# Patient Record
Sex: FEMALE | Race: WHITE | Employment: OTHER | URBAN - METROPOLITAN AREA
[De-identification: names, ages, dates, MRNs, and addresses within clinical notes are randomized per-mention and may not be internally consistent; named-entity substitution may affect disease eponyms.]

---

## 2017-07-03 ENCOUNTER — HOSPITAL ENCOUNTER (OUTPATIENT)
Dept: ULTRASOUND IMAGING | Age: 67
Discharge: HOME | End: 2017-07-03
Payer: MEDICARE

## 2017-07-03 DIAGNOSIS — M79.605 PAIN OF LEFT LOWER EXTREMITY: ICD-10-CM

## 2017-07-03 DIAGNOSIS — L53.9 REDNESS: ICD-10-CM

## 2017-07-03 PROCEDURE — 93971 EXTREMITY STUDY: CPT

## 2017-07-05 ENCOUNTER — HOSPITAL ENCOUNTER (INPATIENT)
Age: 67
LOS: 3 days | Discharge: HOME HEALTH | DRG: 580 | End: 2017-07-09
Attending: FAMILY MEDICINE | Admitting: FAMILY MEDICINE
Payer: MEDICARE

## 2017-07-05 ENCOUNTER — ANESTHESIA EVENT (OUTPATIENT)
Dept: OPERATING ROOM | Age: 67
DRG: 580 | End: 2017-07-05
Payer: MEDICARE

## 2017-07-05 LAB
ABSOLUTE BANDS #: 0.34 K/UL (ref 0–1)
ABSOLUTE EOS #: 0.09 K/UL (ref 0–0.4)
ABSOLUTE LYMPH #: 0.85 K/UL (ref 1–4.8)
ABSOLUTE MONO #: 0.51 K/UL (ref 0.1–1.3)
ANION GAP SERPL CALCULATED.3IONS-SCNC: 13 MMOL/L (ref 9–17)
BANDS: 4 %
BASOPHILS # BLD: 0 %
BASOPHILS ABSOLUTE: 0 K/UL (ref 0–0.2)
BUN BLDV-MCNC: 18 MG/DL (ref 8–23)
BUN/CREAT BLD: NORMAL (ref 9–20)
CALCIUM SERPL-MCNC: 9.8 MG/DL (ref 8.6–10.4)
CHLORIDE BLD-SCNC: 98 MMOL/L (ref 98–107)
CO2: 26 MMOL/L (ref 20–31)
CREAT SERPL-MCNC: 0.81 MG/DL (ref 0.5–0.9)
DIFFERENTIAL TYPE: ABNORMAL
EOSINOPHILS RELATIVE PERCENT: 1 %
GFR AFRICAN AMERICAN: >60 ML/MIN
GFR NON-AFRICAN AMERICAN: >60 ML/MIN
GFR SERPL CREATININE-BSD FRML MDRD: NORMAL ML/MIN/{1.73_M2}
GFR SERPL CREATININE-BSD FRML MDRD: NORMAL ML/MIN/{1.73_M2}
GLUCOSE BLD-MCNC: 94 MG/DL (ref 70–99)
HCT VFR BLD CALC: 36.5 % (ref 36–46)
HEMOGLOBIN: 12.3 G/DL (ref 12–16)
LYMPHOCYTES # BLD: 10 %
MCH RBC QN AUTO: 30.8 PG (ref 26–34)
MCHC RBC AUTO-ENTMCNC: 33.8 G/DL (ref 31–37)
MCV RBC AUTO: 91 FL (ref 80–100)
MONOCYTES # BLD: 6 %
MORPHOLOGY: ABNORMAL
PDW BLD-RTO: 15.5 % (ref 11.5–14.9)
PLATELET # BLD: 280 K/UL (ref 150–450)
PLATELET ESTIMATE: ABNORMAL
PMV BLD AUTO: 8.5 FL (ref 6–12)
POTASSIUM SERPL-SCNC: 4.8 MMOL/L (ref 3.7–5.3)
RBC # BLD: 4.01 M/UL (ref 4–5.2)
RBC # BLD: ABNORMAL 10*6/UL
SEG NEUTROPHILS: 79 %
SEGMENTED NEUTROPHILS ABSOLUTE COUNT: 6.71 K/UL (ref 1.3–9.1)
SODIUM BLD-SCNC: 137 MMOL/L (ref 135–144)
WBC # BLD: 8.5 K/UL (ref 3.5–11)
WBC # BLD: ABNORMAL 10*3/UL

## 2017-07-05 PROCEDURE — 87205 SMEAR GRAM STAIN: CPT

## 2017-07-05 PROCEDURE — G0378 HOSPITAL OBSERVATION PER HR: HCPCS

## 2017-07-05 PROCEDURE — 6370000000 HC RX 637 (ALT 250 FOR IP): Performed by: FAMILY MEDICINE

## 2017-07-05 PROCEDURE — 80048 BASIC METABOLIC PNL TOTAL CA: CPT

## 2017-07-05 PROCEDURE — 93005 ELECTROCARDIOGRAM TRACING: CPT

## 2017-07-05 PROCEDURE — 36415 COLL VENOUS BLD VENIPUNCTURE: CPT

## 2017-07-05 PROCEDURE — 87075 CULTR BACTERIA EXCEPT BLOOD: CPT

## 2017-07-05 PROCEDURE — 6360000002 HC RX W HCPCS: Performed by: FAMILY MEDICINE

## 2017-07-05 PROCEDURE — 96365 THER/PROPH/DIAG IV INF INIT: CPT

## 2017-07-05 PROCEDURE — 87070 CULTURE OTHR SPECIMN AEROBIC: CPT

## 2017-07-05 PROCEDURE — 87040 BLOOD CULTURE FOR BACTERIA: CPT

## 2017-07-05 PROCEDURE — 2580000003 HC RX 258: Performed by: FAMILY MEDICINE

## 2017-07-05 PROCEDURE — 85025 COMPLETE CBC W/AUTO DIFF WBC: CPT

## 2017-07-05 PROCEDURE — G0379 DIRECT REFER HOSPITAL OBSERV: HCPCS

## 2017-07-05 PROCEDURE — 96366 THER/PROPH/DIAG IV INF ADDON: CPT

## 2017-07-05 RX ORDER — LORAZEPAM 0.5 MG/1
0.5 TABLET ORAL 2 TIMES DAILY PRN
Status: DISCONTINUED | OUTPATIENT
Start: 2017-07-05 | End: 2017-07-09 | Stop reason: HOSPADM

## 2017-07-05 RX ORDER — ONDANSETRON 2 MG/ML
4 INJECTION INTRAMUSCULAR; INTRAVENOUS EVERY 6 HOURS PRN
Status: DISCONTINUED | OUTPATIENT
Start: 2017-07-05 | End: 2017-07-09 | Stop reason: HOSPADM

## 2017-07-05 RX ORDER — FUROSEMIDE 40 MG/1
40 TABLET ORAL DAILY PRN
Status: DISCONTINUED | OUTPATIENT
Start: 2017-07-05 | End: 2017-07-09 | Stop reason: HOSPADM

## 2017-07-05 RX ORDER — CLONIDINE HYDROCHLORIDE 0.2 MG/1
0.2 TABLET ORAL 2 TIMES DAILY
Status: DISCONTINUED | OUTPATIENT
Start: 2017-07-05 | End: 2017-07-09 | Stop reason: HOSPADM

## 2017-07-05 RX ORDER — OXYCODONE HYDROCHLORIDE AND ACETAMINOPHEN 5; 325 MG/1; MG/1
1 TABLET ORAL EVERY 6 HOURS PRN
Status: DISCONTINUED | OUTPATIENT
Start: 2017-07-05 | End: 2017-07-09 | Stop reason: HOSPADM

## 2017-07-05 RX ORDER — PANTOPRAZOLE SODIUM 40 MG/1
40 TABLET, DELAYED RELEASE ORAL
Status: DISCONTINUED | OUTPATIENT
Start: 2017-07-06 | End: 2017-07-09 | Stop reason: HOSPADM

## 2017-07-05 RX ORDER — OXYCODONE HYDROCHLORIDE 5 MG/1
5 TABLET ORAL EVERY 4 HOURS PRN
Status: DISCONTINUED | OUTPATIENT
Start: 2017-07-05 | End: 2017-07-09 | Stop reason: HOSPADM

## 2017-07-05 RX ORDER — ESCITALOPRAM OXALATE 10 MG/1
5 TABLET ORAL DAILY
Status: DISCONTINUED | OUTPATIENT
Start: 2017-07-05 | End: 2017-07-09 | Stop reason: HOSPADM

## 2017-07-05 RX ORDER — LISINOPRIL 20 MG/1
40 TABLET ORAL DAILY
Status: DISCONTINUED | OUTPATIENT
Start: 2017-07-05 | End: 2017-07-09 | Stop reason: HOSPADM

## 2017-07-05 RX ORDER — NAPROXEN 250 MG/1
500 TABLET ORAL DAILY
Status: DISCONTINUED | OUTPATIENT
Start: 2017-07-05 | End: 2017-07-09 | Stop reason: HOSPADM

## 2017-07-05 RX ORDER — ZOLPIDEM TARTRATE 5 MG/1
5 TABLET ORAL NIGHTLY PRN
Status: DISCONTINUED | OUTPATIENT
Start: 2017-07-05 | End: 2017-07-09 | Stop reason: HOSPADM

## 2017-07-05 RX ADMIN — CLONIDINE HYDROCHLORIDE 0.2 MG: 0.2 TABLET ORAL at 20:02

## 2017-07-05 RX ADMIN — VANCOMYCIN HYDROCHLORIDE 1750 MG: 1 INJECTION, POWDER, LYOPHILIZED, FOR SOLUTION INTRAVENOUS at 20:01

## 2017-07-05 RX ADMIN — METRONIDAZOLE: 7.5 CREAM TOPICAL at 20:03

## 2017-07-05 RX ADMIN — LORAZEPAM 0.5 MG: 0.5 TABLET ORAL at 21:31

## 2017-07-05 ASSESSMENT — PAIN SCALES - GENERAL
PAINLEVEL_OUTOF10: 5
PAINLEVEL_OUTOF10: 5

## 2017-07-05 ASSESSMENT — PAIN DESCRIPTION - LOCATION: LOCATION: LEG

## 2017-07-05 ASSESSMENT — PAIN DESCRIPTION - PAIN TYPE: TYPE: ACUTE PAIN

## 2017-07-06 ENCOUNTER — ANESTHESIA (OUTPATIENT)
Dept: OPERATING ROOM | Age: 67
DRG: 580 | End: 2017-07-06
Payer: MEDICARE

## 2017-07-06 VITALS — SYSTOLIC BLOOD PRESSURE: 173 MMHG | TEMPERATURE: 96.6 F | OXYGEN SATURATION: 96 % | DIASTOLIC BLOOD PRESSURE: 78 MMHG

## 2017-07-06 PROBLEM — L03.116 LEFT LEG CELLULITIS: Status: ACTIVE | Noted: 2017-07-06

## 2017-07-06 PROBLEM — L02.416 ABSCESS OF LEFT LEG EXCLUDING FOOT: Status: ACTIVE | Noted: 2017-07-06

## 2017-07-06 LAB
CREAT SERPL-MCNC: 0.83 MG/DL (ref 0.5–0.9)
EKG ATRIAL RATE: 88 BPM
EKG P AXIS: 37 DEGREES
EKG P-R INTERVAL: 162 MS
EKG Q-T INTERVAL: 350 MS
EKG QRS DURATION: 76 MS
EKG QTC CALCULATION (BAZETT): 423 MS
EKG R AXIS: -14 DEGREES
EKG T AXIS: 34 DEGREES
EKG VENTRICULAR RATE: 88 BPM
GFR AFRICAN AMERICAN: >60 ML/MIN
GFR NON-AFRICAN AMERICAN: >60 ML/MIN
GFR SERPL CREATININE-BSD FRML MDRD: NORMAL ML/MIN/{1.73_M2}
GFR SERPL CREATININE-BSD FRML MDRD: NORMAL ML/MIN/{1.73_M2}

## 2017-07-06 PROCEDURE — 87075 CULTR BACTERIA EXCEPT BLOOD: CPT

## 2017-07-06 PROCEDURE — 6370000000 HC RX 637 (ALT 250 FOR IP): Performed by: FAMILY MEDICINE

## 2017-07-06 PROCEDURE — 2580000003 HC RX 258: Performed by: FAMILY MEDICINE

## 2017-07-06 PROCEDURE — 6360000002 HC RX W HCPCS: Performed by: FAMILY MEDICINE

## 2017-07-06 PROCEDURE — 3700000000 HC ANESTHESIA ATTENDED CARE: Performed by: SURGERY

## 2017-07-06 PROCEDURE — 3600000002 HC SURGERY LEVEL 2 BASE: Performed by: SURGERY

## 2017-07-06 PROCEDURE — 7100000001 HC PACU RECOVERY - ADDTL 15 MIN: Performed by: SURGERY

## 2017-07-06 PROCEDURE — 0J9M0ZZ DRAINAGE OF LEFT UPPER LEG SUBCUTANEOUS TISSUE AND FASCIA, OPEN APPROACH: ICD-10-PCS | Performed by: SURGERY

## 2017-07-06 PROCEDURE — 36415 COLL VENOUS BLD VENIPUNCTURE: CPT

## 2017-07-06 PROCEDURE — 7100000000 HC PACU RECOVERY - FIRST 15 MIN: Performed by: SURGERY

## 2017-07-06 PROCEDURE — 82565 ASSAY OF CREATININE: CPT

## 2017-07-06 PROCEDURE — 3700000001 HC ADD 15 MINUTES (ANESTHESIA): Performed by: SURGERY

## 2017-07-06 PROCEDURE — 2580000003 HC RX 258: Performed by: ANESTHESIOLOGY

## 2017-07-06 PROCEDURE — 99222 1ST HOSP IP/OBS MODERATE 55: CPT | Performed by: FAMILY MEDICINE

## 2017-07-06 PROCEDURE — 1200000000 HC SEMI PRIVATE

## 2017-07-06 PROCEDURE — 86403 PARTICLE AGGLUT ANTBDY SCRN: CPT

## 2017-07-06 PROCEDURE — 2500000003 HC RX 250 WO HCPCS: Performed by: ANESTHESIOLOGY

## 2017-07-06 PROCEDURE — 87205 SMEAR GRAM STAIN: CPT

## 2017-07-06 PROCEDURE — 87186 SC STD MICRODIL/AGAR DIL: CPT

## 2017-07-06 PROCEDURE — 6370000000 HC RX 637 (ALT 250 FOR IP): Performed by: SURGERY

## 2017-07-06 PROCEDURE — 87070 CULTURE OTHR SPECIMN AEROBIC: CPT

## 2017-07-06 PROCEDURE — 3600000012 HC SURGERY LEVEL 2 ADDTL 15MIN: Performed by: SURGERY

## 2017-07-06 PROCEDURE — 6360000002 HC RX W HCPCS: Performed by: ANESTHESIOLOGY

## 2017-07-06 RX ORDER — MIDAZOLAM HYDROCHLORIDE 1 MG/ML
INJECTION INTRAMUSCULAR; INTRAVENOUS PRN
Status: DISCONTINUED | OUTPATIENT
Start: 2017-07-06 | End: 2017-07-06 | Stop reason: SDUPTHER

## 2017-07-06 RX ORDER — DIPHENHYDRAMINE HYDROCHLORIDE 50 MG/ML
12.5 INJECTION INTRAMUSCULAR; INTRAVENOUS
Status: DISCONTINUED | OUTPATIENT
Start: 2017-07-06 | End: 2017-07-06 | Stop reason: HOSPADM

## 2017-07-06 RX ORDER — ONDANSETRON 2 MG/ML
4 INJECTION INTRAMUSCULAR; INTRAVENOUS
Status: DISCONTINUED | OUTPATIENT
Start: 2017-07-06 | End: 2017-07-06 | Stop reason: HOSPADM

## 2017-07-06 RX ORDER — PROPOFOL 10 MG/ML
INJECTION, EMULSION INTRAVENOUS PRN
Status: DISCONTINUED | OUTPATIENT
Start: 2017-07-06 | End: 2017-07-06 | Stop reason: SDUPTHER

## 2017-07-06 RX ORDER — SODIUM CHLORIDE, SODIUM LACTATE, POTASSIUM CHLORIDE, CALCIUM CHLORIDE 600; 310; 30; 20 MG/100ML; MG/100ML; MG/100ML; MG/100ML
INJECTION, SOLUTION INTRAVENOUS CONTINUOUS PRN
Status: DISCONTINUED | OUTPATIENT
Start: 2017-07-06 | End: 2017-07-06 | Stop reason: SDUPTHER

## 2017-07-06 RX ORDER — LIDOCAINE HYDROCHLORIDE 10 MG/ML
INJECTION, SOLUTION EPIDURAL; INFILTRATION; INTRACAUDAL; PERINEURAL PRN
Status: DISCONTINUED | OUTPATIENT
Start: 2017-07-06 | End: 2017-07-06 | Stop reason: SDUPTHER

## 2017-07-06 RX ORDER — MORPHINE SULFATE 2 MG/ML
2 INJECTION, SOLUTION INTRAMUSCULAR; INTRAVENOUS EVERY 5 MIN PRN
Status: DISCONTINUED | OUTPATIENT
Start: 2017-07-06 | End: 2017-07-06 | Stop reason: HOSPADM

## 2017-07-06 RX ORDER — LABETALOL HYDROCHLORIDE 5 MG/ML
5 INJECTION, SOLUTION INTRAVENOUS EVERY 10 MIN PRN
Status: DISCONTINUED | OUTPATIENT
Start: 2017-07-06 | End: 2017-07-06 | Stop reason: HOSPADM

## 2017-07-06 RX ORDER — OXYCODONE HYDROCHLORIDE AND ACETAMINOPHEN 5; 325 MG/1; MG/1
1 TABLET ORAL EVERY 4 HOURS PRN
Status: DISCONTINUED | OUTPATIENT
Start: 2017-07-06 | End: 2017-07-09 | Stop reason: HOSPADM

## 2017-07-06 RX ORDER — ONDANSETRON 2 MG/ML
INJECTION INTRAMUSCULAR; INTRAVENOUS PRN
Status: DISCONTINUED | OUTPATIENT
Start: 2017-07-06 | End: 2017-07-06 | Stop reason: SDUPTHER

## 2017-07-06 RX ORDER — SUCCINYLCHOLINE CHLORIDE 20 MG/ML
INJECTION INTRAMUSCULAR; INTRAVENOUS PRN
Status: DISCONTINUED | OUTPATIENT
Start: 2017-07-06 | End: 2017-07-06 | Stop reason: SDUPTHER

## 2017-07-06 RX ORDER — ROCURONIUM BROMIDE 10 MG/ML
INJECTION, SOLUTION INTRAVENOUS PRN
Status: DISCONTINUED | OUTPATIENT
Start: 2017-07-06 | End: 2017-07-06 | Stop reason: SDUPTHER

## 2017-07-06 RX ORDER — FENTANYL CITRATE 50 UG/ML
INJECTION, SOLUTION INTRAMUSCULAR; INTRAVENOUS PRN
Status: DISCONTINUED | OUTPATIENT
Start: 2017-07-06 | End: 2017-07-06 | Stop reason: SDUPTHER

## 2017-07-06 RX ADMIN — ONDANSETRON 4 MG: 2 INJECTION INTRAMUSCULAR; INTRAVENOUS at 06:19

## 2017-07-06 RX ADMIN — LIDOCAINE HYDROCHLORIDE 50 MG: 10 INJECTION, SOLUTION EPIDURAL; INFILTRATION; INTRACAUDAL; PERINEURAL at 06:08

## 2017-07-06 RX ADMIN — OXYCODONE HYDROCHLORIDE AND ACETAMINOPHEN 1 TABLET: 5; 325 TABLET ORAL at 21:35

## 2017-07-06 RX ADMIN — OXYBUTYNIN CHLORIDE 15 MG: 10 TABLET, FILM COATED, EXTENDED RELEASE ORAL at 08:28

## 2017-07-06 RX ADMIN — OXYCODONE HYDROCHLORIDE 5 MG: 5 TABLET ORAL at 08:24

## 2017-07-06 RX ADMIN — FENTANYL CITRATE 100 MCG: 50 INJECTION, SOLUTION INTRAMUSCULAR; INTRAVENOUS at 06:08

## 2017-07-06 RX ADMIN — MIDAZOLAM 2 MG: 1 INJECTION INTRAMUSCULAR; INTRAVENOUS at 06:08

## 2017-07-06 RX ADMIN — LISINOPRIL 40 MG: 20 TABLET ORAL at 08:23

## 2017-07-06 RX ADMIN — ROCURONIUM BROMIDE 5 MG: 10 INJECTION INTRAVENOUS at 06:08

## 2017-07-06 RX ADMIN — SODIUM CHLORIDE, POTASSIUM CHLORIDE, SODIUM LACTATE AND CALCIUM CHLORIDE: 600; 310; 30; 20 INJECTION, SOLUTION INTRAVENOUS at 06:03

## 2017-07-06 RX ADMIN — ENOXAPARIN SODIUM 30 MG: 30 INJECTION SUBCUTANEOUS at 21:34

## 2017-07-06 RX ADMIN — SUCCINYLCHOLINE CHLORIDE 140 MG: 20 INJECTION, SOLUTION INTRAMUSCULAR; INTRAVENOUS at 06:08

## 2017-07-06 RX ADMIN — ESCITALOPRAM OXALATE 5 MG: 10 TABLET ORAL at 08:23

## 2017-07-06 RX ADMIN — LORAZEPAM 0.5 MG: 0.5 TABLET ORAL at 21:35

## 2017-07-06 RX ADMIN — OXYCODONE HYDROCHLORIDE 5 MG: 5 TABLET ORAL at 21:35

## 2017-07-06 RX ADMIN — PROPOFOL 150 MG: 10 INJECTION, EMULSION INTRAVENOUS at 06:08

## 2017-07-06 RX ADMIN — VANCOMYCIN HYDROCHLORIDE 1250 MG: 1 INJECTION, POWDER, LYOPHILIZED, FOR SOLUTION INTRAVENOUS at 10:36

## 2017-07-06 RX ADMIN — CLONIDINE HYDROCHLORIDE 0.2 MG: 0.2 TABLET ORAL at 21:34

## 2017-07-06 RX ADMIN — VANCOMYCIN HYDROCHLORIDE 1250 MG: 1 INJECTION, POWDER, LYOPHILIZED, FOR SOLUTION INTRAVENOUS at 21:35

## 2017-07-06 RX ADMIN — ROCURONIUM BROMIDE 5 MG: 10 INJECTION INTRAVENOUS at 06:15

## 2017-07-06 RX ADMIN — OXYCODONE HYDROCHLORIDE AND ACETAMINOPHEN 1 TABLET: 5; 325 TABLET ORAL at 08:24

## 2017-07-06 ASSESSMENT — PAIN DESCRIPTION - PROGRESSION
CLINICAL_PROGRESSION: GRADUALLY IMPROVING
CLINICAL_PROGRESSION: GRADUALLY IMPROVING

## 2017-07-06 ASSESSMENT — PAIN SCALES - GENERAL
PAINLEVEL_OUTOF10: 2
PAINLEVEL_OUTOF10: 6
PAINLEVEL_OUTOF10: 0
PAINLEVEL_OUTOF10: 5

## 2017-07-06 ASSESSMENT — PAIN DESCRIPTION - LOCATION: LOCATION: LEG

## 2017-07-06 ASSESSMENT — PAIN DESCRIPTION - ONSET: ONSET: GRADUAL

## 2017-07-06 ASSESSMENT — PAIN DESCRIPTION - ORIENTATION: ORIENTATION: LEFT

## 2017-07-06 ASSESSMENT — PAIN DESCRIPTION - FREQUENCY: FREQUENCY: INTERMITTENT

## 2017-07-06 ASSESSMENT — PAIN DESCRIPTION - PAIN TYPE: TYPE: SURGICAL PAIN

## 2017-07-07 LAB
ABSOLUTE EOS #: 0.4 K/UL (ref 0–0.4)
ABSOLUTE LYMPH #: 0.8 K/UL (ref 1–4.8)
ABSOLUTE MONO #: 0.5 K/UL (ref 0.1–1.3)
ANION GAP SERPL CALCULATED.3IONS-SCNC: 10 MMOL/L (ref 9–17)
BASOPHILS # BLD: 1 %
BASOPHILS ABSOLUTE: 0 K/UL (ref 0–0.2)
BUN BLDV-MCNC: 19 MG/DL (ref 8–23)
BUN/CREAT BLD: ABNORMAL (ref 9–20)
CALCIUM SERPL-MCNC: 9 MG/DL (ref 8.6–10.4)
CHLORIDE BLD-SCNC: 98 MMOL/L (ref 98–107)
CO2: 25 MMOL/L (ref 20–31)
CREAT SERPL-MCNC: 1.02 MG/DL (ref 0.5–0.9)
DIFFERENTIAL TYPE: ABNORMAL
EOSINOPHILS RELATIVE PERCENT: 6 %
GFR AFRICAN AMERICAN: >60 ML/MIN
GFR NON-AFRICAN AMERICAN: 54 ML/MIN
GFR SERPL CREATININE-BSD FRML MDRD: ABNORMAL ML/MIN/{1.73_M2}
GFR SERPL CREATININE-BSD FRML MDRD: ABNORMAL ML/MIN/{1.73_M2}
GLUCOSE BLD-MCNC: 91 MG/DL (ref 70–99)
HCT VFR BLD CALC: 35.5 % (ref 36–46)
HEMOGLOBIN: 11.7 G/DL (ref 12–16)
LYMPHOCYTES # BLD: 12 %
MCH RBC QN AUTO: 30.5 PG (ref 26–34)
MCHC RBC AUTO-ENTMCNC: 32.9 G/DL (ref 31–37)
MCV RBC AUTO: 92.7 FL (ref 80–100)
MONOCYTES # BLD: 7 %
PDW BLD-RTO: 15.2 % (ref 11.5–14.9)
PLATELET # BLD: 272 K/UL (ref 150–450)
PLATELET ESTIMATE: ABNORMAL
PMV BLD AUTO: 8 FL (ref 6–12)
POTASSIUM SERPL-SCNC: 4.9 MMOL/L (ref 3.7–5.3)
RBC # BLD: 3.83 M/UL (ref 4–5.2)
RBC # BLD: ABNORMAL 10*6/UL
SEG NEUTROPHILS: 74 %
SEGMENTED NEUTROPHILS ABSOLUTE COUNT: 5.1 K/UL (ref 1.3–9.1)
SODIUM BLD-SCNC: 133 MMOL/L (ref 135–144)
VANCOMYCIN RANDOM DATE LAST DOSE: NORMAL
VANCOMYCIN RANDOM DOSE AMOUNT: NORMAL
VANCOMYCIN RANDOM TIME LAST DOSE: 2135
VANCOMYCIN RANDOM: 19.3 UG/ML
VANCOMYCIN TROUGH DATE LAST DOSE: ABNORMAL
VANCOMYCIN TROUGH DOSE AMOUNT: ABNORMAL
VANCOMYCIN TROUGH TIME LAST DOSE: 2135
VANCOMYCIN TROUGH: 22.7 UG/ML (ref 10–20)
WBC # BLD: 6.9 K/UL (ref 3.5–11)
WBC # BLD: ABNORMAL 10*3/UL

## 2017-07-07 PROCEDURE — 99232 SBSQ HOSP IP/OBS MODERATE 35: CPT | Performed by: FAMILY MEDICINE

## 2017-07-07 PROCEDURE — 6370000000 HC RX 637 (ALT 250 FOR IP): Performed by: SURGERY

## 2017-07-07 PROCEDURE — 85025 COMPLETE CBC W/AUTO DIFF WBC: CPT

## 2017-07-07 PROCEDURE — 80048 BASIC METABOLIC PNL TOTAL CA: CPT

## 2017-07-07 PROCEDURE — 6360000002 HC RX W HCPCS: Performed by: FAMILY MEDICINE

## 2017-07-07 PROCEDURE — 36415 COLL VENOUS BLD VENIPUNCTURE: CPT

## 2017-07-07 PROCEDURE — 6370000000 HC RX 637 (ALT 250 FOR IP): Performed by: FAMILY MEDICINE

## 2017-07-07 PROCEDURE — 80202 ASSAY OF VANCOMYCIN: CPT

## 2017-07-07 PROCEDURE — 99231 SBSQ HOSP IP/OBS SF/LOW 25: CPT | Performed by: NURSE PRACTITIONER

## 2017-07-07 PROCEDURE — 1200000000 HC SEMI PRIVATE

## 2017-07-07 RX ADMIN — OXYBUTYNIN CHLORIDE 15 MG: 10 TABLET, FILM COATED, EXTENDED RELEASE ORAL at 08:47

## 2017-07-07 RX ADMIN — OXYCODONE HYDROCHLORIDE 5 MG: 5 TABLET ORAL at 20:49

## 2017-07-07 RX ADMIN — PANTOPRAZOLE SODIUM 40 MG: 40 TABLET, DELAYED RELEASE ORAL at 06:19

## 2017-07-07 RX ADMIN — CLONIDINE HYDROCHLORIDE 0.2 MG: 0.2 TABLET ORAL at 08:47

## 2017-07-07 RX ADMIN — OXYCODONE HYDROCHLORIDE AND ACETAMINOPHEN 1 TABLET: 5; 325 TABLET ORAL at 20:49

## 2017-07-07 RX ADMIN — OXYCODONE HYDROCHLORIDE 5 MG: 5 TABLET ORAL at 06:19

## 2017-07-07 RX ADMIN — ENOXAPARIN SODIUM 30 MG: 30 INJECTION SUBCUTANEOUS at 20:50

## 2017-07-07 RX ADMIN — NAPROXEN 250 MG: 250 TABLET ORAL at 08:49

## 2017-07-07 RX ADMIN — LISINOPRIL 40 MG: 20 TABLET ORAL at 08:46

## 2017-07-07 RX ADMIN — ENOXAPARIN SODIUM 30 MG: 30 INJECTION SUBCUTANEOUS at 08:46

## 2017-07-07 RX ADMIN — CLONIDINE HYDROCHLORIDE 0.2 MG: 0.2 TABLET ORAL at 20:50

## 2017-07-07 RX ADMIN — ESCITALOPRAM OXALATE 5 MG: 10 TABLET ORAL at 08:46

## 2017-07-07 RX ADMIN — OXYCODONE HYDROCHLORIDE AND ACETAMINOPHEN 1 TABLET: 5; 325 TABLET ORAL at 06:19

## 2017-07-07 ASSESSMENT — PAIN DESCRIPTION - ONSET: ONSET: GRADUAL

## 2017-07-07 ASSESSMENT — PAIN DESCRIPTION - PROGRESSION

## 2017-07-07 ASSESSMENT — PAIN SCALES - GENERAL
PAINLEVEL_OUTOF10: 4
PAINLEVEL_OUTOF10: 5
PAINLEVEL_OUTOF10: 1
PAINLEVEL_OUTOF10: 1
PAINLEVEL_OUTOF10: 5
PAINLEVEL_OUTOF10: 6
PAINLEVEL_OUTOF10: 3

## 2017-07-07 ASSESSMENT — PAIN DESCRIPTION - PAIN TYPE
TYPE: SURGICAL PAIN

## 2017-07-07 ASSESSMENT — PAIN DESCRIPTION - FREQUENCY
FREQUENCY: INTERMITTENT

## 2017-07-07 ASSESSMENT — PAIN DESCRIPTION - ORIENTATION
ORIENTATION: LEFT

## 2017-07-07 ASSESSMENT — PAIN DESCRIPTION - LOCATION
LOCATION: LEG

## 2017-07-07 ASSESSMENT — PAIN DESCRIPTION - DESCRIPTORS
DESCRIPTORS: ACHING
DESCRIPTORS: SORE

## 2017-07-08 LAB
CREAT SERPL-MCNC: 1.25 MG/DL (ref 0.5–0.9)
GFR AFRICAN AMERICAN: 52 ML/MIN
GFR NON-AFRICAN AMERICAN: 43 ML/MIN
GFR SERPL CREATININE-BSD FRML MDRD: ABNORMAL ML/MIN/{1.73_M2}
GFR SERPL CREATININE-BSD FRML MDRD: ABNORMAL ML/MIN/{1.73_M2}
VANCOMYCIN TROUGH DATE LAST DOSE: NORMAL
VANCOMYCIN TROUGH DOSE AMOUNT: NORMAL
VANCOMYCIN TROUGH TIME LAST DOSE: 2135
VANCOMYCIN TROUGH: 12 UG/ML (ref 10–20)

## 2017-07-08 PROCEDURE — 99232 SBSQ HOSP IP/OBS MODERATE 35: CPT | Performed by: FAMILY MEDICINE

## 2017-07-08 PROCEDURE — 80202 ASSAY OF VANCOMYCIN: CPT

## 2017-07-08 PROCEDURE — 6370000000 HC RX 637 (ALT 250 FOR IP): Performed by: FAMILY MEDICINE

## 2017-07-08 PROCEDURE — 36415 COLL VENOUS BLD VENIPUNCTURE: CPT

## 2017-07-08 PROCEDURE — 2580000003 HC RX 258: Performed by: SURGERY

## 2017-07-08 PROCEDURE — 6360000002 HC RX W HCPCS: Performed by: FAMILY MEDICINE

## 2017-07-08 PROCEDURE — 6360000002 HC RX W HCPCS: Performed by: SURGERY

## 2017-07-08 PROCEDURE — 1200000000 HC SEMI PRIVATE

## 2017-07-08 PROCEDURE — 82565 ASSAY OF CREATININE: CPT

## 2017-07-08 RX ORDER — SODIUM CHLORIDE 9 MG/ML
INJECTION, SOLUTION INTRAVENOUS CONTINUOUS
Status: DISCONTINUED | OUTPATIENT
Start: 2017-07-08 | End: 2017-07-09

## 2017-07-08 RX ORDER — DOCUSATE SODIUM 100 MG/1
100 CAPSULE, LIQUID FILLED ORAL 2 TIMES DAILY
Status: DISCONTINUED | OUTPATIENT
Start: 2017-07-08 | End: 2017-07-09 | Stop reason: HOSPADM

## 2017-07-08 RX ADMIN — METRONIDAZOLE: 7.5 CREAM TOPICAL at 23:13

## 2017-07-08 RX ADMIN — SODIUM CHLORIDE: 9 INJECTION, SOLUTION INTRAVENOUS at 13:20

## 2017-07-08 RX ADMIN — CLONIDINE HYDROCHLORIDE 0.2 MG: 0.2 TABLET ORAL at 23:14

## 2017-07-08 RX ADMIN — PANTOPRAZOLE SODIUM 40 MG: 40 TABLET, DELAYED RELEASE ORAL at 06:06

## 2017-07-08 RX ADMIN — DOCUSATE SODIUM 100 MG: 100 CAPSULE, LIQUID FILLED ORAL at 23:14

## 2017-07-08 RX ADMIN — ENOXAPARIN SODIUM 30 MG: 30 INJECTION SUBCUTANEOUS at 09:50

## 2017-07-08 RX ADMIN — CEFAZOLIN 1 G: 1 INJECTION, POWDER, FOR SOLUTION INTRAMUSCULAR; INTRAVENOUS at 13:20

## 2017-07-08 RX ADMIN — DOCUSATE SODIUM 100 MG: 100 CAPSULE, LIQUID FILLED ORAL at 15:56

## 2017-07-08 RX ADMIN — OXYBUTYNIN CHLORIDE 15 MG: 10 TABLET, FILM COATED, EXTENDED RELEASE ORAL at 09:48

## 2017-07-08 RX ADMIN — ENOXAPARIN SODIUM 30 MG: 30 INJECTION SUBCUTANEOUS at 23:14

## 2017-07-08 RX ADMIN — OXYCODONE HYDROCHLORIDE 5 MG: 5 TABLET ORAL at 11:43

## 2017-07-08 RX ADMIN — OXYCODONE HYDROCHLORIDE AND ACETAMINOPHEN 1 TABLET: 5; 325 TABLET ORAL at 11:43

## 2017-07-08 RX ADMIN — LISINOPRIL 40 MG: 20 TABLET ORAL at 09:48

## 2017-07-08 RX ADMIN — NAPROXEN 500 MG: 250 TABLET ORAL at 09:48

## 2017-07-08 RX ADMIN — CEFAZOLIN 1 G: 1 INJECTION, POWDER, FOR SOLUTION INTRAMUSCULAR; INTRAVENOUS at 23:13

## 2017-07-08 RX ADMIN — ESCITALOPRAM OXALATE 5 MG: 10 TABLET ORAL at 11:45

## 2017-07-08 RX ADMIN — CLONIDINE HYDROCHLORIDE 0.2 MG: 0.2 TABLET ORAL at 09:48

## 2017-07-08 RX ADMIN — FUROSEMIDE 40 MG: 40 TABLET ORAL at 09:48

## 2017-07-08 ASSESSMENT — PAIN DESCRIPTION - PROGRESSION

## 2017-07-08 ASSESSMENT — PAIN DESCRIPTION - ORIENTATION
ORIENTATION: LEFT;INNER;UPPER
ORIENTATION: LEFT;INNER;UPPER

## 2017-07-08 ASSESSMENT — PAIN SCALES - GENERAL
PAINLEVEL_OUTOF10: 6
PAINLEVEL_OUTOF10: 5
PAINLEVEL_OUTOF10: 6

## 2017-07-08 ASSESSMENT — PAIN DESCRIPTION - LOCATION
LOCATION: OTHER (COMMENT)
LOCATION: OTHER (COMMENT)

## 2017-07-08 ASSESSMENT — PAIN DESCRIPTION - PAIN TYPE: TYPE: SURGICAL PAIN

## 2017-07-09 VITALS
HEART RATE: 57 BPM | SYSTOLIC BLOOD PRESSURE: 140 MMHG | DIASTOLIC BLOOD PRESSURE: 54 MMHG | RESPIRATION RATE: 12 BRPM | OXYGEN SATURATION: 99 % | TEMPERATURE: 97.9 F | WEIGHT: 289.24 LBS | BODY MASS INDEX: 53.23 KG/M2 | HEIGHT: 62 IN

## 2017-07-09 LAB
CREAT SERPL-MCNC: 1.01 MG/DL (ref 0.5–0.9)
GFR AFRICAN AMERICAN: >60 ML/MIN
GFR NON-AFRICAN AMERICAN: 55 ML/MIN
GFR SERPL CREATININE-BSD FRML MDRD: ABNORMAL ML/MIN/{1.73_M2}
GFR SERPL CREATININE-BSD FRML MDRD: ABNORMAL ML/MIN/{1.73_M2}

## 2017-07-09 PROCEDURE — 6370000000 HC RX 637 (ALT 250 FOR IP): Performed by: FAMILY MEDICINE

## 2017-07-09 PROCEDURE — 36415 COLL VENOUS BLD VENIPUNCTURE: CPT

## 2017-07-09 PROCEDURE — 2580000003 HC RX 258: Performed by: SURGERY

## 2017-07-09 PROCEDURE — 99239 HOSP IP/OBS DSCHRG MGMT >30: CPT | Performed by: FAMILY MEDICINE

## 2017-07-09 PROCEDURE — 82565 ASSAY OF CREATININE: CPT

## 2017-07-09 PROCEDURE — 6360000002 HC RX W HCPCS: Performed by: FAMILY MEDICINE

## 2017-07-09 PROCEDURE — 6370000000 HC RX 637 (ALT 250 FOR IP): Performed by: SURGERY

## 2017-07-09 PROCEDURE — 6360000002 HC RX W HCPCS: Performed by: SURGERY

## 2017-07-09 RX ORDER — PSEUDOEPHEDRINE HCL 30 MG
100 TABLET ORAL 2 TIMES DAILY
Qty: 60 CAPSULE | Refills: 0 | Status: SHIPPED | OUTPATIENT
Start: 2017-07-09 | End: 2019-03-14 | Stop reason: ALTCHOICE

## 2017-07-09 RX ORDER — CEPHALEXIN 500 MG/1
500 CAPSULE ORAL EVERY 8 HOURS SCHEDULED
Status: DISCONTINUED | OUTPATIENT
Start: 2017-07-09 | End: 2017-07-09 | Stop reason: HOSPADM

## 2017-07-09 RX ORDER — HYDROCODONE BITARTRATE AND ACETAMINOPHEN 5; 325 MG/1; MG/1
1 TABLET ORAL EVERY 6 HOURS PRN
Qty: 30 TABLET | Refills: 0
Start: 2017-07-09 | End: 2017-07-16

## 2017-07-09 RX ORDER — CEPHALEXIN 500 MG/1
500 CAPSULE ORAL 3 TIMES DAILY
Qty: 30 CAPSULE | Refills: 0 | Status: SHIPPED | OUTPATIENT
Start: 2017-07-09 | End: 2017-07-19

## 2017-07-09 RX ADMIN — ESCITALOPRAM OXALATE 5 MG: 10 TABLET ORAL at 10:02

## 2017-07-09 RX ADMIN — PANTOPRAZOLE SODIUM 40 MG: 40 TABLET, DELAYED RELEASE ORAL at 05:28

## 2017-07-09 RX ADMIN — FUROSEMIDE 40 MG: 40 TABLET ORAL at 10:02

## 2017-07-09 RX ADMIN — SODIUM CHLORIDE: 9 INJECTION, SOLUTION INTRAVENOUS at 10:10

## 2017-07-09 RX ADMIN — OXYCODONE HYDROCHLORIDE 5 MG: 5 TABLET ORAL at 11:41

## 2017-07-09 RX ADMIN — OXYBUTYNIN CHLORIDE 15 MG: 10 TABLET, FILM COATED, EXTENDED RELEASE ORAL at 11:40

## 2017-07-09 RX ADMIN — DOCUSATE SODIUM 100 MG: 100 CAPSULE, LIQUID FILLED ORAL at 10:02

## 2017-07-09 RX ADMIN — ENOXAPARIN SODIUM 30 MG: 30 INJECTION SUBCUTANEOUS at 10:03

## 2017-07-09 RX ADMIN — LISINOPRIL 40 MG: 20 TABLET ORAL at 10:03

## 2017-07-09 RX ADMIN — CLONIDINE HYDROCHLORIDE 0.2 MG: 0.2 TABLET ORAL at 10:02

## 2017-07-09 RX ADMIN — CEPHALEXIN 500 MG: 500 CAPSULE ORAL at 13:50

## 2017-07-09 RX ADMIN — NAPROXEN 500 MG: 250 TABLET ORAL at 10:03

## 2017-07-09 RX ADMIN — OXYCODONE HYDROCHLORIDE AND ACETAMINOPHEN 1 TABLET: 5; 325 TABLET ORAL at 11:41

## 2017-07-09 RX ADMIN — CEFAZOLIN 1 G: 1 INJECTION, POWDER, FOR SOLUTION INTRAMUSCULAR; INTRAVENOUS at 05:28

## 2017-07-09 ASSESSMENT — PAIN SCALES - GENERAL
PAINLEVEL_OUTOF10: 6
PAINLEVEL_OUTOF10: 5

## 2017-07-09 ASSESSMENT — PAIN DESCRIPTION - LOCATION: LOCATION: OTHER (COMMENT)

## 2017-07-09 ASSESSMENT — PAIN DESCRIPTION - ORIENTATION: ORIENTATION: LEFT

## 2017-07-09 ASSESSMENT — PAIN DESCRIPTION - PAIN TYPE: TYPE: SURGICAL PAIN

## 2017-07-10 ENCOUNTER — HOSPITAL ENCOUNTER (OUTPATIENT)
Dept: ULTRASOUND IMAGING | Age: 67
Discharge: HOME | End: 2017-07-10
Payer: MEDICARE

## 2017-07-10 LAB
CULTURE: NORMAL
CULTURE: NORMAL
DIRECT EXAM: NORMAL
DIRECT EXAM: NORMAL
Lab: NORMAL
SPECIMEN DESCRIPTION: NORMAL
SPECIMEN DESCRIPTION: NORMAL
STATUS: NORMAL

## 2017-07-11 LAB
CULTURE: ABNORMAL
DIRECT EXAM: ABNORMAL
DIRECT EXAM: ABNORMAL
Lab: ABNORMAL
ORGANISM: ABNORMAL
SPECIMEN DESCRIPTION: ABNORMAL
SPECIMEN DESCRIPTION: ABNORMAL
STATUS: ABNORMAL

## 2017-07-12 LAB
CULTURE: NORMAL
CULTURE: NORMAL
Lab: NORMAL
Lab: NORMAL
SPECIMEN DESCRIPTION: NORMAL
SPECIMEN DESCRIPTION: NORMAL
STATUS: NORMAL

## 2017-07-17 ENCOUNTER — HOSPITAL ENCOUNTER (OUTPATIENT)
Age: 67
Setting detail: SPECIMEN
Discharge: HOME | End: 2017-07-17
Payer: MEDICARE

## 2017-07-17 LAB
ABSOLUTE EOS #: 0.2 K/UL (ref 0–0.4)
ABSOLUTE LYMPH #: 1 K/UL (ref 1–4.8)
ABSOLUTE MONO #: 0.5 K/UL (ref 0.1–1.3)
ANION GAP SERPL CALCULATED.3IONS-SCNC: 14 MMOL/L (ref 9–17)
BASOPHILS # BLD: 1 %
BASOPHILS ABSOLUTE: 0.1 K/UL (ref 0–0.2)
BUN BLDV-MCNC: 20 MG/DL (ref 8–23)
BUN/CREAT BLD: ABNORMAL (ref 9–20)
CALCIUM SERPL-MCNC: 9.4 MG/DL (ref 8.6–10.4)
CHLORIDE BLD-SCNC: 99 MMOL/L (ref 98–107)
CO2: 27 MMOL/L (ref 20–31)
CREAT SERPL-MCNC: 0.88 MG/DL (ref 0.5–0.9)
DIFFERENTIAL TYPE: ABNORMAL
EOSINOPHILS RELATIVE PERCENT: 4 %
GFR AFRICAN AMERICAN: >60 ML/MIN
GFR NON-AFRICAN AMERICAN: >60 ML/MIN
GFR SERPL CREATININE-BSD FRML MDRD: ABNORMAL ML/MIN/{1.73_M2}
GFR SERPL CREATININE-BSD FRML MDRD: ABNORMAL ML/MIN/{1.73_M2}
GLUCOSE BLD-MCNC: 67 MG/DL (ref 70–99)
HCT VFR BLD CALC: 35.2 % (ref 36–46)
HEMOGLOBIN: 11.6 G/DL (ref 12–16)
LYMPHOCYTES # BLD: 22 %
MCH RBC QN AUTO: 30 PG (ref 26–34)
MCHC RBC AUTO-ENTMCNC: 32.9 G/DL (ref 31–37)
MCV RBC AUTO: 91 FL (ref 80–100)
MONOCYTES # BLD: 11 %
PDW BLD-RTO: 14.8 % (ref 11.5–14.9)
PLATELET # BLD: 329 K/UL (ref 150–450)
PLATELET ESTIMATE: ABNORMAL
PMV BLD AUTO: 9 FL (ref 6–12)
POTASSIUM SERPL-SCNC: 4.5 MMOL/L (ref 3.7–5.3)
RBC # BLD: 3.87 M/UL (ref 4–5.2)
RBC # BLD: ABNORMAL 10*6/UL
SEG NEUTROPHILS: 62 %
SEGMENTED NEUTROPHILS ABSOLUTE COUNT: 2.9 K/UL (ref 1.3–9.1)
SODIUM BLD-SCNC: 140 MMOL/L (ref 135–144)
WBC # BLD: 4.7 K/UL (ref 3.5–11)
WBC # BLD: ABNORMAL 10*3/UL

## 2017-07-17 PROCEDURE — 36415 COLL VENOUS BLD VENIPUNCTURE: CPT

## 2017-07-17 PROCEDURE — 85025 COMPLETE CBC W/AUTO DIFF WBC: CPT

## 2017-07-17 PROCEDURE — 80048 BASIC METABOLIC PNL TOTAL CA: CPT

## 2017-09-05 ENCOUNTER — HOSPITAL ENCOUNTER (OUTPATIENT)
Dept: ULTRASOUND IMAGING | Age: 67
Discharge: HOME | End: 2017-09-05
Payer: MEDICARE

## 2017-09-05 ENCOUNTER — HOSPITAL ENCOUNTER (OUTPATIENT)
Dept: ULTRASOUND IMAGING | Age: 67
End: 2017-09-05
Payer: MEDICARE

## 2017-09-05 DIAGNOSIS — M79.89 RIGHT LEG SWELLING: ICD-10-CM

## 2017-09-05 PROCEDURE — 93971 EXTREMITY STUDY: CPT

## 2017-09-05 PROCEDURE — 93970 EXTREMITY STUDY: CPT

## 2018-01-25 PROBLEM — I48.0 PAROXYSMAL ATRIAL FIBRILLATION (HCC): Status: ACTIVE | Noted: 2018-01-25

## 2018-01-25 PROBLEM — L03.116 LEFT LEG CELLULITIS: Status: RESOLVED | Noted: 2017-07-06 | Resolved: 2018-01-25

## 2018-01-25 PROBLEM — L02.416 ABSCESS OF LEFT LEG EXCLUDING FOOT: Status: RESOLVED | Noted: 2017-07-06 | Resolved: 2018-01-25

## 2018-08-29 PROBLEM — I36.1 NON-RHEUMATIC TRICUSPID VALVE INSUFFICIENCY: Status: ACTIVE | Noted: 2018-08-29

## 2018-08-29 PROBLEM — I48.0 PAROXYSMAL ATRIAL FIBRILLATION (HCC): Status: RESOLVED | Noted: 2018-01-25 | Resolved: 2018-08-29

## 2019-01-04 ENCOUNTER — OFFICE VISIT (OUTPATIENT)
Dept: PRIMARY CARE CLINIC | Age: 69
End: 2019-01-04
Payer: MEDICARE

## 2019-01-04 VITALS
BODY MASS INDEX: 45.71 KG/M2 | RESPIRATION RATE: 16 BRPM | OXYGEN SATURATION: 98 % | WEIGHT: 248.4 LBS | HEART RATE: 92 BPM | HEIGHT: 62 IN | DIASTOLIC BLOOD PRESSURE: 88 MMHG | SYSTOLIC BLOOD PRESSURE: 134 MMHG

## 2019-01-04 DIAGNOSIS — K43.9 VENTRAL HERNIA WITHOUT OBSTRUCTION OR GANGRENE: ICD-10-CM

## 2019-01-04 DIAGNOSIS — E66.01 MORBID OBESITY (HCC): ICD-10-CM

## 2019-01-04 DIAGNOSIS — R30.0 DYSURIA: Primary | ICD-10-CM

## 2019-01-04 PROCEDURE — 99213 OFFICE O/P EST LOW 20 MIN: CPT | Performed by: FAMILY MEDICINE

## 2019-01-04 PROCEDURE — 1123F ACP DISCUSS/DSCN MKR DOCD: CPT | Performed by: FAMILY MEDICINE

## 2019-01-04 PROCEDURE — 1090F PRES/ABSN URINE INCON ASSESS: CPT | Performed by: FAMILY MEDICINE

## 2019-01-04 PROCEDURE — 3017F COLORECTAL CA SCREEN DOC REV: CPT | Performed by: FAMILY MEDICINE

## 2019-01-04 PROCEDURE — G8417 CALC BMI ABV UP PARAM F/U: HCPCS | Performed by: FAMILY MEDICINE

## 2019-01-04 PROCEDURE — 4040F PNEUMOC VAC/ADMIN/RCVD: CPT | Performed by: FAMILY MEDICINE

## 2019-01-04 PROCEDURE — 1101F PT FALLS ASSESS-DOCD LE1/YR: CPT | Performed by: FAMILY MEDICINE

## 2019-01-04 PROCEDURE — G8427 DOCREV CUR MEDS BY ELIG CLIN: HCPCS | Performed by: FAMILY MEDICINE

## 2019-01-04 PROCEDURE — G8400 PT W/DXA NO RESULTS DOC: HCPCS | Performed by: FAMILY MEDICINE

## 2019-01-04 PROCEDURE — 1036F TOBACCO NON-USER: CPT | Performed by: FAMILY MEDICINE

## 2019-01-04 PROCEDURE — G8482 FLU IMMUNIZE ORDER/ADMIN: HCPCS | Performed by: FAMILY MEDICINE

## 2019-01-04 RX ORDER — SULFAMETHOXAZOLE AND TRIMETHOPRIM 800; 160 MG/1; MG/1
1 TABLET ORAL 2 TIMES DAILY
Qty: 14 TABLET | Refills: 0 | Status: SHIPPED | OUTPATIENT
Start: 2019-01-04 | End: 2019-01-11

## 2019-01-04 ASSESSMENT — ENCOUNTER SYMPTOMS
WHEEZING: 0
SORE THROAT: 0
EYE REDNESS: 0
EYE DISCHARGE: 0
VOMITING: 0
COUGH: 0
NAUSEA: 0
ABDOMINAL PAIN: 1
ABDOMINAL DISTENTION: 1
DIARRHEA: 0
SHORTNESS OF BREATH: 0
RHINORRHEA: 0

## 2019-01-07 ENCOUNTER — TELEPHONE (OUTPATIENT)
Dept: PRIMARY CARE CLINIC | Age: 69
End: 2019-01-07

## 2019-01-09 ENCOUNTER — TELEPHONE (OUTPATIENT)
Dept: PRIMARY CARE CLINIC | Age: 69
End: 2019-01-09

## 2019-01-09 DIAGNOSIS — R30.0 DYSURIA: ICD-10-CM

## 2019-03-01 DIAGNOSIS — F41.8 DEPRESSION WITH ANXIETY: ICD-10-CM

## 2019-03-01 RX ORDER — ESCITALOPRAM OXALATE 10 MG/1
10 TABLET ORAL DAILY
Qty: 30 TABLET | Refills: 3 | Status: SHIPPED | OUTPATIENT
Start: 2019-03-01 | End: 2019-07-23 | Stop reason: SDUPTHER

## 2019-03-14 ENCOUNTER — OFFICE VISIT (OUTPATIENT)
Dept: PRIMARY CARE CLINIC | Age: 69
End: 2019-03-14
Payer: MEDICARE

## 2019-03-14 VITALS
DIASTOLIC BLOOD PRESSURE: 90 MMHG | BODY MASS INDEX: 45.8 KG/M2 | SYSTOLIC BLOOD PRESSURE: 142 MMHG | OXYGEN SATURATION: 98 % | TEMPERATURE: 99.1 F | HEART RATE: 61 BPM | WEIGHT: 250.4 LBS

## 2019-03-14 DIAGNOSIS — Z23 NEED FOR VACCINATION: ICD-10-CM

## 2019-03-14 DIAGNOSIS — M79.604 RIGHT LEG PAIN: ICD-10-CM

## 2019-03-14 DIAGNOSIS — S80.11XA HEMATOMA OF RIGHT LOWER EXTREMITY, INITIAL ENCOUNTER: Primary | ICD-10-CM

## 2019-03-14 PROCEDURE — G0009 ADMIN PNEUMOCOCCAL VACCINE: HCPCS | Performed by: PHYSICIAN ASSISTANT

## 2019-03-14 PROCEDURE — G8417 CALC BMI ABV UP PARAM F/U: HCPCS | Performed by: PHYSICIAN ASSISTANT

## 2019-03-14 PROCEDURE — 1036F TOBACCO NON-USER: CPT | Performed by: PHYSICIAN ASSISTANT

## 2019-03-14 PROCEDURE — 1101F PT FALLS ASSESS-DOCD LE1/YR: CPT | Performed by: PHYSICIAN ASSISTANT

## 2019-03-14 PROCEDURE — 4040F PNEUMOC VAC/ADMIN/RCVD: CPT | Performed by: PHYSICIAN ASSISTANT

## 2019-03-14 PROCEDURE — 90732 PPSV23 VACC 2 YRS+ SUBQ/IM: CPT | Performed by: PHYSICIAN ASSISTANT

## 2019-03-14 PROCEDURE — G8400 PT W/DXA NO RESULTS DOC: HCPCS | Performed by: PHYSICIAN ASSISTANT

## 2019-03-14 PROCEDURE — G8482 FLU IMMUNIZE ORDER/ADMIN: HCPCS | Performed by: PHYSICIAN ASSISTANT

## 2019-03-14 PROCEDURE — 1123F ACP DISCUSS/DSCN MKR DOCD: CPT | Performed by: PHYSICIAN ASSISTANT

## 2019-03-14 PROCEDURE — 1090F PRES/ABSN URINE INCON ASSESS: CPT | Performed by: PHYSICIAN ASSISTANT

## 2019-03-14 PROCEDURE — G8427 DOCREV CUR MEDS BY ELIG CLIN: HCPCS | Performed by: PHYSICIAN ASSISTANT

## 2019-03-14 PROCEDURE — 3017F COLORECTAL CA SCREEN DOC REV: CPT | Performed by: PHYSICIAN ASSISTANT

## 2019-03-14 PROCEDURE — 99213 OFFICE O/P EST LOW 20 MIN: CPT | Performed by: PHYSICIAN ASSISTANT

## 2019-03-14 ASSESSMENT — PATIENT HEALTH QUESTIONNAIRE - PHQ9
SUM OF ALL RESPONSES TO PHQ9 QUESTIONS 1 & 2: 0
2. FEELING DOWN, DEPRESSED OR HOPELESS: 0
1. LITTLE INTEREST OR PLEASURE IN DOING THINGS: 0
SUM OF ALL RESPONSES TO PHQ QUESTIONS 1-9: 0
SUM OF ALL RESPONSES TO PHQ QUESTIONS 1-9: 0

## 2019-03-20 ASSESSMENT — ENCOUNTER SYMPTOMS: SHORTNESS OF BREATH: 0

## 2019-03-22 DIAGNOSIS — M79.604 RIGHT LEG PAIN: ICD-10-CM

## 2019-03-22 DIAGNOSIS — S80.11XA HEMATOMA OF RIGHT LOWER EXTREMITY, INITIAL ENCOUNTER: ICD-10-CM

## 2019-05-20 RX ORDER — NAPROXEN 500 MG/1
TABLET ORAL
Qty: 180 TABLET | Refills: 0 | Status: SHIPPED | OUTPATIENT
Start: 2019-05-20 | End: 2019-10-21 | Stop reason: SDUPTHER

## 2019-05-28 DIAGNOSIS — I48.0 PAROXYSMAL ATRIAL FIBRILLATION (HCC): ICD-10-CM

## 2019-05-29 DIAGNOSIS — K25.9 GASTRIC ULCER, UNSPECIFIED CHRONICITY, UNSPECIFIED WHETHER GASTRIC ULCER HEMORRHAGE OR PERFORATION PRESENT: ICD-10-CM

## 2019-05-30 DIAGNOSIS — I48.0 PAROXYSMAL ATRIAL FIBRILLATION (HCC): ICD-10-CM

## 2019-05-30 RX ORDER — OMEPRAZOLE 20 MG/1
CAPSULE, DELAYED RELEASE ORAL
Qty: 90 CAPSULE | Refills: 0 | Status: SHIPPED | OUTPATIENT
Start: 2019-05-30 | End: 2019-12-02 | Stop reason: SDUPTHER

## 2019-06-17 DIAGNOSIS — S29.012A MUSCLE STRAIN OF LEFT UPPER BACK, INITIAL ENCOUNTER: ICD-10-CM

## 2019-06-17 RX ORDER — TIZANIDINE 4 MG/1
4 TABLET ORAL 3 TIMES DAILY
Qty: 60 TABLET | Refills: 1 | Status: SHIPPED | OUTPATIENT
Start: 2019-06-17 | End: 2020-05-18 | Stop reason: SDUPTHER

## 2019-07-23 DIAGNOSIS — F41.8 DEPRESSION WITH ANXIETY: ICD-10-CM

## 2019-07-23 RX ORDER — ESCITALOPRAM OXALATE 10 MG/1
10 TABLET ORAL DAILY
Qty: 30 TABLET | Refills: 0 | Status: SHIPPED | OUTPATIENT
Start: 2019-07-23 | End: 2019-10-07 | Stop reason: SDUPTHER

## 2019-08-06 ENCOUNTER — NURSE TRIAGE (OUTPATIENT)
Dept: OTHER | Facility: CLINIC | Age: 69
End: 2019-08-06

## 2019-08-06 NOTE — TELEPHONE ENCOUNTER
Reason for Disposition   Patient wants to be seen    Protocols used: LEG PAIN-ADULT-[place]    Received call from [facility] in [facility]. Patient with the following complaints:  1). Patient c/o of 4/10 pain on the pain scale to the right knee which started 3-4 weeks ago when she participated in a move from house to house and needed to bend, climb, and be more active. The patient does report a history of a right knee replacement in September of 2018. The patient does report the right knee is \"tight\" with no swelling noted or numbness/tingling or discoloration to the right knee. The patient reports when she walks it hurts. Patient using Tylenol for the pain. 2). Patient c/o of facial rash which has been present for 5-6 weeks- the patient has treated this with acne cream and cold cream but can still feel the rash \"below the skin\" in her words. There is not active red rash on the surface of the skin, but the patient can feel bumps. 3). At the end of the conversation patient also stated her left great toenail was removed and now she had numbness in that toe- she was told she had arthritis. Recommendation and care advice given and patient voices understanding. Call soft transferred to [facility] in [facility] to schedule appointment to be seen today.

## 2019-08-21 ENCOUNTER — HOSPITAL ENCOUNTER (OUTPATIENT)
Dept: GENERAL RADIOLOGY | Age: 69
Discharge: HOME | End: 2019-08-23
Payer: MEDICARE

## 2019-08-21 ENCOUNTER — OFFICE VISIT (OUTPATIENT)
Dept: PRIMARY CARE CLINIC | Age: 69
End: 2019-08-21
Payer: MEDICARE

## 2019-08-21 ENCOUNTER — HOSPITAL ENCOUNTER (OUTPATIENT)
Age: 69
Discharge: HOME | End: 2019-08-23
Payer: MEDICARE

## 2019-08-21 VITALS
HEIGHT: 62 IN | SYSTOLIC BLOOD PRESSURE: 118 MMHG | BODY MASS INDEX: 47.88 KG/M2 | DIASTOLIC BLOOD PRESSURE: 68 MMHG | WEIGHT: 260.2 LBS | OXYGEN SATURATION: 97 % | HEART RATE: 57 BPM

## 2019-08-21 DIAGNOSIS — L71.9 ACNE ROSACEA: ICD-10-CM

## 2019-08-21 DIAGNOSIS — F51.01 PRIMARY INSOMNIA: ICD-10-CM

## 2019-08-21 DIAGNOSIS — S80.01XA CONTUSION OF RIGHT KNEE, INITIAL ENCOUNTER: ICD-10-CM

## 2019-08-21 DIAGNOSIS — Z12.31 ENCOUNTER FOR SCREENING MAMMOGRAM FOR MALIGNANT NEOPLASM OF BREAST: ICD-10-CM

## 2019-08-21 DIAGNOSIS — R19.5 POSITIVE FIT (FECAL IMMUNOCHEMICAL TEST): ICD-10-CM

## 2019-08-21 DIAGNOSIS — Z23 NEED FOR VIRAL IMMUNIZATION: ICD-10-CM

## 2019-08-21 DIAGNOSIS — S80.01XA CONTUSION OF RIGHT KNEE, INITIAL ENCOUNTER: Primary | ICD-10-CM

## 2019-08-21 PROCEDURE — 73562 X-RAY EXAM OF KNEE 3: CPT

## 2019-08-21 PROCEDURE — 1123F ACP DISCUSS/DSCN MKR DOCD: CPT | Performed by: FAMILY MEDICINE

## 2019-08-21 PROCEDURE — G0008 ADMIN INFLUENZA VIRUS VAC: HCPCS | Performed by: FAMILY MEDICINE

## 2019-08-21 PROCEDURE — G8417 CALC BMI ABV UP PARAM F/U: HCPCS | Performed by: FAMILY MEDICINE

## 2019-08-21 PROCEDURE — 1090F PRES/ABSN URINE INCON ASSESS: CPT | Performed by: FAMILY MEDICINE

## 2019-08-21 PROCEDURE — 1036F TOBACCO NON-USER: CPT | Performed by: FAMILY MEDICINE

## 2019-08-21 PROCEDURE — 3017F COLORECTAL CA SCREEN DOC REV: CPT | Performed by: FAMILY MEDICINE

## 2019-08-21 PROCEDURE — 99213 OFFICE O/P EST LOW 20 MIN: CPT | Performed by: FAMILY MEDICINE

## 2019-08-21 PROCEDURE — 90662 IIV NO PRSV INCREASED AG IM: CPT | Performed by: FAMILY MEDICINE

## 2019-08-21 PROCEDURE — G8400 PT W/DXA NO RESULTS DOC: HCPCS | Performed by: FAMILY MEDICINE

## 2019-08-21 PROCEDURE — G8427 DOCREV CUR MEDS BY ELIG CLIN: HCPCS | Performed by: FAMILY MEDICINE

## 2019-08-21 PROCEDURE — 4040F PNEUMOC VAC/ADMIN/RCVD: CPT | Performed by: FAMILY MEDICINE

## 2019-08-21 RX ORDER — METRONIDAZOLE 7.5 MG/G
GEL TOPICAL
Qty: 45 G | Refills: 2 | Status: SHIPPED | OUTPATIENT
Start: 2019-08-21 | End: 2022-05-24

## 2019-08-21 RX ORDER — TRAZODONE HYDROCHLORIDE 50 MG/1
50 TABLET ORAL NIGHTLY PRN
Qty: 30 TABLET | Refills: 5 | Status: SHIPPED | OUTPATIENT
Start: 2019-08-21 | End: 2022-05-24 | Stop reason: SDUPTHER

## 2019-08-21 RX ORDER — TRAZODONE HYDROCHLORIDE 50 MG/1
TABLET ORAL
Qty: 90 TABLET | Refills: 5 | OUTPATIENT
Start: 2019-08-21

## 2019-08-21 ASSESSMENT — ENCOUNTER SYMPTOMS
DIARRHEA: 0
RHINORRHEA: 0
EYE REDNESS: 0
EYE DISCHARGE: 0
SHORTNESS OF BREATH: 0
SORE THROAT: 0
WHEEZING: 0
VOMITING: 0
ABDOMINAL PAIN: 0
NAUSEA: 0
COUGH: 0

## 2019-08-21 NOTE — PROGRESS NOTES
(fecal immunochemical test)          Plan:    pt to get colonoscopy done, understands risk of cancer and death with positve FIT test .  MetroGel for acne rosacea. X-ray right knee. Return in about 3 months (around 11/21/2019), or medicare wellness. Orders Placed This Encounter   Procedures    XR KNEE RIGHT (3 VIEWS)     Standing Status:   Future     Standing Expiration Date:   8/21/2020     Order Specific Question:   Reason for exam:     Answer:   swelling right knee    KARRI DIGITAL SCREEN W OR WO CAD BILATERAL     Standing Status:   Future     Standing Expiration Date:   10/21/2020     Order Specific Question:   Reason for exam:     Answer:   screen    INFLUENZA, HIGH DOSE, 65 YRS +, IM, PF, PREFILL SYR, 0.5ML (FLUZONE HD)     Orders Placed This Encounter   Medications    metroNIDAZOLE (METROGEL) 0.75 % gel     Sig: Apply topically 2 times daily. Dispense:  45 g     Refill:  2    traZODone (DESYREL) 50 MG tablet     Sig: Take 1 tablet by mouth nightly as needed for Sleep     Dispense:  30 tablet     Refill:  5       Patient given educationalmaterials - see patient instructions. Discussed use, benefit, and side effectsof prescribed medications. All patient questions answered. Pt voiced understanding. Reviewed health maintenance. Instructed to continue current medications, diet andexercise. Patient agreed with treatment plan. Follow up as directed.      Electronicallysigned by Phylicia oFrman MD on 8/21/2019 at 11:07 AM

## 2019-08-22 ENCOUNTER — TELEPHONE (OUTPATIENT)
Dept: PRIMARY CARE CLINIC | Age: 69
End: 2019-08-22

## 2019-08-29 DIAGNOSIS — S80.01XA CONTUSION OF RIGHT KNEE, INITIAL ENCOUNTER: Primary | ICD-10-CM

## 2019-09-11 ENCOUNTER — TELEPHONE (OUTPATIENT)
Dept: PRIMARY CARE CLINIC | Age: 69
End: 2019-09-11

## 2019-10-03 ENCOUNTER — TELEPHONE (OUTPATIENT)
Dept: PRIMARY CARE CLINIC | Age: 69
End: 2019-10-03

## 2019-10-07 ENCOUNTER — TELEPHONE (OUTPATIENT)
Dept: PRIMARY CARE CLINIC | Age: 69
End: 2019-10-07

## 2019-10-07 DIAGNOSIS — F41.8 DEPRESSION WITH ANXIETY: ICD-10-CM

## 2019-10-07 RX ORDER — SOLIFENACIN SUCCINATE 5 MG/1
10 TABLET, FILM COATED ORAL DAILY
Qty: 30 TABLET | Refills: 3 | Status: SHIPPED | OUTPATIENT
Start: 2019-10-07 | End: 2019-10-28 | Stop reason: SDUPTHER

## 2019-10-09 RX ORDER — ESCITALOPRAM OXALATE 10 MG/1
TABLET ORAL
Qty: 30 TABLET | Refills: 0 | Status: SHIPPED | OUTPATIENT
Start: 2019-10-09 | End: 2019-11-05 | Stop reason: SDUPTHER

## 2019-10-21 RX ORDER — NAPROXEN 500 MG/1
TABLET ORAL
Qty: 180 TABLET | Refills: 0 | Status: SHIPPED | OUTPATIENT
Start: 2019-10-21 | End: 2020-01-21 | Stop reason: SDUPTHER

## 2019-10-22 ENCOUNTER — TELEPHONE (OUTPATIENT)
Dept: ORTHOPEDIC SURGERY | Age: 69
End: 2019-10-22

## 2019-10-28 RX ORDER — SOLIFENACIN SUCCINATE 5 MG/1
TABLET, FILM COATED ORAL
Qty: 90 TABLET | Refills: 3 | Status: SHIPPED | OUTPATIENT
Start: 2019-10-28 | End: 2019-11-21

## 2019-10-28 RX ORDER — SOLIFENACIN SUCCINATE 5 MG/1
TABLET, FILM COATED ORAL
Qty: 30 TABLET | Refills: 3 | Status: SHIPPED | OUTPATIENT
Start: 2019-10-28 | End: 2019-10-28 | Stop reason: SDUPTHER

## 2019-11-05 DIAGNOSIS — F41.8 DEPRESSION WITH ANXIETY: ICD-10-CM

## 2019-11-05 RX ORDER — ESCITALOPRAM OXALATE 10 MG/1
TABLET ORAL
Qty: 30 TABLET | Refills: 0 | Status: SHIPPED | OUTPATIENT
Start: 2019-11-05 | End: 2019-11-21

## 2019-11-21 ENCOUNTER — HOSPITAL ENCOUNTER (OUTPATIENT)
Age: 69
Setting detail: SPECIMEN
Discharge: HOME | End: 2019-11-21
Payer: MEDICARE

## 2019-11-21 ENCOUNTER — OFFICE VISIT (OUTPATIENT)
Dept: PRIMARY CARE CLINIC | Age: 69
End: 2019-11-21
Payer: MEDICARE

## 2019-11-21 VITALS
DIASTOLIC BLOOD PRESSURE: 90 MMHG | HEART RATE: 58 BPM | OXYGEN SATURATION: 98 % | WEIGHT: 274.6 LBS | BODY MASS INDEX: 50.53 KG/M2 | SYSTOLIC BLOOD PRESSURE: 140 MMHG | HEIGHT: 62 IN

## 2019-11-21 DIAGNOSIS — F41.8 DEPRESSION WITH ANXIETY: ICD-10-CM

## 2019-11-21 DIAGNOSIS — R19.5 POSITIVE FIT (FECAL IMMUNOCHEMICAL TEST): ICD-10-CM

## 2019-11-21 DIAGNOSIS — Z00.00 ROUTINE GENERAL MEDICAL EXAMINATION AT A HEALTH CARE FACILITY: Primary | ICD-10-CM

## 2019-11-21 DIAGNOSIS — Z13.820 SCREENING FOR OSTEOPOROSIS: ICD-10-CM

## 2019-11-21 DIAGNOSIS — Z13.220 ENCOUNTER FOR LIPID SCREENING FOR CARDIOVASCULAR DISEASE: ICD-10-CM

## 2019-11-21 DIAGNOSIS — Z12.31 ENCOUNTER FOR SCREENING MAMMOGRAM FOR MALIGNANT NEOPLASM OF BREAST: ICD-10-CM

## 2019-11-21 DIAGNOSIS — Z13.6 ENCOUNTER FOR LIPID SCREENING FOR CARDIOVASCULAR DISEASE: ICD-10-CM

## 2019-11-21 PROCEDURE — 3017F COLORECTAL CA SCREEN DOC REV: CPT | Performed by: FAMILY MEDICINE

## 2019-11-21 PROCEDURE — G0439 PPPS, SUBSEQ VISIT: HCPCS | Performed by: FAMILY MEDICINE

## 2019-11-21 PROCEDURE — G8482 FLU IMMUNIZE ORDER/ADMIN: HCPCS | Performed by: FAMILY MEDICINE

## 2019-11-21 PROCEDURE — 4040F PNEUMOC VAC/ADMIN/RCVD: CPT | Performed by: FAMILY MEDICINE

## 2019-11-21 PROCEDURE — 1123F ACP DISCUSS/DSCN MKR DOCD: CPT | Performed by: FAMILY MEDICINE

## 2019-11-21 RX ORDER — SOLIFENACIN SUCCINATE 10 MG/1
10 TABLET, FILM COATED ORAL DAILY
Qty: 90 TABLET | Refills: 3 | Status: SHIPPED | OUTPATIENT
Start: 2019-11-21 | End: 2020-12-21

## 2019-11-21 RX ORDER — ESCITALOPRAM OXALATE 10 MG/1
TABLET ORAL
Qty: 90 TABLET | Refills: 3 | Status: SHIPPED | OUTPATIENT
Start: 2019-11-21 | End: 2020-11-03 | Stop reason: SDUPTHER

## 2019-11-21 ASSESSMENT — PATIENT HEALTH QUESTIONNAIRE - PHQ9
SUM OF ALL RESPONSES TO PHQ QUESTIONS 1-9: 0
SUM OF ALL RESPONSES TO PHQ QUESTIONS 1-9: 0

## 2019-12-02 DIAGNOSIS — K25.9 GASTRIC ULCER, UNSPECIFIED CHRONICITY, UNSPECIFIED WHETHER GASTRIC ULCER HEMORRHAGE OR PERFORATION PRESENT: ICD-10-CM

## 2019-12-02 RX ORDER — OMEPRAZOLE 20 MG/1
CAPSULE, DELAYED RELEASE ORAL
Qty: 90 CAPSULE | Refills: 0 | Status: SHIPPED | OUTPATIENT
Start: 2019-12-02 | End: 2020-06-02 | Stop reason: SDUPTHER

## 2019-12-10 ENCOUNTER — PROCEDURE VISIT (OUTPATIENT)
Dept: PRIMARY CARE CLINIC | Age: 69
End: 2019-12-10

## 2019-12-10 DIAGNOSIS — Z13.820 SPECIAL SCREENING FOR OSTEOPOROSIS: ICD-10-CM

## 2019-12-10 DIAGNOSIS — Z78.0 MENOPAUSE: Primary | ICD-10-CM

## 2019-12-11 DIAGNOSIS — Z13.820 SCREENING FOR OSTEOPOROSIS: ICD-10-CM

## 2020-01-21 RX ORDER — NAPROXEN 500 MG/1
TABLET ORAL
Qty: 180 TABLET | Refills: 0 | Status: SHIPPED | OUTPATIENT
Start: 2020-01-21 | End: 2020-05-18

## 2020-01-22 ENCOUNTER — OFFICE VISIT (OUTPATIENT)
Dept: PRIMARY CARE CLINIC | Age: 70
End: 2020-01-22
Payer: MEDICARE

## 2020-01-22 VITALS
HEART RATE: 68 BPM | SYSTOLIC BLOOD PRESSURE: 176 MMHG | WEIGHT: 283.2 LBS | OXYGEN SATURATION: 98 % | BODY MASS INDEX: 51.73 KG/M2 | DIASTOLIC BLOOD PRESSURE: 102 MMHG

## 2020-01-22 PROCEDURE — G8482 FLU IMMUNIZE ORDER/ADMIN: HCPCS | Performed by: NURSE PRACTITIONER

## 2020-01-22 PROCEDURE — 1123F ACP DISCUSS/DSCN MKR DOCD: CPT | Performed by: NURSE PRACTITIONER

## 2020-01-22 PROCEDURE — 1090F PRES/ABSN URINE INCON ASSESS: CPT | Performed by: NURSE PRACTITIONER

## 2020-01-22 PROCEDURE — 1036F TOBACCO NON-USER: CPT | Performed by: NURSE PRACTITIONER

## 2020-01-22 PROCEDURE — G8399 PT W/DXA RESULTS DOCUMENT: HCPCS | Performed by: NURSE PRACTITIONER

## 2020-01-22 PROCEDURE — 3017F COLORECTAL CA SCREEN DOC REV: CPT | Performed by: NURSE PRACTITIONER

## 2020-01-22 PROCEDURE — 4040F PNEUMOC VAC/ADMIN/RCVD: CPT | Performed by: NURSE PRACTITIONER

## 2020-01-22 PROCEDURE — G8427 DOCREV CUR MEDS BY ELIG CLIN: HCPCS | Performed by: NURSE PRACTITIONER

## 2020-01-22 PROCEDURE — 99214 OFFICE O/P EST MOD 30 MIN: CPT | Performed by: NURSE PRACTITIONER

## 2020-01-22 PROCEDURE — G8417 CALC BMI ABV UP PARAM F/U: HCPCS | Performed by: NURSE PRACTITIONER

## 2020-01-22 RX ORDER — BACITRACIN 500 [USP'U]/G
OINTMENT OPHTHALMIC 3 TIMES DAILY
Qty: 3.5 G | Refills: 0 | Status: SHIPPED | OUTPATIENT
Start: 2020-01-22 | End: 2020-02-01

## 2020-01-22 ASSESSMENT — ENCOUNTER SYMPTOMS
EYE PAIN: 0
VOMITING: 0
PHOTOPHOBIA: 0
COUGH: 0
SINUS PRESSURE: 1
EYE ITCHING: 1
RHINORRHEA: 0
SHORTNESS OF BREATH: 0
DIARRHEA: 0
EYE REDNESS: 0
EYE DISCHARGE: 1
SINUS PAIN: 0
WHEEZING: 0
NAUSEA: 0
SORE THROAT: 0

## 2020-01-22 NOTE — PATIENT INSTRUCTIONS
ointment:  ? Tilt your head back, and pull your lower eyelid down with one finger. ? Drop or squirt the medicine inside the lower lid. ? Close your eye for 30 to 60 seconds to let the drops or ointment move around. ? Do not touch the ointment or dropper tip to your eyelashes or any other surface. · Do not wear eye makeup or contact lenses until the stye or chalazion heals. · Do not share towels, pillows, or washcloths while you have a stye. When should you call for help? Call your doctor now or seek immediate medical care if:    · You have pain in your eye.     · You have a change in vision or loss of vision.     · Redness and swelling get much worse.    Watch closely for changes in your health, and be sure to contact your doctor if:    · Your stye does not get better in 1 week.     · Your chalazion does not start to get better after several weeks. Where can you learn more? Go to [email or web address] and sign in to your [employer] account. Enter D639 in the Knok box to learn more about \"Styes and Chalazia: Care Instructions. \"     If you do not have an account, please click on the \"Sign Up Now\" link. Current as of: May 5, 2019  Content Version: 12.3  © 6300-3748 Healthwise, Incorporated. Care instructions adapted under license by [facility] ([facility]). If you have questions about a medical condition or this instruction, always ask your healthcare professional. [place] [address] any warranty or liability for your use of this information. Patient Education        Styes and Chalazia: Care Instructions  Your Care Instructions    Styes and chalazia (say \"eyh-XUC-fdp-uh\") are both conditions that can cause swelling of the eyelid. A stye is an infection in the root of an eyelash. The infection causes a tender red lump on the edge of the eyelid. The infection can spread until the whole eyelid becomes red and inflamed.  Styes usually break open, and a tiny amount of pus drains. They usually clear up on their own in about a week, but they sometimes need treatment with antibiotics. A chalazion is a lump or cyst in the eyelid (chalazion is singular; chalazia is plural). It is caused by swelling and inflammation of deep oil glands inside the eyelid. Chalazia are usually not infected. They can take a few months to heal.  If a chalazion becomes more swollen and painful or does not go away, you may need to have it drained by your doctor. Follow-up care is a key part of your treatment and safety. Be sure to make and go to all appointments, and call your doctor if you are having problems. It's also a good idea to know your test results and keep a list of the medicines you take. How can you care for yourself at home? · Do not rub your eyes. Do not squeeze or try to open a stye or chalazion. · To help a stye or chalazion heal faster:  ? Put a warm, moist compress on your eye for 5 to 10 minutes, 3 to 6 times a day. Heat often brings a stye to a point where it drains on its own. Keep in mind that warm compresses will often increase swelling a little at first.  ? Do not use hot water or heat a wet cloth in a microwave oven. The compress may get too hot and can burn the eyelid. · Always wash your hands before and after you use a compress or touch your eyes. · If the doctor gave you antibiotic drops or ointment, use the medicine exactly as directed. Use the medicine for as long as instructed, even if your eye starts to feel better. · To put in eyedrops or ointment:  ? Tilt your head back, and pull your lower eyelid down with one finger. ? Drop or squirt the medicine inside the lower lid. ? Close your eye for 30 to 60 seconds to let the drops or ointment move around. ? Do not touch the ointment or dropper tip to your eyelashes or any other surface. · Do not wear eye makeup or contact lenses until the stye or chalazion heals.   · Do not share towels, pillows, or washcloths while

## 2020-01-23 ENCOUNTER — TELEPHONE (OUTPATIENT)
Dept: PRIMARY CARE CLINIC | Age: 70
End: 2020-01-23

## 2020-01-23 RX ORDER — ERYTHROMYCIN 5 MG/G
OINTMENT OPHTHALMIC
Qty: 1 G | Refills: 0 | Status: SHIPPED | OUTPATIENT
Start: 2020-01-23 | End: 2020-02-02

## 2020-01-23 NOTE — TELEPHONE ENCOUNTER
Pt was seen by you yesterday. The eye oint that was sent in was 85.00. Pt asking if you would try sending in something else? Ricardo santana listed.

## 2020-02-25 ENCOUNTER — TELEPHONE (OUTPATIENT)
Dept: PRIMARY CARE CLINIC | Age: 70
End: 2020-02-25

## 2020-02-25 RX ORDER — GUAIFENESIN AND CODEINE PHOSPHATE 100; 10 MG/5ML; MG/5ML
10 SOLUTION ORAL EVERY 4 HOURS PRN
Qty: 236 ML | Refills: 0 | Status: SHIPPED | OUTPATIENT
Start: 2020-02-25 | End: 2020-03-10

## 2020-02-25 NOTE — TELEPHONE ENCOUNTER
Pt called and states she has been coughing up Phlegm (green in color) since 02/21/20. She would like to know if you could send a cough syrup over to the [facility] in [place].    Please Approve/deny

## 2020-05-18 ENCOUNTER — OFFICE VISIT (OUTPATIENT)
Dept: PRIMARY CARE CLINIC | Age: 70
End: 2020-05-18
Payer: MEDICARE

## 2020-05-18 VITALS
WEIGHT: 293 LBS | HEIGHT: 62 IN | BODY MASS INDEX: 53.92 KG/M2 | SYSTOLIC BLOOD PRESSURE: 126 MMHG | OXYGEN SATURATION: 96 % | HEART RATE: 70 BPM | DIASTOLIC BLOOD PRESSURE: 76 MMHG

## 2020-05-18 PROBLEM — L97.121 NON-PRESSURE CHRONIC ULCER OF LEFT THIGH LIMITED TO BREAKDOWN OF SKIN (HCC): Status: ACTIVE | Noted: 2020-05-18

## 2020-05-18 PROCEDURE — G8399 PT W/DXA RESULTS DOCUMENT: HCPCS | Performed by: FAMILY MEDICINE

## 2020-05-18 PROCEDURE — G8427 DOCREV CUR MEDS BY ELIG CLIN: HCPCS | Performed by: FAMILY MEDICINE

## 2020-05-18 PROCEDURE — G8417 CALC BMI ABV UP PARAM F/U: HCPCS | Performed by: FAMILY MEDICINE

## 2020-05-18 PROCEDURE — 3017F COLORECTAL CA SCREEN DOC REV: CPT | Performed by: FAMILY MEDICINE

## 2020-05-18 PROCEDURE — 4040F PNEUMOC VAC/ADMIN/RCVD: CPT | Performed by: FAMILY MEDICINE

## 2020-05-18 PROCEDURE — 1090F PRES/ABSN URINE INCON ASSESS: CPT | Performed by: FAMILY MEDICINE

## 2020-05-18 PROCEDURE — 1036F TOBACCO NON-USER: CPT | Performed by: FAMILY MEDICINE

## 2020-05-18 PROCEDURE — 1123F ACP DISCUSS/DSCN MKR DOCD: CPT | Performed by: FAMILY MEDICINE

## 2020-05-18 PROCEDURE — 99214 OFFICE O/P EST MOD 30 MIN: CPT | Performed by: FAMILY MEDICINE

## 2020-05-18 RX ORDER — TOBRAMYCIN AND DEXAMETHASONE 3; 1 MG/ML; MG/ML
1 SUSPENSION/ DROPS OPHTHALMIC 4 TIMES DAILY
Qty: 10 ML | Refills: 0 | Status: SHIPPED | OUTPATIENT
Start: 2020-05-18 | End: 2020-05-28

## 2020-05-18 RX ORDER — TIZANIDINE 4 MG/1
4 TABLET ORAL 3 TIMES DAILY PRN
Qty: 60 TABLET | Refills: 3 | Status: SHIPPED | OUTPATIENT
Start: 2020-05-18 | End: 2022-05-24

## 2020-05-18 ASSESSMENT — ENCOUNTER SYMPTOMS
SHORTNESS OF BREATH: 0
DIARRHEA: 0
EYE REDNESS: 0
EYE DISCHARGE: 0
NAUSEA: 0
ABDOMINAL PAIN: 0
VOMITING: 0
RHINORRHEA: 0
COUGH: 0
WHEEZING: 0
SORE THROAT: 0

## 2020-05-18 NOTE — PROGRESS NOTES
[address] [facility] PRIMARY [facility]ARE  [address] [facility] [address]  Dept: [address] [place] [address]Ramón Garzon is a 77 y.o. female who presents today for her medical conditions/complaintsas noted below. Chief Complaint   Patient presents with    Other     4 month follow up       HPI:     HPI  Patient here for four-month follow-up. Patient states has been gaining weight with the coronavirus. Has not had blood work done recently. Denies any chest pain or shortness of breath. Patient denies any fevers or chills. States her eyes are still bothering her with itching. Patient not checking her blood pressure outside the office. Patient states the Zanaflex was helping with her muscle spasms in her neck. Patient states the ulcer on her leg has totally resolved. Patient states has been trying to follow a low-fat diet.       LDL Cholesterol (mg/dL)   Date Value   11/21/2019 105     LDL Calculated (mg/dL)   Date Value   06/07/2017 131       (goal LDL is <100)   BUN (mg/dL)   Date Value   11/21/2019 19     BP Readings from Last 3 Encounters:   05/18/20 126/76   01/22/20 (!) 176/102   11/21/19 (!) 140/90          (goal 120/80)    Past Medical History:   Diagnosis Date    Bell's palsy     Cellulitis     Hypertension       Past Surgical History:   Procedure Laterality Date    CATARACT REMOVAL Bilateral 2015    HYSTERECTOMY, TOTAL ABDOMINAL      INCISION AND DRAINAGE Left 7/6/2017    LEG INCISION AND DRAINAGE ABSCESS performed by Bri Meyer MD at [address] [place] [address] Right 09/24/2018    TOTAL KNEE ARTHROPLASTY Left 11/09/2017    VENTRAL HERNIA REPAIR  02/11/2019    5 hernias       Family History   Problem Relation Age of Onset    Cancer Mother     High Blood Pressure Father     Stroke Father     Diabetes Maternal Aunt     Cancer Other         daughter/ovarian cancer    Cancer Daughter        Social History     Tobacco Use    Smoking status: Never Smoker

## 2020-06-02 RX ORDER — OMEPRAZOLE 20 MG/1
CAPSULE, DELAYED RELEASE ORAL
Qty: 90 CAPSULE | Refills: 0 | Status: SHIPPED | OUTPATIENT
Start: 2020-06-02 | End: 2021-12-27

## 2020-06-24 ENCOUNTER — TELEPHONE (OUTPATIENT)
Dept: PRIMARY CARE CLINIC | Age: 70
End: 2020-06-24

## 2020-06-24 LAB
ANION GAP SERPL CALCULATED.3IONS-SCNC: NORMAL MMOL/L
ANTIBODY: NORMAL
BUN BLDV-MCNC: NORMAL MG/DL
BUN/CREAT BLD: NORMAL
CALCIUM SERPL-MCNC: NORMAL MG/DL
CHLORIDE BLD-SCNC: NORMAL MMOL/L
CO2: NORMAL
CREAT SERPL-MCNC: NORMAL MG/DL
GFR AFRICAN AMERICAN: NORMAL
GFR NON-AFRICAN AMERICAN: NORMAL
GLUCOSE FASTING: NORMAL
POTASSIUM SERPL-SCNC: NORMAL MMOL/L
SODIUM BLD-SCNC: NORMAL MMOL/L
T4 FREE: NORMAL

## 2020-08-31 ENCOUNTER — TELEPHONE (OUTPATIENT)
Dept: PRIMARY CARE CLINIC | Age: 70
End: 2020-08-31

## 2020-08-31 NOTE — TELEPHONE ENCOUNTER
Patient requests an appt with Dr morales for this week or early next week for leg swelling. She states she can do any day except for Tues 9/01 due to her  having another appt. Patient states she only wants to see Dr Yaneth Molina. Please call patient at [phone] to advise.

## 2020-09-09 ENCOUNTER — OFFICE VISIT (OUTPATIENT)
Dept: PRIMARY CARE CLINIC | Age: 70
End: 2020-09-09
Payer: MEDICARE

## 2020-09-09 VITALS
HEIGHT: 62 IN | OXYGEN SATURATION: 96 % | DIASTOLIC BLOOD PRESSURE: 84 MMHG | SYSTOLIC BLOOD PRESSURE: 136 MMHG | BODY MASS INDEX: 53.92 KG/M2 | WEIGHT: 293 LBS | TEMPERATURE: 97.2 F | HEART RATE: 67 BPM

## 2020-09-09 PROBLEM — L97.121 NON-PRESSURE CHRONIC ULCER OF LEFT THIGH LIMITED TO BREAKDOWN OF SKIN (HCC): Status: RESOLVED | Noted: 2020-05-18 | Resolved: 2020-09-09

## 2020-09-09 PROCEDURE — 99213 OFFICE O/P EST LOW 20 MIN: CPT | Performed by: FAMILY MEDICINE

## 2020-09-09 PROCEDURE — G8417 CALC BMI ABV UP PARAM F/U: HCPCS | Performed by: FAMILY MEDICINE

## 2020-09-09 PROCEDURE — 1036F TOBACCO NON-USER: CPT | Performed by: FAMILY MEDICINE

## 2020-09-09 PROCEDURE — 90694 VACC AIIV4 NO PRSRV 0.5ML IM: CPT | Performed by: FAMILY MEDICINE

## 2020-09-09 PROCEDURE — 3017F COLORECTAL CA SCREEN DOC REV: CPT | Performed by: FAMILY MEDICINE

## 2020-09-09 PROCEDURE — G8399 PT W/DXA RESULTS DOCUMENT: HCPCS | Performed by: FAMILY MEDICINE

## 2020-09-09 PROCEDURE — 1090F PRES/ABSN URINE INCON ASSESS: CPT | Performed by: FAMILY MEDICINE

## 2020-09-09 PROCEDURE — G8427 DOCREV CUR MEDS BY ELIG CLIN: HCPCS | Performed by: FAMILY MEDICINE

## 2020-09-09 PROCEDURE — 4040F PNEUMOC VAC/ADMIN/RCVD: CPT | Performed by: FAMILY MEDICINE

## 2020-09-09 PROCEDURE — G0008 ADMIN INFLUENZA VIRUS VAC: HCPCS | Performed by: FAMILY MEDICINE

## 2020-09-09 PROCEDURE — 1123F ACP DISCUSS/DSCN MKR DOCD: CPT | Performed by: FAMILY MEDICINE

## 2020-09-09 RX ORDER — FUROSEMIDE 20 MG/1
20 TABLET ORAL DAILY PRN
Qty: 30 TABLET | Refills: 5 | Status: SHIPPED | OUTPATIENT
Start: 2020-09-09 | End: 2020-10-19

## 2020-09-09 RX ORDER — POTASSIUM CHLORIDE 750 MG/1
10 TABLET, EXTENDED RELEASE ORAL DAILY PRN
Qty: 30 TABLET | Refills: 5 | Status: SHIPPED | OUTPATIENT
Start: 2020-09-09 | End: 2020-10-19

## 2020-09-09 ASSESSMENT — ENCOUNTER SYMPTOMS
NAUSEA: 0
ABDOMINAL PAIN: 0
EYE REDNESS: 0
EYE DISCHARGE: 0
COUGH: 0
RHINORRHEA: 0
DIARRHEA: 0
VOMITING: 0
WHEEZING: 0
SHORTNESS OF BREATH: 0
SORE THROAT: 0

## 2020-09-09 ASSESSMENT — PATIENT HEALTH QUESTIONNAIRE - PHQ9
1. LITTLE INTEREST OR PLEASURE IN DOING THINGS: 0
SUM OF ALL RESPONSES TO PHQ9 QUESTIONS 1 & 2: 0
SUM OF ALL RESPONSES TO PHQ QUESTIONS 1-9: 0
SUM OF ALL RESPONSES TO PHQ QUESTIONS 1-9: 0
2. FEELING DOWN, DEPRESSED OR HOPELESS: 0

## 2020-09-09 NOTE — PROGRESS NOTES
[address] [facility] PRIMARY CARE  [address] [address] [facility] [address]  Dept: [address] is a 77 y.o. female who presents today for her medical conditions/complaintsas noted below. Chief Complaint   Patient presents with    Edema     Right leg, tender    Flu Vaccine     High dose       HPI:     HPI  Patient states having swelling over the right leg for the past 3 weeks. Denies any previous history. No trauma. Denies any chest pain or shortness of breath. States having some pain in the leg as well. Patient is status post right knee replacement. Patient has not gotten her mammogram done. States she lost her slip.     LDL Cholesterol (mg/dL)   Date Value   11/21/2019 105     LDL Calculated (mg/dL)   Date Value   06/07/2017 131       (goal LDL is <100)   BUN (mg/dL)   Date Value   11/21/2019 19     BP Readings from Last 3 Encounters:   09/09/20 136/84   05/18/20 126/76   01/22/20 (!) 176/102          (goal 120/80)    Past Medical History:   Diagnosis Date    Bell's palsy     Cellulitis     Hypertension       Past Surgical History:   Procedure Laterality Date    CATARACT REMOVAL Bilateral 2015    HYSTERECTOMY, TOTAL ABDOMINAL      INCISION AND DRAINAGE Left 7/6/2017    LEG INCISION AND DRAINAGE ABSCESS performed by Lakisha Norris MD at [facility] Right 09/24/2018    TOTAL KNEE ARTHROPLASTY Left 11/09/2017    VENTRAL HERNIA REPAIR  02/11/2019    5 hernias       Family History   Problem Relation Age of Onset    Cancer Mother     High Blood Pressure Father     Stroke Father     Diabetes Maternal Aunt     Cancer Other         daughter/ovarian cancer    Cancer Daughter        Social History     Tobacco Use    Smoking status: Never Smoker    Smokeless tobacco: Never Used   Substance Use Topics    Alcohol use: No      Current Outpatient Medications   Medication Sig Dispense Refill    furosemide (LASIX) 20 MG tablet Take 1 tablet by mouth daily as needed (swelling) 30 tablet 5    potassium chloride (KLOR-CON M) 10 MEQ extended release tablet Take 1 tablet by mouth daily as needed (swelling) 30 tablet 5    omeprazole (PRILOSEC) 20 MG delayed release capsule TAKE 1 CAPSULE BY MOUTH EVERY NIGHT 90 capsule 0    tiZANidine (ZANAFLEX) 4 MG tablet Take 1 tablet by mouth 3 times daily as needed (spasms) 60 tablet 3    metoprolol tartrate (LOPRESSOR) 25 MG tablet TAKE 1/2 TABLET BY MOUTH TWICE DAILY 90 tablet 3    Calcium Citrate-Vitamin D 500-500 MG-UNIT PACK Take 1 by mouth daily. 30 each 2    escitalopram (LEXAPRO) 10 MG tablet TAKE 1 TABLET BY MOUTH EVERY DAY 90 tablet 3    solifenacin (VESICARE) 10 MG tablet Take 1 tablet by mouth daily 90 tablet 3    metroNIDAZOLE (METROGEL) 0.75 % gel Apply topically 2 times daily. 45 g 2    traZODone (DESYREL) 50 MG tablet Take 1 tablet by mouth nightly as needed for Sleep 30 tablet 5     No current facility-administered medications for this visit. Allergies   Allergen Reactions    Penicillins Hives       Health Maintenance   Topic Date Due    Shingles Vaccine (2 of 3) 08/13/2012    Breast cancer screen  02/15/2020    Flu vaccine (1) 09/01/2020    Annual Wellness Visit (AWV)  11/21/2020    Potassium monitoring  06/24/2021    Creatinine monitoring  06/24/2021    Colon cancer screen fecal DNA test (Cologuard)  06/17/2023    Lipid screen  11/21/2024    DTaP/Tdap/Td vaccine (3 - Td) 11/20/2025    DEXA (modify frequency per FRAX score)  Completed    Pneumococcal 65+ years Vaccine  Completed    Hepatitis C screen  Completed    Hepatitis A vaccine  Aged Out    Hepatitis B vaccine  Aged Out    Hib vaccine  Aged Out    Meningococcal (ACWY) vaccine  Aged Out       Subjective:      Review of Systems   Constitutional: Negative for chills and fever. HENT: Negative for rhinorrhea and sore throat. Eyes: Negative for discharge and redness.    Respiratory: Negative for cough, shortness of breath and wheezing. Cardiovascular: Positive for leg swelling. Negative for chest pain and palpitations. Gastrointestinal: Negative for abdominal pain, diarrhea, nausea and vomiting. Genitourinary: Negative for dysuria and frequency. Musculoskeletal: Negative for arthralgias and myalgias. Neurological: Negative for dizziness, light-headedness and headaches. Psychiatric/Behavioral: Negative for sleep disturbance. Objective:     /84   Pulse 67   Temp 97.2 °F (36.2 °C)   Ht 5' 2.04\" (1.576 m)   Wt 299 lb 3.2 oz (135.7 kg)   SpO2 96%   BMI 54.65 kg/m²   Physical Exam  Vitals signs and nursing note reviewed. Constitutional:       General: She is not in acute distress. Appearance: She is well-developed. She is not ill-appearing. HENT:      Head: Normocephalic and atraumatic. Right Ear: External ear normal.      Left Ear: External ear normal.   Eyes:      General: No scleral icterus. Right eye: No discharge. Left eye: No discharge. Conjunctiva/sclera: Conjunctivae normal.      Pupils: Pupils are equal, round, and reactive to light. Neck:      Thyroid: No thyromegaly. Trachea: No tracheal deviation. Cardiovascular:      Rate and Rhythm: Normal rate and regular rhythm. Heart sounds: Normal heart sounds. Pulmonary:      Effort: Pulmonary effort is normal. No respiratory distress. Breath sounds: Normal breath sounds. No wheezing. Musculoskeletal:         General: Swelling present. Comments: 1+ pitting edema both lower extremities but somewhat more pronounced on the right. Lymphadenopathy:      Cervical: No cervical adenopathy. Skin:     General: Skin is warm. Findings: No rash. Neurological:      Mental Status: She is alert and oriented to person, place, and time. Psychiatric:         Mood and Affect: Mood normal.         Behavior: Behavior normal.         Thought Content:  Thought content normal. Assessment:       Diagnosis Orders   1. Localized swelling of both lower legs  US DUP LOWER EXTREMITY RIGHT DANA    US DUP LOWER EXTREMITY LEFT DANA   2. Need for vaccination  INFLUENZA, QUADV, ADJUVANTED, 65 YRS =, IM, PF, PREFILL SYR, 0.5ML (FLUAD)   3. Encounter for screening mammogram for malignant neoplasm of breast  KARRI DIGITAL SCREEN W OR WO CAD BILATERAL        Plan:    Start Lasix 20 mg and potassium 10 mEq daily as needed for swelling. Venous scan of both legs ordered. Flu shot today. Mammogram ordered. Return in about 3 months (around 12/9/2020), or medicare wellness. Orders Placed This Encounter   Procedures    US DUP LOWER EXTREMITY RIGHT DANA     Standing Status:   Future     Standing Expiration Date:   9/9/2021     Order Specific Question:   Reason for exam:     Answer:   swelling    US DUP LOWER EXTREMITY LEFT DANA     Standing Status:   Future     Standing Expiration Date:   9/9/2021     Order Specific Question:   Reason for exam:     Answer:   leg swelling    KARRI DIGITAL SCREEN W OR WO CAD BILATERAL     Standing Status:   Future     Standing Expiration Date:   11/9/2021     Order Specific Question:   Reason for exam:     Answer:   screen    INFLUENZA, QUADV, ADJUVANTED, 72 YRS =, IM, PF, PREFILL SYR, 0.5ML (FLUAD)     Orders Placed This Encounter   Medications    furosemide (LASIX) 20 MG tablet     Sig: Take 1 tablet by mouth daily as needed (swelling)     Dispense:  30 tablet     Refill:  5    potassium chloride (KLOR-CON M) 10 MEQ extended release tablet     Sig: Take 1 tablet by mouth daily as needed (swelling)     Dispense:  30 tablet     Refill:  5       Patient given educationalmaterials - see patient instructions. Discussed use, benefit, and side effectsof prescribed medications. All patient questions answered. Pt voiced understanding. Reviewed health maintenance. Instructed to continue current medications, diet andexercise. Patient agreed with treatment plan.  Follow up as directed.      Electronicallysigned by Baylee Newman MD on 9/9/2020 at 11:24 AM

## 2020-09-18 ENCOUNTER — HOSPITAL ENCOUNTER (OUTPATIENT)
Dept: MAMMOGRAPHY | Age: 70
Discharge: HOME | End: 2020-09-20
Payer: MEDICARE

## 2020-09-18 ENCOUNTER — HOSPITAL ENCOUNTER (OUTPATIENT)
Dept: ULTRASOUND IMAGING | Age: 70
Discharge: HOME | End: 2020-09-20
Payer: MEDICARE

## 2020-09-18 ENCOUNTER — APPOINTMENT (OUTPATIENT)
Dept: ULTRASOUND IMAGING | Age: 70
End: 2020-09-18
Payer: MEDICARE

## 2020-09-18 PROCEDURE — 77063 BREAST TOMOSYNTHESIS BI: CPT

## 2020-09-18 PROCEDURE — 93970 EXTREMITY STUDY: CPT

## 2020-09-18 PROCEDURE — 93971 EXTREMITY STUDY: CPT

## 2020-10-19 ENCOUNTER — OFFICE VISIT (OUTPATIENT)
Dept: PRIMARY CARE CLINIC | Age: 70
End: 2020-10-19
Payer: MEDICARE

## 2020-10-19 VITALS
DIASTOLIC BLOOD PRESSURE: 80 MMHG | OXYGEN SATURATION: 94 % | TEMPERATURE: 97 F | HEIGHT: 62 IN | SYSTOLIC BLOOD PRESSURE: 128 MMHG | HEART RATE: 63 BPM | WEIGHT: 293 LBS | BODY MASS INDEX: 53.92 KG/M2

## 2020-10-19 PROCEDURE — G8399 PT W/DXA RESULTS DOCUMENT: HCPCS | Performed by: FAMILY MEDICINE

## 2020-10-19 PROCEDURE — 99213 OFFICE O/P EST LOW 20 MIN: CPT | Performed by: FAMILY MEDICINE

## 2020-10-19 PROCEDURE — 1123F ACP DISCUSS/DSCN MKR DOCD: CPT | Performed by: FAMILY MEDICINE

## 2020-10-19 PROCEDURE — 4040F PNEUMOC VAC/ADMIN/RCVD: CPT | Performed by: FAMILY MEDICINE

## 2020-10-19 PROCEDURE — 3017F COLORECTAL CA SCREEN DOC REV: CPT | Performed by: FAMILY MEDICINE

## 2020-10-19 PROCEDURE — G8427 DOCREV CUR MEDS BY ELIG CLIN: HCPCS | Performed by: FAMILY MEDICINE

## 2020-10-19 PROCEDURE — 1036F TOBACCO NON-USER: CPT | Performed by: FAMILY MEDICINE

## 2020-10-19 PROCEDURE — G8484 FLU IMMUNIZE NO ADMIN: HCPCS | Performed by: FAMILY MEDICINE

## 2020-10-19 PROCEDURE — G8417 CALC BMI ABV UP PARAM F/U: HCPCS | Performed by: FAMILY MEDICINE

## 2020-10-19 PROCEDURE — 1090F PRES/ABSN URINE INCON ASSESS: CPT | Performed by: FAMILY MEDICINE

## 2020-10-19 RX ORDER — FUROSEMIDE 40 MG/1
40 TABLET ORAL DAILY
Qty: 30 TABLET | Refills: 5 | Status: SHIPPED | OUTPATIENT
Start: 2020-10-19 | End: 2021-12-27 | Stop reason: SDUPTHER

## 2020-10-19 RX ORDER — POTASSIUM CHLORIDE 750 MG/1
10 TABLET, EXTENDED RELEASE ORAL 2 TIMES DAILY
Qty: 60 TABLET | Refills: 5 | Status: SHIPPED | OUTPATIENT
Start: 2020-10-19 | End: 2021-12-27

## 2020-10-19 ASSESSMENT — PATIENT HEALTH QUESTIONNAIRE - PHQ9
SUM OF ALL RESPONSES TO PHQ9 QUESTIONS 1 & 2: 0
2. FEELING DOWN, DEPRESSED OR HOPELESS: 0
SUM OF ALL RESPONSES TO PHQ QUESTIONS 1-9: 0
1. LITTLE INTEREST OR PLEASURE IN DOING THINGS: 0
SUM OF ALL RESPONSES TO PHQ QUESTIONS 1-9: 0
SUM OF ALL RESPONSES TO PHQ QUESTIONS 1-9: 0

## 2020-10-19 ASSESSMENT — ENCOUNTER SYMPTOMS
RHINORRHEA: 0
WHEEZING: 0
SORE THROAT: 0
SHORTNESS OF BREATH: 0
COUGH: 0
NAUSEA: 0
EYE DISCHARGE: 0
ABDOMINAL PAIN: 0
DIARRHEA: 0
VOMITING: 0
EYE REDNESS: 0

## 2020-10-19 NOTE — PROGRESS NOTES
[address] [facility] PRIMARY CARE  [address]  [facility] [address]  Dept: [address] is a 77 y.o. female who presents today for her medical conditions/complaintsas noted below. Chief Complaint   Patient presents with    Other     Follow up possible blood clot       HPI:     HPI  Patient here for follow-up. Started Xarelto for possible DVT in the right leg. Denies any chest pain or shortness of breath. States legs are still swollen. Not going down very much. Still mostly in the right, somewhat in the left. Denies any fevers or chills. Denies any other change in medication.     LDL Cholesterol (mg/dL)   Date Value   11/21/2019 105     LDL Calculated (mg/dL)   Date Value   06/07/2017 131       (goal LDL is <100)   BUN (mg/dL)   Date Value   11/21/2019 19     BP Readings from Last 3 Encounters:   10/19/20 128/80   09/09/20 136/84   05/18/20 126/76          (goal 120/80)    Past Medical History:   Diagnosis Date    Bell's palsy     Cellulitis     Hypertension       Past Surgical History:   Procedure Laterality Date    CATARACT REMOVAL Bilateral 2015    HYSTERECTOMY, TOTAL ABDOMINAL      INCISION AND DRAINAGE Left 7/6/2017    LEG INCISION AND DRAINAGE ABSCESS performed by Bartolo Verma MD at [facility] Right 09/24/2018    TOTAL KNEE ARTHROPLASTY Left 11/09/2017    VENTRAL HERNIA REPAIR  02/11/2019    5 hernias       Family History   Problem Relation Age of Onset    Cancer Mother     High Blood Pressure Father     Stroke Father     Diabetes Maternal Aunt     Cancer Other         daughter/ovarian cancer    Cancer Daughter        Social History     Tobacco Use    Smoking status: Never Smoker    Smokeless tobacco: Never Used   Substance Use Topics    Alcohol use: No      Current Outpatient Medications   Medication Sig Dispense Refill    apixaban (ELIQUIS) 5 MG TABS tablet Take 1 tablet by mouth 2 times daily 60 tablet 5    potassium chloride (KLOR-CON M) 10 MEQ extended release tablet Take 1 tablet by mouth 2 times daily 60 tablet 5    furosemide (LASIX) 40 MG tablet Take 1 tablet by mouth daily 30 tablet 5    rivaroxaban 15 & 20 MG Starter Pack Take as directed on package. 1 Package 0    omeprazole (PRILOSEC) 20 MG delayed release capsule TAKE 1 CAPSULE BY MOUTH EVERY NIGHT 90 capsule 0    tiZANidine (ZANAFLEX) 4 MG tablet Take 1 tablet by mouth 3 times daily as needed (spasms) 60 tablet 3    metoprolol tartrate (LOPRESSOR) 25 MG tablet TAKE 1/2 TABLET BY MOUTH TWICE DAILY 90 tablet 3    Calcium Citrate-Vitamin D 500-500 MG-UNIT PACK Take 1 by mouth daily. 30 each 2    escitalopram (LEXAPRO) 10 MG tablet TAKE 1 TABLET BY MOUTH EVERY DAY 90 tablet 3    solifenacin (VESICARE) 10 MG tablet Take 1 tablet by mouth daily 90 tablet 3    metroNIDAZOLE (METROGEL) 0.75 % gel Apply topically 2 times daily. 45 g 2    traZODone (DESYREL) 50 MG tablet Take 1 tablet by mouth nightly as needed for Sleep 30 tablet 5     No current facility-administered medications for this visit. Allergies   Allergen Reactions    Penicillins Hives       Health Maintenance   Topic Date Due    Shingles Vaccine (2 of 3) 08/13/2012    Annual Wellness Visit (AWV)  11/21/2020    Potassium monitoring  06/24/2021    Creatinine monitoring  06/24/2021    Breast cancer screen  09/18/2022    Colon cancer screen fecal DNA test (Cologuard)  06/17/2023    Lipid screen  11/21/2024    DTaP/Tdap/Td vaccine (3 - Td) 11/20/2025    DEXA (modify frequency per FRAX score)  Completed    Flu vaccine  Completed    Pneumococcal 65+ years Vaccine  Completed    Hepatitis C screen  Completed    Hepatitis A vaccine  Aged Out    Hepatitis B vaccine  Aged Out    Hib vaccine  Aged Out    Meningococcal (ACWY) vaccine  Aged Out       Subjective:      Review of Systems   Constitutional: Negative for chills and fever.    HENT: Negative for rhinorrhea and sore normal.         Thought Content: Thought content normal.         Assessment:       Diagnosis Orders   1. Acute deep vein thrombosis (DVT) of proximal vein of right lower extremity (HCC)  apixaban (ELIQUIS) 5 MG TABS tablet   2. Localized edema  potassium chloride (KLOR-CON M) 10 MEQ extended release tablet    furosemide (LASIX) 40 MG tablet    Basic Metabolic Panel        Plan: Will see if Eliquis is any cheaper. Option of warfarin discussed. Increase Lasix to 40 mg daily. Increase potassium to 10 mEq twice daily. BMP in 2 weeks. Return in about 3 months (around 1/19/2021). Orders Placed This Encounter   Procedures    Basic Metabolic Panel     Standing Status:   Future     Standing Expiration Date:   10/19/2021     Orders Placed This Encounter   Medications    apixaban (ELIQUIS) 5 MG TABS tablet     Sig: Take 1 tablet by mouth 2 times daily     Dispense:  60 tablet     Refill:  5    potassium chloride (KLOR-CON M) 10 MEQ extended release tablet     Sig: Take 1 tablet by mouth 2 times daily     Dispense:  60 tablet     Refill:  5    furosemide (LASIX) 40 MG tablet     Sig: Take 1 tablet by mouth daily     Dispense:  30 tablet     Refill:  5       Patient given educationalmaterials - see patient instructions. Discussed use, benefit, and side effectsof prescribed medications. All patient questions answered. Pt voiced understanding. Reviewed health maintenance. Instructed to continue current medications, diet andexercise. Patient agreed with treatment plan. Follow up as directed.      Electronicallysigned by Ashli Chicas MD on 10/19/2020 at 9:16 AM

## 2020-11-02 ENCOUNTER — NURSE TRIAGE (OUTPATIENT)
Dept: OTHER | Facility: CLINIC | Age: 70
End: 2020-11-02

## 2020-11-02 NOTE — TELEPHONE ENCOUNTER
Received call from [facility] in [facility]. The patient is having intermittent dizziness for the last week. Not a diabetic- did check her blood sugar and was 83 this morning. History of HTN and does NOT monitor blood pressures at home    Protocol recommendation shared to be seen today and care advice shared. [facility] to schedule appointment by calling the patient back. Attention Provider: Thank you for allowing me to participate in the care of your patient. The  patient was connected to triage in response to information provided to the [facility]. Please do not respond through this encounter as the response is not directed to a shared pool. Reason for Disposition   Patient wants to be seen    Answer Assessment - Initial Assessment Questions  1. DESCRIPTION: \"Describe your dizziness. \"      Comes and goes    2. LIGHTHEADED: \"Do you feel lightheaded? \" (e.g., somewhat faint, woozy, weak upon standing)      The patient has issues with standing up and is on HTN medication. Blood pressure was 120/80 at the MD office. 3. VERTIGO: \"Do you feel like either you or the room is spinning or tilting? \" (i.e. vertigo)     Is not currently but has last week    4. SEVERITY: \"How bad is it? \"  \"Do you feel like you are going to faint? \" \"Can you stand and walk? \"    - MILD - walking normally    - MODERATE - interferes with normal activities (e.g., work, school)     - SEVERE - unable to stand, requires support to walk, feels like passing out now.       mild    5. ONSET:  \"When did the dizziness begin? \"      One week ago    6. AGGRAVATING FACTORS: \"Does anything make it worse? \" (e.g., standing, change in head position)        Standing makes it worse    7. HEART RATE: \"Can you tell me your heart rate? \" \"How many beats in 15 seconds? \"  (Note: not all patients can do this)        Denies    8. CAUSE: \"What do you think is causing the dizziness? \"      Denies a cause    9.  RECURRENT SYMPTOM: \"Have you had dizziness before? \" If so, ask: \"When was the last time? \" \"What happened that time? \"      This happened on Saturday with vertigo    10. OTHER SYMPTOMS: \"Do you have any other symptoms? \" (e.g., fever, chest pain, vomiting, diarrhea, bleeding)        Denies fever, CP, nausea, vomiting, diarrhea, bleeding  11. PREGNANCY: \"Is there any chance you are pregnant? \" \"When was your last menstrual period? \"        N/a due to age    Protocols used: DIZZINESS-ADULT-OH    See above documentation

## 2020-11-03 ENCOUNTER — HOSPITAL ENCOUNTER (OUTPATIENT)
Age: 70
Setting detail: SPECIMEN
Discharge: HOME | End: 2020-11-03
Payer: MEDICARE

## 2020-11-03 ENCOUNTER — OFFICE VISIT (OUTPATIENT)
Dept: PRIMARY CARE CLINIC | Age: 70
End: 2020-11-03
Payer: MEDICARE

## 2020-11-03 VITALS
DIASTOLIC BLOOD PRESSURE: 80 MMHG | WEIGHT: 293 LBS | BODY MASS INDEX: 56.19 KG/M2 | OXYGEN SATURATION: 98 % | HEART RATE: 71 BPM | TEMPERATURE: 96.8 F | SYSTOLIC BLOOD PRESSURE: 126 MMHG

## 2020-11-03 LAB
ALBUMIN SERPL-MCNC: 4 G/DL (ref 3.5–5.2)
ALBUMIN/GLOBULIN RATIO: 0.9 (ref 1–2.5)
ALP BLD-CCNC: 82 U/L (ref 35–104)
ALT SERPL-CCNC: 12 U/L (ref 5–33)
ANION GAP SERPL CALCULATED.3IONS-SCNC: 8 MMOL/L (ref 9–17)
AST SERPL-CCNC: 21 U/L
BILIRUB SERPL-MCNC: 0.43 MG/DL (ref 0.3–1.2)
BUN BLDV-MCNC: 19 MG/DL (ref 8–23)
BUN/CREAT BLD: ABNORMAL (ref 9–20)
CALCIUM SERPL-MCNC: 9.3 MG/DL (ref 8.6–10.4)
CHLORIDE BLD-SCNC: 101 MMOL/L (ref 98–107)
CO2: 28 MMOL/L (ref 20–31)
CREAT SERPL-MCNC: 0.73 MG/DL (ref 0.5–0.9)
GFR AFRICAN AMERICAN: >60 ML/MIN
GFR NON-AFRICAN AMERICAN: >60 ML/MIN
GFR SERPL CREATININE-BSD FRML MDRD: ABNORMAL ML/MIN/{1.73_M2}
GFR SERPL CREATININE-BSD FRML MDRD: ABNORMAL ML/MIN/{1.73_M2}
GLUCOSE BLD-MCNC: 80 MG/DL (ref 70–99)
POTASSIUM SERPL-SCNC: 4.7 MMOL/L (ref 3.7–5.3)
SODIUM BLD-SCNC: 137 MMOL/L (ref 135–144)
TOTAL PROTEIN: 8.3 G/DL (ref 6.4–8.3)

## 2020-11-03 PROCEDURE — G8417 CALC BMI ABV UP PARAM F/U: HCPCS | Performed by: PHYSICIAN ASSISTANT

## 2020-11-03 PROCEDURE — 1123F ACP DISCUSS/DSCN MKR DOCD: CPT | Performed by: PHYSICIAN ASSISTANT

## 2020-11-03 PROCEDURE — 4040F PNEUMOC VAC/ADMIN/RCVD: CPT | Performed by: PHYSICIAN ASSISTANT

## 2020-11-03 PROCEDURE — 3017F COLORECTAL CA SCREEN DOC REV: CPT | Performed by: PHYSICIAN ASSISTANT

## 2020-11-03 PROCEDURE — G8484 FLU IMMUNIZE NO ADMIN: HCPCS | Performed by: PHYSICIAN ASSISTANT

## 2020-11-03 PROCEDURE — 1090F PRES/ABSN URINE INCON ASSESS: CPT | Performed by: PHYSICIAN ASSISTANT

## 2020-11-03 PROCEDURE — G8399 PT W/DXA RESULTS DOCUMENT: HCPCS | Performed by: PHYSICIAN ASSISTANT

## 2020-11-03 PROCEDURE — 1036F TOBACCO NON-USER: CPT | Performed by: PHYSICIAN ASSISTANT

## 2020-11-03 PROCEDURE — G8427 DOCREV CUR MEDS BY ELIG CLIN: HCPCS | Performed by: PHYSICIAN ASSISTANT

## 2020-11-03 PROCEDURE — 99214 OFFICE O/P EST MOD 30 MIN: CPT | Performed by: PHYSICIAN ASSISTANT

## 2020-11-03 RX ORDER — ESCITALOPRAM OXALATE 20 MG/1
TABLET ORAL
Qty: 30 TABLET | Refills: 0 | Status: SHIPPED | OUTPATIENT
Start: 2020-11-03 | End: 2021-01-04 | Stop reason: SDUPTHER

## 2020-11-03 RX ORDER — MECLIZINE HYDROCHLORIDE 25 MG/1
25 TABLET ORAL 3 TIMES DAILY PRN
Qty: 15 TABLET | Refills: 0 | Status: SHIPPED | OUTPATIENT
Start: 2020-11-03 | End: 2020-11-13

## 2020-11-03 ASSESSMENT — ENCOUNTER SYMPTOMS
ABDOMINAL DISTENTION: 0
RHINORRHEA: 0
ABDOMINAL PAIN: 0
SHORTNESS OF BREATH: 0
SINUS PRESSURE: 0
PHOTOPHOBIA: 0
COUGH: 0
VOMITING: 0
SORE THROAT: 0
EYE DISCHARGE: 0
DIARRHEA: 0
CHEST TIGHTNESS: 0
CONSTIPATION: 0

## 2020-11-03 NOTE — PROGRESS NOTES
Family History   Problem Relation Age of Onset    Cancer Mother     High Blood Pressure Father     Stroke Father     Diabetes Maternal Aunt     Cancer Other         daughter/ovarian cancer    Cancer Daughter        Social History     Tobacco Use    Smoking status: Never Smoker    Smokeless tobacco: Never Used   Substance Use Topics    Alcohol use: No      Current Outpatient Medications   Medication Sig Dispense Refill    meclizine (ANTIVERT) 25 MG tablet Take 1 tablet by mouth 3 times daily as needed for Dizziness 15 tablet 0    escitalopram (LEXAPRO) 20 MG tablet TAKE 1 TABLET BY MOUTH EVERY DAY 30 tablet 0    carbamide peroxide (DEBROX) 6.5 % otic solution Place 5 drops in ear(s) 2 times daily 15 mL 0    apixaban (ELIQUIS) 5 MG TABS tablet Take 1 tablet by mouth 2 times daily 60 tablet 5    potassium chloride (KLOR-CON M) 10 MEQ extended release tablet Take 1 tablet by mouth 2 times daily 60 tablet 5    furosemide (LASIX) 40 MG tablet Take 1 tablet by mouth daily 30 tablet 5    rivaroxaban 15 & 20 MG Starter Pack Take as directed on package. 1 Package 0    omeprazole (PRILOSEC) 20 MG delayed release capsule TAKE 1 CAPSULE BY MOUTH EVERY NIGHT 90 capsule 0    tiZANidine (ZANAFLEX) 4 MG tablet Take 1 tablet by mouth 3 times daily as needed (spasms) 60 tablet 3    metoprolol tartrate (LOPRESSOR) 25 MG tablet TAKE 1/2 TABLET BY MOUTH TWICE DAILY 90 tablet 3    Calcium Citrate-Vitamin D 500-500 MG-UNIT PACK Take 1 by mouth daily. 30 each 2    solifenacin (VESICARE) 10 MG tablet Take 1 tablet by mouth daily 90 tablet 3    metroNIDAZOLE (METROGEL) 0.75 % gel Apply topically 2 times daily. 45 g 2    traZODone (DESYREL) 50 MG tablet Take 1 tablet by mouth nightly as needed for Sleep 30 tablet 5     No current facility-administered medications for this visit.       Allergies   Allergen Reactions    Penicillins Hives       Health Maintenance   Topic Date Due    Shingles Vaccine (2 of 3) 08/13/2012    Annual Wellness Visit (AWV)  11/21/2020    Potassium monitoring  06/24/2021    Creatinine monitoring  06/24/2021    Breast cancer screen  09/18/2022    Colon cancer screen fecal DNA test (Cologuard)  06/17/2023    Lipid screen  11/21/2024    DTaP/Tdap/Td vaccine (3 - Td) 11/20/2025    DEXA (modify frequency per FRAX score)  Completed    Flu vaccine  Completed    Pneumococcal 65+ years Vaccine  Completed    Hepatitis C screen  Completed    Hepatitis A vaccine  Aged Out    Hepatitis B vaccine  Aged Out    Hib vaccine  Aged Out    Meningococcal (ACWY) vaccine  Aged Out       Subjective:      Review of Systems   Constitutional: Negative for chills, fever and unexpected weight change. HENT: Negative for congestion, hearing loss, rhinorrhea, sinus pressure and sore throat. Eyes: Negative for photophobia, discharge and visual disturbance. Respiratory: Negative for cough, chest tightness and shortness of breath. Cardiovascular: Negative for chest pain, palpitations and leg swelling. Gastrointestinal: Negative for abdominal distention, abdominal pain, constipation, diarrhea and vomiting. Endocrine: Negative for polydipsia and polyuria. Genitourinary: Negative for decreased urine volume, difficulty urinating, frequency and urgency. Musculoskeletal: Negative for arthralgias, gait problem and myalgias. Skin: Negative for rash. Allergic/Immunologic: Negative for food allergies. Neurological: Positive for dizziness, light-headedness and headaches (at times but it is her normal. ). Negative for weakness and numbness. Hematological: Negative for adenopathy. Psychiatric/Behavioral: Negative for dysphoric mood and sleep disturbance. The patient is not nervous/anxious.         Objective:     /80 (Site: Right Upper Arm, Position: Standing)   Pulse 71   Temp 96.8 °F (36 °C)   Wt (!) 307 lb 9.6 oz (139.5 kg)   SpO2 98%   BMI 56.19 kg/m²   Physical Exam  Constitutional: educated to go to the ER with any signs symptoms of chest pain palpitations or stroke symptoms. Return for Follow up if symptoms persist or worsen. Orders Placed This Encounter   Procedures    Comprehensive Metabolic Panel     Standing Status:   Future     Standing Expiration Date:   11/3/2021     Orders Placed This Encounter   Medications    meclizine (ANTIVERT) 25 MG tablet     Sig: Take 1 tablet by mouth 3 times daily as needed for Dizziness     Dispense:  15 tablet     Refill:  0    escitalopram (LEXAPRO) 20 MG tablet     Sig: TAKE 1 TABLET BY MOUTH EVERY DAY     Dispense:  30 tablet     Refill:  0    carbamide peroxide (DEBROX) 6.5 % otic solution     Sig: Place 5 drops in ear(s) 2 times daily     Dispense:  15 mL     Refill:  0       Patient given educationalmaterials - see patient instructions. Discussed use, benefit, and side effectsof prescribed medications. All patient questions answered. Pt voiced understanding. Reviewed health maintenance. Instructed to continue current medications, diet andexercise. Patient agreed with treatment plan. Follow up as directed.      Electronicallysigned by IZA Renteria on 11/3/2020 at 10:54 AM

## 2020-12-21 ENCOUNTER — OFFICE VISIT (OUTPATIENT)
Dept: PRIMARY CARE CLINIC | Age: 70
End: 2020-12-21
Payer: MEDICARE

## 2020-12-21 VITALS
HEIGHT: 62 IN | WEIGHT: 293 LBS | DIASTOLIC BLOOD PRESSURE: 80 MMHG | BODY MASS INDEX: 53.92 KG/M2 | HEART RATE: 65 BPM | TEMPERATURE: 96.9 F | OXYGEN SATURATION: 92 % | SYSTOLIC BLOOD PRESSURE: 130 MMHG

## 2020-12-21 PROBLEM — K43.9 VENTRAL HERNIA: Status: RESOLVED | Noted: 2019-01-04 | Resolved: 2020-12-21

## 2020-12-21 PROCEDURE — G8484 FLU IMMUNIZE NO ADMIN: HCPCS | Performed by: FAMILY MEDICINE

## 2020-12-21 PROCEDURE — 3017F COLORECTAL CA SCREEN DOC REV: CPT | Performed by: FAMILY MEDICINE

## 2020-12-21 PROCEDURE — 1123F ACP DISCUSS/DSCN MKR DOCD: CPT | Performed by: FAMILY MEDICINE

## 2020-12-21 PROCEDURE — G0439 PPPS, SUBSEQ VISIT: HCPCS | Performed by: FAMILY MEDICINE

## 2020-12-21 PROCEDURE — 4040F PNEUMOC VAC/ADMIN/RCVD: CPT | Performed by: FAMILY MEDICINE

## 2020-12-21 RX ORDER — SOLIFENACIN SUCCINATE 10 MG/1
10 TABLET, FILM COATED ORAL DAILY
Qty: 90 TABLET | Refills: 3 | Status: SHIPPED | OUTPATIENT
Start: 2020-12-21 | End: 2021-01-04 | Stop reason: SDUPTHER

## 2020-12-21 SDOH — ECONOMIC STABILITY: FOOD INSECURITY: WITHIN THE PAST 12 MONTHS, YOU WORRIED THAT YOUR FOOD WOULD RUN OUT BEFORE YOU GOT MONEY TO BUY MORE.: NEVER TRUE

## 2020-12-21 SDOH — ECONOMIC STABILITY: TRANSPORTATION INSECURITY
IN THE PAST 12 MONTHS, HAS LACK OF TRANSPORTATION KEPT YOU FROM MEETINGS, WORK, OR FROM GETTING THINGS NEEDED FOR DAILY LIVING?: NO

## 2020-12-21 SDOH — ECONOMIC STABILITY: INCOME INSECURITY: HOW HARD IS IT FOR YOU TO PAY FOR THE VERY BASICS LIKE FOOD, HOUSING, MEDICAL CARE, AND HEATING?: SOMEWHAT HARD

## 2020-12-21 SDOH — ECONOMIC STABILITY: TRANSPORTATION INSECURITY
IN THE PAST 12 MONTHS, HAS THE LACK OF TRANSPORTATION KEPT YOU FROM MEDICAL APPOINTMENTS OR FROM GETTING MEDICATIONS?: NO

## 2020-12-21 SDOH — ECONOMIC STABILITY: FOOD INSECURITY: WITHIN THE PAST 12 MONTHS, THE FOOD YOU BOUGHT JUST DIDN'T LAST AND YOU DIDN'T HAVE MONEY TO GET MORE.: NEVER TRUE

## 2020-12-21 ASSESSMENT — PATIENT HEALTH QUESTIONNAIRE - PHQ9
1. LITTLE INTEREST OR PLEASURE IN DOING THINGS: 0
SUM OF ALL RESPONSES TO PHQ QUESTIONS 1-9: 0
SUM OF ALL RESPONSES TO PHQ9 QUESTIONS 1 & 2: 0
SUM OF ALL RESPONSES TO PHQ QUESTIONS 1-9: 0
2. FEELING DOWN, DEPRESSED OR HOPELESS: 0
SUM OF ALL RESPONSES TO PHQ QUESTIONS 1-9: 0

## 2020-12-21 NOTE — PROGRESS NOTES
Past Surgical History:   Procedure Laterality Date    CATARACT REMOVAL Bilateral 2015    HYSTERECTOMY, TOTAL ABDOMINAL  1990    INCISION AND DRAINAGE Left 07/06/2017    LEG INCISION AND DRAINAGE ABSCESS performed by Kurt Quinn MD at [address] Right 09/24/2018    TOTAL KNEE ARTHROPLASTY Left 11/09/2017    VENTRAL HERNIA REPAIR  02/11/2019    5 hernias         Family History   Problem Relation Age of Onset    Cancer Mother     High Blood Pressure Father     Stroke Father     Diabetes Maternal Aunt     Cancer Other         daughter/ovarian cancer    Cancer Daughter        CareTeam (Including outside providers/suppliers regularly involved in providing care):   Patient Care Team:  Eitan Cedeño MD as PCP - General (Family Medicine)  [facility]    Wt Readings from Last 3 Encounters:   12/21/20 (!) 310 lb 9.6 oz (140.9 kg)   11/03/20 (!) 307 lb 9.6 oz (139.5 kg)   10/19/20 (!) 304 lb (137.9 kg)     Vitals:    12/21/20 1306   BP: 130/80   Pulse: 65   Temp: 96.9 °F (36.1 °C)   SpO2: 92%   Weight: (!) 310 lb 9.6 oz (140.9 kg)   Height: 5' 2.04\" (1.576 m)     Body mass index is 56.74 kg/m². Based upon direct observation of the patient, evaluation of cognition reveals recent and remote memory intact.     General Appearance: alert and oriented to person, place and time, well developed and well- nourished, in no acute distress  Skin: warm and dry, no rash or erythema  Head: normocephalic and atraumatic  Eyes: pupils equal, round, and reactive to light, extraocular eye movements intact, conjunctivae normal  ENT: tympanic membrane, external ear and ear canal normal bilaterally, nose without deformity, nasal mucosa and turbinates normal without polyps  Neck: supple and non-tender without mass, no thyromegaly or thyroid nodules, no cervical lymphadenopathy Do you or your family notice any trouble with your hearing?: No  Do you have difficulty driving, watching TV, or doing any of your daily activities because of your eyesight?: No  Have you had an eye exam within the past year?: (!) No  Hearing/Vision Interventions:  · No concerns     Safety:  Safety  Do you have working smoke detectors?: Yes  Have all throw rugs been removed or fastened?: (!) No  Do you have non-slip mats or surfaces in all bathtubs/showers?: Yes  Do all of your stairways have a railing or banister?: Yes  Are your doorways, halls and stairs free of clutter?: Yes  Do you always fasten your seatbelt when you are in a car?: Yes  Safety Interventions:  · Patient declines any further evaluation/treatment for this issue     Personalized Preventive Plan   Current Health Maintenance Status  Immunization History   Administered Date(s) Administered    DTaP 07/30/2004    Influenza Virus Vaccine 09/29/2008, 10/20/2011, 09/14/2012, 07/26/2013, 08/23/2013, 08/14/2014, 10/01/2014, 08/25/2015, 11/22/2016    Influenza, High Dose (Fluzone 65 yrs and older) 09/05/2017, 08/29/2018, 08/21/2019    Influenza, Quadv, adjuvanted, 65 yrs +, IM, PF (Fluad) 09/09/2020    Pneumococcal Conjugate 13-valent (Richie Balm) 03/01/2016    Pneumococcal Polysaccharide ([id number]) 04/07/2008, 03/14/2019    Tdap (Boostrix, Adacel) 11/20/2015    Zoster Live (Zostavax) 06/18/2012        Health Maintenance   Topic Date Due    Shingles Vaccine (2 of 3) 08/13/2012    Annual Wellness Visit (AWV)  05/29/2019    Potassium monitoring  11/03/2021    Creatinine monitoring  11/03/2021    Breast cancer screen  09/18/2022    Colon cancer screen fecal DNA test (Cologuard)  06/17/2023    Lipid screen  11/21/2024    DTaP/Tdap/Td vaccine (3 - Td) 11/20/2025    DEXA (modify frequency per FRAX score)  Completed    Flu vaccine  Completed    Pneumococcal 65+ years Vaccine  Completed    Hepatitis C screen  Completed  Hepatitis A vaccine  Aged Out    Hepatitis B vaccine  Aged Out    Hib vaccine  Aged Out    Meningococcal (ACWY) vaccine  Aged Out     Recommendations for [employer] Due: see orders and patient instructions/AVS.  . Recommended screening schedule for the next 5-10 years is provided to the patient in written form: see Patient Gabriela Dutta was seen today for medicare aw. Diagnoses and all orders for this visit:    Routine general medical examination at a health care facility    Deep vein thrombosis (DVT) of proximal vein of right lower extremity, unspecified chronicity ([facility] [address]  -     US DUP LOWER EXTREMITY RIGHT DANA; Future    Other orders  -     solifenacin (VESICARE) 10 MG tablet; Take 1 tablet by mouth daily           Patient no longer taking blood thinners, states cannot afford them. Renew Vesicare. Repeat venous scan right leg.

## 2020-12-21 NOTE — PATIENT INSTRUCTIONS
Personalized Preventive Plan for Mar Champion - 12/21/2020  Medicare offers a range of preventive health benefits. Some of the tests and screenings are paid in full while other may be subject to a deductible, co-insurance, and/or copay. Some of these benefits include a comprehensive review of your medical history including lifestyle, illnesses that may run in your family, and various assessments and screenings as appropriate. After reviewing your medical record and screening and assessments performed today your provider may have ordered immunizations, labs, imaging, and/or referrals for you. A list of these orders (if applicable) as well as your Preventive Care list are included within your After Visit Summary for your review. Other Preventive Recommendations:    · A preventive eye exam performed by an eye specialist is recommended every 1-2 years to screen for glaucoma; cataracts, macular degeneration, and other eye disorders. · A preventive dental visit is recommended every 6 months. · Try to get at least 150 minutes of exercise per week or 10,000 steps per day on a pedometer . · Order or download the FREE \"Exercise & Physical Activity: Your Everyday Guide\" from [employer]. Call 5-257.564.4260 or search [employer] online. · You need 5787-6638 mg of calcium and 7555-7952 IU of vitamin D per day. It is possible to meet your calcium requirement with diet alone, but a vitamin D supplement is usually necessary to meet this goal.  · When exposed to the sun, use a sunscreen that protects against both UVA and UVB radiation with an SPF of 30 or greater. Reapply every 2 to 3 hours or after sweating, drying off with a towel, or swimming. · Always wear a seat belt when traveling in a car. Always wear a helmet when riding a bicycle or motorcycle.

## 2020-12-23 ENCOUNTER — HOSPITAL ENCOUNTER (OUTPATIENT)
Dept: ULTRASOUND IMAGING | Age: 70
Discharge: HOME | End: 2020-12-25
Payer: MEDICARE

## 2020-12-23 PROCEDURE — 93971 EXTREMITY STUDY: CPT

## 2021-01-04 DIAGNOSIS — F41.8 DEPRESSION WITH ANXIETY: ICD-10-CM

## 2021-01-04 RX ORDER — SOLIFENACIN SUCCINATE 10 MG/1
10 TABLET, FILM COATED ORAL DAILY
Qty: 90 TABLET | Refills: 3 | Status: SHIPPED | OUTPATIENT
Start: 2021-01-04 | End: 2021-12-27 | Stop reason: SDUPTHER

## 2021-01-04 RX ORDER — ESCITALOPRAM OXALATE 20 MG/1
TABLET ORAL
Qty: 90 TABLET | Refills: 3 | Status: SHIPPED | OUTPATIENT
Start: 2021-01-04 | End: 2021-12-27 | Stop reason: SDUPTHER

## 2021-04-13 ENCOUNTER — TELEPHONE (OUTPATIENT)
Dept: PRIMARY CARE CLINIC | Age: 71
End: 2021-04-13

## 2021-04-14 RX ORDER — SULFAMETHOXAZOLE AND TRIMETHOPRIM 800; 160 MG/1; MG/1
1 TABLET ORAL 2 TIMES DAILY
Qty: 20 TABLET | Refills: 0 | Status: SHIPPED | OUTPATIENT
Start: 2021-04-14 | End: 2021-04-24

## 2021-05-10 ENCOUNTER — NURSE TRIAGE (OUTPATIENT)
Dept: OTHER | Facility: CLINIC | Age: 71
End: 2021-05-10

## 2021-05-10 NOTE — TELEPHONE ENCOUNTER
Reason for Disposition   Patient wants to be seen    Answer Assessment - Initial Assessment Questions  1. ONSET: \"When did the pain start? \"      4 days ago    2. LOCATION: \"Where is the pain located? \"     Right shoulder    3. PAIN: \"How bad is the pain? \" (Scale 1-10; or mild, moderate, severe)    - MILD (1-3): doesn't interfere with normal activities    - MODERATE (4-7): interferes with normal activities (e.g., work or school) or awakens from sleep    - SEVERE (8-10): excruciating pain, unable to do any normal activities, unable to move arm at all due to pain     7/10    4. WORK OR EXERCISE: \"Has there been any recent work or exercise that involved this part of the body? \"      Walking dog, he pulls    5. CAUSE: \"What do you think is causing the shoulder pain? \"      Maybe the dog walking     6. OTHER SYMPTOMS: \"Do you have any other symptoms? \" (e.g., neck pain, swelling, rash, fever, numbness, weakness)      Pain in arm and shoulder. Also says tired all the time and some redness in legs and painful at times for a few weeks. 7. PREGNANCY: \"Is there any chance you are pregnant? \" \"When was your last menstrual period? \"      N/a    Protocols used: SHOULDER PAIN-ADULT-OH    Received call from Capri Washington at [facility] with [employer]. Brief description of triage: pain in right shoulder from walking dog who pulls hard, redness in both legs for \"weeks\" and \"tired all the time\"    Triage indicates for patient to to be seen today or tomorrow in office    Care advice provided, patient verbalizes understanding; denies any other questions or concerns; instructed to call back for any new or worsening symptoms. Writer provided warm transfer to Christy Timmons at [facility] for appointment scheduling. Attention Provider: Thank you for allowing me to participate in the care of your patient. The patient was connected to triage in response to information provided to the [facility].   Please do not respond

## 2021-05-11 ENCOUNTER — OFFICE VISIT (OUTPATIENT)
Dept: PRIMARY CARE CLINIC | Age: 71
End: 2021-05-11
Payer: MEDICARE

## 2021-05-11 VITALS
OXYGEN SATURATION: 97 % | DIASTOLIC BLOOD PRESSURE: 82 MMHG | WEIGHT: 293 LBS | HEIGHT: 62 IN | BODY MASS INDEX: 53.92 KG/M2 | SYSTOLIC BLOOD PRESSURE: 138 MMHG | HEART RATE: 59 BPM

## 2021-05-11 DIAGNOSIS — E66.01 MORBID OBESITY (HCC): ICD-10-CM

## 2021-05-11 DIAGNOSIS — M75.41 IMPINGEMENT SYNDROME OF RIGHT SHOULDER: Primary | ICD-10-CM

## 2021-05-11 DIAGNOSIS — L03.115 CELLULITIS OF RIGHT LEG: ICD-10-CM

## 2021-05-11 DIAGNOSIS — I48.0 PAROXYSMAL ATRIAL FIBRILLATION (HCC): ICD-10-CM

## 2021-05-11 PROCEDURE — G8399 PT W/DXA RESULTS DOCUMENT: HCPCS | Performed by: FAMILY MEDICINE

## 2021-05-11 PROCEDURE — 1123F ACP DISCUSS/DSCN MKR DOCD: CPT | Performed by: FAMILY MEDICINE

## 2021-05-11 PROCEDURE — G8417 CALC BMI ABV UP PARAM F/U: HCPCS | Performed by: FAMILY MEDICINE

## 2021-05-11 PROCEDURE — 3017F COLORECTAL CA SCREEN DOC REV: CPT | Performed by: FAMILY MEDICINE

## 2021-05-11 PROCEDURE — G8427 DOCREV CUR MEDS BY ELIG CLIN: HCPCS | Performed by: FAMILY MEDICINE

## 2021-05-11 PROCEDURE — 99213 OFFICE O/P EST LOW 20 MIN: CPT | Performed by: FAMILY MEDICINE

## 2021-05-11 PROCEDURE — 4040F PNEUMOC VAC/ADMIN/RCVD: CPT | Performed by: FAMILY MEDICINE

## 2021-05-11 PROCEDURE — 1036F TOBACCO NON-USER: CPT | Performed by: FAMILY MEDICINE

## 2021-05-11 PROCEDURE — 1090F PRES/ABSN URINE INCON ASSESS: CPT | Performed by: FAMILY MEDICINE

## 2021-05-11 RX ORDER — DOXYCYCLINE HYCLATE 100 MG
100 TABLET ORAL 2 TIMES DAILY
Qty: 20 TABLET | Refills: 0 | Status: SHIPPED | OUTPATIENT
Start: 2021-05-11 | End: 2021-05-21

## 2021-05-11 RX ORDER — NAPROXEN 500 MG/1
TABLET ORAL
Qty: 180 TABLET | Refills: 1 | Status: SHIPPED | OUTPATIENT
Start: 2021-05-11 | End: 2021-11-05 | Stop reason: SDUPTHER

## 2021-05-11 ASSESSMENT — ENCOUNTER SYMPTOMS
DIARRHEA: 0
SORE THROAT: 0
WHEEZING: 0
NAUSEA: 0
COUGH: 0
EYE REDNESS: 0
EYE DISCHARGE: 0
RHINORRHEA: 0
VOMITING: 0
SHORTNESS OF BREATH: 0
ABDOMINAL PAIN: 0

## 2021-05-11 ASSESSMENT — PATIENT HEALTH QUESTIONNAIRE - PHQ9
2. FEELING DOWN, DEPRESSED OR HOPELESS: 0
SUM OF ALL RESPONSES TO PHQ QUESTIONS 1-9: 0
SUM OF ALL RESPONSES TO PHQ QUESTIONS 1-9: 0

## 2021-05-11 NOTE — PROGRESS NOTES
[address] [facility] PRIMARY CARE  [address]  [facility] [address]  Dept: [address] is a 72 y.o. female Established patient, who presents today for her medical conditions/complaints as noted below. Chief Complaint   Patient presents with    Shoulder Pain    Fatigue    Other     Redness bilateral legs       HPI:     HPI  Pt with some redness to the right lower leg, gets it on the left as well. Pt states dog was pulling on leash, one week ago, now with pain in the right shoulder . Patient states has not been using her Lasix on a regular basis. Denies any fevers or chills. Otherwise unremarkable.     Reviewed prior notes None  Reviewed previous Labs and Imaging    LDL Cholesterol (mg/dL)   Date Value   11/21/2019 105     LDL Calculated (mg/dL)   Date Value   06/07/2017 131       (goal LDL is <100)   AST (U/L)   Date Value   11/03/2020 21     ALT (U/L)   Date Value   11/03/2020 12     BUN (mg/dL)   Date Value   11/03/2020 19     Hemoglobin A1C (%)   Date Value   08/21/2018 5.2     BP Readings from Last 3 Encounters:   05/11/21 138/82   12/21/20 130/80   11/03/20 126/80          (goal 120/80)    Past Medical History:   Diagnosis Date    Bell's palsy     Cellulitis     Hypertension       Past Surgical History:   Procedure Laterality Date    CATARACT REMOVAL Bilateral 2015    HYSTERECTOMY, TOTAL ABDOMINAL  1990    INCISION AND DRAINAGE Left 07/06/2017    LEG INCISION AND DRAINAGE ABSCESS performed by Chiqui Norris MD at [facility] Right 09/24/2018    TOTAL KNEE ARTHROPLASTY Left 11/09/2017    VENTRAL HERNIA REPAIR  02/11/2019    5 hernias       Family History   Problem Relation Age of Onset    Cancer Mother     High Blood Pressure Father     Stroke Father     Diabetes Maternal Aunt     Cancer Other         daughter/ovarian cancer    Cancer Daughter        Social History     Tobacco Use    Smoking status: Never Smoker    Smokeless tobacco: Never Used   Substance Use Topics    Alcohol use: No      Current Outpatient Medications   Medication Sig Dispense Refill    naproxen (NAPROSYN) 500 MG tablet TAKE 1 TABLET BY MOUTH TWICE DAILY WITH MEALS 180 tablet 1    doxycycline hyclate (VIBRA-TABS) 100 MG tablet Take 1 tablet by mouth 2 times daily for 10 days 20 tablet 0    solifenacin (VESICARE) 10 MG tablet Take 1 tablet by mouth daily 90 tablet 3    escitalopram (LEXAPRO) 20 MG tablet TAKE 1 TABLET BY MOUTH EVERY DAY 90 tablet 3    potassium chloride (KLOR-CON M) 10 MEQ extended release tablet Take 1 tablet by mouth 2 times daily 60 tablet 5    furosemide (LASIX) 40 MG tablet Take 1 tablet by mouth daily 30 tablet 5    omeprazole (PRILOSEC) 20 MG delayed release capsule TAKE 1 CAPSULE BY MOUTH EVERY NIGHT 90 capsule 0    tiZANidine (ZANAFLEX) 4 MG tablet Take 1 tablet by mouth 3 times daily as needed (spasms) 60 tablet 3    metoprolol tartrate (LOPRESSOR) 25 MG tablet TAKE 1/2 TABLET BY MOUTH TWICE DAILY 90 tablet 3    Calcium Citrate-Vitamin D 500-500 MG-UNIT PACK Take 1 by mouth daily. 30 each 2    metroNIDAZOLE (METROGEL) 0.75 % gel Apply topically 2 times daily. 45 g 2    traZODone (DESYREL) 50 MG tablet Take 1 tablet by mouth nightly as needed for Sleep 30 tablet 5     No current facility-administered medications for this visit.       Allergies   Allergen Reactions    Penicillins Hives       Health Maintenance   Topic Date Due    Shingles Vaccine (2 of 3) 08/13/2012    Potassium monitoring  11/03/2021    Creatinine monitoring  11/03/2021    Annual Wellness Visit (AWV)  12/22/2021    Breast cancer screen  09/18/2022    Colon cancer screen fecal DNA test (Cologuard)  06/17/2023    Lipid screen  11/21/2024    DTaP/Tdap/Td vaccine (3 - Td) 11/20/2025    DEXA (modify frequency per FRAX score)  Completed    Flu vaccine  Completed    Pneumococcal 65+ years Vaccine  Completed    COVID-19 Vaccine  Completed lower extremity shows a 1+ pitting edema with some erythema. Lymphadenopathy:      Cervical: No cervical adenopathy. Skin:     General: Skin is warm. Findings: No rash. Neurological:      Mental Status: She is alert and oriented to person, place, and time. Psychiatric:         Mood and Affect: Mood normal.         Behavior: Behavior normal.         Thought Content: Thought content normal.         Assessment:       Diagnosis Orders   1. Impingement syndrome of right shoulder  XR SHOULDER RIGHT (MIN 2 VIEWS)    naproxen (NAPROSYN) 500 MG tablet   2. Paroxysmal atrial fibrillation (HCC)     3. Morbid obesity (HCC)     4. Cellulitis of right leg  doxycycline hyclate (VIBRA-TABS) 100 MG tablet        Plan:    Doxycycline for 10 days. Patient encouraged to take her Lasix daily. X-ray right shoulder. Renew naproxen. Physical therapy eval and treat. If no improvement in shoulder, will refer to orthopedic surgery. Patient no longer needs to be on Eliquis. Has been more than 3 months for her DVT and repeat scan showed no evidence. Return in about 6 months (around 11/11/2021). Orders Placed This Encounter   Procedures    XR SHOULDER RIGHT (MIN 2 VIEWS)     Standing Status:   Future     Standing Expiration Date:   5/11/2022     Order Specific Question:   Reason for exam:     Answer:   shoulder pain     Orders Placed This Encounter   Medications    naproxen (NAPROSYN) 500 MG tablet     Sig: TAKE 1 TABLET BY MOUTH TWICE DAILY WITH MEALS     Dispense:  180 tablet     Refill:  1    doxycycline hyclate (VIBRA-TABS) 100 MG tablet     Sig: Take 1 tablet by mouth 2 times daily for 10 days     Dispense:  20 tablet     Refill:  0       Patient given educational materials - see patient instructions. Discussed use, benefit, and side effects of prescribed medications. All patient questions answered. Pt voiced understanding. Reviewed health maintenance.   Instructed to continue current medications, diet andexercise. Patient agreed with treatment plan. Follow up as directed.      Electronicallysigned by Xiomy Patel MD on 5/11/2021 at 10:38 AM

## 2021-07-06 ENCOUNTER — NURSE TRIAGE (OUTPATIENT)
Dept: OTHER | Facility: CLINIC | Age: 71
End: 2021-07-06

## 2021-07-06 ENCOUNTER — TELEPHONE (OUTPATIENT)
Dept: PRIMARY CARE CLINIC | Age: 71
End: 2021-07-06

## 2021-07-06 ENCOUNTER — OFFICE VISIT (OUTPATIENT)
Dept: PRIMARY CARE CLINIC | Age: 71
End: 2021-07-06
Payer: MEDICARE

## 2021-07-06 VITALS
HEART RATE: 75 BPM | OXYGEN SATURATION: 96 % | BODY MASS INDEX: 53.92 KG/M2 | HEIGHT: 62 IN | SYSTOLIC BLOOD PRESSURE: 142 MMHG | DIASTOLIC BLOOD PRESSURE: 88 MMHG | WEIGHT: 293 LBS

## 2021-07-06 DIAGNOSIS — R53.83 OTHER FATIGUE: ICD-10-CM

## 2021-07-06 DIAGNOSIS — L03.115 CELLULITIS OF RIGHT LOWER EXTREMITY: Primary | ICD-10-CM

## 2021-07-06 PROCEDURE — 4040F PNEUMOC VAC/ADMIN/RCVD: CPT | Performed by: PHYSICIAN ASSISTANT

## 2021-07-06 PROCEDURE — G8417 CALC BMI ABV UP PARAM F/U: HCPCS | Performed by: PHYSICIAN ASSISTANT

## 2021-07-06 PROCEDURE — 1123F ACP DISCUSS/DSCN MKR DOCD: CPT | Performed by: PHYSICIAN ASSISTANT

## 2021-07-06 PROCEDURE — G8427 DOCREV CUR MEDS BY ELIG CLIN: HCPCS | Performed by: PHYSICIAN ASSISTANT

## 2021-07-06 PROCEDURE — 1036F TOBACCO NON-USER: CPT | Performed by: PHYSICIAN ASSISTANT

## 2021-07-06 PROCEDURE — 99213 OFFICE O/P EST LOW 20 MIN: CPT | Performed by: PHYSICIAN ASSISTANT

## 2021-07-06 PROCEDURE — G8399 PT W/DXA RESULTS DOCUMENT: HCPCS | Performed by: PHYSICIAN ASSISTANT

## 2021-07-06 PROCEDURE — 1090F PRES/ABSN URINE INCON ASSESS: CPT | Performed by: PHYSICIAN ASSISTANT

## 2021-07-06 PROCEDURE — 3017F COLORECTAL CA SCREEN DOC REV: CPT | Performed by: PHYSICIAN ASSISTANT

## 2021-07-06 RX ORDER — DOXYCYCLINE HYCLATE 100 MG
100 TABLET ORAL 2 TIMES DAILY
Qty: 28 TABLET | Refills: 0 | Status: SHIPPED | OUTPATIENT
Start: 2021-07-06 | End: 2021-07-20

## 2021-07-06 ASSESSMENT — ENCOUNTER SYMPTOMS
DIARRHEA: 0
EYE DISCHARGE: 0
SINUS PRESSURE: 0
SHORTNESS OF BREATH: 0
CHEST TIGHTNESS: 0
VOMITING: 0
SORE THROAT: 0
CONSTIPATION: 0
COUGH: 0
ABDOMINAL DISTENTION: 0
RHINORRHEA: 0
PHOTOPHOBIA: 0
ABDOMINAL PAIN: 0

## 2021-07-06 NOTE — TELEPHONE ENCOUNTER
Received call from [facility] at . [facility] with [employer]. Brief description of triage: Right lower leg bumped, small wound, pt concerned about infection. Triage indicates for patient to be seen today. If no appts go to walkin or . Care advice provided, patient verbalizes understanding; denies any other questions or concerns; instructed to call back for any new or worsening symptoms. Writer provided warm transfer to OMAR Woodard at [facility] for appointment scheduling. Attention Provider: Thank you for allowing me to participate in the care of your patient. The patient was connected to triage in response to information provided to the [facility]. Please do not respond through this encounter as the response is not directed to a shared pool. Reason for Disposition   Wound looks infected   Pus or cloudy fluid draining from wound    Answer Assessment - Initial Assessment Questions  1. MECHANISM: \"How did the injury happen? \" (e.g., twisting injury, direct blow)       \"bumped right ankle a few days ago, noticed an open area\"  2. ONSET: \"When did the injury happen? \" (Minutes or hours ago)      4-5 days ago. 3. LOCATION: \"Where is the injury located? \"     \"Right, front of leg, closer to ankle\"  4. APPEARANCE of INJURY: \"What does the injury look like? \"      \"Redness all around it, tender to touch, draining clear fluid\"  5. WEIGHT-BEARING: \"Can you put weight on that foot? \" \"Can you walk (four steps or more)? \"       yes  6. SIZE: For cuts, bruises, or swelling, ask: \"How large is it? \" (e.g., inches or centimeters;  entire joint)      \"aprox dime size\"  7. PAIN: \"Is there pain? \" If so, ask: \"How bad is the pain? \"    (e.g., Scale 1-10; or mild, moderate, severe)     4/10  8. TETANUS: For any breaks in the skin, ask: \"When was the last tetanus booster? \"     unsure  9. OTHER SYMPTOMS: \"Do you have any other symptoms? \"      \"mild swelling, feels warm\"  10.  PREGNANCY: \"Is there any chance you are pregnant? \" \"When was your last menstrual period? \"  NA    Protocols used: ANKLE AND FOOT INJURY-ADULT-[place], WOUND INFECTION-ADULT-[place]

## 2021-07-06 NOTE — TELEPHONE ENCOUNTER
----- Message from [facility] sent at 7/6/2021  9:40 AM EDT -----  Subject: Appointment Request    Reason for Call: Immediate Return from RN Triage    QUESTIONS  Type of Appointment? Established Patient  Reason for appointment request? No appointments available during search  Additional Information for Provider? Patient is returned from [place] with a   small wound on her right leg. Her disposition was to be seen today but   nothing available. Patient refused walk-in and UC due to cost. Please   advise.  ---------------------------------------------------------------------------  --------------  CALL BACK INFO  What is the best way for the office to contact you? OK to leave message on   voicemail  Preferred Call Back Phone Number? [phone]  ---------------------------------------------------------------------------  --------------  SCRIPT ANSWERS  Patient needs to be seen today? Yes  Nurse Name? Andria  Have you been diagnosed with, awaiting test results for, or told that you   are suspected of having COVID-19 (Coronavirus)? (If patient has tested   negative or was tested as a requirement for work, school, or travel and   not based on symptoms, answer no)? No  Do you currently have flu-like symptoms including fever or chills, cough,   shortness of breath, difficulty breathing, or new loss of taste or smell? No  Have you had close contact with someone with COVID-19 in the last 14 days? No  (Service Expert  click yes below to proceed with [employer] Usual   Scheduling)?  Yes

## 2021-07-06 NOTE — PROGRESS NOTES
[facility] PRIMARY CARE  [address] [address] [address]  Dept: [phone] is a 72 y.o. female Established patient, who presents today for her medical conditions/complaints as noted below. Chief Complaint   Patient presents with    Wound Check     Right ankle       HPI:     HPI: The patient was having a problem with a a wound on her leg. She was moving furniture. That occurred about 1 week ago.      Reviewed prior notes None  Reviewed previous Labs    LDL Cholesterol (mg/dL)   Date Value   11/21/2019 105     LDL Calculated (mg/dL)   Date Value   06/07/2017 131       (goal LDL is <100)   AST (U/L)   Date Value   11/03/2020 21     ALT (U/L)   Date Value   11/03/2020 12     BUN (mg/dL)   Date Value   11/03/2020 19     Hemoglobin A1C (%)   Date Value   08/21/2018 5.2     BP Readings from Last 3 Encounters:   07/06/21 (!) 142/88   05/11/21 138/82   12/21/20 130/80          (goal 120/80)    Past Medical History:   Diagnosis Date    Bell's palsy     Cellulitis     Hypertension       Past Surgical History:   Procedure Laterality Date    CATARACT REMOVAL Bilateral 2015    HYSTERECTOMY, TOTAL ABDOMINAL  1990    INCISION AND DRAINAGE Left 07/06/2017    LEG INCISION AND DRAINAGE ABSCESS performed by Jessica Mcconnell MD at [address] [place] [address] Right 09/24/2018    TOTAL KNEE ARTHROPLASTY Left 11/09/2017    VENTRAL HERNIA REPAIR  02/11/2019    5 hernias       Family History   Problem Relation Age of Onset    Cancer Mother     High Blood Pressure Father     Stroke Father     Diabetes Maternal Aunt     Cancer Other         daughter/ovarian cancer    Cancer Daughter        Social History     Tobacco Use    Smoking status: Never Smoker    Smokeless tobacco: Never Used   Substance Use Topics    Alcohol use: No      Current Outpatient Medications   Medication Sig Dispense Refill    doxycycline hyclate (VIBRA-TABS) 100 MG tablet Take 1 tablet by mouth 2 times daily for 14 days 28 tablet 0    naproxen (NAPROSYN) 500 MG tablet TAKE 1 TABLET BY MOUTH TWICE DAILY WITH MEALS 180 tablet 1    solifenacin (VESICARE) 10 MG tablet Take 1 tablet by mouth daily 90 tablet 3    escitalopram (LEXAPRO) 20 MG tablet TAKE 1 TABLET BY MOUTH EVERY DAY 90 tablet 3    potassium chloride (KLOR-CON M) 10 MEQ extended release tablet Take 1 tablet by mouth 2 times daily 60 tablet 5    furosemide (LASIX) 40 MG tablet Take 1 tablet by mouth daily 30 tablet 5    omeprazole (PRILOSEC) 20 MG delayed release capsule TAKE 1 CAPSULE BY MOUTH EVERY NIGHT 90 capsule 0    tiZANidine (ZANAFLEX) 4 MG tablet Take 1 tablet by mouth 3 times daily as needed (spasms) 60 tablet 3    metoprolol tartrate (LOPRESSOR) 25 MG tablet TAKE 1/2 TABLET BY MOUTH TWICE DAILY 90 tablet 3    Calcium Citrate-Vitamin D 500-500 MG-UNIT PACK Take 1 by mouth daily. 30 each 2    metroNIDAZOLE (METROGEL) 0.75 % gel Apply topically 2 times daily. 45 g 2    traZODone (DESYREL) 50 MG tablet Take 1 tablet by mouth nightly as needed for Sleep 30 tablet 5     No current facility-administered medications for this visit.      Allergies   Allergen Reactions    Penicillins Hives       Health Maintenance   Topic Date Due    Shingles Vaccine (2 of 3) 08/13/2012    Flu vaccine (1) 09/01/2021    Potassium monitoring  11/03/2021    Creatinine monitoring  11/03/2021    Annual Wellness Visit (AWV)  12/22/2021    Breast cancer screen  09/18/2022    Colon cancer screen fecal DNA test (Cologuard)  06/17/2023    Lipid screen  11/21/2024    DTaP/Tdap/Td vaccine (3 - Td or Tdap) 11/20/2025    DEXA (modify frequency per FRAX score)  Completed    Pneumococcal 65+ years Vaccine  Completed    COVID-19 Vaccine  Completed    Hepatitis C screen  Completed    Hepatitis A vaccine  Aged Out    Hepatitis B vaccine  Aged Out    Hib vaccine  Aged Out    Meningococcal (ACWY) vaccine  Aged Out       Subjective: Review of Systems   Constitutional: Negative for chills, fever and unexpected weight change. HENT: Negative for congestion, hearing loss, rhinorrhea, sinus pressure and sore throat. Eyes: Negative for photophobia, discharge and visual disturbance. Respiratory: Negative for cough, chest tightness and shortness of breath. Cardiovascular: Negative for chest pain, palpitations and leg swelling. Gastrointestinal: Negative for abdominal distention, abdominal pain, constipation, diarrhea and vomiting. Endocrine: Negative for polydipsia and polyuria. Genitourinary: Negative for decreased urine volume, difficulty urinating, frequency and urgency. Musculoskeletal: Negative for arthralgias, gait problem and myalgias. Skin: Positive for rash. Allergic/Immunologic: Negative for food allergies. Neurological: Negative for dizziness, weakness, numbness and headaches. Hematological: Negative for adenopathy. Psychiatric/Behavioral: Negative for dysphoric mood and sleep disturbance. The patient is not nervous/anxious. Objective:     BP (!) 142/88   Pulse 75   Ht 5' 2.04\" (1.576 m)   Wt (!) 311 lb 6.4 oz (141.3 kg)   SpO2 96%   BMI 56.88 kg/m²   Physical Exam  Constitutional:       General: She is not in acute distress. Appearance: Normal appearance. She is not ill-appearing. HENT:      Head: Normocephalic and atraumatic. Right Ear: External ear normal.      Left Ear: External ear normal.      Nose: Nose normal.      Mouth/Throat:      Mouth: Mucous membranes are moist.   Eyes:      Extraocular Movements: Extraocular movements intact. Conjunctiva/sclera: Conjunctivae normal.      Pupils: Pupils are equal, round, and reactive to light. Neck:      Vascular: No carotid bruit. Cardiovascular:      Rate and Rhythm: Normal rate and regular rhythm. Pulses: Normal pulses. Heart sounds: Normal heart sounds.    Pulmonary:      Effort: Pulmonary effort is normal. No

## 2021-07-07 ENCOUNTER — HOSPITAL ENCOUNTER (OUTPATIENT)
Age: 71
Setting detail: SPECIMEN
Discharge: HOME | End: 2021-07-07
Payer: MEDICARE

## 2021-07-07 DIAGNOSIS — R53.83 OTHER FATIGUE: ICD-10-CM

## 2021-07-07 LAB
ABSOLUTE EOS #: 0.13 K/UL (ref 0–0.44)
ABSOLUTE IMMATURE GRANULOCYTE: <0.03 K/UL (ref 0–0.3)
ABSOLUTE LYMPH #: 1.02 K/UL (ref 1.1–3.7)
ABSOLUTE MONO #: 0.37 K/UL (ref 0.1–1.2)
BASOPHILS # BLD: 1 % (ref 0–2)
BASOPHILS ABSOLUTE: <0.03 K/UL (ref 0–0.2)
DIFFERENTIAL TYPE: ABNORMAL
EOSINOPHILS RELATIVE PERCENT: 3 % (ref 1–4)
HCT VFR BLD CALC: 41.9 % (ref 36.3–47.1)
HEMOGLOBIN: 13.2 G/DL (ref 11.9–15.1)
IMMATURE GRANULOCYTES: 0 %
LYMPHOCYTES # BLD: 25 % (ref 24–43)
MCH RBC QN AUTO: 28.9 PG (ref 25.2–33.5)
MCHC RBC AUTO-ENTMCNC: 31.5 G/DL (ref 28.4–34.8)
MCV RBC AUTO: 91.9 FL (ref 82.6–102.9)
MONOCYTES # BLD: 9 % (ref 3–12)
NRBC AUTOMATED: 0 PER 100 WBC
PDW BLD-RTO: 15.2 % (ref 11.8–14.4)
PLATELET # BLD: 234 K/UL (ref 138–453)
PLATELET ESTIMATE: ABNORMAL
PMV BLD AUTO: 11.8 FL (ref 8.1–13.5)
RBC # BLD: 4.56 M/UL (ref 3.95–5.11)
RBC # BLD: ABNORMAL 10*6/UL
SEG NEUTROPHILS: 62 % (ref 36–65)
SEGMENTED NEUTROPHILS ABSOLUTE COUNT: 2.57 K/UL (ref 1.5–8.1)
TSH SERPL DL<=0.05 MIU/L-ACNC: 1.7 MIU/L (ref 0.3–5)
VITAMIN B-12: 598 PG/ML (ref 232–1245)
WBC # BLD: 4.1 K/UL (ref 3.5–11.3)
WBC # BLD: ABNORMAL 10*3/UL

## 2021-08-06 ENCOUNTER — OFFICE VISIT (OUTPATIENT)
Dept: PRIMARY CARE CLINIC | Age: 71
End: 2021-08-06
Payer: MEDICARE

## 2021-08-06 VITALS
HEIGHT: 62 IN | HEART RATE: 72 BPM | WEIGHT: 293 LBS | DIASTOLIC BLOOD PRESSURE: 98 MMHG | SYSTOLIC BLOOD PRESSURE: 152 MMHG | OXYGEN SATURATION: 98 % | BODY MASS INDEX: 53.92 KG/M2

## 2021-08-06 DIAGNOSIS — H00.011 HORDEOLUM EXTERNUM OF RIGHT UPPER EYELID: Primary | ICD-10-CM

## 2021-08-06 PROCEDURE — 1090F PRES/ABSN URINE INCON ASSESS: CPT | Performed by: PHYSICIAN ASSISTANT

## 2021-08-06 PROCEDURE — 99213 OFFICE O/P EST LOW 20 MIN: CPT | Performed by: PHYSICIAN ASSISTANT

## 2021-08-06 PROCEDURE — G8399 PT W/DXA RESULTS DOCUMENT: HCPCS | Performed by: PHYSICIAN ASSISTANT

## 2021-08-06 PROCEDURE — 3017F COLORECTAL CA SCREEN DOC REV: CPT | Performed by: PHYSICIAN ASSISTANT

## 2021-08-06 PROCEDURE — 1123F ACP DISCUSS/DSCN MKR DOCD: CPT | Performed by: PHYSICIAN ASSISTANT

## 2021-08-06 PROCEDURE — 4040F PNEUMOC VAC/ADMIN/RCVD: CPT | Performed by: PHYSICIAN ASSISTANT

## 2021-08-06 PROCEDURE — 1036F TOBACCO NON-USER: CPT | Performed by: PHYSICIAN ASSISTANT

## 2021-08-06 PROCEDURE — G8427 DOCREV CUR MEDS BY ELIG CLIN: HCPCS | Performed by: PHYSICIAN ASSISTANT

## 2021-08-06 PROCEDURE — G8417 CALC BMI ABV UP PARAM F/U: HCPCS | Performed by: PHYSICIAN ASSISTANT

## 2021-08-06 RX ORDER — SULFAMETHOXAZOLE AND TRIMETHOPRIM 800; 160 MG/1; MG/1
1 TABLET ORAL 2 TIMES DAILY
Qty: 20 TABLET | Refills: 0 | Status: SHIPPED | OUTPATIENT
Start: 2021-08-06 | End: 2021-08-16

## 2021-08-06 ASSESSMENT — ENCOUNTER SYMPTOMS
PHOTOPHOBIA: 0
EYE PAIN: 1
EYE DISCHARGE: 0
EYE REDNESS: 0
SINUS PAIN: 0
COLOR CHANGE: 1

## 2021-08-06 NOTE — PROGRESS NOTES
[facility] PRIMARY CARE  [address] [facility] [address]  Dept: [phone] is a 72 y.o. female Established patient, who presents today for her medical conditions/complaints as noted below. Chief Complaint   Patient presents with    Facial Swelling     Right eye swelling and itchy       HPI:     HPI: Patient noticed increasing swelling on right eyelid over the last 2 days. Patient states it is very tender to touch. Has not tried anything for it at this time. No fevers no vision disturbance.     Reviewed prior notes None  Reviewed previous Labs    LDL Cholesterol (mg/dL)   Date Value   11/21/2019 105     LDL Calculated (mg/dL)   Date Value   06/07/2017 131       (goal LDL is <100)   AST (U/L)   Date Value   11/03/2020 21     ALT (U/L)   Date Value   11/03/2020 12     BUN (mg/dL)   Date Value   11/03/2020 19     Hemoglobin A1C (%)   Date Value   08/21/2018 5.2     TSH (mIU/L)   Date Value   07/07/2021 1.70     BP Readings from Last 3 Encounters:   08/06/21 (!) 152/98   07/06/21 (!) 142/88   05/11/21 138/82          (goal 120/80)    Past Medical History:   Diagnosis Date    Bell's palsy     Cellulitis     Hypertension       Past Surgical History:   Procedure Laterality Date    CATARACT REMOVAL Bilateral 2015    HYSTERECTOMY, TOTAL ABDOMINAL  1990    INCISION AND DRAINAGE Left 07/06/2017    LEG INCISION AND DRAINAGE ABSCESS performed by Aylin Neff MD at [address] [place] [address] Right 09/24/2018    TOTAL KNEE ARTHROPLASTY Left 11/09/2017    VENTRAL HERNIA REPAIR  02/11/2019    5 hernias       Family History   Problem Relation Age of Onset    Cancer Mother     High Blood Pressure Father     Stroke Father     Diabetes Maternal Aunt     Cancer Other         daughter/ovarian cancer    Cancer Daughter        Social History     Tobacco Use    Smoking status: Never Smoker    Smokeless tobacco: Never Used   Substance Use screen  Completed    Hepatitis A vaccine  Aged Out    Hepatitis B vaccine  Aged Out    Hib vaccine  Aged Out    Meningococcal (ACWY) vaccine  Aged Out       Subjective:      Review of Systems   Constitutional: Negative for chills and fever. HENT: Negative for congestion, ear discharge, ear pain and sinus pain. Eyes: Positive for pain (on eye lid ). Negative for photophobia, discharge, redness and visual disturbance. Skin: Positive for color change. Objective:     BP (!) 152/98   Pulse 72   Ht 5' 2.04\" (1.576 m)   Wt (!) 314 lb 3.2 oz (142.5 kg)   SpO2 98%   BMI 57.39 kg/m²   Physical Exam  Constitutional:       General: She is not in acute distress. Appearance: She is obese. She is not ill-appearing. HENT:      Head: Normocephalic and atraumatic. Right Ear: External ear normal.      Left Ear: External ear normal.   Eyes:      General:         Right eye: Hordeolum present. Comments: Large fluctuant hordeolum very tender on right upper eyelid. No active discharge. Neurological:      Mental Status: She is alert. Assessment and Plan:          1. Hordeolum externum of right upper eyelid  -     sulfamethoxazole-trimethoprim (BACTRIM DS;SEPTRA DS) 800-160 MG per tablet; Take 1 tablet by mouth 2 times daily for 10 days, Disp-20 tablet, R-0Normal     Patient started on Bactrim twice daily for 10 days. Patient educated to use warm compress with massage 3 times a day in order to facilitate drainage. Follow-up on Monday if no improvement. Patient's blood pressure was elevated today however patient states she forgot to take her medications today. Patient given educational materials - see patient instructions. Discussed use, benefit, and side effects of prescribed medications. All patient questions answered. Pt voiced understanding. Reviewed health maintenance. Instructed to continue current medications, diet and exercise. Patient agreed with treatment plan.  Follow up as directed.      Electronically signed by IZA Olivia on 8/6/2021 at 4:16 PM

## 2021-08-09 ENCOUNTER — TELEPHONE (OUTPATIENT)
Dept: PRIMARY CARE CLINIC | Age: 71
End: 2021-08-09

## 2021-08-09 DIAGNOSIS — H00.011 HORDEOLUM EXTERNUM OF RIGHT UPPER EYELID: Primary | ICD-10-CM

## 2021-08-09 NOTE — TELEPHONE ENCOUNTER
----- Message from Tharon Aase sent at 8/9/2021  9:26 AM EDT -----  Subject: Message to Provider    QUESTIONS  Information for Provider? Patient was advised by Barbara Owen to call back if   the swelling on her right eye had not gone down. It has not gotten worse   that she has noticed, but is starting to feel hard where as before she was   able to push on it. States he would give a referral for an eye doctor. Would like a call back regarding this please. Thank you.   ---------------------------------------------------------------------------  --------------  CALL BACK INFO  What is the best way for the office to contact you? OK to leave message on   voicemail  Preferred Call Back Phone Number? [phone]  ---------------------------------------------------------------------------  --------------  SCRIPT ANSWERS  Relationship to Patient?  Self

## 2021-09-25 DIAGNOSIS — I48.0 PAROXYSMAL ATRIAL FIBRILLATION (HCC): ICD-10-CM

## 2021-11-05 ENCOUNTER — OFFICE VISIT (OUTPATIENT)
Dept: PRIMARY CARE CLINIC | Age: 71
End: 2021-11-05
Payer: MEDICARE

## 2021-11-05 VITALS
HEART RATE: 57 BPM | OXYGEN SATURATION: 97 % | BODY MASS INDEX: 53.92 KG/M2 | HEIGHT: 62 IN | WEIGHT: 293 LBS | SYSTOLIC BLOOD PRESSURE: 136 MMHG | DIASTOLIC BLOOD PRESSURE: 82 MMHG

## 2021-11-05 DIAGNOSIS — M75.41 IMPINGEMENT SYNDROME OF RIGHT SHOULDER: ICD-10-CM

## 2021-11-05 DIAGNOSIS — L03.116 CELLULITIS OF LEFT LOWER EXTREMITY: ICD-10-CM

## 2021-11-05 DIAGNOSIS — Z23 NEED FOR VACCINATION: Primary | ICD-10-CM

## 2021-11-05 PROCEDURE — G8399 PT W/DXA RESULTS DOCUMENT: HCPCS | Performed by: PHYSICIAN ASSISTANT

## 2021-11-05 PROCEDURE — 1123F ACP DISCUSS/DSCN MKR DOCD: CPT | Performed by: PHYSICIAN ASSISTANT

## 2021-11-05 PROCEDURE — G8484 FLU IMMUNIZE NO ADMIN: HCPCS | Performed by: PHYSICIAN ASSISTANT

## 2021-11-05 PROCEDURE — G0008 ADMIN INFLUENZA VIRUS VAC: HCPCS | Performed by: PHYSICIAN ASSISTANT

## 2021-11-05 PROCEDURE — 1036F TOBACCO NON-USER: CPT | Performed by: PHYSICIAN ASSISTANT

## 2021-11-05 PROCEDURE — G8417 CALC BMI ABV UP PARAM F/U: HCPCS | Performed by: PHYSICIAN ASSISTANT

## 2021-11-05 PROCEDURE — G8427 DOCREV CUR MEDS BY ELIG CLIN: HCPCS | Performed by: PHYSICIAN ASSISTANT

## 2021-11-05 PROCEDURE — 3017F COLORECTAL CA SCREEN DOC REV: CPT | Performed by: PHYSICIAN ASSISTANT

## 2021-11-05 PROCEDURE — 99214 OFFICE O/P EST MOD 30 MIN: CPT | Performed by: PHYSICIAN ASSISTANT

## 2021-11-05 PROCEDURE — 4040F PNEUMOC VAC/ADMIN/RCVD: CPT | Performed by: PHYSICIAN ASSISTANT

## 2021-11-05 PROCEDURE — 90694 VACC AIIV4 NO PRSRV 0.5ML IM: CPT | Performed by: PHYSICIAN ASSISTANT

## 2021-11-05 PROCEDURE — 1090F PRES/ABSN URINE INCON ASSESS: CPT | Performed by: PHYSICIAN ASSISTANT

## 2021-11-05 RX ORDER — SULFAMETHOXAZOLE AND TRIMETHOPRIM 800; 160 MG/1; MG/1
1 TABLET ORAL 2 TIMES DAILY
Qty: 20 TABLET | Refills: 0 | Status: SHIPPED | OUTPATIENT
Start: 2021-11-05 | End: 2021-11-15

## 2021-11-05 RX ORDER — NAPROXEN 500 MG/1
TABLET ORAL
Qty: 180 TABLET | Refills: 1 | Status: SHIPPED | OUTPATIENT
Start: 2021-11-05 | End: 2022-06-16

## 2021-11-05 ASSESSMENT — ENCOUNTER SYMPTOMS
PHOTOPHOBIA: 0
RHINORRHEA: 0
DIARRHEA: 0
ABDOMINAL PAIN: 0
COLOR CHANGE: 1
COUGH: 0
ABDOMINAL DISTENTION: 0
SORE THROAT: 0
EYE DISCHARGE: 0
CONSTIPATION: 0
VOMITING: 0
CHEST TIGHTNESS: 0
SINUS PRESSURE: 0
SHORTNESS OF BREATH: 0

## 2021-11-05 NOTE — PROGRESS NOTES
[address] [facility] PRIMARY CARE  [address] [facility] [address]  Dept: [phone] is a 80 y.o. female Established patient, who presents today for her medical conditions/complaints as noted below. Chief Complaint   Patient presents with    Puncture Wound     Left leg (Dog Scratch)       HPI:     HPI: The patient was here for a puncture wound. Dog scratched her ankle. It has not healed and is seeping fluid. Leg is swelling patient is elevating leg and using wrap and antibiotic ointment.      Reviewed prior notes None  Reviewed previous Labs    LDL Cholesterol (mg/dL)   Date Value   11/21/2019 105     LDL Calculated (mg/dL)   Date Value   06/07/2017 131       (goal LDL is <100)   AST (U/L)   Date Value   11/03/2020 21     ALT (U/L)   Date Value   11/03/2020 12     BUN (mg/dL)   Date Value   11/03/2020 19     Hemoglobin A1C (%)   Date Value   08/21/2018 5.2     TSH (mIU/L)   Date Value   07/07/2021 1.70     BP Readings from Last 3 Encounters:   11/05/21 136/82   08/06/21 (!) 152/98   07/06/21 (!) 142/88          (goal 120/80)    Past Medical History:   Diagnosis Date    Bell's palsy     Cellulitis     Hypertension       Past Surgical History:   Procedure Laterality Date    CATARACT REMOVAL Bilateral 2015    HYSTERECTOMY, TOTAL ABDOMINAL  1990    INCISION AND DRAINAGE Left 07/06/2017    LEG INCISION AND DRAINAGE ABSCESS performed by Miky Grimm MD at [address] [place] [address] Right 09/24/2018    TOTAL KNEE ARTHROPLASTY Left 11/09/2017    VENTRAL HERNIA REPAIR  02/11/2019    5 hernias       Family History   Problem Relation Age of Onset    Cancer Mother     High Blood Pressure Father     Stroke Father     Diabetes Maternal Aunt     Cancer Other         daughter/ovarian cancer    Cancer Daughter        Social History     Tobacco Use    Smoking status: Never Smoker    Smokeless tobacco: Never Used   Substance Use Topics    Alcohol use: No      Current Outpatient Medications   Medication Sig Dispense Refill    naproxen (NAPROSYN) 500 MG tablet TAKE 1 TABLET BY MOUTH TWICE DAILY WITH MEALS 180 tablet 1    sulfamethoxazole-trimethoprim (BACTRIM DS;SEPTRA DS) 800-160 MG per tablet Take 1 tablet by mouth 2 times daily for 10 days 20 tablet 0    metoprolol tartrate (LOPRESSOR) 25 MG tablet Take 1/2 (one-half) tablet by mouth twice daily 90 tablet 0    solifenacin (VESICARE) 10 MG tablet Take 1 tablet by mouth daily 90 tablet 3    escitalopram (LEXAPRO) 20 MG tablet TAKE 1 TABLET BY MOUTH EVERY DAY 90 tablet 3    potassium chloride (KLOR-CON M) 10 MEQ extended release tablet Take 1 tablet by mouth 2 times daily 60 tablet 5    furosemide (LASIX) 40 MG tablet Take 1 tablet by mouth daily 30 tablet 5    omeprazole (PRILOSEC) 20 MG delayed release capsule TAKE 1 CAPSULE BY MOUTH EVERY NIGHT 90 capsule 0    tiZANidine (ZANAFLEX) 4 MG tablet Take 1 tablet by mouth 3 times daily as needed (spasms) 60 tablet 3    Calcium Citrate-Vitamin D 500-500 MG-UNIT PACK Take 1 by mouth daily. 30 each 2    metroNIDAZOLE (METROGEL) 0.75 % gel Apply topically 2 times daily. 45 g 2    traZODone (DESYREL) 50 MG tablet Take 1 tablet by mouth nightly as needed for Sleep 30 tablet 5     No current facility-administered medications for this visit.      Allergies   Allergen Reactions    Penicillins Hives       Health Maintenance   Topic Date Due    Shingles Vaccine (2 of 3) 08/13/2012    Flu vaccine (1) 09/01/2021    Potassium monitoring  11/03/2021    Creatinine monitoring  11/03/2021    Annual Wellness Visit (AWV)  12/22/2021    Breast cancer screen  09/18/2022    Colon cancer screen fecal DNA test (Cologuard)  06/17/2023    Lipid screen  11/21/2024    DTaP/Tdap/Td vaccine (3 - Td or Tdap) 11/20/2025    DEXA (modify frequency per FRAX score)  Completed    Pneumococcal 65+ years Vaccine  Completed    COVID-19 Vaccine  Completed    Hepatitis C screen Completed    Hepatitis A vaccine  Aged Out    Hepatitis B vaccine  Aged Out    Hib vaccine  Aged Out    Meningococcal (ACWY) vaccine  Aged Out       Subjective:      Review of Systems   Constitutional: Negative for chills, fever and unexpected weight change. HENT: Negative for congestion, hearing loss, rhinorrhea, sinus pressure and sore throat. Eyes: Negative for photophobia, discharge and visual disturbance. Respiratory: Negative for cough, chest tightness and shortness of breath. Cardiovascular: Positive for leg swelling. Negative for chest pain and palpitations. Gastrointestinal: Negative for abdominal distention, abdominal pain, constipation, diarrhea and vomiting. Endocrine: Negative for polydipsia and polyuria. Genitourinary: Negative for decreased urine volume, difficulty urinating, frequency and urgency. Musculoskeletal: Negative for arthralgias, gait problem and myalgias. Skin: Positive for color change and wound. Negative for rash. Allergic/Immunologic: Negative for food allergies. Neurological: Negative for dizziness, weakness, numbness and headaches. Hematological: Negative for adenopathy. Psychiatric/Behavioral: Negative for dysphoric mood and sleep disturbance. The patient is not nervous/anxious. Objective:     /82   Pulse 62   Ht 5' 2.04\" (1.576 m)   Wt (!) 318 lb 3.2 oz (144.3 kg)   SpO2 97%   BMI 58.12 kg/m²   Physical Exam  Constitutional:       General: She is not in acute distress. Appearance: Normal appearance. She is obese. She is not ill-appearing. HENT:      Head: Normocephalic and atraumatic. Right Ear: External ear normal.      Left Ear: External ear normal.      Nose: Nose normal.      Mouth/Throat:      Mouth: Mucous membranes are moist.   Eyes:      Extraocular Movements: Extraocular movements intact. Conjunctiva/sclera: Conjunctivae normal.      Pupils: Pupils are equal, round, and reactive to light.    Neck: Vascular: No carotid bruit. Cardiovascular:      Rate and Rhythm: Normal rate and regular rhythm. Pulses: Normal pulses. Heart sounds: Normal heart sounds. Pulmonary:      Effort: Pulmonary effort is normal. No respiratory distress. Breath sounds: Normal breath sounds. Abdominal:      General: Bowel sounds are normal. There is no distension. Tenderness: There is no abdominal tenderness. Musculoskeletal:         General: Normal range of motion. Cervical back: Normal range of motion and neck supple. Lymphadenopathy:      Cervical: No cervical adenopathy. Skin:     General: Skin is warm and dry. Findings: Erythema and lesion present. Comments: Posterior left calf. Neurological:      General: No focal deficit present. Mental Status: She is alert and oriented to person, place, and time. Psychiatric:         Mood and Affect: Mood normal.         Behavior: Behavior normal.         Thought Content: Thought content normal.         Assessment and Plan:          1. Need for vaccination  -     INFLUENZA, QUADV, ADJUVANTED, 65 YRS =, IM, PF, PREFILL SYR, 0.5ML (FLUAD)  2. Impingement syndrome of right shoulder  -     naproxen (NAPROSYN) 500 MG tablet; TAKE 1 TABLET BY MOUTH TWICE DAILY WITH MEALS, Disp-180 tablet, R-1Normal  3. Cellulitis of left lower extremity  -     sulfamethoxazole-trimethoprim (BACTRIM DS;SEPTRA DS) 800-160 MG per tablet; Take 1 tablet by mouth 2 times daily for 10 days, Disp-20 tablet, R-0Normal             Patient given educational materials - see patient instructions. Discussed use, benefit, and side effects of prescribed medications. All patient questions answered. Pt voiced understanding. Reviewed health maintenance. Instructed to continue current medications, diet and exercise. Patient agreed with treatment plan. Follow up as directed.      Electronically signed by IZA Hughes on 11/5/2021 at 9:36 AM

## 2021-12-27 ENCOUNTER — OFFICE VISIT (OUTPATIENT)
Dept: PRIMARY CARE CLINIC | Age: 71
End: 2021-12-27
Payer: MEDICARE

## 2021-12-27 VITALS
OXYGEN SATURATION: 97 % | HEART RATE: 63 BPM | WEIGHT: 293 LBS | SYSTOLIC BLOOD PRESSURE: 138 MMHG | DIASTOLIC BLOOD PRESSURE: 84 MMHG | BODY MASS INDEX: 53.92 KG/M2 | HEIGHT: 62 IN

## 2021-12-27 DIAGNOSIS — I48.0 PAROXYSMAL ATRIAL FIBRILLATION (HCC): ICD-10-CM

## 2021-12-27 DIAGNOSIS — K25.9 GASTRIC ULCER, UNSPECIFIED CHRONICITY, UNSPECIFIED WHETHER GASTRIC ULCER HEMORRHAGE OR PERFORATION PRESENT: ICD-10-CM

## 2021-12-27 DIAGNOSIS — F41.8 DEPRESSION WITH ANXIETY: ICD-10-CM

## 2021-12-27 DIAGNOSIS — R60.0 LOCALIZED EDEMA: ICD-10-CM

## 2021-12-27 DIAGNOSIS — Z13.6 ENCOUNTER FOR LIPID SCREENING FOR CARDIOVASCULAR DISEASE: ICD-10-CM

## 2021-12-27 DIAGNOSIS — Z00.00 ROUTINE GENERAL MEDICAL EXAMINATION AT A HEALTH CARE FACILITY: Primary | ICD-10-CM

## 2021-12-27 DIAGNOSIS — Z13.220 ENCOUNTER FOR LIPID SCREENING FOR CARDIOVASCULAR DISEASE: ICD-10-CM

## 2021-12-27 DIAGNOSIS — Z12.31 ENCOUNTER FOR SCREENING MAMMOGRAM FOR MALIGNANT NEOPLASM OF BREAST: ICD-10-CM

## 2021-12-27 PROCEDURE — G0439 PPPS, SUBSEQ VISIT: HCPCS | Performed by: FAMILY MEDICINE

## 2021-12-27 PROCEDURE — G8484 FLU IMMUNIZE NO ADMIN: HCPCS | Performed by: FAMILY MEDICINE

## 2021-12-27 PROCEDURE — 1123F ACP DISCUSS/DSCN MKR DOCD: CPT | Performed by: FAMILY MEDICINE

## 2021-12-27 PROCEDURE — 3017F COLORECTAL CA SCREEN DOC REV: CPT | Performed by: FAMILY MEDICINE

## 2021-12-27 PROCEDURE — 4040F PNEUMOC VAC/ADMIN/RCVD: CPT | Performed by: FAMILY MEDICINE

## 2021-12-27 RX ORDER — FUROSEMIDE 40 MG/1
40 TABLET ORAL DAILY
Qty: 90 TABLET | Refills: 1 | Status: SHIPPED | OUTPATIENT
Start: 2021-12-27 | End: 2022-05-24

## 2021-12-27 RX ORDER — SOLIFENACIN SUCCINATE 10 MG/1
10 TABLET, FILM COATED ORAL DAILY
Qty: 90 TABLET | Refills: 1 | Status: SHIPPED | OUTPATIENT
Start: 2021-12-27 | End: 2022-05-24

## 2021-12-27 RX ORDER — POTASSIUM CHLORIDE 750 MG/1
10 TABLET, EXTENDED RELEASE ORAL 2 TIMES DAILY
Qty: 180 TABLET | Refills: 1 | Status: SHIPPED | OUTPATIENT
Start: 2021-12-27

## 2021-12-27 RX ORDER — OMEPRAZOLE 20 MG/1
CAPSULE, DELAYED RELEASE ORAL
Qty: 90 CAPSULE | Refills: 1 | Status: SHIPPED | OUTPATIENT
Start: 2021-12-27 | End: 2022-06-16

## 2021-12-27 RX ORDER — ESCITALOPRAM OXALATE 20 MG/1
TABLET ORAL
Qty: 90 TABLET | Refills: 1 | Status: SHIPPED | OUTPATIENT
Start: 2021-12-27 | End: 2022-09-07

## 2021-12-27 SDOH — ECONOMIC STABILITY: FOOD INSECURITY: WITHIN THE PAST 12 MONTHS, THE FOOD YOU BOUGHT JUST DIDN'T LAST AND YOU DIDN'T HAVE MONEY TO GET MORE.: NEVER TRUE

## 2021-12-27 SDOH — ECONOMIC STABILITY: FOOD INSECURITY: WITHIN THE PAST 12 MONTHS, YOU WORRIED THAT YOUR FOOD WOULD RUN OUT BEFORE YOU GOT MONEY TO BUY MORE.: NEVER TRUE

## 2021-12-27 ASSESSMENT — PATIENT HEALTH QUESTIONNAIRE - PHQ9
SUM OF ALL RESPONSES TO PHQ QUESTIONS 1-9: 0
2. FEELING DOWN, DEPRESSED OR HOPELESS: 0
SUM OF ALL RESPONSES TO PHQ9 QUESTIONS 1 & 2: 0
SUM OF ALL RESPONSES TO PHQ QUESTIONS 1-9: 0
1. LITTLE INTEREST OR PLEASURE IN DOING THINGS: 0
SUM OF ALL RESPONSES TO PHQ QUESTIONS 1-9: 0

## 2021-12-27 ASSESSMENT — SOCIAL DETERMINANTS OF HEALTH (SDOH): HOW HARD IS IT FOR YOU TO PAY FOR THE VERY BASICS LIKE FOOD, HOUSING, MEDICAL CARE, AND HEATING?: NOT HARD AT ALL

## 2021-12-27 NOTE — PATIENT INSTRUCTIONS
Personalized Preventive Plan for Susan Velazco - 12/27/2021  Medicare offers a range of preventive health benefits. Some of the tests and screenings are paid in full while other may be subject to a deductible, co-insurance, and/or copay. Some of these benefits include a comprehensive review of your medical history including lifestyle, illnesses that may run in your family, and various assessments and screenings as appropriate. After reviewing your medical record and screening and assessments performed today your provider may have ordered immunizations, labs, imaging, and/or referrals for you. A list of these orders (if applicable) as well as your Preventive Care list are included within your After Visit Summary for your review. Other Preventive Recommendations:    · A preventive eye exam performed by an eye specialist is recommended every 1-2 years to screen for glaucoma; cataracts, macular degeneration, and other eye disorders. · A preventive dental visit is recommended every 6 months. · Try to get at least 150 minutes of exercise per week or 10,000 steps per day on a pedometer . · Order or download the FREE \"Exercise & Physical Activity: Your Everyday Guide\" from [employer]. Call 3-943.618.2598 or search [employer] online. · You need 3864-0703 mg of calcium and 1334-0356 IU of vitamin D per day. It is possible to meet your calcium requirement with diet alone, but a vitamin D supplement is usually necessary to meet this goal.  · When exposed to the sun, use a sunscreen that protects against both UVA and UVB radiation with an SPF of 30 or greater. Reapply every 2 to 3 hours or after sweating, drying off with a towel, or swimming. · Always wear a seat belt when traveling in a car. Always wear a helmet when riding a bicycle or motorcycle.

## 2021-12-27 NOTE — PROGRESS NOTES
Medicare Annual Wellness Visit  Name: Nuvia Ordaz Date: 2021   MRN: [id number] Sex: Female   Age: 80 y.o. Ethnicity: Non- / Non    : 1950 Race: White (non-)      Beto Jung is here for Medicare AWV    Screenings for behavioral, psychosocial and functional/safety risks, and cognitive dysfunction are all negative except as indicated below. These results, as well as other patient data from the [address] form, are documented in Flowsheets linked to this Encounter. Allergies   Allergen Reactions    Penicillins Hives         Prior to Visit Medications    Medication Sig Taking? Authorizing Provider   furosemide (LASIX) 40 MG tablet Take 1 tablet by mouth daily Yes Sandra Oliver MD   potassium chloride (KLOR-CON M) 10 MEQ extended release tablet Take 1 tablet by mouth 2 times daily Yes Sandra Oliver MD   omeprazole (PRILOSEC) 20 MG delayed release capsule TAKE 1 CAPSULE BY MOUTH EVERY NIGHT Yes Sandra Oliver MD   metoprolol tartrate (LOPRESSOR) 25 MG tablet Take 1/2 (one-half) tablet by mouth twice daily Yes Sandra Oliver MD   solifenacin (VESICARE) 10 MG tablet Take 1 tablet by mouth daily Yes Sandra Oliver MD   escitalopram (LEXAPRO) 20 MG tablet TAKE 1 TABLET BY MOUTH EVERY DAY Yes Sandra Oliver MD   naproxen (NAPROSYN) 500 MG tablet TAKE 1 TABLET BY MOUTH TWICE DAILY WITH MEALS Yes IZA Sapp   tiZANidine (ZANAFLEX) 4 MG tablet Take 1 tablet by mouth 3 times daily as needed (spasms) Yes Sandra Oliver MD   Calcium Citrate-Vitamin D 500-500 MG-UNIT PACK Take 1 by mouth daily. Yes TOPHER Chavez CNP   metroNIDAZOLE (METROGEL) 0.75 % gel Apply topically 2 times daily.  Yes Sandra Oliver MD   traZODone (DESYREL) 50 MG tablet Take 1 tablet by mouth nightly as needed for Sleep Yes Sandra Oliver MD         Past Medical History:   Diagnosis Date    Bell's palsy     Cellulitis     Hypertension        Past Surgical History:   Procedure Laterality Date    CATARACT REMOVAL Bilateral 2015    HYSTERECTOMY, TOTAL ABDOMINAL  1990    INCISION AND DRAINAGE Left 07/06/2017    LEG INCISION AND DRAINAGE ABSCESS performed by Tori Duong MD at [address] Right 09/24/2018    TOTAL KNEE ARTHROPLASTY Left 11/09/2017    VENTRAL HERNIA REPAIR  02/11/2019    5 hernias         Family History   Problem Relation Age of Onset    Cancer Mother     High Blood Pressure Father     Stroke Father     Diabetes Maternal Aunt     Cancer Other         daughter/ovarian cancer    Cancer Daughter        CareTeam (Including outside providers/suppliers regularly involved in providing care):   Patient Care Team:  Sai Major MD as PCP - General (Family Medicine)  Sai Major MD as PCP - [facility] Empaneled Provider    Wt Readings from Last 3 Encounters:   12/27/21 (!) 324 lb 9.6 oz (147.2 kg)   11/05/21 (!) 318 lb 3.2 oz (144.3 kg)   08/06/21 (!) 314 lb 3.2 oz (142.5 kg)     Vitals:    12/27/21 1058   BP: 138/84   Pulse: 63   SpO2: 97%   Weight: (!) 324 lb 9.6 oz (147.2 kg)   Height: 5' 2.04\" (1.576 m)     Body mass index is 59.29 kg/m². Based upon direct observation of the patient, evaluation of cognition reveals recent and remote memory intact.     General Appearance: alert and oriented to person, place and time, well developed and well- nourished, in no acute distress  Skin: warm and dry, no rash or erythema  Head: normocephalic and atraumatic  Eyes: pupils equal, round, and reactive to light, extraocular eye movements intact, conjunctivae normal  ENT: tympanic membrane, external ear and ear canal normal bilaterally, nose without deformity, nasal mucosa and turbinates normal without polyps  Neck: supple and non-tender without mass, no thyromegaly or thyroid nodules, no cervical lymphadenopathy  Pulmonary/Chest: clear to auscultation bilaterally- no wheezes, rales or rhonchi, normal air movement, no respiratory distress  Cardiovascular: normal rate, regular rhythm, normal S1 and S2, no murmurs, rubs, clicks, or gallops, distal pulses intact, no carotid bruits  Abdomen: soft, non-tender, non-distended, normal bowel sounds, no masses or organomegaly  Extremities: no cyanosis, clubbing or edema  Musculoskeletal: normal range of motion, no joint swelling, deformity or tenderness  Neurologic: reflexes normal and symmetric, no cranial nerve deficit, gait, coordination and speech normal    Patient's complete Health Risk Assessment and screening values have been reviewed and are found in Flowsheets. The following problems were reviewed today and where indicated follow up appointments were made and/or referrals ordered. Positive Risk Factor Screenings with Interventions:              General Health and ACP:  General  In general, how would you say your health is?: Fair  In the past 7 days, have you experienced any of the following?  New or Increased Pain, New or Increased Fatigue, Loneliness, Social Isolation, Stress or Anger?: None of These  Do you get the social and emotional support that you need?: Yes  Do you have a Living Will?: Yes  Advance Directives     Power of [employer] Will ACP-Advance Directive ACP-Power of     Not on File Not on File Not on File Not on File      General Health Risk Interventions:  · Has living will     Health Habits/Nutrition:  Health Habits/Nutrition  Do you exercise for at least 20 minutes 2-3 times per week?: (!) No  Have you lost any weight without trying in the past 3 months?: No  Do you eat only one meal per day?: No  Have you seen the dentist within the past year?: (!) No  Body mass index: (!) 59.29  Health Habits/Nutrition Interventions:  · Has dentures          Personalized Preventive Plan   Current Health Maintenance Status  Immunization History   Administered Date(s) Administered    COVID-19, Mey Bran, Primary or Immunocompromised, PF, potassium chloride (KLOR-CON M) 10 MEQ extended release tablet; Take 1 tablet by mouth 2 times daily    Gastric ulcer, unspecified chronicity, unspecified whether gastric ulcer hemorrhage or perforation present  -     omeprazole (PRILOSEC) 20 MG delayed release capsule; TAKE 1 CAPSULE BY MOUTH EVERY NIGHT    Paroxysmal atrial fibrillation (HCC)  -     metoprolol tartrate (LOPRESSOR) 25 MG tablet; Take 1/2 (one-half) tablet by mouth twice daily    Depression with anxiety  -     escitalopram (LEXAPRO) 20 MG tablet; TAKE 1 TABLET BY MOUTH EVERY DAY    Encounter for screening mammogram for malignant neoplasm of breast  -     KARRI DIGITAL SCREEN W OR WO CAD BILATERAL; Future    Encounter for lipid screening for cardiovascular disease  -     Lipid, Fasting; Future    Other orders  -     solifenacin (VESICARE) 10 MG tablet; Take 1 tablet by mouth daily           Mammogram ordered  Blood work ordered  Renew meds  Restart lasix and potassium  If edema does not improve, would consider adding in lymphedema wraps for swelling.

## 2022-02-16 ENCOUNTER — NURSE TRIAGE (OUTPATIENT)
Dept: OTHER | Facility: CLINIC | Age: 72
End: 2022-02-16

## 2022-02-16 ENCOUNTER — TELEPHONE (OUTPATIENT)
Dept: PRIMARY CARE CLINIC | Age: 72
End: 2022-02-16

## 2022-02-16 RX ORDER — DOXYCYCLINE HYCLATE 100 MG
100 TABLET ORAL 2 TIMES DAILY
Qty: 28 TABLET | Refills: 0 | Status: SHIPPED | OUTPATIENT
Start: 2022-02-16 | End: 2022-03-02

## 2022-02-16 NOTE — TELEPHONE ENCOUNTER
Received call from Renny Cuenca at [facility] with [employer] Complaint. Subjective: Caller states \"right leg pain, redness and swelling\"     Current Symptoms: right leg swelling, pain in the calf area and redness from ankle to calf. Pain when touched. Left leg with redness but no pain or swelling    Onset: 1 day ago; worsening    Associated Symptoms: NA    Pain Severity: 4/10; sharp; intermittent    Temperature: No     What has been tried: ice and elevation-no help    LMP: NA Pregnant: No    Recommended disposition: Go to ED/UCC (or to office with PCP approval). 2nd level triage transferred to Kwesi Moss for further [address] advice provided, patient verbalizes understanding; denies any other questions or concerns; instructed to call back for any new or worsening symptoms. Writer provided warm transfer to Kwesi Moss at [facility] for 2nd level triage     Attention Provider: Thank you for allowing me to participate in the care of your patient. The patient was connected to triage in response to information provided to the ECC/PSC. Please do not respond through this encounter as the response is not directed to a shared pool.           Reason for Disposition   Thigh or calf pain and only 1 side and present > 1 hour    Protocols used: LEG SWELLING AND EDEMA-ADULT-OH

## 2022-02-16 NOTE — TELEPHONE ENCOUNTER
Right leg pain, swelling, hot to the touch x2 days. States has had clot in past and doesn't feel the same, states does feel similar to when she had cellulitis. Also skin is dry and \"breaking\". See nurse triage encounter from today.

## 2022-02-23 ENCOUNTER — HOSPITAL ENCOUNTER (OUTPATIENT)
Age: 72
Setting detail: SPECIMEN
Discharge: HOME | End: 2022-02-23

## 2022-02-23 ENCOUNTER — OFFICE VISIT (OUTPATIENT)
Dept: PRIMARY CARE CLINIC | Age: 72
End: 2022-02-23
Payer: MEDICARE

## 2022-02-23 VITALS
OXYGEN SATURATION: 96 % | BODY MASS INDEX: 53.92 KG/M2 | SYSTOLIC BLOOD PRESSURE: 134 MMHG | DIASTOLIC BLOOD PRESSURE: 88 MMHG | WEIGHT: 293 LBS | HEART RATE: 66 BPM | HEIGHT: 62 IN

## 2022-02-23 DIAGNOSIS — J40 BRONCHITIS: ICD-10-CM

## 2022-02-23 DIAGNOSIS — B37.2 YEAST DERMATITIS: ICD-10-CM

## 2022-02-23 DIAGNOSIS — R60.0 LOCALIZED EDEMA: Primary | ICD-10-CM

## 2022-02-23 DIAGNOSIS — I48.0 PAROXYSMAL ATRIAL FIBRILLATION (HCC): ICD-10-CM

## 2022-02-23 PROCEDURE — 99214 OFFICE O/P EST MOD 30 MIN: CPT | Performed by: FAMILY MEDICINE

## 2022-02-23 PROCEDURE — 1036F TOBACCO NON-USER: CPT | Performed by: FAMILY MEDICINE

## 2022-02-23 PROCEDURE — G8399 PT W/DXA RESULTS DOCUMENT: HCPCS | Performed by: FAMILY MEDICINE

## 2022-02-23 PROCEDURE — 1123F ACP DISCUSS/DSCN MKR DOCD: CPT | Performed by: FAMILY MEDICINE

## 2022-02-23 PROCEDURE — 1090F PRES/ABSN URINE INCON ASSESS: CPT | Performed by: FAMILY MEDICINE

## 2022-02-23 PROCEDURE — G8427 DOCREV CUR MEDS BY ELIG CLIN: HCPCS | Performed by: FAMILY MEDICINE

## 2022-02-23 PROCEDURE — G8417 CALC BMI ABV UP PARAM F/U: HCPCS | Performed by: FAMILY MEDICINE

## 2022-02-23 PROCEDURE — 4040F PNEUMOC VAC/ADMIN/RCVD: CPT | Performed by: FAMILY MEDICINE

## 2022-02-23 PROCEDURE — G8484 FLU IMMUNIZE NO ADMIN: HCPCS | Performed by: FAMILY MEDICINE

## 2022-02-23 PROCEDURE — 3017F COLORECTAL CA SCREEN DOC REV: CPT | Performed by: FAMILY MEDICINE

## 2022-02-23 RX ORDER — GUAIFENESIN AND CODEINE PHOSPHATE 100; 10 MG/5ML; MG/5ML
5 SOLUTION ORAL EVERY 4 HOURS PRN
Qty: 236 ML | Refills: 0 | Status: SHIPPED | OUTPATIENT
Start: 2022-02-23 | End: 2022-05-23

## 2022-02-23 RX ORDER — LEVOFLOXACIN 500 MG/1
500 TABLET, FILM COATED ORAL DAILY
Qty: 10 TABLET | Refills: 0 | Status: SHIPPED | OUTPATIENT
Start: 2022-02-23 | End: 2022-03-05

## 2022-02-23 RX ORDER — NYSTATIN 100000 [USP'U]/G
POWDER TOPICAL
Qty: 60 G | Refills: 3 | Status: SHIPPED | OUTPATIENT
Start: 2022-02-23 | End: 2022-05-24

## 2022-02-23 ASSESSMENT — ENCOUNTER SYMPTOMS
DIARRHEA: 0
RHINORRHEA: 0
NAUSEA: 0
EYE DISCHARGE: 0
SORE THROAT: 0
COUGH: 1
ABDOMINAL PAIN: 0
EYE REDNESS: 0
VOMITING: 0
SHORTNESS OF BREATH: 1
WHEEZING: 0

## 2022-02-23 NOTE — PROGRESS NOTES
[facility] PRIMARY CARE  [address] [address] [address]  [facility] [address]  Dept: [address] is a 80 y.o. female Established patient, who presents today for her medical conditions/complaints as noted below. Chief Complaint   Patient presents with    Edema     Right leg       HPI:     HPI  Pt with right leg swelling for two weeks. Pt states sob for bad cold. Cold symptoms for past 3-4 days. Has cough with phlegm. No fatigue. Has some chills. No light headed or dizzy. Patient states the right leg swelling is not going down. Has been taking her doxycycline as prescribed. Patient denies any rapid beating of the heart. No skipping of the heartbeat. Has been taking her Lopressor as prescribed. Patient also complaining of redness and rash underneath her abdominal folds. Requesting some medication for this.     Reviewed prior notes None  Reviewed previous Labs    LDL Cholesterol (mg/dL)   Date Value   11/21/2019 105     LDL Calculated (mg/dL)   Date Value   06/07/2017 131       (goal LDL is <100)   AST (U/L)   Date Value   11/03/2020 21     ALT (U/L)   Date Value   11/03/2020 12     BUN (mg/dL)   Date Value   11/03/2020 19     Hemoglobin A1C (%)   Date Value   08/21/2018 5.2     TSH (mIU/L)   Date Value   07/07/2021 1.70     BP Readings from Last 3 Encounters:   02/23/22 134/88   12/27/21 138/84   11/05/21 136/82          (goal 120/80)    Past Medical History:   Diagnosis Date    Bell's palsy     Cellulitis     Hypertension       Past Surgical History:   Procedure Laterality Date    CATARACT REMOVAL Bilateral 2015    HYSTERECTOMY, TOTAL ABDOMINAL  1990    INCISION AND DRAINAGE Left 07/06/2017    LEG INCISION AND DRAINAGE ABSCESS performed by Wilton Navarro MD at [address] [facility] Right 09/24/2018    TOTAL KNEE ARTHROPLASTY Left 11/09/2017    VENTRAL HERNIA REPAIR  02/11/2019    5 hernias       Family History   Problem Relation Age of Onset    Cancer Mother     High Blood Pressure Father     Stroke Father     Diabetes Maternal Aunt     Cancer Other         daughter/ovarian cancer    Cancer Daughter        Social History     Tobacco Use    Smoking status: Never Smoker    Smokeless tobacco: Never Used   Substance Use Topics    Alcohol use: No      Current Outpatient Medications   Medication Sig Dispense Refill    levoFLOXacin (LEVAQUIN) 500 MG tablet Take 1 tablet by mouth daily for 10 days 10 tablet 0    guaiFENesin-codeine (CHERATUSSIN AC) 100-10 MG/5ML syrup Take 5 mLs by mouth every 4 hours as needed for Cough for up to 14 days. 236 mL 0    nystatin (MYCOSTATIN) 753637 UNIT/GM powder Apply 2 times daily. 60 g 3    doxycycline hyclate (VIBRA-TABS) 100 MG tablet Take 1 tablet by mouth 2 times daily for 14 days 28 tablet 0    furosemide (LASIX) 40 MG tablet Take 1 tablet by mouth daily 90 tablet 1    potassium chloride (KLOR-CON M) 10 MEQ extended release tablet Take 1 tablet by mouth 2 times daily 180 tablet 1    omeprazole (PRILOSEC) 20 MG delayed release capsule TAKE 1 CAPSULE BY MOUTH EVERY NIGHT 90 capsule 1    metoprolol tartrate (LOPRESSOR) 25 MG tablet Take 1/2 (one-half) tablet by mouth twice daily 90 tablet 1    solifenacin (VESICARE) 10 MG tablet Take 1 tablet by mouth daily 90 tablet 1    escitalopram (LEXAPRO) 20 MG tablet TAKE 1 TABLET BY MOUTH EVERY DAY 90 tablet 1    naproxen (NAPROSYN) 500 MG tablet TAKE 1 TABLET BY MOUTH TWICE DAILY WITH MEALS 180 tablet 1    tiZANidine (ZANAFLEX) 4 MG tablet Take 1 tablet by mouth 3 times daily as needed (spasms) 60 tablet 3    Calcium Citrate-Vitamin D 500-500 MG-UNIT PACK Take 1 by mouth daily. 30 each 2    metroNIDAZOLE (METROGEL) 0.75 % gel Apply topically 2 times daily. 45 g 2    traZODone (DESYREL) 50 MG tablet Take 1 tablet by mouth nightly as needed for Sleep 30 tablet 5     No current facility-administered medications for this visit.      Allergies   Allergen Left eye: No discharge. Conjunctiva/sclera: Conjunctivae normal.      Pupils: Pupils are equal, round, and reactive to light. Neck:      Thyroid: No thyromegaly. Trachea: No tracheal deviation. Cardiovascular:      Rate and Rhythm: Normal rate and regular rhythm. Heart sounds: Normal heart sounds. Pulmonary:      Effort: Pulmonary effort is normal. No respiratory distress. Breath sounds: Normal breath sounds. No wheezing. Musculoskeletal:      Right lower leg: Edema present. Left lower leg: Edema present. Comments: 2+ pitting edema to both lower extremities. Very minimal erythema to both lower extremities. Some tenderness over the right posterior calf   Lymphadenopathy:      Cervical: No cervical adenopathy. Skin:     General: Skin is warm. Findings: No rash. Neurological:      Mental Status: She is alert and oriented to person, place, and time. Psychiatric:         Mood and Affect: Mood normal.         Behavior: Behavior normal.         Thought Content: Thought content normal.         Assessment:       Diagnosis Orders   1. Localized edema  US DUP LOWER EXTREMITY RIGHT DANA   2. Paroxysmal atrial fibrillation (HCC)     3. Bronchitis  levoFLOXacin (LEVAQUIN) 500 MG tablet    guaiFENesin-codeine (CHERATUSSIN AC) 100-10 MG/5ML syrup    COVID-19   4. Body mass index (BMI) 50.0-59.9, adult ([facility] [address]     5. Yeast dermatitis          Plan:    Venous Doppler right leg rule out DVT  Levaquin for 10 days  Cheratussin for cough  Weight loss discussed  Continue medication as is for atrial fibrillation  Nasal swab for Covid  Monistat powder for yeast dermatitis under abdominal folds    Return if symptoms worsen or fail to improve.     Orders Placed This Encounter   Procedures    US DUP LOWER EXTREMITY RIGHT DANA     Standing Status:   Future     Standing Expiration Date:   2/23/2023     Order Specific Question:   Reason for exam:     Answer:   r/o dvt    COVID-19     Standing Status:   Future     Standing Expiration Date:   2/23/2023     Scheduling Instructions:      1) Due to current limited availability of the COVID-19 test, tests will be prioritized based on responses to questions above. Testing may be delayed due to volume. 2) Print and instruct patient to adhere to CDC home isolation program. (Link Above)              3) Set up or refer patient for a monitoring program.              4) Have patient sign up for and leverage MyChart (if not previously done). Order Specific Question:   Is this test for diagnosis or screening? Answer:   Diagnosis of ill patient     Order Specific Question:   Symptomatic for COVID-19 as defined by CDC? Answer:   Yes     Order Specific Question:   Date of Symptom Onset     Answer:   2/19/2022     Order Specific Question:   Hospitalized for COVID-19? Answer:   No     Order Specific Question:   Admitted to ICU for COVID-19? Answer:   No     Order Specific Question:   Employed in healthcare setting? Answer:   No     Order Specific Question:   Resident in a congregate (group) care setting? Answer:   No     Order Specific Question:   Pregnant? Answer:   No     Order Specific Question:   Previously tested for COVID-19? Answer:   Yes     Orders Placed This Encounter   Medications    levoFLOXacin (LEVAQUIN) 500 MG tablet     Sig: Take 1 tablet by mouth daily for 10 days     Dispense:  10 tablet     Refill:  0    guaiFENesin-codeine (CHERATUSSIN AC) 100-10 MG/5ML syrup     Sig: Take 5 mLs by mouth every 4 hours as needed for Cough for up to 14 days. Dispense:  236 mL     Refill:  0     Reduce doses taken as pain becomes manageable    nystatin (MYCOSTATIN) 024254 UNIT/GM powder     Sig: Apply 2 times daily. Dispense:  60 g     Refill:  3       Patient given educational materials - see patient instructions. Discussed use, benefit, and side effects of prescribed medications. All patient questions answered. Pt voiced understanding. Reviewed health maintenance. Instructed to continue current medications, diet andexercise. Patient agreed with treatment plan. Follow up as directed.      Electronicallysigned by Gino Damian MD on 2/23/2022 at 10:28 AM

## 2022-02-24 DIAGNOSIS — J40 BRONCHITIS: ICD-10-CM

## 2022-02-25 LAB
SARS-COV-2: NORMAL
SARS-COV-2: NOT DETECTED
SOURCE: NORMAL

## 2022-03-03 ENCOUNTER — HOSPITAL ENCOUNTER (OUTPATIENT)
Age: 72
Discharge: HOME | End: 2022-03-03
Payer: MEDICARE

## 2022-03-03 ENCOUNTER — HOSPITAL ENCOUNTER (OUTPATIENT)
Dept: VASCULAR LAB | Age: 72
Discharge: HOME | End: 2022-03-03
Payer: MEDICARE

## 2022-03-03 ENCOUNTER — OFFICE VISIT (OUTPATIENT)
Dept: PRIMARY CARE CLINIC | Age: 72
End: 2022-03-03
Payer: MEDICARE

## 2022-03-03 ENCOUNTER — TELEPHONE (OUTPATIENT)
Dept: PRIMARY CARE CLINIC | Age: 72
End: 2022-03-03

## 2022-03-03 VITALS
SYSTOLIC BLOOD PRESSURE: 132 MMHG | BODY MASS INDEX: 53.92 KG/M2 | TEMPERATURE: 97.3 F | OXYGEN SATURATION: 98 % | HEIGHT: 62 IN | WEIGHT: 293 LBS | DIASTOLIC BLOOD PRESSURE: 80 MMHG | HEART RATE: 86 BPM

## 2022-03-03 DIAGNOSIS — R23.8 REDNESS AND SWELLING OF LOWER LEG: ICD-10-CM

## 2022-03-03 DIAGNOSIS — M79.89 REDNESS AND SWELLING OF LOWER LEG: ICD-10-CM

## 2022-03-03 DIAGNOSIS — R60.0 LOCALIZED EDEMA: ICD-10-CM

## 2022-03-03 DIAGNOSIS — S81.801D OPEN WOUND OF RIGHT LOWER EXTREMITY, SUBSEQUENT ENCOUNTER: ICD-10-CM

## 2022-03-03 DIAGNOSIS — R60.0 LOCALIZED EDEMA: Primary | ICD-10-CM

## 2022-03-03 LAB
ANION GAP SERPL CALCULATED.3IONS-SCNC: 10 MMOL/L (ref 9–17)
BUN BLDV-MCNC: 15 MG/DL (ref 8–23)
CALCIUM SERPL-MCNC: 9.2 MG/DL (ref 8.6–10.4)
CHLORIDE BLD-SCNC: 100 MMOL/L (ref 98–107)
CO2: 29 MMOL/L (ref 20–31)
CREAT SERPL-MCNC: 0.76 MG/DL (ref 0.5–0.9)
GFR AFRICAN AMERICAN: >60 ML/MIN
GFR NON-AFRICAN AMERICAN: >60 ML/MIN
GFR SERPL CREATININE-BSD FRML MDRD: NORMAL ML/MIN/{1.73_M2}
GLUCOSE BLD-MCNC: 88 MG/DL (ref 70–99)
POTASSIUM SERPL-SCNC: 4.4 MMOL/L (ref 3.7–5.3)
SODIUM BLD-SCNC: 139 MMOL/L (ref 135–144)

## 2022-03-03 PROCEDURE — 93971 EXTREMITY STUDY: CPT

## 2022-03-03 PROCEDURE — G8484 FLU IMMUNIZE NO ADMIN: HCPCS | Performed by: PHYSICIAN ASSISTANT

## 2022-03-03 PROCEDURE — 3017F COLORECTAL CA SCREEN DOC REV: CPT | Performed by: PHYSICIAN ASSISTANT

## 2022-03-03 PROCEDURE — 1036F TOBACCO NON-USER: CPT | Performed by: PHYSICIAN ASSISTANT

## 2022-03-03 PROCEDURE — 1090F PRES/ABSN URINE INCON ASSESS: CPT | Performed by: PHYSICIAN ASSISTANT

## 2022-03-03 PROCEDURE — G8427 DOCREV CUR MEDS BY ELIG CLIN: HCPCS | Performed by: PHYSICIAN ASSISTANT

## 2022-03-03 PROCEDURE — 99214 OFFICE O/P EST MOD 30 MIN: CPT | Performed by: PHYSICIAN ASSISTANT

## 2022-03-03 PROCEDURE — 80048 BASIC METABOLIC PNL TOTAL CA: CPT

## 2022-03-03 PROCEDURE — 36415 COLL VENOUS BLD VENIPUNCTURE: CPT

## 2022-03-03 PROCEDURE — G8417 CALC BMI ABV UP PARAM F/U: HCPCS | Performed by: PHYSICIAN ASSISTANT

## 2022-03-03 PROCEDURE — 4040F PNEUMOC VAC/ADMIN/RCVD: CPT | Performed by: PHYSICIAN ASSISTANT

## 2022-03-03 PROCEDURE — G8399 PT W/DXA RESULTS DOCUMENT: HCPCS | Performed by: PHYSICIAN ASSISTANT

## 2022-03-03 PROCEDURE — 1123F ACP DISCUSS/DSCN MKR DOCD: CPT | Performed by: PHYSICIAN ASSISTANT

## 2022-03-03 ASSESSMENT — ENCOUNTER SYMPTOMS
COUGH: 1
EYES NEGATIVE: 1
ABDOMINAL PAIN: 0
COLOR CHANGE: 0

## 2022-03-03 NOTE — TELEPHONE ENCOUNTER
----- Message from Chiquita Collado sent at 3/3/2022  9:42 AM EST -----  Subject: Appointment Request    Reason for Call: Semi-Routine Skin Problem    QUESTIONS  Type of Appointment? Established Patient  Reason for appointment request? No appointments available during search  Additional Information for Provider? pt wants a appt she has sore on the   back of her right leg she needs looked at ,please call pt   ---------------------------------------------------------------------------  --------------  Foodem  What is the best way for the office to contact you? OK to leave message on   voicemail  Preferred Call Back Phone Number? [phone]  ---------------------------------------------------------------------------  --------------  SCRIPT ANSWERS  Relationship to Patient? Self  Are you having swelling in your throat or face? No  Are you having difficulty breathing? No  Have the symptoms worsened or spread in the last day? No  Are you having fevers (100.4), chills or sweats? No  Have you recently (14 days) seen a provider for this issue? No  Have you been diagnosed with, awaiting test results for, or told that you   are suspected of having COVID-19 (Coronavirus)? (If patient has tested   negative or was tested as a requirement for work, school, or travel and   not based on symptoms, answer no)? No  Within the past 10 days have you developed any of the following symptoms   (answer no if symptoms have been present longer than 10 days or began   more than 10 days ago)? Fever or Chills, Cough, Shortness of breath or   difficulty breathing, Loss of taste or smell, Sore throat, Nasal   congestion, Sneezing or runny nose, Fatigue or generalized body aches   (answer no if pain is specific to a body part e.g. back pain), Diarrhea,   Headache? No  Have you had close contact with someone with COVID-19 in the last 7 days? No  (Service Expert  click yes below to proceed with [employer] As Usual   Scheduling)?  Yes

## 2022-03-03 NOTE — PROGRESS NOTES
[facility] Primary [facility]  [address]  Phone: [phone]  Fax: [phone]    Rupinder Ortega is a 80 y.o. female who presents today for her medical conditions/complaintsas noted below. Chief Complaint   Patient presents with    Leg Pain     patient states she was seen by dr. Trey Larkin doppler pt states she did not have done- patient states sore on the back of right leg- patient states seeping, painful and redness          HPI:     HPI   Saw Dr. Karley Liu 2/23/22 with red, swollen leg and venous doppler was ordered. Has not had done. Open sore on posterior right leg now, weeping a clear fluid  Was taking Doxycycline, now on Levaquin and has 2 days left. Only takes her Lasix some of the time. Current Outpatient Medications   Medication Sig Dispense Refill    mupirocin (BACTROBAN) 2 % ointment Apply topically 3 times daily. 22 g 2    levoFLOXacin (LEVAQUIN) 500 MG tablet Take 1 tablet by mouth daily for 10 days 10 tablet 0    guaiFENesin-codeine (CHERATUSSIN AC) 100-10 MG/5ML syrup Take 5 mLs by mouth every 4 hours as needed for Cough for up to 14 days. 236 mL 0    nystatin (MYCOSTATIN) 997210 UNIT/GM powder Apply 2 times daily.  60 g 3    furosemide (LASIX) 40 MG tablet Take 1 tablet by mouth daily 90 tablet 1    potassium chloride (KLOR-CON M) 10 MEQ extended release tablet Take 1 tablet by mouth 2 times daily 180 tablet 1    omeprazole (PRILOSEC) 20 MG delayed release capsule TAKE 1 CAPSULE BY MOUTH EVERY NIGHT 90 capsule 1    metoprolol tartrate (LOPRESSOR) 25 MG tablet Take 1/2 (one-half) tablet by mouth twice daily 90 tablet 1    solifenacin (VESICARE) 10 MG tablet Take 1 tablet by mouth daily 90 tablet 1    escitalopram (LEXAPRO) 20 MG tablet TAKE 1 TABLET BY MOUTH EVERY DAY 90 tablet 1    naproxen (NAPROSYN) 500 MG tablet TAKE 1 TABLET BY MOUTH TWICE DAILY WITH MEALS 180 tablet 1    tiZANidine (ZANAFLEX) 4 MG tablet Take 1 tablet by mouth 3 times daily as needed (spasms) 60 tablet 3    Calcium Citrate-Vitamin D 500-500 MG-UNIT PACK Take 1 by mouth daily. 30 each 2    metroNIDAZOLE (METROGEL) 0.75 % gel Apply topically 2 times daily. 45 g 2    traZODone (DESYREL) 50 MG tablet Take 1 tablet by mouth nightly as needed for Sleep 30 tablet 5     No current facility-administered medications for this visit. Allergies   Allergen Reactions    Penicillins Hives       Subjective:      Review of Systems   Constitutional: Negative for chills, diaphoresis, fever and unexpected weight change. HENT: Negative. Negative for mouth sores. Eyes: Negative. Respiratory: Positive for cough. Cardiovascular: Positive for leg swelling. Gastrointestinal: Negative for abdominal pain. Musculoskeletal: Negative for arthralgias and myalgias. Skin: Negative for color change, pallor, rash and wound. Allergic/Immunologic: Negative for environmental allergies, food allergies and immunocompromised state. Hematological: Negative for adenopathy. Objective:     /80   Pulse 86   Temp 97.3 °F (36.3 °C) (Temporal)   Ht 5' 2.04\" (1.576 m)   Wt (!) 319 lb (144.7 kg)   SpO2 98%   BMI 58.27 kg/m²   Physical Exam  Vitals and nursing note reviewed. Constitutional:       Appearance: Normal appearance. She is obese. She is not ill-appearing. Cardiovascular:      Rate and Rhythm: Normal rate and regular rhythm. Heart sounds: Normal heart sounds. Pulmonary:      Effort: Pulmonary effort is normal.      Breath sounds: Normal breath sounds. No wheezing, rhonchi or rales. Musculoskeletal:      Right lower leg: Edema (3+ bilat , red and warm) present. Left lower leg: Edema present. Skin:     Comments: 3-5 open areas on posterior leg weeping serous fluid   Neurological:      Mental Status: She is alert and oriented to person, place, and time.    Psychiatric:         Mood and Affect: Mood normal.         Assessment:       Diagnosis Orders   1. Localized edema  Basic Metabolic Panel    VL Extremity Venous Right   2. Redness and swelling of lower leg  VL Extremity Venous Right   3. Open wound of right lower extremity, subsequent encounter          Plan:    Schedule for STAT venous doppler  ---Sheela Cesar or Olivier Kerns   Murpirocin topically   Had Mary Li take a look for possible Unna boot placement if no DVT. Take the Lasix 40mg daily and elevate the leg       Return if symptoms worsen or fail to improve. Orders Placed This Encounter   Procedures    VL Extremity Venous Right     Standing Status:   Future     Standing Expiration Date:   3/3/2023     Order Specific Question:   Upper or Lower Extremity? Answer:   Lower Extremity    Basic Metabolic Panel     Standing Status:   Future     Standing Expiration Date:   3/3/2023     Orders Placed This Encounter   Medications    mupirocin (BACTROBAN) 2 % ointment     Sig: Apply topically 3 times daily.      Dispense:  22 g     Refill:  2           Electronically signed by Jose López 3/3/2022 at 2:36 PM

## 2022-03-09 ENCOUNTER — TELEPHONE (OUTPATIENT)
Dept: PRIMARY CARE CLINIC | Age: 72
End: 2022-03-09

## 2022-03-09 NOTE — TELEPHONE ENCOUNTER
Nystatin powder prior authorization denied. Insurance states they do cover Clotrimazole topical cream, or nystatin topical ointment. Pharmacy [facility].

## 2022-03-10 RX ORDER — NYSTATIN 100000 U/G
OINTMENT TOPICAL
Qty: 30 G | Refills: 2 | Status: SHIPPED | OUTPATIENT
Start: 2022-03-10 | End: 2022-10-18

## 2022-03-17 ENCOUNTER — HOSPITAL ENCOUNTER (OUTPATIENT)
Age: 72
Setting detail: SPECIMEN
Discharge: HOME | End: 2022-03-17

## 2022-03-17 ENCOUNTER — OFFICE VISIT (OUTPATIENT)
Dept: PRIMARY CARE CLINIC | Age: 72
End: 2022-03-17
Payer: MEDICARE

## 2022-03-17 VITALS — BODY MASS INDEX: 57.76 KG/M2 | HEART RATE: 75 BPM | WEIGHT: 293 LBS | OXYGEN SATURATION: 95 %

## 2022-03-17 DIAGNOSIS — L85.3 DRY SKIN DERMATITIS: ICD-10-CM

## 2022-03-17 DIAGNOSIS — S81.801D OPEN WOUND OF RIGHT LOWER EXTREMITY, SUBSEQUENT ENCOUNTER: ICD-10-CM

## 2022-03-17 DIAGNOSIS — R60.0 LOCALIZED EDEMA: Primary | ICD-10-CM

## 2022-03-17 PROCEDURE — 4040F PNEUMOC VAC/ADMIN/RCVD: CPT | Performed by: NURSE PRACTITIONER

## 2022-03-17 PROCEDURE — 99213 OFFICE O/P EST LOW 20 MIN: CPT | Performed by: NURSE PRACTITIONER

## 2022-03-17 PROCEDURE — 1036F TOBACCO NON-USER: CPT | Performed by: NURSE PRACTITIONER

## 2022-03-17 PROCEDURE — 1123F ACP DISCUSS/DSCN MKR DOCD: CPT | Performed by: NURSE PRACTITIONER

## 2022-03-17 PROCEDURE — 3017F COLORECTAL CA SCREEN DOC REV: CPT | Performed by: NURSE PRACTITIONER

## 2022-03-17 PROCEDURE — 1090F PRES/ABSN URINE INCON ASSESS: CPT | Performed by: NURSE PRACTITIONER

## 2022-03-17 PROCEDURE — G8399 PT W/DXA RESULTS DOCUMENT: HCPCS | Performed by: NURSE PRACTITIONER

## 2022-03-17 PROCEDURE — G8427 DOCREV CUR MEDS BY ELIG CLIN: HCPCS | Performed by: NURSE PRACTITIONER

## 2022-03-17 PROCEDURE — G8484 FLU IMMUNIZE NO ADMIN: HCPCS | Performed by: NURSE PRACTITIONER

## 2022-03-17 PROCEDURE — 29580 STRAPPING UNNA BOOT: CPT | Performed by: NURSE PRACTITIONER

## 2022-03-17 PROCEDURE — G8417 CALC BMI ABV UP PARAM F/U: HCPCS | Performed by: NURSE PRACTITIONER

## 2022-03-17 RX ORDER — AMMONIUM LACTATE 12 G/100G
LOTION TOPICAL
Qty: 222 ML | Refills: 3 | Status: SHIPPED | OUTPATIENT
Start: 2022-03-17 | End: 2022-05-24

## 2022-03-17 ASSESSMENT — ENCOUNTER SYMPTOMS: COLOR CHANGE: 1

## 2022-03-17 NOTE — PROGRESS NOTES
717 [facility] PRIMARY CARE  [address] [facility] [address]  Dept: [address] is a 80 y.o. female Established patient, who presents today for her medical conditions/complaints as noted below. Chief Complaint   Patient presents with    Wound Check     right leg - open wound - pt is unsure of how long it has been there. HPI:     HPI  Right lower leg edema and swelling.   Open wound on posterior legg  She has been on doxycycline and Levaquin in the past.  Dry skin on arm and legs    Reviewed prior notes Previous PCP  Reviewed previous Imaging    LDL Cholesterol (mg/dL)   Date Value   11/21/2019 105     LDL Calculated (mg/dL)   Date Value   06/07/2017 131       (goal LDL is <100)   AST (U/L)   Date Value   11/03/2020 21     ALT (U/L)   Date Value   11/03/2020 12     BUN (mg/dL)   Date Value   03/03/2022 15     Hemoglobin A1C (%)   Date Value   08/21/2018 5.2     TSH (mIU/L)   Date Value   07/07/2021 1.70     BP Readings from Last 3 Encounters:   03/03/22 132/80   02/23/22 134/88   12/27/21 138/84          (goal 120/80)    Past Medical History:   Diagnosis Date    Bell's palsy     Cellulitis     Hypertension       Past Surgical History:   Procedure Laterality Date    CATARACT REMOVAL Bilateral 2015    HYSTERECTOMY, TOTAL ABDOMINAL  1990    INCISION AND DRAINAGE Left 07/06/2017    LEG INCISION AND DRAINAGE ABSCESS performed by Mortimer Guerin, MD at [address] [place] [address] Right 09/24/2018    TOTAL KNEE ARTHROPLASTY Left 11/09/2017    VENTRAL HERNIA REPAIR  02/11/2019    5 hernias       Family History   Problem Relation Age of Onset    Cancer Mother     High Blood Pressure Father     Stroke Father     Diabetes Maternal Aunt     Cancer Other         daughter/ovarian cancer    Cancer Daughter        Social History     Tobacco Use    Smoking status: Never Smoker    Smokeless tobacco: Never Used   Substance Use Topics    Alcohol use: No      Current Outpatient Medications   Medication Sig Dispense Refill    ammonium lactate (LAC-HYDRIN) 12 % lotion Apply topically daily. 222 mL 3    nystatin (MYCOSTATIN) 906721 UNIT/GM ointment Apply topically 2 times daily. 30 g 2    mupirocin (BACTROBAN) 2 % ointment Apply topically 3 times daily. 22 g 2    furosemide (LASIX) 40 MG tablet Take 1 tablet by mouth daily 90 tablet 1    potassium chloride (KLOR-CON M) 10 MEQ extended release tablet Take 1 tablet by mouth 2 times daily 180 tablet 1    omeprazole (PRILOSEC) 20 MG delayed release capsule TAKE 1 CAPSULE BY MOUTH EVERY NIGHT 90 capsule 1    metoprolol tartrate (LOPRESSOR) 25 MG tablet Take 1/2 (one-half) tablet by mouth twice daily 90 tablet 1    solifenacin (VESICARE) 10 MG tablet Take 1 tablet by mouth daily 90 tablet 1    escitalopram (LEXAPRO) 20 MG tablet TAKE 1 TABLET BY MOUTH EVERY DAY 90 tablet 1    Calcium Citrate-Vitamin D 500-500 MG-UNIT PACK Take 1 by mouth daily. 30 each 2    metroNIDAZOLE (METROGEL) 0.75 % gel Apply topically 2 times daily. 45 g 2    nystatin (MYCOSTATIN) 039686 UNIT/GM powder Apply 2 times daily. (Patient not taking: Reported on 3/17/2022) 60 g 3    naproxen (NAPROSYN) 500 MG tablet TAKE 1 TABLET BY MOUTH TWICE DAILY WITH MEALS (Patient not taking: Reported on 3/17/2022) 180 tablet 1    tiZANidine (ZANAFLEX) 4 MG tablet Take 1 tablet by mouth 3 times daily as needed (spasms) (Patient not taking: Reported on 3/17/2022) 60 tablet 3    traZODone (DESYREL) 50 MG tablet Take 1 tablet by mouth nightly as needed for Sleep (Patient not taking: Reported on 3/17/2022) 30 tablet 5     No current facility-administered medications for this visit.      Allergies   Allergen Reactions    Penicillins Hives       Health Maintenance   Topic Date Due    Shingles Vaccine (2 of 3) 08/13/2012    Breast cancer screen  09/18/2022    Depression Monitoring  12/27/2022    Annual Wellness Visit (AWV)  12/28/2022    Potassium monitoring  2023    Creatinine monitoring  2023    Colorectal Cancer Screen  2023    Lipid screen  2024    DTaP/Tdap/Td vaccine (3 - Td or Tdap) 2025    DEXA (modify frequency per FRAX score)  Completed    Flu vaccine  Completed    Pneumococcal 65+ years Vaccine  Completed    COVID-19 Vaccine  Completed    Hepatitis C screen  Completed    Hepatitis A vaccine  Aged Out    Hepatitis B vaccine  Aged Out    Hib vaccine  Aged Out    Meningococcal (ACWY) vaccine  Aged Out       Subjective:      Review of Systems   Cardiovascular: Positive for leg swelling. Musculoskeletal: Positive for gait problem. Skin: Positive for color change and wound. Objective:     Pulse 75   Wt (!) 316 lb 3.2 oz (143.4 kg)   SpO2 95%   BMI 57.76 kg/m²   Physical Exam  HENT:      Head: Normocephalic and atraumatic. Right Ear: External ear normal.      Left Ear: External ear normal.   Cardiovascular:      Rate and Rhythm: Normal rate and regular rhythm. Heart sounds: Normal heart sounds. Pulmonary:      Effort: Pulmonary effort is normal.      Breath sounds: Normal breath sounds. Musculoskeletal:      Right lower le+ Pitting Edema present. Left lower le+ Pitting Edema present. Skin:     General: Skin is warm. Comments: Proximal posterior left lower leg wound 1.1 X 0.9 X 0.1 with red base small amount of serous drainage. Distal posterior left lower leg wound 3.9 X 1.0 x 0.1 cm with 50% slough and 50% red wound base, erythema surrounding area and moderate serous drainage. Neurological:      Mental Status: She is oriented to person, place, and time. Psychiatric:         Mood and Affect: Mood normal.         Behavior: Behavior normal.         Assessment/Plan:   1. Localized edema  -     OK APPLY OF PASTE BOOT  2.  Open wound of right lower extremity, subsequent encounter  -     OK APPLY OF PASTE BOOT  -     Culture, Aerobic and Anaerobic; Future  3. Dry skin dermatitis  -     ammonium lactate (LAC-HYDRIN) 12 % lotion; Apply topically daily. , Disp-222 mL, R-3, Normal       right cleansed  with soap and water and pat dry. calcium alginate placed over open areas  to absorb moisture. Four inch Taylor's boot dressing applied from base of toes to knee. 10 yards  applied in over-lapping turns. Roll gauze applied. Molded to fit. Four inch Coban applied in over-lapping turns. Tolerated well. Instructed not to get wet. She may do all normal activities except anything that may get taylor boot dressing wet. Voiced understanding. Return for Monday in [place] and Thursday in [place]. Orders Placed This Encounter   Procedures    Culture, Aerobic and Anaerobic     Standing Status:   Future     Standing Expiration Date:   3/17/2023    CA APPLY OF PASTE BOOT     Orders Placed This Encounter   Medications    ammonium lactate (LAC-HYDRIN) 12 % lotion     Sig: Apply topically daily. Dispense:  222 mL     Refill:  3       Patient given educational materials - see patient instructions. Discussed use, benefit, and side effects of prescribed medications. All patient questions answered. Pt voiced understanding. Reviewed health maintenance. Instructed to continue current medications, diet and exercise. Patient agreed with treatment plan. Follow up as directed.      Electronically signed by TOPHER Agee CNP on 3/17/2022 at 2:16 PM

## 2022-03-21 ENCOUNTER — OFFICE VISIT (OUTPATIENT)
Dept: PRIMARY CARE CLINIC | Age: 72
End: 2022-03-21
Payer: MEDICARE

## 2022-03-21 VITALS — DIASTOLIC BLOOD PRESSURE: 80 MMHG | SYSTOLIC BLOOD PRESSURE: 162 MMHG | HEART RATE: 62 BPM | OXYGEN SATURATION: 98 %

## 2022-03-21 DIAGNOSIS — B37.9 ANTIBIOTIC-INDUCED YEAST INFECTION: ICD-10-CM

## 2022-03-21 DIAGNOSIS — S81.801D OPEN WOUND OF RIGHT LOWER EXTREMITY, SUBSEQUENT ENCOUNTER: Primary | ICD-10-CM

## 2022-03-21 DIAGNOSIS — T36.95XA ANTIBIOTIC-INDUCED YEAST INFECTION: ICD-10-CM

## 2022-03-21 DIAGNOSIS — L03.115 CELLULITIS OF RIGHT LOWER EXTREMITY: ICD-10-CM

## 2022-03-21 PROCEDURE — 4040F PNEUMOC VAC/ADMIN/RCVD: CPT | Performed by: NURSE PRACTITIONER

## 2022-03-21 PROCEDURE — 1123F ACP DISCUSS/DSCN MKR DOCD: CPT | Performed by: NURSE PRACTITIONER

## 2022-03-21 PROCEDURE — G8417 CALC BMI ABV UP PARAM F/U: HCPCS | Performed by: NURSE PRACTITIONER

## 2022-03-21 PROCEDURE — 1090F PRES/ABSN URINE INCON ASSESS: CPT | Performed by: NURSE PRACTITIONER

## 2022-03-21 PROCEDURE — G8484 FLU IMMUNIZE NO ADMIN: HCPCS | Performed by: NURSE PRACTITIONER

## 2022-03-21 PROCEDURE — 3017F COLORECTAL CA SCREEN DOC REV: CPT | Performed by: NURSE PRACTITIONER

## 2022-03-21 PROCEDURE — G8399 PT W/DXA RESULTS DOCUMENT: HCPCS | Performed by: NURSE PRACTITIONER

## 2022-03-21 PROCEDURE — G8427 DOCREV CUR MEDS BY ELIG CLIN: HCPCS | Performed by: NURSE PRACTITIONER

## 2022-03-21 PROCEDURE — 99213 OFFICE O/P EST LOW 20 MIN: CPT | Performed by: NURSE PRACTITIONER

## 2022-03-21 PROCEDURE — 1036F TOBACCO NON-USER: CPT | Performed by: NURSE PRACTITIONER

## 2022-03-21 RX ORDER — FLUCONAZOLE 100 MG/1
100 TABLET ORAL DAILY
Qty: 10 TABLET | Refills: 0 | Status: SHIPPED | OUTPATIENT
Start: 2022-03-21 | End: 2022-03-31

## 2022-03-21 RX ORDER — SULFAMETHOXAZOLE AND TRIMETHOPRIM 800; 160 MG/1; MG/1
1 TABLET ORAL 2 TIMES DAILY
Qty: 28 TABLET | Refills: 0 | Status: SHIPPED | OUTPATIENT
Start: 2022-03-21 | End: 2022-04-04

## 2022-03-21 ASSESSMENT — ENCOUNTER SYMPTOMS
EYE REDNESS: 0
CONSTIPATION: 0
COUGH: 0
BLOOD IN STOOL: 0
SHORTNESS OF BREATH: 0
COLOR CHANGE: 1
EYE PAIN: 0
DIARRHEA: 0

## 2022-03-21 NOTE — PATIENT INSTRUCTIONS
Cleanse right lower leg wound with soap and water, pat dry and cover with gauze pad and wrap daily. Sulfamethoxazole-trimethoprim 1 tablet 2x/day x 14 days.     Diflucan 100 mg daily X 10 days

## 2022-03-22 LAB
CULTURE: ABNORMAL
CULTURE: ABNORMAL
DIRECT EXAM: ABNORMAL
DIRECT EXAM: ABNORMAL
SPECIMEN DESCRIPTION: ABNORMAL

## 2022-03-31 ENCOUNTER — OFFICE VISIT (OUTPATIENT)
Dept: PRIMARY CARE CLINIC | Age: 72
End: 2022-03-31
Payer: MEDICARE

## 2022-03-31 VITALS
BODY MASS INDEX: 53.92 KG/M2 | HEART RATE: 61 BPM | DIASTOLIC BLOOD PRESSURE: 80 MMHG | WEIGHT: 293 LBS | OXYGEN SATURATION: 96 % | HEIGHT: 62 IN | SYSTOLIC BLOOD PRESSURE: 126 MMHG

## 2022-03-31 DIAGNOSIS — R60.0 LOCALIZED EDEMA: ICD-10-CM

## 2022-03-31 DIAGNOSIS — S81.801D OPEN WOUND OF RIGHT LOWER EXTREMITY, SUBSEQUENT ENCOUNTER: Primary | ICD-10-CM

## 2022-03-31 PROCEDURE — 3017F COLORECTAL CA SCREEN DOC REV: CPT | Performed by: NURSE PRACTITIONER

## 2022-03-31 PROCEDURE — 1090F PRES/ABSN URINE INCON ASSESS: CPT | Performed by: NURSE PRACTITIONER

## 2022-03-31 PROCEDURE — G8427 DOCREV CUR MEDS BY ELIG CLIN: HCPCS | Performed by: NURSE PRACTITIONER

## 2022-03-31 PROCEDURE — 1123F ACP DISCUSS/DSCN MKR DOCD: CPT | Performed by: NURSE PRACTITIONER

## 2022-03-31 PROCEDURE — G8399 PT W/DXA RESULTS DOCUMENT: HCPCS | Performed by: NURSE PRACTITIONER

## 2022-03-31 PROCEDURE — G8417 CALC BMI ABV UP PARAM F/U: HCPCS | Performed by: NURSE PRACTITIONER

## 2022-03-31 PROCEDURE — G8484 FLU IMMUNIZE NO ADMIN: HCPCS | Performed by: NURSE PRACTITIONER

## 2022-03-31 PROCEDURE — 4040F PNEUMOC VAC/ADMIN/RCVD: CPT | Performed by: NURSE PRACTITIONER

## 2022-03-31 PROCEDURE — 99213 OFFICE O/P EST LOW 20 MIN: CPT | Performed by: NURSE PRACTITIONER

## 2022-03-31 PROCEDURE — 1036F TOBACCO NON-USER: CPT | Performed by: NURSE PRACTITIONER

## 2022-03-31 RX ORDER — HYDROPHILIC CREAM
3 PASTE (GRAM) TOPICAL 2 TIMES DAILY
Qty: 170 G | Refills: 2 | Status: SHIPPED | OUTPATIENT
Start: 2022-03-31 | End: 2022-05-24

## 2022-03-31 ASSESSMENT — ENCOUNTER SYMPTOMS
DIARRHEA: 0
SHORTNESS OF BREATH: 0
COLOR CHANGE: 1
COUGH: 0

## 2022-03-31 NOTE — PATIENT INSTRUCTIONS
Cleanse right lower leg with soap and water and pat dry. Apply thin layer of Triad ointment (zind oxide) to wound 2x/day until healed.

## 2022-03-31 NOTE — PROGRESS NOTES
717 [facility] PRIMARY CARE  [address] [address] [facility] [place] [address]  Dept: [address] is a 80 y.o. female Established patient, who presents today for her medical conditions/complaints as noted below. Chief Complaint   Patient presents with    Wound Check       HPI:     HPI  She is leaving it open to air. She states the band aid stuck to her skin and ripped good skin off. She has 4 days of antibiotic left. No drainage right now. Yesterday a little clear drainage.     Reviewed prior notes    Reviewed previous Labs - wound culture negative    LDL Cholesterol (mg/dL)   Date Value   11/21/2019 105     LDL Calculated (mg/dL)   Date Value   06/07/2017 131       (goal LDL is <100)   AST (U/L)   Date Value   11/03/2020 21     ALT (U/L)   Date Value   11/03/2020 12     BUN (mg/dL)   Date Value   03/03/2022 15     Hemoglobin A1C (%)   Date Value   08/21/2018 5.2     TSH (mIU/L)   Date Value   07/07/2021 1.70     BP Readings from Last 3 Encounters:   03/31/22 126/80   03/21/22 (!) 162/80   03/03/22 132/80          (goal 120/80)    Past Medical History:   Diagnosis Date    Bell's palsy     Cellulitis     Hypertension       Past Surgical History:   Procedure Laterality Date    CATARACT REMOVAL Bilateral 2015    HYSTERECTOMY, TOTAL ABDOMINAL  1990    INCISION AND DRAINAGE Left 07/06/2017    LEG INCISION AND DRAINAGE ABSCESS performed by Awilda More MD at [facility] Right 09/24/2018    TOTAL KNEE ARTHROPLASTY Left 11/09/2017    VENTRAL HERNIA REPAIR  02/11/2019    5 hernias       Family History   Problem Relation Age of Onset    Cancer Mother     High Blood Pressure Father     Stroke Father     Diabetes Maternal Aunt     Cancer Other         daughter/ovarian cancer    Cancer Daughter        Social History     Tobacco Use    Smoking status: Never Smoker    Smokeless tobacco: Never Used   Substance Use Topics    Alcohol use: No      Current Outpatient Medications   Medication Sig Dispense Refill    zinc oxide (TRIAD HYDROPHILIC) PSTE paste Apply 3 g topically 2 times daily 170 g 2    sulfamethoxazole-trimethoprim (BACTRIM DS) 800-160 MG per tablet Take 1 tablet by mouth 2 times daily for 14 days 28 tablet 0    fluconazole (DIFLUCAN) 100 MG tablet Take 1 tablet by mouth daily for 10 doses 10 tablet 0    ammonium lactate (LAC-HYDRIN) 12 % lotion Apply topically daily. 222 mL 3    nystatin (MYCOSTATIN) 806380 UNIT/GM ointment Apply topically 2 times daily. 30 g 2    nystatin (MYCOSTATIN) 902025 UNIT/GM powder Apply 2 times daily. 60 g 3    furosemide (LASIX) 40 MG tablet Take 1 tablet by mouth daily 90 tablet 1    potassium chloride (KLOR-CON M) 10 MEQ extended release tablet Take 1 tablet by mouth 2 times daily 180 tablet 1    omeprazole (PRILOSEC) 20 MG delayed release capsule TAKE 1 CAPSULE BY MOUTH EVERY NIGHT 90 capsule 1    metoprolol tartrate (LOPRESSOR) 25 MG tablet Take 1/2 (one-half) tablet by mouth twice daily 90 tablet 1    solifenacin (VESICARE) 10 MG tablet Take 1 tablet by mouth daily 90 tablet 1    escitalopram (LEXAPRO) 20 MG tablet TAKE 1 TABLET BY MOUTH EVERY DAY 90 tablet 1    naproxen (NAPROSYN) 500 MG tablet TAKE 1 TABLET BY MOUTH TWICE DAILY WITH MEALS 180 tablet 1    tiZANidine (ZANAFLEX) 4 MG tablet Take 1 tablet by mouth 3 times daily as needed (spasms) 60 tablet 3    Calcium Citrate-Vitamin D 500-500 MG-UNIT PACK Take 1 by mouth daily. 30 each 2    metroNIDAZOLE (METROGEL) 0.75 % gel Apply topically 2 times daily. 45 g 2    traZODone (DESYREL) 50 MG tablet Take 1 tablet by mouth nightly as needed for Sleep 30 tablet 5     No current facility-administered medications for this visit.      Allergies   Allergen Reactions    Penicillins Hives       Health Maintenance   Topic Date Due    Shingles Vaccine (2 of 3) 08/13/2012    Breast cancer screen  09/18/2022    Depression Monitoring  12/27/2022    Annual Wellness Visit (AWV)  2022    Potassium monitoring  2023    Creatinine monitoring  2023    Colorectal Cancer Screen  2023    Lipid screen  2024    DTaP/Tdap/Td vaccine (3 - Td or Tdap) 2025    DEXA (modify frequency per FRAX score)  Completed    Flu vaccine  Completed    Pneumococcal 65+ years Vaccine  Completed    COVID-19 Vaccine  Completed    Hepatitis C screen  Completed    Hepatitis A vaccine  Aged Out    Hepatitis B vaccine  Aged Out    Hib vaccine  Aged Out    Meningococcal (ACWY) vaccine  Aged Out       Subjective:      Review of Systems   Constitutional: Negative for chills, fatigue and fever. Respiratory: Negative for cough and shortness of breath. Cardiovascular: Positive for leg swelling. Gastrointestinal: Negative for diarrhea. Skin: Positive for color change and wound. Neurological: Negative for dizziness and light-headedness. Psychiatric/Behavioral: Negative for dysphoric mood. The patient is not nervous/anxious. Objective:     /80   Pulse 61   Ht 5' 2.04\" (1.576 m)   Wt (!) 307 lb 6.4 oz (139.4 kg)   SpO2 96%   BMI 56.15 kg/m²   Physical Exam  Vitals and nursing note reviewed. Constitutional:       Appearance: She is obese. HENT:      Head: Normocephalic and atraumatic. Cardiovascular:      Rate and Rhythm: Normal rate and regular rhythm. Heart sounds: Normal heart sounds. Pulmonary:      Effort: Pulmonary effort is normal.      Breath sounds: Normal breath sounds. Musculoskeletal:      Right lower le+ Pitting Edema present. Left lower le+ Pitting Edema present. Skin:     Comments: Proximal Right posterior calf wound 0.4 x 0.4 X 0.1 with 50% slough and small amount of serous drainage. Distal right posterior calf wound 2.1 x 1.1 x 0.1 with dry base and no drainage. Neurological:      Mental Status: She is alert and oriented to person, place, and time.    Psychiatric:         Mood and Affect: Mood normal.         Behavior: Behavior normal.         Assessment/Plan:   1. Open wound of right lower extremity, subsequent encounter  -     zinc oxide (TRIAD HYDROPHILIC) PSTE paste; Apply 3 g topically 2 times daily, Disp-170 g, R-2Normal  2. Localized edema     Cleanse right lower leg with soap and water and pat dry. Apply thin layer of Triad ointment (zinc oxide) to wound 2x/day until healed. Return if symptoms worsen or fail to improve. No orders of the defined types were placed in this encounter. Orders Placed This Encounter   Medications    zinc oxide (TRIAD HYDROPHILIC) PSTE paste     Sig: Apply 3 g topically 2 times daily     Dispense:  170 g     Refill:  2       Patient given educational materials - see patient instructions. Discussed use, benefit, and side effects of prescribed medications. All patient questions answered. Pt voiced understanding. Reviewed health maintenance. Instructed to continue current medications, diet and exercise. Patient agreed with treatment plan. Follow up as directed.      Electronically signed by TOPHER Hussein CNP on 3/31/2022 at 2:06 PM

## 2022-05-21 DIAGNOSIS — J40 BRONCHITIS: ICD-10-CM

## 2022-05-23 ENCOUNTER — TELEPHONE (OUTPATIENT)
Dept: PRIMARY CARE CLINIC | Age: 72
End: 2022-05-23

## 2022-05-23 RX ORDER — CODEINE PHOSPHATE AND GUAIFENESIN 10; 100 MG/5ML; MG/5ML
SOLUTION ORAL
Qty: 236 ML | Refills: 0 | Status: SHIPPED | OUTPATIENT
Start: 2022-05-23 | End: 2022-05-24

## 2022-05-23 NOTE — TELEPHONE ENCOUNTER
----- Message from [employer] sent at 5/23/2022 10:21 AM EDT -----  Subject: Message to Provider    QUESTIONS  Information for Provider? Patient has ulcer in leg that is not getting   better. Saw Dr. Javid Ravi last Thursday. Would like Chely Molina to look at it.   ---------------------------------------------------------------------------  --------------  [address]  What is the best way for the office to contact you? OK to leave message on   voicemail  Preferred Call Back Phone Number? [phone]  ---------------------------------------------------------------------------  --------------  SCRIPT ANSWERS  Relationship to Patient? Self  Are you having swelling in your throat or face? No  Are you having difficulty breathing? No  Have the symptoms worsened or spread in the last day? No  Are you having fevers (100.4), chills or sweats? No  Have you recently (14 days) seen a provider for this issue?  Yes

## 2022-05-24 ENCOUNTER — OFFICE VISIT (OUTPATIENT)
Dept: PRIMARY CARE CLINIC | Age: 72
End: 2022-05-24
Payer: MEDICARE

## 2022-05-24 VITALS
OXYGEN SATURATION: 98 % | DIASTOLIC BLOOD PRESSURE: 80 MMHG | HEART RATE: 68 BPM | WEIGHT: 293 LBS | SYSTOLIC BLOOD PRESSURE: 122 MMHG | HEIGHT: 62 IN | BODY MASS INDEX: 53.92 KG/M2

## 2022-05-24 DIAGNOSIS — I87.2 LYMPHEDEMA DUE TO VENOUS INSUFFICIENCY: ICD-10-CM

## 2022-05-24 DIAGNOSIS — R60.0 LOCALIZED EDEMA: ICD-10-CM

## 2022-05-24 DIAGNOSIS — I10 ESSENTIAL (PRIMARY) HYPERTENSION: Primary | ICD-10-CM

## 2022-05-24 DIAGNOSIS — I89.0 LYMPHEDEMA DUE TO VENOUS INSUFFICIENCY: ICD-10-CM

## 2022-05-24 DIAGNOSIS — S81.801D WOUND OF RIGHT LOWER EXTREMITY, SUBSEQUENT ENCOUNTER: ICD-10-CM

## 2022-05-24 DIAGNOSIS — F51.01 PRIMARY INSOMNIA: ICD-10-CM

## 2022-05-24 PROCEDURE — 1090F PRES/ABSN URINE INCON ASSESS: CPT | Performed by: FAMILY MEDICINE

## 2022-05-24 PROCEDURE — G8427 DOCREV CUR MEDS BY ELIG CLIN: HCPCS | Performed by: FAMILY MEDICINE

## 2022-05-24 PROCEDURE — G8399 PT W/DXA RESULTS DOCUMENT: HCPCS | Performed by: FAMILY MEDICINE

## 2022-05-24 PROCEDURE — 99213 OFFICE O/P EST LOW 20 MIN: CPT | Performed by: FAMILY MEDICINE

## 2022-05-24 PROCEDURE — 3017F COLORECTAL CA SCREEN DOC REV: CPT | Performed by: FAMILY MEDICINE

## 2022-05-24 PROCEDURE — 1036F TOBACCO NON-USER: CPT | Performed by: FAMILY MEDICINE

## 2022-05-24 PROCEDURE — G8417 CALC BMI ABV UP PARAM F/U: HCPCS | Performed by: FAMILY MEDICINE

## 2022-05-24 PROCEDURE — 1123F ACP DISCUSS/DSCN MKR DOCD: CPT | Performed by: FAMILY MEDICINE

## 2022-05-24 RX ORDER — FUROSEMIDE 40 MG/1
60 TABLET ORAL DAILY
Qty: 135 TABLET | Refills: 3 | Status: SHIPPED | OUTPATIENT
Start: 2022-05-24 | End: 2022-06-16

## 2022-05-24 RX ORDER — TRAZODONE HYDROCHLORIDE 50 MG/1
50 TABLET ORAL NIGHTLY PRN
Qty: 30 TABLET | Refills: 5 | Status: SHIPPED | OUTPATIENT
Start: 2022-05-24

## 2022-05-24 ASSESSMENT — ENCOUNTER SYMPTOMS
EYE REDNESS: 0
VOMITING: 0
SORE THROAT: 0
NAUSEA: 0
ABDOMINAL PAIN: 0
RHINORRHEA: 0
SHORTNESS OF BREATH: 0
EYE DISCHARGE: 0
DIARRHEA: 0
WHEEZING: 0
COUGH: 0

## 2022-05-24 NOTE — PROGRESS NOTES
[facility] PRIMARY CARE  [address] [facility] [address]  Dept: [address] is a 80 y.o. female Established patient, who presents today for her medical conditions/complaints as noted below. Chief Complaint   Patient presents with    Leg Pain     right leg per pt-- patient states she has been seeing wound clininc for this     Leg Swelling       HPI:     HPI  Pt with wound on the right leg. Has been treated at the wound center at [facility]. No fever or chills. Has one day of antibiotic left. Patient has been seen in wound care but without resolution. Has been using multiple wraps without improvement. Patient wondering what other options there are. Denies any chest heaviness or shortness of breath. Patient also complaining of difficulty sleeping. States has not been using her trazodone. Had run out sometime ago.     Reviewed prior notes None  Reviewed previous Labs    LDL Cholesterol (mg/dL)   Date Value   11/21/2019 105     LDL Calculated (mg/dL)   Date Value   06/07/2017 131       (goal LDL is <100)   AST (U/L)   Date Value   11/03/2020 21     ALT (U/L)   Date Value   11/03/2020 12     BUN (mg/dL)   Date Value   03/03/2022 15     Hemoglobin A1C (%)   Date Value   08/21/2018 5.2     TSH (mIU/L)   Date Value   07/07/2021 1.70     BP Readings from Last 3 Encounters:   05/24/22 122/80   03/31/22 126/80   03/21/22 (!) 162/80          (goal 120/80)    Past Medical History:   Diagnosis Date    Bell's palsy     Cellulitis     Hypertension       Past Surgical History:   Procedure Laterality Date    CATARACT REMOVAL Bilateral 2015    HYSTERECTOMY, TOTAL ABDOMINAL  1990    INCISION AND DRAINAGE Left 07/06/2017    LEG INCISION AND DRAINAGE ABSCESS performed by Ailyn Sams MD at [facility] Right 09/24/2018    TOTAL KNEE ARTHROPLASTY Left 11/09/2017    VENTRAL HERNIA REPAIR  02/11/2019    5 hernias       Family History   Problem Relation Age of Onset    Cancer Mother     High Blood Pressure Father     Stroke Father     Diabetes Maternal Aunt     Cancer Other         daughter/ovarian cancer    Cancer Daughter        Social History     Tobacco Use    Smoking status: Never Smoker    Smokeless tobacco: Never Used   Substance Use Topics    Alcohol use: No      Current Outpatient Medications   Medication Sig Dispense Refill    furosemide (LASIX) 40 MG tablet Take 1.5 tablets by mouth daily 135 tablet 3    traZODone (DESYREL) 50 MG tablet Take 1 tablet by mouth nightly as needed for Sleep 30 tablet 5    nystatin (MYCOSTATIN) 597134 UNIT/GM ointment Apply topically 2 times daily. 30 g 2    potassium chloride (KLOR-CON M) 10 MEQ extended release tablet Take 1 tablet by mouth 2 times daily 180 tablet 1    omeprazole (PRILOSEC) 20 MG delayed release capsule TAKE 1 CAPSULE BY MOUTH EVERY NIGHT 90 capsule 1    metoprolol tartrate (LOPRESSOR) 25 MG tablet Take 1/2 (one-half) tablet by mouth twice daily 90 tablet 1    escitalopram (LEXAPRO) 20 MG tablet TAKE 1 TABLET BY MOUTH EVERY DAY 90 tablet 1    naproxen (NAPROSYN) 500 MG tablet TAKE 1 TABLET BY MOUTH TWICE DAILY WITH MEALS 180 tablet 1     No current facility-administered medications for this visit.      Allergies   Allergen Reactions    Penicillins Hives       Health Maintenance   Topic Date Due    Shingles vaccine (2 of 3) 08/13/2012    Breast cancer screen  09/18/2022    Depression Monitoring  12/27/2022    Annual Wellness Visit (AWV)  12/28/2022    Colorectal Cancer Screen  06/17/2023    Lipids  11/21/2024    DTaP/Tdap/Td vaccine (3 - Td or Tdap) 11/20/2025    DEXA (modify frequency per FRAX score)  Completed    Flu vaccine  Completed    Pneumococcal 65+ years Vaccine  Completed    COVID-19 Vaccine  Completed    Hepatitis C screen  Completed    Hepatitis A vaccine  Aged Out    Hepatitis B vaccine  Aged Out    Hib vaccine  Aged C/ Haroldo Ofelia 19 Meningococcal (ACWY) vaccine  Aged Out       Subjective:      Review of Systems   Constitutional: Negative for chills and fever. HENT: Negative for rhinorrhea and sore throat. Eyes: Negative for discharge and redness. Respiratory: Negative for cough, shortness of breath and wheezing. Cardiovascular: Positive for leg swelling. Negative for chest pain and palpitations. Gastrointestinal: Negative for abdominal pain, diarrhea, nausea and vomiting. Genitourinary: Negative for dysuria and frequency. Musculoskeletal: Negative for arthralgias and myalgias. Neurological: Negative for dizziness, light-headedness and headaches. Psychiatric/Behavioral: Negative for sleep disturbance. Objective:     /80   Pulse 68   Ht 5' 2.04\" (1.576 m)   Wt (!) 306 lb (138.8 kg)   SpO2 98%   BMI 55.90 kg/m²   Physical Exam  Vitals and nursing note reviewed. Constitutional:       General: She is not in acute distress. Appearance: She is well-developed. She is not ill-appearing. HENT:      Head: Normocephalic and atraumatic. Right Ear: External ear normal.      Left Ear: External ear normal.   Eyes:      General: No scleral icterus. Right eye: No discharge. Left eye: No discharge. Conjunctiva/sclera: Conjunctivae normal.   Neck:      Thyroid: No thyromegaly. Trachea: No tracheal deviation. Cardiovascular:      Rate and Rhythm: Normal rate and regular rhythm. Heart sounds: Normal heart sounds. Pulmonary:      Effort: Pulmonary effort is normal. No respiratory distress. Breath sounds: Normal breath sounds. No wheezing. Musculoskeletal:      Right lower leg: Edema present. Left lower leg: Edema present. Comments: 3+ nonpitting edema to right lower extremity, 2+ nonpitting edema to left lower. Ace wraps are applied. Lymphadenopathy:      Cervical: No cervical adenopathy. Skin:     General: Skin is warm. Findings: No rash.    Neurological: Mental Status: She is alert and oriented to person, place, and time. Psychiatric:         Mood and Affect: Mood normal.         Behavior: Behavior normal.         Thought Content: Thought content normal.         Assessment:       Diagnosis Orders   1. Essential (primary) hypertension     2. Localized edema  furosemide (LASIX) 40 MG tablet    CBC with Auto Differential    Basic Metabolic Panel   3. Wound of right lower extremity, subsequent encounter  [address]   4. Lymphedema due to venous insufficiency  [place] [address]   5. Primary insomnia  traZODone (DESYREL) 50 MG tablet        Plan:    Referral to wound care at [facility] for second opinion  Referral to lymphedema clinic  Increase Lasix to 60 mg daily  CBC BMP in 2 weeks  Renew trazodone for insomnia    Return in about 3 months (around 8/24/2022). Orders Placed This Encounter   Procedures    CBC with Auto Differential     Standing Status:   Future     Standing Expiration Date:   5/24/2023    Basic Metabolic Panel     Standing Status:   Future     Standing Expiration Date:   5/24/2023   [place] [address]     Referral Priority:   Routine     Referral Type:   Eval and Treat     Referral Reason:   Specialty Services Required     Requested Specialty:   Wound Care     Number of Visits Requested:   [facility]     Referral Priority:   Routine     Referral Type:   Eval and Treat     Referral Reason:   Specialty Services Required     Number of Visits Requested:   1     Orders Placed This Encounter   Medications    furosemide (LASIX) 40 MG tablet     Sig: Take 1.5 tablets by mouth daily     Dispense:  135 tablet     Refill:  3    traZODone (DESYREL) 50 MG tablet     Sig: Take 1 tablet by mouth nightly as needed for Sleep     Dispense:  30 tablet     Refill:  5       Patient given educational materials - see patient instructions. Discussed use, benefit, and side effects of prescribed medications. All patient questions answered. Pt voiced understanding. Reviewed health maintenance. Instructed to continue current medications, diet andexercise. Patient agreed with treatment plan. Follow up as directed.      Electronicallysigned by Agustina Randall MD on 5/24/2022 at 10:24 AM

## 2022-05-27 ENCOUNTER — HOSPITAL ENCOUNTER (OUTPATIENT)
Dept: WOUND CARE | Age: 72
Discharge: HOME | End: 2022-05-27
Payer: MEDICARE

## 2022-05-27 VITALS
BODY MASS INDEX: 53.92 KG/M2 | DIASTOLIC BLOOD PRESSURE: 79 MMHG | HEIGHT: 62 IN | WEIGHT: 293 LBS | SYSTOLIC BLOOD PRESSURE: 142 MMHG | HEART RATE: 79 BPM | RESPIRATION RATE: 17 BRPM | TEMPERATURE: 97.6 F

## 2022-05-27 DIAGNOSIS — E66.01 MORBID OBESITY (HCC): ICD-10-CM

## 2022-05-27 DIAGNOSIS — I87.2 VENOUS INSUFFICIENCY OF BOTH LOWER EXTREMITIES: ICD-10-CM

## 2022-05-27 DIAGNOSIS — L97.912 LEG ULCER, RIGHT, WITH FAT LAYER EXPOSED (HCC): Primary | ICD-10-CM

## 2022-05-27 PROCEDURE — 99213 OFFICE O/P EST LOW 20 MIN: CPT

## 2022-05-27 PROCEDURE — 99203 OFFICE O/P NEW LOW 30 MIN: CPT | Performed by: NURSE PRACTITIONER

## 2022-05-27 PROCEDURE — 11042 DBRDMT SUBQ TIS 1ST 20SQCM/<: CPT

## 2022-05-27 PROCEDURE — 11042 DBRDMT SUBQ TIS 1ST 20SQCM/<: CPT | Performed by: NURSE PRACTITIONER

## 2022-05-27 RX ORDER — LIDOCAINE HYDROCHLORIDE 20 MG/ML
JELLY TOPICAL ONCE
Status: CANCELLED | OUTPATIENT
Start: 2022-05-27 | End: 2022-05-27

## 2022-05-27 RX ORDER — LIDOCAINE HYDROCHLORIDE 40 MG/ML
SOLUTION TOPICAL ONCE
Status: CANCELLED | OUTPATIENT
Start: 2022-05-27 | End: 2022-05-27

## 2022-05-27 RX ORDER — LIDOCAINE HYDROCHLORIDE 40 MG/ML
SOLUTION TOPICAL ONCE
Status: DISCONTINUED | OUTPATIENT
Start: 2022-05-27 | End: 2022-05-28 | Stop reason: HOSPADM

## 2022-05-27 ASSESSMENT — PAIN DESCRIPTION - FREQUENCY: FREQUENCY: INTERMITTENT

## 2022-05-27 ASSESSMENT — PAIN - FUNCTIONAL ASSESSMENT: PAIN_FUNCTIONAL_ASSESSMENT: ACTIVITIES ARE NOT PREVENTED

## 2022-05-27 ASSESSMENT — PAIN DESCRIPTION - LOCATION: LOCATION: LEG

## 2022-05-27 ASSESSMENT — PAIN DESCRIPTION - PAIN TYPE: TYPE: ACUTE PAIN

## 2022-05-27 ASSESSMENT — PAIN SCALES - GENERAL: PAINLEVEL_OUTOF10: 5

## 2022-05-27 ASSESSMENT — PAIN DESCRIPTION - ONSET: ONSET: ON-GOING

## 2022-05-27 ASSESSMENT — PAIN DESCRIPTION - ORIENTATION: ORIENTATION: RIGHT

## 2022-05-27 NOTE — PLAN OF CARE
Problem: Discharge Planning  Goal: Discharge to home or other facility with appropriate resources  Outcome: Progressing     Problem: Pain  Goal: Verbalizes/displays adequate comfort level or baseline comfort level  Outcome: Progressing     Problem: Wound:  Goal: Will show signs of wound healing; wound closure and no evidence of infection  Description: Will show signs of wound healing; wound closure and no evidence of infection  Outcome: Progressing     Problem: Falls - Risk of:  Goal: Will remain free from falls  Description: Will remain free from falls  Outcome: Progressing

## 2022-05-27 NOTE — PROGRESS NOTES
Father     Diabetes Maternal Aunt     Cancer Other         daughter/ovarian cancer    Cancer Daughter        SOCIAL HISTORY    Social History     Tobacco Use    Smoking status: Never Smoker    Smokeless tobacco: Never Used   Vaping Use    Vaping Use: Never used   Substance Use Topics    Alcohol use: No    Drug use: No       ALLERGIES    Allergies   Allergen Reactions    Penicillins Hives       MEDICATIONS    Current Outpatient Medications on File Prior to Encounter   Medication Sig Dispense Refill    furosemide (LASIX) 40 MG tablet Take 1.5 tablets by mouth daily 135 tablet 3    traZODone (DESYREL) 50 MG tablet Take 1 tablet by mouth nightly as needed for Sleep 30 tablet 5    nystatin (MYCOSTATIN) 431051 UNIT/GM ointment Apply topically 2 times daily. 30 g 2    potassium chloride (KLOR-CON M) 10 MEQ extended release tablet Take 1 tablet by mouth 2 times daily 180 tablet 1    omeprazole (PRILOSEC) 20 MG delayed release capsule TAKE 1 CAPSULE BY MOUTH EVERY NIGHT 90 capsule 1    metoprolol tartrate (LOPRESSOR) 25 MG tablet Take 1/2 (one-half) tablet by mouth twice daily 90 tablet 1    escitalopram (LEXAPRO) 20 MG tablet TAKE 1 TABLET BY MOUTH EVERY DAY 90 tablet 1    naproxen (NAPROSYN) 500 MG tablet TAKE 1 TABLET BY MOUTH TWICE DAILY WITH MEALS 180 tablet 1     No current facility-administered medications on file prior to encounter.        REVIEW OF SYSTEMS    Constitutional: negative  Eyes: negative  Ears, nose, mouth, throat, and face: negative  Respiratory: negative  Cardiovascular: negative except for lower extremity edema  Gastrointestinal: negative  Genitourinary:negative  Integument/breast: negative except for right leg ulceration  Hematologic/lymphatic: negative  Musculoskeletal:negative  Neurological: negative  Behavioral/Psych: negative  Endocrine: negative  Allergic/Immunologic: negative    Objective:      BP (!) 142/79   Pulse 79   Temp 97.6 °F (36.4 °C) (Tympanic)   Resp 17   Ht 5' 2\" (1.575 m)   Wt (!) 306 lb (138.8 kg)   BMI 55.97 kg/m²     Wt Readings from Last 3 Encounters:   05/27/22 (!) 306 lb (138.8 kg)   05/24/22 (!) 306 lb (138.8 kg)   03/31/22 (!) 307 lb 6.4 oz (139.4 kg)       PHYSICAL EXAM    General Appearance: alert and oriented to person, place and time, well developed and obese, in no acute distress  Skin: warm and dry, no rash or erythema, right leg ulceration   Head: normocephalic and atraumatic  Eyes: pupils equal, round, mextraocular eye movements intact, conjunctivae normal  Pulmonary/Chest: clear to auscultation bilaterally- no wheezes, rales or rhonchi, normal air movement, no respiratory distress  Cardiovascular: normal rate, regular rhythm, normal S1 and S2, no murmurs  Abdomen: soft, non-tender, non-distended, normal bowel sounds  Extremities: no cyanosis, clubbing   Musculoskeletal: no joint swelling, deformity or tenderness  Neurologic: gait, coordination and speech normal      Assessment:     Problem List Items Addressed This Visit     Leg ulcer, right, with fat layer exposed ([address] - Primary    Relevant Medications    lidocaine (XYLOCAINE) 4 % external solution (Start on 5/27/2022  9:15 AM)    Other Relevant Orders    Initiate Outpatient Wound Care Protocol    Morbid obesity ([address]    Relevant Medications    lidocaine (XYLOCAINE) 4 % external solution (Start on 5/27/2022  9:15 AM)    Other Relevant Orders    Initiate Outpatient Wound Care Protocol    Venous insufficiency of both lower extremities    Relevant Medications    lidocaine (XYLOCAINE) 4 % external solution (Start on 5/27/2022  9:15 AM)    Other Relevant Orders    Yara Mark MD, Vascular Surgery, [place]    Initiate Outpatient Wound Care Protocol           Procedure Note  Indications:  Based on my examination of this patient's wound(s)/ulcer(s) today, debridement is required to promote healing and evaluate the wound base.     Performed by: TOPHER Wagner - CNP    Consent obtained:  Yes    Time out taken:  Yes    Pain Control: Anesthetic  Anesthetic: 4% Lidocaine Liquid Topical     Debridement:Excisional Debridement    Using curette the wound(s)/ulcer(s) was/were sharply debrided down through and including the removal of subcutaneous tissue. Devitalized Tissue Debrided:  fibrin, biofilm and slough    Pre Debridement Measurements:  Are located in the [place]  Documentation Flow Sheet    Wound/Ulcer #: 1    Post Debridement Measurements:  Wound/Ulcer Descriptions are Pre Debridement except measurements:    Wound 05/27/22 Leg Right; Lower; Posterior;Distal #1 (Active)   Wound Image   05/27/22 0817   Wound Etiology Venous 05/27/22 0817   Dressing Status Old drainage noted;New drainage noted 05/27/22 0817   Wound Cleansed Cleansed with saline 05/27/22 0817   Wound Length (cm) 2 cm 05/27/22 0817   Wound Width (cm) 1.5 cm 05/27/22 0817   Wound Depth (cm) 0.4 cm 05/27/22 0817   Wound Surface Area (cm^2) 3 cm^2 05/27/22 0817   Wound Volume (cm^3) 1.2 cm^3 05/27/22 0817   Post-Procedure Length (cm) 2 cm 05/27/22 0817   Post-Procedure Width (cm) 1.5 cm 05/27/22 0817   Post-Procedure Depth (cm) 0.4 cm 05/27/22 0817   Post-Procedure Surface Area (cm^2) 3 cm^2 05/27/22 0817   Post-Procedure Volume (cm^3) 1.2 cm^3 05/27/22 0817   Wound Assessment [place]/red;Slough 05/27/22 0817   Drainage Amount Moderate 05/27/22 0817   Drainage Description Yellow;Serous 05/27/22 0817   Odor None 05/27/22 0817   Sis-wound Assessment Blanchable erythema 05/27/22 0817   Margins Defined edges 05/27/22 0817   Wound Thickness Description not for Pressure Injury Full thickness 05/27/22 0817   Number of days: 0       Wound 05/27/22 Leg Lower;Medial;Posterior;Right #2 cluster (Active)   Wound Image   05/27/22 0817   Wound Etiology Venous 05/27/22 0817   Dressing Status New drainage noted; Old drainage noted 05/27/22 0817   Wound Cleansed Cleansed with saline 05/27/22 0817   Wound Length (cm) 2.2 cm 05/27/22 0817   Wound Width (cm) 1.4 cm 05/27/22 0817   Wound Depth (cm) 0.1 cm 05/27/22 0817   Wound Surface Area (cm^2) 3.08 cm^2 05/27/22 0817   Wound Volume (cm^3) 0.308 cm^3 05/27/22 0817   Post-Procedure Length (cm) 2.2 cm 05/27/22 0817   Post-Procedure Width (cm) 1.4 cm 05/27/22 0817   Post-Procedure Depth (cm) 0.1 cm 05/27/22 0817   Post-Procedure Surface Area (cm^2) 3.08 cm^2 05/27/22 0817   Post-Procedure Volume (cm^3) 0.308 cm^3 05/27/22 0817   Wound Assessment [facility] 05/27/22 0817   Drainage Amount Moderate 05/27/22 0817   Drainage Description Yellow;Serous 05/27/22 0817   Odor None 05/27/22 0817   Sis-wound Assessment Blanchable erythema 05/27/22 0817   Margins Defined edges 05/27/22 0817   Wound Thickness Description not for Pressure Injury Full thickness 05/27/22 0817   Number of days: 0          Percent of Wound(s)/Ulcer(s) Debrided: 100%    Total Surface Area Debrided:  3.0 sq cm     Diabetic/Pressure/Non Pressure Ulcers only:  Ulcer: Non-Pressure ulcer, fat layer exposed    Estimated Blood Loss:  Minimal    Hemostasis Achieved:  by pressure    Procedural Pain:  4  / 10     Post Procedural Pain:  0 / 10     Response to treatment:  Well tolerated by patient. Plan:     Treatment Note please see Discharge Instructions    Written patient dismissal instructions given to patient and signed by patient or POA.          Electronically signed by TOPHER Guaman CNP on 5/27/2022 at 9:05 AM

## 2022-05-27 NOTE — PROGRESS NOTES
[facility] Application   Below Knee    NAME:  Quinton Collins  YOB: 1950  MEDICAL RECORD NUMBER:  [id number]  DATE:  5/27/2022     [x] Applied moisturizing agent to dry skin as needed.  [x] Appied primary and secondary dressing as ordered     [x] Applied Unna roll from toes to knee overlapping each time.  [x] Applied ace wrap or coban from toes to below the knee.  [x] Secured with tape and/or metal clips covered with tape.  [x] Instructed patient/caregiver to keep dressing dry and intact. DO NOT REMOVE DRESSING.    [] Instructed pt/family/caregiver to report excessive draining, loose bandage, wet dressing, severe pain or tingling in toes.  [x] Applied [facility] dressing below the knee to RIGHT lower leg(s)        Unna Boot(s) were applied per  Guidelines.      Electronically signed by Claudene Lacrosse, RN on 5/27/2022 at 9:03 AM

## 2022-05-31 ENCOUNTER — HOSPITAL ENCOUNTER (OUTPATIENT)
Dept: OCCUPATIONAL THERAPY | Age: 72
Setting detail: THERAPIES SERIES
Discharge: HOME | End: 2022-05-31

## 2022-05-31 NOTE — SIGNIFICANT EVENT
[x] [address] Occupational Therapy       [address] [facility], 1st Floor       Phone: [phone]       Fax: [phone] [] [facility] Occupational  Therapy at [address] [place] [address].  [place], [place]       Phone: [phone]       Fax: [phone]          Occupational Therapy Cancel/No Show note    Date: 2022  Patient: Truman Dave  : 1950  MRN: [id number]    Cancels/No Shows to date: 1    For today's appointment patient:    [x]  Cancelled    [] Rescheduled appointment    [] No-show     Reason given by patient:    []  Patient ill    []  Conflicting appointment    [] No transportation      [] Conflict with work    [x] No reason given    [] Weather related    [] COVID-19    [] Other:      Comments:       [] Next appointment was confirmed       Electronically signed by: Cooper Byers, OT

## 2022-06-03 ENCOUNTER — HOSPITAL ENCOUNTER (OUTPATIENT)
Dept: WOUND CARE | Age: 72
Discharge: HOME | End: 2022-06-03
Payer: MEDICARE

## 2022-06-03 VITALS
DIASTOLIC BLOOD PRESSURE: 93 MMHG | SYSTOLIC BLOOD PRESSURE: 192 MMHG | TEMPERATURE: 96.4 F | RESPIRATION RATE: 18 BRPM | HEART RATE: 83 BPM | WEIGHT: 293 LBS | BODY MASS INDEX: 55.97 KG/M2

## 2022-06-03 DIAGNOSIS — E66.01 MORBID OBESITY (HCC): ICD-10-CM

## 2022-06-03 DIAGNOSIS — L97.912 LEG ULCER, RIGHT, WITH FAT LAYER EXPOSED (HCC): Primary | ICD-10-CM

## 2022-06-03 DIAGNOSIS — I87.2 VENOUS INSUFFICIENCY OF BOTH LOWER EXTREMITIES: ICD-10-CM

## 2022-06-03 PROCEDURE — 11042 DBRDMT SUBQ TIS 1ST 20SQCM/<: CPT

## 2022-06-03 PROCEDURE — 11042 DBRDMT SUBQ TIS 1ST 20SQCM/<: CPT | Performed by: NURSE PRACTITIONER

## 2022-06-03 RX ORDER — LIDOCAINE HYDROCHLORIDE 40 MG/ML
SOLUTION TOPICAL ONCE
Status: COMPLETED | OUTPATIENT
Start: 2022-06-03 | End: 2022-06-03

## 2022-06-03 RX ORDER — LIDOCAINE HYDROCHLORIDE 20 MG/ML
JELLY TOPICAL ONCE
Status: CANCELLED | OUTPATIENT
Start: 2022-06-03 | End: 2022-06-03

## 2022-06-03 RX ORDER — LIDOCAINE HYDROCHLORIDE 40 MG/ML
SOLUTION TOPICAL ONCE
Status: CANCELLED | OUTPATIENT
Start: 2022-06-03 | End: 2022-06-03

## 2022-06-03 RX ADMIN — LIDOCAINE HYDROCHLORIDE 10 ML: 40 SOLUTION TOPICAL at 08:29

## 2022-06-03 ASSESSMENT — PAIN SCALES - GENERAL: PAINLEVEL_OUTOF10: 3

## 2022-06-03 NOTE — PROGRESS NOTES
[facility] Application   Below Knee    NAME:  Mar Champion  YOB: 1950  MEDICAL RECORD NUMBER:  [id number]  DATE:  6/3/2022     [x] Applied moisturizing agent to dry skin as needed.  [x] Appied primary and secondary dressing as ordered     [x] Applied Unna roll from toes to knee overlapping each time.  [x] Applied ace wrap or coban from toes to below the knee.  [x] Secured with tape and/or metal clips covered with tape.  [x] Instructed patient/caregiver to keep dressing dry and intact. DO NOT REMOVE DRESSING.  [x] Instructed pt/family/caregiver to report excessive draining, loose bandage, wet dressing, severe pain or tingling in toes.  [x] Applied [facility] dressing below the knee to Right lower leg(s)        Unna Boot(s) were applied per  Guidelines.   Applied per RUBÉN   Electronically signed by Shon Moran RN on 6/3/2022 at 8:51 AM

## 2022-06-03 NOTE — PLAN OF CARE
Problem: Discharge Planning  Goal: Discharge to home or other facility with appropriate resources  Outcome: Progressing     Problem: Discharge Planning  Goal: Discharge to home or other facility with appropriate resources  Outcome: Progressing     Problem: Pain  Goal: Verbalizes/displays adequate comfort level or baseline comfort level  Outcome: Progressing     Problem: Pain  Goal: Verbalizes/displays adequate comfort level or baseline comfort level  Outcome: Progressing     Problem: Wound:  Goal: Will show signs of wound healing; wound closure and no evidence of infection  Description: Will show signs of wound healing; wound closure and no evidence of infection  Outcome: Progressing     Problem: Falls - Risk of:  Goal: Will remain free from falls  Description: Will remain free from falls  Outcome: Progressing

## 2022-06-03 NOTE — PROGRESS NOTES
[address]   Progress Note and Procedure Note      [address] [facility] [facility] RECORD NUMBER:  [id number]  AGE: 80 y.o. GENDER: female  : 1950  EPISODE DATE:  6/3/2022    Subjective:     Chief Complaint   Patient presents with    Wound Check     Right lower leg         HISTORY of PRESENT ILLNESS HPI     Aamir Carmona is a 80 y.o. female who presents today for wound/ulcer evaluation. History of Wound Context: here to follow up on right lower leg ulcer. Did well with unna boot.    Wound/Ulcer Pain Timing/Severity: intermittent  Quality of pain: sharp, burning  Severity:  3 / 10   Modifying Factors: Pain worsens with debridement  Associated Signs/Symptoms: edema    Ulcer Identification:  Ulcer Type: venous  Contributing Factors: edema, venous stasis, lymphedema, decreased mobility and obesity    Wound: N/A        PAST MEDICAL HISTORY        Diagnosis Date    Bell's palsy     Cellulitis     Hypertension        PAST SURGICAL HISTORY    Past Surgical History:   Procedure Laterality Date    CATARACT REMOVAL Bilateral     HYSTERECTOMY, TOTAL ABDOMINAL  1990    INCISION AND DRAINAGE Left 2017    LEG INCISION AND DRAINAGE ABSCESS performed by Jocelyne Abraham MD at [address] [place] [address] Right 2018    TOTAL KNEE ARTHROPLASTY Left 2017    VENTRAL HERNIA REPAIR  2019    5 hernias       FAMILY HISTORY    Family History   Problem Relation Age of Onset    Cancer Mother     High Blood Pressure Father     Stroke Father     Diabetes Maternal Aunt     Cancer Other         daughter/ovarian cancer    Cancer Daughter        SOCIAL HISTORY    Social History     Tobacco Use    Smoking status: Never Smoker    Smokeless tobacco: Never Used   Vaping Use    Vaping Use: Never used   Substance Use Topics    Alcohol use: No    Drug use: No       ALLERGIES    Allergies   Allergen Reactions    Penicillins Hives       MEDICATIONS    Current Outpatient Medications on File Prior to Encounter   Medication Sig Dispense Refill    furosemide (LASIX) 40 MG tablet Take 1.5 tablets by mouth daily 135 tablet 3    traZODone (DESYREL) 50 MG tablet Take 1 tablet by mouth nightly as needed for Sleep 30 tablet 5    nystatin (MYCOSTATIN) 541429 UNIT/GM ointment Apply topically 2 times daily. 30 g 2    potassium chloride (KLOR-CON M) 10 MEQ extended release tablet Take 1 tablet by mouth 2 times daily 180 tablet 1    omeprazole (PRILOSEC) 20 MG delayed release capsule TAKE 1 CAPSULE BY MOUTH EVERY NIGHT 90 capsule 1    metoprolol tartrate (LOPRESSOR) 25 MG tablet Take 1/2 (one-half) tablet by mouth twice daily 90 tablet 1    escitalopram (LEXAPRO) 20 MG tablet TAKE 1 TABLET BY MOUTH EVERY DAY 90 tablet 1    naproxen (NAPROSYN) 500 MG tablet TAKE 1 TABLET BY MOUTH TWICE DAILY WITH MEALS 180 tablet 1     No current facility-administered medications on file prior to encounter.        REVIEW OF SYSTEMS    Constitutional: negative  Eyes: negative  Ears, nose, mouth, throat, and face: negative  Respiratory: negative  Cardiovascular: negative except for lower extremity edema  Gastrointestinal: negative  Genitourinary:negative  Integument/breast: negative except for right leg ulceration  Hematologic/lymphatic: negative  Musculoskeletal:negative  Neurological: negative  Behavioral/Psych: negative  Endocrine: negative  Allergic/Immunologic: negative    Objective:      BP (!) 192/93 Comment: hasn't taken BP meds yet this morning  Pulse 83   Temp (!) 96.4 °F (35.8 °C) (Tympanic)   Resp 18   Wt (!) 306 lb (138.8 kg)   BMI 55.97 kg/m²     Wt Readings from Last 3 Encounters:   06/03/22 (!) 306 lb (138.8 kg)   05/27/22 (!) 306 lb (138.8 kg)   05/24/22 (!) 306 lb (138.8 kg)       PHYSICAL EXAM    General Appearance: alert and oriented to person, place and time, well-developed and obese, in no acute distress  Skin: warm and dry, no rash or erythema, right leg ulceration  Head: Area (cm^2) 2.75 cm^2 06/03/22 0826   Change in Wound Size % (l*w) 8.33 06/03/22 0826   Wound Volume (cm^3) 0.825 cm^3 06/03/22 0826   Wound Healing % 31 06/03/22 0826   Post-Procedure Length (cm) 2.5 cm 06/03/22 0826   Post-Procedure Width (cm) 1.1 cm 06/03/22 0826   Post-Procedure Depth (cm) 0.3 cm 06/03/22 0826   Post-Procedure Surface Area (cm^2) 2.75 cm^2 06/03/22 0826   Post-Procedure Volume (cm^3) 0.825 cm^3 06/03/22 0826   Wound Assessment [place]/red;Slough 06/03/22 0826   Drainage Amount Moderate 06/03/22 0826   Drainage Description Serosanguinous; Yellow 06/03/22 0826   Odor None 06/03/22 0826   Sis-wound Assessment Blanchable erythema 06/03/22 0826   Margins Defined edges 06/03/22 0826   Wound Thickness Description not for Pressure Injury Full thickness 06/03/22 0826   Number of days: 7       Wound 05/27/22 Leg Lower;Medial;Posterior;Right #2  (Active)   Wound Image   05/27/22 0817   Wound Etiology Venous 06/03/22 0826   Dressing Status New drainage noted; Old drainage noted 06/03/22 0826   Wound Cleansed Soap and water 06/03/22 0826   Dressing/Treatment Other (comment) 06/03/22 0830   Wound Length (cm) 0.6 cm 06/03/22 0826   Wound Width (cm) 0.5 cm 06/03/22 0826   Wound Depth (cm) 0.3 cm 06/03/22 0826   Wound Surface Area (cm^2) 0.3 cm^2 06/03/22 0826   Change in Wound Size % (l*w) 90.26 06/03/22 0826   Wound Volume (cm^3) 0.09 cm^3 06/03/22 0826   Wound Healing % 71 06/03/22 0826   Post-Procedure Length (cm) 0.6 cm 06/03/22 0826   Post-Procedure Width (cm) 0.5 cm 06/03/22 0826   Post-Procedure Depth (cm) 0.3 cm 06/03/22 0826   Post-Procedure Surface Area (cm^2) 0.3 cm^2 06/03/22 0826   Post-Procedure Volume (cm^3) 0.09 cm^3 06/03/22 0826   Wound Assessment [facility] 06/03/22 0826   Drainage Amount Moderate 06/03/22 0826   Drainage Description Serosanguinous; Yellow 06/03/22 0826   Odor None 06/03/22 0826   Sis-wound Assessment Blanchable erythema 06/03/22 0826   Margins Defined edges 06/03/22 0826   Wound Thickness Description not for Pressure Injury Full thickness 06/03/22 0826   Number of days: 7          Percent of Wound(s)/Ulcer(s) Debrided: 100%    Total Surface Area Debrided:  3.05 sq cm     Diabetic/Pressure/Non Pressure Ulcers only:  Ulcer: Non-Pressure ulcer, fat layer exposed    Estimated Blood Loss:  Minimal    Hemostasis Achieved:  by pressure    Procedural Pain:  3  / 10     Post Procedural Pain:  0 / 10     Response to treatment:  Well tolerated by patient. Plan:     Treatment Note please see Discharge Instructions    Written patient dismissal instructions given to patient and signed by patient or POA.            Electronically signed by TOPHER Del Toro CNP on 6/3/2022 at 8:52 AM

## 2022-06-10 ENCOUNTER — HOSPITAL ENCOUNTER (OUTPATIENT)
Dept: WOUND CARE | Age: 72
Discharge: HOME | End: 2022-06-10
Payer: MEDICARE

## 2022-06-10 VITALS
WEIGHT: 293 LBS | HEIGHT: 62 IN | TEMPERATURE: 96.4 F | DIASTOLIC BLOOD PRESSURE: 80 MMHG | SYSTOLIC BLOOD PRESSURE: 196 MMHG | BODY MASS INDEX: 53.92 KG/M2 | HEART RATE: 63 BPM | RESPIRATION RATE: 20 BRPM

## 2022-06-10 DIAGNOSIS — E66.01 MORBID OBESITY (HCC): ICD-10-CM

## 2022-06-10 DIAGNOSIS — L97.912 LEG ULCER, RIGHT, WITH FAT LAYER EXPOSED (HCC): Primary | ICD-10-CM

## 2022-06-10 DIAGNOSIS — I87.2 VENOUS INSUFFICIENCY OF BOTH LOWER EXTREMITIES: ICD-10-CM

## 2022-06-10 PROCEDURE — 11042 DBRDMT SUBQ TIS 1ST 20SQCM/<: CPT | Performed by: NURSE PRACTITIONER

## 2022-06-10 PROCEDURE — 11042 DBRDMT SUBQ TIS 1ST 20SQCM/<: CPT

## 2022-06-10 RX ORDER — LIDOCAINE HYDROCHLORIDE 20 MG/ML
JELLY TOPICAL ONCE
Status: CANCELLED | OUTPATIENT
Start: 2022-06-10 | End: 2022-06-10

## 2022-06-10 RX ORDER — LIDOCAINE HYDROCHLORIDE 40 MG/ML
SOLUTION TOPICAL ONCE
Status: CANCELLED | OUTPATIENT
Start: 2022-06-10 | End: 2022-06-10

## 2022-06-10 RX ORDER — LIDOCAINE HYDROCHLORIDE 40 MG/ML
SOLUTION TOPICAL ONCE
Status: COMPLETED | OUTPATIENT
Start: 2022-06-10 | End: 2022-06-10

## 2022-06-10 RX ADMIN — LIDOCAINE HYDROCHLORIDE 5 ML: 40 SOLUTION TOPICAL at 08:22

## 2022-06-10 NOTE — PLAN OF CARE
Problem: Discharge Planning  Goal: Discharge to home or other facility with appropriate resources  Outcome: Progressing     Problem: Wound:  Goal: Will show signs of wound healing; wound closure and no evidence of infection  Description: Will show signs of wound healing; wound closure and no evidence of infection  Outcome: Progressing     Problem: Falls - Risk of:  Goal: Will remain free from falls  Description: Will remain free from falls  Outcome: Progressing

## 2022-06-10 NOTE — PROGRESS NOTES
[facility] Application   Below Knee    NAME:  Belinda Conklin  YOB: 1950  MEDICAL RECORD NUMBER:  [id number]  DATE:  6/10/2022     [x] Removed old [employer] boot if indicated and wash leg with mild soap and water.  [x] Applied moisturizing agent to dry skin as needed.  [x] Appied primary and secondary dressing as ordered     [x] Applied Unna roll from toes to knee overlapping each time.  [x] Applied ace wrap or coban from toes to below the knee.  [x] Secured with tape and/or metal clips covered with tape.  [x] Instructed patient/caregiver to keep dressing dry and intact. DO NOT REMOVE DRESSING.  [x] Instructed pt/family/caregiver to report excessive draining, loose bandage, wet dressing, severe pain or tingling in toes.  [x] Applied [employer] dressing below the knee to Right lower leg(s)        Unna Boot(s) were applied per  Guidelines.      Electronically signed by Marcial Shaver RN on 6/10/2022 at 8:40 AM

## 2022-06-10 NOTE — PROGRESS NOTES
[address]   Progress Note and Procedure Note      [address] [facility],[address] [facility] RECORD NUMBER:  [id number]  AGE: 80 y.o. GENDER: female  : 1950  EPISODE DATE:  6/10/2022    Subjective:     Chief Complaint   Patient presents with    Wound Check     right lower leg         HISTORY of PRESENT ILLNESS HPI     Panfilo Mayberry is a 80 y.o. female who presents today for wound/ulcer evaluation. History of Wound Context: here to follow up on right posterior lower leg ulcer which is improving with unna boot.    Wound/Ulcer Pain Timing/Severity: intermittent  Quality of pain: burning  Severity:  2 / 10   Modifying Factors: Pain worsens with debridement  Associated Signs/Symptoms: edema    Ulcer Identification:  Ulcer Type: venous  Contributing Factors: edema, venous stasis, lymphedema, decreased mobility and obesity    Wound: N/A        PAST MEDICAL HISTORY        Diagnosis Date    Bell's palsy     Cellulitis     Hypertension        PAST SURGICAL HISTORY    Past Surgical History:   Procedure Laterality Date    CATARACT REMOVAL Bilateral     HYSTERECTOMY, TOTAL ABDOMINAL (CERVIX REMOVED)      INCISION AND DRAINAGE Left 2017    LEG INCISION AND DRAINAGE ABSCESS performed by Ok Wilcox MD at [address] [place] [address] Right 2018    TOTAL KNEE ARTHROPLASTY Left 2017    VENTRAL HERNIA REPAIR  2019    5 hernias       FAMILY HISTORY    Family History   Problem Relation Age of Onset    Cancer Mother     High Blood Pressure Father     Stroke Father     Diabetes Maternal Aunt     Cancer Other         daughter/ovarian cancer    Cancer Daughter        SOCIAL HISTORY    Social History     Tobacco Use    Smoking status: Never Smoker    Smokeless tobacco: Never Used   Vaping Use    Vaping Use: Never used   Substance Use Topics    Alcohol use: No    Drug use: No       ALLERGIES    Allergies   Allergen Reactions    Penicillins Hives MEDICATIONS    Current Outpatient Medications on File Prior to Encounter   Medication Sig Dispense Refill    furosemide (LASIX) 40 MG tablet Take 1.5 tablets by mouth daily 135 tablet 3    traZODone (DESYREL) 50 MG tablet Take 1 tablet by mouth nightly as needed for Sleep 30 tablet 5    nystatin (MYCOSTATIN) 993307 UNIT/GM ointment Apply topically 2 times daily. 30 g 2    potassium chloride (KLOR-CON M) 10 MEQ extended release tablet Take 1 tablet by mouth 2 times daily 180 tablet 1    omeprazole (PRILOSEC) 20 MG delayed release capsule TAKE 1 CAPSULE BY MOUTH EVERY NIGHT 90 capsule 1    metoprolol tartrate (LOPRESSOR) 25 MG tablet Take 1/2 (one-half) tablet by mouth twice daily 90 tablet 1    escitalopram (LEXAPRO) 20 MG tablet TAKE 1 TABLET BY MOUTH EVERY DAY 90 tablet 1    naproxen (NAPROSYN) 500 MG tablet TAKE 1 TABLET BY MOUTH TWICE DAILY WITH MEALS 180 tablet 1     No current facility-administered medications on file prior to encounter.        REVIEW OF SYSTEMS    Constitutional: negative  Eyes: negative  Ears, nose, mouth, throat, and face: negative  Respiratory: negative  Cardiovascular: negative except for lower extremity edema  Gastrointestinal: negative  Genitourinary:negative  Integument/breast: negative except for right posterior lower leg ulcer  Hematologic/lymphatic: negative  Musculoskeletal:negative  Neurological: negative  Behavioral/Psych: negative  Endocrine: negative  Allergic/Immunologic: negative    Objective:      BP (!) 196/80   Pulse 63   Temp (!) 96.4 °F (35.8 °C) (Tympanic)   Resp 20   Ht 5' 2\" (1.575 m)   Wt (!) 306 lb (138.8 kg)   BMI 55.97 kg/m²     Wt Readings from Last 3 Encounters:   06/10/22 (!) 306 lb (138.8 kg)   06/03/22 (!) 306 lb (138.8 kg)   05/27/22 (!) 306 lb (138.8 kg)       PHYSICAL EXAM    General Appearance: alert and oriented to person, place and time, well-developed and obese, in no acute distress  Skin: warm and dry, no rash or erythema, right posterior leg ulceration   Head: normocephalic and atraumatic  Eyes: pupils equal, round, extraocular eye movements intact, and conjunctivae normal  Pulmonary/Chest: normal air movement, no respiratory distress  Extremities: no cyanosis and no clubbing   Musculoskeletal: no joint swelling, deformity or tenderness  Neurologic: gait, coordination normal and speech normal      Assessment:     Problem List Items Addressed This Visit     Leg ulcer, right, with fat layer exposed ([address] - Primary    Relevant Orders    Initiate Outpatient Wound Care Protocol    Morbid obesity [facility])    Relevant Orders    Initiate Outpatient Wound Care Protocol    Venous insufficiency of both lower extremities    Relevant Orders    Initiate Outpatient Wound Care Protocol           Procedure Note  Indications:  Based on my examination of this patient's wound(s)/ulcer(s) today, debridement is required to promote healing and evaluate the wound base. Performed by: TOPHER Grewal CNP    Consent obtained:  Yes    Time out taken:  Yes    Pain Control: Anesthetic  Anesthetic: 4% Lidocaine Liquid Topical     Debridement:Excisional Debridement    Using curette the wound(s)/ulcer(s) was/were sharply debrided down through and including the removal of subcutaneous tissue. Devitalized Tissue Debrided:  biofilm and slough    Pre Debridement Measurements:  Are located in the Wound/Ulcer Documentation Flow Sheet    Wound/Ulcer #: 1 and 2    Post Debridement Measurements:  Wound/Ulcer Descriptions are Pre Debridement except measurements:    Wound 05/27/22 Leg Right; Lower; Posterior;Distal #1 (Active)   Wound Image   05/27/22 0817   Wound Etiology Venous 06/10/22 0814   Dressing Status Old drainage noted;New drainage noted 06/10/22 0814   Wound Cleansed Soap and water 06/10/22 0814   Dressing/Treatment Other (comment) 06/03/22 0830   Wound Length (cm) 1.7 cm 06/10/22 0814   Wound Width (cm) 0.9 cm 06/10/22 0814   Wound Depth (cm) 0.2 cm 06/10/22 0814   Wound Surface Area (cm^2) 1.53 cm^2 06/10/22 0814   Change in Wound Size % (l*w) 49 06/10/22 0814   Wound Volume (cm^3) 0.306 cm^3 06/10/22 0814   Wound Healing % 74 06/10/22 0814   Post-Procedure Length (cm) 1.7 cm 06/10/22 0814   Post-Procedure Width (cm) 0.9 cm 06/10/22 0814   Post-Procedure Depth (cm) 0.2 cm 06/10/22 0814   Post-Procedure Surface Area (cm^2) 1.53 cm^2 06/10/22 0814   Post-Procedure Volume (cm^3) 0.306 cm^3 06/10/22 0814   Wound Assessment Pink/red;Slough;Granulation tissue 06/10/22 0814   Drainage Amount Moderate 06/10/22 0814   Drainage Description Serosanguinous; Yellow 06/10/22 0814   Odor None 06/10/22 0814   Sis-wound Assessment Blanchable erythema 06/10/22 0814   Margins Defined edges 06/10/22 0814   Wound Thickness Description not for Pressure Injury Full thickness 06/10/22 0814   Number of days: 14       Wound 05/27/22 Leg Lower;Medial;Posterior;Right #2 cluster (Active)   Wound Image   05/27/22 0817   Wound Etiology Venous 06/10/22 0814   Dressing Status New drainage noted; Old drainage noted 06/10/22 0814   Wound Cleansed Soap and water 06/10/22 0814   Dressing/Treatment Other (comment) 06/03/22 0830   Wound Length (cm) 1.5 cm 06/10/22 0814   Wound Width (cm) 0.9 cm 06/10/22 0814   Wound Depth (cm) 0.2 cm 06/10/22 0814   Wound Surface Area (cm^2) 1.35 cm^2 06/10/22 0814   Change in Wound Size % (l*w) 56.17 06/10/22 0814   Wound Volume (cm^3) 0.27 cm^3 06/10/22 0814   Wound Healing % 12 06/10/22 0814   Post-Procedure Length (cm) 1.5 cm 06/10/22 0814   Post-Procedure Width (cm) 0.9 cm 06/10/22 0814   Post-Procedure Depth (cm) 0.2 cm 06/10/22 0814   Post-Procedure Surface Area (cm^2) 1.35 cm^2 06/10/22 0814   Post-Procedure Volume (cm^3) 0.27 cm^3 06/10/22 0814   Wound Assessment [facility] 06/10/22 0814   Drainage Amount Moderate 06/10/22 0814   Drainage Description Serosanguinous; Yellow 06/10/22 0814   Odor None 06/10/22 0814   Sis-wound Assessment Blanchable erythema 06/10/22 0814   Margins Defined edges 06/10/22 0814   Wound Thickness Description not for Pressure Injury Full thickness 06/10/22 0814   Number of days: 14          Percent of Wound(s)/Ulcer(s) Debrided: 100%    Total Surface Area Debrided:  2.88 sq cm     Diabetic/Pressure/Non Pressure Ulcers only:  Ulcer: Non-Pressure ulcer, fat layer exposed    Estimated Blood Loss:  Minimal    Hemostasis Achieved:  by pressure    Procedural Pain:  2  / 10     Post Procedural Pain:  0 / 10     Response to treatment:  Well tolerated by patient. Plan:     Treatment Note please see Discharge Instructions    Written patient dismissal instructions given to patient and signed by patient or POA.            Electronically signed by TOPHER Del Toro CNP on 6/10/2022 at 8:35 AM

## 2022-06-14 ENCOUNTER — TELEPHONE (OUTPATIENT)
Dept: PRIMARY CARE CLINIC | Age: 72
End: 2022-06-14

## 2022-06-14 NOTE — TELEPHONE ENCOUNTER
----- Message from [employer] sent at 6/13/2022  1:33 PM EDT -----  Subject: Appointment Request    Reason for Call: Urgent Abdominal Pain    QUESTIONS  Type of Appointment? Established Patient  Reason for appointment request? No appointments available during search  Additional Information for Provider? Patient has had stomach pain with the   past 2 weeks; would like to schedule an appt.  ---------------------------------------------------------------------------  --------------  CALL BACK INFO  What is the best way for the office to contact you? OK to leave message on   voicemail  Preferred Call Back Phone Number? [phone]  ---------------------------------------------------------------------------  --------------  SCRIPT ANSWERS  Relationship to Patient? Self  Do you have pain that has started or worsened within the past 24 hours? No  Are you vomiting blood or have bloody or black stool? No  Have you recently (14 days) seen a provider for this pain? No  Have you been diagnosed with COVID-19 in the past 10 days? No  (Service Expert  click yes below to proceed with [facility] As [facility]ual   Scheduling)?  Yes

## 2022-06-16 ENCOUNTER — OFFICE VISIT (OUTPATIENT)
Dept: PRIMARY CARE CLINIC | Age: 72
End: 2022-06-16
Payer: MEDICARE

## 2022-06-16 ENCOUNTER — IMMUNIZATION (OUTPATIENT)
Dept: PRIMARY CARE CLINIC | Age: 72
End: 2022-06-16
Payer: MEDICARE

## 2022-06-16 VITALS
BODY MASS INDEX: 56.41 KG/M2 | SYSTOLIC BLOOD PRESSURE: 130 MMHG | WEIGHT: 293 LBS | HEART RATE: 59 BPM | OXYGEN SATURATION: 93 % | DIASTOLIC BLOOD PRESSURE: 74 MMHG

## 2022-06-16 DIAGNOSIS — Z23 NEED FOR VACCINATION: Primary | ICD-10-CM

## 2022-06-16 DIAGNOSIS — R60.0 LOCALIZED EDEMA: ICD-10-CM

## 2022-06-16 DIAGNOSIS — R10.10 PAIN OF UPPER ABDOMEN: Primary | ICD-10-CM

## 2022-06-16 DIAGNOSIS — R10.10 PAIN OF UPPER ABDOMEN: ICD-10-CM

## 2022-06-16 DIAGNOSIS — R19.7 DIARRHEA, UNSPECIFIED TYPE: ICD-10-CM

## 2022-06-16 LAB
ABSOLUTE EOS #: 0.08 K/UL (ref 0–0.44)
ABSOLUTE IMMATURE GRANULOCYTE: <0.03 K/UL (ref 0–0.3)
ABSOLUTE LYMPH #: 0.93 K/UL (ref 1.1–3.7)
ABSOLUTE MONO #: 0.42 K/UL (ref 0.1–1.2)
ALBUMIN SERPL-MCNC: 4.3 G/DL (ref 3.5–5.2)
ALBUMIN/GLOBULIN RATIO: 1.1 (ref 1–2.5)
ALP BLD-CCNC: 80 U/L (ref 35–104)
ALT SERPL-CCNC: 12 U/L (ref 5–33)
AST SERPL-CCNC: 26 U/L
BASOPHILS # BLD: 1 % (ref 0–2)
BASOPHILS ABSOLUTE: <0.03 K/UL (ref 0–0.2)
BILIRUB SERPL-MCNC: 0.5 MG/DL (ref 0.3–1.2)
BILIRUBIN DIRECT: <0.08 MG/DL
BILIRUBIN, INDIRECT: NORMAL MG/DL (ref 0–1)
DIFFERENTIAL TYPE: ABNORMAL
EOSINOPHILS RELATIVE PERCENT: 2 % (ref 1–4)
GLOBULIN: NORMAL G/DL (ref 1.5–3.8)
HCT VFR BLD CALC: 40.6 % (ref 36.3–47.1)
HEMOGLOBIN: 13 G/DL (ref 11.9–15.1)
IMMATURE GRANULOCYTES: 0 %
LIPASE: 27 U/L (ref 13–60)
LYMPHOCYTES # BLD: 27 % (ref 24–43)
MCH RBC QN AUTO: 29.5 PG (ref 25.2–33.5)
MCHC RBC AUTO-ENTMCNC: 32 G/DL (ref 28.4–34.8)
MCV RBC AUTO: 92.3 FL (ref 82.6–102.9)
MONOCYTES # BLD: 12 % (ref 3–12)
NRBC AUTOMATED: 0 PER 100 WBC
PDW BLD-RTO: 15.6 % (ref 11.8–14.4)
PLATELET # BLD: 218 K/UL (ref 138–453)
PLATELET ESTIMATE: ABNORMAL
PMV BLD AUTO: 11.4 FL (ref 8.1–13.5)
RBC # BLD: 4.4 M/UL (ref 3.95–5.11)
RBC # BLD: ABNORMAL 10*6/UL
SEG NEUTROPHILS: 58 % (ref 36–65)
SEGMENTED NEUTROPHILS ABSOLUTE COUNT: 2.04 K/UL (ref 1.5–8.1)
TOTAL PROTEIN: 8.3 G/DL (ref 6.4–8.3)
WBC # BLD: 3.5 K/UL (ref 3.5–11.3)
WBC # BLD: ABNORMAL 10*3/UL

## 2022-06-16 PROCEDURE — 1036F TOBACCO NON-USER: CPT | Performed by: FAMILY MEDICINE

## 2022-06-16 PROCEDURE — 1124F ACP DISCUSS-NO DSCNMKR DOCD: CPT | Performed by: FAMILY MEDICINE

## 2022-06-16 PROCEDURE — 0054A COVID-19, PFIZER GRAY TOP, DO NOT DILUTE, TRIS-SUCROSE, (AGE 12 YRS+), IM, PF, 30MCG/0.3 ML: CPT | Performed by: FAMILY MEDICINE

## 2022-06-16 PROCEDURE — 91305 COVID-19, PFIZER GRAY TOP, DO NOT DILUTE, TRIS-SUCROSE, (AGE 12 YRS+), IM, PF, 30MCG/0.3 ML: CPT | Performed by: FAMILY MEDICINE

## 2022-06-16 PROCEDURE — 1090F PRES/ABSN URINE INCON ASSESS: CPT | Performed by: FAMILY MEDICINE

## 2022-06-16 PROCEDURE — 3017F COLORECTAL CA SCREEN DOC REV: CPT | Performed by: FAMILY MEDICINE

## 2022-06-16 PROCEDURE — G8427 DOCREV CUR MEDS BY ELIG CLIN: HCPCS | Performed by: FAMILY MEDICINE

## 2022-06-16 PROCEDURE — G8417 CALC BMI ABV UP PARAM F/U: HCPCS | Performed by: FAMILY MEDICINE

## 2022-06-16 PROCEDURE — G8399 PT W/DXA RESULTS DOCUMENT: HCPCS | Performed by: FAMILY MEDICINE

## 2022-06-16 PROCEDURE — 99213 OFFICE O/P EST LOW 20 MIN: CPT | Performed by: FAMILY MEDICINE

## 2022-06-16 RX ORDER — DIPHENOXYLATE HYDROCHLORIDE AND ATROPINE SULFATE 2.5; .025 MG/1; MG/1
1 TABLET ORAL 4 TIMES DAILY PRN
Qty: 40 TABLET | Refills: 1 | Status: SHIPPED | OUTPATIENT
Start: 2022-06-16 | End: 2022-06-26

## 2022-06-16 RX ORDER — PANTOPRAZOLE SODIUM 40 MG/1
40 TABLET, DELAYED RELEASE ORAL
Qty: 30 TABLET | Refills: 5 | Status: SHIPPED | OUTPATIENT
Start: 2022-06-16

## 2022-06-16 RX ORDER — BUMETANIDE 1 MG/1
1 TABLET ORAL DAILY
Qty: 90 TABLET | Refills: 3 | Status: SHIPPED | OUTPATIENT
Start: 2022-06-16

## 2022-06-16 ASSESSMENT — ENCOUNTER SYMPTOMS
EYE DISCHARGE: 0
SHORTNESS OF BREATH: 0
NAUSEA: 1
EYE REDNESS: 0
COUGH: 0
SORE THROAT: 0
VOMITING: 0
RHINORRHEA: 0
DIARRHEA: 1
WHEEZING: 0
ABDOMINAL PAIN: 1

## 2022-06-16 ASSESSMENT — PATIENT HEALTH QUESTIONNAIRE - PHQ9
8. MOVING OR SPEAKING SO SLOWLY THAT OTHER PEOPLE COULD HAVE NOTICED. OR THE OPPOSITE, BEING SO FIGETY OR RESTLESS THAT YOU HAVE BEEN MOVING AROUND A LOT MORE THAN USUAL: 0
SUM OF ALL RESPONSES TO PHQ QUESTIONS 1-9: 2
SUM OF ALL RESPONSES TO PHQ9 QUESTIONS 1 & 2: 0
4. FEELING TIRED OR HAVING LITTLE ENERGY: 1
SUM OF ALL RESPONSES TO PHQ QUESTIONS 1-9: 2
6. FEELING BAD ABOUT YOURSELF - OR THAT YOU ARE A FAILURE OR HAVE LET YOURSELF OR YOUR FAMILY DOWN: 0
10. IF YOU CHECKED OFF ANY PROBLEMS, HOW DIFFICULT HAVE THESE PROBLEMS MADE IT FOR YOU TO DO YOUR WORK, TAKE CARE OF THINGS AT HOME, OR GET ALONG WITH OTHER PEOPLE: 0
7. TROUBLE CONCENTRATING ON THINGS, SUCH AS READING THE NEWSPAPER OR WATCHING TELEVISION: 0
2. FEELING DOWN, DEPRESSED OR HOPELESS: 0
1. LITTLE INTEREST OR PLEASURE IN DOING THINGS: 0
SUM OF ALL RESPONSES TO PHQ QUESTIONS 1-9: 2
5. POOR APPETITE OR OVEREATING: 1
3. TROUBLE FALLING OR STAYING ASLEEP: 0
SUM OF ALL RESPONSES TO PHQ QUESTIONS 1-9: 2
9. THOUGHTS THAT YOU WOULD BE BETTER OFF DEAD, OR OF HURTING YOURSELF: 0

## 2022-06-16 NOTE — PROGRESS NOTES
[address] [facility] PRIMARY CARE  [address] [facility] [address]  Dept: [address] is a 80 y.o. female Established patient, who presents today for her medical conditions/complaints as noted below. Chief Complaint   Patient presents with    Abdominal Pain     X 2 weeks, feels better after eating    Diarrhea       HPI:     HPI  Pt with complaint of epigastric pain since May. No blood in the stools, or melena. Has been using Naproxen. Patient states having nausea with the Lasix. Having some cramps as well. Patient states for the past several days has been having diarrhea. Otherwise unremarkable. Denies any emesis.   Taking naproxen on a regular basis    Reviewed prior notes None  Reviewed previous Labs    LDL Cholesterol (mg/dL)   Date Value   11/21/2019 105     LDL Calculated (mg/dL)   Date Value   06/07/2017 131       (goal LDL is <100)   AST (U/L)   Date Value   11/03/2020 21     ALT (U/L)   Date Value   11/03/2020 12     BUN (mg/dL)   Date Value   03/03/2022 15     Hemoglobin A1C (%)   Date Value   08/21/2018 5.2     TSH (mIU/L)   Date Value   07/07/2021 1.70     BP Readings from Last 3 Encounters:   06/16/22 130/74   06/10/22 (!) 196/80   06/03/22 (!) 192/93          (goal 120/80)    Past Medical History:   Diagnosis Date    Bell's palsy     Cellulitis     Hypertension       Past Surgical History:   Procedure Laterality Date    CATARACT REMOVAL Bilateral 2015    HYSTERECTOMY, TOTAL ABDOMINAL (CERVIX REMOVED)  1990    INCISION AND DRAINAGE Left 07/06/2017    LEG INCISION AND DRAINAGE ABSCESS performed by Diana Case MD at [address] [place] [address] Right 09/24/2018    TOTAL KNEE ARTHROPLASTY Left 11/09/2017    VENTRAL HERNIA REPAIR  02/11/2019    5 hernias       Family History   Problem Relation Age of Onset    Cancer Mother     High Blood Pressure Father     Stroke Father     Diabetes Maternal Aunt     Cancer Other daughter/ovarian cancer    Cancer Daughter        Social History     Tobacco Use    Smoking status: Never Smoker    Smokeless tobacco: Never Used   Substance Use Topics    Alcohol use: No      Current Outpatient Medications   Medication Sig Dispense Refill    pantoprazole (PROTONIX) 40 MG tablet Take 1 tablet by mouth every morning (before breakfast) 30 tablet 5    bumetanide (BUMEX) 1 MG tablet Take 1 tablet by mouth daily 90 tablet 3    diphenoxylate-atropine (DIPHENATOL) 2.5-0.025 MG per tablet Take 1 tablet by mouth 4 times daily as needed for Diarrhea for up to 10 days. 40 tablet 1    traZODone (DESYREL) 50 MG tablet Take 1 tablet by mouth nightly as needed for Sleep 30 tablet 5    nystatin (MYCOSTATIN) 319476 UNIT/GM ointment Apply topically 2 times daily. 30 g 2    potassium chloride (KLOR-CON M) 10 MEQ extended release tablet Take 1 tablet by mouth 2 times daily 180 tablet 1    metoprolol tartrate (LOPRESSOR) 25 MG tablet Take 1/2 (one-half) tablet by mouth twice daily 90 tablet 1    escitalopram (LEXAPRO) 20 MG tablet TAKE 1 TABLET BY MOUTH EVERY DAY 90 tablet 1     No current facility-administered medications for this visit.      Allergies   Allergen Reactions    Penicillins Hives       Health Maintenance   Topic Date Due    Shingles vaccine (2 of 3) 08/13/2012    Breast cancer screen  09/18/2022    Depression Monitoring  12/27/2022    Annual Wellness Visit (AWV)  12/28/2022    Colorectal Cancer Screen  06/17/2023    Lipids  11/21/2024    DTaP/Tdap/Td vaccine (3 - Td or Tdap) 11/20/2025    DEXA (modify frequency per FRAX score)  Completed    Flu vaccine  Completed    Pneumococcal 65+ years Vaccine  Completed    COVID-19 Vaccine  Completed    Hepatitis C screen  Completed    Hepatitis A vaccine  Aged Out    Hepatitis B vaccine  Aged Out    Hib vaccine  Aged Out    Meningococcal (ACWY) vaccine  Aged Out       Subjective:      Review of Systems   Constitutional: Negative for chills and fever. HENT: Negative for rhinorrhea and sore throat. Eyes: Negative for discharge and redness. Respiratory: Negative for cough, shortness of breath and wheezing. Cardiovascular: Negative for chest pain and palpitations. Gastrointestinal: Positive for abdominal pain, diarrhea and nausea. Negative for vomiting. Genitourinary: Negative for dysuria and frequency. Musculoskeletal: Negative for arthralgias and myalgias. Neurological: Negative for dizziness, light-headedness and headaches. Psychiatric/Behavioral: Negative for sleep disturbance. Objective:     /74   Pulse 59   Wt (!) 308 lb 6.4 oz (139.9 kg)   SpO2 93%   BMI 56.41 kg/m²   Physical Exam  Vitals and nursing note reviewed. Constitutional:       General: She is not in acute distress. Appearance: She is well-developed. She is not ill-appearing. HENT:      Head: Normocephalic and atraumatic. Right Ear: External ear normal.      Left Ear: External ear normal.   Eyes:      General: No scleral icterus. Right eye: No discharge. Left eye: No discharge. Conjunctiva/sclera: Conjunctivae normal.   Neck:      Thyroid: No thyromegaly. Trachea: No tracheal deviation. Cardiovascular:      Rate and Rhythm: Normal rate and regular rhythm. Heart sounds: Normal heart sounds. Pulmonary:      Effort: Pulmonary effort is normal. No respiratory distress. Breath sounds: Normal breath sounds. No wheezing. Abdominal:      Tenderness: There is abdominal tenderness. Comments: Epigastric as well as left upper quadrant   Lymphadenopathy:      Cervical: No cervical adenopathy. Skin:     General: Skin is warm. Findings: No rash. Neurological:      Mental Status: She is alert and oriented to person, place, and time. Psychiatric:         Mood and Affect: Mood normal.         Behavior: Behavior normal.         Thought Content:  Thought content normal.         Assessment: Diagnosis Orders   1. Pain of upper abdomen  CBC with Auto Differential    Hepatic Function Panel    Lipase   2. Diarrhea, unspecified type  diphenoxylate-atropine (DIPHENATOL) 2.5-0.025 MG per tablet   3. Localized edema  bumetanide (BUMEX) 1 MG tablet        Plan: For gastritis versus ulcer. DC naproxen  Start Protonix 40 mg daily  Blood work ordered today  Change Lasix to Bumex due to patient having nausea Lasix  Covid booster today    Return in about 3 months (around 9/16/2022). Orders Placed This Encounter   Procedures    CBC with Auto Differential     Standing Status:   Future     Standing Expiration Date:   6/16/2023    Hepatic Function Panel     Standing Status:   Future     Standing Expiration Date:   6/16/2023    Lipase     Standing Status:   Future     Standing Expiration Date:   6/16/2023     Orders Placed This Encounter   Medications    pantoprazole (PROTONIX) 40 MG tablet     Sig: Take 1 tablet by mouth every morning (before breakfast)     Dispense:  30 tablet     Refill:  5    bumetanide (BUMEX) 1 MG tablet     Sig: Take 1 tablet by mouth daily     Dispense:  90 tablet     Refill:  3    diphenoxylate-atropine (DIPHENATOL) 2.5-0.025 MG per tablet     Sig: Take 1 tablet by mouth 4 times daily as needed for Diarrhea for up to 10 days. Dispense:  40 tablet     Refill:  1       Patient given educational materials - see patient instructions. Discussed use, benefit, and side effects of prescribed medications. All patient questions answered. Pt voiced understanding. Reviewed health maintenance. Instructed to continue current medications, diet andexercise. Patient agreed with treatment plan. Follow up as directed.      Electronicallysigned by Romana Lederer, MD on 6/16/2022 at 9:23 AM

## 2022-06-16 NOTE — PROGRESS NOTES
After obtaining consent, and per orders of  injection of COVID given in Right deltoid by Villa Allred MA. Patient instructed to remain in clinic for 20 minutes afterwards, and to report any adverse reaction to me immediately. After remaining in office patient tolerated injection well with no questions or concerns.

## 2022-06-17 ENCOUNTER — HOSPITAL ENCOUNTER (OUTPATIENT)
Dept: WOUND CARE | Age: 72
Discharge: HOME | End: 2022-06-17
Payer: MEDICARE

## 2022-06-17 VITALS
HEIGHT: 62 IN | WEIGHT: 293 LBS | BODY MASS INDEX: 53.92 KG/M2 | RESPIRATION RATE: 19 BRPM | SYSTOLIC BLOOD PRESSURE: 185 MMHG | DIASTOLIC BLOOD PRESSURE: 81 MMHG | HEART RATE: 61 BPM | TEMPERATURE: 98 F

## 2022-06-17 DIAGNOSIS — I87.2 VENOUS INSUFFICIENCY OF BOTH LOWER EXTREMITIES: ICD-10-CM

## 2022-06-17 DIAGNOSIS — E66.01 MORBID OBESITY (HCC): ICD-10-CM

## 2022-06-17 DIAGNOSIS — L97.912 LEG ULCER, RIGHT, WITH FAT LAYER EXPOSED (HCC): Primary | ICD-10-CM

## 2022-06-17 PROCEDURE — 11042 DBRDMT SUBQ TIS 1ST 20SQCM/<: CPT | Performed by: NURSE PRACTITIONER

## 2022-06-17 PROCEDURE — 11042 DBRDMT SUBQ TIS 1ST 20SQCM/<: CPT

## 2022-06-17 RX ORDER — LIDOCAINE HYDROCHLORIDE 20 MG/ML
JELLY TOPICAL ONCE
Status: CANCELLED | OUTPATIENT
Start: 2022-06-17 | End: 2022-06-17

## 2022-06-17 RX ORDER — LIDOCAINE HYDROCHLORIDE 40 MG/ML
SOLUTION TOPICAL ONCE
Status: COMPLETED | OUTPATIENT
Start: 2022-06-17 | End: 2022-06-17

## 2022-06-17 RX ORDER — LIDOCAINE HYDROCHLORIDE 40 MG/ML
SOLUTION TOPICAL ONCE
Status: CANCELLED | OUTPATIENT
Start: 2022-06-17 | End: 2022-06-17

## 2022-06-17 RX ADMIN — LIDOCAINE HYDROCHLORIDE 5 ML: 40 SOLUTION TOPICAL at 08:23

## 2022-06-17 NOTE — PROGRESS NOTES
[address]   Progress Note and Procedure Note      [address] [facility],6[address] Floor [facility] RECORD NUMBER:  [id number]  AGE: 80 y.o. GENDER: female  : 1950  EPISODE DATE:  2022    Subjective:     Chief Complaint   Patient presents with    Wound Check     right lower leg         HISTORY of PRESENT ILLNESS HPI     Rekha Madsen is a 80 y.o. female who presents today for wound/ulcer evaluation. History of Wound Context: here to follow up on right posterior lower leg ulcer. Doing well with unna boot.    Wound/Ulcer Pain Timing/Severity: intermittent  Quality of pain: burning  Severity:  2  10   Modifying Factors: Pain worsens with debridement  Associated Signs/Symptoms: edema    Ulcer Identification:  Ulcer Type: venous  Contributing Factors: edema, venous stasis, lymphedema, decreased mobility and obesity    Wound: N/A        PAST MEDICAL HISTORY        Diagnosis Date    Bell's palsy     Cellulitis     Hypertension        PAST SURGICAL HISTORY    Past Surgical History:   Procedure Laterality Date    CATARACT REMOVAL Bilateral     HYSTERECTOMY, TOTAL ABDOMINAL (CERVIX REMOVED)      INCISION AND DRAINAGE Left 2017    LEG INCISION AND DRAINAGE ABSCESS performed by Josse Crooks MD at [address] [place] [address] Right 2018    TOTAL KNEE ARTHROPLASTY Left 2017    VENTRAL HERNIA REPAIR  2019    5 hernias       FAMILY HISTORY    Family History   Problem Relation Age of Onset    Cancer Mother     High Blood Pressure Father     Stroke Father     Diabetes Maternal Aunt     Cancer Other         daughter/ovarian cancer    Cancer Daughter        SOCIAL HISTORY    Social History     Tobacco Use    Smoking status: Never Smoker    Smokeless tobacco: Never Used   Vaping Use    Vaping Use: Never used   Substance Use Topics    Alcohol use: No    Drug use: No       ALLERGIES    Allergies   Allergen Reactions    Penicillins Hives MEDICATIONS    Current Outpatient Medications on File Prior to Encounter   Medication Sig Dispense Refill    pantoprazole (PROTONIX) 40 MG tablet Take 1 tablet by mouth every morning (before breakfast) 30 tablet 5    bumetanide (BUMEX) 1 MG tablet Take 1 tablet by mouth daily 90 tablet 3    diphenoxylate-atropine (DIPHENATOL) 2.5-0.025 MG per tablet Take 1 tablet by mouth 4 times daily as needed for Diarrhea for up to 10 days. 40 tablet 1    traZODone (DESYREL) 50 MG tablet Take 1 tablet by mouth nightly as needed for Sleep 30 tablet 5    nystatin (MYCOSTATIN) 789498 UNIT/GM ointment Apply topically 2 times daily. 30 g 2    potassium chloride (KLOR-CON M) 10 MEQ extended release tablet Take 1 tablet by mouth 2 times daily 180 tablet 1    metoprolol tartrate (LOPRESSOR) 25 MG tablet Take 1/2 (one-half) tablet by mouth twice daily 90 tablet 1    escitalopram (LEXAPRO) 20 MG tablet TAKE 1 TABLET BY MOUTH EVERY DAY 90 tablet 1     No current facility-administered medications on file prior to encounter.        REVIEW OF SYSTEMS    Constitutional: negative  Eyes: negative  Ears, nose, mouth, throat, and face: negative  Respiratory: negative  Cardiovascular: negative except for lower extremity edema  Gastrointestinal: negative  Genitourinary:negative  Integument/breast: negative except for right posterior leg ulcer  Hematologic/lymphatic: negative  Musculoskeletal:negative  Neurological: negative  Behavioral/Psych: negative  Endocrine: negative  Allergic/Immunologic: negative    Objective:      BP (!) 185/81   Pulse 61   Temp 98 °F (36.7 °C) (Tympanic)   Resp 19   Ht 5' 2\" (1.575 m)   Wt (!) 308 lb (139.7 kg)   BMI 56.33 kg/m²     Wt Readings from Last 3 Encounters:   06/17/22 (!) 308 lb (139.7 kg)   06/16/22 (!) 308 lb 6.4 oz (139.9 kg)   06/10/22 (!) 306 lb (138.8 kg)       PHYSICAL EXAM    General Appearance: alert and oriented to person, place and time, well-developed and obese, in no acute distress  Skin: warm and dry, no rash or erythema, right posterior leg ulcer   Head: normocephalic and atraumatic  Eyes: pupils equal, round, extraocular eye movements intact, and conjunctivae normal  Pulmonary/Chest: normal air movement, no respiratory distress  Extremities: no cyanosis and no clubbing   Musculoskeletal: no joint swelling, deformity or tenderness  Neurologic: gait, coordination normal and speech normal      Assessment:     Problem List Items Addressed This Visit     Leg ulcer, right, with fat layer exposed ([address] - Primary    Relevant Orders    Initiate Outpatient Wound Care Protocol    Morbid obesity [facility])    Relevant Orders    Initiate Outpatient Wound Care Protocol    Venous insufficiency of both lower extremities    Relevant Orders    Initiate Outpatient Wound Care Protocol           Procedure Note  Indications:  Based on my examination of this patient's wound(s)/ulcer(s) today, debridement is required to promote healing and evaluate the wound base. Performed by: TOPHER Ledesma CNP    Consent obtained:  Yes    Time out taken:  Yes    Pain Control: Anesthetic  Anesthetic: 4% Lidocaine Liquid Topical     Debridement:Excisional Debridement    Using curette the wound(s)/ulcer(s) was/were sharply debrided down through and including the removal of subcutaneous tissue. Devitalized Tissue Debrided:  biofilm and slough    Pre Debridement Measurements:  Are located in the [facility]  Documentation Flow Sheet    Wound/Ulcer #: 1    Post Debridement Measurements:  Wound/Ulcer Descriptions are Pre Debridement except measurements:    Wound 05/27/22 Leg Right; Lower; Posterior;Distal #1 (Active)   Wound Image   05/27/22 0817   Wound Etiology Venous 06/17/22 0817   Dressing Status Old drainage noted;New drainage noted 06/17/22 0817   Wound Cleansed Soap and water 06/17/22 0817   Dressing/Treatment Other (comment) 06/10/22 0836   Wound Length (cm) 1.3 cm 06/17/22 0817   Wound Width (cm) 0.5 cm 06/17/22 0817   Wound Depth (cm) 0.2 cm 06/17/22 0817   Wound Surface Area (cm^2) 0.65 cm^2 06/17/22 0817   Change in Wound Size % (l*w) 78.33 06/17/22 0817   Wound Volume (cm^3) 0.13 cm^3 06/17/22 0817   Wound Healing % 89 06/17/22 0817   Post-Procedure Length (cm) 1.3 cm 06/17/22 0817   Post-Procedure Width (cm) 0.5 cm 06/17/22 0817   Post-Procedure Depth (cm) 0.2 cm 06/17/22 0817   Post-Procedure Surface Area (cm^2) 0.65 cm^2 06/17/22 0817   Post-Procedure Volume (cm^3) 0.13 cm^3 06/17/22 0817   Wound Assessment Pink/red;Slough;Granulation tissue 06/17/22 0817   Drainage Amount Moderate 06/17/22 0817   Drainage Description Serosanguinous; Yellow 06/17/22 0817   Odor None 06/17/22 0817   Sis-wound Assessment Blanchable erythema 06/17/22 0817   Margins Defined edges 06/17/22 0817   Wound Thickness Description not for Pressure Injury Full thickness 06/17/22 0817   Number of days: 21       Wound 05/27/22 Leg Lower;Medial;Posterior;Right #2 cluster (Active)   Wound Image   05/27/22 0817   Wound Etiology Venous 06/10/22 0814   Dressing Status New drainage noted; Old drainage noted 06/10/22 0814   Wound Cleansed Soap and water 06/10/22 0814   Dressing/Treatment Other (comment) 06/10/22 0836   Wound Length (cm) 0 cm 06/17/22 0817   Wound Width (cm) 0 cm 06/17/22 0817   Wound Depth (cm) 0 cm 06/17/22 0817   Wound Surface Area (cm^2) 0 cm^2 06/17/22 0817   Change in Wound Size % (l*w) 100 06/17/22 0817   Wound Volume (cm^3) 0 cm^3 06/17/22 0817   Wound Healing % 100 06/17/22 0817   Post-Procedure Length (cm) 0 cm 06/17/22 0817   Post-Procedure Width (cm) 0 cm 06/17/22 0817   Post-Procedure Depth (cm) 0 cm 06/17/22 0817   Post-Procedure Surface Area (cm^2) 0 cm^2 06/17/22 0817   Post-Procedure Volume (cm^3) 0 cm^3 06/17/22 0817   Wound Assessment [facility] 06/10/22 0814   Drainage Amount Moderate 06/10/22 0814   Drainage Description Serosanguinous; Yellow 06/10/22 0814   Odor None 06/10/22 0814   Sis-wound Assessment Blanchable erythema 06/10/22 0814   Margins Defined edges 06/10/22 0814   Wound Thickness Description not for Pressure Injury Full thickness 06/10/22 0814   Number of days: 21          Percent of Wound(s)/Ulcer(s) Debrided: 100%    Total Surface Area Debrided:  0.65 sq cm     Diabetic/Pressure/Non Pressure Ulcers only:  Ulcer: Non-Pressure ulcer, fat layer exposed    Estimated Blood Loss:  Minimal    Hemostasis Achieved:  by pressure    Procedural Pain:  2  / 10     Post Procedural Pain:  0 / 10     Response to treatment:  Well tolerated by patient. Plan:     Treatment Note please see Discharge Instructions    Written patient dismissal instructions given to patient and signed by patient or POA.            Electronically signed by TOPHER Hurt CNP on 6/17/2022 at 8:36 AM

## 2022-06-17 NOTE — PLAN OF CARE
Problem: Discharge Planning  Goal: Discharge to home or other facility with appropriate resources  Outcome: Progressing     Problem: Wound:  Goal: Will show signs of wound healing; wound closure and no evidence of infection  Description: Will show signs of wound healing; wound closure and no evidence of infection  Outcome: Progressing     Problem: Falls - Risk of:  Goal: Will remain free from falls  Description: Will remain free from falls  Outcome: Progressing     Problem: Safety - Adult  Goal: Free from fall injury  Outcome: Progressing

## 2022-06-17 NOTE — PROGRESS NOTES
[facility] Application   Below Knee    NAME:  Luis Lopez  YOB: 1950  MEDICAL RECORD NUMBER:  [id number]  DATE:  6/17/2022     [x] Applied moisturizing agent to dry skin as needed.  [x] Appied primary and secondary dressing as ordered     [x] Applied Unna roll from toes to knee overlapping each time.  [x] Applied ace wrap or coban from toes to below the knee.  [x] Secured with tape and/or metal clips covered with tape.  [x] Instructed patient/caregiver to keep dressing dry and intact. DO NOT REMOVE DRESSING.  [x] Instructed pt/family/caregiver to report excessive draining, loose bandage, wet dressing, severe pain or tingling in toes.  [x] Applied [facility] dressing below the knee to RIGHT lower leg(s)        Unna Boot(s) were applied per  Guidelines.      Electronically signed by Manoj Jackson RN on 6/17/2022 at 8:39 AM

## 2022-06-23 ENCOUNTER — HOSPITAL ENCOUNTER (OUTPATIENT)
Dept: WOUND CARE | Age: 72
Discharge: HOME | End: 2022-06-23
Payer: MEDICARE

## 2022-06-23 VITALS
TEMPERATURE: 98.5 F | RESPIRATION RATE: 18 BRPM | WEIGHT: 293 LBS | BODY MASS INDEX: 53.92 KG/M2 | HEIGHT: 62 IN | DIASTOLIC BLOOD PRESSURE: 77 MMHG | HEART RATE: 71 BPM | SYSTOLIC BLOOD PRESSURE: 176 MMHG

## 2022-06-23 DIAGNOSIS — L97.912 LEG ULCER, RIGHT, WITH FAT LAYER EXPOSED (HCC): Primary | ICD-10-CM

## 2022-06-23 DIAGNOSIS — I87.2 VENOUS INSUFFICIENCY OF BOTH LOWER EXTREMITIES: ICD-10-CM

## 2022-06-23 DIAGNOSIS — E66.01 MORBID OBESITY (HCC): ICD-10-CM

## 2022-06-23 DIAGNOSIS — I89.0 LYMPHEDEMA: ICD-10-CM

## 2022-06-23 PROBLEM — L97.812 NON-PRESSURE CHRONIC ULCER OF OTHER PART OF RIGHT LOWER LEG WITH FAT LAYER EXPOSED (HCC): Status: ACTIVE | Noted: 2022-05-27

## 2022-06-23 PROCEDURE — 11042 DBRDMT SUBQ TIS 1ST 20SQCM/<: CPT

## 2022-06-23 RX ORDER — LIDOCAINE HYDROCHLORIDE 40 MG/ML
SOLUTION TOPICAL ONCE
Status: COMPLETED | OUTPATIENT
Start: 2022-06-23 | End: 2022-06-23

## 2022-06-23 RX ORDER — LIDOCAINE HYDROCHLORIDE 20 MG/ML
JELLY TOPICAL ONCE
Status: CANCELLED | OUTPATIENT
Start: 2022-06-23 | End: 2022-06-23

## 2022-06-23 RX ORDER — LIDOCAINE HYDROCHLORIDE 40 MG/ML
SOLUTION TOPICAL ONCE
Status: CANCELLED | OUTPATIENT
Start: 2022-06-23 | End: 2022-06-23

## 2022-06-23 RX ADMIN — LIDOCAINE HYDROCHLORIDE 5 ML: 40 SOLUTION TOPICAL at 08:51

## 2022-06-23 ASSESSMENT — PAIN SCALES - GENERAL: PAINLEVEL_OUTOF10: 0

## 2022-06-23 NOTE — PROGRESS NOTES
[address]   Progress Note and Procedure Note      [address] [facility],6[address] Floor [facility] RECORD NUMBER:  [id number]  AGE: 80 y.o. GENDER: female  : 1950  EPISODE DATE:  2022    Subjective:     Chief Complaint   Patient presents with    Wound Check     right lower leg         HISTORY of PRESENT ILLNESS HPI     Ольга Mccauley is a 80 y.o. female who presents today for wound/ulcer evaluation. History of Wound Context: Jim  presents for follow-up evaluation of right posterior calf ulceration. She has been improving with current treatment and doing well with the [facility]. She does have juxta light wraps for when she has healed. No sign or symptom of infection.     Ulcer Identification:  Ulcer Type: venous and lymphedema  Contributing Factors: edema, venous stasis, lymphedema and obesity          PAST MEDICAL HISTORY        Diagnosis Date    Bell's palsy     Cellulitis     Hypertension        PAST SURGICAL HISTORY    Past Surgical History:   Procedure Laterality Date    CATARACT REMOVAL Bilateral     HYSTERECTOMY, TOTAL ABDOMINAL (CERVIX REMOVED)      INCISION AND DRAINAGE Left 2017    LEG INCISION AND DRAINAGE ABSCESS performed by Joaquin Clifton MD at [address] [place] [address] Right 2018    TOTAL KNEE ARTHROPLASTY Left 2017    VENTRAL HERNIA REPAIR  2019    5 hernias       FAMILY HISTORY    Family History   Problem Relation Age of Onset    Cancer Mother     High Blood Pressure Father     Stroke Father     Diabetes Maternal Aunt     Cancer Other         daughter/ovarian cancer    Cancer Daughter        SOCIAL HISTORY    Social History     Tobacco Use    Smoking status: Never Smoker    Smokeless tobacco: Never Used   Vaping Use    Vaping Use: Never used   Substance Use Topics    Alcohol use: No    Drug use: No       ALLERGIES    Allergies   Allergen Reactions    Penicillins Hives       MEDICATIONS    Current Outpatient Medications on File Prior to Encounter   Medication Sig Dispense Refill    pantoprazole (PROTONIX) 40 MG tablet Take 1 tablet by mouth every morning (before breakfast) 30 tablet 5    bumetanide (BUMEX) 1 MG tablet Take 1 tablet by mouth daily 90 tablet 3    diphenoxylate-atropine (DIPHENATOL) 2.5-0.025 MG per tablet Take 1 tablet by mouth 4 times daily as needed for Diarrhea for up to 10 days. 40 tablet 1    traZODone (DESYREL) 50 MG tablet Take 1 tablet by mouth nightly as needed for Sleep 30 tablet 5    nystatin (MYCOSTATIN) 660051 UNIT/GM ointment Apply topically 2 times daily. 30 g 2    potassium chloride (KLOR-CON M) 10 MEQ extended release tablet Take 1 tablet by mouth 2 times daily 180 tablet 1    metoprolol tartrate (LOPRESSOR) 25 MG tablet Take 1/2 (one-half) tablet by mouth twice daily 90 tablet 1    escitalopram (LEXAPRO) 20 MG tablet TAKE 1 TABLET BY MOUTH EVERY DAY 90 tablet 1     No current facility-administered medications on file prior to encounter. REVIEW OF SYSTEMS    Review of Systems   Constitutional: Negative for chills and fever. Skin: Positive for wound. Objective:      BP (!) 176/77   Pulse 71   Temp 98.5 °F (36.9 °C) (Tympanic)   Resp 18   Ht 5' 2\" (1.575 m)   Wt (!) 308 lb (139.7 kg)   BMI 56.33 kg/m²     Wt Readings from Last 3 Encounters:   06/23/22 (!) 308 lb (139.7 kg)   06/17/22 (!) 308 lb (139.7 kg)   06/16/22 (!) 308 lb 6.4 oz (139.9 kg)       Physical Exam:  General:  Alert and oriented x3. In no acute distress. Lower Extremity Physical Exam:    Vascular: DP pulses are palpable, Bilateral. PT pulses are palpable, Bilateral. CFT <3 seconds to all digits, Bilateral.  Pitting edema, Bilateral.  Hair growth is absent to the level of the digits, Bilateral.     Neuro: Saph/sural/SP/DP/plantar sensation intact to light touch. Musculoskeletal: EHL/FHL/GS/TA gross motor intact. Gross deformity is absent.      Dermatologic: Open wound present to posterior right calf as documented in detail below. Wound base is fibrotic with slough and bioburden into to the subcutaneous layer. Negative probe to bone. There is no erythema. There is no purulent drainage. There is no fluctuance or crepitus. Interdigital maceration absent, Bilateral.       Assessment:      Active Hospital Problems    Diagnosis Date Noted    Lymphedema [I89.0] 06/23/2022     Priority: Medium    Non-pressure chronic ulcer of other part of right lower leg with fat layer exposed ([address] [L97.812] 05/27/2022     Priority: Medium    Venous insufficiency of both lower extremities [I87.2] 05/27/2022     Priority: Medium    Morbid obesity ([address] [E66.01] 11/17/2015       Plan:     Treatment Note please see attached Discharge Instructions    Discussed the importance of compression therapy for healing of leg ulcerations in the setting of venous insufficiency and lymphedema. Continue Unna boot until healed. Transition to juxta lites after that. Educated on signs and symptoms of infection. Instructed to call clinic immediately or go to ER if signs and symptoms of infection are present. RTC 1 week      Procedure Note  Indications:  Based on my examination of this patient's wound(s)/ulcer(s) today, debridement is required to promote healing and evaluate the wound base. Performed by: David Hunter DPM    Consent obtained:  Yes    Time out taken:  Yes    Pain Control: Anesthetic  Anesthetic: 4% Lidocaine Liquid Topical       Debridement: Excisional Debridement    Using curette the wound(s)/ulcer(s) was/were sharply debrided down through and including the removal of subcutaneous tissue.         Devitalized Tissue Debrided:  fibrin, biofilm and slough    Pre Debridement Measurements:  Are located in the [facility]  Documentation Flow Sheet    Wound/Ulcer #: 1    Post Debridement Measurements:  Wound/Ulcer Descriptions are Pre Debridement except measurements:    Wound 05/27/22 Leg Right; Lower; Posterior;Distal #1 (Active)   Wound Image   05/27/22 0817   Wound Etiology Venous 06/23/22 0841   Dressing Status Old drainage noted;New drainage noted 06/23/22 0841   Wound Cleansed Soap and water 06/23/22 0841   Dressing/Treatment Other (comment) 06/10/22 0836   Wound Length (cm) 0.6 cm 06/23/22 0841   Wound Width (cm) 0.2 cm 06/23/22 0841   Wound Depth (cm) 0.2 cm 06/23/22 0841   Wound Surface Area (cm^2) 0.12 cm^2 06/23/22 0841   Change in Wound Size % (l*w) 96 06/23/22 0841   Wound Volume (cm^3) 0.024 cm^3 06/23/22 0841   Wound Healing % 98 06/23/22 0841   Post-Procedure Length (cm) 0.6 cm 06/23/22 0841   Post-Procedure Width (cm) 0.2 cm 06/23/22 0841   Post-Procedure Depth (cm) 0.2 cm 06/23/22 0841   Post-Procedure Surface Area (cm^2) 0.12 cm^2 06/23/22 0841   Post-Procedure Volume (cm^3) 0.024 cm^3 06/23/22 0841   Wound Assessment Pink/red 06/23/22 0841   Drainage Amount Moderate 06/23/22 0841   Drainage Description Serosanguinous 06/23/22 0841   Odor None 06/23/22 0841   Sis-wound Assessment Blanchable erythema 06/23/22 0841   Margins Defined edges 06/23/22 0841   Wound Thickness Description not for Pressure Injury Full thickness 06/23/22 0841   Number of days: 27          Percent of Wound(s)/Ulcer(s) Debrided: 100%    Total Surface Area Debrided:  0.12 sq cm     Diabetic/Pressure/Non Pressure Ulcers only:  Ulcer: Non-Pressure ulcer, fat layer exposed     Estimated Blood Loss:  Minimal    Hemostasis Achieved:  by pressure    Response to treatment:  Well tolerated by patient. Written patient discharge instructions given to patient and signed by patient or POA.       Orders Placed This Encounter   Medications    lidocaine (XYLOCAINE) 4 % external solution     Orders Placed This Encounter   Procedures    Initiate Outpatient Wound Care Protocol     Cleanse wound with saline    If wound contains bioburden or contamination cleanse with wound cleanser or antimicrobial solution     For normal periwound tissue without irritation nor maceration, apply topical skin protectant    For periwound tissue with irritation and/or maceration, apply zinc based product, topical steroid cream/ointment, or equivalent     For wounds with dry firm black eschar and/or without exudate, apply betadine and leave open to air      For wounds with scant/small to no exudate or drainage, apply wound gel, hydrocolloid, polymer, or equivalent and cover with secondary dressing/foam      For wounds with moderate/large exudate or drainage, apply alginate, hydrofiber, polymer, or equivalent and cover with secondary dressing/foam    For wounds with nonviable tissue requiring removal, apply chemical or mechanical debrider and cover with secondary dressing/foam    For wounds with tunneling, dead space, or cavity, fill or pack with strip/gauze/kerlex to fit and cover with secondary dressing/foam    For wounds with adequate granulation or epithelization, apply wound gel, hydrocolloid, polymer, collagen, or transparent film, and cover secondary dry dressing/foam    For wounds that need additional secondary dressing to help pad or control additional drainage/exudates, add foam, absorbent pad or hydrocolloid    For wounds with suspected or known infection, apply antimicrobial mesh and/or antimicrobial alginate/hydrofiber, or antimicrobial solution moistened gauze/kerlex, or equivalent and cover with secondary dressing/foam    Compression Management needed for edema control, apply multilayer compression or tubular garment or equivalent    Offloading Management needed for pressure relief, apply offloading shoe/boot or equivalent     Standing Status:   Standing     Number of Occurrences:   1          Discharge Instructions         [address] [place] [facility]                                 Visit  Discharge Instructions / Physician Orders  DATE:6/22/22     Home Care:NONE     SUPPLIES ORDERED THRU:NONE      DATE LAST SUPPLIED NONE     Wound Location:  Right Lower leg     Cleanse with: Keep dry and intact      Dressing Orders:  Primary dressing Fibracol then Silvercel Secondary dressing  White foam to post ankle then build up ankle with abd pads Apply Zinc Unna boot                                Frequency:  Keep dry and intact     Additional Orders: Increase protein to diet (meat, cheese, eggs, fish, peanut butter, nuts and beans)  Multivitamin daily  ELEVATE LEGS WHEN SITTING  If area where wound is at is sore try laying an ice pack wrapped in a towel over it. ( you can rest your leg on it)  Bring Compression wraps to wound care next week  OFFLOADING []???? YES  TYPE:                  [x]???? NA     Weekly wound care visits until determined otherwise.     Antibiotic therapy-wound care related YES []???? NO []???? NA[x]? ???     MY CHART []????     Smart Device  []????      HYPERBARIC TREATMENT-                TREATMENT #                          Your next appointment with the [address] [place] [address] is in Monday 6/27/22 at 2:45pm                                                                                                   (Please note your next appointment above and if you are unable to keep, kindly give a 24 hour notice.  Thank you.)  If more than 15 min late we cannot guarantee you will be seen due to clinician schedule  Per Policy, Excessive cancellation will call for dismissal from program.  If you experience any of the following, please call the [address] [place] [address] during business hours:  [phone]     * Increase in Pain  * Temperature over 101  * Increase in drainage from your wound  * Drainage with a foul odor  * Bleeding  * Increase in swelling  * Need for compression bandage changes due to slippage, breakthrough drainage.     If you need medical attention outside of the business hours of the [address] [place] [address] please contact your PCP or go to the nearest emergency room.     The information contained in the After Visit Summary has been reviewed with me, the patient and/or responsible adult, by my health care provider(s). I had the opportunity to ask questions regarding this information. I have elected to receive;      []?? ?? After Visit Summary  [x]????Comprehensive Discharge Instruction        Patient signature______________________________________  Electronically signed by Juventino Dorsey DPM on 6/23/2022 at 8:26 AM  Electronically signed by Codie Leon RN on 6/23/2022 at 9:10 AM          Electronically signed by Juventino Dorsey DPM on 6/23/2022 at 9:10 AM

## 2022-06-23 NOTE — PLAN OF CARE
Problem: Discharge Planning  Goal: Discharge to home or other facility with appropriate resources  Outcome: Progressing     Problem: Wound:  Goal: Will show signs of wound healing; wound closure and no evidence of infection  Description: Will show signs of wound healing; wound closure and no evidence of infection  Outcome: Progressing     Problem: Falls - Risk of:  Goal: Will remain free from falls  Description: Will remain free from falls  Outcome: Progressing     Problem: Pain  Goal: Verbalizes/displays adequate comfort level or baseline comfort level  Outcome: Progressing

## 2022-06-23 NOTE — PROGRESS NOTES
[facility] Application   Below Knee    NAME:  Jaqueline Monroy  YOB: 1950  MEDICAL RECORD NUMBER:  [id number]  DATE:  6/23/2022     [x] Applied moisturizing agent to dry skin as needed.  [x] Appied primary and secondary dressing as ordered     [x] Applied Unna roll from toes to knee overlapping each time.  [x] Applied ace wrap or coban from toes to below the knee.  [x] Secured with tape and/or metal clips covered with tape.  [x] Instructed patient/caregiver to keep dressing dry and intact. DO NOT REMOVE DRESSING.  [x] Instructed pt/family/caregiver to report excessive draining, loose bandage, wet dressing, severe pain or tingling in toes.  [x] Applied [facility] dressing below the knee to Right lower leg(s)        Unna Boot(s) were applied per  Guidelines.      Electronically signed by Natalia Luis RN on 6/23/2022 at 9:26 AM

## 2022-06-27 ENCOUNTER — HOSPITAL ENCOUNTER (OUTPATIENT)
Dept: WOUND CARE | Age: 72
Discharge: HOME | End: 2022-06-27
Payer: MEDICARE

## 2022-06-27 VITALS
TEMPERATURE: 98.7 F | HEIGHT: 62 IN | DIASTOLIC BLOOD PRESSURE: 81 MMHG | SYSTOLIC BLOOD PRESSURE: 196 MMHG | WEIGHT: 293 LBS | RESPIRATION RATE: 18 BRPM | BODY MASS INDEX: 53.92 KG/M2 | HEART RATE: 69 BPM

## 2022-06-27 DIAGNOSIS — I87.2 VENOUS INSUFFICIENCY OF BOTH LOWER EXTREMITIES: ICD-10-CM

## 2022-06-27 DIAGNOSIS — E66.01 MORBID OBESITY (HCC): ICD-10-CM

## 2022-06-27 DIAGNOSIS — L97.812 NON-PRESSURE CHRONIC ULCER OF OTHER PART OF RIGHT LOWER LEG WITH FAT LAYER EXPOSED (HCC): Primary | ICD-10-CM

## 2022-06-27 PROCEDURE — 29580 STRAPPING UNNA BOOT: CPT

## 2022-06-27 PROCEDURE — 99213 OFFICE O/P EST LOW 20 MIN: CPT | Performed by: PLASTIC SURGERY

## 2022-06-27 RX ORDER — LIDOCAINE HYDROCHLORIDE 40 MG/ML
SOLUTION TOPICAL ONCE
Status: CANCELLED | OUTPATIENT
Start: 2022-06-27 | End: 2022-06-27

## 2022-06-27 RX ORDER — LIDOCAINE HYDROCHLORIDE 20 MG/ML
JELLY TOPICAL ONCE
Status: CANCELLED | OUTPATIENT
Start: 2022-06-27 | End: 2022-06-27

## 2022-06-27 ASSESSMENT — PAIN SCALES - GENERAL: PAINLEVEL_OUTOF10: 0

## 2022-06-27 NOTE — PROGRESS NOTES
[employer] Application   Below Knee    NAME:  Jaqueline Monroy  YOB: 1950  MEDICAL RECORD NUMBER:  [id number]  DATE:  6/27/2022     [x] Applied moisturizing agent to dry skin as needed.  [x] Appied primary and secondary dressing as ordered     [x] Applied Unna roll from toes to knee overlapping each time.  [x] Applied ace wrap or coban from toes to below the knee.  [x] Secured with tape and/or metal clips covered with tape.  [x] Instructed patient/caregiver to keep dressing dry and intact. DO NOT REMOVE DRESSING.  [x] Instructed pt/family/caregiver to report excessive draining, loose bandage, wet dressing, severe pain or tingling in toes.  [x] Applied [employer] dressing below the knee to Right lower leg(s)        Unna Boot(s) were applied per  Guidelines.      Electronically signed by Natalia Luis RN on 6/27/2022 at 2:52 PM

## 2022-06-27 NOTE — PROGRESS NOTES
[address]   Progress Note and Procedure Note      Progress note     Chief Complaint   Patient presents with    Wound Check     right lower leg        HPI:   Donna Perales is a 80 y.o. female who presents for wound ulcer evaluation. Carlton Ladd History of the wound is as follows: Patient is here for follow-up of the posterior calf ulceration. This has been treated with an Unna boot. The also type is venous and lymphedema  Contributing factors are BMI greater than 30, venous stasis, lymphedema  Pain: Patient does not have much pain associated with the area  Medications:     Current Outpatient Medications   Medication Sig Dispense Refill    pantoprazole (PROTONIX) 40 MG tablet Take 1 tablet by mouth every morning (before breakfast) 30 tablet 5    bumetanide (BUMEX) 1 MG tablet Take 1 tablet by mouth daily 90 tablet 3    traZODone (DESYREL) 50 MG tablet Take 1 tablet by mouth nightly as needed for Sleep 30 tablet 5    nystatin (MYCOSTATIN) 282037 UNIT/GM ointment Apply topically 2 times daily. 30 g 2    potassium chloride (KLOR-CON M) 10 MEQ extended release tablet Take 1 tablet by mouth 2 times daily 180 tablet 1    metoprolol tartrate (LOPRESSOR) 25 MG tablet Take 1/2 (one-half) tablet by mouth twice daily 90 tablet 1    escitalopram (LEXAPRO) 20 MG tablet TAKE 1 TABLET BY MOUTH EVERY DAY 90 tablet 1     No current facility-administered medications for this encounter. Allergies: Allergies   Allergen Reactions    Penicillins Hives     Review of Systems:   Constitutional: Negative for fever, chills, fatigue and unexpected weight change. HENT: Negative for hearing loss, sore throat and facial swelling. Eyes: Negative for pain and discharge. Respiratory: Negative for cough and shortness of breath. Cardiovascular: Patient with a history of hypertension. Gastrointestinal: Negative for nausea, vomiting, diarrhea and constipation.    Skin: Patient with a history of cellulitis. Neurological: Patient with a history of Bell's palsy  Hematological: Does not bruise/bleed easily. Psychiatric/Behavioral: Negative for behavioral problems. The patient is not nervous/anxious. Past Medical History:   Diagnosis Date    Bell's palsy     Cellulitis     Hypertension      Past Surgical History:   Procedure Laterality Date    CATARACT REMOVAL Bilateral 2015    HYSTERECTOMY, TOTAL ABDOMINAL (CERVIX REMOVED)  1990    INCISION AND DRAINAGE Left 07/06/2017    LEG INCISION AND DRAINAGE ABSCESS performed by Alcides Bolanos MD at [address] [place] [address] Right 09/24/2018    TOTAL KNEE ARTHROPLASTY Left 11/09/2017    VENTRAL HERNIA REPAIR  02/11/2019    5 hernias     Social History     Socioeconomic History    Marital status:      Spouse name: Not on file    Number of children: Not on file    Years of education: Not on file    Highest education level: Not on file   Occupational History    Not on file   Tobacco Use    Smoking status: Never Smoker    Smokeless tobacco: Never Used   Vaping Use    Vaping Use: Never used   Substance and Sexual Activity    Alcohol use: No    Drug use: No    Sexual activity: Yes   Other Topics Concern    Not on file   Social History Narrative    Not on file     Social Determinants of Health     Financial Resource Strain: Low Risk     Difficulty of Paying Living Expenses: Not hard at all   Food Insecurity: No Food Insecurity    Worried About [address] in the Last Year: Never true    [facility] in the Last Year: Never true   Transportation Needs:     Lack of Transportation (Medical): Not on file    Lack of Transportation (Non-Medical):  Not on file   Physical Activity:     Days of Exercise per Week: Not on file    Minutes of Exercise per Session: Not on file   Stress:     Feeling of Stress : Not on file   Social Connections:     Frequency of Communication with Friends and Family: Not on file    Frequency of Social Gatherings with Friends and Family: Not on file    Attends Anglican Services: Not on file    Active Member of Clubs or Organizations: Not on file    Attends Club or Organization Meetings: Not on file    Marital Status: Not on file   Intimate Partner Violence:     Fear of Current or Ex-Partner: Not on file    Emotionally Abused: Not on file    Physically Abused: Not on file    Sexually Abused: Not on file   Housing Stability:     Unable to Pay for Housing in the Last Year: Not on file    Number of [place] in the Last Year: Not on file    Unstable Housing in the Last Year: Not on file     Family History   Problem Relation Age of Onset    Cancer Mother     High Blood Pressure Father     Stroke Father     Diabetes Maternal Aunt     Cancer Other         daughter/ovarian cancer    Cancer Daughter      Physical Exam:   BP (!) 196/81   Pulse 69   Temp 98.7 °F (37.1 °C) (Tympanic)   Resp 18   Ht 5' 2\" (1.575 m)   Wt (!) 308 lb (139.7 kg)   BMI 56.33 kg/m²    Body mass index is 56.33 kg/m². Physical Exam   Nursing note and vitals reviewed. Constitutional: Oriented to person, place, and time. Appears well-developed and well-nourished. No distress. Head: Normocephalic and atraumatic. Eyes: Conjunctivae and EOM are normal.   Pulmonary/Chest: Effort normal. No respiratory distress. Neurological: Alert and oriented to person, place, and time. Psychiatric: Normal mood and affect. Behavior is normal    Post Debridement Measurements:  Wound/Ulcer Descriptions are Pre Debridement except measurements:    Wound 05/27/22 Leg Right; Lower; Posterior;Distal #1 (Active)   Wound Image   05/27/22 0817   Wound Etiology Venous 06/27/22 1435   Dressing Status Old drainage noted;New drainage noted 06/27/22 1435   Wound Cleansed Soap and water 06/27/22 1435   Dressing/Treatment Other (comment) 06/23/22 0925   Wound Length (cm) 0 cm 06/27/22 1435   Wound Width (cm) 0 cm 06/27/22 1435   Wound Depth (cm) 0 cm 06/27/22 1435   Wound Surface Area (cm^2) 0 cm^2 06/27/22 1435   Change in Wound Size % (l*w) 100 06/27/22 1435   Wound Volume (cm^3) 0 cm^3 06/27/22 1435   Wound Healing % 100 06/27/22 1435   Post-Procedure Length (cm) 0 cm 06/27/22 1435   Post-Procedure Width (cm) 0 cm 06/27/22 1435   Post-Procedure Depth (cm) 0 cm 06/27/22 1435   Post-Procedure Surface Area (cm^2) 0 cm^2 06/27/22 1435   Post-Procedure Volume (cm^3) 0 cm^3 06/27/22 1435   Wound Assessment Pink/red 06/23/22 0841   Drainage Amount Moderate 06/23/22 0841   Drainage Description Serosanguinous 06/23/22 0841   Odor None 06/23/22 0841   Sis-wound Assessment Blanchable erythema 06/23/22 0841   Margins Defined edges 06/23/22 0841   Wound Thickness Description not for Pressure Injury Full thickness 06/23/22 0841   Number of days: 31            Imaging:   [unfilled]        Impression/Plan:     Problem List Items Addressed This Visit        Circulatory    Venous insufficiency of both lower extremities    Relevant Orders    Initiate Outpatient Wound Care Protocol       Other    Non-pressure chronic ulcer of other part of right lower leg with fat layer exposed ([address] - Primary    Relevant Orders    Initiate Outpatient Wound Care Protocol    Morbid obesity ([address]    Relevant Orders    Initiate Outpatient Wound Care Protocol          Patient Active Problem List   Diagnosis    Depression with anxiety    Eczema    Edema    Essential (primary) hypertension    Hyperlipidemia    Morbid obesity ([address]    Osteoarthritis    Rosacea    Urge incontinence of urine    Varicose veins    Paroxysmal atrial fibrillation ([address]    Non-rheumatic tricuspid valve insufficiency    Non-pressure chronic ulcer of other part of right lower leg with fat layer exposed ([address]    Venous insufficiency of both lower extremities    Lymphedema       Plan:  Wound care as per wound care instructions.          Electronically signed by:  Haroldo Li MD 6/27/2022

## 2022-07-06 ENCOUNTER — HOSPITAL ENCOUNTER (OUTPATIENT)
Dept: WOUND CARE | Age: 72
Discharge: HOME | End: 2022-07-06
Payer: MEDICARE

## 2022-07-06 VITALS
HEART RATE: 61 BPM | HEIGHT: 62 IN | RESPIRATION RATE: 18 BRPM | DIASTOLIC BLOOD PRESSURE: 75 MMHG | WEIGHT: 293 LBS | TEMPERATURE: 97.6 F | SYSTOLIC BLOOD PRESSURE: 208 MMHG | BODY MASS INDEX: 53.92 KG/M2

## 2022-07-06 DIAGNOSIS — E66.01 MORBID OBESITY (HCC): ICD-10-CM

## 2022-07-06 DIAGNOSIS — L97.812 NON-PRESSURE CHRONIC ULCER OF OTHER PART OF RIGHT LOWER LEG WITH FAT LAYER EXPOSED (HCC): Primary | ICD-10-CM

## 2022-07-06 DIAGNOSIS — I87.2 VENOUS INSUFFICIENCY OF BOTH LOWER EXTREMITIES: ICD-10-CM

## 2022-07-06 PROCEDURE — 99212 OFFICE O/P EST SF 10 MIN: CPT

## 2022-07-06 RX ORDER — LIDOCAINE HYDROCHLORIDE 20 MG/ML
JELLY TOPICAL ONCE
OUTPATIENT
Start: 2022-07-06 | End: 2022-07-06

## 2022-07-06 RX ORDER — LIDOCAINE HYDROCHLORIDE 40 MG/ML
SOLUTION TOPICAL ONCE
Status: DISCONTINUED | OUTPATIENT
Start: 2022-07-06 | End: 2022-07-07 | Stop reason: HOSPADM

## 2022-07-06 RX ORDER — LIDOCAINE HYDROCHLORIDE 40 MG/ML
SOLUTION TOPICAL ONCE
OUTPATIENT
Start: 2022-07-06 | End: 2022-07-06

## 2022-07-06 ASSESSMENT — PAIN SCALES - GENERAL: PAINLEVEL_OUTOF10: 0

## 2022-07-06 NOTE — PROGRESS NOTES
[address]   Progress Note and Procedure Note      [address] [facility],[address] [facility] RECORD NUMBER:  [id number]  AGE: 80 y.o. GENDER: female  : 1950  EPISODE DATE:  2022    Subjective:     Chief Complaint   Patient presents with    Wound Check     Right leg         HISTORY of PRESENT ILLNESS HPI     Rebecca Adam is a 80 y.o. female who presents today for wound/ulcer evaluation.    History of Wound Context: Posterior calf wound, had been using unna boot     Interval history: wound is closed today    Wound/Ulcer Pain Timing/Severity: none    Wound/Ulcer Identification:  Ulcer Type: venous  Contributing Factors: edema, venous stasis and lymphedema          PAST MEDICAL HISTORY        Diagnosis Date    Bell's palsy     Cellulitis     Hypertension        PAST SURGICAL HISTORY    Past Surgical History:   Procedure Laterality Date    CATARACT REMOVAL Bilateral     HYSTERECTOMY, TOTAL ABDOMINAL (CERVIX REMOVED)      INCISION AND DRAINAGE Left 2017    LEG INCISION AND DRAINAGE ABSCESS performed by Lester Lee MD at [place] [address] Right 2018    TOTAL KNEE ARTHROPLASTY Left 2017    VENTRAL HERNIA REPAIR  2019    5 hernias       FAMILY HISTORY    Family History   Problem Relation Age of Onset    Cancer Mother     High Blood Pressure Father     Stroke Father     Diabetes Maternal Aunt     Cancer Other         daughter/ovarian cancer    Cancer Daughter        SOCIAL HISTORY    Social History     Tobacco Use    Smoking status: Never Smoker    Smokeless tobacco: Never Used   Vaping Use    Vaping Use: Never used   Substance Use Topics    Alcohol use: No    Drug use: No       ALLERGIES    Allergies   Allergen Reactions    Penicillins Hives       MEDICATIONS    Current Outpatient Medications on File Prior to Encounter   Medication Sig Dispense Refill    pantoprazole (PROTONIX) 40 MG tablet Take 1 tablet by mouth every insufficiency of both lower extremities    Relevant Medications    lidocaine (XYLOCAINE) 4 % external solution    Other Relevant Orders    Initiate Outpatient Wound Care Protocol       Other    Non-pressure chronic ulcer of other part of right lower leg with fat layer exposed ([facility] - Primary    Relevant Medications    lidocaine (XYLOCAINE) 4 % external solution    Other Relevant Orders    Initiate Outpatient Wound Care Protocol    Morbid obesity ([facility]    Relevant Medications    lidocaine (XYLOCAINE) 4 % external solution    Other Relevant Orders    Initiate Outpatient Wound Care Protocol                      Plan:     -Wound remains closed today    -Educated on importance of compression during waking hours to prevent further VLU. -See Discharge Instructions        Written patient dismissal instructions given to patient and signed by patient or POA.            Electronically signed by TOPHER Bowie CNP on 7/6/2022 at 8:40 AM

## 2022-07-19 ENCOUNTER — HOSPITAL ENCOUNTER (OUTPATIENT)
Dept: OCCUPATIONAL THERAPY | Age: 72
Setting detail: THERAPIES SERIES
Discharge: HOME | End: 2022-07-19

## 2022-07-19 NOTE — SIGNIFICANT EVENT
[x] [address] Occupational Therapy       [address] [facility], 1st Floor       Phone: [phone]       Fax: [phone] [] [facility] Occupational  Therapy at [address] [place] [address]. [place], [place]       Phone: [phone]       Fax: [phone]          Occupational Therapy Cancel/No Show note    Date: 2022  Patient: Fabienne Koo  : 1950  MRN: [id number]    Cancels/No Shows to date: 2    For today's appointment patient:    [x]  Cancelled    [] Rescheduled appointment    [] No-show     Reason given by patient:    []  Patient ill    []  Conflicting appointment    [] No transportation      [] Conflict with work    [x] No reason given    [] Weather related    [] COVID-19    [] Other:      Comments:       [] Next appointment was confirmed  - pt states she does not want to reschedule at this time.        Electronically signed by: Jong Rawls OT

## 2022-08-10 DIAGNOSIS — I48.0 PAROXYSMAL ATRIAL FIBRILLATION (HCC): ICD-10-CM

## 2022-09-07 DIAGNOSIS — F41.8 DEPRESSION WITH ANXIETY: ICD-10-CM

## 2022-09-07 DIAGNOSIS — K25.9 GASTRIC ULCER, UNSPECIFIED CHRONICITY, UNSPECIFIED WHETHER GASTRIC ULCER HEMORRHAGE OR PERFORATION PRESENT: ICD-10-CM

## 2022-09-07 RX ORDER — OMEPRAZOLE 20 MG/1
CAPSULE, DELAYED RELEASE ORAL
Qty: 90 CAPSULE | Refills: 0 | Status: SHIPPED | OUTPATIENT
Start: 2022-09-07

## 2022-09-07 RX ORDER — ESCITALOPRAM OXALATE 20 MG/1
TABLET ORAL
Qty: 90 TABLET | Refills: 0 | Status: SHIPPED | OUTPATIENT
Start: 2022-09-07

## 2022-10-18 ENCOUNTER — OFFICE VISIT (OUTPATIENT)
Dept: PRIMARY CARE CLINIC | Age: 72
End: 2022-10-18
Payer: MEDICARE

## 2022-10-18 VITALS
DIASTOLIC BLOOD PRESSURE: 84 MMHG | OXYGEN SATURATION: 98 % | HEART RATE: 64 BPM | BODY MASS INDEX: 55.6 KG/M2 | SYSTOLIC BLOOD PRESSURE: 122 MMHG | WEIGHT: 293 LBS

## 2022-10-18 DIAGNOSIS — L70.9 ACNE, UNSPECIFIED ACNE TYPE: ICD-10-CM

## 2022-10-18 DIAGNOSIS — S81.802A OPEN WOUND OF LEFT LOWER LEG, INITIAL ENCOUNTER: Primary | ICD-10-CM

## 2022-10-18 DIAGNOSIS — R19.7 DIARRHEA, UNSPECIFIED TYPE: ICD-10-CM

## 2022-10-18 PROCEDURE — 99214 OFFICE O/P EST MOD 30 MIN: CPT | Performed by: NURSE PRACTITIONER

## 2022-10-18 PROCEDURE — 1036F TOBACCO NON-USER: CPT | Performed by: NURSE PRACTITIONER

## 2022-10-18 PROCEDURE — G8417 CALC BMI ABV UP PARAM F/U: HCPCS | Performed by: NURSE PRACTITIONER

## 2022-10-18 PROCEDURE — G8427 DOCREV CUR MEDS BY ELIG CLIN: HCPCS | Performed by: NURSE PRACTITIONER

## 2022-10-18 PROCEDURE — G8484 FLU IMMUNIZE NO ADMIN: HCPCS | Performed by: NURSE PRACTITIONER

## 2022-10-18 PROCEDURE — 3017F COLORECTAL CA SCREEN DOC REV: CPT | Performed by: NURSE PRACTITIONER

## 2022-10-18 PROCEDURE — G8399 PT W/DXA RESULTS DOCUMENT: HCPCS | Performed by: NURSE PRACTITIONER

## 2022-10-18 PROCEDURE — 1124F ACP DISCUSS-NO DSCNMKR DOCD: CPT | Performed by: NURSE PRACTITIONER

## 2022-10-18 PROCEDURE — 1090F PRES/ABSN URINE INCON ASSESS: CPT | Performed by: NURSE PRACTITIONER

## 2022-10-18 RX ORDER — HYDROPHILIC CREAM
3 PASTE (GRAM) TOPICAL 3 TIMES DAILY
Qty: 170 G | Refills: 2 | Status: SHIPPED | OUTPATIENT
Start: 2022-10-18

## 2022-10-18 RX ORDER — CLINDAMYCIN AND BENZOYL PEROXIDE 10; 50 MG/G; MG/G
GEL TOPICAL
Qty: 50 G | Refills: 1 | Status: SHIPPED | OUTPATIENT
Start: 2022-10-18

## 2022-10-18 RX ORDER — DIPHENOXYLATE HYDROCHLORIDE AND ATROPINE SULFATE 2.5; .025 MG/1; MG/1
1 TABLET ORAL 3 TIMES DAILY PRN
Qty: 20 TABLET | Refills: 0 | Status: SHIPPED | OUTPATIENT
Start: 2022-10-18 | End: 2022-11-17

## 2022-10-18 RX ORDER — DIPHENOXYLATE HYDROCHLORIDE AND ATROPINE SULFATE 2.5; .025 MG/1; MG/1
1 TABLET ORAL 3 TIMES DAILY PRN
Qty: 20 TABLET | Refills: 0 | Status: SHIPPED | OUTPATIENT
Start: 2022-10-18 | End: 2022-10-18 | Stop reason: SDUPTHER

## 2022-10-18 RX ORDER — GREEN TEA/HOODIA GORDONII 315-12.5MG
1 CAPSULE ORAL 2 TIMES DAILY
Qty: 60 TABLET | Refills: 3 | Status: SHIPPED | OUTPATIENT
Start: 2022-10-18 | End: 2022-11-17

## 2022-10-18 ASSESSMENT — ENCOUNTER SYMPTOMS
BLOATING: 1
FLATUS: 1
ABDOMINAL PAIN: 1
VOMITING: 0
SHORTNESS OF BREATH: 0
COUGH: 0
DIARRHEA: 1

## 2022-10-18 ASSESSMENT — PATIENT HEALTH QUESTIONNAIRE - PHQ9
SUM OF ALL RESPONSES TO PHQ QUESTIONS 1-9: 0
2. FEELING DOWN, DEPRESSED OR HOPELESS: 0
SUM OF ALL RESPONSES TO PHQ QUESTIONS 1-9: 0
SUM OF ALL RESPONSES TO PHQ9 QUESTIONS 1 & 2: 0
1. LITTLE INTEREST OR PLEASURE IN DOING THINGS: 0

## 2022-10-18 NOTE — PATIENT INSTRUCTIONS
Wash left thigh/buttocks wound with soap and water and pat dry, then apply Triad paste 3x/day and as needed.   Take probiotic (floranex) 1 tablet 2x/day  Take diphenoxylate-atropine (lomotil) 1 tablet as needed for diarrhea - no more than 3 capsules per day (It can cause constipation)  Clindamycin gel for acne - apply 2x/day

## 2022-10-18 NOTE — PROGRESS NOTES
[address] [address] PRIMARY CARE  [facility] [address]  [facility] [place] [address]  Dept: [phone]    Efren Felder is a 80 y.o. female Established patient, who presents today for her medical conditions/complaints as noted below. Chief Complaint   Patient presents with    Abdominal Pain    Diarrhea     Pt states this started about a week ago - has a rash on her leg thinks it's related to the diarrhea - denies vomiting. HPI:     Diarrhea   This is a recurrent problem. The current episode started 1 to 4 weeks ago (a little longer than a week). The problem occurs 2 to 4 times per day. The problem has been waxing and waning. The stool consistency is described as Mucous and watery. The patient states that diarrhea does not awaken her from sleep. Associated symptoms include abdominal pain, bloating, increased flatus and myalgias. Pertinent negatives include no chills, coughing, fever or vomiting. Exacerbated by: cola or coffee. She has tried anti-motility drug and change of diet for the symptoms. The treatment provided no relief. Abdominal Pain  This is a recurrent problem. The current episode started 1 to 4 weeks ago (this episode about a week she has had previous flare ups). The onset quality is sudden. The problem occurs 2 to 4 times per day. Associated symptoms include diarrhea, flatus and myalgias. Pertinent negatives include no dysuria, fever or vomiting. Left arm pain all the time. It an achy pain in the muscle . Rates at 5/10,.     Reviewed prior notes: Previous PCP and wound care    Reviewed previous:  Labs    LDL Cholesterol (mg/dL)   Date Value   11/21/2019 105     LDL Calculated (mg/dL)   Date Value   06/07/2017 131       (goal LDL is <100)   AST (U/L)   Date Value   06/16/2022 26     ALT (U/L)   Date Value   06/16/2022 12     BUN (mg/dL)   Date Value   03/03/2022 15     Hemoglobin A1C (%)   Date Value   08/21/2018 5.2     TSH (mIU/L)   Date Value   07/07/2021 1.70     BP Readings from Last 3 Encounters:   10/18/22 122/84   07/06/22 (!) 208/75   06/27/22 (!) 196/81          (goal 120/80)    Past Medical History:   Diagnosis Date    Bell's palsy     Cellulitis     Hypertension       Past Surgical History:   Procedure Laterality Date    CATARACT REMOVAL Bilateral 2015    HYSTERECTOMY, TOTAL ABDOMINAL (CERVIX REMOVED)  1990    INCISION AND DRAINAGE Left 07/06/2017    LEG INCISION AND DRAINAGE ABSCESS performed by Sam Cortes MD at [place] [address] Right 09/24/2018    TOTAL KNEE ARTHROPLASTY Left 11/09/2017    VENTRAL HERNIA REPAIR  02/11/2019    5 hernias       Family History   Problem Relation Age of Onset    Cancer Mother     High Blood Pressure Father     Stroke Father     Diabetes Maternal Aunt     Cancer Other         daughter/ovarian cancer    Cancer Daughter        Social History     Tobacco Use    Smoking status: Never    Smokeless tobacco: Never   Substance Use Topics    Alcohol use: No      Current Outpatient Medications   Medication Sig Dispense Refill    zinc oxide (TRIAD HYDROPHILIC) PSTE paste Apply 3 g topically 3 times daily 170 g 2    diphenoxylate-atropine (LOMOTIL) 2.5-0.025 MG per tablet Take 1 tablet by mouth 3 times daily as needed for Diarrhea for up to 30 days. 20 tablet 0    Probiotic Acidophilus (FLORANEX) TABS Take 1 tablet by mouth 2 times daily 60 tablet 3    clindamycin-benzoyl peroxide (BENZACLIN) 1-5 % gel Apply topically 2 times daily.  50 g 1    escitalopram (LEXAPRO) 20 MG tablet Take 1 tablet by mouth once daily 90 tablet 0    omeprazole (PRILOSEC) 20 MG delayed release capsule TAKE 1 CAPSULE BY MOUTH ONCE DAILY AT NIGHT 90 capsule 0    metoprolol tartrate (LOPRESSOR) 25 MG tablet Take 1/2 (one-half) tablet by mouth twice daily 90 tablet 0    pantoprazole (PROTONIX) 40 MG tablet Take 1 tablet by mouth every morning (before breakfast) 30 tablet 5    bumetanide (BUMEX) 1 MG tablet Take 1 tablet by mouth daily 90 tablet 3 traZODone (DESYREL) 50 MG tablet Take 1 tablet by mouth nightly as needed for Sleep 30 tablet 5    potassium chloride (KLOR-CON M) 10 MEQ extended release tablet Take 1 tablet by mouth 2 times daily 180 tablet 1     No current facility-administered medications for this visit. Allergies   Allergen Reactions    Penicillins Hives       Health Maintenance   Topic Date Due    Shingles vaccine (2 of 3) 08/13/2012    Breast cancer screen  09/18/2022    Annual Wellness Visit (AWV)  12/28/2022    Depression Monitoring  06/16/2023    Colorectal Cancer Screen  06/17/2023    Lipids  11/21/2024    DTaP/Tdap/Td vaccine (3 - Td or Tdap) 11/20/2025    DEXA (modify frequency per FRAX score)  Completed    Flu vaccine  Completed    Pneumococcal 65+ years Vaccine  Completed    COVID-19 Vaccine  Completed    Hepatitis C screen  Completed    Hepatitis A vaccine  Aged Out    Hib vaccine  Aged Out    Meningococcal (ACWY) vaccine  Aged Out       Subjective:      Review of Systems   Constitutional:  Negative for chills and fever. HENT:  Negative for congestion and ear pain. Respiratory:  Negative for cough and shortness of breath. Gastrointestinal:  Positive for abdominal pain, bloating, diarrhea and flatus. Negative for vomiting. Genitourinary:  Negative for difficulty urinating and dysuria. Musculoskeletal:  Positive for myalgias. Skin:  Positive for rash. Objective:     /84   Pulse 64   Wt (!) 304 lb (137.9 kg)   SpO2 98%   BMI 55.60 kg/m²   Physical Exam  Vitals and nursing note reviewed. Constitutional:       Appearance: She is obese. HENT:      Head: Normocephalic and atraumatic. Right Ear: External ear normal.      Left Ear: External ear normal.   Cardiovascular:      Rate and Rhythm: Normal rate and regular rhythm. Pulmonary:      Effort: Pulmonary effort is normal.      Breath sounds: Normal breath sounds. Abdominal:      Tenderness: There is abdominal tenderness in the epigastric area. Musculoskeletal:      Cervical back: Normal range of motion and neck supple. Right lower leg: No edema. Left lower leg: No edema. Skin:            Comments: Open wound on left posterior thigh/buttocks crease. Wound base is red. Minimal erythema with no drainage at this time. Multiple small patches or acne on face   Neurological:      Mental Status: She is alert and oriented to person, place, and time. Psychiatric:         Mood and Affect: Mood normal.         Behavior: Behavior normal.         Thought Content: Thought content normal.       Assessment/Plan:   1. Open wound of left lower leg, initial encounter  -     zinc oxide (TRIAD HYDROPHILIC) PSTE paste; Apply 3 g topically 3 times daily, Disp-170 g, R-2Normal  2. Diarrhea, unspecified type  -     diphenoxylate-atropine (LOMOTIL) 2.5-0.025 MG per tablet; Take 1 tablet by mouth 3 times daily as needed for Diarrhea for up to 30 days. , Disp-20 tablet, R-0Print  -     Probiotic Acidophilus (FLORANEX) TABS; Take 1 tablet by mouth 2 times daily, Disp-60 tablet, R-3Normal  3. Acne, unspecified acne type  -     clindamycin-benzoyl peroxide (BENZACLIN) 1-5 % gel; Apply topically 2 times daily. , Disp-50 g, R-1, Normal     Wash left thigh/buttocks wound with soap and water and pat dry, then apply Triad paste 3x/day and as needed. Take probiotic (floranex) 1 tablet 2x/day  Take diphenoxylate-atropine (lomotil) 1 tablet as needed for diarrhea - no more than 3 capsules per day (It can cause constipation)  Clindamycin gel for acne - apply 2x/day     Return in about 3 weeks (around 11/8/2022) for wound check with Dr. Marce Roque or Farzana Sharp. Data Unavailable     No orders of the defined types were placed in this encounter.     Orders Placed This Encounter   Medications    zinc oxide (TRIAD HYDROPHILIC) PSTE paste     Sig: Apply 3 g topically 3 times daily     Dispense:  170 g     Refill:  2    diphenoxylate-atropine (LOMOTIL) 2.5-0.025 MG per tablet     Sig: Take 1 tablet by mouth 3 times daily as needed for Diarrhea for up to 30 days. Dispense:  20 tablet     Refill:  0    Probiotic Acidophilus (FLORANEX) TABS     Sig: Take 1 tablet by mouth 2 times daily     Dispense:  60 tablet     Refill:  3    clindamycin-benzoyl peroxide (BENZACLIN) 1-5 % gel     Sig: Apply topically 2 times daily. Dispense:  50 g     Refill:  1         Patient given educational materials - see patient instructions. Discussed use, benefit, and side effects of prescribed medications. All patient questions answered. Pt voiced understanding. Reviewed health maintenance. Instructed to continue current medications, diet and exercise. Patient agreed with treatment plan. Follow up as directed.      Electronically signed by TOPHER Marlow CNP on 10/18/2022 at 12:17 PM

## 2022-11-10 ENCOUNTER — TELEPHONE (OUTPATIENT)
Dept: PRIMARY CARE CLINIC | Age: 72
End: 2022-11-10

## 2022-11-10 NOTE — TELEPHONE ENCOUNTER
Left message for patient to call office back about paperwork. We got paperwork from [facility] screening for immunodeficiency testing, need to know if patient would like to have this done?

## 2022-12-04 DIAGNOSIS — I48.0 PAROXYSMAL ATRIAL FIBRILLATION (HCC): ICD-10-CM

## 2022-12-04 DIAGNOSIS — K25.9 GASTRIC ULCER, UNSPECIFIED CHRONICITY, UNSPECIFIED WHETHER GASTRIC ULCER HEMORRHAGE OR PERFORATION PRESENT: ICD-10-CM

## 2022-12-04 DIAGNOSIS — M75.41 IMPINGEMENT SYNDROME OF RIGHT SHOULDER: ICD-10-CM

## 2022-12-05 RX ORDER — OMEPRAZOLE 20 MG/1
CAPSULE, DELAYED RELEASE ORAL
Qty: 90 CAPSULE | Refills: 0 | Status: SHIPPED | OUTPATIENT
Start: 2022-12-05

## 2022-12-05 RX ORDER — NAPROXEN 500 MG/1
TABLET ORAL
Qty: 180 TABLET | Refills: 0 | OUTPATIENT
Start: 2022-12-05

## 2022-12-06 DIAGNOSIS — M75.41 IMPINGEMENT SYNDROME OF RIGHT SHOULDER: ICD-10-CM

## 2022-12-06 RX ORDER — NAPROXEN 500 MG/1
TABLET ORAL
Qty: 90 TABLET | Refills: 0 | Status: SHIPPED | OUTPATIENT
Start: 2022-12-06

## 2022-12-06 RX ORDER — NAPROXEN 500 MG/1
TABLET ORAL
Qty: 180 TABLET | Refills: 0 | OUTPATIENT
Start: 2022-12-06

## 2023-02-20 DIAGNOSIS — F41.8 DEPRESSION WITH ANXIETY: ICD-10-CM

## 2023-02-20 RX ORDER — ESCITALOPRAM OXALATE 20 MG/1
TABLET ORAL
Qty: 90 TABLET | Refills: 0 | Status: SHIPPED | OUTPATIENT
Start: 2023-02-20

## 2023-03-05 DIAGNOSIS — K25.9 GASTRIC ULCER, UNSPECIFIED CHRONICITY, UNSPECIFIED WHETHER GASTRIC ULCER HEMORRHAGE OR PERFORATION PRESENT: ICD-10-CM

## 2023-03-05 DIAGNOSIS — I48.0 PAROXYSMAL ATRIAL FIBRILLATION (HCC): ICD-10-CM

## 2023-03-06 RX ORDER — OMEPRAZOLE 20 MG/1
CAPSULE, DELAYED RELEASE ORAL
Qty: 90 CAPSULE | Refills: 0 | Status: SHIPPED | OUTPATIENT
Start: 2023-03-06

## 2023-03-07 ENCOUNTER — TELEPHONE (OUTPATIENT)
Dept: PRIMARY CARE CLINIC | Age: 73
End: 2023-03-07

## 2023-03-07 ENCOUNTER — OFFICE VISIT (OUTPATIENT)
Dept: PRIMARY CARE CLINIC | Age: 73
End: 2023-03-07
Payer: MEDICARE

## 2023-03-07 VITALS
HEART RATE: 60 BPM | BODY MASS INDEX: 53.92 KG/M2 | WEIGHT: 293 LBS | HEIGHT: 62 IN | DIASTOLIC BLOOD PRESSURE: 82 MMHG | OXYGEN SATURATION: 96 % | SYSTOLIC BLOOD PRESSURE: 124 MMHG

## 2023-03-07 DIAGNOSIS — Z12.31 ENCOUNTER FOR SCREENING MAMMOGRAM FOR MALIGNANT NEOPLASM OF BREAST: ICD-10-CM

## 2023-03-07 DIAGNOSIS — Z00.00 MEDICARE ANNUAL WELLNESS VISIT, SUBSEQUENT: Primary | ICD-10-CM

## 2023-03-07 DIAGNOSIS — Z00.00 ANNUAL PHYSICAL EXAM: ICD-10-CM

## 2023-03-07 DIAGNOSIS — I48.0 PAROXYSMAL ATRIAL FIBRILLATION (HCC): ICD-10-CM

## 2023-03-07 DIAGNOSIS — H61.23 BILATERAL IMPACTED CERUMEN: ICD-10-CM

## 2023-03-07 DIAGNOSIS — Z13.220 ENCOUNTER FOR LIPID SCREENING FOR CARDIOVASCULAR DISEASE: ICD-10-CM

## 2023-03-07 DIAGNOSIS — Z13.6 ENCOUNTER FOR LIPID SCREENING FOR CARDIOVASCULAR DISEASE: ICD-10-CM

## 2023-03-07 DIAGNOSIS — L70.9 ACNE, UNSPECIFIED ACNE TYPE: ICD-10-CM

## 2023-03-07 DIAGNOSIS — L97.812 NON-PRESSURE CHRONIC ULCER OF OTHER PART OF RIGHT LOWER LEG WITH FAT LAYER EXPOSED (HCC): ICD-10-CM

## 2023-03-07 PROCEDURE — 69209 REMOVE IMPACTED EAR WAX UNI: CPT | Performed by: FAMILY MEDICINE

## 2023-03-07 PROCEDURE — 3074F SYST BP LT 130 MM HG: CPT | Performed by: FAMILY MEDICINE

## 2023-03-07 PROCEDURE — 1124F ACP DISCUSS-NO DSCNMKR DOCD: CPT | Performed by: FAMILY MEDICINE

## 2023-03-07 PROCEDURE — G8484 FLU IMMUNIZE NO ADMIN: HCPCS | Performed by: FAMILY MEDICINE

## 2023-03-07 PROCEDURE — 3017F COLORECTAL CA SCREEN DOC REV: CPT | Performed by: FAMILY MEDICINE

## 2023-03-07 PROCEDURE — G0439 PPPS, SUBSEQ VISIT: HCPCS | Performed by: FAMILY MEDICINE

## 2023-03-07 PROCEDURE — 3079F DIAST BP 80-89 MM HG: CPT | Performed by: FAMILY MEDICINE

## 2023-03-07 RX ORDER — NYSTATIN 100000 U/G
OINTMENT TOPICAL
Qty: 30 G | Refills: 2 | Status: SHIPPED | OUTPATIENT
Start: 2023-03-07

## 2023-03-07 RX ORDER — METRONIDAZOLE 10 MG/G
GEL TOPICAL
Qty: 60 G | Refills: 2 | Status: SHIPPED | OUTPATIENT
Start: 2023-03-07

## 2023-03-07 RX ORDER — CLINDAMYCIN AND BENZOYL PEROXIDE 10; 50 MG/G; MG/G
GEL TOPICAL
Qty: 50 G | Refills: 1 | Status: SHIPPED | OUTPATIENT
Start: 2023-03-07

## 2023-03-07 SDOH — ECONOMIC STABILITY: FOOD INSECURITY: WITHIN THE PAST 12 MONTHS, YOU WORRIED THAT YOUR FOOD WOULD RUN OUT BEFORE YOU GOT MONEY TO BUY MORE.: NEVER TRUE

## 2023-03-07 SDOH — ECONOMIC STABILITY: HOUSING INSECURITY
IN THE LAST 12 MONTHS, WAS THERE A TIME WHEN YOU DID NOT HAVE A STEADY PLACE TO SLEEP OR SLEPT IN A SHELTER (INCLUDING NOW)?: NO

## 2023-03-07 SDOH — ECONOMIC STABILITY: INCOME INSECURITY: HOW HARD IS IT FOR YOU TO PAY FOR THE VERY BASICS LIKE FOOD, HOUSING, MEDICAL CARE, AND HEATING?: NOT HARD AT ALL

## 2023-03-07 SDOH — ECONOMIC STABILITY: FOOD INSECURITY: WITHIN THE PAST 12 MONTHS, THE FOOD YOU BOUGHT JUST DIDN'T LAST AND YOU DIDN'T HAVE MONEY TO GET MORE.: NEVER TRUE

## 2023-03-07 ASSESSMENT — PATIENT HEALTH QUESTIONNAIRE - PHQ9
9. THOUGHTS THAT YOU WOULD BE BETTER OFF DEAD, OR OF HURTING YOURSELF: 0
7. TROUBLE CONCENTRATING ON THINGS, SUCH AS READING THE NEWSPAPER OR WATCHING TELEVISION: 0
6. FEELING BAD ABOUT YOURSELF - OR THAT YOU ARE A FAILURE OR HAVE LET YOURSELF OR YOUR FAMILY DOWN: 0
1. LITTLE INTEREST OR PLEASURE IN DOING THINGS: 0
SUM OF ALL RESPONSES TO PHQ QUESTIONS 1-9: 0
3. TROUBLE FALLING OR STAYING ASLEEP: 0
SUM OF ALL RESPONSES TO PHQ QUESTIONS 1-9: 0
SUM OF ALL RESPONSES TO PHQ QUESTIONS 1-9: 0
2. FEELING DOWN, DEPRESSED OR HOPELESS: 0
SUM OF ALL RESPONSES TO PHQ QUESTIONS 1-9: 0
4. FEELING TIRED OR HAVING LITTLE ENERGY: 0
10. IF YOU CHECKED OFF ANY PROBLEMS, HOW DIFFICULT HAVE THESE PROBLEMS MADE IT FOR YOU TO DO YOUR WORK, TAKE CARE OF THINGS AT HOME, OR GET ALONG WITH OTHER PEOPLE: 0
SUM OF ALL RESPONSES TO PHQ9 QUESTIONS 1 & 2: 0
8. MOVING OR SPEAKING SO SLOWLY THAT OTHER PEOPLE COULD HAVE NOTICED. OR THE OPPOSITE, BEING SO FIGETY OR RESTLESS THAT YOU HAVE BEEN MOVING AROUND A LOT MORE THAN USUAL: 0
5. POOR APPETITE OR OVEREATING: 0

## 2023-03-07 ASSESSMENT — LIFESTYLE VARIABLES
HOW OFTEN DO YOU HAVE A DRINK CONTAINING ALCOHOL: NEVER
HOW MANY STANDARD DRINKS CONTAINING ALCOHOL DO YOU HAVE ON A TYPICAL DAY: PATIENT DOES NOT DRINK

## 2023-03-07 NOTE — PATIENT INSTRUCTIONS
Learning About Vision Tests  What are vision tests? The four most common vision tests are visual acuity tests, refraction, visual field tests, and color vision tests. Visual acuity (sharpness) tests  These tests are used: To see if you need glasses or contact lenses. To monitor an eye problem. To check an eye injury. Visual acuity tests are done as part of routine exams. You may also have this test when you get your 's license or apply for some types of jobs. Visual field tests  These tests are used: To check for vision loss in any area of your range of vision. To screen for certain eye diseases. To look for nerve damage after a stroke, head injury, or other problem that could reduce blood flow to the brain. Refraction and color tests  A refraction test is done to find the right prescription for glasses and contact lenses. A color vision test is done to check for color blindness. Color vision is often tested as part of a routine exam. You may also have this test when you apply for a job where recognizing different colors is important, such as , electronics, or the [employer]. How are vision tests done? Visual acuity test   You cover one eye at a time. You read aloud from a wall chart across the room. You read aloud from a small card that you hold in your hand. Refraction   You look into a special device. The device puts lenses of different strengths in front of each eye to see how strong your glasses or contact lenses need to be. Visual field tests   Your doctor may have you look through special machines. Or your doctor may simply have you stare straight ahead while they move a finger into and out of your field of vision. Color vision test   You look at pieces of printed test patterns in various colors. You say what number or symbol you see. Your doctor may have you trace the number or symbol using a pointer. How do these tests feel?   There is very little chance of having a problem from this test. If dilating drops are used for a vision test, they may make the eyes sting and cause a medicine taste in the mouth. Follow-up care is a key part of your treatment and safety. Be sure to make and go to all appointments, and call your doctor if you are having problems. It's also a good idea to know your test results and keep a list of the medicines you take. Where can you learn more? Go to http://www.darden.com/ and enter G551 to learn more about \"Learning About Vision Tests. \"  Current as of: October 12, 2022               Content Version: 13.5  © 3198-5065 [employer]. Care instructions adapted under license by [facility] ([facility]). If you have questions about a medical condition or this instruction, always ask your healthcare professional. [address] any warranty or liability for your use of this information. Advance Directives: Care Instructions  Overview  An advance directive is a legal way to state your wishes at the end of your life. It tells your family and your doctor what to do if you can't say what you want. There are two main types of advance directives. You can change them any time your wishes change. Living will. This form tells your family and your doctor your wishes about life support and other treatment. The form is also called a declaration. Medical power of . This form lets you name a person to make treatment decisions for you when you can't speak for yourself. This person is called a health care agent (health care proxy, health care surrogate). The form is also called a durable power of  for health care. If you do not have an advance directive, decisions about your medical care may be made by a family member, or by a doctor or a  who doesn't know you. It may help to think of an advance directive as a gift to the people who care for you.  If you have one, they won't have to make tough decisions by themselves. For more information, including forms for your state, see the [address] website (www.caringinfo.org/planning/advance-directives/). Follow-up care is a key part of your treatment and safety. Be sure to make and go to all appointments, and call your doctor if you are having problems. It's also a good idea to know your test results and keep a list of the medicines you take. What should you include in an advance directive? Many states have a unique advance directive form. (It may ask you to address specific issues.) Or you might use a universal form that's approved by many states. If your form doesn't tell you what to address, it may be hard to know what to include in your advance directive. Use the questions below to help you get started. Who do you want to make decisions about your medical care if you are not able to? What life-support measures do you want if you have a serious illness that gets worse over time or can't be cured? What are you most afraid of that might happen? (Maybe you're afraid of having pain, losing your independence, or being kept alive by machines.)  Where would you prefer to die? (Your home? A hospital? A nursing home?)  Do you want to donate your organs when you die? Do you want certain [facility] practices performed before you die? When should you call for help? Be sure to contact your doctor if you have any questions. Where can you learn more? Go to http://www.darden.com/ and enter R264 to learn more about \"Advance Directives: Care Instructions. \"  Current as of: June 16, 2022               Content Version: 13.5  © 4538-3944 [employer]. Care instructions adapted under license by [facility] ([facility]). If you have questions about a medical condition or this instruction, always ask your healthcare professional. [address] any warranty or liability for your use of this information.            Starting a Weight Loss Plan: Care Instructions  Overview     If you're thinking about losing weight, it can be hard to know where to start. Your doctor can help you set up a weight loss plan that best meets your needs. You may want to take a class on nutrition or exercise, or you could join a weight loss support group. If you have questions about how to make changes to your eating or exercise habits, ask your doctor about seeing a registered dietitian or an exercise specialist.  It can be a big challenge to lose weight. But you don't have to make huge changes at once. Make small changes, and stick with them. When those changes become habit, add a few more changes. If you don't think you're ready to make changes right now, try to pick a date in the future. Make an appointment to see your doctor to discuss whether the time is right for you to start a plan. Follow-up care is a key part of your treatment and safety. Be sure to make and go to all appointments, and call your doctor if you are having problems. It's also a good idea to know your test results and keep a list of the medicines you take. How can you care for yourself at home? Set realistic goals. Many people expect to lose much more weight than is likely. A weight loss of 5% to 10% of your body weight may be enough to improve your health. Get family and friends involved to provide support. Talk to them about why you are trying to lose weight, and ask them to help. They can help by participating in exercise and having meals with you, even if they may be eating something different. Find what works best for you. If you do not have time or do not like to cook, a program that offers meal replacement bars or shakes may be better for you. Or if you like to prepare meals, finding a plan that includes daily menus and recipes may be best.  Ask your doctor about other health professionals who can help you achieve your weight loss goals.   A dietitian can help you make healthy changes in your diet. An exercise specialist or  can help you develop a safe and effective exercise program.  A counselor or psychiatrist can help you cope with issues such as depression, anxiety, or family problems that can make it hard to focus on weight loss. Consider joining a support group for people who are trying to lose weight. Your doctor can suggest groups in your area. Where can you learn more? Go to http://www.woods.com/ and enter U357 to learn more about \"Starting a Weight Loss Plan: Care Instructions. \"  Current as of: August 25, 2022               Content Version: 13.5  © 3938-3072 [employer]. Care instructions adapted under license by [facility] ([facility]). If you have questions about a medical condition or this instruction, always ask your healthcare professional. [address] any warranty or liability for your use of this information. A Healthy Heart: Care Instructions  Your Care Instructions     Coronary artery disease, also called heart disease, occurs when a substance called plaque builds up in the vessels that supply oxygen-rich blood to your heart muscle. This can narrow the blood vessels and reduce blood flow. A heart attack happens when blood flow is completely blocked. A high-fat diet, smoking, and other factors increase the risk of heart disease. Your doctor has found that you have a chance of having heart disease. You can do lots of things to keep your heart healthy. It may not be easy, but you can change your diet, exercise more, and quit smoking. These steps really work to lower your chance of heart disease. Follow-up care is a key part of your treatment and safety. Be sure to make and go to all appointments, and call your doctor if you are having problems. It's also a good idea to know your test results and keep a list of the medicines you take. How can you care for yourself at home?   Diet    Use less salt when you cook and eat. This helps lower your blood pressure. Taste food before salting. Add only a little salt when you think you need it. With time, your taste buds will adjust to less salt.     Eat fewer snack items, fast foods, canned soups, and other high-salt, high-fat, processed foods.     Read food labels and try to avoid saturated and trans fats. They increase your risk of heart disease by raising cholesterol levels.     Limit the amount of solid fat-butter, margarine, and shortening-you eat. Use olive, peanut, or canola oil when you cook. Bake, broil, and steam foods instead of frying them.     Eat a variety of fruit and vegetables every day. Dark green, deep orange, red, or yellow fruits and vegetables are especially good for you. Examples include spinach, carrots, peaches, and berries.     Foods high in fiber can reduce your cholesterol and provide important vitamins and minerals. High-fiber foods include whole-grain cereals and breads, oatmeal, beans, brown rice, citrus fruits, and apples.     Eat lean proteins. Heart-healthy proteins include seafood, lean meats and poultry, eggs, beans, peas, nuts, seeds, and soy products.     Limit drinks and foods with added sugar. These include candy, desserts, and soda pop. Lifestyle changes    If your doctor recommends it, get more exercise. Walking is a good choice. Bit by bit, increase the amount you walk every day. Try for at least 30 minutes on most days of the week. You also may want to swim, bike, or do other activities.     Do not smoke. If you need help quitting, talk to your doctor about stop-smoking programs and medicines. These can increase your chances of quitting for good. Quitting smoking may be the most important step you can take to protect your heart. It is never too late to quit.     Limit alcohol to 2 drinks a day for men and 1 drink a day for women.  Too much alcohol can cause health problems.     Manage other health problems such as diabetes, high blood pressure, and high cholesterol. If you think you may have a problem with alcohol or drug use, talk to your doctor. Medicines    Take your medicines exactly as prescribed. Call your doctor if you think you are having a problem with your medicine.     If your doctor recommends aspirin, take the amount directed each day. Make sure you take aspirin and not another kind of pain reliever, such as acetaminophen (Tylenol). When should you call for help? Call 911 if you have symptoms of a heart attack. These may include:    Chest pain or pressure, or a strange feeling in the chest.     Sweating.     Shortness of breath.     Pain, pressure, or a strange feeling in the back, neck, jaw, or upper belly or in one or both shoulders or arms.     Lightheadedness or sudden weakness.     A fast or irregular heartbeat. After you call 911, the  may tell you to chew 1 adult-strength or 2 to 4 low-dose aspirin. Wait for an ambulance. Do not try to drive yourself. Watch closely for changes in your health, and be sure to contact your doctor if you have any problems. Where can you learn more? Go to http://Zappli.darden.com/ and enter F075 to learn more about \"A Healthy Heart: Care Instructions. \"  Current as of: September 7, 2022               Content Version: 13.5  © 2006-2022 Healthwise, Incorporated. Care instructions adapted under license by [facility] ([facility]). If you have questions about a medical condition or this instruction, always ask your healthcare professional. [place] [address] any warranty or liability for your use of this information. Personalized Preventive Plan for Cici Welsh - 3/7/2023  Medicare offers a range of preventive health benefits. Some of the tests and screenings are paid in full while other may be subject to a deductible, co-insurance, and/or copay.     Some of these benefits include a comprehensive review of your medical history including lifestyle, illnesses that may run in your family, and various assessments and screenings as appropriate. After reviewing your medical record and screening and assessments performed today your provider may have ordered immunizations, labs, imaging, and/or referrals for you. A list of these orders (if applicable) as well as your Preventive Care list are included within your After Visit Summary for your review. Other Preventive Recommendations:    A preventive eye exam performed by an eye specialist is recommended every 1-2 years to screen for glaucoma; cataracts, macular degeneration, and other eye disorders. A preventive dental visit is recommended every 6 months. Try to get at least 150 minutes of exercise per week or 10,000 steps per day on a pedometer . Order or download the FREE \"Exercise & Physical Activity: Your Everyday Guide\" from [employer]. Call 7-375.842.4828 or search [employer] online. You need 5663-8200 mg of calcium and 5565-7412 IU of vitamin D per day. It is possible to meet your calcium requirement with diet alone, but a vitamin D supplement is usually necessary to meet this goal.  When exposed to the sun, use a sunscreen that protects against both UVA and UVB radiation with an SPF of 30 or greater. Reapply every 2 to 3 hours or after sweating, drying off with a towel, or swimming. Always wear a seat belt when traveling in a car. Always wear a helmet when riding a bicycle or motorcycle.

## 2023-03-07 NOTE — TELEPHONE ENCOUNTER
She went to  the Metronidazole it was $140 she cannot afford that. Is there something else you can send?

## 2023-03-07 NOTE — PROGRESS NOTES
Medicare Annual Wellness Visit    Margaret Cardenas is here for Rash (Patient c/o rash on face. Itches. ), Leg Pain (Patient c/o bilateral leg pain. Edema at times), and Medicare AWV    Assessment & Plan   Medicare annual wellness visit, subsequent  Paroxysmal atrial fibrillation ([facility] [address]    Recommendations for Preventive Services Due: see orders and patient instructions/AVS.  Recommended screening schedule for the next 5-10 years is provided to the patient in written form: see Patient Instructions/AVS.     Return for Medicare Annual Wellness Visit in 1 year. Subjective   The following acute and/or chronic problems were also addressed today:  Acne rosacea  Lymphedema    Lymphedema - has bilateral edema daily with activity, puts feet up in evening to reduce swelling. Leg throbbing keeps her up at night. Takes Tylenol for pain which controls the pain. Has had ulcers in past on posterior calves. Bilateral calves edematous with some erythema in office. OA - naproxen BID, if doesn't take has aching of distal joints. HTN - taking metoprolol. Eczema - no recent flare ups. Has had outbreak of itching blisters on nose. Present for 1month. No recent illness. Depression/anxiety - states lexapro keeps mood stable. No thoughts of suicide or hurting self/others. GERD - controlled by prilosec. HTN- needs new machine for BP at home. Has upper/lower dentures, does not see dentist, has had no complications. Needs refill Nystatin Ointment 100,000U/g needs refill for eczema below bilateral breasts. Would like ears irrigated. No interest in mammo. Would like Shingrix today. Interested in weight loss injections. Patient's complete Health Risk Assessment and screening values have been reviewed and are found in Flowsheets. The following problems were reviewed today and where indicated follow up appointments were made and/or referrals ordered.     Positive Risk Factor Screenings with Interventions: Weight and Activity:  Physical Activity: Insufficiently Active    Days of Exercise per Week: 2 days    Minutes of Exercise per Session: 20 min     On average, how many days per week do you engage in moderate to strenuous exercise (like a brisk walk)?: 2 days  Have you lost any weight without trying in the past 3 months?: No  Body mass index: (!) 54.75  Obesity Interventions:  Patient comments: Starting physical therapy for legs at [facility] for free next week. Vision Screen:  Do you have difficulty driving, watching TV, or doing any of your daily activities because of your eyesight?: No  Have you had an eye exam within the past year?: (!) No  No results found. Optometrist about 1.5yrs ago, needs new glasses due to increased difficulty reading. Interventions:   Patient encouraged to make appointment with their eye specialist                      Objective   Vitals:    03/07/23 0827   BP: 124/82   Pulse: 60   SpO2: 96%   Weight: 299 lb 6.4 oz (135.8 kg)   Height: 5' 2\" (1.575 m)      Body mass index is 54.76 kg/m². Skin: warm and dry. New pruritic lesion of bridge of nose. Head: normocephalic and atraumatic  Eyes: pupils equal, round, and reactive to light, extraocular eye movements intact, conjunctivae normal, pupils equal, round, and reactive to light, and extraocular eye movements intact  ENT: bilateral cerumen impaction noted  Neck: neck supple and non tender without mass, no thyromegaly or thyroid nodules, no cervical lymphadenopathy, neck supple and non tender without mass, no thyromegaly, no thyroid nodules, and no cervical adenopathy   Pulmonary/Chest: clear to auscultation bilaterally- no wheezes, rales or rhonchi, normal air movement, no respiratory distress  Cardiovascular: normal rate, regular rhythm, normal S1 and S2, no gallops, intact distal pulses, and no carotid bruits. 2+ Edema of lower extremities with slight erythema, varicose veins.       Allergies   Allergen Reactions Penicillins Hives     Prior to Visit Medications    Medication Sig Taking? Authorizing Provider   omeprazole (PRILOSEC) 20 MG delayed release capsule TAKE 1 CAPSULE BY MOUTH ONCE DAILY AT NIGHT Yes Josse Winston MD   metoprolol tartrate (LOPRESSOR) 25 MG tablet Take 1/2 (one-half) tablet by mouth twice daily Yes Josse Winston MD   escitalopram (LEXAPRO) 20 MG tablet Take 1 tablet by mouth once daily Yes Josse Winston MD   naproxen (NAPROSYN) 500 MG tablet TAKE 1 TABLET BY MOUTH TWICE DAILY WITH MEALS PRN Yes Radha Caballero MD   clindamycin-benzoyl peroxide (BENZACLIN) 1-5 % gel Apply topically 2 times daily. Yes TOPHER Reyes CNP   pantoprazole (PROTONIX) 40 MG tablet Take 1 tablet by mouth every morning (before breakfast) Yes Josse Winston MD   bumetanide (BUMEX) 1 MG tablet Take 1 tablet by mouth daily Yes Josse Winston MD   traZODone (DESYREL) 50 MG tablet Take 1 tablet by mouth nightly as needed for Sleep Yes Josse Winston MD   potassium chloride (KLOR-CON M) 10 MEQ extended release tablet Take 1 tablet by mouth 2 times daily Yes Josse Winston MD   zinc oxide (TRIAD HYDROPHILIC) PSTE paste Apply 3 g topically 3 times daily  Patient not taking: Reported on 3/7/2023  TOPHER Reyes CNP       CareTeam (Including outside providers/suppliers regularly involved in providing care):   Patient Care Team:  Josse Winston MD as PCP - General (Family Medicine)  Josse Winston [facility]     Reviewed and updated this visit:  Tobacco  Allergies  Meds  Med Hx  Surg Hx  Soc Hx  Fam Hx      For acne rosacea, start MetroGel. If not effective, may need to change to BenzaClin    Patient's ulcer has healed up on her lower leg. No longer seeing wound care. Lymphedema is unchanged    Patient denies any atrial fibrillation at present. Denies any rapid beating of the heart or skipping of the heartbeat.   Currently in sinus rhythm    Blood work ordered    Shingrix discussed    Ear irrigation performed with removal of wax from both ears.         Robin Ly MD

## 2023-05-17 ENCOUNTER — TELEPHONE (OUTPATIENT)
Dept: PRIMARY CARE CLINIC | Age: 73
End: 2023-05-17

## 2023-05-24 ENCOUNTER — HOSPITAL ENCOUNTER (OUTPATIENT)
Dept: WOUND CARE | Age: 73
Discharge: HOME | End: 2023-05-24
Payer: MEDICARE

## 2023-05-24 VITALS
SYSTOLIC BLOOD PRESSURE: 187 MMHG | TEMPERATURE: 99.3 F | BODY MASS INDEX: 55.32 KG/M2 | RESPIRATION RATE: 18 BRPM | HEIGHT: 61 IN | HEART RATE: 72 BPM | WEIGHT: 293 LBS | DIASTOLIC BLOOD PRESSURE: 67 MMHG

## 2023-05-24 DIAGNOSIS — L97.822 NON-PRESSURE CHRONIC ULCER OF OTHER PART OF LEFT LOWER LEG WITH FAT LAYER EXPOSED (HCC): Primary | ICD-10-CM

## 2023-05-24 PROCEDURE — 99213 OFFICE O/P EST LOW 20 MIN: CPT

## 2023-05-24 PROCEDURE — 11042 DBRDMT SUBQ TIS 1ST 20SQCM/<: CPT

## 2023-05-24 RX ORDER — BETAMETHASONE DIPROPIONATE 0.05 %
OINTMENT (GRAM) TOPICAL ONCE
OUTPATIENT
Start: 2023-05-24 | End: 2023-05-24

## 2023-05-24 RX ORDER — BACITRACIN ZINC AND POLYMYXIN B SULFATE 500; 1000 [USP'U]/G; [USP'U]/G
OINTMENT TOPICAL ONCE
OUTPATIENT
Start: 2023-05-24 | End: 2023-05-24

## 2023-05-24 RX ORDER — LIDOCAINE HYDROCHLORIDE 20 MG/ML
JELLY TOPICAL ONCE
OUTPATIENT
Start: 2023-05-24 | End: 2023-05-24

## 2023-05-24 RX ORDER — CLOBETASOL PROPIONATE 0.5 MG/G
OINTMENT TOPICAL ONCE
OUTPATIENT
Start: 2023-05-24 | End: 2023-05-24

## 2023-05-24 RX ORDER — LIDOCAINE HYDROCHLORIDE 20 MG/ML
JELLY TOPICAL ONCE
Status: CANCELLED | OUTPATIENT
Start: 2023-05-24 | End: 2023-05-24

## 2023-05-24 RX ORDER — BETAMETHASONE DIPROPIONATE 0.05 %
OINTMENT (GRAM) TOPICAL ONCE
Status: CANCELLED | OUTPATIENT
Start: 2023-05-24 | End: 2023-05-24

## 2023-05-24 RX ORDER — LIDOCAINE HYDROCHLORIDE 40 MG/ML
SOLUTION TOPICAL ONCE
OUTPATIENT
Start: 2023-05-24 | End: 2023-05-24

## 2023-05-24 RX ORDER — BACITRACIN, NEOMYCIN, POLYMYXIN B 400; 3.5; 5 [USP'U]/G; MG/G; [USP'U]/G
OINTMENT TOPICAL ONCE
OUTPATIENT
Start: 2023-05-24 | End: 2023-05-24

## 2023-05-24 RX ORDER — LIDOCAINE 50 MG/G
OINTMENT TOPICAL ONCE
OUTPATIENT
Start: 2023-05-24 | End: 2023-05-24

## 2023-05-24 RX ORDER — LIDOCAINE 40 MG/G
CREAM TOPICAL ONCE
Status: CANCELLED | OUTPATIENT
Start: 2023-05-24 | End: 2023-05-24

## 2023-05-24 RX ORDER — GENTAMICIN SULFATE 1 MG/G
OINTMENT TOPICAL ONCE
Status: CANCELLED | OUTPATIENT
Start: 2023-05-24 | End: 2023-05-24

## 2023-05-24 RX ORDER — SODIUM CHLOR/HYPOCHLOROUS ACID 0.033 %
SOLUTION, IRRIGATION IRRIGATION ONCE
OUTPATIENT
Start: 2023-05-24 | End: 2023-05-24

## 2023-05-24 RX ORDER — GINSENG 100 MG
CAPSULE ORAL ONCE
OUTPATIENT
Start: 2023-05-24 | End: 2023-05-24

## 2023-05-24 RX ORDER — LIDOCAINE HYDROCHLORIDE 40 MG/ML
SOLUTION TOPICAL ONCE
Status: COMPLETED | OUTPATIENT
Start: 2023-05-24 | End: 2023-05-24

## 2023-05-24 RX ORDER — SODIUM CHLOR/HYPOCHLOROUS ACID 0.033 %
SOLUTION, IRRIGATION IRRIGATION ONCE
Status: CANCELLED | OUTPATIENT
Start: 2023-05-24 | End: 2023-05-24

## 2023-05-24 RX ORDER — LIDOCAINE 50 MG/G
OINTMENT TOPICAL ONCE
Status: CANCELLED | OUTPATIENT
Start: 2023-05-24 | End: 2023-05-24

## 2023-05-24 RX ORDER — LIDOCAINE 40 MG/G
CREAM TOPICAL ONCE
OUTPATIENT
Start: 2023-05-24 | End: 2023-05-24

## 2023-05-24 RX ORDER — CLOBETASOL PROPIONATE 0.5 MG/G
OINTMENT TOPICAL ONCE
Status: CANCELLED | OUTPATIENT
Start: 2023-05-24 | End: 2023-05-24

## 2023-05-24 RX ORDER — BACITRACIN ZINC AND POLYMYXIN B SULFATE 500; 1000 [USP'U]/G; [USP'U]/G
OINTMENT TOPICAL ONCE
Status: CANCELLED | OUTPATIENT
Start: 2023-05-24 | End: 2023-05-24

## 2023-05-24 RX ORDER — ASPIRIN 81 MG/1
81 TABLET ORAL DAILY
COMMUNITY

## 2023-05-24 RX ORDER — GINSENG 100 MG
CAPSULE ORAL ONCE
Status: CANCELLED | OUTPATIENT
Start: 2023-05-24 | End: 2023-05-24

## 2023-05-24 RX ORDER — GENTAMICIN SULFATE 1 MG/G
OINTMENT TOPICAL ONCE
OUTPATIENT
Start: 2023-05-24 | End: 2023-05-24

## 2023-05-24 RX ORDER — BACITRACIN, NEOMYCIN, POLYMYXIN B 400; 3.5; 5 [USP'U]/G; MG/G; [USP'U]/G
OINTMENT TOPICAL ONCE
Status: CANCELLED | OUTPATIENT
Start: 2023-05-24 | End: 2023-05-24

## 2023-05-24 RX ADMIN — LIDOCAINE HYDROCHLORIDE 5 ML: 40 SOLUTION TOPICAL at 14:30

## 2023-05-24 ASSESSMENT — PAIN - FUNCTIONAL ASSESSMENT: PAIN_FUNCTIONAL_ASSESSMENT: ACTIVITIES ARE NOT PREVENTED

## 2023-05-24 ASSESSMENT — PAIN DESCRIPTION - DESCRIPTORS: DESCRIPTORS: BURNING

## 2023-05-24 ASSESSMENT — PAIN DESCRIPTION - LOCATION: LOCATION: LEG

## 2023-05-24 ASSESSMENT — PAIN DESCRIPTION - PAIN TYPE: TYPE: CHRONIC PAIN

## 2023-05-24 ASSESSMENT — PAIN DESCRIPTION - ORIENTATION: ORIENTATION: LEFT;LOWER

## 2023-05-24 ASSESSMENT — PAIN DESCRIPTION - ONSET: ONSET: ON-GOING

## 2023-05-24 ASSESSMENT — PAIN DESCRIPTION - FREQUENCY: FREQUENCY: INTERMITTENT

## 2023-05-24 ASSESSMENT — PAIN SCALES - GENERAL: PAINLEVEL_OUTOF10: 6

## 2023-05-24 NOTE — PROGRESS NOTES
[place] Application   Below Knee    NAME:  Natalia Li  YOB: 1950  MEDICAL RECORD NUMBER:  [id number]  DATE:  5/24/2023    [x] Applied moisturizing agent to dry skin as needed. [x] Appied primary and secondary dressing as ordered    [x] Applied Unna roll from toes to knee overlapping each time. [x] Applied ace wrap or coban from toes to below the knee. [x] Secured with tape and/or metal clips covered with tape. [x] Instructed patient/caregiver to keep dressing dry and intact. DO NOT REMOVE DRESSING. [x] Instructed pt/family/caregiver to report excessive draining, loose bandage, wet dressing, severe pain or tingling in toes. [x] Applied [place] dressing below the knee to Left lower leg(s)       Unna Boot(s) were applied per  Guidelines.      Electronically signed by Orlando Purvis RN on 5/24/2023 at 3:11 PM
05/24/23 1430   Wound Width (cm) 0.3 cm 05/24/23 1430   Wound Depth (cm) 0.1 cm 05/24/23 1430   Wound Surface Area (cm^2) 0.24 cm^2 05/24/23 1430   Wound Volume (cm^3) 0.024 cm^3 05/24/23 1430   Post-Procedure Length (cm) 0.8 cm 05/24/23 1430   Post-Procedure Width (cm) 0.3 cm 05/24/23 1430   Post-Procedure Depth (cm) 0.1 cm 05/24/23 1430   Post-Procedure Surface Area (cm^2) 0.24 cm^2 05/24/23 1430   Post-Procedure Volume (cm^3) 0.024 cm^3 05/24/23 1430   Wound Assessment Pink/red 05/24/23 1430   Drainage Amount Moderate 05/24/23 1430   Drainage Description Serous 05/24/23 1430   Odor None 05/24/23 1430   Sis-wound Assessment Blanchable erythema 05/24/23 1430   Margins Defined edges 05/24/23 1430   Wound Thickness Description not for Pressure Injury Full thickness 05/24/23 1430   Number of days: 0          Percent of Wound(s)/Ulcer(s) Debrided: 100%    Total Surface Area Debrided:  1.44 sq cm     Diabetic/Pressure/Non Pressure Ulcers only:  Ulcer: Non-Pressure ulcer, fat layer exposed     Estimated Blood Loss:  1 mL    Hemostasis Achieved:  by pressure    Response to treatment:  Well tolerated by patient. Written patient discharge instructions given to patient and signed by patient or POA. No orders of the defined types were placed in this encounter. No orders of the defined types were placed in this encounter.          Discharge Instructions           [id number]      Visit  Discharge Instructions / Physician Orders  DATE: 5/24/23     Home Care: none     SUPPLIES ORDERED THRU:    none                 DATE LAST SUPPLIED  na     Wound Location:  Left lower leg     Cleanse with: Leave wraps dry and intact     Dressing Orders:  Primary dressing    fibracol, silvercel, zinc unna boot to left lower leg  Frequency:  Leave wraps dry and intact     Additional Orders: Increase protein to diet (meat, cheese, eggs, fish, peanut butter, nuts and beans)  Multivitamin

## 2023-05-24 NOTE — DISCHARGE INSTRUCTIONS
receive;      []After Visit Summary  [x]Comprehensive Discharge Instruction      Patient signature______________________________________Date:________   Electronically signed by Emelina Del Angel DPM on 5/24/2023 at 2:33 PM  Electronically signed by Yee Ruiz RN on 5/24/2023 at 2:52 PM

## 2023-05-24 NOTE — PLAN OF CARE
Problem: Discharge Planning  Goal: Discharge to home or other facility with appropriate resources  Outcome: Progressing     Problem: Pain  Goal: Verbalizes/displays adequate comfort level or baseline comfort level  Outcome: Progressing     Problem: Cognitive:  Goal: Knowledge of wound care  Description: Knowledge of wound care  Outcome: Progressing  Goal: Understands risk factors for wounds  Description: Understands risk factors for wounds  Outcome: Progressing     Problem: Wound:  Goal: Will show signs of wound healing; wound closure and no evidence of infection  Description: Will show signs of wound healing; wound closure and no evidence of infection  Outcome: Progressing     Problem: Venous:  Goal: Signs of wound healing will improve  Description: Signs of wound healing will improve  Outcome: Progressing     Problem: Compression therapy:  Goal: Will be free from complications associated with compression therapy  Description: Will be free from complications associated with compression therapy  Outcome: Progressing     Problem: Falls - Risk of:  Goal: Will remain free from falls  Description: Will remain free from falls  Outcome: Progressing

## 2023-05-31 ENCOUNTER — HOSPITAL ENCOUNTER (OUTPATIENT)
Dept: WOUND CARE | Age: 73
Discharge: HOME | End: 2023-05-31
Payer: MEDICARE

## 2023-05-31 VITALS
HEART RATE: 96 BPM | RESPIRATION RATE: 18 BRPM | TEMPERATURE: 99.4 F | SYSTOLIC BLOOD PRESSURE: 160 MMHG | DIASTOLIC BLOOD PRESSURE: 80 MMHG | HEIGHT: 61 IN | WEIGHT: 293 LBS | BODY MASS INDEX: 55.32 KG/M2

## 2023-05-31 DIAGNOSIS — F41.8 DEPRESSION WITH ANXIETY: ICD-10-CM

## 2023-05-31 DIAGNOSIS — K25.9 GASTRIC ULCER, UNSPECIFIED CHRONICITY, UNSPECIFIED WHETHER GASTRIC ULCER HEMORRHAGE OR PERFORATION PRESENT: ICD-10-CM

## 2023-05-31 DIAGNOSIS — L97.822 NON-PRESSURE CHRONIC ULCER OF OTHER PART OF LEFT LOWER LEG WITH FAT LAYER EXPOSED (HCC): Primary | ICD-10-CM

## 2023-05-31 PROCEDURE — 11042 DBRDMT SUBQ TIS 1ST 20SQCM/<: CPT

## 2023-05-31 RX ORDER — BACITRACIN ZINC AND POLYMYXIN B SULFATE 500; 1000 [USP'U]/G; [USP'U]/G
OINTMENT TOPICAL ONCE
OUTPATIENT
Start: 2023-05-31 | End: 2023-05-31

## 2023-05-31 RX ORDER — LIDOCAINE 50 MG/G
OINTMENT TOPICAL ONCE
OUTPATIENT
Start: 2023-05-31 | End: 2023-05-31

## 2023-05-31 RX ORDER — LIDOCAINE 40 MG/G
CREAM TOPICAL ONCE
OUTPATIENT
Start: 2023-05-31 | End: 2023-05-31

## 2023-05-31 RX ORDER — GINSENG 100 MG
CAPSULE ORAL ONCE
OUTPATIENT
Start: 2023-05-31 | End: 2023-05-31

## 2023-05-31 RX ORDER — BETAMETHASONE DIPROPIONATE 0.05 %
OINTMENT (GRAM) TOPICAL ONCE
OUTPATIENT
Start: 2023-05-31 | End: 2023-05-31

## 2023-05-31 RX ORDER — LIDOCAINE HYDROCHLORIDE 20 MG/ML
JELLY TOPICAL ONCE
OUTPATIENT
Start: 2023-05-31 | End: 2023-05-31

## 2023-05-31 RX ORDER — BACITRACIN, NEOMYCIN, POLYMYXIN B 400; 3.5; 5 [USP'U]/G; MG/G; [USP'U]/G
OINTMENT TOPICAL ONCE
OUTPATIENT
Start: 2023-05-31 | End: 2023-05-31

## 2023-05-31 RX ORDER — GENTAMICIN SULFATE 1 MG/G
OINTMENT TOPICAL ONCE
OUTPATIENT
Start: 2023-05-31 | End: 2023-05-31

## 2023-05-31 RX ORDER — LIDOCAINE HYDROCHLORIDE 40 MG/ML
SOLUTION TOPICAL ONCE
OUTPATIENT
Start: 2023-05-31 | End: 2023-05-31

## 2023-05-31 RX ORDER — ESCITALOPRAM OXALATE 20 MG/1
TABLET ORAL
Qty: 90 TABLET | Refills: 0 | Status: SHIPPED | OUTPATIENT
Start: 2023-05-31

## 2023-05-31 RX ORDER — LIDOCAINE HYDROCHLORIDE 40 MG/ML
SOLUTION TOPICAL ONCE
Status: COMPLETED | OUTPATIENT
Start: 2023-05-31 | End: 2023-05-31

## 2023-05-31 RX ORDER — SODIUM CHLOR/HYPOCHLOROUS ACID 0.033 %
SOLUTION, IRRIGATION IRRIGATION ONCE
OUTPATIENT
Start: 2023-05-31 | End: 2023-05-31

## 2023-05-31 RX ORDER — NAPROXEN 500 MG/1
TABLET ORAL
Qty: 90 TABLET | Refills: 0 | Status: SHIPPED | OUTPATIENT
Start: 2023-05-31

## 2023-05-31 RX ORDER — CLOBETASOL PROPIONATE 0.5 MG/G
OINTMENT TOPICAL ONCE
OUTPATIENT
Start: 2023-05-31 | End: 2023-05-31

## 2023-05-31 RX ORDER — OMEPRAZOLE 20 MG/1
CAPSULE, DELAYED RELEASE ORAL
Qty: 90 CAPSULE | Refills: 0 | Status: SHIPPED | OUTPATIENT
Start: 2023-05-31

## 2023-05-31 RX ADMIN — LIDOCAINE HYDROCHLORIDE 5 ML: 40 SOLUTION TOPICAL at 14:22

## 2023-05-31 ASSESSMENT — PAIN DESCRIPTION - ONSET: ONSET: ON-GOING

## 2023-05-31 ASSESSMENT — PAIN DESCRIPTION - ORIENTATION: ORIENTATION: RIGHT;LOWER

## 2023-05-31 ASSESSMENT — PAIN - FUNCTIONAL ASSESSMENT: PAIN_FUNCTIONAL_ASSESSMENT: PREVENTS OR INTERFERES SOME ACTIVE ACTIVITIES AND ADLS

## 2023-05-31 ASSESSMENT — PAIN SCALES - GENERAL: PAINLEVEL_OUTOF10: 6

## 2023-05-31 ASSESSMENT — PAIN DESCRIPTION - LOCATION: LOCATION: LEG

## 2023-05-31 ASSESSMENT — PAIN DESCRIPTION - PAIN TYPE: TYPE: CHRONIC PAIN

## 2023-05-31 ASSESSMENT — PAIN DESCRIPTION - FREQUENCY: FREQUENCY: INTERMITTENT

## 2023-05-31 ASSESSMENT — PAIN DESCRIPTION - DESCRIPTORS: DESCRIPTORS: ACHING;BURNING

## 2023-05-31 NOTE — TELEPHONE ENCOUNTER
LAST VISIT:   3/7/2023     Future Appointments   Date Time Provider Stacy Camilo   5/31/2023  2:00 PM Gar Lulas, [place] [facility] [place]   6/12/2023  9:30 AM [facility]

## 2023-05-31 NOTE — PLAN OF CARE
Problem: Discharge Planning  Goal: Discharge to home or other facility with appropriate resources  Outcome: Progressing     Problem: Pain  Goal: Verbalizes/displays adequate comfort level or baseline comfort level  Outcome: Progressing     Problem: Wound:  Goal: Will show signs of wound healing; wound closure and no evidence of infection  Description: Will show signs of wound healing; wound closure and no evidence of infection  Outcome: Progressing     Problem: Venous:  Goal: Signs of wound healing will improve  Description: Signs of wound healing will improve  Outcome: Progressing     Problem: Compression therapy:  Goal: Will be free from complications associated with compression therapy  Description: Will be free from complications associated with compression therapy  Outcome: Progressing

## 2023-05-31 NOTE — TELEPHONE ENCOUNTER
LAST VISIT:   3/7/2023     Future Appointments   Date Time Provider Stacy Camilo   5/31/2023  2:00 PM Zander Spann   6/12/2023  9:30 AM [facility]

## 2023-05-31 NOTE — PROGRESS NOTES
[address]   Progress Note and Procedure Note      [address] [facility] [facility] RECORD NUMBER:  [id number]  AGE: 71 y.o. GENDER: female  : 1950  EPISODE DATE:  2023    Subjective:     Chief Complaint   Patient presents with    Wound Check     Left lower leg         HISTORY of PRESENT ILLNESS HPI     Lamar Adan is a 71 y.o. female who presents today for wound/ulcer  evaluation. Interval history: Tolerated the [employer] well. History of Wound Context: Darlin Byers presents for follow-up evaluation of left pre-tib and lateral calf ulceration. She was previously seen for ulceration of the right leg. She did well with Unna boot. No sign or symptom of infection.     Ulcer Identification:  Ulcer Type: venous and lymphedema  Contributing Factors: edema, venous stasis, lymphedema and obesity          PAST MEDICAL HISTORY        Diagnosis Date    Bell's palsy     Cellulitis     Hypertension        PAST SURGICAL HISTORY    Past Surgical History:   Procedure Laterality Date    CATARACT REMOVAL Bilateral     HYSTERECTOMY, TOTAL ABDOMINAL (CERVIX REMOVED)  1990    INCISION AND DRAINAGE Left 2017    LEG INCISION AND DRAINAGE ABSCESS performed by Meghan Castro MD at [place] [address] Right 2018    TOTAL KNEE ARTHROPLASTY Left 2017    VENTRAL HERNIA REPAIR  2019    5 hernias       FAMILY HISTORY    Family History   Problem Relation Age of Onset    Cancer Mother     High Blood Pressure Father     Stroke Father     Diabetes Maternal Aunt     Cancer Other         daughter/ovarian cancer    Cancer Daughter        SOCIAL HISTORY    Social History     Tobacco Use    Smoking status: Never    Smokeless tobacco: Never   Vaping Use    Vaping Use: Never used   Substance Use Topics    Alcohol use: No    Drug use: No       ALLERGIES    Allergies   Allergen Reactions    Penicillins Hives       MEDICATIONS    Current Outpatient Medications on File

## 2023-05-31 NOTE — DISCHARGE INSTRUCTIONS
to ask questions regarding this information.  I have elected to receive;      []After Visit Summary  [x]Comprehensive Discharge Instruction        Patient signature______________________________________Date:________  Electronically signed by Javan Harada, DPM on 5/31/2023 at 2:38 PM  Electronically signed by Jessica Downs RN on 5/31/2023 at 2:44 PM

## 2023-06-05 NOTE — DISCHARGE INSTRUCTIONS
[address]                            Visit  Discharge Instructions / Physician Orders  DATE: 6/7/23     Home Care: none     SUPPLIES ORDERED THRU:    none                 DATE LAST SUPPLIED  na     Wound Location:  Left lower leg     Cleanse with: Keep dry and intact    Dressing Orders:  Primary dressing   AM endoform, silvercel, zinc unna boot    Frequency:  Keep dry and intact     Additional Orders: Increase protein to diet (meat, cheese, eggs, fish, peanut butter, nuts and beans)  Multivitamin daily     OFFLOADING [] YES  TYPE:                  [x] NA     Weekly wound care visits until determined otherwise. Antibiotic therapy-wound care related YES [] NO [] NA[x]     MY CHART []     Smart Device  []          Your next appointment with the [facility] is in 1 week                                                                                                   (Please note your next appointment above and if you are unable to keep, kindly give a 24 hour notice. Thank you.)  If more than 15 min late we cannot guarantee you will be seen due to clinician schedule  Per Policy, Excessive cancellation will call for dismissal from program.  If you experience any of the following, please call the [facility] during business hours:  [phone]     * Increase in Pain  * Temperature over 101  * Increase in drainage from your wound  * Drainage with a foul odor  * Bleeding  * Increase in swelling  * Need for compression bandage changes due to slippage, breakthrough drainage. If you need medical attention outside of the business hours of the [facility] please contact your PCP or go to the nearest emergency room. The information contained in the After Visit Summary has been reviewed with me, the patient and/or responsible adult, by my health care provider(s). I had the opportunity to ask questions regarding this information.  I have elected to receive;

## 2023-06-07 ENCOUNTER — HOSPITAL ENCOUNTER (OUTPATIENT)
Dept: WOUND CARE | Age: 73
Discharge: HOME | End: 2023-06-07
Payer: MEDICARE

## 2023-06-07 VITALS
HEIGHT: 61 IN | SYSTOLIC BLOOD PRESSURE: 188 MMHG | DIASTOLIC BLOOD PRESSURE: 94 MMHG | HEART RATE: 64 BPM | WEIGHT: 293 LBS | TEMPERATURE: 97.6 F | BODY MASS INDEX: 55.32 KG/M2 | RESPIRATION RATE: 18 BRPM

## 2023-06-07 DIAGNOSIS — L97.822 NON-PRESSURE CHRONIC ULCER OF OTHER PART OF LEFT LOWER LEG WITH FAT LAYER EXPOSED (HCC): Primary | ICD-10-CM

## 2023-06-07 PROCEDURE — 11042 DBRDMT SUBQ TIS 1ST 20SQCM/<: CPT

## 2023-06-07 RX ORDER — GINSENG 100 MG
CAPSULE ORAL ONCE
OUTPATIENT
Start: 2023-06-07 | End: 2023-06-07

## 2023-06-07 RX ORDER — BETAMETHASONE DIPROPIONATE 0.05 %
OINTMENT (GRAM) TOPICAL ONCE
OUTPATIENT
Start: 2023-06-07 | End: 2023-06-07

## 2023-06-07 RX ORDER — LIDOCAINE HYDROCHLORIDE 40 MG/ML
SOLUTION TOPICAL ONCE
OUTPATIENT
Start: 2023-06-07 | End: 2023-06-07

## 2023-06-07 RX ORDER — LIDOCAINE 40 MG/G
CREAM TOPICAL ONCE
OUTPATIENT
Start: 2023-06-07 | End: 2023-06-07

## 2023-06-07 RX ORDER — LIDOCAINE 50 MG/G
OINTMENT TOPICAL ONCE
OUTPATIENT
Start: 2023-06-07 | End: 2023-06-07

## 2023-06-07 RX ORDER — CLOBETASOL PROPIONATE 0.5 MG/G
OINTMENT TOPICAL ONCE
OUTPATIENT
Start: 2023-06-07 | End: 2023-06-07

## 2023-06-07 RX ORDER — LIDOCAINE HYDROCHLORIDE 20 MG/ML
JELLY TOPICAL ONCE
OUTPATIENT
Start: 2023-06-07 | End: 2023-06-07

## 2023-06-07 RX ORDER — SODIUM CHLOR/HYPOCHLOROUS ACID 0.033 %
SOLUTION, IRRIGATION IRRIGATION ONCE
OUTPATIENT
Start: 2023-06-07 | End: 2023-06-07

## 2023-06-07 RX ORDER — BACITRACIN ZINC AND POLYMYXIN B SULFATE 500; 1000 [USP'U]/G; [USP'U]/G
OINTMENT TOPICAL ONCE
OUTPATIENT
Start: 2023-06-07 | End: 2023-06-07

## 2023-06-07 RX ORDER — GENTAMICIN SULFATE 1 MG/G
OINTMENT TOPICAL ONCE
OUTPATIENT
Start: 2023-06-07 | End: 2023-06-07

## 2023-06-07 RX ORDER — IBUPROFEN 200 MG
TABLET ORAL ONCE
OUTPATIENT
Start: 2023-06-07 | End: 2023-06-07

## 2023-06-07 RX ORDER — LIDOCAINE HYDROCHLORIDE 40 MG/ML
SOLUTION TOPICAL ONCE
Status: COMPLETED | OUTPATIENT
Start: 2023-06-07 | End: 2023-06-07

## 2023-06-07 RX ADMIN — LIDOCAINE HYDROCHLORIDE 5 ML: 40 SOLUTION TOPICAL at 13:33

## 2023-06-07 ASSESSMENT — PAIN DESCRIPTION - ONSET: ONSET: ON-GOING

## 2023-06-07 ASSESSMENT — PAIN SCALES - GENERAL: PAINLEVEL_OUTOF10: 7

## 2023-06-07 ASSESSMENT — PAIN DESCRIPTION - FREQUENCY: FREQUENCY: INTERMITTENT

## 2023-06-07 ASSESSMENT — PAIN DESCRIPTION - PAIN TYPE: TYPE: CHRONIC PAIN

## 2023-06-07 ASSESSMENT — PAIN DESCRIPTION - DESCRIPTORS: DESCRIPTORS: BURNING;ACHING

## 2023-06-07 NOTE — PLAN OF CARE
Problem: Discharge Planning  Goal: Discharge to home or other facility with appropriate resources  Outcome: Progressing     Problem: Pain  Goal: Verbalizes/displays adequate comfort level or baseline comfort level  Outcome: Progressing     Problem: Wound:  Goal: Will show signs of wound healing; wound closure and no evidence of infection  Description: Will show signs of wound healing; wound closure and no evidence of infection  Outcome: Progressing     Problem: Venous:  Goal: Signs of wound healing will improve  Description: Signs of wound healing will improve  Outcome: Progressing     Problem: Compression therapy:  Goal: Will be free from complications associated with compression therapy  Description: Will be free from complications associated with compression therapy  Outcome: Progressing     Problem: Falls - Risk of:  Goal: Will remain free from falls  Description: Will remain free from falls  Outcome: Progressing     Problem: Cognitive:  Goal: Knowledge of wound care  Description: Knowledge of wound care  Outcome: Progressing  Goal: Understands risk factors for wounds  Description: Understands risk factors for wounds  Outcome: Progressing

## 2023-06-07 NOTE — PROGRESS NOTES
[employer] Application   Below Knee    NAME:  Jayde Seigel  YOB: 1950  MEDICAL RECORD NUMBER:  [id number]  DATE:  6/7/2023    [x] Removed old Lavetta Linear boot if indicated and wash leg with mild soap and water. [x] Applied moisturizing agent to dry skin as needed. [x] Appied primary and secondary dressing as ordered    [x] Applied Unna roll from toes to knee overlapping each time. [x] Applied ace wrap or coban from toes to below the knee. [x] Secured with tape and/or metal clips covered with tape. [x] Instructed patient/caregiver to keep dressing dry and intact. DO NOT REMOVE DRESSING. [x] Instructed pt/family/caregiver to report excessive draining, loose bandage, wet dressing, severe pain or tingling in toes. [x] Applied [employer] dressing below the knee to Left lower leg(s)       Unna Boot(s) were applied per  Guidelines.      Electronically signed by Zachary Reilly RN on 6/7/2023 at 2:17 PM

## 2023-06-07 NOTE — PROGRESS NOTES
[address]   Progress Note and Procedure Note      [address] [facility],[address] Floor [facility] RECORD NUMBER:  [id number]  AGE: 71 y.o. GENDER: female  : 1950  EPISODE DATE:  2023    Subjective:     Chief Complaint   Patient presents with    Wound Check     Left lower leg         HISTORY of PRESENT ILLNESS HPI     Kimmy Patterson is a 71 y.o. female who presents today for wound/ulcer  evaluation. Interval history:  Ulceration is more granular this week. History of Wound Context: Carson Beltran presents for follow-up evaluation of left pre-tib and lateral calf ulceration. She was previously seen for ulceration of the right leg. She did well with Unna boot. No sign or symptom of infection.     Ulcer Identification:  Ulcer Type: venous and lymphedema  Contributing Factors: edema, venous stasis, lymphedema and obesity          PAST MEDICAL HISTORY        Diagnosis Date    Bell's palsy     Cellulitis     Hypertension        PAST SURGICAL HISTORY    Past Surgical History:   Procedure Laterality Date    CATARACT REMOVAL Bilateral     HYSTERECTOMY, TOTAL ABDOMINAL (CERVIX REMOVED)      INCISION AND DRAINAGE Left 2017    LEG INCISION AND DRAINAGE ABSCESS performed by Judy Hernandez MD at [place] [address] Right 2018    TOTAL KNEE ARTHROPLASTY Left 2017    VENTRAL HERNIA REPAIR  2019    5 hernias       FAMILY HISTORY    Family History   Problem Relation Age of Onset    Cancer Mother     High Blood Pressure Father     Stroke Father     Diabetes Maternal Aunt     Cancer Other         daughter/ovarian cancer    Cancer Daughter        SOCIAL HISTORY    Social History     Tobacco Use    Smoking status: Never    Smokeless tobacco: Never   Vaping Use    Vaping Use: Never used   Substance Use Topics    Alcohol use: No    Drug use: No       ALLERGIES    Allergies   Allergen Reactions    Penicillins Hives       MEDICATIONS    Current Outpatient Medications on

## 2023-06-19 NOTE — DISCHARGE INSTRUCTIONS
[address]                            Visit  Discharge Instructions / Physician Orders  DATE: 6/23/23     Home Care: none     SUPPLIES ORDERED THRU:    none                 DATE LAST SUPPLIED  na     Wound Location:  Left lower leg     Cleanse with: Keep dry and intact     Dressing Orders:  Primary dressing   AM endoform, silvercel, zinc unna boot     Frequency:  Keep dry and intact     Additional Orders: Increase protein to diet (meat, cheese, eggs, fish, peanut butter, nuts and beans)  Multivitamin daily     OFFLOADING [] YES  TYPE:                  [x] NA     Weekly wound care visits until determined otherwise. Antibiotic therapy-wound care related YES [] NO [] NA[x]     MY CHART []     Smart Device  []          Your next appointment with the [facility] is in 1 week                                                                                                   (Please note your next appointment above and if you are unable to keep, kindly give a 24 hour notice. Thank you.)  If more than 15 min late we cannot guarantee you will be seen due to clinician schedule  Per Policy, Excessive cancellation will call for dismissal from program.  If you experience any of the following, please call the [facility] during business hours:  [phone]     * Increase in Pain  * Temperature over 101  * Increase in drainage from your wound  * Drainage with a foul odor  * Bleeding  * Increase in swelling  * Need for compression bandage changes due to slippage, breakthrough drainage. If you need medical attention outside of the business hours of the [facility] please contact your PCP or go to the nearest emergency room. The information contained in the After Visit Summary has been reviewed with me, the patient and/or responsible adult, by my health care provider(s). I had the opportunity to ask questions regarding this information.  I have elected to

## 2023-06-23 ENCOUNTER — HOSPITAL ENCOUNTER (OUTPATIENT)
Dept: WOUND CARE | Age: 73
Discharge: HOME | End: 2023-06-23
Payer: MEDICARE

## 2023-06-23 VITALS
BODY MASS INDEX: 55.32 KG/M2 | RESPIRATION RATE: 19 BRPM | WEIGHT: 293 LBS | TEMPERATURE: 98.4 F | SYSTOLIC BLOOD PRESSURE: 195 MMHG | HEIGHT: 61 IN | HEART RATE: 56 BPM | DIASTOLIC BLOOD PRESSURE: 74 MMHG

## 2023-06-23 DIAGNOSIS — L97.822 NON-PRESSURE CHRONIC ULCER OF OTHER PART OF LEFT LOWER LEG WITH FAT LAYER EXPOSED (HCC): Primary | ICD-10-CM

## 2023-06-23 DIAGNOSIS — I87.2 VENOUS INSUFFICIENCY OF BOTH LOWER EXTREMITIES: ICD-10-CM

## 2023-06-23 PROCEDURE — 11042 DBRDMT SUBQ TIS 1ST 20SQCM/<: CPT

## 2023-06-23 RX ORDER — SODIUM CHLOR/HYPOCHLOROUS ACID 0.033 %
SOLUTION, IRRIGATION IRRIGATION ONCE
OUTPATIENT
Start: 2023-06-23 | End: 2023-06-23

## 2023-06-23 RX ORDER — LIDOCAINE HYDROCHLORIDE 40 MG/ML
SOLUTION TOPICAL ONCE
OUTPATIENT
Start: 2023-06-23 | End: 2023-06-23

## 2023-06-23 RX ORDER — LIDOCAINE HYDROCHLORIDE 40 MG/ML
SOLUTION TOPICAL ONCE
Status: COMPLETED | OUTPATIENT
Start: 2023-06-23 | End: 2023-06-23

## 2023-06-23 RX ORDER — CLOBETASOL PROPIONATE 0.5 MG/G
OINTMENT TOPICAL ONCE
OUTPATIENT
Start: 2023-06-23 | End: 2023-06-23

## 2023-06-23 RX ORDER — GINSENG 100 MG
CAPSULE ORAL ONCE
OUTPATIENT
Start: 2023-06-23 | End: 2023-06-23

## 2023-06-23 RX ORDER — IBUPROFEN 200 MG
TABLET ORAL ONCE
OUTPATIENT
Start: 2023-06-23 | End: 2023-06-23

## 2023-06-23 RX ORDER — LIDOCAINE 40 MG/G
CREAM TOPICAL ONCE
OUTPATIENT
Start: 2023-06-23 | End: 2023-06-23

## 2023-06-23 RX ORDER — BACITRACIN ZINC AND POLYMYXIN B SULFATE 500; 1000 [USP'U]/G; [USP'U]/G
OINTMENT TOPICAL ONCE
OUTPATIENT
Start: 2023-06-23 | End: 2023-06-23

## 2023-06-23 RX ORDER — BETAMETHASONE DIPROPIONATE 0.05 %
OINTMENT (GRAM) TOPICAL ONCE
OUTPATIENT
Start: 2023-06-23 | End: 2023-06-23

## 2023-06-23 RX ORDER — LIDOCAINE HYDROCHLORIDE 20 MG/ML
JELLY TOPICAL ONCE
OUTPATIENT
Start: 2023-06-23 | End: 2023-06-23

## 2023-06-23 RX ORDER — LIDOCAINE 50 MG/G
OINTMENT TOPICAL ONCE
OUTPATIENT
Start: 2023-06-23 | End: 2023-06-23

## 2023-06-23 RX ORDER — GENTAMICIN SULFATE 1 MG/G
OINTMENT TOPICAL ONCE
OUTPATIENT
Start: 2023-06-23 | End: 2023-06-23

## 2023-06-23 RX ADMIN — LIDOCAINE HYDROCHLORIDE 6 ML: 40 SOLUTION TOPICAL at 08:17

## 2023-06-23 NOTE — PROGRESS NOTES
[address]   Progress Note and Procedure Note      [address] [facility],[address] Floor [facility] RECORD NUMBER:  [id number]  AGE: 71 y.o. GENDER: female  : 1950  EPISODE DATE:  2023    Subjective:     Chief Complaint   Patient presents with    Wound Check     Left lower leg         HISTORY of PRESENT ILLNESS HPI     Alfonso Briggs is a 71 y.o. female who presents today for wound/ulcer  evaluation. History of Wound Context: Merlinda Scott presents for follow-up evaluation of left pre-tib and lateral calf ulceration. She denied to significant pain today, no purulent drainage or surrounding redness, denied fever, no other complaints.     Ulcer Identification:  Ulcer Type: venous and lymphedema  Contributing Factors: edema, venous stasis, lymphedema and obesity          PAST MEDICAL HISTORY        Diagnosis Date    Bell's palsy     Cellulitis     Hypertension        PAST SURGICAL HISTORY    Past Surgical History:   Procedure Laterality Date    CATARACT REMOVAL Bilateral     HYSTERECTOMY, TOTAL ABDOMINAL (CERVIX REMOVED)      INCISION AND DRAINAGE Left 2017    LEG INCISION AND DRAINAGE ABSCESS performed by Chandan Reeves MD at [place] [address] Right 2018    TOTAL KNEE ARTHROPLASTY Left 2017    VENTRAL HERNIA REPAIR  2019    5 hernias       FAMILY HISTORY    Family History   Problem Relation Age of Onset    Cancer Mother     High Blood Pressure Father     Stroke Father     Diabetes Maternal Aunt     Cancer Other         daughter/ovarian cancer    Cancer Daughter        SOCIAL HISTORY    Social History     Tobacco Use    Smoking status: Never    Smokeless tobacco: Never   Vaping Use    Vaping Use: Never used   Substance Use Topics    Alcohol use: No    Drug use: No       ALLERGIES    Allergies   Allergen Reactions    Penicillins Hives       MEDICATIONS    Current Outpatient Medications on File Prior to Encounter   Medication Sig Dispense Refill

## 2023-06-23 NOTE — PROGRESS NOTES
[employer] Application   Below Knee    NAME:  Alisa Thomas  YOB: 1950  MEDICAL RECORD NUMBER:  [id number]  DATE:  6/23/2023    [x] Applied moisturizing agent to dry skin as needed. [x] Appied primary and secondary dressing as ordered    [x] Applied Unna roll from toes to knee overlapping each time. [x] Applied ace wrap or coban from toes to below the knee. [x] Secured with tape and/or metal clips covered with tape. [x] Instructed patient/caregiver to keep dressing dry and intact. DO NOT REMOVE DRESSING. [x] Instructed pt/family/caregiver to report excessive draining, loose bandage, wet dressing, severe pain or tingling in toes. [x] Applied [employer] dressing below the knee to Left lower leg(s)       Unna Boot(s) were applied per  Guidelines.      Electronically signed by Laura Kincaid RN on 6/23/2023 at 8:42 AM

## 2023-06-29 ENCOUNTER — HOSPITAL ENCOUNTER (OUTPATIENT)
Dept: WOUND CARE | Age: 73
Discharge: HOME | End: 2023-06-29
Payer: MEDICARE

## 2023-06-29 DIAGNOSIS — L97.822 NON-PRESSURE CHRONIC ULCER OF OTHER PART OF LEFT LOWER LEG WITH FAT LAYER EXPOSED (HCC): Primary | ICD-10-CM

## 2023-06-29 DIAGNOSIS — I87.2 VENOUS INSUFFICIENCY OF BOTH LOWER EXTREMITIES: ICD-10-CM

## 2023-06-29 DIAGNOSIS — I89.0 LYMPHEDEMA: ICD-10-CM

## 2023-06-29 PROCEDURE — 11042 DBRDMT SUBQ TIS 1ST 20SQCM/<: CPT

## 2023-06-29 RX ORDER — GENTAMICIN SULFATE 1 MG/G
OINTMENT TOPICAL ONCE
Status: CANCELLED | OUTPATIENT
Start: 2023-06-29 | End: 2023-06-29

## 2023-06-29 RX ORDER — LIDOCAINE HYDROCHLORIDE 40 MG/ML
SOLUTION TOPICAL ONCE
OUTPATIENT
Start: 2023-06-29 | End: 2023-06-29

## 2023-06-29 RX ORDER — BETAMETHASONE DIPROPIONATE 0.05 %
OINTMENT (GRAM) TOPICAL ONCE
Status: CANCELLED | OUTPATIENT
Start: 2023-06-29 | End: 2023-06-29

## 2023-06-29 RX ORDER — LIDOCAINE HYDROCHLORIDE 20 MG/ML
JELLY TOPICAL ONCE
OUTPATIENT
Start: 2023-06-29 | End: 2023-06-29

## 2023-06-29 RX ORDER — LIDOCAINE HYDROCHLORIDE 20 MG/ML
JELLY TOPICAL ONCE
Status: CANCELLED | OUTPATIENT
Start: 2023-06-29 | End: 2023-06-29

## 2023-06-29 RX ORDER — IBUPROFEN 200 MG
TABLET ORAL ONCE
Status: CANCELLED | OUTPATIENT
Start: 2023-06-29 | End: 2023-06-29

## 2023-06-29 RX ORDER — GINSENG 100 MG
CAPSULE ORAL ONCE
Status: CANCELLED | OUTPATIENT
Start: 2023-06-29 | End: 2023-06-29

## 2023-06-29 RX ORDER — LIDOCAINE 50 MG/G
OINTMENT TOPICAL ONCE
Status: CANCELLED | OUTPATIENT
Start: 2023-06-29 | End: 2023-06-29

## 2023-06-29 RX ORDER — LIDOCAINE 40 MG/G
CREAM TOPICAL ONCE
Status: CANCELLED | OUTPATIENT
Start: 2023-06-29 | End: 2023-06-29

## 2023-06-29 RX ORDER — BACITRACIN ZINC AND POLYMYXIN B SULFATE 500; 1000 [USP'U]/G; [USP'U]/G
OINTMENT TOPICAL ONCE
Status: CANCELLED | OUTPATIENT
Start: 2023-06-29 | End: 2023-06-29

## 2023-06-29 RX ORDER — CLOBETASOL PROPIONATE 0.5 MG/G
OINTMENT TOPICAL ONCE
Status: CANCELLED | OUTPATIENT
Start: 2023-06-29 | End: 2023-06-29

## 2023-06-29 RX ORDER — SODIUM CHLOR/HYPOCHLOROUS ACID 0.033 %
SOLUTION, IRRIGATION IRRIGATION ONCE
Status: CANCELLED | OUTPATIENT
Start: 2023-06-29 | End: 2023-06-29

## 2023-06-29 RX ORDER — LIDOCAINE HYDROCHLORIDE 40 MG/ML
SOLUTION TOPICAL ONCE
Status: COMPLETED | OUTPATIENT
Start: 2023-06-29 | End: 2023-06-29

## 2023-06-29 RX ADMIN — LIDOCAINE HYDROCHLORIDE 10 ML: 40 SOLUTION TOPICAL at 08:33

## 2023-06-29 ASSESSMENT — ENCOUNTER SYMPTOMS
COUGH: 0
RHINORRHEA: 0
NAUSEA: 0
SHORTNESS OF BREATH: 0
VOMITING: 0
DIARRHEA: 0

## 2023-07-04 NOTE — DISCHARGE INSTRUCTIONS
[facility]                            Visit  Discharge Instructions / Physician Orders  DATE: 7/5/23   NURSE VISIT  Home Care: none     SUPPLIES ORDERED THRU:    none                 DATE LAST SUPPLIED  na     Wound Location:  Left lower leg     Cleanse with: Keep dry and intact     Dressing Orders:  Primary dressing Triad cream foam then Calamine unna boot     Frequency:  Keep dry and intact     Additional Orders: Increase protein to diet (meat, cheese, eggs, fish, peanut butter, nuts and beans)  Multivitamin daily    a pair of compression socks  OFFLOADING [] YES  TYPE:                  [x] NA     Weekly wound care visits until determined otherwise. Antibiotic therapy-wound care related YES [] NO [] NA[x]     MY CHART []     Smart Device  []          [facility] 1 week  with Gilbert Long                                                                                                                                                                                                      (Please note your next appointment above and if you are unable to keep, kindly give a 24 hour notice. Thank you.)  If more than 15 min late we cannot guarantee you will be seen due to clinician schedule  Per Policy, Excessive cancellation will call for dismissal from program.  If you experience any of the following, please call the [facility] during business hours:  [phone]     * Increase in Pain  * Temperature over 101  * Increase in drainage from your wound  * Drainage with a foul odor  * Bleeding  * Increase in swelling  * Need for compression bandage changes due to slippage, breakthrough drainage. If you need medical attention outside of the business hours of the [facility] please contact your PCP or go to the nearest emergency room.      The information contained in the After Visit Summary has been reviewed with me, the

## 2023-07-05 ENCOUNTER — HOSPITAL ENCOUNTER (OUTPATIENT)
Dept: WOUND CARE | Age: 73
Discharge: HOME | End: 2023-07-05
Payer: MEDICARE

## 2023-07-05 VITALS
RESPIRATION RATE: 20 BRPM | SYSTOLIC BLOOD PRESSURE: 193 MMHG | DIASTOLIC BLOOD PRESSURE: 91 MMHG | TEMPERATURE: 98.9 F | HEART RATE: 63 BPM

## 2023-07-05 DIAGNOSIS — L97.822 NON-PRESSURE CHRONIC ULCER OF OTHER PART OF LEFT LOWER LEG WITH FAT LAYER EXPOSED (HCC): Primary | ICD-10-CM

## 2023-07-05 PROCEDURE — 29580 STRAPPING UNNA BOOT: CPT

## 2023-07-05 RX ORDER — LIDOCAINE HYDROCHLORIDE 40 MG/ML
SOLUTION TOPICAL ONCE
OUTPATIENT
Start: 2023-07-05 | End: 2023-07-05

## 2023-07-05 RX ORDER — LIDOCAINE HYDROCHLORIDE 20 MG/ML
JELLY TOPICAL ONCE
OUTPATIENT
Start: 2023-07-05 | End: 2023-07-05

## 2023-07-05 ASSESSMENT — PAIN SCALES - GENERAL: PAINLEVEL_OUTOF10: 0

## 2023-07-05 NOTE — PLAN OF CARE
Problem: Discharge Planning  Goal: Discharge to home or other facility with appropriate resources  Outcome: Progressing     Problem: Wound:  Goal: Will show signs of wound healing; wound closure and no evidence of infection  Description: Will show signs of wound healing; wound closure and no evidence of infection  Outcome: Progressing     Problem: Venous:  Goal: Signs of wound healing will improve  Description: Signs of wound healing will improve  Outcome: Progressing     Problem: Falls - Risk of:  Goal: Will remain free from falls  Description: Will remain free from falls  Outcome: Progressing

## 2023-07-11 NOTE — DISCHARGE INSTRUCTIONS
me, the patient and/or responsible adult, by my health care provider(s). I had the opportunity to ask questions regarding this information.  I have elected to receive;      []After Visit Summary  [x]Comprehensive Discharge Instruction        Patient signature______________________________________Date:________  Electronically signed by Shane Devine RN on 7/13/2023 at 8:28 AM  Electronically signed by TOPHER Singh - CNP on 7/13/2023 at 8:53 AM

## 2023-07-13 ENCOUNTER — HOSPITAL ENCOUNTER (OUTPATIENT)
Dept: WOUND CARE | Age: 73
Discharge: HOME | End: 2023-07-13
Payer: MEDICARE

## 2023-07-13 VITALS
TEMPERATURE: 98.4 F | BODY MASS INDEX: 55.32 KG/M2 | WEIGHT: 293 LBS | DIASTOLIC BLOOD PRESSURE: 84 MMHG | RESPIRATION RATE: 20 BRPM | SYSTOLIC BLOOD PRESSURE: 181 MMHG | HEART RATE: 54 BPM | HEIGHT: 61 IN

## 2023-07-13 DIAGNOSIS — L97.822 NON-PRESSURE CHRONIC ULCER OF OTHER PART OF LEFT LOWER LEG WITH FAT LAYER EXPOSED (HCC): Primary | ICD-10-CM

## 2023-07-13 PROCEDURE — 11042 DBRDMT SUBQ TIS 1ST 20SQCM/<: CPT

## 2023-07-13 RX ORDER — LIDOCAINE HYDROCHLORIDE 40 MG/ML
SOLUTION TOPICAL ONCE
OUTPATIENT
Start: 2023-07-13 | End: 2023-07-13

## 2023-07-13 RX ORDER — LIDOCAINE HYDROCHLORIDE 20 MG/ML
JELLY TOPICAL ONCE
OUTPATIENT
Start: 2023-07-13 | End: 2023-07-13

## 2023-07-13 RX ORDER — LIDOCAINE HYDROCHLORIDE 40 MG/ML
SOLUTION TOPICAL ONCE
Status: COMPLETED | OUTPATIENT
Start: 2023-07-13 | End: 2023-07-13

## 2023-07-13 RX ADMIN — LIDOCAINE HYDROCHLORIDE 7.5 ML: 40 SOLUTION TOPICAL at 08:35

## 2023-07-13 ASSESSMENT — ENCOUNTER SYMPTOMS
RHINORRHEA: 0
COUGH: 0
DIARRHEA: 0
NAUSEA: 0
VOMITING: 0
SHORTNESS OF BREATH: 0

## 2023-07-13 ASSESSMENT — PAIN SCALES - GENERAL: PAINLEVEL_OUTOF10: 0

## 2023-07-13 NOTE — PROGRESS NOTES
[employer] Application   Below Knee    NAME:  Keaton Madrigal  YOB: 1950  MEDICAL RECORD NUMBER:  [id number]  DATE:  7/13/2023    [x] Applied moisturizing agent to dry skin as needed. [x] Appied primary and secondary dressing as ordered    [x] Applied Unna roll from toes to knee overlapping each time. [x] Applied ace wrap or coban from toes to below the knee. [x] Secured with tape and/or metal clips covered with tape. [x] Instructed patient/caregiver to keep dressing dry and intact. DO NOT REMOVE DRESSING. [x] Instructed pt/family/caregiver to report excessive draining, loose bandage, wet dressing, severe pain or tingling in toes. [x] Applied [employer] dressing below the knee to Left lower leg(s)       Unna Boot(s) were applied per  Guidelines.      Electronically signed by Noemi Capps RN on 7/13/2023 at 9:07 AM

## 2023-07-13 NOTE — PROGRESS NOTES
[address] [facility]   Progress Note and Procedure Note      [address] RECORD NUMBER:  [id number]  AGE: 71 y.o. GENDER: female  : 1950  EPISODE DATE:  2023    Subjective:     Chief Complaint   Patient presents with    Wound Check     Left lower leg         HISTORY of PRESENT ILLNESS HPI     Enedina Lau is a 71 y.o. female who presents today for wound/ulcer evaluation. History of Wound Context: here to follow up on left lower leg ulceration. Doing well with unna boot. Itching is better with calamine. Wound bed has good granulation, will stop triad cream and start endoform to help with epithelialization.    Wound/Ulcer Pain Timing/Severity: intermittent  Quality of pain: sharp  Severity:  2 / 10   Modifying Factors: None  Associated Signs/Symptoms: none    Ulcer Identification:  Ulcer Type: venous  Contributing Factors: edema, venous stasis, lymphedema, decreased mobility, and obesity         PAST MEDICAL HISTORY        Diagnosis Date    Bell's palsy     Cellulitis     Hypertension        PAST SURGICAL HISTORY    Past Surgical History:   Procedure Laterality Date    CATARACT REMOVAL Bilateral     HYSTERECTOMY, TOTAL ABDOMINAL (CERVIX REMOVED)  1990    INCISION AND DRAINAGE Left 2017    LEG INCISION AND DRAINAGE ABSCESS performed by Luisana Ayon MD at [address] [facility] Right 2018    TOTAL KNEE ARTHROPLASTY Left 2017    VENTRAL HERNIA REPAIR  2019    5 hernias       FAMILY HISTORY    Family History   Problem Relation Age of Onset    Cancer Mother     High Blood Pressure Father     Stroke Father     Diabetes Maternal Aunt     Cancer Other         daughter/ovarian cancer    Cancer Daughter        SOCIAL HISTORY    Social History     Tobacco Use    Smoking status: Never    Smokeless tobacco: Never   Vaping Use    Vaping Use: Never used   Substance Use Topics    Alcohol use: No    Drug use: No       ALLERGIES    Allergies

## 2023-07-17 NOTE — DISCHARGE INSTRUCTIONS
[facility]                            Visit  Discharge Instructions / Physician Orders  DATE: 7/20/23     Home Care: none     SUPPLIES ORDERED THRU:    none                 DATE LAST SUPPLIED  na     Wound Location:  Left lower leg     Cleanse with: Keep dry and intact     Dressing Orders:  Primary dressing Endoform then hydroferra blue foam then Calamine unna boot     Frequency:  Keep dry and intact     Additional Orders: Increase protein to diet (meat, cheese, eggs, fish, peanut butter, nuts and beans)  Multivitamin daily    a pair of compression socks  OFFLOADING [] YES  TYPE:                  [x] NA     Weekly wound care visits until determined otherwise. Antibiotic therapy-wound care related YES [] NO [] NA[x]     MY CHART []     Smart Device  []          Your next appointment with the [address] [place] [address] is in                                                                                                    1 week with Tom Short                                                                                                   (Please note your next appointment above and if you are unable to keep, kindly give a 24 hour notice. Thank you.)  If more than 15 min late we cannot guarantee you will be seen due to clinician schedule  Per Policy, Excessive cancellation will call for dismissal from program.  If you experience any of the following, please call the [address] [place] [address] during business hours:  [phone]     * Increase in Pain  * Temperature over 101  * Increase in drainage from your wound  * Drainage with a foul odor  * Bleeding  * Increase in swelling  * Need for compression bandage changes due to slippage, breakthrough drainage. If you need medical attention outside of the business hours of the [address] [place] [address] please contact your PCP or go to the nearest emergency room.      The information contained in the After Visit Summary has been reviewed with

## 2023-07-20 ENCOUNTER — HOSPITAL ENCOUNTER (OUTPATIENT)
Dept: WOUND CARE | Age: 73
Discharge: HOME | End: 2023-07-20
Payer: MEDICARE

## 2023-07-20 VITALS
SYSTOLIC BLOOD PRESSURE: 163 MMHG | HEIGHT: 61 IN | HEART RATE: 63 BPM | TEMPERATURE: 97.6 F | WEIGHT: 293 LBS | DIASTOLIC BLOOD PRESSURE: 67 MMHG | RESPIRATION RATE: 20 BRPM | BODY MASS INDEX: 55.32 KG/M2

## 2023-07-20 DIAGNOSIS — L97.822 NON-PRESSURE CHRONIC ULCER OF OTHER PART OF LEFT LOWER LEG WITH FAT LAYER EXPOSED (HCC): Primary | ICD-10-CM

## 2023-07-20 DIAGNOSIS — I89.0 LYMPHEDEMA: ICD-10-CM

## 2023-07-20 DIAGNOSIS — E66.01 MORBID OBESITY (HCC): ICD-10-CM

## 2023-07-20 DIAGNOSIS — I87.2 VENOUS INSUFFICIENCY OF BOTH LOWER EXTREMITIES: ICD-10-CM

## 2023-07-20 PROCEDURE — 11042 DBRDMT SUBQ TIS 1ST 20SQCM/<: CPT

## 2023-07-20 RX ORDER — LIDOCAINE HYDROCHLORIDE 20 MG/ML
JELLY TOPICAL ONCE
OUTPATIENT
Start: 2023-07-20 | End: 2023-07-20

## 2023-07-20 RX ORDER — LIDOCAINE HYDROCHLORIDE 40 MG/ML
SOLUTION TOPICAL ONCE
OUTPATIENT
Start: 2023-07-20 | End: 2023-07-20

## 2023-07-20 RX ORDER — LIDOCAINE HYDROCHLORIDE 20 MG/ML
JELLY TOPICAL ONCE
Status: COMPLETED | OUTPATIENT
Start: 2023-07-20 | End: 2023-07-20

## 2023-07-20 RX ADMIN — LIDOCAINE HYDROCHLORIDE 5 ML: 20 JELLY TOPICAL at 13:07

## 2023-07-20 ASSESSMENT — ENCOUNTER SYMPTOMS
VOMITING: 0
SHORTNESS OF BREATH: 0
RHINORRHEA: 0
DIARRHEA: 0
COUGH: 0
NAUSEA: 0

## 2023-07-20 ASSESSMENT — PAIN SCALES - GENERAL: PAINLEVEL_OUTOF10: 0

## 2023-07-20 NOTE — PLAN OF CARE
Problem: Discharge Planning  Goal: Discharge to home or other facility with appropriate resources  Outcome: Progressing     Problem: Pain  Goal: Verbalizes/displays adequate comfort level or baseline comfort level  Outcome: Progressing     Problem: Wound:  Goal: Will show signs of wound healing; wound closure and no evidence of infection  Description: Will show signs of wound healing; wound closure and no evidence of infection  Outcome: Progressing     Problem: Venous:  Goal: Signs of wound healing will improve  Description: Signs of wound healing will improve  Outcome: Progressing     Problem: Falls - Risk of:  Goal: Will remain free from falls  Description: Will remain free from falls  Outcome: Progressing
PROVIDER:[TOKEN:[[id number]]]

## 2023-07-24 NOTE — DISCHARGE INSTRUCTIONS
[facility] HYPERBARIC TREATMENT  CENTER                            Visit  Discharge Instructions / Physician Orders  DATE: 7/27/23     Home Care: none     SUPPLIES ORDERED THRU:    none                 DATE LAST SUPPLIED  na     Wound Location:  Left lower leg     Cleanse with: Keep dry and intact     Dressing Orders:  Primary dressing Endoform then hydroferra blue foam then Calamine unna boot     Frequency:  Keep dry and intact     Additional Orders: Increase protein to diet (meat, cheese, eggs, fish, peanut butter, nuts and beans)  Multivitamin daily    a pair of compression socks  OFFLOADING [] YES  TYPE:                  [x] NA     Weekly wound care visits until determined otherwise. Antibiotic therapy-wound care related YES [] NO [] NA[x]     MY CHART []     Smart Device  []          Your next appointment with the [address] [place] [address] is in                                                                                                    1 week with Nury Moore                                                                                                   (Please note your next appointment above and if you are unable to keep, kindly give a 24 hour notice. Thank you.)  If more than 15 min late we cannot guarantee you will be seen due to clinician schedule  Per Policy, Excessive cancellation will call for dismissal from program.  If you experience any of the following, please call the [address] [place] [address] during business hours:  [phone]     * Increase in Pain  * Temperature over 101  * Increase in drainage from your wound  * Drainage with a foul odor  * Bleeding  * Increase in swelling  * Need for compression bandage changes due to slippage, breakthrough drainage. If you need medical attention outside of the business hours of the [address] [place] [address] please contact your PCP or go to the nearest emergency room.      The information contained in the After Visit Summary has been reviewed with

## 2023-07-27 ENCOUNTER — HOSPITAL ENCOUNTER (OUTPATIENT)
Dept: WOUND CARE | Age: 73
Discharge: HOME | End: 2023-07-27
Payer: MEDICARE

## 2023-07-27 VITALS
TEMPERATURE: 98 F | SYSTOLIC BLOOD PRESSURE: 186 MMHG | BODY MASS INDEX: 55.32 KG/M2 | WEIGHT: 293 LBS | DIASTOLIC BLOOD PRESSURE: 78 MMHG | HEIGHT: 61 IN

## 2023-07-27 DIAGNOSIS — I89.0 LYMPHEDEMA: ICD-10-CM

## 2023-07-27 DIAGNOSIS — I87.2 VENOUS INSUFFICIENCY OF BOTH LOWER EXTREMITIES: ICD-10-CM

## 2023-07-27 DIAGNOSIS — L97.822 NON-PRESSURE CHRONIC ULCER OF OTHER PART OF LEFT LOWER LEG WITH FAT LAYER EXPOSED (HCC): Primary | ICD-10-CM

## 2023-07-27 PROCEDURE — 11042 DBRDMT SUBQ TIS 1ST 20SQCM/<: CPT

## 2023-07-27 RX ORDER — LIDOCAINE HYDROCHLORIDE 20 MG/ML
JELLY TOPICAL ONCE
OUTPATIENT
Start: 2023-07-27 | End: 2023-07-27

## 2023-07-27 RX ORDER — LIDOCAINE HYDROCHLORIDE 40 MG/ML
SOLUTION TOPICAL ONCE
OUTPATIENT
Start: 2023-07-27 | End: 2023-07-27

## 2023-07-27 RX ORDER — LIDOCAINE HYDROCHLORIDE 40 MG/ML
SOLUTION TOPICAL ONCE
Status: COMPLETED | OUTPATIENT
Start: 2023-07-27 | End: 2023-07-27

## 2023-07-27 RX ADMIN — LIDOCAINE HYDROCHLORIDE 10 ML: 40 SOLUTION TOPICAL at 08:55

## 2023-07-27 ASSESSMENT — PAIN DESCRIPTION - DESCRIPTORS: DESCRIPTORS: SORE

## 2023-07-27 ASSESSMENT — ENCOUNTER SYMPTOMS
SHORTNESS OF BREATH: 0
DIARRHEA: 0
COUGH: 0
VOMITING: 0
NAUSEA: 0
RHINORRHEA: 0

## 2023-07-27 ASSESSMENT — PAIN DESCRIPTION - ORIENTATION: ORIENTATION: LEFT;LOWER

## 2023-07-27 ASSESSMENT — PAIN SCALES - GENERAL: PAINLEVEL_OUTOF10: 3

## 2023-07-27 ASSESSMENT — PAIN DESCRIPTION - FREQUENCY: FREQUENCY: CONTINUOUS

## 2023-07-27 ASSESSMENT — PAIN DESCRIPTION - ONSET: ONSET: ON-GOING

## 2023-07-27 ASSESSMENT — PAIN DESCRIPTION - LOCATION: LOCATION: LEG

## 2023-07-27 NOTE — PLAN OF CARE
Problem: Discharge Planning  Goal: Discharge to home or other facility with appropriate resources  Outcome: Progressing     Problem: Pain  Goal: Verbalizes/displays adequate comfort level or baseline comfort level  Outcome: Progressing     Problem: Wound:  Goal: Will show signs of wound healing; wound closure and no evidence of infection  Description: Will show signs of wound healing; wound closure and no evidence of infection  Outcome: Progressing     Problem: Venous:  Goal: Signs of wound healing will improve  Description: Signs of wound healing will improve  Outcome: Progressing     Problem: Falls - Risk of:  Goal: Will remain free from falls  Description: Will remain free from falls  Outcome: Progressing

## 2023-07-27 NOTE — PROGRESS NOTES
[employer] Application   Below Knee    NAME:  Jorje Scott  YOB: 1950  MEDICAL RECORD NUMBER:  [id number]  DATE:  7/27/2023    [x] Removed old Sujey Hortensia boot if indicated and wash leg with mild soap and water. [x] Applied moisturizing agent to dry skin as needed. [x] Appied primary and secondary dressing as ordered    [x] Applied Unna roll from toes to knee overlapping each time. [x] Applied ace wrap or coban from toes to below the knee. [x] Secured with tape and/or metal clips covered with tape. [x] Instructed patient/caregiver to keep dressing dry and intact. DO NOT REMOVE DRESSING. Instructed pt/family/caregiver to r[x]eport excessive draining, loose bandage, wet dressing, severe pain or tingling in toes. [x] Applied [place] dressing below the knee to Left lower leg(s)       Unna Boot(s) were applied per  Guidelines.      Electronically signed by Jenny Parisi RN on 7/27/2023 at 10:22 AM
or POA.            Electronically signed by TOPHER Rodríguez CNP on 7/27/2023 at 10:13 AM

## 2023-07-30 NOTE — DISCHARGE INSTRUCTIONS
[facility] HYPERBARIC TREATMENT  CENTER                            Visit  Discharge Instructions / Physician Orders  DATE: 8/2/23     Home Care: none     SUPPLIES ORDERED THRU:    none                 DATE LAST SUPPLIED  na     Wound Location:  Left lower leg     Cleanse with: Keep dry and intact     Dressing Orders:  Primary dressing Fibracol then Silver maty Calamine unna boot     Frequency:  Keep dry and intact     Additional Orders: Increase protein to diet (meat, cheese, eggs, fish, peanut butter, nuts and beans)  Multivitamin daily    a pair of compression socks 10 - 20mmhg  or 8-15 mm hg  OFFLOADING [] YES  TYPE:                  [x] NA     Weekly wound care visits until determined otherwise. Antibiotic therapy-wound care related YES [] NO [] NA[x]     MY CHART []     Smart Device  []          Your next appointment with the [address] [place] [address] is in                                                                                                    1 week with Marcia Soto                                                                                                   (Please note your next appointment above and if you are unable to keep, kindly give a 24 hour notice. Thank you.)  If more than 15 min late we cannot guarantee you will be seen due to clinician schedule  Per Policy, Excessive cancellation will call for dismissal from program.  If you experience any of the following, please call the [address] [place] [address] during business hours:  [phone]     * Increase in Pain  * Temperature over 101  * Increase in drainage from your wound  * Drainage with a foul odor  * Bleeding  * Increase in swelling  * Need for compression bandage changes due to slippage, breakthrough drainage. If you need medical attention outside of the business hours of the [address] [place] [address] please contact your PCP or go to the nearest emergency room.      The information contained in the After Visit Summary has been

## 2023-07-31 ENCOUNTER — OFFICE VISIT (OUTPATIENT)
Dept: PRIMARY CARE CLINIC | Age: 73
End: 2023-07-31
Payer: MEDICARE

## 2023-07-31 VITALS
DIASTOLIC BLOOD PRESSURE: 112 MMHG | HEART RATE: 65 BPM | OXYGEN SATURATION: 95 % | SYSTOLIC BLOOD PRESSURE: 160 MMHG | HEIGHT: 61 IN | WEIGHT: 293 LBS | BODY MASS INDEX: 55.32 KG/M2

## 2023-07-31 DIAGNOSIS — Z13.220 ENCOUNTER FOR LIPID SCREENING FOR CARDIOVASCULAR DISEASE: ICD-10-CM

## 2023-07-31 DIAGNOSIS — Z79.899 MEDICATION MANAGEMENT: ICD-10-CM

## 2023-07-31 DIAGNOSIS — R60.0 LOCALIZED EDEMA: ICD-10-CM

## 2023-07-31 DIAGNOSIS — I48.0 PAROXYSMAL ATRIAL FIBRILLATION (HCC): ICD-10-CM

## 2023-07-31 DIAGNOSIS — M19.91 PRIMARY OSTEOARTHRITIS, UNSPECIFIED SITE: ICD-10-CM

## 2023-07-31 DIAGNOSIS — I89.0 LYMPHEDEMA: ICD-10-CM

## 2023-07-31 DIAGNOSIS — I10 ESSENTIAL (PRIMARY) HYPERTENSION: Primary | ICD-10-CM

## 2023-07-31 DIAGNOSIS — Z12.11 ENCOUNTER FOR SCREENING FOR MALIGNANT NEOPLASM OF COLON: ICD-10-CM

## 2023-07-31 DIAGNOSIS — Z13.6 ENCOUNTER FOR LIPID SCREENING FOR CARDIOVASCULAR DISEASE: ICD-10-CM

## 2023-07-31 DIAGNOSIS — M19.91 PRIMARY OSTEOARTHRITIS, UNSPECIFIED SITE: Primary | ICD-10-CM

## 2023-07-31 DIAGNOSIS — F41.8 DEPRESSION WITH ANXIETY: ICD-10-CM

## 2023-07-31 DIAGNOSIS — Z00.00 ANNUAL PHYSICAL EXAM: ICD-10-CM

## 2023-07-31 PROCEDURE — G8427 DOCREV CUR MEDS BY ELIG CLIN: HCPCS | Performed by: FAMILY MEDICINE

## 2023-07-31 PROCEDURE — 1036F TOBACCO NON-USER: CPT | Performed by: FAMILY MEDICINE

## 2023-07-31 PROCEDURE — 3017F COLORECTAL CA SCREEN DOC REV: CPT | Performed by: FAMILY MEDICINE

## 2023-07-31 PROCEDURE — 3080F DIAST BP >= 90 MM HG: CPT | Performed by: FAMILY MEDICINE

## 2023-07-31 PROCEDURE — 1123F ACP DISCUSS/DSCN MKR DOCD: CPT | Performed by: FAMILY MEDICINE

## 2023-07-31 PROCEDURE — 99214 OFFICE O/P EST MOD 30 MIN: CPT | Performed by: FAMILY MEDICINE

## 2023-07-31 PROCEDURE — 3077F SYST BP >= 140 MM HG: CPT | Performed by: FAMILY MEDICINE

## 2023-07-31 PROCEDURE — G8399 PT W/DXA RESULTS DOCUMENT: HCPCS | Performed by: FAMILY MEDICINE

## 2023-07-31 PROCEDURE — 1090F PRES/ABSN URINE INCON ASSESS: CPT | Performed by: FAMILY MEDICINE

## 2023-07-31 PROCEDURE — G8417 CALC BMI ABV UP PARAM F/U: HCPCS | Performed by: FAMILY MEDICINE

## 2023-07-31 RX ORDER — POTASSIUM CHLORIDE 750 MG/1
10 TABLET, EXTENDED RELEASE ORAL 2 TIMES DAILY
Qty: 180 TABLET | Refills: 1 | Status: SHIPPED | OUTPATIENT
Start: 2023-07-31

## 2023-07-31 RX ORDER — ESCITALOPRAM OXALATE 20 MG/1
20 TABLET ORAL DAILY
Qty: 90 TABLET | Refills: 3 | Status: SHIPPED | OUTPATIENT
Start: 2023-07-31

## 2023-07-31 RX ORDER — NAPROXEN 500 MG/1
TABLET ORAL
Qty: 180 TABLET | Refills: 3 | Status: SHIPPED | OUTPATIENT
Start: 2023-07-31

## 2023-07-31 RX ORDER — BUMETANIDE 1 MG/1
1 TABLET ORAL DAILY
Qty: 90 TABLET | Refills: 3 | Status: SHIPPED | OUTPATIENT
Start: 2023-07-31

## 2023-07-31 RX ORDER — TELMISARTAN 20 MG/1
20 TABLET ORAL DAILY
Qty: 90 TABLET | Refills: 3 | Status: SHIPPED | OUTPATIENT
Start: 2023-07-31

## 2023-07-31 ASSESSMENT — ENCOUNTER SYMPTOMS
EYE REDNESS: 0
VOMITING: 0
DIARRHEA: 0
NAUSEA: 0
COUGH: 0
EYE DISCHARGE: 0
SHORTNESS OF BREATH: 0
RHINORRHEA: 0
SORE THROAT: 0
ABDOMINAL PAIN: 0
WHEEZING: 0

## 2023-07-31 NOTE — PROGRESS NOTES
[address] PRIMARY CARE  [address]  [facility] [address]  Dept: [facility] is a 71 y.o. female Established patient, who presents today for her medical conditions/complaints as noted below. Chief Complaint   Patient presents with    Hypertension     Pt is complaining of headaches       HPI:     HPI  Patient here for follow-up of high blood pressure. Denies any chest pain or shortness of breath. Has been having some headaches. States upon care blood pressure has continued to. Patient had the Lopressor 1/2 tablet twice daily for mild left-ventricular diastolic dysfunction. Denies any chest fatigue. Continues to use naproxen for diffuse arthritic pain in the lower legs. Moods been doing well. Not feeling down or sad. Patient states not able lose weight. Patient is been out of her Bumex and her Lasix. Requesting refills. Does have history of lymphedema.     Reviewed prior notes None  Reviewed previous Labs and Imaging    LDL Cholesterol (mg/dL)   Date Value   11/21/2019 105     LDL Calculated (mg/dL)   Date Value   06/07/2017 131       (goal LDL is <100)   AST (U/L)   Date Value   06/16/2022 26     ALT (U/L)   Date Value   06/16/2022 12     BUN (mg/dL)   Date Value   03/03/2022 15     Hemoglobin A1C (%)   Date Value   08/21/2018 5.2     TSH (mIU/L)   Date Value   07/07/2021 1.70     BP Readings from Last 3 Encounters:   07/31/23 (!) 160/112   07/27/23 (!) 186/78   07/20/23 (!) 163/67          (goal 120/80)    Past Medical History:   Diagnosis Date    Bell's palsy     Cellulitis     Hypertension       Past Surgical History:   Procedure Laterality Date    CATARACT REMOVAL Bilateral 2015    HYSTERECTOMY, TOTAL ABDOMINAL (CERVIX REMOVED)  1990    INCISION AND DRAINAGE Left 07/06/2017    LEG INCISION AND DRAINAGE ABSCESS performed by Vanessa Marcano MD at [address] [place] [address] Right 09/24/2018    TOTAL KNEE ARTHROPLASTY

## 2023-08-02 ENCOUNTER — HOSPITAL ENCOUNTER (OUTPATIENT)
Dept: WOUND CARE | Age: 73
Discharge: HOME | End: 2023-08-02
Payer: MEDICARE

## 2023-08-02 VITALS
TEMPERATURE: 98.4 F | WEIGHT: 293 LBS | HEIGHT: 61 IN | HEART RATE: 56 BPM | BODY MASS INDEX: 55.32 KG/M2 | RESPIRATION RATE: 20 BRPM | DIASTOLIC BLOOD PRESSURE: 80 MMHG | SYSTOLIC BLOOD PRESSURE: 186 MMHG

## 2023-08-02 DIAGNOSIS — L97.822 NON-PRESSURE CHRONIC ULCER OF OTHER PART OF LEFT LOWER LEG WITH FAT LAYER EXPOSED (HCC): Primary | ICD-10-CM

## 2023-08-02 DIAGNOSIS — I89.0 LYMPHEDEMA: ICD-10-CM

## 2023-08-02 DIAGNOSIS — I87.2 VENOUS INSUFFICIENCY OF BOTH LOWER EXTREMITIES: ICD-10-CM

## 2023-08-02 PROCEDURE — 11042 DBRDMT SUBQ TIS 1ST 20SQCM/<: CPT | Performed by: NURSE PRACTITIONER

## 2023-08-02 PROCEDURE — 11042 DBRDMT SUBQ TIS 1ST 20SQCM/<: CPT

## 2023-08-02 RX ORDER — LIDOCAINE HYDROCHLORIDE 20 MG/ML
JELLY TOPICAL ONCE
OUTPATIENT
Start: 2023-08-02 | End: 2023-08-02

## 2023-08-02 RX ORDER — LIDOCAINE HYDROCHLORIDE 40 MG/ML
SOLUTION TOPICAL ONCE
Status: COMPLETED | OUTPATIENT
Start: 2023-08-02 | End: 2023-08-02

## 2023-08-02 RX ORDER — LIDOCAINE HYDROCHLORIDE 40 MG/ML
SOLUTION TOPICAL ONCE
OUTPATIENT
Start: 2023-08-02 | End: 2023-08-02

## 2023-08-02 RX ADMIN — LIDOCAINE HYDROCHLORIDE 5 ML: 40 SOLUTION TOPICAL at 08:40

## 2023-08-02 ASSESSMENT — ENCOUNTER SYMPTOMS
DIARRHEA: 0
SHORTNESS OF BREATH: 0
NAUSEA: 0
COUGH: 0
VOMITING: 0
RHINORRHEA: 0

## 2023-08-02 ASSESSMENT — PAIN SCALES - GENERAL: PAINLEVEL_OUTOF10: 0

## 2023-08-02 NOTE — PROGRESS NOTES
[employer] Application   Below Knee    NAME:  Keaton Madrigal  YOB: 1950  MEDICAL RECORD NUMBER:  [id number]  DATE:  8/2/2023    [x] Removed old Faustino Jacob boot if indicated and wash leg with mild soap and water. [x] Applied moisturizing agent to dry skin as needed. [x] Appied primary and secondary dressing as ordered    [x] Applied Unna roll from toes to knee overlapping each time. [x] Applied ace wrap or coban from toes to below the knee. [x] Secured with tape and/or metal clips covered with tape. [x] Instructed patient/caregiver to keep dressing dry and intact. DO NOT REMOVE DRESSING. [x] Instructed pt/family/caregiver to report excessive draining, loose bandage, wet dressing, severe pain or tingling in toes. [x] Applied [employer] dressing below the knee to Left lower leg(s)       Unna Boot(s) were applied per  Guidelines.      Electronically signed by Ivor Apley, RN on 8/2/2023 at 9:17 AM
Documentation Flow Sheet    Wound/Ulcer #: 1    Post Debridement Measurements:  Wound/Ulcer Descriptions are Pre Debridement except measurements:    Wound 05/24/23 Leg Left; Lower; Lateral wound #1 (Active)   Wound Image   07/27/23 0842   Wound Etiology Venous 08/02/23 0830   Dressing Status New drainage noted; Old drainage noted 08/02/23 0830   Wound Cleansed Soap and water 08/02/23 0830   Dressing/Treatment Other (comment) 07/13/23 0855   Wound Length (cm) 1.2 cm 08/02/23 0830   Wound Width (cm) 1 cm 08/02/23 0830   Wound Depth (cm) 0.2 cm 08/02/23 0830   Wound Surface Area (cm^2) 1.2 cm^2 08/02/23 0830   Change in Wound Size % (l*w) 0.83 08/02/23 0830   Wound Volume (cm^3) 0.24 cm^3 08/02/23 0830   Wound Healing % 1 08/02/23 0830   Post-Procedure Length (cm) 1 cm 08/02/23 0830   Post-Procedure Width (cm) 1 cm 08/02/23 0830   Post-Procedure Depth (cm) 0.2 cm 08/02/23 0830   Post-Procedure Surface Area (cm^2) 1 cm^2 08/02/23 0830   Post-Procedure Volume (cm^3) 0.2 cm^3 08/02/23 0830   Wound Assessment [place]/red;Slough 08/02/23 0830   Drainage Amount Moderate 08/02/23 0830   Drainage Description Serosanguinous 08/02/23 0830   Odor None 08/02/23 0830   Sis-wound Assessment Blanchable erythema 08/02/23 0830   Margins Defined edges 08/02/23 0830   Wound Thickness Description not for Pressure Injury Full thickness 08/02/23 0830   Number of days: 69          Percent of Wound(s)/Ulcer(s) Debrided: 100%    Total Surface Area Debrided:  1.0 sq cm     Diabetic/Pressure/Non Pressure Ulcers only:  Ulcer: Non-Pressure ulcer, fat layer exposed    Estimated Blood Loss:  None    Hemostasis Achieved:  not needed    Procedural Pain:  2  / 10     Post Procedural Pain:  2 / 10     Response to treatment:  Well tolerated by patient. Plan:     Treatment Note please see Discharge Instructions    Written patient dismissal instructions given to patient and signed by patient or POA.            Electronically signed by Mary Walter,

## 2023-08-04 NOTE — DISCHARGE INSTRUCTIONS
reviewed with me, the patient and/or responsible adult, by my health care provider(s). I had the opportunity to ask questions regarding this information.  I have elected to receive;      []After Visit Summary  [x]Comprehensive Discharge Instruction        Patient signature______________________________________Date:________    Electronically signed by TOPHER Darden CNP on 8/9/2023 at 8:25 AM  Electronically signed by Kennedi Rojas RN on 8/9/2023 at 8:52 AM

## 2023-08-08 DIAGNOSIS — Z00.00 ANNUAL PHYSICAL EXAM: ICD-10-CM

## 2023-08-08 DIAGNOSIS — Z13.6 ENCOUNTER FOR LIPID SCREENING FOR CARDIOVASCULAR DISEASE: ICD-10-CM

## 2023-08-08 DIAGNOSIS — Z13.220 ENCOUNTER FOR LIPID SCREENING FOR CARDIOVASCULAR DISEASE: ICD-10-CM

## 2023-08-08 DIAGNOSIS — Z79.899 MEDICATION MANAGEMENT: ICD-10-CM

## 2023-08-08 LAB
ABSOLUTE IMMATURE GRANULOCYTE: <0.03 K/UL (ref 0–0.3)
ALBUMIN SERPL-MCNC: 4.3 G/DL (ref 3.5–5.2)
ALBUMIN/GLOBULIN RATIO: 0.9 (ref 1–2.5)
ALP BLD-CCNC: 84 U/L (ref 35–104)
ALT SERPL-CCNC: 13 U/L (ref 5–33)
ANION GAP SERPL CALCULATED.3IONS-SCNC: 11 MMOL/L (ref 9–17)
AST SERPL-CCNC: 26 U/L
BASOPHILS ABSOLUTE: <0.03 K/UL (ref 0–0.2)
BASOPHILS RELATIVE PERCENT: 1 % (ref 0–2)
BILIRUB SERPL-MCNC: 0.6 MG/DL (ref 0.3–1.2)
BILIRUBIN DIRECT: 0.1 MG/DL
BILIRUBIN, INDIRECT: 0.5 MG/DL (ref 0–1)
BUN BLDV-MCNC: 21 MG/DL (ref 8–23)
CALCIUM SERPL-MCNC: 9.7 MG/DL (ref 8.6–10.4)
CHLORIDE BLD-SCNC: 98 MMOL/L (ref 98–107)
CHOLESTEROL, FASTING: 178 MG/DL
CHOLESTEROL/HDL RATIO: 4
CO2: 29 MMOL/L (ref 20–31)
CREAT SERPL-MCNC: 0.8 MG/DL (ref 0.5–0.9)
EOSINOPHILS ABSOLUTE: 0.19 K/UL (ref 0–0.44)
EOSINOPHILS RELATIVE PERCENT: 5 % (ref 1–4)
GFR SERPL CREATININE-BSD FRML MDRD: >60 ML/MIN/1.73M2
GLUCOSE FASTING: 98 MG/DL (ref 70–99)
HCT VFR BLD CALC: 43.8 % (ref 36.3–47.1)
HDLC SERPL-MCNC: 44 MG/DL
HEMOGLOBIN: 14.5 G/DL (ref 11.9–15.1)
IMMATURE GRANULOCYTES: 0 %
LDL CHOLESTEROL: 112 MG/DL (ref 0–130)
LYMPHOCYTES ABSOLUTE: 0.79 K/UL (ref 1.1–3.7)
LYMPHOCYTES RELATIVE PERCENT: 21 % (ref 24–43)
MCH RBC QN AUTO: 30.3 PG (ref 25.2–33.5)
MCHC RBC AUTO-ENTMCNC: 33.1 G/DL (ref 28.4–34.8)
MCV RBC AUTO: 91.6 FL (ref 82.6–102.9)
MONOCYTES ABSOLUTE: 0.37 K/UL (ref 0.1–1.2)
MONOCYTES RELATIVE PERCENT: 10 % (ref 3–12)
NEUTROPHILS ABSOLUTE: 2.42 K/UL (ref 1.5–8.1)
NEUTROPHILS RELATIVE PERCENT: 63 % (ref 36–65)
NRBC AUTOMATED: 0 PER 100 WBC
PDW BLD-RTO: 14.7 % (ref 11.8–14.4)
PLATELET # BLD: 231 K/UL (ref 138–453)
PMV BLD AUTO: 11.2 FL (ref 8.1–13.5)
POTASSIUM SERPL-SCNC: 4.3 MMOL/L (ref 3.7–5.3)
RBC # BLD: 4.78 M/UL (ref 3.95–5.11)
RBC # BLD: ABNORMAL 10*6/UL
SODIUM BLD-SCNC: 138 MMOL/L (ref 135–144)
TOTAL PROTEIN: 9.2 G/DL (ref 6.4–8.3)
TRIGLYCERIDE, FASTING: 108 MG/DL
WBC # BLD: 3.8 K/UL (ref 3.5–11.3)

## 2023-08-09 ENCOUNTER — HOSPITAL ENCOUNTER (OUTPATIENT)
Dept: WOUND CARE | Age: 73
Discharge: HOME | End: 2023-08-09
Payer: MEDICARE

## 2023-08-09 VITALS
HEART RATE: 63 BPM | SYSTOLIC BLOOD PRESSURE: 170 MMHG | HEIGHT: 61 IN | BODY MASS INDEX: 55.32 KG/M2 | TEMPERATURE: 96.8 F | RESPIRATION RATE: 20 BRPM | WEIGHT: 293 LBS | DIASTOLIC BLOOD PRESSURE: 78 MMHG

## 2023-08-09 DIAGNOSIS — I87.2 VENOUS INSUFFICIENCY OF BOTH LOWER EXTREMITIES: ICD-10-CM

## 2023-08-09 DIAGNOSIS — I89.0 LYMPHEDEMA: ICD-10-CM

## 2023-08-09 DIAGNOSIS — L97.822 NON-PRESSURE CHRONIC ULCER OF OTHER PART OF LEFT LOWER LEG WITH FAT LAYER EXPOSED (HCC): Primary | ICD-10-CM

## 2023-08-09 PROCEDURE — 11042 DBRDMT SUBQ TIS 1ST 20SQCM/<: CPT

## 2023-08-09 RX ORDER — LIDOCAINE HYDROCHLORIDE 20 MG/ML
JELLY TOPICAL ONCE
OUTPATIENT
Start: 2023-08-09 | End: 2023-08-09

## 2023-08-09 RX ORDER — LIDOCAINE HYDROCHLORIDE 40 MG/ML
SOLUTION TOPICAL ONCE
OUTPATIENT
Start: 2023-08-09 | End: 2023-08-09

## 2023-08-09 RX ORDER — LIDOCAINE HYDROCHLORIDE 40 MG/ML
SOLUTION TOPICAL ONCE
Status: COMPLETED | OUTPATIENT
Start: 2023-08-09 | End: 2023-08-09

## 2023-08-09 RX ADMIN — LIDOCAINE HYDROCHLORIDE 5 ML: 40 SOLUTION TOPICAL at 08:31

## 2023-08-09 ASSESSMENT — ENCOUNTER SYMPTOMS
VOMITING: 0
DIARRHEA: 0
SHORTNESS OF BREATH: 0
COUGH: 0
RHINORRHEA: 0
NAUSEA: 0

## 2023-08-09 ASSESSMENT — PAIN DESCRIPTION - PAIN TYPE: TYPE: CHRONIC PAIN

## 2023-08-09 ASSESSMENT — PAIN DESCRIPTION - FREQUENCY: FREQUENCY: CONTINUOUS

## 2023-08-09 ASSESSMENT — PAIN DESCRIPTION - LOCATION: LOCATION: LEG

## 2023-08-09 ASSESSMENT — PAIN DESCRIPTION - DESCRIPTORS: DESCRIPTORS: SORE

## 2023-08-09 ASSESSMENT — PAIN SCALES - GENERAL: PAINLEVEL_OUTOF10: 0

## 2023-08-09 ASSESSMENT — PAIN DESCRIPTION - ORIENTATION: ORIENTATION: LEFT;LOWER

## 2023-08-09 ASSESSMENT — PAIN - FUNCTIONAL ASSESSMENT: PAIN_FUNCTIONAL_ASSESSMENT: PREVENTS OR INTERFERES SOME ACTIVE ACTIVITIES AND ADLS

## 2023-08-09 ASSESSMENT — PAIN DESCRIPTION - ONSET: ONSET: ON-GOING

## 2023-08-09 NOTE — WOUND CARE
[employer] Application   Below Knee    NAME:  Chemo Sanchez  YOB: 1950  MEDICAL RECORD NUMBER:  [id number]  DATE:  8/9/2023    [x] Removed old Manley Cone boot if indicated and wash leg with mild soap and water. [x] Applied moisturizing agent to dry skin as needed. [x] Appied primary and secondary dressing as ordered    [x] Applied Unna roll from toes to knee overlapping each time. [x] Applied ace wrap or coban from toes to below the knee. [x] Secured with tape and/or metal clips covered with tape. [x] Instructed patient/caregiver to keep dressing dry and intact. DO NOT REMOVE DRESSING. [x] Instructed pt/family/caregiver to report excessive draining, loose bandage, wet dressing, severe pain or tingling in toes. [] Applied [employer] dressing below the knee to Left lower leg(s)       Unna Boot(s) were applied per  Guidelines.      Electronically signed by Viviana Landers RN on 8/9/2023 at 9:19 AM

## 2023-08-14 NOTE — DISCHARGE INSTRUCTIONS
[facility] HYPERBARIC TREATMENT  CENTER                            Visit  Discharge Instructions / Physician Orders  DATE: 8/16/23     Home Care: none     SUPPLIES ORDERED THRU:    none                 DATE LAST SUPPLIED  na     Wound Location:  Left lower leg     Cleanse with: Keep dry and intact     Dressing Orders:  Primary dressing Fibracol then Silver maty Calamine unna boot     Frequency:  Keep dry and intact     Additional Orders: Increase protein to diet (meat, cheese, eggs, fish, peanut butter, nuts and beans)  Multivitamin daily    a pair of compression socks 10 - 20mmhg  or 8-15 mm hg  OFFLOADING [] YES  TYPE:                  [x] NA     Weekly wound care visits until determined otherwise. Antibiotic therapy-wound care related YES [] NO [] NA[x]     MY CHART []     Smart Device  []          Your next appointment with the [address] [place] [address] is in                                                                                                    1 week with Jeronimo Sharp                                                                                                   (Please note your next appointment above and if you are unable to keep, kindly give a 24 hour notice. Thank you.)  If more than 15 min late we cannot guarantee you will be seen due to clinician schedule  Per Policy, Excessive cancellation will call for dismissal from program.  If you experience any of the following, please call the [address] [place] [address] during business hours:  [phone]     * Increase in Pain  * Temperature over 101  * Increase in drainage from your wound  * Drainage with a foul odor  * Bleeding  * Increase in swelling  * Need for compression bandage changes due to slippage, breakthrough drainage. If you need medical attention outside of the business hours of the [address] [place] [address] please contact your PCP or go to the nearest emergency room.      The information contained in the After Visit Summary has been

## 2023-08-16 ENCOUNTER — HOSPITAL ENCOUNTER (OUTPATIENT)
Dept: WOUND CARE | Age: 73
Discharge: HOME | End: 2023-08-16
Payer: MEDICARE

## 2023-08-16 VITALS
RESPIRATION RATE: 20 BRPM | HEIGHT: 61 IN | SYSTOLIC BLOOD PRESSURE: 198 MMHG | BODY MASS INDEX: 55.32 KG/M2 | TEMPERATURE: 96.3 F | HEART RATE: 70 BPM | DIASTOLIC BLOOD PRESSURE: 75 MMHG | WEIGHT: 293 LBS

## 2023-08-16 DIAGNOSIS — L97.822 NON-PRESSURE CHRONIC ULCER OF OTHER PART OF LEFT LOWER LEG WITH FAT LAYER EXPOSED (HCC): Primary | ICD-10-CM

## 2023-08-16 DIAGNOSIS — I89.0 LYMPHEDEMA: ICD-10-CM

## 2023-08-16 DIAGNOSIS — I87.2 VENOUS INSUFFICIENCY OF BOTH LOWER EXTREMITIES: ICD-10-CM

## 2023-08-16 PROCEDURE — 11042 DBRDMT SUBQ TIS 1ST 20SQCM/<: CPT

## 2023-08-16 RX ORDER — LIDOCAINE HYDROCHLORIDE 40 MG/ML
SOLUTION TOPICAL ONCE
OUTPATIENT
Start: 2023-08-16 | End: 2023-08-16

## 2023-08-16 RX ORDER — LIDOCAINE HYDROCHLORIDE 40 MG/ML
SOLUTION TOPICAL ONCE
Status: COMPLETED | OUTPATIENT
Start: 2023-08-16 | End: 2023-08-16

## 2023-08-16 RX ORDER — LIDOCAINE HYDROCHLORIDE 20 MG/ML
JELLY TOPICAL ONCE
OUTPATIENT
Start: 2023-08-16 | End: 2023-08-16

## 2023-08-16 RX ADMIN — LIDOCAINE HYDROCHLORIDE 5 ML: 40 SOLUTION TOPICAL at 09:04

## 2023-08-16 ASSESSMENT — ENCOUNTER SYMPTOMS
COUGH: 0
NAUSEA: 0
DIARRHEA: 0
VOMITING: 0
SHORTNESS OF BREATH: 0
RHINORRHEA: 0

## 2023-08-16 ASSESSMENT — PAIN SCALES - GENERAL: PAINLEVEL_OUTOF10: 0

## 2023-08-16 NOTE — PROGRESS NOTES
[employer] Application   Below Knee    NAME:  Mary Dyer  YOB: 1950  MEDICAL RECORD NUMBER:  [id number]  DATE:  8/16/2023    [x] Removed old [place] boot if indicated and wash leg with mild soap and water. [x] Applied moisturizing agent to dry skin as needed. [x] Appied primary and secondary dressing as ordered    [x] Applied Unna roll from toes to knee overlapping each time. [x] Applied ace wrap or coban from toes to below the knee. [x] Secured with tape and/or metal clips covered with tape. [x] Instructed patient/caregiver to keep dressing dry and intact. DO NOT REMOVE DRESSING. [x] Instructed pt/family/caregiver to report excessive draining, loose bandage, wet dressing, severe pain or tingling in toes. [x] Applied [employer] dressing below the knee to Left lower leg(s)       Unna Boot(s) were applied per  Guidelines.      Electronically signed by Jona Kramer RN on 8/16/2023 at 9:30 AM
Left;Lower; Lateral wound #1 (Active)   Wound Image   07/27/23 0842   Wound Etiology Venous 08/16/23 0902   Dressing Status New drainage noted; Old drainage noted 08/16/23 0902   Wound Cleansed Soap and water 08/16/23 0902   Dressing/Treatment Other (comment) 08/16/23 0902   Wound Length (cm) 1 cm 08/16/23 0902   Wound Width (cm) 0.7 cm 08/16/23 0902   Wound Depth (cm) 0.1 cm 08/16/23 0902   Wound Surface Area (cm^2) 0.7 cm^2 08/16/23 0902   Change in Wound Size % (l*w) 42.15 08/16/23 0902   Wound Volume (cm^3) 0.07 cm^3 08/16/23 0902   Wound Healing % 71 08/16/23 0902   Post-Procedure Length (cm) 1 cm 08/16/23 0902   Post-Procedure Width (cm) 0.7 cm 08/16/23 0902   Post-Procedure Depth (cm) 0.1 cm 08/16/23 0902   Post-Procedure Surface Area (cm^2) 0.7 cm^2 08/16/23 0902   Post-Procedure Volume (cm^3) 0.07 cm^3 08/16/23 0902   Wound Assessment [place]/red;Slough 08/16/23 0902   Drainage Amount Moderate (25-50%) 08/16/23 0902   Drainage Description Serosanguinous 08/16/23 0902   Odor None 08/16/23 0902   Sis-wound Assessment Blanchable erythema 08/16/23 0902   Margins Defined edges 08/16/23 0902   Wound Thickness Description not for Pressure Injury Full thickness 08/16/23 0902   Number of days: 83          Percent of Wound(s)/Ulcer(s) Debrided: 100%    Total Surface Area Debrided:  0.70 sq cm     Diabetic/Pressure/Non Pressure Ulcers only:  Ulcer: Non-Pressure ulcer, fat layer exposed    Estimated Blood Loss:  None    Hemostasis Achieved:  not needed    Procedural Pain:  2  / 10     Post Procedural Pain:  2 / 10     Response to treatment:  Well tolerated by patient. Plan:     Treatment Note please see Discharge Instructions    Written patient dismissal instructions given to patient and signed by patient or POA.            Electronically signed by TOPHER Lin - CNP on 8/16/2023 at 9:18 AM

## 2023-08-20 NOTE — DISCHARGE INSTRUCTIONS
[facility] HYPERBARIC TREATMENT  CENTER                            Visit  Discharge Instructions / Physician Orders  DATE: 8/23/23     Home Care: none     SUPPLIES ORDERED THRU:    none                 DATE LAST SUPPLIED  na     Wound Location:  Left lower leg     Cleanse with: Keep dry and intact     Dressing Orders:  Primary dressing Fibracol then Silver maty Calamine unna boot     Frequency:  Keep dry and intact     Additional Orders: Increase protein to diet (meat, cheese, eggs, fish, peanut butter, nuts and beans)  Multivitamin daily    a pair of compression socks 10 - 20mmhg  or 8-15 mm hg  OFFLOADING [] YES  TYPE:                  [x] NA     Weekly wound care visits until determined otherwise. Antibiotic therapy-wound care related YES [] NO [] NA[x]     MY CHART []     Smart Device  []          Your next appointment with the [address] [place] [address] is in                                                                                                    1 week with Belinda Sanders                                                                                                   (Please note your next appointment above and if you are unable to keep, kindly give a 24 hour notice. Thank you.)  If more than 15 min late we cannot guarantee you will be seen due to clinician schedule  Per Policy, Excessive cancellation will call for dismissal from program.  If you experience any of the following, please call the [address] [place] [address] during business hours:  [phone]     * Increase in Pain  * Temperature over 101  * Increase in drainage from your wound  * Drainage with a foul odor  * Bleeding  * Increase in swelling  * Need for compression bandage changes due to slippage, breakthrough drainage. If you need medical attention outside of the business hours of the [address] [place] [address] please contact your PCP or go to the nearest emergency room.      The information contained in the After Visit Summary has been

## 2023-08-23 ENCOUNTER — HOSPITAL ENCOUNTER (OUTPATIENT)
Dept: WOUND CARE | Age: 73
Discharge: HOME | End: 2023-08-23
Payer: MEDICARE

## 2023-08-23 VITALS
SYSTOLIC BLOOD PRESSURE: 183 MMHG | TEMPERATURE: 97.1 F | BODY MASS INDEX: 55.32 KG/M2 | RESPIRATION RATE: 18 BRPM | WEIGHT: 293 LBS | DIASTOLIC BLOOD PRESSURE: 64 MMHG | HEART RATE: 52 BPM | HEIGHT: 61 IN

## 2023-08-23 DIAGNOSIS — I87.2 VENOUS INSUFFICIENCY OF BOTH LOWER EXTREMITIES: ICD-10-CM

## 2023-08-23 DIAGNOSIS — I89.0 LYMPHEDEMA: ICD-10-CM

## 2023-08-23 DIAGNOSIS — L97.822 NON-PRESSURE CHRONIC ULCER OF OTHER PART OF LEFT LOWER LEG WITH FAT LAYER EXPOSED (HCC): Primary | ICD-10-CM

## 2023-08-23 PROCEDURE — 11042 DBRDMT SUBQ TIS 1ST 20SQCM/<: CPT

## 2023-08-23 RX ORDER — LIDOCAINE HYDROCHLORIDE 40 MG/ML
SOLUTION TOPICAL ONCE
Status: COMPLETED | OUTPATIENT
Start: 2023-08-23 | End: 2023-08-23

## 2023-08-23 RX ORDER — LIDOCAINE HYDROCHLORIDE 40 MG/ML
SOLUTION TOPICAL ONCE
OUTPATIENT
Start: 2023-08-23 | End: 2023-08-23

## 2023-08-23 RX ORDER — LIDOCAINE HYDROCHLORIDE 20 MG/ML
JELLY TOPICAL ONCE
OUTPATIENT
Start: 2023-08-23 | End: 2023-08-23

## 2023-08-23 RX ADMIN — LIDOCAINE HYDROCHLORIDE 5 ML: 40 SOLUTION TOPICAL at 09:29

## 2023-08-23 ASSESSMENT — ENCOUNTER SYMPTOMS
VOMITING: 0
COUGH: 0
NAUSEA: 0
RHINORRHEA: 0
DIARRHEA: 0
SHORTNESS OF BREATH: 0

## 2023-08-23 ASSESSMENT — PAIN SCALES - GENERAL: PAINLEVEL_OUTOF10: 0

## 2023-08-23 NOTE — PROGRESS NOTES
[employer] Application   Below Knee    NAME:  Bridgette Law  YOB: 1950  MEDICAL RECORD NUMBER:  [id number]  DATE:  8/23/2023    [x] Removed old Renetta Maillard boot if indicated and wash leg with mild soap and water. [x] Applied moisturizing agent to dry skin as needed. [x] Appied primary and secondary dressing as ordered    [x] Applied Unna roll from toes to knee overlapping each time. [x] Applied ace wrap or coban from toes to below the knee. [x] Secured with tape and/or metal clips covered with tape. [x] Instructed patient/caregiver to keep dressing dry and intact. DO NOT REMOVE DRESSING. [x] Instructed pt/family/caregiver to report excessive draining, loose bandage, wet dressing, severe pain or tingling in toes. [x] Applied [employer] dressing below the knee to Left lower leg(s)       Unna Boot(s) were applied per  Guidelines.      Electronically signed by Shirlene Corbin RN on 8/23/2023 at 9:48 AM

## 2023-08-23 NOTE — PROGRESS NOTES
[facility]   Progress Note and Procedure Note      [address] RECORD NUMBER:  [id number]  AGE: 71 y.o. GENDER: female  : 1950  EPISODE DATE:  2023    Subjective:     Chief Complaint   Patient presents with    Wound Check     Left lateral lower leg         HISTORY of PRESENT ILLNESS CLAUDIA Montelongo is a 71 y.o. female who presents today for wound/ulcer evaluation. History of Wound Context: presents in follow up on left lower leg ulcer. No signs or symptoms of infection.     Wound/Ulcer Pain Timing/Severity: intermittent  Quality of pain: sharp  Severity:  2 / 10   Modifying Factors: None  Associated Signs/Symptoms: none    Ulcer Identification:  Ulcer Type: venous  Contributing Factors: edema, venous stasis, lymphedema, decreased mobility, and obesity         PAST MEDICAL HISTORY        Diagnosis Date    Bell's palsy     Cellulitis     Hypertension        PAST SURGICAL HISTORY    Past Surgical History:   Procedure Laterality Date    CATARACT REMOVAL Bilateral     HYSTERECTOMY, TOTAL ABDOMINAL (CERVIX REMOVED)      INCISION AND DRAINAGE Left 2017    LEG INCISION AND DRAINAGE ABSCESS performed by Debi Simon MD at [address] [place] [address] Right 2018    TOTAL KNEE ARTHROPLASTY Left 2017    VENTRAL HERNIA REPAIR  2019    5 hernias       FAMILY HISTORY    Family History   Problem Relation Age of Onset    Cancer Mother     High Blood Pressure Father     Stroke Father     Diabetes Maternal Aunt     Cancer Other         daughter/ovarian cancer    Cancer Daughter        SOCIAL HISTORY    Social History     Tobacco Use    Smoking status: Never    Smokeless tobacco: Never   Vaping Use    Vaping Use: Never used   Substance Use Topics    Alcohol use: No    Drug use: No       ALLERGIES    Allergies   Allergen Reactions    Penicillins Hives       MEDICATIONS    Current Outpatient Medications on File Prior to Encounter

## 2023-08-28 NOTE — DISCHARGE INSTRUCTIONS
[facility] and HYPERBARIC TREATMENT  CENTER                            Visit  Discharge Instructions / Physician Orders  DATE: 8/30/23     Home Care: none     SUPPLIES ORDERED THRU:    none                 DATE LAST SUPPLIED  na     Wound Location:  Left lower leg     Cleanse with: Liquid antibacterial soap and water,rinse well       Dressing Orders:  Primary dressing oanh to wound cover with gentac apply tubigrip sock     Frequency:  daily     Additional Orders: Increase protein to diet (meat, cheese, eggs, fish, peanut butter, nuts and beans)  Multivitamin daily    a pair of compression socks 10 - 20mmhg  or 8-15 mm hg  GO TO ER IF SWELLING OR REDNESS GETS WORSE  START DOXYCYCLINE 100 MG 2 X PER DAY FOR 7 DAYS- SENT TO [facility] ON [address] [place]-8/30/23  OFFLOADING [] YES  TYPE:                  [x] NA     Weekly wound care visits until determined otherwise. Antibiotic therapy-wound care related YES [] NO [] NA[x]     MY CHART []     Smart Device  []          Your next appointment with the [address] [place] [address] is in                                                                                                    1 week with Fabby Chew                                                                                                   (Please note your next appointment above and if you are unable to keep, kindly give a 24 hour notice. Thank you.)  If more than 15 min late we cannot guarantee you will be seen due to clinician schedule  Per Policy, Excessive cancellation will call for dismissal from program.  If you experience any of the following, please call the [address] [place] [address] during business hours:  [phone]     * Increase in Pain  * Temperature over 101  * Increase in drainage from your wound  * Drainage with a foul odor  * Bleeding  * Increase in swelling  * Need for compression bandage changes due to slippage, breakthrough drainage.      If you need medical attention outside of the business

## 2023-08-30 ENCOUNTER — HOSPITAL ENCOUNTER (OUTPATIENT)
Dept: MAMMOGRAPHY | Age: 73
Discharge: HOME | End: 2023-09-01
Payer: MEDICARE

## 2023-08-30 ENCOUNTER — HOSPITAL ENCOUNTER (OUTPATIENT)
Dept: WOUND CARE | Age: 73
Discharge: HOME | End: 2023-08-30
Payer: MEDICARE

## 2023-08-30 VITALS
HEIGHT: 61 IN | DIASTOLIC BLOOD PRESSURE: 86 MMHG | SYSTOLIC BLOOD PRESSURE: 181 MMHG | RESPIRATION RATE: 18 BRPM | HEART RATE: 69 BPM | TEMPERATURE: 97.6 F | BODY MASS INDEX: 55.32 KG/M2 | WEIGHT: 293 LBS

## 2023-08-30 DIAGNOSIS — I87.2 VENOUS INSUFFICIENCY OF BOTH LOWER EXTREMITIES: ICD-10-CM

## 2023-08-30 DIAGNOSIS — Z12.31 ENCOUNTER FOR SCREENING MAMMOGRAM FOR MALIGNANT NEOPLASM OF BREAST: ICD-10-CM

## 2023-08-30 DIAGNOSIS — I89.0 LYMPHEDEMA: ICD-10-CM

## 2023-08-30 DIAGNOSIS — L97.822 NON-PRESSURE CHRONIC ULCER OF OTHER PART OF LEFT LOWER LEG WITH FAT LAYER EXPOSED (HCC): Primary | ICD-10-CM

## 2023-08-30 PROCEDURE — 87070 CULTURE OTHR SPECIMN AEROBIC: CPT

## 2023-08-30 PROCEDURE — 87176 TISSUE HOMOGENIZATION CULTR: CPT

## 2023-08-30 PROCEDURE — 87075 CULTR BACTERIA EXCEPT BLOOD: CPT

## 2023-08-30 PROCEDURE — 87205 SMEAR GRAM STAIN: CPT

## 2023-08-30 PROCEDURE — 87186 SC STD MICRODIL/AGAR DIL: CPT

## 2023-08-30 PROCEDURE — 11042 DBRDMT SUBQ TIS 1ST 20SQCM/<: CPT | Performed by: NURSE PRACTITIONER

## 2023-08-30 PROCEDURE — 11042 DBRDMT SUBQ TIS 1ST 20SQCM/<: CPT

## 2023-08-30 PROCEDURE — 77063 BREAST TOMOSYNTHESIS BI: CPT

## 2023-08-30 RX ORDER — DOXYCYCLINE HYCLATE 100 MG
100 TABLET ORAL 2 TIMES DAILY
Qty: 14 TABLET | Refills: 0 | Status: SHIPPED | OUTPATIENT
Start: 2023-08-30 | End: 2023-09-06

## 2023-08-30 RX ORDER — LIDOCAINE HYDROCHLORIDE 40 MG/ML
SOLUTION TOPICAL ONCE
OUTPATIENT
Start: 2023-08-30 | End: 2023-08-30

## 2023-08-30 RX ORDER — LIDOCAINE HYDROCHLORIDE 40 MG/ML
SOLUTION TOPICAL ONCE
Status: COMPLETED | OUTPATIENT
Start: 2023-08-30 | End: 2023-08-30

## 2023-08-30 RX ORDER — LIDOCAINE HYDROCHLORIDE 20 MG/ML
JELLY TOPICAL ONCE
OUTPATIENT
Start: 2023-08-30 | End: 2023-08-30

## 2023-08-30 RX ADMIN — LIDOCAINE HYDROCHLORIDE 5 ML: 40 SOLUTION TOPICAL at 13:22

## 2023-08-30 ASSESSMENT — ENCOUNTER SYMPTOMS
DIARRHEA: 0
RHINORRHEA: 0
VOMITING: 0
SHORTNESS OF BREATH: 0
COUGH: 0
NAUSEA: 0

## 2023-08-30 ASSESSMENT — PAIN SCALES - GENERAL: PAINLEVEL_OUTOF10: 6

## 2023-08-30 NOTE — PROGRESS NOTES
[facility]   Progress Note and Procedure Note      [address] RECORD NUMBER:  [id number]  AGE: 71 y.o. GENDER: female  : 1950  EPISODE DATE:  2023    Subjective:     Chief Complaint   Patient presents with    Wound Check     Left lower leg         HISTORY of PRESENT ILLNESS HPI     Larissa Juarez is a 71 y.o. female who presents today for wound/ulcer evaluation. History of Wound Context: here to follow up on left lower leg wound. She took unna boot off yesterday as leg was painful and she wanted to shower. Left leg is edematous with erythema and warmth circumstantially around lower leg by her wound.       Wound/Ulcer Pain Timing/Severity: intermittent  Quality of pain: sharp  Severity:  2 / 10   Modifying Factors: None  Associated Signs/Symptoms: none    Ulcer Identification:  Ulcer Type: venous  Contributing Factors: edema, venous stasis, lymphedema, decreased mobility, and obesity         PAST MEDICAL HISTORY        Diagnosis Date    Bell's palsy     Cellulitis     Hypertension        PAST SURGICAL HISTORY    Past Surgical History:   Procedure Laterality Date    CATARACT REMOVAL Bilateral     HYSTERECTOMY, TOTAL ABDOMINAL (CERVIX REMOVED)  1990    INCISION AND DRAINAGE Left 2017    LEG INCISION AND DRAINAGE ABSCESS performed by Lowell Dupont MD at [address] [facility] Right 2018    TOTAL KNEE ARTHROPLASTY Left 2017    VENTRAL HERNIA REPAIR  2019    5 hernias       FAMILY HISTORY    Family History   Problem Relation Age of Onset    Cancer Mother     High Blood Pressure Father     Stroke Father     Ovarian Cancer Daughter     Cancer Daughter     Diabetes Maternal Aunt     Cancer Other         daughter/ovarian cancer       SOCIAL HISTORY    Social History     Tobacco Use    Smoking status: Never    Smokeless tobacco: Never   Vaping Use    Vaping Use: Never used   Substance Use Topics    Alcohol use: No    Drug use:

## 2023-09-02 LAB
MICROORGANISM SPEC CULT: ABNORMAL
MICROORGANISM SPEC CULT: ABNORMAL
MICROORGANISM/AGENT SPEC: ABNORMAL
MICROORGANISM/AGENT SPEC: ABNORMAL
SERVICE CMNT-IMP: ABNORMAL
SPECIMEN DESCRIPTION: ABNORMAL

## 2023-09-03 DIAGNOSIS — K25.9 GASTRIC ULCER, UNSPECIFIED CHRONICITY, UNSPECIFIED WHETHER GASTRIC ULCER HEMORRHAGE OR PERFORATION PRESENT: ICD-10-CM

## 2023-09-03 NOTE — DISCHARGE INSTRUCTIONS
please contact your PCP or go to the nearest emergency room. The information contained in the After Visit Summary has been reviewed with me, the patient and/or responsible adult, by my health care provider(s). I had the opportunity to ask questions regarding this information.  I have elected to receive;      []After Visit Summary  [x]Comprehensive Discharge Instruction        Patient signature______________________________________Date:________  Electronically signed by Rick Kennedy RN on 9/6/2023 at 8:47 AM    Electronically signed by TOPHER Cervantes CNP on 9/6/2023 at 8:32 AM

## 2023-09-05 RX ORDER — OMEPRAZOLE 20 MG/1
CAPSULE, DELAYED RELEASE ORAL
Qty: 90 CAPSULE | Refills: 0 | Status: SHIPPED | OUTPATIENT
Start: 2023-09-05

## 2023-09-05 NOTE — TELEPHONE ENCOUNTER
LAST VISIT:   7/31/2023     Future Appointments   Date Time Provider [address]   9/6/2023  8:30 AM TOPHER Tillman - CNP [facility] MELISSA [place]ledge [place]   9/11/2023  8:30 AM MD JEFFRY Waggoner

## 2023-09-06 ENCOUNTER — HOSPITAL ENCOUNTER (OUTPATIENT)
Dept: WOUND CARE | Age: 73
Discharge: HOME | End: 2023-09-06
Payer: MEDICARE

## 2023-09-06 VITALS
HEIGHT: 61 IN | RESPIRATION RATE: 20 BRPM | DIASTOLIC BLOOD PRESSURE: 71 MMHG | BODY MASS INDEX: 55.32 KG/M2 | SYSTOLIC BLOOD PRESSURE: 189 MMHG | HEART RATE: 57 BPM | TEMPERATURE: 96.7 F | WEIGHT: 293 LBS

## 2023-09-06 DIAGNOSIS — L97.822 NON-PRESSURE CHRONIC ULCER OF OTHER PART OF LEFT LOWER LEG WITH FAT LAYER EXPOSED (HCC): Primary | ICD-10-CM

## 2023-09-06 DIAGNOSIS — I87.2 VENOUS INSUFFICIENCY OF BOTH LOWER EXTREMITIES: ICD-10-CM

## 2023-09-06 DIAGNOSIS — I89.0 LYMPHEDEMA: ICD-10-CM

## 2023-09-06 PROCEDURE — 11042 DBRDMT SUBQ TIS 1ST 20SQCM/<: CPT | Performed by: NURSE PRACTITIONER

## 2023-09-06 PROCEDURE — 11042 DBRDMT SUBQ TIS 1ST 20SQCM/<: CPT

## 2023-09-06 RX ORDER — LIDOCAINE HYDROCHLORIDE 40 MG/ML
SOLUTION TOPICAL ONCE
OUTPATIENT
Start: 2023-09-06 | End: 2023-09-06

## 2023-09-06 RX ORDER — LIDOCAINE HYDROCHLORIDE 20 MG/ML
JELLY TOPICAL ONCE
OUTPATIENT
Start: 2023-09-06 | End: 2023-09-06

## 2023-09-06 RX ORDER — LIDOCAINE HYDROCHLORIDE 40 MG/ML
SOLUTION TOPICAL ONCE
Status: COMPLETED | OUTPATIENT
Start: 2023-09-06 | End: 2023-09-06

## 2023-09-06 RX ADMIN — LIDOCAINE HYDROCHLORIDE 5 ML: 40 SOLUTION TOPICAL at 08:40

## 2023-09-06 ASSESSMENT — PAIN DESCRIPTION - FREQUENCY: FREQUENCY: INTERMITTENT

## 2023-09-06 ASSESSMENT — ENCOUNTER SYMPTOMS
SHORTNESS OF BREATH: 0
NAUSEA: 0
VOMITING: 0
COUGH: 0
RHINORRHEA: 0
DIARRHEA: 0

## 2023-09-06 ASSESSMENT — PAIN DESCRIPTION - LOCATION: LOCATION: LEG

## 2023-09-06 ASSESSMENT — PAIN DESCRIPTION - ONSET: ONSET: ON-GOING

## 2023-09-06 ASSESSMENT — PAIN SCALES - GENERAL: PAINLEVEL_OUTOF10: 2

## 2023-09-06 ASSESSMENT — PAIN - FUNCTIONAL ASSESSMENT: PAIN_FUNCTIONAL_ASSESSMENT: PREVENTS OR INTERFERES SOME ACTIVE ACTIVITIES AND ADLS

## 2023-09-06 ASSESSMENT — PAIN DESCRIPTION - ORIENTATION: ORIENTATION: LEFT;LOWER

## 2023-09-06 ASSESSMENT — PAIN DESCRIPTION - DESCRIPTORS: DESCRIPTORS: SORE

## 2023-09-06 ASSESSMENT — PAIN DESCRIPTION - PAIN TYPE: TYPE: CHRONIC PAIN

## 2023-09-06 NOTE — PROGRESS NOTES
Debridement except measurements:    Wound 05/24/23 Leg Left; Lower; Lateral wound #1 (Active)   Wound Image   08/23/23 0927   Wound Etiology Venous 09/06/23 0836   Dressing Status New drainage noted; Old drainage noted 09/06/23 0836   Wound Cleansed Cleansed with saline 09/06/23 0836   Dressing/Treatment Other (comment) 08/30/23 1345   Wound Length (cm) 0.4 cm 09/06/23 0836   Wound Width (cm) 0.4 cm 09/06/23 0836   Wound Depth (cm) 0.2 cm 09/06/23 0836   Wound Surface Area (cm^2) 0.16 cm^2 09/06/23 0836   Change in Wound Size % (l*w) 86.78 09/06/23 0836   Wound Volume (cm^3) 0.032 cm^3 09/06/23 0836   Wound Healing % 87 09/06/23 0836   Post-Procedure Length (cm) 0.4 cm 09/06/23 0836   Post-Procedure Width (cm) 0.4 cm 09/06/23 0836   Post-Procedure Depth (cm) 0.2 cm 09/06/23 0836   Post-Procedure Surface Area (cm^2) 0.16 cm^2 09/06/23 0836   Post-Procedure Volume (cm^3) 0.032 cm^3 09/06/23 0836   Wound Assessment [facility] 09/06/23 0836   Drainage Amount Moderate (25-50%) 09/06/23 0836   Drainage Description Serosanguinous 08/30/23 1319   Odor None 09/06/23 0836   Sis-wound Assessment Blanchable erythema 09/06/23 0836   Margins Defined edges 09/06/23 0836   Wound Thickness Description not for Pressure Injury Full thickness 09/06/23 0836   Number of days: 104          Percent of Wound(s)/Ulcer(s) Debrided: 100%    Total Surface Area Debrided:  0.16 sq cm     Diabetic/Pressure/Non Pressure Ulcers only:  Ulcer: Non-Pressure ulcer, fat layer exposed    Estimated Blood Loss:  Minimal    Hemostasis Achieved:  by pressure    Procedural Pain:  2  / 10     Post Procedural Pain:  2 / 10     Response to treatment:  Well tolerated by patient. Plan:     Treatment Note please see Discharge Instructions    Written patient dismissal instructions given to patient and signed by patient or POA.            Electronically signed by TOPHER Retana CNP on 9/6/2023 at 8:56 AM
none

## 2023-09-11 ENCOUNTER — OFFICE VISIT (OUTPATIENT)
Dept: PRIMARY CARE CLINIC | Age: 73
End: 2023-09-11
Payer: MEDICARE

## 2023-09-11 VITALS
BODY MASS INDEX: 55.32 KG/M2 | WEIGHT: 293 LBS | HEIGHT: 61 IN | HEART RATE: 79 BPM | DIASTOLIC BLOOD PRESSURE: 84 MMHG | OXYGEN SATURATION: 95 % | SYSTOLIC BLOOD PRESSURE: 160 MMHG

## 2023-09-11 DIAGNOSIS — Z23 NEED FOR VACCINATION: ICD-10-CM

## 2023-09-11 DIAGNOSIS — I10 ESSENTIAL (PRIMARY) HYPERTENSION: Primary | ICD-10-CM

## 2023-09-11 PROCEDURE — G8399 PT W/DXA RESULTS DOCUMENT: HCPCS | Performed by: FAMILY MEDICINE

## 2023-09-11 PROCEDURE — G0008 ADMIN INFLUENZA VIRUS VAC: HCPCS | Performed by: FAMILY MEDICINE

## 2023-09-11 PROCEDURE — 99213 OFFICE O/P EST LOW 20 MIN: CPT | Performed by: FAMILY MEDICINE

## 2023-09-11 PROCEDURE — G8417 CALC BMI ABV UP PARAM F/U: HCPCS | Performed by: FAMILY MEDICINE

## 2023-09-11 PROCEDURE — 3017F COLORECTAL CA SCREEN DOC REV: CPT | Performed by: FAMILY MEDICINE

## 2023-09-11 PROCEDURE — 90694 VACC AIIV4 NO PRSRV 0.5ML IM: CPT | Performed by: FAMILY MEDICINE

## 2023-09-11 PROCEDURE — 3077F SYST BP >= 140 MM HG: CPT | Performed by: FAMILY MEDICINE

## 2023-09-11 PROCEDURE — G8427 DOCREV CUR MEDS BY ELIG CLIN: HCPCS | Performed by: FAMILY MEDICINE

## 2023-09-11 PROCEDURE — 3079F DIAST BP 80-89 MM HG: CPT | Performed by: FAMILY MEDICINE

## 2023-09-11 PROCEDURE — 1090F PRES/ABSN URINE INCON ASSESS: CPT | Performed by: FAMILY MEDICINE

## 2023-09-11 PROCEDURE — 1036F TOBACCO NON-USER: CPT | Performed by: FAMILY MEDICINE

## 2023-09-11 PROCEDURE — 1123F ACP DISCUSS/DSCN MKR DOCD: CPT | Performed by: FAMILY MEDICINE

## 2023-09-11 RX ORDER — TELMISARTAN 40 MG/1
40 TABLET ORAL DAILY
Qty: 90 TABLET | Refills: 3 | Status: SHIPPED | OUTPATIENT
Start: 2023-09-11

## 2023-09-11 RX ORDER — SOLIFENACIN SUCCINATE 10 MG/1
10 TABLET, FILM COATED ORAL DAILY
Qty: 90 TABLET | Refills: 3 | Status: SHIPPED | OUTPATIENT
Start: 2023-09-11

## 2023-09-11 ASSESSMENT — ENCOUNTER SYMPTOMS
VOMITING: 0
COUGH: 0
RHINORRHEA: 0
ABDOMINAL PAIN: 0
SORE THROAT: 0
WHEEZING: 0
EYE REDNESS: 0
NAUSEA: 0
EYE DISCHARGE: 0
SHORTNESS OF BREATH: 0
DIARRHEA: 0

## 2023-09-13 ENCOUNTER — HOSPITAL ENCOUNTER (OUTPATIENT)
Dept: WOUND CARE | Age: 73
Discharge: HOME | End: 2023-09-13
Payer: MEDICARE

## 2023-09-13 VITALS
HEART RATE: 59 BPM | DIASTOLIC BLOOD PRESSURE: 86 MMHG | HEIGHT: 61 IN | BODY MASS INDEX: 55.32 KG/M2 | TEMPERATURE: 97.3 F | WEIGHT: 293 LBS | SYSTOLIC BLOOD PRESSURE: 179 MMHG | RESPIRATION RATE: 18 BRPM

## 2023-09-13 DIAGNOSIS — L97.822 NON-PRESSURE CHRONIC ULCER OF OTHER PART OF LEFT LOWER LEG WITH FAT LAYER EXPOSED (HCC): Primary | ICD-10-CM

## 2023-09-13 DIAGNOSIS — I89.0 LYMPHEDEMA: ICD-10-CM

## 2023-09-13 DIAGNOSIS — I87.2 VENOUS INSUFFICIENCY OF BOTH LOWER EXTREMITIES: ICD-10-CM

## 2023-09-13 PROCEDURE — 11042 DBRDMT SUBQ TIS 1ST 20SQCM/<: CPT | Performed by: NURSE PRACTITIONER

## 2023-09-13 PROCEDURE — 11042 DBRDMT SUBQ TIS 1ST 20SQCM/<: CPT

## 2023-09-13 RX ORDER — LIDOCAINE HYDROCHLORIDE 40 MG/ML
SOLUTION TOPICAL ONCE
OUTPATIENT
Start: 2023-09-13 | End: 2023-09-13

## 2023-09-13 RX ORDER — LIDOCAINE HYDROCHLORIDE 20 MG/ML
JELLY TOPICAL ONCE
OUTPATIENT
Start: 2023-09-13 | End: 2023-09-13

## 2023-09-13 RX ORDER — LIDOCAINE HYDROCHLORIDE 40 MG/ML
SOLUTION TOPICAL ONCE
Status: COMPLETED | OUTPATIENT
Start: 2023-09-13 | End: 2023-09-13

## 2023-09-13 RX ADMIN — LIDOCAINE HYDROCHLORIDE 5 ML: 40 SOLUTION TOPICAL at 08:29

## 2023-09-13 ASSESSMENT — ENCOUNTER SYMPTOMS
SHORTNESS OF BREATH: 0
DIARRHEA: 0
NAUSEA: 0
RHINORRHEA: 0
COUGH: 0
VOMITING: 0

## 2023-09-13 ASSESSMENT — PAIN SCALES - GENERAL: PAINLEVEL_OUTOF10: 0

## 2023-09-13 NOTE — PROGRESS NOTES
[facility]   Progress Note and Procedure Note      [address] RECORD NUMBER:  [id number]  AGE: 71 y.o. GENDER: female  : 1950  EPISODE DATE:  2023    Subjective:     Chief Complaint   Patient presents with    Wound Check     Left lower leg         HISTORY of PRESENT ILLNESS HPI     Chemo Sanchez is a 71 y.o. female who presents today for wound/ulcer evaluation.    History of Wound Context: here to follow up on left lower leg wound     Wound/Ulcer Pain Timing/Severity: intermittent  Quality of pain: sharp  Severity:  2 / 10   Modifying Factors: None  Associated Signs/Symptoms: none    Ulcer Identification:  Ulcer Type: venous  Contributing Factors: edema, venous stasis, lymphedema, decreased mobility, and obesity         PAST MEDICAL HISTORY        Diagnosis Date    Bell's palsy     Cellulitis     Hypertension        PAST SURGICAL HISTORY    Past Surgical History:   Procedure Laterality Date    CATARACT REMOVAL Bilateral     HYSTERECTOMY, TOTAL ABDOMINAL (CERVIX REMOVED)      INCISION AND DRAINAGE Left 2017    LEG INCISION AND DRAINAGE ABSCESS performed by Tiffanie Thayer MD at [address] [place] [address] Right 2018    TOTAL KNEE ARTHROPLASTY Left 2017    VENTRAL HERNIA REPAIR  2019    5 hernias       FAMILY HISTORY    Family History   Problem Relation Age of Onset    Cancer Mother     High Blood Pressure Father     Stroke Father     Ovarian Cancer Daughter     Cancer Daughter     Diabetes Maternal Aunt     Cancer Other         daughter/ovarian cancer       SOCIAL HISTORY    Social History     Tobacco Use    Smoking status: Never    Smokeless tobacco: Never   Vaping Use    Vaping Use: Never used   Substance Use Topics    Alcohol use: No    Drug use: No       ALLERGIES    Allergies   Allergen Reactions    Penicillins Hives       MEDICATIONS    Current Outpatient Medications on File Prior to Encounter   Medication Sig

## 2023-09-13 NOTE — PLAN OF CARE
Problem: Wound:  Goal: Will show signs of wound healing; wound closure and no evidence of infection  Description: Will show signs of wound healing; wound closure and no evidence of infection  Outcome: Progressing     Problem: Venous:  Goal: Signs of wound healing will improve  Description: Signs of wound healing will improve  Outcome: Progressing     Problem: Falls - Risk of:  Goal: Will remain free from falls  Description: Will remain free from falls  Outcome: Progressing

## 2023-09-13 NOTE — DISCHARGE INSTRUCTIONS
[facility] HYPERBARIC TREATMENT  CENTER                            Visit  Discharge Instructions / Physician Orders  DATE: 9/13/23     Home Care: none     SUPPLIES ORDERED THRU:    none                 DATE LAST SUPPLIED  na     Wound Location:  Left lower leg     Cleanse with: Liquid antibacterial soap and water,rinse well        Dressing Orders:  Primary dressing oanh to wound cover with gentac apply tubigrip sock     Frequency:  daily     Additional Orders: Increase protein to diet (meat, cheese, eggs, fish, peanut butter, nuts and beans)  Multivitamin daily    a pair of compression socks 10 - 20mmhg  or 8-15 mm hg    OFFLOADING [] YES  TYPE:                  [x] NA     Weekly wound care visits until determined otherwise. Antibiotic therapy-wound care related YES [] NO [] NA[x]     MY CHART []     Smart Device  []          Your next appointment with the [address] [place] [address] is in                                                                                                    1 week with Dmitriy Polanco                                                                                                   (Please note your next appointment above and if you are unable to keep, kindly give a 24 hour notice. Thank you.)  If more than 15 min late we cannot guarantee you will be seen due to clinician schedule  Per Policy, Excessive cancellation will call for dismissal from program.  If you experience any of the following, please call the [address] [place] [address] during business hours:  [phone]     * Increase in Pain  * Temperature over 101  * Increase in drainage from your wound  * Drainage with a foul odor  * Bleeding  * Increase in swelling  * Need for compression bandage changes due to slippage, breakthrough drainage. If you need medical attention outside of the business hours of the [address] [place] [address] please contact your PCP or go to the nearest emergency room.      The information contained in the

## 2023-09-17 NOTE — DISCHARGE INSTRUCTIONS
[facility]                            Visit  Discharge Instructions / Physician Orders  DATE: 9/20/23     Home Care: none     SUPPLIES ORDERED THRU:    none                 DATE LAST SUPPLIED  na     Wound Location:  Left lower leg     Cleanse with: keep dry and intact        Dressing Orders:  Primary dressing Fibracol silver maty apply foam to achilles area to wound wrap with zinc unna boot     Frequency:  keep dry and intact     Additional Orders: Increase protein to diet (meat, cheese, eggs, fish, peanut butter, nuts and beans)  Multivitamin daily  Will order Juxta lite wrap  thru [address] [facility] [address]     OFFLOADING [] YES  TYPE:                  [x] NA     Weekly wound care visits until determined otherwise. Antibiotic therapy-wound care related YES [] NO [] NA[x]     MY CHART []     Smart Device  []          Your next appointment with the [address] [place] [address] is in                                                                                                    1 week with Maci Becker                                                                                                   (Please note your next appointment above and if you are unable to keep, kindly give a 24 hour notice. Thank you.)  If more than 15 min late we cannot guarantee you will be seen due to clinician schedule  Per Policy, Excessive cancellation will call for dismissal from program.  If you experience any of the following, please call the [address] [place] [address] during business hours:  [phone]     * Increase in Pain  * Temperature over 101  * Increase in drainage from your wound  * Drainage with a foul odor  * Bleeding  * Increase in swelling  * Need for compression bandage changes due to slippage, breakthrough drainage. If you need medical attention outside of the business hours of the [address] [place] [address] please contact your PCP or go to the nearest emergency room.      The information contained in the

## 2023-09-20 ENCOUNTER — HOSPITAL ENCOUNTER (OUTPATIENT)
Dept: WOUND CARE | Age: 73
Discharge: HOME | End: 2023-09-20
Payer: MEDICARE

## 2023-09-20 VITALS
DIASTOLIC BLOOD PRESSURE: 89 MMHG | RESPIRATION RATE: 18 BRPM | HEART RATE: 64 BPM | TEMPERATURE: 97 F | WEIGHT: 293 LBS | BODY MASS INDEX: 55.32 KG/M2 | HEIGHT: 61 IN | SYSTOLIC BLOOD PRESSURE: 181 MMHG

## 2023-09-20 DIAGNOSIS — L97.822 NON-PRESSURE CHRONIC ULCER OF OTHER PART OF LEFT LOWER LEG WITH FAT LAYER EXPOSED (HCC): Primary | ICD-10-CM

## 2023-09-20 DIAGNOSIS — I87.2 VENOUS INSUFFICIENCY OF BOTH LOWER EXTREMITIES: ICD-10-CM

## 2023-09-20 DIAGNOSIS — I89.0 LYMPHEDEMA: ICD-10-CM

## 2023-09-20 PROCEDURE — 11042 DBRDMT SUBQ TIS 1ST 20SQCM/<: CPT

## 2023-09-20 PROCEDURE — 11042 DBRDMT SUBQ TIS 1ST 20SQCM/<: CPT | Performed by: NURSE PRACTITIONER

## 2023-09-20 RX ORDER — LIDOCAINE HYDROCHLORIDE 40 MG/ML
SOLUTION TOPICAL ONCE
OUTPATIENT
Start: 2023-09-20 | End: 2023-09-20

## 2023-09-20 RX ORDER — LIDOCAINE HYDROCHLORIDE 20 MG/ML
JELLY TOPICAL ONCE
OUTPATIENT
Start: 2023-09-20 | End: 2023-09-20

## 2023-09-20 RX ORDER — LIDOCAINE HYDROCHLORIDE 40 MG/ML
SOLUTION TOPICAL ONCE
Status: COMPLETED | OUTPATIENT
Start: 2023-09-20 | End: 2023-09-20

## 2023-09-20 RX ADMIN — LIDOCAINE HYDROCHLORIDE 10 ML: 40 SOLUTION TOPICAL at 09:15

## 2023-09-20 ASSESSMENT — ENCOUNTER SYMPTOMS
DIARRHEA: 0
VOMITING: 0
SHORTNESS OF BREATH: 0
RHINORRHEA: 0
COUGH: 0
NAUSEA: 0

## 2023-09-20 ASSESSMENT — PAIN SCALES - GENERAL: PAINLEVEL_OUTOF10: 0

## 2023-09-20 NOTE — PROGRESS NOTES
[facility]   Progress Note and Procedure Note      [address] RECORD NUMBER:  [id number]  AGE: 71 y.o. GENDER: female  : 1950  EPISODE DATE:  2023    Subjective:     Chief Complaint   Patient presents with    Wound Check     Left lower leg         HISTORY of PRESENT ILLNESS HPI     Robin Fragoso is a 71 y.o. female who presents today for wound/ulcer evaluation. History of Wound Context: here to follow up on left lower leg wound. Has increased in size. She is using tubigrip for compression which is not keeping her swelling under control. Will wrap in unna boot to help with edema and wound healing. She will follow up here next week.      Wound/Ulcer Pain Timing/Severity: intermittent  Quality of pain: sharp  Severity:  2 / 10   Modifying Factors: None  Associated Signs/Symptoms: none    Ulcer Identification:  Ulcer Type: venous  Contributing Factors: edema, venous stasis, lymphedema, decreased mobility, and obesity         PAST MEDICAL HISTORY        Diagnosis Date    Bell's palsy     Cellulitis     Hypertension        PAST SURGICAL HISTORY    Past Surgical History:   Procedure Laterality Date    CATARACT REMOVAL Bilateral     HYSTERECTOMY, TOTAL ABDOMINAL (CERVIX REMOVED)  1990    INCISION AND DRAINAGE Left 2017    LEG INCISION AND DRAINAGE ABSCESS performed by Dave Reyes MD at [address] [facility] Right 2018    TOTAL KNEE ARTHROPLASTY Left 2017    VENTRAL HERNIA REPAIR  2019    5 hernias       FAMILY HISTORY    Family History   Problem Relation Age of Onset    Cancer Mother     High Blood Pressure Father     Stroke Father     Ovarian Cancer Daughter     Cancer Daughter     Diabetes Maternal Aunt     Cancer Other         daughter/ovarian cancer       SOCIAL HISTORY    Social History     Tobacco Use    Smoking status: Never    Smokeless tobacco: Never   Vaping Use    Vaping Use: Never used   Substance Use Topics
[place] Application   Below Knee    NAME:  Jai Gonzalez  YOB: 1950  MEDICAL RECORD NUMBER:  [id number]  DATE:  9/20/2023    [x] Removed old Anabella Parisian boot if indicated and wash leg with mild soap and water. [x] Applied moisturizing agent to dry skin as needed. [x] Appied primary and secondary dressing as ordered    [x] Applied Unna roll from toes to knee overlapping each time. [x] Applied ace wrap or coban from toes to below the knee. [x] Secured with tape and/or metal clips covered with tape. [x] Instructed patient/caregiver to keep dressing dry and intact. DO NOT REMOVE DRESSING. [x] Instructed pt/family/caregiver to report excessive draining, loose bandage, wet dressing, severe pain or tingling in toes. [x] Applied [place] dressing below the knee to Left lower leg(s)       Unna Boot(s) were applied per  Guidelines.      Electronically signed by Isaac Barker RN on 9/20/2023 at 9:43 AM
1.1 cm 09/20/23 0905   Post-Procedure Depth (cm) 0.1 cm 09/20/23 0905   Post-Procedure Surface Area (cm^2) 0.66 cm^2 09/20/23 0905   Post-Procedure Volume (cm^3) 0.066 cm^3 09/20/23 0905   Wound Assessment Pink/red;Slough 09/20/23 0905   Drainage Amount Moderate (25-50%) 09/20/23 0905   Drainage Description Yellow 09/20/23 0905   Odor None 09/20/23 0905   Sis-wound Assessment Blanchable erythema 09/20/23 0905   Margins Defined edges 09/20/23 0905   Wound Thickness Description not for Pressure Injury Full thickness 09/20/23 0905   Number of days: 118       Wound 09/20/23 Leg Left;Distal #2 (Active)   Wound Image   09/20/23 0905   Wound Etiology Venous 09/20/23 0905   Dressing Status New drainage noted; Old drainage noted 09/20/23 0905   Wound Cleansed Cleansed with saline 09/20/23 0905   Wound Length (cm) 0.8 cm 09/20/23 0905   Wound Width (cm) 1.5 cm 09/20/23 0905   Wound Depth (cm) 0.1 cm 09/20/23 0905   Wound Surface Area (cm^2) 1.2 cm^2 09/20/23 0905   Wound Volume (cm^3) 0.12 cm^3 09/20/23 0905   Post-Procedure Length (cm) 0.8 cm 09/20/23 0905   Post-Procedure Width (cm) 1.5 cm 09/20/23 0905   Post-Procedure Depth (cm) 0.1 cm 09/20/23 0905   Post-Procedure Surface Area (cm^2) 1.2 cm^2 09/20/23 0905   Post-Procedure Volume (cm^3) 0.12 cm^3 09/20/23 0905   Wound Assessment Pink/red 09/20/23 0905   Drainage Amount Moderate (25-50%) 09/20/23 0905   Drainage Description Serous 09/20/23 0905   Odor None 09/20/23 0905   Sis-wound Assessment Blanchable erythema 09/20/23 0905   Margins Defined edges 09/20/23 0905   Wound Thickness Description not for Pressure Injury Full thickness 09/20/23 0905   Number of days: 0       Right Leg Measurements: (ALL measurements are in cm)  Right Leg Edema Point of Measurement  Great toe to forefoot: 10 cm  Heel to ankle: 10 cm  Heel to calf: 30  Leg circumference: 52 cm  Ankle circumference: 27 cm  Foot circumference: 23 cm  Ankle to Knee 31 cm  Please note NO OPEN WOUND ON RIGHT PLEASE

## 2023-09-24 NOTE — DISCHARGE INSTRUCTIONS
[address] [facility]                            Visit  Discharge Instructions / Physician Orders  DATE: 9/27/23     Home Care: none     SUPPLIES ORDERED THRU:    none                 DATE LAST SUPPLIED  na     Wound Location:  Left lower leg     Cleanse with: keep dry and intact        Dressing Orders:  Primary dressing  apply foam to achilles area to wound wrap with zinc unna boot     Frequency:  keep dry and intact     Additional Orders: Increase protein to diet (meat, cheese, eggs, fish, peanut butter, nuts and beans)  Multivitamin daily  Will order Juxta lite wrap  thru [address] [facility] [address]     OFFLOADING [] YES  TYPE:                  [x] NA     Weekly wound care visits until determined otherwise. Antibiotic therapy-wound care related YES [] NO [] NA[x]     MY CHART []     Smart Device  []          Your next appointment with the [address] [place] [address] is in                                                                                                    1 week with Nury Moore                                                                                                   (Please note your next appointment above and if you are unable to keep, kindly give a 24 hour notice. Thank you.)  If more than 15 min late we cannot guarantee you will be seen due to clinician schedule  Per Policy, Excessive cancellation will call for dismissal from program.  If you experience any of the following, please call the [address] [place] [address] during business hours:  [phone]     * Increase in Pain  * Temperature over 101  * Increase in drainage from your wound  * Drainage with a foul odor  * Bleeding  * Increase in swelling  * Need for compression bandage changes due to slippage, breakthrough drainage. If you need medical attention outside of the business hours of the [address] [place] [address] please contact your PCP or go to the nearest emergency room.      The information contained in the After Visit Summary

## 2023-09-27 ENCOUNTER — HOSPITAL ENCOUNTER (OUTPATIENT)
Dept: WOUND CARE | Age: 73
Discharge: HOME | End: 2023-09-27
Payer: MEDICARE

## 2023-09-27 VITALS
RESPIRATION RATE: 20 BRPM | WEIGHT: 293 LBS | DIASTOLIC BLOOD PRESSURE: 73 MMHG | BODY MASS INDEX: 55.32 KG/M2 | SYSTOLIC BLOOD PRESSURE: 187 MMHG | HEART RATE: 64 BPM | HEIGHT: 61 IN | TEMPERATURE: 97 F

## 2023-09-27 DIAGNOSIS — I87.2 VENOUS INSUFFICIENCY OF BOTH LOWER EXTREMITIES: ICD-10-CM

## 2023-09-27 DIAGNOSIS — I89.0 LYMPHEDEMA: ICD-10-CM

## 2023-09-27 DIAGNOSIS — L97.822 NON-PRESSURE CHRONIC ULCER OF OTHER PART OF LEFT LOWER LEG WITH FAT LAYER EXPOSED (HCC): Primary | ICD-10-CM

## 2023-09-27 PROCEDURE — 29580 STRAPPING UNNA BOOT: CPT

## 2023-09-27 PROCEDURE — 99213 OFFICE O/P EST LOW 20 MIN: CPT | Performed by: NURSE PRACTITIONER

## 2023-09-27 RX ORDER — LIDOCAINE HYDROCHLORIDE 40 MG/ML
SOLUTION TOPICAL ONCE
OUTPATIENT
Start: 2023-09-27 | End: 2023-09-27

## 2023-09-27 RX ORDER — LIDOCAINE HYDROCHLORIDE 20 MG/ML
JELLY TOPICAL ONCE
OUTPATIENT
Start: 2023-09-27 | End: 2023-09-27

## 2023-09-27 RX ORDER — LIDOCAINE HYDROCHLORIDE 40 MG/ML
SOLUTION TOPICAL ONCE
Status: DISCONTINUED | OUTPATIENT
Start: 2023-09-27 | End: 2023-09-28 | Stop reason: HOSPADM

## 2023-09-27 ASSESSMENT — ENCOUNTER SYMPTOMS
SHORTNESS OF BREATH: 0
COUGH: 0
VOMITING: 0
NAUSEA: 0
RHINORRHEA: 0
DIARRHEA: 0

## 2023-09-27 NOTE — PROGRESS NOTES
[facility]   Progress Note and Procedure Note      [address] RECORD NUMBER:  [id number]  AGE: 71 y.o. GENDER: female  : 1950  EPISODE DATE:  2023    Subjective:     Chief Complaint   Patient presents with    Wound Check     Left lower posterior leg         HISTORY of PRESENT ILLNESS HPI     Sera Nuñez is a 71 y.o. female who presents today for wound/ulcer evaluation. History of Wound Context: here to follow upon left lower leg wound that is healed today. Will wrap in unna boot one more week. Juxta lite wraps ordered, have not arrived yet. She was advised to bring wraps with her to next appointment.    Wound/Ulcer Pain Timing/Severity: intermittent  Quality of pain: sharp  Severity:  2 / 10   Modifying Factors: None  Associated Signs/Symptoms: none    Ulcer Identification:  Ulcer Type: venous  Contributing Factors: edema, venous stasis, lymphedema, decreased mobility, and obesity         PAST MEDICAL HISTORY        Diagnosis Date    Bell's palsy     Cellulitis     Hypertension        PAST SURGICAL HISTORY    Past Surgical History:   Procedure Laterality Date    CATARACT REMOVAL Bilateral     HYSTERECTOMY, TOTAL ABDOMINAL (CERVIX REMOVED)  1990    INCISION AND DRAINAGE Left 2017    LEG INCISION AND DRAINAGE ABSCESS performed by Joy Merrill MD at [address] [facility] Right 2018    TOTAL KNEE ARTHROPLASTY Left 2017    VENTRAL HERNIA REPAIR  2019    5 hernias       FAMILY HISTORY    Family History   Problem Relation Age of Onset    Cancer Mother     High Blood Pressure Father     Stroke Father     Ovarian Cancer Daughter     Cancer Daughter     Diabetes Maternal Aunt     Cancer Other         daughter/ovarian cancer       SOCIAL HISTORY    Social History     Tobacco Use    Smoking status: Never    Smokeless tobacco: Never   Vaping Use    Vaping Use: Never used   Substance Use Topics    Alcohol use: No    Drug

## 2023-10-01 NOTE — DISCHARGE INSTRUCTIONS
[facility]  [place]                            Visit  Discharge Instructions / Physician Orders  DATE: 10/4/23     Home Care: none     SUPPLIES ORDERED THRU:    none                 DATE LAST SUPPLIED  na     Wound Location:  Left lower leg-HEALED     Cleanse with:         Dressing Orders:  APPLY JUXTA LITE WRAP FIRST THING IN THE AM   SILICONE DRESSING FOR PROTECTION- MAY REMOVE IN 2-3 DAYS  Frequency:       Additional Orders: Increase protein to diet (meat, cheese, eggs, fish, peanut butter, nuts and beans)  Multivitamin daily  Will order Juxta lite wrap  thru [address]     OFFLOADING [] YES  TYPE:                  [x] NA     Weekly wound care visits until determined otherwise. Antibiotic therapy-wound care related YES [] NO [] NA[x]     MY CHART []     Smart Device  []          Your next appointment with the [address] [place] [address] is in -CALL IF NEEDED                                                                                                                                                                                                      (Please note your next appointment above and if you are unable to keep, kindly give a 24 hour notice. Thank you.)  If more than 15 min late we cannot guarantee you will be seen due to clinician schedule  Per Policy, Excessive cancellation will call for dismissal from program.  If you experience any of the following, please call the [address] [place] [address] during business hours:  [phone]     * Increase in Pain  * Temperature over 101  * Increase in drainage from your wound  * Drainage with a foul odor  * Bleeding  * Increase in swelling  * Need for compression bandage changes due to slippage, breakthrough drainage. If you need medical attention outside of the business hours of the [address] [place] [address] please contact your PCP or go to the nearest emergency room.      The information contained in the After Visit Summary has been

## 2023-10-04 ENCOUNTER — HOSPITAL ENCOUNTER (OUTPATIENT)
Dept: WOUND CARE | Age: 73
Discharge: HOME | End: 2023-10-04
Payer: MEDICARE

## 2023-10-04 VITALS
BODY MASS INDEX: 55.32 KG/M2 | DIASTOLIC BLOOD PRESSURE: 75 MMHG | TEMPERATURE: 97.9 F | HEIGHT: 61 IN | SYSTOLIC BLOOD PRESSURE: 195 MMHG | WEIGHT: 293 LBS | HEART RATE: 56 BPM | RESPIRATION RATE: 20 BRPM

## 2023-10-04 DIAGNOSIS — I87.2 VENOUS INSUFFICIENCY OF BOTH LOWER EXTREMITIES: ICD-10-CM

## 2023-10-04 DIAGNOSIS — I89.0 LYMPHEDEMA: ICD-10-CM

## 2023-10-04 DIAGNOSIS — L97.822 NON-PRESSURE CHRONIC ULCER OF OTHER PART OF LEFT LOWER LEG WITH FAT LAYER EXPOSED (HCC): Primary | ICD-10-CM

## 2023-10-04 PROCEDURE — 99212 OFFICE O/P EST SF 10 MIN: CPT | Performed by: NURSE PRACTITIONER

## 2023-10-04 PROCEDURE — 99212 OFFICE O/P EST SF 10 MIN: CPT

## 2023-10-04 RX ORDER — LIDOCAINE HYDROCHLORIDE 40 MG/ML
SOLUTION TOPICAL ONCE
Status: CANCELLED | OUTPATIENT
Start: 2023-10-04 | End: 2023-10-04

## 2023-10-04 RX ORDER — LIDOCAINE HYDROCHLORIDE 40 MG/ML
SOLUTION TOPICAL ONCE
Status: COMPLETED | OUTPATIENT
Start: 2023-10-04 | End: 2023-10-04

## 2023-10-04 RX ORDER — LIDOCAINE HYDROCHLORIDE 20 MG/ML
JELLY TOPICAL ONCE
Status: CANCELLED | OUTPATIENT
Start: 2023-10-04 | End: 2023-10-04

## 2023-10-04 RX ADMIN — LIDOCAINE HYDROCHLORIDE 10 ML: 40 SOLUTION TOPICAL at 09:18

## 2023-10-04 ASSESSMENT — ENCOUNTER SYMPTOMS
NAUSEA: 0
RHINORRHEA: 0
SHORTNESS OF BREATH: 0
DIARRHEA: 0
COUGH: 0
VOMITING: 0

## 2023-10-04 NOTE — PROGRESS NOTES
[facility]   Progress Note and Procedure Note      [address] RECORD NUMBER:  [id number]  AGE: 71 y.o. GENDER: female  : 1950  EPISODE DATE:  10/4/2023    Subjective:     Chief Complaint   Patient presents with    Wound Check     LLE         HISTORY of PRESENT ILLNESS HPI Silvia Severe is a 71 y.o. female who presents today for wound/ulcer evaluation. History of Wound Context: here to follow up on left lower leg wound that is healed. She brought juxta lite wrap, will transition to that. She will follow up in wound clinic only as needed.    Wound/Ulcer Pain Timing/Severity: none  Quality of pain: N/A  Severity:  0 / 10   Modifying Factors: None  Associated Signs/Symptoms: none    Ulcer Identification:  Ulcer Type: venous  Contributing Factors: edema, venous stasis, lymphedema, decreased mobility, and obesity    Wound: N/A        PAST MEDICAL HISTORY        Diagnosis Date    Bell's palsy     Cellulitis     Hypertension        PAST SURGICAL HISTORY    Past Surgical History:   Procedure Laterality Date    CATARACT REMOVAL Bilateral     HYSTERECTOMY, TOTAL ABDOMINAL (CERVIX REMOVED)  1990    INCISION AND DRAINAGE Left 2017    LEG INCISION AND DRAINAGE ABSCESS performed by Roseann Moss MD at [address] [place] [address] Right 2018    TOTAL KNEE ARTHROPLASTY Left 2017    VENTRAL HERNIA REPAIR  2019    5 hernias       FAMILY HISTORY    Family History   Problem Relation Age of Onset    Cancer Mother     High Blood Pressure Father     Stroke Father     Ovarian Cancer Daughter     Cancer Daughter     Diabetes Maternal Aunt     Cancer Other         daughter/ovarian cancer       SOCIAL HISTORY    Social History     Tobacco Use    Smoking status: Never     Passive exposure: Never    Smokeless tobacco: Never   Vaping Use    Vaping Use: Never used   Substance Use Topics    Alcohol use: No    Drug use: No       ALLERGIES    Allergies

## 2023-10-10 ENCOUNTER — OFFICE VISIT (OUTPATIENT)
Dept: PRIMARY CARE CLINIC | Age: 73
End: 2023-10-10

## 2023-10-10 VITALS
HEART RATE: 56 BPM | HEIGHT: 61 IN | SYSTOLIC BLOOD PRESSURE: 138 MMHG | WEIGHT: 293 LBS | OXYGEN SATURATION: 92 % | BODY MASS INDEX: 55.32 KG/M2 | DIASTOLIC BLOOD PRESSURE: 80 MMHG

## 2023-10-10 DIAGNOSIS — I87.2 LYMPHEDEMA DUE TO VENOUS INSUFFICIENCY: Primary | ICD-10-CM

## 2023-10-10 DIAGNOSIS — I10 ESSENTIAL (PRIMARY) HYPERTENSION: ICD-10-CM

## 2023-10-10 DIAGNOSIS — I89.0 LYMPHEDEMA DUE TO VENOUS INSUFFICIENCY: Primary | ICD-10-CM

## 2023-10-10 DIAGNOSIS — F41.8 DEPRESSION WITH ANXIETY: ICD-10-CM

## 2023-10-10 RX ORDER — ESCITALOPRAM OXALATE 20 MG/1
20 TABLET ORAL DAILY
Qty: 90 TABLET | Refills: 3 | Status: SHIPPED | OUTPATIENT
Start: 2023-10-10

## 2023-10-10 RX ORDER — SOLIFENACIN SUCCINATE 10 MG/1
10 TABLET, FILM COATED ORAL DAILY
Qty: 90 TABLET | Refills: 3 | Status: SHIPPED | OUTPATIENT
Start: 2023-10-10

## 2023-10-10 ASSESSMENT — ENCOUNTER SYMPTOMS
BLOOD IN STOOL: 0
NAUSEA: 0
ABDOMINAL PAIN: 0
DIARRHEA: 0
VOMITING: 0
WHEEZING: 0
COUGH: 0
SHORTNESS OF BREATH: 0

## 2023-10-10 NOTE — PROGRESS NOTES
[address] PRIMARY CARE  [id number] [address]  [address] [facility] [address]  Dept: [address] [facility] [address] is a 71 y.o. female Established patient, who presents today for her medical conditions/complaints as noted below. Chief Complaint   Patient presents with    Hypertension       HPI:     HPI  Increased Micardis to 40 mg at last visit. Checks BP at [facility] a couple times per week, 140/80-84. Pt states insurance would not cover Vesicare for urge incontinence. Pt would discuss potential weight loss medications. Has tried diet changes without results. Was cleared by wound care regarding wound on L foot. Is wearing wrap on LLE. Since wearing wrap, pt has noted increased swelling around her left knee. No associated pain, numbness/tingling, redness, or warmth.      Reviewed prior notes None  Reviewed previous Labs    LDL Cholesterol (mg/dL)   Date Value   08/08/2023 112   11/21/2019 105     LDL Calculated (mg/dL)   Date Value   06/07/2017 131       (goal LDL is <100)   AST (U/L)   Date Value   08/08/2023 26     ALT (U/L)   Date Value   08/08/2023 13     BUN (mg/dL)   Date Value   08/08/2023 21     Hemoglobin A1C (%)   Date Value   08/21/2018 5.2     TSH (mIU/L)   Date Value   07/07/2021 1.70     BP Readings from Last 3 Encounters:   10/10/23 138/80   10/04/23 (!) 195/75   09/27/23 (!) 187/73          (goal 120/80)    Past Medical History:   Diagnosis Date    Bell's palsy     Cellulitis     Hypertension       Past Surgical History:   Procedure Laterality Date    CATARACT REMOVAL Bilateral 2015    HYSTERECTOMY, TOTAL ABDOMINAL (CERVIX REMOVED)  1990    INCISION AND DRAINAGE Left 07/06/2017    LEG INCISION AND DRAINAGE ABSCESS performed by Karl De Leon MD at [facility] Right 09/24/2018    TOTAL KNEE ARTHROPLASTY Left 11/09/2017    VENTRAL HERNIA REPAIR  02/11/2019    5 hernias       Family History   Problem Relation Age of Onset    Cancer

## 2023-10-30 ENCOUNTER — HOSPITAL ENCOUNTER (OUTPATIENT)
Dept: WOUND CARE | Age: 73
Discharge: HOME | End: 2023-10-30
Payer: MEDICARE

## 2023-10-30 VITALS
SYSTOLIC BLOOD PRESSURE: 170 MMHG | HEIGHT: 61 IN | RESPIRATION RATE: 20 BRPM | BODY MASS INDEX: 55.32 KG/M2 | DIASTOLIC BLOOD PRESSURE: 76 MMHG | HEART RATE: 62 BPM | TEMPERATURE: 98.7 F | WEIGHT: 293 LBS

## 2023-10-30 DIAGNOSIS — I87.2 VENOUS INSUFFICIENCY OF BOTH LOWER EXTREMITIES: ICD-10-CM

## 2023-10-30 DIAGNOSIS — I89.0 LYMPHEDEMA: Primary | ICD-10-CM

## 2023-10-30 PROBLEM — L97.202 VENOUS STASIS ULCER OF CALF WITH FAT LAYER EXPOSED WITH VARICOSE VEINS (HCC): Status: ACTIVE | Noted: 2023-10-30

## 2023-10-30 PROBLEM — I83.002 VENOUS STASIS ULCER OF CALF WITH FAT LAYER EXPOSED WITH VARICOSE VEINS (HCC): Status: ACTIVE | Noted: 2023-10-30

## 2023-10-30 PROCEDURE — 87176 TISSUE HOMOGENIZATION CULTR: CPT

## 2023-10-30 PROCEDURE — 11042 DBRDMT SUBQ TIS 1ST 20SQCM/<: CPT | Performed by: PLASTIC SURGERY

## 2023-10-30 PROCEDURE — 87205 SMEAR GRAM STAIN: CPT

## 2023-10-30 PROCEDURE — 87077 CULTURE AEROBIC IDENTIFY: CPT

## 2023-10-30 PROCEDURE — 87186 SC STD MICRODIL/AGAR DIL: CPT

## 2023-10-30 PROCEDURE — 87075 CULTR BACTERIA EXCEPT BLOOD: CPT

## 2023-10-30 PROCEDURE — 87070 CULTURE OTHR SPECIMN AEROBIC: CPT

## 2023-10-30 PROCEDURE — 11042 DBRDMT SUBQ TIS 1ST 20SQCM/<: CPT

## 2023-10-30 RX ORDER — SODIUM CHLOR/HYPOCHLOROUS ACID 0.033 %
SOLUTION, IRRIGATION IRRIGATION ONCE
OUTPATIENT
Start: 2023-10-30 | End: 2023-10-30

## 2023-10-30 RX ORDER — LIDOCAINE 40 MG/G
CREAM TOPICAL ONCE
OUTPATIENT
Start: 2023-10-30 | End: 2023-10-30

## 2023-10-30 RX ORDER — TRIAMCINOLONE ACETONIDE 1 MG/G
OINTMENT TOPICAL ONCE
OUTPATIENT
Start: 2023-10-30 | End: 2023-10-30

## 2023-10-30 RX ORDER — GINSENG 100 MG
CAPSULE ORAL ONCE
OUTPATIENT
Start: 2023-10-30 | End: 2023-10-30

## 2023-10-30 RX ORDER — DOXYCYCLINE HYCLATE 100 MG
100 TABLET ORAL 2 TIMES DAILY
Qty: 14 TABLET | Refills: 0 | Status: SHIPPED | OUTPATIENT
Start: 2023-10-30 | End: 2023-11-06

## 2023-10-30 RX ORDER — LIDOCAINE HYDROCHLORIDE 40 MG/ML
SOLUTION TOPICAL ONCE
Status: DISCONTINUED | OUTPATIENT
Start: 2023-10-30 | End: 2023-10-31 | Stop reason: HOSPADM

## 2023-10-30 RX ORDER — CLOBETASOL PROPIONATE 0.5 MG/G
OINTMENT TOPICAL ONCE
OUTPATIENT
Start: 2023-10-30 | End: 2023-10-30

## 2023-10-30 RX ORDER — BACITRACIN ZINC AND POLYMYXIN B SULFATE 500; 1000 [USP'U]/G; [USP'U]/G
OINTMENT TOPICAL ONCE
OUTPATIENT
Start: 2023-10-30 | End: 2023-10-30

## 2023-10-30 RX ORDER — BETAMETHASONE DIPROPIONATE 0.05 %
OINTMENT (GRAM) TOPICAL ONCE
OUTPATIENT
Start: 2023-10-30 | End: 2023-10-30

## 2023-10-30 RX ORDER — GENTAMICIN SULFATE 1 MG/G
OINTMENT TOPICAL ONCE
OUTPATIENT
Start: 2023-10-30 | End: 2023-10-30

## 2023-10-30 RX ORDER — LIDOCAINE HYDROCHLORIDE 20 MG/ML
JELLY TOPICAL ONCE
OUTPATIENT
Start: 2023-10-30 | End: 2023-10-30

## 2023-10-30 RX ORDER — LIDOCAINE 50 MG/G
OINTMENT TOPICAL ONCE
OUTPATIENT
Start: 2023-10-30 | End: 2023-10-30

## 2023-10-30 RX ORDER — IBUPROFEN 200 MG
TABLET ORAL ONCE
OUTPATIENT
Start: 2023-10-30 | End: 2023-10-30

## 2023-10-30 RX ORDER — LIDOCAINE HYDROCHLORIDE 40 MG/ML
SOLUTION TOPICAL ONCE
OUTPATIENT
Start: 2023-10-30 | End: 2023-10-30

## 2023-10-30 ASSESSMENT — PAIN SCALES - GENERAL: PAINLEVEL_OUTOF10: 0

## 2023-10-30 NOTE — DISCHARGE INSTRUCTIONS
[address] [facility]                            Visit  Discharge Instructions / Physician Orders  DATE: 10/30/23     Home Care: none     SUPPLIES ORDERED THRU:  [employer]           DATE LAST SUPPLIED  10/30/23     Wound Location:  Left lower leg     Cleanse with: Liquid antibacterial soap and water,rinse well       Dressing Orders:  Silvercel to wounds cover with abd pad, wrap with 4\" Kerlix wrap    Frequency:  Change daily     Additional Orders: Increase protein to diet (meat, cheese, eggs, fish, peanut butter, nuts and beans)  Multivitamin daily  Will order Juxta lite wrap  thru [address] [facility] [address]     OFFLOADING [] YES  TYPE:                  [x] NA     Weekly wound care visits until determined otherwise. Antibiotic therapy-wound care related YES [x] NO [] NA[]   Doxycycline 100 mg 2 x per day for 1 week  MY CHART []     Smart Device  []          Your next appointment with the [address] [place] [address] is in 1 week                                                                                                   (Please note your next appointment above and if you are unable to keep, kindly give a 24 hour notice. Thank you.)  If more than 15 min late we cannot guarantee you will be seen due to clinician schedule  Per Policy, Excessive cancellation will call for dismissal from program.  If you experience any of the following, please call the [address] [place] [address] during business hours:  [phone]     * Increase in Pain  * Temperature over 101  * Increase in drainage from your wound  * Drainage with a foul odor  * Bleeding  * Increase in swelling  * Need for compression bandage changes due to slippage, breakthrough drainage. If you need medical attention outside of the business hours of the [address] [place] [address] please contact your PCP or go to the nearest emergency room.      The information contained in the After Visit Summary has been reviewed with me, the patient and/or

## 2023-10-30 NOTE — PROGRESS NOTES
Serous 10/30/23 1254   Odor None 10/30/23 1254   Sis-wound Assessment Blanchable erythema 10/30/23 1254   Margins Defined edges 10/30/23 1254   Wound Thickness Description not for Pressure Injury Full thickness 10/30/23 1254   Number of days: 0       Wound 10/30/23 Leg Left;Posterior; Lower; Lateral wound #2 (Active)   Wound Image   10/30/23 1254   Wound Etiology Venous 10/30/23 1254   Dressing Status New drainage noted; Old drainage noted 10/30/23 1254   Wound Cleansed Cleansed with saline 10/30/23 1254   Wound Length (cm) 1 cm 10/30/23 1254   Wound Width (cm) 1.5 cm 10/30/23 1254   Wound Depth (cm) 0.2 cm 10/30/23 1254   Wound Surface Area (cm^2) 1.5 cm^2 10/30/23 1254   Wound Volume (cm^3) 0.3 cm^3 10/30/23 1254   Post-Procedure Length (cm) 1 cm 10/30/23 1254   Post-Procedure Width (cm) 1.5 cm 10/30/23 1254   Post-Procedure Depth (cm) 0.2 cm 10/30/23 1254   Post-Procedure Surface Area (cm^2) 1.5 cm^2 10/30/23 1254   Post-Procedure Volume (cm^3) 0.3 cm^3 10/30/23 1254   Wound Assessment Pink/red;Slough 10/30/23 1254   Drainage Amount Moderate (25-50%) 10/30/23 1254   Drainage Description Serous 10/30/23 1254   Odor None 10/30/23 1254   Sis-wound Assessment Blanchable erythema 10/30/23 1254   Margins Defined edges 10/30/23 1254   Wound Thickness Description not for Pressure Injury Full thickness 10/30/23 1254   Number of days: 0          Supplies Requested :      WOUND #: 1 and 2   PRIMARY DRESSING:  Silvercel nonadherent   Cover and Secure with: ABD pad     FREQUENCY OF DRESSING CHANGES:  Daily       ADDITIONAL ITEMS:  [] Gloves Small  [x] Gloves Medium [] Gloves Large [] Gloves XLarge  [] Tape 1\" [x] Tape 2\" [] Tape 3\"  [] Medipore Tape  [] Saline  [] Vashe  [] Skin Prep   [] Adhesive Remover   [] Cotton Tip Applicators   [] Other:    Patient Wound(s) Debrided: [x] Yes if yes please add date 10/30/23[] No    Debribement Type: Excisional/Sharp    Is the patient currently on an antibiotic for their Wound(s): [x] Yes
not for Pressure Injury Full thickness 10/30/23 1254   Number of days: 0          Procedure Note  Indications:  Based on my examination of this patient's wound(s)/ulcer(s) today, debridement is required to promote healing and evaluate the wound base. Performed by: Ministerio Espinosa MD    Consent obtained:  Yes    Time out taken:  Yes    Pain Control: Anesthetic  Anesthetic: 4% Lidocaine Liquid Topical       Percent of Wound(s)/Ulcer(s) Debrided: 100%    Total Surface Area Debrided:    Excisional debridement of the left lower extremity tibial using a curette measuring 1.76 sq cm   Excisional debridement of the left lower extremity posterior using a curette measuring 15 sq cm     Diabetic/Pressure/Non Pressure Ulcers only:  Ulcer: Non-Pressure ulcer, fat layer exposed      Estimated Blood Loss:  Minimal    Hemostasis Achieved:  by pressure    Procedural Pain:  4  / 10     Post Procedural Pain:  0 / 10     Response to treatment:  With complaints of pain.          Imaging:   [unfilled]        Impression/Plan:     Problem List Items Addressed This Visit    None      Patient Active Problem List   Diagnosis    Depression with anxiety    Eczema    Edema    Essential (primary) hypertension    Hyperlipidemia    Morbid obesity ([address])    Osteoarthritis    Rosacea    Urge incontinence of urine    Varicose veins    Paroxysmal atrial fibrillation (HCC)    Non-rheumatic tricuspid valve insufficiency    Venous insufficiency of both lower extremities    Lymphedema       Plan:  Wound care orders as per discharge instructions  Tissue cultures were obtained     Electronically signed by:  Ministerio Espinosa MD 10/30/2023

## 2023-11-03 LAB
MICROORGANISM SPEC CULT: ABNORMAL
MICROORGANISM/AGENT SPEC: ABNORMAL
MICROORGANISM/AGENT SPEC: ABNORMAL
SPECIMEN DESCRIPTION: ABNORMAL

## 2023-11-04 NOTE — DISCHARGE INSTRUCTIONS
[facility] and HYPERBARIC TREATMENT  CENTER                            Visit  Discharge Instructions / Physician Orders  DATE: 11/6/23     Home Care: none     SUPPLIES ORDERED THRU:  [email or web address]           DATE LAST SUPPLIED  10/30/23     Wound Location:  Left lower leg     Cleanse with: Liquid antibacterial soap and water,rinse well       Dressing Orders:  Silvercel to wounds cover with abd pad, wrap with 4\" Kerlix wrap     Frequency:  Change daily     Additional Orders: Increase protein to diet (meat, cheese, eggs, fish, peanut butter, nuts and beans)  Multivitamin daily  Will order Juxta lite wrap  thru [address] [facility] [address]     OFFLOADING [] YES  TYPE:                  [x] NA     Weekly wound care visits until determined otherwise. Antibiotic therapy-wound care related YES [x] NO [] NA[]   sulfamethoxazole-trimethoprim (BACTRIM) 400-80 MG per tablet [[id number]]     Order Details  Dose: 1 tablet Route: Oral Frequency: 2 TIMES DAILY   Dispense Quantity: 20 tablet Refills: 0          Sig: Take 1 tablet by mouth 2 times daily for 10 days         Start Date: 11/06/23 End Date: 11/16/23 after 20 doses   Written Date: 11/06/23 Expiration Date: 11/05/24     MY CHART []     Smart Device  []          Your next appointment with the [address] [place] [address] is in 1 week                                                                                                   (Please note your next appointment above and if you are unable to keep, kindly give a 24 hour notice.  Thank you.)  If more than 15 min late we cannot guarantee you will be seen due to clinician schedule  Per Policy, Excessive cancellation will call for dismissal from program.  If you experience any of the following, please call the [address] [place] [address] during business hours:  [phone]     * Increase in Pain  * Temperature over 101  * Increase in drainage from your wound  * Drainage with a foul odor  * Bleeding  * Increase in swelling  * Need for

## 2023-11-06 ENCOUNTER — HOSPITAL ENCOUNTER (OUTPATIENT)
Dept: WOUND CARE | Age: 73
Discharge: HOME | End: 2023-11-06
Payer: MEDICARE

## 2023-11-06 VITALS
BODY MASS INDEX: 55.32 KG/M2 | TEMPERATURE: 98.7 F | SYSTOLIC BLOOD PRESSURE: 168 MMHG | HEIGHT: 61 IN | DIASTOLIC BLOOD PRESSURE: 71 MMHG | HEART RATE: 71 BPM | RESPIRATION RATE: 20 BRPM | WEIGHT: 293 LBS

## 2023-11-06 DIAGNOSIS — L08.9 WOUND INFECTION: Primary | ICD-10-CM

## 2023-11-06 DIAGNOSIS — I89.0 LYMPHEDEMA: Primary | ICD-10-CM

## 2023-11-06 DIAGNOSIS — T14.8XXA WOUND INFECTION: Primary | ICD-10-CM

## 2023-11-06 DIAGNOSIS — I87.2 VENOUS INSUFFICIENCY OF BOTH LOWER EXTREMITIES: ICD-10-CM

## 2023-11-06 PROCEDURE — 11042 DBRDMT SUBQ TIS 1ST 20SQCM/<: CPT

## 2023-11-06 PROCEDURE — 11042 DBRDMT SUBQ TIS 1ST 20SQCM/<: CPT | Performed by: PLASTIC SURGERY

## 2023-11-06 RX ORDER — TRIAMCINOLONE ACETONIDE 1 MG/G
OINTMENT TOPICAL ONCE
OUTPATIENT
Start: 2023-11-06 | End: 2023-11-06

## 2023-11-06 RX ORDER — LIDOCAINE HYDROCHLORIDE 20 MG/ML
JELLY TOPICAL ONCE
OUTPATIENT
Start: 2023-11-06 | End: 2023-11-06

## 2023-11-06 RX ORDER — IBUPROFEN 200 MG
TABLET ORAL ONCE
OUTPATIENT
Start: 2023-11-06 | End: 2023-11-06

## 2023-11-06 RX ORDER — GENTAMICIN SULFATE 1 MG/G
OINTMENT TOPICAL ONCE
OUTPATIENT
Start: 2023-11-06 | End: 2023-11-06

## 2023-11-06 RX ORDER — SULFAMETHOXAZOLE AND TRIMETHOPRIM 400; 80 MG/1; MG/1
1 TABLET ORAL 2 TIMES DAILY
Qty: 20 TABLET | Refills: 0 | Status: SHIPPED | OUTPATIENT
Start: 2023-11-06 | End: 2023-11-16

## 2023-11-06 RX ORDER — LIDOCAINE 50 MG/G
OINTMENT TOPICAL ONCE
OUTPATIENT
Start: 2023-11-06 | End: 2023-11-06

## 2023-11-06 RX ORDER — GINSENG 100 MG
CAPSULE ORAL ONCE
OUTPATIENT
Start: 2023-11-06 | End: 2023-11-06

## 2023-11-06 RX ORDER — LIDOCAINE HYDROCHLORIDE 40 MG/ML
SOLUTION TOPICAL ONCE
Status: DISCONTINUED | OUTPATIENT
Start: 2023-11-06 | End: 2023-11-07 | Stop reason: HOSPADM

## 2023-11-06 RX ORDER — SODIUM CHLOR/HYPOCHLOROUS ACID 0.033 %
SOLUTION, IRRIGATION IRRIGATION ONCE
OUTPATIENT
Start: 2023-11-06 | End: 2023-11-06

## 2023-11-06 RX ORDER — BETAMETHASONE DIPROPIONATE 0.05 %
OINTMENT (GRAM) TOPICAL ONCE
OUTPATIENT
Start: 2023-11-06 | End: 2023-11-06

## 2023-11-06 RX ORDER — LIDOCAINE 40 MG/G
CREAM TOPICAL ONCE
OUTPATIENT
Start: 2023-11-06 | End: 2023-11-06

## 2023-11-06 RX ORDER — LIDOCAINE HYDROCHLORIDE 40 MG/ML
SOLUTION TOPICAL ONCE
OUTPATIENT
Start: 2023-11-06 | End: 2023-11-06

## 2023-11-06 RX ORDER — BACITRACIN ZINC AND POLYMYXIN B SULFATE 500; 1000 [USP'U]/G; [USP'U]/G
OINTMENT TOPICAL ONCE
OUTPATIENT
Start: 2023-11-06 | End: 2023-11-06

## 2023-11-06 RX ORDER — CLOBETASOL PROPIONATE 0.5 MG/G
OINTMENT TOPICAL ONCE
OUTPATIENT
Start: 2023-11-06 | End: 2023-11-06

## 2023-11-06 ASSESSMENT — PAIN DESCRIPTION - FREQUENCY: FREQUENCY: INTERMITTENT

## 2023-11-06 ASSESSMENT — PAIN - FUNCTIONAL ASSESSMENT: PAIN_FUNCTIONAL_ASSESSMENT: PREVENTS OR INTERFERES SOME ACTIVE ACTIVITIES AND ADLS

## 2023-11-06 ASSESSMENT — PAIN DESCRIPTION - ONSET: ONSET: ON-GOING

## 2023-11-06 ASSESSMENT — PAIN SCALES - GENERAL: PAINLEVEL_OUTOF10: 5

## 2023-11-06 ASSESSMENT — PAIN DESCRIPTION - PAIN TYPE: TYPE: CHRONIC PAIN

## 2023-11-06 ASSESSMENT — PAIN DESCRIPTION - LOCATION: LOCATION: LEG

## 2023-11-06 ASSESSMENT — PAIN DESCRIPTION - ORIENTATION: ORIENTATION: LEFT;LOWER

## 2023-11-06 ASSESSMENT — PAIN DESCRIPTION - DESCRIPTORS: DESCRIPTORS: BURNING;SORE

## 2023-11-06 NOTE — PLAN OF CARE
Problem: Pain  Goal: Verbalizes/displays adequate comfort level or baseline comfort level  Outcome: Progressing     Problem: Wound:  Goal: Will show signs of wound healing; wound closure and no evidence of infection  Description: Will show signs of wound healing; wound closure and no evidence of infection  Outcome: Progressing     Problem: Venous:  Goal: Signs of wound healing will improve  Description: Signs of wound healing will improve  Outcome: Progressing     Problem: Falls - Risk of:  Goal: Will remain free from falls  Description: Will remain free from falls  Outcome: Progressing

## 2023-11-13 ENCOUNTER — HOSPITAL ENCOUNTER (OUTPATIENT)
Dept: WOUND CARE | Age: 73
Discharge: HOME | End: 2023-11-13
Payer: MEDICARE

## 2023-11-13 VITALS
BODY MASS INDEX: 55.32 KG/M2 | HEIGHT: 61 IN | SYSTOLIC BLOOD PRESSURE: 191 MMHG | WEIGHT: 293 LBS | TEMPERATURE: 96.9 F | HEART RATE: 66 BPM | RESPIRATION RATE: 20 BRPM | DIASTOLIC BLOOD PRESSURE: 78 MMHG

## 2023-11-13 DIAGNOSIS — I89.0 LYMPHEDEMA: Primary | ICD-10-CM

## 2023-11-13 DIAGNOSIS — I87.2 VENOUS INSUFFICIENCY OF BOTH LOWER EXTREMITIES: ICD-10-CM

## 2023-11-13 PROCEDURE — 87205 SMEAR GRAM STAIN: CPT

## 2023-11-13 PROCEDURE — 11042 DBRDMT SUBQ TIS 1ST 20SQCM/<: CPT | Performed by: PLASTIC SURGERY

## 2023-11-13 PROCEDURE — 87075 CULTR BACTERIA EXCEPT BLOOD: CPT

## 2023-11-13 PROCEDURE — 11042 DBRDMT SUBQ TIS 1ST 20SQCM/<: CPT

## 2023-11-13 PROCEDURE — 87070 CULTURE OTHR SPECIMN AEROBIC: CPT

## 2023-11-13 PROCEDURE — 86403 PARTICLE AGGLUT ANTBDY SCRN: CPT

## 2023-11-13 RX ORDER — LIDOCAINE 50 MG/G
OINTMENT TOPICAL ONCE
OUTPATIENT
Start: 2023-11-13 | End: 2023-11-13

## 2023-11-13 RX ORDER — BACITRACIN ZINC AND POLYMYXIN B SULFATE 500; 1000 [USP'U]/G; [USP'U]/G
OINTMENT TOPICAL ONCE
OUTPATIENT
Start: 2023-11-13 | End: 2023-11-13

## 2023-11-13 RX ORDER — LIDOCAINE 40 MG/G
CREAM TOPICAL ONCE
OUTPATIENT
Start: 2023-11-13 | End: 2023-11-13

## 2023-11-13 RX ORDER — GENTAMICIN SULFATE 1 MG/G
OINTMENT TOPICAL ONCE
OUTPATIENT
Start: 2023-11-13 | End: 2023-11-13

## 2023-11-13 RX ORDER — CLOBETASOL PROPIONATE 0.5 MG/G
OINTMENT TOPICAL ONCE
OUTPATIENT
Start: 2023-11-13 | End: 2023-11-13

## 2023-11-13 RX ORDER — LIDOCAINE HYDROCHLORIDE 20 MG/ML
JELLY TOPICAL ONCE
OUTPATIENT
Start: 2023-11-13 | End: 2023-11-13

## 2023-11-13 RX ORDER — LIDOCAINE HYDROCHLORIDE 40 MG/ML
SOLUTION TOPICAL ONCE
Status: COMPLETED | OUTPATIENT
Start: 2023-11-13 | End: 2023-11-13

## 2023-11-13 RX ORDER — SULFAMETHOXAZOLE AND TRIMETHOPRIM 400; 80 MG/1; MG/1
1 TABLET ORAL 2 TIMES DAILY
Qty: 14 TABLET | Refills: 0 | Status: SHIPPED | OUTPATIENT
Start: 2023-11-13 | End: 2023-11-20

## 2023-11-13 RX ORDER — BETAMETHASONE DIPROPIONATE 0.05 %
OINTMENT (GRAM) TOPICAL ONCE
OUTPATIENT
Start: 2023-11-13 | End: 2023-11-13

## 2023-11-13 RX ORDER — SODIUM CHLOR/HYPOCHLOROUS ACID 0.033 %
SOLUTION, IRRIGATION IRRIGATION ONCE
OUTPATIENT
Start: 2023-11-13 | End: 2023-11-13

## 2023-11-13 RX ORDER — IBUPROFEN 200 MG
TABLET ORAL ONCE
OUTPATIENT
Start: 2023-11-13 | End: 2023-11-13

## 2023-11-13 RX ORDER — GINSENG 100 MG
CAPSULE ORAL ONCE
OUTPATIENT
Start: 2023-11-13 | End: 2023-11-13

## 2023-11-13 RX ORDER — TRIAMCINOLONE ACETONIDE 1 MG/G
OINTMENT TOPICAL ONCE
OUTPATIENT
Start: 2023-11-13 | End: 2023-11-13

## 2023-11-13 RX ORDER — LIDOCAINE HYDROCHLORIDE 40 MG/ML
SOLUTION TOPICAL ONCE
OUTPATIENT
Start: 2023-11-13 | End: 2023-11-13

## 2023-11-13 RX ADMIN — LIDOCAINE HYDROCHLORIDE 5 ML: 40 SOLUTION TOPICAL at 13:44

## 2023-11-13 ASSESSMENT — PAIN DESCRIPTION - ORIENTATION: ORIENTATION: LEFT;RIGHT;LOWER

## 2023-11-13 ASSESSMENT — PAIN DESCRIPTION - DESCRIPTORS: DESCRIPTORS: SORE

## 2023-11-13 ASSESSMENT — PAIN DESCRIPTION - FREQUENCY: FREQUENCY: INTERMITTENT

## 2023-11-13 ASSESSMENT — PAIN SCALES - GENERAL: PAINLEVEL_OUTOF10: 5

## 2023-11-13 ASSESSMENT — PAIN DESCRIPTION - PAIN TYPE: TYPE: CHRONIC PAIN

## 2023-11-13 ASSESSMENT — PAIN DESCRIPTION - LOCATION: LOCATION: LEG

## 2023-11-13 ASSESSMENT — PAIN DESCRIPTION - ONSET: ONSET: ON-GOING

## 2023-11-13 NOTE — PROGRESS NOTES
[address] [place] [address]       Progress Note and Procedure Note      Progress note     Chief Complaint   Patient presents with    Wound Check     Lower legs        HPI:   Elizabet Mueller is a 68 y.o. female who presents for wound ulcer evaluation  . History of Wound Context:   Patient is a history of a left leg wound. Wound/Ulcer Pain Timing/Severity: none  Quality of pain: N/A  Severity:  0 / 10   Modifying Factors: None  Associated Signs/Symptoms: none    Ulcer Identification:  Ulcer Type: venous  Contributing Factors: edema, venous stasis, lymphedema, decreased mobility, and obesity        Medications:     Current Outpatient Medications   Medication Sig Dispense Refill    sulfamethoxazole-trimethoprim (BACTRIM) 400-80 MG per tablet Take 1 tablet by mouth 2 times daily for 10 days 20 tablet 0    solifenacin (VESICARE) 10 MG tablet Take 1 tablet by mouth daily 90 tablet 3    escitalopram (LEXAPRO) 20 MG tablet Take 1 tablet by mouth daily 90 tablet 3    telmisartan (MICARDIS) 40 MG tablet Take 1 tablet by mouth daily 90 tablet 3    omeprazole (PRILOSEC) 20 MG delayed release capsule TAKE 1 CAPSULE BY MOUTH ONCE DAILY AT NIGHT 90 capsule 0    bumetanide (BUMEX) 1 MG tablet Take 1 tablet by mouth daily 90 tablet 3    potassium chloride (KLOR-CON M) 10 MEQ extended release tablet Take 1 tablet by mouth 2 times daily 180 tablet 1    metoprolol tartrate (LOPRESSOR) 25 MG tablet Take 1 tablet by mouth 2 times daily 180 tablet 3    naproxen (NAPROSYN) 500 MG tablet TAKE 1 TABLET BY MOUTH TWICE DAILY WITH MEALS AS NEEDED 180 tablet 3    aspirin 81 MG EC tablet Take 1 tablet by mouth daily      traZODone (DESYREL) 50 MG tablet Take 1 tablet by mouth nightly as needed for Sleep 30 tablet 5     No current facility-administered medications for this encounter. Allergies:      Allergies   Allergen Reactions    Penicillins Hives     Review of Systems:   Constitutional: Negative for fever, chills,

## 2023-11-18 LAB
MICROORGANISM SPEC CULT: NORMAL
MICROORGANISM SPEC CULT: NORMAL
MICROORGANISM/AGENT SPEC: NORMAL
MICROORGANISM/AGENT SPEC: NORMAL
SPECIMEN DESCRIPTION: NORMAL

## 2023-11-18 NOTE — DISCHARGE INSTRUCTIONS
[facility] HYPERBARIC TREATMENT  CENTER                            Visit  Discharge Instructions / Physician Orders  DATE: 11/20/23     Home Care: none     SUPPLIES ORDERED THRU:  [employer]           DATE LAST SUPPLIED  10/30/23     Wound Location:  Left lower leg, Right lower leg     Cleanse with: Liquid antibacterial soap and water,rinse well       Dressing Orders:  Triad to wounds Nickel Thickness cover with abd pad, wrap with 4\" Kerlix wrap/ wrap with ace wraps     Frequency:  Change daily     Additional Orders: Increase protein to diet (meat, cheese, eggs, fish, peanut butter, nuts and beans)  Multivitamin daily        OFFLOADING [] YES  TYPE:                  [x] NA     Weekly wound care visits until determined otherwise. Antibiotic therapy-wound care related YES [x] NO [] NA[]   sulfamethoxazole-trimethoprim (BACTRIM) 400-80 MG per tablet  2x per day for 1 more week-finish antibiotic    Order Details please finish up  [address] [place] [address] []     Smart Device  []          Your next appointment with the [address] [place] [address] is in 1 week                                                                                                   (Please note your next appointment above and if you are unable to keep, kindly give a 24 hour notice. Thank you.)  If more than 15 min late we cannot guarantee you will be seen due to clinician schedule  Per Policy, Excessive cancellation will call for dismissal from program.  If you experience any of the following, please call the [address] [place] [address] during business hours:  [phone]     * Increase in Pain  * Temperature over 101  * Increase in drainage from your wound  * Drainage with a foul odor  * Bleeding  * Increase in swelling  * Need for compression bandage changes due to slippage, breakthrough drainage. If you need medical attention outside of the business hours of the [address] [place] [address] please contact your PCP or go to the nearest emergency room.      The

## 2023-11-20 ENCOUNTER — HOSPITAL ENCOUNTER (OUTPATIENT)
Dept: WOUND CARE | Age: 73
Discharge: HOME | End: 2023-11-20
Payer: MEDICARE

## 2023-11-20 DIAGNOSIS — I87.2 VENOUS INSUFFICIENCY OF BOTH LOWER EXTREMITIES: ICD-10-CM

## 2023-11-20 DIAGNOSIS — I89.0 LYMPHEDEMA: Primary | ICD-10-CM

## 2023-11-20 PROCEDURE — 99213 OFFICE O/P EST LOW 20 MIN: CPT | Performed by: PLASTIC SURGERY

## 2023-11-20 PROCEDURE — 99213 OFFICE O/P EST LOW 20 MIN: CPT

## 2023-11-20 RX ORDER — CLOBETASOL PROPIONATE 0.5 MG/G
OINTMENT TOPICAL ONCE
OUTPATIENT
Start: 2023-11-20 | End: 2023-11-20

## 2023-11-20 RX ORDER — BETAMETHASONE DIPROPIONATE 0.05 %
OINTMENT (GRAM) TOPICAL ONCE
OUTPATIENT
Start: 2023-11-20 | End: 2023-11-20

## 2023-11-20 RX ORDER — LIDOCAINE HYDROCHLORIDE 40 MG/ML
SOLUTION TOPICAL ONCE
OUTPATIENT
Start: 2023-11-20 | End: 2023-11-20

## 2023-11-20 RX ORDER — SODIUM CHLOR/HYPOCHLOROUS ACID 0.033 %
SOLUTION, IRRIGATION IRRIGATION ONCE
OUTPATIENT
Start: 2023-11-20 | End: 2023-11-20

## 2023-11-20 RX ORDER — LIDOCAINE 50 MG/G
OINTMENT TOPICAL ONCE
OUTPATIENT
Start: 2023-11-20 | End: 2023-11-20

## 2023-11-20 RX ORDER — BACITRACIN ZINC AND POLYMYXIN B SULFATE 500; 1000 [USP'U]/G; [USP'U]/G
OINTMENT TOPICAL ONCE
OUTPATIENT
Start: 2023-11-20 | End: 2023-11-20

## 2023-11-20 RX ORDER — GENTAMICIN SULFATE 1 MG/G
OINTMENT TOPICAL ONCE
OUTPATIENT
Start: 2023-11-20 | End: 2023-11-20

## 2023-11-20 RX ORDER — LIDOCAINE HYDROCHLORIDE 40 MG/ML
SOLUTION TOPICAL ONCE
Status: COMPLETED | OUTPATIENT
Start: 2023-11-20 | End: 2023-11-20

## 2023-11-20 RX ORDER — LIDOCAINE HYDROCHLORIDE 20 MG/ML
JELLY TOPICAL ONCE
OUTPATIENT
Start: 2023-11-20 | End: 2023-11-20

## 2023-11-20 RX ORDER — IBUPROFEN 200 MG
TABLET ORAL ONCE
OUTPATIENT
Start: 2023-11-20 | End: 2023-11-20

## 2023-11-20 RX ORDER — GINSENG 100 MG
CAPSULE ORAL ONCE
OUTPATIENT
Start: 2023-11-20 | End: 2023-11-20

## 2023-11-20 RX ORDER — LIDOCAINE 40 MG/G
CREAM TOPICAL ONCE
OUTPATIENT
Start: 2023-11-20 | End: 2023-11-20

## 2023-11-20 RX ORDER — TRIAMCINOLONE ACETONIDE 1 MG/G
OINTMENT TOPICAL ONCE
OUTPATIENT
Start: 2023-11-20 | End: 2023-11-20

## 2023-11-20 RX ADMIN — LIDOCAINE HYDROCHLORIDE 10 ML: 40 SOLUTION TOPICAL at 14:10

## 2023-11-20 NOTE — PROGRESS NOTES
[address] [place] [address]       Progress Note and Procedure Note      Progress note     Chief Complaint   Patient presents with    Wound Check     Bilateral lower legs        HPI:   Elizabet Mueller is a 68 y.o. female who presents for wound ulcer evaluation  . History of Wound Context:   Patient is a history of a left leg wound. Wound/Ulcer Pain Timing/Severity: none  Quality of pain: N/A  Severity:  0 / 10   Modifying Factors: None  Associated Signs/Symptoms: none    Ulcer Identification:  Ulcer Type: venous  Contributing Factors: edema, venous stasis, lymphedema, decreased mobility, and obesity        Medications:     Current Outpatient Medications   Medication Sig Dispense Refill    sulfamethoxazole-trimethoprim (BACTRIM) 400-80 MG per tablet Take 1 tablet by mouth 2 times daily for 7 days 14 tablet 0    solifenacin (VESICARE) 10 MG tablet Take 1 tablet by mouth daily 90 tablet 3    escitalopram (LEXAPRO) 20 MG tablet Take 1 tablet by mouth daily 90 tablet 3    telmisartan (MICARDIS) 40 MG tablet Take 1 tablet by mouth daily 90 tablet 3    omeprazole (PRILOSEC) 20 MG delayed release capsule TAKE 1 CAPSULE BY MOUTH ONCE DAILY AT NIGHT 90 capsule 0    bumetanide (BUMEX) 1 MG tablet Take 1 tablet by mouth daily 90 tablet 3    potassium chloride (KLOR-CON M) 10 MEQ extended release tablet Take 1 tablet by mouth 2 times daily 180 tablet 1    metoprolol tartrate (LOPRESSOR) 25 MG tablet Take 1 tablet by mouth 2 times daily 180 tablet 3    naproxen (NAPROSYN) 500 MG tablet TAKE 1 TABLET BY MOUTH TWICE DAILY WITH MEALS AS NEEDED 180 tablet 3    aspirin 81 MG EC tablet Take 1 tablet by mouth daily      traZODone (DESYREL) 50 MG tablet Take 1 tablet by mouth nightly as needed for Sleep (Patient not taking: Reported on 11/20/2023) 30 tablet 5     No current facility-administered medications for this encounter. Allergies:      Allergies   Allergen Reactions    Penicillins Hives     Review of

## 2023-11-26 NOTE — DISCHARGE INSTRUCTIONS
[facility] and HYPERBARIC TREATMENT  CENTER                            Visit  Discharge Instructions / Physician Orders  DATE: 11/27/23     Home Care: none     SUPPLIES ORDERED THRU:  [employer]           DATE LAST SUPPLIED  10/30/23     Wound Location:  Left lower leg, Right lower leg     Cleanse with: Liquid antibacterial soap and water,rinse well       Dressing Orders:  Triad to wounds Nickel Thickness cover with abd pad, wrap with 4\" Kerlix wrap/ wrap with ace wraps     Frequency:  Change daily     Additional Orders: Increase protein to diet (meat, cheese, eggs, fish, peanut butter, nuts and beans)  Multivitamin daily   Schedule PVR with [facility]   Venous studies with Reflux     OFFLOADING [] YES  TYPE:                  [x] NA     Weekly wound care visits until determined otherwise. Antibiotic therapy-wound care related YES [x] NO [] NA[]         MY CHART []     Smart Device  []        Your next appointment with the [employer] [place] is in 1 week                                                                                                   (Please note your next appointment above and if you are unable to keep, kindly give a 24 hour notice. Thank you.)  If more than 15 min late we cannot guarantee you will be seen due to clinician schedule  Per Policy, Excessive cancellation will call for dismissal from program.  If you experience any of the following, please call the [facility] [place] during business hours:  [phone]     * Increase in Pain  * Temperature over 101  * Increase in drainage from your wound  * Drainage with a foul odor  * Bleeding  * Increase in swelling  * Need for compression bandage changes due to slippage, breakthrough drainage. If you need medical attention outside of the business hours of the [address] [place], [place] [address] please contact your PCP or go to the nearest emergency room.      The information contained in the After Visit Summary has been reviewed with me, the patient

## 2023-11-27 ENCOUNTER — HOSPITAL ENCOUNTER (OUTPATIENT)
Dept: WOUND CARE | Age: 73
Discharge: HOME | End: 2023-11-27
Payer: MEDICARE

## 2023-11-27 VITALS
BODY MASS INDEX: 55.32 KG/M2 | DIASTOLIC BLOOD PRESSURE: 78 MMHG | SYSTOLIC BLOOD PRESSURE: 186 MMHG | RESPIRATION RATE: 20 BRPM | WEIGHT: 293 LBS | HEIGHT: 61 IN | TEMPERATURE: 96.3 F | HEART RATE: 67 BPM

## 2023-11-27 DIAGNOSIS — I87.2 VENOUS INSUFFICIENCY OF BOTH LOWER EXTREMITIES: ICD-10-CM

## 2023-11-27 DIAGNOSIS — R60.0 BILATERAL LOWER EXTREMITY EDEMA: ICD-10-CM

## 2023-11-27 DIAGNOSIS — R09.89 DECREASED PEDAL PULSES: ICD-10-CM

## 2023-11-27 DIAGNOSIS — I89.0 LYMPHEDEMA: Primary | ICD-10-CM

## 2023-11-27 PROCEDURE — 11042 DBRDMT SUBQ TIS 1ST 20SQCM/<: CPT | Performed by: PLASTIC SURGERY

## 2023-11-27 PROCEDURE — 11042 DBRDMT SUBQ TIS 1ST 20SQCM/<: CPT

## 2023-11-27 RX ORDER — LIDOCAINE HYDROCHLORIDE 20 MG/ML
JELLY TOPICAL ONCE
OUTPATIENT
Start: 2023-11-27 | End: 2023-11-27

## 2023-11-27 RX ORDER — BACITRACIN ZINC AND POLYMYXIN B SULFATE 500; 1000 [USP'U]/G; [USP'U]/G
OINTMENT TOPICAL ONCE
OUTPATIENT
Start: 2023-11-27 | End: 2023-11-27

## 2023-11-27 RX ORDER — LIDOCAINE HYDROCHLORIDE 40 MG/ML
SOLUTION TOPICAL ONCE
Status: COMPLETED | OUTPATIENT
Start: 2023-11-27 | End: 2023-11-27

## 2023-11-27 RX ORDER — GINSENG 100 MG
CAPSULE ORAL ONCE
OUTPATIENT
Start: 2023-11-27 | End: 2023-11-27

## 2023-11-27 RX ORDER — SODIUM CHLOR/HYPOCHLOROUS ACID 0.033 %
SOLUTION, IRRIGATION IRRIGATION ONCE
OUTPATIENT
Start: 2023-11-27 | End: 2023-11-27

## 2023-11-27 RX ORDER — GENTAMICIN SULFATE 1 MG/G
OINTMENT TOPICAL ONCE
OUTPATIENT
Start: 2023-11-27 | End: 2023-11-27

## 2023-11-27 RX ORDER — CLOBETASOL PROPIONATE 0.5 MG/G
OINTMENT TOPICAL ONCE
OUTPATIENT
Start: 2023-11-27 | End: 2023-11-27

## 2023-11-27 RX ORDER — LIDOCAINE HYDROCHLORIDE 40 MG/ML
SOLUTION TOPICAL ONCE
OUTPATIENT
Start: 2023-11-27 | End: 2023-11-27

## 2023-11-27 RX ORDER — IBUPROFEN 200 MG
TABLET ORAL ONCE
OUTPATIENT
Start: 2023-11-27 | End: 2023-11-27

## 2023-11-27 RX ORDER — TRIAMCINOLONE ACETONIDE 1 MG/G
OINTMENT TOPICAL ONCE
OUTPATIENT
Start: 2023-11-27 | End: 2023-11-27

## 2023-11-27 RX ORDER — LIDOCAINE 50 MG/G
OINTMENT TOPICAL ONCE
OUTPATIENT
Start: 2023-11-27 | End: 2023-11-27

## 2023-11-27 RX ORDER — LIDOCAINE 40 MG/G
CREAM TOPICAL ONCE
OUTPATIENT
Start: 2023-11-27 | End: 2023-11-27

## 2023-11-27 RX ORDER — BETAMETHASONE DIPROPIONATE 0.05 %
OINTMENT (GRAM) TOPICAL ONCE
OUTPATIENT
Start: 2023-11-27 | End: 2023-11-27

## 2023-11-27 RX ADMIN — LIDOCAINE HYDROCHLORIDE 5 ML: 40 SOLUTION TOPICAL at 13:11

## 2023-11-27 ASSESSMENT — PAIN DESCRIPTION - FREQUENCY: FREQUENCY: INTERMITTENT

## 2023-11-27 ASSESSMENT — PAIN DESCRIPTION - LOCATION: LOCATION: LEG

## 2023-11-27 ASSESSMENT — PAIN DESCRIPTION - ONSET: ONSET: ON-GOING

## 2023-11-27 ASSESSMENT — PAIN DESCRIPTION - ORIENTATION: ORIENTATION: LEFT;LOWER

## 2023-11-27 ASSESSMENT — PAIN DESCRIPTION - PAIN TYPE: TYPE: CHRONIC PAIN

## 2023-11-27 ASSESSMENT — PAIN DESCRIPTION - DESCRIPTORS: DESCRIPTORS: SORE

## 2023-11-27 ASSESSMENT — PAIN - FUNCTIONAL ASSESSMENT: PAIN_FUNCTIONAL_ASSESSMENT: PREVENTS OR INTERFERES SOME ACTIVE ACTIVITIES AND ADLS

## 2023-11-27 ASSESSMENT — PAIN SCALES - GENERAL: PAINLEVEL_OUTOF10: 3

## 2023-11-27 NOTE — PROGRESS NOTES
[address] [place] [address]       Progress Note and Procedure Note      Progress note     Chief Complaint   Patient presents with    Wound Check     Left lower lateral leg        HPI:   Dedra Rodriguez is a 68 y.o. female who presents for wound ulcer evaluation  . History of Wound Context:   Patient is a history of a left leg wound. Wound/Ulcer Pain Timing/Severity: none  Quality of pain: N/A  Severity:  0 / 10   Modifying Factors: None  Associated Signs/Symptoms: none    Ulcer Identification:  Ulcer Type: venous  Contributing Factors: edema, venous stasis, lymphedema, decreased mobility, and obesity        Medications:     Current Outpatient Medications   Medication Sig Dispense Refill    solifenacin (VESICARE) 10 MG tablet Take 1 tablet by mouth daily 90 tablet 3    escitalopram (LEXAPRO) 20 MG tablet Take 1 tablet by mouth daily 90 tablet 3    telmisartan (MICARDIS) 40 MG tablet Take 1 tablet by mouth daily 90 tablet 3    omeprazole (PRILOSEC) 20 MG delayed release capsule TAKE 1 CAPSULE BY MOUTH ONCE DAILY AT NIGHT 90 capsule 0    bumetanide (BUMEX) 1 MG tablet Take 1 tablet by mouth daily 90 tablet 3    potassium chloride (KLOR-CON M) 10 MEQ extended release tablet Take 1 tablet by mouth 2 times daily 180 tablet 1    metoprolol tartrate (LOPRESSOR) 25 MG tablet Take 1 tablet by mouth 2 times daily 180 tablet 3    naproxen (NAPROSYN) 500 MG tablet TAKE 1 TABLET BY MOUTH TWICE DAILY WITH MEALS AS NEEDED 180 tablet 3    aspirin 81 MG EC tablet Take 1 tablet by mouth daily      traZODone (DESYREL) 50 MG tablet Take 1 tablet by mouth nightly as needed for Sleep (Patient not taking: Reported on 11/20/2023) 30 tablet 5     Current Facility-Administered Medications   Medication Dose Route Frequency Provider Last Rate Last Admin    lidocaine (XYLOCAINE) 4 % external solution   Topical Once Kyle Matthew MD          Allergies:      Allergies   Allergen Reactions    Penicillins Hives     Review of

## 2023-12-01 ENCOUNTER — HOSPITAL ENCOUNTER (OUTPATIENT)
Dept: VASCULAR LAB | Age: 73
End: 2023-12-01
Attending: PLASTIC SURGERY
Payer: MEDICARE

## 2023-12-01 DIAGNOSIS — R09.89 DECREASED PEDAL PULSES: ICD-10-CM

## 2023-12-01 DIAGNOSIS — R60.0 BILATERAL LOWER EXTREMITY EDEMA: ICD-10-CM

## 2023-12-01 LAB
VAS LEFT ABI: 0.68
VAS LEFT ARM BP: 194 MMHG
VAS LEFT ATA PROX PSV: 42.7 CM/S
VAS LEFT CFA PROX PSV: 160.5 CM/S
VAS LEFT DORSALIS PEDIS BP: 110 MMHG
VAS LEFT PFA PROX PSV: 106.6 CM/S
VAS LEFT POP A DIST PSV: 90.5 CM/S
VAS LEFT POP A PROX PSV: 178.1 CM/S
VAS LEFT PTA BP: 137 MMHG
VAS LEFT PTA DIST PSV: 68.6 CM/S
VAS LEFT PTA MID PSV: 98.5 CM/S
VAS LEFT PTA PROX PSV: 84 CM/S
VAS LEFT SFA MID PSV: 133.9 CM/S
VAS LEFT SFA MID VEL RATIO: 1.17
VAS LEFT SFA PROX PSV: 114.7 CM/S
VAS LEFT SFA PROX VEL RATIO: 0.71
VAS RIGHT ABI: 1.07
VAS RIGHT ARM BP: 202 MMHG
VAS RIGHT ATA PROX PSV: 13.6 CM/S
VAS RIGHT CFA PROX PSV: 172.8 CM/S
VAS RIGHT DORSALIS PEDIS BP: 216 MMHG
VAS RIGHT PERONEAL MID PSV: 108.4 CM/S
VAS RIGHT PFA PROX PSV: 168.8 CM/S
VAS RIGHT POP A DIST PSV: 120.7 CM/S
VAS RIGHT POP A PROX PSV: 109.4 CM/S
VAS RIGHT POP A PROX VEL RATIO: 0.86
VAS RIGHT PTA BP: 215 MMHG
VAS RIGHT PTA DIST PSV: 44.2 CM/S
VAS RIGHT PTA MID PSV: 45.1 CM/S
VAS RIGHT PTA PROX PSV: 32 CM/S
VAS RIGHT SFA DIST PSV: 127.2 CM/S
VAS RIGHT SFA DIST VEL RATIO: 0.98
VAS RIGHT SFA MID PSV: 129.4 CM/S
VAS RIGHT SFA MID VEL RATIO: 0.9
VAS RIGHT SFA PROX PSV: 145.2 CM/S
VAS RIGHT SFA PROX VEL RATIO: 0.8

## 2023-12-01 PROCEDURE — 93922 UPR/L XTREMITY ART 2 LEVELS: CPT

## 2023-12-02 DIAGNOSIS — K25.9 GASTRIC ULCER, UNSPECIFIED CHRONICITY, UNSPECIFIED WHETHER GASTRIC ULCER HEMORRHAGE OR PERFORATION PRESENT: ICD-10-CM

## 2023-12-03 NOTE — DISCHARGE INSTRUCTIONS
[facility]                            Visit  Discharge Instructions / Physician Orders  DATE: 12/4/23     Home Care: none     SUPPLIES ORDERED THRU:  [address] [facility] [address]           DATE LAST SUPPLIED  10/30/23     Wound Location:  Left lower leg, Right lower leg     Cleanse with: Liquid antibacterial soap and water,rinse well       Dressing Orders:  Triad to wounds Nickel Thickness cover with abd pad, wrap with 4\" Kerlix wrap/ wrap with ace wraps     Frequency:  Change daily     Additional Orders: Increase protein to diet (meat, cheese, eggs, fish, peanut butter, nuts and beans)  Multivitamin daily   Schedule PVR with PHILL-Patient needs to see  or Peyton Harrison at [address] [place] [address]' Wound care PHILL on Left leg is . 76 Study completed 12/1/23   Venous studies with Reflux     OFFLOADING [] YES  TYPE:                  [x] NA     Weekly wound care visits until determined otherwise. Antibiotic therapy-wound care related YES [x] NO [] NA[]         MY CHART []     Smart Device  []        Your next appointment with the [facility] is in 1 week                                                                                                   (Please note your next appointment above and if you are unable to keep, kindly give a 24 hour notice. Thank you.)  If more than 15 min late we cannot guarantee you will be seen due to clinician schedule  Per Policy, Excessive cancellation will call for dismissal from program.  If you experience any of the following, please call the [facility] during business hours:  [phone]     * Increase in Pain  * Temperature over 101  * Increase in drainage from your wound  * Drainage with a foul odor  * Bleeding  * Increase in swelling  * Need for compression bandage changes due to slippage, breakthrough drainage.      If you need medical attention outside of the business hours of the [facility] please contact your PCP or go to the

## 2023-12-04 ENCOUNTER — HOSPITAL ENCOUNTER (OUTPATIENT)
Dept: WOUND CARE | Age: 73
Discharge: HOME | End: 2023-12-04
Payer: MEDICARE

## 2023-12-04 VITALS
RESPIRATION RATE: 20 BRPM | DIASTOLIC BLOOD PRESSURE: 72 MMHG | HEIGHT: 62 IN | BODY MASS INDEX: 53.92 KG/M2 | WEIGHT: 293 LBS | SYSTOLIC BLOOD PRESSURE: 178 MMHG | HEART RATE: 64 BPM | TEMPERATURE: 97.3 F

## 2023-12-04 DIAGNOSIS — I89.0 LYMPHEDEMA: Primary | ICD-10-CM

## 2023-12-04 DIAGNOSIS — I87.2 VENOUS INSUFFICIENCY OF BOTH LOWER EXTREMITIES: ICD-10-CM

## 2023-12-04 PROCEDURE — 93925 LOWER EXTREMITY STUDY: CPT | Performed by: STUDENT IN AN ORGANIZED HEALTH CARE EDUCATION/TRAINING PROGRAM

## 2023-12-04 PROCEDURE — 99213 OFFICE O/P EST LOW 20 MIN: CPT

## 2023-12-04 PROCEDURE — 99213 OFFICE O/P EST LOW 20 MIN: CPT | Performed by: PLASTIC SURGERY

## 2023-12-04 RX ORDER — LIDOCAINE HYDROCHLORIDE 20 MG/ML
JELLY TOPICAL ONCE
OUTPATIENT
Start: 2023-12-04 | End: 2023-12-04

## 2023-12-04 RX ORDER — IBUPROFEN 200 MG
TABLET ORAL ONCE
OUTPATIENT
Start: 2023-12-04 | End: 2023-12-04

## 2023-12-04 RX ORDER — LIDOCAINE HYDROCHLORIDE 40 MG/ML
SOLUTION TOPICAL ONCE
Status: COMPLETED | OUTPATIENT
Start: 2023-12-04 | End: 2023-12-04

## 2023-12-04 RX ORDER — CLOBETASOL PROPIONATE 0.5 MG/G
OINTMENT TOPICAL ONCE
OUTPATIENT
Start: 2023-12-04 | End: 2023-12-04

## 2023-12-04 RX ORDER — LIDOCAINE 50 MG/G
OINTMENT TOPICAL ONCE
OUTPATIENT
Start: 2023-12-04 | End: 2023-12-04

## 2023-12-04 RX ORDER — GINSENG 100 MG
CAPSULE ORAL ONCE
OUTPATIENT
Start: 2023-12-04 | End: 2023-12-04

## 2023-12-04 RX ORDER — OMEPRAZOLE 20 MG/1
CAPSULE, DELAYED RELEASE ORAL
Qty: 90 CAPSULE | Refills: 0 | Status: SHIPPED | OUTPATIENT
Start: 2023-12-04

## 2023-12-04 RX ORDER — SODIUM CHLOR/HYPOCHLOROUS ACID 0.033 %
SOLUTION, IRRIGATION IRRIGATION ONCE
OUTPATIENT
Start: 2023-12-04 | End: 2023-12-04

## 2023-12-04 RX ORDER — LIDOCAINE 40 MG/G
CREAM TOPICAL ONCE
OUTPATIENT
Start: 2023-12-04 | End: 2023-12-04

## 2023-12-04 RX ORDER — BETAMETHASONE DIPROPIONATE 0.05 %
OINTMENT (GRAM) TOPICAL ONCE
OUTPATIENT
Start: 2023-12-04 | End: 2023-12-04

## 2023-12-04 RX ORDER — LIDOCAINE HYDROCHLORIDE 40 MG/ML
SOLUTION TOPICAL ONCE
OUTPATIENT
Start: 2023-12-04 | End: 2023-12-04

## 2023-12-04 RX ORDER — GENTAMICIN SULFATE 1 MG/G
OINTMENT TOPICAL ONCE
OUTPATIENT
Start: 2023-12-04 | End: 2023-12-04

## 2023-12-04 RX ORDER — TRIAMCINOLONE ACETONIDE 1 MG/G
OINTMENT TOPICAL ONCE
OUTPATIENT
Start: 2023-12-04 | End: 2023-12-04

## 2023-12-04 RX ORDER — BACITRACIN ZINC AND POLYMYXIN B SULFATE 500; 1000 [USP'U]/G; [USP'U]/G
OINTMENT TOPICAL ONCE
OUTPATIENT
Start: 2023-12-04 | End: 2023-12-04

## 2023-12-04 RX ADMIN — LIDOCAINE HYDROCHLORIDE 5 ML: 40 SOLUTION TOPICAL at 13:45

## 2023-12-04 ASSESSMENT — PAIN DESCRIPTION - PAIN TYPE: TYPE: CHRONIC PAIN

## 2023-12-04 ASSESSMENT — PAIN DESCRIPTION - LOCATION: LOCATION: LEG

## 2023-12-04 ASSESSMENT — PAIN - FUNCTIONAL ASSESSMENT: PAIN_FUNCTIONAL_ASSESSMENT: PREVENTS OR INTERFERES SOME ACTIVE ACTIVITIES AND ADLS

## 2023-12-04 ASSESSMENT — PAIN DESCRIPTION - FREQUENCY: FREQUENCY: INTERMITTENT

## 2023-12-04 ASSESSMENT — PAIN DESCRIPTION - DESCRIPTORS: DESCRIPTORS: SORE;ITCHING

## 2023-12-04 ASSESSMENT — PAIN DESCRIPTION - ONSET: ONSET: ON-GOING

## 2023-12-04 ASSESSMENT — PAIN DESCRIPTION - ORIENTATION: ORIENTATION: LOWER;LEFT

## 2023-12-04 ASSESSMENT — PAIN SCALES - GENERAL: PAINLEVEL_OUTOF10: 2

## 2023-12-04 NOTE — PROGRESS NOTES
[address] [place] [address]       Progress Note and Procedure Note      Progress note     Chief Complaint   Patient presents with    Wound Check     Left lower leg        HPI:   Larissa Juarez is a 68 y.o. female who presents for wound ulcer evaluation  . History of Wound Context:   Patient is a history of a left leg wound. Wound/Ulcer Pain Timing/Severity: none  Quality of pain: N/A  Severity:  0 / 10   Modifying Factors: None  Associated Signs/Symptoms: none    Ulcer Identification:  Ulcer Type: venous  Contributing Factors: edema, venous stasis, lymphedema, decreased mobility, and obesity        Medications:     Current Outpatient Medications   Medication Sig Dispense Refill    solifenacin (VESICARE) 10 MG tablet Take 1 tablet by mouth daily 90 tablet 3    escitalopram (LEXAPRO) 20 MG tablet Take 1 tablet by mouth daily 90 tablet 3    telmisartan (MICARDIS) 40 MG tablet Take 1 tablet by mouth daily 90 tablet 3    omeprazole (PRILOSEC) 20 MG delayed release capsule TAKE 1 CAPSULE BY MOUTH ONCE DAILY AT NIGHT 90 capsule 0    bumetanide (BUMEX) 1 MG tablet Take 1 tablet by mouth daily 90 tablet 3    potassium chloride (KLOR-CON M) 10 MEQ extended release tablet Take 1 tablet by mouth 2 times daily 180 tablet 1    metoprolol tartrate (LOPRESSOR) 25 MG tablet Take 1 tablet by mouth 2 times daily 180 tablet 3    naproxen (NAPROSYN) 500 MG tablet TAKE 1 TABLET BY MOUTH TWICE DAILY WITH MEALS AS NEEDED 180 tablet 3    aspirin 81 MG EC tablet Take 1 tablet by mouth daily      traZODone (DESYREL) 50 MG tablet Take 1 tablet by mouth nightly as needed for Sleep (Patient not taking: Reported on 12/4/2023) 30 tablet 5     No current facility-administered medications for this encounter. Allergies: Allergies   Allergen Reactions    Penicillins Hives     Review of Systems:   Constitutional: Negative for fever, chills, fatigue and unexpected weight change.    HENT: Negative for hearing loss, sore

## 2023-12-05 DIAGNOSIS — R60.0 BILATERAL LOWER EXTREMITY EDEMA: Primary | ICD-10-CM

## 2023-12-08 ENCOUNTER — HOSPITAL ENCOUNTER (OUTPATIENT)
Dept: VASCULAR LAB | Age: 73
End: 2023-12-08
Payer: MEDICARE

## 2023-12-08 DIAGNOSIS — R60.0 BILATERAL LOWER EXTREMITY EDEMA: ICD-10-CM

## 2023-12-08 LAB
VAS LEFT GSV AT KNEE DIAM: 7.2 MM
VAS LEFT GSV BK MID DIAM: 4.1 MM
VAS LEFT GSV JUNC DIAM: 5.8 MM
VAS LEFT GSV THIGH MID DIAM: 4.8 MM
VAS LEFT GSV THIGH PROX DIAM: 5.9 MM
VAS LEFT GSV THIGHT MID RFX: 1.4 S
VAS LEFT POP RFX: 1.2 S
VAS LEFT SSV MID DIAM: 2.9 MM
VAS LEFT SSV PROX DIAM: 3 MM
VAS RIGHT GSV ANKLE DIAM: 3.8 MM
VAS RIGHT GSV AT KNEE DIAM: 3.8 MM
VAS RIGHT GSV BK MID DIAM: 4.6 MM
VAS RIGHT GSV JUNC DIAM: 7.8 MM
VAS RIGHT GSV JUNC RFX: 1.3 S
VAS RIGHT GSV THIGH MID DIAM: 6 MM
VAS RIGHT GSV THIGH PROX DIAM: 4.4 MM
VAS RIGHT SSV MID DIAM: 3.3 MM
VAS RIGHT SSV PROX DIAM: 3.8 MM

## 2023-12-08 PROCEDURE — 93970 EXTREMITY STUDY: CPT | Performed by: SURGERY

## 2023-12-08 PROCEDURE — 93970 EXTREMITY STUDY: CPT

## 2023-12-12 NOTE — DISCHARGE INSTRUCTIONS
[address]- Phone: [phone] Fax: [phone]                          Visit  Discharge Instructions / Physician Orders    DATE: 12/13/2023     Home Care: none     SUPPLIES ORDERED THRU:  [address]           DATE LAST SUPPLIED  10/30/23     Wound Location:  Left lower leg, Right lower leg     Cleanse with: Keep Dry and Intact      Dressing Orders:  Aquacel Ag, KerraMax, Zinc Layer, 3 Layer Compression Wrap     Frequency: Keep Dry and Intact     Additional Orders: Increase protein to diet (meat, cheese, eggs, fish, peanut butter, nuts and beans)  12/1/23- Arterial/ Venous Studies Completed  12/13/23- Lymphedema Referral       Your next appointment with the [address] [place] [address] is in 1 week with Dr. Smita Ponce                                                                                                   (Please note your next appointment above and if you are unable to keep, kindly give a 24 hour notice. Thank you.)  If more than 15 min late we cannot guarantee you will be seen due to clinician schedule  Per Policy, Excessive cancellation will call for dismissal from program.  If you experience any of the following, please call the [address] [place] [address] during business hours:  [phone]     * Increase in Pain  * Temperature over 101  * Increase in drainage from your wound  * Drainage with a foul odor  * Bleeding  * Increase in swelling  * Need for compression bandage changes due to slippage, breakthrough drainage. If you need medical attention outside of the business hours of the [address] [place] [address] please contact your PCP or go to the nearest emergency room. The information contained in the After Visit Summary has been reviewed with me, the patient and/or responsible adult, by my health care provider(s). I had the opportunity to ask questions regarding this information.  I have elected to receive;      []After Visit Summary  [x]Comprehensive Discharge Instruction        Patient

## 2023-12-13 ENCOUNTER — HOSPITAL ENCOUNTER (OUTPATIENT)
Dept: WOUND CARE | Age: 73
Discharge: HOME | End: 2023-12-13
Payer: MEDICARE

## 2023-12-13 VITALS
BODY MASS INDEX: 53.92 KG/M2 | TEMPERATURE: 97.9 F | HEIGHT: 62 IN | HEART RATE: 67 BPM | RESPIRATION RATE: 20 BRPM | DIASTOLIC BLOOD PRESSURE: 59 MMHG | SYSTOLIC BLOOD PRESSURE: 104 MMHG | WEIGHT: 293 LBS

## 2023-12-13 DIAGNOSIS — I87.2 VENOUS INSUFFICIENCY OF BOTH LOWER EXTREMITIES: Primary | ICD-10-CM

## 2023-12-13 DIAGNOSIS — I89.0 LYMPHEDEMA: ICD-10-CM

## 2023-12-13 PROBLEM — L97.929 VENOUS ULCER OF LEFT LEG (HCC): Status: ACTIVE | Noted: 2023-12-13

## 2023-12-13 PROBLEM — I83.029 VENOUS ULCER OF LEFT LEG (HCC): Status: ACTIVE | Noted: 2023-12-13

## 2023-12-13 PROCEDURE — 11042 DBRDMT SUBQ TIS 1ST 20SQCM/<: CPT

## 2023-12-13 RX ORDER — LIDOCAINE HYDROCHLORIDE 20 MG/ML
JELLY TOPICAL ONCE
Status: COMPLETED | OUTPATIENT
Start: 2023-12-13 | End: 2023-12-13

## 2023-12-13 RX ORDER — BACITRACIN ZINC AND POLYMYXIN B SULFATE 500; 1000 [USP'U]/G; [USP'U]/G
OINTMENT TOPICAL ONCE
OUTPATIENT
Start: 2023-12-13 | End: 2023-12-13

## 2023-12-13 RX ORDER — LIDOCAINE 50 MG/G
OINTMENT TOPICAL ONCE
OUTPATIENT
Start: 2023-12-13 | End: 2023-12-13

## 2023-12-13 RX ORDER — LIDOCAINE 40 MG/G
CREAM TOPICAL ONCE
OUTPATIENT
Start: 2023-12-13 | End: 2023-12-13

## 2023-12-13 RX ORDER — IBUPROFEN 200 MG
TABLET ORAL ONCE
OUTPATIENT
Start: 2023-12-13 | End: 2023-12-13

## 2023-12-13 RX ORDER — SODIUM CHLOR/HYPOCHLOROUS ACID 0.033 %
SOLUTION, IRRIGATION IRRIGATION ONCE
OUTPATIENT
Start: 2023-12-13 | End: 2023-12-13

## 2023-12-13 RX ORDER — LIDOCAINE HYDROCHLORIDE 40 MG/ML
SOLUTION TOPICAL ONCE
OUTPATIENT
Start: 2023-12-13 | End: 2023-12-13

## 2023-12-13 RX ORDER — LIDOCAINE HYDROCHLORIDE 20 MG/ML
JELLY TOPICAL ONCE
OUTPATIENT
Start: 2023-12-13 | End: 2023-12-13

## 2023-12-13 RX ORDER — GINSENG 100 MG
CAPSULE ORAL ONCE
OUTPATIENT
Start: 2023-12-13 | End: 2023-12-13

## 2023-12-13 RX ORDER — TRIAMCINOLONE ACETONIDE 1 MG/G
OINTMENT TOPICAL ONCE
OUTPATIENT
Start: 2023-12-13 | End: 2023-12-13

## 2023-12-13 RX ORDER — CLOBETASOL PROPIONATE 0.5 MG/G
OINTMENT TOPICAL ONCE
OUTPATIENT
Start: 2023-12-13 | End: 2023-12-13

## 2023-12-13 RX ORDER — GENTAMICIN SULFATE 1 MG/G
OINTMENT TOPICAL ONCE
OUTPATIENT
Start: 2023-12-13 | End: 2023-12-13

## 2023-12-13 RX ORDER — BETAMETHASONE DIPROPIONATE 0.05 %
OINTMENT (GRAM) TOPICAL ONCE
OUTPATIENT
Start: 2023-12-13 | End: 2023-12-13

## 2023-12-13 RX ADMIN — LIDOCAINE HYDROCHLORIDE 6 ML: 20 JELLY TOPICAL at 10:13

## 2023-12-13 ASSESSMENT — PAIN DESCRIPTION - LOCATION: LOCATION: LEG

## 2023-12-13 ASSESSMENT — PAIN DESCRIPTION - ORIENTATION: ORIENTATION: LEFT;LOWER

## 2023-12-13 ASSESSMENT — PAIN SCALES - GENERAL: PAINLEVEL_OUTOF10: 10

## 2023-12-13 NOTE — PROGRESS NOTES
[facility]   Progress Note and Procedure Note      [address] RECORD NUMBER:  [id number]  AGE: 68 y.o. GENDER: female  : 1950  EPISODE DATE:  2023    Subjective:     Chief Complaint   Patient presents with    Wound Check     LLE         HISTORY of PRESENT ILLNESS HPI     Odette Elaine is a 68 y.o. female who presents today for wound/ulcer evaluation. Left leg venous ulcer that has been there for several weeks. Has history of leg swelling for many years. Said she hasn't dealt with venous ulcers before. Arterial studies show normal PHILL on the right and 0.68 on the left. The venous reflux showed isolated reflux at right SFJ and left mid thigh GSV and popliteal vein reflux. She has discomfort in left leg due to the swelling, no issues with the foot.         PAST MEDICAL HISTORY        Diagnosis Date    Bell's palsy     Cellulitis     Hypertension        PAST SURGICAL HISTORY    Past Surgical History:   Procedure Laterality Date    CATARACT REMOVAL Bilateral     HYSTERECTOMY, TOTAL ABDOMINAL (CERVIX REMOVED)      INCISION AND DRAINAGE Left 2017    LEG INCISION AND DRAINAGE ABSCESS performed by Brady Hearn MD at [facility] Right 2018    TOTAL KNEE ARTHROPLASTY Left 2017    VENTRAL HERNIA REPAIR  2019    5 hernias       FAMILY HISTORY    Family History   Problem Relation Age of Onset    Cancer Mother     High Blood Pressure Father     Stroke Father     Ovarian Cancer Daughter     Cancer Daughter     Diabetes Maternal Aunt     Cancer Other         daughter/ovarian cancer       SOCIAL HISTORY    Social History     Tobacco Use    Smoking status: Never     Passive exposure: Never    Smokeless tobacco: Never   Vaping Use    Vaping Use: Never used   Substance Use Topics    Alcohol use: No    Drug use: No       ALLERGIES    Allergies   Allergen Reactions    Penicillins Hives       MEDICATIONS    Current

## 2023-12-14 ENCOUNTER — APPOINTMENT (OUTPATIENT)
Dept: VASCULAR LAB | Age: 73
DRG: 853 | End: 2023-12-14
Attending: INTERNAL MEDICINE
Payer: MEDICARE

## 2023-12-14 ENCOUNTER — HOSPITAL ENCOUNTER (INPATIENT)
Age: 73
LOS: 19 days | Discharge: ANOTHER HOSPITAL | DRG: 853 | End: 2024-01-02
Attending: EMERGENCY MEDICINE | Admitting: INTERNAL MEDICINE
Payer: MEDICARE

## 2023-12-14 ENCOUNTER — APPOINTMENT (OUTPATIENT)
Dept: ULTRASOUND IMAGING | Age: 73
DRG: 853 | End: 2023-12-14
Payer: MEDICARE

## 2023-12-14 ENCOUNTER — APPOINTMENT (OUTPATIENT)
Dept: GENERAL RADIOLOGY | Age: 73
DRG: 853 | End: 2023-12-14
Payer: MEDICARE

## 2023-12-14 ENCOUNTER — APPOINTMENT (OUTPATIENT)
Dept: CT IMAGING | Age: 73
DRG: 853 | End: 2023-12-14
Payer: MEDICARE

## 2023-12-14 DIAGNOSIS — K92.1 MELENA: ICD-10-CM

## 2023-12-14 DIAGNOSIS — N17.9 AKI (ACUTE KIDNEY INJURY) (HCC): ICD-10-CM

## 2023-12-14 DIAGNOSIS — R60.0 LOCALIZED EDEMA: ICD-10-CM

## 2023-12-14 DIAGNOSIS — E87.5 HYPERKALEMIA: ICD-10-CM

## 2023-12-14 DIAGNOSIS — L03.115 CELLULITIS OF RIGHT LOWER EXTREMITY: Primary | ICD-10-CM

## 2023-12-14 DIAGNOSIS — L03.116 CELLULITIS OF LEFT LOWER EXTREMITY: ICD-10-CM

## 2023-12-14 DIAGNOSIS — R41.82 ALTERED MENTAL STATUS, UNSPECIFIED ALTERED MENTAL STATUS TYPE: ICD-10-CM

## 2023-12-14 PROBLEM — L03.90 CELLULITIS: Status: ACTIVE | Noted: 2023-12-14

## 2023-12-14 LAB
ABSOLUTE BANDS #: 0.76 K/UL (ref 0–1)
ALBUMIN SERPL-MCNC: 3.2 G/DL (ref 3.5–5.2)
ALP SERPL-CCNC: 104 U/L (ref 35–104)
ALT SERPL-CCNC: 15 U/L (ref 5–33)
ANION GAP SERPL CALCULATED.3IONS-SCNC: 14 MMOL/L (ref 9–17)
AST SERPL-CCNC: 28 U/L
BANDS: 4 % (ref 0–10)
BASOPHILS # BLD: 0 K/UL (ref 0–0.2)
BASOPHILS NFR BLD: 0 % (ref 0–2)
BILIRUB SERPL-MCNC: 0.8 MG/DL (ref 0.3–1.2)
BUN SERPL-MCNC: 54 MG/DL (ref 8–23)
CALCIUM SERPL-MCNC: 9.2 MG/DL (ref 8.6–10.4)
CHLORIDE SERPL-SCNC: 98 MMOL/L (ref 98–107)
CK SERPL-CCNC: 236 U/L (ref 26–192)
CO2 SERPL-SCNC: 24 MMOL/L (ref 20–31)
CREAT SERPL-MCNC: 3.9 MG/DL (ref 0.5–0.9)
CRP SERPL HS-MCNC: 464.3 MG/L (ref 0–5)
EOSINOPHIL # BLD: 0 K/UL (ref 0–0.4)
EOSINOPHILS RELATIVE PERCENT: 0 % (ref 0–4)
ERYTHROCYTE [DISTWIDTH] IN BLOOD BY AUTOMATED COUNT: 15.1 % (ref 11.5–14.9)
ERYTHROCYTE [SEDIMENTATION RATE] IN BLOOD BY PHOTOMETRIC METHOD: 47 MM/HR (ref 0–30)
GFR SERPL CREATININE-BSD FRML MDRD: 12 ML/MIN/1.73M2
GLUCOSE BLD-MCNC: 73 MG/DL (ref 65–105)
GLUCOSE BLD-MCNC: 82 MG/DL (ref 65–105)
GLUCOSE SERPL-MCNC: 99 MG/DL (ref 70–99)
HCT VFR BLD AUTO: 35.3 % (ref 36–46)
HGB BLD-MCNC: 11.3 G/DL (ref 12–16)
INR PPP: 1.2
LACTATE BLDV-SCNC: 3.3 MMOL/L (ref 0.5–1.9)
LYMPHOCYTES NFR BLD: 0.38 K/UL (ref 1–4.8)
LYMPHOCYTES RELATIVE PERCENT: 2 % (ref 24–44)
MAGNESIUM SERPL-MCNC: 1.9 MG/DL (ref 1.6–2.6)
MCH RBC QN AUTO: 29.7 PG (ref 26–34)
MCHC RBC AUTO-ENTMCNC: 32.2 G/DL (ref 31–37)
MCV RBC AUTO: 92.3 FL (ref 80–100)
MONOCYTES NFR BLD: 0.57 K/UL (ref 0.1–1.3)
MONOCYTES NFR BLD: 3 % (ref 1–7)
MORPHOLOGY: ABNORMAL
NEUTROPHILS NFR BLD: 91 % (ref 36–66)
NEUTS SEG NFR BLD: 17.19 K/UL (ref 1.3–9.1)
PLATELET # BLD AUTO: 224 K/UL (ref 150–450)
PMV BLD AUTO: 9.2 FL (ref 6–12)
POTASSIUM SERPL-SCNC: 4.8 MMOL/L (ref 3.7–5.3)
PROT SERPL-MCNC: 7.8 G/DL (ref 6.4–8.3)
PROTHROMBIN TIME: 15.3 SEC (ref 11.8–14.6)
RBC # BLD AUTO: 3.82 M/UL (ref 4–5.2)
SODIUM SERPL-SCNC: 136 MMOL/L (ref 135–144)
TROPONIN I SERPL HS-MCNC: 31 NG/L (ref 0–14)
WBC OTHER # BLD: 18.9 K/UL (ref 3.5–11)

## 2023-12-14 PROCEDURE — 84484 ASSAY OF TROPONIN QUANT: CPT

## 2023-12-14 PROCEDURE — 96374 THER/PROPH/DIAG INJ IV PUSH: CPT

## 2023-12-14 PROCEDURE — 99285 EMERGENCY DEPT VISIT HI MDM: CPT

## 2023-12-14 PROCEDURE — 87205 SMEAR GRAM STAIN: CPT

## 2023-12-14 PROCEDURE — 93005 ELECTROCARDIOGRAM TRACING: CPT | Performed by: EMERGENCY MEDICINE

## 2023-12-14 PROCEDURE — 6360000002 HC RX W HCPCS: Performed by: EMERGENCY MEDICINE

## 2023-12-14 PROCEDURE — 6370000000 HC RX 637 (ALT 250 FOR IP): Performed by: INTERNAL MEDICINE

## 2023-12-14 PROCEDURE — 80053 COMPREHEN METABOLIC PANEL: CPT

## 2023-12-14 PROCEDURE — 6360000002 HC RX W HCPCS: Performed by: INTERNAL MEDICINE

## 2023-12-14 PROCEDURE — 87040 BLOOD CULTURE FOR BACTERIA: CPT

## 2023-12-14 PROCEDURE — 86140 C-REACTIVE PROTEIN: CPT

## 2023-12-14 PROCEDURE — 83605 ASSAY OF LACTIC ACID: CPT

## 2023-12-14 PROCEDURE — 70450 CT HEAD/BRAIN W/O DYE: CPT

## 2023-12-14 PROCEDURE — 99223 1ST HOSP IP/OBS HIGH 75: CPT | Performed by: INTERNAL MEDICINE

## 2023-12-14 PROCEDURE — 85652 RBC SED RATE AUTOMATED: CPT

## 2023-12-14 PROCEDURE — 82550 ASSAY OF CK (CPK): CPT

## 2023-12-14 PROCEDURE — 85610 PROTHROMBIN TIME: CPT

## 2023-12-14 PROCEDURE — 87186 SC STD MICRODIL/AGAR DIL: CPT

## 2023-12-14 PROCEDURE — 83735 ASSAY OF MAGNESIUM: CPT

## 2023-12-14 PROCEDURE — 87154 CUL TYP ID BLD PTHGN 6+ TRGT: CPT

## 2023-12-14 PROCEDURE — 85025 COMPLETE CBC W/AUTO DIFF WBC: CPT

## 2023-12-14 PROCEDURE — 73590 X-RAY EXAM OF LOWER LEG: CPT

## 2023-12-14 PROCEDURE — 2580000003 HC RX 258: Performed by: EMERGENCY MEDICINE

## 2023-12-14 PROCEDURE — 36415 COLL VENOUS BLD VENIPUNCTURE: CPT

## 2023-12-14 PROCEDURE — 2580000003 HC RX 258: Performed by: INTERNAL MEDICINE

## 2023-12-14 PROCEDURE — 87077 CULTURE AEROBIC IDENTIFY: CPT

## 2023-12-14 PROCEDURE — 82947 ASSAY GLUCOSE BLOOD QUANT: CPT

## 2023-12-14 PROCEDURE — 76775 US EXAM ABDO BACK WALL LIM: CPT

## 2023-12-14 PROCEDURE — 6370000000 HC RX 637 (ALT 250 FOR IP): Performed by: EMERGENCY MEDICINE

## 2023-12-14 PROCEDURE — 2060000000 HC ICU INTERMEDIATE R&B

## 2023-12-14 RX ORDER — ONDANSETRON 4 MG/1
4 TABLET, ORALLY DISINTEGRATING ORAL EVERY 8 HOURS PRN
Status: DISCONTINUED | OUTPATIENT
Start: 2023-12-14 | End: 2024-01-02 | Stop reason: HOSPADM

## 2023-12-14 RX ORDER — MORPHINE SULFATE 4 MG/ML
4 INJECTION, SOLUTION INTRAMUSCULAR; INTRAVENOUS ONCE
Status: COMPLETED | OUTPATIENT
Start: 2023-12-14 | End: 2023-12-14

## 2023-12-14 RX ORDER — LINEZOLID 2 MG/ML
600 INJECTION, SOLUTION INTRAVENOUS EVERY 12 HOURS
Status: COMPLETED | OUTPATIENT
Start: 2023-12-15 | End: 2023-12-19

## 2023-12-14 RX ORDER — SODIUM CHLORIDE 0.9 % (FLUSH) 0.9 %
5-40 SYRINGE (ML) INJECTION PRN
Status: DISCONTINUED | OUTPATIENT
Start: 2023-12-14 | End: 2024-01-02 | Stop reason: HOSPADM

## 2023-12-14 RX ORDER — ONDANSETRON 2 MG/ML
4 INJECTION INTRAMUSCULAR; INTRAVENOUS EVERY 6 HOURS PRN
Status: DISCONTINUED | OUTPATIENT
Start: 2023-12-14 | End: 2024-01-02 | Stop reason: HOSPADM

## 2023-12-14 RX ORDER — SODIUM CHLORIDE 0.9 % (FLUSH) 0.9 %
5-40 SYRINGE (ML) INJECTION EVERY 12 HOURS SCHEDULED
Status: DISCONTINUED | OUTPATIENT
Start: 2023-12-14 | End: 2024-01-02 | Stop reason: HOSPADM

## 2023-12-14 RX ORDER — SODIUM CHLORIDE 9 MG/ML
INJECTION, SOLUTION INTRAVENOUS CONTINUOUS
Status: ACTIVE | OUTPATIENT
Start: 2023-12-14 | End: 2023-12-16

## 2023-12-14 RX ORDER — HEPARIN SODIUM 5000 [USP'U]/ML
5000 INJECTION, SOLUTION INTRAVENOUS; SUBCUTANEOUS EVERY 8 HOURS SCHEDULED
Status: DISCONTINUED | OUTPATIENT
Start: 2023-12-14 | End: 2023-12-18

## 2023-12-14 RX ORDER — ACETAMINOPHEN 325 MG/1
650 TABLET ORAL EVERY 6 HOURS PRN
Status: DISCONTINUED | OUTPATIENT
Start: 2023-12-14 | End: 2024-01-02 | Stop reason: HOSPADM

## 2023-12-14 RX ORDER — ACETAMINOPHEN 325 MG/1
650 TABLET ORAL EVERY 6 HOURS PRN
COMMUNITY

## 2023-12-14 RX ORDER — ESCITALOPRAM OXALATE 20 MG/1
20 TABLET ORAL DAILY
Status: DISCONTINUED | OUTPATIENT
Start: 2023-12-14 | End: 2024-01-02 | Stop reason: HOSPADM

## 2023-12-14 RX ORDER — SODIUM CHLORIDE 9 MG/ML
INJECTION, SOLUTION INTRAVENOUS PRN
Status: DISCONTINUED | OUTPATIENT
Start: 2023-12-14 | End: 2024-01-02 | Stop reason: HOSPADM

## 2023-12-14 RX ORDER — OXYCODONE HYDROCHLORIDE AND ACETAMINOPHEN 5; 325 MG/1; MG/1
1 TABLET ORAL ONCE
Status: COMPLETED | OUTPATIENT
Start: 2023-12-14 | End: 2023-12-14

## 2023-12-14 RX ORDER — POLYETHYLENE GLYCOL 3350 17 G/17G
17 POWDER, FOR SOLUTION ORAL DAILY PRN
Status: DISCONTINUED | OUTPATIENT
Start: 2023-12-14 | End: 2024-01-02 | Stop reason: HOSPADM

## 2023-12-14 RX ORDER — ASPIRIN 81 MG/1
81 TABLET ORAL DAILY
Status: DISCONTINUED | OUTPATIENT
Start: 2023-12-14 | End: 2024-01-02 | Stop reason: HOSPADM

## 2023-12-14 RX ORDER — PANTOPRAZOLE SODIUM 40 MG/1
40 TABLET, DELAYED RELEASE ORAL
Status: DISCONTINUED | OUTPATIENT
Start: 2023-12-15 | End: 2023-12-26

## 2023-12-14 RX ORDER — 0.9 % SODIUM CHLORIDE 0.9 %
1000 INTRAVENOUS SOLUTION INTRAVENOUS ONCE
Status: COMPLETED | OUTPATIENT
Start: 2023-12-14 | End: 2023-12-14

## 2023-12-14 RX ORDER — ACETAMINOPHEN 650 MG/1
650 SUPPOSITORY RECTAL EVERY 6 HOURS PRN
Status: DISCONTINUED | OUTPATIENT
Start: 2023-12-14 | End: 2024-01-02 | Stop reason: HOSPADM

## 2023-12-14 RX ORDER — 0.9 % SODIUM CHLORIDE 0.9 %
500 INTRAVENOUS SOLUTION INTRAVENOUS ONCE
Status: COMPLETED | OUTPATIENT
Start: 2023-12-14 | End: 2023-12-14

## 2023-12-14 RX ORDER — LINEZOLID 2 MG/ML
600 INJECTION, SOLUTION INTRAVENOUS ONCE
Status: COMPLETED | OUTPATIENT
Start: 2023-12-14 | End: 2023-12-14

## 2023-12-14 RX ADMIN — ASPIRIN 81 MG: 81 TABLET, COATED ORAL at 17:19

## 2023-12-14 RX ADMIN — HEPARIN SODIUM 5000 UNITS: 5000 INJECTION INTRAVENOUS; SUBCUTANEOUS at 17:19

## 2023-12-14 RX ADMIN — SODIUM CHLORIDE 1000 ML: 9 INJECTION, SOLUTION INTRAVENOUS at 12:59

## 2023-12-14 RX ADMIN — LINEZOLID 600 MG: 600 INJECTION, SOLUTION INTRAVENOUS at 17:19

## 2023-12-14 RX ADMIN — MORPHINE SULFATE 4 MG: 4 INJECTION, SOLUTION INTRAMUSCULAR; INTRAVENOUS at 12:15

## 2023-12-14 RX ADMIN — SODIUM CHLORIDE 500 ML: 9 INJECTION, SOLUTION INTRAVENOUS at 22:05

## 2023-12-14 RX ADMIN — SODIUM CHLORIDE: 9 INJECTION, SOLUTION INTRAVENOUS at 17:16

## 2023-12-14 RX ADMIN — ACETAMINOPHEN 650 MG: 325 TABLET ORAL at 17:19

## 2023-12-14 RX ADMIN — CEFEPIME 2000 MG: 2 INJECTION, POWDER, FOR SOLUTION INTRAVENOUS at 13:50

## 2023-12-14 RX ADMIN — CEFEPIME 2000 MG: 2 INJECTION, POWDER, FOR SOLUTION INTRAVENOUS at 17:31

## 2023-12-14 RX ADMIN — SODIUM CHLORIDE, PRESERVATIVE FREE 10 ML: 5 INJECTION INTRAVENOUS at 22:06

## 2023-12-14 RX ADMIN — HEPARIN SODIUM 5000 UNITS: 5000 INJECTION INTRAVENOUS; SUBCUTANEOUS at 22:16

## 2023-12-14 RX ADMIN — OXYCODONE AND ACETAMINOPHEN 1 TABLET: 5; 325 TABLET ORAL at 13:53

## 2023-12-14 ASSESSMENT — PAIN DESCRIPTION - LOCATION
LOCATION: LEG

## 2023-12-14 ASSESSMENT — PAIN DESCRIPTION - DESCRIPTORS: DESCRIPTORS: BURNING;STABBING

## 2023-12-14 ASSESSMENT — PAIN - FUNCTIONAL ASSESSMENT
PAIN_FUNCTIONAL_ASSESSMENT: 0-10
PAIN_FUNCTIONAL_ASSESSMENT: PREVENTS OR INTERFERES SOME ACTIVE ACTIVITIES AND ADLS

## 2023-12-14 ASSESSMENT — PAIN SCALES - GENERAL
PAINLEVEL_OUTOF10: 8
PAINLEVEL_OUTOF10: 8
PAINLEVEL_OUTOF10: 9

## 2023-12-14 ASSESSMENT — PAIN DESCRIPTION - ORIENTATION
ORIENTATION: LEFT
ORIENTATION: LEFT

## 2023-12-14 NOTE — PROGRESS NOTES
Pharmacy Medication History Note      List of current medications patient is taking is complete.     Source of information: patient, dispense report, OARRS    Changes made to medication list:  Medications flagged for removal (include reason, ex. noncompliance):  Trazodone - patient reports not taking   Omeprazole - patient reports she has not taken this medications in over a month    Medications removed (include reason, ex. therapy complete or physician discontinued):  None     Medications added/doses adjusted:  None     Other notes (ex. Recent course of antibiotics, Coumadin dosing):  OARRS negative     Denies use of other OTC or herbal medications.      Allergies clarified    Medication list provided to the patient: no   Medication education provided to the patient: none      Electronically signed by Anahy Noe RPH on 12/14/2023 at 2:57 PM

## 2023-12-14 NOTE — ED PROVIDER NOTES
EMERGENCY DEPARTMENT ENCOUNTER    Pt Name: Elaina Champagne  MRN: [id number]  Birthdate 1950  Date of evaluation: 12/14/23  CHIEF COMPLAINT       Chief Complaint   Patient presents with    Leg Pain     Left      Fatigue     HISTORY OF PRESENT ILLNESS   HPI  Had an episode of confusion this morning.  She could not dial her phone and she was having some difficulty seeing.  It resolved completely.  No unilateral weakness.  She was having some excruciating pain in her left leg at the same time.  She just had left leg wound care debridement yesterday with a tight wrapping around it which has been since taken off.  Her pain is significantly improved.  Her chief complaint at this time is pain and redness in her left leg.  She is not currently on antibiotics she was on antibiotics 2 weeks ago.  Denies any headache denies any speech difficulty denies any vision loss at this time.  She came from home.      REVIEW OF SYSTEMS     Review of Systems   All other systems reviewed and are negative.    PASTMEDICAL HISTORY     Past Medical History:   Diagnosis Date    Bell's palsy     Cellulitis     Hypertension      Past Problem List  Patient Active Problem List   Diagnosis Code    Depression with anxiety F41.8    Eczema L30.9    Edema R60.9    Essential (primary) hypertension I10    Hyperlipidemia E78.5    Morbid obesity ([facility]) E66.01    Osteoarthritis M19.90    Rosacea L71.9    Urge incontinence of urine N39.41    Varicose veins I83.90    Paroxysmal atrial fibrillation ([facility]) I48.0    Non-rheumatic tricuspid valve insufficiency I36.1    Venous insufficiency of both lower extremities I87.2    Lymphedema I89.0    Venous stasis ulcer of calf with fat layer exposed with varicose veins ([facility]) I83.002, L97.202    Venous ulcer of left leg ([facility]) I83.029, L97.929    Hyperkalemia E87.5    MICHELLE (acute kidney injury) ([facility]) N17.9     SURGICAL HISTORY       Past Surgical History:   Procedure Laterality Date    CATARACT REMOVAL Bilateral 2015     ENCOUNTER:  Orders Placed This Encounter   Medications    morphine injection 4 mg    ceFEPIme (MAXIPIME) 2,000 mg in sodium chloride 0.9 % 100 mL IVPB (mini-bag)     Order Specific Question:   Antimicrobial Indications     Answer:   Skin and Soft Tissue Infection    sodium chloride 0.9 % bolus 1,000 mL    linezolid (ZYVOX) IVPB 600 mg     Order Specific Question:   Antimicrobial Indications     Answer:   Skin and Soft Tissue Infection     Order Specific Question:   Skin duration of therapy     Answer:   7 days     DISCHARGE PRESCRIPTIONS:  New Prescriptions    No medications on file     PHYSICIAN CONSULTS ORDERED THIS ENCOUNTER:  IP CONSULT TO HOSPITALIST  FINAL IMPRESSION      1. Cellulitis of left lower extremity    2. Cellulitis of right lower extremity    3. Altered mental status, unspecified altered mental status type    4. MICHELLE (acute kidney injury) ([facility])          DISPOSITION/PLAN   DISPOSITION Admitted 12/14/2023 01:22:52 PM      OUTPATIENT FOLLOW UP THE PATIENT:  No follow-up provider specified.    MD Khurram Macario Wesley D, MD  12/14/23 1624

## 2023-12-14 NOTE — ED NOTES
notified that second set of cultures was not yet able to be obtained - MD verbal order and instruction to start antibiotics prior to obtaining the second set of blood cultures.        Linda Anton RN  12/14/23 5386

## 2023-12-14 NOTE — ED NOTES
2nd attempt to page lab for cultures as well as trop and lactic that are due.     Arnulfo Perkins, RN  12/14/23 2358

## 2023-12-14 NOTE — ED NOTES
Mode of arrival (squad #, walk in, police, etc) : EMS        Chief complaint(s): leg pain, weakness        Arrival Note (brief scenario, treatment PTA, etc).: squad called to patient's house due to increase in LLE pain and patient having some confusion. Patient alert and oriented to everything but the current year.         C= \"Have you ever felt that you should Cut down on your drinking?\"  No  A= \"Have people Annoyed you by criticizing your drinking?\"  No  G= \"Have you ever felt bad or Guilty about your drinking?\"  No  E= \"Have you ever had a drink as an Eye-opener first thing in the morning to steady your nerves or to help a hangover?\"  No      Deferred []      Reason for deferring:     *If yes to two or more: probable alcohol abuse.*      Leslee Wyatt, MARIA M  12/14/23 6326

## 2023-12-14 NOTE — ED NOTES
Report given to MARIA M Enriquez from [facility].   Report method by phone   The following was reviewed with receiving RN:   Current vital signs:  /68   Pulse 89   Temp 97.7 °F (36.5 °C) (Oral)   Resp 24   Ht 1.575 m (5' 2\")   Wt (!) 137 kg (302 lb)   SpO2 99%   BMI 55.24 kg/m²                MEWS Score: 2     Any medication or safety alerts were reviewed. Any pending diagnostics and notifications were also reviewed, as well as any safety concerns or issues, abnormal labs, abnormal imaging, and abnormal assessment findings. Questions were answered.          Arnulfo Perkins RN  12/14/23 3975

## 2023-12-14 NOTE — H&P
[facility]   IN-PATIENT SERVICE   [facility]    HISTORY AND PHYSICAL EXAMINATION            Date:   12/14/2023  Patient name:  Elaina Champagne  Date of admission:  12/14/2023 11:42 AM  MRN:   [id number]  Account:  [id number]  YOB: 1950  PCP:    Robin Ly MD  Room:   JAZ/JAZ  Code Status:    Full Code    Chief Complaint:     Chief Complaint   Patient presents with    Leg Pain     Left      Fatigue       History Obtained From:     patient, electronic medical record    History of Present Illness:     The patient is a 73 y.o.  Non- / non  female who presents with Leg Pain (Left/) and Fatigue   and she is admitted to the hospital for the management of wound check  Patient has past medical is a multiple medical problem including morbid obesity, hypertension, lymphedema, patient has wound in both legs and back of her legs, symptoms are going on for last 1 month  Patient follows with wound care  She was evaluated by Dr. Meredith vascular surgery yesterday, dressing was applied on left leg, she was having severe pain that made her come to the hospital  Patient, had arterial scan done earlier suggestive of PHILL of 0.68 on left side, on right side PHILL was okay  Previous wound culture was growing Klebsiella and staph, MSSA  In the emergency room, patient had lab work done suggestive elevated creatinine of 3.8  CK was normal      Past Medical History:     Past Medical History:   Diagnosis Date    Bell's palsy     Cellulitis     Hypertension         Past Surgical History:     Past Surgical History:   Procedure Laterality Date    CATARACT REMOVAL Bilateral 2015    HYSTERECTOMY, TOTAL ABDOMINAL (CERVIX REMOVED)  1990    INCISION AND DRAINAGE Left 07/06/2017    LEG INCISION AND DRAINAGE ABSCESS performed by Isaiah Casey MD at [facility] OR    KNEE ARTHROPLASTY Right 09/24/2018    TOTAL KNEE ARTHROPLASTY Left 11/09/2017    VENTRAL HERNIA REPAIR  02/11/2019    5 hernias     starting patient on gentle hydration, will monitor creatinine closely, ultrasound kidneys done in emergency room was negative for hydronephrosis, nephrology consult  Heparin for DVT prophylaxis  Ordering Doppler for both legs to rule out DVT  Morbid obesity with BMI of 55    Consultations:   IP CONSULT TO HOSPITALIST  IP CONSULT TO INFECTIOUS DISEASES  IP CONSULT TO NEUROLOGY    Patient is admitted as inpatient status because of co-morbidities listed above, severity of signs and symptoms as outlined, requirement for current medical therapies and most importantly because of direct risk to patient if care not provided in a hospital setting.    David Yang MD  12/14/2023  2:11 PM    Copy sent to Dr. Ly, Robin Olivo MD    Please note that this chart was generated using voice recognition Dragon dictation software.  Although every effort was made to ensure the accuracy of this automated transcription, some errors in transcription may have occurred.

## 2023-12-15 ENCOUNTER — APPOINTMENT (OUTPATIENT)
Dept: INTERVENTIONAL RADIOLOGY/VASCULAR | Age: 73
DRG: 853 | End: 2023-12-15
Attending: INTERNAL MEDICINE
Payer: MEDICARE

## 2023-12-15 ENCOUNTER — APPOINTMENT (OUTPATIENT)
Age: 73
DRG: 853 | End: 2023-12-15
Attending: INTERNAL MEDICINE
Payer: MEDICARE

## 2023-12-15 LAB
ABSOLUTE BANDS #: 5.22 K/UL (ref 0–1)
ANION GAP SERPL CALCULATED.3IONS-SCNC: 14 MMOL/L (ref 9–17)
BACTERIA URNS QL MICRO: ABNORMAL
BANDS: 23 % (ref 0–10)
BASOPHILS # BLD: 0 K/UL (ref 0–0.2)
BASOPHILS NFR BLD: 0 % (ref 0–2)
BILIRUB UR QL STRIP: ABNORMAL
BUN SERPL-MCNC: 61 MG/DL (ref 8–23)
CALCIUM SERPL-MCNC: 8.5 MG/DL (ref 8.6–10.4)
CASTS #/AREA URNS LPF: ABNORMAL /LPF
CHLORIDE SERPL-SCNC: 100 MMOL/L (ref 98–107)
CLARITY UR: ABNORMAL
CO2 SERPL-SCNC: 20 MMOL/L (ref 20–31)
COLOR UR: ABNORMAL
CREAT SERPL-MCNC: 3.8 MG/DL (ref 0.5–0.9)
CREAT UR-MCNC: 325.9 MG/DL (ref 28–217)
ECHO AO ROOT DIAM: 3.4 CM
ECHO AO ROOT INDEX: 1.47 CM/M2
ECHO AV MEAN GRADIENT: 2 MMHG
ECHO AV MEAN VELOCITY: 0.7 M/S
ECHO AV PEAK GRADIENT: 6 MMHG
ECHO AV PEAK VELOCITY: 1.2 M/S
ECHO AV VTI: 21.8 CM
ECHO BSA: 2.48 M2
ECHO EST RA PRESSURE: 15 MMHG
ECHO LA DIAMETER INDEX: 1.9 CM/M2
ECHO LA DIAMETER: 4.4 CM
ECHO LA TO AORTIC ROOT RATIO: 1.29
ECHO LV FRACTIONAL SHORTENING: 40 % (ref 28–44)
ECHO LV INTERNAL DIMENSION DIASTOLE INDEX: 1.3 CM/M2
ECHO LV INTERNAL DIMENSION DIASTOLIC: 3 CM (ref 3.9–5.3)
ECHO LV INTERNAL DIMENSION SYSTOLIC INDEX: 0.78 CM/M2
ECHO LV INTERNAL DIMENSION SYSTOLIC: 1.8 CM
ECHO LV IVSD: 1.9 CM (ref 0.6–0.9)
ECHO LV MASS 2D: 228.4 G (ref 67–162)
ECHO LV MASS INDEX 2D: 98.9 G/M2 (ref 43–95)
ECHO LV POSTERIOR WALL DIASTOLIC: 1.8 CM (ref 0.6–0.9)
ECHO LV RELATIVE WALL THICKNESS RATIO: 1.2
ECHO LVOT AREA: 4.2 CM2
ECHO LVOT DIAM: 2.3 CM
ECHO RIGHT VENTRICULAR SYSTOLIC PRESSURE (RVSP): 37 MMHG
ECHO TV REGURGITANT MAX VELOCITY: 2.35 M/S
ECHO TV REGURGITANT PEAK GRADIENT: 21 MMHG
EOSINOPHIL # BLD: 0 K/UL (ref 0–0.4)
EOSINOPHILS RELATIVE PERCENT: 0 % (ref 0–4)
EPI CELLS #/AREA URNS HPF: ABNORMAL /HPF
ERYTHROCYTE [DISTWIDTH] IN BLOOD BY AUTOMATED COUNT: 15.8 % (ref 11.5–14.9)
GFR SERPL CREATININE-BSD FRML MDRD: 12 ML/MIN/1.73M2
GLUCOSE BLD-MCNC: 82 MG/DL (ref 65–105)
GLUCOSE BLD-MCNC: 93 MG/DL (ref 65–105)
GLUCOSE SERPL-MCNC: 109 MG/DL (ref 70–99)
GLUCOSE UR STRIP-MCNC: NEGATIVE MG/DL
HCT VFR BLD AUTO: 34 % (ref 36–46)
HGB BLD-MCNC: 11.2 G/DL (ref 12–16)
HGB UR QL STRIP.AUTO: NEGATIVE
KETONES UR STRIP-MCNC: ABNORMAL MG/DL
LACTATE BLDV-SCNC: 2.2 MMOL/L (ref 0.5–2.2)
LACTATE BLDV-SCNC: 3.1 MMOL/L (ref 0.5–2.2)
LEUKOCYTE ESTERASE UR QL STRIP: ABNORMAL
LYMPHOCYTES NFR BLD: 1.36 K/UL (ref 1–4.8)
LYMPHOCYTES RELATIVE PERCENT: 6 % (ref 24–44)
MCH RBC QN AUTO: 30.6 PG (ref 26–34)
MCHC RBC AUTO-ENTMCNC: 33 G/DL (ref 31–37)
MCV RBC AUTO: 92.7 FL (ref 80–100)
MONOCYTES NFR BLD: 0.45 K/UL (ref 0.1–1.3)
MONOCYTES NFR BLD: 2 % (ref 1–7)
MORPHOLOGY: ABNORMAL
MORPHOLOGY: ABNORMAL
NEUTROPHILS NFR BLD: 69 % (ref 36–66)
NEUTS SEG NFR BLD: 15.67 K/UL (ref 1.3–9.1)
NITRITE UR QL STRIP: NEGATIVE
PH UR STRIP: 5 [PH] (ref 5–8)
PLATELET # BLD AUTO: 233 K/UL (ref 150–450)
PMV BLD AUTO: 9.4 FL (ref 6–12)
POTASSIUM SERPL-SCNC: 5 MMOL/L (ref 3.7–5.3)
PROT UR STRIP-MCNC: ABNORMAL MG/DL
RBC # BLD AUTO: 3.66 M/UL (ref 4–5.2)
RBC #/AREA URNS HPF: ABNORMAL /HPF
SODIUM SERPL-SCNC: 134 MMOL/L (ref 135–144)
SODIUM UR-SCNC: 27 MMOL/L
SP GR UR STRIP: 1.03 (ref 1–1.03)
UROBILINOGEN UR STRIP-ACNC: NORMAL EU/DL (ref 0–1)
WBC #/AREA URNS HPF: ABNORMAL /HPF
WBC OTHER # BLD: 22.7 K/UL (ref 3.5–11)

## 2023-12-15 PROCEDURE — 6360000002 HC RX W HCPCS: Performed by: INTERNAL MEDICINE

## 2023-12-15 PROCEDURE — 82947 ASSAY GLUCOSE BLOOD QUANT: CPT

## 2023-12-15 PROCEDURE — P9047 ALBUMIN (HUMAN), 25%, 50ML: HCPCS | Performed by: INTERNAL MEDICINE

## 2023-12-15 PROCEDURE — 51701 INSERT BLADDER CATHETER: CPT

## 2023-12-15 PROCEDURE — 87205 SMEAR GRAM STAIN: CPT

## 2023-12-15 PROCEDURE — 2500000003 HC RX 250 WO HCPCS: Performed by: INTERNAL MEDICINE

## 2023-12-15 PROCEDURE — 81001 URINALYSIS AUTO W/SCOPE: CPT

## 2023-12-15 PROCEDURE — 93005 ELECTROCARDIOGRAM TRACING: CPT

## 2023-12-15 PROCEDURE — 36556 INSERT NON-TUNNEL CV CATH: CPT

## 2023-12-15 PROCEDURE — 6360000002 HC RX W HCPCS: Performed by: RADIOLOGY

## 2023-12-15 PROCEDURE — 83605 ASSAY OF LACTIC ACID: CPT

## 2023-12-15 PROCEDURE — 86705 HEP B CORE ANTIBODY IGM: CPT

## 2023-12-15 PROCEDURE — 51798 US URINE CAPACITY MEASURE: CPT

## 2023-12-15 PROCEDURE — 99213 OFFICE O/P EST LOW 20 MIN: CPT

## 2023-12-15 PROCEDURE — 76937 US GUIDE VASCULAR ACCESS: CPT

## 2023-12-15 PROCEDURE — 6370000000 HC RX 637 (ALT 250 FOR IP): Performed by: INTERNAL MEDICINE

## 2023-12-15 PROCEDURE — 87186 SC STD MICRODIL/AGAR DIL: CPT

## 2023-12-15 PROCEDURE — 87070 CULTURE OTHR SPECIMN AEROBIC: CPT

## 2023-12-15 PROCEDURE — 77001 FLUOROGUIDE FOR VEIN DEVICE: CPT

## 2023-12-15 PROCEDURE — 87077 CULTURE AEROBIC IDENTIFY: CPT

## 2023-12-15 PROCEDURE — 85025 COMPLETE CBC W/AUTO DIFF WBC: CPT

## 2023-12-15 PROCEDURE — 86317 IMMUNOASSAY INFECTIOUS AGENT: CPT

## 2023-12-15 PROCEDURE — 82570 ASSAY OF URINE CREATININE: CPT

## 2023-12-15 PROCEDURE — 80048 BASIC METABOLIC PNL TOTAL CA: CPT

## 2023-12-15 PROCEDURE — 2580000003 HC RX 258: Performed by: INTERNAL MEDICINE

## 2023-12-15 PROCEDURE — 84300 ASSAY OF URINE SODIUM: CPT

## 2023-12-15 PROCEDURE — 87086 URINE CULTURE/COLONY COUNT: CPT

## 2023-12-15 PROCEDURE — 6370000000 HC RX 637 (ALT 250 FOR IP)

## 2023-12-15 PROCEDURE — 87340 HEPATITIS B SURFACE AG IA: CPT

## 2023-12-15 PROCEDURE — 2060000000 HC ICU INTERMEDIATE R&B

## 2023-12-15 PROCEDURE — C1769 GUIDE WIRE: HCPCS

## 2023-12-15 PROCEDURE — 99233 SBSQ HOSP IP/OBS HIGH 50: CPT | Performed by: INTERNAL MEDICINE

## 2023-12-15 PROCEDURE — 36415 COLL VENOUS BLD VENIPUNCTURE: CPT

## 2023-12-15 PROCEDURE — C1713 ANCHOR/SCREW BN/BN,TIS/BN: HCPCS

## 2023-12-15 PROCEDURE — 93306 TTE W/DOPPLER COMPLETE: CPT

## 2023-12-15 PROCEDURE — 99223 1ST HOSP IP/OBS HIGH 75: CPT | Performed by: INTERNAL MEDICINE

## 2023-12-15 PROCEDURE — 87040 BLOOD CULTURE FOR BACTERIA: CPT

## 2023-12-15 PROCEDURE — 02HV33Z INSERTION OF INFUSION DEVICE INTO SUPERIOR VENA CAVA, PERCUTANEOUS APPROACH: ICD-10-PCS | Performed by: INTERNAL MEDICINE

## 2023-12-15 PROCEDURE — 90935 HEMODIALYSIS ONE EVALUATION: CPT

## 2023-12-15 RX ORDER — HEPARIN SODIUM 1000 [USP'U]/ML
INJECTION, SOLUTION INTRAVENOUS; SUBCUTANEOUS PRN
Status: COMPLETED | OUTPATIENT
Start: 2023-12-15 | End: 2023-12-15

## 2023-12-15 RX ORDER — ALBUMIN (HUMAN) 12.5 G/50ML
25 SOLUTION INTRAVENOUS ONCE
Status: COMPLETED | OUTPATIENT
Start: 2023-12-15 | End: 2023-12-15

## 2023-12-15 RX ORDER — 0.9 % SODIUM CHLORIDE 0.9 %
500 INTRAVENOUS SOLUTION INTRAVENOUS ONCE
Status: COMPLETED | OUTPATIENT
Start: 2023-12-15 | End: 2023-12-15

## 2023-12-15 RX ORDER — DEXTROSE MONOHYDRATE 100 MG/ML
INJECTION, SOLUTION INTRAVENOUS CONTINUOUS PRN
Status: DISCONTINUED | OUTPATIENT
Start: 2023-12-15 | End: 2024-01-02 | Stop reason: HOSPADM

## 2023-12-15 RX ADMIN — ANTI-FUNGAL POWDER MICONAZOLE NITRATE TALC FREE: 1.42 POWDER TOPICAL at 13:06

## 2023-12-15 RX ADMIN — ANTI-FUNGAL POWDER MICONAZOLE NITRATE TALC FREE: 1.42 POWDER TOPICAL at 22:58

## 2023-12-15 RX ADMIN — HEPARIN SODIUM 1400 UNITS: 1000 INJECTION INTRAVENOUS; SUBCUTANEOUS at 14:09

## 2023-12-15 RX ADMIN — SODIUM CHLORIDE, PRESERVATIVE FREE 10 ML: 5 INJECTION INTRAVENOUS at 22:58

## 2023-12-15 RX ADMIN — HEPARIN SODIUM 1500 UNITS: 1000 INJECTION INTRAVENOUS; SUBCUTANEOUS at 14:11

## 2023-12-15 RX ADMIN — ASPIRIN 81 MG: 81 TABLET, COATED ORAL at 08:22

## 2023-12-15 RX ADMIN — COLLAGENASE SANTYL: 250 OINTMENT TOPICAL at 18:25

## 2023-12-15 RX ADMIN — SODIUM CHLORIDE: 9 INJECTION, SOLUTION INTRAVENOUS at 00:36

## 2023-12-15 RX ADMIN — SODIUM CHLORIDE, PRESERVATIVE FREE 10 ML: 5 INJECTION INTRAVENOUS at 08:23

## 2023-12-15 RX ADMIN — AMIODARONE HYDROCHLORIDE 300 MG: 50 INJECTION, SOLUTION INTRAVENOUS at 23:06

## 2023-12-15 RX ADMIN — ONDANSETRON 4 MG: 2 INJECTION INTRAMUSCULAR; INTRAVENOUS at 11:05

## 2023-12-15 RX ADMIN — HEPARIN SODIUM 5000 UNITS: 5000 INJECTION INTRAVENOUS; SUBCUTANEOUS at 05:44

## 2023-12-15 RX ADMIN — HEPARIN SODIUM 5000 UNITS: 5000 INJECTION INTRAVENOUS; SUBCUTANEOUS at 22:57

## 2023-12-15 RX ADMIN — LINEZOLID 600 MG: 600 INJECTION, SOLUTION INTRAVENOUS at 12:37

## 2023-12-15 RX ADMIN — Medication 1.5 ML: at 17:49

## 2023-12-15 RX ADMIN — SODIUM CHLORIDE 500 ML: 9 INJECTION, SOLUTION INTRAVENOUS at 05:13

## 2023-12-15 RX ADMIN — Medication 1.4 ML: at 17:49

## 2023-12-15 RX ADMIN — AMIODARONE HYDROCHLORIDE 1 MG/MIN: 1.8 INJECTION, SOLUTION INTRAVENOUS at 23:23

## 2023-12-15 RX ADMIN — PANTOPRAZOLE SODIUM 40 MG: 40 TABLET, DELAYED RELEASE ORAL at 06:35

## 2023-12-15 RX ADMIN — HEPARIN SODIUM 5000 UNITS: 5000 INJECTION INTRAVENOUS; SUBCUTANEOUS at 15:39

## 2023-12-15 RX ADMIN — LINEZOLID 600 MG: 600 INJECTION, SOLUTION INTRAVENOUS at 00:37

## 2023-12-15 RX ADMIN — SODIUM CHLORIDE: 9 INJECTION, SOLUTION INTRAVENOUS at 12:36

## 2023-12-15 RX ADMIN — SODIUM CHLORIDE 500 ML: 9 INJECTION, SOLUTION INTRAVENOUS at 01:19

## 2023-12-15 RX ADMIN — SODIUM CHLORIDE: 9 INJECTION, SOLUTION INTRAVENOUS at 23:07

## 2023-12-15 RX ADMIN — ALBUMIN (HUMAN) 25 G: 0.25 INJECTION, SOLUTION INTRAVENOUS at 12:34

## 2023-12-15 RX ADMIN — ACETAMINOPHEN 650 MG: 325 TABLET ORAL at 22:58

## 2023-12-15 RX ADMIN — CEFEPIME 1000 MG: 1 INJECTION, POWDER, FOR SOLUTION INTRAMUSCULAR; INTRAVENOUS at 05:10

## 2023-12-15 ASSESSMENT — PAIN SCALES - GENERAL
PAINLEVEL_OUTOF10: 0

## 2023-12-15 NOTE — DIALYSIS
HEMODIALYSIS POST TREATMENT NOTE    Treatment time ordered: 2 hours    Actual treatment time: 2 hours    UltraFiltration Goal: 0  UltraFiltration Removed: 0      Pre Treatment weight:   Post Treatment weight: 141.1 kg  Estimated Dry Weight:     Access used:     Central Venous Catheter:          Tunneled or Non-tunneled: non tunneled placed today 12.15.23           Site: right neck          Access Flow: lines reversed; positional; able to run at ordered bfr 250      Internal Access:       AV Fistula or AV Graft:          Site:        Access Flow:        Sign and symptoms of infection: wnl       If YES:     Medications or blood products given: none    Chronic outpatient schedule:     Chronic outpatient unit:     Summary of response to treatment: Treatment #1 complete and tolerated well. Lines reversed. No fluid removal over 2 hours.     Explain if orders NOT met, was physician notified:n/a      ACES flowsheet faxed to patient unit/ placed in patient chart: yes    Post assessment completed: yes    Report given to: Maryellen FRANZ      * Intra-treatment documented Safety Checks include the followin) Access and face visible at all times.     2) All connections and blood lines are secure with no kinks.     3) NVL alarm engaged.     4) Hemosafe device applied (if applicable).     5) No collapse of Arterial or Venous blood chambers.     6) All blood lines / pump segments in the air detectors.

## 2023-12-15 NOTE — PROGRESS NOTES
RN attempted to call Mahi Loo, Pt's primary decision maker but it directed RN to voice mail. RN left a voice message of ICU RN's work number for them to call back and be updated on pt's progress.

## 2023-12-15 NOTE — PROGRESS NOTES
Updated Dr. Yang that pt's BP is starting to drop again, pt has had no urine output, and bladder scan is only showing 27ml Orders received for IV NS 500ml bolus and repeat NS 500ml bolus if BP does not improve with 1 hour, increase NS IV fluids to 125ml/hr, lactic acid, urine culture, blood cultures x2, critical care consult, and to transfer pt to Intermediate ICU. Orders initiated and pts nurse Jenna updated.

## 2023-12-15 NOTE — CARE COORDINATION
Case Management Assessment  Initial Evaluation    Date/Time of Evaluation: 12/15/2023 2:23 PM  Assessment Completed by: Sujatha Bills RN    If patient is discharged prior to next notation, then this note serves as note for discharge by case management.    Patient Name: Elaina Champagne                   YOB: 1950  Diagnosis: Hyperkalemia [E87.5]  Cellulitis [L03.90]  MICHELLE (acute kidney injury) ([facility]) [N17.9]  Cellulitis of right lower extremity [L03.115]  Cellulitis of left lower extremity [L03.116]  Altered mental status, unspecified altered mental status type [R41.82]                   Date / Time: 12/14/2023 11:42 AM    Patient Admission Status: Inpatient   Readmission Risk (Low < 19, Mod (19-27), High > 27): Readmission Risk Score: 17.6    Current PCP: Robin Ly MD  PCP verified by CM? Yes    Chart Reviewed: Yes      History Provided by: Significant Other  Patient Orientation: Other (see comment) (Sleeping)    Patient Cognition:      Hospitalization in the last 30 days (Readmission):  No    If yes, Readmission Assessment in CM Navigator will be completed.    Advance Directives:      Code Status: Full Code   Patient's Primary Decision Maker is:      Primary Decision Maker: MahiEze - Other - [phone]    Discharge Planning:    Patient lives with: Spouse/Significant Other Type of Home: Trailer/Mobile Home  Primary Care Giver: Self  Patient Support Systems include: Spouse/Significant Other   Current Financial resources: Medicare  Current community resources: None  Current services prior to admission: Durable Medical Equipment, Other (Comment) (Follows at [facility])            Current DME: Cane, Walker            Type of Home Care services:  None    ADLS  Prior functional level: Independent in ADLs/IADLs  Current functional level: Independent in ADLs/IADLs    PT AM-PAC:   /24  OT AM-PAC:   /24    Family can provide assistance at DC: No (Per  needs

## 2023-12-15 NOTE — PROGRESS NOTES
Attempted to call patient for TCM, LMTCB.   Report given MARIA M Wagner from ICU.  Reviewed vitals, medication, any pending diagnosis, abnormal labs, abnormal assessments findings and any safety concern or issues.

## 2023-12-15 NOTE — DIALYSIS
HEMODIALYSIS PRE-TREATMENT NOTE    Patient Identifiers prior to treatment: name  mrn    Isolation Required:                       Isolation Type:        (please document if patient is being managed as a PUI/COVID-19 patient)        Hepatitis status: unknown                          Date Drawn                             Result  Hepatitis B Surface Antigen 12.15.23                          Hepatitis B Surface Antibody 12.15.23         Hepatitis B Core Antibody 12.15.23           How was Hepatitis Status verified: lab draw complete     Was a copy of the labs you documented provided to facility for the patient's chart: n/a    Hemodialysis orders verified: yes    Access Within normal limits ( I.e. s/s of infection,...): non tunneled to right neck wnl     Pre-Assessment completed: yes    Pre-dialysis report received from: Maryellen FRANZ                      Time: 5422

## 2023-12-15 NOTE — DISCHARGE INSTR - COC
Continuity of Care Form    Patient Name: Elaina Champagne   :  1950  MRN:  [id number]    Admit date:  2023  Discharge date:  ***    Code Status Order: Full Code   Advance Directives:     Admitting Physician:  David Yang MD  PCP: Robin Ly MD    Discharging Nurse: ***  Discharging Hospital Unit/Room#:   Discharging Unit Phone Number: ***    Emergency Contact:   Extended Emergency Contact Information  Primary Emergency Contact: Eze Champagne   [facility]  Home Phone: [phone]  Relation: Other    Past Surgical History:  Past Surgical History:   Procedure Laterality Date    CATARACT REMOVAL Bilateral     HYSTERECTOMY, TOTAL ABDOMINAL (CERVIX REMOVED)  1990    INCISION AND DRAINAGE Left 2017    LEG INCISION AND DRAINAGE ABSCESS performed by Isaiah Casey MD at [facility] OR    KNEE ARTHROPLASTY Right 2018    TOTAL KNEE ARTHROPLASTY Left 2017    VENTRAL HERNIA REPAIR  2019    5 hernias       Immunization History:   Immunization History   Administered Date(s) Administered    COVID-19, MODERNA BLUE border, Primary or Immunocompromised, (age 12y+), IM, 100 mcg/0.5mL 2021, 2021    COVID-19, PFIZER Bivalent, DO NOT Dilute, (age 12y+), IM, 30 mcg/0.3 mL 2022    COVID-19, PFIZER GRAY top, DO NOT Dilute, (age 12 y+), IM, 30 mcg/0.3 mL 2022    COVID-19, PFIZER PURPLE top, DILUTE for use, (age 12 y+), 30mcg/0.3mL 2021    DTaP 2004    Influenza Virus Vaccine 2008, 10/20/2011, 2012, 2013, 2013, 2014, 10/01/2014, 2015, 2016    Influenza, FLUAD, (age 65 y+), Adjuvanted, 0.5mL 2020, 2021, 2023    Influenza, FLUZONE (age 65 y+), High Dose, 0.7mL 2022    Influenza, High Dose (Fluzone 65 yrs and older) 10/20/2011, 2012, 2013, 2013, 10/01/2014, 2017, 2018, 2019    Pneumococcal, PCV-13, PREVNAR 13, (age 6w+), IM, 0.5mL  Tibial Left;Posterior;Lower wound #1 (Active)   Wound Image   12/15/23 1535   Wound Etiology Other 12/15/23 1535   Dressing Status Old drainage noted;New dressing applied 12/15/23 1535   Wound Cleansed Cleansed with saline 12/15/23 1535   Dressing/Treatment Petroleum gauze;ABD;Roll gauze 12/15/23 1535   Dressing Change Due 12/15/23 12/15/23 1200   Wound Length (cm) 8 cm 12/15/23 1535   Wound Width (cm) 6 cm 12/15/23 1535   Wound Depth (cm) 0.2 cm 12/15/23 1535   Wound Surface Area (cm^2) 48 cm^2 12/15/23 1535   Change in Wound Size % (l*w) -2627.27 12/15/23 1535   Wound Volume (cm^3) 9.6 cm^3 12/15/23 1535   Wound Healing % -2627 12/15/23 1535   Post-Procedure Length (cm) 4 cm 12/13/23 1011   Post-Procedure Width (cm) 3.3 cm 12/13/23 1011   Post-Procedure Depth (cm) 0.2 cm 12/13/23 1011   Post-Procedure Surface Area (cm^2) 13.2 cm^2 12/13/23 1011   Post-Procedure Volume (cm^3) 2.64 cm^3 12/13/23 1011   Wound Assessment Eschar moist;Pink/red;Slough 12/15/23 1535   Drainage Amount Moderate (25-50%) 12/15/23 1535   Drainage Description Serosanguinous 12/15/23 1535   Odor None 12/15/23 1535   Sis-wound Assessment Blanchable erythema;Fragile 12/15/23 1535   Margins Defined edges 12/15/23 1535   Wound Thickness Description not for Pressure Injury Partial thickness 12/15/23 1200   Number of days: 46     Left posterior leg wound: Cleanse with saline, pat dry. Apply santyl (nickel thickness) to wound, cover with saline moistened gauze and ABD, wrap with roll gauze. Change daily and as needed if loose or soiled.     Gluteal cleft: Cleanse with soap and water, pat dry. Apply triad cream, cover with foam dressing. Change daily and as needed if loose or soiled.      Elimination:  Continence:   Bowel: {YES / NO:19727}  Bladder: {YES / NO:19727}  Urinary Catheter: {Urinary Catheter:[id number]}   Colostomy/Ileostomy/Ileal Conduit: {YES / NO:19727}       Date of Last BM: ***    Intake/Output Summary (Last 24 hours) at 12/15/2023

## 2023-12-15 NOTE — PROGRESS NOTES
Pt is now more alert and responsive after NS bolus and BP is now 98/34. No s/s or c/o distress at this time; will continue to monitor.

## 2023-12-15 NOTE — CONSULTS
[facility]   CONSULT NOTE:      DATE OF CONSULT 12/15/2023    REASON FOR CONSULTATION:  Fatigue acute kidney injury low blood pressure      PCP Robin Ly MD     CHIEF COMPLAINT: I am not feeling good    HISTORY OF PRESENT ILLNESS:     73-year-old female.  History of left leg discomfort history of fatigue.  Patient was seen in the ER.  Reportedly she has a history of wounds in her left leg follows with wound care.  She has been off antibiotics for couple weeks.  She presented were lab work included a BUN 54 creatinine 3.9 serum bicarb 24 total CK2 36 white count 18.9 with 4 bands.  Repeat white count this morning 22.7 thousand with 23% bands.      Patient's blood cultures times 2 December 2014 pending so far blood cultures times 2 December 2015 pending so far urine culture in process wound culture in process  Patient currently is on Maxipime.  On Zyvox.    Patient is on DVT prophylaxis with subcu heparin.    ALLERGIES:  Allergies   Allergen Reactions    Penicillins Hives       HOME MEDICATIONS:  Medications Prior to Admission: acetaminophen (TYLENOL) 325 MG tablet, Take 2 tablets by mouth every 6 hours as needed for Pain  omeprazole (PRILOSEC) 20 MG delayed release capsule, TAKE 1 CAPSULE BY MOUTH ONCE DAILY AT NIGHT  solifenacin (VESICARE) 10 MG tablet, Take 1 tablet by mouth daily  escitalopram (LEXAPRO) 20 MG tablet, Take 1 tablet by mouth daily  telmisartan (MICARDIS) 40 MG tablet, Take 1 tablet by mouth daily  bumetanide (BUMEX) 1 MG tablet, Take 1 tablet by mouth daily  potassium chloride (KLOR-CON M) 10 MEQ extended release tablet, Take 1 tablet by mouth 2 times daily  metoprolol tartrate (LOPRESSOR) 25 MG tablet, Take 1 tablet by mouth 2 times daily  naproxen (NAPROSYN) 500 MG tablet, TAKE 1 TABLET BY MOUTH TWICE DAILY WITH MEALS AS NEEDED  aspirin 81 MG EC tablet, Take 1 tablet by mouth daily  traZODone (DESYREL) 50 MG tablet, Take 1 tablet by mouth    Intimate Partner Violence: Not on file   Housing Stability: Low Risk  (12/14/2023)    Housing Stability Vital Sign     Unable to Pay for Housing in the Last Year: No     Number of Places Lived in the Last Year: 1     Unstable Housing in the Last Year: No       FAMILY HISTORY:  Family History   Problem Relation Age of Onset    Cancer Mother     High Blood Pressure Father     Stroke Father     Ovarian Cancer Daughter     Cancer Daughter     Diabetes Maternal Aunt     Cancer Other         daughter/ovarian cancer       REVIEW OF SYSTEMS:  All other systems reviewed and are negative.      PHYSICAL EXAM:  Vital Signs Blood pressure (!) 91/25, pulse 87, temperature 97.6 °F (36.4 °C), temperature source Oral, resp. rate 27, height 1.575 m (5' 2\"), weight (!) 141.2 kg (311 lb 4.6 oz), SpO2 91 %.  Oxygen Amount and Delivery:      Admission Weight Weight - Scale: (!) 137 kg (302 lb)    General Appearance     73-year-old female obese, looking tired.    Head  Normocephalic, without obvious abnormality, atraumatic    Eyes  conjunctivae clear.     ENT Mallampati class IV  Neck  no adenopathy,   Lungs clear posteriorly no crackles rales or wheezes  Heart: regular rate and rhythm, S1, S2 normal, no murmur, click, rub or gallop  Abdomen  soft, non-tender; bowel sounds normal  Extremities    Skin  Skin color, texture, turgor normal. No rashes or lesions        Imaging      Lab Review  CBC     Lab Results   Component Value Date/Time    WBC 22.7 12/15/2023 03:58 AM    RBC 3.66 12/15/2023 03:58 AM    RBC 3.34 09/25/2018 05:38 AM    HGB 11.2 12/15/2023 03:58 AM    HCT 34.0 12/15/2023 03:58 AM     12/15/2023 03:58 AM    MCV 92.7 12/15/2023 03:58 AM    MCH 30.6 12/15/2023 03:58 AM    MCHC 33.0 12/15/2023 03:58 AM    RDW 15.8 12/15/2023 03:58 AM    NRBC 0 09/25/2018 05:38 AM    LYMPHOPCT 6 12/15/2023 03:58 AM    MONOPCT 2 12/15/2023 03:58 AM    BASOPCT 0 12/15/2023 03:58 AM    MONOSABS 0.45 12/15/2023 03:58 AM    LYMPHSABS 1.36

## 2023-12-15 NOTE — CONSULTS
[facility]  Consult Note       NAME:  Elaina Champagne  MEDICAL RECORD NUMBER:  [id number]  AGE: 73 y.o.   GENDER: female  : 1950  TODAY'S DATE:  12/15/2023    Subjective:      Elaina Champagne is a 73 y.o. female with inpatient referral to [facility] for:  Left posterior leg      Wound Identification:  Wound Type: venous and arterial  Contributing Factors: edema, venous stasis, and lymphedema    Wound History: pt states she has had the wound for \"a while,\" she has been following with outpatient wound care since May 2023  Current Wound Care Treatment:  3 layer compression wrap    Patient Goal of Care:  [x] Wound Healing  [] Odor Control  [] Palliative Care  [] Pain Control   [] Other:         PAST MEDICAL HISTORY        Diagnosis Date    Bell's palsy     Cellulitis     Hypertension        PAST SURGICAL HISTORY    Past Surgical History:   Procedure Laterality Date    CATARACT REMOVAL Bilateral     HYSTERECTOMY, TOTAL ABDOMINAL (CERVIX REMOVED)      INCISION AND DRAINAGE Left 2017    LEG INCISION AND DRAINAGE ABSCESS performed by Isaiah Casey MD at [facility] OR    KNEE ARTHROPLASTY Right 2018    TOTAL KNEE ARTHROPLASTY Left 2017    VENTRAL HERNIA REPAIR  2019    5 hernias       FAMILY HISTORY    Family History   Problem Relation Age of Onset    Cancer Mother     High Blood Pressure Father     Stroke Father     Ovarian Cancer Daughter     Cancer Daughter     Diabetes Maternal Aunt     Cancer Other         daughter/ovarian cancer       SOCIAL HISTORY    Social History     Tobacco Use    Smoking status: Never     Passive exposure: Never    Smokeless tobacco: Never   Vaping Use    Vaping Use: Never used   Substance Use Topics    Alcohol use: No    Drug use: No         ALLERGIES    Allergies   Allergen Reactions    Penicillins Hives       HOME MEDICATIONS  Prior to Admission medications    Medication Sig Start Date End Date Taking?  back dressing, inspect skin beneath, and re-secure every shift. Change every 3 days and as needed if loose or soiled. Discontinue sacral foam if repeatedly soiled by incontinence.    [] Apply zinc oxide cream twice daily and as needed after incontinent episodes.    [] Perform routine incontinence care with use of foam cleanser.    [x] Use single layer moisture wicking underpad.    [] Use comfort glide system and wedges to reposition patient.    [x] Keep the head of the bed below 30 degrees unless contraindicated.    [] Pressure reducing chair cushion while up to chair. Reposition every hour while in chair and limit chair time to 2 hour intervals.    [x] Encourage good nutritional intake and fluids. Consult dietician if needed.    Specialty Bed Required : Yes   [] Low Air Loss   [x] Pressure Redistribution  [] Fluid Immersion  [] Bariatric  [] Total Pressure Relief  [] Other:     Current Diet: ADULT DIET; Regular; No Added Salt (3-4 gm)  Dietician consult:  Yes    Discharge Plan:  Placement for patient upon discharge: home with support   Patient appropriate for Outpatient Wound Care Center: Yes    Patient/Caregiver Teaching:  Level of patientunderstanding able to:     [x] Indicates understanding       [] Needs reinforcement  [] Unsuccessful      [x] Verbal Understanding  [] Demonstrated understanding       [] No evidence of learning  [] Refused teaching         [] N/A       Electronically signed by Mabel Arellano RN on  12/15/2023 at 3:38 PM

## 2023-12-15 NOTE — PROGRESS NOTES
Writer called Dr. Yang to inform him that pts BP is 72/34, pt is clammy and slower to respond. Order received for IV NS 500ml bolus. Order initiated and bolus started.

## 2023-12-15 NOTE — OR NURSING
Patient brought to the IR suite via cart, after consent obtained, patient placed on the procedure table, monitoring equipment/O2 hooked up and rhythm strip printed.  Patient then positioned/prepped/draped

## 2023-12-15 NOTE — PLAN OF CARE
Problem: Discharge Planning  Goal: Discharge to home or other facility with appropriate resources  Outcome: Progressing     Problem: Safety - Adult  Goal: Free from fall injury  Outcome: Progressing     Problem: Pain  Goal: Verbalizes/displays adequate comfort level or baseline comfort level  Outcome: Progressing     Problem: Skin/Tissue Integrity  Goal: Absence of new skin breakdown  Description: 1.  Monitor for areas of redness and/or skin breakdown  2.  Assess vascular access sites hourly  3.  Every 4-6 hours minimum:  Change oxygen saturation probe site  4.  Every 4-6 hours:  If on nasal continuous positive airway pressure, respiratory therapy assess nares and determine need for appliance change or resting period.  Outcome: Progressing     Problem: ABCDS Injury Assessment  Goal: Absence of physical injury  Outcome: Progressing

## 2023-12-15 NOTE — PROGRESS NOTES
Patient refused venous scan/ dopplers. states that she had one done recently and they \"roughed me up\".

## 2023-12-15 NOTE — PLAN OF CARE
Problem: Discharge Planning  Goal: Discharge to home or other facility with appropriate resources  12/15/2023 1632 by Maryellen Rodriguez, RN  Outcome: Progressing  Flowsheets  Taken 12/15/2023 1600  Discharge to home or other facility with appropriate resources: Refer to discharge planning if patient needs post-hospital services based on physician order or complex needs related to functional status, cognitive ability or social support system  Taken 12/15/2023 1200  Discharge to home or other facility with appropriate resources: Refer to discharge planning if patient needs post-hospital services based on physician order or complex needs related to functional status, cognitive ability or social support system  Taken 12/15/2023 0800  Discharge to home or other facility with appropriate resources: Refer to discharge planning if patient needs post-hospital services based on physician order or complex needs related to functional status, cognitive ability or social support system     Problem: Pain  Goal: Verbalizes/displays adequate comfort level or baseline comfort level  12/15/2023 1632 by Maryellen Rodriguez, RN  Outcome: Progressing  Flowsheets  Taken 12/15/2023 1200  Verbalizes/displays adequate comfort level or baseline comfort level:   Encourage patient to monitor pain and request assistance   Assess pain using appropriate pain scale  Taken 12/15/2023 0800  Verbalizes/displays adequate comfort level or baseline comfort level:   Encourage patient to monitor pain and request assistance   Assess pain using appropriate pain scale   Administer analgesics based on type and severity of pain and evaluate response

## 2023-12-15 NOTE — PROGRESS NOTES
the  Progressive Unit/Step down    Patient admitted with cellulitis, source and back of both legs, with lymphedema, holding wound care, previous wound culture was growing Klebsiella and MSSA, starting patient on cefepime, Zyvox,  Ordering venous scan, patient had arterial scan done recently suggestive of peripheral artery disease affecting left leg, ID consult, wound care consult  MICHELLE, holding telmisartan, diuretics starting patient on gentle hydration, will monitor creatinine closely, ultrasound kidneys done in emergency room was negative for hydronephrosis, nephrology consult  Heparin for DVT prophylaxis  Ordering Doppler for both legs to rule out DVT  Morbid obesity with BMI of 55  Dec 15  Patient was hypotensive on the floor hence transferred to ICU did not require any pressors fluid resuscitation  Extensive cellulitis with wounds in the back of the leg patient refused to get the DVT scan done today vascular input pending broad-spectrum IV antibiotics infectious disease input requested  Ok to step down      Consultations:   IP CONSULT TO HOSPITALIST  IP CONSULT TO INFECTIOUS DISEASES  IP CONSULT TO NEUROLOGY  IP CONSULT TO CRITICAL CARE    Patient is admitted as inpatient status because of co-morbidities listed above, severity of signs and symptoms as outlined, requirement for current medical therapies and most importantly because of direct risk to patient if care not provided in a hospital setting.    Meño Lowe MD  12/15/2023  10:48 AM    Copy sent to Dr. Ly, Robin Olivo MD    Please note that this chart was generated using voice recognition Dragon dictation software.  Although every effort was made to ensure the accuracy of this automated transcription, some errors in transcription may have occurred.

## 2023-12-15 NOTE — CONSULTS
NEPHROLOGY CONSULT     Patient :  Elaina Champagne; 73 y.o. MRN# [id number]  Location:  2006/2006-01  Attending:  David Yang MD  Admit Date:  12/14/2023   Hospital Day: 1      Reason for Consult: Acute kidney injury      Chief Complaint: Left leg swelling wound erythema and pain  History Obtained From:  patient ,, EMR nursing staff and they can do it tomorrow I told yesterday that it is if she if they can do it and everything is set at discharge    History of Present Illness:    This is a 73 y.o. female with past medical history of essential hypertension, peripheral vascular disease, patient presented to the hospital with complaints of left leg swelling erythema and wound on the left leg patient was seen by vascular and wound care on 12/13/2023.  Patient presented to the hospital yesterday on 12/14/2023 with complaints of left leg swelling erythema and wound getting worse patient has that swelling for several weeks but has been getting worse and more red.  Patient also noted to have fatigue weakness, and she has confusion this morning.  According to the  no nausea vomiting diarrhea no fever chills no headache dizziness.  Labs on admission showed BUN of 54 serum creatinine of 3.9 mg/dL, patient has been oliguric.    Patient denies dysuria, gross hematuria, flank pain, nocturia, urgency, passing frothy urine or urinary incontinence.  There has been no recent exposure to IV contrast.   There is no history  of paraprotein disease.   Pt denies any history of recurrent UTI or kidney stones.  Medication review shows use of ARB's and Bumex, and NSAIDs.    Patient noted to be hypotensive yesterday systolic blood pressure was in 70s blood pressure has improved    UA showed 1+ protein trace leukocyte esterase WBC 0-2 RBC 3-5 hemoglobin negative many bacteria.    Urine culture is positive for moderate gram-negative rods, few gram-positive cocci in pairs, rare gram-positive cocci in clusters.Likely contaminated

## 2023-12-15 NOTE — CONSULTS
[facility] -   Infectious diseases evaluation  admission date 12/14/2023    reason for consultation:   Cellulitis    Impression :   Current:  Cellulitis bilateral lower extremity  Sepsis secondary to above  Left posterior tibial wound  Peripheral arterial disease  Acute on chronic renal failure  Obesity  History of lymphedema venous stasis insufficiency  Hyperlipidemia  Penicillin allergy    Recommendations   IV cefepime and Zyvox  Follow cultures and adjust antibiotics as needed  Plan for temporary hemodialysis catheter per nephrology  Wound care  Follow CBC and renal function  Supportive care    Infection Control Recommendations   [place] Precautions  Antimicrobial Stewardship Recommendations   Simplification of therapy  Targeted therapy      History of Present Illness:   Initial history:  Elaina Champagne is a 73 y.o.-year-old female was sent to the hospital from wound care clinic for severe left leg pain associated with generalized fatigue.  The patient had swelling and redness of the lower extremities bilaterally with open wound to the posterior aspect of the right leg.  She is poor historian, afebrile, denied nausea or vomiting, no diarrhea, no cough or shortness of breath, no other complaints.  Initial labs showed WBC of 22, elevated lactic acid and creatinine of 3.8.  Arterial Doppler showed left PHILL of 0.68    Interval changes  12/15/2023   Patient Vitals for the past 8 hrs:   BP Temp Temp src Pulse Resp SpO2   12/15/23 0930 -- -- -- 87 27 91 %   12/15/23 0915 -- -- -- 93 22 92 %   12/15/23 0900 (!) 91/25 -- -- 92 26 95 %   12/15/23 0800 118/71 97.6 °F (36.4 °C) Oral 95 20 95 %   12/15/23 0600 (!) 120/29 -- -- 91 24 --   12/15/23 0400 (!) 123/41 -- -- (!) 109 25 (!) 87 %           I have personally reviewed the past medical history, past surgical history, medications, social history, and family history, and I haveupdated the database

## 2023-12-16 PROBLEM — R78.81 BACTEREMIA DUE TO PSEUDOMONAS: Status: ACTIVE | Noted: 2023-12-16

## 2023-12-16 PROBLEM — R41.82 ALTERED MENTAL STATUS: Status: ACTIVE | Noted: 2023-12-16

## 2023-12-16 PROBLEM — B96.5 BACTEREMIA DUE TO PSEUDOMONAS: Status: ACTIVE | Noted: 2023-12-16

## 2023-12-16 PROBLEM — R79.82 ELEVATED C-REACTIVE PROTEIN (CRP): Status: ACTIVE | Noted: 2023-12-16

## 2023-12-16 PROBLEM — R82.71 BACTERIURIA: Status: ACTIVE | Noted: 2023-12-16

## 2023-12-16 PROBLEM — Z88.0 ALLERGY TO PENICILLIN: Status: ACTIVE | Noted: 2023-12-16

## 2023-12-16 PROBLEM — D72.829 LEUKOCYTOSIS: Status: ACTIVE | Noted: 2023-12-16

## 2023-12-16 PROBLEM — I48.91 ATRIAL FIBRILLATION WITH RAPID VENTRICULAR RESPONSE (HCC): Status: ACTIVE | Noted: 2023-12-16

## 2023-12-16 LAB
ABSOLUTE BANDS #: 4.18 K/UL (ref 0–1)
ALBUMIN SERPL-MCNC: 2.4 G/DL (ref 3.5–5.2)
ALP SERPL-CCNC: 147 U/L (ref 35–104)
ALT SERPL-CCNC: 15 U/L (ref 5–33)
ANION GAP SERPL CALCULATED.3IONS-SCNC: 11 MMOL/L (ref 9–17)
AST SERPL-CCNC: 29 U/L
BANDS: 18 % (ref 0–10)
BASOPHILS # BLD: 0 K/UL (ref 0–0.2)
BASOPHILS NFR BLD: 0 % (ref 0–2)
BILIRUB SERPL-MCNC: 0.7 MG/DL (ref 0.3–1.2)
BUN SERPL-MCNC: 55 MG/DL (ref 8–23)
CALCIUM SERPL-MCNC: 8.4 MG/DL (ref 8.6–10.4)
CHLORIDE SERPL-SCNC: 99 MMOL/L (ref 98–107)
CO2 SERPL-SCNC: 23 MMOL/L (ref 20–31)
CREAT SERPL-MCNC: 3 MG/DL (ref 0.5–0.9)
EOSINOPHIL # BLD: 0 K/UL (ref 0–0.4)
EOSINOPHILS RELATIVE PERCENT: 0 % (ref 0–4)
ERYTHROCYTE [DISTWIDTH] IN BLOOD BY AUTOMATED COUNT: 15.9 % (ref 11.5–14.9)
GFR SERPL CREATININE-BSD FRML MDRD: 16 ML/MIN/1.73M2
GLUCOSE SERPL-MCNC: 99 MG/DL (ref 70–99)
HBV CORE IGM SERPL QL IA: NONREACTIVE
HBV SURFACE AB SERPL IA-ACNC: <3.5 MIU/ML
HBV SURFACE AG SERPL QL IA: NONREACTIVE
HCT VFR BLD AUTO: 33.3 % (ref 36–46)
HGB BLD-MCNC: 10.8 G/DL (ref 12–16)
LYMPHOCYTES NFR BLD: 0.46 K/UL (ref 1–4.8)
LYMPHOCYTES RELATIVE PERCENT: 2 % (ref 24–44)
MCH RBC QN AUTO: 30.2 PG (ref 26–34)
MCHC RBC AUTO-ENTMCNC: 32.4 G/DL (ref 31–37)
MCV RBC AUTO: 93.4 FL (ref 80–100)
MICROORGANISM SPEC CULT: ABNORMAL
MICROORGANISM SPEC CULT: NO GROWTH
MONOCYTES NFR BLD: 0.23 K/UL (ref 0.1–1.3)
MONOCYTES NFR BLD: 1 % (ref 1–7)
MORPHOLOGY: ABNORMAL
MORPHOLOGY: ABNORMAL
NEUTROPHILS NFR BLD: 79 % (ref 36–66)
NEUTS SEG NFR BLD: 18.33 K/UL (ref 1.3–9.1)
PLATELET # BLD AUTO: 235 K/UL (ref 150–450)
PMV BLD AUTO: 9.5 FL (ref 6–12)
POTASSIUM SERPL-SCNC: 4.6 MMOL/L (ref 3.7–5.3)
PROT SERPL-MCNC: 6.9 G/DL (ref 6.4–8.3)
RBC # BLD AUTO: 3.57 M/UL (ref 4–5.2)
SERVICE CMNT-IMP: ABNORMAL
SODIUM SERPL-SCNC: 133 MMOL/L (ref 135–144)
SPECIMEN DESCRIPTION: ABNORMAL
SPECIMEN DESCRIPTION: NORMAL
WBC OTHER # BLD: 23.2 K/UL (ref 3.5–11)

## 2023-12-16 PROCEDURE — 2500000003 HC RX 250 WO HCPCS: Performed by: INTERNAL MEDICINE

## 2023-12-16 PROCEDURE — 2580000003 HC RX 258: Performed by: INTERNAL MEDICINE

## 2023-12-16 PROCEDURE — 6360000002 HC RX W HCPCS: Performed by: INTERNAL MEDICINE

## 2023-12-16 PROCEDURE — 99291 CRITICAL CARE FIRST HOUR: CPT | Performed by: INTERNAL MEDICINE

## 2023-12-16 PROCEDURE — 85025 COMPLETE CBC W/AUTO DIFF WBC: CPT

## 2023-12-16 PROCEDURE — 36415 COLL VENOUS BLD VENIPUNCTURE: CPT

## 2023-12-16 PROCEDURE — 2060000000 HC ICU INTERMEDIATE R&B

## 2023-12-16 PROCEDURE — 6370000000 HC RX 637 (ALT 250 FOR IP): Performed by: INTERNAL MEDICINE

## 2023-12-16 PROCEDURE — 99223 1ST HOSP IP/OBS HIGH 75: CPT | Performed by: INTERNAL MEDICINE

## 2023-12-16 PROCEDURE — 80053 COMPREHEN METABOLIC PANEL: CPT

## 2023-12-16 PROCEDURE — 90935 HEMODIALYSIS ONE EVALUATION: CPT

## 2023-12-16 PROCEDURE — 99232 SBSQ HOSP IP/OBS MODERATE 35: CPT | Performed by: NURSE PRACTITIONER

## 2023-12-16 RX ORDER — NOREPINEPHRINE BITARTRATE 0.06 MG/ML
1-100 INJECTION, SOLUTION INTRAVENOUS CONTINUOUS
Status: DISCONTINUED | OUTPATIENT
Start: 2023-12-16 | End: 2023-12-17

## 2023-12-16 RX ADMIN — LINEZOLID 600 MG: 600 INJECTION, SOLUTION INTRAVENOUS at 12:56

## 2023-12-16 RX ADMIN — PANTOPRAZOLE SODIUM 40 MG: 40 TABLET, DELAYED RELEASE ORAL at 05:16

## 2023-12-16 RX ADMIN — CEFEPIME 1000 MG: 1 INJECTION, POWDER, FOR SOLUTION INTRAMUSCULAR; INTRAVENOUS at 05:14

## 2023-12-16 RX ADMIN — SODIUM CHLORIDE: 9 INJECTION, SOLUTION INTRAVENOUS at 06:47

## 2023-12-16 RX ADMIN — AMIODARONE HYDROCHLORIDE 1 MG/MIN: 1.8 INJECTION, SOLUTION INTRAVENOUS at 11:48

## 2023-12-16 RX ADMIN — Medication 5 MCG/MIN: at 06:18

## 2023-12-16 RX ADMIN — HEPARIN SODIUM 5000 UNITS: 5000 INJECTION INTRAVENOUS; SUBCUTANEOUS at 14:45

## 2023-12-16 RX ADMIN — LINEZOLID 600 MG: 600 INJECTION, SOLUTION INTRAVENOUS at 00:35

## 2023-12-16 RX ADMIN — Medication 1.5 ML: at 12:19

## 2023-12-16 RX ADMIN — Medication 1.4 ML: at 12:18

## 2023-12-16 RX ADMIN — AMIODARONE HYDROCHLORIDE 1 MG/MIN: 1.8 INJECTION, SOLUTION INTRAVENOUS at 23:39

## 2023-12-16 RX ADMIN — COLLAGENASE SANTYL: 250 OINTMENT TOPICAL at 08:53

## 2023-12-16 RX ADMIN — ANTI-FUNGAL POWDER MICONAZOLE NITRATE TALC FREE: 1.42 POWDER TOPICAL at 21:30

## 2023-12-16 RX ADMIN — SODIUM CHLORIDE, PRESERVATIVE FREE 10 ML: 5 INJECTION INTRAVENOUS at 21:30

## 2023-12-16 RX ADMIN — HEPARIN SODIUM 5000 UNITS: 5000 INJECTION INTRAVENOUS; SUBCUTANEOUS at 05:44

## 2023-12-16 RX ADMIN — HEPARIN SODIUM 5000 UNITS: 5000 INJECTION INTRAVENOUS; SUBCUTANEOUS at 21:30

## 2023-12-16 RX ADMIN — AMIODARONE HYDROCHLORIDE 1 MG/MIN: 1.8 INJECTION, SOLUTION INTRAVENOUS at 05:15

## 2023-12-16 RX ADMIN — ANTI-FUNGAL POWDER MICONAZOLE NITRATE TALC FREE: 1.42 POWDER TOPICAL at 08:53

## 2023-12-16 RX ADMIN — AMIODARONE HYDROCHLORIDE 1 MG/MIN: 1.8 INJECTION, SOLUTION INTRAVENOUS at 17:54

## 2023-12-16 ASSESSMENT — PAIN SCALES - GENERAL
PAINLEVEL_OUTOF10: 0

## 2023-12-16 NOTE — PROGRESS NOTES
[facility] -   Infectious diseases evaluation  admission date 12/14/2023    reason for consultation:   Cellulitis    Impression :   Current:  Cellulitis bilateral lower extremity. Wound cx growing GNR, GPC in pairs and clusters.  Pseudomonas Aeruginosa Bacteremia.  Sepsis secondary to above  Bacteriuria  Leukocytosis  Elevated CRP  Left posterior tibial wound  Peripheral arterial disease  Acute on chronic renal failure.  New onset HD.  Obesity  History of lymphedema venous stasis insufficiency  Hyperlipidemia  Penicillin allergy-Hives.    Recommendations   Zyvox 600 mg IV every 12 hours, Cefepime 1 gram IV daily for now.  Follow WBC and platelets closely while on Zyvox.  Follow renal function closely.  Follow cultures and adjust antibiotics as needed  Wound care  Supportive care  Discussed with patient,  at the bedside.    Infection Control Recommendations   [place] Precautions    Antimicrobial Stewardship Recommendations   Simplification of therapy  Targeted therapy    History of Present Illness:   Initial history:  Elaina Champagne is a 73 y.o.-year-old female was sent to the hospital from wound care clinic for severe left leg pain associated with generalized fatigue.  The patient had swelling and redness of the lower extremities bilaterally with open wound to the posterior aspect of the right leg.  She is poor historian, afebrile, denied nausea or vomiting, no diarrhea, no cough or shortness of breath, no other complaints.  Initial labs showed WBC of 22, elevated lactic acid and creatinine of 3.8.  Arterial Doppler showed left PHILL of 0.68    12/14            Interval changes  12/16/2023   Patient Vitals for the past 8 hrs:   BP Temp Temp src Pulse Resp SpO2 Weight   12/16/23 1115 (!) 113/37 -- -- 74 -- -- --   12/16/23 1100 (!) 96/21 -- -- 71 19 97 % --   12/16/23 1045 128/68 -- -- 73 15 95 % --   12/16/23 1030 (!) 119/33 -- -- 75 24 98 % --   12/16/23 1015  reviewed/ordered the following labs:    CBC with Differential:   Recent Labs     12/15/23  0358 12/16/23  0419   WBC 22.7* 23.2*   HGB 11.2* 10.8*   HCT 34.0* 33.3*    235   LYMPHOPCT 6* 2*   MONOPCT 2 1     BMP:  Recent Labs     12/14/23  1207 12/15/23  0358 12/16/23  0419    134* 133*   K 4.8 5.0 4.6   CL 98 100 99   CO2 24 20 23   BUN 54* 61* 55*   CREATININE 3.9* 3.8* 3.0*   MG 1.9  --   --      Hepatic Function Panel:   Recent Labs     12/14/23  1207 12/16/23 0419   PROT 7.8 6.9   LABALBU 3.2* 2.4*   BILITOT 0.8 0.7   ALKPHOS 104 147*   ALT 15 15   AST 28 29     No results for input(s): \"RPR\" in the last 72 hours.  No results for input(s): \"HIV\" in the last 72 hours.  No results for input(s): \"BC\" in the last 72 hours.  Lab Results   Component Value Date/Time    CREATININE 3.0 12/16/2023 04:19 AM    GLUCOSE 99 12/16/2023 04:19 AM    GLUCOSE 91 09/19/2018 11:53 AM   CRP: 464.3    Detailed results:        Thank you for allowing us to participate in the care of this patient.Please call with questions.    This note is created with the assistance of a speech recognition program.  While intending to generate adocument that actually reflects the content of the visit, the document can still have some errors including those of syntax and sound a like substitutions which may escape proof reading.  It such instances, actual meaningcan be extrapolated by contextual diversion.    Emerald Kwan, TOPHER - CNP  Office: [phone]  Perfect serve / office [phone]

## 2023-12-16 NOTE — PROGRESS NOTES
Nutrition Note    Pt reportedly not eating well with nurse requesting supplements with meals. Will add Ensure x 1 and Nepro x 2 daily. Monitor labs.    Hortencia Moses R.D., L.D.  Phone: [phone]

## 2023-12-16 NOTE — PROGRESS NOTES
Pulmonary Progress Note  [facility]      Patient - Elaina Champagne,  Age - 73 y.o.    - 1950      Room Number -    MRN -  [id number]   [facility] # - [id number]  Date of Admission -  2023 11:42 AM    Consulting Service/Physician   Consulting - David Yang MD  Primary Care Physician - Robin Ly MD     SUBJECTIVE   Patient appears to be tired.   at bedside.  Dialysis running.  Plan is to remove 800 mL of fluid.    OBJECTIVE   VITALS    height is 1.575 m (5' 2\") and weight is 146.3 kg (322 lb 8.5 oz) (abnormal). Her oral temperature is 98.2 °F (36.8 °C). Her blood pressure is 134/48 (abnormal) and her pulse is 75. Her respiration is 19 and oxygen saturation is 97%.     Body mass index is 58.99 kg/m².  Temperature Range: Temp: 98.2 °F (36.8 °C) Temp  Av.1 °F (36.7 °C)  Min: 97.9 °F (36.6 °C)  Max: 98.2 °F (36.8 °C)  BP Range:  Systolic (24hrs), Av , Min:62 , Max:163     Diastolic (24hrs), Av, Min:20, Max:115    Pulse Range: Pulse  Av.1  Min: 52  Max: 116  Respiration Range: Resp  Av.1  Min: 14  Max: 32  Current Pulse Ox::  SpO2: 97 %  24HR Pulse Ox Range:  SpO2  Av.1 %  Min: 87 %  Max: 100 %  Oxygen Amount and Delivery: O2 Flow Rate (L/min): 2 L/min    Wt Readings from Last 3 Encounters:   23 (!) 146.3 kg (322 lb 8.5 oz)   23 (!) 137 kg (302 lb)   23 (!) 138.3 kg (305 lb)       I/O (24 Hours)    Intake/Output Summary (Last 24 hours) at 2023 1222  Last data filed at 2023 0649  Gross per 24 hour   Intake 5010.92 ml   Output 500 ml   Net 4510.92 ml       EXAM     General Appearance  Awake, alert, oriented, in no acute distress  HEENT - normocephalic, atraumatic.  Neck - Supple,  trachea midline   Lungs -coarse breath sounds no crackles rales or wheezes  Heart Exam:PMI normal. No lifts, heaves, or thrills. RRR. No murmurs, clicks, gallops, or rubs  Abdomen Exam: Abdomen soft, non-tender. BS  normal.  Extremity Exam: No signs of cyanosis    MEDS      [START ON 12/17/2023] cefepime  1,000 mg IntraVENous Q24H    miconazole   Topical BID    collagenase   Topical Daily    linezolid  600 mg IntraVENous Q12H    aspirin  81 mg Oral Daily    [Held by provider] escitalopram  20 mg Oral Daily    [Held by provider] metoprolol tartrate  25 mg Oral BID    pantoprazole  40 mg Oral QAM AC    sodium chloride flush  5-40 mL IntraVENous 2 times per day    heparin (porcine)  5,000 Units SubCUTAneous 3 times per day      norepinephrine Stopped (12/16/23 0854)    dextrose      amiodarone 1 mg/min (12/16/23 1148)    sodium chloride 10 mL/hr at 12/15/23 0036    sodium chloride 125 mL/hr at 12/16/23 0649     glucose, dextrose bolus **OR** dextrose bolus, glucagon (rDNA), dextrose, anticoagulant sodium citrate, anticoagulant sodium citrate, sodium chloride flush, sodium chloride, ondansetron **OR** ondansetron, polyethylene glycol, acetaminophen **OR** acetaminophen    LABS   CBC   Recent Labs     12/16/23  0419   WBC 23.2*   HGB 10.8*   HCT 33.3*   MCV 93.4        BMP:   Lab Results   Component Value Date/Time     12/16/2023 04:19 AM    K 4.6 12/16/2023 04:19 AM    CL 99 12/16/2023 04:19 AM    CO2 23 12/16/2023 04:19 AM    BUN 55 12/16/2023 04:19 AM    LABALBU 2.4 12/16/2023 04:19 AM    CREATININE 3.0 12/16/2023 04:19 AM    CALCIUM 8.4 12/16/2023 04:19 AM    GFRAA >60 03/03/2022 03:57 PM    LABGLOM 16 12/16/2023 04:19 AM     ABGs:No results found for: \"PHART\", \"PO2ART\", \"JUA5HXO\" No results found for: \"IFIO2\", \"MODE\", \"SETTIDVOL\", \"SETPEEP\"  Ionized Calcium:  No results found for: \"IONCA\"  Magnesium:    Lab Results   Component Value Date/Time    MG 1.9 12/14/2023 12:07 PM     Phosphorus:  No results found for: \"PHOS\"     LIVER PROFILE   Recent Labs     12/16/23  0419   AST 29   ALT 15   BILITOT 0.7   ALKPHOS 147*     INR   Recent Labs     12/14/23  1207   INR 1.2     PTT   Lab Results   Component Value Date    APTT

## 2023-12-16 NOTE — PLAN OF CARE
Problem: Discharge Planning  Goal: Discharge to home or other facility with appropriate resources  Outcome: Progressing  Flowsheets  Taken 12/16/2023 1600  Discharge to home or other facility with appropriate resources: Refer to discharge planning if patient needs post-hospital services based on physician order or complex needs related to functional status, cognitive ability or social support system  Taken 12/16/2023 1200  Discharge to home or other facility with appropriate resources: Refer to discharge planning if patient needs post-hospital services based on physician order or complex needs related to functional status, cognitive ability or social support system  Taken 12/16/2023 0900  Discharge to home or other facility with appropriate resources: Refer to discharge planning if patient needs post-hospital services based on physician order or complex needs related to functional status, cognitive ability or social support system  Taken 12/16/2023 0800  Discharge to home or other facility with appropriate resources: Refer to discharge planning if patient needs post-hospital services based on physician order or complex needs related to functional status, cognitive ability or social support system     Problem: Pain  Goal: Verbalizes/displays adequate comfort level or baseline comfort level  Outcome: Progressing  Flowsheets  Taken 12/16/2023 1600  Verbalizes/displays adequate comfort level or baseline comfort level:   Assess pain using appropriate pain scale   Encourage patient to monitor pain and request assistance  Taken 12/16/2023 1200  Verbalizes/displays adequate comfort level or baseline comfort level:   Assess pain using appropriate pain scale   Administer analgesics based on type and severity of pain and evaluate response   Encourage patient to monitor pain and request assistance  Taken 12/16/2023 0800  Verbalizes/displays adequate comfort level or baseline comfort level:   Encourage patient to monitor pain

## 2023-12-16 NOTE — PROGRESS NOTES
NEPHROLOGY PROGRESS NOTE     Patient :  Elaina Champagne; 73 y.o. MRN# [id number]  Location:  2006/2006-01  Attending:  David Yang MD  Admit Date:  12/14/2023   Hospital Day: 2      Reason for Consult: Acute kidney injury    Subjective/interval history.  Patient seen and examined in ICU during dialysis patient is lethargic but responsive.  Patient was hypotensive and required Levophed which is off blood pressure stable patient was also found to be in A-fib and on amiodarone drip  Currently in sinus rhythm  Tolerating dialysis well, 1 L ultrafiltration goal      Chief Complaint: Left leg swelling wound erythema and pain  History Obtained From:  patient ,, EMR nursing staff and they can do it tomorrow I told yesterday that it is if she if they can do it and everything is set at discharge    History of Present Illness:    This is a 73 y.o. female with past medical history of essential hypertension, peripheral vascular disease, patient presented to the hospital with complaints of left leg swelling erythema and wound on the left leg patient was seen by vascular and wound care on 12/13/2023.  Patient presented to the hospital yesterday on 12/14/2023 with complaints of left leg swelling erythema and wound getting worse patient has that swelling for several weeks but has been getting worse and more red.  Patient also noted to have fatigue weakness, and she has confusion this morning.  According to the  no nausea vomiting diarrhea no fever chills no headache dizziness.  Labs on admission showed BUN of 54 serum creatinine of 3.9 mg/dL, patient has been oliguric.    Patient denies dysuria, gross hematuria, flank pain, nocturia, urgency, passing frothy urine or urinary incontinence.  There has been no recent exposure to IV contrast.   There is no history  of paraprotein disease.   Pt denies any history of recurrent UTI or kidney stones.  Medication review shows use of ARB's and Bumex, and NSAIDs.    Patient  55*   CREATININE 3.9* 3.8* 3.0*   GLUCOSE 99 109* 99   CALCIUM 9.2 8.5* 8.4*      Phosphorus:  No results for input(s): \"PHOS\" in the last 72 hours.  Magnesium:   Recent Labs     12/14/23  1207   MG 1.9     Albumin:   Recent Labs     12/14/23  1207 12/16/23  0419   LABALBU 3.2* 2.4*       IRON:  No results for input(s): \"IRON\" in the last 72 hours.  Iron Saturation:  Invalid input(s): \"PERCENTFE\"  TIBC:  No results for input(s): \"TIBC\" in the last 72 hours.  FERRITIN:  No results for input(s): \"FERRITIN\" in the last 72 hours.  SPEP: No results for input(s): \"SPEP\" in the last 72 hours.   Recent Labs     12/16/23  0419   PROT 6.9     UPEP: No results for input(s): \"TPU\" in the last 72 hours.   Urine Sodium:    Recent Labs     12/15/23  0421   RYNE 27      Urine Potassium: No results for input(s): \"KUR\" in the last 72 hours.  Urine Chloride:  No results for input(s): \"CLU\" in the last 72 hours.  Urine Ph:  Invalid input(s): \"PO4U\"  Urine Osmolarity: No results for input(s): \"OSMOU\" in the last 72 hours.  Urine Creatinine:    Recent Labs     12/15/23  0421   LABCREA 325.9*     Urine Eosinophils: Invalid input(s): \"EOSU\"  Urine Protein:  No results for input(s): \"TPU\" in the last 72 hours.  Urinalysis:    Recent Labs     12/15/23  0420   NITRU NEGATIVE   COLORU Dark Yellow*   PHUR 5.0   WBCUA 0 TO 2*   RBCUA 3 to 5*   BACTERIA MANY*   SPECGRAV 1.030   LEUKOCYTESUR TRACE*   UROBILINOGEN Normal   BILIRUBINUR NEGATIVE  Verified by ictotest.*   GLUCOSEU NEGATIVE   KETUA SMALL*         Radiology:  Reviewed as available.    Assessment:   MICHELLE, most likely secondary to ATN due to hypotension and hypovolemia, patient is oliguric serum creatinine peaked to 3.9 mg/dL, UA no blood 1+ protein negative leukocyte Estrace, , renal ultrasound unremarkable, patient has confused lethargic oriented to time and place, most likely weak uremic symptoms.  Status post temporary dialysis catheter placement 12/15/2023 patient had first

## 2023-12-16 NOTE — PROGRESS NOTES
atraumatic.  Eye: no icterus, redness, pupils equal and reactive, extraocular eye movements intact, conjunctiva clear  Ear: normal external ear, no discharge, hearing intact  Nose:  no drainage noted  Mouth: mucous membranes moist  Neck: supple, no carotid bruits, thyroid not palpable  Lungs: Bilateral equal air entry, clear to ausculation, no wheezing, rales or rhonchi, normal effort  Cardiovascular: normal rate, regular rhythm, no murmur, gallop, rub.  Abdomen: Soft, nontender, nondistended, normal bowel sounds, no hepatomegaly or splenomegaly  Neurologic: There are no new focal motor or sensory deficits, normal muscle tone and bulk, no abnormal sensation, normal speech, cranial nerves II through XII grossly intact  Skin: No gross lesions, rashes, bruising or bleeding on exposed skin area  Extremities:  p has good peripheral pulses, lymphedema present, sores in the back of both calves    Psych: normal affect     Investigations:      Laboratory Testing:  Recent Results (from the past 24 hour(s))   Echo (TTE) complete (PRN contrast/bubble/strain/3D)    Collection Time: 12/15/23 12:50 PM   Result Value Ref Range    AV Mean Gradient 2 mmHg    AV VTI 21.8 cm    AV Mean Velocity 0.7 m/s    AV Peak Velocity 1.2 m/s    AV Peak Gradient 6 mmHg    Aortic Root 3.4 cm    IVSd 1.9 (A) 0.6 - 0.9 cm    LVIDd 3.0 (A) 3.9 - 5.3 cm    LVIDs 1.8 cm    LVOT Diameter 2.3 cm    LVPWd 1.8 (A) 0.6 - 0.9 cm    LVOT Area 4.2 cm2    LA Diameter 4.4 cm    TR Max Velocity 2.35 m/s    TR Peak Gradient 21 mmHg    Body Surface Area 2.48 m2    Fractional Shortening 2D 40 28 - 44 %    LVIDd Index 1.30 cm/m2    LVIDs Index 0.78 cm/m2    LV RWT Ratio 1.20     LV Mass 2D 228.4 (A) 67 - 162 g    LV Mass 2D Index 98.9 (A) 43 - 95 g/m2    LA Size Index 1.90 cm/m2    LA/AO Root Ratio 1.29     Ao Root Index 1.47 cm/m2    Est. RA Pressure 15 mmHg    RVSP 37 mmHg   Hepatitis B Core Antibody, IgM    Collection Time: 12/15/23  3:39 PM   Result Value Ref Range

## 2023-12-16 NOTE — PROGRESS NOTES
HEMODIALYSIS PRE-TREATMENT NOTE    Patient Identifiers prior to treatment: name  mrn    Isolation Required: no                      Isolation Type: no       (please document if patient is being managed as a PUI/COVID-19 patient)        Hepatitis status:                           Date Drawn                             Result  Hepatitis B Surface Antigen 12/15/23     neg                     Hepatitis B Surface Antibody 12/15/23 neg        Hepatitis B Core Antibody 12/15/23 neg          How was Hepatitis Status verified: epic     Was a copy of the labs you documented provided to facility for the patient's chart: yes    Hemodialysis orders verified: yes    Access Within normal limits ( I.e. s/s of infection,...): yes     Pre-Assessment completed: yes    Pre-dialysis report received from: geni de oliveira rn                     Time: 915

## 2023-12-16 NOTE — PROGRESS NOTES
Writer spoke to pt's spouse as he left unit and walked to ICU waiting room; he's tired and worried. He appreciated support and prayer.    12/16/23 1433   Encounter Summary   Encounter Overview/Reason  Spiritual/Emotional Needs   Service Provided For: Family   Referral/Consult From: [facility]   Support System Spouse;Family members   Last Encounter  12/16/23   Complexity of Encounter Moderate   Spiritual/Emotional needs   Type Spiritual Distress;Emotional Distress   Assessment/Intervention/Outcome   Assessment Anxious   Intervention Active listening;Discussed illness injury and it’s impact;Explored/Affirmed feelings, thoughts, concerns;Prayer (assurance of)/[place];Sustaining Presence/Ministry of presence   Outcome Comfort;Engaged in conversation;Expressed feelings, needs, and concerns;Expressed Gratitude;Receptive

## 2023-12-16 NOTE — CONSULTS
[facility]   Consult Note         Today's Date: 12/16/2023  Patient Name: Elaina Champagne  Date of admission: 12/14/2023 11:42 AM  Patient's age: 73 y.o., 1950  Admission Dx: Hyperkalemia [E87.5]  Cellulitis [L03.90]  MICHELLE (acute kidney injury) (HCC) [N17.9]  Cellulitis of right lower extremity [L03.115]  Cellulitis of left lower extremity [L03.116]  Altered mental status, unspecified altered mental status type [R41.82]    Reason for Consult:  Cardiac evaluation    Requesting Physician: David Yang MD    REASON FOR CONSULT:  Afib    History Obtained From:  Patient, chart, staff, records    HISTORY OF PRESENT ILLNESS:      The patient is a 73 y.o. female who is admitted to the hospital for fatigue and leg pain. Found to be in afib RVR.     Past Medical History:   has a past medical history of Bell's palsy, Cellulitis, and Hypertension.    Past Surgical History:   has a past surgical history that includes Hysterectomy, total abdominal (1990); Cataract removal (Bilateral, 2015); incision and drainage (Left, 07/06/2017); Total knee arthroplasty (Left, 11/09/2017); Knee Arthroplasty (Right, 09/24/2018); ventral hernia repair (02/11/2019); and IR NONTUNNELED VASCULAR CATHETER > 5 YEARS (12/15/2023).     Home Medications:    Prior to Admission medications    Medication Sig Start Date End Date Taking? Authorizing Provider   acetaminophen (TYLENOL) 325 MG tablet Take 2 tablets by mouth every 6 hours as needed for Pain   Yes Provider, MD Jah   omeprazole (PRILOSEC) 20 MG delayed release capsule TAKE 1 CAPSULE BY MOUTH ONCE DAILY AT NIGHT 12/4/23   Robin Ly MD   solifenacin (VESICARE) 10 MG tablet Take 1 tablet by mouth daily 10/10/23   Robin Ly MD   escitalopram (LEXAPRO) 20 MG tablet Take 1 tablet by mouth daily 10/10/23   Robin Ly MD   telmisartan (MICARDIS) 40 MG tablet Take 1 tablet by mouth daily 9/11/23   Robin Ly MD   bumetanide (BUMEX)

## 2023-12-16 NOTE — PROGRESS NOTES
HEMODIALYSIS POST TREATMENT NOTE    Treatment time ordered: 3h    Actual treatment time: 3h    UltraFiltration Goal: 700mls  UltraFiltration Removed: 700mls      Pre Treatment weight: 146.3kg  Post Treatment weight: 145.6kg  Estimated Dry Weight: tbd    Access used:     Central Venous Catheter:          Tunneled or Non-tunneled: non           Site: right neck          Access Flow: good      Internal Access:       AV Fistula or AV Graft: na         Site: na       Access Flow: na       Sign and symptoms of infection: no       If YES: na    Medications or blood products given: no    Chronic outpatient schedule: baljinder tx 2    Chronic outpatient unit: na    Summary of response to treatment: [facility]    Explain if orders NOT met, was physician notified:keely      ACES flowsheet faxed to patient unit/ placed in patient chart: yes    Post assessment completed: yes    Report given to: geni de oliveira rn      * Intra-treatment documented Safety Checks include the followin) Access and face visible at all times.     2) All connections and blood lines are secure with no kinks.     3) NVL alarm engaged.     4) Hemosafe device applied (if applicable).     5) No collapse of Arterial or Venous blood chambers.     6) All blood lines / pump segments in the air detectors.

## 2023-12-16 NOTE — PROGRESS NOTES
2200 [facility] informed of new onset rhythm change that appears to be Afib/RVR. New consult for cardiology.      2240 Spoke with Dr. Gracia Giles     300mg IV bolus amiodarone    1mg/minute no rate reduction amio drip      2309 bolus half finished, pressures dropped to 60s/40s; called dr. Giles. Instructed to stop amio bolus and just run drip at 1mg/min with no rate reducation....pressures rebounding       2343 Bladder scan with 54cc; patient has not made urine; Dr. José Manuel Ellis MD  informed; patient will likely get HD in the AM; no further need to intervene at this time      0600 70s/40s, genuine. Dr. Giles informed. New orders for levophed.     0623 Critical care updated on afib rvr, amiodarone, hypotension, and levophed orders. Request for status change to ICU. Awaiting response.

## 2023-12-17 ENCOUNTER — APPOINTMENT (OUTPATIENT)
Dept: CT IMAGING | Age: 73
DRG: 853 | End: 2023-12-17
Payer: MEDICARE

## 2023-12-17 PROBLEM — R70.0 ELEVATED ERYTHROCYTE SEDIMENTATION RATE: Status: ACTIVE | Noted: 2023-12-17

## 2023-12-17 LAB
ABSOLUTE BANDS #: 2.16 K/UL (ref 0–1)
ANION GAP SERPL CALCULATED.3IONS-SCNC: 10 MMOL/L (ref 9–17)
BANDS: 7 % (ref 0–10)
BASOPHILS # BLD: 0 K/UL (ref 0–0.2)
BASOPHILS NFR BLD: 0 % (ref 0–2)
BUN SERPL-MCNC: 49 MG/DL (ref 8–23)
CALCIUM SERPL-MCNC: 8.7 MG/DL (ref 8.6–10.4)
CHLORIDE SERPL-SCNC: 100 MMOL/L (ref 98–107)
CO2 SERPL-SCNC: 24 MMOL/L (ref 20–31)
CREAT SERPL-MCNC: 2.5 MG/DL (ref 0.5–0.9)
CRP SERPL HS-MCNC: 327.2 MG/L (ref 0–5)
EKG ATRIAL RATE: 84 BPM
EKG P AXIS: 26 DEGREES
EKG P-R INTERVAL: 178 MS
EKG Q-T INTERVAL: 374 MS
EKG QRS DURATION: 78 MS
EKG QTC CALCULATION (BAZETT): 441 MS
EKG R AXIS: -12 DEGREES
EKG T AXIS: 48 DEGREES
EKG VENTRICULAR RATE: 84 BPM
EOSINOPHIL # BLD: 0 K/UL (ref 0–0.4)
EOSINOPHILS RELATIVE PERCENT: 0 % (ref 0–4)
ERYTHROCYTE [DISTWIDTH] IN BLOOD BY AUTOMATED COUNT: 15.8 % (ref 11.5–14.9)
GFR SERPL CREATININE-BSD FRML MDRD: 20 ML/MIN/1.73M2
GLUCOSE SERPL-MCNC: 112 MG/DL (ref 70–99)
HCT VFR BLD AUTO: 32.8 % (ref 36–46)
HGB BLD-MCNC: 10.5 G/DL (ref 12–16)
LYMPHOCYTES NFR BLD: 1.85 K/UL (ref 1–4.8)
LYMPHOCYTES RELATIVE PERCENT: 6 % (ref 24–44)
MCH RBC QN AUTO: 29.6 PG (ref 26–34)
MCHC RBC AUTO-ENTMCNC: 32 G/DL (ref 31–37)
MCV RBC AUTO: 92.4 FL (ref 80–100)
MICROORGANISM SPEC CULT: ABNORMAL
MICROORGANISM/AGENT SPEC: ABNORMAL
MONOCYTES NFR BLD: 0.31 K/UL (ref 0.1–1.3)
MONOCYTES NFR BLD: 1 % (ref 1–7)
MORPHOLOGY: ABNORMAL
NEUTROPHILS NFR BLD: 86 % (ref 36–66)
NEUTS SEG NFR BLD: 26.58 K/UL (ref 1.3–9.1)
PLATELET # BLD AUTO: 230 K/UL (ref 150–450)
PMV BLD AUTO: 9 FL (ref 6–12)
POTASSIUM SERPL-SCNC: 4.3 MMOL/L (ref 3.7–5.3)
PROCALCITONIN SERPL-MCNC: 13.11 NG/ML
RBC # BLD AUTO: 3.55 M/UL (ref 4–5.2)
SERVICE CMNT-IMP: ABNORMAL
SODIUM SERPL-SCNC: 134 MMOL/L (ref 135–144)
SPECIMEN DESCRIPTION: ABNORMAL
SPECIMEN DESCRIPTION: ABNORMAL
WBC OTHER # BLD: 30.9 K/UL (ref 3.5–11)

## 2023-12-17 PROCEDURE — 2580000003 HC RX 258: Performed by: NURSE PRACTITIONER

## 2023-12-17 PROCEDURE — 6360000002 HC RX W HCPCS: Performed by: NURSE PRACTITIONER

## 2023-12-17 PROCEDURE — 6360000002 HC RX W HCPCS: Performed by: INTERNAL MEDICINE

## 2023-12-17 PROCEDURE — 36415 COLL VENOUS BLD VENIPUNCTURE: CPT

## 2023-12-17 PROCEDURE — 93010 ELECTROCARDIOGRAM REPORT: CPT | Performed by: INTERNAL MEDICINE

## 2023-12-17 PROCEDURE — 94761 N-INVAS EAR/PLS OXIMETRY MLT: CPT

## 2023-12-17 PROCEDURE — 2060000000 HC ICU INTERMEDIATE R&B

## 2023-12-17 PROCEDURE — 73700 CT LOWER EXTREMITY W/O DYE: CPT

## 2023-12-17 PROCEDURE — 6370000000 HC RX 637 (ALT 250 FOR IP): Performed by: INTERNAL MEDICINE

## 2023-12-17 PROCEDURE — 86140 C-REACTIVE PROTEIN: CPT

## 2023-12-17 PROCEDURE — 99233 SBSQ HOSP IP/OBS HIGH 50: CPT | Performed by: INTERNAL MEDICINE

## 2023-12-17 PROCEDURE — 2500000003 HC RX 250 WO HCPCS: Performed by: INTERNAL MEDICINE

## 2023-12-17 PROCEDURE — 99232 SBSQ HOSP IP/OBS MODERATE 35: CPT | Performed by: NURSE PRACTITIONER

## 2023-12-17 PROCEDURE — 85025 COMPLETE CBC W/AUTO DIFF WBC: CPT

## 2023-12-17 PROCEDURE — 80048 BASIC METABOLIC PNL TOTAL CA: CPT

## 2023-12-17 PROCEDURE — 84145 PROCALCITONIN (PCT): CPT

## 2023-12-17 RX ADMIN — PANTOPRAZOLE SODIUM 40 MG: 40 TABLET, DELAYED RELEASE ORAL at 05:32

## 2023-12-17 RX ADMIN — HEPARIN SODIUM 5000 UNITS: 5000 INJECTION INTRAVENOUS; SUBCUTANEOUS at 22:45

## 2023-12-17 RX ADMIN — AMIODARONE HYDROCHLORIDE 1 MG/MIN: 1.8 INJECTION, SOLUTION INTRAVENOUS at 11:49

## 2023-12-17 RX ADMIN — HEPARIN SODIUM 5000 UNITS: 5000 INJECTION INTRAVENOUS; SUBCUTANEOUS at 05:32

## 2023-12-17 RX ADMIN — LINEZOLID 600 MG: 600 INJECTION, SOLUTION INTRAVENOUS at 00:36

## 2023-12-17 RX ADMIN — HEPARIN SODIUM 5000 UNITS: 5000 INJECTION INTRAVENOUS; SUBCUTANEOUS at 15:20

## 2023-12-17 RX ADMIN — CEFEPIME 1000 MG: 1 INJECTION, POWDER, FOR SOLUTION INTRAMUSCULAR; INTRAVENOUS at 05:33

## 2023-12-17 RX ADMIN — CEFEPIME 1000 MG: 1 INJECTION, POWDER, FOR SOLUTION INTRAMUSCULAR; INTRAVENOUS at 18:20

## 2023-12-17 RX ADMIN — ANTI-FUNGAL POWDER MICONAZOLE NITRATE TALC FREE: 1.42 POWDER TOPICAL at 22:46

## 2023-12-17 RX ADMIN — COLLAGENASE SANTYL: 250 OINTMENT TOPICAL at 08:45

## 2023-12-17 RX ADMIN — AMIODARONE HYDROCHLORIDE 1 MG/MIN: 1.8 INJECTION, SOLUTION INTRAVENOUS at 05:35

## 2023-12-17 RX ADMIN — ANTI-FUNGAL POWDER MICONAZOLE NITRATE TALC FREE: 1.42 POWDER TOPICAL at 08:44

## 2023-12-17 RX ADMIN — AMIODARONE HYDROCHLORIDE 1 MG/MIN: 1.8 INJECTION, SOLUTION INTRAVENOUS at 18:52

## 2023-12-17 RX ADMIN — ASPIRIN 81 MG: 81 TABLET, COATED ORAL at 08:44

## 2023-12-17 RX ADMIN — LINEZOLID 600 MG: 600 INJECTION, SOLUTION INTRAVENOUS at 12:47

## 2023-12-17 RX ADMIN — ACETAMINOPHEN 650 MG: 325 TABLET ORAL at 08:44

## 2023-12-17 ASSESSMENT — PAIN DESCRIPTION - LOCATION: LOCATION: GENERALIZED

## 2023-12-17 ASSESSMENT — PAIN DESCRIPTION - DESCRIPTORS: DESCRIPTORS: ACHING

## 2023-12-17 ASSESSMENT — PAIN SCALES - GENERAL
PAINLEVEL_OUTOF10: 3
PAINLEVEL_OUTOF10: 3

## 2023-12-17 NOTE — PROGRESS NOTES
[facility]        Date:   12/17/2023  Patient name: Elaina Champagne  Date of admission:  12/14/2023 11:42 AM  MRN:   [id number]  YOB: 1950  PCP: Robin Ly MD    Reason for Consult:       Subjective: No new cardiac issues. No overnight events. Chart reviewed.        Physical Exam:   Vitals: BP (!) 127/36   Pulse 75   Temp 98 °F (36.7 °C) (Oral)   Resp 23   Ht 1.575 m (5' 2\")   Wt (!) 145.6 kg (320 lb 15.8 oz)   SpO2 97%   BMI 58.71 kg/m²   General appearance: alert and cooperative with exam  HEENT: Head: Normocephalic, no lesions, without obvious abnormality.  Neck: no adenopathy, no carotid bruit, no JVD, supple, symmetrical, trachea midline and thyroid not enlarged, symmetric, no tenderness/mass/nodules  Lungs: clear to auscultation bilaterally  Heart: regular rate and rhythm, S1, S2 normal, no murmur, click, rub or gallop  Abdomen: soft, non-tender; bowel sounds normal; no masses,  no organomegaly  Extremities: extremities normal, atraumatic, no cyanosis or edema  Neurologic: Mental status: Alert, oriented, thought content appropriate      Lab work, imaging, nursing, and chart reviewed extensively.    Medications:   Scheduled Meds:   cefepime  1,000 mg IntraVENous Q12H    miconazole   Topical BID    collagenase   Topical Daily    linezolid  600 mg IntraVENous Q12H    aspirin  81 mg Oral Daily    [Held by provider] escitalopram  20 mg Oral Daily    [Held by provider] metoprolol tartrate  25 mg Oral BID    pantoprazole  40 mg Oral QAM AC    sodium chloride flush  5-40 mL IntraVENous 2 times per day    heparin (porcine)  5,000 Units SubCUTAneous 3 times per day     Continuous Infusions:   dextrose      amiodarone 1 mg/min (12/17/23 1149)    sodium chloride 10 mL/hr at 12/15/23 0036         Labs:     Lab Results   Component Value Date    CHOL 189 06/07/2017    TRIG 56 06/07/2017    HDL 44 08/08/2023    LDLCHOLESTEROL 112 08/08/2023    LDLCALC 131 06/07/2017    VLDL  Echo:  12/14/23    ECHO (TTE) COMPLETE (PRN CONTRAST/BUBBLE/STRAIN/3D) 12/15/2023  6:00 PM (Final)    Interpretation Summary  Technically difficult study  The left ventricle appears normal in size, severely increased LV wall thickness, small LV cavity  Hyperdynamic wall motions, ejection fraction more than 65%  The right ventricle appears normal in size and function  The left atrium appears mildly dilated  The aortic valve appears sclerotic no stenosis no regurgitation  Mitral valve structure appears normal no mitral stenosis, no obvious regurgitation  Tricuspid valve structure appears normal trace regurgitation no stenosis  Pulmonary valve not well-visualized  Normal aortic root dimensions  The IVC is dilated, impaired respiratory variation indicating elevated right atrial filling pressure  No significant pericardial effusion seen    Signed by: Cristian Lorenzo MD on 12/15/2023  6:00 PM      Last Cath:  No results found for this or any previous visit.      Last Stress:  No results found for this or any previous visit.      IMPRESSION & Recommendations:      New Afib RVR - optimize meds. On amio drip currently. Rate better controlled now  ESRD- was on HD at the time of my evaluation  Sepsis- stable  Leukocytosis      Discussed with patient, family, and Nurse.    Electronically signed by Mariya Giles DO on 12/17/2023 at 5:35 PM    Mariya Giles DO  [facility]  [facility]  [phone]

## 2023-12-17 NOTE — PROGRESS NOTES
[facility]   IN-PATIENT SERVICE   [facility]    HISTORY AND PHYSICAL EXAMINATION            Date:   12/17/2023  Patient name:  Elaina Champagne  Date of admission:  12/14/2023 11:42 AM  MRN:   [id number]  Account:  [id number]  YOB: 1950  PCP:    Robin Ly MD  Room:   2006/2006-01  Code Status:    Full Code    Chief Complaint:     Chief Complaint   Patient presents with    Leg Pain     Left      Fatigue       History Obtained From:     patient, electronic medical record    History of Present Illness:     The patient is a 73 y.o.  Non- / non  female who presents with Leg Pain (Left/) and Fatigue   and she is admitted to the hospital for the management of wound check  Patient has past medical is a multiple medical problem including morbid obesity, hypertension, lymphedema, patient has wound in both legs and back of her legs, symptoms are going on for last 1 month  Patient follows with wound care  She was evaluated by Dr. Meredith vascular surgery yesterday, dressing was applied on left leg, she was having severe pain that made her come to the hospital  Patient, had arterial scan done earlier suggestive of PHILL of 0.68 on left side, on right side PHILL was okay  Previous wound culture was growing Klebsiella and staph, MSSA  In the emergency room, patient had lab work done suggestive elevated creatinine of 3.8  CK was normal      Past Medical History:     Past Medical History:   Diagnosis Date    Bell's palsy     Cellulitis     Hypertension         Past Surgical History:     Past Surgical History:   Procedure Laterality Date    CATARACT REMOVAL Bilateral 2015    HYSTERECTOMY, TOTAL ABDOMINAL (CERVIX REMOVED)  1990    INCISION AND DRAINAGE Left 07/06/2017    LEG INCISION AND DRAINAGE ABSCESS performed by Isaiah Casey MD at [facility] OR    IR NONTUNNELED VASCULAR CATHETER  12/15/2023    IR NONTUNNELED VASCULAR CATHETER 12/15/2023 [facility] SPECIAL PROCEDURES    KNEE  Right arm;

## 2023-12-17 NOTE — PROGRESS NOTES
0522 Critical value WBC 30.9. OMAR Gomez NP notified. RN instructed per NP to also notify infectious disease...perfect serve message to Dr. Luong sent as well

## 2023-12-17 NOTE — PROGRESS NOTES
[facility] -   Infectious diseases evaluation  admission date 12/14/2023    reason for consultation:   Cellulitis    Impression :   Current:  Cellulitis bilateral lower extremity. Wound cx growing GNR, GPC in pairs and clusters.Pseudomonas Aeruginosa Bacteremia.  Sepsis secondary to above  Bacteriuria  Leukocytosis  Elevated CRP  Left posterior tibial wound  Peripheral arterial disease  Acute on chronic renal failure.  New onset HD.  Obesity  History of lymphedema venous stasis insufficiency  Hyperlipidemia  Penicillin allergy-Hives.    Recommendations   Zyvox 600 mg IV every 12 hours,   Increase Cefepime to 1.5 gram IV daily for now per Dr Mckeon  Worsending leukocytosis could be a sign of abscess, will obtain CT scan without contrast of Rt and Left lower extremities  Follow WBC and platelets closely while on Zyvox.  Follow renal function closely.  Follow cultures and adjust antibiotics as needed  Wound care  Supportive care  Discussed with patient, RN, Dr Aparicio, Dr Valdez    Infection Control Recommendations   [place] Precautions    Antimicrobial Stewardship Recommendations   Simplification of therapy  Targeted therapy    History of Present Illness:   Initial history:  Elaina Champagne is a 73 y.o.-year-old female was sent to the hospital from wound care clinic for severe left leg pain associated with generalized fatigue.  The patient had swelling and redness of the lower extremities bilaterally with open wound to the posterior aspect of the right leg.  She is poor historian, afebrile, denied nausea or vomiting, no diarrhea, no cough or shortness of breath, no other complaints.  Initial labs showed WBC of 22, elevated lactic acid and creatinine of 3.8.  Arterial Doppler showed left PHILL of 0.68    12/14            Interval changes  12/17/2023   Afebrile  VS stable, SpO2 97% 2L/nc  Amiodarone gtt  Temp HD catheter placed 12/15/23.  Anuric since admission  Alert and

## 2023-12-17 NOTE — PROGRESS NOTES
Pulmonary Progress Note  [facility]      Patient - Elaina Champagne,  Age - 73 y.o.    - 1950      Room Number -    MRN -  [id number]   [facility] # - [id number]  Date of Admission -  2023 11:42 AM        Consulting Service/Physician   Consulting - David Yang MD  Primary Care Physician - Robin Ly MD     SUBJECTIVE   Patient's mentation is improved.  She definitely looks less tired compared to yesterday.  O2 saturations 97% on 2 L.  Temperature max is 90  OBJECTIVE   VITALS    height is 1.575 m (5' 2\") and weight is 145.6 kg (320 lb 15.8 oz) (abnormal). Her oral temperature is 98 °F (36.7 °C). Her blood pressure is 127/36 (abnormal) and her pulse is 75. Her respiration is 23 and oxygen saturation is 97%.     Body mass index is 58.71 kg/m².  Temperature Range: Temp: 98 °F (36.7 °C) Temp  Av.3 °F (36.8 °C)  Min: 97.4 °F (36.3 °C)  Max: 98.9 °F (37.2 °C)  BP Range:  Systolic (24hrs), Av , Min:109 , Max:154     Diastolic (24hrs), Av, Min:26, Max:95    Pulse Range: Pulse  Av.3  Min: 70  Max: 79  Respiration Range: Resp  Av.5  Min: 16  Max: 33  Current Pulse Ox::  SpO2: 97 %  24HR Pulse Ox Range:  SpO2  Av.3 %  Min: 93 %  Max: 100 %  Oxygen Amount and Delivery: O2 Flow Rate (L/min): 2 L/min    Wt Readings from Last 3 Encounters:   23 (!) 145.6 kg (320 lb 15.8 oz)   23 (!) 137 kg (302 lb)   23 (!) 138.3 kg (305 lb)       I/O (24 Hours)    Intake/Output Summary (Last 24 hours) at 2023 1113  Last data filed at 2023 0500  Gross per 24 hour   Intake 2618.17 ml   Output 1200 ml   Net 1418.17 ml       EXAM     General Appearance  Awake, alert, oriented, in no acute distress  HEENT - normocephalic, atraumatic.  Neck - Supple,  trachea midline   Lungs -coarse breath sounds no crackles no rales or wheezes  Heart Exam:PMI normal. No lifts, heaves, or thrills. RRR. No murmurs, clicks, gallops, or rubs  Abdomen

## 2023-12-17 NOTE — PLAN OF CARE
Problem: Discharge Planning  Goal: Discharge to home or other facility with appropriate resources  Outcome: Progressing     Problem: Safety - Adult  Goal: Free from fall injury  Outcome: Progressing     Problem: Pain  Goal: Verbalizes/displays adequate comfort level or baseline comfort level  Outcome: Progressing     Problem: Skin/Tissue Integrity  Goal: Absence of new skin breakdown  Description: 1.  Monitor for areas of redness and/or skin breakdown  2.  Assess vascular access sites hourly  3.  Every 4-6 hours minimum:  Change oxygen saturation probe site  4.  Every 4-6 hours:  If on nasal continuous positive airway pressure, respiratory therapy assess nares and determine need for appliance change or resting period.  Outcome: Progressing

## 2023-12-17 NOTE — PROGRESS NOTES
NEPHROLOGY PROGRESS NOTE     Patient :  Elaina Champagne; 73 y.o. MRN# [id number]  Location:  2006/2006-01  Attending:  David Yang MD  Admit Date:  12/14/2023   Hospital Day: 3      Reason for Consult: Acute kidney injury    Subjective/interval history.  Patient seen and examined in ICU, no acute events overnight patient is more awake and alert patient had second session of dialysis yesterday, tolerated well, UF goal of 700 mL achieved  Blood pressure stable   heart rate improved on amiodarone drip  Currently in sinus rhythm        Chief Complaint: Left leg swelling wound erythema and pain  History Obtained From:  patient ,, EMR nursing staff and they can do it tomorrow I told yesterday that it is if she if they can do it and everything is set at discharge    History of Present Illness:    This is a 73 y.o. female with past medical history of essential hypertension, peripheral vascular disease, patient presented to the hospital with complaints of left leg swelling erythema and wound on the left leg patient was seen by vascular and wound care on 12/13/2023.  Patient presented to the hospital yesterday on 12/14/2023 with complaints of left leg swelling erythema and wound getting worse patient has that swelling for several weeks but has been getting worse and more red.  Patient also noted to have fatigue weakness, and she has confusion this morning.  According to the  no nausea vomiting diarrhea no fever chills no headache dizziness.  Labs on admission showed BUN of 54 serum creatinine of 3.9 mg/dL, patient has been oliguric.    Patient denies dysuria, gross hematuria, flank pain, nocturia, urgency, passing frothy urine or urinary incontinence.  There has been no recent exposure to IV contrast.   There is no history  of paraprotein disease.   Pt denies any history of recurrent UTI or kidney stones.  Medication review shows use of ARB's and Bumex, and NSAIDs.    Patient noted to be hypotensive yesterday  hours.  Magnesium:   Recent Labs     12/14/23  1207   MG 1.9     Albumin:   Recent Labs     12/14/23  1207 12/16/23  0419   LABALBU 3.2* 2.4*       IRON:  No results for input(s): \"IRON\" in the last 72 hours.  Iron Saturation:  Invalid input(s): \"PERCENTFE\"  TIBC:  No results for input(s): \"TIBC\" in the last 72 hours.  FERRITIN:  No results for input(s): \"FERRITIN\" in the last 72 hours.  SPEP: No results for input(s): \"SPEP\" in the last 72 hours.   Recent Labs     12/16/23  0419   PROT 6.9     UPEP: No results for input(s): \"TPU\" in the last 72 hours.   Urine Sodium:    Recent Labs     12/15/23  0421   RYNE 27      Urine Potassium: No results for input(s): \"KUR\" in the last 72 hours.  Urine Chloride:  No results for input(s): \"CLU\" in the last 72 hours.  Urine Ph:  Invalid input(s): \"PO4U\"  Urine Osmolarity: No results for input(s): \"OSMOU\" in the last 72 hours.  Urine Creatinine:    Recent Labs     12/15/23  0421   LABCREA 325.9*     Urine Eosinophils: Invalid input(s): \"EOSU\"  Urine Protein:  No results for input(s): \"TPU\" in the last 72 hours.  Urinalysis:    Recent Labs     12/15/23  0420   NITRU NEGATIVE   COLORU Dark Yellow*   PHUR 5.0   WBCUA 0 TO 2*   RBCUA 3 to 5*   BACTERIA MANY*   SPECGRAV 1.030   LEUKOCYTESUR TRACE*   UROBILINOGEN Normal   BILIRUBINUR NEGATIVE  Verified by ictotest.*   GLUCOSEU NEGATIVE   KETUA SMALL*         Radiology:  Reviewed as available.    Assessment:   MICHELLE, most likely secondary to ATN due to hypotension and hypovolemia, patient is oliguric serum creatinine peaked to 3.9 mg/dL, UA no blood 1+ protein negative leukocyte Estrace, , renal ultrasound unremarkable,   Status post temporary dialysis catheter placement 12/15/2023 patient had first dialysis treatment yesterday tolerated well   patient remains oliguric    Hypotension.  Improved  Bilateral lower extremity swelling erythema wound/cellulitis left leg, leukocytosis WBC count 22.7  Sepsis/bacteremia Pseudomonas

## 2023-12-18 PROBLEM — I48.21 PERMANENT ATRIAL FIBRILLATION (HCC): Status: ACTIVE | Noted: 2023-12-18

## 2023-12-18 PROBLEM — I15.0 RENOVASCULAR HYPERTENSION: Status: ACTIVE | Noted: 2023-12-18

## 2023-12-18 LAB
ABSOLUTE BANDS #: 0.83 K/UL (ref 0–1)
ANION GAP SERPL CALCULATED.3IONS-SCNC: 10 MMOL/L (ref 9–17)
ANTI-XA UNFRAC HEPARIN: <0.1 IU/L (ref 0.3–0.7)
ANTI-XA UNFRAC HEPARIN: <0.1 IU/L (ref 0.3–0.7)
BANDS: 3 % (ref 0–10)
BASOPHILS # BLD: 0 K/UL (ref 0–0.2)
BASOPHILS NFR BLD: 0 % (ref 0–2)
BUN SERPL-MCNC: 58 MG/DL (ref 8–23)
CALCIUM SERPL-MCNC: 8.3 MG/DL (ref 8.6–10.4)
CHLORIDE SERPL-SCNC: 100 MMOL/L (ref 98–107)
CO2 SERPL-SCNC: 24 MMOL/L (ref 20–31)
CREAT SERPL-MCNC: 2.2 MG/DL (ref 0.5–0.9)
CRP SERPL HS-MCNC: 242.4 MG/L (ref 0–5)
EOSINOPHIL # BLD: 0 K/UL (ref 0–0.4)
EOSINOPHILS RELATIVE PERCENT: 0 % (ref 0–4)
ERYTHROCYTE [DISTWIDTH] IN BLOOD BY AUTOMATED COUNT: 15.4 % (ref 11.5–14.9)
ERYTHROCYTE [SEDIMENTATION RATE] IN BLOOD BY PHOTOMETRIC METHOD: 123 MM/HR (ref 0–30)
GFR SERPL CREATININE-BSD FRML MDRD: 23 ML/MIN/1.73M2
GLUCOSE BLD-MCNC: 116 MG/DL (ref 65–105)
GLUCOSE SERPL-MCNC: 106 MG/DL (ref 70–99)
HCT VFR BLD AUTO: 30.6 % (ref 36–46)
HGB BLD-MCNC: 10.1 G/DL (ref 12–16)
INR PPP: 1.1
LYMPHOCYTES NFR BLD: 0.55 K/UL (ref 1–4.8)
LYMPHOCYTES RELATIVE PERCENT: 2 % (ref 24–44)
MCH RBC QN AUTO: 29.5 PG (ref 26–34)
MCHC RBC AUTO-ENTMCNC: 32.9 G/DL (ref 31–37)
MCV RBC AUTO: 89.6 FL (ref 80–100)
MONOCYTES NFR BLD: 1.39 K/UL (ref 0.1–1.3)
MONOCYTES NFR BLD: 5 % (ref 1–7)
MORPHOLOGY: ABNORMAL
NEUTROPHILS NFR BLD: 90 % (ref 36–66)
NEUTS SEG NFR BLD: 24.93 K/UL (ref 1.3–9.1)
PARTIAL THROMBOPLASTIN TIME: 37.8 SEC (ref 24–36)
PLATELET # BLD AUTO: 222 K/UL (ref 150–450)
PMV BLD AUTO: 8.4 FL (ref 6–12)
POTASSIUM SERPL-SCNC: 4.2 MMOL/L (ref 3.7–5.3)
PROCALCITONIN SERPL-MCNC: 7.7 NG/ML
PROTHROMBIN TIME: 14.8 SEC (ref 11.8–14.6)
RBC # BLD AUTO: 3.42 M/UL (ref 4–5.2)
SODIUM SERPL-SCNC: 134 MMOL/L (ref 135–144)
WBC OTHER # BLD: 27.7 K/UL (ref 3.5–11)

## 2023-12-18 PROCEDURE — 6360000002 HC RX W HCPCS: Performed by: NURSE PRACTITIONER

## 2023-12-18 PROCEDURE — 99233 SBSQ HOSP IP/OBS HIGH 50: CPT | Performed by: INTERNAL MEDICINE

## 2023-12-18 PROCEDURE — 6360000002 HC RX W HCPCS: Performed by: INTERNAL MEDICINE

## 2023-12-18 PROCEDURE — 82947 ASSAY GLUCOSE BLOOD QUANT: CPT

## 2023-12-18 PROCEDURE — 2580000003 HC RX 258: Performed by: INTERNAL MEDICINE

## 2023-12-18 PROCEDURE — 84145 PROCALCITONIN (PCT): CPT

## 2023-12-18 PROCEDURE — 85520 HEPARIN ASSAY: CPT

## 2023-12-18 PROCEDURE — 86140 C-REACTIVE PROTEIN: CPT

## 2023-12-18 PROCEDURE — 85652 RBC SED RATE AUTOMATED: CPT

## 2023-12-18 PROCEDURE — 2060000000 HC ICU INTERMEDIATE R&B

## 2023-12-18 PROCEDURE — 85025 COMPLETE CBC W/AUTO DIFF WBC: CPT

## 2023-12-18 PROCEDURE — 2580000003 HC RX 258: Performed by: NURSE PRACTITIONER

## 2023-12-18 PROCEDURE — 2500000003 HC RX 250 WO HCPCS: Performed by: INTERNAL MEDICINE

## 2023-12-18 PROCEDURE — 36415 COLL VENOUS BLD VENIPUNCTURE: CPT

## 2023-12-18 PROCEDURE — 2500000003 HC RX 250 WO HCPCS: Performed by: NURSE PRACTITIONER

## 2023-12-18 PROCEDURE — 80048 BASIC METABOLIC PNL TOTAL CA: CPT

## 2023-12-18 PROCEDURE — 99232 SBSQ HOSP IP/OBS MODERATE 35: CPT | Performed by: INTERNAL MEDICINE

## 2023-12-18 PROCEDURE — 90935 HEMODIALYSIS ONE EVALUATION: CPT

## 2023-12-18 PROCEDURE — 85610 PROTHROMBIN TIME: CPT

## 2023-12-18 PROCEDURE — 6370000000 HC RX 637 (ALT 250 FOR IP): Performed by: INTERNAL MEDICINE

## 2023-12-18 PROCEDURE — 6370000000 HC RX 637 (ALT 250 FOR IP)

## 2023-12-18 PROCEDURE — 85730 THROMBOPLASTIN TIME PARTIAL: CPT

## 2023-12-18 RX ORDER — HEPARIN SODIUM 10000 [USP'U]/100ML
5-30 INJECTION, SOLUTION INTRAVENOUS CONTINUOUS
Status: DISCONTINUED | OUTPATIENT
Start: 2023-12-18 | End: 2023-12-25

## 2023-12-18 RX ORDER — HEPARIN SODIUM 1000 [USP'U]/ML
2000 INJECTION, SOLUTION INTRAVENOUS; SUBCUTANEOUS PRN
Status: DISCONTINUED | OUTPATIENT
Start: 2023-12-18 | End: 2023-12-25

## 2023-12-18 RX ORDER — HEPARIN SODIUM 1000 [USP'U]/ML
60 INJECTION, SOLUTION INTRAVENOUS; SUBCUTANEOUS ONCE
Status: DISCONTINUED | OUTPATIENT
Start: 2023-12-18 | End: 2023-12-18

## 2023-12-18 RX ORDER — GUAIFENESIN 200 MG/10ML
200 LIQUID ORAL EVERY 4 HOURS PRN
Status: DISCONTINUED | OUTPATIENT
Start: 2023-12-18 | End: 2024-01-02 | Stop reason: HOSPADM

## 2023-12-18 RX ORDER — HEPARIN SODIUM 1000 [USP'U]/ML
4000 INJECTION, SOLUTION INTRAVENOUS; SUBCUTANEOUS PRN
Status: DISCONTINUED | OUTPATIENT
Start: 2023-12-18 | End: 2023-12-25

## 2023-12-18 RX ADMIN — HEPARIN SODIUM 6 UNITS/KG/HR: 10000 INJECTION, SOLUTION INTRAVENOUS at 17:37

## 2023-12-18 RX ADMIN — HEPARIN SODIUM 5000 UNITS: 5000 INJECTION INTRAVENOUS; SUBCUTANEOUS at 05:52

## 2023-12-18 RX ADMIN — PANTOPRAZOLE SODIUM 40 MG: 40 TABLET, DELAYED RELEASE ORAL at 05:52

## 2023-12-18 RX ADMIN — LINEZOLID 600 MG: 600 INJECTION, SOLUTION INTRAVENOUS at 01:18

## 2023-12-18 RX ADMIN — LINEZOLID 600 MG: 600 INJECTION, SOLUTION INTRAVENOUS at 14:19

## 2023-12-18 RX ADMIN — ASPIRIN 81 MG: 81 TABLET, COATED ORAL at 14:20

## 2023-12-18 RX ADMIN — AMIODARONE HYDROCHLORIDE 1 MG/MIN: 1.8 INJECTION, SOLUTION INTRAVENOUS at 05:53

## 2023-12-18 RX ADMIN — ANTI-FUNGAL POWDER MICONAZOLE NITRATE TALC FREE: 1.42 POWDER TOPICAL at 14:27

## 2023-12-18 RX ADMIN — HEPARIN SODIUM 5000 UNITS: 5000 INJECTION INTRAVENOUS; SUBCUTANEOUS at 14:24

## 2023-12-18 RX ADMIN — CEFEPIME 1000 MG: 1 INJECTION, POWDER, FOR SOLUTION INTRAMUSCULAR; INTRAVENOUS at 05:51

## 2023-12-18 RX ADMIN — CEFEPIME 1000 MG: 1 INJECTION, POWDER, FOR SOLUTION INTRAMUSCULAR; INTRAVENOUS at 22:13

## 2023-12-18 RX ADMIN — METOPROLOL TARTRATE 25 MG: 25 TABLET, FILM COATED ORAL at 22:05

## 2023-12-18 RX ADMIN — AMIODARONE HYDROCHLORIDE 1 MG/MIN: 1.8 INJECTION, SOLUTION INTRAVENOUS at 01:17

## 2023-12-18 RX ADMIN — GUAIFENESIN 200 MG: 200 SOLUTION ORAL at 03:18

## 2023-12-18 RX ADMIN — COLLAGENASE SANTYL: 250 OINTMENT TOPICAL at 17:57

## 2023-12-18 RX ADMIN — ANTI-FUNGAL POWDER MICONAZOLE NITRATE TALC FREE: 1.42 POWDER TOPICAL at 22:05

## 2023-12-18 RX ADMIN — SODIUM CHLORIDE, PRESERVATIVE FREE 10 ML: 5 INJECTION INTRAVENOUS at 22:06

## 2023-12-18 NOTE — PLAN OF CARE
Problem: Skin/Tissue Integrity  Goal: Absence of new skin breakdown  Description: 1.  Monitor for areas of redness and/or skin breakdown  2.  Assess vascular access sites hourly  3.  Every 4-6 hours minimum:  Change oxygen saturation probe site  4.  Every 4-6 hours:  If on nasal continuous positive airway pressure, respiratory therapy assess nares and determine need for appliance change or resting period.  12/18/2023 0538 by Lois Fermin RN  Outcome: Not Progressing  Note: Patient repositioned, mepilex placed on coccyx area skin breakdown noted.   12/17/2023 1833 by Heather Hankins RN  Outcome: Progressing     Problem: Discharge Planning  Goal: Discharge to home or other facility with appropriate resources  12/18/2023 0538 by Lois Fermin RN  Outcome: Progressing     Problem: Safety - Adult  Goal: Free from fall injury  12/18/2023 0538 by Lois Fermin RN  Outcome: Progressing  Note: Bed alarm on, call light in reach     Problem: Pain  Goal: Verbalizes/displays adequate comfort level or baseline comfort level  12/18/2023 0538 by Lois Fermin RN  Outcome: Progressing     Problem: ABCDS Injury Assessment  Goal: Absence of physical injury  Outcome: Progressing  Flowsheets (Taken 12/18/2023 0538)  Absence of Physical Injury: Implement safety measures based on patient assessment     Problem: Skin/Tissue Integrity  Goal: Absence of new skin breakdown  Description: 1.  Monitor for areas of redness and/or skin breakdown  2.  Assess vascular access sites hourly  3.  Every 4-6 hours minimum:  Change oxygen saturation probe site  4.  Every 4-6 hours:  If on nasal continuous positive airway pressure, respiratory therapy assess nares and determine need for appliance change or resting period.  12/18/2023 0538 by Lois Fermin RN  Outcome: Not Progressing  Note: Patient repositioned, mepilex placed on coccyx area skin breakdown noted.   12/17/2023 1833 by Nhi

## 2023-12-18 NOTE — CARE COORDINATION
ONGOING DISCHARGE PLAN:    Patient is alert and oriented x4.    Spoke with patient regarding discharge plan and patient confirms that plan is still to discharge to home vs snf    Labs    Ultrafiltration tomorrow    DC IV fluids    Hemodialysis today      DV amio drip    Will continue to follow for additional discharge needs.    If patient is discharged prior to next notation, then this note serves as note for discharge by case management.    Electronically signed by Cami Schmidt RN on 12/18/2023 at 12:51 PM

## 2023-12-18 NOTE — PROGRESS NOTES
[facility] -   Infectious diseases evaluation  admission date 12/14/2023    reason for consultation:   Cellulitis    Impression :   Current:  Cellulitis bilateral lower extremity. Wound cx grew Pseudomonas aeruginosa sensitive to Levaquin and cefepime and Enterococcus faecalis sensitive to ampicillin  Pseudomonas Aeruginosa bacteremia sensitive to Levaquin and cefepime.  Sepsis secondary to above  Bacteriuria  Leukocytosis  Elevated CRP  Left posterior tibial wound  Peripheral arterial disease  Acute on chronic renal failure.  New onset HD.  Obesity  History of lymphedema venous stasis insufficiency  Hyperlipidemia  Penicillin allergy-Hives.    Recommendations   Zyvox 600 mg IV every 12 hours, avoid ampicillin due to penicillin allergy  IV cefepime  Left lower extremity CT showed no fluid collection  Follow WBC and platelets closely while on Zyvox.  Follow renal function closely.  Follow cultures and adjust antibiotics as needed  Wound care  Supportive care      Infection Control Recommendations   [place] Precautions    Antimicrobial Stewardship Recommendations   Simplification of therapy  Targeted therapy    History of Present Illness:   Initial history:  Elaina Champagne is a 73 y.o.-year-old female was sent to the hospital from wound care clinic for severe left leg pain associated with generalized fatigue.  The patient had swelling and redness of the lower extremities bilaterally with open wound to the posterior aspect of the right leg.  She is poor historian, afebrile, denied nausea or vomiting, no diarrhea, no cough or shortness of breath, no other complaints.  Initial labs showed WBC of 22, elevated lactic acid and creatinine of 3.8.  Arterial Doppler showed left PHILL of 0.68    12/14            Interval changes  12/18/2023   She was seen and evaluated earlier today,  afebrile, denied significant pain, no new complaints  WBC 27  Echocardiogram showed dilated IVC

## 2023-12-18 NOTE — PROGRESS NOTES
HEMODIALYSIS PRE-TREATMENT NOTE    Patient Identifiers prior to treatment: Name  MRN    Isolation Required: No                      Isolation Type: N/A       (please document if patient is being managed as a PUI/COVID-19 patient)        Hepatitis status:                           Date Drawn                             Result  Hepatitis B Surface Antigen 12/15/2023     NEG                     Hepatitis B Surface Antibody 12/15/2023 NEG        Hepatitis B Core Antibody 12/15/2023 NEG          How was Hepatitis Status verified: Epic Chart     Was a copy of the labs you documented provided to facility for the patient's chart: yes    Hemodialysis orders verified: Yes by Andria Gonzalez    Access Within normal limits ( I.e. s/s of infection,...): WNL     Pre-Assessment completed: Yes by Andria Gonzalez    Pre-dialysis report received from: Wandy                      Time: 930

## 2023-12-18 NOTE — PROGRESS NOTES
[facility]   Progress Note                   Date:   12/18/2023  Patient name: Elaina Champagne  Date of admission:  12/14/2023 11:42 AM  MRN:   [id number]  YOB: 1950  PCP: Robin Ly MD    Reason for Admission:      Subjective:       Clinical Changes / Abnormalities: Patient is lying comfortably still in A-fib with well rate controlled  Patient just had third dialysis done.        Medications:   Scheduled Meds:   cefepime  1,000 mg IntraVENous Q24H    miconazole   Topical BID    collagenase   Topical Daily    linezolid  600 mg IntraVENous Q12H    aspirin  81 mg Oral Daily    [Held by provider] escitalopram  20 mg Oral Daily    [Held by provider] metoprolol tartrate  25 mg Oral BID    pantoprazole  40 mg Oral QAM AC    sodium chloride flush  5-40 mL IntraVENous 2 times per day    heparin (porcine)  5,000 Units SubCUTAneous 3 times per day     Continuous Infusions:   dextrose      sodium chloride 10 mL/hr at 12/15/23 0036     CBC:   Recent Labs     12/16/23 0419 12/17/23 0426 12/18/23  0531   WBC 23.2* 30.9* 27.7*   HGB 10.8* 10.5* 10.1*    230 222     BMP:    Recent Labs     12/16/23 0419 12/17/23  0426 12/18/23  0531   * 134* 134*   K 4.6 4.3 4.2   CL 99 100 100   CO2 23 24 24   BUN 55* 49* 58*   CREATININE 3.0* 2.5* 2.2*   GLUCOSE 99 112* 106*     Hepatic:   Recent Labs     12/16/23 0419   AST 29   ALT 15   BILITOT 0.7   ALKPHOS 147*     Troponin: No results for input(s): \"TROPONINI\" in the last 72 hours.  BNP: No results for input(s): \"BNP\" in the last 72 hours.  Lipids: No results for input(s): \"CHOL\", \"HDL\" in the last 72 hours.    Invalid input(s): \"LDLCALCU\"  INR: No results for input(s): \"INR\" in the last 72 hours.    Objective:   Vitals: BP (!) 166/55   Pulse 75   Temp 98.6 °F (37 °C)   Resp 16   Ht 1.575 m (5' 2\")   Wt (!) 146 kg (321 lb 14 oz)   SpO2 97%   BMI 58.87 kg/m²   I/O last 3 completed shifts:  In: 836.3 [P.O.:237; I.V.:299.3; IV  Piggyback:300]  Out: 400 [Urine:400]  I/O this shift:  In: 840 [P.O.:340]  Out: 3650 [Urine:650]  Scheduled Meds:   cefepime  1,000 mg IntraVENous Q24H    miconazole   Topical BID    collagenase   Topical Daily    linezolid  600 mg IntraVENous Q12H    aspirin  81 mg Oral Daily    [Held by provider] escitalopram  20 mg Oral Daily    [Held by provider] metoprolol tartrate  25 mg Oral BID    pantoprazole  40 mg Oral QAM AC    sodium chloride flush  5-40 mL IntraVENous 2 times per day    heparin (porcine)  5,000 Units SubCUTAneous 3 times per day     Continuous Infusions:   dextrose      sodium chloride 10 mL/hr at 12/15/23 0036     PRN Meds:.guaiFENesin, glucose, dextrose bolus **OR** dextrose bolus, glucagon (rDNA), dextrose, anticoagulant sodium citrate, anticoagulant sodium citrate, sodium chloride flush, sodium chloride, ondansetron **OR** ondansetron, polyethylene glycol, acetaminophen **OR** acetaminophen    CONSTITUTIONAL: AOx4, no apparent distress, appears stated age   HEAD: normocephalic, atraumatic   EYES: PERRLA, EOMI   ENT: moist mucous membranes, uvula midline   NECK: symmetric, no midline tenderness to palpation   LUNGS: clear to auscultation bilaterally   CARDIOVASCULAR: irregular rate and rhythm, no murmurs, rubs or gallops   ABDOMEN: Soft, non-tender, non-distended with normal active bowel sounds   SKIN: no rash       EKG: .AFIB  ECHO: 12/15/23  Technically difficult study  The left ventricle appears normal in size, severely increased LV wall thickness, small LV cavity  Hyperdynamic wall motions, ejection fraction more than 65%  The right ventricle appears normal in size and function  The left atrium appears mildly dilated  The aortic valve appears sclerotic no stenosis no regurgitation  Mitral valve structure appears normal no mitral stenosis, no obvious regurgitation  Tricuspid valve structure appears normal trace regurgitation no stenosis  Pulmonary valve not well-visualized  Normal aortic root

## 2023-12-18 NOTE — PROGRESS NOTES
[facility] PULMONARY,CRITICAL CARE & SLEEP   Fabrizio Aparicio MD/Karl Crowder MD/Homero OBANDO AGACNP-BC, NP-C      Angela OBANDO NP-C     Joie OBANDO NP-C                                           Pulmonary Progress Note    Patient - Elaina Champagne   Age - 73 y.o.   - 1950  MRN - [id number]  [id number]  Date of Admission - 2023 11:42 AM    Consulting Service/Physician:       Primary Care Physician: Robin Ly MD    SUBJECTIVE:     Chief Complaint:   Chief Complaint   Patient presents with    Leg Pain     Left      Fatigue     Subjective:    She states she is feeling better, she has not had any fevers, white count is trending down but still elevated.  I did see her while she was in hemodialysis, after she had completed 2.5 L of fluid removal.  She is alert and appropriate.  She is on 3 L of oxygen with pulse ox in the high 90s.  She states she has an occasional cough but not bringing up any phlegm.  She denies any chest pain or pleuritic pain.    VITALS  BP (!) 166/55   Pulse 75   Temp 98.6 °F (37 °C)   Resp 16   Ht 1.575 m (5' 2\")   Wt (!) 146 kg (321 lb 14 oz)   SpO2 97%   BMI 58.87 kg/m²   Wt Readings from Last 3 Encounters:   23 (!) 146 kg (321 lb 14 oz)   23 (!) 137 kg (302 lb)   23 (!) 138.3 kg (305 lb)     I/O (24 Hours)    Intake/Output Summary (Last 24 hours) at 2023 1351  Last data filed at 2023 1324  Gross per 24 hour   Intake 1077 ml   Output 3650 ml   Net -2573 ml     Ventilator:      Exam:   Physical Exam   Constitutional:  Oriented to person, place, and time.  Lying in hemodialysis bed, not in any distress  HENT: Unremarkable, nasal cannula in place  Head: Normocephalic and atraumatic.   Eyes: EOM are normal. Pupils are equal, round, and reactive to light.   Neck: Neck supple.   Cardiovascular:  Regular rate and rhythm.  Normal heart tones.  No JVD.    Pulmonary/Chest: Lung  amiodarone has been discontinued  Lactic acidosis, resolved  Acute hypoxic respiratory insufficiency on 3 L  Chronic diastolic heart failure  History of venous insufficiency/bilateral lower extremity swelling  Morbid obesity with BMI 56.9  Full code  PLAN:   Fluid removal per nephrology, she did tolerate 2.5 L removed today, plans noted for ultrafiltration again tomorrow  Wean O2 to keep sats greater than 89%  Antibiotics per infectious disease, currently on cefepime and Zyvox  Monitor leukocytosis, slowly trending down  Monitor renal function  Increase activity  Discussed with RN and hemodialysis RN      Electronically signed by TOPHER Miguel - NINA on 12/18/23     This progress note was completed using a voice transcription system. Every effort was made to ensure accuracy. However, inadvertent computerized transcription errors may be present.    VERO OBANDO AGACNP-BC, NP-C  [facility] Pulmonary, Critical Care & Sleep

## 2023-12-18 NOTE — PROGRESS NOTES
NEPHROLOGY PROGRESS NOTE     Patient :  Elaina Champagne; 73 y.o. MRN# [id number]  Location:  2095/2095-01  Attending:  David Yang MD  Admit Date:  12/14/2023   Hospital Day: 4      Reason for Consult: Acute kidney injury    Subjective/interval history.  Patient seen and examined at dialysis.  Transferred out of the ICU over the weekend.  Remains oliguric with positive balance of 5.7 kg-urine output 350 cc yesterday  Started on amiodarone drip for new onset A-fib  Positive leg swelling edema  Echocardiogram showed dilated IVC with elevated right atrial filling pressure EF greater than 65% severely increased LV wall thickness, small LV cavity      Chief Complaint: Left leg swelling wound erythema and pain  History Obtained From:  patient ,, EMR nursing staff     History of Present Illness:    This is a 73 y.o. female with past medical history of essential hypertension, peripheral vascular disease, patient presented to the hospital with complaints of left leg swelling erythema and wound on the left leg patient was seen by vascular and wound care on 12/13/2023.  Patient presented to the hospital yesterday on 12/14/2023 with complaints of left leg swelling erythema and wound getting worse patient has that swelling for several weeks but has been getting worse and more red.  Patient also noted to have fatigue weakness, and she has confusion this morning.  According to the  no nausea vomiting diarrhea no fever chills no headache dizziness.  Labs on admission showed BUN of 54 serum creatinine of 3.9 mg/dL, patient has been oliguric.    Patient denies dysuria, gross hematuria, flank pain, nocturia, urgency, passing frothy urine or urinary incontinence.  There has been no recent exposure to IV contrast.   There is no history  of paraprotein disease.   Pt denies any history of recurrent UTI or kidney stones.  Medication review shows use of ARB's and Bumex, and NSAIDs.    Patient noted to be hypotensive  PRN  0.9 % sodium chloride infusion, PRN  ondansetron (ZOFRAN-ODT) disintegrating tablet 4 mg, Q8H PRN   Or  ondansetron (ZOFRAN) injection 4 mg, Q6H PRN  polyethylene glycol (GLYCOLAX) packet 17 g, Daily PRN  acetaminophen (TYLENOL) tablet 650 mg, Q6H PRN   Or  acetaminophen (TYLENOL) suppository 650 mg, Q6H PRN  heparin (porcine) injection 5,000 Units, 3 times per day        Allergies:  Penicillins    Social History:   Social History     Socioeconomic History    Marital status:      Spouse name: Eze    Number of children: 3    Years of education: Not on file    Highest education level: Not on file   Occupational History    Not on file   Tobacco Use    Smoking status: Never     Passive exposure: Never    Smokeless tobacco: Never   Vaping Use    Vaping Use: Never used   Substance and Sexual Activity    Alcohol use: No    Drug use: No    Sexual activity: Yes   Other Topics Concern    Not on file   Social History Narrative    Not on file     Social Determinants of Health     Financial Resource Strain: Low Risk  (3/7/2023)    Overall Financial Resource Strain (CARDIA)     Difficulty of Paying Living Expenses: Not hard at all   Food Insecurity: No Food Insecurity (12/14/2023)    Hunger Vital Sign     Worried About Running Out of Food in the Last Year: Never true     Ran Out of Food in the Last Year: Never true   Transportation Needs: No Transportation Needs (12/14/2023)    PRAPARE - Transportation     Lack of Transportation (Medical): No     Lack of Transportation (Non-Medical): No   Physical Activity: Insufficiently Active (3/7/2023)    Exercise Vital Sign     Days of Exercise per Week: 2 days     Minutes of Exercise per Session: 20 min   Stress: Not on file   Social Connections: Not on file   Intimate Partner Violence: Not on file   Housing Stability: Low Risk  (12/14/2023)    Housing Stability Vital Sign     Unable to Pay for Housing in the Last Year: No     Number of Places Lived in the Last Year: 1

## 2023-12-18 NOTE — PROGRESS NOTES
[facility]   IN-PATIENT SERVICE   [facility]    HISTORY AND PHYSICAL EXAMINATION            Date:   12/18/2023  Patient name:  Elaina Champagne  Date of admission:  12/14/2023 11:42 AM  MRN:   [id number]  Account:  [id number]  YOB: 1950  PCP:    Robin Ly MD  Room:   [address] [place] [address]  Code Status:    Full Code    Chief Complaint:     Chief Complaint   Patient presents with    Leg Pain     Left      Fatigue       History Obtained From:     patient, electronic medical record    History of Present Illness:     The patient is a 73 y.o.  Non- / non  female who presents with Leg Pain (Left/) and Fatigue   and she is admitted to the hospital for the management of wound check  Patient has past medical is a multiple medical problem including morbid obesity, hypertension, lymphedema, patient has wound in both legs and back of her legs, symptoms are going on for last 1 month  Patient follows with wound care  She was evaluated by Dr. Meredith vascular surgery yesterday, dressing was applied on left leg, she was having severe pain that made her come to the hospital  Patient, had arterial scan done earlier suggestive of PHILL of 0.68 on left side, on right side PHILL was okay  Previous wound culture was growing Klebsiella and staph, MSSA  In the emergency room, patient had lab work done suggestive elevated creatinine of 3.8  CK was normal      Past Medical History:     Past Medical History:   Diagnosis Date    Bell's palsy     Cellulitis     Hypertension         Past Surgical History:     Past Surgical History:   Procedure Laterality Date    CATARACT REMOVAL Bilateral 2015    HYSTERECTOMY, TOTAL ABDOMINAL (CERVIX REMOVED)  1990    INCISION AND DRAINAGE Left 07/06/2017    LEG INCISION AND DRAINAGE ABSCESS performed by Isaiah Casey MD at [facility] OR    IR NONTUNNELED VASCULAR CATHETER  12/15/2023    IR NONTUNNELED VASCULAR CATHETER 12/15/2023 [facility] SPECIAL PROCEDURES    KNEE  therapies and most importantly because of direct risk to patient if care not provided in a hospital setting.    Joesph Tate MD  12/18/2023  6:07 PM    Copy sent to Robin Franco MD    Please note that this chart was generated using voice recognition Dragon dictation software.  Although every effort was made to ensure the accuracy of this automated transcription, some errors in transcription may have occurred.

## 2023-12-18 NOTE — PROGRESS NOTES
Comprehensive Nutrition Assessment    Type and Reason for Visit:  Initial    Nutrition Recommendations/Plan:   Will continue No Added Salt diet with supplements all trays     Malnutrition Assessment:  Malnutrition Status:  At risk for malnutrition (Comment) (12/18/23 1501)    Context:  Chronic Illness     Findings of the 6 clinical characteristics of malnutrition:  Energy Intake:  Mild decrease in energy intake (Comment)  Weight Loss:  No significant weight loss     Body Fat Loss:  No significant body fat loss     Muscle Mass Loss:  No significant muscle mass loss    Fluid Accumulation:  Mild Extremities   Strength:  Not Performed    Nutrition Assessment:    Pt was admitted for Cellulitis. HD was initiated on 12/15. Intake has been poor. Supplements have been ordered.    Nutrition Related Findings:    mild generalized/BLE edema, Labs: K 4.2, Meds: Reviewed, PMH: Lymphedema, Chronic Wounds, BM 12/12 Wound Type: Venous Stasis       Current Nutrition Intake & Therapies:    Average Meal Intake: 0%, %     ADULT DIET; Regular; No Added Salt (3-4 gm)  ADULT ORAL NUTRITION SUPPLEMENT; Dinner; Standard High Calorie/High Protein Oral Supplement  ADULT ORAL NUTRITION SUPPLEMENT; Breakfast, Lunch; Renal Oral Supplement    Anthropometric Measures:  Height: 157.5 cm (5' 2\")  Ideal Body Weight (IBW): 110 lbs (50 kg)    Admission Body Weight: 141.1 kg (311 lb)  Current Body Weight: 145.6 kg (321 lb),   IBW. Weight Source: Bed Scale  Current BMI (kg/m2): 58.7  Usual Body Weight: 135.6 kg (299 lb) (3/23)  % Weight Change (Calculated): 7.4                    BMI Categories: Obese Class 3 (BMI 40.0 or greater)    Estimated Daily Nutrient Needs:  Energy Requirements Based On: Formula  Weight Used for Energy Requirements: Ideal  Energy (kcal/day): [place] x 1.3= 1300 kcal  Weight Used for Protein Requirements: Ideal  Protein (g/day): 2g/kg= 100 g     Fluid (ml/day): per MD    Nutrition Diagnosis:   Inadequate protein intake

## 2023-12-18 NOTE — PROGRESS NOTES
Message out to Dr. Luong regarding family concern over patient's RLE, increasing redness since yesterday, awaiting response.

## 2023-12-18 NOTE — PROGRESS NOTES
Patient is NSR, HR in 70's, with 2 episodes of bradycardia. Dr. Mariano notified, discontinue amiodarone gtt.

## 2023-12-18 NOTE — PROGRESS NOTES
HEMODIALYSIS POST TREATMENT NOTE    Treatment time ordered: 3.0hrs    Actual treatment time: 3.0hrs    UltraFiltration Goal: 2.5kgs  UltraFiltration Removed: 2.5kgs      Pre Treatment weight: 148.5kgs   Post Treatment weight: 146.0kgs  Estimated Dry Weight: MICHELLE    Access used:     Central Venous Catheter:          Tunneled or Non-tunneled: Non-Tunneled           Site: Right Neck          Access Flow: Good      Internal Access:       AV Fistula or AV Graft: N/A         Site: N/A       Access Flow: N/A       Sign and symptoms of infection: No       If YES: N/A    Medications or blood products given: See MAR    Chronic outpatient schedule: MICHELLE    Chronic outpatient unit: MICHELLE    Summary of response to treatment: Patient tolerated treatment good.  2.5Kgs of fluid removed without difficulty.  CVC clamped and capped.    Explain if orders NOT met, was physician notified:N/A      ACES flowsheet faxed to patient unit/ placed in patient chart: Yes    Post assessment completed: Andria Gonzalez    Report given to: Wandy Morales Intra-treatment documented Safety Checks include the followin) Access and face visible at all times.     2) All connections and blood lines are secure with no kinks.     3) NVL alarm engaged.     4) Hemosafe device applied (if applicable).     5) No collapse of Arterial or Venous blood chambers.     6) All blood lines / pump segments in the air detectors.

## 2023-12-19 ENCOUNTER — APPOINTMENT (OUTPATIENT)
Dept: VASCULAR LAB | Age: 73
DRG: 853 | End: 2023-12-19
Attending: INTERNAL MEDICINE
Payer: MEDICARE

## 2023-12-19 ENCOUNTER — APPOINTMENT (OUTPATIENT)
Dept: GENERAL RADIOLOGY | Age: 73
DRG: 853 | End: 2023-12-19
Payer: MEDICARE

## 2023-12-19 LAB
ANION GAP SERPL CALCULATED.3IONS-SCNC: 9 MMOL/L (ref 9–17)
ANTI-XA UNFRAC HEPARIN: 0.22 IU/L (ref 0.3–0.7)
ANTI-XA UNFRAC HEPARIN: 0.25 IU/L (ref 0.3–0.7)
ANTI-XA UNFRAC HEPARIN: 0.36 IU/L (ref 0.3–0.7)
BASOPHILS # BLD: 0 K/UL (ref 0–0.2)
BASOPHILS NFR BLD: 0 % (ref 0–2)
BUN SERPL-MCNC: 38 MG/DL (ref 8–23)
CALCIUM SERPL-MCNC: 8.3 MG/DL (ref 8.6–10.4)
CHLORIDE SERPL-SCNC: 99 MMOL/L (ref 98–107)
CO2 SERPL-SCNC: 26 MMOL/L (ref 20–31)
CREAT SERPL-MCNC: 1.4 MG/DL (ref 0.5–0.9)
ECHO BSA: 2.48 M2
EOSINOPHIL # BLD: 0.21 K/UL (ref 0–0.4)
EOSINOPHILS RELATIVE PERCENT: 1 % (ref 0–4)
ERYTHROCYTE [DISTWIDTH] IN BLOOD BY AUTOMATED COUNT: 15 % (ref 11.5–14.9)
GFR SERPL CREATININE-BSD FRML MDRD: 40 ML/MIN/1.73M2
GLUCOSE BLD-MCNC: 101 MG/DL (ref 65–105)
GLUCOSE BLD-MCNC: 88 MG/DL (ref 65–105)
GLUCOSE BLD-MCNC: 97 MG/DL (ref 65–105)
GLUCOSE SERPL-MCNC: 96 MG/DL (ref 70–99)
HCT VFR BLD AUTO: 30.8 % (ref 36–46)
HGB BLD-MCNC: 10.3 G/DL (ref 12–16)
LYMPHOCYTES NFR BLD: 0.83 K/UL (ref 1–4.8)
LYMPHOCYTES RELATIVE PERCENT: 4 % (ref 24–44)
MCH RBC QN AUTO: 29.8 PG (ref 26–34)
MCHC RBC AUTO-ENTMCNC: 33.5 G/DL (ref 31–37)
MCV RBC AUTO: 89 FL (ref 80–100)
MONOCYTES NFR BLD: 0.62 K/UL (ref 0.1–1.3)
MONOCYTES NFR BLD: 3 % (ref 1–7)
MORPHOLOGY: ABNORMAL
NEUTROPHILS NFR BLD: 92 % (ref 36–66)
NEUTS SEG NFR BLD: 19.04 K/UL (ref 1.3–9.1)
PLATELET # BLD AUTO: 226 K/UL (ref 150–450)
PMV BLD AUTO: 7.9 FL (ref 6–12)
POTASSIUM SERPL-SCNC: 4.3 MMOL/L (ref 3.7–5.3)
RBC # BLD AUTO: 3.46 M/UL (ref 4–5.2)
SODIUM SERPL-SCNC: 134 MMOL/L (ref 135–144)
WBC OTHER # BLD: 20.7 K/UL (ref 3.5–11)

## 2023-12-19 PROCEDURE — 6360000002 HC RX W HCPCS: Performed by: INTERNAL MEDICINE

## 2023-12-19 PROCEDURE — 85025 COMPLETE CBC W/AUTO DIFF WBC: CPT

## 2023-12-19 PROCEDURE — 2580000003 HC RX 258: Performed by: INTERNAL MEDICINE

## 2023-12-19 PROCEDURE — 99213 OFFICE O/P EST LOW 20 MIN: CPT

## 2023-12-19 PROCEDURE — 99232 SBSQ HOSP IP/OBS MODERATE 35: CPT | Performed by: INTERNAL MEDICINE

## 2023-12-19 PROCEDURE — 85520 HEPARIN ASSAY: CPT

## 2023-12-19 PROCEDURE — 36415 COLL VENOUS BLD VENIPUNCTURE: CPT

## 2023-12-19 PROCEDURE — 99233 SBSQ HOSP IP/OBS HIGH 50: CPT | Performed by: INTERNAL MEDICINE

## 2023-12-19 PROCEDURE — 6370000000 HC RX 637 (ALT 250 FOR IP): Performed by: INTERNAL MEDICINE

## 2023-12-19 PROCEDURE — 90935 HEMODIALYSIS ONE EVALUATION: CPT

## 2023-12-19 PROCEDURE — 80048 BASIC METABOLIC PNL TOTAL CA: CPT

## 2023-12-19 PROCEDURE — 6360000002 HC RX W HCPCS: Performed by: NURSE PRACTITIONER

## 2023-12-19 PROCEDURE — 2580000003 HC RX 258: Performed by: NURSE PRACTITIONER

## 2023-12-19 PROCEDURE — 99233 SBSQ HOSP IP/OBS HIGH 50: CPT | Performed by: NURSE PRACTITIONER

## 2023-12-19 PROCEDURE — 2500000003 HC RX 250 WO HCPCS: Performed by: NURSE PRACTITIONER

## 2023-12-19 PROCEDURE — 2500000003 HC RX 250 WO HCPCS: Performed by: INTERNAL MEDICINE

## 2023-12-19 PROCEDURE — 93970 EXTREMITY STUDY: CPT | Performed by: SURGERY

## 2023-12-19 PROCEDURE — 93970 EXTREMITY STUDY: CPT

## 2023-12-19 PROCEDURE — 71045 X-RAY EXAM CHEST 1 VIEW: CPT

## 2023-12-19 PROCEDURE — 2060000000 HC ICU INTERMEDIATE R&B

## 2023-12-19 PROCEDURE — 82947 ASSAY GLUCOSE BLOOD QUANT: CPT

## 2023-12-19 RX ORDER — MIDODRINE HYDROCHLORIDE 5 MG/1
5 TABLET ORAL PRN
Status: DISCONTINUED | OUTPATIENT
Start: 2023-12-19 | End: 2024-01-02 | Stop reason: HOSPADM

## 2023-12-19 RX ORDER — ALBUMIN (HUMAN) 12.5 G/50ML
25 SOLUTION INTRAVENOUS PRN
Status: DISCONTINUED | OUTPATIENT
Start: 2023-12-19 | End: 2024-01-02 | Stop reason: HOSPADM

## 2023-12-19 RX ADMIN — ANTI-FUNGAL POWDER MICONAZOLE NITRATE TALC FREE: 1.42 POWDER TOPICAL at 20:55

## 2023-12-19 RX ADMIN — METOPROLOL TARTRATE 25 MG: 25 TABLET, FILM COATED ORAL at 20:50

## 2023-12-19 RX ADMIN — ACETAMINOPHEN 650 MG: 325 TABLET ORAL at 20:50

## 2023-12-19 RX ADMIN — LINEZOLID 600 MG: 600 INJECTION, SOLUTION INTRAVENOUS at 00:59

## 2023-12-19 RX ADMIN — ASPIRIN 81 MG: 81 TABLET, COATED ORAL at 07:57

## 2023-12-19 RX ADMIN — SODIUM CHLORIDE: 9 INJECTION, SOLUTION INTRAVENOUS at 00:54

## 2023-12-19 RX ADMIN — HEPARIN SODIUM 2000 UNITS: 1000 INJECTION INTRAVENOUS; SUBCUTANEOUS at 15:32

## 2023-12-19 RX ADMIN — Medication 1.5 ML: at 13:17

## 2023-12-19 RX ADMIN — SODIUM CHLORIDE, PRESERVATIVE FREE 10 ML: 5 INJECTION INTRAVENOUS at 20:53

## 2023-12-19 RX ADMIN — PANTOPRAZOLE SODIUM 40 MG: 40 TABLET, DELAYED RELEASE ORAL at 05:35

## 2023-12-19 RX ADMIN — LINEZOLID 600 MG: 600 INJECTION, SOLUTION INTRAVENOUS at 12:45

## 2023-12-19 RX ADMIN — COLLAGENASE SANTYL: 250 OINTMENT TOPICAL at 17:27

## 2023-12-19 RX ADMIN — CEFEPIME 1000 MG: 1 INJECTION, POWDER, FOR SOLUTION INTRAMUSCULAR; INTRAVENOUS at 22:11

## 2023-12-19 RX ADMIN — Medication 1.4 ML: at 13:17

## 2023-12-19 RX ADMIN — HEPARIN SODIUM 12 UNITS/KG/HR: 10000 INJECTION, SOLUTION INTRAVENOUS at 12:45

## 2023-12-19 RX ADMIN — GUAIFENESIN 200 MG: 200 SOLUTION ORAL at 00:45

## 2023-12-19 RX ADMIN — SODIUM CHLORIDE: 9 INJECTION, SOLUTION INTRAVENOUS at 22:09

## 2023-12-19 RX ADMIN — ANTI-FUNGAL POWDER MICONAZOLE NITRATE TALC FREE: 1.42 POWDER TOPICAL at 07:57

## 2023-12-19 RX ADMIN — HEPARIN SODIUM 2000 UNITS: 1000 INJECTION INTRAVENOUS; SUBCUTANEOUS at 07:46

## 2023-12-19 RX ADMIN — ACETAMINOPHEN 650 MG: 325 TABLET ORAL at 15:18

## 2023-12-19 RX ADMIN — METOPROLOL TARTRATE 25 MG: 25 TABLET, FILM COATED ORAL at 07:59

## 2023-12-19 RX ADMIN — HEPARIN SODIUM 4000 UNITS: 1000 INJECTION INTRAVENOUS; SUBCUTANEOUS at 00:30

## 2023-12-19 ASSESSMENT — PAIN DESCRIPTION - FREQUENCY
FREQUENCY: INTERMITTENT
FREQUENCY: INTERMITTENT

## 2023-12-19 ASSESSMENT — PAIN DESCRIPTION - PAIN TYPE
TYPE: CHRONIC PAIN
TYPE: CHRONIC PAIN

## 2023-12-19 ASSESSMENT — PAIN - FUNCTIONAL ASSESSMENT
PAIN_FUNCTIONAL_ASSESSMENT: PREVENTS OR INTERFERES SOME ACTIVE ACTIVITIES AND ADLS
PAIN_FUNCTIONAL_ASSESSMENT: PREVENTS OR INTERFERES SOME ACTIVE ACTIVITIES AND ADLS

## 2023-12-19 ASSESSMENT — PAIN DESCRIPTION - DESCRIPTORS
DESCRIPTORS: ACHING
DESCRIPTORS: ACHING

## 2023-12-19 ASSESSMENT — PAIN DESCRIPTION - ONSET
ONSET: ON-GOING
ONSET: ON-GOING

## 2023-12-19 ASSESSMENT — PAIN DESCRIPTION - LOCATION
LOCATION: LEG
LOCATION: LEG

## 2023-12-19 ASSESSMENT — PAIN DESCRIPTION - ORIENTATION
ORIENTATION: LEFT
ORIENTATION: LEFT

## 2023-12-19 ASSESSMENT — PAIN SCALES - GENERAL
PAINLEVEL_OUTOF10: 3
PAINLEVEL_OUTOF10: 6

## 2023-12-19 NOTE — PROGRESS NOTES
NEPHROLOGY PROGRESS NOTE     Patient :  Elaina Champagne; 73 y.o. MRN# [id number]  Location:  2095/2095-01  Attending:  David Yang MD  Admit Date:  12/14/2023   Hospital Day: 5      Reason for Consult: Acute kidney injury    Subjective/interval history.  Patient seen and examined at dialysis.   Patient denies nausea-appetite slowly improving -no flank pain fever or skin rash.  Patient is receiving extra ultrafiltration today-urine output increased at 1075 - on amiodarone drip for new onset A-fib.Positive leg swelling edema  Echocardiogram showed dilated IVC with elevated right atrial filling pressure EF greater than 65% severely increased LV wall thickness, small LV cavity      Chief Complaint: Left leg swelling wound erythema and pain  History Obtained From:  patient ,, EMR nursing staff     History of Present Illness:    This is a 73 y.o. female with past medical history of essential hypertension, peripheral vascular disease, patient presented to the hospital with complaints of left leg swelling erythema and wound on the left leg patient was seen by vascular and wound care on 12/13/2023.  Patient presented to the hospital yesterday on 12/14/2023 with complaints of left leg swelling erythema and wound getting worse patient has that swelling for several weeks but has been getting worse and more red.  Patient also noted to have fatigue weakness, and she has confusion this morning.  According to the  no nausea vomiting diarrhea no fever chills no headache dizziness.  Labs on admission showed BUN of 54 serum creatinine of 3.9 mg/dL, patient has been oliguric.    Patient denies dysuria, gross hematuria, flank pain, nocturia, urgency, passing frothy urine or urinary incontinence.  There has been no recent exposure to IV contrast.   There is no history  of paraprotein disease.   Pt denies any history of recurrent UTI or kidney stones.  Medication review shows use of ARB's and Bumex, and NSAIDs.    Patient  Number of Places Lived in the Last Year: 1     Unstable Housing in the Last Year: No   Aware she is for potassium    Objective:  CURRENT TEMPERATURE:  Temp: 97.8 °F (36.6 °C)  MAXIMUM TEMPERATURE OVER 24HRS:  Temp (24hrs), Av.6 °F (37 °C), Min:97.8 °F (36.6 °C), Max:99.5 °F (37.5 °C)    CURRENT RESPIRATORY RATE:  Respirations: 18  CURRENT PULSE:  Pulse: 73  CURRENT BLOOD PRESSURE:  BP: (!) 132/58  24HR BLOOD PRESSURE RANGE:  Systolic (24hrs), Av , Min:129 , Max:185   ; Diastolic (24hrs), Av, Min:51, Max:71    24HR INTAKE/OUTPUT:    Intake/Output Summary (Last 24 hours) at 2023 1102  Last data filed at 2023 0546  Gross per 24 hour   Intake 500 ml   Output 3725 ml   Net -3225 ml       Patient Vitals for the past 96 hrs (Last 3 readings):   Weight   23 0926 (!) 152.2 kg (335 lb 8.6 oz)   23 0630 (!) 145.4 kg (320 lb 8.8 oz)   23 1324 (!) 146 kg (321 lb 14 oz)         Physical Exam:  GENERAL APPEARANCE: Lethargic, responsive to verbal stimuli  HEAD: normocephalic  EYES:  EOMI. Not pale, anicteric   NOSE:  No nasal discharge.    CARDIAC: Normal S1 and S2. No S3, S4 or murmurs. Rhythm is regular.  LUNGS:  diminished breath sounds.  No accessory muscle use  NECK: Neck supple, non-tender   GI-soft nontender  MUSKULOSKELETAL: Adequately aligned spine. No joint erythema or tenderness.   EXTREMITIES: ++ edema.  Bilateral lower extremity edema swelling  NEURO: Nonfocal      Labs:   CBC:  Recent Labs     23  0426 23  0531 23  0625   WBC 30.9* 27.7* 20.7*   RBC 3.55* 3.42* 3.46*   HGB 10.5* 10.1* 10.3*   HCT 32.8* 30.6* 30.8*   MCV 92.4 89.6 89.0   MCH 29.6 29.5 29.8   MCHC 32.0 32.9 33.5   RDW 15.8* 15.4* 15.0*    222 226   MPV 9.0 8.4 7.9        BMP:   Recent Labs     23  0426 23  0531 23  0625   * 134* 134*   K 4.3 4.2 4.3    100 99   CO2 24 24 26   BUN 49* 58* 38*   CREATININE 2.5* 2.2* 1.4*   GLUCOSE 112* 106* 96   CALCIUM 8.7

## 2023-12-19 NOTE — PROGRESS NOTES
Leonardo Russell, JABIER   Patient:  MARGARET PACHECO   YOB: 1950  MRN:  [id number]  Location: [facility]    2095-01 12/19/23 3:39 PM   [phone] From: Kristin Watson [facility] RE: MARGARET TARA Foot blisters  Unread

## 2023-12-19 NOTE — PROGRESS NOTES
miconazole (MICOTIN) 2 % powder, , Topical, BID, Jorge Luong MD, Given at 12/19/23 0757    collagenase ointment, , Topical, Daily, Keyanna Juarez APRN - CNP, Given at 12/18/23 1757    anticoagulant sodium citrate 4 % injection 1.4 mL, 1.4 mL, IntraCATHeter, PRN, José Manuel Ellis MD, 1.4 mL at 12/16/23 1218    anticoagulant sodium citrate 4 % injection 1.5 mL, 1.5 mL, IntraCATHeter, PRN, José Manuel Ellis MD, 1.5 mL at 12/16/23 1219    linezolid (ZYVOX) IVPB 600 mg, 600 mg, IntraVENous, Q12H, David Yang MD, Stopped at 12/19/23 0159    aspirin EC tablet 81 mg, 81 mg, Oral, Daily, David Yang MD, 81 mg at 12/19/23 0757    [Held by provider] escitalopram (LEXAPRO) tablet 20 mg, 20 mg, Oral, Daily, David Yang MD    metoprolol tartrate (LOPRESSOR) tablet 25 mg, 25 mg, Oral, BID, Sunil Mariano MD, 25 mg at 12/19/23 0759    pantoprazole (PROTONIX) tablet 40 mg, 40 mg, Oral, QAM AC, David Yang MD, 40 mg at 12/19/23 0535    sodium chloride flush 0.9 % injection 5-40 mL, 5-40 mL, IntraVENous, 2 times per day, David Yang MD, 10 mL at 12/18/23 2206    sodium chloride flush 0.9 % injection 5-40 mL, 5-40 mL, IntraVENous, PRN, David Yang MD    0.9 % sodium chloride infusion, , IntraVENous, PRN, Davdi Yang MD, Last Rate: 10 mL/hr at 12/19/23 0054, New Bag at 12/19/23 0054    ondansetron (ZOFRAN-ODT) disintegrating tablet 4 mg, 4 mg, Oral, Q8H PRN **OR** ondansetron (ZOFRAN) injection 4 mg, 4 mg, IntraVENous, Q6H PRN, David Yang MD, 4 mg at 12/15/23 1105    polyethylene glycol (GLYCOLAX) packet 17 g, 17 g, Oral, Daily PRN, David Yang MD    acetaminophen (TYLENOL) tablet 650 mg, 650 mg, Oral, Q6H PRN, 650 mg at 12/17/23 0844 **OR** acetaminophen (TYLENOL) suppository 650 mg, 650 mg, Rectal, Q6H PRN, David Yang MD    Lab Results:     Lab Results   Component Value Date    WBC 20.7 (H) 12/19/2023    HGB 10.3 (L) 12/19/2023    HCT 30.8 (L) 12/19/2023    MCV 89.0 12/19/2023      12/19/2023     Lab Results   Component Value Date    CALCIUM 8.3 (L) 12/19/2023     (L) 12/19/2023    K 4.3 12/19/2023    CO2 26 12/19/2023    CL 99 12/19/2023    BUN 38 (H) 12/19/2023    CREATININE 1.4 (H) 12/19/2023       Lab Results   Component Value Date    INR 1.1 12/18/2023    PROTIME 14.8 (H) 12/18/2023       Radiology:   No new chest imaging    ASSESSMENT:       Sepsis secondary to cellulitis, positive wound culture with Pseudomonas  Acute kidney injury requiring hemodialysis, secondary to ischemic acute tubular necrosis due to hypotension and sepsis -completed hemodialysis today with 2 and half liters fluid removal  New onset A-fib with RVR, now controlled, amiodarone has been discontinued  Lactic acidosis, resolved  Acute hypoxic respiratory insufficiency on 1 L  Chronic diastolic heart failure  History of venous insufficiency/bilateral lower extremity swelling  Morbid obesity with BMI 56.9  Full code  PLAN:   Extra ultrafiltration completed today for fluid removal  Wean O2 to keep sats greater than 89%, she is down to 1 L with adequate saturations  Antibiotics per infectious disease, currently on cefepime and Zyvox  Monitor renal function  Leukocytosis continues to trend down  Increase activity  Discussed with hemodialysis RN      Electronically signed by TOPHER Miguel - CNP on 12/19/23     This progress note was completed using a voice transcription system. Every effort was made to ensure accuracy. However, inadvertent computerized transcription errors may be present.    VERO OBANDO AGACNP-BC, NP-C  [facility]

## 2023-12-19 NOTE — PROGRESS NOTES
[facility]  Progress Note                   Date:   12/19/2023  Patient name: Elaina Champagne  Date of admission:  12/14/2023 11:42 AM  MRN:   [id number]  YOB: 1950  PCP: Robin Ly MD    Reason for Admission: Hyperkalemia [E87.5]  Cellulitis [L03.90]  MICHELLE (acute kidney injury) (HCC) [N17.9]  Cellulitis of right lower extremity [L03.115]  Cellulitis of left lower extremity [L03.116]  Altered mental status, unspecified altered mental status type [R41.82]    Subjective:       Clinical Changes /Abnormalities: Seen & examined in room. No acute CV issues/concerns overnight. Labs, vitals, & tele reviewed.     Review of Systems    Medications:   Scheduled Meds:   cefepime  1,000 mg IntraVENous Q24H    miconazole   Topical BID    collagenase   Topical Daily    linezolid  600 mg IntraVENous Q12H    aspirin  81 mg Oral Daily    [Held by provider] escitalopram  20 mg Oral Daily    metoprolol tartrate  25 mg Oral BID    pantoprazole  40 mg Oral QAM AC    sodium chloride flush  5-40 mL IntraVENous 2 times per day     Continuous Infusions:   heparin (PORCINE) Infusion 12 Units/kg/hr (12/19/23 0747)    dextrose      sodium chloride 10 mL/hr at 12/19/23 0054     CBC:   Recent Labs     12/17/23 0426 12/18/23  0531 12/19/23  0625   WBC 30.9* 27.7* 20.7*   HGB 10.5* 10.1* 10.3*    222 226     BMP:    Recent Labs     12/17/23 0426 12/18/23  0531 12/19/23  0625   * 134* 134*   K 4.3 4.2 4.3    100 99   CO2 24 24 26   BUN 49* 58* 38*   CREATININE 2.5* 2.2* 1.4*   GLUCOSE 112* 106* 96     Hepatic:No results for input(s): \"AST\", \"ALT\", \"ALB\", \"BILITOT\", \"ALKPHOS\" in the last 72 hours.  Troponin: No results for input(s): \"TROPHS\" in the last 72 hours.  BNP: No results for input(s): \"BNP\" in the last 72 hours.  Lipids: No results for input(s): \"CHOL\", \"HDL\" in the last 72 hours.    Invalid input(s): \"LDLCALCU\"  INR:   Recent Labs     12/18/23  1512   INR 1.1       Objective:

## 2023-12-19 NOTE — PROGRESS NOTES
[facility]   IN-PATIENT SERVICE   [facility]    Progress note             Date:   12/19/2023  Patient name:  Elaina Champagne  Date of admission:  12/14/2023 11:42 AM  MRN:   [id number]  Account:  [id number]  YOB: 1950  PCP:    Robin Ly MD  Room:   [facility]  Code Status:    Full Code    Chief Complaint:     Chief Complaint   Patient presents with    Leg Pain     Left      Fatigue       History Obtained From:     patient, electronic medical record    History of Present Illness:     The patient is a 73 y.o.  Non- / non  female who presents with Leg Pain (Left/) and Fatigue   and she is admitted to the hospital for the management of wound check  Patient has past medical is a multiple medical problem including morbid obesity, hypertension, lymphedema, patient has wound in both legs and back of her legs, symptoms are going on for last 1 month  Patient follows with wound care  She was evaluated by Dr. Meredith vascular surgery yesterday, dressing was applied on left leg, she was having severe pain that made her come to the hospital  Patient, had arterial scan done earlier suggestive of PHILL of 0.68 on left side, on right side PHILL was okay  Previous wound culture was growing Klebsiella and staph, MSSA  In the emergency room, patient had lab work done suggestive elevated creatinine of 3.8  CK was normal      Past Medical History:     Past Medical History:   Diagnosis Date    Bell's palsy     Cellulitis     Hypertension         Past Surgical History:     Past Surgical History:   Procedure Laterality Date    CATARACT REMOVAL Bilateral 2015    HYSTERECTOMY, TOTAL ABDOMINAL (CERVIX REMOVED)  1990    INCISION AND DRAINAGE Left 07/06/2017    LEG INCISION AND DRAINAGE ABSCESS performed by Isaiah Casey MD at [facility] OR    IR NONTUNNELED VASCULAR CATHETER  12/15/2023    IR NONTUNNELED VASCULAR CATHETER 12/15/2023 [facility] SPECIAL PROCEDURES    KNEE ARTHROPLASTY Right

## 2023-12-19 NOTE — PROGRESS NOTES
[facility] -   Infectious diseases evaluation  admission date 12/14/2023    reason for consultation:   Cellulitis    Impression :   Current:  Cellulitis bilateral lower extremity. Wound cx grew Pseudomonas aeruginosa sensitive to Levaquin and cefepime and Enterococcus faecalis sensitive to ampicillin  Pseudomonas Aeruginosa bacteremia sensitive to Levaquin and cefepime.  Sepsis secondary to above  Bacteriuria  Leukocytosis  Elevated CRP  Left posterior tibial wound  Peripheral arterial disease  Acute on chronic renal failure.  New onset HD.  Obesity  History of lymphedema venous stasis insufficiency  Hyperlipidemia  Penicillin allergy-Hives.    Recommendations   Zyvox 600 mg IV every 12 hours, avoid ampicillin due to penicillin allergy  IV cefepime  Left lower extremity CT showed no fluid collection  Follow WBC and platelets closely while on Zyvox.  Follow renal function closely.  Follow cultures and adjust antibiotics as needed  Wound care  Supportive care  Podiatry evaluation  Right foot x-ray  Discussed with Dr. Tate      Infection Control Recommendations   [place]ollis Precautions    Antimicrobial Stewardship Recommendations   Simplification of therapy  Targeted therapy    History of Present Illness:   Initial history:  Elaina Champagne is a 73 y.o.-year-old female was sent to the hospital from wound care clinic for severe left leg pain associated with generalized fatigue.  The patient had swelling and redness of the lower extremities bilaterally with open wound to the posterior aspect of the right leg.  She is poor historian, afebrile, denied nausea or vomiting, no diarrhea, no cough or shortness of breath, no other complaints.  Initial labs showed WBC of 22, elevated lactic acid and creatinine of 3.8.  Arterial Doppler showed left PHILL of 0.68    12/14            Interval changes  12/19/2023   She had a fever with a temperature max of 100.9,denied significant pain,  Eze    Number of children: 3    Years of education: Not on file    Highest education level: Not on file   Occupational History    Not on file   Tobacco Use    Smoking status: Never     Passive exposure: Never    Smokeless tobacco: Never   Vaping Use    Vaping Use: Never used   Substance and Sexual Activity    Alcohol use: No    Drug use: No    Sexual activity: Yes   Other Topics Concern    Not on file   Social History Narrative    Not on file     Social Determinants of Health     Financial Resource Strain: Low Risk  (3/7/2023)    Overall Financial Resource Strain (CARDIA)     Difficulty of Paying Living Expenses: Not hard at all   Food Insecurity: No Food Insecurity (12/14/2023)    Hunger Vital Sign     Worried About Running Out of Food in the Last Year: Never true     Ran Out of Food in the Last Year: Never true   Transportation Needs: No Transportation Needs (12/14/2023)    PRAPARE - Transportation     Lack of Transportation (Medical): No     Lack of Transportation (Non-Medical): No   Physical Activity: Insufficiently Active (3/7/2023)    Exercise Vital Sign     Days of Exercise per Week: 2 days     Minutes of Exercise per Session: 20 min   Stress: Not on file   Social Connections: Not on file   Intimate Partner Violence: Not on file   Housing Stability: Low Risk  (12/14/2023)    Housing Stability Vital Sign     Unable to Pay for Housing in the Last Year: No     Number of Places Lived in the Last Year: 1     Unstable Housing in the Last Year: No       Family History:     Family History   Problem Relation Age of Onset    Cancer Mother     High Blood Pressure Father     Stroke Father     Ovarian Cancer Daughter     Cancer Daughter     Diabetes Maternal Aunt     Cancer Other         daughter/ovarian cancer      Medical Decision Making:   I have independently reviewed/ordered the following labs:    CBC with Differential:   Recent Labs     12/18/23  0531 12/19/23  0625   WBC 27.7* 20.7*   HGB 10.1* 10.3*   HCT 30.6*

## 2023-12-19 NOTE — PLAN OF CARE
Problem: Discharge Planning  Goal: Discharge to home or other facility with appropriate resources  12/18/2023 1929 by Wandy Livingston, RN  Outcome: Progressing     Problem: Safety - Adult  Goal: Free from fall injury  12/18/2023 1929 by Wandy Livingston, RN  Outcome: Progressing     Problem: Pain  Goal: Verbalizes/displays adequate comfort level or baseline comfort level  12/18/2023 1929 by Wandy Livingston, RN  Outcome: Progressing     Problem: Skin/Tissue Integrity  Goal: Absence of new skin breakdown  Description: 1.  Monitor for areas of redness and/or skin breakdown  2.  Assess vascular access sites hourly  3.  Every 4-6 hours minimum:  Change oxygen saturation probe site  4.  Every 4-6 hours:  If on nasal continuous positive airway pressure, respiratory therapy assess nares and determine need for appliance change or resting period.  12/18/2023 1929 by Wandy Livingston, RN  Outcome: Progressing     Problem: ABCDS Injury Assessment  Goal: Absence of physical injury  12/18/2023 1929 by Wandy Livingston, RN  Outcome: Progressing     Problem: Nutrition Deficit:  Goal: Optimize nutritional status  Outcome: Progressing     Problem: Skin/Tissue Integrity  Goal: Absence of new skin breakdown  Description: 1.  Monitor for areas of redness and/or skin breakdown  2.  Assess vascular access sites hourly  3.  Every 4-6 hours minimum:  Change oxygen saturation probe site  4.  Every 4-6 hours:  If on nasal continuous positive airway pressure, respiratory therapy assess nares and determine need for appliance change or resting period.  12/18/2023 1929 by Wandy Livingston, RN  Outcome: Progressing  12/18/2023 0538 by Lois Fermin RN  Outcome: Not Progressing  Note: Patient repositioned, mepilex placed on coccyx area skin breakdown noted.

## 2023-12-19 NOTE — PROGRESS NOTES
HEMODIALYSIS POST TREATMENT NOTE    Treatment time ordered: 3h    Actual treatment time: 3h    UltraFiltration Goal: 2.5l  UltraFiltration Removed: 2.5l      Pre Treatment weight: 152.2kg  Post Treatment weight: 149.8kg  Estimated Dry Weight: tbd    Access used:     Central Venous Catheter:          Tunneled or Non-tunneled: non           Site: right neck          Access Flow: good      Internal Access:       AV Fistula or AV Graft: na         Site: na       Access Flow: na       Sign and symptoms of infection: no       If YES: na    Medications or blood products given: no    Chronic outpatient schedule: tbd    Chronic outpatient unit: [facility]    Summary of response to treatment: HANANE WELL    Explain if orders NOT met, was physician notified:MUKUL      ACES flowsheet faxed to patient unit/ placed in patient chart: YES    Post assessment completed: YES    Report given to: FARHEEN BRENNAN RN      * Intra-treatment documented Safety Checks include the followin) Access and face visible at all times.     2) All connections and blood lines are secure with no kinks.     3) NVL alarm engaged.     4) Hemosafe device applied (if applicable).     5) No collapse of Arterial or Venous blood chambers.     6) All blood lines / pump segments in the air detectors.

## 2023-12-19 NOTE — CARE COORDINATION
ONGOING DISCHARGE PLAN:    Patient is alert and oriented x4.    Spoke with patient regarding discharge plan and patient confirms that plan is agreeable to [facility]. Referral Sent.      12/15 IR non tunneled cath  1st UF on 12/16, extra tx today   Will follow for HD for OP    IV cefepime/zyvox per ID, temp 100.9    Venous scan pending    New Podiatry consult-blisters to left foot    Cardiology consult-heparin drip,echo completed   eliquis/xarelto will not be affordable     PT/OT       Will continue to follow for additional discharge needs.    If patient is discharged prior to next notation, then this note serves as note for discharge by case management.    Electronically signed by Meagan Barrios RN on 12/19/2023 at 3:31 PM

## 2023-12-19 NOTE — PROGRESS NOTES
HEMODIALYSIS PRE-TREATMENT NOTE    Patient Identifiers prior to treatment: name, , mrn    Isolation Required: n/a                      Isolation Type: n/a       (please document if patient is being managed as a PUI/COVID-19 patient)        Hepatitis status:                           Date Drawn                             Result  Hepatitis B Surface Antigen 23     neg                     Hepatitis B Surface Antibody 12/15/23 neg        Hepatitis B Core Antibody 12/15/23 neg          How was Hepatitis Status verified: epic     Was a copy of the labs you documented provided to facility for the patient's chart: yes    Hemodialysis orders verified: yes    Access Within normal limits ( I.e. s/s of infection,...): yes     Pre-Assessment completed: yes    Pre-dialysis report received from: Radha Tariq RN                      Time: 830

## 2023-12-19 NOTE — PROGRESS NOTES
[facility]  Progress Note      NAME:  Elaina Champagne  MEDICAL RECORD NUMBER:  [id number]  AGE: 73 y.o.   GENDER: female  : 1950  TODAY'S DATE:  2023    [facility] nurse follow up visit for left posterior leg wound. Wound has improved some with the use of santyl. About 50% of wound is now red healthy tissue. Pt does still have some slough/moist eschar to the proximal portion of the wound that would benefit from continued use of santyl. Primary RN also reports buttock wound. Pt had some intertriginous dermatitis to her gluteal cleft which has improved, but there is a small area that is open and bleeding. Recommend triad cream and foam dressing to protect. Pt also states that her right leg has become more red in the last few days. When assessed, noted that she had multiple fluid filled blisters to her ankle, dorsal foot, and toes. She does not know when the blisters developed, but they were not present when writer last assessed her. Dr Tate aware and has consulted podiatry. Will leave open to air for now, as the blisters are intact and not draining. Vascular staff arrived to room to do vascular duplex. Will continue to follow.     Gluteal cleft: Cleanse with soap and water, pat dry. Apply triad cream, cover with foam dressing. Change daily and as needed if loose or soiled.       Measurements:  Wound 10/30/23 Tibial Left;Posterior;Lower wound #1 (Active)   Wound Image   23 1358   Wound Etiology Other 23   Dressing Status Old drainage noted;New dressing applied 23   Wound Cleansed Cleansed with saline 23 135   Dressing/Treatment Pharmaceutical agent (see MAR);Moist to moist;ABD;Roll gauze 23 135   Dressing Change Due 23 2100   Wound Length (cm) 9 cm 23   Wound Width (cm) 5.5 cm 23 135   Wound Depth (cm) 0.2 cm 23   Wound Surface Area (cm^2) 49.5 cm^2 23 135   Change in Wound Size % (l*w) -2712.5  [facility]  Wound, Ostomy, and Continence Nursing  [phone]

## 2023-12-20 ENCOUNTER — APPOINTMENT (OUTPATIENT)
Dept: GENERAL RADIOLOGY | Age: 73
DRG: 853 | End: 2023-12-20
Attending: INTERNAL MEDICINE
Payer: MEDICARE

## 2023-12-20 LAB
ANION GAP SERPL CALCULATED.3IONS-SCNC: 9 MMOL/L (ref 9–17)
ANTI-XA UNFRAC HEPARIN: 0.37 IU/L (ref 0.3–0.7)
ATYPICAL LYMPHOCYTE ABSOLUTE COUNT: 0.59 K/UL
ATYPICAL LYMPHOCYTES: 3 %
BASOPHILS # BLD: 0 K/UL (ref 0–0.2)
BASOPHILS NFR BLD: 0 % (ref 0–2)
BUN SERPL-MCNC: 45 MG/DL (ref 8–23)
CALCIUM SERPL-MCNC: 8.3 MG/DL (ref 8.6–10.4)
CHLORIDE SERPL-SCNC: 94 MMOL/L (ref 98–107)
CO2 SERPL-SCNC: 26 MMOL/L (ref 20–31)
CREAT SERPL-MCNC: 1.6 MG/DL (ref 0.5–0.9)
EKG Q-T INTERVAL: 286 MS
EKG QRS DURATION: 80 MS
EKG QTC CALCULATION (BAZETT): 436 MS
EKG R AXIS: -9 DEGREES
EKG T AXIS: 27 DEGREES
EKG VENTRICULAR RATE: 140 BPM
EOSINOPHIL # BLD: 0.59 K/UL (ref 0–0.4)
EOSINOPHILS RELATIVE PERCENT: 3 % (ref 0–4)
ERYTHROCYTE [DISTWIDTH] IN BLOOD BY AUTOMATED COUNT: 15.7 % (ref 11.5–14.9)
GFR SERPL CREATININE-BSD FRML MDRD: 34 ML/MIN/1.73M2
GLUCOSE BLD-MCNC: 106 MG/DL (ref 65–105)
GLUCOSE BLD-MCNC: 80 MG/DL (ref 65–105)
GLUCOSE BLD-MCNC: 88 MG/DL (ref 65–105)
GLUCOSE BLD-MCNC: 88 MG/DL (ref 65–105)
GLUCOSE SERPL-MCNC: 94 MG/DL (ref 70–99)
HCT VFR BLD AUTO: 32.4 % (ref 36–46)
HGB BLD-MCNC: 10.7 G/DL (ref 12–16)
LYMPHOCYTES NFR BLD: 1.77 K/UL (ref 1–4.8)
LYMPHOCYTES RELATIVE PERCENT: 9 % (ref 24–44)
MCH RBC QN AUTO: 29.9 PG (ref 26–34)
MCHC RBC AUTO-ENTMCNC: 33.1 G/DL (ref 31–37)
MCV RBC AUTO: 90.4 FL (ref 80–100)
MICROORGANISM SPEC CULT: NORMAL
MICROORGANISM SPEC CULT: NORMAL
MONOCYTES NFR BLD: 0.59 K/UL (ref 0.1–1.3)
MONOCYTES NFR BLD: 3 % (ref 1–7)
MORPHOLOGY: ABNORMAL
NEUTROPHILS NFR BLD: 82 % (ref 36–66)
NEUTS SEG NFR BLD: 16.16 K/UL (ref 1.3–9.1)
NUCLEATED RED BLOOD CELLS: 1 PER 100 WBC
PLATELET # BLD AUTO: 227 K/UL (ref 150–450)
PMV BLD AUTO: 8 FL (ref 6–12)
POTASSIUM SERPL-SCNC: 4.1 MMOL/L (ref 3.7–5.3)
RBC # BLD AUTO: 3.58 M/UL (ref 4–5.2)
SERVICE CMNT-IMP: NORMAL
SERVICE CMNT-IMP: NORMAL
SODIUM SERPL-SCNC: 129 MMOL/L (ref 135–144)
SPECIMEN DESCRIPTION: NORMAL
SPECIMEN DESCRIPTION: NORMAL
WBC OTHER # BLD: 19.7 K/UL (ref 3.5–11)

## 2023-12-20 PROCEDURE — 2580000003 HC RX 258: Performed by: NURSE PRACTITIONER

## 2023-12-20 PROCEDURE — 2060000000 HC ICU INTERMEDIATE R&B

## 2023-12-20 PROCEDURE — 99233 SBSQ HOSP IP/OBS HIGH 50: CPT | Performed by: INTERNAL MEDICINE

## 2023-12-20 PROCEDURE — 6370000000 HC RX 637 (ALT 250 FOR IP): Performed by: INTERNAL MEDICINE

## 2023-12-20 PROCEDURE — 85025 COMPLETE CBC W/AUTO DIFF WBC: CPT

## 2023-12-20 PROCEDURE — 97530 THERAPEUTIC ACTIVITIES: CPT

## 2023-12-20 PROCEDURE — 97166 OT EVAL MOD COMPLEX 45 MIN: CPT

## 2023-12-20 PROCEDURE — 2580000003 HC RX 258: Performed by: INTERNAL MEDICINE

## 2023-12-20 PROCEDURE — 6360000002 HC RX W HCPCS: Performed by: NURSE PRACTITIONER

## 2023-12-20 PROCEDURE — 97162 PT EVAL MOD COMPLEX 30 MIN: CPT

## 2023-12-20 PROCEDURE — 6360000002 HC RX W HCPCS: Performed by: INTERNAL MEDICINE

## 2023-12-20 PROCEDURE — 6370000000 HC RX 637 (ALT 250 FOR IP): Performed by: NURSE PRACTITIONER

## 2023-12-20 PROCEDURE — 73620 X-RAY EXAM OF FOOT: CPT

## 2023-12-20 PROCEDURE — 36415 COLL VENOUS BLD VENIPUNCTURE: CPT

## 2023-12-20 PROCEDURE — 85520 HEPARIN ASSAY: CPT

## 2023-12-20 PROCEDURE — 82947 ASSAY GLUCOSE BLOOD QUANT: CPT

## 2023-12-20 PROCEDURE — 80048 BASIC METABOLIC PNL TOTAL CA: CPT

## 2023-12-20 RX ORDER — LANOLIN ALCOHOL/MO/W.PET/CERES
3 CREAM (GRAM) TOPICAL NIGHTLY PRN
Status: DISCONTINUED | OUTPATIENT
Start: 2023-12-20 | End: 2024-01-02 | Stop reason: HOSPADM

## 2023-12-20 RX ORDER — FUROSEMIDE 10 MG/ML
40 INJECTION INTRAMUSCULAR; INTRAVENOUS ONCE
Status: COMPLETED | OUTPATIENT
Start: 2023-12-20 | End: 2023-12-20

## 2023-12-20 RX ADMIN — ACETAMINOPHEN 650 MG: 325 TABLET ORAL at 10:53

## 2023-12-20 RX ADMIN — SODIUM CHLORIDE, PRESERVATIVE FREE 10 ML: 5 INJECTION INTRAVENOUS at 10:00

## 2023-12-20 RX ADMIN — FUROSEMIDE 40 MG: 10 INJECTION, SOLUTION INTRAMUSCULAR; INTRAVENOUS at 16:36

## 2023-12-20 RX ADMIN — ACETAMINOPHEN 650 MG: 325 TABLET ORAL at 16:57

## 2023-12-20 RX ADMIN — ANTI-FUNGAL POWDER MICONAZOLE NITRATE TALC FREE: 1.42 POWDER TOPICAL at 10:00

## 2023-12-20 RX ADMIN — HEPARIN SODIUM 14 UNITS/KG/HR: 10000 INJECTION, SOLUTION INTRAVENOUS at 13:20

## 2023-12-20 RX ADMIN — ANTI-FUNGAL POWDER MICONAZOLE NITRATE TALC FREE: 1.42 POWDER TOPICAL at 21:04

## 2023-12-20 RX ADMIN — COLLAGENASE SANTYL: 250 OINTMENT TOPICAL at 17:16

## 2023-12-20 RX ADMIN — ASPIRIN 81 MG: 81 TABLET, COATED ORAL at 10:53

## 2023-12-20 RX ADMIN — HEPARIN SODIUM 14 UNITS/KG/HR: 10000 INJECTION, SOLUTION INTRAVENOUS at 00:16

## 2023-12-20 RX ADMIN — CEFEPIME 1000 MG: 1 INJECTION, POWDER, FOR SOLUTION INTRAMUSCULAR; INTRAVENOUS at 21:03

## 2023-12-20 RX ADMIN — PANTOPRAZOLE SODIUM 40 MG: 40 TABLET, DELAYED RELEASE ORAL at 05:42

## 2023-12-20 RX ADMIN — METOPROLOL TARTRATE 25 MG: 25 TABLET, FILM COATED ORAL at 21:02

## 2023-12-20 RX ADMIN — METOPROLOL TARTRATE 25 MG: 25 TABLET, FILM COATED ORAL at 10:54

## 2023-12-20 RX ADMIN — SODIUM CHLORIDE, PRESERVATIVE FREE 10 ML: 5 INJECTION INTRAVENOUS at 21:02

## 2023-12-20 RX ADMIN — Medication 3 MG: at 21:02

## 2023-12-20 ASSESSMENT — PAIN DESCRIPTION - ORIENTATION
ORIENTATION: RIGHT;LEFT;LOWER
ORIENTATION: RIGHT;LEFT

## 2023-12-20 ASSESSMENT — PAIN DESCRIPTION - DESCRIPTORS
DESCRIPTORS: DISCOMFORT
DESCRIPTORS: ACHING;THROBBING

## 2023-12-20 ASSESSMENT — PAIN DESCRIPTION - LOCATION
LOCATION: LEG
LOCATION: LEG

## 2023-12-20 ASSESSMENT — PAIN SCALES - GENERAL
PAINLEVEL_OUTOF10: 8
PAINLEVEL_OUTOF10: 2

## 2023-12-20 NOTE — PROGRESS NOTES
Physical Therapy  Facility/Department: [facility] PROGRESSIVE CARE  Physical Therapy Initial Assessment    Name: Elaina Champagne  : 1950  MRN: [id number]  Date of Service: 2023    Discharge Recommendations:  Patient would benefit from continued therapy after discharge, Therapy recommended at discharge          Patient Diagnosis(es): The primary encounter diagnosis was Cellulitis of right lower extremity. Diagnoses of Cellulitis of left lower extremity, Altered mental status, unspecified altered mental status type, MICHELLE (acute kidney injury) (HCC), Hyperkalemia, and Localized edema were also pertinent to this visit.  Past Medical History:  has a past medical history of Bell's palsy, Cellulitis, and Hypertension.  Past Surgical History:  has a past surgical history that includes Hysterectomy, total abdominal (); Cataract removal (Bilateral, ); incision and drainage (Left, 2017); Total knee arthroplasty (Left, 2017); Knee Arthroplasty (Right, 2018); ventral hernia repair (2019); and IR NONTUNNELED VASCULAR CATHETER > 5 YEARS (12/15/2023).    Assessment   Treatment Diagnosis: Impaired fucntion  Specific Instructions for Next Treatment: Caution wound B LE-very tender to touch whiel assisting in/out of bed  Therapy Prognosis: Fair  Decision Making: High Complexity  Requires PT Follow-Up: Yes  Activity Tolerance  Activity Tolerance: Patient limited by pain;Patient limited by fatigue;Patient limited by endurance     Plan   Physical Therapy Plan  General Plan: 5-7 times per week  Specific Instructions for Next Treatment: Caution wound B LE-very tender to touch whiel assisting in/out of bed  Current Treatment Recommendations: Strengthening, Balance training, Functional mobility training, Transfer training, Endurance training, Gait training, Patient/Caregiver education & training, Home exercise program, Positioning, Therapeutic activities, ROM  Safety Devices  Type of Devices: All lien

## 2023-12-20 NOTE — PROGRESS NOTES
NEPHROLOGY PROGRESS NOTE     Patient :  Elaina Champagne; 73 y.o. MRN# [id number]  Location:  2095/2095-01  Attending:  David Yang MD  Admit Date:  12/14/2023   Hospital Day: 6      Reason for Consult: Acute kidney injury    Subjective/interval history.  Patient seen and examined a  Patient denies nausea-appetite slowly improving -no flank pain fever or skin rash.  Patient is receiving extra ultrafiltration yesterday with 2.5 kg removed. -urine output 450-++ leg swelling edema  Echocardiogram showed dilated IVC with elevated right atrial filling pressure EF greater than 65% severely increased LV wall thickness, small LV cavity      Chief Complaint: Left leg swelling wound erythema and pain  History Obtained From:  patient ,, EMR nursing staff     History of Present Illness:    This is a 73 y.o. female with past medical history of essential hypertension, peripheral vascular disease, patient presented to the hospital with complaints of left leg swelling erythema and wound on the left leg patient was seen by vascular and wound care on 12/13/2023.  Patient presented to the hospital yesterday on 12/14/2023 with complaints of left leg swelling erythema and wound getting worse patient has that swelling for several weeks but has been getting worse and more red.  Patient also noted to have fatigue weakness, and she has confusion this morning.  According to the  no nausea vomiting diarrhea no fever chills no headache dizziness.  Labs on admission showed BUN of 54 serum creatinine of 3.9 mg/dL, patient has been oliguric.    Patient denies dysuria, gross hematuria, flank pain, nocturia, urgency, passing frothy urine or urinary incontinence.  There has been no recent exposure to IV contrast.   There is no history  of paraprotein disease.   Pt denies any history of recurrent UTI or kidney stones.  Medication review shows use of ARB's and Bumex, and NSAIDs.    Patient noted to be hypotensive yesterday systolic  No     Number of Places Lived in the Last Year: 1     Unstable Housing in the Last Year: No   Aware she is for potassium    Objective:  CURRENT TEMPERATURE:  Temp: 98 °F (36.7 °C)  MAXIMUM TEMPERATURE OVER 24HRS:  Temp (24hrs), Av.4 °F (36.9 °C), Min:97.8 °F (36.6 °C), Max:100 °F (37.8 °C)    CURRENT RESPIRATORY RATE:  Respirations: 19  CURRENT PULSE:  Pulse: 83  CURRENT BLOOD PRESSURE:  BP: 137/69  24HR BLOOD PRESSURE RANGE:  Systolic (24hrs), Av , Min:118 , Max:155   ; Diastolic (24hrs), Av, Min:42, Max:86    24HR INTAKE/OUTPUT:    Intake/Output Summary (Last 24 hours) at 2023 1611  Last data filed at 2023 0957  Gross per 24 hour   Intake 1954.08 ml   Output 875 ml   Net 1079.08 ml       Patient Vitals for the past 96 hrs (Last 3 readings):   Weight   23 1220 (!) 149.8 kg (330 lb 4 oz)   23 0926 (!) 152.2 kg (335 lb 8.6 oz)   23 0630 (!) 145.4 kg (320 lb 8.8 oz)         Physical Exam:  GENERAL APPEARANCE: Lethargic, responsive to verbal stimuli  HEAD: normocephalic  EYES:  EOMI. Not pale, anicteric   NOSE:  No nasal discharge.    CARDIAC: Normal S1 and S2. No S3, S4 or murmurs. Rhythm is regular.  LUNGS:  diminished breath sounds.  No accessory muscle use  NECK: Neck supple, non-tender   GI-soft nontender  MUSKULOSKELETAL: Adequately aligned spine. No joint erythema or tenderness.   EXTREMITIES: ++ edema.  Bilateral lower extremity edema swelling  NEURO: Nonfocal      Labs:   CBC:  Recent Labs     23  0531 23  0625 23  0354   WBC 27.7* 20.7* 19.7*   RBC 3.42* 3.46* 3.58*   HGB 10.1* 10.3* 10.7*   HCT 30.6* 30.8* 32.4*   MCV 89.6 89.0 90.4   MCH 29.5 29.8 29.9   MCHC 32.9 33.5 33.1   RDW 15.4* 15.0* 15.7*    226 227   MPV 8.4 7.9 8.0        BMP:   Recent Labs     23  0531 23  0625 23  0354   * 134* 129*   K 4.2 4.3 4.1    99 94*   CO2 24 26 26   BUN 58* 38* 45*   CREATININE 2.2* 1.4* 1.6*   GLUCOSE 106* 96 94   CALCIUM

## 2023-12-20 NOTE — PROGRESS NOTES
Consultations:   IP CONSULT TO HOSPITALIST  IP CONSULT TO INFECTIOUS DISEASES  IP CONSULT TO NEUROLOGY  IP CONSULT TO CRITICAL CARE  IP CONSULT TO CARDIOLOGY  IP CONSULT TO PODIATRY    Patient is admitted as inpatient status because of co-morbidities listed above, severity of signs and symptoms as outlined, requirement for current medical therapies and most importantly because of direct risk to patient if care not provided in a hospital setting.    Joesph Tate MD  12/20/2023  5:03 PM    Copy sent to Dr. Ly, Robin Olivo MD    Please note that this chart was generated using voice recognition Dragon dictation software.  Although every effort was made to ensure the accuracy of this automated transcription, some errors in transcription may have occurred.

## 2023-12-20 NOTE — PROGRESS NOTES
[facility]  Progress Note                   Date:   12/20/2023  Patient name: Elaina Champagne  Date of admission:  12/14/2023 11:42 AM  MRN:   [id number]  YOB: 1950  PCP: Robin Ly MD    Reason for Admission: Hyperkalemia [E87.5]  Cellulitis [L03.90]  MICHELLE (acute kidney injury) (HCC) [N17.9]  Cellulitis of right lower extremity [L03.115]  Cellulitis of left lower extremity [L03.116]  Altered mental status, unspecified altered mental status type [R41.82]    Subjective:       Clinical Changes /Abnormalities:   No cp  No sob  Sinus on monitor    Medications:   Scheduled Meds:   cefepime  1,000 mg IntraVENous Q24H    miconazole   Topical BID    collagenase   Topical Daily    aspirin  81 mg Oral Daily    [Held by provider] escitalopram  20 mg Oral Daily    metoprolol tartrate  25 mg Oral BID    pantoprazole  40 mg Oral QAM AC    sodium chloride flush  5-40 mL IntraVENous 2 times per day     Continuous Infusions:   heparin (PORCINE) Infusion 14 Units/kg/hr (12/20/23 0016)    dextrose      sodium chloride 5 mL/hr at 12/19/23 2209     CBC:   Recent Labs     12/18/23  0531 12/19/23  0625 12/20/23  0354   WBC 27.7* 20.7* 19.7*   HGB 10.1* 10.3* 10.7*    226 227       BMP:    Recent Labs     12/18/23  0531 12/19/23  0625 12/20/23  0354   * 134* 129*   K 4.2 4.3 4.1    99 94*   CO2 24 26 26   BUN 58* 38* 45*   CREATININE 2.2* 1.4* 1.6*   GLUCOSE 106* 96 94       Hepatic:No results for input(s): \"AST\", \"ALT\", \"ALB\", \"BILITOT\", \"ALKPHOS\" in the last 72 hours.  Troponin: No results for input(s): \"TROPHS\" in the last 72 hours.  BNP: No results for input(s): \"BNP\" in the last 72 hours.  Lipids: No results for input(s): \"CHOL\", \"HDL\" in the last 72 hours.    Invalid input(s): \"LDLCALCU\"  INR:   Recent Labs     12/18/23  1512   INR 1.1         Objective:   Vitals: BP (!) 152/75   Pulse 85   Temp 97.8 °F (36.6 °C) (Oral)   Resp 19   Ht 1.575 m (5' 2\")   Wt (!) 149.8

## 2023-12-20 NOTE — CONSULTS
cellulitis involving the medial aspect and posteromedial   aspect of the proximal thigh, involving the leg, ankle, and lateral foot. No   localized collection identified.      Remote full-thickness Achilles tendon tear.      Atrophy of the leg musculature.      Degenerative change.         IR NONTUNNELED VASCULAR CATHETER > 5 YEARS   Final Result   Successful ultrasound and fluoroscopy guided non-tunneled triple-lumen   dialysis catheter placement.         XR TIBIA FIBULA LEFT (2 VIEWS)   Final Result   Edema of the subcutaneous fat, in keeping with the clinical diagnosis of   cellulitis. No radiographic evidence of osteomyelitis.         XR TIBIA FIBULA RIGHT (2 VIEWS)   Final Result   Right total knee prosthesis. No acute bony process.      No radiographic evidence for infectious process.         US RENAL LIMITED   Final Result   Unremarkable ultrasound of the kidneys.      Cholelithiasis in a distended gallbladder.         CT HEAD WO CONTRAST   Final Result   No acute intracranial abnormality.         Vascular duplex lower extremity venous bilateral    (Results Pending)       Cultures:   Left leg swab: Pseudomonas, Enterococcus faecalis  Blood cultures negative x2    Assessment     Elaina Champagne is a 73 y.o. female with   Intact blisters to dorsal foot, right  Full thickness ulceration extending to subcutaneous tissue, left leg  Pitting edema, bilateral lower extremity    Principal Problem:    Hyperkalemia  Active Problems:    Atrial fibrillation with rapid ventricular response (HCC)    Altered mental status    Permanent atrial fibrillation (HCC)    Renovascular hypertension    MICHELLE (acute kidney injury) (HCC)    Cellulitis    Bacteremia due to Pseudomonas    Bacteriuria    Leukocytosis    Elevated C-reactive protein (CRP)    Allergy to penicillin    Elevated erythrocyte sedimentation rate  Resolved Problems:    * No resolved hospital problems. *        Plan     Patient examined and evaluated at bedside  as noted.     Roc Edwards DPM on 12/27/2023 at 8:45 AM  [facility]  [facility], [employer]    This note was generated with the assistance of a speech recognition program. While intending to generate a timely document that accurately reflects the content of the visit, no guarantee can be provided that every grammatical or spelling mistake has been or will be identified or corrected.  In such instances, actual meaning can be extrapolated by context.Thank you for your understanding.

## 2023-12-20 NOTE — CARE COORDINATION
Rosenda with [facility] called and they can accept. They would be able to accept for dialysis if needed. Patient would need tunneled cath prior to discharge if HD is decided. Electronically signed by Meagan Barrios RN on 12/20/2023 at 12:12 PM

## 2023-12-20 NOTE — PLAN OF CARE
Problem: Pain  Goal: Verbalizes/displays adequate comfort level or baseline comfort level  12/20/2023 0318 by Jenna Ellison RN  Outcome: Progressing     Problem: Safety - Adult  Goal: Free from fall injury  12/20/2023 0318 by Jenna Ellison RN  Outcome: Progressing     Problem: Skin/Tissue Integrity  Goal: Absence of new skin breakdown  Description: 1.  Monitor for areas of redness and/or skin breakdown  2.  Assess vascular access sites hourly  3.  Every 4-6 hours minimum:  Change oxygen saturation probe site  4.  Every 4-6 hours:  If on nasal continuous positive airway pressure, respiratory therapy assess nares and determine need for appliance change or resting period.  12/20/2023 0318 by Jenna Ellison RN  Outcome: Progressing     Problem: ABCDS Injury Assessment  Goal: Absence of physical injury  Outcome: Progressing     Problem: Respiratory - Adult  Goal: Achieves optimal ventilation and oxygenation  Outcome: Progressing     Problem: Cardiovascular - Adult  Goal: Maintains optimal cardiac output and hemodynamic stability  Outcome: Progressing     Problem: Cardiovascular - Adult  Goal: Absence of cardiac dysrhythmias or at baseline  Outcome: Progressing     Problem: Musculoskeletal - Adult  Goal: Return mobility to safest level of function  Outcome: Progressing     Problem: Infection - Adult  Goal: Absence of infection during hospitalization  Outcome: Progressing     Problem: Metabolic/Fluid and Electrolytes - Adult  Goal: Electrolytes maintained within normal limits  Outcome: Progressing

## 2023-12-20 NOTE — PROGRESS NOTES
[facility] PULMONARY,CRITICAL CARE & SLEEP   Fabrizio Aparicio MD/Karl Crowder MD/Homero OBANDO AGACNP-BC, NP-C      Angela OBANDO NP-C     Joie OBANDO NP-C                                           Pulmonary Progress Note    Patient - Elaina Champagne   Age - 73 y.o.   - 1950  MRN - [id number]  [id number]  Date of Admission - 2023 11:42 AM    Consulting Service/Physician:       Primary Care Physician: Robin Ly MD    SUBJECTIVE:     Chief Complaint:   Chief Complaint   Patient presents with    Leg Pain     Left      Fatigue     Subjective:    She continues to have pain to the right lower extremity with redness.  She has not had any recent fevers.  She has been weaned off her oxygen she does look mildly dyspneic at rest however her pulse ox is 94 to 95%.  She did have an extra ultrafiltration yesterday.  Chest x-ray yesterday still showed significant pulmonary edema/vascular congestion.    VITALS  /63   Pulse 88   Temp 98.4 °F (36.9 °C) (Oral)   Resp 18   Ht 1.575 m (5' 2\")   Wt (!) 149.8 kg (330 lb 4 oz)   SpO2 96%   BMI 60.40 kg/m²   Wt Readings from Last 3 Encounters:   23 (!) 149.8 kg (330 lb 4 oz)   23 (!) 137 kg (302 lb)   23 (!) 138.3 kg (305 lb)     I/O (24 Hours)    Intake/Output Summary (Last 24 hours) at 2023 1255  Last data filed at 2023 0957  Gross per 24 hour   Intake 1954.08 ml   Output 875 ml   Net 1079.08 ml     Ventilator:      Exam:   Physical Exam   Constitutional:  Oriented to person, place, and time.  Lying in bed, mildly dyspneic at rest but not in any distress on room air  HENT: Unremarkable  Head: Normocephalic and atraumatic.   Eyes: EOM are normal. Pupils are equal, round, and reactive to light.   Neck: Neck supple.  Short thick stature neck  Cardiovascular:  Regular rate and rhythm.  Normal heart tones.  No JVD.    Pulmonary/Chest: Lung sounds clear but  12/20/2023    K 4.1 12/20/2023    CO2 26 12/20/2023    CL 94 (L) 12/20/2023    BUN 45 (H) 12/20/2023    CREATININE 1.6 (H) 12/20/2023       Lab Results   Component Value Date    INR 1.1 12/18/2023    PROTIME 14.8 (H) 12/18/2023       Radiology:       12/20/23 chest x-ray with vascular congestion noted  ASSESSMENT:       Sepsis secondary to cellulitis, positive wound culture with Pseudomonas  BLE cellulitis - + pseudomonas aeruginosa and enterococcus faecalis  Acute kidney injury requiring hemodialysis, secondary to ischemic acute tubular necrosis due to hypotension and sepsis  New onset A-fib with RVR, now controlled, amiodarone has been discontinued, still on heparin infusion  Lactic acidosis, resolved  Acute hypoxic respiratory insufficiency -resolved now on room air  Acute on Chronic diastolic heart failure  PAD  Venous stasis/lymphedema  Morbid obesity with BMI 56.9  Full code  PLAN:   Fluid removal per nephrology  Monitor off oxygen, she is saturating well at rest on room air  Antibiotics per infectious disease, currently on cefepime and Zyvox  Monitor renal function  Await podiatry eval for any further procedures/interventions   Increase activity  Discussed with spouse at bedside  Discussed with RN      Electronically signed by TOPHER Miguel - CNP on 12/20/23     This progress note was completed using a voice transcription system. Every effort was made to ensure accuracy. However, inadvertent computerized transcription errors may be present.    VERO OBANDO AGACNP-BC, NP-C  [facility] Pulmonary, Critical Care & Sleep

## 2023-12-20 NOTE — PLAN OF CARE
Problem: Discharge Planning  Goal: Discharge to home or other facility with appropriate resources  Outcome: Progressing     Problem: Safety - Adult  Goal: Free from fall injury  12/20/2023 1649 by Mary Anne Morton RN  Outcome: Progressing     Problem: Pain  Goal: Verbalizes/displays adequate comfort level or baseline comfort level  12/20/2023 1649 by Mary Anne Morton RN  Outcome: Progressing     Problem: Skin/Tissue Integrity  Goal: Absence of new skin breakdown  Description: 1.  Monitor for areas of redness and/or skin breakdown  2.  Assess vascular access sites hourly  3.  Every 4-6 hours minimum:  Change oxygen saturation probe site  4.  Every 4-6 hours:  If on nasal continuous positive airway pressure, respiratory therapy assess nares and determine need for appliance change or resting period.  12/20/2023 1649 by Mary Anne Morton RN  Outcome: Progressing     Problem: ABCDS Injury Assessment  Goal: Absence of physical injury  12/20/2023 1649 by Mary Anne Morton RN  Outcome: Progressing     Problem: Nutrition Deficit:  Goal: Optimize nutritional status  12/20/2023 1649 by Mary Anne Morton RN  Outcome: Progressing     Problem: Respiratory - Adult  Goal: Achieves optimal ventilation and oxygenation  12/20/2023 1649 by Mary Anne Morton RN  Outcome: Progressing     Problem: Cardiovascular - Adult  Goal: Maintains optimal cardiac output and hemodynamic stability  12/20/2023 1649 by Mary Anne Morton RN  Outcome: Progressing     Problem: Cardiovascular - Adult  Goal: Absence of cardiac dysrhythmias or at baseline  12/20/2023 1649 by Mary Anne Morton RN  Outcome: Progressing     Problem: Musculoskeletal - Adult  Goal: Return mobility to safest level of function  12/20/2023 1649 by Mary Anne Morton RN  Outcome: Progressing     Problem: Infection - Adult  Goal: Absence of infection during hospitalization  12/20/2023 1649 by Mary Anne Morton RN  Outcome: Progressing     Problem: Metabolic/Fluid and Electrolytes -  Adult  Goal: Electrolytes maintained within normal limits  12/20/2023 1649 by Mary Anne Morton, RN  Outcome: Progressing

## 2023-12-20 NOTE — CARE COORDINATION
ONGOING DISCHARGE PLAN:    Patient is alert and oriented x4.    Spoke with patient regarding discharge plan and patient confirms that plan is still [facility]. They can accept for both HD and skilled therapy.    Nephrology-will follow for possible HD OP  12/15 IR non tunneled cath  1st UF on 12/16, extra tx today   IV lasix x1 12/20    IV cefepime per ID -cellulitis LLE w/ blisters     Will need coumadin at discharge for Afib d/t affordability    PT/OT     Will continue to follow for additional discharge needs.    If patient is discharged prior to next notation, then this note serves as note for discharge by case management.    Electronically signed by Meagan Barrios RN on 12/20/2023 at 1:39 PM

## 2023-12-20 NOTE — PROGRESS NOTES
Physical Therapy  Facility/Department: [facility] PROGRESSIVE CARE  Physical Therapy Initial Assessment    Name: Elaina Champagne  : 1950  MRN: [id number]  Date of Service: 2023    Discharge Recommendations:  Patient would benefit from continued therapy after discharge, Therapy recommended at discharge          Patient Diagnosis(es): The primary encounter diagnosis was Cellulitis of right lower extremity. Diagnoses of Cellulitis of left lower extremity, Altered mental status, unspecified altered mental status type, MICHELLE (acute kidney injury) (HCC), Hyperkalemia, and Localized edema were also pertinent to this visit.  Past Medical History:  has a past medical history of Bell's palsy, Cellulitis, and Hypertension.  Past Surgical History:  has a past surgical history that includes Hysterectomy, total abdominal (); Cataract removal (Bilateral, ); incision and drainage (Left, 2017); Total knee arthroplasty (Left, 2017); Knee Arthroplasty (Right, 2018); ventral hernia repair (2019); and IR NONTUNNELED VASCULAR CATHETER > 5 YEARS (12/15/2023).    Assessment   Treatment Diagnosis: Impaired fucntion  Specific Instructions for Next Treatment: Caution wound B LE-very tender to touch whiel assisting in/out of bed  Therapy Prognosis: Fair  Decision Making: High Complexity  Requires PT Follow-Up: Yes  Activity Tolerance  Activity Tolerance: Patient limited by pain;Patient limited by fatigue;Patient limited by endurance     Plan   Physical Therapy Plan  General Plan: 5-7 times per week  Specific Instructions for Next Treatment: Caution wound B LE-very tender to touch whiel assisting in/out of bed  Current Treatment Recommendations: Strengthening, Balance training, Functional mobility training, Transfer training, Endurance training, Gait training, Patient/Caregiver education & training, Home exercise program, Positioning, Therapeutic activities, ROM  Safety Devices  Type of Devices: All lien

## 2023-12-20 NOTE — PROGRESS NOTES
[facility]   Occupational Therapy Evaluation  Date: 23  Patient Name: Elaina Champagne       Room: 5/2095-01  MRN: [id number]  Account: [id number]   : 1950  (73 y.o.) Gender: female     Discharge Recommendations:  Further Occupational Therapy is recommended upon facility discharge.    OT Equipment Recommendations  Other: TBD    Referring Practitioner: David Yang MD  Diagnosis: Hyperkalemia Additional Pertinent Hx: The patient is a 73 y.o.  Non- / non  female who presents with Leg Pain (Left/) and Fatigue   and she is admitted to the hospital for the management of wound check  Patient has past medical is a multiple medical problem including morbid obesity, hypertension, lymphedema, patient has wound in both legs and back of her legs, symptoms are going on for last 1 month  Patient follows with wound care  She was evaluated by Dr. Meredith vascular surgery yesterday, dressing was applied on left leg, she was having severe pain that made her come to the hospital  Patient, had arterial scan done earlier suggestive of PHILL of 0.68 on left side, on right side PHILL was okay  Previous wound culture was growing Klebsiella and staph, MSSA    Treatment Diagnosis: Impaired self-care status    Past Medical History:  has a past medical history of Bell's palsy, Cellulitis, and Hypertension.    Past Surgical History:   has a past surgical history that includes Hysterectomy, total abdominal (); Cataract removal (Bilateral, ); incision and drainage (Left, 2017); Total knee arthroplasty (Left, 2017); Knee Arthroplasty (Right, 2018); ventral hernia repair (2019); and IR NONTUNNELED VASCULAR CATHETER > 5 YEARS (12/15/2023).    Restrictions  Restrictions/Precautions  Restrictions/Precautions: Fall Risk;General Precautions;Up as Tolerated  Required Braces or Orthoses?: No  Implants present? : Metal implants (Bilat TKA)  Position Activity  status, Decreased ROM, Decreased strength, Decreased safe awareness, Decreased endurance, Decreased sensation, Decreased balance, Decreased high-level IADLs  Treatment Diagnosis: Impaired self-care status  Prognosis: Good  Decision Making: Medium Complexity  Discharge Recommendations: Patient would benefit from continued therapy after discharge    Activity Tolerance  Activity Tolerance: Patient limited by fatigue, Patient limited by pain    Safety Devices  Type of Devices: All lien prominences offloaded, All fall risk precautions in place, Bed alarm in place, Call light within reach, Gait belt, Heels elevated for pressure relief, Patient at risk for falls, Left in bed, Nurse notified    Patient Education  Patient Education  Education Given To: Patient, Family  Education Provided: Role of Therapy, Plan of Care, Home Exercise Program, Transfer Training  Education Method: Verbal  Barriers to Learning: None  Education Outcome: Verbalized understanding, Continued education needed    Functional Outcome Measures  AM-PAC Daily Activity - Inpatient   How much help is needed for putting on and taking off regular lower body clothing?: Total  How much help is needed for bathing (which includes washing, rinsing, drying)?: Total  How much help is needed for toileting (which includes using toilet, bedpan, or urinal)?: Total  How much help is needed for putting on and taking off regular upper body clothing?: A Little  How much help is needed for taking care of personal grooming?: A Little  How much help for eating meals?: A Little  AM-[facility] Inpatient Daily Activity Raw Score: 12  AM-PAC Inpatient ADL T-Scale Score : 30.6  ADL Inpatient CMS 0-100% Score: 66.57  ADL Inpatient CMS G-Code Modifier : CL    Goals   Short Term Goals  Time Frame for Short Term Goals: By discharge  Short Term Goal 1: Pt will complete bed mobility with Mod A x2 to assist with skin breakdown/pressure ulcers and assist with hygiene  Short Term Goal 2: Pt will

## 2023-12-20 NOTE — PROGRESS NOTES
[facility] -   Infectious diseases evaluation  admission date 12/14/2023    reason for consultation:   Cellulitis    Impression :   Current:  Cellulitis bilateral lower extremity. Wound cx grew Pseudomonas aeruginosa sensitive to Levaquin and cefepime and Enterococcus faecalis sensitive to ampicillin  Pseudomonas Aeruginosa bacteremia sensitive to Levaquin and cefepime.  Sepsis secondary to above  Bacteriuria  Leukocytosis  Elevated CRP  Left posterior tibial wound  Peripheral arterial disease  Acute on chronic renal failure.  New onset HD.  Obesity  History of lymphedema venous stasis insufficiency  Hyperlipidemia  Penicillin allergy-Hives.    Recommendations   Zyvox 600 mg IV every 12 hours, avoid ampicillin due to penicillin allergy  IV cefepime  Left lower extremity CT showed no fluid collection  Follow WBC and platelets closely while on Zyvox.  Follow renal function closely.  Repeat blood cultures from 12/15/2023 still negative  No objection for tunneled hemodialysis catheter if needed.  Wound care  Supportive care  Podiatry evaluation  Right foot x-ray  Discussed with Dr. Tate      Infection Control Recommendations   [place] Precautions    Antimicrobial Stewardship Recommendations   Simplification of therapy  Targeted therapy    History of Present Illness:   Initial history:  Elaina Champagne is a 73 y.o.-year-old female was sent to the hospital from wound care clinic for severe left leg pain associated with generalized fatigue.  The patient had swelling and redness of the lower extremities bilaterally with open wound to the posterior aspect of the right leg.  She is poor historian, afebrile, denied nausea or vomiting, no diarrhea, no cough or shortness of breath, no other complaints.  Initial labs showed WBC of 22, elevated lactic acid and creatinine of 3.8.  Arterial Doppler showed left PHILL of 0.68    12/14            Interval changes  12/20/2023   She had a     Highest education level: Not on file   Occupational History    Not on file   Tobacco Use    Smoking status: Never     Passive exposure: Never    Smokeless tobacco: Never   Vaping Use    Vaping Use: Never used   Substance and Sexual Activity    Alcohol use: No    Drug use: No    Sexual activity: Yes   Other Topics Concern    Not on file   Social History Narrative    Not on file     Social Determinants of Health     Financial Resource Strain: Low Risk  (3/7/2023)    Overall Financial Resource Strain (CARDIA)     Difficulty of Paying Living Expenses: Not hard at all   Food Insecurity: No Food Insecurity (12/14/2023)    Hunger Vital Sign     Worried About Running Out of Food in the Last Year: Never true     Ran Out of Food in the Last Year: Never true   Transportation Needs: No Transportation Needs (12/14/2023)    PRAPARE - Transportation     Lack of Transportation (Medical): No     Lack of Transportation (Non-Medical): No   Physical Activity: Insufficiently Active (3/7/2023)    Exercise Vital Sign     Days of Exercise per Week: 2 days     Minutes of Exercise per Session: 20 min   Stress: Not on file   Social Connections: Not on file   Intimate Partner Violence: Not on file   Housing Stability: Low Risk  (12/14/2023)    Housing Stability Vital Sign     Unable to Pay for Housing in the Last Year: No     Number of Places Lived in the Last Year: 1     Unstable Housing in the Last Year: No       Family History:     Family History   Problem Relation Age of Onset    Cancer Mother     High Blood Pressure Father     Stroke Father     Ovarian Cancer Daughter     Cancer Daughter     Diabetes Maternal Aunt     Cancer Other         daughter/ovarian cancer      Medical Decision Making:   I have independently reviewed/ordered the following labs:    CBC with Differential:   Recent Labs     12/19/23  0625 12/20/23  0354   WBC 20.7* 19.7*   HGB 10.3* 10.7*   HCT 30.8* 32.4*    227   LYMPHOPCT 4* 9*   MONOPCT 3 3

## 2023-12-21 LAB
ABSOLUTE BANDS #: 1.74 K/UL (ref 0–1)
ANION GAP SERPL CALCULATED.3IONS-SCNC: 6 MMOL/L (ref 9–17)
ANTI-XA UNFRAC HEPARIN: 0.39 IU/L (ref 0.3–0.7)
BANDS: 9 % (ref 0–10)
BASOPHILS # BLD: 0 K/UL (ref 0–0.2)
BASOPHILS NFR BLD: 0 % (ref 0–2)
BUN SERPL-MCNC: 33 MG/DL (ref 8–23)
CALCIUM SERPL-MCNC: 8.2 MG/DL (ref 8.6–10.4)
CHLORIDE SERPL-SCNC: 98 MMOL/L (ref 98–107)
CO2 SERPL-SCNC: 29 MMOL/L (ref 20–31)
CREAT SERPL-MCNC: 0.8 MG/DL (ref 0.5–0.9)
EOSINOPHIL # BLD: 0 K/UL (ref 0–0.4)
EOSINOPHILS RELATIVE PERCENT: 0 % (ref 0–4)
ERYTHROCYTE [DISTWIDTH] IN BLOOD BY AUTOMATED COUNT: 15.2 % (ref 11.5–14.9)
GFR SERPL CREATININE-BSD FRML MDRD: >60 ML/MIN/1.73M2
GLUCOSE BLD-MCNC: 106 MG/DL (ref 65–105)
GLUCOSE BLD-MCNC: 86 MG/DL (ref 65–105)
GLUCOSE BLD-MCNC: 89 MG/DL (ref 65–105)
GLUCOSE BLD-MCNC: 97 MG/DL (ref 65–105)
GLUCOSE SERPL-MCNC: 115 MG/DL (ref 70–99)
HCT VFR BLD AUTO: 30.4 % (ref 36–46)
HGB BLD-MCNC: 10.1 G/DL (ref 12–16)
LYMPHOCYTES NFR BLD: 0.19 K/UL (ref 1–4.8)
LYMPHOCYTES RELATIVE PERCENT: 1 % (ref 24–44)
MAGNESIUM SERPL-MCNC: 1.8 MG/DL (ref 1.6–2.6)
MCH RBC QN AUTO: 29.9 PG (ref 26–34)
MCHC RBC AUTO-ENTMCNC: 33.3 G/DL (ref 31–37)
MCV RBC AUTO: 90 FL (ref 80–100)
MONOCYTES NFR BLD: 1.54 K/UL (ref 0.1–1.3)
MONOCYTES NFR BLD: 8 % (ref 1–7)
MORPHOLOGY: ABNORMAL
NEUTROPHILS NFR BLD: 82 % (ref 36–66)
NEUTS SEG NFR BLD: 15.83 K/UL (ref 1.3–9.1)
PLATELET # BLD AUTO: 218 K/UL (ref 150–450)
PMV BLD AUTO: 8.8 FL (ref 6–12)
POTASSIUM SERPL-SCNC: 3.9 MMOL/L (ref 3.7–5.3)
RBC # BLD AUTO: 3.38 M/UL (ref 4–5.2)
SODIUM SERPL-SCNC: 133 MMOL/L (ref 135–144)
WBC OTHER # BLD: 19.3 K/UL (ref 3.5–11)

## 2023-12-21 PROCEDURE — 99213 OFFICE O/P EST LOW 20 MIN: CPT

## 2023-12-21 PROCEDURE — 6360000002 HC RX W HCPCS: Performed by: INTERNAL MEDICINE

## 2023-12-21 PROCEDURE — 97530 THERAPEUTIC ACTIVITIES: CPT

## 2023-12-21 PROCEDURE — 2500000003 HC RX 250 WO HCPCS: Performed by: INTERNAL MEDICINE

## 2023-12-21 PROCEDURE — 6370000000 HC RX 637 (ALT 250 FOR IP): Performed by: NURSE PRACTITIONER

## 2023-12-21 PROCEDURE — 85025 COMPLETE CBC W/AUTO DIFF WBC: CPT

## 2023-12-21 PROCEDURE — 82947 ASSAY GLUCOSE BLOOD QUANT: CPT

## 2023-12-21 PROCEDURE — 6370000000 HC RX 637 (ALT 250 FOR IP): Performed by: INTERNAL MEDICINE

## 2023-12-21 PROCEDURE — 99232 SBSQ HOSP IP/OBS MODERATE 35: CPT | Performed by: INTERNAL MEDICINE

## 2023-12-21 PROCEDURE — 85520 HEPARIN ASSAY: CPT

## 2023-12-21 PROCEDURE — 97110 THERAPEUTIC EXERCISES: CPT

## 2023-12-21 PROCEDURE — 90935 HEMODIALYSIS ONE EVALUATION: CPT

## 2023-12-21 PROCEDURE — 83735 ASSAY OF MAGNESIUM: CPT

## 2023-12-21 PROCEDURE — 2580000003 HC RX 258: Performed by: INTERNAL MEDICINE

## 2023-12-21 PROCEDURE — 2060000000 HC ICU INTERMEDIATE R&B

## 2023-12-21 PROCEDURE — 6360000002 HC RX W HCPCS: Performed by: NURSE PRACTITIONER

## 2023-12-21 PROCEDURE — 2580000003 HC RX 258: Performed by: NURSE PRACTITIONER

## 2023-12-21 PROCEDURE — 36415 COLL VENOUS BLD VENIPUNCTURE: CPT

## 2023-12-21 PROCEDURE — 80048 BASIC METABOLIC PNL TOTAL CA: CPT

## 2023-12-21 RX ADMIN — CEFEPIME 2000 MG: 2 INJECTION, POWDER, FOR SOLUTION INTRAVENOUS at 18:37

## 2023-12-21 RX ADMIN — Medication 1.4 ML: at 12:58

## 2023-12-21 RX ADMIN — ACETAMINOPHEN 650 MG: 325 TABLET ORAL at 09:27

## 2023-12-21 RX ADMIN — ASPIRIN 81 MG: 81 TABLET, COATED ORAL at 14:49

## 2023-12-21 RX ADMIN — HEPARIN SODIUM 14 UNITS/KG/HR: 10000 INJECTION, SOLUTION INTRAVENOUS at 01:36

## 2023-12-21 RX ADMIN — METOPROLOL TARTRATE 25 MG: 25 TABLET, FILM COATED ORAL at 14:49

## 2023-12-21 RX ADMIN — SODIUM CHLORIDE, PRESERVATIVE FREE 10 ML: 5 INJECTION INTRAVENOUS at 21:35

## 2023-12-21 RX ADMIN — SODIUM CHLORIDE, PRESERVATIVE FREE 10 ML: 5 INJECTION INTRAVENOUS at 14:49

## 2023-12-21 RX ADMIN — Medication 1.5 ML: at 12:58

## 2023-12-21 RX ADMIN — HEPARIN SODIUM 14 UNITS/KG/HR: 10000 INJECTION, SOLUTION INTRAVENOUS at 13:54

## 2023-12-21 RX ADMIN — ACETAMINOPHEN 650 MG: 325 TABLET ORAL at 21:34

## 2023-12-21 RX ADMIN — COLLAGENASE SANTYL: 250 OINTMENT TOPICAL at 14:50

## 2023-12-21 RX ADMIN — ANTI-FUNGAL POWDER MICONAZOLE NITRATE TALC FREE: 1.42 POWDER TOPICAL at 14:49

## 2023-12-21 RX ADMIN — Medication 3 MG: at 21:34

## 2023-12-21 RX ADMIN — ANTI-FUNGAL POWDER MICONAZOLE NITRATE TALC FREE: 1.42 POWDER TOPICAL at 21:35

## 2023-12-21 RX ADMIN — METOPROLOL TARTRATE 25 MG: 25 TABLET, FILM COATED ORAL at 21:35

## 2023-12-21 RX ADMIN — MIDODRINE HYDROCHLORIDE 5 MG: 5 TABLET ORAL at 09:27

## 2023-12-21 RX ADMIN — PANTOPRAZOLE SODIUM 40 MG: 40 TABLET, DELAYED RELEASE ORAL at 06:16

## 2023-12-21 ASSESSMENT — PAIN DESCRIPTION - LOCATION
LOCATION: LEG
LOCATION: LEG
LOCATION: BACK

## 2023-12-21 ASSESSMENT — PAIN SCALES - GENERAL
PAINLEVEL_OUTOF10: 7
PAINLEVEL_OUTOF10: 3
PAINLEVEL_OUTOF10: 8

## 2023-12-21 ASSESSMENT — PAIN DESCRIPTION - DESCRIPTORS
DESCRIPTORS: ACHING
DESCRIPTORS: ACHING;DISCOMFORT

## 2023-12-21 ASSESSMENT — PAIN DESCRIPTION - ORIENTATION
ORIENTATION: RIGHT;LEFT
ORIENTATION: LEFT

## 2023-12-21 NOTE — PROGRESS NOTES
redness, pupils equal and reactive, extraocular eye movements intact, conjunctiva clear  Ear: normal external ear, no discharge, hearing intact  Nose:  no drainage noted  Mouth: mucous membranes moist  Neck: supple, no carotid bruits, thyroid not palpable  Lungs: Bilateral equal air entry, clear to ausculation, no wheezing, rales or rhonchi, normal effort  Cardiovascular: normal rate, regular rhythm, no murmur, gallop, rub.  Abdomen: Soft, nontender, nondistended, normal bowel sounds, no hepatomegaly or splenomegaly  Neurologic: There are no new focal motor or sensory deficits, normal muscle tone and bulk, no abnormal sensation, normal speech, cranial nerves II through XII grossly intact  Skin: No gross lesions, rashes, bruising or bleeding on exposed skin area  Extremities:  p has good peripheral pulses, lymphedema present, sores in the back of both calves    Psych: normal affect     Investigations:      Laboratory Testing:  Recent Results (from the past 24 hour(s))   POC Glucose Fingerstick    Collection Time: 12/20/23  8:11 PM   Result Value Ref Range    POC Glucose 106 (H) 65 - 105 mg/dL   POC Glucose Fingerstick    Collection Time: 12/21/23  6:20 AM   Result Value Ref Range    POC Glucose 97 65 - 105 mg/dL   Anti-Xa, Unfractionated Heparin    Collection Time: 12/21/23  6:57 AM   Result Value Ref Range    Anti-XA Unfrac Heparin 0.39 0.30 - 0.70 IU/L   CBC with Auto Differential    Collection Time: 12/21/23  6:57 AM   Result Value Ref Range    WBC 19.3 (H) 3.5 - 11.0 k/uL    RBC 3.38 (L) 4.0 - 5.2 m/uL    Hemoglobin 10.1 (L) 12.0 - 16.0 g/dL    Hematocrit 30.4 (L) 36 - 46 %    MCV 90.0 80 - 100 fL    MCH 29.9 26 - 34 pg    MCHC 33.3 31 - 37 g/dL    RDW 15.2 (H) 11.5 - 14.9 %    Platelets 218 150 - 450 k/uL    MPV 8.8 6.0 - 12.0 fL    Neutrophils % 82 (H) 36 - 66 %    Lymphocytes % 1 (L) 24 - 44 %    Monocytes % 8 (H) 1 - 7 %    Eosinophils % 0 0 - 4 %    Basophils % 0 0 - 2 %    Bands 9 0 - 10 %    Neutrophils  Absolute 15.83 (H) 1.3 - 9.1 k/uL    Lymphocytes Absolute 0.19 (L) 1.0 - 4.8 k/uL    Monocytes Absolute 1.54 (H) 0.1 - 1.3 k/uL    Eosinophils Absolute 0.00 0.0 - 0.4 k/uL    Basophils Absolute 0.00 0.0 - 0.2 k/uL    Absolute Bands # 1.74 (H) 0.0 - 1.0 k/uL    Morphology ANISOCYTOSIS PRESENT     Morphology HYPOCHROMIA PRESENT     Morphology 1+ TARGET CELLS     Morphology GIANT PLATELETS    Basic Metabolic Panel    Collection Time: 12/21/23 10:15 AM   Result Value Ref Range    Sodium 133 (L) 135 - 144 mmol/L    Potassium 3.9 3.7 - 5.3 mmol/L    Chloride 98 98 - 107 mmol/L    CO2 29 20 - 31 mmol/L    Anion Gap 6 (L) 9 - 17 mmol/L    Glucose 115 (H) 70 - 99 mg/dL    BUN 33 (H) 8 - 23 mg/dL    Creatinine 0.8 0.5 - 0.9 mg/dL    Est, Glom Filt Rate >60 >60 mL/min/1.73m2    Calcium 8.2 (L) 8.6 - 10.4 mg/dL   Magnesium    Collection Time: 12/21/23 10:15 AM   Result Value Ref Range    Magnesium 1.8 1.6 - 2.6 mg/dL   POC Glucose Fingerstick    Collection Time: 12/21/23  2:02 PM   Result Value Ref Range    POC Glucose 106 (H) 65 - 105 mg/dL       Imaging/Diagnostics:        Assessment :      Primary Problem  Hyperkalemia    Active Hospital Problems    Diagnosis Date Noted    Permanent atrial fibrillation (HCC) [I48.21] 12/18/2023     Priority: High    Renovascular hypertension [I15.0] 12/18/2023     Priority: High    Atrial fibrillation with rapid ventricular response (HCC) [I48.91] 12/16/2023     Priority: High    Altered mental status [R41.82] 12/16/2023     Priority: High    Elevated erythrocyte sedimentation rate [R70.0] 12/17/2023    Bacteremia due to Pseudomonas [R78.81, B96.5] 12/16/2023    Bacteriuria [R82.71] 12/16/2023    Leukocytosis [D72.829] 12/16/2023    Elevated C-reactive protein (CRP) [R79.82] 12/16/2023    Allergy to penicillin [Z88.0] 12/16/2023    Hyperkalemia [E87.5] 12/14/2023    MICHELLE (acute kidney injury) (HCC) [N17.9] 12/14/2023    Cellulitis [L03.90] 12/14/2023     Cc 35 mins  Plan:     Patient status

## 2023-12-21 NOTE — PROGRESS NOTES
HEMODIALYSIS POST TREATMENT NOTE    Treatment time ordered: 3h    Actual treatment time: 3h    UltraFiltration Goal: 2l  UltraFiltration Removed: 2l      Pre Treatment weight: 149.1kg  Post Treatment weight: 14.8kg  Estimated Dry Weight: tbd    Access used:     Central Venous Catheter:          Tunneled or Non-tunneled: non           Site: right neck          Access Flow: fair, arteral alarms freq      Internal Access:       AV Fistula or AV Graft: na         Site: na       Access Flow: na       Sign and symptoms of infection: no       If YES: na    Medications or blood products given: no    Chronic outpatient schedule: na    Chronic outpatient unit: [facility]    Summary of response to treatment: paul[facility] well    Explain if orders NOT met, was physician notified:keely      ACES flowsheet faxed to patient unit/ placed in patient chart: yes    Post assessment completed: yes    Report given to: Linda Domingo rn      * Intra-treatment documented Safety Checks include the followin) Access and face visible at all times.     2) All connections and blood lines are secure with no kinks.     3) NVL alarm engaged.     4) Hemosafe device applied (if applicable).     5) No collapse of Arterial or Venous blood chambers.     6) All blood lines / pump segments in the air detectors.

## 2023-12-21 NOTE — PROGRESS NOTES
Physical Therapy  [facility]    Date: 23  Patient Name: Elaina Champagne       Room: 5/2095-01  MRN: [id number]   Account: [id number]   : 1950  (73 y.o.) Gender: female     Referring Practitioner: David Yang MD  Diagnosis: Hyperkalemia  Past Medical History:  has a past medical history of Bell's palsy, Cellulitis, and Hypertension.   Past Surgical History:   has a past surgical history that includes Hysterectomy, total abdominal (); Cataract removal (Bilateral, ); incision and drainage (Left, 2017); Total knee arthroplasty (Left, 2017); Knee Arthroplasty (Right, 2018); ventral hernia repair (2019); and IR NONTUNNELED VASCULAR CATHETER > 5 YEARS (12/15/2023).  Additional Pertinent Hx: The patient is a 73 y.o.  Non- / non  female who presents with Leg Pain (Left/) and Fatigue   and she is admitted to the hospital for the management of wound check  Patient has past medical is a multiple medical problem including morbid obesity, hypertension, lymphedema, patient has wound in both legs and back of her legs, symptoms are going on for last 1 month  Patient follows with wound care  She was evaluated by Dr. Meredith vascular surgery yesterday, dressing was applied on left leg, she was having severe pain that made her come to the hospital  Patient, had arterial scan done earlier suggestive of PHILL of 0.68 on left side, on right side PHILL was okay  Previous wound culture was growing Klebsiella and staph, MSSA  In the emergency room, patient had lab work done suggestive elevated creatinine of 3.8  CK was normal, Nephrology consulted for MICHELLE- started on Hemodialysis.    Overall Orientation Status: Within Functional Limits  Restrictions/Precautions  Restrictions/Precautions: Fall Risk;General Precautions;Up as Tolerated  Required Braces or Orthoses?: No  Implants present? : Metal implants (Bilat TKA)  Position Activity Restriction  Other  hospital room?: Total  How much help is needed climbing 3-5 steps with a railing?: Total  AM-PAC Inpatient Mobility Raw Score : 6  AM-PAC Inpatient T-Scale Score : 23.55  Mobility Inpatient CMS 0-100% Score: 100  Mobility Inpatient CMS G-Code Modifier : CN     Goals  Short Term Goals  Time Frame for Short Term Goals: 10 visits  Short Term Goal 1: Supine <>sit mod A x 2  Short Term Goal 2: Sit<>stand from chair/bed , mod a x 2,  Short Term Goal 3: Tolerate standing with Rolling walekr for 2 to 3 minutes, performing pre-gait activity  Short Term Goal 4: Pt able to take 4 to 5 steps with RW to transfers to recliner/BSC with 2 person assist and rolling walker.  Short Term Goal 5: Pt able to ambulate with rolling walekr distance fo 10 ft x 2 bismark x 2 in straight path  Short Term Goal 6: Tolerate ROM  B LE to improve flexion at all joint and improve mobility       12/21/23 0930   PT Individual Minutes   Time In 0823   Time Out 0846   Minutes 23         Electronically signed by Maribel Dean PTA on 12/21/23 at 9:30 AM EST

## 2023-12-21 NOTE — CARE COORDINATION
ONGOING DISCHARGE PLAN:    Patient is alert and oriented x4.    Spoke with patient regarding discharge plan and patient confirms that plan is still [facility].    Per family more confused today.    Heparin drip-will need to start coumadin     Nephrology consult-waiting for final recommendations for HD    IV cefepime per ID   RLE cellulitis-blisters    Wound care     PT/OT    Will continue to follow for additional discharge needs.    If patient is discharged prior to next notation, then this note serves as note for discharge by case management.    Electronically signed by Meagan Barrios RN on 12/21/2023 at 2:32 PM

## 2023-12-21 NOTE — PROGRESS NOTES
[facility] -   Infectious diseases evaluation  admission date 12/14/2023    reason for consultation:   Cellulitis    Impression :   Current:  Cellulitis bilateral lower extremity. Wound cx grew Pseudomonas aeruginosa sensitive to Levaquin and cefepime and Enterococcus faecalis sensitive to ampicillin  Pseudomonas Aeruginosa bacteremia sensitive to Levaquin and cefepime.  Sepsis secondary to above  Bacteriuria  Leukocytosis  Elevated CRP  Left posterior tibial wound  Peripheral arterial disease  Acute on chronic renal failure.  New onset HD.  Obesity  History of lymphedema venous stasis insufficiency  Hyperlipidemia  Penicillin allergy-Hives.    Recommendations   Zyvox course completed  IV cefepime  Left lower extremity CT showed no fluid collection  Follow WBC and platelets closely while on Zyvox.  Follow renal function closely.  Repeat blood cultures from 12/15/2023 still negative  The patient off dialysis, renal function improving  Wound care  Supportive care        Infection Control Recommendations   [place] Precautions    Antimicrobial Stewardship Recommendations   Simplification of therapy  Targeted therapy    History of Present Illness:   Initial history:  Elaina Champagne is a 73 y.o.-year-old female was sent to the hospital from wound care clinic for severe left leg pain associated with generalized fatigue.  The patient had swelling and redness of the lower extremities bilaterally with open wound to the posterior aspect of the right leg.  She is poor historian, afebrile, denied nausea or vomiting, no diarrhea, no cough or shortness of breath, no other complaints.  Initial labs showed WBC of 22, elevated lactic acid and creatinine of 3.8.  Arterial Doppler showed left PHILL of 0.68    12/14            Interval changes  12/21/2023   She is afebrile, no new complaints  Echocardiogram showed dilated IVC with elevated right atrial filling pressure EF greater than  file     Social Determinants of Health     Financial Resource Strain: Low Risk  (3/7/2023)    Overall Financial Resource Strain (CARDIA)     Difficulty of Paying Living Expenses: Not hard at all   Food Insecurity: No Food Insecurity (12/14/2023)    Hunger Vital Sign     Worried About Running Out of Food in the Last Year: Never true     Ran Out of Food in the Last Year: Never true   Transportation Needs: No Transportation Needs (12/14/2023)    PRAPARE - Transportation     Lack of Transportation (Medical): No     Lack of Transportation (Non-Medical): No   Physical Activity: Insufficiently Active (3/7/2023)    Exercise Vital Sign     Days of Exercise per Week: 2 days     Minutes of Exercise per Session: 20 min   Stress: Not on file   Social Connections: Not on file   Intimate Partner Violence: Not on file   Housing Stability: Low Risk  (12/14/2023)    Housing Stability Vital Sign     Unable to Pay for Housing in the Last Year: No     Number of Places Lived in the Last Year: 1     Unstable Housing in the Last Year: No       Family History:     Family History   Problem Relation Age of Onset    Cancer Mother     High Blood Pressure Father     Stroke Father     Ovarian Cancer Daughter     Cancer Daughter     Diabetes Maternal Aunt     Cancer Other         daughter/ovarian cancer      Medical Decision Making:   I have independently reviewed/ordered the following labs:    CBC with Differential:   Recent Labs     12/20/23  0354 12/21/23  0657   WBC 19.7* 19.3*   HGB 10.7* 10.1*   HCT 32.4* 30.4*    218   LYMPHOPCT 9* 1*   MONOPCT 3 8*       BMP:  Recent Labs     12/20/23  0354 12/21/23  1015   * 133*   K 4.1 3.9   CL 94* 98   CO2 26 29   BUN 45* 33*   CREATININE 1.6* 0.8   MG  --  1.8       Hepatic Function Panel:   No results for input(s): \"PROT\", \"LABALBU\", \"BILIDIR\", \"IBILI\", \"BILITOT\", \"ALKPHOS\", \"ALT\", \"AST\" in the last 72 hours.    No results for input(s): \"RPR\" in the last 72 hours.  No results for

## 2023-12-21 NOTE — PROGRESS NOTES
NEPHROLOGY PROGRESS NOTE     Patient :  Elaina Champagne; 73 y.o. MRN# [id number]  Location:  2095/2095-01  Attending:  David Yang MD  Admit Date:  12/14/2023   Hospital Day: 7      Reason for Consult: Acute kidney injury    Subjective/interval history.  Patient seen and examined on hemodialysis receiving ultrafiltration  Patient denies nausea-appetite slowly improving -no flank pain fever or skin rash.  Remarkably creatinine is 0.8 mg/dL with urine output of 1.4 l after IV Lasix x 1  Patient  still has LE edema      Echocardiogram showed dilated IVC with elevated right atrial filling pressure EF greater than 65% severely increased LV wall thickness, small LV cavity      Chief Complaint: Left leg swelling wound erythema and pain  History Obtained From:  patient ,, EMR nursing staff     History of Present Illness:    This is a 73 y.o. female with past medical history of essential hypertension, peripheral vascular disease, patient presented to the hospital with complaints of left leg swelling erythema and wound on the left leg patient was seen by vascular and wound care on 12/13/2023.  Patient presented to the hospital yesterday on 12/14/2023 with complaints of left leg swelling erythema and wound getting worse patient has that swelling for several weeks but has been getting worse and more red.  Patient also noted to have fatigue weakness, and she has confusion this morning.  According to the  no nausea vomiting diarrhea no fever chills no headache dizziness.  Labs on admission showed BUN of 54 serum creatinine of 3.9 mg/dL, patient has been oliguric.    Patient denies dysuria, gross hematuria, flank pain, nocturia, urgency, passing frothy urine or urinary incontinence.  There has been no recent exposure to IV contrast.   There is no history  of paraprotein disease.   Pt denies any history of recurrent UTI or kidney stones.  Medication review shows use of ARB's and Bumex, and NSAIDs.    Patient noted  unremarkable,[Normal renal function creatinine 0.8 mg/dl on 8/8/203]  Status post temporary dialysis catheter placement 12/15/2023 patient had first dialysis treatment on Saturday, 12/16/2023-renal function improving creatinine 0.8 off dialysis since Wednesday    Hypotension.  Improved now blood pressure on the higher side-    Bilateral lower extremity swelling erythema wound/cellulitis left leg, leukocytosis    Sepsis/bacteremia Pseudomonas aeruginosa-on IV antibiotics     5.  Chronic heart failure with diastolic dysfunction, severe LVH, elevated right atrial pressure.    Plan:  Ultrafiltration only today for fluid removal-no indication for solute clearance renal function stable  IV Lasix 40 mg daily  Strict I's and O's  IV albumin 25 g and midodrine as needed for intradialytic hypotension  5.  Basic metabolic panel daily  6.  Continue IV zyvox and cefepime renal dosed.      Prognosis for renal recovery is good renal function improving.  Patient does not require a tunneled catheter.  If Renal function is stable and edema improving over next 2 days , will consider discontinuing dialysis          Electronically signed by Zachariah Ward MD on 12/21/2023 at 12:08 PM

## 2023-12-21 NOTE — PLAN OF CARE
Problem: Discharge Planning  Goal: Discharge to home or other facility with appropriate resources  12/20/2023 2342 by Laura Licea RN  Outcome: Progressing  Flowsheets (Taken 12/20/2023 2100)  Discharge to home or other facility with appropriate resources: Identify barriers to discharge with patient and caregiver     Problem: Safety - Adult  Goal: Free from fall injury  12/20/2023 2342 by Laura Licea RN  Outcome: Progressing     Problem: Pain  Goal: Verbalizes/displays adequate comfort level or baseline comfort level  12/20/2023 2342 by Laura Licea RN  Outcome: Progressing  Flowsheets (Taken 12/20/2023 2100)  Verbalizes/displays adequate comfort level or baseline comfort level:   Encourage patient to monitor pain and request assistance   Assess pain using appropriate pain scale   Administer analgesics based on type and severity of pain and evaluate response   Implement non-pharmacological measures as appropriate and evaluate response     Problem: Skin/Tissue Integrity  Goal: Absence of new skin breakdown  Description: 1.  Monitor for areas of redness and/or skin breakdown  2.  Assess vascular access sites hourly  3.  Every 4-6 hours minimum:  Change oxygen saturation probe site  4.  Every 4-6 hours:  If on nasal continuous positive airway pressure, respiratory therapy assess nares and determine need for appliance change or resting period.  12/20/2023 2342 by Laura Licea RN  Outcome: Progressing     Problem: ABCDS Injury Assessment  Goal: Absence of physical injury  12/20/2023 2342 by Laura Licea RN  Outcome: Progressing     Problem: Infection - Adult  Goal: Absence of infection during hospitalization  12/20/2023 2342 by Laura Licea RN  Outcome: Progressing  Flowsheets (Taken 12/20/2023 2100)  Absence of infection during hospitalization:   Assess and monitor for signs and symptoms of infection   Monitor lab/diagnostic results     Problem: Metabolic/Fluid and Electrolytes - Adult  Goal: Electrolytes

## 2023-12-21 NOTE — PROGRESS NOTES
HEMODIALYSIS PRE-TREATMENT NOTE    Patient Identifiers prior to treatment: name  mrn    Isolation Required: no                      Isolation Type: na       (please document if patient is being managed as a PUI/COVID-19 patient)        Hepatitis status:                           Date Drawn                             Result  Hepatitis B Surface Antigen 12/15/23     neg                     Hepatitis B Surface Antibody 12/15/23 neg        Hepatitis B Core Antibody 12/15/23 neg          How was Hepatitis Status verified: epic     Was a copy of the labs you documented provided to facility for the patient's chart: yes    Hemodialysis orders verified: yes    Access Within normal limits ( I.e. s/s of infection,...): yes     Pre-Assessment completed: yes    Pre-dialysis report received from: viviana whitaker                      Time: 0900

## 2023-12-21 NOTE — CARE COORDINATION
Writer is following for potential discharge to [facility]. Facility accepts this pt pending permanent catheter placement for HD. Pt is not medically ready at this time.    Writer placed call to Rosenda with [facility] for update.   Electronically signed by MANDI Hopson on 12/21/2023 at 3:53 PM

## 2023-12-21 NOTE — PROGRESS NOTES
Occupational Therapy  [facility]   INPATIENT OCCUPATIONAL THERAPY  PROGRESS NOTE  Date: 2023  Patient Name: Elaina Champagne       Room:   MRN: [id number]    : 1950  (73 y.o.)  Gender: female   Referring Practitioner: David Yang MD  Diagnosis: Hyperkalemia      Discharge Recommendations:  Further Occupational Therapy is recommended upon facility discharge.    OT Equipment Recommendations  Other: TBD    Restrictions/Precautions  Restrictions/Precautions  Restrictions/Precautions: Fall Risk;General Precautions;Up as Tolerated  Required Braces or Orthoses?: No  Implants present? : Metal implants (Bilat TKA)  Position Activity Restriction  Other position/activity restrictions: Up with assistance. Hemodialysis started 23    SpO2: 97 %  O2 Device: None (Room air)  Comment: 1L O2. 94-97% throughout    Subjective  Subjective  Pain: Pt reports 8/10 pain in BLE  Comments: Okay for treatment, co-tx with Maribel PTA.    Objective  Orientation  Overall Orientation Status: Within Functional Limits  Cognition  Overall Cognitive Status: WFL    Activities of Daily Living  ADL  Feeding: Setup  Grooming: Setup  UE Bathing: Minimal assistance  LE Bathing: Dependent/Total  UE Dressing: Minimal assistance  LE Dressing: Dependent/Total  Toileting: Dependent/Total  Additional Comments: ADL scores are based on skilled observation and clinical reasoning unless otherwise noted. Pt is currently limited by increased pain, impaired strength, endurance, balance, and body habitus which impacts the pt's ability to safely and independently complete self-care/mobility    Balance  Balance  Sitting Balance: Stand by assistance (~10 min seated EOB)  Standing Balance: Unable to assess(comment) (pt deferred standing this date due to pain.)    Transfers/Mobility  Bed mobility  Supine to Sit: 2 Person assistance;Maximum assistance  Sit to Supine: 2 Person assistance;Dependent/Total  Scootin  Decreased safe awareness, Decreased endurance, Decreased sensation, Decreased balance, Decreased high-level IADLs  Treatment Diagnosis: Impaired self-care status  Prognosis: Good  Decision Making: Medium Complexity  Discharge Recommendations: Patient would benefit from continued therapy after discharge  OT Equipment Recommendations  Other: TBD  Safety Devices  Type of Devices: All lien prominences offloaded, All fall risk precautions in place, Bed alarm in place, Call light within reach, Heels elevated for pressure relief, Patient at risk for falls, Left in bed, Nurse notified    AM-PAC Daily Activities Inpatient  AM-PAC Daily Activity - Inpatient   How much help is needed for putting on and taking off regular lower body clothing?: Total  How much help is needed for bathing (which includes washing, rinsing, drying)?: Total  How much help is needed for toileting (which includes using toilet, bedpan, or urinal)?: Total  How much help is needed for putting on and taking off regular upper body clothing?: A Little  How much help is needed for taking care of personal grooming?: A Little  How much help for eating meals?: A Little  AM-PAC Inpatient Daily Activity Raw Score: 12  AM-PAC Inpatient ADL T-Scale Score : 30.6  ADL Inpatient CMS 0-100% Score: 66.57  ADL Inpatient CMS G-Code Modifier : CL    OT Minutes  OT Individual Minutes  Time In: 0823  Time Out: 0846  Minutes: 23      Electronically signed by DEAN Simmons on 12/21/23 at 1:35 PM EST

## 2023-12-21 NOTE — PROGRESS NOTES
[facility]  Progress Note      NAME:  Elaina Champagne  MEDICAL RECORD NUMBER:  [id number]  AGE: 73 y.o.   GENDER: female  : 1950  TODAY'S DATE:  2023    [facility] nurse quick follow up visit to check on blisters to right foot. Some of the blisters have started to reabsorb (see images in media tab). Right leg continues to be red and warm to touch. Will add skin barrier wipe daily to give more strength the skin over the blisters with the hope that they will reabsorb and skin will crust over naturally rather than breaking open and having open wounds. Primary RN changing dressings at time of assessment. Left posterior leg looking better with more red tissue present. Buttock wound has improved with two small areas open in the gluteal cleft. Will continue current treatments.     Mabel Arellano, MACN, RN, CWOCN, WCC, DAPWCA  [facility]  Wound, Ostomy, and Continence Nursing  [phone]

## 2023-12-21 NOTE — PROGRESS NOTES
HEPARIN DRIP  Date  12/21/23 2 0657  Anti-Xa    0.39    Plt  218K    Anti-Xa 0.3-0.7     units/mL No bolus.    No change in rate.   HEIDI Bruno.Ph.  12/21/2023  8:47 AM  Continue:   Handoff : 14 Units/kg/hr : 20.4 mL/hr : IntraVENous 12/21/23 0233 12/21/23 0233   VIMAL BrunoPh.  12/21/2023  8:48 AM

## 2023-12-21 NOTE — PROGRESS NOTES
[facility] PULMONARY,CRITICAL CARE & SLEEP   Farbiziobharathi Aparicio MD/Karl Crowder MD/Homero OBANDO AGACNP-BC, NP-C      Angela OBANDO NP-C     Joie OBANDO NP-C                                           Pulmonary Progress Note    Patient - Elaina Champagne   Age - 73 y.o.   - 1950  MRN - [id number]  [id number]  Date of Admission - 2023 11:42 AM    Consulting Service/Physician:       Primary Care Physician: Robin Ly MD    SUBJECTIVE:     Chief Complaint:   Chief Complaint   Patient presents with    Leg Pain     Left      Fatigue     Subjective:    She states she does feel like she is breathing easier today, podiatry did evaluate her right lower extremity, no need for any surgical intervention at this time, discussed continuing with local wound care.  She has not had any fevers.  Right lower extremity is still very erythematous and warm to touch, slightly tender.  Blisters are currently intact.    VITALS  BP (!) 114/46   Pulse 78   Temp 97.8 °F (36.6 °C) (Oral)   Resp 18   Ht 1.575 m (5' 2\")   Wt (!) 149.1 kg (328 lb 11.3 oz)   SpO2 94%   BMI 60.12 kg/m²   Wt Readings from Last 3 Encounters:   23 (!) 149.1 kg (328 lb 11.3 oz)   23 (!) 137 kg (302 lb)   23 (!) 138.3 kg (305 lb)     I/O (24 Hours)    Intake/Output Summary (Last 24 hours) at 2023 1132  Last data filed at 2023 0255  Gross per 24 hour   Intake 236 ml   Output 950 ml   Net -714 ml     Ventilator:      Exam:   Physical Exam   Constitutional:  Oriented to person, place, and time.  Lying in bed, looks more comfortable today, not in any distress on room air seen in hemodialysis  HENT: Unremarkable  Head: Normocephalic and atraumatic.   Eyes: EOM are normal. Pupils are equal, round, and reactive to light.   Neck: Neck supple.  Short thick stature neck  Cardiovascular:  Regular rate and rhythm.  Normal heart tones.  No JVD.   12/21/2023     12/21/2023     Lab Results   Component Value Date    CALCIUM 8.2 (L) 12/21/2023     (L) 12/21/2023    K 3.9 12/21/2023    CO2 29 12/21/2023    CL 98 12/21/2023    BUN 33 (H) 12/21/2023    CREATININE 0.8 12/21/2023       Lab Results   Component Value Date    INR 1.1 12/18/2023    PROTIME 14.8 (H) 12/18/2023       Radiology:       12/20/23 chest x-ray with vascular congestion noted  ASSESSMENT:       Sepsis secondary to cellulitis, positive wound culture with Pseudomonas  BLE cellulitis - + pseudomonas aeruginosa and enterococcus faecalis  Acute kidney injury requiring hemodialysis, secondary to ischemic acute tubular necrosis due to hypotension and sepsis  New onset A-fib with RVR, now controlled, amiodarone has been discontinued, still on heparin infusion  Lactic acidosis, resolved  Acute hypoxic respiratory insufficiency -resolved now on room air  Acute on Chronic diastolic heart failure  PAD  Venous stasis/lymphedema  Morbid obesity with BMI 56.9  Full code  PLAN:   Fluid removal per nephrology, plan noted for removal of 2 and half liters  Monitor off oxygen, she is saturating well at rest on room air  Antibiotics per infectious disease, currently on cefepime   Monitor renal function  Podiatry eval yesterday stated no need for surgical intervention and to leave blisters intact and to continue local wound care  Increase activity  Follow-up chest x-ray in a.m.  Discussed with hemodialysis RN      Electronically signed by TOPHER Miguel - CNP on 12/21/23     This progress note was completed using a voice transcription system. Every effort was made to ensure accuracy. However, inadvertent computerized transcription errors may be present.    VERO OBANDO AGACNP-BC, NP-C  [facility]

## 2023-12-21 NOTE — PLAN OF CARE
Problem: Safety - Adult  Goal: Free from fall injury  12/21/2023 0312 by Rina Sierra RN  Outcome: Progressing  Note: Pt free from falls     Problem: Skin/Tissue Integrity  Goal: Absence of new skin breakdown  Description: 1.  Monitor for areas of redness and/or skin breakdown  2.  Assess vascular access sites hourly  3.  Every 4-6 hours minimum:  Change oxygen saturation probe site  4.  Every 4-6 hours:  If on nasal continuous positive airway pressure, respiratory therapy assess nares and determine need for appliance change or resting period.  12/21/2023 0312 by Rina Sierra, RN  Outcome: Progressing  Note: Pt absent of any new skin breakdown     Problem: ABCDS Injury Assessment  Goal: Absence of physical injury  12/21/2023 0312 by Rina Sierra RN  Outcome: Progressing  Note: Pt absent of any physical injury

## 2023-12-22 LAB
ANION GAP SERPL CALCULATED.3IONS-SCNC: 11 MMOL/L (ref 9–17)
ANTI-XA UNFRAC HEPARIN: 0.39 IU/L (ref 0.3–0.7)
BUN SERPL-MCNC: 36 MG/DL (ref 8–23)
CALCIUM SERPL-MCNC: 8.2 MG/DL (ref 8.6–10.4)
CHLORIDE SERPL-SCNC: 97 MMOL/L (ref 98–107)
CO2 SERPL-SCNC: 26 MMOL/L (ref 20–31)
CREAT SERPL-MCNC: 0.9 MG/DL (ref 0.5–0.9)
GFR SERPL CREATININE-BSD FRML MDRD: >60 ML/MIN/1.73M2
GLUCOSE BLD-MCNC: 100 MG/DL (ref 65–105)
GLUCOSE BLD-MCNC: 107 MG/DL (ref 65–105)
GLUCOSE BLD-MCNC: 88 MG/DL (ref 65–105)
GLUCOSE BLD-MCNC: 89 MG/DL (ref 65–105)
GLUCOSE BLD-MCNC: 95 MG/DL (ref 65–105)
GLUCOSE SERPL-MCNC: 96 MG/DL (ref 70–99)
POTASSIUM SERPL-SCNC: 4.3 MMOL/L (ref 3.7–5.3)
SODIUM SERPL-SCNC: 134 MMOL/L (ref 135–144)

## 2023-12-22 PROCEDURE — 6370000000 HC RX 637 (ALT 250 FOR IP): Performed by: INTERNAL MEDICINE

## 2023-12-22 PROCEDURE — 99232 SBSQ HOSP IP/OBS MODERATE 35: CPT | Performed by: INTERNAL MEDICINE

## 2023-12-22 PROCEDURE — 82947 ASSAY GLUCOSE BLOOD QUANT: CPT

## 2023-12-22 PROCEDURE — 6360000002 HC RX W HCPCS: Performed by: INTERNAL MEDICINE

## 2023-12-22 PROCEDURE — 6360000002 HC RX W HCPCS: Performed by: NURSE PRACTITIONER

## 2023-12-22 PROCEDURE — 85520 HEPARIN ASSAY: CPT

## 2023-12-22 PROCEDURE — 2580000003 HC RX 258: Performed by: NURSE PRACTITIONER

## 2023-12-22 PROCEDURE — 80048 BASIC METABOLIC PNL TOTAL CA: CPT

## 2023-12-22 PROCEDURE — 36415 COLL VENOUS BLD VENIPUNCTURE: CPT

## 2023-12-22 PROCEDURE — 2580000003 HC RX 258: Performed by: INTERNAL MEDICINE

## 2023-12-22 PROCEDURE — 2060000000 HC ICU INTERMEDIATE R&B

## 2023-12-22 RX ORDER — ACYCLOVIR 200 MG/1
400 CAPSULE ORAL 3 TIMES DAILY
Status: COMPLETED | OUTPATIENT
Start: 2023-12-22 | End: 2023-12-29

## 2023-12-22 RX ORDER — FUROSEMIDE 10 MG/ML
40 INJECTION INTRAMUSCULAR; INTRAVENOUS DAILY
Status: DISCONTINUED | OUTPATIENT
Start: 2023-12-22 | End: 2023-12-29

## 2023-12-22 RX ADMIN — FUROSEMIDE 40 MG: 10 INJECTION, SOLUTION INTRAMUSCULAR; INTRAVENOUS at 18:21

## 2023-12-22 RX ADMIN — HEPARIN SODIUM 14 UNITS/KG/HR: 10000 INJECTION, SOLUTION INTRAVENOUS at 17:59

## 2023-12-22 RX ADMIN — ACETAMINOPHEN 650 MG: 325 TABLET ORAL at 20:30

## 2023-12-22 RX ADMIN — ACYCLOVIR 400 MG: 200 CAPSULE ORAL at 18:21

## 2023-12-22 RX ADMIN — HEPARIN SODIUM 14 UNITS/KG/HR: 10000 INJECTION, SOLUTION INTRAVENOUS at 02:29

## 2023-12-22 RX ADMIN — CEFEPIME 2000 MG: 2 INJECTION, POWDER, FOR SOLUTION INTRAVENOUS at 18:34

## 2023-12-22 RX ADMIN — SODIUM CHLORIDE, PRESERVATIVE FREE 10 ML: 5 INJECTION INTRAVENOUS at 11:35

## 2023-12-22 RX ADMIN — ASPIRIN 81 MG: 81 TABLET, COATED ORAL at 11:33

## 2023-12-22 RX ADMIN — CEFEPIME 2000 MG: 2 INJECTION, POWDER, FOR SOLUTION INTRAVENOUS at 11:30

## 2023-12-22 RX ADMIN — ACYCLOVIR 400 MG: 200 CAPSULE ORAL at 20:32

## 2023-12-22 RX ADMIN — CEFEPIME 2000 MG: 2 INJECTION, POWDER, FOR SOLUTION INTRAVENOUS at 02:11

## 2023-12-22 RX ADMIN — PANTOPRAZOLE SODIUM 40 MG: 40 TABLET, DELAYED RELEASE ORAL at 06:36

## 2023-12-22 RX ADMIN — METOPROLOL TARTRATE 25 MG: 25 TABLET, FILM COATED ORAL at 11:33

## 2023-12-22 RX ADMIN — COLLAGENASE SANTYL: 250 OINTMENT TOPICAL at 11:30

## 2023-12-22 NOTE — PROGRESS NOTES
Physical Therapy  DATE: 2023    NAME: Elaina Champagne  MRN: [id number]   : 1950    Patient not seen this date for Physical Therapy due to:      [] Cancel by RN or physician due to:    [] Hemodialysis    [] Critical Lab Value Level     [] Blood transfusion in progress    [] Acute or unstable cardiovascular status   _MAP < 55 or more than >115  _HR < 40 or > 130    [] Acute or unstable pulmonary status   -FiO2 > 60%   _RR < 5 or >40    _O2 sats < 85%    [] Strict Bedrest    [] Off Unit for surgery or procedure    [] Off Unit for testing       [] Pending imaging to R/O fracture    [x] Refusal by Patient; checked on pt @ 2457-3712. Pt lying in bed stating \"I feel out of it\". Encouragement provided from writer and [place]. Pt continued to refuse. Will continue to follow.      [] Other      [] PT being discontinued at this time. Patient independent. No further needs.     [] PT being discontinued at this time as the patient has been transferred to hospice care. No further needs.      Electronically signed by Maribel Dean PTA on 23 at 9:53 AM EST

## 2023-12-22 NOTE — PROGRESS NOTES
NEPHROLOGY PROGRESS NOTE     Patient :  Elaina Champagne; 73 y.o. MRN# [id number]  Location:  2095/2095-01  Attending:  David Yang MD  Admit Date:  12/14/2023   Hospital Day: 8      Reason for Consult: Acute kidney injury    Subjective/interval history.  Patient seen and examined .  She appears confused this afternoon.  No fever or shortness of breath  She had an extra ultrafiltration yesterday for lower extremity edema.  Remarkably creatinine is 0.8 mg/dL       Echocardiogram showed dilated IVC with elevated right atrial filling pressure EF greater than 65% severely increased LV wall thickness, small LV cavity      Chief Complaint: Left leg swelling wound erythema and pain  History Obtained From:  patient ,, EMR nursing staff     History of Present Illness:    This is a 73 y.o. female with past medical history of essential hypertension, peripheral vascular disease, patient presented to the hospital with complaints of left leg swelling erythema and wound on the left leg patient was seen by vascular and wound care on 12/13/2023.  Patient presented to the hospital yesterday on 12/14/2023 with complaints of left leg swelling erythema and wound getting worse patient has that swelling for several weeks but has been getting worse and more red.  Patient also noted to have fatigue weakness, and she has confusion this morning.  According to the  no nausea vomiting diarrhea no fever chills no headache dizziness.  Labs on admission showed BUN of 54 serum creatinine of 3.9 mg/dL, patient has been oliguric.  Patient denies dysuria, gross hematuria, flank pain, nocturia, urgency, passing frothy urine or urinary incontinence.  There has been no recent exposure to IV contrast.   There is no history  of paraprotein disease.   Pt denies any history of recurrent UTI or kidney stones.  Medication review shows use of ARB's and Bumex, and NSAIDs.    Patient noted to be hypotensive yesterday systolic blood pressure was in

## 2023-12-22 NOTE — PROGRESS NOTES
Comprehensive Nutrition Assessment    Type and Reason for Visit:  Reassess    Nutrition Recommendations/Plan:   Recommend continue No Added Salt diet and supplements all trays  Would an appetite stimulant like Marinol be appropriate for this pt?     Malnutrition Assessment:  Malnutrition Status:  At risk for malnutrition (Comment) (12/18/23 1501)    Context:  Chronic Illness     Findings of the 6 clinical characteristics of malnutrition:  Energy Intake:  Mild decrease in energy intake (Comment)  Weight Loss:  No significant weight loss     Body Fat Loss:  No significant body fat loss     Muscle Mass Loss:  No significant muscle mass loss    Fluid Accumulation:  Mild Extremities   Strength:  Not Performed    Nutrition Assessment:    Pt is not meeting nutrition needs with po intake. Nursing documentation shows 0% intake. Observed pt with lunch tray untouched. Encouraged pt to eat. She states she is not hungry. Pt continues to receive regular UF.    Nutrition Related Findings:    mild generalized edema, Moderate to severe BLE edema, Labs/Meds: Reviewed Wound Type: Venous Stasis       Current Nutrition Intake & Therapies:    Average Meal Intake: 0%  Average Supplements Intake: 0%  ADULT DIET; Regular; No Added Salt (3-4 gm)  ADULT ORAL NUTRITION SUPPLEMENT; Dinner; Standard High Calorie/High Protein Oral Supplement  ADULT ORAL NUTRITION SUPPLEMENT; Breakfast, Lunch; Renal Oral Supplement    Anthropometric Measures:  Height: 157.5 cm (5' 2\")  Ideal Body Weight (IBW): 110 lbs (50 kg)    Admission Body Weight: 141.1 kg (311 lb)  Current Body Weight: 147.4 kg (325 lb),   IBW. Weight Source: Bed Scale  Current BMI (kg/m2): 59.4  Usual Body Weight: 135.6 kg (299 lb) (3/23)  % Weight Change (Calculated): 7.4                    BMI Categories: Obese Class 3 (BMI 40.0 or greater)    Estimated Daily Nutrient Needs:  Energy Requirements Based On: Formula  Weight Used for Energy Requirements: Ideal  Energy (kcal/day): [place] x

## 2023-12-22 NOTE — PLAN OF CARE
Problem: Discharge Planning  Goal: Discharge to home or other facility with appropriate resources  Outcome: Progressing  Flowsheets (Taken 12/22/2023 0537)  Discharge to home or other facility with appropriate resources: Identify barriers to discharge with patient and caregiver     Problem: Safety - Adult  Goal: Free from fall injury  Outcome: Progressing  Note: Bed alarm on, bed locked, side rails up, gripper socks on, call-light within reach and able to use appropriately. Will continue to monitor this shift.       Problem: Pain  Goal: Verbalizes/displays adequate comfort level or baseline comfort level  Outcome: Progressing  Flowsheets (Taken 12/22/2023 0537)  Verbalizes/displays adequate comfort level or baseline comfort level:   Encourage patient to monitor pain and request assistance   Assess pain using appropriate pain scale     Problem: Skin/Tissue Integrity  Goal: Absence of new skin breakdown  Description: 1.  Monitor for areas of redness and/or skin breakdown  2.  Assess vascular access sites hourly  3.  Every 4-6 hours minimum:  Change oxygen saturation probe site  4.  Every 4-6 hours:  If on nasal continuous positive airway pressure, respiratory therapy assess nares and determine need for appliance change or resting period.  Outcome: Progressing  Note: Patient is on a  2 hour turn and reposition schedule. Able  to use call light appropriately for any assistance. Will continue to monitor this shift.       Problem: ABCDS Injury Assessment  Goal: Absence of physical injury  Outcome: Progressing     Problem: Nutrition Deficit:  Goal: Optimize nutritional status  Outcome: Progressing     Problem: Respiratory - Adult  Goal: Achieves optimal ventilation and oxygenation  Outcome: Progressing  Flowsheets (Taken 12/22/2023 0537)  Achieves optimal ventilation and oxygenation:   Assess for changes in respiratory status   Assess for changes in mentation and behavior     Problem: Cardiovascular - Adult  Goal:  Maintains optimal cardiac output and hemodynamic stability  Outcome: Progressing  Flowsheets (Taken 12/22/2023 0537)  Maintains optimal cardiac output and hemodynamic stability:   Monitor blood pressure and heart rate   Monitor urine output and notify Licensed Independent Practitioner for values outside of normal range     Problem: Cardiovascular - Adult  Goal: Absence of cardiac dysrhythmias or at baseline  Outcome: Progressing  Flowsheets (Taken 12/22/2023 0537)  Absence of cardiac dysrhythmias or at baseline: Monitor cardiac rate and rhythm     Problem: Metabolic/Fluid and Electrolytes - Adult  Goal: Electrolytes maintained within normal limits  Flowsheets (Taken 12/22/2023 0537)  Electrolytes maintained within normal limits:   Monitor labs and assess patient for signs and symptoms of electrolyte imbalances   Administer electrolyte replacement as ordered

## 2023-12-22 NOTE — PLAN OF CARE
Problem: Discharge Planning  Goal: Discharge to home or other facility with appropriate resources  12/22/2023 1848 by Zoe Aiken RN  Outcome: Progressing  Note: Pt not ready for discharge     Problem: Safety - Adult  Goal: Free from fall injury  12/22/2023 1848 by Zoe Aiken RN  Outcome: Progressing     Problem: Pain  Goal: Verbalizes/displays adequate comfort level or baseline comfort level  12/22/2023 1848 by Zoe Aiken RN  Outcome: Progressing     Problem: Skin/Tissue Integrity  Goal: Absence of new skin breakdown  Description: 1.  Monitor for areas of redness and/or skin breakdown  2.  Assess vascular access sites hourly  3.  Every 4-6 hours minimum:  Change oxygen saturation probe site  4.  Every 4-6 hours:  If on nasal continuous positive airway pressure, respiratory therapy assess nares and determine need for appliance change or resting period.  12/22/2023 1848 by Zoe Aiken RN  Outcome: Progressing     Problem: ABCDS Injury Assessment  Goal: Absence of physical injury  12/22/2023 1848 by Zoe Aiken RN  Outcome: Progressing     Problem: Nutrition Deficit:  Goal: Optimize nutritional status  12/22/2023 1848 by Zoe Aiken RN  Outcome: Not Progressing  Flowsheets  Taken 12/22/2023 1848 by Zoe Aiken RN  Nutrient intake appropriate for improving, restoring, or maintaining nutritional needs:   Assess nutritional status and recommend course of action   Monitor oral intake, labs, and treatment plans  Taken 12/22/2023 1451 by Kim Dasilva RD, LD  Nutrient intake appropriate for improving, restoring, or maintaining nutritional needs: Monitor oral intake, labs, and treatment plans  Note: Poor PO intake today     Problem: Respiratory - Adult  Goal: Achieves optimal ventilation and oxygenation  12/22/2023 1848 by Zoe Aiken RN  Outcome: Progressing     Problem: Cardiovascular - Adult  Goal: Maintains optimal cardiac output and hemodynamic  No

## 2023-12-22 NOTE — PROGRESS NOTES
[facility]   OCCUPATIONAL THERAPY MISSED TREATMENT NOTE   INPATIENT   Date: 23  Patient Name: Elaina Champagne       Room:   MRN: [id number]   Account #: [id number]    : 1950  (73 y.o.)  Gender: female   Referring Practitioner: David Yang MD  Diagnosis: Hyperkalemia             REASON FOR MISSED TREATMENT:  Patient declined   -   Attempt @ 857, Pt states, \"I'm not in my right mind today.\" Writer and PTA provided edu and encouragement for participation. Pt continues to decline. Will continue to follow for OT needs.      Electronically signed by DEAN Nugent on 23 at 9:47 AM EST

## 2023-12-22 NOTE — PROGRESS NOTES
[facility] PULMONARY,CRITICAL CARE & SLEEP   Fabrizio Aparicio MD/Karl Crowder MD/Homero OBANDO AGACNP-BC, NP-C      Angela OBANDO NP-C     Joie OBANDO NP-C                                           Pulmonary Progress Note    Patient - Elaina Champagne   Age - 73 y.o.   - 1950  MRN - [id number]  [id number]  Date of Admission - 2023 11:42 AM    Consulting Service/Physician:       Primary Care Physician: Robin Ly MD    SUBJECTIVE:     Chief Complaint:   Chief Complaint   Patient presents with    Leg Pain     Left      Fatigue     Subjective:    She has had more confusion that has been noticeable, usually occurs in the morning.   is concerned.  She does have a low-grade temperature 99.1.  Right lower extremity is still very warm to touch with blisters, very erythematous.  She did have blood cultures are positive for Pseudomonas but she has been on antibiotics with infectious disease.  She did have a blister on the left leg that did open up.  Wound care has been working with her also.    I suspect she has some component of obstructive sleep apnea as well.  She states when she does wake up she does feel disoriented.  She did not have any ultrafiltration today.    Her blood pressures remained stable, she has not required any oxygen today.  She does not feel short of breath.    VITALS  BP (!) 140/48   Pulse 75   Temp 99.1 °F (37.3 °C) (Oral)   Resp 18   Ht 1.575 m (5' 2\")   Wt (!) 147.8 kg (325 lb 13.4 oz)   SpO2 100%   BMI 59.60 kg/m²   Wt Readings from Last 3 Encounters:   23 (!) 147.8 kg (325 lb 13.4 oz)   23 (!) 137 kg (302 lb)   23 (!) 138.3 kg (305 lb)     I/O (24 Hours)  No intake or output data in the 24 hours ending 23 1523    Ventilator:      Exam:   Physical Exam   Constitutional:  Oriented to person, place, and time.  Lying in bed, somewhat confused to details but orients very easily,  units in dextrose 5% 250 mL (premix) infusion, 5-30 Units/kg/hr, IntraVENous, Continuous, Sunil Mariano MD, Last Rate: 20.4 mL/hr at 12/22/23 0229, 14 Units/kg/hr at 12/22/23 0229    glucose chewable tablet 16 g, 4 tablet, Oral, PRN, David Yang MD    dextrose bolus 10% 125 mL, 125 mL, IntraVENous, PRN **OR** dextrose bolus 10% 250 mL, 250 mL, IntraVENous, PRN, David Yang MD    glucagon injection 1 mg, 1 mg, SubCUTAneous, PRN, David Yang MD    dextrose 10 % infusion, , IntraVENous, Continuous PRN, David Yang MD    miconazole (MICOTIN) 2 % powder, , Topical, BID, Jorge Luong MD, Given at 12/21/23 2135    collagenase ointment, , Topical, Daily, Keyanna Juarez, APRN - CNP, Given at 12/22/23 1130    anticoagulant sodium citrate 4 % injection 1.4 mL, 1.4 mL, IntraCATHeter, PRN, José Manuel Ellis MD, 1.4 mL at 12/21/23 1258    anticoagulant sodium citrate 4 % injection 1.5 mL, 1.5 mL, IntraCATHeter, PRN, José Manuel Ellis MD, 1.5 mL at 12/21/23 1258    aspirin EC tablet 81 mg, 81 mg, Oral, Daily, David Yang MD, 81 mg at 12/22/23 1133    [Held by provider] escitalopram (LEXAPRO) tablet 20 mg, 20 mg, Oral, Daily, David Yang MD    metoprolol tartrate (LOPRESSOR) tablet 25 mg, 25 mg, Oral, BID, Sunil Mariano MD, 25 mg at 12/22/23 1133    pantoprazole (PROTONIX) tablet 40 mg, 40 mg, Oral, QAM AC, David Yang MD, 40 mg at 12/22/23 0636    sodium chloride flush 0.9 % injection 5-40 mL, 5-40 mL, IntraVENous, 2 times per day, David Yang MD, 10 mL at 12/22/23 1135    sodium chloride flush 0.9 % injection 5-40 mL, 5-40 mL, IntraVENous, PRN, David Yang MD    0.9 % sodium chloride infusion, , IntraVENous, PRN, David Yang MD, Last Rate: 5 mL/hr at 12/19/23 2209, New Bag at 12/19/23 2209    ondansetron (ZOFRAN-ODT) disintegrating tablet 4 mg, 4 mg, Oral, Q8H PRN **OR** ondansetron (ZOFRAN) injection 4 mg, 4 mg, IntraVENous, Q6H PRN, David Yang MD, 4 mg at 12/15/23 1105

## 2023-12-22 NOTE — PROGRESS NOTES
[facility]   IN-PATIENT SERVICE   [facility]    Progress note             Date:   12/22/2023  Patient name:  Elaina Champagne  Date of admission:  12/14/2023 11:42 AM  MRN:   [id number]  Account:  [id number]  YOB: 1950  PCP:    Robin Ly MD  Room:   2092095[facility]  Code Status:    DNR-CCA    Chief Complaint:     Chief Complaint   Patient presents with    Leg Pain     Left      Fatigue       History Obtained From:     patient, electronic medical record    History of Present Illness:     The patient is a 73 y.o.  Non- / non  female who presents with Leg Pain (Left/) and Fatigue   and she is admitted to the hospital for the management of wound check  Patient has past medical is a multiple medical problem including morbid obesity, hypertension, lymphedema, patient has wound in both legs and back of her legs, symptoms are going on for last 1 month  Patient follows with wound care  She was evaluated by Dr. Meredith vascular surgery yesterday, dressing was applied on left leg, she was having severe pain that made her come to the hospital  Patient, had arterial scan done earlier suggestive of PHILL of 0.68 on left side, on right side PHILL was okay  Previous wound culture was growing Klebsiella and staph, MSSA  In the emergency room, patient had lab work done suggestive elevated creatinine of 3.8  CK was normal      Past Medical History:     Past Medical History:   Diagnosis Date    Bell's palsy     Cellulitis     Hypertension         Past Surgical History:     Past Surgical History:   Procedure Laterality Date    CATARACT REMOVAL Bilateral 2015    HYSTERECTOMY, TOTAL ABDOMINAL (CERVIX REMOVED)  1990    INCISION AND DRAINAGE Left 07/06/2017    LEG INCISION AND DRAINAGE ABSCESS performed by Isaiah Casey MD at [facility] OR    IR NONTUNNELED VASCULAR CATHETER  12/15/2023    IR NONTUNNELED VASCULAR CATHETER 12/15/2023 [facility] SPECIAL PROCEDURES    KNEE ARTHROPLASTY Right

## 2023-12-22 NOTE — PROGRESS NOTES
[facility] -   Infectious diseases evaluation  admission date 12/14/2023    reason for consultation:   Cellulitis    Impression :   Current:  Cellulitis bilateral lower extremity. Wound cx grew Pseudomonas aeruginosa sensitive to Levaquin and cefepime and Enterococcus faecalis sensitive to ampicillin  Pseudomonas Aeruginosa bacteremia sensitive to Levaquin and cefepime.  Sepsis secondary to above  Mild ulcers, possible HSV  Left posterior tibial wound  Peripheral arterial disease  Acute on chronic renal failure.  New onset HD.  Obesity  History of lymphedema venous stasis insufficiency  Hyperlipidemia  Penicillin allergy-Hives.    Recommendations   Zyvox course completed 12/19/2023  IV cefepime dose adjusted to renal function  P.o. Valtrex  Left lower extremity CT showed no fluid collection  Follow WBC and platelets closely while on Zyvox.  Follow renal function closely.  Repeat blood cultures from 12/15/2023 still negative  The patient off dialysis, renal function improving  Wound care  Supportive care        Infection Control Recommendations   [place] Precautions    Antimicrobial Stewardship Recommendations   Simplification of therapy  Targeted therapy    History of Present Illness:   Initial history:  Elaina Champagne is a 73 y.o.-year-old female was sent to the hospital from wound care clinic for severe left leg pain associated with generalized fatigue.  The patient had swelling and redness of the lower extremities bilaterally with open wound to the posterior aspect of the right leg.  She is poor historian, afebrile, denied nausea or vomiting, no diarrhea, no cough or shortness of breath, no other complaints.  Initial labs showed WBC of 22, elevated lactic acid and creatinine of 3.8.  Arterial Doppler showed left PHILL of 0.68    12/14            Interval changes  12/22/2023   She is afebrile, had mouth ulcers, denied significant pain, comfortable on oxygen per  Overall Financial Resource Strain (CARDIA)     Difficulty of Paying Living Expenses: Not hard at all   Food Insecurity: No Food Insecurity (12/14/2023)    Hunger Vital Sign     Worried About Running Out of Food in the Last Year: Never true     Ran Out of Food in the Last Year: Never true   Transportation Needs: No Transportation Needs (12/14/2023)    PRAPARE - Transportation     Lack of Transportation (Medical): No     Lack of Transportation (Non-Medical): No   Physical Activity: Insufficiently Active (3/7/2023)    Exercise Vital Sign     Days of Exercise per Week: 2 days     Minutes of Exercise per Session: 20 min   Stress: Not on file   Social Connections: Not on file   Intimate Partner Violence: Not on file   Housing Stability: Low Risk  (12/14/2023)    Housing Stability Vital Sign     Unable to Pay for Housing in the Last Year: No     Number of Places Lived in the Last Year: 1     Unstable Housing in the Last Year: No       Family History:     Family History   Problem Relation Age of Onset    Cancer Mother     High Blood Pressure Father     Stroke Father     Ovarian Cancer Daughter     Cancer Daughter     Diabetes Maternal Aunt     Cancer Other         daughter/ovarian cancer      Medical Decision Making:   I have independently reviewed/ordered the following labs:    CBC with Differential:   Recent Labs     12/20/23  0354 12/21/23  0657   WBC 19.7* 19.3*   HGB 10.7* 10.1*   HCT 32.4* 30.4*    218   LYMPHOPCT 9* 1*   MONOPCT 3 8*       BMP:  Recent Labs     12/21/23  1015 12/22/23  1145   * 134*   K 3.9 4.3   CL 98 97*   CO2 29 26   BUN 33* 36*   CREATININE 0.8 0.9   MG 1.8  --        Hepatic Function Panel:   No results for input(s): \"PROT\", \"LABALBU\", \"BILIDIR\", \"IBILI\", \"BILITOT\", \"ALKPHOS\", \"ALT\", \"AST\" in the last 72 hours.    No results for input(s): \"RPR\" in the last 72 hours.  No results for input(s): \"HIV\" in the last 72 hours.  No results for input(s): \"BC\" in the last 72 hours.  Lab

## 2023-12-23 ENCOUNTER — APPOINTMENT (OUTPATIENT)
Dept: GENERAL RADIOLOGY | Age: 73
DRG: 853 | End: 2023-12-23
Payer: MEDICARE

## 2023-12-23 LAB
ANION GAP SERPL CALCULATED.3IONS-SCNC: 10 MMOL/L (ref 9–17)
ANTI-XA UNFRAC HEPARIN: 0.32 IU/L (ref 0.3–0.7)
BUN SERPL-MCNC: 37 MG/DL (ref 8–23)
CALCIUM SERPL-MCNC: 8.3 MG/DL (ref 8.6–10.4)
CHLORIDE SERPL-SCNC: 98 MMOL/L (ref 98–107)
CO2 SERPL-SCNC: 27 MMOL/L (ref 20–31)
CREAT SERPL-MCNC: 0.9 MG/DL (ref 0.5–0.9)
ERYTHROCYTE [DISTWIDTH] IN BLOOD BY AUTOMATED COUNT: 15.1 % (ref 11.5–14.9)
GFR SERPL CREATININE-BSD FRML MDRD: >60 ML/MIN/1.73M2
GLUCOSE BLD-MCNC: 104 MG/DL (ref 65–105)
GLUCOSE BLD-MCNC: 90 MG/DL (ref 65–105)
GLUCOSE BLD-MCNC: 91 MG/DL (ref 65–105)
GLUCOSE BLD-MCNC: 97 MG/DL (ref 65–105)
GLUCOSE SERPL-MCNC: 95 MG/DL (ref 70–99)
HCT VFR BLD AUTO: 29.9 % (ref 36–46)
HGB BLD-MCNC: 9.7 G/DL (ref 12–16)
MCH RBC QN AUTO: 29.6 PG (ref 26–34)
MCHC RBC AUTO-ENTMCNC: 32.5 G/DL (ref 31–37)
MCV RBC AUTO: 90.9 FL (ref 80–100)
PLATELET # BLD AUTO: 318 K/UL (ref 150–450)
PMV BLD AUTO: 8.3 FL (ref 6–12)
POTASSIUM SERPL-SCNC: 4.1 MMOL/L (ref 3.7–5.3)
RBC # BLD AUTO: 3.28 M/UL (ref 4–5.2)
SODIUM SERPL-SCNC: 135 MMOL/L (ref 135–144)
WBC OTHER # BLD: 17.8 K/UL (ref 3.5–11)

## 2023-12-23 PROCEDURE — 2580000003 HC RX 258: Performed by: INTERNAL MEDICINE

## 2023-12-23 PROCEDURE — 99232 SBSQ HOSP IP/OBS MODERATE 35: CPT | Performed by: INTERNAL MEDICINE

## 2023-12-23 PROCEDURE — 36415 COLL VENOUS BLD VENIPUNCTURE: CPT

## 2023-12-23 PROCEDURE — 6360000002 HC RX W HCPCS: Performed by: NURSE PRACTITIONER

## 2023-12-23 PROCEDURE — 85027 COMPLETE CBC AUTOMATED: CPT

## 2023-12-23 PROCEDURE — 6370000000 HC RX 637 (ALT 250 FOR IP): Performed by: INTERNAL MEDICINE

## 2023-12-23 PROCEDURE — 2580000003 HC RX 258: Performed by: NURSE PRACTITIONER

## 2023-12-23 PROCEDURE — 80048 BASIC METABOLIC PNL TOTAL CA: CPT

## 2023-12-23 PROCEDURE — 82947 ASSAY GLUCOSE BLOOD QUANT: CPT

## 2023-12-23 PROCEDURE — 6360000002 HC RX W HCPCS: Performed by: INTERNAL MEDICINE

## 2023-12-23 PROCEDURE — 99231 SBSQ HOSP IP/OBS SF/LOW 25: CPT | Performed by: INTERNAL MEDICINE

## 2023-12-23 PROCEDURE — 2060000000 HC ICU INTERMEDIATE R&B

## 2023-12-23 PROCEDURE — 85520 HEPARIN ASSAY: CPT

## 2023-12-23 PROCEDURE — 71045 X-RAY EXAM CHEST 1 VIEW: CPT

## 2023-12-23 RX ADMIN — ACETAMINOPHEN 650 MG: 325 TABLET ORAL at 08:23

## 2023-12-23 RX ADMIN — ACETAMINOPHEN 650 MG: 325 TABLET ORAL at 16:32

## 2023-12-23 RX ADMIN — ACYCLOVIR 400 MG: 200 CAPSULE ORAL at 15:30

## 2023-12-23 RX ADMIN — ANTI-FUNGAL POWDER MICONAZOLE NITRATE TALC FREE: 1.42 POWDER TOPICAL at 08:24

## 2023-12-23 RX ADMIN — ASPIRIN 81 MG: 81 TABLET, COATED ORAL at 08:24

## 2023-12-23 RX ADMIN — HEPARIN SODIUM 14 UNITS/KG/HR: 10000 INJECTION, SOLUTION INTRAVENOUS at 05:17

## 2023-12-23 RX ADMIN — METOPROLOL TARTRATE 25 MG: 25 TABLET, FILM COATED ORAL at 20:22

## 2023-12-23 RX ADMIN — ANTI-FUNGAL POWDER MICONAZOLE NITRATE TALC FREE: 1.42 POWDER TOPICAL at 20:22

## 2023-12-23 RX ADMIN — ANTI-FUNGAL POWDER MICONAZOLE NITRATE TALC FREE: 1.42 POWDER TOPICAL at 00:15

## 2023-12-23 RX ADMIN — CEFEPIME 2000 MG: 2 INJECTION, POWDER, FOR SOLUTION INTRAVENOUS at 03:12

## 2023-12-23 RX ADMIN — ACYCLOVIR 400 MG: 200 CAPSULE ORAL at 20:22

## 2023-12-23 RX ADMIN — FUROSEMIDE 40 MG: 10 INJECTION, SOLUTION INTRAMUSCULAR; INTRAVENOUS at 08:24

## 2023-12-23 RX ADMIN — HEPARIN SODIUM 14 UNITS/KG/HR: 10000 INJECTION, SOLUTION INTRAVENOUS at 16:06

## 2023-12-23 RX ADMIN — SODIUM CHLORIDE, PRESERVATIVE FREE 10 ML: 5 INJECTION INTRAVENOUS at 08:36

## 2023-12-23 RX ADMIN — CEFEPIME 2000 MG: 2 INJECTION, POWDER, FOR SOLUTION INTRAVENOUS at 08:35

## 2023-12-23 RX ADMIN — COLLAGENASE SANTYL: 250 OINTMENT TOPICAL at 08:27

## 2023-12-23 RX ADMIN — ACYCLOVIR 400 MG: 200 CAPSULE ORAL at 08:23

## 2023-12-23 ASSESSMENT — PAIN SCALES - GENERAL: PAINLEVEL_OUTOF10: 3

## 2023-12-23 ASSESSMENT — PAIN DESCRIPTION - LOCATION: LOCATION: LEG

## 2023-12-23 NOTE — PROGRESS NOTES
rubs  Abdomen Exam: Abdomen soft, non-tender.   Extremity Exam: No signs of cyanosis    MEDS      [START ON 12/24/2023] cefepime  1,000 mg IntraVENous Q24H    acyclovir  400 mg Oral TID    furosemide  40 mg IntraVENous Daily    miconazole   Topical BID    collagenase   Topical Daily    aspirin  81 mg Oral Daily    [Held by provider] escitalopram  20 mg Oral Daily    metoprolol tartrate  25 mg Oral BID    pantoprazole  40 mg Oral QAM AC    sodium chloride flush  5-40 mL IntraVENous 2 times per day      heparin (PORCINE) Infusion 14 Units/kg/hr (12/23/23 1606)    dextrose      sodium chloride 5 mL/hr at 12/19/23 2209     magic (miracle) mouthwash, melatonin, albumin human 25%, midodrine, guaiFENesin, heparin (porcine), heparin (porcine), glucose, dextrose bolus **OR** dextrose bolus, glucagon (rDNA), dextrose, anticoagulant sodium citrate, anticoagulant sodium citrate, sodium chloride flush, sodium chloride, ondansetron **OR** ondansetron, polyethylene glycol, acetaminophen **OR** acetaminophen    LABS   CBC   Recent Labs     12/23/23  0555   WBC 17.8*   HGB 9.7*   HCT 29.9*   MCV 90.9        BMP:   Lab Results   Component Value Date/Time     12/23/2023 05:55 AM    K 4.1 12/23/2023 05:55 AM    CL 98 12/23/2023 05:55 AM    CO2 27 12/23/2023 05:55 AM    BUN 37 12/23/2023 05:55 AM    LABALBU 2.4 12/16/2023 04:19 AM    CREATININE 0.9 12/23/2023 05:55 AM    CALCIUM 8.3 12/23/2023 05:55 AM    GFRAA >60 03/03/2022 03:57 PM    LABGLOM >60 12/23/2023 05:55 AM     ABGs:No results found for: \"PHART\", \"PO2ART\", \"AVZ7XCU\" No results found for: \"IFIO2\", \"MODE\", \"SETTIDVOL\", \"SETPEEP\"  Ionized Calcium:  No results found for: \"IONCA\"  Magnesium:    Lab Results   Component Value Date/Time    MG 1.8 12/21/2023 10:15 AM     Phosphorus:  No results found for: \"PHOS\"     LIVER PROFILE No results for input(s): \"AST\", \"ALT\", \"LIPASE\", \"AMYLASE\", \"ALB\", \"BILIDIR\", \"BILITOT\", \"ALKPHOS\" in the last 72 hours.  INR No results for  input(s): \"INR\" in the last 72 hours.  PTT   Lab Results   Component Value Date    APTT 37.8 (H) 12/18/2023         RADIOLOGY     (See actual reports for details)    ASSESSMENT/PLAN     Patient Active Problem List   Diagnosis    Depression with anxiety    Eczema    Edema    Essential (primary) hypertension    Hyperlipidemia    Morbid obesity (HCC)    Osteoarthritis    Rosacea    Urge incontinence of urine    Varicose veins    Paroxysmal atrial fibrillation (HCC)    Non-rheumatic tricuspid valve insufficiency    Venous insufficiency of both lower extremities    Lymphedema    Venous stasis ulcer of calf with fat layer exposed with varicose veins (HCC)    Venous ulcer of left leg (HCC)    Hyperkalemia    MICHELLE (acute kidney injury) ([facility])    Cellulitis    Bacteremia due to Pseudomonas    Bacteriuria    Leukocytosis    Elevated C-reactive protein (CRP)    Allergy to penicillin    Atrial fibrillation with rapid ventricular response ([facility])    Altered mental status    Elevated erythrocyte sedimentation rate    Permanent atrial fibrillation ([facility])    Renovascular hypertension         Sepsis secondary to cellulitis, positive wound culture with Pseudomonas  Pseudomonas bacteremia  BLE cellulitis - + pseudomonas aeruginosa and enterococcus faecalis  Acute kidney injury requiring hemodialysis, secondary to ischemic acute tubular necrosis due to hypotension and sepsis  New onset A-fib with RVR, now controlled, amiodarone has been discontinued, on heparin  Lactic acidosis, resolved  Acute hypoxic respiratory insufficiency -resolved now on room air  Acute on Chronic diastolic heart failure  PAD  Venous stasis/lymphedema  Suspect obstructive sleep apnea  Morbid obesity with BMI 56.9  Full code    Chest x-ray reveals evidence of mild congestion.  Currently on Maxipime  Currently on heparin drip----ordered by cardiology  Dialysis per nephrology      Following with other services.  Electronically signed by Fabrizio Aparicio MD on 12/23/2023

## 2023-12-23 NOTE — PROGRESS NOTES
Bed in lowest and locked position.  Bed alarm is on  HOB at 60 degrees.    Patient is currently talking to granddaughter who is at bedside.   Pt is more talkative and appears more lucid.  Patient is talking about recipes and about needing to be in rehab for 2 weeks or more.   Patient is tolerating room air at this time.  Respirations are even and unlabored.  Patient states that she feels better after receiving tylenol.    Telemetry remains on, sinus rhythm on the monitor.        Last set of vitals  Vitals:    12/22/23 2204   BP: (!) 124/51   Pulse: 82   Resp: 19   Temp: 98.8 °F (37.1 °C)   SpO2: 96%         Patient denies any needs at this time.    Will continue with hourly and PRN rounds.

## 2023-12-23 NOTE — CARE COORDINATION
ONGOING DISCHARGE PLAN:    Patient was asleep at time of visit.    Writer reviewed Chart notes.     Plan is To DC to [facility].     Will need 4-5 hour notice, prior to return for Bariatric Bed.     Per Previous Nephro notes, Renal function improving. Does not require a tunneled cath.    WBC 17.8. today.    Will continue to follow for additional discharge needs.    If patient is discharged prior to next notation, then this note serves as note for discharge by case management.    Electronically signed by Sujatha Bills RN on 12/23/2023 at 2:27 PM

## 2023-12-23 NOTE — PLAN OF CARE
Problem: Safety - Adult  Goal: Free from fall injury  12/23/2023 0431 by Erika Vicente RN  Outcome: Progressing  Note: Bed in lowest and locked position.  2 bedrails used for pt safety.  Bed alarm in use in pt's room.  Hourly rounds done by staff and RN.  Phone and call light within pt's reach.           Problem: Skin/Tissue Integrity  Goal: Absence of new skin breakdown  Description: 1.  Monitor for areas of redness and/or skin breakdown  2.  Assess vascular access sites hourly  3.  Every 4-6 hours minimum:  Change oxygen saturation probe site  4.  Every 4-6 hours:  If on nasal continuous positive airway pressure, respiratory therapy assess nares and determine need for appliance change or resting period.  12/23/2023 0431 by Erika Vicente RN  Outcome: Progressing  Note: Patient is repositioned by staff every 2 hours.  Patient does have a vera on the bed to alternate the pressure.  Patient has both legs elevated on pillows.  Skin issues have been assessed during the shift.  No new skin breakdown noted at this time.       Problem: Nutrition Deficit:  Goal: Optimize nutritional status  12/23/2023 0431 by Erika Vicente, RN  Outcome: Progressing  Note: Patient has poor intake and states that she has no appetite.  Patient will drink water when staff offer it to her.   Patient refused to drink a protein drink during this shift.       Problem: Musculoskeletal - Adult  Goal: Return mobility to safest level of function  12/23/2023 0431 by Erika Vicente, RN  Outcome: Progressing  Note: Staff reposition patient every 2 hours.  Patient has skin care issues on both legs.  Patient's family states that patient did not ambulate much at home without assistance.  Patient's family states that discharge plan is to a rehab facility.       Problem: Infection - Adult  Goal: Absence of infection during hospitalization  12/23/2023 0431 by Erika Vicente, RN  Outcome: Progressing  Note: Patient is receiving IV  antibiotics and oral antivirals.  Infectious disease has been consulted and active part of her care team. Patient did have an elevated temp during the shift.  Tylenol was effective for elevated temp.  Patient did remove a peripheral IV during the shift and attempted to pull at her dialysis catheter.  Dressing for dialysis catheter has been changed during the shift.       Problem: Confusion  Goal: Confusion, delirium, dementia, or psychosis is improved or at baseline  Description: INTERVENTIONS:  1. Assess for possible contributors to thought disturbance, including medications, impaired vision or hearing, underlying metabolic abnormalities, dehydration, psychiatric diagnoses, and notify attending LIP  2. [place] high risk fall precautions, as indicated  3. Provide frequent short contacts to provide reality reorientation, refocusing and direction  4. Decrease environmental stimuli, including noise as appropriate  5. Monitor and intervene to maintain adequate nutrition, hydration, elimination, sleep and activity  6. If unable to ensure safety without constant attention obtain sitter and review sitter guidelines with assigned personnel  7. Initiate Psychosocial CNS and Spiritual Care consult, as indicated  Outcome: Progressing  Note: Patient has been alert to self and place.  Family state that patient is A&Ox4 at baseline.  Patient does need to be reoriented to place and situation often.  Telesitter in the room d/t patient pulling at lines.       Problem: Nutrition Deficit:  Goal: Optimize nutritional status  12/23/2023 0431 by Erika Vicente RN  Outcome: Progressing  Note: Patient has poor intake and states that she has no appetite.  Patient will drink water when staff offer it to her.   Patient refused to drink a protein drink during this shift.    12/23/2023 0404 by Erika Vicente, RN  Outcome: Progressing  12/22/2023 1848 by Zoe Aiken RN  Outcome: Not Progressing  Flowsheets  Taken 12/22/2023 1848

## 2023-12-23 NOTE — PLAN OF CARE
Problem: Discharge Planning  Goal: Discharge to home or other facility with appropriate resources  Outcome: Progressing     Problem: Safety - Adult  Goal: Free from fall injury  12/23/2023 1505 by Zoe Aiken RN  Outcome: Progressing     Problem: Pain  Goal: Verbalizes/displays adequate comfort level or baseline comfort level  Outcome: Progressing     Problem: Skin/Tissue Integrity  Goal: Absence of new skin breakdown  Description: 1.  Monitor for areas of redness and/or skin breakdown  2.  Assess vascular access sites hourly  3.  Every 4-6 hours minimum:  Change oxygen saturation probe site  4.  Every 4-6 hours:  If on nasal continuous positive airway pressure, respiratory therapy assess nares and determine need for appliance change or resting period.  12/23/2023 1505 by Zeo Aiken RN  Outcome: Not Progressing  Note: Pt has multiple areas of skin concerns. Her PO intake is poor which will inhibit healing.     Problem: ABCDS Injury Assessment  Goal: Absence of physical injury  Outcome: Progressing     Problem: Nutrition Deficit:  Goal: Optimize nutritional status  12/23/2023 1505 by Zoe Aiken RN  Outcome: Not Progressing  Flowsheets (Taken 12/23/2023 1505)  Nutrient intake appropriate for improving, restoring, or maintaining nutritional needs:   Assess nutritional status and recommend course of action   Monitor oral intake, labs, and treatment plans  Note: Poor intake     Problem: Respiratory - Adult  Goal: Achieves optimal ventilation and oxygenation  Outcome: Progressing     Problem: Cardiovascular - Adult  Goal: Maintains optimal cardiac output and hemodynamic stability  Outcome: Progressing     Problem: Cardiovascular - Adult  Goal: Absence of cardiac dysrhythmias or at baseline  Outcome: Progressing     Problem: Musculoskeletal - Adult  Goal: Return mobility to safest level of function  12/23/2023 1505 by Zoe Aiken RN  Outcome: Progressing     Problem: Infection - Adult  Goal:

## 2023-12-23 NOTE — PROGRESS NOTES
Messaged Dr. Manley about heparin gtt. Pt has had 5 therapeutic readings, lab orders have  to draw anti XA. Asked Dr. Manley to advise as to continuing this gtt.

## 2023-12-23 NOTE — PROGRESS NOTES
Physician Progress Note      PATIENT:               MARGARET PACHECO  CSN #:                  [id number]  :                       1950  ADMIT DATE:       2023 11:42 AM  DISCH DATE:  RESPONDING  PROVIDER #:        Joesph Tate MD          QUERY TEXT:    Pt admitted with BLE cellulitis.  On admission, pt noted to be afebrile, R 22,   P 97, BP 72/32 (on ), WBC 18.9, lactic 3.1, .3, MICHELLE.  Code sepsis   was called in ED.  If possible, please document in the progress notes and   discharge summary if you are evaluating and /or treating any of the following:    The medical record reflects the following:  Risk Factors: BLE cellulitis, advanced age  Clinical Indicators: On admission, pt noted to be afebrile, R 22, P 97, BP   72/32 (on ), WBC 18.9, lactic 3.1, .3, MICHELLE.  Code sepsis was   called in ED  Treatment: IV ATBs, IVF, Tylenol, labs, imaging    Thank you!    Erin Dominique, RN, BSN, RHIT, CCDS  Clinical   Options provided:  -- Sepsis, present on admission  -- Cellulitis without Sepsis  -- Other - I will add my own diagnosis  -- Disagree - Not applicable / Not valid  -- Disagree - Clinically unable to determine / Unknown  -- Refer to Clinical Documentation Reviewer    PROVIDER RESPONSE TEXT:    This patient has sepsis which was present on admission.    Query created by: Erin Dominique on 2023 11:25 AM      QUERY TEXT:    Pt admitted with sepsis due to BLE cellulitis.   IM note states,   \"Hypotension overnight with A-fib with RVR requiring amiodarone drip required   Levophed overnight.\"  Pt noted to have BP 62/20 and MAP 34.  If possible,   please document in the progress notes and discharge summary if you are   evaluating and/or treating any of the following:    The medical record reflects the following:  Risk Factors: sepsis, afib with RVR, advanced age  Clinical Indicators:  IM note states, \"Hypotension overnight with A-fib   with RVR  Documentation Reviewer    PROVIDER RESPONSE TEXT:    After study, ESRD confirmed and ATN ruled out.    Query created by: Erin Dominique on 12/21/2023 11:29 AM      Electronically signed by:  Joesph Tate MD 12/23/2023 12:39 PM

## 2023-12-23 NOTE — PROGRESS NOTES
lower extremity swelling or redness improving, still have blisters to the right foot, no other complaints  Echocardiogram showed dilated IVC with elevated right atrial filling pressure EF greater than 65% severely increased LV wall thickness, small LV cavity   Temp HD catheter placed 12/15/23.    Micro:  12/14 Blood Cx x2 - 2/2 pseudomonas aeruginosa  12/15 Blood Cx x2 - no growth to date;   12/15 Wound Cx - pseudomonas aeruginosa & enterococcus faecalis  12/15 Urine Cx - no growth    Patient Vitals for the past 8 hrs:   BP Temp Temp src Pulse Resp SpO2   12/23/23 1230 (!) 121/58 98.8 °F (37.1 °C) Oral 80 17 96 %   12/23/23 0814 -- -- -- 82 -- --   12/23/23 0800 (!) 119/51 99.6 °F (37.6 °C) Oral 86 18 100 %             I have personally reviewed the past medical history, past surgical history, medications, social history, and family history, and I haveupdated the database accordingly.      Allergies:   Penicillins     Review of Systems:     Review of Systems   All other systems reviewed and are negative.      Physical Examination :       Physical Exam  Vitals and nursing note reviewed.   Constitutional:       General: She is not in acute distress.     Appearance: Normal appearance. She is obese.   HENT:      Head: Normocephalic and atraumatic.      Right Ear: External ear normal.      Left Ear: External ear normal.      Nose: Nose normal.      Mouth/Throat:      Comments: Several mouth ulcers  Eyes:      General:         Right eye: No discharge.         Left eye: No discharge.   Neck:      Comments: R IJ temporary HD catheter site clean.  Cardiovascular:      Rate and Rhythm: Normal rate and regular rhythm.      Heart sounds: Normal heart sounds.   Pulmonary:      Effort: Pulmonary effort is normal. No respiratory distress.      Comments: 2 L NC  Abdominal:      General: Abdomen is flat. There is no distension.   Genitourinary:     Comments: No tierney.  Musculoskeletal:      Cervical back: Neck supple. No rigidity.  \"ALKPHOS\", \"ALT\", \"AST\" in the last 72 hours.    No results for input(s): \"RPR\" in the last 72 hours.  No results for input(s): \"HIV\" in the last 72 hours.  No results for input(s): \"BC\" in the last 72 hours.  Lab Results   Component Value Date/Time    CREATININE 0.9 12/23/2023 05:55 AM    GLUCOSE 95 12/23/2023 05:55 AM    GLUCOSE 91 09/19/2018 11:53 AM   CRP: 464.3    Detailed results:        Thank you for allowing us to participate in the care of this patient.Please call with questions.    This note is created with the assistance of a speech recognition program.  While intending to generate adocument that actually reflects the content of the visit, the document can still have some errors including those of syntax and sound a like substitutions which may escape proof reading.  It such instances, actual meaningcan be extrapolated by contextual diversion.    Jorge Luong MD  Office: [phone]  Perfect serve / office [phone]

## 2023-12-23 NOTE — PROGRESS NOTES
During hourly rounds, RN found patient's newest IV on the ground.  Patient did confirm that she pulled it out.  Patient had pulled another IV during the day shift.  RN went to get [employer] camera.  Albina WADDELL was assisting another patient to the bathroom and our house supervisor was busy to grab a camera.  Albina CTA informed RN that patient was attempting to pull at her dialysis catheter.  Dialysis catheter assessed.  It is still in place and is patent.

## 2023-12-23 NOTE — PROGRESS NOTES
NEPHROLOGY PROGRESS NOTE     Patient :  Elaina Champagne; 73 y.o. MRN# [id number]  Location:  2095/2095-01  Attending:  David Yang MD  Admit Date:  12/14/2023   Hospital Day: 9      Reason for Consult: Acute kidney injury    Subjective/interval history.  Patient seen and examined .  She appears confused this afternoon.  No fever or shortness of breath  She had an extra ultrafiltration on Thursday for lower extremity edema.   creatinine is 0.9 mg/dL       Echocardiogram showed dilated IVC with elevated right atrial filling pressure EF greater than 65% severely increased LV wall thickness, small LV cavity      Chief Complaint: Left leg swelling wound erythema and pain  History Obtained From:  patient ,, EMR nursing staff     History of Present Illness:    This is a 73 y.o. female with past medical history of essential hypertension, peripheral vascular disease, patient presented to the hospital with complaints of left leg swelling erythema and wound on the left leg patient was seen by vascular and wound care on 12/13/2023.  Patient presented to the hospital yesterday on 12/14/2023 with complaints of left leg swelling erythema and wound getting worse patient has that swelling for several weeks but has been getting worse and more red.  Patient also noted to have fatigue weakness, and she has confusion this morning.  According to the  no nausea vomiting diarrhea no fever chills no headache dizziness.  Labs on admission showed BUN of 54 serum creatinine of 3.9 mg/dL, patient has been oliguric.  Patient denies dysuria, gross hematuria, flank pain, nocturia, urgency, passing frothy urine or urinary incontinence.  There has been no recent exposure to IV contrast.   There is no history  of paraprotein disease.   Pt denies any history of recurrent UTI or kidney stones.  Medication review shows use of ARB's and Bumex, and NSAIDs.    Patient noted to be hypotensive yesterday systolic blood pressure was in 70s  mg/dl on 8/8/203]  Status post temporary dialysis catheter placement 12/15/2023 patient had first dialysis treatment on Saturday, 12/16/2023-renal function improving creatinine 0.9 mg/dl.     Hypotension.  Improved    Bilateral lower extremity swelling erythema wound/cellulitis left leg    Sepsis/bacteremia Pseudomonas aeruginosa-on IV antibiotics     5.  Chronic heart failure with diastolic dysfunction, severe LVH, elevated right atrial pressure.    Plan:  Hold the dialysis today  IV Lasix 40 mg daily  Strict I's and O's  4.  Basic metabolic panel daily  5.  Continue IV zyvox and cefepime renal dosed.      Prognosis for renal recovery is good renal function improving.  Patient does not require a tunneled catheter.  If adequate diuresis with IV Lasix and renal function stable will discontinue dialysis and remove Melquiades catheter today          Electronically signed by Zachariah Ward MD on 12/23/2023 at 4:55 PM

## 2023-12-24 LAB
ANION GAP SERPL CALCULATED.3IONS-SCNC: 8 MMOL/L (ref 9–17)
ANTI-XA UNFRAC HEPARIN: 0.42 IU/L (ref 0.3–0.7)
BASOPHILS # BLD: 0 K/UL (ref 0–0.2)
BASOPHILS NFR BLD: 0 % (ref 0–2)
BUN SERPL-MCNC: 37 MG/DL (ref 8–23)
CALCIUM SERPL-MCNC: 8.3 MG/DL (ref 8.6–10.4)
CHLORIDE SERPL-SCNC: 99 MMOL/L (ref 98–107)
CO2 SERPL-SCNC: 28 MMOL/L (ref 20–31)
CREAT SERPL-MCNC: 0.8 MG/DL (ref 0.5–0.9)
EOSINOPHIL # BLD: 0.16 K/UL (ref 0–0.4)
EOSINOPHILS RELATIVE PERCENT: 1 % (ref 0–4)
ERYTHROCYTE [DISTWIDTH] IN BLOOD BY AUTOMATED COUNT: 15.8 % (ref 11.5–14.9)
GFR SERPL CREATININE-BSD FRML MDRD: >60 ML/MIN/1.73M2
GLUCOSE BLD-MCNC: 95 MG/DL (ref 65–105)
GLUCOSE BLD-MCNC: 97 MG/DL (ref 65–105)
GLUCOSE BLD-MCNC: 98 MG/DL (ref 65–105)
GLUCOSE BLD-MCNC: 99 MG/DL (ref 65–105)
GLUCOSE SERPL-MCNC: 90 MG/DL (ref 70–99)
HCT VFR BLD AUTO: 29.1 % (ref 36–46)
HGB BLD-MCNC: 9.1 G/DL (ref 12–16)
LYMPHOCYTES NFR BLD: 0.95 K/UL (ref 1–4.8)
LYMPHOCYTES RELATIVE PERCENT: 6 % (ref 24–44)
MCH RBC QN AUTO: 29.3 PG (ref 26–34)
MCHC RBC AUTO-ENTMCNC: 31.4 G/DL (ref 31–37)
MCV RBC AUTO: 93.5 FL (ref 80–100)
MONOCYTES NFR BLD: 1.26 K/UL (ref 0.1–1.3)
MONOCYTES NFR BLD: 8 % (ref 1–7)
MORPHOLOGY: ABNORMAL
MORPHOLOGY: ABNORMAL
NEUTROPHILS NFR BLD: 85 % (ref 36–66)
NEUTS SEG NFR BLD: 13.43 K/UL (ref 1.3–9.1)
PLATELET # BLD AUTO: 357 K/UL (ref 150–450)
PMV BLD AUTO: 8.8 FL (ref 6–12)
POTASSIUM SERPL-SCNC: 4 MMOL/L (ref 3.7–5.3)
RBC # BLD AUTO: 3.12 M/UL (ref 4–5.2)
SODIUM SERPL-SCNC: 135 MMOL/L (ref 135–144)
WBC OTHER # BLD: 15.8 K/UL (ref 3.5–11)

## 2023-12-24 PROCEDURE — 99232 SBSQ HOSP IP/OBS MODERATE 35: CPT | Performed by: INTERNAL MEDICINE

## 2023-12-24 PROCEDURE — 6360000002 HC RX W HCPCS: Performed by: INTERNAL MEDICINE

## 2023-12-24 PROCEDURE — 2580000003 HC RX 258: Performed by: INTERNAL MEDICINE

## 2023-12-24 PROCEDURE — 80048 BASIC METABOLIC PNL TOTAL CA: CPT

## 2023-12-24 PROCEDURE — 6370000000 HC RX 637 (ALT 250 FOR IP): Performed by: INTERNAL MEDICINE

## 2023-12-24 PROCEDURE — 36415 COLL VENOUS BLD VENIPUNCTURE: CPT

## 2023-12-24 PROCEDURE — 6370000000 HC RX 637 (ALT 250 FOR IP): Performed by: NURSE PRACTITIONER

## 2023-12-24 PROCEDURE — 85025 COMPLETE CBC W/AUTO DIFF WBC: CPT

## 2023-12-24 PROCEDURE — 2060000000 HC ICU INTERMEDIATE R&B

## 2023-12-24 PROCEDURE — 85520 HEPARIN ASSAY: CPT

## 2023-12-24 PROCEDURE — 82947 ASSAY GLUCOSE BLOOD QUANT: CPT

## 2023-12-24 RX ORDER — SENNA AND DOCUSATE SODIUM 50; 8.6 MG/1; MG/1
2 TABLET, FILM COATED ORAL DAILY
Status: DISCONTINUED | OUTPATIENT
Start: 2023-12-24 | End: 2024-01-02 | Stop reason: HOSPADM

## 2023-12-24 RX ADMIN — ACETAMINOPHEN 650 MG: 325 TABLET ORAL at 03:32

## 2023-12-24 RX ADMIN — ACYCLOVIR 400 MG: 200 CAPSULE ORAL at 08:06

## 2023-12-24 RX ADMIN — Medication 3 MG: at 21:10

## 2023-12-24 RX ADMIN — FUROSEMIDE 40 MG: 10 INJECTION, SOLUTION INTRAMUSCULAR; INTRAVENOUS at 08:06

## 2023-12-24 RX ADMIN — HEPARIN SODIUM 14 UNITS/KG/HR: 10000 INJECTION, SOLUTION INTRAVENOUS at 05:07

## 2023-12-24 RX ADMIN — SODIUM CHLORIDE, PRESERVATIVE FREE 10 ML: 5 INJECTION INTRAVENOUS at 21:11

## 2023-12-24 RX ADMIN — ASPIRIN 81 MG: 81 TABLET, COATED ORAL at 08:06

## 2023-12-24 RX ADMIN — ANTI-FUNGAL POWDER MICONAZOLE NITRATE TALC FREE: 1.42 POWDER TOPICAL at 08:23

## 2023-12-24 RX ADMIN — SODIUM CHLORIDE, PRESERVATIVE FREE 10 ML: 5 INJECTION INTRAVENOUS at 08:46

## 2023-12-24 RX ADMIN — METOPROLOL TARTRATE 25 MG: 25 TABLET, FILM COATED ORAL at 21:10

## 2023-12-24 RX ADMIN — ACYCLOVIR 400 MG: 200 CAPSULE ORAL at 21:10

## 2023-12-24 RX ADMIN — CEFEPIME 1000 MG: 1 INJECTION, POWDER, FOR SOLUTION INTRAMUSCULAR; INTRAVENOUS at 08:18

## 2023-12-24 RX ADMIN — ANTI-FUNGAL POWDER MICONAZOLE NITRATE TALC FREE: 1.42 POWDER TOPICAL at 21:11

## 2023-12-24 RX ADMIN — SENNOSIDES AND DOCUSATE SODIUM 2 TABLET: 50; 8.6 TABLET ORAL at 21:10

## 2023-12-24 RX ADMIN — HEPARIN SODIUM 14 UNITS/KG/HR: 10000 INJECTION, SOLUTION INTRAVENOUS at 17:20

## 2023-12-24 RX ADMIN — ACYCLOVIR 400 MG: 200 CAPSULE ORAL at 15:04

## 2023-12-24 RX ADMIN — COLLAGENASE SANTYL: 250 OINTMENT TOPICAL at 08:22

## 2023-12-24 RX ADMIN — PANTOPRAZOLE SODIUM 40 MG: 40 TABLET, DELAYED RELEASE ORAL at 06:22

## 2023-12-24 ASSESSMENT — PAIN DESCRIPTION - ORIENTATION: ORIENTATION: RIGHT;LEFT

## 2023-12-24 ASSESSMENT — PAIN SCALES - GENERAL
PAINLEVEL_OUTOF10: 5
PAINLEVEL_OUTOF10: 2

## 2023-12-24 ASSESSMENT — PAIN DESCRIPTION - LOCATION: LOCATION: KNEE

## 2023-12-24 NOTE — PROGRESS NOTES
NEPHROLOGY PROGRESS NOTE     Patient :  Elaina Champagne; 73 y.o. MRN# [id number]  Location:  2095/2095-01  Attending:  David Yang MD  Admit Date:  12/14/2023   Hospital Day: 10      Reason for Consult: Acute kidney injury    Subjective/interval history.  Patient seen and examined .  She appears confused this afternoon.  No fever or shortness of breath  She had an extra ultrafiltration on Thursday for lower extremity edema.  Responding well to IV Lasix edema is improving.  Creatinine stable 0.9 mg/dL      Echocardiogram showed dilated IVC with elevated right atrial filling pressure EF greater than 65% severely increased LV wall thickness, small LV cavity      Chief Complaint: Left leg swelling wound erythema and pain  History Obtained From:  patient ,, EMR nursing staff     History of Present Illness:    This is a 73 y.o. female with past medical history of essential hypertension, peripheral vascular disease, patient presented to the hospital with complaints of left leg swelling erythema and wound on the left leg patient was seen by vascular and wound care on 12/13/2023.  Patient presented to the hospital yesterday on 12/14/2023 with complaints of left leg swelling erythema and wound getting worse patient has that swelling for several weeks but has been getting worse and more red.  Patient also noted to have fatigue weakness, and she has confusion this morning.  According to the  no nausea vomiting diarrhea no fever chills no headache dizziness.  Labs on admission showed BUN of 54 serum creatinine of 3.9 mg/dL, patient has been oliguric.  Patient denies dysuria, gross hematuria, flank pain, nocturia, urgency, passing frothy urine or urinary incontinence.  There has been no recent exposure to IV contrast.   There is no history  of paraprotein disease.   Pt denies any history of recurrent UTI or kidney stones.  Medication review shows use of ARB's and Bumex, and NSAIDs.    Patient noted to be    Social Connections: Not on file   Intimate Partner Violence: Not on file   Housing Stability: Low Risk  (2023)    Housing Stability Vital Sign     Unable to Pay for Housing in the Last Year: No     Number of Places Lived in the Last Year: 1     Unstable Housing in the Last Year: No   Aware she is for potassium    Objective:  CURRENT TEMPERATURE:  Temp: 98.7 °F (37.1 °C)  MAXIMUM TEMPERATURE OVER 24HRS:  Temp (24hrs), Av.5 °F (36.9 °C), Min:98.2 °F (36.8 °C), Max:98.8 °F (37.1 °C)    CURRENT RESPIRATORY RATE:  Respirations: 19  CURRENT PULSE:  Pulse: 90  CURRENT BLOOD PRESSURE:  BP: (!) 131/50  24HR BLOOD PRESSURE RANGE:  Systolic (24hrs), Av , Min:113 , Max:148   ; Diastolic (24hrs), Av, Min:47, Max:70    24HR INTAKE/OUTPUT:    Intake/Output Summary (Last 24 hours) at 2023 1739  Last data filed at 2023 1724  Gross per 24 hour   Intake 1435.19 ml   Output 2750 ml   Net -1314.81 ml       Patient Vitals for the past 96 hrs (Last 3 readings):   Weight   23 1300 (!) 147.8 kg (325 lb 13.4 oz)   23 0949 (!) 149.1 kg (328 lb 11.3 oz)         Physical Exam:  GENERAL APPEARANCE: Lethargic, responsive to verbal stimuli  HEAD: normocephalic  EYES:  EOMI. Not pale, anicteric   NOSE:  No nasal discharge.    CARDIAC: Normal S1 and S2. No S3, S4 or murmurs. Rhythm is regular.  LUNGS:  diminished breath sounds.  No accessory muscle use  NECK: Neck supple, non-tender   GI-soft nontender  MUSKULOSKELETAL: Adequately aligned spine. No joint erythema or tenderness.   EXTREMITIES: ++ edema.  Bilateral lower extremity edema swelling  NEURO: Nonfocal      Labs:   CBC:  Recent Labs     23  0555 23  0605   WBC 17.8* 15.8*   RBC 3.28* 3.12*   HGB 9.7* 9.1*   HCT 29.9* 29.1*   MCV 90.9 93.5   MCH 29.6 29.3   MCHC 32.5 31.4   RDW 15.1* 15.8*    357   MPV 8.3 8.8        BMP:   Recent Labs     23  1145 23  0555 23  0605   * 135 135   K 4.3 4.1 4.0   CL 97* 98

## 2023-12-24 NOTE — PROGRESS NOTES
[facility] PULMONARY,CRITICAL CARE & SLEEP   Fabrizio Aparicio MD/Karl Crowder MD/Homero OBANDO AGACNP-BC, NP-C      Angela OBANDO NP-C     Joie OBANDO NP-C                                           Pulmonary Progress Note    Patient - Elaina Champagne   Age - 73 y.o.   - 1950  MRN - [id number]  [id number]  Date of Admission - 2023 11:42 AM    Consulting Service/Physician:       Primary Care Physician: Robin Ly MD    SUBJECTIVE:     Chief Complaint:   Chief Complaint   Patient presents with    Leg Pain     Left      Fatigue     Subjective:    She has been doing fairly well, has not required any oxygen.  Her lower extremity does not look as erythematic as it had been, still with some weeping.  She denies any cough or shortness of breath.  Chest x-ray yesterday still showed some vascular congestion, she is still getting diuresed per nephrology.  No further hemodialysis required, dialysis catheter to be removed today.    VITALS  BP (!) 113/47   Pulse 84   Temp 98.3 °F (36.8 °C) (Oral)   Resp 18   Ht 1.575 m (5' 2\")   Wt (!) 147.8 kg (325 lb 13.4 oz)   SpO2 (P) 95%   BMI 59.60 kg/m²   Wt Readings from Last 3 Encounters:   23 (!) 147.8 kg (325 lb 13.4 oz)   23 (!) 137 kg (302 lb)   23 (!) 138.3 kg (305 lb)     I/O (24 Hours)    Intake/Output Summary (Last 24 hours) at 2023 1333  Last data filed at 2023 1132  Gross per 24 hour   Intake 1435.19 ml   Output 2000 ml   Net -564.81 ml     Ventilator:      Exam:   Physical Exam   Constitutional:  Oriented to person, place, and time.  Lying in bed, very appropriate, not in any distress  HENT: Unremarkable  Head: Normocephalic and atraumatic.   Eyes: EOM are normal. Pupils are equal, round, and reactive to light.   Neck: Neck supple.   Cardiovascular:  Regular rate and rhythm.  Normal heart tones.  No JVD.    Pulmonary/Chest: Lung sounds clear anteriorly,  acetaminophen (TYLENOL) suppository 650 mg, 650 mg, Rectal, Q6H PRN, David Yang MD    Lab Results:     Lab Results   Component Value Date    WBC 15.8 (H) 12/24/2023    HGB 9.1 (L) 12/24/2023    HCT 29.1 (L) 12/24/2023    MCV 93.5 12/24/2023     12/24/2023     Lab Results   Component Value Date    CALCIUM 8.3 (L) 12/24/2023     12/24/2023    K 4.0 12/24/2023    CO2 28 12/24/2023    CL 99 12/24/2023    BUN 37 (H) 12/24/2023    CREATININE 0.8 12/24/2023       Lab Results   Component Value Date    INR 1.1 12/18/2023    PROTIME 14.8 (H) 12/18/2023       Radiology:      12/23/23 12/19/23  Chest x-ray still shows mild vascular congestion  ASSESSMENT:       Sepsis secondary to cellulitis, positive wound culture with Pseudomonas  Pseudomonas bacteremia  BLE cellulitis - + pseudomonas aeruginosa and enterococcus faecalis  Acute kidney injury requiring hemodialysis, secondary to ischemic acute tubular necrosis due to hypotension and sepsis  New onset A-fib with RVR, now controlled, amiodarone has been discontinued, on heparin  Lactic acidosis, resolved  Acute hypoxic respiratory insufficiency -resolved now on room air  Acute on Chronic diastolic heart failure  PAD  Venous stasis/lymphedema  Suspect obstructive sleep apnea  Morbid obesity with BMI 56.9  Full code  PLAN:   Pulmonary wise has remained stable  Hemodialysis catheter to be removed after they replace an IV  Diuresis per nephrology, would keep her on the dry side  Antibiotics per ID  Wound care  Has not required oxygen in several days  Nothing further for us to add at this time, we will sign off  Please call us back if we can be of any further assistance  Discussed with family at bedside  Discussed with RN      Electronically signed by TOPHER Miguel CNP on 12/24/23     This progress note was completed using a voice transcription system. Every effort was made to ensure accuracy. However,

## 2023-12-24 NOTE — PROGRESS NOTES
lymphedema, holding wound care, previous wound culture was growing Klebsiella and MSSA, starting patient on cefepime, Zyvox,  Ordering venous scan, patient had arterial scan done recently suggestive of peripheral artery disease affecting left leg, ID consult, wound care consult  MICHELLE, holding telmisartan, diuretics starting patient on gentle hydration, will monitor creatinine closely, ultrasound kidneys done in emergency room was negative for hydronephrosis, nephrology consult  Heparin for DVT prophylaxis  Ordering Doppler for both legs to rule out DVT  Morbid obesity with BMI of 55  Dec 15  Patient was hypotensive on the floor hence transferred to ICU did not require any pressors fluid resuscitation  Extensive cellulitis with wounds in the back of the leg patient refused to get the DVT scan done today vascular input pending broad-spectrum IV antibiotics infectious disease input requested      Dec 21  Seen examined face-to-face,  Blood culture positive for Pseudomonas gram-negative sepsis IV cefepime and Zyvox  Infectious disease on board  Patient currently receiving hemodialysis with a right Melquiades catheter  is present in the room  Patient is critically ill   A-fib with RVR, now rate controlled,  Was on amiodarone, cardiology discontinued amiodarone, starting on metoprolol,  On heparin now, will stop for procedure   End-stage renal disease on hemodialysis,  Overall prognosis very poor  Foot blisters   Advised to get podiatry by ID   Overall prognosis poor   Code status discussed with pt and  at length   They both want to change code status to DNRCCA   Changed and signed   Dialysis cath soon       12/22/23    Patient admitted with cellulitis, source and back of both legs, with lymphedema, holding wound care, previous wound culture was growing Klebsiella and MSSA, starting patient on cefepime, Zyvox,   Atrial fibrillation the rate is controlled now  The planned to have new tunneled Catheter has been  held  Her creatinine was 0.8 yesterday  She tolerated hemofiltration  Bilateral leg cellulitis persists  Had bacteremia MSSA and Klebsiella  Chills are stable  She was a little confused when she woke up but at present is coherent and alert  Afebrile  Sugars okay  Continue current therapy        12/23/23  Patient continues to be doing fairly  Being treated for sepsis  Vitals and labs reviewed  Will continue current therapy    Consultations:   IP CONSULT TO HOSPITALIST  IP CONSULT TO INFECTIOUS DISEASES  IP CONSULT TO NEUROLOGY  IP CONSULT TO CRITICAL CARE  IP CONSULT TO CARDIOLOGY  IP CONSULT TO PODIATRY    Patient is admitted as inpatient status because of co-morbidities listed above, severity of signs and symptoms as outlined, requirement for current medical therapies and most importantly because of direct risk to patient if care not provided in a hospital setting.    Srini Manley MD  12/24/2023  12:45 PM    Copy sent to Dr. Ly, Robin Olivo MD    Please note that this chart was generated using voice recognition Dragon dictation software.  Although every effort was made to ensure the accuracy of this automated transcription, some errors in transcription may have occurred.

## 2023-12-24 NOTE — PROGRESS NOTES
Pt's overall skin health is very poor. Despite inner dry and fungal powder, skin has worsened from 12/23 to 12/24. Pt's PO intake has been poor. Eating <25% of meals. She is very limited with movement and quite painful with attempting at turning. She is being turned frequently. The wounds to her legs have not improved despite interventions as well. Her R foot remains red with fluid-filled blisters. Dressings changed to her legs with noticeable drainage seeping out on the R.

## 2023-12-24 NOTE — PROGRESS NOTES
[facility] -   Infectious diseases evaluation  admission date 12/14/2023    reason for consultation:   Cellulitis    Impression :   Current:  Cellulitis bilateral lower extremity. Wound cx grew Pseudomonas aeruginosa sensitive to Levaquin and cefepime and Enterococcus faecalis sensitive to ampicillin  Pseudomonas Aeruginosa bacteremia sensitive to Levaquin and cefepime.  Sepsis secondary to above  Mild ulcers, possible HSV  Left posterior tibial wound  Peripheral arterial disease  Acute on chronic renal failure.  New onset HD, discontinued.  Obesity  History of lymphedema venous stasis insufficiency  Hyperlipidemia  Penicillin allergy-Hives.    Recommendations   Zyvox course completed 12/19/2023  IV cefepime dose adjusted to renal function through 12/30/2023  Midline on discharge if okay with nephrology  P.o. Valtrex to finish 1 week course  Left lower extremity CT showed no fluid collection  Follow renal function closely.  Repeat blood cultures from 12/15/2023 still negative  On IV Lasix  Wound care  Supportive care  Hemodialysis catheter to be removed.        Infection Control Recommendations   [place] Precautions    Antimicrobial Stewardship Recommendations   Simplification of therapy  Targeted therapy    History of Present Illness:   Initial history:  Elaina Champagne is a 73 y.o.-year-old female was sent to the hospital from wound care clinic for severe left leg pain associated with generalized fatigue.  The patient had swelling and redness of the lower extremities bilaterally with open wound to the posterior aspect of the right leg.  She is poor historian, afebrile, denied nausea or vomiting, no diarrhea, no cough or shortness of breath, no other complaints.  Initial labs showed WBC of 22, elevated lactic acid and creatinine of 3.8.  Arterial Doppler showed left PHILL of 0.68    12/14            Interval changes  12/24/2023   She is afebrile, mouth ulcers  improving, less swelling and redness to the legs, right foot blisters improving, no new complaints.  Echocardiogram showed dilated IVC with elevated right atrial filling pressure EF greater than 65% severely increased LV wall thickness, small LV cavity   Temp HD catheter placed 12/15/23.    Micro:  12/14 Blood Cx x2 - 2/2 pseudomonas aeruginosa  12/15 Blood Cx x2 - no growth to date;   12/15 Wound Cx - pseudomonas aeruginosa & enterococcus faecalis  12/15 Urine Cx - no growth    Patient Vitals for the past 8 hrs:   BP Temp Temp src Pulse Resp SpO2   12/24/23 1730 (!) 131/50 98.7 °F (37.1 °C) Oral 90 19 95 %   12/24/23 1349 (!) 148/63 98.6 °F (37 °C) Oral 89 18 99 %             I have personally reviewed the past medical history, past surgical history, medications, social history, and family history, and I haveupdated the database accordingly.      Allergies:   Penicillins     Review of Systems:     Review of Systems   All other systems reviewed and are negative.      Physical Examination :       Physical Exam  Vitals and nursing note reviewed.   Constitutional:       General: She is not in acute distress.     Appearance: Normal appearance. She is obese.   HENT:      Head: Normocephalic and atraumatic.      Right Ear: External ear normal.      Left Ear: External ear normal.      Nose: Nose normal.      Mouth/Throat:      Comments: Several mouth ulcers  Eyes:      General:         Right eye: No discharge.         Left eye: No discharge.   Neck:      Comments: R IJ temporary HD catheter site clean.  Cardiovascular:      Rate and Rhythm: Normal rate and regular rhythm.      Heart sounds: Normal heart sounds.   Pulmonary:      Effort: Pulmonary effort is normal. No respiratory distress.      Comments: 2 L NC  Abdominal:      General: Abdomen is flat. There is no distension.   Genitourinary:     Comments: No tierney.  Musculoskeletal:      Cervical back: Neck supple. No rigidity.      Right lower leg: Edema present.

## 2023-12-24 NOTE — PLAN OF CARE
Problem: Discharge Planning  Goal: Discharge to home or other facility with appropriate resources  Outcome: Progressing     Problem: Safety - Adult  Goal: Free from fall injury  Outcome: Progressing     Problem: Pain  Goal: Verbalizes/displays adequate comfort level or baseline comfort level  Outcome: Progressing     Problem: Skin/Tissue Integrity  Goal: Absence of new skin breakdown  Description: 1.  Monitor for areas of redness and/or skin breakdown  2.  Assess vascular access sites hourly  3.  Every 4-6 hours minimum:  Change oxygen saturation probe site  4.  Every 4-6 hours:  If on nasal continuous positive airway pressure, respiratory therapy assess nares and determine need for appliance change or resting period.  Outcome: Not Progressing  Note: General skin health is poor. Pt has multiple skin areas that are open. Nutrition is poor.     Problem: ABCDS Injury Assessment  Goal: Absence of physical injury  Outcome: Progressing     Problem: Nutrition Deficit:  Goal: Optimize nutritional status  Outcome: Not Progressing  Flowsheets (Taken 12/24/2023 1711)  Nutrient intake appropriate for improving, restoring, or maintaining nutritional needs:   Assess nutritional status and recommend course of action   Monitor oral intake, labs, and treatment plans  Note: PO intake remains poor     Problem: Respiratory - Adult  Goal: Achieves optimal ventilation and oxygenation  Outcome: Progressing     Problem: Cardiovascular - Adult  Goal: Maintains optimal cardiac output and hemodynamic stability  Outcome: Progressing     Problem: Cardiovascular - Adult  Goal: Absence of cardiac dysrhythmias or at baseline  Outcome: Progressing     Problem: Musculoskeletal - Adult  Goal: Return mobility to safest level of function  Outcome: Not Progressing  Note: Pt has poor strength and cannot even turn without assistance     Problem: Infection - Adult  Goal: Absence of infection during hospitalization  Outcome: Progressing     Problem:

## 2023-12-25 LAB
ANION GAP SERPL CALCULATED.3IONS-SCNC: 11 MMOL/L (ref 9–17)
ANTI-XA UNFRAC HEPARIN: 0.32 IU/L (ref 0.3–0.7)
BASOPHILS # BLD: 0.18 K/UL (ref 0–0.2)
BASOPHILS NFR BLD: 1 % (ref 0–2)
BUN SERPL-MCNC: 33 MG/DL (ref 8–23)
CALCIUM SERPL-MCNC: 8.3 MG/DL (ref 8.6–10.4)
CHLORIDE SERPL-SCNC: 97 MMOL/L (ref 98–107)
CO2 SERPL-SCNC: 26 MMOL/L (ref 20–31)
CREAT SERPL-MCNC: 0.9 MG/DL (ref 0.5–0.9)
DATE, STOOL #1: ABNORMAL
EOSINOPHIL # BLD: 0 K/UL (ref 0–0.4)
EOSINOPHILS RELATIVE PERCENT: 0 % (ref 0–4)
ERYTHROCYTE [DISTWIDTH] IN BLOOD BY AUTOMATED COUNT: 15 % (ref 11.5–14.9)
GFR SERPL CREATININE-BSD FRML MDRD: >60 ML/MIN/1.73M2
GLUCOSE BLD-MCNC: 102 MG/DL (ref 65–105)
GLUCOSE BLD-MCNC: 102 MG/DL (ref 65–105)
GLUCOSE BLD-MCNC: 75 MG/DL (ref 65–105)
GLUCOSE BLD-MCNC: 90 MG/DL (ref 65–105)
GLUCOSE SERPL-MCNC: 102 MG/DL (ref 70–99)
HCT VFR BLD AUTO: 29 % (ref 36–46)
HEMOCCULT SP1 STL QL: POSITIVE
HGB BLD-MCNC: 9.5 G/DL (ref 12–16)
LACTATE BLDV-SCNC: 2.4 MMOL/L (ref 0.5–2.2)
LYMPHOCYTES NFR BLD: 1.07 K/UL (ref 1–4.8)
LYMPHOCYTES RELATIVE PERCENT: 6 % (ref 24–44)
MCH RBC QN AUTO: 29.5 PG (ref 26–34)
MCHC RBC AUTO-ENTMCNC: 32.6 G/DL (ref 31–37)
MCV RBC AUTO: 90.5 FL (ref 80–100)
MONOCYTES NFR BLD: 1.07 K/UL (ref 0.1–1.3)
MONOCYTES NFR BLD: 6 % (ref 1–7)
MORPHOLOGY: ABNORMAL
NEUTROPHILS NFR BLD: 87 % (ref 36–66)
NEUTS SEG NFR BLD: 15.58 K/UL (ref 1.3–9.1)
PLATELET # BLD AUTO: 537 K/UL (ref 150–450)
PMV BLD AUTO: 8.3 FL (ref 6–12)
POTASSIUM SERPL-SCNC: 4.3 MMOL/L (ref 3.7–5.3)
RBC # BLD AUTO: 3.2 M/UL (ref 4–5.2)
SODIUM SERPL-SCNC: 134 MMOL/L (ref 135–144)
TIME, STOOL #1: ABNORMAL
WBC OTHER # BLD: 17.9 K/UL (ref 3.5–11)

## 2023-12-25 PROCEDURE — 2580000003 HC RX 258: Performed by: INTERNAL MEDICINE

## 2023-12-25 PROCEDURE — 6370000000 HC RX 637 (ALT 250 FOR IP): Performed by: INTERNAL MEDICINE

## 2023-12-25 PROCEDURE — 83605 ASSAY OF LACTIC ACID: CPT

## 2023-12-25 PROCEDURE — 80048 BASIC METABOLIC PNL TOTAL CA: CPT

## 2023-12-25 PROCEDURE — 6360000002 HC RX W HCPCS: Performed by: INTERNAL MEDICINE

## 2023-12-25 PROCEDURE — 85520 HEPARIN ASSAY: CPT

## 2023-12-25 PROCEDURE — 2060000000 HC ICU INTERMEDIATE R&B

## 2023-12-25 PROCEDURE — 85025 COMPLETE CBC W/AUTO DIFF WBC: CPT

## 2023-12-25 PROCEDURE — 87040 BLOOD CULTURE FOR BACTERIA: CPT

## 2023-12-25 PROCEDURE — 36415 COLL VENOUS BLD VENIPUNCTURE: CPT

## 2023-12-25 PROCEDURE — 97530 THERAPEUTIC ACTIVITIES: CPT

## 2023-12-25 PROCEDURE — 99232 SBSQ HOSP IP/OBS MODERATE 35: CPT | Performed by: INTERNAL MEDICINE

## 2023-12-25 PROCEDURE — 82270 OCCULT BLOOD FECES: CPT

## 2023-12-25 PROCEDURE — 82947 ASSAY GLUCOSE BLOOD QUANT: CPT

## 2023-12-25 PROCEDURE — 99233 SBSQ HOSP IP/OBS HIGH 50: CPT | Performed by: INTERNAL MEDICINE

## 2023-12-25 RX ADMIN — CEFEPIME 2000 MG: 2 INJECTION, POWDER, FOR SOLUTION INTRAVENOUS at 09:19

## 2023-12-25 RX ADMIN — COLLAGENASE SANTYL: 250 OINTMENT TOPICAL at 09:14

## 2023-12-25 RX ADMIN — METOPROLOL TARTRATE 25 MG: 25 TABLET, FILM COATED ORAL at 09:14

## 2023-12-25 RX ADMIN — HEPARIN SODIUM 14 UNITS/KG/HR: 10000 INJECTION, SOLUTION INTRAVENOUS at 05:17

## 2023-12-25 RX ADMIN — FUROSEMIDE 40 MG: 10 INJECTION, SOLUTION INTRAMUSCULAR; INTRAVENOUS at 09:12

## 2023-12-25 RX ADMIN — ACYCLOVIR 400 MG: 200 CAPSULE ORAL at 13:32

## 2023-12-25 RX ADMIN — ACYCLOVIR 400 MG: 200 CAPSULE ORAL at 09:14

## 2023-12-25 RX ADMIN — ACYCLOVIR 400 MG: 200 CAPSULE ORAL at 21:40

## 2023-12-25 RX ADMIN — ANTI-FUNGAL POWDER MICONAZOLE NITRATE TALC FREE: 1.42 POWDER TOPICAL at 09:14

## 2023-12-25 RX ADMIN — METOPROLOL TARTRATE 25 MG: 25 TABLET, FILM COATED ORAL at 21:39

## 2023-12-25 RX ADMIN — ESCITALOPRAM OXALATE 20 MG: 20 TABLET ORAL at 09:14

## 2023-12-25 RX ADMIN — RIVAROXABAN 20 MG: 20 TABLET, FILM COATED ORAL at 18:25

## 2023-12-25 RX ADMIN — ACETAMINOPHEN 650 MG: 325 TABLET ORAL at 12:18

## 2023-12-25 RX ADMIN — ANTI-FUNGAL POWDER MICONAZOLE NITRATE TALC FREE: 1.42 POWDER TOPICAL at 21:41

## 2023-12-25 RX ADMIN — PANTOPRAZOLE SODIUM 40 MG: 40 TABLET, DELAYED RELEASE ORAL at 05:28

## 2023-12-25 RX ADMIN — SENNOSIDES AND DOCUSATE SODIUM 2 TABLET: 50; 8.6 TABLET ORAL at 09:14

## 2023-12-25 RX ADMIN — ASPIRIN 81 MG: 81 TABLET, COATED ORAL at 09:14

## 2023-12-25 ASSESSMENT — PAIN DESCRIPTION - ORIENTATION: ORIENTATION: LEFT;RIGHT

## 2023-12-25 ASSESSMENT — PAIN SCALES - GENERAL
PAINLEVEL_OUTOF10: 1
PAINLEVEL_OUTOF10: 5

## 2023-12-25 ASSESSMENT — PAIN DESCRIPTION - LOCATION: LOCATION: LEG

## 2023-12-25 ASSESSMENT — PAIN DESCRIPTION - DESCRIPTORS: DESCRIPTORS: SHARP;STABBING

## 2023-12-25 ASSESSMENT — PAIN - FUNCTIONAL ASSESSMENT: PAIN_FUNCTIONAL_ASSESSMENT: PREVENTS OR INTERFERES WITH MANY ACTIVE NOT PASSIVE ACTIVITIES

## 2023-12-25 NOTE — PROGRESS NOTES
many bacteria.    Urine culture is positive for moderate gram-negative rods, few gram-positive cocci in pairs, rare gram-positive cocci in clusters.Likely contaminated urine sample    Patient received IV normal saline    Past Medical History:        Diagnosis Date    Bell's palsy     Cellulitis     Hypertension        Past Surgical History:        Procedure Laterality Date    CATARACT REMOVAL Bilateral 2015    HYSTERECTOMY, TOTAL ABDOMINAL (CERVIX REMOVED)  1990    INCISION AND DRAINAGE Left 07/06/2017    LEG INCISION AND DRAINAGE ABSCESS performed by Isaiah Casey MD at [facility] OR    IR NONTUNNELED VASCULAR CATHETER  12/15/2023    IR NONTUNNELED VASCULAR CATHETER 12/15/2023 [facility] SPECIAL PROCEDURES    KNEE ARTHROPLASTY Right 09/24/2018    TOTAL KNEE ARTHROPLASTY Left 11/09/2017    VENTRAL HERNIA REPAIR  02/11/2019    5 hernias       Current Medications:    ceFEPIme (MAXIPIME) 2,000 mg in sodium chloride 0.9 % 100 mL IVPB (mini-bag), Q8H  sennosides-docusate sodium (SENOKOT-S) 8.6-50 MG tablet 2 tablet, Daily  magic (miracle) mouthwash, 4x Daily PRN  acyclovir (ZOVIRAX) capsule 400 mg, TID  furosemide (LASIX) injection 40 mg, Daily  melatonin tablet 3 mg, Nightly PRN  albumin human 25% IV solution 25 g, PRN  midodrine (PROAMATINE) tablet 5 mg, PRN  guaiFENesin (ROBITUSSIN) 100 MG/5ML liquid 200 mg, Q4H PRN  glucose chewable tablet 16 g, PRN  dextrose bolus 10% 125 mL, PRN   Or  dextrose bolus 10% 250 mL, PRN  glucagon injection 1 mg, PRN  dextrose 10 % infusion, Continuous PRN  miconazole (MICOTIN) 2 % powder, BID  collagenase ointment, Daily  anticoagulant sodium citrate 4 % injection 1.4 mL, PRN  anticoagulant sodium citrate 4 % injection 1.5 mL, PRN  aspirin EC tablet 81 mg, Daily  escitalopram (LEXAPRO) tablet 20 mg, Daily  metoprolol tartrate (LOPRESSOR) tablet 25 mg, BID  pantoprazole (PROTONIX) tablet 40 mg, QAM AC  sodium chloride flush 0.9 % injection 5-40 mL, 2 times per day  sodium chloride flush 0.9 %    Aware she is for potassium    Objective:  CURRENT TEMPERATURE:  Temp: 98.8 °F (37.1 °C)  MAXIMUM TEMPERATURE OVER 24HRS:  Temp (24hrs), Av.3 °F (37.4 °C), Min:98.5 °F (36.9 °C), Max:101.9 °F (38.8 °C)    CURRENT RESPIRATORY RATE:  Respirations: 18  CURRENT PULSE:  Pulse: 98  CURRENT BLOOD PRESSURE:  BP: 127/75  24HR BLOOD PRESSURE RANGE:  Systolic (24hrs), Av , Min:127 , Max:141   ; Diastolic (24hrs), Av, Min:50, Max:91    24HR INTAKE/OUTPUT:    Intake/Output Summary (Last 24 hours) at 2023 1512  Last data filed at 2023 1250  Gross per 24 hour   Intake 830 ml   Output 2250 ml   Net -1420 ml       No data found.      Physical Exam:  GENERAL APPEARANCE: Lethargic, responsive to verbal stimuli  HEAD: normocephalic  EYES:  EOMI. Not pale, anicteric   NOSE:  No nasal discharge.    CARDIAC: Normal S1 and S2. No S3, S4 or murmurs. Rhythm is regular.  LUNGS:  diminished breath sounds.  No accessory muscle use  NECK: Neck supple, non-tender   GI-soft nontender  MUSKULOSKELETAL: Adequately aligned spine. No joint erythema or tenderness.   EXTREMITIES: ++ edema.  Bilateral lower extremity edema swelling  NEURO: Nonfocal      Labs:   CBC:  Recent Labs     23  0555 23  0605 23  1226   WBC 17.8* 15.8* 17.9*   RBC 3.28* 3.12* 3.20*   HGB 9.7* 9.1* 9.5*   HCT 29.9* 29.1* 29.0*   MCV 90.9 93.5 90.5   MCH 29.6 29.3 29.5   MCHC 32.5 31.4 32.6   RDW 15.1* 15.8* 15.0*    357 537*   MPV 8.3 8.8 8.3        BMP:   Recent Labs     23  0555 23  0605 23  1032    135 134*   K 4.1 4.0 4.3   CL 98 99 97*   CO2 27 28 26   BUN 37* 37* 33*   CREATININE 0.9 0.8 0.9   GLUCOSE 95 90 102*   CALCIUM 8.3* 8.3* 8.3*        Phosphorus:  No results for input(s): \"PHOS\" in the last 72 hours.  Magnesium:   No results for input(s): \"MG\" in the last 72 hours.    Albumin:   No results for input(s): \"LABALBU\" in the last 72 hours.      IRON:  No results for input(s): \"IRON\" in the last 72

## 2023-12-25 NOTE — CARE COORDINATION
ONGOING DISCHARGE PLAN:    Writer reviewed Chart Notes.     Plan is to DC to [facility].     Will need 4-5 hour notice, prior to return for Bariatric Bed.     Per Nephro notes, Renal function improving. Does not require a tunneled cath. Melquiades to be removed today.    Per ID, Midline, if OK w/ Nephro. Will need IV Cefepime through 12/30.      Will continue to follow for additional discharge needs.    If patient is discharged prior to next notation, then this note serves as note for discharge by case management.    Electronically signed by Sujatha Bills RN on 12/25/2023 at 4:21 PM

## 2023-12-25 NOTE — PROGRESS NOTES
Occupational Therapy  [facility]   INPATIENT OCCUPATIONAL THERAPY  PROGRESS NOTE  Date: 2023  Patient Name: Elaina Champagne       Room:   MRN: [id number]    : 1950  (73 y.o.)  Gender: female   Referring Practitioner: David Yang MD  Diagnosis: Hyperkalemia      Discharge Recommendations:  Further Occupational Therapy is recommended upon facility discharge.    OT Equipment Recommendations  Other: TBD    Restrictions/Precautions  Restrictions/Precautions  Restrictions/Precautions: Fall Risk;General Precautions;Up as Tolerated  Required Braces or Orthoses?: No  Implants present? : Metal implants (Bilat TKA)  Position Activity Restriction  Other position/activity restrictions: Up with assistance. Hemodialysis started 23    SpO2: 97 %  O2 Device: None (Room air)  Comment: 1L O2. 94-97% throughout    Subjective  Subjective  Subjective: pt agreeable to participate in therapy as long as there is no pressure on her feet.  Subjective  Pain: Pt reports 10/10 pain in feet.       Objective  Orientation  Overall Orientation Status: Within Functional Limits  Cognition  Overall Cognitive Status: WFL    Activities of Daily Living  ADL  Feeding: Setup  Grooming: Setup  UE Bathing: Minimal assistance  LE Bathing: Maximum assistance  UE Dressing: Minimal assistance  LE Dressing: Dependent/Total  Toileting: Dependent/Total  Additional Comments: ADL scores are based on skilled observation and clinical reasoning unless otherwise noted. Pt is currently limited by increased pain, impaired strength, endurance, balance, and body habitus which impacts the pt's ability to safely and independently complete self-care/mobility  Skin Care: Soap and water    Balance  Balance  Sitting Balance: Stand by assistance (pt declines sitting EOB this date 2* pain.)  Standing Balance: Unable to assess(comment) (pt deferred standing this date due to pain.)    Transfers/Mobility  Bed mobility  Rolling

## 2023-12-25 NOTE — PROGRESS NOTES
Patient was unable to sit for wound dressing change. She refused and wouldn't let me continue, offered support and offered to do it after a dose of tylenol

## 2023-12-25 NOTE — PROGRESS NOTES
[facility] -   Infectious diseases evaluation  admission date 12/14/2023    reason for consultation:   Cellulitis    Impression :   Current:  Cellulitis bilateral lower extremity. Wound cx grew Pseudomonas aeruginosa sensitive to Levaquin and cefepime and Enterococcus faecalis sensitive to ampicillin  Pseudomonas Aeruginosa bacteremia sensitive to Levaquin and cefepime.  Sepsis secondary to above  Mild ulcers, possible HSV  Left posterior tibial wound  Peripheral arterial disease  Acute on chronic renal failure.  New onset HD, discontinued.  Obesity  History of lymphedema venous stasis insufficiency  Hyperlipidemia  Penicillin allergy-Hives.    Recommendations   Zyvox course completed 12/19/2023  IV cefepime  through 12/30/2023  Midline on discharge if okay with nephrology  P.o. Valtrex to finish 1 week course  Left lower extremity CT showed no fluid collection  Follow renal function closely.  Repeat blood cultures from 12/15/2023 negative  On IV Lasix  Wound care  Supportive care  Hemodialysis catheter to be removed.  Discussed with Dr. Manley and the case management        Infection Control Recommendations   [place] Precautions    Antimicrobial Stewardship Recommendations   Simplification of therapy  Targeted therapy    History of Present Illness:   Initial history:  Elaina Champagne is a 73 y.o.-year-old female was sent to the hospital from wound care clinic for severe left leg pain associated with generalized fatigue.  The patient had swelling and redness of the lower extremities bilaterally with open wound to the posterior aspect of the right leg.  She is poor historian, afebrile, denied nausea or vomiting, no diarrhea, no cough or shortness of breath, no other complaints.  Initial labs showed WBC of 22, elevated lactic acid and creatinine of 3.8.  Arterial Doppler showed left PHILL of 0.68    12/14            Interval changes  12/25/2023   She is afebrile, mouth  hours.  No results for input(s): \"HIV\" in the last 72 hours.  No results for input(s): \"BC\" in the last 72 hours.  Lab Results   Component Value Date/Time    CREATININE 0.8 12/24/2023 06:05 AM    GLUCOSE 90 12/24/2023 06:05 AM    GLUCOSE 91 09/19/2018 11:53 AM   CRP: 464.3    Detailed results:        Thank you for allowing us to participate in the care of this patient.Please call with questions.    This note is created with the assistance of a speech recognition program.  While intending to generate adocument that actually reflects the content of the visit, the document can still have some errors including those of syntax and sound a like substitutions which may escape proof reading.  It such instances, actual meaningcan be extrapolated by contextual diversion.    Jorge Luong MD  Office: [phone]  Perfect serve / office [phone]

## 2023-12-25 NOTE — PROGRESS NOTES
Pharmacy Consult      Elaina Champagne is a 73 y.o. female for whom pharmacy has been consulted to dose rivaroxaban for A fib.    Patient Active Problem List   Diagnosis    Depression with anxiety    Eczema    Edema    Essential (primary) hypertension    Hyperlipidemia    Morbid obesity (HCC)    Osteoarthritis    Rosacea    Urge incontinence of urine    Varicose veins    Paroxysmal atrial fibrillation (HCC)    Non-rheumatic tricuspid valve insufficiency    Venous insufficiency of both lower extremities    Lymphedema    Venous stasis ulcer of calf with fat layer exposed with varicose veins (HCC)    Venous ulcer of left leg (HCC)    Hyperkalemia    MICHELLE (acute kidney injury) (HCC)    Cellulitis    Bacteremia due to Pseudomonas    Bacteriuria    Leukocytosis    Elevated C-reactive protein (CRP)    Allergy to penicillin    Atrial fibrillation with rapid ventricular response (HCC)    Altered mental status    Elevated erythrocyte sedimentation rate    Permanent atrial fibrillation (HCC)    Renovascular hypertension       Allergies:  Penicillins     Recent Labs     12/24/23  0605 12/25/23  1032   CREATININE 0.8 0.9       Ht/Wt:   Ht Readings from Last 1 Encounters:   12/15/23 1.575 m (5' 2\")        Wt Readings from Last 1 Encounters:   12/21/23 (!) 147.8 kg (325 lb 13.4 oz)         Estimated Creatinine Clearance: 80 mL/min (based on SCr of 0.9 mg/dL).    Assessment/Plan:  Patient no longer requiring hemodialysis, so will dose standard dose for A fib at 20 mg daily.      Thank you for the consult.  Will continue to follow.    HEIDI Bruno.Ph.  12/25/2023  3:28 PM

## 2023-12-25 NOTE — PLAN OF CARE
Problem: Skin/Tissue Integrity  Goal: Absence of new skin breakdown  Description: 1.  Monitor for areas of redness and/or skin breakdown  2.  Assess vascular access sites hourly  3.  Every 4-6 hours minimum:  Change oxygen saturation probe site  4.  Every 4-6 hours:  If on nasal continuous positive airway pressure, respiratory therapy assess nares and determine need for appliance change or resting period.  Outcome: Not Progressing  Note: Patients skin has not improved. Pt refusing turns despite educating patient on need for turning.      Problem: Nutrition Deficit:  Goal: Optimize nutritional status  Outcome: Not Progressing  Note: Patient eats less than <25% of meals.      Problem: Discharge Planning  Goal: Discharge to home or other facility with appropriate resources  Outcome: Progressing     Problem: Safety - Adult  Goal: Free from fall injury  Outcome: Progressing     Problem: Pain  Goal: Verbalizes/displays adequate comfort level or baseline comfort level  Outcome: Progressing     Problem: ABCDS Injury Assessment  Goal: Absence of physical injury  Outcome: Progressing     Problem: Respiratory - Adult  Goal: Achieves optimal ventilation and oxygenation  Outcome: Progressing     Problem: Cardiovascular - Adult  Goal: Maintains optimal cardiac output and hemodynamic stability  Outcome: Progressing  Goal: Absence of cardiac dysrhythmias or at baseline  Outcome: Progressing     Problem: Musculoskeletal - Adult  Goal: Return mobility to safest level of function  Outcome: Progressing     Problem: Infection - Adult  Goal: Absence of infection during hospitalization  Outcome: Progressing     Problem: Metabolic/Fluid and Electrolytes - Adult  Goal: Electrolytes maintained within normal limits  Outcome: Progressing     Problem: Confusion  Goal: Confusion, delirium, dementia, or psychosis is improved or at baseline  Description: INTERVENTIONS:  1. Assess for possible contributors to thought disturbance, including  medications, impaired vision or hearing, underlying metabolic abnormalities, dehydration, psychiatric diagnoses, and notify attending LIP  2. [place] high risk fall precautions, as indicated  3. Provide frequent short contacts to provide reality reorientation, refocusing and direction  4. Decrease environmental stimuli, including noise as appropriate  5. Monitor and intervene to maintain adequate nutrition, hydration, elimination, sleep and activity  6. If unable to ensure safety without constant attention obtain sitter and review sitter guidelines with assigned personnel  7. Initiate Psychosocial CNS and Spiritual Care consult, as indicated  Outcome: Progressing     Problem: Skin/Tissue Integrity  Goal: Absence of new skin breakdown  Description: 1.  Monitor for areas of redness and/or skin breakdown  2.  Assess vascular access sites hourly  3.  Every 4-6 hours minimum:  Change oxygen saturation probe site  4.  Every 4-6 hours:  If on nasal continuous positive airway pressure, respiratory therapy assess nares and determine need for appliance change or resting period.  Outcome: Not Progressing  Note: Patients skin has not improved. Pt refusing turns despite educating patient on need for turning.      Problem: Nutrition Deficit:  Goal: Optimize nutritional status  Outcome: Not Progressing  Note: Patient eats less than <25% of meals.

## 2023-12-25 NOTE — PROGRESS NOTES
discontinued amiodarone, starting on metoprolol,  On heparin now, will stop for procedure   End-stage renal disease on hemodialysis,  Overall prognosis very poor  Foot blisters   Advised to get podiatry by ID   Overall prognosis poor   Code status discussed with pt and  at length   They both want to change code status to DNRCCA   Changed and signed   Dialysis cath soon       12/22/23    Patient admitted with cellulitis, source and back of both legs, with lymphedema, holding wound care, previous wound culture was growing Klebsiella and MSSA, starting patient on cefepime, Zyvox,   Atrial fibrillation the rate is controlled now  The planned to have new tunneled Catheter has been held  Her creatinine was 0.8 yesterday  She tolerated hemofiltration  Bilateral leg cellulitis persists  Had bacteremia MSSA and Klebsiella  Chills are stable  She was a little confused when she woke up but at present is coherent and alert  Afebrile  Sugars okay  Continue current therapy        12/23/23  Patient continues to be doing fairly  Being treated for sepsis  Vitals and labs reviewed  Will continue current therapy    12/24/23    Patient is afebrile vitals are stable   Labs reviewed blood sugar and renal function normal  Last hemoglobin 9.1 WBC count 15,800  Patient is on Zyvox which has been completed cefepime continue patient is also on p.o. Valtrex          12/25/23    Cellulitis bilateral lower extremity. Wound cx grew Pseudomonas aeruginosa sensitive to Levaquin and cefepime and Enterococcus faecalis sensitive to ampicillin  Pseudomonas Aeruginosa bacteremia sensitive to Levaquin and cefepime.  Sepsis secondary to above  Mild ulcers, possible HSV  Left posterior tibial wound  Peripheral arterial disease   On cefepime till 12/30/23  Zyvox completed.  Patient had A-fib with rapid ventricular response on admission is on heparin drip  Will discontinue heparin  Patient having trouble with IV access  And will request pharmacy to  dose Xarelto          nsultations:   IP CONSULT TO HOSPITALIST  IP CONSULT TO INFECTIOUS DISEASES  IP CONSULT TO NEUROLOGY  IP CONSULT TO CRITICAL CARE  IP CONSULT TO CARDIOLOGY  IP CONSULT TO PODIATRY    Patient is admitted as inpatient status because of co-morbidities listed above, severity of signs and symptoms as outlined, requirement for current medical therapies and most importantly because of direct risk to patient if care not provided in a hospital setting.    Srini Manley MD  12/25/2023  10:06 AM    Copy sent to Dr. Ly, Robin Olivo MD    Please note that this chart was generated using voice recognition Dragon dictation software.  Although every effort was made to ensure the accuracy of this automated transcription, some errors in transcription may have occurred.

## 2023-12-26 LAB
ANTI-XA UNFRAC HEPARIN: >1.1 IU/L (ref 0.3–0.7)
BASOPHILS # BLD: 0.1 K/UL (ref 0–0.2)
BASOPHILS NFR BLD: 0 % (ref 0–2)
EOSINOPHIL # BLD: 0.1 K/UL (ref 0–0.4)
EOSINOPHILS RELATIVE PERCENT: 1 % (ref 0–4)
ERYTHROCYTE [DISTWIDTH] IN BLOOD BY AUTOMATED COUNT: 14.9 % (ref 11.5–14.9)
ERYTHROCYTE [DISTWIDTH] IN BLOOD BY AUTOMATED COUNT: 15.6 % (ref 11.5–14.9)
GLUCOSE BLD-MCNC: 102 MG/DL (ref 65–105)
GLUCOSE BLD-MCNC: 113 MG/DL (ref 65–105)
GLUCOSE BLD-MCNC: 93 MG/DL (ref 65–105)
GLUCOSE BLD-MCNC: 97 MG/DL (ref 65–105)
HCT VFR BLD AUTO: 24.6 % (ref 36–46)
HCT VFR BLD AUTO: 25.6 % (ref 36–46)
HCT VFR BLD AUTO: 25.6 % (ref 36–46)
HCT VFR BLD AUTO: 27 % (ref 36–46)
HGB BLD-MCNC: 8.1 G/DL (ref 12–16)
HGB BLD-MCNC: 8.2 G/DL (ref 12–16)
HGB BLD-MCNC: 8.2 G/DL (ref 12–16)
HGB BLD-MCNC: 8.7 G/DL (ref 12–16)
LYMPHOCYTES NFR BLD: 1 K/UL (ref 1–4.8)
LYMPHOCYTES RELATIVE PERCENT: 8 % (ref 24–44)
MCH RBC QN AUTO: 29.3 PG (ref 26–34)
MCH RBC QN AUTO: 29.3 PG (ref 26–34)
MCHC RBC AUTO-ENTMCNC: 32.1 G/DL (ref 31–37)
MCHC RBC AUTO-ENTMCNC: 32.4 G/DL (ref 31–37)
MCV RBC AUTO: 90.6 FL (ref 80–100)
MCV RBC AUTO: 91.3 FL (ref 80–100)
MONOCYTES NFR BLD: 0.9 K/UL (ref 0.1–1.3)
MONOCYTES NFR BLD: 7 % (ref 1–7)
NEUTROPHILS NFR BLD: 84 % (ref 36–66)
NEUTS SEG NFR BLD: 11.6 K/UL (ref 1.3–9.1)
PLATELET # BLD AUTO: 556 K/UL (ref 150–450)
PLATELET # BLD AUTO: 567 K/UL (ref 150–450)
PMV BLD AUTO: 8 FL (ref 6–12)
PMV BLD AUTO: 8.1 FL (ref 6–12)
RBC # BLD AUTO: 2.81 M/UL (ref 4–5.2)
RBC # BLD AUTO: 2.98 M/UL (ref 4–5.2)
WBC OTHER # BLD: 13.5 K/UL (ref 3.5–11)
WBC OTHER # BLD: 13.7 K/UL (ref 3.5–11)

## 2023-12-26 PROCEDURE — 2580000003 HC RX 258: Performed by: INTERNAL MEDICINE

## 2023-12-26 PROCEDURE — 97530 THERAPEUTIC ACTIVITIES: CPT

## 2023-12-26 PROCEDURE — C9113 INJ PANTOPRAZOLE SODIUM, VIA: HCPCS | Performed by: NURSE PRACTITIONER

## 2023-12-26 PROCEDURE — 36415 COLL VENOUS BLD VENIPUNCTURE: CPT

## 2023-12-26 PROCEDURE — 6370000000 HC RX 637 (ALT 250 FOR IP): Performed by: INTERNAL MEDICINE

## 2023-12-26 PROCEDURE — 85014 HEMATOCRIT: CPT

## 2023-12-26 PROCEDURE — A4216 STERILE WATER/SALINE, 10 ML: HCPCS | Performed by: NURSE PRACTITIONER

## 2023-12-26 PROCEDURE — 85025 COMPLETE CBC W/AUTO DIFF WBC: CPT

## 2023-12-26 PROCEDURE — 85018 HEMOGLOBIN: CPT

## 2023-12-26 PROCEDURE — 97110 THERAPEUTIC EXERCISES: CPT

## 2023-12-26 PROCEDURE — 82947 ASSAY GLUCOSE BLOOD QUANT: CPT

## 2023-12-26 PROCEDURE — 85027 COMPLETE CBC AUTOMATED: CPT

## 2023-12-26 PROCEDURE — 6360000002 HC RX W HCPCS: Performed by: NURSE PRACTITIONER

## 2023-12-26 PROCEDURE — 2580000003 HC RX 258: Performed by: NURSE PRACTITIONER

## 2023-12-26 PROCEDURE — 85520 HEPARIN ASSAY: CPT

## 2023-12-26 PROCEDURE — 6360000002 HC RX W HCPCS: Performed by: INTERNAL MEDICINE

## 2023-12-26 PROCEDURE — 2060000000 HC ICU INTERMEDIATE R&B

## 2023-12-26 RX ADMIN — ANTI-FUNGAL POWDER MICONAZOLE NITRATE TALC FREE: 1.42 POWDER TOPICAL at 11:37

## 2023-12-26 RX ADMIN — CEFEPIME 2000 MG: 2 INJECTION, POWDER, FOR SOLUTION INTRAVENOUS at 00:54

## 2023-12-26 RX ADMIN — PANTOPRAZOLE SODIUM 80 MG: 40 INJECTION, POWDER, FOR SOLUTION INTRAVENOUS at 16:46

## 2023-12-26 RX ADMIN — ACETAMINOPHEN 650 MG: 325 TABLET ORAL at 14:32

## 2023-12-26 RX ADMIN — SENNOSIDES AND DOCUSATE SODIUM 2 TABLET: 50; 8.6 TABLET ORAL at 11:34

## 2023-12-26 RX ADMIN — ACYCLOVIR 400 MG: 200 CAPSULE ORAL at 15:25

## 2023-12-26 RX ADMIN — ESCITALOPRAM OXALATE 20 MG: 20 TABLET ORAL at 11:34

## 2023-12-26 RX ADMIN — COLLAGENASE SANTYL: 250 OINTMENT TOPICAL at 11:38

## 2023-12-26 RX ADMIN — CEFEPIME 2000 MG: 2 INJECTION, POWDER, FOR SOLUTION INTRAVENOUS at 11:50

## 2023-12-26 RX ADMIN — FUROSEMIDE 40 MG: 10 INJECTION, SOLUTION INTRAMUSCULAR; INTRAVENOUS at 11:34

## 2023-12-26 RX ADMIN — CEFEPIME 2000 MG: 2 INJECTION, POWDER, FOR SOLUTION INTRAVENOUS at 18:08

## 2023-12-26 RX ADMIN — ANTI-FUNGAL POWDER MICONAZOLE NITRATE TALC FREE: 1.42 POWDER TOPICAL at 21:39

## 2023-12-26 RX ADMIN — SODIUM CHLORIDE, PRESERVATIVE FREE 10 ML: 5 INJECTION INTRAVENOUS at 11:38

## 2023-12-26 RX ADMIN — METOPROLOL TARTRATE 25 MG: 25 TABLET, FILM COATED ORAL at 11:34

## 2023-12-26 RX ADMIN — PANTOPRAZOLE SODIUM 40 MG: 40 TABLET, DELAYED RELEASE ORAL at 06:29

## 2023-12-26 RX ADMIN — METOPROLOL TARTRATE 25 MG: 25 TABLET, FILM COATED ORAL at 21:39

## 2023-12-26 RX ADMIN — ACYCLOVIR 400 MG: 200 CAPSULE ORAL at 11:39

## 2023-12-26 RX ADMIN — SODIUM CHLORIDE, PRESERVATIVE FREE 10 ML: 5 INJECTION INTRAVENOUS at 21:39

## 2023-12-26 RX ADMIN — ASPIRIN 81 MG: 81 TABLET, COATED ORAL at 11:34

## 2023-12-26 RX ADMIN — ACYCLOVIR 400 MG: 200 CAPSULE ORAL at 21:39

## 2023-12-26 RX ADMIN — PANTOPRAZOLE SODIUM 8 MG/HR: 40 INJECTION, POWDER, FOR SOLUTION INTRAVENOUS at 18:10

## 2023-12-26 NOTE — CARE COORDINATION
Writer is following for potential discharge to [facility]. Pt has been accepted by facility for skilled therapy and HD.    Writer met with pt to discuss discharge plans.  No further questions at this time.  Electronically signed by MANDI Hopson on 12/26/2023 at 4:43 PM

## 2023-12-26 NOTE — PROGRESS NOTES
[facility]   IN-PATIENT SERVICE   [facility]    Progress note             Date:   12/26/2023  Patient name:  Elaina Champagne  Date of admission:  12/14/2023 11:42 AM  MRN:   [id number]  Account:  [id number]  YOB: 1950  PCP:    Robin Ly MD  Room:   2092095[facility]  Code Status:    DNR-CCA    Chief Complaint:     Chief Complaint   Patient presents with    Leg Pain     Left      Fatigue       History Obtained From:     patient, electronic medical record    History of Present Illness:     The patient is a 73 y.o.  Non- / non  female who presents with Leg Pain (Left/) and Fatigue   and she is admitted to the hospital for the management of wound check  Patient has past medical is a multiple medical problem including morbid obesity, hypertension, lymphedema, patient has wound in both legs and back of her legs, symptoms are going on for last 1 month  Patient follows with wound care  She was evaluated by Dr. Meredith vascular surgery yesterday, dressing was applied on left leg, she was having severe pain that made her come to the hospital  Patient, had arterial scan done earlier suggestive of PHILL of 0.68 on left side, on right side PHILL was okay  Previous wound culture was growing Klebsiella and staph, MSSA  In the emergency room, patient had lab work done suggestive elevated creatinine of 3.8  CK was normal      Past Medical History:     Past Medical History:   Diagnosis Date    Bell's palsy     Cellulitis     Hypertension         Past Surgical History:     Past Surgical History:   Procedure Laterality Date    CATARACT REMOVAL Bilateral 2015    HYSTERECTOMY, TOTAL ABDOMINAL (CERVIX REMOVED)  1990    INCISION AND DRAINAGE Left 07/06/2017    LEG INCISION AND DRAINAGE ABSCESS performed by Isaiah Casey MD at [facility] OR    IR NONTUNNELED VASCULAR CATHETER  12/15/2023    IR NONTUNNELED VASCULAR CATHETER 12/15/2023 [facility] SPECIAL PROCEDURES    KNEE ARTHROPLASTY Right  transcription may have occurred.

## 2023-12-26 NOTE — PROGRESS NOTES
Physical Therapy  [facility]    Date: 23  Patient Name: Elaina Champagne       Room: 5/2095-01  MRN: [id number]   Account: [id number]   : 1950  (73 y.o.) Gender: female     Referring Practitioner: David Yang MD  Diagnosis: Hyperkalemia  Past Medical History:  has a past medical history of Bell's palsy, Cellulitis, and Hypertension.   Past Surgical History:   has a past surgical history that includes Hysterectomy, total abdominal (); Cataract removal (Bilateral, ); incision and drainage (Left, 2017); Total knee arthroplasty (Left, 2017); Knee Arthroplasty (Right, 2018); ventral hernia repair (2019); and IR NONTUNNELED VASCULAR CATHETER > 5 YEARS (12/15/2023).  Additional Pertinent Hx: The patient is a 73 y.o.  Non- / non  female who presents with Leg Pain (Left/) and Fatigue   and she is admitted to the hospital for the management of wound check  Patient has past medical is a multiple medical problem including morbid obesity, hypertension, lymphedema, patient has wound in both legs and back of her legs, symptoms are going on for last 1 month  Patient follows with wound care  She was evaluated by Dr. Meredith vascular surgery yesterday, dressing was applied on left leg, she was having severe pain that made her come to the hospital  Patient, had arterial scan done earlier suggestive of PHILL of 0.68 on left side, on right side PHILL was okay  Previous wound culture was growing Klebsiella and staph, MSSA  In the emergency room, patient had lab work done suggestive elevated creatinine of 3.8  CK was normal, Nephrology consulted for MICHELLE- started on Hemodialysis.    Overall Orientation Status: Within Functional Limits  Restrictions/Precautions  Restrictions/Precautions: Fall Risk;General Precautions;Up as Tolerated  Required Braces or Orthoses?: No  Implants present? : Metal implants (Bilat TKA)  Position Activity Restriction  Other  23.55  Mobility Inpatient CMS 0-100% Score: 100  Mobility Inpatient CMS G-Code Modifier : CN     Goals  Short Term Goals  Time Frame for Short Term Goals: 10 visits  Short Term Goal 1: Supine <>sit mod A x 2  Short Term Goal 2: Sit<>stand from chair/bed , mod a x 2,  Short Term Goal 3: Tolerate standing with Rolling walekr for 2 to 3 minutes, performing pre-gait activity  Short Term Goal 4: Pt able to take 4 to 5 steps with RW to transfers to recliner/BSC with 2 person assist and rolling walker.  Short Term Goal 5: Pt able to ambulate with rolling walekr distance fo 10 ft x 2 b[place]ark x 2 in straight path  Short Term Goal 6: Tolerate ROM  B LE to improve flexion at all joint and improve mobility       12/26/23 1354   PT Individual Minutes   Time In 1107   Time Out 1133   Minutes 26         Electronically signed by Maribel Dean PTA on 12/26/23 at 1:55 PM EST

## 2023-12-26 NOTE — PROGRESS NOTES
Perfect serve sent to wound care letting them know that one of her blisters had opened and it was bleeding. No further injury noticed. Blister drained blood.

## 2023-12-26 NOTE — PROGRESS NOTES
[facility]   INPATIENT OCCUPATIONAL THERAPY  PROGRESS NOTE  Date: 2023  Patient Name: Elaina Champagne       Room:   MRN: [id number]    : 1950  (73 y.o.)  Gender: female   Referring Practitioner: David Yang MD  Diagnosis: Hyperkalemia      Discharge Recommendations:  Further Occupational Therapy is recommended upon facility discharge.    OT Equipment Recommendations  Other: TBD    Restrictions/Precautions  Restrictions/Precautions  Restrictions/Precautions: Fall Risk;General Precautions;Up as Tolerated  Required Braces or Orthoses?: No  Implants present? : Metal implants (Bilat TKA)  Position Activity Restriction  Other position/activity restrictions: Up with assistance. Hemodialysis started 23    SpO2: 97 %  O2 Device: None (Room air)  Comment: 1L O2. 94-97% throughout    Subjective  Subjective  Subjective: pt agreeable to participate in therapy as long as there is no pressure on her feet.  Comments: MARIA M boyce's tx. Co-tx with Maribel NAIK PTA    Objective  Orientation  Overall Orientation Status: Within Functional Limits  Cognition  Overall Cognitive Status: WFL    Activities of Daily Living  ADL  Feeding: Setup  Grooming: Setup  UE Bathing: Minimal assistance  LE Bathing: Maximum assistance  UE Dressing: Minimal assistance  LE Dressing: Dependent/Total  Toileting: Dependent/Total  Additional Comments: ADL scores are based on skilled observation and clinical reasoning unless otherwise noted. Pt is currently limited by increased pain, impaired strength, endurance, balance, and body habitus which impacts the pt's ability to safely and independently complete self-care/mobility    Balance  Balance  Sitting Balance: Stand by assistance (SBA for dynamic reaching, SUP for static.)  Standing Balance: Unable to assess(comment) (Pt declines standing this date)    Transfers/Mobility  Bed mobility  Supine to Sit: 2 Person assistance;Moderate assistance (Mod A  X2)  Sit to Supine: 2 Person assistance;Maximum assistance (Max A X2)  Scooting: Maximal assistance (hips to EOB)  Bed Mobility Comments: HOB elevated, heavy us of bed rail    OT Exercises  Dynamic Sitting Balance Exercises: Pt facilitated in functional reaching in all directions (high/how and laterally/cross midline outside CORRIE) while sitting unsupported EOB for increased activity tolerance for improved ease and I with ADLs. Pt tolerates for 16 minutes with SBA for dynamic  sitting balance.    Patient Education  Patient Education  Education Given To: Patient  Education Provided: Role of Therapy, Plan of Care, Transfer Training, Home Exercise Program  Education Method: Verbal  Barriers to Learning: None  Education Outcome: Verbalized understanding, Continued education needed    Goals  Short Term Goals  Time Frame for Short Term Goals: By discharge  Short Term Goal 1: Pt will complete bed mobility with Mod A x2 to assist with skin breakdown/pressure ulcers and assist with hygiene  Short Term Goal 2: Pt will tolerate sitting at EOB unsupported, SUP, for 10+ minutes during dynamic reaching tasks for improved ease with LB self-care  Short Term Goal 3: Pt will complete UB ADLs with Mod I and LB ADLs with Mod A, good safety, and use of AE/DME/Modified techniques as needed  Short Term Goal 4: Pt will complete functional transfers/mobility with Min A x2, good safety, and use of least restrictive device  Short Term Goal 5: Pt will actively participate in 15+ minutes of therapeutic exercise/functional activity to promote safety and independence with self-care/mobility  Occupational Therapy Plan  Times Per Week: 5-7  Times Per Day: Once a day  Current Treatment Recommendations: Self-Care / ADL, Strengthening, ROM, Balance training, Functional mobility training, Endurance training, Pain management, Safety education & training, Patient/Caregiver education & training, Equipment evaluation, education, & procurement, Positioning,

## 2023-12-26 NOTE — PROGRESS NOTES
Zi Duran, ALBER   Patient:  MARGARET PACHECO   YOB: 1950  MRN:  [id number]  Location: [facility]    2095-01 12/26/23 9:43 AM   [phone] Hospital or Facility: [facility] From: [facility] [place] RE: MARGARET PACHECO 1950 RM: 2095-01 +occult stool 12/25, hgb 8.7 from 9.5 Need Callback: NO CALLBACK REQ PROG CARE ROUTINE  Unrea

## 2023-12-26 NOTE — PLAN OF CARE
Problem: Discharge Planning  Goal: Discharge to home or other facility with appropriate resources  12/26/2023 1609 by Zoe Aiken RN  Outcome: Progressing  Note: Pt has been accepted at [facility]     Problem: Safety - Adult  Goal: Free from fall injury  12/26/2023 1609 by Zoe Aiken RN  Outcome: Progressing     Problem: Pain  Goal: Verbalizes/displays adequate comfort level or baseline comfort level  12/26/2023 1609 by Zoe Aiken RN  Outcome: Progressing     Problem: Skin/Tissue Integrity  Goal: Absence of new skin breakdown  Description: 1.  Monitor for areas of redness and/or skin breakdown  2.  Assess vascular access sites hourly  3.  Every 4-6 hours minimum:  Change oxygen saturation probe site  4.  Every 4-6 hours:  If on nasal continuous positive airway pressure, respiratory therapy assess nares and determine need for appliance change or resting period.  12/26/2023 1609 by Zoe Aiken RN  Outcome: Not Progressing  Note: Pt remains with several problematic areas     Problem: ABCDS Injury Assessment  Goal: Absence of physical injury  12/26/2023 1609 by Zoe Aiken RN  Outcome: Progressing     Problem: Nutrition Deficit:  Goal: Optimize nutritional status  12/26/2023 1609 by Zoe Aiken RN  Outcome: Not Progressing  Flowsheets (Taken 12/26/2023 1609)  Nutrient intake appropriate for improving, restoring, or maintaining nutritional needs:   Assess nutritional status and recommend course of action   Monitor oral intake, labs, and treatment plans   Recommend appropriate diets, oral nutritional supplements, and vitamin/mineral supplements  Note: PO intake remains poor     Problem: Respiratory - Adult  Goal: Achieves optimal ventilation and oxygenation  12/26/2023 1609 by Zoe Aiken RN  Outcome: Progressing     Problem: Cardiovascular - Adult  Goal: Maintains optimal cardiac output and hemodynamic stability  12/26/2023 1609 by Zoe Aiken RN  Outcome:

## 2023-12-26 NOTE — PROGRESS NOTES
NEPHROLOGY PROGRESS NOTE     Patient :  Elaina Champagne; 73 y.o. MRN# [id number]  Location:  2095/2095-01  Attending:  David Yang MD  Admit Date:  12/14/2023   Hospital Day: 12    Reason for consultation: Management of acute kidney injury.    Consulting physician: David Yang MD.    Interval history: Patient was seen and examined today and is currently afebrile and does not have shortness of breath.  He is not on oliguric and has not required hemodialysis in 1 week.  Laboratory studies were reviewed.  Echocardiogram showed dilated IVC with elevated right atrial filling pressure EF greater than 65% severely increased LV wall thickness, small LV cavity    History of Present Illness: This is a 73 y.o. female with past medical history of essential hypertension, peripheral vascular disease, patient presented to the hospital with complaints of left leg swelling erythema and wound on the left leg patient was seen by vascular and wound care on 12/13/2023.  Patient presented to the hospital yesterday on 12/14/2023 with complaints of left leg swelling erythema and wound getting worse patient has that swelling for several weeks but has been getting worse and more red.  Patient also noted to have fatigue weakness, and she has confusion this morning.  According to the  no nausea vomiting diarrhea no fever chills no headache dizziness.  Labs on admission showed BUN of 54 serum creatinine of 3.9 mg/dL, patient has been oliguric.  Patient denies dysuria, gross hematuria, flank pain, nocturia, urgency, passing frothy urine or urinary incontinence.  There has been no recent exposure to IV contrast.   There is no history  of paraprotein disease.   Pt denies any history of recurrent UTI or kidney stones.  Medication review shows use of ARB's and Bumex, and NSAIDs.  Patient noted to be hypotensive yesterday systolic blood pressure was in 70s blood pressure has improved  UA showed 1+ protein trace leukocyte esterase WBC 0-2  RBC 3-5 hemoglobin negative many bacteria.  Urine culture is positive for moderate gram-negative rods, few gram-positive cocci in pairs, rare gram-positive cocci in clusters.Likely contaminated urine sample    Current Medications:    pantoprazole (PROTONIX) 80 mg in sodium chloride (PF) 0.9 % 20 mL injection, Once  pantoprazole (PROTONIX) 80 mg in sodium chloride 0.9 % 100 mL infusion, Continuous  ceFEPIme (MAXIPIME) 2,000 mg in sodium chloride 0.9 % 100 mL IVPB (mini-bag), Q8H  rivaroxaban (XARELTO) tablet 20 mg, Daily  sennosides-docusate sodium (SENOKOT-S) 8.6-50 MG tablet 2 tablet, Daily  magic (miracle) mouthwash, 4x Daily PRN  acyclovir (ZOVIRAX) capsule 400 mg, TID  furosemide (LASIX) injection 40 mg, Daily  melatonin tablet 3 mg, Nightly PRN  albumin human 25% IV solution 25 g, PRN  midodrine (PROAMATINE) tablet 5 mg, PRN  guaiFENesin (ROBITUSSIN) 100 MG/5ML liquid 200 mg, Q4H PRN  glucose chewable tablet 16 g, PRN  dextrose bolus 10% 125 mL, PRN   Or  dextrose bolus 10% 250 mL, PRN  glucagon injection 1 mg, PRN  dextrose 10 % infusion, Continuous PRN  miconazole (MICOTIN) 2 % powder, BID  collagenase ointment, Daily  anticoagulant sodium citrate 4 % injection 1.4 mL, PRN  anticoagulant sodium citrate 4 % injection 1.5 mL, PRN  aspirin EC tablet 81 mg, Daily  escitalopram (LEXAPRO) tablet 20 mg, Daily  metoprolol tartrate (LOPRESSOR) tablet 25 mg, BID  sodium chloride flush 0.9 % injection 5-40 mL, 2 times per day  sodium chloride flush 0.9 % injection 5-40 mL, PRN  0.9 % sodium chloride infusion, PRN  ondansetron (ZOFRAN-ODT) disintegrating tablet 4 mg, Q8H PRN   Or  ondansetron (ZOFRAN) injection 4 mg, Q6H PRN  polyethylene glycol (GLYCOLAX) packet 17 g, Daily PRN  acetaminophen (TYLENOL) tablet 650 mg, Q6H PRN   Or  acetaminophen (TYLENOL) suppository 650 mg, Q6H PRN      Objective:  CURRENT TEMPERATURE:  Temp: 98 °F (36.7 °C)  MAXIMUM TEMPERATURE OVER 24HRS:  Temp (24hrs), Av.3 °F (36.8 °C), Min:97.9

## 2023-12-26 NOTE — PROGRESS NOTES
Comprehensive Nutrition Assessment    Type and Reason for Visit:  Reassess    Nutrition Recommendations/Plan:   Will continue No Added Salt diet and provide Nepro twice daily as a milkshake.     Malnutrition Assessment:  Malnutrition Status:  At risk for malnutrition (Comment) (12/18/23 1501)    Context:  Chronic Illness     Findings of the 6 clinical characteristics of malnutrition:  Energy Intake:  Mild decrease in energy intake (Comment)  Weight Loss:  No significant weight loss     Body Fat Loss:  No significant body fat loss     Muscle Mass Loss:  No significant muscle mass loss    Fluid Accumulation:  Mild Extremities   Strength:  Not Performed    Nutrition Assessment:    Observed pt with lunch tray consuming tomato soup. She states she would drink supplement if it was made into a milkshake. Mouth Sores noted.    Nutrition Related Findings:    Edema: Moderate BLE, mild generalized, Labs:POC Glu 113, Meds: Reviewed, BM 12/25 Wound Type: Venous Stasis       Current Nutrition Intake & Therapies:    Average Meal Intake: 1-25%  Average Supplements Intake: 0%  ADULT DIET; Regular; No Added Salt (3-4 gm)  ADULT ORAL NUTRITION SUPPLEMENT; Breakfast, Lunch; Renal Oral Supplement    Anthropometric Measures:  Height: 157.5 cm (5' 2\")  Ideal Body Weight (IBW): 110 lbs (50 kg)    Admission Body Weight: 141.1 kg (311 lb)  Current Body Weight: 147.4 kg (325 lb),   IBW. Weight Source: Bed Scale  Current BMI (kg/m2): 59.4  Usual Body Weight: 135.6 kg (299 lb) (3/23)  % Weight Change (Calculated): 7.4                    BMI Categories: Obese Class 3 (BMI 40.0 or greater)    Estimated Daily Nutrient Needs:  Energy Requirements Based On: Formula  Weight Used for Energy Requirements: Ideal  Energy (kcal/day): [place] x 1.3= 1300 kcal  Weight Used for Protein Requirements: Ideal  Protein (g/day): 2g/kg= 100 g     Fluid (ml/day): per MD    Nutrition Diagnosis:   Inadequate protein intake related to  (poor appetite) as evidenced

## 2023-12-27 ENCOUNTER — ANESTHESIA EVENT (OUTPATIENT)
Dept: ENDOSCOPY | Age: 73
End: 2023-12-27
Payer: MEDICARE

## 2023-12-27 PROBLEM — D64.9 ACUTE ON CHRONIC ANEMIA: Status: ACTIVE | Noted: 2023-12-27

## 2023-12-27 PROBLEM — Z79.01 ANTICOAGULATED: Status: ACTIVE | Noted: 2023-12-27

## 2023-12-27 PROBLEM — K92.1 MELENA: Status: ACTIVE | Noted: 2023-12-27

## 2023-12-27 LAB
ALBUMIN SERPL-MCNC: 2.4 G/DL (ref 3.5–5.2)
ANION GAP SERPL CALCULATED.3IONS-SCNC: 9 MMOL/L (ref 9–17)
BUN SERPL-MCNC: 37 MG/DL (ref 8–23)
CALCIUM SERPL-MCNC: 8.3 MG/DL (ref 8.6–10.4)
CHLORIDE SERPL-SCNC: 101 MMOL/L (ref 98–107)
CO2 SERPL-SCNC: 29 MMOL/L (ref 20–31)
CREAT SERPL-MCNC: 0.7 MG/DL (ref 0.5–0.9)
ERYTHROCYTE [DISTWIDTH] IN BLOOD BY AUTOMATED COUNT: 15.2 % (ref 11.5–14.9)
GFR SERPL CREATININE-BSD FRML MDRD: >60 ML/MIN/1.73M2
GLUCOSE BLD-MCNC: 103 MG/DL (ref 65–105)
GLUCOSE BLD-MCNC: 90 MG/DL (ref 65–105)
GLUCOSE BLD-MCNC: 91 MG/DL (ref 65–105)
GLUCOSE BLD-MCNC: 96 MG/DL (ref 65–105)
GLUCOSE SERPL-MCNC: 105 MG/DL (ref 70–99)
HCT VFR BLD AUTO: 24.4 % (ref 36–46)
HCT VFR BLD AUTO: 26.6 % (ref 36–46)
HGB BLD-MCNC: 8 G/DL (ref 12–16)
HGB BLD-MCNC: 8.7 G/DL (ref 12–16)
MCH RBC QN AUTO: 29.9 PG (ref 26–34)
MCHC RBC AUTO-ENTMCNC: 32.6 G/DL (ref 31–37)
MCV RBC AUTO: 91.5 FL (ref 80–100)
PHOSPHATE SERPL-MCNC: 3.3 MG/DL (ref 2.6–4.5)
PLATELET # BLD AUTO: 599 K/UL (ref 150–450)
PMV BLD AUTO: 8 FL (ref 6–12)
POTASSIUM SERPL-SCNC: 3.8 MMOL/L (ref 3.7–5.3)
RBC # BLD AUTO: 2.66 M/UL (ref 4–5.2)
SODIUM SERPL-SCNC: 139 MMOL/L (ref 135–144)
WBC OTHER # BLD: 11.1 K/UL (ref 3.5–11)

## 2023-12-27 PROCEDURE — 2580000003 HC RX 258: Performed by: NURSE PRACTITIONER

## 2023-12-27 PROCEDURE — 85018 HEMOGLOBIN: CPT

## 2023-12-27 PROCEDURE — 97110 THERAPEUTIC EXERCISES: CPT

## 2023-12-27 PROCEDURE — 99222 1ST HOSP IP/OBS MODERATE 55: CPT | Performed by: INTERNAL MEDICINE

## 2023-12-27 PROCEDURE — 36415 COLL VENOUS BLD VENIPUNCTURE: CPT

## 2023-12-27 PROCEDURE — 82947 ASSAY GLUCOSE BLOOD QUANT: CPT

## 2023-12-27 PROCEDURE — 6370000000 HC RX 637 (ALT 250 FOR IP): Performed by: INTERNAL MEDICINE

## 2023-12-27 PROCEDURE — 85027 COMPLETE CBC AUTOMATED: CPT

## 2023-12-27 PROCEDURE — 99213 OFFICE O/P EST LOW 20 MIN: CPT

## 2023-12-27 PROCEDURE — 6360000002 HC RX W HCPCS: Performed by: INTERNAL MEDICINE

## 2023-12-27 PROCEDURE — C9113 INJ PANTOPRAZOLE SODIUM, VIA: HCPCS | Performed by: NURSE PRACTITIONER

## 2023-12-27 PROCEDURE — 97530 THERAPEUTIC ACTIVITIES: CPT

## 2023-12-27 PROCEDURE — 2580000003 HC RX 258: Performed by: INTERNAL MEDICINE

## 2023-12-27 PROCEDURE — 6370000000 HC RX 637 (ALT 250 FOR IP): Performed by: NURSE PRACTITIONER

## 2023-12-27 PROCEDURE — 2060000000 HC ICU INTERMEDIATE R&B

## 2023-12-27 PROCEDURE — 99232 SBSQ HOSP IP/OBS MODERATE 35: CPT | Performed by: INTERNAL MEDICINE

## 2023-12-27 PROCEDURE — 6360000002 HC RX W HCPCS: Performed by: NURSE PRACTITIONER

## 2023-12-27 PROCEDURE — 85014 HEMATOCRIT: CPT

## 2023-12-27 PROCEDURE — 80069 RENAL FUNCTION PANEL: CPT

## 2023-12-27 RX ORDER — LEVOFLOXACIN 500 MG/1
500 TABLET, FILM COATED ORAL DAILY
Qty: 3 TABLET | Refills: 0 | Status: SHIPPED | OUTPATIENT
Start: 2023-12-27 | End: 2023-12-30

## 2023-12-27 RX ORDER — LEVOFLOXACIN 500 MG/1
500 TABLET, FILM COATED ORAL DAILY
Status: COMPLETED | OUTPATIENT
Start: 2023-12-27 | End: 2023-12-29

## 2023-12-27 RX ADMIN — CEFEPIME 2000 MG: 2 INJECTION, POWDER, FOR SOLUTION INTRAVENOUS at 09:26

## 2023-12-27 RX ADMIN — METOPROLOL TARTRATE 25 MG: 25 TABLET, FILM COATED ORAL at 20:09

## 2023-12-27 RX ADMIN — ASPIRIN 81 MG: 81 TABLET, COATED ORAL at 09:19

## 2023-12-27 RX ADMIN — ACYCLOVIR 400 MG: 200 CAPSULE ORAL at 20:09

## 2023-12-27 RX ADMIN — LEVOFLOXACIN 500 MG: 500 TABLET, FILM COATED ORAL at 20:06

## 2023-12-27 RX ADMIN — ANTI-FUNGAL POWDER MICONAZOLE NITRATE TALC FREE: 1.42 POWDER TOPICAL at 20:13

## 2023-12-27 RX ADMIN — ANTI-FUNGAL POWDER MICONAZOLE NITRATE TALC FREE: 1.42 POWDER TOPICAL at 09:38

## 2023-12-27 RX ADMIN — SENNOSIDES AND DOCUSATE SODIUM 2 TABLET: 50; 8.6 TABLET ORAL at 09:20

## 2023-12-27 RX ADMIN — SODIUM CHLORIDE, PRESERVATIVE FREE 10 ML: 5 INJECTION INTRAVENOUS at 09:38

## 2023-12-27 RX ADMIN — ESCITALOPRAM OXALATE 20 MG: 20 TABLET ORAL at 09:20

## 2023-12-27 RX ADMIN — PANTOPRAZOLE SODIUM 8 MG/HR: 40 INJECTION, POWDER, FOR SOLUTION INTRAVENOUS at 17:47

## 2023-12-27 RX ADMIN — CEFEPIME 2000 MG: 2 INJECTION, POWDER, FOR SOLUTION INTRAVENOUS at 17:23

## 2023-12-27 RX ADMIN — ACYCLOVIR 400 MG: 200 CAPSULE ORAL at 14:56

## 2023-12-27 RX ADMIN — CEFEPIME 2000 MG: 2 INJECTION, POWDER, FOR SOLUTION INTRAVENOUS at 00:17

## 2023-12-27 RX ADMIN — ACYCLOVIR 400 MG: 200 CAPSULE ORAL at 09:37

## 2023-12-27 RX ADMIN — Medication 3 MG: at 00:21

## 2023-12-27 RX ADMIN — COLLAGENASE SANTYL: 250 OINTMENT TOPICAL at 09:38

## 2023-12-27 RX ADMIN — METOPROLOL TARTRATE 25 MG: 25 TABLET, FILM COATED ORAL at 09:19

## 2023-12-27 RX ADMIN — FUROSEMIDE 40 MG: 10 INJECTION, SOLUTION INTRAMUSCULAR; INTRAVENOUS at 09:20

## 2023-12-27 RX ADMIN — PANTOPRAZOLE SODIUM 8 MG/HR: 40 INJECTION, POWDER, FOR SOLUTION INTRAVENOUS at 04:27

## 2023-12-27 NOTE — PLAN OF CARE
Problem: Discharge Planning  Goal: Discharge to home or other facility with appropriate resources  Outcome: Progressing     Problem: Safety - Adult  Goal: Free from fall injury  Outcome: Progressing     Problem: Pain  Goal: Verbalizes/displays adequate comfort level or baseline comfort level  Outcome: Progressing     Problem: Skin/Tissue Integrity  Goal: Absence of new skin breakdown  Description: 1.  Monitor for areas of redness and/or skin breakdown  2.  Assess vascular access sites hourly  3.  Every 4-6 hours minimum:  Change oxygen saturation probe site  4.  Every 4-6 hours:  If on nasal continuous positive airway pressure, respiratory therapy assess nares and determine need for appliance change or resting period.  Outcome: Not Progressing  Note: Pt continues with multiple areas of problematic skin conditions     Problem: ABCDS Injury Assessment  Goal: Absence of physical injury  Outcome: Progressing     Problem: Nutrition Deficit:  Goal: Optimize nutritional status  Outcome: Not Progressing  Note: PO intake remains fair to poor     Problem: Respiratory - Adult  Goal: Achieves optimal ventilation and oxygenation  Outcome: Progressing     Problem: Cardiovascular - Adult  Goal: Maintains optimal cardiac output and hemodynamic stability  Outcome: Progressing  Goal: Absence of cardiac dysrhythmias or at baseline  Outcome: Progressing     Problem: Musculoskeletal - Adult  Goal: Return mobility to safest level of function  Outcome: Not Progressing  Note: Pt able to sit on the side of the bed but unable to ambulate and provide self care     Problem: Infection - Adult  Goal: Absence of infection during hospitalization  Outcome: Progressing     Problem: Metabolic/Fluid and Electrolytes - Adult  Goal: Electrolytes maintained within normal limits  Outcome: Progressing     Problem: Gastrointestinal - Adult  Goal: Maintains or returns to baseline bowel function  Outcome: Progressing  Goal: Maintains adequate nutritional

## 2023-12-27 NOTE — PLAN OF CARE
GI Pre rounding note:    73 F admitted with BLE extremity weakness, cellulitis, open wounds, lymphedema  Upon arrival patient was found to be in A-fib with RVR and started on heparin drip.   Patient has had a prolonged hospitalization due to wound care and IV antibiotics  Patient developed melena on 12/25/2023 after heparin drip was DC'd and patient was started on Xarelto.  Patient has had a total of 3 episodes of melena, denies any abdominal pain, epigastric pain, nausea vomiting, emesis or bright red blood per rectum.  No recent weight loss no change in appetite.   Patient denies previous GI bleeding  Denies previous colonoscopy  States that she does take 3 ibuprofen tablets daily for knee pain    No previous endoscopy or GI reports available in care everywhere epic  No abdominal imaging completed at this time  BMP unremarkable except elevated BUN of 37 with a normal creatinine of 0.7  Patient's hemoglobin upon arrival was 11.3 g/dL which is slowly down trended during the hospitalization to 8.0 g/dL this a.m. normocytic anemia.  Normal platelets.  LFTs unremarkable except slight elevation alk phos of 147    Impression:    Suspected NSAID induced put causing melena, acute blood loss anemia in the setting of AC use.  Patient will need to continue on Xarelto due to newly diagnosed A-fib with RVR.     Plan:  Will plan for endoscopy tomorrow in the OR due to lack of availability today  Clear liquid diet-n.p.o. at midnight  Continue Protonix drip  Trend H&H.  Transfuse as clinically indicated  Avoid all NSAIDs  Please hold Xarelto  Complete consult note to follow per Dr. Nolasco later this afternoon    Zi Duran, CNP  [phone]

## 2023-12-27 NOTE — PROGRESS NOTES
Wound care managed pt's coccyx wound and BLE wounds. Inner dry applied to bilateral breasts and abdominal folds. Pt's skin conditions remains widely unchanged.

## 2023-12-27 NOTE — ANESTHESIA PRE PROCEDURE
reviewed   no history of anesthetic complications:   Airway: Mallampati: III  TM distance: >3 FB   Neck ROM: full  Mouth opening: > = 3 FB   Dental:    (+) upper dentures and lower dentures      Pulmonary:normal exam  breath sounds clear to auscultation                             Cardiovascular:    (+) hypertension:, dysrhythmias: atrial fibrillation, hyperlipidemia      ECG reviewed      Echocardiogram reviewed         Beta Blocker:  Dose within 24 Hrs      ROS comment: The left ventricle appears normal in size, severely increased LV wall thickness, small LV cavity  Hyperdynamic wall motions, ejection fraction more than 65%  The right ventricle appears normal in size and function       Neuro/Psych:   (+) neuromuscular disease:, psychiatric history:depression/anxiety             GI/Hepatic/Renal:   (+) morbid obesity         ROS comment:  melena. Endo/Other:    (+) : arthritis: OA. .                  ROS comment: Uses cane and walker for ambulation at home Abdominal:             Vascular: negative vascular ROS. Other Findings:       Anesthesia Plan      general     ASA 3     (TIVA  Has a DNR status which she agrees to have suspended for tomorrow)  Induction: intravenous. MIPS: Prophylactic antiemetics administered. Anesthetic plan and risks discussed with patient. Plan discussed with CRNA.                 Erik Dubon MD   12/27/2023

## 2023-12-27 NOTE — PLAN OF CARE
WBC down  Pseudpmonas S levaquin  Switch to levaquin po till 12/30 - reconsiled    Anna Valdez MD. Infectious Diseases

## 2023-12-27 NOTE — PROGRESS NOTES
[facility]   IN-PATIENT SERVICE   [facility]    Progress note             Date:   12/27/2023  Patient name:  Elaina Champagne  Date of admission:  12/14/2023 11:42 AM  MRN:   [id number]  Account:  [id number]  YOB: 1950  PCP:    Robin Ly MD  Room:   [id number]  Code Status:    DNR-CCA    Chief Complaint:     Chief Complaint   Patient presents with    Leg Pain     Left      Fatigue       History Obtained From:     patient, electronic medical record    History of Present Illness:     The patient is a 73 y.o.  Non- / non  female who presents with Leg Pain (Left/) and Fatigue   and she is admitted to the hospital for the management of wound check  Patient has past medical is a multiple medical problem including morbid obesity, hypertension, lymphedema, patient has wound in both legs and back of her legs, symptoms are going on for last 1 month  Patient follows with wound care  She was evaluated by Dr. Meredith vascular surgery yesterday, dressing was applied on left leg, she was having severe pain that made her come to the hospital  Patient, had arterial scan done earlier suggestive of PHILL of 0.68 on left side, on right side PHILL was okay  Previous wound culture was growing Klebsiella and staph, MSSA  In the emergency room, patient had lab work done suggestive elevated creatinine of 3.8  CK was normal      Past Medical History:     Past Medical History:   Diagnosis Date    Bell's palsy     Cellulitis     Hypertension         Past Surgical History:     Past Surgical History:   Procedure Laterality Date    CATARACT REMOVAL Bilateral 2015    HYSTERECTOMY, TOTAL ABDOMINAL (CERVIX REMOVED)  1990    INCISION AND DRAINAGE Left 07/06/2017    LEG INCISION AND DRAINAGE ABSCESS performed by Isaiah Casey MD at [facility] OR    IR NONTUNNELED VASCULAR CATHETER  12/15/2023    IR NONTUNNELED VASCULAR CATHETER 12/15/2023 [facility] SPECIAL PROCEDURES    KNEE ARTHROPLASTY Right  09/24/2018    TOTAL KNEE ARTHROPLASTY Left 11/09/2017    VENTRAL HERNIA REPAIR  02/11/2019    5 hernias        Medications Prior to Admission:     Prior to Admission medications    Medication Sig Start Date End Date Taking? Authorizing Provider   acetaminophen (TYLENOL) 325 MG tablet Take 2 tablets by mouth every 6 hours as needed for Pain   Yes Jah Ko MD   omeprazole (PRILOSEC) 20 MG delayed release capsule TAKE 1 CAPSULE BY MOUTH ONCE DAILY AT NIGHT 12/4/23   Robin Ly MD   solifenacin (VESICARE) 10 MG tablet Take 1 tablet by mouth daily 10/10/23   Robin Ly MD   escitalopram (LEXAPRO) 20 MG tablet Take 1 tablet by mouth daily 10/10/23   Robin Ly MD   telmisartan (MICARDIS) 40 MG tablet Take 1 tablet by mouth daily 9/11/23   Robin Ly MD   bumetanide (BUMEX) 1 MG tablet Take 1 tablet by mouth daily 7/31/23   Robin Ly MD   potassium chloride (KLOR-CON M) 10 MEQ extended release tablet Take 1 tablet by mouth 2 times daily 7/31/23   Robin Ly MD   metoprolol tartrate (LOPRESSOR) 25 MG tablet Take 1 tablet by mouth 2 times daily 7/31/23   Robin Ly MD   naproxen (NAPROSYN) 500 MG tablet TAKE 1 TABLET BY MOUTH TWICE DAILY WITH MEALS AS NEEDED 7/31/23   Robin Ly MD   aspirin 81 MG EC tablet Take 1 tablet by mouth daily    ProviderJah MD   traZODone (DESYREL) 50 MG tablet Take 1 tablet by mouth nightly as needed for Sleep  Patient not taking: Reported on 12/4/2023 5/24/22   Robin Ly MD        Allergies:     Penicillins    Social History:     Tobacco:    reports that she has never smoked. She has never been exposed to tobacco smoke. She has never used smokeless tobacco.  Alcohol:      reports no history of alcohol use.  Drug Use:  reports no history of drug use.    Family History:     Family History   Problem Relation Age of Onset    Cancer Mother     High Blood Pressure Father     Stroke Father

## 2023-12-27 NOTE — PLAN OF CARE
Problem: Safety - Adult  Goal: Free from fall injury  12/27/2023 0437 by Pb Reardon, RN  Outcome: Progressing     Problem: Pain  Goal: Verbalizes/displays adequate comfort level or baseline comfort level  12/27/2023 0437 by Pb Reardon RN  Outcome: Progressing     Problem: Skin/Tissue Integrity  Goal: Absence of new skin breakdown  Description: 1.  Monitor for areas of redness and/or skin breakdown  2.  Assess vascular access sites hourly  3.  Every 4-6 hours minimum:  Change oxygen saturation probe site  4.  Every 4-6 hours:  If on nasal continuous positive airway pressure, respiratory therapy assess nares and determine need for appliance change or resting period.  12/27/2023 0437 by Pb Reardon, RN  Outcome: Progressing     Problem: ABCDS Injury Assessment  Goal: Absence of physical injury  12/27/2023 0437 by Pb Reardon RN  Outcome: Progressing     Problem: Skin/Tissue Integrity  Goal: Absence of new skin breakdown  Description: 1.  Monitor for areas of redness and/or skin breakdown  2.  Assess vascular access sites hourly  3.  Every 4-6 hours minimum:  Change oxygen saturation probe site  4.  Every 4-6 hours:  If on nasal continuous positive airway pressure, respiratory therapy assess nares and determine need for appliance change or resting period.  12/27/2023 0437 by Pb Reardon RN  Outcome: Progressing  12/26/2023 1609 by Zoe Aiken RN  Outcome: Not Progressing  Note: Pt remains with several problematic areas  12/26/2023 1608 by Zoe Aiken RN  Outcome: Progressing     Problem: Nutrition Deficit:  Goal: Optimize nutritional status  12/26/2023 1609 by Zoe Aiken RN  Outcome: Not Progressing  Flowsheets (Taken 12/26/2023 1609)  Nutrient intake appropriate for improving, restoring, or maintaining nutritional needs:   Assess nutritional status and recommend course of action   Monitor oral intake, labs, and treatment plans   Recommend

## 2023-12-27 NOTE — CONSULTS
Gastroenterology  Consultation Note     .      REASON FOR CONSULTATION:    Occult positive stool, anemia      HISTORY OF PRESENT ILLNESS:       This is a 73-year-old female with past medical history of hypertension who is currently admitted to the hospital with bilateral lower extremity weakness, cellulitis with open wound and lymphedema.  The patient was noted to be in A-fib with RVR and was initially started on heparin drip.  The patient has had a prolonged hospitalization due to wound care and IV antibiotics.  She initially developed melena or 12/25/2023 after heparin was DC'd and Xarelto was started.  She denies any previous similar symptoms.  She had a total of 3 episodes of melena.  She denies any abdominal pain.  No vomiting or hematochezia noted.  No recent weight loss.  She did endorse having a previous EGD a long time ago, no formal report is available.  No prior colonoscopies were reported.  She was taking ibuprofen prior to hospitalization.  On admission her hemoglobin was 11.3, prior to that it was 14.5 in August 2023.  Her hemoglobin has been ranging in 8-9 recently.    Previous GI history:  No previous endoscopy or GI reports available in care everywhere epic  No abdominal imaging completed at this time    PAST MEDICAL HISTORY:  Past Medical History:   Diagnosis Date    Bell's palsy     Cellulitis     Hypertension      Past Surgical History:   Procedure Laterality Date    CATARACT REMOVAL Bilateral 2015    HYSTERECTOMY, TOTAL ABDOMINAL (CERVIX REMOVED)  1990    INCISION AND DRAINAGE Left 07/06/2017    LEG INCISION AND DRAINAGE ABSCESS performed by Isaiah Casey MD at [facility] OR    IR NONTUNNELED VASCULAR CATHETER  12/15/2023    IR NONTUNNELED VASCULAR CATHETER 12/15/2023 [facility] SPECIAL PROCEDURES    KNEE ARTHROPLASTY Right 09/24/2018    TOTAL KNEE ARTHROPLASTY Left 11/09/2017    VENTRAL HERNIA REPAIR  02/11/2019    5 hernias       ALLERGIES:  Allergies   Allergen Reactions    Penicillins Hives  Unstable Housing in the Last Year: No       FAMILY HISTORY:   Family History   Problem Relation Age of Onset    Cancer Mother     High Blood Pressure Father     Stroke Father     Ovarian Cancer Daughter     Cancer Daughter     Diabetes Maternal Aunt     Cancer Other         daughter/ovarian cancer       REVIEW OF SYSTEMS:     Constitutional: No fever, no chills, no lethargy, no weakness.  HEENT:  No headache, otalgia, itchy eyes, nasal discharge or sore throat.  Cardiac:  No chest pain, dyspnea, orthopnea or PND.  Chest:   No cough, phlegm or wheezing.  Abdomen:  No abdominal pain, nausea or vomiting.  Neuro:  No focal weakness, abnormal movements or seizure like activity.  Skin:   No rashes, no itching.  :   No hematuria, no pyuria, no dysuria, no flank pain.  Extremities:  No swelling or joint pains.  ROS was otherwise negative except as mentioned in the [place].     PHYSICAL EXAM:    BP (!) 142/63   Pulse 76   Temp 98.4 °F (36.9 °C) (Oral)   Resp 18   Ht 1.575 m (5' 2\")   Wt (!) 147.8 kg (325 lb 13.4 oz)   SpO2 94%   BMI 59.60 kg/m²   .TMAX[24]    General: Well developed, Well nourished, No apparent distress  Head:  Normocephalic, Atraumatic  EENT: EOMI, Sclera not icteric, Oropharynx moist  Neck:  Supple, Trachea midline  Lungs:CTA Bilaterally  Heart: RRR, No murmur, No rub, No gallop, PMI nondisplaced.   Abdomen:Soft, Non tender, Not distended, BS WNL,  No masses.  Ext:No clubbing.  No cyanosis. No edema.  Skin: No rashes.  No jaundice.  No stigmata of liver disease.    Neuro:  A&O x Three, No focal neurological deficits    LABS and IMAGING:     Hemotological labs:   ANEMIA STUDIES  No results for input(s): \"LABIRON\", \"TIBC\", \"FERRITIN\", \"YYPLKMYE60\", \"FOLATE\", \"OCCULTBLD\" in the last 72 hours.    CBC  Recent Labs     12/25/23  1226 12/26/23  0603 12/26/23  1624 12/26/23  2239 12/27/23  0537 12/27/23  1534   WBC 17.9* 13.7* 13.5*  --  11.1*  --    HGB 9.5* 8.7* 8.2*  8.2* 8.1* 8.0* 8.7*   MCV 90.5 90.6

## 2023-12-27 NOTE — PROGRESS NOTES
NEPHROLOGY PROGRESS NOTE     Patient :  Elaina Champagne; 73 y.o. MRN# [id number]  Location:  2095/2095-01  Attending:  David Yang MD  Admit Date:  12/14/2023   Hospital Day: 13    Reason for consultation: Management of acute kidney injury.    Consulting physician: David Yang MD.    Interval history:  Patient was seen and examined today and she apparently developed rectal bleeding last night.  She is comfortable at rest and is nonoliguric on diuretics.  Laboratory study results were reviewed and discussed with patient and her .    History of Present Illness: This is a 73 y.o. female with past medical history of essential hypertension, peripheral vascular disease, patient presented to the hospital with complaints of left leg swelling erythema and wound on the left leg patient was seen by vascular and wound care on 12/13/2023.  Patient presented to the hospital yesterday on 12/14/2023 with complaints of left leg swelling erythema and wound getting worse patient has that swelling for several weeks but has been getting worse and more red.  Patient also noted to have fatigue weakness, and she has confusion this morning.  According to the  no nausea vomiting diarrhea no fever chills no headache dizziness.  Labs on admission showed BUN of 54 serum creatinine of 3.9 mg/dL, patient has been oliguric.  Patient denies dysuria, gross hematuria, flank pain, nocturia, urgency, passing frothy urine or urinary incontinence.  There has been no recent exposure to IV contrast.   There is no history  of paraprotein disease.   Pt denies any history of recurrent UTI or kidney stones.  Medication review shows use of ARB's and Bumex, and NSAIDs.  Patient noted to be hypotensive yesterday systolic blood pressure was in 70s blood pressure has improved  UA showed 1+ protein trace leukocyte esterase WBC 0-2 RBC 3-5 hemoglobin negative many bacteria.  Urine culture is positive for moderate gram-negative rods, few  gram-positive cocci in pairs, rare gram-positive cocci in clusters.Likely contaminated urine sample    Current Medications:    pantoprazole (PROTONIX) 80 mg in sodium chloride 0.9 % 100 mL infusion, Continuous  ceFEPIme (MAXIPIME) 2,000 mg in sodium chloride 0.9 % 100 mL IVPB (mini-bag), Q8H  rivaroxaban (XARELTO) tablet 20 mg, Daily  sennosides-docusate sodium (SENOKOT-S) 8.6-50 MG tablet 2 tablet, Daily  magic (miracle) mouthwash, 4x Daily PRN  acyclovir (ZOVIRAX) capsule 400 mg, TID  furosemide (LASIX) injection 40 mg, Daily  melatonin tablet 3 mg, Nightly PRN  albumin human 25% IV solution 25 g, PRN  midodrine (PROAMATINE) tablet 5 mg, PRN  guaiFENesin (ROBITUSSIN) 100 MG/5ML liquid 200 mg, Q4H PRN  glucose chewable tablet 16 g, PRN  dextrose bolus 10% 125 mL, PRN   Or  dextrose bolus 10% 250 mL, PRN  glucagon injection 1 mg, PRN  dextrose 10 % infusion, Continuous PRN  miconazole (MICOTIN) 2 % powder, BID  collagenase ointment, Daily  anticoagulant sodium citrate 4 % injection 1.4 mL, PRN  anticoagulant sodium citrate 4 % injection 1.5 mL, PRN  aspirin EC tablet 81 mg, Daily  escitalopram (LEXAPRO) tablet 20 mg, Daily  metoprolol tartrate (LOPRESSOR) tablet 25 mg, BID  sodium chloride flush 0.9 % injection 5-40 mL, 2 times per day  sodium chloride flush 0.9 % injection 5-40 mL, PRN  0.9 % sodium chloride infusion, PRN  ondansetron (ZOFRAN-ODT) disintegrating tablet 4 mg, Q8H PRN   Or  ondansetron (ZOFRAN) injection 4 mg, Q6H PRN  polyethylene glycol (GLYCOLAX) packet 17 g, Daily PRN  acetaminophen (TYLENOL) tablet 650 mg, Q6H PRN   Or  acetaminophen (TYLENOL) suppository 650 mg, Q6H PRN      Objective:  CURRENT TEMPERATURE:  Temp: 97.8 °F (36.6 °C)  MAXIMUM TEMPERATURE OVER 24HRS:  Temp (24hrs), Av °F (36.7 °C), Min:97.3 °F (36.3 °C), Max:98.6 °F (37 °C)    CURRENT RESPIRATORY RATE:  Respirations: 17  CURRENT PULSE:  Pulse: 76  CURRENT BLOOD PRESSURE:  BP: (!) 109/48  24HR BLOOD PRESSURE RANGE:  Systolic

## 2023-12-28 ENCOUNTER — ANESTHESIA (OUTPATIENT)
Dept: ENDOSCOPY | Age: 73
End: 2023-12-28
Payer: MEDICARE

## 2023-12-28 LAB
ALBUMIN SERPL-MCNC: 2.4 G/DL (ref 3.5–5.2)
ANION GAP SERPL CALCULATED.3IONS-SCNC: 6 MMOL/L (ref 9–17)
BUN SERPL-MCNC: 31 MG/DL (ref 8–23)
CALCIUM SERPL-MCNC: 8.3 MG/DL (ref 8.6–10.4)
CHLORIDE SERPL-SCNC: 101 MMOL/L (ref 98–107)
CO2 SERPL-SCNC: 32 MMOL/L (ref 20–31)
CREAT SERPL-MCNC: 0.7 MG/DL (ref 0.5–0.9)
ERYTHROCYTE [DISTWIDTH] IN BLOOD BY AUTOMATED COUNT: 15.2 % (ref 11.5–14.9)
GFR SERPL CREATININE-BSD FRML MDRD: >60 ML/MIN/1.73M2
GLUCOSE BLD-MCNC: 103 MG/DL (ref 65–105)
GLUCOSE BLD-MCNC: 105 MG/DL (ref 65–105)
GLUCOSE BLD-MCNC: 94 MG/DL (ref 65–105)
GLUCOSE SERPL-MCNC: 104 MG/DL (ref 70–99)
HCT VFR BLD AUTO: 24.1 % (ref 36–46)
HGB BLD-MCNC: 8 G/DL (ref 12–16)
MCH RBC QN AUTO: 30.3 PG (ref 26–34)
MCHC RBC AUTO-ENTMCNC: 33.4 G/DL (ref 31–37)
MCV RBC AUTO: 90.8 FL (ref 80–100)
PHOSPHATE SERPL-MCNC: 2.7 MG/DL (ref 2.6–4.5)
PLATELET # BLD AUTO: 607 K/UL (ref 150–450)
PMV BLD AUTO: 8 FL (ref 6–12)
POTASSIUM SERPL-SCNC: 3.9 MMOL/L (ref 3.7–5.3)
RBC # BLD AUTO: 2.65 M/UL (ref 4–5.2)
SODIUM SERPL-SCNC: 139 MMOL/L (ref 135–144)
WBC OTHER # BLD: 8.7 K/UL (ref 3.5–11)

## 2023-12-28 PROCEDURE — 7100000001 HC PACU RECOVERY - ADDTL 15 MIN: Performed by: INTERNAL MEDICINE

## 2023-12-28 PROCEDURE — 82947 ASSAY GLUCOSE BLOOD QUANT: CPT

## 2023-12-28 PROCEDURE — 6370000000 HC RX 637 (ALT 250 FOR IP): Performed by: INTERNAL MEDICINE

## 2023-12-28 PROCEDURE — 2580000003 HC RX 258: Performed by: NURSE PRACTITIONER

## 2023-12-28 PROCEDURE — C9113 INJ PANTOPRAZOLE SODIUM, VIA: HCPCS | Performed by: NURSE PRACTITIONER

## 2023-12-28 PROCEDURE — 2580000003 HC RX 258: Performed by: INTERNAL MEDICINE

## 2023-12-28 PROCEDURE — 85027 COMPLETE CBC AUTOMATED: CPT

## 2023-12-28 PROCEDURE — 6370000000 HC RX 637 (ALT 250 FOR IP): Performed by: NURSE PRACTITIONER

## 2023-12-28 PROCEDURE — 80069 RENAL FUNCTION PANEL: CPT

## 2023-12-28 PROCEDURE — 6360000002 HC RX W HCPCS: Performed by: INTERNAL MEDICINE

## 2023-12-28 PROCEDURE — 97110 THERAPEUTIC EXERCISES: CPT

## 2023-12-28 PROCEDURE — 2580000003 HC RX 258: Performed by: ANESTHESIOLOGY

## 2023-12-28 PROCEDURE — 2709999900 HC NON-CHARGEABLE SUPPLY: Performed by: INTERNAL MEDICINE

## 2023-12-28 PROCEDURE — 6360000002 HC RX W HCPCS: Performed by: NURSE PRACTITIONER

## 2023-12-28 PROCEDURE — C9113 INJ PANTOPRAZOLE SODIUM, VIA: HCPCS | Performed by: INTERNAL MEDICINE

## 2023-12-28 PROCEDURE — 36415 COLL VENOUS BLD VENIPUNCTURE: CPT

## 2023-12-28 PROCEDURE — 7100000000 HC PACU RECOVERY - FIRST 15 MIN: Performed by: INTERNAL MEDICINE

## 2023-12-28 PROCEDURE — 97530 THERAPEUTIC ACTIVITIES: CPT

## 2023-12-28 PROCEDURE — 99232 SBSQ HOSP IP/OBS MODERATE 35: CPT | Performed by: NURSE PRACTITIONER

## 2023-12-28 PROCEDURE — 3609012400 HC EGD TRANSORAL BIOPSY SINGLE/MULTIPLE: Performed by: INTERNAL MEDICINE

## 2023-12-28 PROCEDURE — 88305 TISSUE EXAM BY PATHOLOGIST: CPT

## 2023-12-28 PROCEDURE — 6360000002 HC RX W HCPCS: Performed by: NURSE ANESTHETIST, CERTIFIED REGISTERED

## 2023-12-28 PROCEDURE — 1200000000 HC SEMI PRIVATE

## 2023-12-28 PROCEDURE — 2580000003 HC RX 258: Performed by: NURSE ANESTHETIST, CERTIFIED REGISTERED

## 2023-12-28 PROCEDURE — 2500000003 HC RX 250 WO HCPCS: Performed by: NURSE ANESTHETIST, CERTIFIED REGISTERED

## 2023-12-28 PROCEDURE — 88342 IMHCHEM/IMCYTCHM 1ST ANTB: CPT

## 2023-12-28 PROCEDURE — 0DB68ZX EXCISION OF STOMACH, VIA NATURAL OR ARTIFICIAL OPENING ENDOSCOPIC, DIAGNOSTIC: ICD-10-PCS | Performed by: INTERNAL MEDICINE

## 2023-12-28 PROCEDURE — 43239 EGD BIOPSY SINGLE/MULTIPLE: CPT | Performed by: INTERNAL MEDICINE

## 2023-12-28 PROCEDURE — 3700000000 HC ANESTHESIA ATTENDED CARE: Performed by: INTERNAL MEDICINE

## 2023-12-28 RX ORDER — SODIUM CHLORIDE, SODIUM LACTATE, POTASSIUM CHLORIDE, CALCIUM CHLORIDE 600; 310; 30; 20 MG/100ML; MG/100ML; MG/100ML; MG/100ML
INJECTION, SOLUTION INTRAVENOUS CONTINUOUS
Status: DISCONTINUED | OUTPATIENT
Start: 2023-12-28 | End: 2023-12-29

## 2023-12-28 RX ORDER — PROPOFOL 10 MG/ML
INJECTION, EMULSION INTRAVENOUS PRN
Status: DISCONTINUED | OUTPATIENT
Start: 2023-12-28 | End: 2023-12-28 | Stop reason: SDUPTHER

## 2023-12-28 RX ORDER — SODIUM CHLORIDE 9 MG/ML
INJECTION, SOLUTION INTRAVENOUS PRN
Status: DISCONTINUED | OUTPATIENT
Start: 2023-12-28 | End: 2023-12-28 | Stop reason: HOSPADM

## 2023-12-28 RX ORDER — LIDOCAINE HYDROCHLORIDE 10 MG/ML
1 INJECTION, SOLUTION EPIDURAL; INFILTRATION; INTRACAUDAL; PERINEURAL
Status: DISCONTINUED | OUTPATIENT
Start: 2023-12-28 | End: 2023-12-28 | Stop reason: HOSPADM

## 2023-12-28 RX ORDER — SODIUM CHLORIDE 0.9 % (FLUSH) 0.9 %
5-40 SYRINGE (ML) INJECTION EVERY 12 HOURS SCHEDULED
Status: DISCONTINUED | OUTPATIENT
Start: 2023-12-28 | End: 2023-12-28 | Stop reason: HOSPADM

## 2023-12-28 RX ORDER — SODIUM CHLORIDE 0.9 % (FLUSH) 0.9 %
5-40 SYRINGE (ML) INJECTION PRN
Status: DISCONTINUED | OUTPATIENT
Start: 2023-12-28 | End: 2023-12-28 | Stop reason: HOSPADM

## 2023-12-28 RX ORDER — SODIUM CHLORIDE 9 MG/ML
INJECTION, SOLUTION INTRAVENOUS CONTINUOUS PRN
Status: DISCONTINUED | OUTPATIENT
Start: 2023-12-28 | End: 2023-12-28 | Stop reason: SDUPTHER

## 2023-12-28 RX ORDER — LIDOCAINE HYDROCHLORIDE 10 MG/ML
INJECTION, SOLUTION EPIDURAL; INFILTRATION; INTRACAUDAL; PERINEURAL PRN
Status: DISCONTINUED | OUTPATIENT
Start: 2023-12-28 | End: 2023-12-28 | Stop reason: SDUPTHER

## 2023-12-28 RX ADMIN — Medication 3 MG: at 00:03

## 2023-12-28 RX ADMIN — ASPIRIN 81 MG: 81 TABLET, COATED ORAL at 11:11

## 2023-12-28 RX ADMIN — PANTOPRAZOLE SODIUM 8 MG/HR: 40 INJECTION, POWDER, FOR SOLUTION INTRAVENOUS at 21:01

## 2023-12-28 RX ADMIN — LEVOFLOXACIN 500 MG: 500 TABLET, FILM COATED ORAL at 11:11

## 2023-12-28 RX ADMIN — METOPROLOL TARTRATE 25 MG: 25 TABLET, FILM COATED ORAL at 11:11

## 2023-12-28 RX ADMIN — SENNOSIDES AND DOCUSATE SODIUM 2 TABLET: 50; 8.6 TABLET ORAL at 11:11

## 2023-12-28 RX ADMIN — ESCITALOPRAM OXALATE 20 MG: 20 TABLET ORAL at 11:12

## 2023-12-28 RX ADMIN — ANTI-FUNGAL POWDER MICONAZOLE NITRATE TALC FREE: 1.42 POWDER TOPICAL at 11:14

## 2023-12-28 RX ADMIN — PROPOFOL 80 MG: 10 INJECTION, EMULSION INTRAVENOUS at 07:51

## 2023-12-28 RX ADMIN — SODIUM CHLORIDE, POTASSIUM CHLORIDE, SODIUM LACTATE AND CALCIUM CHLORIDE: 600; 310; 30; 20 INJECTION, SOLUTION INTRAVENOUS at 21:02

## 2023-12-28 RX ADMIN — SODIUM CHLORIDE, POTASSIUM CHLORIDE, SODIUM LACTATE AND CALCIUM CHLORIDE: 600; 310; 30; 20 INJECTION, SOLUTION INTRAVENOUS at 11:29

## 2023-12-28 RX ADMIN — RIVAROXABAN 20 MG: 20 TABLET, FILM COATED ORAL at 17:24

## 2023-12-28 RX ADMIN — COLLAGENASE SANTYL: 250 OINTMENT TOPICAL at 11:13

## 2023-12-28 RX ADMIN — ACYCLOVIR 400 MG: 200 CAPSULE ORAL at 21:03

## 2023-12-28 RX ADMIN — METOPROLOL TARTRATE 25 MG: 25 TABLET, FILM COATED ORAL at 21:03

## 2023-12-28 RX ADMIN — ACYCLOVIR 400 MG: 200 CAPSULE ORAL at 11:27

## 2023-12-28 RX ADMIN — ACYCLOVIR 400 MG: 200 CAPSULE ORAL at 14:57

## 2023-12-28 RX ADMIN — PANTOPRAZOLE SODIUM 8 MG/HR: 40 INJECTION, POWDER, FOR SOLUTION INTRAVENOUS at 03:18

## 2023-12-28 RX ADMIN — ACETAMINOPHEN 650 MG: 325 TABLET ORAL at 11:49

## 2023-12-28 RX ADMIN — SODIUM CHLORIDE, PRESERVATIVE FREE 10 ML: 5 INJECTION INTRAVENOUS at 11:20

## 2023-12-28 RX ADMIN — SODIUM CHLORIDE: 9 INJECTION, SOLUTION INTRAVENOUS at 07:46

## 2023-12-28 RX ADMIN — FUROSEMIDE 40 MG: 10 INJECTION, SOLUTION INTRAMUSCULAR; INTRAVENOUS at 11:12

## 2023-12-28 RX ADMIN — LIDOCAINE HYDROCHLORIDE 40 MG: 10 INJECTION, SOLUTION EPIDURAL; INFILTRATION; INTRACAUDAL; PERINEURAL at 07:51

## 2023-12-28 ASSESSMENT — PAIN - FUNCTIONAL ASSESSMENT
PAIN_FUNCTIONAL_ASSESSMENT: NONE - DENIES PAIN
PAIN_FUNCTIONAL_ASSESSMENT: 0-10

## 2023-12-28 ASSESSMENT — PAIN DESCRIPTION - ORIENTATION
ORIENTATION: LEFT;RIGHT
ORIENTATION: LEFT;RIGHT

## 2023-12-28 ASSESSMENT — PAIN DESCRIPTION - ONSET: ONSET: ON-GOING

## 2023-12-28 ASSESSMENT — PAIN DESCRIPTION - LOCATION
LOCATION: LEG
LOCATION: LEG

## 2023-12-28 ASSESSMENT — PAIN DESCRIPTION - DESCRIPTORS: DESCRIPTORS: ACHING

## 2023-12-28 ASSESSMENT — PAIN SCALES - WONG BAKER: WONGBAKER_NUMERICALRESPONSE: 6

## 2023-12-28 ASSESSMENT — PAIN SCALES - GENERAL
PAINLEVEL_OUTOF10: 0
PAINLEVEL_OUTOF10: 4

## 2023-12-28 NOTE — PROGRESS NOTES
Rehabilitation Physical Therapy    Date: 2023  Patient Name: Elaina Champagne        MRN: [id number]    : 1950  (73 y.o.)  Gender: female      Diagnosis: Hyperkalemia  Additional Pertinent Hx: The patient is a 73 y.o.  Non- / non  female who presents with Leg Pain (Left/) and Fatigue   and she is admitted to the hospital for the management of wound check  Patient has past medical is a multiple medical problem including morbid obesity, hypertension, lymphedema, patient has wound in both legs and back of her legs, symptoms are going on for last 1 month  Patient follows with wound care  She was evaluated by Dr. Meredith vascular surgery yesterday, dressing was applied on left leg, she was having severe pain that made her come to the hospital  Patient, had arterial scan done earlier suggestive of PHILL of 0.68 on left side, on right side PHILL was okay  Previous wound culture was growing Klebsiella and staph, MSSA  In the emergency room, patient had lab work done suggestive elevated creatinine of 3.8  CK was normal, Nephrology consulted for MICHELLE- started on Hemodialysis.    Discharge Recommendations:  Patient would benefit from continued therapy after discharge, Therapy recommended at discharge       Assessment  Assessment  Activity Tolerance: Patient limited by pain;Treatment limited secondary to medical complications (pt had just returned from EGD and was lethargic which affected pt's ability from previous date.)  Discharge Recommendations: Patient would benefit from continued therapy after discharge;Therapy recommended at discharge    Plan  Physical Therapy Plan  General Plan: 5-7 times per week  Specific Instructions for Next Treatment: Caution wound B LE-very tender to touch while assisting in/out of bed  Current Treatment Recommendations: Strengthening, Balance training, Functional mobility training, Transfer training, Endurance training, Gait training, Patient/Caregiver education & training, Home

## 2023-12-28 NOTE — PROGRESS NOTES
Comprehensive Nutrition Assessment    Type and Reason for Visit:  Reassess    Nutrition Recommendations/Plan:   Will add Clear Liquid supplements to Clear Liquid trays  Recommend Regular diet as tolerated as pt is consuming very minimal amounts     Malnutrition Assessment:  Malnutrition Status:  At risk for malnutrition (Comment) (12/18/23 1501)    Context:  Chronic Illness     Findings of the 6 clinical characteristics of malnutrition:  Energy Intake:   (minimal intake x 14 days)  Weight Loss:  No significant weight loss     Body Fat Loss:  No significant body fat loss     Muscle Mass Loss:  No significant muscle mass loss    Fluid Accumulation:  Mild Extremities   Strength:  Not Performed    Nutrition Assessment:    Pt was npo for EGD this morning. EGD showed large ulcer. Pt is now on Clear liquids consuming minimal amounts. Intake has been less than 50% for almost all of pt's 14 day stay.    Nutrition Related Findings:    Mild edema BLE/Generalized, Labs: K 3.9, Meds: Reviewed, BM 12/28 Wound Type: Multiple       Current Nutrition Intake & Therapies:    Average Meal Intake: 26-50%, 1-25%  Average Supplements Intake: 0%  ADULT DIET; Clear Liquid  ADULT ORAL NUTRITION SUPPLEMENT; Breakfast, Lunch, Dinner; Clear Liquid Oral Supplement    Anthropometric Measures:  Height: 157.5 cm (5' 2\")  Ideal Body Weight (IBW): 110 lbs (50 kg)    Admission Body Weight: 141.1 kg (311 lb)  Current Body Weight: 147.4 kg (325 lb),   IBW. Weight Source: Bed Scale  Current BMI (kg/m2): 59.4  Usual Body Weight: 135.6 kg (299 lb) (3/23)  % Weight Change (Calculated): 7.4                    BMI Categories: Obese Class 3 (BMI 40.0 or greater)    Estimated Daily Nutrient Needs:  Energy Requirements Based On: Formula  Weight Used for Energy Requirements: Ideal  Energy (kcal/day): [place] x 1.3= 1300 kcal  Weight Used for Protein Requirements: Ideal  Protein (g/day): 2g/kg= 100 g     Fluid (ml/day): per MD    Nutrition Diagnosis:

## 2023-12-28 NOTE — CARE COORDINATION
ONGOING DISCHARGE PLAN:    Patient is alert and oriented x4.    Spoke with patient regarding discharge plan and patient confirms that plan is still to discharge to [facility]    Patient had a EGD done today     Continue IV Protonix drip for three days      [facility]     Will continue to follow for additional discharge needs.    If patient is discharged prior to next notation, then this note serves as note for discharge by case management.    Electronically signed by Cami Schmidt RN on 12/28/2023 at 10:52 AM

## 2023-12-28 NOTE — PROGRESS NOTES
[facility] -   Infectious diseases evaluation  admission date 12/14/2023    reason for consultation:   Cellulitis    Impression :   Current:  Cellulitis bilateral lower extremity. Wound cx grew Pseudomonas aeruginosa sensitive to Levaquin and cefepime and Enterococcus faecalis sensitive to ampicillin  Pseudomonas Aeruginosa bacteremia sensitive to Levaquin and cefepime.  Sepsis secondary to above  Mild ulcers, possible HSV  Left posterior tibial wound  Peripheral arterial disease  Acute on chronic renal failure.  New onset HD, discontinued.  Obesity  History of lymphedema venous stasis insufficiency  Hyperlipidemia  Penicillin allergy-Hives.  Tolerated Cefepime 12/23.    Recommendations   Zyvox course completed 12/19/2023  Levaquin 500 mg po through 12/29/23.   Valtrex 400 mg po every 8 hours through 12/29.to finish 1 week course  Wound care  Supportive care  Discussed with patient, her  at the bedside.    Infection Control Recommendations   [place] Precautions    Antimicrobial Stewardship Recommendations   Simplification of therapy  Targeted therapy    History of Present Illness:   Initial history:  Elaina Champagne is a 73 y.o.-year-old female was sent to the hospital from wound care clinic for severe left leg pain associated with generalized fatigue.  The patient had swelling and redness of the lower extremities bilaterally with open wound to the posterior aspect of the right leg.  She is poor historian, afebrile, denied nausea or vomiting, no diarrhea, no cough or shortness of breath, no other complaints.  Initial labs showed WBC of 22, elevated lactic acid and creatinine of 3.8.  Arterial Doppler showed left PHILL of 0.68    12/14            Interval changes  12/28/2023   Afebrile.  EGD today for anemia, + OB stool.  Large cratered ulcer noted at the antrum.  Feeling better.  No fever, chills, n/v/d.  Redness to right lower leg improved.    Micro:  12/14

## 2023-12-28 NOTE — PLAN OF CARE
Problem: Nutrition Deficit:  Goal: Optimize nutritional status  Outcome: Progressing  Flowsheets (Taken 12/28/2023 1508 by Kim Dasilva, CECILLE, LD)  Nutrient intake appropriate for improving, restoring, or maintaining nutritional needs: Monitor oral intake, labs, and treatment plans  Note: Patient can now begin to advance diet as tolerated starting with clears then moving to full liquid diet per GI doctor.

## 2023-12-28 NOTE — PROGRESS NOTES
Occupational Therapy  [facility]   INPATIENT OCCUPATIONAL THERAPY  PROGRESS NOTE  Date: 2023  Patient Name: Elaina Champagne       Room:   MRN: [id number]    : 1950  (73 y.o.)  Gender: female   Referring Practitioner: David Yang MD  Diagnosis: Hyperkalemia      Discharge Recommendations:  Further Occupational Therapy is recommended upon facility discharge.    OT Equipment Recommendations  Other: TBD    Restrictions/Precautions  Restrictions/Precautions  Restrictions/Precautions: Fall Risk;General Precautions;Up as Tolerated  Required Braces or Orthoses?: No  Implants present? : Metal implants (Bilat TKA)  Position Activity Restriction  Other position/activity restrictions: Up with assistance. Hemodialysis started 23    O2 Device: Nasal Canula  Comment: 2L O2.  Pt reports pain in BLEs however does not rate.     Subjective  Subjective  Subjective: EGD procedure completed earlier this date, pt reports still feeling effects of procedure, \"please don't make me stand today\"  Comments: MARIA M Celaya'd treatment, co-tx with Meagan CANAS for optimal pt safety and tolerance. Pt pleasant and cooperative to participate.    Objective  Orientation  Overall Orientation Status: Within Functional Limits  Cognition  Overall Cognitive Status: WFL    Activities of Daily Living  ADL  Feeding: Setup  Grooming: Setup  UE Bathing: Minimal assistance  LE Bathing: Maximum assistance  UE Dressing: Minimal assistance  LE Dressing: Dependent/Total  Toileting: Dependent/Total  Additional Comments: ADL scores are based on skilled observation and clinical reasoning unless otherwise noted. Pt is currently limited by increased pain, impaired strength, endurance, balance, and body habitus which impacts the pt's ability to safely and independently complete self-care/mobility    Balance  Balance  Sitting Balance: Stand by assistance (SBA for dynamic reaching as able, sup for static. Pt tolerated  sitting EOB x15 min)  Standing Balance: Unable to assess(comment) (pt declined this date.)    Transfers/Mobility  Bed mobility  Supine to Sit: Maximum assistance  Sit to Supine: Moderate assistance (LE assist only.)  Bed Mobility Comments: cues for technique, bed rails and HOB elevated.     OT Exercises  A/AROM Exercises: Pt completed BUE AROM x15 reps in all planes to address overall strength and endurance for functional tasks, good seated balance throughout while completed EOB.  Resistive Exercises: pink resistive sponge provided to assist with swelling reduction, hand strength and overall UE circulation    Patient Education  Patient Education  Education Given To: Patient  Education Provided: Role of Therapy, Plan of Care, Transfer Training, Home Exercise Program  Education Method: Verbal  Barriers to Learning: None  Education Outcome: Verbalized understanding, Continued education needed    Goals  Short Term Goals  Time Frame for Short Term Goals: By discharge  Short Term Goal 1: Pt will complete bed mobility with Mod A x2 to assist with skin breakdown/pressure ulcers and assist with hygiene  Short Term Goal 2: Pt will tolerate sitting at EOB unsupported, SUP, for 10+ minutes during dynamic reaching tasks for improved ease with LB self-care  Short Term Goal 3: Pt will complete UB ADLs with Mod I and LB ADLs with Mod A, good safety, and use of AE/DME/Modified techniques as needed  Short Term Goal 4: Pt will complete functional transfers/mobility with Min A x2, good safety, and use of least restrictive device  Short Term Goal 5: Pt will actively participate in 15+ minutes of therapeutic exercise/functional activity to promote safety and independence with self-care/mobility  Occupational Therapy Plan  Times Per Week: 5-7  Times Per Day: Once a day  Current Treatment Recommendations: Self-Care / ADL, Strengthening, ROM, Balance training, Functional mobility training, Endurance training, Pain management, Safety

## 2023-12-28 NOTE — OP NOTE
EGD report    Esophagogastroduodenoscopy (EGD) Procedure Note    Procedure:  EGD with Biopsies    Procedure Date: 12/28/23    Indications:  Anemia, Positive occult blood stool    Sedation:  MAC    Attending Physician:  Dr. Mary Nolasco MD    Assistant:  None    Procedure Details:    Informed consent was obtained for the procedure, including sedation. Risks of infection, perforation, hemorrhage, adverse drug reaction, and aspiration were discussed. The patient was placed in the left lateral decubitus position. The patient was monitored continuously with ECG tracing, pulse oximetry, blood pressure monitoring, and direct observation.      The gastroscope was inserted into the mouth and advanced under direct vision to second portion of the duodenum.  A careful inspection was made as the gastroscope was withdrawn, including a retroflexed view of the proximal stomach; findings and interventions are described below. Appropriate photodocumentation was obtained.    Findings:  Retropharyngeal area was grossly normal appearing     Esophagus: normal                          Esophagogastric markings: Diaphragmatic hiatus- 40 cm; GE junction- 40 cm     Stomach:    Fundus: normal    Body: normal    Antrum: A large cratered ulcer measuring 3-4 cm noted at the antrum causing deformity of the pylorus. Pigmented spots noted but no other high risk features. No active bleeding. Random stomach biopsies obtained.     Duodenum:     Bulb: normal    First part: Normal    Second Part: Normal      Complications:  None           Estimated blood loss:  Minimal    Disposition:  [facility]           Condition: stable    Specimen Removed: Stomach mucosal biopsies    Impression:    A large cratered ulcer measuring 3-4 cm noted at the antrum causing deformity of the pylorus. Pigmented spots noted but no other high risk features. No active bleeding. Random stomach biopsies obtained.    Recommendations:  Continue protonix drip for 3 days., then  40mg BID for 2 months  Ideally should hold the anticoagulation for at least 2-3 days before resuming and if absolutely needed, consider initiating heparin first.  Avoid NSAIDs  Will need repeat EGD in 6-8 weeks to assess healing  Discussed with family    Attending Attestation: I performed the procedure.    Mary Nolasco MD

## 2023-12-28 NOTE — PROGRESS NOTES
2.64 cm^3 12/13/23 1011   Wound Assessment Pink/red;Slough 12/27/23 1433   Drainage Amount Moderate (25-50%) 12/27/23 1433   Drainage Description Serosanguinous 12/27/23 1433   Odor None 12/27/23 1433   Sis-wound Assessment Fragile 12/27/23 1433   Margins Defined edges 12/27/23 1433   Wound Thickness Description not for Pressure Injury Partial thickness 12/27/23 1231   Number of days: 58       Wound 12/19/23 Buttocks gluteal cleft- MASD (Active)   Wound Image   12/27/23 1433   Wound Etiology Other 12/27/23 1433   Dressing Status Old drainage noted;New dressing applied 12/27/23 1433   Wound Cleansed Soap and water 12/27/23 1433   Dressing/Treatment Triad hydro/zinc oxide-based hydrophilic paste 12/27/23 1433   Dressing Change Due 12/24/23 12/27/23 1231   Wound Length (cm) 3 cm 12/27/23 1433   Wound Width (cm) 0.5 cm 12/27/23 1433   Wound Depth (cm) 0.1 cm 12/27/23 1433   Wound Surface Area (cm^2) 1.5 cm^2 12/27/23 1433   Wound Volume (cm^3) 0.15 cm^3 12/27/23 1433   Wound Assessment Pink/red 12/27/23 1433   Drainage Amount Scant (moist but unmeasurable) 12/27/23 1433   Drainage Description Serosanguinous 12/27/23 1433   Odor None 12/27/23 1433   Sis-wound Assessment Blanchable erythema 12/27/23 1433   Margins Attached edges 12/27/23 1433   Number of days: 8       Wound 12/19/23 Foot Right (Active)   Wound Image    12/27/23 1433   Wound Etiology Other 12/27/23 1433   Dressing Status Old drainage noted;New dressing applied 12/27/23 1433   Wound Cleansed Cleansed with saline 12/27/23 1433   Dressing/Treatment Open to air;Barrier film 12/27/23 1433   Wound Assessment Ruptured blister 12/27/23 1433   Drainage Amount None (dry) 12/27/23 1433   Odor None 12/27/23 1433   Sis-wound Assessment Dry/flaky;Fragile 12/27/23 1433   Margins Attached edges 12/27/23 1433   Number of days: 8       Wound 12/27/23 Leg Right;Lateral (Active)   Wound Image   12/27/23 1433   Wound Etiology Other 12/27/23 1433   Dressing Status Old  drainage noted;New dressing applied 12/27/23 1433   Wound Cleansed Cleansed with saline 12/27/23 1433   Dressing/Treatment Petroleum gauze;ABD;Roll gauze 12/27/23 1433   Wound Length (cm) 1 cm 12/27/23 1433   Wound Width (cm) 1 cm 12/27/23 1433   Wound Depth (cm) 0.1 cm 12/27/23 1433   Wound Surface Area (cm^2) 1 cm^2 12/27/23 1433   Wound Volume (cm^3) 0.1 cm^3 12/27/23 1433   Wound Assessment Pink/red;Bleeding 12/27/23 1433   Drainage Amount Moderate (25-50%) 12/27/23 1433   Drainage Description Serosanguinous 12/27/23 1433   Odor None 12/27/23 1433   Sis-wound Assessment Blisters;Blanchable erythema 12/27/23 1433   Margins Defined edges 12/27/23 1433   Number of days: 0     Mabel Arellano, MACN, RN, CWOCN, WCC, DAPWCA  [facility]  Wound, Ostomy, and Continence Nursing  [phone]

## 2023-12-29 ENCOUNTER — APPOINTMENT (OUTPATIENT)
Dept: MRI IMAGING | Age: 73
DRG: 853 | End: 2023-12-29
Payer: MEDICARE

## 2023-12-29 PROBLEM — K25.0 ACUTE GASTRIC ULCER WITH HEMORRHAGE: Status: ACTIVE | Noted: 2023-12-29

## 2023-12-29 PROBLEM — L02.611 ABSCESS OF RIGHT FOOT: Status: ACTIVE | Noted: 2023-12-29

## 2023-12-29 PROBLEM — A41.52 PSEUDOMONAS SEPTICEMIA (HCC): Status: ACTIVE | Noted: 2023-12-29

## 2023-12-29 LAB
ALBUMIN SERPL-MCNC: 2.2 G/DL (ref 3.5–5.2)
ANION GAP SERPL CALCULATED.3IONS-SCNC: 7 MMOL/L (ref 9–17)
BASOPHILS # BLD: 0.1 K/UL (ref 0–0.2)
BASOPHILS NFR BLD: 1 % (ref 0–2)
BUN SERPL-MCNC: 21 MG/DL (ref 8–23)
CALCIUM SERPL-MCNC: 8.2 MG/DL (ref 8.6–10.4)
CHLORIDE SERPL-SCNC: 102 MMOL/L (ref 98–107)
CO2 SERPL-SCNC: 30 MMOL/L (ref 20–31)
CREAT SERPL-MCNC: 0.6 MG/DL (ref 0.5–0.9)
EOSINOPHIL # BLD: 0.1 K/UL (ref 0–0.4)
EOSINOPHILS RELATIVE PERCENT: 1 % (ref 0–4)
ERYTHROCYTE [DISTWIDTH] IN BLOOD BY AUTOMATED COUNT: 16.3 % (ref 11.5–14.9)
GFR SERPL CREATININE-BSD FRML MDRD: >60 ML/MIN/1.73M2
GLUCOSE BLD-MCNC: 128 MG/DL (ref 65–105)
GLUCOSE BLD-MCNC: 91 MG/DL (ref 65–105)
GLUCOSE BLD-MCNC: 99 MG/DL (ref 65–105)
GLUCOSE BLD-MCNC: 99 MG/DL (ref 65–105)
GLUCOSE SERPL-MCNC: 96 MG/DL (ref 70–99)
HCT VFR BLD AUTO: 27.4 % (ref 36–46)
HGB BLD-MCNC: 8.7 G/DL (ref 12–16)
LYMPHOCYTES NFR BLD: 1 K/UL (ref 1–4.8)
LYMPHOCYTES RELATIVE PERCENT: 13 % (ref 24–44)
MCH RBC QN AUTO: 30.2 PG (ref 26–34)
MCHC RBC AUTO-ENTMCNC: 31.7 G/DL (ref 31–37)
MCV RBC AUTO: 95.4 FL (ref 80–100)
MONOCYTES NFR BLD: 0.5 K/UL (ref 0.1–1.3)
MONOCYTES NFR BLD: 7 % (ref 1–7)
NEUTROPHILS NFR BLD: 78 % (ref 36–66)
NEUTS SEG NFR BLD: 6 K/UL (ref 1.3–9.1)
PHOSPHATE SERPL-MCNC: 2.5 MG/DL (ref 2.6–4.5)
PLATELET # BLD AUTO: 601 K/UL (ref 150–450)
PMV BLD AUTO: 7.7 FL (ref 6–12)
POTASSIUM SERPL-SCNC: 3.4 MMOL/L (ref 3.7–5.3)
RBC # BLD AUTO: 2.88 M/UL (ref 4–5.2)
SODIUM SERPL-SCNC: 139 MMOL/L (ref 135–144)
WBC OTHER # BLD: 7.6 K/UL (ref 3.5–11)

## 2023-12-29 PROCEDURE — 87186 SC STD MICRODIL/AGAR DIL: CPT

## 2023-12-29 PROCEDURE — 6370000000 HC RX 637 (ALT 250 FOR IP): Performed by: INTERNAL MEDICINE

## 2023-12-29 PROCEDURE — 82947 ASSAY GLUCOSE BLOOD QUANT: CPT

## 2023-12-29 PROCEDURE — 73721 MRI JNT OF LWR EXTRE W/O DYE: CPT

## 2023-12-29 PROCEDURE — 99232 SBSQ HOSP IP/OBS MODERATE 35: CPT | Performed by: INTERNAL MEDICINE

## 2023-12-29 PROCEDURE — 6360000002 HC RX W HCPCS: Performed by: NURSE PRACTITIONER

## 2023-12-29 PROCEDURE — 97530 THERAPEUTIC ACTIVITIES: CPT

## 2023-12-29 PROCEDURE — 87205 SMEAR GRAM STAIN: CPT

## 2023-12-29 PROCEDURE — 99233 SBSQ HOSP IP/OBS HIGH 50: CPT | Performed by: INTERNAL MEDICINE

## 2023-12-29 PROCEDURE — 1200000000 HC SEMI PRIVATE

## 2023-12-29 PROCEDURE — 6370000000 HC RX 637 (ALT 250 FOR IP): Performed by: NURSE PRACTITIONER

## 2023-12-29 PROCEDURE — 80069 RENAL FUNCTION PANEL: CPT

## 2023-12-29 PROCEDURE — 6360000002 HC RX W HCPCS: Performed by: INTERNAL MEDICINE

## 2023-12-29 PROCEDURE — 2580000003 HC RX 258: Performed by: INTERNAL MEDICINE

## 2023-12-29 PROCEDURE — 2500000003 HC RX 250 WO HCPCS: Performed by: INTERNAL MEDICINE

## 2023-12-29 PROCEDURE — 73718 MRI LOWER EXTREMITY W/O DYE: CPT

## 2023-12-29 PROCEDURE — 36415 COLL VENOUS BLD VENIPUNCTURE: CPT

## 2023-12-29 PROCEDURE — C9113 INJ PANTOPRAZOLE SODIUM, VIA: HCPCS | Performed by: INTERNAL MEDICINE

## 2023-12-29 PROCEDURE — 85025 COMPLETE CBC W/AUTO DIFF WBC: CPT

## 2023-12-29 PROCEDURE — 87070 CULTURE OTHR SPECIMN AEROBIC: CPT

## 2023-12-29 PROCEDURE — 2580000003 HC RX 258: Performed by: ANESTHESIOLOGY

## 2023-12-29 PROCEDURE — 87077 CULTURE AEROBIC IDENTIFY: CPT

## 2023-12-29 PROCEDURE — APPSS30 APP SPLIT SHARED TIME 16-30 MINUTES: Performed by: NURSE PRACTITIONER

## 2023-12-29 RX ORDER — LEVOFLOXACIN 500 MG/1
500 TABLET, FILM COATED ORAL DAILY
Status: DISCONTINUED | OUTPATIENT
Start: 2023-12-29 | End: 2023-12-29

## 2023-12-29 RX ORDER — MORPHINE SULFATE 2 MG/ML
2 INJECTION, SOLUTION INTRAMUSCULAR; INTRAVENOUS ONCE
Status: COMPLETED | OUTPATIENT
Start: 2023-12-29 | End: 2023-12-29

## 2023-12-29 RX ORDER — FUROSEMIDE 20 MG/1
20 TABLET ORAL DAILY
Status: DISCONTINUED | OUTPATIENT
Start: 2023-12-30 | End: 2024-01-02 | Stop reason: HOSPADM

## 2023-12-29 RX ORDER — LEVOFLOXACIN 500 MG/1
500 TABLET, FILM COATED ORAL DAILY
Status: DISCONTINUED | OUTPATIENT
Start: 2023-12-30 | End: 2024-01-02 | Stop reason: HOSPADM

## 2023-12-29 RX ADMIN — SODIUM CHLORIDE, PRESERVATIVE FREE 10 ML: 5 INJECTION INTRAVENOUS at 21:37

## 2023-12-29 RX ADMIN — ANTI-FUNGAL POWDER MICONAZOLE NITRATE TALC FREE: 1.42 POWDER TOPICAL at 21:37

## 2023-12-29 RX ADMIN — ANTI-FUNGAL POWDER MICONAZOLE NITRATE TALC FREE: 1.42 POWDER TOPICAL at 11:00

## 2023-12-29 RX ADMIN — METOPROLOL TARTRATE 25 MG: 25 TABLET, FILM COATED ORAL at 21:37

## 2023-12-29 RX ADMIN — MORPHINE SULFATE 2 MG: 2 INJECTION, SOLUTION INTRAMUSCULAR; INTRAVENOUS at 11:24

## 2023-12-29 RX ADMIN — ESCITALOPRAM OXALATE 20 MG: 20 TABLET ORAL at 10:50

## 2023-12-29 RX ADMIN — SODIUM CHLORIDE, POTASSIUM CHLORIDE, SODIUM LACTATE AND CALCIUM CHLORIDE: 600; 310; 30; 20 INJECTION, SOLUTION INTRAVENOUS at 04:45

## 2023-12-29 RX ADMIN — SODIUM CHLORIDE, POTASSIUM CHLORIDE, SODIUM LACTATE AND CALCIUM CHLORIDE: 600; 310; 30; 20 INJECTION, SOLUTION INTRAVENOUS at 11:15

## 2023-12-29 RX ADMIN — RIVAROXABAN 20 MG: 20 TABLET, FILM COATED ORAL at 18:13

## 2023-12-29 RX ADMIN — METOPROLOL TARTRATE 25 MG: 25 TABLET, FILM COATED ORAL at 10:50

## 2023-12-29 RX ADMIN — PANTOPRAZOLE SODIUM 8 MG/HR: 40 INJECTION, POWDER, FOR SOLUTION INTRAVENOUS at 06:06

## 2023-12-29 RX ADMIN — FUROSEMIDE 40 MG: 10 INJECTION, SOLUTION INTRAMUSCULAR; INTRAVENOUS at 10:55

## 2023-12-29 RX ADMIN — Medication 3 MG: at 01:45

## 2023-12-29 RX ADMIN — POTASSIUM PHOSPHATE, MONOBASIC POTASSIUM PHOSPHATE, DIBASIC 10 MMOL: 224; 236 INJECTION, SOLUTION, CONCENTRATE INTRAVENOUS at 14:34

## 2023-12-29 RX ADMIN — ACYCLOVIR 400 MG: 200 CAPSULE ORAL at 10:54

## 2023-12-29 RX ADMIN — LEVOFLOXACIN 500 MG: 500 TABLET, FILM COATED ORAL at 10:54

## 2023-12-29 RX ADMIN — SENNOSIDES AND DOCUSATE SODIUM 2 TABLET: 50; 8.6 TABLET ORAL at 10:50

## 2023-12-29 RX ADMIN — ACETAMINOPHEN 650 MG: 325 TABLET ORAL at 01:07

## 2023-12-29 RX ADMIN — ACETAMINOPHEN 650 MG: 325 TABLET ORAL at 18:16

## 2023-12-29 RX ADMIN — ASPIRIN 81 MG: 81 TABLET, COATED ORAL at 10:50

## 2023-12-29 ASSESSMENT — PAIN DESCRIPTION - LOCATION
LOCATION: FOOT
LOCATION: HEAD
LOCATION: LEG
LOCATION: HEAD
LOCATION: ABDOMEN

## 2023-12-29 ASSESSMENT — PAIN SCALES - GENERAL
PAINLEVEL_OUTOF10: 4
PAINLEVEL_OUTOF10: 7
PAINLEVEL_OUTOF10: 4
PAINLEVEL_OUTOF10: 4
PAINLEVEL_OUTOF10: 3

## 2023-12-29 ASSESSMENT — PAIN DESCRIPTION - DESCRIPTORS
DESCRIPTORS: HEAVINESS
DESCRIPTORS: DISCOMFORT
DESCRIPTORS: ACHING

## 2023-12-29 ASSESSMENT — PAIN DESCRIPTION - ORIENTATION
ORIENTATION: MID
ORIENTATION: RIGHT;LEFT
ORIENTATION: LEFT;RIGHT

## 2023-12-29 NOTE — PLAN OF CARE
Please have pt or POA who knows pt medical hx fill out MRI screening form in [facility]. Pt will need to be dressed in hospital clothes for this exam. Any questions please call [phone].    Thanks!

## 2023-12-29 NOTE — CARE COORDINATION
Writer placed call to Heydi at [facility] to schedule transport for pt tentatively for 1500 on 12/31. Heydi agreeable requesting forms faxed for scheduling. Will call back to confirm time.  Electronically signed by MANDI Hopson on 12/29/2023 at 4:30 PM

## 2023-12-29 NOTE — ANESTHESIA POSTPROCEDURE EVALUATION
Department of Anesthesiology  Postprocedure Note    Patient: Odette Mcclendon  MRN: [id number]  YOB: 1950  Date of evaluation: 12/29/2023    Procedure Summary     Date: 12/28/23 Room / Location: [address] [place] [address]: [place] ROVERTO REINA    Anesthesia Start: 1225 Anesthesia Stop: 6993    Procedure: EGD BIOPSY (Esophagus) Diagnosis:       Melena      (Melena [K92.1])    Surgeons: Mindy Johnston MD Responsible Provider: Isha Argueta MD    Anesthesia Type: general ASA Status: 3          Anesthesia Type: No value filed. Chase Phase I: Chase Score: 8    Chase Phase II:      Anesthesia Post Evaluation    Comments: POD #1 Patient seen lying in bed. Denied any anesthesia related issues. No notable events documented.

## 2023-12-29 NOTE — PROGRESS NOTES
[facility]   IN-PATIENT SERVICE   [facility]    Progress note             Date:   12/29/2023  Patient name:  Elaina Champagne  Date of admission:  12/14/2023 11:42 AM  MRN:   [id number]  Account:  [id number]  YOB: 1950  PCP:    Robin Ly MD  Room:   2058/2058-01  Code Status:    DNR-CCA    Chief Complaint:     Chief Complaint   Patient presents with    Leg Pain     Left      Fatigue       History Obtained From:     patient, electronic medical record    History of Present Illness:     The patient is a 73 y.o.  Non- / non  female who presents with Leg Pain (Left/) and Fatigue   and she is admitted to the hospital for the management of wound check  Patient has past medical is a multiple medical problem including morbid obesity, hypertension, lymphedema, patient has wound in both legs and back of her legs, symptoms are going on for last 1 month  Patient follows with wound care  She was evaluated by Dr. Meredith vascular surgery yesterday, dressing was applied on left leg, she was having severe pain that made her come to the hospital  Patient, had arterial scan done earlier suggestive of PHILL of 0.68 on left side, on right side PHILL was okay  Previous wound culture was growing Klebsiella and staph, MSSA  In the emergency room, patient had lab work done suggestive elevated creatinine of 3.8  CK was normal      Past Medical History:     Past Medical History:   Diagnosis Date    Bell's palsy     Cellulitis     Hypertension         Past Surgical History:     Past Surgical History:   Procedure Laterality Date    CATARACT REMOVAL Bilateral 2015    HYSTERECTOMY, TOTAL ABDOMINAL (CERVIX REMOVED)  1990    INCISION AND DRAINAGE Left 07/06/2017    LEG INCISION AND DRAINAGE ABSCESS performed by Isaiah Casey MD at [facility] OR    IR NONTUNNELED VASCULAR CATHETER  12/15/2023    IR NONTUNNELED VASCULAR CATHETER 12/15/2023 [facility] SPECIAL PROCEDURES    KNEE ARTHROPLASTY Right     Albumin 2.2 (L) 3.5 - 5.2 g/dL    Phosphorus 2.5 (L) 2.6 - 4.5 mg/dL    Sodium 139 135 - 144 mmol/L    Potassium 3.4 (L) 3.7 - 5.3 mmol/L    Chloride 102 98 - 107 mmol/L    CO2 30 20 - 31 mmol/L    Anion Gap 7 (L) 9 - 17 mmol/L   CBC with Auto Differential    Collection Time: 12/29/23  6:39 AM   Result Value Ref Range    WBC 7.6 3.5 - 11.0 k/uL    RBC 2.88 (L) 4.0 - 5.2 m/uL    Hemoglobin 8.7 (L) 12.0 - 16.0 g/dL    Hematocrit 27.4 (L) 36 - 46 %    MCV 95.4 80 - 100 fL    MCH 30.2 26 - 34 pg    MCHC 31.7 31 - 37 g/dL    RDW 16.3 (H) 11.5 - 14.9 %    Platelets 601 (H) 150 - 450 k/uL    MPV 7.7 6.0 - 12.0 fL    Neutrophils % 78 (H) 36 - 66 %    Lymphocytes % 13 (L) 24 - 44 %    Monocytes % 7 1 - 7 %    Eosinophils % 1 0 - 4 %    Basophils % 1 0 - 2 %    Neutrophils Absolute 6.00 1.3 - 9.1 k/uL    Lymphocytes Absolute 1.00 1.0 - 4.8 k/uL    Monocytes Absolute 0.50 0.1 - 1.3 k/uL    Eosinophils Absolute 0.10 0.0 - 0.4 k/uL    Basophils Absolute 0.10 0.0 - 0.2 k/uL       Imaging/Diagnostics:        Assessment :      Primary Problem  Hyperkalemia    Active Hospital Problems    Diagnosis Date Noted    Permanent atrial fibrillation (HCC) [I48.21] 12/18/2023     Priority: High    Renovascular hypertension [I15.0] 12/18/2023     Priority: High    Atrial fibrillation with rapid ventricular response (HCC) [I48.91] 12/16/2023     Priority: High    Altered mental status [R41.82] 12/16/2023     Priority: High    Melena [K92.1] 12/27/2023    Acute on chronic anemia [D64.9] 12/27/2023    Anticoagulated [Z79.01] 12/27/2023    Elevated erythrocyte sedimentation rate [R70.0] 12/17/2023    Bacteremia due to Pseudomonas [R78.81, B96.5] 12/16/2023    Bacteriuria [R82.71] 12/16/2023    Leukocytosis [D72.829] 12/16/2023    Elevated C-reactive protein (CRP) [R79.82] 12/16/2023    Allergy to penicillin [Z88.0] 12/16/2023    Hyperkalemia [E87.5] 12/14/2023    MICHELLE (acute kidney injury) (HCC) [N17.9] 12/14/2023    Cellulitis [L03.90]

## 2023-12-29 NOTE — PROGRESS NOTES
Physical Therapy  [facility]    Date: 23  Patient Name: Elaina Champagne       Room: 8-01  MRN: [id number]   Account: [id number]   : 1950  (73 y.o.) Gender: female     Referring Practitioner: David Yang MD  Diagnosis: Hyperkalemia  Past Medical History:  has a past medical history of Bell's palsy, Cellulitis, and Hypertension.   Past Surgical History:   has a past surgical history that includes Hysterectomy, total abdominal (); Cataract removal (Bilateral, ); incision and drainage (Left, 2017); Total knee arthroplasty (Left, 2017); Knee Arthroplasty (Right, 2018); ventral hernia repair (2019); IR NONTUNNELED VASCULAR CATHETER > 5 YEARS (12/15/2023); and Upper gastrointestinal endoscopy (N/A, 2023).  Additional Pertinent Hx: The patient is a 73 y.o.  Non- / non  female who presents with Leg Pain (Left/) and Fatigue   and she is admitted to the hospital for the management of wound check  Patient has past medical is a multiple medical problem including morbid obesity, hypertension, lymphedema, patient has wound in both legs and back of her legs, symptoms are going on for last 1 month  Patient follows with wound care  She was evaluated by Dr. Meredith vascular surgery yesterday, dressing was applied on left leg, she was having severe pain that made her come to the hospital  Patient, had arterial scan done earlier suggestive of PHILL of 0.68 on left side, on right side PHILL was okay  Previous wound culture was growing Klebsiella and staph, MSSA  In the emergency room, patient had lab work done suggestive elevated creatinine of 3.8  CK was normal, Nephrology consulted for MICHELLE- started on Hemodialysis.    Overall Orientation Status: Within Functional Limits  Restrictions/Precautions  Restrictions/Precautions: Fall Risk;General Precautions;Up as Tolerated  Required Braces or Orthoses?: No  Implants present? : Metal implants  (Bilat TKA)  Position Activity Restriction  Other position/activity restrictions: Up with assistance. Hemodialysis started 12/18/23    Subjective: Pt lying in bed upon arrival, agreeable to therapy  Comments: MARIA M Sánchez approved therapy. co-treat with CLIFF Navarro       Pain Assessment: 0-10  Pain Level: 4 (increased to 6 with standing)  Pain Location: Foot  Pain Orientation: Right;Left     Oxygen Therapy  O2 Device: None (Room air)    Bed Mobility  Supine to Sit: Stand by assistance  Sit to Supine: Maximal assistance (x2)  Scooting: Stand by assistance (to OEB)  Bed mobility  Scooting: Stand by assistance (to OEB)    Transfers:  Sit to Stand: Maximum Assistance;2 Person Assistance  Stand to Sit: Maximum Assistance;2 Person Assistance                          Stairs/Curb  Stairs?: No      EXERCISES    Other exercises?: Yes  Other exercises 1: bed mobility x2  Other exercises 2: seated EOB ~ 17 minutes SBA  Other exercises 3: STS x1 with RW and Max A x2. Ateempted second stand however pt could not clear butt from bed  Other exercises 4: standing tolerance ~ 55 seconds CGA x2           Activity Tolerance: Patient limited by pain, Treatment limited secondary to medical complications    Current Treatment Recommendations: Strengthening, Balance training, Functional mobility training, Transfer training, Endurance training, Gait training, Patient/Caregiver education & training, Home exercise program, Positioning, Therapeutic activities, ROM    Conditions Requiring Skilled Therapeutic Intervention  Treatment Diagnosis: Impaired fucntion  Discharge Recommendations: Patient would benefit from continued therapy after discharge;Therapy recommended at discharge    AM-PAC Basic Mobility - Inpatient   How much help is needed turning from your back to your side while in a flat bed without using bedrails?: A Lot  How much help is needed moving from lying on your back to sitting on the side of a flat bed without using bedrails?: A

## 2023-12-29 NOTE — PROGRESS NOTES
NEPHROLOGY PROGRESS NOTE     Patient :  Elaina Champagne; 73 y.o. MRN# [id number]  Location:  2058/2058-01  Attending:  David Yang MD  Admit Date:  12/14/2023   Hospital Day: 15    Reason for consultation: Management of acute kidney injury.    Consulting physician: David Yang MD.    Interval history:  Patient was seen and examined today she complained mainly of insomnia. She underwent EGD [12/28/2023] with finding of a large cratered ulcer measuring 3 to 4 cm at the antrum causing deformity of the pylorus.  She is currently on Protonix drip.  She is also on lactated Ringer's infusion at 125 mill per hour and IV Lasix 40 mg daily.    History of Present Illness: This is a 73 y.o. female with past medical history of essential hypertension, peripheral vascular disease, patient presented to the hospital with complaints of left leg swelling erythema and wound on the left leg patient was seen by vascular and wound care on 12/13/2023.  Patient presented to the hospital yesterday on 12/14/2023 with complaints of left leg swelling erythema and wound getting worse patient has that swelling for several weeks but has been getting worse and more red.  Patient also noted to have fatigue weakness, and she has confusion this morning.  According to the  no nausea vomiting diarrhea no fever chills no headache dizziness.  Labs on admission showed BUN of 54 serum creatinine of 3.9 mg/dL, patient has been oliguric.  Patient denies dysuria, gross hematuria, flank pain, nocturia, urgency, passing frothy urine or urinary incontinence.  There has been no recent exposure to IV contrast.   There is no history  of paraprotein disease.   Pt denies any history of recurrent UTI or kidney stones.  Medication review shows use of ARB's and Bumex, and NSAIDs.  Patient noted to be hypotensive yesterday systolic blood pressure was in 70s blood pressure has improved  UA showed 1+ protein trace leukocyte esterase WBC 0-2 RBC 3-5 hemoglobin  Systolic (24hrs), Av , Min:120 , Max:135   ; Diastolic (24hrs), Av, Min:53, Max:64    24HR INTAKE/OUTPUT:    Intake/Output Summary (Last 24 hours) at 2023 1109  Last data filed at 2023 0449  Gross per 24 hour   Intake --   Output 1000 ml   Net -1000 ml       Physical Exam:  GENERAL APPEARANCE: Lethargic, responsive to verbal stimuli  HEAD: normocephalic  EYES:  EOMI. Not pale, anicteric   NOSE:  No nasal discharge.    CARDIAC: Normal S1 and S2. No S3, S4 or murmurs. Rhythm is regular.  LUNGS:  diminished breath sounds.  No accessory muscle use  NECK: Neck supple, non-tender   GI-soft nontender  MUSKULOSKELETAL: Adequately aligned spine. No joint erythema or tenderness.   EXTREMITIES: ++ edema.  Bilateral lower extremity edema swelling  NEURO:  Awake and alert; no acute focal neurologic deficits.    Labs:   CBC:  Recent Labs     23  0537 23  1534 23  0538 23  0639   WBC 11.1*  --  8.7 7.6   RBC 2.66*  --  2.65* 2.88*   HGB 8.0* 8.7* 8.0* 8.7*   HCT 24.4* 26.6* 24.1* 27.4*   MCV 91.5  --  90.8 95.4   MCH 29.9  --  30.3 30.2   MCHC 32.6  --  33.4 31.7   RDW 15.2*  --  15.2* 16.3*   *  --  607* 601*   MPV 8.0  --  8.0 7.7        BMP:   Recent Labs     23  0537 23  0538 23  0639    139 139   K 3.8 3.9 3.4*    101 102   CO2 29 32* 30   BUN 37* 31* 21   CREATININE 0.7 0.7 0.6   GLUCOSE 105* 104* 96   CALCIUM 8.3* 8.3* 8.2*     Assessment/plan:    1.  Acute kidney injury - consistent with ischemic acute tubular necrosis.  Renal function has recovered and there are no indications for further hemodialysis in this patient.    Continue to monitor urine output closely.    2.  Generalized edema - change to furosemide 20 mg p.o. daily.  Check weight daily.  Two g/day dietary sodium restriction.    3.  Systemic hypertension - blood pressure control is adequate.    4.  GI bleed -  Secondary to peptic ulcer disease.  Patient would need repeat EGD in 6  Statement Selected

## 2023-12-29 NOTE — PROGRESS NOTES
Transfer report given to [facility] MARIA M Ferguson. Patient transported to [facility] via bed with all belongings.

## 2023-12-29 NOTE — CARE COORDINATION
Writer is following for potential discharge to [facility].  Pt can potentially admit Sunday per charge MARIA M Connell.  Writer placed message to Rosenda at [facility] regarding this. Facility needs at 4 hour notice for pt admission to facility to ensure bariatric bed is ordered.  Electronically signed by MANDI Hopson on 12/29/2023 at 10:01 AM    Writer spoke to Rosenda regarding pt HD approval. HD approval is pending her permanent catheter placement.   Pt would not be able to admit on Sunday if permanent cath is not placed prior for dialysis approval.  Electronically signed by MANDI Hopson on 12/29/2023 at 10:09 AM    Writer confirmed with charge MARIA M Connell that pt will not need dialysis at facility. Writer placed call to Rosenda to update.  Electronically signed by MANDI Hopson on 12/29/2023 at 11:26 AM

## 2023-12-29 NOTE — CONSULTS
Consult Note  Podiatric Medicine and Surgery     Subjective     CC: right leg cellulitis and venous wounds    HPI :  Elaina Champagne is a 73 y.o. female seen at [facility] complaining of blisters to her right dorsal foot. Pt has severe lower extremity edema and a wound to her left posterior leg. She has been seen at [facility] wound care in the past. Wound ostomy is on board giving attention to her left leg wound and sacral wound. Patient has a history of cellulitis, Afib, AMS, venous insufficiency, and MICHELLE. Blisters have not been draining and have come on insidiously in the last couple days.She was diagnosed with bacteremia secondary to UTI on this admission. We are re-consulted because of worsening of venous wound new blood blisters to E.  Patient denies any systemic symptoms at this time.      ROS: Review of Systems   Constitutional:  Positive for activity change.   HENT: Negative.     Eyes: Negative.    Respiratory: Negative.     Cardiovascular:  Positive for leg swelling.   Gastrointestinal: Negative.    Endocrine: Negative.    Genitourinary: Negative.    Musculoskeletal:  Positive for gait problem and joint swelling.   Skin:  Positive for color change and wound.   Allergic/Immunologic: Negative.    Hematological: Negative.    Psychiatric/Behavioral: Negative.      Medications:  Scheduled Meds:   [START ON 12/30/2023] furosemide  20 mg Oral Daily    potassium phosphate IVPB (CENTRAL LINE)  10 mmol IntraVENous Once    [START ON 12/30/2023] levoFLOXacin  500 mg Oral Daily    rivaroxaban  20 mg Oral Daily    sennosides-docusate sodium  2 tablet Oral Daily    miconazole   Topical BID    collagenase   Topical Daily    aspirin  81 mg Oral Daily    escitalopram  20 mg Oral Daily    metoprolol tartrate  25 mg Oral BID    sodium chloride flush  5-40 mL IntraVENous 2 times per day       Continuous Infusions:   pantoprazole 8 mg/hr (12/29/23 0606)    dextrose      sodium chloride 5 mL/hr at 12/19/23 6182       PRN

## 2023-12-29 NOTE — PLAN OF CARE
Problem: Discharge Planning  Goal: Discharge to home or other facility with appropriate resources  Outcome: Progressing     Problem: Safety - Adult  Goal: Free from fall injury  Outcome: Progressing     Problem: Pain  Goal: Verbalizes/displays adequate comfort level or baseline comfort level  Outcome: Progressing     Problem: Skin/Tissue Integrity  Goal: Absence of new skin breakdown  Description: 1.  Monitor for areas of redness and/or skin breakdown  2.  Assess vascular access sites hourly  3.  Every 4-6 hours minimum:  Change oxygen saturation probe site  4.  Every 4-6 hours:  If on nasal continuous positive airway pressure, respiratory therapy assess nares and determine need for appliance change or resting period.  Outcome: Progressing     Problem: ABCDS Injury Assessment  Goal: Absence of physical injury  Outcome: Progressing     Problem: Nutrition Deficit:  Goal: Optimize nutritional status  Outcome: Progressing     Problem: Respiratory - Adult  Goal: Achieves optimal ventilation and oxygenation  Outcome: Progressing     Problem: Cardiovascular - Adult  Goal: Maintains optimal cardiac output and hemodynamic stability  Outcome: Progressing     Problem: Cardiovascular - Adult  Goal: Absence of cardiac dysrhythmias or at baseline  Outcome: Progressing     Problem: Musculoskeletal - Adult  Goal: Return mobility to safest level of function  Outcome: Progressing     Problem: Infection - Adult  Goal: Absence of infection during hospitalization  Outcome: Progressing     Problem: Metabolic/Fluid and Electrolytes - Adult  Goal: Electrolytes maintained within normal limits  Outcome: Progressing     Problem: Confusion  Goal: Confusion, delirium, dementia, or psychosis is improved or at baseline  Description: INTERVENTIONS:  1. Assess for possible contributors to thought disturbance, including medications, impaired vision or hearing, underlying metabolic abnormalities, dehydration, psychiatric diagnoses, and notify  attending LIP  2. [place] high risk fall precautions, as indicated  3. Provide frequent short contacts to provide reality reorientation, refocusing and direction  4. Decrease environmental stimuli, including noise as appropriate  5. Monitor and intervene to maintain adequate nutrition, hydration, elimination, sleep and activity  6. If unable to ensure safety without constant attention obtain sitter and review sitter guidelines with assigned personnel  7. Initiate Psychosocial CNS and Spiritual Care consult, as indicated  Outcome: Progressing     Problem: Gastrointestinal - Adult  Goal: Maintains or returns to baseline bowel function  Outcome: Progressing     Problem: Gastrointestinal - Adult  Goal: Maintains adequate nutritional intake  Outcome: Progressing

## 2023-12-29 NOTE — PROGRESS NOTES
GI Progress notes    12/29/2023   10:16 AM    Name:  Elaina Champagne  MRN:    [id number]     Acct:     [id number]   Room:  2058/2058-01   Day: 15     Admit Date: 12/14/2023 11:42 AM  PCP: Robin Ly MD    Subjective:     C/C:   Chief Complaint   Patient presents with    Leg Pain     Left      Fatigue       Interval History: Status: improved.     Patient seen and examined.  No acute events overnight.  No signs of bleeding  Hgb stable  S/p EGD with large cratered ulcer, 3-4 cm, antrum causing deformity of pylorus.  On PPI drip  Tolerating CLD.  Having some mild epigastric discomfort.    ROS:  Constitutional: negative for chills, fevers and sweats  Gastrointestinal: negative for constipation, diarrhea, nausea and vomiting  Neurological: negative for dizziness and headaches    Medications:     Allergies:   Allergies   Allergen Reactions    Penicillins Hives       Current Meds: lactated ringers IV soln infusion, Continuous  levoFLOXacin (LEVAQUIN) tablet 500 mg, Daily  pantoprazole (PROTONIX) 80 mg in sodium chloride 0.9 % 100 mL infusion, Continuous  rivaroxaban (XARELTO) tablet 20 mg, Daily  sennosides-docusate sodium (SENOKOT-S) 8.6-50 MG tablet 2 tablet, Daily  magic (miracle) mouthwash, 4x Daily PRN  acyclovir (ZOVIRAX) capsule 400 mg, TID  furosemide (LASIX) injection 40 mg, Daily  melatonin tablet 3 mg, Nightly PRN  albumin human 25% IV solution 25 g, PRN  midodrine (PROAMATINE) tablet 5 mg, PRN  guaiFENesin (ROBITUSSIN) 100 MG/5ML liquid 200 mg, Q4H PRN  glucose chewable tablet 16 g, PRN  dextrose bolus 10% 125 mL, PRN   Or  dextrose bolus 10% 250 mL, PRN  glucagon injection 1 mg, PRN  dextrose 10 % infusion, Continuous PRN  miconazole (MICOTIN) 2 % powder, BID  collagenase ointment, Daily  anticoagulant sodium citrate 4 % injection 1.4 mL, PRN  anticoagulant sodium citrate 4 % injection 1.5 mL, PRN  aspirin EC tablet 81 mg, Daily  escitalopram (LEXAPRO) tablet 20 mg, Daily  metoprolol tartrate  extrapolated by contextual diversion.    Electronically signed by TOPHER Donovan NP on 12/29/2023 at 10:16 AM

## 2023-12-29 NOTE — PROGRESS NOTES
[facility] -   Infectious diseases evaluation  admission date 12/14/2023    reason for consultation:   Cellulitis    Impression :   Current:  Cellulitis bilateral lower extremity.   Bilat LE edema w large blisters - infected bliters  Wound cx grew Pseudomonas aeruginosa sensitive to Levaquin and cefepime and Enterococcus faecalis sensitive to ampicillin  Pseudomonas Aeruginosa bacteremia 12/14/23 source is the leg abscesses  sensitive to Levaquin and cefepime.  TRAUMATIC rR post calf hematoma  Sepsis secondary to above  Mild ulcers, possible HSV  Peripheral arterial disease  Acute on chronic renal failure.  New onset HD, discontinued.  Obesity  History of lymphedema venous stasis insufficiency  Hyperlipidemia  Penicillin allergy-Hives.  Tolerated Cefepime 12/23.    Recommendations   Zyvox course completed 12/19/2023 and cellulitis RLE much better  Levaquin 500 mg po- at least till 12/29 for the bacteremia   Keep longer for the R foot cutan abscesses  Cx taken from 12/29 right foot sub cut blisters  12/29 Consult podiatry for skin I/D   Valtrex po- 1 week course - completed      Infection Control Recommendations   [place] Precautions    Antimicrobial Stewardship Recommendations   Simplification of therapy  Targeted therapy    History of Present Illness:   Initial history:  Elaina Champagne is a 73 y.o.-year-old female was sent to the hospital from wound care clinic for severe left leg pain associated with generalized fatigue.  The patient had swelling and redness of the lower extremities bilaterally with open wound to the posterior aspect of the right leg.  She is poor historian, afebrile, denied nausea or vomiting, no diarrhea, no cough or shortness of breath, no other complaints.  Initial labs showed WBC of 22, elevated lactic acid and creatinine of 3.8.  Arterial Doppler showed left PHILL of 0.68    12/14 12/29                        Interval changes       Socioeconomic History    Marital status:      Spouse name: Eze    Number of children: 3    Years of education: Not on file    Highest education level: Not on file   Occupational History    Not on file   Tobacco Use    Smoking status: Never     Passive exposure: Never    Smokeless tobacco: Never   Vaping Use    Vaping Use: Never used   Substance and Sexual Activity    Alcohol use: No    Drug use: No    Sexual activity: Yes   Other Topics Concern    Not on file   Social History Narrative    Not on file     Social Determinants of Health     Financial Resource Strain: Low Risk  (3/7/2023)    Overall Financial Resource Strain (CARDIA)     Difficulty of Paying Living Expenses: Not hard at all   Food Insecurity: No Food Insecurity (12/14/2023)    Hunger Vital Sign     Worried About Running Out of Food in the Last Year: Never true     Ran Out of Food in the Last Year: Never true   Transportation Needs: No Transportation Needs (12/14/2023)    PRAPARE - Transportation     Lack of Transportation (Medical): No     Lack of Transportation (Non-Medical): No   Physical Activity: Insufficiently Active (3/7/2023)    Exercise Vital Sign     Days of Exercise per Week: 2 days     Minutes of Exercise per Session: 20 min   Stress: Not on file   Social Connections: Not on file   Intimate Partner Violence: Not on file   Housing Stability: Low Risk  (12/14/2023)    Housing Stability Vital Sign     Unable to Pay for Housing in the Last Year: No     Number of Places Lived in the Last Year: 1     Unstable Housing in the Last Year: No       Family History:     Family History   Problem Relation Age of Onset    Cancer Mother     High Blood Pressure Father     Stroke Father     Ovarian Cancer Daughter     Cancer Daughter     Diabetes Maternal Aunt     Cancer Other         daughter/ovarian cancer      Medical Decision Making:   I have independently reviewed/ordered the following labs:    CBC with Differential:   Recent Labs

## 2023-12-29 NOTE — PROGRESS NOTES
Occupational Therapy  [facility]   INPATIENT OCCUPATIONAL THERAPY  PROGRESS NOTE  Date: 2023  Patient Name: Elaina Champagne       Room:   MRN: [id number]    : 1950  (73 y.o.)  Gender: female   Referring Practitioner: David Yang MD  Diagnosis: Hyperkalemia      Discharge Recommendations:  Further Occupational Therapy is recommended upon facility discharge.    OT Equipment Recommendations  Other: TBD    Restrictions/Precautions  Restrictions/Precautions  Restrictions/Precautions: Fall Risk;General Precautions;Up as Tolerated  Required Braces or Orthoses?: No  Implants present? : Metal implants (Bilat TKA)  Position Activity Restriction  Other position/activity restrictions: Up with assistance. Hemodialysis started 23      Subjective  Subjective  Subjective: Pt reports 4/10 pain in bottom of B feet at rest, increased to 6/10 once in standing.  Comments: MARIA M Ramirez treatment, co-tx with Maribel CANAS for optimal pt safety and performance.    Objective  Orientation  Overall Orientation Status: Within Functional Limits  Cognition  Overall Cognitive Status: WFL    Activities of Daily Living  ADL  Feeding: Setup  Grooming: Setup  UE Bathing: Minimal assistance  LE Bathing: Maximum assistance  UE Dressing: Minimal assistance  LE Dressing: Dependent/Total  Toileting: Dependent/Total  Additional Comments: ADL scores are based on skilled observation and clinical reasoning unless otherwise noted. Pt is currently limited by increased pain, impaired strength, endurance, balance, and body habitus which impacts the pt's ability to safely and independently complete self-care/mobility    Balance  Balance  Sitting Balance: Supervision (seated EOB in good tolerance; x9 and x8 min trials.)  Standing Balance: Contact guard assistance (CGA x 2)  Standing Balance  Time: 55 sec  Activity: static stand EOB with BUE support on RW; cues for upright posture.    Transfers/Mobility  Bed

## 2023-12-29 NOTE — PROGRESS NOTES
Is patient able to advance diet as tolerated? Read 5:28 PM   12/28/23 5:29 PM  Yes start with clear liquids and advance to fill liquids today    Will advance diet as tolerated per GI

## 2023-12-30 ENCOUNTER — APPOINTMENT (OUTPATIENT)
Dept: GENERAL RADIOLOGY | Age: 73
DRG: 853 | End: 2023-12-30
Payer: MEDICARE

## 2023-12-30 LAB
ALBUMIN SERPL-MCNC: 2.6 G/DL (ref 3.5–5.2)
ANION GAP SERPL CALCULATED.3IONS-SCNC: 7 MMOL/L (ref 9–17)
BASOPHILS # BLD: 0.1 K/UL (ref 0–0.2)
BASOPHILS NFR BLD: 1 % (ref 0–2)
BUN SERPL-MCNC: 16 MG/DL (ref 8–23)
CALCIUM SERPL-MCNC: 8.4 MG/DL (ref 8.6–10.4)
CHLORIDE SERPL-SCNC: 97 MMOL/L (ref 98–107)
CO2 SERPL-SCNC: 31 MMOL/L (ref 20–31)
CREAT SERPL-MCNC: 0.6 MG/DL (ref 0.5–0.9)
CRP SERPL HS-MCNC: 93.2 MG/L (ref 0–5)
D DIMER PPP FEU-MCNC: 5.18 UG/ML FEU (ref 0–0.59)
EOSINOPHIL # BLD: 0.1 K/UL (ref 0–0.4)
EOSINOPHILS RELATIVE PERCENT: 1 % (ref 0–4)
ERYTHROCYTE [DISTWIDTH] IN BLOOD BY AUTOMATED COUNT: 15.6 % (ref 11.5–14.9)
ERYTHROCYTE [SEDIMENTATION RATE] IN BLOOD BY PHOTOMETRIC METHOD: 40 MM/HR (ref 0–30)
GFR SERPL CREATININE-BSD FRML MDRD: >60 ML/MIN/1.73M2
GLUCOSE BLD-MCNC: 114 MG/DL (ref 65–105)
GLUCOSE BLD-MCNC: 90 MG/DL (ref 65–105)
GLUCOSE BLD-MCNC: 92 MG/DL (ref 65–105)
GLUCOSE BLD-MCNC: 93 MG/DL (ref 65–105)
GLUCOSE SERPL-MCNC: 113 MG/DL (ref 70–99)
HCT VFR BLD AUTO: 26.4 % (ref 36–46)
HGB BLD-MCNC: 8.7 G/DL (ref 12–16)
LACTATE BLDV-SCNC: 1.1 MMOL/L (ref 0.5–1.9)
LYMPHOCYTES NFR BLD: 1.2 K/UL (ref 1–4.8)
LYMPHOCYTES RELATIVE PERCENT: 16 % (ref 24–44)
MAGNESIUM SERPL-MCNC: 1.5 MG/DL (ref 1.6–2.6)
MCH RBC QN AUTO: 30.1 PG (ref 26–34)
MCHC RBC AUTO-ENTMCNC: 33.1 G/DL (ref 31–37)
MCV RBC AUTO: 90.9 FL (ref 80–100)
MICROORGANISM SPEC CULT: NORMAL
MONOCYTES NFR BLD: 0.3 K/UL (ref 0.1–1.3)
MONOCYTES NFR BLD: 4 % (ref 1–7)
NEUTROPHILS NFR BLD: 78 % (ref 36–66)
NEUTS SEG NFR BLD: 6 K/UL (ref 1.3–9.1)
PHOSPHATE SERPL-MCNC: 2.2 MG/DL (ref 2.6–4.5)
PLATELET # BLD AUTO: 628 K/UL (ref 150–450)
PMV BLD AUTO: 8.1 FL (ref 6–12)
POTASSIUM SERPL-SCNC: 3 MMOL/L (ref 3.7–5.3)
POTASSIUM SERPL-SCNC: 3.5 MMOL/L (ref 3.7–5.3)
RBC # BLD AUTO: 2.9 M/UL (ref 4–5.2)
SERVICE CMNT-IMP: NORMAL
SODIUM SERPL-SCNC: 135 MMOL/L (ref 135–144)
SPECIMEN DESCRIPTION: NORMAL
TROPONIN I SERPL HS-MCNC: 383 NG/L (ref 0–14)
TROPONIN I SERPL HS-MCNC: 383 NG/L (ref 0–14)
WBC OTHER # BLD: 7.7 K/UL (ref 3.5–11)

## 2023-12-30 PROCEDURE — 80069 RENAL FUNCTION PANEL: CPT

## 2023-12-30 PROCEDURE — 36415 COLL VENOUS BLD VENIPUNCTURE: CPT

## 2023-12-30 PROCEDURE — 86140 C-REACTIVE PROTEIN: CPT

## 2023-12-30 PROCEDURE — 6370000000 HC RX 637 (ALT 250 FOR IP): Performed by: INTERNAL MEDICINE

## 2023-12-30 PROCEDURE — 2580000003 HC RX 258: Performed by: INTERNAL MEDICINE

## 2023-12-30 PROCEDURE — 85652 RBC SED RATE AUTOMATED: CPT

## 2023-12-30 PROCEDURE — 84484 ASSAY OF TROPONIN QUANT: CPT

## 2023-12-30 PROCEDURE — 6360000002 HC RX W HCPCS: Performed by: NURSE PRACTITIONER

## 2023-12-30 PROCEDURE — 82947 ASSAY GLUCOSE BLOOD QUANT: CPT

## 2023-12-30 PROCEDURE — 84132 ASSAY OF SERUM POTASSIUM: CPT

## 2023-12-30 PROCEDURE — 71045 X-RAY EXAM CHEST 1 VIEW: CPT

## 2023-12-30 PROCEDURE — 99231 SBSQ HOSP IP/OBS SF/LOW 25: CPT | Performed by: INTERNAL MEDICINE

## 2023-12-30 PROCEDURE — 6360000002 HC RX W HCPCS: Performed by: INTERNAL MEDICINE

## 2023-12-30 PROCEDURE — C9113 INJ PANTOPRAZOLE SODIUM, VIA: HCPCS | Performed by: NURSE PRACTITIONER

## 2023-12-30 PROCEDURE — 1200000000 HC SEMI PRIVATE

## 2023-12-30 PROCEDURE — 85379 FIBRIN DEGRADATION QUANT: CPT

## 2023-12-30 PROCEDURE — 2500000003 HC RX 250 WO HCPCS: Performed by: INTERNAL MEDICINE

## 2023-12-30 PROCEDURE — APPSS30 APP SPLIT SHARED TIME 16-30 MINUTES: Performed by: NURSE PRACTITIONER

## 2023-12-30 PROCEDURE — 83605 ASSAY OF LACTIC ACID: CPT

## 2023-12-30 PROCEDURE — 85025 COMPLETE CBC W/AUTO DIFF WBC: CPT

## 2023-12-30 PROCEDURE — 83735 ASSAY OF MAGNESIUM: CPT

## 2023-12-30 PROCEDURE — 2580000003 HC RX 258: Performed by: NURSE PRACTITIONER

## 2023-12-30 PROCEDURE — 6370000000 HC RX 637 (ALT 250 FOR IP): Performed by: NURSE PRACTITIONER

## 2023-12-30 PROCEDURE — 99232 SBSQ HOSP IP/OBS MODERATE 35: CPT | Performed by: INTERNAL MEDICINE

## 2023-12-30 RX ORDER — POTASSIUM CHLORIDE 20 MEQ/1
20 TABLET, EXTENDED RELEASE ORAL ONCE
Status: COMPLETED | OUTPATIENT
Start: 2023-12-30 | End: 2023-12-30

## 2023-12-30 RX ORDER — ENOXAPARIN SODIUM 150 MG/ML
1 INJECTION SUBCUTANEOUS 2 TIMES DAILY
Status: DISCONTINUED | OUTPATIENT
Start: 2023-12-30 | End: 2024-01-02 | Stop reason: HOSPADM

## 2023-12-30 RX ORDER — METOPROLOL TARTRATE 50 MG/1
50 TABLET, FILM COATED ORAL 2 TIMES DAILY
Status: DISCONTINUED | OUTPATIENT
Start: 2023-12-30 | End: 2024-01-02 | Stop reason: HOSPADM

## 2023-12-30 RX ORDER — PANTOPRAZOLE SODIUM 40 MG/1
40 TABLET, DELAYED RELEASE ORAL
Status: DISCONTINUED | OUTPATIENT
Start: 2023-12-30 | End: 2023-12-30

## 2023-12-30 RX ORDER — HYDROXYZINE HYDROCHLORIDE 10 MG/1
10 TABLET, FILM COATED ORAL ONCE
Status: COMPLETED | OUTPATIENT
Start: 2023-12-30 | End: 2023-12-30

## 2023-12-30 RX ADMIN — POTASSIUM CHLORIDE 20 MEQ: 1500 TABLET, EXTENDED RELEASE ORAL at 12:03

## 2023-12-30 RX ADMIN — LEVOFLOXACIN 500 MG: 500 TABLET, FILM COATED ORAL at 09:24

## 2023-12-30 RX ADMIN — FUROSEMIDE 20 MG: 20 TABLET ORAL at 09:19

## 2023-12-30 RX ADMIN — SENNOSIDES AND DOCUSATE SODIUM 2 TABLET: 50; 8.6 TABLET ORAL at 09:19

## 2023-12-30 RX ADMIN — PANTOPRAZOLE SODIUM 8 MG/HR: 40 INJECTION, POWDER, FOR SOLUTION INTRAVENOUS at 19:55

## 2023-12-30 RX ADMIN — ACETAMINOPHEN 650 MG: 325 TABLET ORAL at 09:24

## 2023-12-30 RX ADMIN — SODIUM CHLORIDE, PRESERVATIVE FREE 10 ML: 5 INJECTION INTRAVENOUS at 21:21

## 2023-12-30 RX ADMIN — ESCITALOPRAM OXALATE 20 MG: 20 TABLET ORAL at 09:24

## 2023-12-30 RX ADMIN — ACETAMINOPHEN 650 MG: 325 TABLET ORAL at 00:25

## 2023-12-30 RX ADMIN — METOPROLOL TARTRATE 50 MG: 50 TABLET ORAL at 21:21

## 2023-12-30 RX ADMIN — ACETAMINOPHEN 650 MG: 325 TABLET ORAL at 21:21

## 2023-12-30 RX ADMIN — ANTI-FUNGAL POWDER MICONAZOLE NITRATE TALC FREE: 1.42 POWDER TOPICAL at 09:20

## 2023-12-30 RX ADMIN — ANTI-FUNGAL POWDER MICONAZOLE NITRATE TALC FREE: 1.42 POWDER TOPICAL at 21:21

## 2023-12-30 RX ADMIN — ASPIRIN 81 MG: 81 TABLET, COATED ORAL at 09:19

## 2023-12-30 RX ADMIN — PANTOPRAZOLE SODIUM 8 MG/HR: 40 INJECTION, POWDER, FOR SOLUTION INTRAVENOUS at 09:18

## 2023-12-30 RX ADMIN — METOPROLOL TARTRATE 25 MG: 25 TABLET, FILM COATED ORAL at 09:19

## 2023-12-30 RX ADMIN — ENOXAPARIN SODIUM 150 MG: 150 INJECTION SUBCUTANEOUS at 23:24

## 2023-12-30 RX ADMIN — POTASSIUM PHOSPHATE, MONOBASIC POTASSIUM PHOSPHATE, DIBASIC 30 MMOL: 224; 236 INJECTION, SOLUTION, CONCENTRATE INTRAVENOUS at 12:03

## 2023-12-30 RX ADMIN — SODIUM CHLORIDE, PRESERVATIVE FREE 80 MG: 5 INJECTION INTRAVENOUS at 09:17

## 2023-12-30 RX ADMIN — HYDROXYZINE HYDROCHLORIDE 10 MG: 10 TABLET ORAL at 23:15

## 2023-12-30 ASSESSMENT — PAIN SCALES - GENERAL
PAINLEVEL_OUTOF10: 3

## 2023-12-30 ASSESSMENT — PAIN DESCRIPTION - LOCATION
LOCATION: FOOT
LOCATION: TOE (COMMENT WHICH ONE)
LOCATION: LEG
LOCATION: FOOT;LEG

## 2023-12-30 ASSESSMENT — PAIN DESCRIPTION - ORIENTATION
ORIENTATION: RIGHT;LEFT
ORIENTATION: RIGHT;LEFT
ORIENTATION: LEFT
ORIENTATION: LEFT;RIGHT

## 2023-12-30 ASSESSMENT — PAIN DESCRIPTION - DESCRIPTORS
DESCRIPTORS: ACHING
DESCRIPTORS: CRAMPING

## 2023-12-30 NOTE — PLAN OF CARE
Problem: Discharge Planning  Goal: Discharge to home or other facility with appropriate resources  12/30/2023 1714 by Elba Mcnulty, RN  Outcome: Progressing     Problem: Pain  Goal: Verbalizes/displays adequate comfort level or baseline comfort level  12/30/2023 1714 by Elba Mcunlty, RN  Outcome: Progressing     Problem: Skin/Tissue Integrity  Goal: Absence of new skin breakdown  Description: 1.  Monitor for areas of redness and/or skin breakdown  2.  Assess vascular access sites hourly  3.  Every 4-6 hours minimum:  Change oxygen saturation probe site  4.  Every 4-6 hours:  If on nasal continuous positive airway pressure, respiratory therapy assess nares and determine need for appliance change or resting period.  12/30/2023 1714 by Elba Mcnulty, RN  Outcome: Progressing

## 2023-12-30 NOTE — PROGRESS NOTES
[facility]    Gastroenterology Daily Progress Note      Patient:   Elaina Champagne   :    1950   Facility:   [facility]  Date:     2023  Consultant:   TOPHER Kincaid - CNP, CNP      SUBJECTIVE  73 y.o. female admitted 2023 with Hyperkalemia [E87.5]  Cellulitis [L03.90]  MICHELLE (acute kidney injury) (HCC) [N17.9]  Cellulitis of right lower extremity [L03.115]  Cellulitis of left lower extremity [L03.116]  Altered mental status, unspecified altered mental status type [R41.82] and seen for anemia with antral ulcer. The pt was seen and examined. Hgb 8.7 pt has not had abdominal pain, no BM overnight. .        OBJECTIVE  Scheduled Meds:   furosemide  20 mg Oral Daily    levoFLOXacin  500 mg Oral Daily    rivaroxaban  20 mg Oral Daily    sennosides-docusate sodium  2 tablet Oral Daily    miconazole   Topical BID    collagenase   Topical Daily    aspirin  81 mg Oral Daily    escitalopram  20 mg Oral Daily    metoprolol tartrate  25 mg Oral BID    sodium chloride flush  5-40 mL IntraVENous 2 times per day       Vital Signs:  BP (!) 163/72   Pulse 73   Temp 98.1 °F (36.7 °C) (Oral)   Resp 18   Ht 1.575 m (5' 2\")   Wt (!) 147.8 kg (325 lb 13.4 oz)   SpO2 96%   BMI 59.60 kg/m²    Review of Systems - History obtained from chart review and the patient  General ROS: negative  Respiratory ROS: no cough, shortness of breath, or wheezing  Cardiovascular ROS: no chest pain or dyspnea on exertion  Gastrointestinal ROS: no abdominal pain, change in bowel habits, or black or bloody stools   Physical Exam:     General Appearance: alert and oriented to person, place and time, well-developed and well-nourished, in no acute distress  Skin: warm and dry, no rash or erythema  Head: normocephalic and atraumatic  Eyes: pupils equal, round, and reactive to light, extraocular eye movements intact, conjunctivae normal  ENT: hearing grossly normal bilaterally  Neck: neck supple and non

## 2023-12-30 NOTE — PROGRESS NOTES
[facility]   IN-PATIENT SERVICE   [facility]    Progress note             Date:   12/30/2023  Patient name:  Elaina Champagne  Date of admission:  12/14/2023 11:42 AM  MRN:   [id number]  Account:  [id number]  YOB: 1950  PCP:    Robin Ly MD  Room:   2058/2058-01  Code Status:    DNR-CCA    Chief Complaint:     Chief Complaint   Patient presents with    Leg Pain     Left      Fatigue       History Obtained From:     patient, electronic medical record    History of Present Illness:     The patient is a 73 y.o.  Non- / non  female who presents with Leg Pain (Left/) and Fatigue   and she is admitted to the hospital for the management of wound check  Patient has past medical is a multiple medical problem including morbid obesity, hypertension, lymphedema, patient has wound in both legs and back of her legs, symptoms are going on for last 1 month  Patient follows with wound care  She was evaluated by Dr. Meredith vascular surgery yesterday, dressing was applied on left leg, she was having severe pain that made her come to the hospital  Patient, had arterial scan done earlier suggestive of PHILL of 0.68 on left side, on right side PHILL was okay  Previous wound culture was growing Klebsiella and staph, MSSA  In the emergency room, patient had lab work done suggestive elevated creatinine of 3.8  CK was normal      Past Medical History:     Past Medical History:   Diagnosis Date    Bell's palsy     Cellulitis     Hypertension         Past Surgical History:     Past Surgical History:   Procedure Laterality Date    CATARACT REMOVAL Bilateral 2015    HYSTERECTOMY, TOTAL ABDOMINAL (CERVIX REMOVED)  1990    INCISION AND DRAINAGE Left 07/06/2017    LEG INCISION AND DRAINAGE ABSCESS performed by Isaiah Casey MD at [facility] OR    IR NONTUNNELED VASCULAR CATHETER  12/15/2023    IR NONTUNNELED VASCULAR CATHETER 12/15/2023 [facility] SPECIAL PROCEDURES    KNEE ARTHROPLASTY Right  Fingerstick    Collection Time: 12/30/23  6:29 AM   Result Value Ref Range    POC Glucose 114 (H) 65 - 105 mg/dL       Imaging/Diagnostics:        Assessment :      Primary Problem  Hyperkalemia    Active Hospital Problems    Diagnosis Date Noted    Permanent atrial fibrillation (HCC) [I48.21] 12/18/2023     Priority: High    Renovascular hypertension [I15.0] 12/18/2023     Priority: High    Atrial fibrillation with rapid ventricular response (HCC) [I48.91] 12/16/2023     Priority: High    Altered mental status [R41.82] 12/16/2023     Priority: High    Pseudomonas septicemia (HCC) [A41.52] 12/29/2023    Abscess of right foot [L02.611] 12/29/2023    Acute gastric ulcer with hemorrhage [K25.0] 12/29/2023    Melena [K92.1] 12/27/2023    Acute on chronic anemia [D64.9] 12/27/2023    Anticoagulated [Z79.01] 12/27/2023    Elevated erythrocyte sedimentation rate [R70.0] 12/17/2023    Bacteremia due to Pseudomonas [R78.81, B96.5] 12/16/2023    Bacteriuria [R82.71] 12/16/2023    Leukocytosis [D72.829] 12/16/2023    Elevated C-reactive protein (CRP) [R79.82] 12/16/2023    Allergy to penicillin [Z88.0] 12/16/2023    Hyperkalemia [E87.5] 12/14/2023    MICHELLE (acute kidney injury) (HCC) [N17.9] 12/14/2023    Cellulitis [L03.90] 12/14/2023     Cc 35 mins  Plan:     Patient status Admit as inpatient in the  Progressive Unit/Step down    Patient admitted with cellulitis, source and back of both legs, with lymphedema, holding wound care, previous wound culture was growing Klebsiella and MSSA, starting patient on cefepime, Zyvox,  Ordering venous scan, patient had arterial scan done recently suggestive of peripheral artery disease affecting left leg, ID consult, wound care consult  MICHELLE, holding telmisartan, diuretics starting patient on gentle hydration, will monitor creatinine closely, ultrasound kidneys done in emergency room was negative for hydronephrosis, nephrology consult  Heparin for DVT prophylaxis  Ordering Doppler for both

## 2023-12-30 NOTE — PLAN OF CARE
Problem: Discharge Planning  Goal: Discharge to home or other facility with appropriate resources  12/30/2023 0414 by Janis Pineda, RN  Outcome: Progressing  Flowsheets (Taken 12/30/2023 0414)  Discharge to home or other facility with appropriate resources:   Identify barriers to discharge with patient and caregiver   Identify discharge learning needs (meds, wound care, etc)   Refer to discharge planning if patient needs post-hospital services based on physician order or complex needs related to functional status, cognitive ability or social support system   Arrange for needed discharge resources and transportation as appropriate  Note: Inform pt. Of discharge teaching and planned. Instructed pt. To inform me if further teaching need to be done.      Problem: Safety - Adult  Goal: Free from fall injury  12/30/2023 0414 by Janis Pineda, RN  Outcome: Progressing  Flowsheets (Taken 12/30/2023 0414)  Free From Fall Injury:   Based on caregiver fall risk screen, instruct family/caregiver to ask for assistance with transferring infant if caregiver noted to have fall risk factors   Instruct family/caregiver on patient safety  Note: Made sure call light was in reach, a clear pathway and adequate lighting was provided. Also made sure that the pt. Was wearing non-slip socks.      Problem: Pain  Goal: Verbalizes/displays adequate comfort level or baseline comfort level  12/30/2023 0414 by Janis Pineda, RN  Outcome: Progressing

## 2023-12-30 NOTE — PROGRESS NOTES
NEPHROLOGY PROGRESS NOTE     Patient :  Elaina Champagne; 73 y.o. MRN# [id number]  Location:  2058/2058-01  Attending:  David Yang MD  Admit Date:  12/14/2023   Hospital Day: 16    Reason for consultation: Management of acute kidney injury.    Consulting physician: David Yang MD.    Interval history:  Patient does not have any new complaints today. She underwent EGD [12/28/2023] with finding of a large cratered ulcer measuring 3 to 4 cm at the antrum causing deformity of the pylorus.  She is currently on Protonix drip.    History of Present Illness: This is a 73 y.o. female with past medical history of essential hypertension, peripheral vascular disease, patient presented to the hospital with complaints of left leg swelling erythema and wound on the left leg patient was seen by vascular and wound care on 12/13/2023.  Patient presented to the hospital yesterday on 12/14/2023 with complaints of left leg swelling erythema and wound getting worse patient has that swelling for several weeks but has been getting worse and more red.  Patient also noted to have fatigue weakness, and she has confusion this morning.  According to the  no nausea vomiting diarrhea no fever chills no headache dizziness.  Labs on admission showed BUN of 54 serum creatinine of 3.9 mg/dL, patient has been oliguric.  Patient denies dysuria, gross hematuria, flank pain, nocturia, urgency, passing frothy urine or urinary incontinence.  There has been no recent exposure to IV contrast.   There is no history  of paraprotein disease.   Pt denies any history of recurrent UTI or kidney stones.  Medication review shows use of ARB's and Bumex, and NSAIDs.  Patient noted to be hypotensive yesterday systolic blood pressure was in 70s blood pressure has improved  UA showed 1+ protein trace leukocyte esterase WBC 0-2 RBC 3-5 hemoglobin negative many bacteria.  Urine culture is positive for moderate gram-negative rods, few gram-positive cocci in  pairs, rare gram-positive cocci in clusters.Likely contaminated urine sample    Current Medications:    pantoprazole (PROTONIX) 80 mg in sodium chloride 0.9 % 100 mL infusion, Continuous  potassium phosphate 30 mmol in sodium chloride 0.9 % 500 mL IVPB, Once  furosemide (LASIX) tablet 20 mg, Daily  levoFLOXacin (LEVAQUIN) tablet 500 mg, Daily  rivaroxaban (XARELTO) tablet 20 mg, Daily  sennosides-docusate sodium (SENOKOT-S) 8.6-50 MG tablet 2 tablet, Daily  magic (miracle) mouthwash, 4x Daily PRN  melatonin tablet 3 mg, Nightly PRN  albumin human 25% IV solution 25 g, PRN  midodrine (PROAMATINE) tablet 5 mg, PRN  guaiFENesin (ROBITUSSIN) 100 MG/5ML liquid 200 mg, Q4H PRN  glucose chewable tablet 16 g, PRN  dextrose bolus 10% 125 mL, PRN   Or  dextrose bolus 10% 250 mL, PRN  glucagon injection 1 mg, PRN  dextrose 10 % infusion, Continuous PRN  miconazole (MICOTIN) 2 % powder, BID  collagenase ointment, Daily  anticoagulant sodium citrate 4 % injection 1.4 mL, PRN  anticoagulant sodium citrate 4 % injection 1.5 mL, PRN  aspirin EC tablet 81 mg, Daily  escitalopram (LEXAPRO) tablet 20 mg, Daily  metoprolol tartrate (LOPRESSOR) tablet 25 mg, BID  sodium chloride flush 0.9 % injection 5-40 mL, 2 times per day  sodium chloride flush 0.9 % injection 5-40 mL, PRN  0.9 % sodium chloride infusion, PRN  ondansetron (ZOFRAN-ODT) disintegrating tablet 4 mg, Q8H PRN   Or  ondansetron (ZOFRAN) injection 4 mg, Q6H PRN  polyethylene glycol (GLYCOLAX) packet 17 g, Daily PRN  acetaminophen (TYLENOL) tablet 650 mg, Q6H PRN   Or  acetaminophen (TYLENOL) suppository 650 mg, Q6H PRN      Objective:  CURRENT TEMPERATURE:  Temp: 98.1 °F (36.7 °C)  MAXIMUM TEMPERATURE OVER 24HRS:  Temp (24hrs), Av.3 °F (36.8 °C), Min:98.1 °F (36.7 °C), Max:98.5 °F (36.9 °C)    CURRENT RESPIRATORY RATE:  Respirations: 18  CURRENT PULSE:  Pulse: 73  CURRENT BLOOD PRESSURE:  BP: (!) 163/72  24HR BLOOD PRESSURE RANGE:  Systolic (24hrs), Av , Min:95 ,

## 2023-12-30 NOTE — PROGRESS NOTES
Consult Note  Podiatric Medicine and Surgery     Subjective     CC: right leg cellulitis and venous wounds    Interval history:  Patient seen and examined at bedside.  Vital signs, labs, and imaging reviewed  No acute events overnight.  Afebrile, vital signs stable   Discussed MRI findings with patient this morning.  Discharge planning in place    HPI :  Elaina Champagne is a 73 y.o. female seen at [facility] complaining of blisters to her right dorsal foot. Pt has severe lower extremity edema and a wound to her left posterior leg. She has been seen at [facility] wound care in the past. Wound ostomy is on board giving attention to her left leg wound and sacral wound. Patient has a history of cellulitis, Afib, AMS, venous insufficiency, and MICHELLE. Blisters have not been draining and have come on insidiously in the last couple days.She was diagnosed with bacteremia secondary to UTI on this admission. We are re-consulted because of worsening of venous wound new blood blisters to RLE.  Patient denies any systemic symptoms at this time.      ROS: Review of Systems   Constitutional:  Positive for activity change.   HENT: Negative.     Eyes: Negative.    Respiratory: Negative.     Cardiovascular:  Positive for leg swelling.   Gastrointestinal: Negative.    Endocrine: Negative.    Genitourinary: Negative.    Musculoskeletal:  Positive for gait problem and joint swelling.   Skin:  Positive for color change and wound.   Allergic/Immunologic: Negative.    Hematological: Negative.    Psychiatric/Behavioral: Negative.      Medications:  Scheduled Meds:   potassium phosphate IVPB (PERIPHERAL LINE)  30 mmol IntraVENous Once    potassium chloride  20 mEq Oral Once    furosemide  20 mg Oral Daily    levoFLOXacin  500 mg Oral Daily    rivaroxaban  20 mg Oral Daily    sennosides-docusate sodium  2 tablet Oral Daily    miconazole   Topical BID    collagenase   Topical Daily    aspirin  81 mg Oral Daily    escitalopram  20 mg Oral  swelling without acute osseous abnormality.         XR CHEST PORTABLE   Final Result   Perihilar pulmonary edema, pulmonary is congestion and moderate cardiomegaly.   Congestive heart failure versus volume overload are differential diagnosis.         Vascular duplex lower extremity venous bilateral   Final Result      CT FOOT LEFT WO CONTRAST   Final Result   Soft tissue edema or cellulitis involving the medial aspect and posteromedial   aspect of the proximal thigh, involving the leg, ankle, and lateral foot.  No   localized collection identified.      Remote full-thickness Achilles tendon tear.      Atrophy of the leg musculature.      Degenerative change.         CT FOOT RIGHT WO CONTRAST   Final Result   Subcutaneous soft tissue edema or cellulitis involving the leg, ankle, and   foot.  No localized collection identified.      Degenerative change.      Probable remote partial tear of the mid Achilles tendon.         CT TIBIA FIBULA RIGHT WO CONTRAST   Final Result   Subcutaneous soft tissue edema or cellulitis involving the leg, ankle, and   foot.  No localized collection identified.      Degenerative change.      Probable remote partial tear of the mid Achilles tendon.         CT TIBIA FIBULA LEFT WO CONTRAST   Final Result   Soft tissue edema or cellulitis involving the medial aspect and posteromedial   aspect of the proximal thigh, involving the leg, ankle, and lateral foot. No   localized collection identified.      Remote full-thickness Achilles tendon tear.      Atrophy of the leg musculature.      Degenerative change.         IR NONTUNNELED VASCULAR CATHETER > 5 YEARS   Final Result   Successful ultrasound and fluoroscopy guided non-tunneled triple-lumen   dialysis catheter placement.         XR TIBIA FIBULA LEFT (2 VIEWS)   Final Result   Edema of the subcutaneous fat, in keeping with the clinical diagnosis of   cellulitis. No radiographic evidence of osteomyelitis.         XR TIBIA FIBULA RIGHT (2

## 2023-12-31 ENCOUNTER — APPOINTMENT (OUTPATIENT)
Dept: CT IMAGING | Age: 73
DRG: 853 | End: 2023-12-31
Payer: MEDICARE

## 2023-12-31 PROBLEM — I21.4 NSTEMI (NON-ST ELEVATED MYOCARDIAL INFARCTION) (HCC): Status: ACTIVE | Noted: 2023-12-31

## 2023-12-31 LAB
ALBUMIN SERPL-MCNC: 2.4 G/DL (ref 3.5–5.2)
ANION GAP SERPL CALCULATED.3IONS-SCNC: 6 MMOL/L (ref 9–17)
BASOPHILS # BLD: 0 K/UL (ref 0–0.2)
BASOPHILS # BLD: 0 K/UL (ref 0–0.2)
BASOPHILS NFR BLD: 0 % (ref 0–2)
BASOPHILS NFR BLD: 1 % (ref 0–2)
BUN SERPL-MCNC: 13 MG/DL (ref 8–23)
CALCIUM SERPL-MCNC: 8.1 MG/DL (ref 8.6–10.4)
CHLORIDE SERPL-SCNC: 103 MMOL/L (ref 98–107)
CO2 SERPL-SCNC: 32 MMOL/L (ref 20–31)
CREAT SERPL-MCNC: 0.6 MG/DL (ref 0.5–0.9)
CRP SERPL HS-MCNC: 65.7 MG/L (ref 0–5)
EOSINOPHIL # BLD: 0.1 K/UL (ref 0–0.4)
EOSINOPHIL # BLD: 0.1 K/UL (ref 0–0.4)
EOSINOPHILS RELATIVE PERCENT: 2 % (ref 0–4)
EOSINOPHILS RELATIVE PERCENT: 2 % (ref 0–4)
ERYTHROCYTE [DISTWIDTH] IN BLOOD BY AUTOMATED COUNT: 15.8 % (ref 11.5–14.9)
ERYTHROCYTE [DISTWIDTH] IN BLOOD BY AUTOMATED COUNT: 16.1 % (ref 11.5–14.9)
ERYTHROCYTE [SEDIMENTATION RATE] IN BLOOD BY PHOTOMETRIC METHOD: 48 MM/HR (ref 0–30)
GFR SERPL CREATININE-BSD FRML MDRD: >60 ML/MIN/1.73M2
GLUCOSE SERPL-MCNC: 96 MG/DL (ref 70–99)
HCT VFR BLD AUTO: 24.9 % (ref 36–46)
HCT VFR BLD AUTO: 26.5 % (ref 36–46)
HGB BLD-MCNC: 8.2 G/DL (ref 12–16)
HGB BLD-MCNC: 8.9 G/DL (ref 12–16)
LYMPHOCYTES NFR BLD: 1 K/UL (ref 1–4.8)
LYMPHOCYTES NFR BLD: 1.2 K/UL (ref 1–4.8)
LYMPHOCYTES RELATIVE PERCENT: 14 % (ref 24–44)
LYMPHOCYTES RELATIVE PERCENT: 16 % (ref 24–44)
MCH RBC QN AUTO: 29.9 PG (ref 26–34)
MCH RBC QN AUTO: 30.9 PG (ref 26–34)
MCHC RBC AUTO-ENTMCNC: 32.8 G/DL (ref 31–37)
MCHC RBC AUTO-ENTMCNC: 33.7 G/DL (ref 31–37)
MCV RBC AUTO: 91 FL (ref 80–100)
MCV RBC AUTO: 91.5 FL (ref 80–100)
MONOCYTES NFR BLD: 0.4 K/UL (ref 0.1–1.3)
MONOCYTES NFR BLD: 0.5 K/UL (ref 0.1–1.3)
MONOCYTES NFR BLD: 7 % (ref 1–7)
MONOCYTES NFR BLD: 7 % (ref 1–7)
NEUTROPHILS NFR BLD: 75 % (ref 36–66)
NEUTROPHILS NFR BLD: 76 % (ref 36–66)
NEUTS SEG NFR BLD: 5.2 K/UL (ref 1.3–9.1)
NEUTS SEG NFR BLD: 5.6 K/UL (ref 1.3–9.1)
PHOSPHATE SERPL-MCNC: 2.3 MG/DL (ref 2.6–4.5)
PLATELET # BLD AUTO: 510 K/UL (ref 150–450)
PLATELET # BLD AUTO: 560 K/UL (ref 150–450)
PMV BLD AUTO: 7.1 FL (ref 6–12)
PMV BLD AUTO: 7.1 FL (ref 6–12)
POTASSIUM SERPL-SCNC: 3.9 MMOL/L (ref 3.7–5.3)
RBC # BLD AUTO: 2.73 M/UL (ref 4–5.2)
RBC # BLD AUTO: 2.9 M/UL (ref 4–5.2)
SODIUM SERPL-SCNC: 141 MMOL/L (ref 135–144)
TROPONIN I SERPL HS-MCNC: 371 NG/L (ref 0–14)
WBC OTHER # BLD: 6.8 K/UL (ref 3.5–11)
WBC OTHER # BLD: 7.5 K/UL (ref 3.5–11)

## 2023-12-31 PROCEDURE — 2580000003 HC RX 258: Performed by: NURSE PRACTITIONER

## 2023-12-31 PROCEDURE — 6370000000 HC RX 637 (ALT 250 FOR IP): Performed by: INTERNAL MEDICINE

## 2023-12-31 PROCEDURE — 2580000003 HC RX 258: Performed by: INTERNAL MEDICINE

## 2023-12-31 PROCEDURE — 6360000002 HC RX W HCPCS: Performed by: INTERNAL MEDICINE

## 2023-12-31 PROCEDURE — 93005 ELECTROCARDIOGRAM TRACING: CPT

## 2023-12-31 PROCEDURE — 6360000002 HC RX W HCPCS: Performed by: NURSE PRACTITIONER

## 2023-12-31 PROCEDURE — 99223 1ST HOSP IP/OBS HIGH 75: CPT | Performed by: INTERNAL MEDICINE

## 2023-12-31 PROCEDURE — 2500000003 HC RX 250 WO HCPCS: Performed by: NURSE PRACTITIONER

## 2023-12-31 PROCEDURE — 2060000000 HC ICU INTERMEDIATE R&B

## 2023-12-31 PROCEDURE — 6360000004 HC RX CONTRAST MEDICATION: Performed by: NURSE PRACTITIONER

## 2023-12-31 PROCEDURE — 85025 COMPLETE CBC W/AUTO DIFF WBC: CPT

## 2023-12-31 PROCEDURE — 99232 SBSQ HOSP IP/OBS MODERATE 35: CPT | Performed by: NURSE PRACTITIONER

## 2023-12-31 PROCEDURE — C9113 INJ PANTOPRAZOLE SODIUM, VIA: HCPCS | Performed by: NURSE PRACTITIONER

## 2023-12-31 PROCEDURE — 36415 COLL VENOUS BLD VENIPUNCTURE: CPT

## 2023-12-31 PROCEDURE — 71260 CT THORAX DX C+: CPT

## 2023-12-31 PROCEDURE — 80069 RENAL FUNCTION PANEL: CPT

## 2023-12-31 PROCEDURE — 85652 RBC SED RATE AUTOMATED: CPT

## 2023-12-31 PROCEDURE — 84484 ASSAY OF TROPONIN QUANT: CPT

## 2023-12-31 PROCEDURE — 99232 SBSQ HOSP IP/OBS MODERATE 35: CPT | Performed by: INTERNAL MEDICINE

## 2023-12-31 PROCEDURE — 86140 C-REACTIVE PROTEIN: CPT

## 2023-12-31 PROCEDURE — 2500000003 HC RX 250 WO HCPCS: Performed by: INTERNAL MEDICINE

## 2023-12-31 RX ORDER — POTASSIUM CHLORIDE 20 MEQ/1
40 TABLET, EXTENDED RELEASE ORAL PRN
Status: DISCONTINUED | OUTPATIENT
Start: 2023-12-31 | End: 2023-12-31

## 2023-12-31 RX ORDER — SODIUM CHLORIDE 0.9 % (FLUSH) 0.9 %
10 SYRINGE (ML) INJECTION PRN
Status: DISCONTINUED | OUTPATIENT
Start: 2023-12-31 | End: 2024-01-02 | Stop reason: HOSPADM

## 2023-12-31 RX ORDER — POTASSIUM CHLORIDE 7.45 MG/ML
10 INJECTION INTRAVENOUS PRN
Status: DISCONTINUED | OUTPATIENT
Start: 2023-12-31 | End: 2024-01-02 | Stop reason: HOSPADM

## 2023-12-31 RX ORDER — 0.9 % SODIUM CHLORIDE 0.9 %
100 INTRAVENOUS SOLUTION INTRAVENOUS ONCE
Status: COMPLETED | OUTPATIENT
Start: 2023-12-31 | End: 2023-12-31

## 2023-12-31 RX ORDER — POTASSIUM CHLORIDE 7.45 MG/ML
10 INJECTION INTRAVENOUS PRN
Status: DISCONTINUED | OUTPATIENT
Start: 2023-12-31 | End: 2023-12-31

## 2023-12-31 RX ORDER — MAGNESIUM SULFATE HEPTAHYDRATE 40 MG/ML
2000 INJECTION, SOLUTION INTRAVENOUS PRN
Status: DISCONTINUED | OUTPATIENT
Start: 2023-12-31 | End: 2024-01-02 | Stop reason: HOSPADM

## 2023-12-31 RX ORDER — PANTOPRAZOLE SODIUM 40 MG/1
40 TABLET, DELAYED RELEASE ORAL
Status: DISCONTINUED | OUTPATIENT
Start: 2024-01-01 | End: 2024-01-02 | Stop reason: HOSPADM

## 2023-12-31 RX ADMIN — ACETAMINOPHEN 650 MG: 325 TABLET ORAL at 20:54

## 2023-12-31 RX ADMIN — ANTI-FUNGAL POWDER MICONAZOLE NITRATE TALC FREE: 1.42 POWDER TOPICAL at 20:55

## 2023-12-31 RX ADMIN — ANTI-FUNGAL POWDER MICONAZOLE NITRATE TALC FREE: 1.42 POWDER TOPICAL at 11:02

## 2023-12-31 RX ADMIN — LEVOFLOXACIN 500 MG: 500 TABLET, FILM COATED ORAL at 10:56

## 2023-12-31 RX ADMIN — ESCITALOPRAM OXALATE 20 MG: 20 TABLET ORAL at 10:57

## 2023-12-31 RX ADMIN — ASPIRIN 81 MG: 81 TABLET, COATED ORAL at 10:56

## 2023-12-31 RX ADMIN — SODIUM CHLORIDE, PRESERVATIVE FREE 10 ML: 5 INJECTION INTRAVENOUS at 02:19

## 2023-12-31 RX ADMIN — ACETAMINOPHEN 650 MG: 325 TABLET ORAL at 11:05

## 2023-12-31 RX ADMIN — FUROSEMIDE 20 MG: 20 TABLET ORAL at 10:56

## 2023-12-31 RX ADMIN — SODIUM CHLORIDE, PRESERVATIVE FREE 10 ML: 5 INJECTION INTRAVENOUS at 11:12

## 2023-12-31 RX ADMIN — SENNOSIDES AND DOCUSATE SODIUM 2 TABLET: 50; 8.6 TABLET ORAL at 10:57

## 2023-12-31 RX ADMIN — PANTOPRAZOLE SODIUM 8 MG/HR: 40 INJECTION, POWDER, FOR SOLUTION INTRAVENOUS at 18:22

## 2023-12-31 RX ADMIN — METOPROLOL TARTRATE 50 MG: 50 TABLET ORAL at 10:57

## 2023-12-31 RX ADMIN — ENOXAPARIN SODIUM 150 MG: 150 INJECTION SUBCUTANEOUS at 22:34

## 2023-12-31 RX ADMIN — SODIUM CHLORIDE 100 ML: 9 INJECTION, SOLUTION INTRAVENOUS at 02:20

## 2023-12-31 RX ADMIN — Medication 3 MG: at 20:54

## 2023-12-31 RX ADMIN — IOPAMIDOL 75 ML: 755 INJECTION, SOLUTION INTRAVENOUS at 02:19

## 2023-12-31 RX ADMIN — METOPROLOL TARTRATE 50 MG: 50 TABLET ORAL at 20:54

## 2023-12-31 RX ADMIN — POTASSIUM PHOSPHATE, MONOBASIC POTASSIUM PHOSPHATE, DIBASIC 30 MMOL: 224; 236 INJECTION, SOLUTION, CONCENTRATE INTRAVENOUS at 12:11

## 2023-12-31 RX ADMIN — SODIUM CHLORIDE, PRESERVATIVE FREE 10 ML: 5 INJECTION INTRAVENOUS at 20:55

## 2023-12-31 RX ADMIN — PANTOPRAZOLE SODIUM 8 MG/HR: 40 INJECTION, POWDER, FOR SOLUTION INTRAVENOUS at 06:08

## 2023-12-31 RX ADMIN — ENOXAPARIN SODIUM 150 MG: 150 INJECTION SUBCUTANEOUS at 11:06

## 2023-12-31 RX ADMIN — MAGNESIUM SULFATE HEPTAHYDRATE 2000 MG: 40 INJECTION, SOLUTION INTRAVENOUS at 01:23

## 2023-12-31 ASSESSMENT — PAIN DESCRIPTION - DESCRIPTORS
DESCRIPTORS: THROBBING
DESCRIPTORS: ACHING

## 2023-12-31 ASSESSMENT — PAIN DESCRIPTION - LOCATION
LOCATION: BUTTOCKS
LOCATION: LEG

## 2023-12-31 ASSESSMENT — PAIN SCALES - GENERAL
PAINLEVEL_OUTOF10: 0
PAINLEVEL_OUTOF10: 5
PAINLEVEL_OUTOF10: 4

## 2023-12-31 ASSESSMENT — PAIN DESCRIPTION - ORIENTATION: ORIENTATION: RIGHT;LEFT

## 2023-12-31 NOTE — CARE COORDINATION
ONGOING DISCHARGE PLAN:    Patient is alert and oriented x4.    Spoke with patient regarding discharge plan and patient confirms that plan is still [facility].     Heart cath on Tuesday    Nephrology consult to minimize risk, had HD this admit    DDimer 5.18    CT PE negative     PT/OT    Will continue to follow for additional discharge needs.    If patient is discharged prior to next notation, then this note serves as note for discharge by case management.    Electronically signed by Meagan Barrios RN on 12/31/2023 at 6:26 PM

## 2023-12-31 NOTE — PROGRESS NOTES
[facility] -   Infectious diseases evaluation  admission date 12/14/2023    reason for consultation:   Cellulitis    Impression :   Current:  Cellulitis bilateral lower extremity.   Bilat LE edema w large blisters - infected bliters  Wound cx grew Pseudomonas aeruginosa sensitive to Levaquin and cefepime and Enterococcus faecalis sensitive to ampicillin  Pseudomonas Aeruginosa bacteremia 12/14/23 source is the leg abscesses  sensitive to Levaquin and cefepime.  TRAUMATIC rR post calf hematoma  Sepsis secondary to above  Mild ulcers, possible HSV  Peripheral arterial disease  Acute on chronic renal failure.  New onset HD, discontinued.  Obesity  History of lymphedema venous stasis insufficiency  Hyperlipidemia  Penicillin allergy-Hives.  Tolerated Cefepime 12/23.    Recommendations   Zyvox course completed 12/19/2023 and cellulitis RLE much better  Levaquin 500 mg po- at least till 12/29 for the bacteremia   Keep longer for the R foot cutan abscesses  Cx taken from 12/29 right foot sub cut blisters  12/29 Consult podiatry for skin I/D   Valtrex po- 1 week course - completed      Infection Control Recommendations   [place] Precautions    Antimicrobial Stewardship Recommendations   Simplification of therapy  Targeted therapy    History of Present Illness:   Initial history:  Elaina Champagne is a 73 y.o.-year-old female was sent to the hospital from wound care clinic for severe left leg pain associated with generalized fatigue.  The patient had swelling and redness of the lower extremities bilaterally with open wound to the posterior aspect of the right leg.  She is poor historian, afebrile, denied nausea or vomiting, no diarrhea, no cough or shortness of breath, no other complaints.  Initial labs showed WBC of 22, elevated lactic acid and creatinine of 3.8.  Arterial Doppler showed left PHILL of 0.68    12/14 12/29                        Interval changes

## 2023-12-31 NOTE — PROGRESS NOTES
many bacteria.  Urine culture is positive for moderate gram-negative rods, few gram-positive cocci in pairs, rare gram-positive cocci in clusters.Likely contaminated urine sample    Current Medications:    magnesium sulfate 2000 mg in water 50 mL IVPB, PRN  potassium chloride 10 mEq/100 mL IVPB (Peripheral Line), PRN  sodium chloride flush 0.9 % injection 10 mL, PRN  pantoprazole (PROTONIX) 80 mg in sodium chloride 0.9 % 100 mL infusion, Continuous  metoprolol tartrate (LOPRESSOR) tablet 50 mg, BID  enoxaparin (LOVENOX) injection 150 mg, BID  furosemide (LASIX) tablet 20 mg, Daily  levoFLOXacin (LEVAQUIN) tablet 500 mg, Daily  sennosides-docusate sodium (SENOKOT-S) 8.6-50 MG tablet 2 tablet, Daily  magic (miracle) mouthwash, 4x Daily PRN  melatonin tablet 3 mg, Nightly PRN  albumin human 25% IV solution 25 g, PRN  midodrine (PROAMATINE) tablet 5 mg, PRN  guaiFENesin (ROBITUSSIN) 100 MG/5ML liquid 200 mg, Q4H PRN  glucose chewable tablet 16 g, PRN  dextrose bolus 10% 125 mL, PRN   Or  dextrose bolus 10% 250 mL, PRN  glucagon injection 1 mg, PRN  dextrose 10 % infusion, Continuous PRN  miconazole (MICOTIN) 2 % powder, BID  collagenase ointment, Daily  anticoagulant sodium citrate 4 % injection 1.4 mL, PRN  anticoagulant sodium citrate 4 % injection 1.5 mL, PRN  aspirin EC tablet 81 mg, Daily  escitalopram (LEXAPRO) tablet 20 mg, Daily  sodium chloride flush 0.9 % injection 5-40 mL, 2 times per day  sodium chloride flush 0.9 % injection 5-40 mL, PRN  0.9 % sodium chloride infusion, PRN  ondansetron (ZOFRAN-ODT) disintegrating tablet 4 mg, Q8H PRN   Or  ondansetron (ZOFRAN) injection 4 mg, Q6H PRN  polyethylene glycol (GLYCOLAX) packet 17 g, Daily PRN  acetaminophen (TYLENOL) tablet 650 mg, Q6H PRN   Or  acetaminophen (TYLENOL) suppository 650 mg, Q6H PRN      Objective:  CURRENT TEMPERATURE:  Temp: 98.7 °F (37.1 °C)  MAXIMUM TEMPERATURE OVER 24HRS:  Temp (24hrs), Av.4 °F (36.9 °C), Min:97.9 °F (36.6 °C), Max:98.7  dietary sodium restriction.    3.  Systemic hypertension -  Blood pressure is adequately controlled.    4.  GI bleed -  Secondary to peptic ulcer disease.  Patient would need repeat EGD in 6 to 8 weeks to assess healing by GI.    5.  Hypophosphatemia - we will replace with IV potassium phosphate 30 mmol.     Prognosis is guarded.  Discussed with patient's .       Electronically signed by Carleen Siddiqi MD on 12/31/2023 at 11:01 AM

## 2023-12-31 NOTE — CONSULTS
stasis ulcer of calf with fat layer exposed with varicose veins (HCC)    Venous ulcer of left leg (HCC)    Hyperkalemia    MICHELLE (acute kidney injury) (HCC)    Cellulitis    Bacteremia due to Pseudomonas    Bacteriuria    Leukocytosis    Elevated C-reactive protein (CRP)    Allergy to penicillin    Atrial fibrillation with rapid ventricular response (HCC)    Altered mental status    Elevated erythrocyte sedimentation rate    Permanent atrial fibrillation (HCC)    Renovascular hypertension    Melena    Acute on chronic anemia    Anticoagulated    Pseudomonas septicemia (HCC)    Abscess of right foot    Acute gastric ulcer with hemorrhage           RECOMMENDATIONS:  NSTEMI, on full dose Lovenox  Talk in detail regarding heart cath that will be done on Tuesday  Patient is already on beta-blockers we will continue  Hypertension fairly controlled  Paroxysmal A-fib has been converted to normal sinus rhythm Xarelto is discontinue as patient is started on Lovenox  Given episodes of shortness of breath and hypotension most likely secondary to ischemic episode  Cellulitis continue as per primary physician      Discussed with patient and spouse and nursing.    Sunil Mariano MD, MD [facility]        [phone]

## 2023-12-31 NOTE — PLAN OF CARE
Problem: Discharge Planning  Goal: Discharge to home or other facility with appropriate resources  Outcome: Progressing     Problem: Safety - Adult  Goal: Free from fall injury  Outcome: Progressing     Problem: Pain  Goal: Verbalizes/displays adequate comfort level or baseline comfort level  Outcome: Progressing     Problem: Skin/Tissue Integrity  Goal: Absence of new skin breakdown  Description: 1.  Monitor for areas of redness and/or skin breakdown  2.  Assess vascular access sites hourly  3.  Every 4-6 hours minimum:  Change oxygen saturation probe site  4.  Every 4-6 hours:  If on nasal continuous positive airway pressure, respiratory therapy assess nares and determine need for appliance change or resting period.  Outcome: Progressing     Problem: ABCDS Injury Assessment  Goal: Absence of physical injury  Outcome: Progressing     Problem: Nutrition Deficit:  Goal: Optimize nutritional status  Outcome: Progressing     Problem: Respiratory - Adult  Goal: Achieves optimal ventilation and oxygenation  Outcome: Progressing     Problem: Cardiovascular - Adult  Goal: Maintains optimal cardiac output and hemodynamic stability  Outcome: Progressing     Problem: Cardiovascular - Adult  Goal: Absence of cardiac dysrhythmias or at baseline  Outcome: Progressing     Problem: Musculoskeletal - Adult  Goal: Return mobility to safest level of function  Outcome: Progressing     Problem: Infection - Adult  Goal: Absence of infection during hospitalization  Outcome: Progressing     Problem: Metabolic/Fluid and Electrolytes - Adult  Goal: Electrolytes maintained within normal limits  Outcome: Progressing     Problem: Confusion  Goal: Confusion, delirium, dementia, or psychosis is improved or at baseline  Description: INTERVENTIONS:  1. Assess for possible contributors to thought disturbance, including medications, impaired vision or hearing, underlying metabolic abnormalities, dehydration, psychiatric diagnoses, and notify

## 2023-12-31 NOTE — PROGRESS NOTES
RN inform NPSarah that pt had a critical trop of 383. Order placed and received.     21:30 : RN informed NP of results from EKG. NP put in an order for Cardiology consult.     22:25: MARIA M ray served message Dr. Adam about pt consult. RN talk to Dr. Adam, were order to d/c pt Xarelto and to order Lovenox 1mg/kg BID was received and placed.     00:21 : RN informed NP of labs.Second trop still elevated and D-dimer 5.18. Order placed and received.     01:12: Order for CT of chest was received. RN page Dr. Siddiqi to see if it was okay for pt to receive contrast to complete the CT.  said it was okay. An order for pt to be transfer to PCU was also placed.     01:30: RN called report to PCU, RN. All question answer. Pt transfer over to floor at 02:40 and placed in room 2012.

## 2024-01-01 ENCOUNTER — APPOINTMENT (OUTPATIENT)
Dept: GENERAL RADIOLOGY | Age: 74
DRG: 853 | End: 2024-01-01
Payer: MEDICARE

## 2024-01-01 ENCOUNTER — APPOINTMENT (OUTPATIENT)
Dept: GENERAL RADIOLOGY | Age: 74
DRG: 853 | End: 2024-01-01
Attending: INTERNAL MEDICINE
Payer: MEDICARE

## 2024-01-01 LAB
ALBUMIN SERPL-MCNC: 2.5 G/DL (ref 3.5–5.2)
ANION GAP SERPL CALCULATED.3IONS-SCNC: 4 MMOL/L (ref 9–17)
BUN SERPL-MCNC: 12 MG/DL (ref 8–23)
CALCIUM SERPL-MCNC: 8.1 MG/DL (ref 8.6–10.4)
CHLORIDE SERPL-SCNC: 103 MMOL/L (ref 98–107)
CO2 SERPL-SCNC: 33 MMOL/L (ref 20–31)
CREAT SERPL-MCNC: 0.5 MG/DL (ref 0.5–0.9)
CRP SERPL HS-MCNC: 59 MG/L (ref 0–5)
ERYTHROCYTE [SEDIMENTATION RATE] IN BLOOD BY PHOTOMETRIC METHOD: 41 MM/HR (ref 0–30)
GFR SERPL CREATININE-BSD FRML MDRD: >60 ML/MIN/1.73M2
GLUCOSE BLD-MCNC: 153 MG/DL (ref 65–105)
GLUCOSE SERPL-MCNC: 93 MG/DL (ref 70–99)
MICROORGANISM SPEC CULT: ABNORMAL
MICROORGANISM/AGENT SPEC: ABNORMAL
MICROORGANISM/AGENT SPEC: ABNORMAL
PHOSPHATE SERPL-MCNC: 2.5 MG/DL (ref 2.6–4.5)
POTASSIUM SERPL-SCNC: 3.8 MMOL/L (ref 3.7–5.3)
SODIUM SERPL-SCNC: 140 MMOL/L (ref 135–144)
SPECIMEN DESCRIPTION: ABNORMAL
TROPONIN I SERPL HS-MCNC: 317 NG/L (ref 0–14)

## 2024-01-01 PROCEDURE — 6370000000 HC RX 637 (ALT 250 FOR IP): Performed by: INTERNAL MEDICINE

## 2024-01-01 PROCEDURE — 80069 RENAL FUNCTION PANEL: CPT

## 2024-01-01 PROCEDURE — 97530 THERAPEUTIC ACTIVITIES: CPT

## 2024-01-01 PROCEDURE — 97110 THERAPEUTIC EXERCISES: CPT

## 2024-01-01 PROCEDURE — 84484 ASSAY OF TROPONIN QUANT: CPT

## 2024-01-01 PROCEDURE — 2060000000 HC ICU INTERMEDIATE R&B

## 2024-01-01 PROCEDURE — 99232 SBSQ HOSP IP/OBS MODERATE 35: CPT | Performed by: INTERNAL MEDICINE

## 2024-01-01 PROCEDURE — 86140 C-REACTIVE PROTEIN: CPT

## 2024-01-01 PROCEDURE — 74018 RADEX ABDOMEN 1 VIEW: CPT

## 2024-01-01 PROCEDURE — 85652 RBC SED RATE AUTOMATED: CPT

## 2024-01-01 PROCEDURE — 36415 COLL VENOUS BLD VENIPUNCTURE: CPT

## 2024-01-01 PROCEDURE — 99233 SBSQ HOSP IP/OBS HIGH 50: CPT | Performed by: INTERNAL MEDICINE

## 2024-01-01 PROCEDURE — 6360000002 HC RX W HCPCS: Performed by: INTERNAL MEDICINE

## 2024-01-01 PROCEDURE — 82947 ASSAY GLUCOSE BLOOD QUANT: CPT

## 2024-01-01 PROCEDURE — 2580000003 HC RX 258: Performed by: INTERNAL MEDICINE

## 2024-01-01 RX ORDER — SODIUM CHLORIDE 9 MG/ML
INJECTION, SOLUTION INTRAVENOUS CONTINUOUS
Status: ACTIVE | OUTPATIENT
Start: 2024-01-02 | End: 2024-01-02

## 2024-01-01 RX ORDER — ALPRAZOLAM 0.5 MG/1
0.5 TABLET ORAL 2 TIMES DAILY PRN
Status: DISCONTINUED | OUTPATIENT
Start: 2024-01-01 | End: 2024-01-02 | Stop reason: HOSPADM

## 2024-01-01 RX ADMIN — SODIUM CHLORIDE, PRESERVATIVE FREE 10 ML: 5 INJECTION INTRAVENOUS at 20:40

## 2024-01-01 RX ADMIN — LEVOFLOXACIN 500 MG: 500 TABLET, FILM COATED ORAL at 10:51

## 2024-01-01 RX ADMIN — COLLAGENASE SANTYL: 250 OINTMENT TOPICAL at 10:52

## 2024-01-01 RX ADMIN — METOPROLOL TARTRATE 50 MG: 50 TABLET ORAL at 20:32

## 2024-01-01 RX ADMIN — ENOXAPARIN SODIUM 150 MG: 150 INJECTION SUBCUTANEOUS at 10:20

## 2024-01-01 RX ADMIN — PANTOPRAZOLE SODIUM 40 MG: 40 TABLET, DELAYED RELEASE ORAL at 18:01

## 2024-01-01 RX ADMIN — METOPROLOL TARTRATE 50 MG: 50 TABLET ORAL at 10:51

## 2024-01-01 RX ADMIN — ALPRAZOLAM 0.5 MG: 0.5 TABLET ORAL at 13:45

## 2024-01-01 RX ADMIN — PANTOPRAZOLE SODIUM 40 MG: 40 TABLET, DELAYED RELEASE ORAL at 05:07

## 2024-01-01 RX ADMIN — ESCITALOPRAM OXALATE 20 MG: 20 TABLET ORAL at 10:51

## 2024-01-01 RX ADMIN — FUROSEMIDE 20 MG: 20 TABLET ORAL at 10:51

## 2024-01-01 RX ADMIN — ENOXAPARIN SODIUM 150 MG: 150 INJECTION SUBCUTANEOUS at 20:34

## 2024-01-01 RX ADMIN — ANTI-FUNGAL POWDER MICONAZOLE NITRATE TALC FREE: 1.42 POWDER TOPICAL at 10:53

## 2024-01-01 RX ADMIN — ASPIRIN 81 MG: 81 TABLET, COATED ORAL at 10:51

## 2024-01-01 RX ADMIN — SENNOSIDES AND DOCUSATE SODIUM 2 TABLET: 50; 8.6 TABLET ORAL at 10:51

## 2024-01-01 RX ADMIN — ALPRAZOLAM 0.5 MG: 0.5 TABLET ORAL at 20:40

## 2024-01-01 RX ADMIN — SODIUM CHLORIDE, PRESERVATIVE FREE 10 ML: 5 INJECTION INTRAVENOUS at 10:59

## 2024-01-01 RX ADMIN — SODIUM CHLORIDE: 9 INJECTION, SOLUTION INTRAVENOUS at 23:44

## 2024-01-01 RX ADMIN — ACETAMINOPHEN 650 MG: 325 TABLET ORAL at 23:41

## 2024-01-01 ASSESSMENT — PAIN DESCRIPTION - LOCATION: LOCATION: ABDOMEN;LEG

## 2024-01-01 ASSESSMENT — PAIN SCALES - GENERAL
PAINLEVEL_OUTOF10: 5
PAINLEVEL_OUTOF10: 0

## 2024-01-01 ASSESSMENT — PAIN DESCRIPTION - ORIENTATION: ORIENTATION: RIGHT;LEFT

## 2024-01-01 NOTE — PROGRESS NOTES
[facility]   Progress Note                   Date:   1/1/2024  Patient name: Elaina Champagne  Date of admission:  12/14/2023 11:42 AM  MRN:   [id number]  YOB: 1950  PCP: Robin Ly MD    Reason for Admission:      Subjective:       Clinical Changes / Abnormalities: Patient denies any chest pain pressure tightness      Medications:   Scheduled Meds:   pantoprazole  40 mg Oral BID AC    metoprolol tartrate  50 mg Oral BID    enoxaparin  1 mg/kg SubCUTAneous BID    furosemide  20 mg Oral Daily    levoFLOXacin  500 mg Oral Daily    sennosides-docusate sodium  2 tablet Oral Daily    miconazole   Topical BID    collagenase   Topical Daily    aspirin  81 mg Oral Daily    escitalopram  20 mg Oral Daily    sodium chloride flush  5-40 mL IntraVENous 2 times per day     Continuous Infusions:   dextrose      sodium chloride 5 mL/hr at 12/19/23 2209     CBC:   Recent Labs     12/30/23  0803 12/31/23  0759 12/31/23  2214   WBC 7.7 7.5 6.8   HGB 8.7* 8.9* 8.2*   * 560* 510*     BMP:    Recent Labs     12/30/23  0803 12/30/23  2233 12/31/23  0759 01/01/24  0515     --  141 140   K 3.0* 3.5* 3.9 3.8   CL 97*  --  103 103   CO2 31  --  32* 33*   BUN 16  --  13 12   CREATININE 0.6  --  0.6 0.5   GLUCOSE 113*  --  96 93     Hepatic: No results for input(s): \"AST\", \"ALT\", \"ALB\", \"BILITOT\", \"ALKPHOS\" in the last 72 hours.  Troponin: No results for input(s): \"TROPONINI\" in the last 72 hours.  BNP: No results for input(s): \"BNP\" in the last 72 hours.  Lipids: No results for input(s): \"CHOL\", \"HDL\" in the last 72 hours.    Invalid input(s): \"LDLCALCU\"  INR: No results for input(s): \"INR\" in the last 72 hours.    Objective:   Vitals: BP (!) 149/56   Pulse 78   Temp 97.7 °F (36.5 °C) (Oral)   Resp 20   Ht 1.575 m (5' 2\")   Wt (!) 147.8 kg (325 lb 13.4 oz)   SpO2 95%   BMI 59.60 kg/m²   I/O last 3 completed shifts:  In: 240 [P.O.:240]  Out: 700 [Urine:700]  No intake/output data  aortic root dimensions  The IVC is dilated, impaired respiratory variation indicating elevated right atrial filling pressure  No significant pericardial effusion seen     Cardiac Angiography: getting in am             Assessment / Acute Cardiac Problems:       Patient Active Problem List:     Depression with anxiety     Eczema     Edema     Primary hypertension     Hyperlipidemia     Morbid obesity ([facility])     Osteoarthritis     Rosacea     Urge incontinence of urine     Varicose veins     Paroxysmal atrial fibrillation ([facility])     Non-rheumatic tricuspid valve insufficiency     Venous insufficiency of both lower extremities     Lymphedema     Venous stasis ulcer of calf with fat layer exposed with varicose veins ([facility])     Venous ulcer of left leg ([facility])     Hyperkalemia     MICHELLE (acute kidney injury) ([facility])     Cellulitis     Bacteremia due to Pseudomonas     Bacteriuria     Leukocytosis     Elevated C-reactive protein (CRP)     Allergy to penicillin     Atrial fibrillation with rapid ventricular response ([facility])     Altered mental status     Elevated erythrocyte sedimentation rate     Permanent atrial fibrillation ([facility])     Renovascular hypertension     Melena     Acute on chronic anemia     Anticoagulated     Pseudomonas septicemia ([facility])     Abscess of right foot     Acute gastric ulcer with hemorrhage     NSTEMI (non-ST elevated myocardial infarction) ([facility])      Plan of Treatment:   NSTEMI no chest pain or pressure  Going to do the heart cath in a.m.  Please hold Lovenox a.m. dose  Paroxysmal A-fib in normal sinus rhythm, patient was on Xarelto at home which is on hold  MICHELLE improved patient has seen by nephrology  Hypertension fairly controlled  Explained all the risk and complication patient agrees    Sunil Mariano MD, MD  [facility]  [phone]

## 2024-01-01 NOTE — PROGRESS NOTES
[facility]  IN-PATIENT SERVICE  [facility]    PROGRESS NOTE             Date:   1/1/2024  Patient name:  Elaina Champagne  Date of admission:  12/14/2023 11:42 AM  MRN:   [id number]  YOB: 1950    INTERVAL HISTORY:      SUBJECTIVE:  Patient laying in bed, reports anxiety is going for heart cath tomorrow.  Reports abdominal fullness, had 3 episodes of diarrhea yesterday.    Objective   Vitals:    12/31/23 2027 12/31/23 2330 01/01/24 0330 01/01/24 0815   BP: (!) 140/84 (!) 147/63 (!) 160/83 (!) 149/56   Pulse: 78 67 71 78   Resp: 18 22 20 20   Temp: 99 °F (37.2 °C) 98.1 °F (36.7 °C) 97.9 °F (36.6 °C) 97.7 °F (36.5 °C)   TempSrc:  Oral Axillary Oral   SpO2: 94% 94% 92% 95%   Weight:       Height:         BP Readings from Last 6 Encounters:   01/01/24 (!) 149/56   12/13/23 (!) 104/59   12/04/23 (!) 178/72   11/27/23 (!) 186/78   11/13/23 (!) 191/78   11/06/23 (!) 168/71          Intake/Output Summary (Last 24 hours) at 1/1/2024 1136  Last data filed at 1/1/2024 0510  Gross per 24 hour   Intake 240 ml   Output 250 ml   Net -10 ml     General appearance: Obese  HEENT: Normocephalic, no lesions, without obvious abnormality.pupils equal and reactive, EOM normal  Lungs: clear to auscultation bilaterally  Heart: regular rate and rhythm, S1, S2 normal, no murmur, click, rub or gallop  Abdomen: soft, non-tender; bowel sounds normal; no masses,  no organomegaly  Extremities: No edema weak pulses in the left side, wounds covered with dressing which was not removed  Neurological: Alert oriented x 3, moves all 4 extremities spontaneously.  Makes adequate conversation    CBC:   Recent Labs     12/30/23  0803 12/31/23  0759 12/31/23  2214   WBC 7.7 7.5 6.8   HGB 8.7* 8.9* 8.2*   * 560* 510*     BMP:    Recent Labs     12/30/23  0803 12/30/23  2233 12/31/23  0759 01/01/24  0515     --  141 140   K 3.0* 3.5* 3.9 3.8   CL 97*  --  103 103   CO2 31  --  32* 33*  Creatinine back to normal.  Left tibial ulcer  Mild ulcers, possibly HSV  Morbid obesity  Generalized edema, on Lasix.  Appreciate nephrology help.  GI bleed secondary to peptic ulcer disease, continue pantoprazole 40 mg twice daily, off of pantoprazole drip now.  Hypophosphatemia, management as per nephrology.  NSTEMI, cardiology on board.  Currently plans for cardiac cath in a.m., or TERESA prophylaxis as per renal    DVT PROPHYLAXIS: On therapeutic Lovenox  GI PROPHYLAXIS: PPI twice daily  CODE STATUS: DNR-CCA   DISPOSITION:    Ashley Winters [place], [place] [address].   Phone [phone]   Fax: [phone]  Answering Service: [phone]

## 2024-01-01 NOTE — CARE COORDINATION
ONGOING DISCHARGE PLAN:    Patient is alert and oriented x4.    Spoke with patient regarding discharge plan and patient confirms that plan is still [facility].     Heart cath on Tuesday    Nephrology consult to minimize risk, had HD this admit    DDimer 5.18    CT PE negative     PT/OT    Will continue to follow for additional discharge needs.    If patient is discharged prior to next notation, then this note serves as note for discharge by case management.    Electronically signed by Meagan Barrios RN on 1/1/2024 at 4:33 PM

## 2024-01-01 NOTE — PROGRESS NOTES
NEPHROLOGY PROGRESS NOTE     Patient :  Elaina Champagne; 73 y.o. MRN# [id number]  Location:  2112/2112-01  Attending:  David Yang MD  Admit Date:  12/14/2023   Hospital Day: 18    Reason for consultation: Management of acute kidney injury.    Consulting physician: David Yang MD.    Interval history: Patient was seen and examined today on MRI of the right ankle and tibia-fibula which do not suggest osteomyelitis.  She is comfortable at rest.  She had episode of shortness of breath and elevated troponins 2 days ago and is scheduled for cardiac catheterization tomorrow.  She is nonoliguric.  She is hemodynamically stable.    History of Present Illness: This is a 73 y.o. female with past medical history of essential hypertension, peripheral vascular disease, patient presented to the hospital with complaints of left leg swelling erythema and wound on the left leg patient was seen by vascular and wound care on 12/13/2023.  Patient presented to the hospital yesterday on 12/14/2023 with complaints of left leg swelling erythema and wound getting worse patient has that swelling for several weeks but has been getting worse and more red.  Patient also noted to have fatigue weakness, and she has confusion this morning.  According to the  no nausea vomiting diarrhea no fever chills no headache dizziness.  Labs on admission showed BUN of 54 serum creatinine of 3.9 mg/dL, patient has been oliguric.  Patient denies dysuria, gross hematuria, flank pain, nocturia, urgency, passing frothy urine or urinary incontinence.  There has been no recent exposure to IV contrast.   There is no history  of paraprotein disease.   Pt denies any history of recurrent UTI or kidney stones.  Medication review shows use of ARB's and Bumex, and NSAIDs.  Patient noted to be hypotensive yesterday systolic blood pressure was in 70s blood pressure has improved  UA showed 1+ protein trace leukocyte esterase WBC 0-2 RBC 3-5 hemoglobin negative  urine output closely.    2.  Generalized edema -  She is on furosemide 20 mg p.o. daily I will hold furosemide tomorrow for cardiac cath.  Check weight daily.  Two g/day dietary sodium restriction.    3.  Systemic hypertension -  Blood pressure is adequately controlled.    4.  GI bleed -  Secondary to peptic ulcer disease.  Patient would need repeat EGD in 6 to 8 weeks to assess healing by GI.    5.  Hypophosphatemia - we will replace with IV potassium phosphate 10 mmol.     Prognosis is guarded.  Discussed with patient's .       Electronically signed by Carleen Siddiqi MD on 1/1/2024 at 12:02 PM

## 2024-01-01 NOTE — PLAN OF CARE
sitter and review sitter guidelines with assigned personnel  7. Initiate Psychosocial CNS and Spiritual Care consult, as indicated  1/1/2024 0227 by Minoo Weber, RN  Outcome: Progressing  Note: Alert and oriented.  No confusion.  12/31/2023 1626 by Mary Anne Morton, RN  Outcome: Progressing     Problem: Gastrointestinal - Adult  Goal: Maintains or returns to baseline bowel function  1/1/2024 0227 by Minoo Weber, RN  Outcome: Progressing  12/31/2023 1626 by Mary Anne Morton, RN  Outcome: Progressing  Goal: Maintains adequate nutritional intake  1/1/2024 0227 by Minoo Weber RN  Outcome: Progressing  12/31/2023 1626 by Mary Anne Morton RN  Outcome: Progressing

## 2024-01-01 NOTE — PROGRESS NOTES
Use:  reports no history of drug use.    Family History:     Family History   Problem Relation Age of Onset    Cancer Mother     High Blood Pressure Father     Stroke Father     Ovarian Cancer Daughter     Cancer Daughter     Diabetes Maternal Aunt     Cancer Other         daughter/ovarian cancer       Review of Systems:     Positive and Negative as described in HPI.    CONSTITUTIONAL:  negative for fevers, chills, sweats, fatigue, weight loss  HEENT:  negative for vision, hearing changes, runny nose, throat pain  RESPIRATORY:  negative for shortness of breath, cough, congestion, wheezing.  CARDIOVASCULAR:  negative for chest pain, palpitations.  GASTROINTESTINAL:  negative for nausea, vomiting, diarrhea, constipation, change in bowel habits, abdominal pain   GENITOURINARY:  negative for difficulty of urination, burning with urination, frequency   INTEGUMENT:  negative for rash, skin lesions, easy bruising   HEMATOLOGIC/LYMPHATIC:  negative for swelling/edema   ALLERGIC/IMMUNOLOGIC:  negative for urticaria , itching  ENDOCRINE:  negative increase in drinking, increase in urination, hot or cold intolerance  MUSCULOSKELETAL: Pain in left leg, sores in back of both legs, lymphedema present  NEUROLOGICAL:  negative for headaches, dizziness, lightheadedness, numbness, pain, tingling extremities    Physical Exam:   BP (!) 140/84   Pulse 78   Temp 99 °F (37.2 °C)   Resp 18   Ht 1.575 m (5' 2\")   Wt (!) 147.8 kg (325 lb 13.4 oz)   SpO2 94%   BMI 59.60 kg/m²   Temp (24hrs), Av.5 °F (36.9 °C), Min:97.9 °F (36.6 °C), Max:99 °F (37.2 °C)    Recent Labs     23  0026 23  0629 23  1143 23  1604   POCGLU 92 114* 90 93         Intake/Output Summary (Last 24 hours) at 2023 2201  Last data filed at 2023 2045  Gross per 24 hour   Intake --   Output 350 ml   Net -350 ml         General Appearance:  alert, well appearing, and in no acute distress, central obesity present  Mental status:  RVR, now rate controlled,  Was on amiodarone, cardiology discontinued amiodarone, starting on metoprolol,  On heparin now, will stop for procedure   End-stage renal disease on hemodialysis,  Overall prognosis very poor  Foot blisters   Advised to get podiatry by ID   Overall prognosis poor   Code status discussed with pt and  at length   They both want to change code status to DNRCCA   Changed and signed   Dialysis cath soon       12/22/23    Patient admitted with cellulitis, source and back of both legs, with lymphedema, holding wound care, previous wound culture was growing Klebsiella and MSSA, starting patient on cefepime, Zyvox,   Atrial fibrillation the rate is controlled now  The planned to have new tunneled Catheter has been held  Her creatinine was 0.8 yesterday  She tolerated hemofiltration  Bilateral leg cellulitis persists  Had bacteremia MSSA and Klebsiella  Chills are stable  She was a little confused when she woke up but at present is coherent and alert  Afebrile  Sugars okay  Continue current therapy        12/23/23  Patient continues to be doing fairly  Being treated for sepsis  Vitals and labs reviewed  Will continue current therapy    12/24/23    Patient is afebrile vitals are stable   Labs reviewed blood sugar and renal function normal  Last hemoglobin 9.1 WBC count 15,800  Patient is on Zyvox which has been completed cefepime continue patient is also on p.o. Valtrex          12/25/23    Cellulitis bilateral lower extremity. Wound cx grew Pseudomonas aeruginosa sensitive to Levaquin and cefepime and Enterococcus faecalis sensitive to ampicillin  Pseudomonas Aeruginosa bacteremia sensitive to Levaquin and cefepime.  Sepsis secondary to above  Mild ulcers, possible HSV  Left posterior tibial wound  Peripheral arterial disease   On cefepime till 12/30/23  Zyvox completed.  Patient had A-fib with rapid ventricular response on admission is on heparin drip  Will discontinue heparin  Patient

## 2024-01-01 NOTE — PROGRESS NOTES
Occupational Therapy  [facility]   INPATIENT OCCUPATIONAL THERAPY  PROGRESS NOTE  Date: 2024  Patient Name: Elaina Champagne       Room:   MRN: [id number]    : 1950  (73 y.o.)  Gender: female   Referring Practitioner: David Yang MD  Diagnosis: Hyperkalemia      Discharge Recommendations:  Further Occupational Therapy is recommended upon facility discharge.    OT Equipment Recommendations  Other: TBD    Restrictions/Precautions  Restrictions/Precautions  Restrictions/Precautions: Fall Risk;General Precautions;Up as Tolerated  Required Braces or Orthoses?: No  Implants present? : Metal implants (Bilat TKA)  Position Activity Restriction  Other position/activity restrictions: Up with assistance. Hemodialysis started 23      Subjective  Subjective  Subjective: \"I just wanna give up\"  Subjective  Pain: Pt reports 5/10 abdominal and LE pain.  Comments: MARIA M Ramirez treatment, co-tx with Maribel DOMINIQUE PTA. Pt requires increased encouragement to participate this date.    Objective  Orientation  Overall Orientation Status: Within Functional Limits  Cognition  Overall Cognitive Status: WFL    Activities of Daily Living  ADL  Feeding: Setup  Grooming: Setup  UE Bathing: Minimal assistance  LE Bathing: Maximum assistance  UE Dressing: Minimal assistance  LE Dressing: Dependent/Total  Toileting: Dependent/Total  Additional Comments: ADL scores are based on skilled observation and clinical reasoning unless otherwise noted. Pt is currently limited by increased pain, impaired strength, endurance, balance, and body habitus which impacts the pt's ability to safely and independently complete self-care/mobility  Skin Care: Soap and water;Protective barrier    Balance  Balance  Sitting Balance: Supervision (sup-static, SBA-dynamic. Pt sat EOB 16 min 30 sec.)  Standing Balance: Unable to assess(comment) (Pt deferred standing due to pain/anxiety this date.)    Transfers/Mobility  Bed

## 2024-01-01 NOTE — PROGRESS NOTES
Physical Therapy  [facility]    Date: 24  Patient Name: Elaina Champagne       Room: -  MRN: [id number]   Account: [id number]   : 1950  (73 y.o.) Gender: female     Referring Practitioner: David Yang MD  Diagnosis: Hyperkalemia  Past Medical History:  has a past medical history of Bell's palsy, Cellulitis, and Hypertension.   Past Surgical History:   has a past surgical history that includes Hysterectomy, total abdominal (); Cataract removal (Bilateral, ); incision and drainage (Left, 2017); Total knee arthroplasty (Left, 2017); Knee Arthroplasty (Right, 2018); ventral hernia repair (2019); IR NONTUNNELED VASCULAR CATHETER > 5 YEARS (12/15/2023); and Upper gastrointestinal endoscopy (N/A, 2023).  Additional Pertinent Hx: The patient is a 73 y.o.  Non- / non  female who presents with Leg Pain (Left/) and Fatigue   and she is admitted to the hospital for the management of wound check  Patient has past medical is a multiple medical problem including morbid obesity, hypertension, lymphedema, patient has wound in both legs and back of her legs, symptoms are going on for last 1 month  Patient follows with wound care  She was evaluated by Dr. Meredith vascular surgery yesterday, dressing was applied on left leg, she was having severe pain that made her come to the hospital  Patient, had arterial scan done earlier suggestive of PHILL of 0.68 on left side, on right side PHILL was okay  Previous wound culture was growing Klebsiella and staph, MSSA  In the emergency room, patient had lab work done suggestive elevated creatinine of 3.8  CK was normal, Nephrology consulted for MICHELLE- started on Hemodialysis.    Overall Orientation Status: Within Functional Limits  Restrictions/Precautions  Restrictions/Precautions: Fall Risk;General Precautions;Up as Tolerated  Required Braces or Orthoses?: No  Implants present? : Metal implants (Bilat

## 2024-01-02 ENCOUNTER — HOSPITAL ENCOUNTER (INPATIENT)
Age: 74
LOS: 4 days | Discharge: NURSING, REHAB OR LONG-TERM CARE | DRG: 853 | End: 2024-01-06
Attending: INTERNAL MEDICINE | Admitting: INTERNAL MEDICINE
Payer: MEDICARE

## 2024-01-02 ENCOUNTER — OFFICE VISIT (OUTPATIENT)
Dept: OPERATING ROOM | Age: 74
End: 2024-01-02
Attending: INTERNAL MEDICINE

## 2024-01-02 VITALS
OXYGEN SATURATION: 96 % | SYSTOLIC BLOOD PRESSURE: 151 MMHG | HEIGHT: 62 IN | BODY MASS INDEX: 53.92 KG/M2 | TEMPERATURE: 98 F | HEART RATE: 84 BPM | WEIGHT: 293 LBS | RESPIRATION RATE: 17 BRPM | DIASTOLIC BLOOD PRESSURE: 73 MMHG

## 2024-01-02 DIAGNOSIS — I21.4 NSTEMI (NON-ST ELEVATED MYOCARDIAL INFARCTION) (HCC): ICD-10-CM

## 2024-01-02 DIAGNOSIS — Z98.890 S/P CARDIAC CATH: Primary | ICD-10-CM

## 2024-01-02 DIAGNOSIS — I36.1 NON-RHEUMATIC TRICUSPID VALVE INSUFFICIENCY: ICD-10-CM

## 2024-01-02 DIAGNOSIS — I48.0 PAROXYSMAL ATRIAL FIBRILLATION (HCC): ICD-10-CM

## 2024-01-02 DIAGNOSIS — Z95.5 S/P PRIMARY ANGIOPLASTY WITH CORONARY STENT: ICD-10-CM

## 2024-01-02 LAB
ANION GAP SERPL CALCULATED.3IONS-SCNC: 5 MMOL/L (ref 9–17)
ANTI-XA UNFRAC HEPARIN: 0.25 IU/L
BASOPHILS # BLD: 0.1 K/UL (ref 0–0.2)
BASOPHILS NFR BLD: 1 % (ref 0–2)
BUN SERPL-MCNC: 11 MG/DL (ref 8–23)
CALCIUM SERPL-MCNC: 8 MG/DL (ref 8.6–10.4)
CHLORIDE SERPL-SCNC: 102 MMOL/L (ref 98–107)
CO2 SERPL-SCNC: 31 MMOL/L (ref 20–31)
CREAT SERPL-MCNC: 0.5 MG/DL (ref 0.5–0.9)
CRP SERPL HS-MCNC: 49.4 MG/L (ref 0–5)
EKG ATRIAL RATE: 79 BPM
EKG P AXIS: 31 DEGREES
EKG P-R INTERVAL: 182 MS
EKG Q-T INTERVAL: 398 MS
EKG QRS DURATION: 76 MS
EKG QTC CALCULATION (BAZETT): 456 MS
EKG R AXIS: -35 DEGREES
EKG T AXIS: 6 DEGREES
EKG VENTRICULAR RATE: 79 BPM
EOSINOPHIL # BLD: 0.1 K/UL (ref 0–0.4)
EOSINOPHILS RELATIVE PERCENT: 2 % (ref 0–4)
ERYTHROCYTE [DISTWIDTH] IN BLOOD BY AUTOMATED COUNT: 16.9 % (ref 11.5–14.9)
ERYTHROCYTE [DISTWIDTH] IN BLOOD BY AUTOMATED COUNT: 16.9 % (ref 11.8–14.4)
ERYTHROCYTE [SEDIMENTATION RATE] IN BLOOD BY PHOTOMETRIC METHOD: 56 MM/HR (ref 0–30)
GFR SERPL CREATININE-BSD FRML MDRD: >60 ML/MIN/1.73M2
GLUCOSE BLD-MCNC: 92 MG/DL (ref 65–105)
GLUCOSE BLD-MCNC: 93 MG/DL (ref 65–105)
GLUCOSE SERPL-MCNC: 114 MG/DL (ref 70–99)
HCT VFR BLD AUTO: 28.7 % (ref 36–46)
HCT VFR BLD AUTO: 29.3 % (ref 36.3–47.1)
HGB BLD-MCNC: 8.9 G/DL (ref 11.9–15.1)
HGB BLD-MCNC: 9.4 G/DL (ref 12–16)
INR PPP: 1.3
LYMPHOCYTES NFR BLD: 1.2 K/UL (ref 1–4.8)
LYMPHOCYTES RELATIVE PERCENT: 19 % (ref 24–44)
MAGNESIUM SERPL-MCNC: 1.8 MG/DL (ref 1.6–2.6)
MCH RBC QN AUTO: 29.9 PG (ref 25.2–33.5)
MCH RBC QN AUTO: 30.3 PG (ref 26–34)
MCHC RBC AUTO-ENTMCNC: 30.4 G/DL (ref 28.4–34.8)
MCHC RBC AUTO-ENTMCNC: 32.8 G/DL (ref 31–37)
MCV RBC AUTO: 92.1 FL (ref 80–100)
MCV RBC AUTO: 98.3 FL (ref 82.6–102.9)
MONOCYTES NFR BLD: 0.4 K/UL (ref 0.1–1.3)
MONOCYTES NFR BLD: 7 % (ref 1–7)
NEUTROPHILS NFR BLD: 71 % (ref 36–66)
NEUTS SEG NFR BLD: 4.5 K/UL (ref 1.3–9.1)
NRBC BLD-RTO: 0 PER 100 WBC
PARTIAL THROMBOPLASTIN TIME: 34.4 SEC (ref 23–36.5)
PLATELET # BLD AUTO: 445 K/UL (ref 138–453)
PLATELET # BLD AUTO: 481 K/UL (ref 150–450)
PMV BLD AUTO: 7.2 FL (ref 6–12)
PMV BLD AUTO: 9.2 FL (ref 8.1–13.5)
POTASSIUM SERPL-SCNC: 3.8 MMOL/L (ref 3.7–5.3)
PROTHROMBIN TIME: 16.2 SEC (ref 11.7–14.9)
RBC # BLD AUTO: 2.98 M/UL (ref 3.95–5.11)
RBC # BLD AUTO: 3.12 M/UL (ref 4–5.2)
SODIUM SERPL-SCNC: 138 MMOL/L (ref 135–144)
TROPONIN I SERPL HS-MCNC: 174 NG/L (ref 0–14)
WBC OTHER # BLD: 6.2 K/UL (ref 3.5–11)
WBC OTHER # BLD: 6.4 K/UL (ref 3.5–11.3)

## 2024-01-02 PROCEDURE — 93005 ELECTROCARDIOGRAM TRACING: CPT | Performed by: INTERNAL MEDICINE

## 2024-01-02 PROCEDURE — 6360000002 HC RX W HCPCS

## 2024-01-02 PROCEDURE — 86140 C-REACTIVE PROTEIN: CPT

## 2024-01-02 PROCEDURE — 93454 CORONARY ARTERY ANGIO S&I: CPT | Performed by: INTERNAL MEDICINE

## 2024-01-02 PROCEDURE — 2500000003 HC RX 250 WO HCPCS: Performed by: INTERNAL MEDICINE

## 2024-01-02 PROCEDURE — 83735 ASSAY OF MAGNESIUM: CPT

## 2024-01-02 PROCEDURE — 6360000002 HC RX W HCPCS: Performed by: INTERNAL MEDICINE

## 2024-01-02 PROCEDURE — 5A1D70Z PERFORMANCE OF URINARY FILTRATION, INTERMITTENT, LESS THAN 6 HOURS PER DAY: ICD-10-PCS | Performed by: INTERNAL MEDICINE

## 2024-01-02 PROCEDURE — 99239 HOSP IP/OBS DSCHRG MGMT >30: CPT | Performed by: INTERNAL MEDICINE

## 2024-01-02 PROCEDURE — C1894 INTRO/SHEATH, NON-LASER: HCPCS | Performed by: INTERNAL MEDICINE

## 2024-01-02 PROCEDURE — B2111ZZ FLUOROSCOPY OF MULTIPLE CORONARY ARTERIES USING LOW OSMOLAR CONTRAST: ICD-10-PCS | Performed by: INTERNAL MEDICINE

## 2024-01-02 PROCEDURE — 85652 RBC SED RATE AUTOMATED: CPT

## 2024-01-02 PROCEDURE — 80048 BASIC METABOLIC PNL TOTAL CA: CPT

## 2024-01-02 PROCEDURE — 99221 1ST HOSP IP/OBS SF/LOW 40: CPT | Performed by: INTERNAL MEDICINE

## 2024-01-02 PROCEDURE — 82947 ASSAY GLUCOSE BLOOD QUANT: CPT

## 2024-01-02 PROCEDURE — 2709999900 HC NON-CHARGEABLE SUPPLY: Performed by: INTERNAL MEDICINE

## 2024-01-02 PROCEDURE — 85027 COMPLETE CBC AUTOMATED: CPT

## 2024-01-02 PROCEDURE — 2060000000 HC ICU INTERMEDIATE R&B

## 2024-01-02 PROCEDURE — 93010 ELECTROCARDIOGRAM REPORT: CPT | Performed by: INTERNAL MEDICINE

## 2024-01-02 PROCEDURE — 93005 ELECTROCARDIOGRAM TRACING: CPT

## 2024-01-02 PROCEDURE — 6370000000 HC RX 637 (ALT 250 FOR IP): Performed by: INTERNAL MEDICINE

## 2024-01-02 PROCEDURE — C1769 GUIDE WIRE: HCPCS | Performed by: INTERNAL MEDICINE

## 2024-01-02 PROCEDURE — 6360000004 HC RX CONTRAST MEDICATION: Performed by: INTERNAL MEDICINE

## 2024-01-02 PROCEDURE — 2580000003 HC RX 258: Performed by: STUDENT IN AN ORGANIZED HEALTH CARE EDUCATION/TRAINING PROGRAM

## 2024-01-02 PROCEDURE — 84484 ASSAY OF TROPONIN QUANT: CPT

## 2024-01-02 PROCEDURE — 85730 THROMBOPLASTIN TIME PARTIAL: CPT

## 2024-01-02 PROCEDURE — 85610 PROTHROMBIN TIME: CPT

## 2024-01-02 PROCEDURE — 7100000011 HC PHASE II RECOVERY - ADDTL 15 MIN: Performed by: INTERNAL MEDICINE

## 2024-01-02 PROCEDURE — 85520 HEPARIN ASSAY: CPT

## 2024-01-02 PROCEDURE — 7100000010 HC PHASE II RECOVERY - FIRST 15 MIN: Performed by: INTERNAL MEDICINE

## 2024-01-02 PROCEDURE — 99152 MOD SED SAME PHYS/QHP 5/>YRS: CPT | Performed by: INTERNAL MEDICINE

## 2024-01-02 PROCEDURE — 85025 COMPLETE CBC W/AUTO DIFF WBC: CPT

## 2024-01-02 PROCEDURE — 36415 COLL VENOUS BLD VENIPUNCTURE: CPT

## 2024-01-02 PROCEDURE — 6360000002 HC RX W HCPCS: Performed by: NURSE PRACTITIONER

## 2024-01-02 RX ORDER — LANOLIN ALCOHOL/MO/W.PET/CERES
3 CREAM (GRAM) TOPICAL NIGHTLY PRN
Status: DISCONTINUED | OUTPATIENT
Start: 2024-01-02 | End: 2024-01-06 | Stop reason: HOSPADM

## 2024-01-02 RX ORDER — SODIUM CHLORIDE 0.9 % (FLUSH) 0.9 %
10 SYRINGE (ML) INJECTION PRN
Status: CANCELLED | OUTPATIENT
Start: 2024-01-02

## 2024-01-02 RX ORDER — HEPARIN SODIUM 1000 [USP'U]/ML
2000 INJECTION, SOLUTION INTRAVENOUS; SUBCUTANEOUS PRN
Status: DISCONTINUED | OUTPATIENT
Start: 2024-01-02 | End: 2024-01-04

## 2024-01-02 RX ORDER — HYDRALAZINE HYDROCHLORIDE 20 MG/ML
10 INJECTION INTRAMUSCULAR; INTRAVENOUS ONCE
Status: COMPLETED | OUTPATIENT
Start: 2024-01-02 | End: 2024-01-02

## 2024-01-02 RX ORDER — NITROGLYCERIN 0.4 MG/1
0.4 TABLET SUBLINGUAL EVERY 5 MIN PRN
Status: DISCONTINUED | OUTPATIENT
Start: 2024-01-02 | End: 2024-01-06 | Stop reason: HOSPADM

## 2024-01-02 RX ORDER — SODIUM CHLORIDE 9 MG/ML
INJECTION, SOLUTION INTRAVENOUS PRN
Status: DISCONTINUED | OUTPATIENT
Start: 2024-01-02 | End: 2024-01-06 | Stop reason: HOSPADM

## 2024-01-02 RX ORDER — POTASSIUM CHLORIDE 7.45 MG/ML
10 INJECTION INTRAVENOUS PRN
Status: DISCONTINUED | OUTPATIENT
Start: 2024-01-02 | End: 2024-01-06 | Stop reason: HOSPADM

## 2024-01-02 RX ORDER — LEVOFLOXACIN 500 MG/1
500 TABLET, FILM COATED ORAL DAILY
Status: COMPLETED | OUTPATIENT
Start: 2024-01-03 | End: 2024-01-05

## 2024-01-02 RX ORDER — SODIUM CHLORIDE 0.9 % (FLUSH) 0.9 %
5-40 SYRINGE (ML) INJECTION PRN
Status: DISCONTINUED | OUTPATIENT
Start: 2024-01-02 | End: 2024-01-06 | Stop reason: HOSPADM

## 2024-01-02 RX ORDER — DEXTROSE MONOHYDRATE 100 MG/ML
INJECTION, SOLUTION INTRAVENOUS CONTINUOUS PRN
Status: CANCELLED | OUTPATIENT
Start: 2024-01-02

## 2024-01-02 RX ORDER — HEPARIN SODIUM 1000 [USP'U]/ML
INJECTION, SOLUTION INTRAVENOUS; SUBCUTANEOUS PRN
Status: DISCONTINUED | OUTPATIENT
Start: 2024-01-02 | End: 2024-01-02 | Stop reason: HOSPADM

## 2024-01-02 RX ORDER — POLYETHYLENE GLYCOL 3350 17 G/17G
17 POWDER, FOR SOLUTION ORAL DAILY PRN
Status: DISCONTINUED | OUTPATIENT
Start: 2024-01-02 | End: 2024-01-06 | Stop reason: HOSPADM

## 2024-01-02 RX ORDER — HEPARIN SODIUM 10000 [USP'U]/100ML
5-30 INJECTION, SOLUTION INTRAVENOUS CONTINUOUS
Status: DISCONTINUED | OUTPATIENT
Start: 2024-01-02 | End: 2024-01-03

## 2024-01-02 RX ORDER — ONDANSETRON 2 MG/ML
4 INJECTION INTRAMUSCULAR; INTRAVENOUS EVERY 6 HOURS PRN
Status: DISCONTINUED | OUTPATIENT
Start: 2024-01-02 | End: 2024-01-06 | Stop reason: HOSPADM

## 2024-01-02 RX ORDER — PANTOPRAZOLE SODIUM 40 MG/1
40 TABLET, DELAYED RELEASE ORAL
Status: CANCELLED | OUTPATIENT
Start: 2024-01-02

## 2024-01-02 RX ORDER — MIDODRINE HYDROCHLORIDE 5 MG/1
5 TABLET ORAL PRN
Status: CANCELLED | OUTPATIENT
Start: 2024-01-02

## 2024-01-02 RX ORDER — HEPARIN SODIUM 1000 [USP'U]/ML
4000 INJECTION, SOLUTION INTRAVENOUS; SUBCUTANEOUS ONCE
Status: COMPLETED | OUTPATIENT
Start: 2024-01-02 | End: 2024-01-02

## 2024-01-02 RX ORDER — ONDANSETRON 4 MG/1
4 TABLET, ORALLY DISINTEGRATING ORAL EVERY 8 HOURS PRN
Status: CANCELLED | OUTPATIENT
Start: 2024-01-02

## 2024-01-02 RX ORDER — ENOXAPARIN SODIUM 150 MG/ML
1 INJECTION SUBCUTANEOUS 2 TIMES DAILY
Status: DISCONTINUED | OUTPATIENT
Start: 2024-01-02 | End: 2024-01-02

## 2024-01-02 RX ORDER — HEPARIN SODIUM 1000 [USP'U]/ML
4000 INJECTION, SOLUTION INTRAVENOUS; SUBCUTANEOUS PRN
Status: DISCONTINUED | OUTPATIENT
Start: 2024-01-02 | End: 2024-01-04

## 2024-01-02 RX ORDER — NITROGLYCERIN 0.4 MG/1
0.4 TABLET SUBLINGUAL EVERY 5 MIN PRN
Status: DISCONTINUED | OUTPATIENT
Start: 2024-01-02 | End: 2024-01-02 | Stop reason: HOSPADM

## 2024-01-02 RX ORDER — SENNA AND DOCUSATE SODIUM 50; 8.6 MG/1; MG/1
2 TABLET, FILM COATED ORAL DAILY
Status: DISCONTINUED | OUTPATIENT
Start: 2024-01-03 | End: 2024-01-06 | Stop reason: HOSPADM

## 2024-01-02 RX ORDER — POLYETHYLENE GLYCOL 3350 17 G/17G
17 POWDER, FOR SOLUTION ORAL DAILY PRN
Status: CANCELLED | OUTPATIENT
Start: 2024-01-02

## 2024-01-02 RX ORDER — SODIUM CHLORIDE 0.9 % (FLUSH) 0.9 %
10 SYRINGE (ML) INJECTION PRN
Status: DISCONTINUED | OUTPATIENT
Start: 2024-01-02 | End: 2024-01-06 | Stop reason: HOSPADM

## 2024-01-02 RX ORDER — GUAIFENESIN 200 MG/10ML
200 LIQUID ORAL EVERY 4 HOURS PRN
Status: CANCELLED | OUTPATIENT
Start: 2024-01-02

## 2024-01-02 RX ORDER — ACETAMINOPHEN 650 MG/1
650 SUPPOSITORY RECTAL EVERY 6 HOURS PRN
Status: CANCELLED | OUTPATIENT
Start: 2024-01-02

## 2024-01-02 RX ORDER — METOPROLOL TARTRATE 50 MG/1
50 TABLET, FILM COATED ORAL 2 TIMES DAILY
Status: DISCONTINUED | OUTPATIENT
Start: 2024-01-02 | End: 2024-01-06 | Stop reason: HOSPADM

## 2024-01-02 RX ORDER — ESCITALOPRAM OXALATE 20 MG/1
20 TABLET ORAL DAILY
Status: CANCELLED | OUTPATIENT
Start: 2024-01-03

## 2024-01-02 RX ORDER — ALPRAZOLAM 0.5 MG/1
0.5 TABLET ORAL 2 TIMES DAILY PRN
Status: DISCONTINUED | OUTPATIENT
Start: 2024-01-02 | End: 2024-01-06 | Stop reason: HOSPADM

## 2024-01-02 RX ORDER — ASPIRIN 81 MG/1
81 TABLET ORAL DAILY
Status: DISCONTINUED | OUTPATIENT
Start: 2024-01-03 | End: 2024-01-06 | Stop reason: HOSPADM

## 2024-01-02 RX ORDER — ALPRAZOLAM 0.5 MG/1
0.5 TABLET ORAL 2 TIMES DAILY PRN
Status: CANCELLED | OUTPATIENT
Start: 2024-01-02

## 2024-01-02 RX ORDER — PANTOPRAZOLE SODIUM 40 MG/1
40 TABLET, DELAYED RELEASE ORAL
Status: DISCONTINUED | OUTPATIENT
Start: 2024-01-03 | End: 2024-01-06 | Stop reason: HOSPADM

## 2024-01-02 RX ORDER — MIDODRINE HYDROCHLORIDE 5 MG/1
5 TABLET ORAL PRN
Status: DISCONTINUED | OUTPATIENT
Start: 2024-01-02 | End: 2024-01-05

## 2024-01-02 RX ORDER — ACETAMINOPHEN 325 MG/1
650 TABLET ORAL EVERY 4 HOURS PRN
Status: DISCONTINUED | OUTPATIENT
Start: 2024-01-02 | End: 2024-01-02 | Stop reason: SDUPTHER

## 2024-01-02 RX ORDER — ENOXAPARIN SODIUM 150 MG/ML
1 INJECTION SUBCUTANEOUS 2 TIMES DAILY
Status: CANCELLED | OUTPATIENT
Start: 2024-01-02

## 2024-01-02 RX ORDER — MAGNESIUM SULFATE HEPTAHYDRATE 40 MG/ML
2000 INJECTION, SOLUTION INTRAVENOUS PRN
Status: CANCELLED | OUTPATIENT
Start: 2024-01-02

## 2024-01-02 RX ORDER — ONDANSETRON 2 MG/ML
4 INJECTION INTRAMUSCULAR; INTRAVENOUS EVERY 6 HOURS PRN
Status: CANCELLED | OUTPATIENT
Start: 2024-01-02

## 2024-01-02 RX ORDER — ASPIRIN 81 MG/1
81 TABLET ORAL DAILY
Status: CANCELLED | OUTPATIENT
Start: 2024-01-03

## 2024-01-02 RX ORDER — SODIUM CHLORIDE 0.9 % (FLUSH) 0.9 %
5-40 SYRINGE (ML) INJECTION EVERY 12 HOURS SCHEDULED
Status: DISCONTINUED | OUTPATIENT
Start: 2024-01-02 | End: 2024-01-06 | Stop reason: HOSPADM

## 2024-01-02 RX ORDER — MAGNESIUM SULFATE IN WATER 40 MG/ML
2000 INJECTION, SOLUTION INTRAVENOUS PRN
Status: DISCONTINUED | OUTPATIENT
Start: 2024-01-02 | End: 2024-01-06 | Stop reason: HOSPADM

## 2024-01-02 RX ORDER — NITROGLYCERIN 0.4 MG/1
0.4 TABLET SUBLINGUAL EVERY 5 MIN PRN
Status: CANCELLED | OUTPATIENT
Start: 2024-01-02

## 2024-01-02 RX ORDER — ACETAMINOPHEN 325 MG/1
650 TABLET ORAL EVERY 6 HOURS PRN
Status: CANCELLED | OUTPATIENT
Start: 2024-01-02

## 2024-01-02 RX ORDER — FENTANYL CITRATE 50 UG/ML
INJECTION, SOLUTION INTRAMUSCULAR; INTRAVENOUS PRN
Status: DISCONTINUED | OUTPATIENT
Start: 2024-01-02 | End: 2024-01-02 | Stop reason: HOSPADM

## 2024-01-02 RX ORDER — GUAIFENESIN 200 MG/10ML
200 LIQUID ORAL EVERY 4 HOURS PRN
Status: DISCONTINUED | OUTPATIENT
Start: 2024-01-02 | End: 2024-01-06 | Stop reason: HOSPADM

## 2024-01-02 RX ORDER — LANOLIN ALCOHOL/MO/W.PET/CERES
3 CREAM (GRAM) TOPICAL NIGHTLY PRN
Status: CANCELLED | OUTPATIENT
Start: 2024-01-02

## 2024-01-02 RX ORDER — METOPROLOL TARTRATE 50 MG/1
50 TABLET, FILM COATED ORAL 2 TIMES DAILY
Status: CANCELLED | OUTPATIENT
Start: 2024-01-02

## 2024-01-02 RX ORDER — ESCITALOPRAM OXALATE 10 MG/1
20 TABLET ORAL DAILY
Status: DISCONTINUED | OUTPATIENT
Start: 2024-01-03 | End: 2024-01-06 | Stop reason: HOSPADM

## 2024-01-02 RX ORDER — SENNA AND DOCUSATE SODIUM 50; 8.6 MG/1; MG/1
2 TABLET, FILM COATED ORAL DAILY
Status: CANCELLED | OUTPATIENT
Start: 2024-01-03

## 2024-01-02 RX ORDER — DEXTROSE MONOHYDRATE 100 MG/ML
INJECTION, SOLUTION INTRAVENOUS CONTINUOUS PRN
Status: DISCONTINUED | OUTPATIENT
Start: 2024-01-02 | End: 2024-01-06 | Stop reason: HOSPADM

## 2024-01-02 RX ORDER — POTASSIUM CHLORIDE 7.45 MG/ML
10 INJECTION INTRAVENOUS PRN
Status: CANCELLED | OUTPATIENT
Start: 2024-01-02

## 2024-01-02 RX ORDER — ONDANSETRON 4 MG/1
4 TABLET, ORALLY DISINTEGRATING ORAL EVERY 8 HOURS PRN
Status: DISCONTINUED | OUTPATIENT
Start: 2024-01-02 | End: 2024-01-06 | Stop reason: HOSPADM

## 2024-01-02 RX ORDER — ACETAMINOPHEN 325 MG/1
650 TABLET ORAL EVERY 6 HOURS PRN
Status: DISCONTINUED | OUTPATIENT
Start: 2024-01-02 | End: 2024-01-06 | Stop reason: HOSPADM

## 2024-01-02 RX ORDER — LEVOFLOXACIN 500 MG/1
500 TABLET, FILM COATED ORAL DAILY
Status: CANCELLED | OUTPATIENT
Start: 2024-01-03 | End: 2024-01-06

## 2024-01-02 RX ORDER — ACETAMINOPHEN 650 MG/1
650 SUPPOSITORY RECTAL EVERY 6 HOURS PRN
Status: DISCONTINUED | OUTPATIENT
Start: 2024-01-02 | End: 2024-01-06 | Stop reason: HOSPADM

## 2024-01-02 RX ORDER — MIDAZOLAM HYDROCHLORIDE 1 MG/ML
INJECTION INTRAMUSCULAR; INTRAVENOUS PRN
Status: DISCONTINUED | OUTPATIENT
Start: 2024-01-02 | End: 2024-01-02 | Stop reason: HOSPADM

## 2024-01-02 RX ADMIN — NITROGLYCERIN 0.4 MG: 0.4 TABLET, ORALLY DISINTEGRATING SUBLINGUAL at 08:32

## 2024-01-02 RX ADMIN — ESCITALOPRAM OXALATE 20 MG: 20 TABLET ORAL at 08:36

## 2024-01-02 RX ADMIN — Medication 3 MG: at 20:06

## 2024-01-02 RX ADMIN — METOPROLOL TARTRATE 50 MG: 50 TABLET, FILM COATED ORAL at 20:06

## 2024-01-02 RX ADMIN — ANTI-FUNGAL POWDER MICONAZOLE NITRATE TALC FREE: 1.42 POWDER TOPICAL at 20:06

## 2024-01-02 RX ADMIN — METOPROLOL TARTRATE 50 MG: 50 TABLET ORAL at 08:36

## 2024-01-02 RX ADMIN — ALPRAZOLAM 0.5 MG: 0.5 TABLET ORAL at 05:15

## 2024-01-02 RX ADMIN — LEVOFLOXACIN 500 MG: 500 TABLET, FILM COATED ORAL at 08:36

## 2024-01-02 RX ADMIN — HEPARIN SODIUM 4000 UNITS: 1000 INJECTION INTRAVENOUS; SUBCUTANEOUS at 21:15

## 2024-01-02 RX ADMIN — HYDRALAZINE HYDROCHLORIDE 10 MG: 20 INJECTION INTRAMUSCULAR; INTRAVENOUS at 05:08

## 2024-01-02 RX ADMIN — ASPIRIN 81 MG: 81 TABLET, COATED ORAL at 08:36

## 2024-01-02 RX ADMIN — ALPRAZOLAM 0.5 MG: 0.5 TABLET ORAL at 20:06

## 2024-01-02 RX ADMIN — SODIUM CHLORIDE, PRESERVATIVE FREE 10 ML: 5 INJECTION INTRAVENOUS at 20:06

## 2024-01-02 RX ADMIN — HEPARIN SODIUM 12 UNITS/KG/HR: 10000 INJECTION, SOLUTION INTRAVENOUS at 21:15

## 2024-01-02 ASSESSMENT — PAIN SCALES - GENERAL: PAINLEVEL_OUTOF10: 0

## 2024-01-02 NOTE — H&P
[facility]  Procedure History and Physical Update          Patient Name: Elaina Champagne  MRN:    [id number]  YOB: 1950  Date of evaluation:  1/2/2024    Procedure:    Cardiac cath +/- PCI    Indication for procedure:  NSTEMI      Please refer to the office note completed by Dr. Sunil Mariano on 1/1/2024 in the medical record and note that:    [x] I have examined the patient and reviewed the H&P/Consult and there are no changes to be made to the assessment or plan.    [] I have examined the patient and reviewed the H&P/Consult and have noted the following changes:    Past Medical History:   Diagnosis Date    Bell's palsy     Cellulitis     Hypertension        Past Surgical History:   Procedure Laterality Date    CATARACT REMOVAL Bilateral 2015    HYSTERECTOMY, TOTAL ABDOMINAL (CERVIX REMOVED)  1990    INCISION AND DRAINAGE Left 07/06/2017    LEG INCISION AND DRAINAGE ABSCESS performed by Isaiah Casey MD at [facility] OR    IR NONTUNNELED VASCULAR CATHETER  12/15/2023    IR NONTUNNELED VASCULAR CATHETER 12/15/2023 [facility] SPECIAL PROCEDURES    KNEE ARTHROPLASTY Right 09/24/2018    TOTAL KNEE ARTHROPLASTY Left 11/09/2017    UPPER GASTROINTESTINAL ENDOSCOPY N/A 12/28/2023    EGD BIOPSY performed by Mary Nolasco MD at [facility] ENDO    VENTRAL HERNIA REPAIR  02/11/2019    5 hernias       Family History   Problem Relation Age of Onset    Cancer Mother     High Blood Pressure Father     Stroke Father     Ovarian Cancer Daughter     Cancer Daughter     Diabetes Maternal Aunt     Cancer Other         daughter/ovarian cancer       Allergies   Allergen Reactions    Penicillins Hives       Prior to Admission medications    Medication Sig Start Date End Date Taking? Authorizing Provider   acetaminophen (TYLENOL) 325 MG tablet Take 2 tablets by mouth every 6 hours as needed for Pain    Provider, MD Jah   omeprazole (PRILOSEC) 20 MG delayed release capsule TAKE 1 CAPSULE BY MOUTH ONCE DAILY AT

## 2024-01-02 NOTE — PROGRESS NOTES
Patient admitted, consent signed, all questions answered.  Pt ready for procedure.  Bed in low position, call light to reach with side rails up 2 of 2.  Groins  clipped.  Family  at bedside with patient.

## 2024-01-02 NOTE — CARE COORDINATION
Writer is following for potential discharge to [facility]. Facility needs prior notice for discharge to order bariatric bed.    Pt is scheduled for heart cath at 1200, pt will not be discharging today.  Electronically signed by MANDI Hopson on 1/2/2024 at 11:25 AM

## 2024-01-02 NOTE — PLAN OF CARE
Problem: Discharge Planning  Goal: Discharge to home or other facility with appropriate resources  Outcome: Progressing     Problem: Safety - Adult  Goal: Free from fall injury  Outcome: Progressing     Problem: Pain  Goal: Verbalizes/displays adequate comfort level or baseline comfort level  Outcome: Progressing     Problem: Skin/Tissue Integrity  Goal: Absence of new skin breakdown  Description: 1.  Monitor for areas of redness and/or skin breakdown  2.  Assess vascular access sites hourly  3.  Every 4-6 hours minimum:  Change oxygen saturation probe site  4.  Every 4-6 hours:  If on nasal continuous positive airway pressure, respiratory therapy assess nares and determine need for appliance change or resting period.  Outcome: Progressing     Problem: ABCDS Injury Assessment  Goal: Absence of physical injury  Outcome: Progressing     Problem: Nutrition Deficit:  Goal: Optimize nutritional status  Outcome: Progressing     Problem: Respiratory - Adult  Goal: Achieves optimal ventilation and oxygenation  Outcome: Progressing     Problem: Cardiovascular - Adult  Goal: Maintains optimal cardiac output and hemodynamic stability  Outcome: Progressing  Goal: Absence of cardiac dysrhythmias or at baseline  Outcome: Progressing     Problem: Musculoskeletal - Adult  Goal: Return mobility to safest level of function  Outcome: Progressing     Problem: Infection - Adult  Goal: Absence of infection during hospitalization  Outcome: Progressing     Problem: Metabolic/Fluid and Electrolytes - Adult  Goal: Electrolytes maintained within normal limits  Outcome: Progressing     Problem: Gastrointestinal - Adult  Goal: Maintains or returns to baseline bowel function  Outcome: Progressing  Goal: Maintains adequate nutritional intake  Outcome: Progressing     Problem: Confusion  Goal: Confusion, delirium, dementia, or psychosis is improved or at baseline  Description: INTERVENTIONS:  1. Assess for possible contributors to thought  disturbance, including medications, impaired vision or hearing, underlying metabolic abnormalities, dehydration, psychiatric diagnoses, and notify attending LIP  2. [place] high risk fall precautions, as indicated  3. Provide frequent short contacts to provide reality reorientation, refocusing and direction  4. Decrease environmental stimuli, including noise as appropriate  5. Monitor and intervene to maintain adequate nutrition, hydration, elimination, sleep and activity  6. If unable to ensure safety without constant attention obtain sitter and review sitter guidelines with assigned personnel  7. Initiate Psychosocial CNS and Spiritual Care consult, as indicated  Outcome: Progressing

## 2024-01-02 NOTE — PRE SEDATION
[facility]    CARDIAC CATHETERIZATION    Date:   1/2/2024  Patient name:  Elaina Champagne  Date of admission:  1/2/2024 12:05 PM  MRN:   [id number]  YOB: 1950    Operators:  Primary:   GUMARO Aponte MD (Attending Physician)    Assistant/CV fellow:  Gay Archuleta MD      Procedure performed:       [x] Left Heart Catheterization.   [] Graft Angiography.  [x] Left Ventriculography.     [] Right Heart Catheterization.  [x] Coronary Angiography.    [] Aortic Valve Studies.  [] PCI:      [] Other:       Pre Procedure Conscious Sedation Data:  ASA Class:    [] I [x] II [] III [] IV    Mallampati Class:  [] I [x] II [] III [] IV      Indication:  [] STEMI      [] + Stress test  [x] ACS      [] + EKG Changes  [] Non Q MI       [] Significant Risk Factors  [] Recurrent Angina             [] Diabetes Mellitus    [] New LBBB      [] Uncontrolled HTN.  [] CHF / Low EF changes     [] Abnormal CTA / Ca Score      Electronically signed on 1/2/2024 at 4:07 PM by:    Gay Archuleta MD  Fellow, Cardiovascular Diseases  [facility]

## 2024-01-02 NOTE — PROGRESS NOTES
[facility]  IN-PATIENT SERVICE  [facility]    PROGRESS NOTE             Date:   1/2/2024  Patient name:  Elaina Champagne  Date of admission:  12/14/2023 11:42 AM  MRN:   [id number]  YOB: 1950    INTERVAL HISTORY:      SUBJECTIVE:  Patient laying in bed, anxious.  Earlier reported having chest pain that resolved after sublingual nitroglycerin.  No nausea no vomiting, having bowel movements.  No fevers no chills, no shortness of breath.  N.p.o. for heart cath today.    Objective   Vitals:    01/02/24 0445 01/02/24 0800 01/02/24 0832 01/02/24 0839   BP: (!) 172/72 (!) 161/69 (!) 151/72 (!) 151/73   Pulse: 71 73 83 84   Resp: 18 17     Temp: 97.9 °F (36.6 °C) 98 °F (36.7 °C)     TempSrc:  Axillary     SpO2: 94% 96%     Weight:       Height:         BP Readings from Last 6 Encounters:   01/02/24 (!) 151/73   12/13/23 (!) 104/59   12/04/23 (!) 178/72   11/27/23 (!) 186/78   11/13/23 (!) 191/78   11/06/23 (!) 168/71          Intake/Output Summary (Last 24 hours) at 1/2/2024 0913  Last data filed at 1/1/2024 1846  Gross per 24 hour   Intake --   Output 600 ml   Net -600 ml       General appearance: Obese  HEENT: Normocephalic, no lesions, without obvious abnormality.pupils equal and reactive, EOM normal  Lungs: clear to auscultation bilaterally  Heart: regular rate and rhythm, S1, S2 normal, no murmur, click, rub or gallop  Abdomen: soft, non-tender; bowel sounds normal; no masses,  no organomegaly  Extremities: No edema weak pulses in the left side, wounds covered with dressing which was not removed  Neurological: Alert oriented x 3, moves all 4 extremities spontaneously.  Makes adequate conversation    CBC:   Recent Labs     12/31/23  0759 12/31/23  2214 01/02/24  0530   WBC 7.5 6.8 6.2   HGB 8.9* 8.2* 9.4*   * 510* 481*       BMP:    Recent Labs     12/30/23  2233 12/31/23  0759 01/01/24  0515 01/02/24  0530   NA  --  141 140 138   K 3.5* 3.9 3.8 3.8

## 2024-01-02 NOTE — PROGRESS NOTES
Dr. Winters notified dpatient is having 5/10 chest ache/ pressure.  to place orders. Patient given one nitro which resolved pain.     Dr. CATINA Giles notified and order received for trop.

## 2024-01-03 ENCOUNTER — APPOINTMENT (OUTPATIENT)
Dept: VASCULAR LAB | Age: 74
DRG: 853 | End: 2024-01-03
Attending: INTERNAL MEDICINE
Payer: MEDICARE

## 2024-01-03 ENCOUNTER — TELEPHONE (OUTPATIENT)
Age: 74
End: 2024-01-03

## 2024-01-03 PROBLEM — E66.813 CLASS 3 SEVERE OBESITY DUE TO EXCESS CALORIES WITH SERIOUS COMORBIDITY AND BODY MASS INDEX (BMI) OF 50.0 TO 59.9 IN ADULT: Status: ACTIVE | Noted: 2024-01-03

## 2024-01-03 PROBLEM — L97.911 HEALING ULCERS OF BOTH LOWER EXTREMITIES, LIMITED TO BREAKDOWN OF SKIN (HCC): Status: ACTIVE | Noted: 2024-01-03

## 2024-01-03 PROBLEM — A49.8 PSEUDOMONAS AERUGINOSA INFECTION: Status: ACTIVE | Noted: 2024-01-03

## 2024-01-03 PROBLEM — I25.10 CORONARY ARTERY DISEASE INVOLVING NATIVE CORONARY ARTERY OF NATIVE HEART WITHOUT ANGINA PECTORIS: Status: ACTIVE | Noted: 2024-01-03

## 2024-01-03 PROBLEM — E66.01 CLASS 3 SEVERE OBESITY DUE TO EXCESS CALORIES WITH SERIOUS COMORBIDITY AND BODY MASS INDEX (BMI) OF 50.0 TO 59.9 IN ADULT (HCC): Status: ACTIVE | Noted: 2024-01-03

## 2024-01-03 PROBLEM — L97.921 HEALING ULCERS OF BOTH LOWER EXTREMITIES, LIMITED TO BREAKDOWN OF SKIN (HCC): Status: ACTIVE | Noted: 2024-01-03

## 2024-01-03 PROBLEM — L03.115 BILATERAL LOWER LEG CELLULITIS: Status: ACTIVE | Noted: 2024-01-03

## 2024-01-03 PROBLEM — L03.116 BILATERAL LOWER LEG CELLULITIS: Status: ACTIVE | Noted: 2024-01-03

## 2024-01-03 LAB
ANION GAP SERPL CALCULATED.3IONS-SCNC: 9 MMOL/L (ref 9–17)
ANTI-XA UNFRAC HEPARIN: 0.66 IU/L
ANTI-XA UNFRAC HEPARIN: 0.66 IU/L
ANTI-XA UNFRAC HEPARIN: 0.71 IU/L
BASOPHILS # BLD: <0.03 K/UL (ref 0–0.2)
BASOPHILS NFR BLD: 0 % (ref 0–2)
BUN SERPL-MCNC: 12 MG/DL (ref 8–23)
CALCIUM SERPL-MCNC: 8 MG/DL (ref 8.6–10.4)
CHLORIDE SERPL-SCNC: 102 MMOL/L (ref 98–107)
CO2 SERPL-SCNC: 26 MMOL/L (ref 20–31)
CREAT SERPL-MCNC: 0.4 MG/DL (ref 0.5–0.9)
ECHO BSA: 2.46 M2
EOSINOPHIL # BLD: 0.09 K/UL (ref 0–0.44)
EOSINOPHILS RELATIVE PERCENT: 2 % (ref 1–4)
ERYTHROCYTE [DISTWIDTH] IN BLOOD BY AUTOMATED COUNT: 17.3 % (ref 11.8–14.4)
GFR SERPL CREATININE-BSD FRML MDRD: >60 ML/MIN/1.73M2
GLUCOSE SERPL-MCNC: 91 MG/DL (ref 70–99)
HCT VFR BLD AUTO: 28.5 % (ref 36.3–47.1)
HGB BLD-MCNC: 8.7 G/DL (ref 11.9–15.1)
IMM GRANULOCYTES # BLD AUTO: 0.06 K/UL (ref 0–0.3)
IMM GRANULOCYTES NFR BLD: 1 %
LYMPHOCYTES NFR BLD: 1.04 K/UL (ref 1.1–3.7)
LYMPHOCYTES RELATIVE PERCENT: 17 % (ref 24–43)
MAGNESIUM SERPL-MCNC: 1.6 MG/DL (ref 1.6–2.6)
MCH RBC QN AUTO: 29.5 PG (ref 25.2–33.5)
MCHC RBC AUTO-ENTMCNC: 30.5 G/DL (ref 28.4–34.8)
MCV RBC AUTO: 96.6 FL (ref 82.6–102.9)
MONOCYTES NFR BLD: 0.47 K/UL (ref 0.1–1.2)
MONOCYTES NFR BLD: 8 % (ref 3–12)
NEUTROPHILS NFR BLD: 72 % (ref 36–65)
NEUTS SEG NFR BLD: 4.47 K/UL (ref 1.5–8.1)
NRBC BLD-RTO: 0 PER 100 WBC
PLATELET # BLD AUTO: 433 K/UL (ref 138–453)
PMV BLD AUTO: 9.6 FL (ref 8.1–13.5)
POTASSIUM SERPL-SCNC: 3.4 MMOL/L (ref 3.7–5.3)
RBC # BLD AUTO: 2.95 M/UL (ref 3.95–5.11)
RBC # BLD: ABNORMAL 10*6/UL
SODIUM SERPL-SCNC: 137 MMOL/L (ref 135–144)
SURGICAL PATHOLOGY REPORT: NORMAL
VAS LEFT BULB EDV: 36.4 CM/S
VAS LEFT BULB PSV: 113 CM/S
VAS LEFT CCA DIST EDV: 30.8 CM/S
VAS LEFT CCA DIST PSV: 100 CM/S
VAS LEFT CCA MID EDV: 27.3 CM/S
VAS LEFT CCA MID PSV: 104 CM/S
VAS LEFT CCA PROX EDV: 17.5 CM/S
VAS LEFT CCA PROX PSV: 69.4 CM/S
VAS LEFT ECA EDV: 4.9 CM/S
VAS LEFT ECA PSV: 102 CM/S
VAS LEFT ICA DIST EDV: 37.8 CM/S
VAS LEFT ICA DIST PSV: 117 CM/S
VAS LEFT ICA MID EDV: 31.5 CM/S
VAS LEFT ICA MID PSV: 118 CM/S
VAS LEFT ICA PROX EDV: 38.5 CM/S
VAS LEFT ICA PROX PSV: 126 CM/S
VAS LEFT VERTEBRAL EDV: 14.7 CM/S
VAS LEFT VERTEBRAL PSV: 51.8 CM/S
VAS RIGHT BULB EDV: 25.5 CM/S
VAS RIGHT BULB PSV: 106 CM/S
VAS RIGHT CCA DIST EDV: 14.9 CM/S
VAS RIGHT CCA DIST PSV: 59 CM/S
VAS RIGHT CCA MID EDV: 11.8 CM/S
VAS RIGHT CCA MID PSV: 53.4 CM/S
VAS RIGHT CCA PROX EDV: 13 CM/S
VAS RIGHT CCA PROX PSV: 94.4 CM/S
VAS RIGHT ECA EDV: 0 CM/S
VAS RIGHT ECA PSV: 102 CM/S
VAS RIGHT ICA DIST EDV: 29.6 CM/S
VAS RIGHT ICA DIST PSV: 95.5 CM/S
VAS RIGHT ICA MID EDV: 50.5 CM/S
VAS RIGHT ICA MID PSV: 193 CM/S
VAS RIGHT ICA PROX EDV: 64.8 CM/S
VAS RIGHT ICA PROX PSV: 225 CM/S
VAS RIGHT VERTEBRAL EDV: 19.6 CM/S
VAS RIGHT VERTEBRAL PSV: 66.6 CM/S
WBC OTHER # BLD: 6.2 K/UL (ref 3.5–11.3)

## 2024-01-03 PROCEDURE — 2060000000 HC ICU INTERMEDIATE R&B

## 2024-01-03 PROCEDURE — 93880 EXTRACRANIAL BILAT STUDY: CPT | Performed by: STUDENT IN AN ORGANIZED HEALTH CARE EDUCATION/TRAINING PROGRAM

## 2024-01-03 PROCEDURE — 2580000003 HC RX 258: Performed by: STUDENT IN AN ORGANIZED HEALTH CARE EDUCATION/TRAINING PROGRAM

## 2024-01-03 PROCEDURE — 93880 EXTRACRANIAL BILAT STUDY: CPT

## 2024-01-03 PROCEDURE — 80048 BASIC METABOLIC PNL TOTAL CA: CPT

## 2024-01-03 PROCEDURE — 6370000000 HC RX 637 (ALT 250 FOR IP): Performed by: INTERNAL MEDICINE

## 2024-01-03 PROCEDURE — 6360000002 HC RX W HCPCS: Performed by: INTERNAL MEDICINE

## 2024-01-03 PROCEDURE — 93970 EXTREMITY STUDY: CPT

## 2024-01-03 PROCEDURE — 99223 1ST HOSP IP/OBS HIGH 75: CPT | Performed by: INTERNAL MEDICINE

## 2024-01-03 PROCEDURE — 99222 1ST HOSP IP/OBS MODERATE 55: CPT | Performed by: PHYSICIAN ASSISTANT

## 2024-01-03 PROCEDURE — 85520 HEPARIN ASSAY: CPT

## 2024-01-03 PROCEDURE — 6370000000 HC RX 637 (ALT 250 FOR IP): Performed by: STUDENT IN AN ORGANIZED HEALTH CARE EDUCATION/TRAINING PROGRAM

## 2024-01-03 PROCEDURE — 6360000002 HC RX W HCPCS

## 2024-01-03 PROCEDURE — 36415 COLL VENOUS BLD VENIPUNCTURE: CPT

## 2024-01-03 PROCEDURE — 85025 COMPLETE CBC W/AUTO DIFF WBC: CPT

## 2024-01-03 PROCEDURE — 99233 SBSQ HOSP IP/OBS HIGH 50: CPT | Performed by: NURSE PRACTITIONER

## 2024-01-03 PROCEDURE — 99213 OFFICE O/P EST LOW 20 MIN: CPT

## 2024-01-03 PROCEDURE — 83735 ASSAY OF MAGNESIUM: CPT

## 2024-01-03 RX ORDER — ATORVASTATIN CALCIUM 40 MG/1
40 TABLET, FILM COATED ORAL NIGHTLY
Status: DISCONTINUED | OUTPATIENT
Start: 2024-01-03 | End: 2024-01-06 | Stop reason: HOSPADM

## 2024-01-03 RX ORDER — HEPARIN SODIUM 10000 [USP'U]/100ML
5-30 INJECTION, SOLUTION INTRAVENOUS CONTINUOUS
Status: DISCONTINUED | OUTPATIENT
Start: 2024-01-03 | End: 2024-01-04

## 2024-01-03 RX ORDER — POTASSIUM CHLORIDE 20 MEQ/1
20 TABLET, EXTENDED RELEASE ORAL ONCE
Status: COMPLETED | OUTPATIENT
Start: 2024-01-03 | End: 2024-01-03

## 2024-01-03 RX ORDER — HYDRALAZINE HYDROCHLORIDE 20 MG/ML
5 INJECTION INTRAMUSCULAR; INTRAVENOUS EVERY 6 HOURS PRN
Status: DISCONTINUED | OUTPATIENT
Start: 2024-01-03 | End: 2024-01-06 | Stop reason: HOSPADM

## 2024-01-03 RX ORDER — POTASSIUM CHLORIDE 20 MEQ/1
40 TABLET, EXTENDED RELEASE ORAL ONCE
Status: DISCONTINUED | OUTPATIENT
Start: 2024-01-03 | End: 2024-01-06 | Stop reason: HOSPADM

## 2024-01-03 RX ADMIN — POTASSIUM CHLORIDE 10 MEQ: 7.46 INJECTION, SOLUTION INTRAVENOUS at 06:27

## 2024-01-03 RX ADMIN — PANTOPRAZOLE SODIUM 40 MG: 40 TABLET, DELAYED RELEASE ORAL at 16:58

## 2024-01-03 RX ADMIN — ANTI-FUNGAL POWDER MICONAZOLE NITRATE TALC FREE: 1.42 POWDER TOPICAL at 13:38

## 2024-01-03 RX ADMIN — DOCUSATE SODIUM 50 MG AND SENNOSIDES 8.6 MG 2 TABLET: 8.6; 5 TABLET, FILM COATED ORAL at 09:31

## 2024-01-03 RX ADMIN — COLLAGENASE SANTYL: 250 OINTMENT TOPICAL at 13:39

## 2024-01-03 RX ADMIN — Medication 81 MG: at 09:30

## 2024-01-03 RX ADMIN — PANTOPRAZOLE SODIUM 40 MG: 40 TABLET, DELAYED RELEASE ORAL at 09:34

## 2024-01-03 RX ADMIN — HYDRALAZINE HYDROCHLORIDE 5 MG: 20 INJECTION INTRAMUSCULAR; INTRAVENOUS at 01:15

## 2024-01-03 RX ADMIN — Medication 3 MG: at 19:42

## 2024-01-03 RX ADMIN — ESCITALOPRAM 20 MG: 10 TABLET, FILM COATED ORAL at 09:30

## 2024-01-03 RX ADMIN — ANTI-FUNGAL POWDER MICONAZOLE NITRATE TALC FREE: 1.42 POWDER TOPICAL at 19:42

## 2024-01-03 RX ADMIN — COLLAGENASE SANTYL: 250 OINTMENT TOPICAL at 12:57

## 2024-01-03 RX ADMIN — DESMOPRESSIN ACETATE 40 MG: 0.2 TABLET ORAL at 19:42

## 2024-01-03 RX ADMIN — METOPROLOL TARTRATE 50 MG: 50 TABLET, FILM COATED ORAL at 19:42

## 2024-01-03 RX ADMIN — ALPRAZOLAM 0.5 MG: 0.5 TABLET ORAL at 19:42

## 2024-01-03 RX ADMIN — METOPROLOL TARTRATE 50 MG: 50 TABLET, FILM COATED ORAL at 09:29

## 2024-01-03 RX ADMIN — LEVOFLOXACIN 500 MG: 500 TABLET, FILM COATED ORAL at 09:30

## 2024-01-03 RX ADMIN — POTASSIUM CHLORIDE 20 MEQ: 1500 TABLET, EXTENDED RELEASE ORAL at 10:31

## 2024-01-03 RX ADMIN — MAGNESIUM SULFATE HEPTAHYDRATE 2000 MG: 40 INJECTION, SOLUTION INTRAVENOUS at 05:23

## 2024-01-03 RX ADMIN — POTASSIUM CHLORIDE 10 MEQ: 7.46 INJECTION, SOLUTION INTRAVENOUS at 05:24

## 2024-01-03 RX ADMIN — POTASSIUM CHLORIDE 10 MEQ: 7.46 INJECTION, SOLUTION INTRAVENOUS at 08:28

## 2024-01-03 RX ADMIN — SODIUM CHLORIDE, PRESERVATIVE FREE 10 ML: 5 INJECTION INTRAVENOUS at 19:43

## 2024-01-03 ASSESSMENT — ENCOUNTER SYMPTOMS
SHORTNESS OF BREATH: 1
WHEEZING: 0
CHEST TIGHTNESS: 0
ABDOMINAL PAIN: 0

## 2024-01-03 ASSESSMENT — PAIN SCALES - GENERAL
PAINLEVEL_OUTOF10: 0
PAINLEVEL_OUTOF10: 2
PAINLEVEL_OUTOF10: 0

## 2024-01-03 ASSESSMENT — PAIN DESCRIPTION - LOCATION: LOCATION: ARM;LEG

## 2024-01-03 ASSESSMENT — PAIN SCALES - WONG BAKER: WONGBAKER_NUMERICALRESPONSE: 2

## 2024-01-03 NOTE — CONSULTS
[facility] - Initial Consult Note  Today's Date and Time: 1/3/2024, 11:17 AM    Impression :   NSTEMI (non-ST elevated myocardial infarction) (HCC)   S/P cardiac cath MV CAD  CT surgery consult for CABG  On heparin drip  Cardiology and CT surgery on board, evaluating CABG versus stenting  Paroxysmal A-fib  Venous insufficiency of both lower extremities  Primary hypertension  Hyperlipidemia  Cellulitis bilateral lower extremity.   Bilat LE edema w large blisters - infected blisters  Wound cx 12/15/23 Pseudomonas aeruginosa sensitive to Levaquin and cefepime and Enterococcus faecalis sensitive to ampicillin  Wound cx left foot 12/25/2023: Pseudomonas aeruginosa  Pseudomonas Aeruginosa bacteremia 12/14/23 source is the leg abscesses  sensitive to Levaquin and cefepime.  Blood Cx 12/15/2023: no growth  Blood Cx 12/25/2023: no growth  Traumatic Rt post calf hematoma  Sepsis secondary to above  Mild ulcers, possible HSV s/p 1 week PO Valtrex  Peripheral arterial disease  Acute on chronic renal failure.  New onset HD, discontinued.  Obesity  History of lymphedema venous stasis insufficiency  Coccyx/Buttock wound   Penicillin allergy - Hives.  Tolerated Cefepime    Recommendations:     Levaquin 500 mg po started 12/27. Stop date 1-5-24  Cx taken 12/29 from right foot sub cutaneous blisters.   Pseudomonas aeruginosa - sensitive to Levaquin  On 12/29 Podiatry recommended no surgical intervention  Previously:   Zyvox completed 12/19/2023. Cellulitis RLE much better  Completed Cefepime from 12/21 -> 12/27   Completed Valtrex po 12/22/23 --> 12/29/23 for HSV lesion  Wound Ostomy consulted for wound care      Medical Decision Making/Summary/Discussion:1/3/2024     Daily Elements of Decision Making provided by Consulting Physician:    Note: I have independently performed the steps listed below as part of the medical decision making and evaluation.    Review of current Problems:  Today I am  Limited images of the upper abdomen are unremarkable. Soft Tissues/Bones: No acute bone or soft tissue abnormality.     No evidence of pulmonary embolism. Small left pleural effusion with associated mild passive atelectasis.     XR CHEST PORTABLE    Result Date: 12/30/2023  EXAMINATION: ONE XRAY VIEW OF THE CHEST 12/30/2023 9:13 pm COMPARISON: 12/23/2023 HISTORY: ORDERING SYSTEM PROVIDED HISTORY: SOB TECHNOLOGIST PROVIDED HISTORY: SOB Reason for Exam: CXR due to dyspnea. FINDINGS: Mild pulmonary venous congestive changes.  Possible small bilateral pleural effusions.  The cardiomediastinal silhouette is enlarged.  The osseous structures are without acute process.  Insert removal of a right IJ central venous catheter.     Cardiomegaly with mild pulmonary venous congestive changes. Possible small bilateral pleural effusions.       Medical Decision Tizcol-Dlzdzhyc-Oqeny:     Results       Procedure Component Value Units Date/Time    Culture, Aerobic [[id number]]  (Abnormal)  (Susceptibility) Collected: 12/29/23 1245    Order Status: Completed Specimen: Foot Updated: 01/01/24 0843     Specimen Description .FOOT LEFT FOOT     Direct Exam MANY NEUTROPHILS      NO ORGANISMS SEEN     Culture PSEUDOMONAS AERUGINOSA RARE GROWTH    Susceptibility        Pseudomonas aeruginosa      BACTERIAL SUSCEPTIBILITY PANEL RASHEL      amikacin <=2  Sensitive      cefepime 2  Sensitive      gentamicin <=1  Sensitive      levofloxacin 0.5  Sensitive      meropenem 1  Sensitive      piperacillin-tazobactam <=4  Sensitive      tobramycin <=1  Sensitive                           Culture, Blood 1 [[id number]] Collected: 12/25/23 1225    Order Status: Completed Specimen: Blood Updated: 12/30/23 1515     Specimen Description .BLOOD RH     Special Requests          Culture NO GROWTH 5 DAYS              Medical Decision Making-Other:     Note:    Thank you for allowing us to participate in the care of this patient. Please call with

## 2024-01-03 NOTE — PROGRESS NOTES
[facility]  Follow Up      NAME:  Elaina Champagne  MEDICAL RECORD NUMBER:  [id number]  AGE: 73 y.o.   GENDER: female  : 1950  TODAY'S DATE:  1/3/2024    Subjective:     Reason for WOCN Evaluation and Assessment: \"left lower leg\"      Elaina Champagne is a 73 y.o. female referred by:   [x] Physician  [] Nursing  [] Other:     Wound Identification:  Wound Type: venous and arterial, dermatitis  Contributing Factors: edema, venous stasis, chronic pressure, decreased mobility, shear force, obesity, arterial insufficiency, decreased tissue oxygenation, anticoagulation therapy, incontinence of stool, and incontinence of urine        Transfer from [facility].   Per the patient and EHR known lower extremity venous disease with use of Unna's Boots compression therapy in the past. Recent PHILL of the left leg result of 0.68 indicates moderate arterial insufficiency. Right leg 1.04 is within normal range. Patient states her left leg wound has been present for months. She recently developed severe edema and blisters of the bilateral feet. Here are also newly developed venous ulcers of the right calf and foot since admission to [facility].   She also has abdominal fold and gluteal dermatitis.     Objective:      BP (!) 139/48   Pulse 63   Temp 97.7 °F (36.5 °C) (Oral)   Resp 27   Ht 1.575 m (5' 2\")   Wt (!) 138.4 kg (305 lb 1.9 oz)   SpO2 100%   BMI 55.81 kg/m²   Pa Risk Score: Pa Scale Score: 12    LABS    CBC:   Lab Results   Component Value Date/Time    WBC 6.2 2024 03:19 AM    RBC 2.95 2024 03:19 AM    RBC 3.34 2018 05:38 AM    HGB 8.7 2024 03:19 AM    HCT 28.5 2024 03:19 AM     CMP:  Albumin:    Lab Results   Component Value Date/Time    LABALBU 2.5 2024 05:15 AM     PT/INR:    Lab Results   Component Value Date/Time    PROTIME 16.2 2024 08:30 PM    PROTIME 13.7 2018 11:53 AM    INR 1.3 2024 08:30 PM     HgBA1c:

## 2024-01-03 NOTE — PROGRESS NOTES
[facility]   Progress Note                   Date:   1/3/2024  Patient name: Elaina Champagne  Date of admission:  1/2/2024 12:05 PM  MRN:   [id number]  YOB: 1950  PCP: Robin Ly MD    Reason for Admission: NSTEMI (non-ST elevated myocardial infarction) (HCC) [I21.4]  S/P cardiac cath [Z98.890]    Subjective:       Clinical Changes / Abnormalities: Pt seen and examined in the room. Pt denies any CP or sob.  Family in room.      Medications:   Scheduled Meds:   potassium chloride  40 mEq Oral Once    atorvastatin  40 mg Oral Nightly    sodium chloride flush  5-40 mL IntraVENous 2 times per day    aspirin  81 mg Oral Daily    collagenase   Topical Daily    escitalopram  20 mg Oral Daily    levoFLOXacin  500 mg Oral Daily    metoprolol tartrate  50 mg Oral BID    miconazole   Topical BID    pantoprazole  40 mg Oral BID AC    sennosides-docusate sodium  2 tablet Oral Daily     Continuous Infusions:   heparin (PORCINE) Infusion 11 Units/kg/hr (01/03/24 1030)    sodium chloride      dextrose       CBC:   Recent Labs     01/02/24 0530 01/02/24 2030 01/03/24 0319   WBC 6.2 6.4 6.2   HGB 9.4* 8.9* 8.7*   * 445 433     BMP:    Recent Labs     01/01/24 0515 01/02/24 0530 01/03/24 0319    138 137   K 3.8 3.8 3.4*    102 102   CO2 33* 31 26   BUN 12 11 12   CREATININE 0.5 0.5 0.4*   GLUCOSE 93 114* 91     Hepatic: No results for input(s): \"AST\", \"ALT\", \"ALB\", \"BILITOT\", \"ALKPHOS\" in the last 72 hours.  Troponin:   Recent Labs     01/01/24  0515 01/02/24  0839   TROPHS 317* 174*     BNP: No results for input(s): \"BNP\" in the last 72 hours.  Lipids: No results for input(s): \"CHOL\", \"HDL\" in the last 72 hours.    Invalid input(s): \"LDLCALCU\"  INR:   Recent Labs     01/02/24  2030   INR 1.3       CARDIAC CATH 1/2/24    Multivessel coronary artery disease.     Left Main   The vessel is angiographically normal.      Left Anterior Descending   Has proximal 75%  extremities     Lymphedema     Venous stasis ulcer of calf with fat layer exposed with varicose veins (HCC)     Venous ulcer of left leg (HCC)     Hyperkalemia     MICHELLE (acute kidney injury) ([facility])     Cellulitis     Bacteremia due to Pseudomonas     Bacteriuria     Leukocytosis     Elevated C-reactive protein (CRP)     Allergy to penicillin     Atrial fibrillation with rapid ventricular response (HCC)     Altered mental status     Elevated erythrocyte sedimentation rate     Permanent atrial fibrillation ([facility])     Renovascular hypertension     Melena     Acute on chronic anemia     Anticoagulated     Pseudomonas septicemia ([facility])     Abscess of right foot     Acute gastric ulcer with hemorrhage     NSTEMI (non-ST elevated myocardial infarction) ([facility])     S/P cardiac cath      Plan of Treatment:   MVD per cardiac cath.  Consult CTS for recs.  Await preop testing.    Continue asa, statin,   Afib.  Continue BB and heparin gtt. On eliquis at home   Keep K> 4 and MAG > 2     Electronically signed by TOPHER MUNIZ CNP on 1/3/2024 at 11:48 AM  [facility]  [phone]

## 2024-01-03 NOTE — PROGRESS NOTES
[facility]  Internal Medicine Teaching Residency Program  Inpatient Daily Progress Note  ______________________________________________________________________________    Patient: Elaina Champagne  YOB: 1950   MRN:[id number]    Acct: [id number]     Room: 0504/0504-01  Admit date: 1/2/2024  Today's date: 01/03/24  Number of days in the hospital: 1    SUBJECTIVE   Admitting Diagnosis: S/P cardiac cath  CC: CT surgery evaluation   Pt examined at bedside. Chart & results reviewed.   -no acute overnight events, reports good sleep , reports improvement in breathing   -blood pressure in 140's systolic, on NC at 3L   -denies chest pain , on heparin drip for nstemi   -npo awaiting cardio and cts recs   -k - 3.4, replaced       ROS:  Constitutional:  negative for chills, fevers, sweats  Respiratory:  negative for cough, dyspnea on exertion, hemoptysis, shortness of breath, wheezing  Cardiovascular:  negative for chest pain, chest pressure/discomfort, lower extremity edema, palpitations  Gastrointestinal:  negative for abdominal pain, constipation, diarrhea, nausea, vomiting  Neurological:  negative for dizziness, headache    BRIEF HISTORY     The patient is a pleasant 73 y.o. female with a PMHx significant for lymphedema,GI bleed,,htn, paroxysmal A-fib, morbid obesity bmi 55.81   Who presented to [facility] on 12/14 with complaints of left leg swelling erythema and wound and was being managed for cellulitis, she developed some shortness of breath and hypotension while admitted there, troponins were checked and it was found to be about 383, managed as NSTEMI and underwent cath which showed multivessel CAD and patient was further transferred to [facility] for CT surgery evaluation     -She has bilateral lower extremity cellulitis, left tibial ulcer, wound cultures grew Klebsiella and MSSA, finished course of Zyvox, was followed by ID over at [facility], and finished

## 2024-01-03 NOTE — PLAN OF CARE
Problem: Safety - Adult  Goal: Free from fall injury  Outcome: Progressing     Problem: Discharge Planning  Goal: Discharge to home or other facility with appropriate resources  Outcome: Progressing     Problem: Chronic Conditions and Co-morbidities  Goal: Patient's chronic conditions and co-morbidity symptoms are monitored and maintained or improved  Outcome: Progressing     Problem: Pain  Goal: Verbalizes/displays adequate comfort level or baseline comfort level  Outcome: Progressing     Problem: Skin/Tissue Integrity  Goal: Absence of new skin breakdown  Description: 1.  Monitor for areas of redness and/or skin breakdown  2.  Assess vascular access sites hourly  3.  Every 4-6 hours minimum:  Change oxygen saturation probe site  4.  Every 4-6 hours:  If on nasal continuous positive airway pressure, respiratory therapy assess nares and determine need for appliance change or resting period.  Outcome: Progressing

## 2024-01-03 NOTE — CONSULTS
[facility]  Consultation Note                             1/2/2024    Surgeon: Berta  Cardiologist: Fadi    CC: chest pain  Admitting Diagnosis: leg swelling  Reason for consultation: MVCAD    HISTORY OF PRESENT ILLNESS:  Elaina Champagne is a 73 y.o. year old, morbidly obese  female with a past medical history of lymphedema chronic wounds of her lower extremities Bell's palsy cellulitis hypertension paroxysmal atrial fibrillation space dyslipidemia. Patient admitted to [facility] on 1214 with complaints of leg swelling erythema.  Patient had developed shortness of breath and hypotension during admission troponins were checked and found to be elevated at 383.  Patient was managed as a non-STEMI she underwent was transferred to [facility] and underwent cardiac catheterization yesterday which revealed multivessel coronary artery disease.  We have been asked to evaluate for possible CABG.  Of note patient is active wounds bilateral lower extremities with severe lymphedema and positive wound cultures as noted below and positive occult blood. Patient denies any chest pain neck pain jaw pain back pain.    Review of systems:  General negative  Denies any fever or chills  HEENT negative  Denies any diplopia, tinnitus or vertigo  Resp positive for  dyspnea  Cardiac Denies any chest pain, palpitations, claudication or edema  GI Denies any melena, hematochezia, hematemesis or pyrosis   Denies any frequency, urgency, hesitancy or incontinence  Heme Denies bruising or bleeding easily  Endocrine Denies any history of diabetes or thyroid disease  Neuro Denies any focal motor or sensory deficits    Past Medical History:        Diagnosis Date    Bell's palsy     Cellulitis     Hypertension        Past Surgical History:        Procedure Laterality Date    CATARACT REMOVAL Bilateral 2015    HYSTERECTOMY, TOTAL ABDOMINAL (CERVIX REMOVED)  1990    INCISION AND DRAINAGE Left 07/06/2017

## 2024-01-03 NOTE — H&P
[facility]     Department of Internal Medicine - Staff Internal Medicine Teaching Service          ADMISSION NOTE/HISTORY AND PHYSICAL EXAMINATION   Date: 1/2/2024  Patient Name: Elaina Champagne  Date of admission: 1/2/2024 12:05 PM  YOB: 1950  PCP: Robin Ly MD  History Obtained From:  patient, electronic medical record    CHIEF COMPLAINT     Chief complaint: transfer from [facility] for CABG evaluation     HISTORY OF PRESENTING ILLNESS     The patient is a pleasant 73 y.o. female with a PMHx significant for lymphedema,GI bleed,,htn, paroxysmal A-fib, morbid obesity bmi 55.81   Who presented to [facility] on 12/14 with complaints of left leg swelling erythema and wound and was being managed for cellulitis, she developed some shortness of breath and hypotension while admitted there, troponins were checked and it was found to be about 383, managed as NSTEMI and underwent cath which showed multivessel CAD and patient was further transferred to [facility] for CT surgery evaluation    -She has bilateral lower extremity cellulitis, left tibial ulcer, wound cultures grew Klebsiella and MSSA, finished course of Zyvox, was followed by ID over at [facility], and finished course of Levaquin    On our evaluation, patient was complaining of shortness of breath, denied any chest pain, saturating greater than 95 on room air, reports significant anxiety      Review of Systems:  Review of Systems   Constitutional:  Positive for fatigue.   Respiratory:  Positive for shortness of breath. Negative for chest tightness and wheezing.    Cardiovascular:  Positive for leg swelling. Negative for chest pain and palpitations.   Gastrointestinal:  Negative for abdominal pain.   Genitourinary: Negative.    Musculoskeletal:  Positive for joint swelling.   Skin:  Positive for wound.   Psychiatric/Behavioral: Negative.           PAST MEDICAL HISTORY     Past Medical History:   Diagnosis

## 2024-01-03 NOTE — PLAN OF CARE
Reviewed cardiac cath films with Dr. Aponte.   Plan for PCI to LAD and RCA   Medical management of LCX at this time.     Discussed with pt.    Will make NPO after midnight and plan PCI in am

## 2024-01-03 NOTE — ACP (ADVANCE CARE PLANNING)
Advance Care Planning     Advance Care Planning Activator (Inpatient)  Conversation Note      Date of ACP Conversation: 1/3/2024     Conversation Conducted with: Patient with Decision Making Capacity    ACP Activator: Coretta Elaine RN        Health Care Decision Maker:     Current Designated Health Care Decision Maker:     Primary Decision Maker: Eze Champagne - Other - [phone]   Click here to complete Healthcare Decision Makers including section of the Healthcare Decision Maker Relationship (ie \"Primary\")  Today we documented Decision Maker(s) consistent with Legal Next of Kin hierarchy.    Care Preferences    Ventilation:  \"If you were in your present state of health and suddenly became very ill and were unable to breathe on your own, what would your preference be about the use of a ventilator (breathing machine) if it were available to you?\"      Would the patient desire the use of ventilator (breathing machine)?: yes    \"If your health worsens and it becomes clear that your chance of recovery is unlikely, what would your preference be about the use of a ventilator (breathing machine) if it were available to you?\"     Would the patient desire the use of ventilator (breathing machine)?: No      Resuscitation  \"CPR works best to restart the heart when there is a sudden event, like a heart attack, in someone who is otherwise healthy. Unfortunately, CPR does not typically restart the heart for people who have serious health conditions or who are very sick.\"    \"In the event your heart stopped as a result of an underlying serious health condition, would you want attempts to be made to restart your heart (answer \"yes\" for attempt to resuscitate) or would you prefer a natural death (answer \"no\" for do not attempt to resuscitate)?\" yes       [] Yes   [] No   Educated Patient / Decision Maker regarding differences between Advance Directives and portable DNR orders.    Length of ACP Conversation in minutes:       Conversation Outcomes:  ACP discussion completed    Follow-up plan:    [] Schedule follow-up conversation to continue planning  [] Referred individual to Provider for additional questions/concerns   [] Advised patient/agent/surrogate to review completed ACP document and update if needed with changes in condition, patient preferences or care setting    [] This note routed to one or more involved healthcare providers

## 2024-01-03 NOTE — PLAN OF CARE
Discussed with Dr Winters and Berta about patient having LAD and RCA stented. Poor target to OM which is not very dominant. Dr. Winters eval on this admission for stents Friday

## 2024-01-03 NOTE — TELEPHONE ENCOUNTER
----- Message from Margo Moody MA sent at 1/2/2024 10:22 AM EST -----    ----- Message -----  From: Coretta Olivares APRN - CNP  Sent: 12/30/2023   1:13 PM EST  To: Margo Moody MA    Egd with dr baez in 4-6 weeks ..TOPHER Kincaid CNP

## 2024-01-03 NOTE — PLAN OF CARE
SENIOR NOTE    Briefly patient is a 73-year-old female coming in from outside facility.  Past history medical history of morbid obesity, hypertension, lymphedema.  Patient has wounds bilateral legs.  Wound cultures grew Pseudomonas sensitive to Levaquin and cefepime Enterococcus faecalis sensitive to ampicillin.  She also had Pseudomonas bacteremia on December 14.  Sensitive to same antibiotics.  She tolerated a course of Zyvox completed on December 19.  And completed Levaquin until December 29.  Patient was also found to be NSTEMI.  Heart cath today showed multivessel disease.  Patient transferred to [facility] for CABG evaluation.                        A/P  Hypertension  -Continue hydralazine  -Metoprolol 50 twice daily  -Monitor blood pressure as needed    Cellulitis  -Continue Levaquin.  -Will discuss possible ID consult in the morning.  -Continue to monitor    CAD  -Follow-up CT surgery recommendations            Radhika Phelps MD  Internal medicine, PGY-2  [facility], [facility]   [unfilled] 12:48 AM

## 2024-01-03 NOTE — CARE COORDINATION
Case Management Assessment  Initial Evaluation    Date/Time of Evaluation: 1/3/2024 12:30PM  Assessment Completed by: Coretta Elaine RN    If patient is discharged prior to next notation, then this note serves as note for discharge by case management.    Patient Name: Elaina Champagne                   YOB: 1950  Diagnosis: NSTEMI (non-ST elevated myocardial infarction) (HCC) [I21.4]  S/P cardiac cath [Z98.890]                   Date / Time: 1/2/2024 12:05 PM    Patient Admission Status: Inpatient   Readmission Risk (Low < 19, Mod (19-27), High > 27): Readmission Risk Score: 22.6    Current PCP: Robin Ly MD  PCP verified by CM? (P) Yes (Robin Ly MD)    Chart Reviewed: Yes      History Provided by: Patient  Patient Orientation: Alert and Oriented, Person, Place, Situation    Patient Cognition: Alert    Hospitalization in the last 30 days (Readmission):  Yes -transfer from [place]  If yes, Readmission Assessment in CM Navigator will be completed.    Advance Directives:      Code Status: Full Code   Patient's Primary Decision Maker is: Legal Next of Kin    Primary Decision Maker: Eze Champagne - Spouse - [phone]    Discharge Planning:    Patient lives with: (P) Spouse/Significant Other Type of Home: (P) Trailer/Mobile Home  Primary Care Giver: Self  Patient Support Systems include: Spouse/Significant Other   Current Financial resources: (P) Medicare  Current community resources: (P) Other (Comment) (SNF)  Current services prior to admission: (P) Durable Medical Equipment            Current DME: (P) Cane            Type of Home Care services:  (P) None    ADLS  Prior functional level: (P) Independent in ADLs/IADLs  Current functional level: (P) Assistance with the following:, Bathing, Dressing, Toileting, Mobility    PT AM-PAC:   /24  OT AM-PAC:   /24    Family can provide assistance at DC:    Would you like Case Management to discuss the discharge plan with any other family

## 2024-01-04 ENCOUNTER — APPOINTMENT (OUTPATIENT)
Dept: GENERAL RADIOLOGY | Age: 74
DRG: 853 | End: 2024-01-04
Attending: INTERNAL MEDICINE
Payer: MEDICARE

## 2024-01-04 LAB
ANION GAP SERPL CALCULATED.3IONS-SCNC: 4 MMOL/L (ref 9–17)
ANTI-XA UNFRAC HEPARIN: 0.53 IU/L
BUN SERPL-MCNC: 12 MG/DL (ref 8–23)
CALCIUM SERPL-MCNC: 8.2 MG/DL (ref 8.6–10.4)
CHLORIDE SERPL-SCNC: 103 MMOL/L (ref 98–107)
CO2 SERPL-SCNC: 30 MMOL/L (ref 20–31)
CREAT SERPL-MCNC: 0.4 MG/DL (ref 0.5–0.9)
ECHO BSA: 2.46 M2
ECHO BSA: 2.46 M2
ERYTHROCYTE [DISTWIDTH] IN BLOOD BY AUTOMATED COUNT: 17.4 % (ref 11.8–14.4)
GFR SERPL CREATININE-BSD FRML MDRD: >60 ML/MIN/1.73M2
GLUCOSE SERPL-MCNC: 90 MG/DL (ref 70–99)
HCT VFR BLD AUTO: 28.8 % (ref 36.3–47.1)
HGB BLD-MCNC: 8.7 G/DL (ref 11.9–15.1)
MAGNESIUM SERPL-MCNC: 1.8 MG/DL (ref 1.6–2.6)
MCH RBC QN AUTO: 29.6 PG (ref 25.2–33.5)
MCHC RBC AUTO-ENTMCNC: 30.2 G/DL (ref 28.4–34.8)
MCV RBC AUTO: 98 FL (ref 82.6–102.9)
NRBC BLD-RTO: 0 PER 100 WBC
PLATELET # BLD AUTO: 373 K/UL (ref 138–453)
PMV BLD AUTO: 9.3 FL (ref 8.1–13.5)
POTASSIUM SERPL-SCNC: 3.9 MMOL/L (ref 3.7–5.3)
RBC # BLD AUTO: 2.94 M/UL (ref 3.95–5.11)
SODIUM SERPL-SCNC: 137 MMOL/L (ref 135–144)
VAS LEFT GSV ANKLE DIAM: 2.3 MM
VAS LEFT GSV AT KNEE DIAM: 5.1 MM
VAS LEFT GSV BK MID DIAM: 2.4 MM
VAS LEFT GSV BK PROX DIAM: 2.9 MM
VAS LEFT GSV JUNC DIAM: 6.9 MM
VAS LEFT GSV THIGH MID DIAM: 5.7 MM
VAS RIGHT GSV ANKLE DIAM: 2.6 MM
VAS RIGHT GSV AT KNEE DIAM: 4.5 MM
VAS RIGHT GSV BK MID DIAM: 2.5 MM
VAS RIGHT GSV BK PROX DIAM: 3.1 MM
VAS RIGHT GSV JUNC DIAM: 5.5 MM
VAS RIGHT GSV THIGH MID DIAM: 5 MM
WBC OTHER # BLD: 5.5 K/UL (ref 3.5–11.3)

## 2024-01-04 PROCEDURE — 80048 BASIC METABOLIC PNL TOTAL CA: CPT

## 2024-01-04 PROCEDURE — C1874 STENT, COATED/COV W/DEL SYS: HCPCS | Performed by: INTERNAL MEDICINE

## 2024-01-04 PROCEDURE — 6370000000 HC RX 637 (ALT 250 FOR IP): Performed by: INTERNAL MEDICINE

## 2024-01-04 PROCEDURE — 83735 ASSAY OF MAGNESIUM: CPT

## 2024-01-04 PROCEDURE — 6360000002 HC RX W HCPCS: Performed by: INTERNAL MEDICINE

## 2024-01-04 PROCEDURE — 99153 MOD SED SAME PHYS/QHP EA: CPT | Performed by: INTERNAL MEDICINE

## 2024-01-04 PROCEDURE — 2580000003 HC RX 258: Performed by: INTERNAL MEDICINE

## 2024-01-04 PROCEDURE — 92928 PRQ TCAT PLMT NTRAC ST 1 LES: CPT | Performed by: INTERNAL MEDICINE

## 2024-01-04 PROCEDURE — 92921 HC PRQ CARDIAC ANGIO ADDL ART: CPT | Performed by: INTERNAL MEDICINE

## 2024-01-04 PROCEDURE — 2580000003 HC RX 258: Performed by: STUDENT IN AN ORGANIZED HEALTH CARE EDUCATION/TRAINING PROGRAM

## 2024-01-04 PROCEDURE — 6360000002 HC RX W HCPCS

## 2024-01-04 PROCEDURE — 027135Z DILATION OF CORONARY ARTERY, TWO ARTERIES WITH TWO DRUG-ELUTING INTRALUMINAL DEVICES, PERCUTANEOUS APPROACH: ICD-10-PCS | Performed by: INTERNAL MEDICINE

## 2024-01-04 PROCEDURE — 93970 EXTREMITY STUDY: CPT | Performed by: STUDENT IN AN ORGANIZED HEALTH CARE EDUCATION/TRAINING PROGRAM

## 2024-01-04 PROCEDURE — 36415 COLL VENOUS BLD VENIPUNCTURE: CPT

## 2024-01-04 PROCEDURE — 2709999900 HC NON-CHARGEABLE SUPPLY: Performed by: INTERNAL MEDICINE

## 2024-01-04 PROCEDURE — 85027 COMPLETE CBC AUTOMATED: CPT

## 2024-01-04 PROCEDURE — 2060000000 HC ICU INTERMEDIATE R&B

## 2024-01-04 PROCEDURE — 2500000003 HC RX 250 WO HCPCS: Performed by: INTERNAL MEDICINE

## 2024-01-04 PROCEDURE — 99232 SBSQ HOSP IP/OBS MODERATE 35: CPT | Performed by: INTERNAL MEDICINE

## 2024-01-04 PROCEDURE — 71045 X-RAY EXAM CHEST 1 VIEW: CPT

## 2024-01-04 PROCEDURE — 85520 HEPARIN ASSAY: CPT

## 2024-01-04 PROCEDURE — 92920 PRQ TRLUML C ANGIOP 1ART&/BR: CPT | Performed by: INTERNAL MEDICINE

## 2024-01-04 PROCEDURE — 99233 SBSQ HOSP IP/OBS HIGH 50: CPT | Performed by: NURSE PRACTITIONER

## 2024-01-04 PROCEDURE — 6360000004 HC RX CONTRAST MEDICATION: Performed by: INTERNAL MEDICINE

## 2024-01-04 PROCEDURE — 99222 1ST HOSP IP/OBS MODERATE 55: CPT | Performed by: STUDENT IN AN ORGANIZED HEALTH CARE EDUCATION/TRAINING PROGRAM

## 2024-01-04 PROCEDURE — C9601 PERC DRUG-EL COR STENT BRAN: HCPCS | Performed by: INTERNAL MEDICINE

## 2024-01-04 PROCEDURE — 99152 MOD SED SAME PHYS/QHP 5/>YRS: CPT | Performed by: INTERNAL MEDICINE

## 2024-01-04 PROCEDURE — 2500000003 HC RX 250 WO HCPCS

## 2024-01-04 PROCEDURE — C1769 GUIDE WIRE: HCPCS | Performed by: INTERNAL MEDICINE

## 2024-01-04 PROCEDURE — 6370000000 HC RX 637 (ALT 250 FOR IP): Performed by: STUDENT IN AN ORGANIZED HEALTH CARE EDUCATION/TRAINING PROGRAM

## 2024-01-04 PROCEDURE — C1725 CATH, TRANSLUMIN NON-LASER: HCPCS | Performed by: INTERNAL MEDICINE

## 2024-01-04 PROCEDURE — C9600 PERC DRUG-EL COR STENT SING: HCPCS | Performed by: INTERNAL MEDICINE

## 2024-01-04 DEVICE — STENT ONYXNG30038UX ONYX 3.00X38RX
Type: IMPLANTABLE DEVICE | Status: FUNCTIONAL
Brand: ONYX FRONTIER™

## 2024-01-04 DEVICE — STENT ONYXNG22526UX ONYX 2.25X26RX
Type: IMPLANTABLE DEVICE | Status: FUNCTIONAL
Brand: ONYX FRONTIER™

## 2024-01-04 RX ORDER — SODIUM CHLORIDE 9 MG/ML
INJECTION, SOLUTION INTRAVENOUS PRN
Status: DISCONTINUED | OUTPATIENT
Start: 2024-01-04 | End: 2024-01-06 | Stop reason: HOSPADM

## 2024-01-04 RX ORDER — MIDAZOLAM HYDROCHLORIDE 1 MG/ML
INJECTION INTRAMUSCULAR; INTRAVENOUS PRN
Status: DISCONTINUED | OUTPATIENT
Start: 2024-01-04 | End: 2024-01-04 | Stop reason: HOSPADM

## 2024-01-04 RX ORDER — METOPROLOL TARTRATE 1 MG/ML
5 INJECTION, SOLUTION INTRAVENOUS EVERY 6 HOURS PRN
Status: DISCONTINUED | OUTPATIENT
Start: 2024-01-04 | End: 2024-01-06 | Stop reason: HOSPADM

## 2024-01-04 RX ORDER — BIVALIRUDIN 250 MG/5ML
INJECTION, POWDER, LYOPHILIZED, FOR SOLUTION INTRAVENOUS PRN
Status: DISCONTINUED | OUTPATIENT
Start: 2024-01-04 | End: 2024-01-04 | Stop reason: HOSPADM

## 2024-01-04 RX ORDER — FUROSEMIDE 10 MG/ML
40 INJECTION INTRAMUSCULAR; INTRAVENOUS DAILY
Status: DISCONTINUED | OUTPATIENT
Start: 2024-01-04 | End: 2024-01-06 | Stop reason: HOSPADM

## 2024-01-04 RX ORDER — ACETAMINOPHEN 325 MG/1
650 TABLET ORAL EVERY 4 HOURS PRN
Status: DISCONTINUED | OUTPATIENT
Start: 2024-01-04 | End: 2024-01-06 | Stop reason: HOSPADM

## 2024-01-04 RX ORDER — SODIUM CHLORIDE 0.9 % (FLUSH) 0.9 %
5-40 SYRINGE (ML) INJECTION EVERY 12 HOURS SCHEDULED
Status: DISCONTINUED | OUTPATIENT
Start: 2024-01-04 | End: 2024-01-06 | Stop reason: HOSPADM

## 2024-01-04 RX ORDER — FUROSEMIDE 10 MG/ML
20 INJECTION INTRAMUSCULAR; INTRAVENOUS ONCE
Status: DISCONTINUED | OUTPATIENT
Start: 2024-01-04 | End: 2024-01-04

## 2024-01-04 RX ORDER — METHYLPREDNISOLONE SODIUM SUCCINATE 1 G/16ML
40 INJECTION, POWDER, LYOPHILIZED, FOR SOLUTION INTRAMUSCULAR; INTRAVENOUS ONCE
Status: COMPLETED | OUTPATIENT
Start: 2024-01-04 | End: 2024-01-04

## 2024-01-04 RX ORDER — FENTANYL CITRATE 50 UG/ML
INJECTION, SOLUTION INTRAMUSCULAR; INTRAVENOUS PRN
Status: DISCONTINUED | OUTPATIENT
Start: 2024-01-04 | End: 2024-01-04 | Stop reason: HOSPADM

## 2024-01-04 RX ORDER — SODIUM CHLORIDE 0.9 % (FLUSH) 0.9 %
5-40 SYRINGE (ML) INJECTION PRN
Status: DISCONTINUED | OUTPATIENT
Start: 2024-01-04 | End: 2024-01-06 | Stop reason: HOSPADM

## 2024-01-04 RX ORDER — FUROSEMIDE 10 MG/ML
20 INJECTION INTRAMUSCULAR; INTRAVENOUS ONCE
Status: COMPLETED | OUTPATIENT
Start: 2024-01-04 | End: 2024-01-04

## 2024-01-04 RX ORDER — SODIUM CHLORIDE 9 MG/ML
INJECTION, SOLUTION INTRAVENOUS CONTINUOUS
Status: DISCONTINUED | OUTPATIENT
Start: 2024-01-04 | End: 2024-01-04

## 2024-01-04 RX ADMIN — LEVOFLOXACIN 500 MG: 500 TABLET, FILM COATED ORAL at 14:55

## 2024-01-04 RX ADMIN — ANTI-FUNGAL POWDER MICONAZOLE NITRATE TALC FREE: 1.42 POWDER TOPICAL at 20:41

## 2024-01-04 RX ADMIN — COLLAGENASE SANTYL: 250 OINTMENT TOPICAL at 08:13

## 2024-01-04 RX ADMIN — PANTOPRAZOLE SODIUM 40 MG: 40 TABLET, DELAYED RELEASE ORAL at 16:29

## 2024-01-04 RX ADMIN — PANTOPRAZOLE SODIUM 40 MG: 40 TABLET, DELAYED RELEASE ORAL at 08:02

## 2024-01-04 RX ADMIN — TICAGRELOR 90 MG: 90 TABLET ORAL at 20:40

## 2024-01-04 RX ADMIN — SODIUM CHLORIDE, PRESERVATIVE FREE 10 ML: 5 INJECTION INTRAVENOUS at 20:40

## 2024-01-04 RX ADMIN — METHYLPREDNISOLONE SODIUM SUCCINATE 40 MG: 40 INJECTION INTRAMUSCULAR; INTRAVENOUS at 18:00

## 2024-01-04 RX ADMIN — SODIUM CHLORIDE: 9 INJECTION, SOLUTION INTRAVENOUS at 11:31

## 2024-01-04 RX ADMIN — ALPRAZOLAM 0.5 MG: 0.5 TABLET ORAL at 15:38

## 2024-01-04 RX ADMIN — ANTI-FUNGAL POWDER MICONAZOLE NITRATE TALC FREE: 1.42 POWDER TOPICAL at 08:12

## 2024-01-04 RX ADMIN — FUROSEMIDE 40 MG: 10 INJECTION, SOLUTION INTRAMUSCULAR; INTRAVENOUS at 17:37

## 2024-01-04 RX ADMIN — Medication 3 MG: at 20:40

## 2024-01-04 RX ADMIN — METOPROLOL TARTRATE 50 MG: 50 TABLET, FILM COATED ORAL at 08:02

## 2024-01-04 RX ADMIN — SODIUM CHLORIDE, PRESERVATIVE FREE 10 ML: 5 INJECTION INTRAVENOUS at 20:41

## 2024-01-04 RX ADMIN — HEPARIN SODIUM 11 UNITS/KG/HR: 10000 INJECTION, SOLUTION INTRAVENOUS at 01:31

## 2024-01-04 RX ADMIN — METOPROLOL TARTRATE 5 MG: 5 INJECTION INTRAVENOUS at 18:00

## 2024-01-04 RX ADMIN — ESCITALOPRAM 20 MG: 10 TABLET, FILM COATED ORAL at 08:02

## 2024-01-04 RX ADMIN — FUROSEMIDE 20 MG: 10 INJECTION, SOLUTION INTRAMUSCULAR; INTRAVENOUS at 18:11

## 2024-01-04 RX ADMIN — METOPROLOL TARTRATE 50 MG: 50 TABLET, FILM COATED ORAL at 20:40

## 2024-01-04 RX ADMIN — Medication 81 MG: at 08:02

## 2024-01-04 RX ADMIN — DESMOPRESSIN ACETATE 40 MG: 0.2 TABLET ORAL at 20:40

## 2024-01-04 ASSESSMENT — PAIN DESCRIPTION - LOCATION: LOCATION: LEG

## 2024-01-04 ASSESSMENT — PAIN SCALES - WONG BAKER: WONGBAKER_NUMERICALRESPONSE: 2

## 2024-01-04 ASSESSMENT — PAIN SCALES - GENERAL
PAINLEVEL_OUTOF10: 0
PAINLEVEL_OUTOF10: 0
PAINLEVEL_OUTOF10: 2

## 2024-01-04 ASSESSMENT — PAIN DESCRIPTION - DESCRIPTORS: DESCRIPTORS: NAGGING;SORE;TENDER

## 2024-01-04 NOTE — CONSULTS
Division of Vascular Surgery        New Consult      Physician Requesting Consult:  Rob Hernandez    Reason for Consult:   bilateral carotid stenosis    Chief Complaint:     Leg pain    History of Present Illness:      Elaina Champagne is a 73 y.o. woman with venous stasis dermatitis and venous ulcer who presented initially to [facility] with leg pain and fatigue on 12/14/2023.  She has been evaluated at wound care the day prior and underwent dressing changes.  She was admitted for treatment of MICHELLE and cellulitis.  Blood cultures were positive for Pseudomonas bacteremia.  During her stay, she underwent an EGD to evaluate her positive occult stool sample and anemia, during which an antral ulcer was discovered.  She required hemodialysis with temporary catheter placement.  A-fib RVR developed, and cardiology was consulted.  An echo was obtained at that time, which was normal.  She also developed an NSTEMI.  She was then transferred to [facility], and cardiac catheterization was performed on 1/2/2024.  Patient has multivessel disease for which CT surgery was consulted.  Bilateral carotid duplex was also obtained, and patient was noted to have moderate (50-69%) stenosis of bilateral carotid arteries.    Patient seems to be a poor historian and states that the wounds on her legs began when she came into the hospital.  She did not notice them when she lived at home.  She lives independently and states that she ambulates on a regular basis.  She reports that both of her legs have been swollen for the past 6 months.    She denies any shortness of breath, chest pain, lightheadedness, dizziness, unilateral weakness, numbness, tingling, or syncope.    She does report 1 episode of loss of vision that was like a curtain dropping that occurred when she was transported to [facility].  She denies any past episodes of this.  She has never smoked.     Medical History:     Past Medical History:   Diagnosis Date

## 2024-01-04 NOTE — PROGRESS NOTES
sodium chloride      dextrose       PRN MedicationshydrALAZINE, 5 mg, Q6H PRN  sodium chloride flush, 5-40 mL, PRN  sodium chloride, , PRN  acetaminophen, 650 mg, Q6H PRN   Or  acetaminophen, 650 mg, Q6H PRN  ALPRAZolam, 0.5 mg, BID PRN  dextrose, , Continuous PRN  dextrose bolus, 125 mL, PRN   Or  dextrose bolus, 250 mL, PRN  glucagon (rDNA), 1 mg, PRN  glucose, 4 tablet, PRN  guaiFENesin, 200 mg, Q4H PRN  magic (miracle) mouthwash, 5 mL, 4x Daily PRN  magnesium sulfate, 2,000 mg, PRN  melatonin, 3 mg, Nightly PRN  midodrine, 5 mg, PRN  nitroGLYCERIN, 0.4 mg, Q5 Min PRN  ondansetron, 4 mg, Q8H PRN   Or  ondansetron, 4 mg, Q6H PRN  polyethylene glycol, 17 g, Daily PRN  potassium chloride, 10 mEq, PRN  sodium chloride flush, 10 mL, PRN  heparin (porcine), 4,000 Units, PRN  heparin (porcine), 2,000 Units, PRN        Diagnostic Labs:  CBC:   Recent Labs     01/02/24 2030 01/03/24 0319 01/04/24  0522   WBC 6.4 6.2 5.5   RBC 2.98* 2.95* 2.94*   HGB 8.9* 8.7* 8.7*   HCT 29.3* 28.5* 28.8*   MCV 98.3 96.6 98.0   RDW 16.9* 17.3* 17.4*    433 373     BMP:   Recent Labs     01/02/24  0530 01/03/24 0319 01/04/24  0713    137 137   K 3.8 3.4* 3.9    102 103   CO2 31 26 30   BUN 11 12 12   CREATININE 0.5 0.4* 0.4*     BNP: No results for input(s): \"BNP\" in the last 72 hours.  PT/INR:   Recent Labs     01/02/24 2030   PROTIME 16.2*   INR 1.3     APTT:   Recent Labs     01/02/24 2030   APTT 34.4     CARDIAC ENZYMES: No results for input(s): \"CKMB\", \"CKMBINDEX\", \"TROPONINI\" in the last 72 hours.    Invalid input(s): \"CKTOTAL;3\"  FASTING LIPID PANEL:  Lab Results   Component Value Date    CHOL 189 06/07/2017    HDL 44 08/08/2023    TRIG 56 06/07/2017     LIVER PROFILE: No results for input(s): \"AST\", \"ALT\", \"ALB\", \"BILIDIR\", \"BILITOT\", \"ALKPHOS\" in the last 72 hours.   MICROBIOLOGY:   Lab Results   Component Value Date/Time    CULTURE PSEUDOMONAS AERUGINOSA RARE GROWTH (A) 12/29/2023 12:45 PM    PT/OT/SW  Discharge Planning:monitor inpatient, cath today     Mason Ponce MD  Internal Medicine Resident, PGY-1  [facility]; [place], [place]  1/4/2024, 8:47 AM

## 2024-01-04 NOTE — PROGRESS NOTES
[facility] - Progress Note    Today's Date and Time: 1/4/2024, 7:41 AM    Impression :   NSTEMI (non-ST elevated myocardial infarction) (HCC)   S/P cardiac cath MV CAD  CT surgery consult for CABG  On heparin drip  Cardiology and CT surgery on board, evaluating CABG versus stenting  Paroxysmal A-fib  Venous insufficiency of both lower extremities  Primary hypertension  Hyperlipidemia  Cellulitis bilateral lower extremity.   Bilat LE edema w large blisters - infected blisters  Wound cx 12/15/23 Pseudomonas aeruginosa sensitive to Levaquin and cefepime and Enterococcus faecalis sensitive to ampicillin  Wound cx left foot 12/25/2023: Pseudomonas aeruginosa  Pseudomonas Aeruginosa bacteremia 12/14/23 source is the leg abscesses  sensitive to Levaquin and cefepime.  Blood Cx 12/15/2023: no growth  Blood Cx 12/25/2023: no growth  Traumatic Rt post calf hematoma  Sepsis secondary to above  Mild ulcers, possible HSV s/p 1 week PO Valtrex  Peripheral arterial disease  Acute on chronic renal failure.  New onset HD, discontinued.  Obesity  History of lymphedema venous stasis insufficiency  Coccyx/Buttock wound   Penicillin allergy - Hives.  Tolerated Cefepime    Recommendations:     Levaquin 500 mg po started 12/27. Stop date 1-5-24  Cx taken 12/29 from right foot sub cutaneous blisters.   Pseudomonas aeruginosa - sensitive to Levaquin  On 12/29 Podiatry recommended no surgical intervention  Previously:   Zyvox completed 12/19/2023. Cellulitis RLE much better  Completed Cefepime from 12/21 -> 12/27   Completed Valtrex po 12/22/23 --> 12/29/23 for HSV lesion  Wound Ostomy consulted for wound care      Medical Decision Making/Summary/Discussion:1/4/2024     Daily Elements of Decision Making provided by Consulting Physician:    Note: I have independently performed the steps listed below as part of the medical decision making and evaluation.    Review of current Problems:  Today I am seeing  edema, with developing areas of fluid along the superficial fascial margins in the medial and lateral leg and circumferentially at the ankle with skin blistering.  No walled-off fluid collection. 3. No intramuscular fluid collection.  No deep fascial fluid.     MRI TIBIA FIBULA RIGHT WO CONTRAST    Result Date: 12/31/2023  EXAMINATION: MRI OF THE RIGHT TIBIA/FIBULA WITHOUT CONTRAST; MRI OF THE RIGHT ANKLE WITHOUT CONTRAST 12/29/2023 4:29 pm; 12/29/2023 4:26 pm TECHNIQUE: Multiplanar multisequence MRI of the right tibia/fibula was performed without the administration of intravenous contrast.; Multiplanar multisequence MRI of the right ankle was performed without the administration of intravenous contrast. COMPARISON: 12/17/2023 HISTORY: ORDERING SYSTEM PROVIDED HISTORY: TECHNOLOGIST PROVIDED HISTORY: Venous ulcer throughout. Rule out abscess Reason for Exam: Leg pain, fatigue; ORDERING SYSTEM PROVIDED HISTORY: right lower extremity venous wound. rule out abscess TECHNOLOGIST PROVIDED HISTORY: right lower extremity venous wound. rule out abscess Reason for Exam: right lower extremity venous wound. rule out abscess FINDINGS: Bone marrow: Bone marrow signal in the tibia and fibula is maintained.  Bone marrow signal in the ankle, hindfoot and midfoot is maintained.  No changes of acute osteomyelitis. Soft tissues: Circumferential superficial soft tissue edema is present in the leg and ankle.  More focal areas of fluid present in the lateral mid leg along the superficial margin of fascial plane  the fat and anterior and lateral compartments of the leg.  These areas of focal fluid in the mid leg extends for 13 cm craniocaudal, 6 cm AP dimension and 1.5 cm in thickness.  Similar areas of fluid are present in the medial posterior mid leg.  There are areas of superimposed skin blistering. No intramuscular fluid collection.  No deep fascial fluid.  No evidence of tendinopathy. In the ankle, there are similar

## 2024-01-04 NOTE — PROCEDURES
[facility]        Date:   1/4/2024  Patient name: Elaina Champagne  Date of admission:  1/2/2024 12:05 PM  MRN:   [id number]  YOB: 1950    PCI to LAD and diagonal    Operators:  Primary: Maria Teresa Rodriguez MD.  Assistant:     Indications for cath: High-grade LAD and diagonal stenosis    Procedure performed: Cardiac cath.    Access: Right Radial artery      Procedure: After informed consent was obtained with explanation of the risks and benefits, patient was brought to the cath lab. The right wrist was prepped and draped in sterile fashion. 1% lidocaine was used for local block. The Radial artery was cannulated with 6  Fr sheath with brisk arterial blood return. The side port was frequently flushed and aspirated with normal saline.    EBL is 20 mL    Findings:      LAD: 90% long proximal and mid stenosis at first diagonal bifurcation, length is 34 mm, RAVINDRA-3 flow, underwent PCI with RAJAN using 3.0*38 millimeter Tutwiler stent with 0% residual stenosis and RAVINDRA-3 flow    First diagonal ostial 80% stenosis, length is 22 mm, RAVINDRA-3 flow, underwent PCI with RAJAN using 2.25*26 millimeter Chace stent with 0% residual stenosis and RAVINDRA-3 flow      Conclusions:  Successful kissing stents to LAD and diagonal  Residual mid RCA stenosis      Recommendation:  Routine post PCI orders  Medical treatments.  Risk factors modifications.  Staged PCI to RCA in 4 to 6 weeks        Electronically signed by Maria Teresa Rodriguez MD on 1/4/2024 at 2:22 PM      [facility]  [phone]

## 2024-01-04 NOTE — PROGRESS NOTES
[facility]   Progress Note                   Date:   1/4/2024  Patient name: Elaina Champagne  Date of admission:  1/2/2024 12:05 PM  MRN:   [id number]  YOB: 1950  PCP: Robin Ly MD    Reason for Admission: NSTEMI (non-ST elevated myocardial infarction) (HCC) [I21.4]  S/P cardiac cath [Z98.890]    Subjective:       Clinical Changes / Abnormalities: Pt seen and examined in the room. Pt denies any CP or sob.  Family in room.  Pt NPO     Medications:   Scheduled Meds:   potassium chloride  40 mEq Oral Once    atorvastatin  40 mg Oral Nightly    sodium chloride flush  5-40 mL IntraVENous 2 times per day    aspirin  81 mg Oral Daily    collagenase   Topical Daily    escitalopram  20 mg Oral Daily    levoFLOXacin  500 mg Oral Daily    metoprolol tartrate  50 mg Oral BID    miconazole   Topical BID    pantoprazole  40 mg Oral BID AC    sennosides-docusate sodium  2 tablet Oral Daily     Continuous Infusions:   sodium chloride 75 mL/hr at 01/04/24 1131    heparin (PORCINE) Infusion 11 Units/kg/hr (01/04/24 0131)    sodium chloride      dextrose       CBC:   Recent Labs     01/02/24  2030 01/03/24  0319 01/04/24  0522   WBC 6.4 6.2 5.5   HGB 8.9* 8.7* 8.7*    433 373       BMP:    Recent Labs     01/02/24  0530 01/03/24  0319 01/04/24  0713    137 137   K 3.8 3.4* 3.9    102 103   CO2 31 26 30   BUN 11 12 12   CREATININE 0.5 0.4* 0.4*   GLUCOSE 114* 91 90       Hepatic: No results for input(s): \"AST\", \"ALT\", \"ALB\", \"BILITOT\", \"ALKPHOS\" in the last 72 hours.  Troponin:   Recent Labs     01/02/24  0839   TROPHS 174*       BNP: No results for input(s): \"BNP\" in the last 72 hours.  Lipids: No results for input(s): \"CHOL\", \"HDL\" in the last 72 hours.    Invalid input(s): \"LDLCALCU\"  INR:   Recent Labs     01/02/24 2030   INR 1.3         CARDIAC CATH 1/2/24    Multivessel coronary artery disease.     Left Main   The vessel is angiographically normal.      Left  insufficiency     Venous insufficiency of both lower extremities     Lymphedema     Venous stasis ulcer of calf with fat layer exposed with varicose veins (HCC)     Venous ulcer of left leg (HCC)     Hyperkalemia     MICHELLE (acute kidney injury) ([facility])     Cellulitis     Bacteremia due to Pseudomonas     Bacteriuria     Leukocytosis     Elevated C-reactive protein (CRP)     Allergy to penicillin     Atrial fibrillation with rapid ventricular response (HCC)     Altered mental status     Elevated erythrocyte sedimentation rate     Permanent atrial fibrillation (HCC)     Renovascular hypertension     Melena     Acute on chronic anemia     Anticoagulated     Pseudomonas septicemia ([facility])     Abscess of right foot     Acute gastric ulcer with hemorrhage     NSTEMI (non-ST elevated myocardial infarction) ([facility])     S/P cardiac cath      Plan of Treatment:   MVD per cardiac cath.  Pt is not a candidate for CABG.  Reviewed films with Dr. Rodriguez and will proceed with PCI today.  NPO currently.  Discussed in detail with pt and family and they are agreeable to proceed.   Continue asa, statin, and BB   Afib.  Continue BB and heparin gtt. On eliquis at home   Keep K> 4 and MAG > 2     Electronically signed by TOPHER MUNIZ CNP on 1/4/2024 at 12:13 PM  [facility]  [phone]

## 2024-01-04 NOTE — PROGRESS NOTES
[facility]    Admission Medication Reconciliation       The patient's list of current home medications has been reviewed.     Source(s) of information: dispense report     Based on information provided by the above source(s), no changes to the patient's home medication list were necessary.     P  Please feel free to call me with any questions about this encounter. Thank you.    Thank you  Robbi ChowdhuryD, [facility]  Inpatient Clinical Pharmacist  [phone]      Electronically signed by Devan Birmingham [facility] on 1/4/2024 at 3:45 PM

## 2024-01-04 NOTE — PROGRESS NOTES
Patient admitted, consent signed and questions answered. Patient ready for procedure. Call light to reach with side rails up 2 of 2. Groin previously clipped with writer and Ivis present.   at bedside with patient.  History and physical complete.

## 2024-01-04 NOTE — PLAN OF CARE
Continue triple therapy with ASA, brilinta and eliquis for 2 weeks, followed by eliquis and brilinta thereafter.     Gay Archuleta MD  Fellow, Cardiovascular Diseases    [facility]

## 2024-01-05 PROBLEM — I65.23 CAROTID STENOSIS, ASYMPTOMATIC, BILATERAL: Status: ACTIVE | Noted: 2024-01-05

## 2024-01-05 LAB
ANION GAP SERPL CALCULATED.3IONS-SCNC: 6 MMOL/L (ref 9–17)
BASOPHILS # BLD: 0 K/UL (ref 0–0.2)
BASOPHILS NFR BLD: 0 % (ref 0–2)
BODY TEMPERATURE: 37
BUN SERPL-MCNC: 12 MG/DL (ref 8–23)
CALCIUM SERPL-MCNC: 8.4 MG/DL (ref 8.6–10.4)
CHLORIDE SERPL-SCNC: 99 MMOL/L (ref 98–107)
CO2 SERPL-SCNC: 32 MMOL/L (ref 20–31)
COHGB MFR BLD: 1.1 % (ref 0–5)
CREAT SERPL-MCNC: 0.4 MG/DL (ref 0.5–0.9)
EKG ATRIAL RATE: 78 BPM
EKG P AXIS: 26 DEGREES
EKG P-R INTERVAL: 186 MS
EKG Q-T INTERVAL: 448 MS
EKG QRS DURATION: 72 MS
EKG QTC CALCULATION (BAZETT): 510 MS
EKG R AXIS: -33 DEGREES
EKG T AXIS: 1 DEGREES
EKG VENTRICULAR RATE: 78 BPM
EOSINOPHIL # BLD: 0 K/UL (ref 0–0.4)
EOSINOPHILS RELATIVE PERCENT: 0 % (ref 1–4)
ERYTHROCYTE [DISTWIDTH] IN BLOOD BY AUTOMATED COUNT: 17.2 % (ref 11.8–14.4)
FIO2 ON VENT: ABNORMAL %
GFR SERPL CREATININE-BSD FRML MDRD: >60 ML/MIN/1.73M2
GLUCOSE SERPL-MCNC: 121 MG/DL (ref 70–99)
HCO3 VENOUS: 33.7 MMOL/L (ref 24–30)
HCT VFR BLD AUTO: 29.2 % (ref 36.3–47.1)
HCT VFR BLD AUTO: 29.6 % (ref 36.3–47.1)
HGB BLD-MCNC: 9.2 G/DL (ref 11.9–15.1)
HGB BLD-MCNC: 9.2 G/DL (ref 11.9–15.1)
IMM GRANULOCYTES # BLD AUTO: 0.06 K/UL (ref 0–0.3)
IMM GRANULOCYTES NFR BLD: 1 %
LYMPHOCYTES NFR BLD: 0.73 K/UL (ref 1–4.8)
LYMPHOCYTES RELATIVE PERCENT: 12 % (ref 24–44)
MAGNESIUM SERPL-MCNC: 1.7 MG/DL (ref 1.6–2.6)
MCH RBC QN AUTO: 30 PG (ref 25.2–33.5)
MCHC RBC AUTO-ENTMCNC: 31.1 G/DL (ref 28.4–34.8)
MCV RBC AUTO: 96.4 FL (ref 82.6–102.9)
MONOCYTES NFR BLD: 0.12 K/UL (ref 0.1–0.8)
MONOCYTES NFR BLD: 2 % (ref 1–7)
MORPHOLOGY: ABNORMAL
NEUTROPHILS NFR BLD: 85 % (ref 36–66)
NEUTS SEG NFR BLD: 5.19 K/UL (ref 1.8–7.7)
NRBC BLD-RTO: 0 PER 100 WBC
O2 SAT, VEN: 61.6 % (ref 60–85)
PCO2, VEN: 57.6 MM HG (ref 39–55)
PH VENOUS: 7.38 (ref 7.32–7.42)
PLATELET # BLD AUTO: 376 K/UL (ref 138–453)
PMV BLD AUTO: 9.5 FL (ref 8.1–13.5)
PO2, VEN: 32.7 MM HG (ref 30–50)
POSITIVE BASE EXCESS, VEN: 7.8 MMOL/L (ref 0–2)
POTASSIUM SERPL-SCNC: 4.7 MMOL/L (ref 3.7–5.3)
RBC # BLD AUTO: 3.07 M/UL (ref 3.95–5.11)
RBC # BLD: ABNORMAL 10*6/UL
SODIUM SERPL-SCNC: 137 MMOL/L (ref 135–144)
WBC OTHER # BLD: 6.1 K/UL (ref 3.5–11.3)

## 2024-01-05 PROCEDURE — 85014 HEMATOCRIT: CPT

## 2024-01-05 PROCEDURE — 99232 SBSQ HOSP IP/OBS MODERATE 35: CPT | Performed by: INTERNAL MEDICINE

## 2024-01-05 PROCEDURE — 6370000000 HC RX 637 (ALT 250 FOR IP): Performed by: NURSE PRACTITIONER

## 2024-01-05 PROCEDURE — 83735 ASSAY OF MAGNESIUM: CPT

## 2024-01-05 PROCEDURE — 29580 STRAPPING UNNA BOOT: CPT

## 2024-01-05 PROCEDURE — 2580000003 HC RX 258: Performed by: STUDENT IN AN ORGANIZED HEALTH CARE EDUCATION/TRAINING PROGRAM

## 2024-01-05 PROCEDURE — 82805 BLOOD GASES W/O2 SATURATION: CPT

## 2024-01-05 PROCEDURE — 85018 HEMOGLOBIN: CPT

## 2024-01-05 PROCEDURE — 6370000000 HC RX 637 (ALT 250 FOR IP): Performed by: INTERNAL MEDICINE

## 2024-01-05 PROCEDURE — 85027 COMPLETE CBC AUTOMATED: CPT

## 2024-01-05 PROCEDURE — 6370000000 HC RX 637 (ALT 250 FOR IP): Performed by: STUDENT IN AN ORGANIZED HEALTH CARE EDUCATION/TRAINING PROGRAM

## 2024-01-05 PROCEDURE — 99233 SBSQ HOSP IP/OBS HIGH 50: CPT | Performed by: NURSE PRACTITIONER

## 2024-01-05 PROCEDURE — 36415 COLL VENOUS BLD VENIPUNCTURE: CPT

## 2024-01-05 PROCEDURE — 2060000000 HC ICU INTERMEDIATE R&B

## 2024-01-05 PROCEDURE — 6360000002 HC RX W HCPCS

## 2024-01-05 PROCEDURE — 80048 BASIC METABOLIC PNL TOTAL CA: CPT

## 2024-01-05 PROCEDURE — 6360000002 HC RX W HCPCS: Performed by: INTERNAL MEDICINE

## 2024-01-05 RX ORDER — METOPROLOL TARTRATE 50 MG/1
50 TABLET, FILM COATED ORAL 2 TIMES DAILY
Qty: 60 TABLET | Refills: 3 | Status: SHIPPED | OUTPATIENT
Start: 2024-01-05

## 2024-01-05 RX ORDER — GUAIFENESIN 200 MG/10ML
200 LIQUID ORAL EVERY 4 HOURS PRN
Qty: 180 ML | Refills: 1 | DISCHARGE
Start: 2024-01-05

## 2024-01-05 RX ORDER — FUROSEMIDE 10 MG/ML
20 INJECTION INTRAMUSCULAR; INTRAVENOUS ONCE
Status: COMPLETED | OUTPATIENT
Start: 2024-01-05 | End: 2024-01-05

## 2024-01-05 RX ORDER — CLOPIDOGREL BISULFATE 75 MG/1
75 TABLET ORAL DAILY
Status: DISCONTINUED | OUTPATIENT
Start: 2024-01-05 | End: 2024-01-06 | Stop reason: HOSPADM

## 2024-01-05 RX ORDER — ATORVASTATIN CALCIUM 40 MG/1
40 TABLET, FILM COATED ORAL NIGHTLY
Qty: 30 TABLET | Refills: 3 | DISCHARGE
Start: 2024-01-05

## 2024-01-05 RX ORDER — CLOPIDOGREL BISULFATE 75 MG/1
75 TABLET ORAL DAILY
Qty: 30 TABLET | Refills: 3 | DISCHARGE
Start: 2024-01-05

## 2024-01-05 RX ORDER — NITROGLYCERIN 0.4 MG/1
0.4 TABLET SUBLINGUAL EVERY 5 MIN PRN
Qty: 25 TABLET | Refills: 3 | DISCHARGE
Start: 2024-01-05

## 2024-01-05 RX ORDER — MAGNESIUM SULFATE IN WATER 40 MG/ML
2000 INJECTION, SOLUTION INTRAVENOUS ONCE
Status: COMPLETED | OUTPATIENT
Start: 2024-01-05 | End: 2024-01-05

## 2024-01-05 RX ORDER — PANTOPRAZOLE SODIUM 40 MG/1
40 TABLET, DELAYED RELEASE ORAL
Qty: 30 TABLET | Refills: 3 | Status: SHIPPED | OUTPATIENT
Start: 2024-01-05

## 2024-01-05 RX ORDER — ASPIRIN 81 MG/1
81 TABLET ORAL DAILY
Qty: 30 TABLET | Refills: 3 | OUTPATIENT
Start: 2024-01-06

## 2024-01-05 RX ADMIN — FUROSEMIDE 20 MG: 10 INJECTION, SOLUTION INTRAMUSCULAR; INTRAVENOUS at 14:04

## 2024-01-05 RX ADMIN — SODIUM CHLORIDE, PRESERVATIVE FREE 5 ML: 5 INJECTION INTRAVENOUS at 08:22

## 2024-01-05 RX ADMIN — FUROSEMIDE 40 MG: 10 INJECTION, SOLUTION INTRAMUSCULAR; INTRAVENOUS at 10:01

## 2024-01-05 RX ADMIN — ESCITALOPRAM 20 MG: 10 TABLET, FILM COATED ORAL at 08:15

## 2024-01-05 RX ADMIN — Medication 81 MG: at 08:15

## 2024-01-05 RX ADMIN — ACETAMINOPHEN 650 MG: 325 TABLET ORAL at 14:04

## 2024-01-05 RX ADMIN — COLLAGENASE SANTYL: 250 OINTMENT TOPICAL at 08:16

## 2024-01-05 RX ADMIN — SODIUM CHLORIDE, PRESERVATIVE FREE 10 ML: 5 INJECTION INTRAVENOUS at 08:16

## 2024-01-05 RX ADMIN — DOCUSATE SODIUM 50 MG AND SENNOSIDES 8.6 MG 2 TABLET: 8.6; 5 TABLET, FILM COATED ORAL at 08:15

## 2024-01-05 RX ADMIN — ANTI-FUNGAL POWDER MICONAZOLE NITRATE TALC FREE: 1.42 POWDER TOPICAL at 08:16

## 2024-01-05 RX ADMIN — PANTOPRAZOLE SODIUM 40 MG: 40 TABLET, DELAYED RELEASE ORAL at 15:25

## 2024-01-05 RX ADMIN — APIXABAN 5 MG: 5 TABLET, FILM COATED ORAL at 20:02

## 2024-01-05 RX ADMIN — LEVOFLOXACIN 500 MG: 500 TABLET, FILM COATED ORAL at 08:15

## 2024-01-05 RX ADMIN — Medication 1 EACH: at 15:09

## 2024-01-05 RX ADMIN — DESMOPRESSIN ACETATE 40 MG: 0.2 TABLET ORAL at 20:02

## 2024-01-05 RX ADMIN — ANTI-FUNGAL POWDER MICONAZOLE NITRATE TALC FREE: 1.42 POWDER TOPICAL at 20:03

## 2024-01-05 RX ADMIN — TICAGRELOR 90 MG: 90 TABLET ORAL at 08:15

## 2024-01-05 RX ADMIN — PANTOPRAZOLE SODIUM 40 MG: 40 TABLET, DELAYED RELEASE ORAL at 08:15

## 2024-01-05 RX ADMIN — MAGNESIUM SULFATE HEPTAHYDRATE 2000 MG: 40 INJECTION, SOLUTION INTRAVENOUS at 17:47

## 2024-01-05 RX ADMIN — CLOPIDOGREL BISULFATE 75 MG: 75 TABLET, FILM COATED ORAL at 20:02

## 2024-01-05 RX ADMIN — Medication 3 MG: at 20:03

## 2024-01-05 RX ADMIN — METOPROLOL TARTRATE 50 MG: 50 TABLET, FILM COATED ORAL at 08:15

## 2024-01-05 RX ADMIN — SODIUM CHLORIDE, PRESERVATIVE FREE 10 ML: 5 INJECTION INTRAVENOUS at 20:03

## 2024-01-05 RX ADMIN — APIXABAN 5 MG: 5 TABLET, FILM COATED ORAL at 15:26

## 2024-01-05 ASSESSMENT — PAIN DESCRIPTION - LOCATION: LOCATION: KNEE

## 2024-01-05 ASSESSMENT — PAIN DESCRIPTION - ORIENTATION: ORIENTATION: RIGHT

## 2024-01-05 NOTE — PROGRESS NOTES
Pharmacy Intern New Medication Counseling Note    Medication counseling provided to patient and   New medications reviewed: eliquis, plavis, aspirin (DAPT)    Handouts provided to patient include: Medication Side Effects brochure    Discussed recently initiated medication therapy with patient/caregiver utilizing teachback method.  Reviewed uses and possible side effects of medication and answered all medication-related questions.  Patient/caregiver verbalized understanding.    Garcia Louise pharmacy student

## 2024-01-05 NOTE — PLAN OF CARE
Problem: Safety - Adult  Goal: Free from fall injury  Outcome: Progressing     Problem: Discharge Planning  Goal: Discharge to home or other facility with appropriate resources  Outcome: Progressing  Flowsheets (Taken 1/4/2024 2000 by Annmarie Velazquez RN)  Discharge to home or other facility with appropriate resources:   Identify barriers to discharge with patient and caregiver   Arrange for needed discharge resources and transportation as appropriate   Identify discharge learning needs (meds, wound care, etc)   Arrange for interpreters to assist at discharge as needed   Refer to discharge planning if patient needs post-hospital services based on physician order or complex needs related to functional status, cognitive ability or social support system     Problem: Chronic Conditions and Co-morbidities  Goal: Patient's chronic conditions and co-morbidity symptoms are monitored and maintained or improved  Outcome: Progressing  Flowsheets (Taken 1/4/2024 2000 by Annmarie Velazquez RN)  Care Plan - Patient's Chronic Conditions and Co-Morbidity Symptoms are Monitored and Maintained or Improved:   Monitor and assess patient's chronic conditions and comorbid symptoms for stability, deterioration, or improvement   Collaborate with multidisciplinary team to address chronic and comorbid conditions and prevent exacerbation or deterioration   Update acute care plan with appropriate goals if chronic or comorbid symptoms are exacerbated and prevent overall improvement and discharge     Problem: Pain  Goal: Verbalizes/displays adequate comfort level or baseline comfort level  Outcome: Progressing  Flowsheets (Taken 1/4/2024 2030 by Annmarie Velazquez RN)  Verbalizes/displays adequate comfort level or baseline comfort level:   Encourage patient to monitor pain and request assistance   Assess pain using appropriate pain scale   Administer analgesics based on type and severity of pain and evaluate response   Implement non-pharmacological

## 2024-01-05 NOTE — CARE COORDINATION
Transitional planning- call from Sera at [facility]-accepting patient. Needs JENNIFER and HENS.    [facility] Transport is now late tonight. Set up transportation for 1000AM on 1-6-2024.    1510 called Sera at [facility]-informed her of discharge time 1000AM on 1-6-2024.    1615 HENS completed and placed in envelope, still need JENNIFER completed with Left leg dressing UNNA BOOT order-asked Basilia with Wound Care to place one.

## 2024-01-05 NOTE — PROGRESS NOTES
[facility] - Progress Note    Today's Date and Time: 1/5/2024, 7:29 AM    Impression :   NSTEMI (non-ST elevated myocardial infarction) (HCC)   S/P cardiac cath MV CAD  CT surgery consult for CABG  On heparin drip  Cardiology and CT surgery on board, evaluating CABG versus stenting  Paroxysmal A-fib  Venous insufficiency of both lower extremities  Primary hypertension  Hyperlipidemia  Cellulitis bilateral lower extremities.   Bilat LE edema w large blisters - infected blisters  Wound cx 12/15/23 Pseudomonas aeruginosa sensitive to Levaquin and cefepime and Enterococcus faecalis sensitive to ampicillin  Wound cx left foot 12/25/2023: Pseudomonas aeruginosa  Pseudomonas Aeruginosa bacteremia 12/14/23. Originating from the leg abscesses  Sensitive to Levaquin and cefepime.  Blood Cx 12/15/2023: no growth  Blood Cx 12/25/2023: no growth  Traumatic Rt post calf hematoma  Sepsis secondary to above  Mild ulcers, possible HSV s/p 1 week PO Valtrex  Peripheral arterial disease  Acute on chronic renal failure.  New onset HD, discontinued.  Obesity  History of lymphedema venous stasis insufficiency  Coccyx/Buttock wound   Penicillin allergy - Hives.  Tolerated Cefepime    Recommendations:   Wound care  Monitor off antibiotics   Completed Levaquin 500 mg po started 12/27. Stop date 1-5-24  Cx taken 12/29 from right foot sub cutaneous blisters.   Pseudomonas aeruginosa - sensitive to Levaquin  On 12/29 Podiatry recommended no surgical intervention  Previously:   Zyvox completed 12/19/2023. Cellulitis RLE much better  Completed Cefepime from 12/21 -> 12/27   Completed Valtrex po 12/22/23 --> 12/29/23 for HSV lesion  Wound Ostomy consulted for wound care      Medical Decision Making/Summary/Discussion:1/5/2024     Daily Elements of Decision Making provided by Consulting Physician:    Note: I have independently performed the steps listed below as part of the medical decision making and  evidence of mediastinal lymphadenopathy.  The heart and pericardium demonstrate no acute abnormality.  There is mild cardiomegaly. There is no acute abnormality of the thoracic aorta. Lungs/pleura: There is minor posterior atelectasis on the right.  On the left, there is a small pleural effusion with associated mild passive atelectasis.  No pneumothorax or infiltrate evident. Upper Abdomen: Limited images of the upper abdomen are unremarkable. Soft Tissues/Bones: No acute bone or soft tissue abnormality.     No evidence of pulmonary embolism. Small left pleural effusion with associated mild passive atelectasis.     XR CHEST PORTABLE    Result Date: 12/30/2023  EXAMINATION: ONE XRAY VIEW OF THE CHEST 12/30/2023 9:13 pm COMPARISON: 12/23/2023 HISTORY: ORDERING SYSTEM PROVIDED HISTORY: SOB TECHNOLOGIST PROVIDED HISTORY: SOB Reason for Exam: CXR due to dyspnea. FINDINGS: Mild pulmonary venous congestive changes.  Possible small bilateral pleural effusions.  The cardiomediastinal silhouette is enlarged.  The osseous structures are without acute process.  Insert removal of a right IJ central venous catheter.     Cardiomegaly with mild pulmonary venous congestive changes. Possible small bilateral pleural effusions.       Medical Decision [id number]:     Results       Procedure Component Value Units Date/Time    Culture, Aerobic [[id number]]  (Abnormal)  (Susceptibility) Collected: 12/29/23 1245    Order Status: Completed Specimen: Foot Updated: 01/01/24 0843     Specimen Description .FOOT LEFT FOOT     Direct Exam MANY NEUTROPHILS      NO ORGANISMS SEEN     Culture PSEUDOMONAS AERUGINOSA RARE GROWTH    Susceptibility        Pseudomonas aeruginosa      BACTERIAL SUSCEPTIBILITY PANEL RASHEL      amikacin <=2  Sensitive      cefepime 2  Sensitive      gentamicin <=1  Sensitive      levofloxacin 0.5  Sensitive      meropenem 1  Sensitive      piperacillin-tazobactam <=4  Sensitive      tobramycin <=1  Sensitive

## 2024-01-05 NOTE — PROGRESS NOTES
[facility]   Progress Note                   Date:   1/5/2024  Patient name: Elaina Champagne  Date of admission:  1/2/2024 12:05 PM  MRN:   [id number]  YOB: 1950  PCP: Robin Ly MD    Reason for Admission: NSTEMI (non-ST elevated myocardial infarction) (HCC) [I21.4]  S/P cardiac cath [Z98.890]    Subjective:       Clinical Changes / Abnormalities: Pt seen and examined in the room. Pt denies any CP.  Pt is on NRB currently.  Spo2 100%. Pt states that she feels sob and some chest \"heaviness\"     -359 ml since admission   Medications:   Scheduled Meds:   gelocast unnas boot  2 each Other Once    furosemide  20 mg IntraVENous Once    ticagrelor  90 mg Oral BID    sodium chloride flush  5-40 mL IntraVENous 2 times per day    furosemide  40 mg IntraVENous Daily    potassium chloride  40 mEq Oral Once    atorvastatin  40 mg Oral Nightly    sodium chloride flush  5-40 mL IntraVENous 2 times per day    aspirin  81 mg Oral Daily    escitalopram  20 mg Oral Daily    metoprolol tartrate  50 mg Oral BID    miconazole   Topical BID    pantoprazole  40 mg Oral BID AC    sennosides-docusate sodium  2 tablet Oral Daily     Continuous Infusions:   sodium chloride      sodium chloride      dextrose       CBC:   Recent Labs     01/03/24 0319 01/04/24  0522 01/05/24  0454   WBC 6.2 5.5 6.1   HGB 8.7* 8.7* 9.2*    373 376       BMP:    Recent Labs     01/03/24 0319 01/04/24  0713 01/05/24  0454    137 137   K 3.4* 3.9 4.7    103 99   CO2 26 30 32*   BUN 12 12 12   CREATININE 0.4* 0.4* 0.4*   GLUCOSE 91 90 121*       Hepatic: No results for input(s): \"AST\", \"ALT\", \"ALB\", \"BILITOT\", \"ALKPHOS\" in the last 72 hours.  Troponin:   No results for input(s): \"TROPHS\" in the last 72 hours.    BNP: No results for input(s): \"BNP\" in the last 72 hours.  Lipids: No results for input(s): \"CHOL\", \"HDL\" in the last 72 hours.    Invalid input(s): \"LDLCALCU\"  INR:   Recent Labs

## 2024-01-05 NOTE — PROGRESS NOTES
[facility]  Nurse  Follow up Note       NAME:  Elaina Champagne  MEDICAL RECORD NUMBER:  [id number]  AGE: 73 y.o.   GENDER: female  : 1950  TODAY'S DATE:  2024    Subjective   Reason for WO Nurse Evaluation and Assessment: Reason for WOCN Evaluation and Assessment: \"left lower leg\"     Wound Identification:  Wound Type: venous and arterial, dermatitis  Contributing Factors: edema, venous stasis, chronic pressure, decreased mobility, shear force, obesity, arterial insufficiency, decreased tissue oxygenation, anticoagulation therapy, incontinence of stool, and incontinence of urine                                        Transfer from [facility].   Per the patient and EHR known lower extremity venous disease with use of Unna's Boots compression therapy in the past. Recent PHILL of the left leg result of 0.68 indicates moderate arterial insufficiency. Right leg 1.04 is within normal range. Patient states her left leg wound has been present for months. She recently developed severe edema and blisters of the bilateral feet. Here are also newly developed venous ulcers of the right calf and foot since admission to [facility].   She also has abdominal fold and gluteal dermatitis.     Objective    BP (!) 135/43   Pulse 69   Temp 97.3 °F (36.3 °C) (Axillary)   Resp 20   Ht 1.575 m (5' 2\")   Wt (!) 138.4 kg (305 lb 1.9 oz)   SpO2 97%   BMI 55.81 kg/m²     LABS:  WBC:    Lab Results   Component Value Date/Time    WBC 6.1 2024 04:54 AM     H/H:    Lab Results   Component Value Date/Time    HGB 9.2 2024 04:54 AM    HCT 29.6 2024 04:54 AM     PTT:    Lab Results   Component Value Date/Time    APTT 34.4 2024 08:30 PM    APTT 32.2 2018 11:53 AM   [APTT}  PT/INR:    Lab Results   Component Value Date/Time    PROTIME 16.2 2024 08:30 PM    PROTIME 13.7 2018 11:53 AM    INR 1.3 2024 08:30 PM     HgBA1c:    Lab Results   Component Value Date/Time     01/05/24 1500   Wound 12/27/23 Leg Right;Lateral;Posterior   Date First Assessed/Time First Assessed: 12/27/23 2110   Present on Original Admission: Yes  Primary Wound Type: Other (comment)  Location: Leg  Wound Location Orientation: Right;Lateral;Posterior   Wound Image    Wound Etiology Venous   Wound Cleansed Soap and water;Cleansed with saline   Dressing/Treatment Hydrofiber Ag;ABD;Roll gauze;Ace wrap   Dressing Change Due 01/06/24   Wound Length (cm) 1.5 cm   Wound Width (cm) 1.5 cm   Wound Depth (cm) 1 cm   Wound Surface Area (cm^2) 2.25 cm^2   Change in Wound Size % (l*w) -125   Wound Volume (cm^3) 2.25 cm^3   Wound Healing % -2150   Undermining Maxium Distance (cm) 1  (laterally)   Wound Assessment Subcutaneous;Slough   Drainage Amount Moderate (25-50%)   Drainage Description Purulent;Sanguinous   Sis-wound Assessment Fragile;Dry/flaky;Hyperpigmented   Wound Thickness Description not for Pressure Injury Full thickness   Wound 10/30/23 Tibial Left;Posterior;Lower wound #1   Date First Assessed: 10/30/23   Wound Approximate Age at First Assessment (Weeks): 2 weeks  Primary Wound Type: Venous Ulcer  Location: Tibial  Wound Location Orientation: Left;Posterior;Lower  Wound Description (Comments): wound #1   Wound Image    Wound Etiology Venous   Dressing Status New dressing applied   Wound Cleansed Soap and water;Cleansed with saline   Dressing/Treatment Collagen;Hydrofiber Ag  (zinc / calamine Unna boot)   Dressing Change Due 01/12/24   Wound Length (cm) 5 cm   Wound Width (cm) 6 cm   Wound Depth (cm) 0.3 cm   Wound Surface Area (cm^2) 30 cm^2   Change in Wound Size % (l*w) -1604.55   Wound Volume (cm^3) 9 cm^3   Wound Healing % -2457   Wound Assessment Subcutaneous;Pink/red;Slough   Drainage Amount Small (< 25%)   Drainage Description Serosanguinous   Sis-wound Assessment Dry/flaky;Fragile   Wound Thickness Description not for Pressure Injury Full thickness     ACE wrap removed from the left lower leg;

## 2024-01-05 NOTE — DISCHARGE INSTR - COC
Continuity of Care Form    Patient Name: Elaina Champagne   :  1950  MRN:  [id number]    Admit date:  2024  Discharge date:  ***    Code Status Order: Full Code   Advance Directives:     Admitting Physician:  Regis Aponte MD  PCP: Robin Ly MD    Discharging Nurse: ***  Discharging Hospital Unit/Room#: 0504/0504-01  Discharging Unit Phone Number:     Emergency Contact:   Extended Emergency Contact Information  Primary Emergency Contact: Eze Champagne   [facility]  Home Phone: [phone]  Relation: Spouse  Secondary Emergency Contact: Margo ferreira  Mobile Phone: [phone]  Relation: Grandchild  Preferred language: English    Past Surgical History:  Past Surgical History:   Procedure Laterality Date    CARDIAC PROCEDURE N/A 2024    Coronary angiography performed by Regis Aponte MD at [facility] CARDIAC CATH LAB    CARDIAC PROCEDURE N/A 2024    frantz / Percutaneous coronary intervention / rm 504 performed by Maria Teresa Rodriguez MD at [facility] CARDIAC CATH LAB    CATARACT REMOVAL Bilateral     HYSTERECTOMY, TOTAL ABDOMINAL (CERVIX REMOVED)  1990    INCISION AND DRAINAGE Left 2017    LEG INCISION AND DRAINAGE ABSCESS performed by Isaiah Casey MD at [facility] OR    IR NONTUNNELED VASCULAR CATHETER  12/15/2023    IR NONTUNNELED VASCULAR CATHETER 12/15/2023 [facility] SPECIAL PROCEDURES    KNEE ARTHROPLASTY Right 2018    TOTAL KNEE ARTHROPLASTY Left 2017    UPPER GASTROINTESTINAL ENDOSCOPY N/A 2023    EGD BIOPSY performed by Mary Nolasco MD at [facility] ENDO    VENTRAL HERNIA REPAIR  2019    5 hernias       Immunization History:   Immunization History   Administered Date(s) Administered    COVID-19, MODERNA BLUE border, Primary or Immunocompromised, (age 12y+), IM, 100 mcg/0.5mL 2021, 2021    COVID-19, PFIZER Bivalent, DO NOT Dilute, (age 12y+), IM, 30 mcg/0.3 mL 2022    COVID-19, PFIZER GRAY top, DO NOT Dilute, (age 12 y+),  ventilator support    Rehab Therapies: Physical Therapy and Occupational Therapy  Weight Bearing Status/Restrictions: Touchdown weight bearing (10-25 lbs) only on right leg - Pt cannot walk safely, BLE  Other Medical Equipment (for information only, NOT a DME order):  wheelchair  Other Treatments: ***BLE dressing changes, skin fold assessment and care  LEFT LOWER LEG:  Apply zinc Unna's boot.  Cleanse leg  with gentle soap and water.  Apply Silver hyrdofiber to posterior leg wound  under Unna's boot.  Replace weekly.    RIGHT LOWER LEG:  Cleanse leg  with gentle soap and water.  Gently pack a strip of  Silver hyrdofiber into ulceration on the posterior leg wound, cover with ABD and secure with roll gauze and ACE wrap from toes to knee.  Replace daily.  May place Unna's boot to the right leg after ensuring no infection or abscess is present to the posterior leg wound.     Follow up at [facility] in one week    Patient's personal belongings (please select all that are sent with patient):  {CHP DME Belongings:[id number]}    RN SIGNATURE:  {Esignature:[id number]}    CASE MANAGEMENT/SOCIAL WORK SECTION    Inpatient Status Date: 1-2-2023    Readmission Risk Assessment Score:  Readmission Risk              Risk of Unplanned Readmission:  24           Discharging to Facility/ Agency   Name: [facility]  Address:  Phone:  Fax:    Dialysis Facility (if applicable)   Name:  Address:  Dialysis Schedule:  Phone:  Fax:    / signature: Electronically signed by Coretta Elaine RN on 1/5/24 at 2:58 PM EST    PHYSICIAN SECTION    Prognosis: Fair    Condition at Discharge: Stable    Rehab Potential (if transferring to Rehab): Fair    Recommended Labs or Other Treatments After Discharge: EGD in 4 to 6 weeks, staged PCI to RCA in 4 to 6 weeks, follow-up with vascular surgery for carotid stenosis.  Unna boots as ordered  Oxygen via nasal cannula to keep saturations more than 92%  Follow-up with

## 2024-01-05 NOTE — DISCHARGE INSTRUCTIONS
You are admitted with cellulitis on multivessel CAD, underwent cardiac cath with placement of stents.    Please take your medications as prescribed  Please follow-up with cardiologist for staged PCI in 4 to 6 weeks  Follow-up with gastroenterology for repeat EGD in 4 to 6 weeks  You have been started on blood thinners, watch out for bloody stool, dark stools, blood in urine, bleeding from other sites or easy bruising  Avoid NSAIDs  Follow with the wound care  Return if you have any worsening of your symptoms

## 2024-01-06 VITALS
DIASTOLIC BLOOD PRESSURE: 70 MMHG | BODY MASS INDEX: 53.92 KG/M2 | SYSTOLIC BLOOD PRESSURE: 152 MMHG | HEART RATE: 63 BPM | OXYGEN SATURATION: 98 % | HEIGHT: 62 IN | RESPIRATION RATE: 20 BRPM | WEIGHT: 293 LBS | TEMPERATURE: 97.9 F

## 2024-01-06 LAB
ANION GAP SERPL CALCULATED.3IONS-SCNC: 9 MMOL/L (ref 9–17)
BASOPHILS # BLD: <0.03 K/UL (ref 0–0.2)
BASOPHILS NFR BLD: 0 % (ref 0–2)
BUN SERPL-MCNC: 16 MG/DL (ref 8–23)
CALCIUM SERPL-MCNC: 8.4 MG/DL (ref 8.6–10.4)
CHLORIDE SERPL-SCNC: 100 MMOL/L (ref 98–107)
CO2 SERPL-SCNC: 32 MMOL/L (ref 20–31)
CREAT SERPL-MCNC: 0.4 MG/DL (ref 0.5–0.9)
EOSINOPHIL # BLD: 0.06 K/UL (ref 0–0.44)
EOSINOPHILS RELATIVE PERCENT: 1 % (ref 1–4)
ERYTHROCYTE [DISTWIDTH] IN BLOOD BY AUTOMATED COUNT: 17.4 % (ref 11.8–14.4)
GFR SERPL CREATININE-BSD FRML MDRD: >60 ML/MIN/1.73M2
GLUCOSE SERPL-MCNC: 93 MG/DL (ref 70–99)
HCT VFR BLD AUTO: 29.7 % (ref 36.3–47.1)
HGB BLD-MCNC: 9.1 G/DL (ref 11.9–15.1)
IMM GRANULOCYTES # BLD AUTO: 0.04 K/UL (ref 0–0.3)
IMM GRANULOCYTES NFR BLD: 1 %
LYMPHOCYTES NFR BLD: 1.07 K/UL (ref 1.1–3.7)
LYMPHOCYTES RELATIVE PERCENT: 21 % (ref 24–43)
MAGNESIUM SERPL-MCNC: 1.9 MG/DL (ref 1.6–2.6)
MCH RBC QN AUTO: 29.7 PG (ref 25.2–33.5)
MCHC RBC AUTO-ENTMCNC: 30.6 G/DL (ref 28.4–34.8)
MCV RBC AUTO: 97.1 FL (ref 82.6–102.9)
MONOCYTES NFR BLD: 0.38 K/UL (ref 0.1–1.2)
MONOCYTES NFR BLD: 8 % (ref 3–12)
NEUTROPHILS NFR BLD: 69 % (ref 36–65)
NEUTS SEG NFR BLD: 3.47 K/UL (ref 1.5–8.1)
NRBC BLD-RTO: 0 PER 100 WBC
PLATELET # BLD AUTO: 328 K/UL (ref 138–453)
PMV BLD AUTO: 9.6 FL (ref 8.1–13.5)
POTASSIUM SERPL-SCNC: 3.5 MMOL/L (ref 3.7–5.3)
RBC # BLD AUTO: 3.06 M/UL (ref 3.95–5.11)
RBC # BLD: ABNORMAL 10*6/UL
SODIUM SERPL-SCNC: 141 MMOL/L (ref 135–144)
WBC OTHER # BLD: 5 K/UL (ref 3.5–11.3)

## 2024-01-06 PROCEDURE — 36415 COLL VENOUS BLD VENIPUNCTURE: CPT

## 2024-01-06 PROCEDURE — 85025 COMPLETE CBC W/AUTO DIFF WBC: CPT

## 2024-01-06 PROCEDURE — 6370000000 HC RX 637 (ALT 250 FOR IP)

## 2024-01-06 PROCEDURE — 2580000003 HC RX 258: Performed by: STUDENT IN AN ORGANIZED HEALTH CARE EDUCATION/TRAINING PROGRAM

## 2024-01-06 PROCEDURE — 6370000000 HC RX 637 (ALT 250 FOR IP): Performed by: INTERNAL MEDICINE

## 2024-01-06 PROCEDURE — 99232 SBSQ HOSP IP/OBS MODERATE 35: CPT | Performed by: INTERNAL MEDICINE

## 2024-01-06 PROCEDURE — 6360000002 HC RX W HCPCS

## 2024-01-06 PROCEDURE — 99233 SBSQ HOSP IP/OBS HIGH 50: CPT | Performed by: NURSE PRACTITIONER

## 2024-01-06 PROCEDURE — 83735 ASSAY OF MAGNESIUM: CPT

## 2024-01-06 PROCEDURE — 80048 BASIC METABOLIC PNL TOTAL CA: CPT

## 2024-01-06 PROCEDURE — 6370000000 HC RX 637 (ALT 250 FOR IP): Performed by: NURSE PRACTITIONER

## 2024-01-06 RX ORDER — POTASSIUM CHLORIDE 20 MEQ/1
40 TABLET, EXTENDED RELEASE ORAL ONCE
Status: COMPLETED | OUTPATIENT
Start: 2024-01-06 | End: 2024-01-06

## 2024-01-06 RX ADMIN — FUROSEMIDE 40 MG: 10 INJECTION, SOLUTION INTRAMUSCULAR; INTRAVENOUS at 08:59

## 2024-01-06 RX ADMIN — APIXABAN 5 MG: 5 TABLET, FILM COATED ORAL at 08:58

## 2024-01-06 RX ADMIN — ESCITALOPRAM 20 MG: 10 TABLET, FILM COATED ORAL at 08:59

## 2024-01-06 RX ADMIN — CLOPIDOGREL BISULFATE 75 MG: 75 TABLET, FILM COATED ORAL at 08:58

## 2024-01-06 RX ADMIN — SODIUM CHLORIDE, PRESERVATIVE FREE 10 ML: 5 INJECTION INTRAVENOUS at 09:00

## 2024-01-06 RX ADMIN — PANTOPRAZOLE SODIUM 40 MG: 40 TABLET, DELAYED RELEASE ORAL at 07:49

## 2024-01-06 RX ADMIN — ANTI-FUNGAL POWDER MICONAZOLE NITRATE TALC FREE: 1.42 POWDER TOPICAL at 08:59

## 2024-01-06 RX ADMIN — DOCUSATE SODIUM 50 MG AND SENNOSIDES 8.6 MG 2 TABLET: 8.6; 5 TABLET, FILM COATED ORAL at 08:58

## 2024-01-06 RX ADMIN — POTASSIUM CHLORIDE 40 MEQ: 1500 TABLET, EXTENDED RELEASE ORAL at 09:29

## 2024-01-06 RX ADMIN — Medication 81 MG: at 08:58

## 2024-01-06 RX ADMIN — METOPROLOL TARTRATE 50 MG: 50 TABLET, FILM COATED ORAL at 08:58

## 2024-01-06 NOTE — PLAN OF CARE
Problem: Safety - Adult  Goal: Free from fall injury  Outcome: Progressing     Problem: Discharge Planning  Goal: Discharge to home or other facility with appropriate resources  Outcome: Progressing     Problem: Chronic Conditions and Co-morbidities  Goal: Patient's chronic conditions and co-morbidity symptoms are monitored and maintained or improved  Outcome: Progressing     Problem: Pain  Goal: Verbalizes/displays adequate comfort level or baseline comfort level  Outcome: Progressing     Problem: Skin/Tissue Integrity  Goal: Absence of new skin breakdown  Description: 1.  Monitor for areas of redness and/or skin breakdown  2.  Assess vascular access sites hourly  3.  Every 4-6 hours minimum:  Change oxygen saturation probe site  4.  Every 4-6 hours:  If on nasal continuous positive airway pressure, respiratory therapy assess nares and determine need for appliance change or resting period.  Outcome: Progressing     Problem: ABCDS Injury Assessment  Goal: Absence of physical injury  Outcome: Progressing

## 2024-01-06 NOTE — CARE COORDINATION
Transitional planning: JENNIFER/AVS and HENS faxed to [facility].    0840 Blue transport packet placed with soft chart.    Discharge Report    [facility]  Clinical Case Management Department  Written by: Cely Lindsey RN    Patient Name: Elainabharathi Champagne  Attending Provider: Rob Hernandez MD  Admit Date: 2024 12:05 PM  MRN: [id number]  Account: [id number]                     : 1950  Discharge Date:       Disposition: [facility]    Cely Lindsey RN

## 2024-01-06 NOTE — PLAN OF CARE
Problem: Safety - Adult  Goal: Free from fall injury  1/6/2024 0725 by Martha Gaming RN  Outcome: Progressing  1/6/2024 0329 by Kenroy Suarez RN  Outcome: Progressing     Problem: Discharge Planning  Goal: Discharge to home or other facility with appropriate resources  1/6/2024 0725 by Martha Gaming RN  Outcome: Progressing  1/6/2024 0329 by Kenroy Suarez RN  Outcome: Progressing     Problem: Chronic Conditions and Co-morbidities  Goal: Patient's chronic conditions and co-morbidity symptoms are monitored and maintained or improved  1/6/2024 0329 by Kenroy Suarez RN  Outcome: Progressing     Problem: Pain  Goal: Verbalizes/displays adequate comfort level or baseline comfort level  1/6/2024 0329 by Kenroy Suarez RN  Outcome: Progressing     Problem: Skin/Tissue Integrity  Goal: Absence of new skin breakdown  Description: 1.  Monitor for areas of redness and/or skin breakdown  2.  Assess vascular access sites hourly  3.  Every 4-6 hours minimum:  Change oxygen saturation probe site  4.  Every 4-6 hours:  If on nasal continuous positive airway pressure, respiratory therapy assess nares and determine need for appliance change or resting period.  1/6/2024 0329 by Kenroy Suarez RN  Outcome: Progressing     Problem: ABCDS Injury Assessment  Goal: Absence of physical injury  1/6/2024 0329 by Kenroy Suarez RN  Outcome: Progressing     Problem: Safety - Adult  Goal: Free from fall injury  1/6/2024 0725 by Martha Gaming RN  Outcome: Progressing  1/6/2024 0329 by Kenroy Suarez RN  Outcome: Progressing     Problem: Discharge Planning  Goal: Discharge to home or other facility with appropriate resources  1/6/2024 0725 by Martha Gaming RN  Outcome: Progressing  1/6/2024 0329 by Kenroy Suarez RN  Outcome: Progressing     Problem: Chronic Conditions and Co-morbidities  Goal: Patient's chronic conditions and co-morbidity symptoms are monitored and maintained

## 2024-01-06 NOTE — PROGRESS NOTES
[facility]  Internal Medicine Teaching Residency Program  Inpatient Daily Progress Note  ______________________________________________________________________________    Patient: Elaina Champagne  YOB: 1950   MRN:[id number]    Acct: [id number]     Room: [facility]/0504-01  Admit date: 1/2/2024  Today's date: 01/06/24  Number of days in the hospital: 4    SUBJECTIVE   Admitting Diagnosis: S/P cardiac cath  CC: CT surgery evaluation   Pt examined at bedside. Chart & results reviewed.   Overnight was on nc at 2l , no acute events   - blood pressure 152/70, afebrile , sat>95 on 2l nc   -denies chest pain,reports improvement in breathing   -urine output - 1750 In 24hrs , on lasix 40mg iv daily   - s/p cardiac cath with pci , on asa , plavix, statin,eliquis for afib   -am labs reviewed, k - 3.4 , replaced   Hgb stable   Awaiting dc     ROS:  Constitutional:  negative for chills, fevers, sweats  Respiratory:  negative for cough, dyspnea on exertion, hemoptysis, shortness of breath, wheezing  Cardiovascular:  negative for chest pain, chest pressure/discomfort, lower extremity edema, palpitations  Gastrointestinal:  negative for abdominal pain, constipation, diarrhea, nausea, vomiting  Neurological:  negative for dizziness, headache    BRIEF HISTORY     The patient is a pleasant 73 y.o. female with a PMHx significant for lymphedema,GI bleed,,htn, paroxysmal A-fib, morbid obesity bmi 55.81   Who presented to [facility] on 12/14 with complaints of left leg swelling erythema and wound and was being managed for cellulitis, she developed some shortness of breath and hypotension while admitted there, troponins were checked and it was found to be about 383, managed as NSTEMI and underwent cath which showed multivessel CAD and patient was further transferred to [facility] for CT surgery evaluation     -She has bilateral lower extremity cellulitis, left tibial ulcer, wound  \"ALKPHOS\" in the last 72 hours.   MICROBIOLOGY:   Lab Results   Component Value Date/Time    CULTURE PSEUDOMONAS AERUGINOSA RARE GROWTH (A) 12/29/2023 12:45 PM       Imaging:    XR ABDOMEN (KUB) (SINGLE AP VIEW)    Result Date: 1/1/2024  Focally dilated loop of small bowel in the left mid abdomen measuring up to 4.5 cm. This is nonspecific and could represent a focal ileus.     MRI ANKLE RIGHT WO CONTRAST    Result Date: 12/31/2023  1. No MRI evidence of acute osteomyelitis in the leg or ankle. 2. Worsening since CT 12/17/2023, circumferential changes of superficial soft tissue edema, with developing areas of fluid along the superficial fascial margins in the medial and lateral leg and circumferentially at the ankle with skin blistering.  No walled-off fluid collection. 3. No intramuscular fluid collection.  No deep fascial fluid.     MRI TIBIA FIBULA RIGHT WO CONTRAST    Result Date: 12/31/2023  1. No MRI evidence of acute osteomyelitis in the leg or ankle. 2. Worsening since CT 12/17/2023, circumferential changes of superficial soft tissue edema, with developing areas of fluid along the superficial fascial margins in the medial and lateral leg and circumferentially at the ankle with skin blistering.  No walled-off fluid collection. 3. No intramuscular fluid collection.  No deep fascial fluid.     CT CHEST PULMONARY EMBOLISM W CONTRAST    Result Date: 12/31/2023  No evidence of pulmonary embolism. Small left pleural effusion with associated mild passive atelectasis.     XR CHEST PORTABLE    Result Date: 12/30/2023  Cardiomegaly with mild pulmonary venous congestive changes. Possible small bilateral pleural effusions.       ASSESSMENT & PLAN     Assessment and Plan:    Principal Problem:    S/P cardiac cath  Active Problems:    Atrial fibrillation with rapid ventricular response (HCC)    NSTEMI (non-ST elevated myocardial infarction) ([facility])    Venous insufficiency of both lower extremities    Primary hypertension

## 2024-01-06 NOTE — PROGRESS NOTES
[facility] - Progress Note    Today's Date and Time: 1/6/2024, 6:33 AM    Impression :   NSTEMI (non-ST elevated myocardial infarction) (HCC)   S/P cardiac cath MV CAD  CT surgery consult for CABG  On heparin drip  Cardiology and CT surgery on board, evaluating CABG versus stenting  Paroxysmal A-fib  Venous insufficiency of both lower extremities  Primary hypertension  Hyperlipidemia  Cellulitis bilateral lower extremities.   Bilat LE edema w large blisters - infected blisters  Wound cx 12/15/23 Pseudomonas aeruginosa sensitive to Levaquin and cefepime and Enterococcus faecalis sensitive to ampicillin  Wound cx left foot 12/25/2023: Pseudomonas aeruginosa  Pseudomonas Aeruginosa bacteremia 12/14/23. Originating from the leg abscesses  Sensitive to Levaquin and cefepime.  Blood Cx 12/15/2023: no growth  Blood Cx 12/25/2023: no growth  Traumatic Rt post calf hematoma  Sepsis secondary to above  Mild ulcers, possible HSV s/p 1 week PO Valtrex  Peripheral arterial disease  Acute on chronic renal failure.  New onset HD, discontinued.  Obesity  History of lymphedema venous stasis insufficiency  Coccyx/Buttock wound   Penicillin allergy - Hives.  Tolerated Cefepime    Recommendations:   Wound care  Monitor off antibiotics   Completed Levaquin 500 mg po started 12/27. Stop date 1-5-24  Cx taken 12/29 from right foot sub cutaneous blisters.   Pseudomonas aeruginosa - sensitive to Levaquin  On 12/29 Podiatry recommended no surgical intervention  Previously:   Zyvox completed 12/19/2023. Cellulitis RLE much better  Completed Cefepime from 12/21 -> 12/27   Completed Valtrex po 12/22/23 --> 12/29/23 for HSV lesion  Wound Ostomy consulted for wound care      Medical Decision Making/Summary/Discussion:1/6/2024     Daily Elements of Decision Making provided by Consulting Physician:    Note: I have independently performed the steps listed below as part of the medical decision making and  Mediastinum: No evidence of mediastinal lymphadenopathy.  The heart and pericardium demonstrate no acute abnormality.  There is mild cardiomegaly. There is no acute abnormality of the thoracic aorta. Lungs/pleura: There is minor posterior atelectasis on the right.  On the left, there is a small pleural effusion with associated mild passive atelectasis.  No pneumothorax or infiltrate evident. Upper Abdomen: Limited images of the upper abdomen are unremarkable. Soft Tissues/Bones: No acute bone or soft tissue abnormality.     No evidence of pulmonary embolism. Small left pleural effusion with associated mild passive atelectasis.     XR CHEST PORTABLE    Result Date: 12/30/2023  EXAMINATION: ONE XRAY VIEW OF THE CHEST 12/30/2023 9:13 pm COMPARISON: 12/23/2023 HISTORY: ORDERING SYSTEM PROVIDED HISTORY: SOB TECHNOLOGIST PROVIDED HISTORY: SOB Reason for Exam: CXR due to dyspnea. FINDINGS: Mild pulmonary venous congestive changes.  Possible small bilateral pleural effusions.  The cardiomediastinal silhouette is enlarged.  The osseous structures are without acute process.  Insert removal of a right IJ central venous catheter.     Cardiomegaly with mild pulmonary venous congestive changes. Possible small bilateral pleural effusions.       Medical Decision [id number]:     Results       Procedure Component Value Units Date/Time    Culture, Aerobic [[id number]]  (Abnormal)  (Susceptibility) Collected: 12/29/23 1245    Order Status: Completed Specimen: Foot Updated: 01/01/24 0843     Specimen Description .FOOT LEFT FOOT     Direct Exam MANY NEUTROPHILS      NO ORGANISMS SEEN     Culture PSEUDOMONAS AERUGINOSA RARE GROWTH    Susceptibility        Pseudomonas aeruginosa      BACTERIAL SUSCEPTIBILITY PANEL RASHEL      amikacin <=2  Sensitive      cefepime 2  Sensitive      gentamicin <=1  Sensitive      levofloxacin 0.5  Sensitive      meropenem 1  Sensitive      piperacillin-tazobactam <=4  Sensitive      tobramycin <=1

## 2024-01-06 NOTE — PROGRESS NOTES
[facility]   Progress Note                   Date:   1/6/2024  Patient name: Elaina Champagne  Date of admission:  1/2/2024 12:05 PM  MRN:   [id number]  YOB: 1950  PCP: Robin Ly MD    Reason for Admission: NSTEMI (non-ST elevated myocardial infarction) (HCC) [I21.4]  S/P cardiac cath [Z98.890]    Subjective:       Clinical Changes / Abnormalities: Pt seen and examined in the room. Pt denies any CP.  Pt is on NRB currently.  Spo2 100%. Pt states that she feels sob and some chest \"heaviness\"     -359 ml since admission   Medications:   Scheduled Meds:   clopidogrel  75 mg Oral Daily    apixaban  5 mg Oral BID    sodium chloride flush  5-40 mL IntraVENous 2 times per day    furosemide  40 mg IntraVENous Daily    potassium chloride  40 mEq Oral Once    atorvastatin  40 mg Oral Nightly    sodium chloride flush  5-40 mL IntraVENous 2 times per day    aspirin  81 mg Oral Daily    escitalopram  20 mg Oral Daily    metoprolol tartrate  50 mg Oral BID    miconazole   Topical BID    pantoprazole  40 mg Oral BID AC    sennosides-docusate sodium  2 tablet Oral Daily     Continuous Infusions:   sodium chloride      sodium chloride      dextrose       CBC:   Recent Labs     01/04/24  0522 01/05/24  0454 01/05/24  1934 01/06/24  0640   WBC 5.5 6.1  --  5.0   HGB 8.7* 9.2* 9.2* 9.1*    376  --  328       BMP:    Recent Labs     01/04/24  0713 01/05/24  0454 01/06/24  0640    137 141   K 3.9 4.7 3.5*    99 100   CO2 30 32* 32*   BUN 12 12 16   CREATININE 0.4* 0.4* 0.4*   GLUCOSE 90 121* 93       Hepatic: No results for input(s): \"AST\", \"ALT\", \"ALB\", \"BILITOT\", \"ALKPHOS\" in the last 72 hours.  Troponin:   No results for input(s): \"TROPHS\" in the last 72 hours.    BNP: No results for input(s): \"BNP\" in the last 72 hours.  Lipids: No results for input(s): \"CHOL\", \"HDL\" in the last 72 hours.    Invalid input(s): \"LDLCALCU\"  INR:   No results for input(s): \"INR\" in the last

## 2024-01-07 NOTE — DISCHARGE SUMMARY
[facility]   IN-PATIENT SERVICE   [facility]    Discharge Summary     Patient ID: Elaina Champagne  :  1950   MRN: [id number]     ACCOUNT:  [id number]   Patient's PCP: Robin Ly MD  Admit Date: 2023   Discharge Date: 2024  Length of Stay: 19  Code Status:  Prior  Admitting Physician: David Yang MD  Discharge Physician: Ashley Winters MD     Active Discharge Diagnoses:     Primary Problem  Hyperkalemia      Hospital Problems  Bilateral lower extremity cellulitis with lymphedema, wound cultures growing Klebsiella and MSSA.  Currently on levofloxacin.  ID on board.  Pseudomonas aeruginosa bacteremia  Chest pain 2024, EKG with no concerning findings.  Resolved with 1 sublingual nitroglycerin.    NSTEMI, cardiology on board..  Heart cath at [place]  PAD left leg  Essential hypertension  MICHELLE on CKD, resolved  Left tibial ulcer  Mild ulcers, possibly HSV  Morbid obesity  GI bleed secondary to peptic ulcer disease, continue pantoprazole 40 mg twice daily    Admission Condition:  fair     Discharged Condition: fair    Hospital Stay:     Hospital Course:  Elaina Champagne is a 73 y.o. female who was admitted for the management of   Hyperkalemia , presented to ER with Leg Pain (Left/) and Fatigue    73-year-old female was sent by the wound care clinic given severe left leg pain associated with generalized fatigue.  The patient was noted to have an open wound, surrounding cellulitis, started on antibiotics.  ID was consulted, based on culture results recommended levofloxacin.    Also the patient has peripheral vascular disease, podiatry recommended medical management.    Patient had troponin elevation, cardiology was consulted, being moved to [place] today for heart cath.    Significant Diagnostic Studies:   Labs / Micro:  CBC:   Lab Results   Component Value Date/Time    WBC 5.0 2024 06:40 AM    RBC 3.06 2024 06:40 AM    RBC 3.34 2018 05:38

## 2024-01-09 LAB
EKG ATRIAL RATE: 78 BPM
EKG P AXIS: 26 DEGREES
EKG P-R INTERVAL: 186 MS
EKG Q-T INTERVAL: 448 MS
EKG QRS DURATION: 72 MS
EKG QTC CALCULATION (BAZETT): 510 MS
EKG R AXIS: -33 DEGREES
EKG T AXIS: 1 DEGREES
EKG VENTRICULAR RATE: 78 BPM

## 2024-01-09 NOTE — DISCHARGE SUMMARY
tolerated    Diet: regular diet    Follow-up:    [facility]  [address]  [facility] [address]  [phone]        Mariya Gomez MD  [address]  [facility] [address]  [phone]    Schedule an appointment as soon as possible for a visit in 1 month(s)  Bilateral (50-69%) carotid stenosis    Maria Teresa Rodriguez MD  [address]  [facility] [address]  [phone]    Schedule an appointment as soon as possible for a visit in 4 week(s)  Post hospital follow-up    Staged PCI in 4 to 6 weeks    Mary Nolasco MD  [address] [facility] [id number] [phone]    Schedule an appointment as soon as possible for a visit in 4 week(s)  Post hospital follow-up  Repeat EGD      Patient Instructions:   You are admitted with cellulitis on multivessel CAD, underwent cardiac cath with placement of stents.    Please take your medications as prescribed  Please follow-up with cardiologist for staged PCI in 4 to 6 weeks  Follow-up with gastroenterology for repeat EGD in 4 to 6 weeks  You have been started on blood thinners, watch out for bloody stool, dark stools, blood in urine, bleeding from other sites or easy bruising  Avoid NSAIDs  Follow with the wound care  Return if you have any worsening of your symptoms        Mason Ponce MD,  Internal Medicine Resident, PGY-1  [facility]; [place], [place]  1/9/2024, 1:03 PM         Interpolation Flap Text: A decision was made to reconstruct the defect utilizing an interpolation axial flap and a staged reconstruction.  A telfa template was made of the defect.  This telfa template was then used to outline the interpolation flap.  The donor area for the pedicle flap was then injected with anesthesia.  The flap was excised through the skin and subcutaneous tissue down to the layer of the underlying musculature.  The interpolation flap was carefully excised within this deep plane to maintain its blood supply.  The edges of the donor site were undermined.   The donor site was closed in a primary fashion.  The pedicle was then rotated into position and sutured.  Once the tube was sutured into place, adequate blood supply was confirmed with blanching and refill.  The pedicle was then wrapped with xeroform gauze and dressed appropriately with a telfa and gauze bandage to ensure continued blood supply and protect the attached pedicle.

## 2024-01-10 DIAGNOSIS — I65.23 CAROTID STENOSIS, BILATERAL: Primary | ICD-10-CM

## 2024-01-12 ENCOUNTER — TELEPHONE (OUTPATIENT)
Dept: VASCULAR SURGERY | Age: 74
End: 2024-01-12

## 2024-01-12 ENCOUNTER — HOSPITAL ENCOUNTER (OUTPATIENT)
Age: 74
Setting detail: SPECIMEN
Discharge: HOME | End: 2024-01-12

## 2024-01-12 LAB
ANION GAP SERPL CALCULATED.3IONS-SCNC: 13 MMOL/L (ref 9–17)
BUN SERPL-MCNC: 11 MG/DL (ref 8–23)
CALCIUM SERPL-MCNC: 8.5 MG/DL (ref 8.6–10.4)
CHLORIDE SERPL-SCNC: 93 MMOL/L (ref 98–107)
CO2 SERPL-SCNC: 26 MMOL/L (ref 20–31)
CREAT SERPL-MCNC: 0.5 MG/DL (ref 0.5–0.9)
D DIMER PPP FEU-MCNC: 3.51 UG/ML FEU (ref 0–0.57)
ERYTHROCYTE [DISTWIDTH] IN BLOOD BY AUTOMATED COUNT: 17.7 % (ref 11.8–14.4)
GFR SERPL CREATININE-BSD FRML MDRD: >60 ML/MIN/1.73M2
GLUCOSE SERPL-MCNC: 106 MG/DL (ref 70–99)
HCT VFR BLD AUTO: 34 % (ref 36.3–47.1)
HGB BLD-MCNC: 10.4 G/DL (ref 11.9–15.1)
MCH RBC QN AUTO: 29.1 PG (ref 25.2–33.5)
MCHC RBC AUTO-ENTMCNC: 30.6 G/DL (ref 28.4–34.8)
MCV RBC AUTO: 95.2 FL (ref 82.6–102.9)
NRBC BLD-RTO: 0 PER 100 WBC
PLATELET # BLD AUTO: 315 K/UL (ref 138–453)
PMV BLD AUTO: 10.7 FL (ref 8.1–13.5)
POTASSIUM SERPL-SCNC: 4 MMOL/L (ref 3.7–5.3)
RBC # BLD AUTO: 3.57 M/UL (ref 3.95–5.11)
SODIUM SERPL-SCNC: 132 MMOL/L (ref 135–144)
WBC OTHER # BLD: 7.7 K/UL (ref 3.5–11.3)

## 2024-01-12 PROCEDURE — 85379 FIBRIN DEGRADATION QUANT: CPT

## 2024-01-12 PROCEDURE — 80048 BASIC METABOLIC PNL TOTAL CA: CPT

## 2024-01-12 PROCEDURE — 85027 COMPLETE CBC AUTOMATED: CPT

## 2024-01-12 PROCEDURE — P9603 ONE-WAY ALLOW PRORATED MILES: HCPCS

## 2024-01-12 PROCEDURE — 36415 COLL VENOUS BLD VENIPUNCTURE: CPT

## 2024-01-12 NOTE — TELEPHONE ENCOUNTER
Left a voice mail for the patient to call the office and schedule a follow up with Dr. Hernandez in 6 months for carotid stenosis and with a carotid ultrasound

## 2024-01-15 ENCOUNTER — HOSPITAL ENCOUNTER (OUTPATIENT)
Age: 74
Setting detail: SPECIMEN
Discharge: HOME | End: 2024-01-15

## 2024-01-15 LAB
ANION GAP SERPL CALCULATED.3IONS-SCNC: 10 MMOL/L (ref 9–17)
BUN SERPL-MCNC: 14 MG/DL (ref 8–23)
BUN/CREAT SERPL: 23 (ref 9–20)
CALCIUM SERPL-MCNC: 8.4 MG/DL (ref 8.6–10.4)
CHLORIDE SERPL-SCNC: 96 MMOL/L (ref 98–107)
CO2 SERPL-SCNC: 31 MMOL/L (ref 20–31)
CREAT SERPL-MCNC: 0.6 MG/DL (ref 0.5–0.9)
ERYTHROCYTE [DISTWIDTH] IN BLOOD BY AUTOMATED COUNT: 17.1 % (ref 11.8–14.4)
GFR SERPL CREATININE-BSD FRML MDRD: >60 ML/MIN/1.73M2
GLUCOSE SERPL-MCNC: 89 MG/DL (ref 70–99)
HCT VFR BLD AUTO: 31.4 % (ref 36.3–47.1)
HGB BLD-MCNC: 9.7 G/DL (ref 11.9–15.1)
MCH RBC QN AUTO: 29.6 PG (ref 25.2–33.5)
MCHC RBC AUTO-ENTMCNC: 30.9 G/DL (ref 28.4–34.8)
MCV RBC AUTO: 95.7 FL (ref 82.6–102.9)
NRBC BLD-RTO: 0 PER 100 WBC
PLATELET # BLD AUTO: 213 K/UL (ref 138–453)
PMV BLD AUTO: 10.6 FL (ref 8.1–13.5)
POTASSIUM SERPL-SCNC: 3.8 MMOL/L (ref 3.7–5.3)
RBC # BLD AUTO: 3.28 M/UL (ref 3.95–5.11)
SODIUM SERPL-SCNC: 137 MMOL/L (ref 135–144)
WBC OTHER # BLD: 5.5 K/UL (ref 3.5–11.3)

## 2024-01-15 PROCEDURE — P9603 ONE-WAY ALLOW PRORATED MILES: HCPCS

## 2024-01-15 PROCEDURE — 85027 COMPLETE CBC AUTOMATED: CPT

## 2024-01-15 PROCEDURE — 80048 BASIC METABOLIC PNL TOTAL CA: CPT

## 2024-01-15 PROCEDURE — 36415 COLL VENOUS BLD VENIPUNCTURE: CPT

## 2024-01-24 ENCOUNTER — TELEPHONE (OUTPATIENT)
Dept: VASCULAR SURGERY | Age: 74
End: 2024-01-24

## 2024-01-24 NOTE — TELEPHONE ENCOUNTER
01/24/2024 @ 9:23 am - Spoke to the patients Son, Eze who request that we call him in May to discuss this appointment.  He states she is not getting out of bed and is currently at [facility], so he does not want to make any appointments at this time until he knows she is getting better.      A reminder was put in the patients chart to call in May.     ----- Message from Jaquelin Walton MA sent at 1/12/2024  3:36 PM EST -----  Regarding: RE: F/u apt  Voice mail left for the patient to call the office to schedule   ----- Message -----  From: Mary Hernandez MD  Sent: 1/5/2024   9:31 PM EST  To: Reji [facility] Heart/Vascular Clinical Staff  Subject: F/u apt                                          She needs to see me in office in 6 months with bilateral carotid duplex performed at that time for asymptomatic bilateral carotid stenosis.

## 2024-02-02 DIAGNOSIS — K25.4 GASTRIC ULCER WITH HEMORRHAGE, UNSPECIFIED CHRONICITY: Primary | ICD-10-CM

## 2024-02-02 NOTE — TELEPHONE ENCOUNTER
Writer spoke with pt's , Dawit. Dawit said Elaina is still in Rehab. He thinks she will be out in another week or two. He said she just started getting up and walking.    Writer told Dawit she will call back in 1.5/2 weeks to get repeat EGD scheduled.

## 2024-02-07 ENCOUNTER — HOSPITAL ENCOUNTER (OUTPATIENT)
Age: 74
Setting detail: SPECIMEN
Discharge: HOME | End: 2024-02-07

## 2024-02-07 LAB
ALBUMIN SERPL-MCNC: 3.1 G/DL (ref 3.5–5.2)
ALBUMIN/GLOB SERPL: 0.8 {RATIO} (ref 1–2.5)
ALP SERPL-CCNC: 98 U/L (ref 35–104)
ALT SERPL-CCNC: 13 U/L (ref 5–33)
ANION GAP SERPL CALCULATED.3IONS-SCNC: 11 MMOL/L (ref 9–17)
AST SERPL-CCNC: 28 U/L
BILIRUB SERPL-MCNC: 0.7 MG/DL (ref 0.3–1.2)
BUN SERPL-MCNC: 14 MG/DL (ref 8–23)
CALCIUM SERPL-MCNC: 9.1 MG/DL (ref 8.6–10.4)
CHLORIDE SERPL-SCNC: 97 MMOL/L (ref 98–107)
CO2 SERPL-SCNC: 30 MMOL/L (ref 20–31)
CREAT SERPL-MCNC: 0.7 MG/DL (ref 0.5–0.9)
ERYTHROCYTE [DISTWIDTH] IN BLOOD BY AUTOMATED COUNT: 17.8 % (ref 11.8–14.4)
GFR SERPL CREATININE-BSD FRML MDRD: >60 ML/MIN/1.73M2
GLUCOSE SERPL-MCNC: 73 MG/DL (ref 70–99)
HCT VFR BLD AUTO: 35.1 % (ref 36.3–47.1)
HGB BLD-MCNC: 10.8 G/DL (ref 11.9–15.1)
MCH RBC QN AUTO: 29.4 PG (ref 25.2–33.5)
MCHC RBC AUTO-ENTMCNC: 30.8 G/DL (ref 28.4–34.8)
MCV RBC AUTO: 95.6 FL (ref 82.6–102.9)
NRBC BLD-RTO: 0 PER 100 WBC
PLATELET # BLD AUTO: 240 K/UL (ref 138–453)
PMV BLD AUTO: 10.2 FL (ref 8.1–13.5)
POTASSIUM SERPL-SCNC: 3.8 MMOL/L (ref 3.7–5.3)
PROT SERPL-MCNC: 6.9 G/DL (ref 6.4–8.3)
RBC # BLD AUTO: 3.67 M/UL (ref 3.95–5.11)
SODIUM SERPL-SCNC: 138 MMOL/L (ref 135–144)
WBC OTHER # BLD: 3.3 K/UL (ref 3.5–11.3)

## 2024-02-07 PROCEDURE — 36415 COLL VENOUS BLD VENIPUNCTURE: CPT

## 2024-02-07 PROCEDURE — 80053 COMPREHEN METABOLIC PANEL: CPT

## 2024-02-07 PROCEDURE — 87086 URINE CULTURE/COLONY COUNT: CPT

## 2024-02-07 PROCEDURE — P9603 ONE-WAY ALLOW PRORATED MILES: HCPCS

## 2024-02-07 PROCEDURE — 85027 COMPLETE CBC AUTOMATED: CPT

## 2024-02-07 PROCEDURE — 81001 URINALYSIS AUTO W/SCOPE: CPT

## 2024-02-08 LAB
BACTERIA URNS QL MICRO: ABNORMAL
BILIRUB UR QL STRIP: NEGATIVE
CASTS #/AREA URNS LPF: ABNORMAL /LPF (ref 0–2)
CLARITY UR: ABNORMAL
COLOR UR: YELLOW
CRYSTALS URNS MICRO: ABNORMAL /HPF
CRYSTALS URNS MICRO: ABNORMAL /HPF
EPI CELLS #/AREA URNS HPF: ABNORMAL /HPF (ref 0–5)
GLUCOSE UR STRIP-MCNC: NEGATIVE MG/DL
HGB UR QL STRIP.AUTO: NEGATIVE
KETONES UR STRIP-MCNC: NEGATIVE MG/DL
LEUKOCYTE ESTERASE UR QL STRIP: ABNORMAL
NITRITE UR QL STRIP: NEGATIVE
PH UR STRIP: 5.5 [PH] (ref 5–8)
PROT UR STRIP-MCNC: NEGATIVE MG/DL
RBC #/AREA URNS HPF: ABNORMAL /HPF (ref 0–2)
SP GR UR STRIP: 1.01 (ref 1–1.03)
UROBILINOGEN UR STRIP-ACNC: NORMAL EU/DL (ref 0–1)
WBC #/AREA URNS HPF: ABNORMAL /HPF (ref 0–5)
YEAST URNS QL MICRO: ABNORMAL

## 2024-02-09 LAB
MICROORGANISM SPEC CULT: NORMAL
SERVICE CMNT-IMP: NORMAL
SPECIMEN DESCRIPTION: NORMAL

## 2024-02-13 ENCOUNTER — HOSPITAL ENCOUNTER (OUTPATIENT)
Age: 74
Setting detail: SPECIMEN
Discharge: HOME | End: 2024-02-13

## 2024-02-13 LAB
BASOPHILS # BLD: <0.03 K/UL (ref 0–0.2)
BASOPHILS NFR BLD: 0 % (ref 0–2)
EOSINOPHIL # BLD: 0.1 K/UL (ref 0–0.44)
EOSINOPHILS RELATIVE PERCENT: 2 % (ref 1–4)
ERYTHROCYTE [DISTWIDTH] IN BLOOD BY AUTOMATED COUNT: 17.3 % (ref 11.8–14.4)
HCT VFR BLD AUTO: 35 % (ref 36.3–47.1)
HGB BLD-MCNC: 10.8 G/DL (ref 11.9–15.1)
IMM GRANULOCYTES # BLD AUTO: <0.03 K/UL (ref 0–0.3)
IMM GRANULOCYTES NFR BLD: 0 %
LYMPHOCYTES NFR BLD: 1.49 K/UL (ref 1.1–3.7)
LYMPHOCYTES RELATIVE PERCENT: 26 % (ref 24–43)
MCH RBC QN AUTO: 29.5 PG (ref 25.2–33.5)
MCHC RBC AUTO-ENTMCNC: 30.9 G/DL (ref 28.4–34.8)
MCV RBC AUTO: 95.6 FL (ref 82.6–102.9)
MONOCYTES NFR BLD: 0.56 K/UL (ref 0.1–1.2)
MONOCYTES NFR BLD: 10 % (ref 3–12)
NEUTROPHILS NFR BLD: 62 % (ref 36–65)
NEUTS SEG NFR BLD: 3.65 K/UL (ref 1.5–8.1)
NRBC BLD-RTO: 0 PER 100 WBC
PLATELET # BLD AUTO: 251 K/UL (ref 138–453)
PMV BLD AUTO: 9.9 FL (ref 8.1–13.5)
RBC # BLD AUTO: 3.66 M/UL (ref 3.95–5.11)
RBC # BLD: ABNORMAL 10*6/UL
WBC OTHER # BLD: 5.8 K/UL (ref 3.5–11.3)

## 2024-02-13 PROCEDURE — 36415 COLL VENOUS BLD VENIPUNCTURE: CPT

## 2024-02-13 PROCEDURE — 85025 COMPLETE CBC W/AUTO DIFF WBC: CPT

## 2024-02-13 PROCEDURE — P9603 ONE-WAY ALLOW PRORATED MILES: HCPCS

## 2024-02-19 ENCOUNTER — HOSPITAL ENCOUNTER (OUTPATIENT)
Age: 74
Setting detail: SPECIMEN
Discharge: HOME | End: 2024-02-19

## 2024-02-19 LAB
ANION GAP SERPL CALCULATED.3IONS-SCNC: 7 MMOL/L (ref 9–17)
BUN SERPL-MCNC: 26 MG/DL (ref 8–23)
CALCIUM SERPL-MCNC: 9.2 MG/DL (ref 8.6–10.4)
CHLORIDE SERPL-SCNC: 94 MMOL/L (ref 98–107)
CO2 SERPL-SCNC: 32 MMOL/L (ref 20–31)
CREAT SERPL-MCNC: 0.9 MG/DL (ref 0.5–0.9)
ERYTHROCYTE [DISTWIDTH] IN BLOOD BY AUTOMATED COUNT: 16.5 % (ref 11.8–14.4)
GFR SERPL CREATININE-BSD FRML MDRD: >60 ML/MIN/1.73M2
GLUCOSE SERPL-MCNC: 78 MG/DL (ref 70–99)
HCT VFR BLD AUTO: 32.1 % (ref 36.3–47.1)
HGB BLD-MCNC: 10 G/DL (ref 11.9–15.1)
MCH RBC QN AUTO: 29.1 PG (ref 25.2–33.5)
MCHC RBC AUTO-ENTMCNC: 31.2 G/DL (ref 28.4–34.8)
MCV RBC AUTO: 93.3 FL (ref 82.6–102.9)
NRBC BLD-RTO: 0 PER 100 WBC
PLATELET # BLD AUTO: 331 K/UL (ref 138–453)
PMV BLD AUTO: 9.6 FL (ref 8.1–13.5)
POTASSIUM SERPL-SCNC: 3.9 MMOL/L (ref 3.7–5.3)
RBC # BLD AUTO: 3.44 M/UL (ref 3.95–5.11)
SODIUM SERPL-SCNC: 133 MMOL/L (ref 135–144)
WBC OTHER # BLD: 3.9 K/UL (ref 3.5–11.3)

## 2024-02-19 PROCEDURE — P9603 ONE-WAY ALLOW PRORATED MILES: HCPCS

## 2024-02-19 PROCEDURE — 85027 COMPLETE CBC AUTOMATED: CPT

## 2024-02-19 PROCEDURE — 80048 BASIC METABOLIC PNL TOTAL CA: CPT

## 2024-02-19 PROCEDURE — 36415 COLL VENOUS BLD VENIPUNCTURE: CPT

## 2024-02-22 ENCOUNTER — TELEPHONE (OUTPATIENT)
Dept: PRIMARY CARE CLINIC | Age: 74
End: 2024-02-22

## 2024-02-22 NOTE — TELEPHONE ENCOUNTER
Arleth from [facility] is calling to see if you would follow up for Home Care for patient. She will be discharged on 02/24/24.     Please advise.

## 2024-02-27 ENCOUNTER — CARE COORDINATION (OUTPATIENT)
Dept: CASE MANAGEMENT | Age: 74
End: 2024-02-27

## 2024-02-27 NOTE — CARE COORDINATION
Care Transitions Initial Follow Up Call    Call within 2 business days of discharge: Yes    Patient Current Location:  Home: [address] [place], [place] [address]  [facility] [address]    Care Transition Nurse contacted the patient by telephone to perform post hospital discharge assessment. Verified name and  with patient as identifiers. Provided introduction to self, and explanation of the Care Transition Nurse role.     Patient: Elaina Champagne Patient : 1950   MRN: <[id number]>  Reason for Admission: S/P cardiac cath  Discharge Date: 24 RARS: Readmission Risk Score: 23.2      Last Discharge Facility       Date Complaint Diagnosis Description Type Department Provider    24  S/P cardiac cath ... Admission (Discharged) Rob Dawkins MD            Was this an external facility discharge? Yes, 24  Discharge Facility: [facility]    Challenges to be reviewed by the provider   Additional needs identified to be addressed with provider: No  none               Method of communication with provider: none.    Patient states that she is doing pretty good. Denies sob, cp, palpitations, fever, chills, n/v. She reports L foot swelling. States she elevates when she sits. She states chronic L knee pain. BLE cellulitis is all healed except a dime size area on the back of her L leg. States [facility] nurse cleaned it and placed a band-aid over it. Appetite is improving. States she is drinking plenty of water. Elimination habits regular. Medication reviewed. She ambulates using a walker. She is able to sponge bathe, dress self and her  helps with meal prep. Patient states her PCP f/u is scheduled .    Care Transition Nurse reviewed medical action plan with patient who verbalized understanding. The patient was given an opportunity to ask questions and does not have any further questions or concerns at this time. Were discharge instructions available to patient? Yes. Reviewed appropriate site of care

## 2024-02-28 ENCOUNTER — CARE COORDINATION (OUTPATIENT)
Dept: CARE COORDINATION | Age: 74
End: 2024-02-28

## 2024-02-28 NOTE — CARE COORDINATION
Ambulatory Care Coordination Note  2024    Patient Current Location:  Home: [address]  [facility] [place] [address]    ACM contacted the patient by telephone. Verified name and  with patient as identifiers. Provided introduction to self, and explanation of the ACM role.     Referral from care transition nurse for care management.  Admission - for NSTEMI, had 2 stents placed, sepsis related to cellulitis and venous stasis ulcers lower legs, MICHELLE. EGD done for GI bleed, has antral ulcer. Went to [facility] and discharged to home 24. No orders to go to wound care but ID wrote in note she will need outpatient wound care.  No chest pain, palpitations, dizziness. Eating well, denied any black or red colored stools, is on ASA and Eliquis.  Most of wounds are healed, now only a wound left posterior lower leg.  applying gauze every day, is about the size of a dime now. Following with ID.  She has follow up with GI scheduled. Cardiology office called her to schedule appt but she wanted to wait. Writer encouraged she call and schedule, gave her phone number. Has PCP appt tomorrow. Starts cardiac rehab in April.  She needs to schedule appt with vascular surgeon Dr. Gomez in 6 months for carotid stenosis,   Getting [facility], PT and OT visits.  Also has HTN, OA, a fib, obesity, venous insufficiency, CRF. Does not weigh herself or check BP at home.  She mostly manages medications herself, no list available from facility, she will take with her to PCP appt.  She uses a walker for ambulation, is independent except can't get out of recliner by herself,  has to help her. She will ask PCP for a shower chair order.  SDOH- no needs identified. Her  will be driving her to appts. No financial concerns.  CC Plan:   -follow up in a few weeks to review progress notes, assess therapy at home, wounds and treatment by home health, review medications, address ACP and RPM.  General

## 2024-02-29 ENCOUNTER — OFFICE VISIT (OUTPATIENT)
Dept: PRIMARY CARE CLINIC | Age: 74
End: 2024-02-29

## 2024-02-29 VITALS
HEIGHT: 62 IN | WEIGHT: 254.6 LBS | BODY MASS INDEX: 46.85 KG/M2 | SYSTOLIC BLOOD PRESSURE: 124 MMHG | DIASTOLIC BLOOD PRESSURE: 64 MMHG | OXYGEN SATURATION: 96 % | HEART RATE: 89 BPM

## 2024-02-29 DIAGNOSIS — N39.41 URGE INCONTINENCE OF URINE: ICD-10-CM

## 2024-02-29 DIAGNOSIS — I25.119 ATHEROSCLEROSIS OF NATIVE CORONARY ARTERY OF NATIVE HEART WITH ANGINA PECTORIS (HCC): ICD-10-CM

## 2024-02-29 DIAGNOSIS — R60.0 LOCALIZED EDEMA: ICD-10-CM

## 2024-02-29 DIAGNOSIS — Z09 HOSPITAL DISCHARGE FOLLOW-UP: ICD-10-CM

## 2024-02-29 DIAGNOSIS — I87.312 CHRONIC VENOUS HYPERTENSION (IDIOPATHIC) WITH ULCER OF LEFT LOWER EXTREMITY (CODE) (HCC): ICD-10-CM

## 2024-02-29 DIAGNOSIS — I48.0 PAROXYSMAL ATRIAL FIBRILLATION (HCC): ICD-10-CM

## 2024-02-29 DIAGNOSIS — I48.91 ATRIAL FIBRILLATION WITH RAPID VENTRICULAR RESPONSE (HCC): Primary | ICD-10-CM

## 2024-02-29 RX ORDER — SOLIFENACIN SUCCINATE 10 MG/1
10 TABLET, FILM COATED ORAL DAILY
Qty: 90 TABLET | Refills: 3 | Status: SHIPPED | OUTPATIENT
Start: 2024-02-29

## 2024-02-29 RX ORDER — POTASSIUM CHLORIDE 750 MG/1
CAPSULE, EXTENDED RELEASE ORAL
COMMUNITY
Start: 2024-02-23 | End: 2024-02-29

## 2024-02-29 RX ORDER — HYDROCHLOROTHIAZIDE 25 MG/1
25 TABLET ORAL EVERY MORNING
Qty: 90 TABLET | Refills: 3 | Status: SHIPPED | OUTPATIENT
Start: 2024-02-29

## 2024-02-29 RX ORDER — POTASSIUM CHLORIDE 750 MG/1
10 CAPSULE, EXTENDED RELEASE ORAL 2 TIMES DAILY
Qty: 180 CAPSULE | Refills: 3 | Status: SHIPPED | OUTPATIENT
Start: 2024-02-29

## 2024-02-29 RX ORDER — CLOPIDOGREL BISULFATE 75 MG/1
75 TABLET ORAL DAILY
Qty: 90 TABLET | Refills: 3 | Status: SHIPPED | OUTPATIENT
Start: 2024-02-29

## 2024-02-29 RX ORDER — ATORVASTATIN CALCIUM 40 MG/1
40 TABLET, FILM COATED ORAL NIGHTLY
Qty: 90 TABLET | Refills: 3 | Status: SHIPPED | OUTPATIENT
Start: 2024-02-29

## 2024-02-29 RX ORDER — METOPROLOL TARTRATE 50 MG/1
50 TABLET, FILM COATED ORAL 2 TIMES DAILY
Qty: 180 TABLET | Refills: 3 | Status: SHIPPED | OUTPATIENT
Start: 2024-02-29

## 2024-02-29 ASSESSMENT — PATIENT HEALTH QUESTIONNAIRE - PHQ9
8. MOVING OR SPEAKING SO SLOWLY THAT OTHER PEOPLE COULD HAVE NOTICED. OR THE OPPOSITE, BEING SO FIGETY OR RESTLESS THAT YOU HAVE BEEN MOVING AROUND A LOT MORE THAN USUAL: 0
SUM OF ALL RESPONSES TO PHQ9 QUESTIONS 1 & 2: 0
3. TROUBLE FALLING OR STAYING ASLEEP: 0
1. LITTLE INTEREST OR PLEASURE IN DOING THINGS: 0
SUM OF ALL RESPONSES TO PHQ QUESTIONS 1-9: 3
SUM OF ALL RESPONSES TO PHQ QUESTIONS 1-9: 3
7. TROUBLE CONCENTRATING ON THINGS, SUCH AS READING THE NEWSPAPER OR WATCHING TELEVISION: 1
4. FEELING TIRED OR HAVING LITTLE ENERGY: 1
SUM OF ALL RESPONSES TO PHQ QUESTIONS 1-9: 3
2. FEELING DOWN, DEPRESSED OR HOPELESS: 0
5. POOR APPETITE OR OVEREATING: 0
10. IF YOU CHECKED OFF ANY PROBLEMS, HOW DIFFICULT HAVE THESE PROBLEMS MADE IT FOR YOU TO DO YOUR WORK, TAKE CARE OF THINGS AT HOME, OR GET ALONG WITH OTHER PEOPLE: 2
SUM OF ALL RESPONSES TO PHQ QUESTIONS 1-9: 3
9. THOUGHTS THAT YOU WOULD BE BETTER OFF DEAD, OR OF HURTING YOURSELF: 0
6. FEELING BAD ABOUT YOURSELF - OR THAT YOU ARE A FAILURE OR HAVE LET YOURSELF OR YOUR FAMILY DOWN: 1

## 2024-02-29 NOTE — PROGRESS NOTES
Post-Discharge Transitional Care Management Progress Note      Elaina Champagne   YOB: 1950    Date of Office Visit:  2/29/2024  Date of Hospital Admission: 1/26/2024  Date of Hospital Discharge: 2/24/2024    Care management risk score Rising risk (score 2-5) and Complex Care (Scores >=6): No Risk Score On File     Non face to face  following discharge, date last encounter closed (first attempt may have been earlier): 02/27/2024 02/27/2024    Call initiated 2 business days of discharge: Yes    ASSESSMENT/PLAN:   Atrial fibrillation with rapid ventricular response (HCC)  Paroxysmal atrial fibrillation (HCC)  Localized edema  -     hydroCHLOROthiazide (HYDRODIURIL) 25 MG tablet; Take 1 tablet by mouth every morning, Disp-90 tablet, R-3Normal  -     Basic Metabolic Panel; Future  Chronic venous hypertension (idiopathic) with ulcer of left lower extremity (CODE) (HCC)  Atherosclerosis of native coronary artery of native heart with angina pectoris (HCC)  Urge incontinence of urine  -     solifenacin (VESICARE) 10 MG tablet; Take 1 tablet by mouth daily, Disp-90 tablet, R-3Patient will use Good RXNormal  Hospital discharge follow-up  -     ID DISCHARGE MEDS RECONCILED W/ CURRENT OUTPATIENT MED LIST      Medical Decision Making: high complexity  Return in about 4 weeks (around 3/28/2024).         Subjective:   HPI:  Follow up of Hospital problems/diagnosis(es): Patient was admitted to [facility].  Patient originally states \"could not see\" patient was noted to have elevated troponin levels.  Patient was treated for cellulitis of her left leg which was followed by wound care.  Her troponins elevated.  Then went to [place] for heart cath.  Patient had 2 stents placed.  Patient also has history of paroxysmal atrial fibrillation.  States was started on Eliquis but not sure if her insurance will cover it.  If they want, states she cannot afford it.  Patient was given Bumex for the edema.  [facility]

## 2024-03-01 ENCOUNTER — TELEPHONE (OUTPATIENT)
Dept: PRIMARY CARE CLINIC | Age: 74
End: 2024-03-01

## 2024-03-01 NOTE — TELEPHONE ENCOUNTER
Mavis from [facility] someone from wound care visited patient in her home today and applied a cream and coban to the wound on patients left leg.    Mavis states she is not sure what the cream was and needed orders for treatment to the area.    Patient confirmed that it was the wound center through [place] however there is no documentation from the wound center in patients chart. The office is closed at this time so we will have to call Monday.    Please advise if you have other recommendations or if they should follow instructions from wound care.

## 2024-03-05 ENCOUNTER — TELEPHONE (OUTPATIENT)
Dept: PRIMARY CARE CLINIC | Age: 74
End: 2024-03-05

## 2024-03-05 NOTE — TELEPHONE ENCOUNTER
MARIA M Gamble from [facility] called stating pt has a wound on LT lwr leg and was sent to [place] wound center but pt does not want to go back there. Would not state why but that she just didn't want to go. Pt has supplies from previously going and is using Triad cream and putting 2x2 dry dressing with coban. She is changing dressing every other day. Pt's  states wound is getting smaller since last week. MARIA M Gamble has not seen wound yet due to pt already changing dressing today. She states she will send order for what pt is doing and using if you agree, please sign and will fax back     FYI- advise if needed

## 2024-03-05 NOTE — TELEPHONE ENCOUNTER
A nurse is going out today to see pt re the wound. Pt will have her call the office to let us know what they are using on her wound.

## 2024-03-11 ENCOUNTER — CARE COORDINATION (OUTPATIENT)
Dept: CARE COORDINATION | Age: 74
End: 2024-03-11

## 2024-03-11 NOTE — CARE COORDINATION
This Visit's Progress     Conditions and Symptoms   On track     I will schedule office visits, as directed by my provider.  I will keep my appointment or reschedule if I have to cancel.  I will notify my provider of any barriers to my plan of care.  I will notify my provider of any symptoms that indicate a worsening of my condition.    Barriers: overwhelmed by complexity of regimen and lack of education  Plan for overcoming my barriers: ACM calls, education, assist with appts, home health visits  Confidence: 8/10  Anticipated Goal Completion Date: 5/20/24                Future Appointments   Date Time Provider Department Center   3/26/2024  2:00 PM Robin Ly MD [facility]   4/15/2024 12:00 PM [facility] RE[facility]AB RM 01 [facility] CARDIAC [facility]

## 2024-03-15 ENCOUNTER — HOSPITAL ENCOUNTER (OUTPATIENT)
Age: 74
Setting detail: SPECIMEN
Discharge: HOME | End: 2024-03-15

## 2024-03-15 LAB
ANION GAP SERPL CALCULATED.3IONS-SCNC: 11 MMOL/L (ref 9–16)
BUN SERPL-MCNC: 26 MG/DL (ref 8–23)
CALCIUM SERPL-MCNC: 9.4 MG/DL (ref 8.6–10.4)
CHLORIDE SERPL-SCNC: 100 MMOL/L (ref 98–107)
CO2 SERPL-SCNC: 25 MMOL/L (ref 20–31)
CREAT SERPL-MCNC: 1.1 MG/DL (ref 0.5–0.9)
GFR SERPL CREATININE-BSD FRML MDRD: 53 ML/MIN/1.73M2
GLUCOSE SERPL-MCNC: 91 MG/DL (ref 74–99)
POTASSIUM SERPL-SCNC: 4.2 MMOL/L (ref 3.7–5.3)
SODIUM SERPL-SCNC: 136 MMOL/L (ref 136–145)

## 2024-03-18 ENCOUNTER — TELEPHONE (OUTPATIENT)
Dept: PRIMARY CARE CLINIC | Age: 74
End: 2024-03-18

## 2024-03-18 NOTE — TELEPHONE ENCOUNTER
Mavis from [facility] is reporting a new sore patient has developed on her left foot.    The sore is red, painful and seeping. [facility] is already treating a would on patients left lower extremity, they are requesting verbal orders for wound care for this new wound. Mavis states for now they will wrap the leg in coban and absorbant dressing.    Mavis mentioned that patient is taking hydrochlorothiazide. Please advise.

## 2024-03-19 NOTE — TELEPHONE ENCOUNTER
Mavis calling back. Pt does not want to use wound care. Asking if they can treat pt with Alginate to wound bed and use a foam border dressing qod, or daily if needed? SAHARA

## 2024-03-20 ENCOUNTER — CARE COORDINATION (OUTPATIENT)
Dept: CARE COORDINATION | Age: 74
End: 2024-03-20

## 2024-03-20 NOTE — CARE COORDINATION
Ambulatory Care Coordination Note  3/20/2024    Patient Current Location:  Home: [address]  [facility] [address]     ACM contacted the patient by telephone. Verified name and  with patient as identifiers. Provided introduction to self, and explanation of the ACM role.      She has another wound on left lower leg,[facility] nurse treating, she stated won't go to wound care clinic. Stated she had a lot of pain when they were cutting away the dead skin so they would stop and not continue. Stated right leg wound is healed closed. Writer discussed possibly following up with I.D.Dr. Baez who she saw while admitted.She asked writer to schedule for her.  She has not called to schedule cardiology appt, she is ok with writer scheduling but doesn't want it to be soon.No dyspnea or chest pain, just feels tired a lot, still not getting around great, is slow. She also needs to schedule with vascular surgeon and do bilat carotid duplex in , she asked writer to schedule that also.  No new concerns or symptoms. Has PCP appt next week.  CC Actions and Plan:   - Called Dr. Baez's office, they will not see patient unless she has an active infection, won't address the wound or healing of it if not infected so no appt made.  -Called vasc surgery, scheduled July appt.  -Called and scheduled carotid duplex needed before vascular appt  -Called and scheduled cardiology appt  -Left VM message on Dr. Nolasco surgery line informing she is out of rehab facility and can be called to schedule repeat endoscopy.  -Notified patient of new appts, office phone numbers. Will call again in a few weeks to review progress notes, symptoms, progress with wound healing.  General Assessment    Do you have any symptoms that are causing concern?: Yes  Progression since Onset: Gradually Improving  Reported Symptoms: Other (Comment: wounds on legs)                Offered patient enrollment in the Remote Patient Monitoring (RPM) program

## 2024-03-22 RX ORDER — FUROSEMIDE 40 MG/1
40 TABLET ORAL DAILY
Qty: 90 TABLET | Refills: 3 | Status: SHIPPED | OUTPATIENT
Start: 2024-03-22

## 2024-03-26 ENCOUNTER — OFFICE VISIT (OUTPATIENT)
Dept: PRIMARY CARE CLINIC | Age: 74
End: 2024-03-26
Payer: MEDICARE

## 2024-03-26 VITALS
OXYGEN SATURATION: 97 % | SYSTOLIC BLOOD PRESSURE: 128 MMHG | WEIGHT: 244.6 LBS | BODY MASS INDEX: 45.01 KG/M2 | DIASTOLIC BLOOD PRESSURE: 68 MMHG | HEIGHT: 62 IN | HEART RATE: 85 BPM

## 2024-03-26 DIAGNOSIS — I89.0 LYMPHEDEMA: ICD-10-CM

## 2024-03-26 DIAGNOSIS — S81.802D OPEN WOUND OF LEFT LOWER LEG, SUBSEQUENT ENCOUNTER: Primary | ICD-10-CM

## 2024-03-26 DIAGNOSIS — N18.30 STAGE 3 CHRONIC KIDNEY DISEASE, UNSPECIFIED WHETHER STAGE 3A OR 3B CKD (HCC): ICD-10-CM

## 2024-03-26 DIAGNOSIS — N18.32 CHRONIC KIDNEY DISEASE, STAGE 3B (HCC): ICD-10-CM

## 2024-03-26 DIAGNOSIS — I87.312 CHRONIC VENOUS HYPERTENSION (IDIOPATHIC) WITH ULCER OF LEFT LOWER EXTREMITY (CODE) (HCC): ICD-10-CM

## 2024-03-26 PROCEDURE — 1090F PRES/ABSN URINE INCON ASSESS: CPT | Performed by: FAMILY MEDICINE

## 2024-03-26 PROCEDURE — 3074F SYST BP LT 130 MM HG: CPT | Performed by: FAMILY MEDICINE

## 2024-03-26 PROCEDURE — 99213 OFFICE O/P EST LOW 20 MIN: CPT | Performed by: FAMILY MEDICINE

## 2024-03-26 PROCEDURE — G8399 PT W/DXA RESULTS DOCUMENT: HCPCS | Performed by: FAMILY MEDICINE

## 2024-03-26 PROCEDURE — G8417 CALC BMI ABV UP PARAM F/U: HCPCS | Performed by: FAMILY MEDICINE

## 2024-03-26 PROCEDURE — 1123F ACP DISCUSS/DSCN MKR DOCD: CPT | Performed by: FAMILY MEDICINE

## 2024-03-26 PROCEDURE — 1036F TOBACCO NON-USER: CPT | Performed by: FAMILY MEDICINE

## 2024-03-26 PROCEDURE — G8427 DOCREV CUR MEDS BY ELIG CLIN: HCPCS | Performed by: FAMILY MEDICINE

## 2024-03-26 PROCEDURE — 3078F DIAST BP <80 MM HG: CPT | Performed by: FAMILY MEDICINE

## 2024-03-26 PROCEDURE — 3017F COLORECTAL CA SCREEN DOC REV: CPT | Performed by: FAMILY MEDICINE

## 2024-03-26 PROCEDURE — G8484 FLU IMMUNIZE NO ADMIN: HCPCS | Performed by: FAMILY MEDICINE

## 2024-03-26 SDOH — ECONOMIC STABILITY: FOOD INSECURITY: WITHIN THE PAST 12 MONTHS, THE FOOD YOU BOUGHT JUST DIDN'T LAST AND YOU DIDN'T HAVE MONEY TO GET MORE.: NEVER TRUE

## 2024-03-26 SDOH — ECONOMIC STABILITY: FOOD INSECURITY: WITHIN THE PAST 12 MONTHS, YOU WORRIED THAT YOUR FOOD WOULD RUN OUT BEFORE YOU GOT MONEY TO BUY MORE.: NEVER TRUE

## 2024-03-26 ASSESSMENT — ENCOUNTER SYMPTOMS
NAUSEA: 0
RHINORRHEA: 0
EYE REDNESS: 0
SORE THROAT: 0
DIARRHEA: 0
EYE DISCHARGE: 0
ABDOMINAL PAIN: 0
WHEEZING: 0
COUGH: 0
SHORTNESS OF BREATH: 0
VOMITING: 0

## 2024-04-04 ENCOUNTER — CARE COORDINATION (OUTPATIENT)
Dept: CARE COORDINATION | Age: 74
End: 2024-04-04

## 2024-04-04 NOTE — CARE COORDINATION
Left VM message asking patient to return call for care management follow up, discuss ACP, check leg wound healing. Will call again next week.    Future Appointments   Date Time Provider Department Center   4/15/2024 12:00 PM [facility] RM 01 [facility] CARDIAC [facility]   4/16/2024  2:00 PM Raven Conte, APRN - CNP [facility]   7/1/2024  1:45 PM [facility]   7/2/2024  1:30 PM ST[facility] VASCULAR LAB 2 [facility] Radi[facility]   7/16/2024  1:15 PM [facility]

## 2024-04-08 NOTE — PROGRESS NOTES
[facility]  [address]  [address] [place] [address]  Dept: [phone]    Erika Soriano is a 80 y.o. female Established patient, who presents today for her medical conditions/complaints as noted below. Chief Complaint   Patient presents with    Leg Pain     left leg - pt took [employer] off Sunday - pt states that it was too tight - redness has gone up to patients inner thigh.  Hypoglycemia     pt reports glucose of 70 over the weekend - pt reports shaking - drank OJ and felt much better.         HPI:     HPI  She took [employer] boot off on Sunday due to pain   She states the redness in her leg has slowly moved up  She has had some yellow drainage from wound  No history of diabetes    Reviewed prior notes None  Reviewed previous Labs    LDL Cholesterol (mg/dL)   Date Value   11/21/2019 105     LDL Calculated (mg/dL)   Date Value   06/07/2017 131       (goal LDL is <100)   AST (U/L)   Date Value   11/03/2020 21     ALT (U/L)   Date Value   11/03/2020 12     BUN (mg/dL)   Date Value   03/03/2022 15     Hemoglobin A1C (%)   Date Value   08/21/2018 5.2     TSH (mIU/L)   Date Value   07/07/2021 1.70     BP Readings from Last 3 Encounters:   03/21/22 (!) 162/80   03/03/22 132/80   02/23/22 134/88          (goal 120/80)    Past Medical History:   Diagnosis Date    Bell's palsy     Cellulitis     Hypertension       Past Surgical History:   Procedure Laterality Date    CATARACT REMOVAL Bilateral 2015    HYSTERECTOMY, TOTAL ABDOMINAL  1990    INCISION AND DRAINAGE Left 07/06/2017    LEG INCISION AND DRAINAGE ABSCESS performed by Selvin Holland MD at [address] [place] [address] Right 09/24/2018    TOTAL KNEE ARTHROPLASTY Left 11/09/2017    VENTRAL HERNIA REPAIR  02/11/2019    5 hernias       Family History   Problem Relation Age of Onset    Cancer Mother     High Blood Pressure Father     Stroke Father     Diabetes Maternal Aunt     Cancer Other daughter/ovarian cancer    Cancer Daughter        Social History     Tobacco Use    Smoking status: Never Smoker    Smokeless tobacco: Never Used   Substance Use Topics    Alcohol use: No      Current Outpatient Medications   Medication Sig Dispense Refill    sulfamethoxazole-trimethoprim (BACTRIM DS) 800-160 MG per tablet Take 1 tablet by mouth 2 times daily for 14 days 28 tablet 0    fluconazole (DIFLUCAN) 100 MG tablet Take 1 tablet by mouth daily for 10 doses 10 tablet 0    ammonium lactate (LAC-HYDRIN) 12 % lotion Apply topically daily. 222 mL 3    nystatin (MYCOSTATIN) 185788 UNIT/GM ointment Apply topically 2 times daily. 30 g 2    mupirocin (BACTROBAN) 2 % ointment Apply topically 3 times daily. 22 g 2    nystatin (MYCOSTATIN) 487079 UNIT/GM powder Apply 2 times daily. 60 g 3    furosemide (LASIX) 40 MG tablet Take 1 tablet by mouth daily 90 tablet 1    potassium chloride (KLOR-CON M) 10 MEQ extended release tablet Take 1 tablet by mouth 2 times daily 180 tablet 1    omeprazole (PRILOSEC) 20 MG delayed release capsule TAKE 1 CAPSULE BY MOUTH EVERY NIGHT 90 capsule 1    metoprolol tartrate (LOPRESSOR) 25 MG tablet Take 1/2 (one-half) tablet by mouth twice daily 90 tablet 1    solifenacin (VESICARE) 10 MG tablet Take 1 tablet by mouth daily 90 tablet 1    escitalopram (LEXAPRO) 20 MG tablet TAKE 1 TABLET BY MOUTH EVERY DAY 90 tablet 1    naproxen (NAPROSYN) 500 MG tablet TAKE 1 TABLET BY MOUTH TWICE DAILY WITH MEALS 180 tablet 1    tiZANidine (ZANAFLEX) 4 MG tablet Take 1 tablet by mouth 3 times daily as needed (spasms) 60 tablet 3    Calcium Citrate-Vitamin D 500-500 MG-UNIT PACK Take 1 by mouth daily. 30 each 2    metroNIDAZOLE (METROGEL) 0.75 % gel Apply topically 2 times daily. 45 g 2    traZODone (DESYREL) 50 MG tablet Take 1 tablet by mouth nightly as needed for Sleep 30 tablet 5     No current facility-administered medications for this visit.      Allergies   Allergen Reactions    Penicillins Hives       Health Maintenance   Topic Date Due    Shingles Vaccine (2 of 3) 08/13/2012    Breast cancer screen  09/18/2022    Depression Monitoring  12/27/2022    Annual Wellness Visit (AWV)  12/28/2022    Potassium monitoring  03/03/2023    Creatinine monitoring  03/03/2023    Colorectal Cancer Screen  06/17/2023    Lipid screen  11/21/2024    DTaP/Tdap/Td vaccine (3 - Td or Tdap) 11/20/2025    DEXA (modify frequency per FRAX score)  Completed    Flu vaccine  Completed    Pneumococcal 65+ years Vaccine  Completed    COVID-19 Vaccine  Completed    Hepatitis C screen  Completed    Hepatitis A vaccine  Aged Out    Hepatitis B vaccine  Aged Out    Hib vaccine  Aged Out    Meningococcal (ACWY) vaccine  Aged Out       Subjective:      Review of Systems   Constitutional: Negative for chills, fatigue and fever. HENT: Negative for congestion, ear pain and postnasal drip. Eyes: Negative for pain and redness. Respiratory: Negative for cough and shortness of breath. Cardiovascular: Positive for leg swelling. Negative for chest pain and palpitations. Gastrointestinal: Negative for blood in stool, constipation and diarrhea. Genitourinary: Positive for vaginal pain. Negative for frequency and hematuria. Musculoskeletal: Negative for arthralgias and neck pain. Skin: Positive for color change and wound. Neurological: Negative for dizziness, light-headedness and headaches. Psychiatric/Behavioral: Positive for sleep disturbance. Negative for dysphoric mood. The patient is not nervous/anxious. Objective:     BP (!) 162/80   Pulse 62   SpO2 98%   Physical Exam  Vitals and nursing note reviewed. Constitutional:       Appearance: She is obese. HENT:      Head: Normocephalic and atraumatic. Eyes:      Extraocular Movements: Extraocular movements intact. Pupils: Pupils are equal, round, and reactive to light.    Cardiovascular:      Rate and Rhythm: Normal rate and regular rhythm. Heart sounds: Normal heart sounds. Pulmonary:      Effort: Pulmonary effort is normal.      Breath sounds: Normal breath sounds. Abdominal:      General: Bowel sounds are normal.      Palpations: Abdomen is soft. Skin:     General: Skin is warm. Findings: Erythema present. Comments: Proximal Right posterior calf wound 1.1 x 1.0 X 0.1 with 50% slough and small amount of serous drainage. Distal right posterior calf wound 2.8 x 1.2 x 0.1 with 60% slough, and moderate serous drainage. Neurological:      Mental Status: She is alert and oriented to person, place, and time. Cranial Nerves: No cranial nerve deficit. Psychiatric:         Mood and Affect: Mood normal.         Thought Content: Thought content normal.         Assessment/Plan:   1. Open wound of right lower extremity, subsequent encounter  -     sulfamethoxazole-trimethoprim (BACTRIM DS) 800-160 MG per tablet; Take 1 tablet by mouth 2 times daily for 14 days, Disp-28 tablet, R-0Normal  2. Cellulitis of right lower extremity  -     sulfamethoxazole-trimethoprim (BACTRIM DS) 800-160 MG per tablet; Take 1 tablet by mouth 2 times daily for 14 days, Disp-28 tablet, R-0Normal  3. Antibiotic-induced yeast infection  -     fluconazole (DIFLUCAN) 100 MG tablet; Take 1 tablet by mouth daily for 10 doses, Disp-10 tablet, R-0Normal     Cleanse right lower leg wound with soap and water, pat dry and cover with gauze pad and wrap daily. Sulfamethoxazole-trimethoprim 1 tablet 2x/day x 14 days. Diflucan 100 mg daily X 10 days    Return in about 10 days (around 3/31/2022) for wound check. No orders of the defined types were placed in this encounter.     Orders Placed This Encounter   Medications    sulfamethoxazole-trimethoprim (BACTRIM DS) 800-160 MG per tablet     Sig: Take 1 tablet by mouth 2 times daily for 14 days     Dispense:  28 tablet     Refill:  0    fluconazole (DIFLUCAN) 100 MG tablet     Sig: Take 1 The patient is a 4y6m Female complaining of fever and rash.

## 2024-04-10 ENCOUNTER — CARE COORDINATION (OUTPATIENT)
Dept: CARE COORDINATION | Age: 74
End: 2024-04-10

## 2024-04-10 NOTE — ACP (ADVANCE CARE PLANNING)
Advance Care Planning   Healthcare Decision Maker:    Primary Decision Maker: Eze Champagne - Spouse - [phone]    Click here to complete Healthcare Decision Makers including selection of the Healthcare Decision Maker Relationship (ie \"Primary\").  Today we documented Decision Maker(s) consistent with Legal Next of Kin hierarchy. She thinks has ACP documents, she will look for them. Instructed to bring copy to PCP office to have scanned into her chart.

## 2024-04-10 NOTE — CARE COORDINATION
Conditions and Symptoms   On track     I will schedule office visits, as directed by my provider.  I will keep my appointment or reschedule if I have to cancel.  I will notify my provider of any barriers to my plan of care.  I will notify my provider of any symptoms that indicate a worsening of my condition.    Barriers: overwhelmed by complexity of regimen and lack of education  Plan for overcoming my barriers: ACM calls, education, assist with appts, home health visits  Confidence: 8/10  Anticipated Goal Completion Date: 5/20/24                Future Appointments   Date Time Provider Department Center   4/15/2024 12:00 PM [facility] CARD REHAB RM 01 [facility]   4/16/2024  2:00 PM Raven Conte, APRN - CNP [facility]   7/1/2024  1:45 PM Robin Ly MD [facility]   7/2/2024  1:30 PM [facility] VASCULAR LAB 2 [facility] V[facility]   7/16/2024  1:15 PM [facility]

## 2024-04-15 ENCOUNTER — HOSPITAL ENCOUNTER (OUTPATIENT)
Dept: CARDIAC REHAB | Age: 74
Setting detail: THERAPIES SERIES
Discharge: HOME | End: 2024-04-15

## 2024-04-15 NOTE — PROGRESS NOTES
Pt was no show no call for Cardiac rehab today, called pt, states she forgot, reminder call was placed on 4/12 to remind pt of appointment per Arabella FRANZ, pt was also reminded of appointment while in Wound Care on 4/10. Pt rescheduled for 5/13 at 1200, pt instructed to please call if she cannot make her next appointment, call back number provided, pt v/u.

## 2024-04-16 ENCOUNTER — TELEPHONE (OUTPATIENT)
Dept: PRIMARY CARE CLINIC | Age: 74
End: 2024-04-16

## 2024-04-16 NOTE — TELEPHONE ENCOUNTER
Pt has a bed sore about the size of a dime and open, feels raw. The only thing she has at home is A&D oint. Asking if you want her to use that, or something different?

## 2024-04-29 ENCOUNTER — TELEPHONE (OUTPATIENT)
Dept: PRIMARY CARE CLINIC | Age: 74
End: 2024-04-29

## 2024-04-29 ENCOUNTER — CARE COORDINATION (OUTPATIENT)
Dept: CARE COORDINATION | Age: 74
End: 2024-04-29

## 2024-04-29 DIAGNOSIS — L97.812 NON-PRESSURE CHRONIC ULCER OF OTHER PART OF RIGHT LOWER LEG WITH FAT LAYER EXPOSED (HCC): Primary | ICD-10-CM

## 2024-04-29 RX ORDER — SULFAMETHOXAZOLE AND TRIMETHOPRIM 400; 80 MG/1; MG/1
1 TABLET ORAL 2 TIMES DAILY
Qty: 14 TABLET | Refills: 0 | Status: ON HOLD | OUTPATIENT
Start: 2024-04-29 | End: 2024-05-06

## 2024-04-29 NOTE — TELEPHONE ENCOUNTER
Pt has had urinary burning times 2 days. Asking for an abx. No fever.    Uses [facility].

## 2024-04-29 NOTE — TELEPHONE ENCOUNTER
----- Message from Farhad De Leon sent at 4/29/2024  9:43 AM EDT -----  Subject: Referral Request    Reason for referral request? Wound care  Provider patient wants to be referred to(if known):     Provider Phone Number(if known):    Additional Information for Provider? Patient would like to go [place].  ---------------------------------------------------------------------------  --------------  CALL BACK INFO    [phone]; OK to leave message on voicemail  ---------------------------------------------------------------------------  --------------

## 2024-04-29 NOTE — TELEPHONE ENCOUNTER
----- Message from Farhad De Leon sent at 4/29/2024  9:43 AM EDT -----  Subject: Referral Request    Reason for referral request? Wound care  Provider patient wants to be referred to(if known):     Provider Phone Number(if known):    Additional Information for Provider? Patient would like to go [facility].  ---------------------------------------------------------------------------  --------------  CALL BACK INFO    [phone]; OK to leave message on voicemail  ---------------------------------------------------------------------------  --------------

## 2024-04-29 NOTE — TELEPHONE ENCOUNTER
----- Message from Farhad Moises sent at 4/29/2024  9:43 AM EDT -----  Subject: Message to Provider    QUESTIONS  Information for Provider? Patient said the nurse told her that she may   have a uti because when she pee it burns and she needs a antibiotic for   her leg she said her left leg is hurting.  ---------------------------------------------------------------------------  --------------  CALL BACK INFO  [phone]; OK to leave message on voicemail  ---------------------------------------------------------------------------  --------------  SCRIPT ANSWERS  Relationship to Patient? Self

## 2024-04-29 NOTE — CARE COORDINATION
Attempted to call for care management, greeting stated writer not registered with [employer] so unable to complete call, no voicemail.     She did not attend cardiology appt 4/16 and did not reschedule, she canceled and did reschedule the 4/15 cardiac rehab appt.    Will attempt to call again next week.    Future Appointments   Date Time Provider Department Center   5/13/2024 12:00 PM ST CARD REHAB RM 01 [facility]   7/1/2024  1:45 PM Robin Ly MD [facility]   7/2/2024  1:30 PM STV VASCULAR LAB 2 [facility] VAS [facility] Radiolog   7/16/2024  1:15 PM [facility]

## 2024-04-29 NOTE — TELEPHONE ENCOUNTER
Pt has sores on her left leg that are getting bigger and believes 2 of them are infected. Asking for a referral to [facility].

## 2024-05-01 ENCOUNTER — APPOINTMENT (OUTPATIENT)
Dept: GENERAL RADIOLOGY | Age: 74
DRG: 377 | End: 2024-05-01
Payer: COMMERCIAL

## 2024-05-01 ENCOUNTER — APPOINTMENT (OUTPATIENT)
Dept: CT IMAGING | Age: 74
DRG: 377 | End: 2024-05-01
Payer: COMMERCIAL

## 2024-05-01 ENCOUNTER — HOSPITAL ENCOUNTER (INPATIENT)
Age: 74
LOS: 5 days | Discharge: STILL ADMITTED OR ADMINISTRATIVE | DRG: 377 | End: 2024-05-06
Attending: EMERGENCY MEDICINE | Admitting: INTERNAL MEDICINE
Payer: COMMERCIAL

## 2024-05-01 DIAGNOSIS — K92.1 MELENA: ICD-10-CM

## 2024-05-01 DIAGNOSIS — N17.9 AKI (ACUTE KIDNEY INJURY) (HCC): ICD-10-CM

## 2024-05-01 DIAGNOSIS — D64.9 ANEMIA, UNSPECIFIED TYPE: Primary | ICD-10-CM

## 2024-05-01 DIAGNOSIS — R53.83 OTHER FATIGUE: ICD-10-CM
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
## 2024-05-01 PROBLEM — K92.2 GI BLEED: Status: ACTIVE | Noted: 2024-05-01

## 2024-05-01 LAB
ALBUMIN SERPL-MCNC: 3 G/DL (ref 3.5–5.2)
ALP SERPL-CCNC: 54 U/L (ref 35–104)
ALT SERPL-CCNC: 7 U/L (ref 5–33)
ANION GAP SERPL CALCULATED.3IONS-SCNC: 14 MMOL/L (ref 9–17)
AST SERPL-CCNC: 15 U/L
BASOPHILS # BLD: 0 K/UL (ref 0–0.2)
BASOPHILS NFR BLD: 0 % (ref 0–2)
BILIRUB DIRECT SERPL-MCNC: 0.1 MG/DL
BILIRUB INDIRECT SERPL-MCNC: 0.2 MG/DL (ref 0–1)
BILIRUB SERPL-MCNC: 0.3 MG/DL (ref 0.3–1.2)
BUN SERPL-MCNC: 85 MG/DL (ref 8–23)
CALCIUM SERPL-MCNC: 9.4 MG/DL (ref 8.6–10.4)
CHLORIDE SERPL-SCNC: 96 MMOL/L (ref 98–107)
CO2 SERPL-SCNC: 24 MMOL/L (ref 20–31)
CREAT SERPL-MCNC: 1.8 MG/DL (ref 0.5–0.9)
EOSINOPHIL # BLD: 0 K/UL (ref 0–0.4)
EOSINOPHILS RELATIVE PERCENT: 0 % (ref 0–4)
ERYTHROCYTE [DISTWIDTH] IN BLOOD BY AUTOMATED COUNT: 17.3 % (ref 11.5–14.9)
FLUAV RNA RESP QL NAA+PROBE: NOT DETECTED
FLUBV RNA RESP QL NAA+PROBE: NOT DETECTED
GFR, ESTIMATED: 30 ML/MIN/1.73M2
GLUCOSE SERPL-MCNC: 124 MG/DL (ref 70–99)
HCT VFR BLD AUTO: 20.5 % (ref 36–46)
HCT VFR BLD AUTO: 20.5 % (ref 36–46)
HGB BLD-MCNC: 6.6 G/DL (ref 12–16)
HGB BLD-MCNC: 6.8 G/DL (ref 12–16)
INR PPP: 1.7
LIPASE SERPL-CCNC: 22 U/L (ref 13–60)
LYMPHOCYTES NFR BLD: 1.34 K/UL (ref 1–4.8)
LYMPHOCYTES RELATIVE PERCENT: 16 % (ref 24–44)
MAGNESIUM SERPL-MCNC: 1.9 MG/DL (ref 1.6–2.6)
MCH RBC QN AUTO: 27.2 PG (ref 26–34)
MCHC RBC AUTO-ENTMCNC: 32.4 G/DL (ref 31–37)
MCV RBC AUTO: 84 FL (ref 80–100)
MONOCYTES NFR BLD: 0.5 K/UL (ref 0.1–1.3)
MONOCYTES NFR BLD: 6 % (ref 1–7)
MORPHOLOGY: ABNORMAL
NEUTROPHILS NFR BLD: 78 % (ref 36–66)
NEUTS SEG NFR BLD: 6.56 K/UL (ref 1.3–9.1)
PARTIAL THROMBOPLASTIN TIME: 38.6 SEC (ref 24–36)
PHOSPHATE SERPL-MCNC: 3 MG/DL (ref 2.6–4.5)
PLATELET # BLD AUTO: 363 K/UL (ref 150–450)
PMV BLD AUTO: 8.3 FL (ref 6–12)
POTASSIUM SERPL-SCNC: 4.1 MMOL/L (ref 3.7–5.3)
PROT SERPL-MCNC: 6.9 G/DL (ref 6.4–8.3)
PROTHROMBIN TIME: 20.4 SEC (ref 11.8–14.6)
RBC # BLD AUTO: 2.44 M/UL (ref 4–5.2)
SARS-COV-2 RNA RESP QL NAA+PROBE: NOT DETECTED
SODIUM SERPL-SCNC: 134 MMOL/L (ref 135–144)
SOURCE: NORMAL
SPECIMEN DESCRIPTION: NORMAL
TROPONIN I SERPL HS-MCNC: 37 NG/L (ref 0–14)
TROPONIN I SERPL HS-MCNC: 45 NG/L (ref 0–14)
WBC OTHER # BLD: 8.4 K/UL (ref 3.5–11)

## 2024-05-01 PROCEDURE — 74176 CT ABD & PELVIS W/O CONTRAST: CPT

## 2024-05-01 PROCEDURE — 1200000000 HC SEMI PRIVATE

## 2024-05-01 PROCEDURE — 85610 PROTHROMBIN TIME: CPT

## 2024-05-01 PROCEDURE — 86901 BLOOD TYPING SEROLOGIC RH(D): CPT

## 2024-05-01 PROCEDURE — 85014 HEMATOCRIT: CPT

## 2024-05-01 PROCEDURE — 83690 ASSAY OF LIPASE: CPT

## 2024-05-01 PROCEDURE — P9016 RBC LEUKOCYTES REDUCED: HCPCS

## 2024-05-01 PROCEDURE — 2500000003 HC RX 250 WO HCPCS

## 2024-05-01 PROCEDURE — C9113 INJ PANTOPRAZOLE SODIUM, VIA: HCPCS | Performed by: EMERGENCY MEDICINE

## 2024-05-01 PROCEDURE — 83735 ASSAY OF MAGNESIUM: CPT

## 2024-05-01 PROCEDURE — 85018 HEMOGLOBIN: CPT

## 2024-05-01 PROCEDURE — 96374 THER/PROPH/DIAG INJ IV PUSH: CPT

## 2024-05-01 PROCEDURE — 85025 COMPLETE CBC W/AUTO DIFF WBC: CPT

## 2024-05-01 PROCEDURE — 85730 THROMBOPLASTIN TIME PARTIAL: CPT

## 2024-05-01 PROCEDURE — 93005 ELECTROCARDIOGRAM TRACING: CPT | Performed by: EMERGENCY MEDICINE

## 2024-05-01 PROCEDURE — 80048 BASIC METABOLIC PNL TOTAL CA: CPT

## 2024-05-01 PROCEDURE — 30233N1 TRANSFUSION OF NONAUTOLOGOUS RED BLOOD CELLS INTO PERIPHERAL VEIN, PERCUTANEOUS APPROACH: ICD-10-PCS | Performed by: INTERNAL MEDICINE

## 2024-05-01 PROCEDURE — 84100 ASSAY OF PHOSPHORUS: CPT

## 2024-05-01 PROCEDURE — 86920 COMPATIBILITY TEST SPIN: CPT

## 2024-05-01 PROCEDURE — 6370000000 HC RX 637 (ALT 250 FOR IP)

## 2024-05-01 PROCEDURE — 70450 CT HEAD/BRAIN W/O DYE: CPT

## 2024-05-01 PROCEDURE — 96375 TX/PRO/DX INJ NEW DRUG ADDON: CPT

## 2024-05-01 PROCEDURE — 80076 HEPATIC FUNCTION PANEL: CPT

## 2024-05-01 PROCEDURE — 87636 SARSCOV2 & INF A&B AMP PRB: CPT

## 2024-05-01 PROCEDURE — 99285 EMERGENCY DEPT VISIT HI MDM: CPT

## 2024-05-01 PROCEDURE — 84484 ASSAY OF TROPONIN QUANT: CPT

## 2024-05-01 PROCEDURE — A4216 STERILE WATER/SALINE, 10 ML: HCPCS | Performed by: EMERGENCY MEDICINE

## 2024-05-01 PROCEDURE — 71045 X-RAY EXAM CHEST 1 VIEW: CPT

## 2024-05-01 PROCEDURE — 86850 RBC ANTIBODY SCREEN: CPT

## 2024-05-01 PROCEDURE — 6360000002 HC RX W HCPCS: Performed by: EMERGENCY MEDICINE

## 2024-05-01 PROCEDURE — 36415 COLL VENOUS BLD VENIPUNCTURE: CPT

## 2024-05-01 PROCEDURE — 72125 CT NECK SPINE W/O DYE: CPT

## 2024-05-01 PROCEDURE — 2580000003 HC RX 258: Performed by: EMERGENCY MEDICINE

## 2024-05-01 PROCEDURE — 86900 BLOOD TYPING SEROLOGIC ABO: CPT

## 2024-05-01 RX ORDER — FUROSEMIDE 40 MG/1
40 TABLET ORAL DAILY
Status: DISCONTINUED | OUTPATIENT
Start: 2024-05-02 | End: 2024-05-06 | Stop reason: HOSPADM

## 2024-05-01 RX ORDER — SODIUM CHLORIDE 9 MG/ML
INJECTION, SOLUTION INTRAVENOUS PRN
Status: DISCONTINUED | OUTPATIENT
Start: 2024-05-01 | End: 2024-05-06 | Stop reason: HOSPADM

## 2024-05-01 RX ORDER — POTASSIUM CHLORIDE 7.45 MG/ML
10 INJECTION INTRAVENOUS PRN
Status: DISCONTINUED | OUTPATIENT
Start: 2024-05-01 | End: 2024-05-06 | Stop reason: HOSPADM

## 2024-05-01 RX ORDER — MAGNESIUM SULFATE HEPTAHYDRATE 40 MG/ML
2000 INJECTION, SOLUTION INTRAVENOUS PRN
Status: DISCONTINUED | OUTPATIENT
Start: 2024-05-01 | End: 2024-05-06 | Stop reason: HOSPADM

## 2024-05-01 RX ORDER — BISACODYL 10 MG
10 SUPPOSITORY, RECTAL RECTAL DAILY PRN
Status: DISCONTINUED | OUTPATIENT
Start: 2024-05-01 | End: 2024-05-06 | Stop reason: HOSPADM

## 2024-05-01 RX ORDER — SODIUM CHLORIDE 0.9 % (FLUSH) 0.9 %
10 SYRINGE (ML) INJECTION PRN
Status: DISCONTINUED | OUTPATIENT
Start: 2024-05-01 | End: 2024-05-06 | Stop reason: HOSPADM

## 2024-05-01 RX ORDER — CLOPIDOGREL BISULFATE 75 MG/1
75 TABLET ORAL DAILY
Status: DISCONTINUED | OUTPATIENT
Start: 2024-05-02 | End: 2024-05-06 | Stop reason: HOSPADM

## 2024-05-01 RX ORDER — TROSPIUM CHLORIDE 20 MG/1
20 TABLET, FILM COATED ORAL
Status: DISCONTINUED | OUTPATIENT
Start: 2024-05-02 | End: 2024-05-06 | Stop reason: HOSPADM

## 2024-05-01 RX ORDER — ASPIRIN 81 MG/1
81 TABLET ORAL DAILY
Status: DISCONTINUED | OUTPATIENT
Start: 2024-05-02 | End: 2024-05-03

## 2024-05-01 RX ORDER — POTASSIUM CHLORIDE 750 MG/1
10 CAPSULE, EXTENDED RELEASE ORAL 2 TIMES DAILY
Status: DISCONTINUED | OUTPATIENT
Start: 2024-05-01 | End: 2024-05-06 | Stop reason: HOSPADM

## 2024-05-01 RX ORDER — ESCITALOPRAM OXALATE 20 MG/1
20 TABLET ORAL DAILY
Status: DISCONTINUED | OUTPATIENT
Start: 2024-05-02 | End: 2024-05-06 | Stop reason: HOSPADM

## 2024-05-01 RX ORDER — POTASSIUM CHLORIDE 20 MEQ/1
40 TABLET, EXTENDED RELEASE ORAL PRN
Status: DISCONTINUED | OUTPATIENT
Start: 2024-05-01 | End: 2024-05-06 | Stop reason: HOSPADM

## 2024-05-01 RX ORDER — 0.9 % SODIUM CHLORIDE 0.9 %
1000 INTRAVENOUS SOLUTION INTRAVENOUS ONCE
Status: COMPLETED | OUTPATIENT
Start: 2024-05-01 | End: 2024-05-01

## 2024-05-01 RX ORDER — ONDANSETRON 2 MG/ML
4 INJECTION INTRAMUSCULAR; INTRAVENOUS ONCE
Status: COMPLETED | OUTPATIENT
Start: 2024-05-01 | End: 2024-05-01

## 2024-05-01 RX ORDER — ATORVASTATIN CALCIUM 40 MG/1
40 TABLET, FILM COATED ORAL NIGHTLY
Status: DISCONTINUED | OUTPATIENT
Start: 2024-05-01 | End: 2024-05-06 | Stop reason: HOSPADM

## 2024-05-01 RX ORDER — SODIUM CHLORIDE 0.9 % (FLUSH) 0.9 %
5-40 SYRINGE (ML) INJECTION EVERY 12 HOURS SCHEDULED
Status: DISCONTINUED | OUTPATIENT
Start: 2024-05-01 | End: 2024-05-06 | Stop reason: HOSPADM

## 2024-05-01 RX ORDER — ACETAMINOPHEN 650 MG/1
650 SUPPOSITORY RECTAL EVERY 6 HOURS PRN
Status: DISCONTINUED | OUTPATIENT
Start: 2024-05-01 | End: 2024-05-06 | Stop reason: HOSPADM

## 2024-05-01 RX ORDER — POLYETHYLENE GLYCOL 3350 17 G/17G
17 POWDER, FOR SOLUTION ORAL DAILY PRN
Status: DISCONTINUED | OUTPATIENT
Start: 2024-05-01 | End: 2024-05-06 | Stop reason: HOSPADM

## 2024-05-01 RX ORDER — ACETAMINOPHEN 325 MG/1
650 TABLET ORAL EVERY 6 HOURS PRN
Status: DISCONTINUED | OUTPATIENT
Start: 2024-05-01 | End: 2024-05-06 | Stop reason: HOSPADM

## 2024-05-01 RX ORDER — ONDANSETRON 2 MG/ML
4 INJECTION INTRAMUSCULAR; INTRAVENOUS EVERY 6 HOURS PRN
Status: DISCONTINUED | OUTPATIENT
Start: 2024-05-01 | End: 2024-05-06 | Stop reason: HOSPADM

## 2024-05-01 RX ORDER — LANOLIN ALCOHOL/MO/W.PET/CERES
3 CREAM (GRAM) TOPICAL NIGHTLY PRN
Status: DISCONTINUED | OUTPATIENT
Start: 2024-05-01 | End: 2024-05-06 | Stop reason: HOSPADM

## 2024-05-01 RX ORDER — ONDANSETRON 4 MG/1
4 TABLET, ORALLY DISINTEGRATING ORAL EVERY 8 HOURS PRN
Status: DISCONTINUED | OUTPATIENT
Start: 2024-05-01 | End: 2024-05-06 | Stop reason: HOSPADM

## 2024-05-01 RX ORDER — SODIUM CHLORIDE 9 MG/ML
INJECTION, SOLUTION INTRAVENOUS CONTINUOUS
Status: DISCONTINUED | OUTPATIENT
Start: 2024-05-01 | End: 2024-05-03

## 2024-05-01 RX ORDER — METOPROLOL TARTRATE 50 MG/1
50 TABLET, FILM COATED ORAL 2 TIMES DAILY
Status: DISCONTINUED | OUTPATIENT
Start: 2024-05-01 | End: 2024-05-06 | Stop reason: HOSPADM

## 2024-05-01 RX ADMIN — PANTOPRAZOLE SODIUM 8 MG/HR: 40 INJECTION, POWDER, FOR SOLUTION INTRAVENOUS at 16:33

## 2024-05-01 RX ADMIN — ONDANSETRON 4 MG: 2 INJECTION INTRAMUSCULAR; INTRAVENOUS at 15:08

## 2024-05-01 RX ADMIN — ANTI-FUNGAL POWDER MICONAZOLE NITRATE TALC FREE: 1.42 POWDER TOPICAL at 21:47

## 2024-05-01 RX ADMIN — PANTOPRAZOLE SODIUM 80 MG: 40 INJECTION, POWDER, FOR SOLUTION INTRAVENOUS at 16:40

## 2024-05-01 RX ADMIN — ATORVASTATIN CALCIUM 40 MG: 40 TABLET, FILM COATED ORAL at 21:47

## 2024-05-01 RX ADMIN — POTASSIUM CHLORIDE 10 MEQ: 10 CAPSULE, COATED, EXTENDED RELEASE ORAL at 21:47

## 2024-05-01 RX ADMIN — SODIUM CHLORIDE 1000 ML: 9 INJECTION, SOLUTION INTRAVENOUS at 15:11

## 2024-05-01 RX ADMIN — Medication 3 MG: at 22:02

## 2024-05-01 RX ADMIN — SODIUM CHLORIDE: 9 INJECTION, SOLUTION INTRAVENOUS at 19:52

## 2024-05-01 ASSESSMENT — LIFESTYLE VARIABLES
HOW MANY STANDARD DRINKS CONTAINING ALCOHOL DO YOU HAVE ON A TYPICAL DAY: PATIENT DOES NOT DRINK
HOW OFTEN DO YOU HAVE A DRINK CONTAINING ALCOHOL: NEVER

## 2024-05-01 ASSESSMENT — PAIN - FUNCTIONAL ASSESSMENT: PAIN_FUNCTIONAL_ASSESSMENT: 0-10

## 2024-05-01 ASSESSMENT — PAIN SCALES - GENERAL: PAINLEVEL_OUTOF10: 4

## 2024-05-01 NOTE — ED TRIAGE NOTES
Mode of arrival (squad #, walk in, police, etc) : Walk in        Chief complaint(s): Fall, leg pain, weakness        Arrival Note (brief scenario, treatment PTA, etc).: Pt arrives to ED c/o fall. Patient states that she is unsure what is causing her to fall. Patient state that she fell at 0500 this morning and again this afternoon. Patient reports feeling very weak. Patient c/o left leg pain stating that is chronic for her d/t an ulcer on the back of her leg.

## 2024-05-01 NOTE — ED PROVIDER NOTES
Tenderness: There is generalized abdominal tenderness.   Musculoskeletal:         General: No deformity or signs of injury. Normal range of motion.      Cervical back: No rigidity or tenderness.   Skin:     General: Skin is warm and dry.   Neurological:      Mental Status: She is alert and oriented to person, place, and time. Mental status is at baseline.   Psychiatric:         Mood and Affect: Mood normal.         Behavior: Behavior normal.         MEDICAL DECISION MAKING / ED COURSE:         1)  Number and Complexity of Problems Addressed at this Encounter      2)  Data Reviewed and Analyzed  (Lab and radiology tests/orders below in next section)          3)  Treatment and Disposition         73-year-old female presenting to the ER complaining of generalized weakness.  Patient has been having more more difficulty ambulating because of increased weakness.  The patient did show signs of anemia on lab work and the patient was transfused after consenting for red blood cell transfusion.  CT imaging in the ER did not display signs of acute pathology.  X-ray imaging did not display signs of acute abnormality.  The patient did have multiple falls which I believe was secondary to the increased weakness.  Gastroenterology was consulted and they did agree with starting the patient on a Protonix drip and they will likely see the patient in the morning.  The patient was admitted after speaking with the admitting team.  Cardiac workup in the ER did not display positive signs of acute cardiac ischemia.  EKG was reviewed and interpreted by myself, the ED physician.  Patient was admitted after speaking with the admitting team.  Patient understands and agrees with the plan.    CRITICAL CARE:       PROCEDURES:    Procedures      DATA FOR LAB AND RADIOLOGY TESTS ORDERED BELOW ARE REVIEWED BY THE ED CLINICIAN:    RADIOLOGY: All x-rays, CT, MRI, and formal ultrasound images (except ED bedside ultrasound) are read by the radiologist,

## 2024-05-01 NOTE — CONSENT
Informed Consent for Blood Component Transfusion Note    I have discussed with the patient the rationale for blood component transfusion; its benefits in treating or preventing fatigue, organ damage, or death; and its risk which includes mild transfusion reactions, rare risk of blood borne infection, or more serious but rare reactions. I have discussed the alternatives to transfusion, including the risk and consequences of not receiving transfusion. The patient had an opportunity to ask questions and had agreed to proceed with transfusion of blood components.    Electronically signed by Rahat Trevino DO on 5/1/24 at 3:24 PM EDT

## 2024-05-02 ENCOUNTER — ANESTHESIA (OUTPATIENT)
Dept: ENDOSCOPY | Age: 74
End: 2024-05-02
Payer: COMMERCIAL

## 2024-05-02 ENCOUNTER — ANESTHESIA EVENT (OUTPATIENT)
Dept: ENDOSCOPY | Age: 74
End: 2024-05-02
Payer: COMMERCIAL

## 2024-05-02 ENCOUNTER — TELEPHONE (OUTPATIENT)
Dept: VASCULAR SURGERY | Age: 74
End: 2024-05-02

## 2024-05-02 LAB
ANION GAP SERPL CALCULATED.3IONS-SCNC: 9 MMOL/L (ref 9–17)
BACTERIA URNS QL MICRO: ABNORMAL
BASOPHILS # BLD: 0 K/UL (ref 0–0.2)
BASOPHILS NFR BLD: 0 % (ref 0–2)
BILIRUB UR QL STRIP: NEGATIVE
BUN SERPL-MCNC: 72 MG/DL (ref 8–23)
CALCIUM SERPL-MCNC: 8.9 MG/DL (ref 8.6–10.4)
CASTS #/AREA URNS LPF: ABNORMAL /LPF
CHLORIDE SERPL-SCNC: 107 MMOL/L (ref 98–107)
CLARITY UR: CLEAR
CO2 SERPL-SCNC: 28 MMOL/L (ref 20–31)
COLOR UR: YELLOW
CREAT SERPL-MCNC: 1.4 MG/DL (ref 0.5–0.9)
EKG ATRIAL RATE: 79 BPM
EKG P AXIS: 54 DEGREES
EKG P-R INTERVAL: 158 MS
EKG Q-T INTERVAL: 422 MS
EKG QRS DURATION: 78 MS
EKG QTC CALCULATION (BAZETT): 483 MS
EKG R AXIS: -43 DEGREES
EKG T AXIS: 57 DEGREES
EKG VENTRICULAR RATE: 79 BPM
EOSINOPHIL # BLD: 0 K/UL (ref 0–0.4)
EOSINOPHILS RELATIVE PERCENT: 1 % (ref 0–4)
EPI CELLS #/AREA URNS HPF: ABNORMAL /HPF
ERYTHROCYTE [DISTWIDTH] IN BLOOD BY AUTOMATED COUNT: 16.9 % (ref 11.5–14.9)
GFR, ESTIMATED: 40 ML/MIN/1.73M2
GLUCOSE SERPL-MCNC: 80 MG/DL (ref 70–99)
GLUCOSE UR STRIP-MCNC: NEGATIVE MG/DL
HCT VFR BLD AUTO: 24.2 % (ref 36–46)
HGB BLD-MCNC: 7.8 G/DL (ref 12–16)
HGB UR QL STRIP.AUTO: NEGATIVE
KETONES UR STRIP-MCNC: NEGATIVE MG/DL
LACTATE BLDV-SCNC: 1 MMOL/L (ref 0.5–2.2)
LEUKOCYTE ESTERASE UR QL STRIP: ABNORMAL
LYMPHOCYTES NFR BLD: 0.9 K/UL (ref 1–4.8)
LYMPHOCYTES RELATIVE PERCENT: 14 % (ref 24–44)
MCHC RBC AUTO-ENTMCNC: 32.4 G/DL (ref 31–37)
MCV RBC AUTO: 83.9 FL (ref 80–100)
MONOCYTES NFR BLD: 0.4 K/UL (ref 0.1–1.3)
MONOCYTES NFR BLD: 6 % (ref 1–7)
NEUTROPHILS NFR BLD: 79 % (ref 36–66)
NEUTS SEG NFR BLD: 4.9 K/UL (ref 1.3–9.1)
NITRITE UR QL STRIP: NEGATIVE
PH UR STRIP: 5 [PH] (ref 5–8)
PLATELET # BLD AUTO: 274 K/UL (ref 150–450)
PMV BLD AUTO: 8.1 FL (ref 6–12)
POTASSIUM SERPL-SCNC: 3.6 MMOL/L (ref 3.7–5.3)
PROT UR STRIP-MCNC: NEGATIVE MG/DL
RBC # BLD AUTO: 2.89 M/UL (ref 4–5.2)
RBC #/AREA URNS HPF: ABNORMAL /HPF
SODIUM SERPL-SCNC: 144 MMOL/L (ref 135–144)
SP GR UR STRIP: 1.02 (ref 1–1.03)
UROBILINOGEN UR STRIP-ACNC: NORMAL EU/DL (ref 0–1)
WBC #/AREA URNS HPF: ABNORMAL /HPF
WBC OTHER # BLD: 6.2 K/UL (ref 3.5–11)

## 2024-05-02 PROCEDURE — 6360000002 HC RX W HCPCS

## 2024-05-02 PROCEDURE — 36430 TRANSFUSION BLD/BLD COMPNT: CPT

## 2024-05-02 PROCEDURE — 36415 COLL VENOUS BLD VENIPUNCTURE: CPT

## 2024-05-02 PROCEDURE — 7100000001 HC PACU RECOVERY - ADDTL 15 MIN: Performed by: INTERNAL MEDICINE

## 2024-05-02 PROCEDURE — 6370000000 HC RX 637 (ALT 250 FOR IP)

## 2024-05-02 PROCEDURE — 3700000000 HC ANESTHESIA ATTENDED CARE: Performed by: INTERNAL MEDICINE

## 2024-05-02 PROCEDURE — 87040 BLOOD CULTURE FOR BACTERIA: CPT

## 2024-05-02 PROCEDURE — 88342 IMHCHEM/IMCYTCHM 1ST ANTB: CPT

## 2024-05-02 PROCEDURE — P9016 RBC LEUKOCYTES REDUCED: HCPCS

## 2024-05-02 PROCEDURE — 93010 ELECTROCARDIOGRAM REPORT: CPT | Performed by: INTERNAL MEDICINE

## 2024-05-02 PROCEDURE — 3700000001 HC ADD 15 MINUTES (ANESTHESIA): Performed by: INTERNAL MEDICINE

## 2024-05-02 PROCEDURE — 2580000003 HC RX 258: Performed by: INTERNAL MEDICINE

## 2024-05-02 PROCEDURE — 81001 URINALYSIS AUTO W/SCOPE: CPT

## 2024-05-02 PROCEDURE — 2580000003 HC RX 258: Performed by: ANESTHESIOLOGY

## 2024-05-02 PROCEDURE — 2580000003 HC RX 258

## 2024-05-02 PROCEDURE — 6360000002 HC RX W HCPCS: Performed by: NURSE ANESTHETIST, CERTIFIED REGISTERED

## 2024-05-02 PROCEDURE — 0DB68ZX EXCISION OF STOMACH, VIA NATURAL OR ARTIFICIAL OPENING ENDOSCOPIC, DIAGNOSTIC: ICD-10-PCS | Performed by: INTERNAL MEDICINE

## 2024-05-02 PROCEDURE — 99213 OFFICE O/P EST LOW 20 MIN: CPT

## 2024-05-02 PROCEDURE — 2500000003 HC RX 250 WO HCPCS: Performed by: NURSE ANESTHETIST, CERTIFIED REGISTERED

## 2024-05-02 PROCEDURE — 99223 1ST HOSP IP/OBS HIGH 75: CPT | Performed by: INTERNAL MEDICINE

## 2024-05-02 PROCEDURE — 83605 ASSAY OF LACTIC ACID: CPT

## 2024-05-02 PROCEDURE — 6360000002 HC RX W HCPCS: Performed by: INTERNAL MEDICINE

## 2024-05-02 PROCEDURE — C9113 INJ PANTOPRAZOLE SODIUM, VIA: HCPCS | Performed by: INTERNAL MEDICINE

## 2024-05-02 PROCEDURE — C9113 INJ PANTOPRAZOLE SODIUM, VIA: HCPCS

## 2024-05-02 PROCEDURE — 2709999900 HC NON-CHARGEABLE SUPPLY: Performed by: INTERNAL MEDICINE

## 2024-05-02 PROCEDURE — 3609012400 HC EGD TRANSORAL BIOPSY SINGLE/MULTIPLE: Performed by: INTERNAL MEDICINE

## 2024-05-02 PROCEDURE — 88305 TISSUE EXAM BY PATHOLOGIST: CPT

## 2024-05-02 PROCEDURE — APPNB60 APP NON BILLABLE TIME 46-60 MINS: Performed by: NURSE PRACTITIONER

## 2024-05-02 PROCEDURE — 85025 COMPLETE CBC W/AUTO DIFF WBC: CPT

## 2024-05-02 PROCEDURE — 80048 BASIC METABOLIC PNL TOTAL CA: CPT

## 2024-05-02 PROCEDURE — 99222 1ST HOSP IP/OBS MODERATE 55: CPT | Performed by: INTERNAL MEDICINE

## 2024-05-02 PROCEDURE — 6370000000 HC RX 637 (ALT 250 FOR IP): Performed by: INTERNAL MEDICINE

## 2024-05-02 PROCEDURE — 7100000000 HC PACU RECOVERY - FIRST 15 MIN: Performed by: INTERNAL MEDICINE

## 2024-05-02 PROCEDURE — 1200000000 HC SEMI PRIVATE

## 2024-05-02 RX ORDER — SODIUM CHLORIDE 9 MG/ML
INJECTION, SOLUTION INTRAVENOUS CONTINUOUS
Status: DISCONTINUED | OUTPATIENT
Start: 2024-05-02 | End: 2024-05-02 | Stop reason: HOSPADM

## 2024-05-02 RX ORDER — PROPOFOL 10 MG/ML
INJECTION, EMULSION INTRAVENOUS PRN
Status: DISCONTINUED | OUTPATIENT
Start: 2024-05-02 | End: 2024-05-02 | Stop reason: SDUPTHER

## 2024-05-02 RX ORDER — LIDOCAINE HYDROCHLORIDE 10 MG/ML
INJECTION, SOLUTION INFILTRATION; PERINEURAL PRN
Status: DISCONTINUED | OUTPATIENT
Start: 2024-05-02 | End: 2024-05-02 | Stop reason: SDUPTHER

## 2024-05-02 RX ADMIN — SODIUM CHLORIDE, PRESERVATIVE FREE 10 ML: 5 INJECTION INTRAVENOUS at 08:52

## 2024-05-02 RX ADMIN — PROPOFOL 80 MG: 10 INJECTION, EMULSION INTRAVENOUS at 15:28

## 2024-05-02 RX ADMIN — ESCITALOPRAM OXALATE 20 MG: 20 TABLET ORAL at 08:51

## 2024-05-02 RX ADMIN — MEROPENEM 1000 MG: 1 INJECTION, POWDER, FOR SOLUTION INTRAVENOUS at 03:09

## 2024-05-02 RX ADMIN — POTASSIUM CHLORIDE 10 MEQ: 10 CAPSULE, COATED, EXTENDED RELEASE ORAL at 20:00

## 2024-05-02 RX ADMIN — ANTI-FUNGAL POWDER MICONAZOLE NITRATE TALC FREE: 1.42 POWDER TOPICAL at 08:51

## 2024-05-02 RX ADMIN — PANTOPRAZOLE SODIUM 8 MG/HR: 40 INJECTION, POWDER, FOR SOLUTION INTRAVENOUS at 12:59

## 2024-05-02 RX ADMIN — PANTOPRAZOLE SODIUM 8 MG/HR: 40 INJECTION, POWDER, FOR SOLUTION INTRAVENOUS at 03:03

## 2024-05-02 RX ADMIN — HYDROMORPHONE HYDROCHLORIDE 0.5 MG: 1 INJECTION, SOLUTION INTRAMUSCULAR; INTRAVENOUS; SUBCUTANEOUS at 05:24

## 2024-05-02 RX ADMIN — ATORVASTATIN CALCIUM 40 MG: 40 TABLET, FILM COATED ORAL at 20:01

## 2024-05-02 RX ADMIN — LIDOCAINE HYDROCHLORIDE 50 MG: 10 INJECTION, SOLUTION INFILTRATION; PERINEURAL at 15:28

## 2024-05-02 RX ADMIN — SODIUM CHLORIDE: 9 INJECTION, SOLUTION INTRAVENOUS at 14:39

## 2024-05-02 RX ADMIN — METOPROLOL TARTRATE 50 MG: 50 TABLET, FILM COATED ORAL at 08:51

## 2024-05-02 RX ADMIN — HYDROMORPHONE HYDROCHLORIDE 0.5 MG: 1 INJECTION, SOLUTION INTRAMUSCULAR; INTRAVENOUS; SUBCUTANEOUS at 19:22

## 2024-05-02 RX ADMIN — POTASSIUM CHLORIDE 10 MEQ: 10 CAPSULE, COATED, EXTENDED RELEASE ORAL at 08:51

## 2024-05-02 RX ADMIN — MEROPENEM 1000 MG: 1 INJECTION, POWDER, FOR SOLUTION INTRAVENOUS at 17:35

## 2024-05-02 RX ADMIN — SODIUM CHLORIDE: 9 INJECTION, SOLUTION INTRAVENOUS at 06:22

## 2024-05-02 RX ADMIN — HYDROMORPHONE HYDROCHLORIDE 0.5 MG: 1 INJECTION, SOLUTION INTRAMUSCULAR; INTRAVENOUS; SUBCUTANEOUS at 13:00

## 2024-05-02 RX ADMIN — TROSPIUM CHLORIDE 20 MG: 20 TABLET, FILM COATED ORAL at 17:35

## 2024-05-02 RX ADMIN — TROSPIUM CHLORIDE 20 MG: 20 TABLET, FILM COATED ORAL at 05:24

## 2024-05-02 RX ADMIN — HYDROMORPHONE HYDROCHLORIDE 0.5 MG: 1 INJECTION, SOLUTION INTRAMUSCULAR; INTRAVENOUS; SUBCUTANEOUS at 08:52

## 2024-05-02 RX ADMIN — PANTOPRAZOLE SODIUM 8 MG/HR: 40 INJECTION, POWDER, FOR SOLUTION INTRAVENOUS at 21:55

## 2024-05-02 ASSESSMENT — PAIN SCALES - GENERAL
PAINLEVEL_OUTOF10: 5
PAINLEVEL_OUTOF10: 4
PAINLEVEL_OUTOF10: 6
PAINLEVEL_OUTOF10: 6
PAINLEVEL_OUTOF10: 3
PAINLEVEL_OUTOF10: 3
PAINLEVEL_OUTOF10: 6

## 2024-05-02 ASSESSMENT — PAIN DESCRIPTION - ORIENTATION: ORIENTATION: LOWER

## 2024-05-02 ASSESSMENT — PAIN DESCRIPTION - LOCATION
LOCATION: LEG

## 2024-05-02 ASSESSMENT — PAIN DESCRIPTION - DESCRIPTORS
DESCRIPTORS: ACHING
DESCRIPTORS: BURNING
DESCRIPTORS: DISCOMFORT;ACHING

## 2024-05-02 ASSESSMENT — PAIN - FUNCTIONAL ASSESSMENT
PAIN_FUNCTIONAL_ASSESSMENT: NONE - DENIES PAIN
PAIN_FUNCTIONAL_ASSESSMENT: PREVENTS OR INTERFERES SOME ACTIVE ACTIVITIES AND ADLS

## 2024-05-02 NOTE — ED NOTES
Report given to MARIA M Cline from [facility].   Report method by phone   The following was reviewed with receiving RN:   Current vital signs:  BP (!) 123/54   Pulse 69   Temp 98.4 °F (36.9 °C) (Oral)   Resp 14   Ht 1.549 m (5' 1\")   Wt 108 kg (238 lb)   SpO2 100%   BMI 44.97 kg/m²                MEWS Score: 2     Any medication or safety alerts were reviewed. Any pending diagnostics and notifications were also reviewed, as well as any safety concerns or issues, abnormal labs, abnormal imaging, and abnormal assessment findings. Questions were answered.

## 2024-05-02 NOTE — ANESTHESIA POSTPROCEDURE EVALUATION
Department of Anesthesiology  Postprocedure Note    Patient: Elaina Champagne  MRN: [id number]  YOB: 1950  Date of evaluation: 5/2/2024    Procedure Summary       Date: 05/02/24 Room / Location: [place] [address] / [facility]    Anesthesia Start: 1522 Anesthesia Stop: 1541    Procedure: ESOPHAGOGASTRODUODENOSCOPY BIOPSY (Esophagus) Diagnosis:       Melena      (Melena [K92.1])    Surgeons: Rachana Maria MD Responsible Provider: Haley Carrasco MD    Anesthesia Type: general ASA Status: 3            Anesthesia Type: No value filed.    Chase Phase I: Chase Score: 8    Chase Phase II:      Anesthesia Post Evaluation    Comments: POST- ANESTHESIA EVALUATION       Pt Name: Elaina Champagne  MRN: [id number]  YOB: 1950  Date of evaluation: 5/2/2024  Time:  4:39 PM      BP (!) 111/45   Pulse 59   Temp 97.3 °F (36.3 °C)   Resp 13   Ht 1.549 m (5' 1\")   Wt 108 kg (238 lb)   SpO2 98%   BMI 44.97 kg/m²      Consciousness Level  Awake  Cardiopulmonary Status  Stable  Pain Adequately Treated YES  Nausea / Vomiting  NO  Adequate Hydration  YES  Anesthesia Related Complications NONE      Electronically signed by Haley Carrasco MD on 5/2/2024 at 4:39 PM      No notable events documented.

## 2024-05-02 NOTE — CARE COORDINATION
Case Management Assessment  Initial Evaluation    Date/Time of Evaluation: 5/2/2024 12:08 PM  Assessment Completed by: Reema Harrington RN    If patient is discharged prior to next notation, then this note serves as note for discharge by case management.    Patient Name: Elaina Champagne                   YOB: 1950  Diagnosis: GI bleed [K92.2]  MICHELLE (acute kidney injury) (HCC) [N17.9]  Fall, initial encounter [W19.XXXA]  Other fatigue [R53.83]  Anemia, unspecified type [D64.9]                   Date / Time: 5/1/2024  2:38 PM    Patient Admission Status: Inpatient   Readmission Risk (Low < 19, Mod (19-27), High > 27): Readmission Risk Score: 20.8    Current PCP: Robin Ly MD  PCP verified by CM? Yes    Chart Reviewed: Yes      History Provided by: Patient  Patient Orientation: Alert and Oriented    Patient Cognition: Alert    Hospitalization in the last 30 days (Readmission):  No    If yes, Readmission Assessment in  Navigator will be completed.    Advance Directives:      Code Status: Full Code   Patient's Primary Decision Maker is: Legal Next of Kin    Primary Decision Maker: Eze Champagne - Spouse - [phone]    Discharge Planning:    Patient lives with: Spouse/Significant Other Type of Home: Trailer/Mobile Home  Primary Care Giver: Self  Patient Support Systems include: Spouse/Significant Other, Family Members   Current Financial resources: Medicare  Current community resources: ECF/Home Care  Current services prior to admission: Durable Medical Equipment            Current DME: [employer]            Type of Home Care services:  OT, PT    ADLS  Prior functional level: Assistance with the following:, Mobility  Current functional level: Assistance with the following:, Mobility    PT AM-PAC:   /24  OT AM-PAC:   /24    Family can provide assistance at DC: Yes  Would you like Case Management to discuss the discharge plan with any other family members/significant others, and if so, who?

## 2024-05-02 NOTE — CONSULTS
Gastroenterology Consult Note    Patient:   Elaina Champagne   Admit date:  5/1/2024  Facility:   [facility]  Referring/PCP: Robin Ly MD  Date:     5/2/2024  Consultant:   TOPHER Donovan NP, Rachana Maria MD    Subjective:     This 73 y.o. female was admitted 5/1/2024 with a diagnosis of \"GI bleed [K92.2]  MICHELLE (acute kidney injury) (HCC) [N17.9]  Fall, initial encounter [W19.XXXA]  Other fatigue [R53.83]  Anemia, unspecified type [D64.9]\" and is seen in consultation regarding   Chief Complaint   Patient presents with    Fall    Fatigue    Leg Pain     73-year-old female with past medical history of obesity, hypertension, stroke, A-fib, CAD status post stent placed in January of this year on Eliquis, Plavix, aspirin presents to ED for fatigue, leg pain, fall.  GI consulted for anemia, melena.  Patient states she has noticed black tarry stools for the last 3 to 4 days.  Hemoglobin on admission 6.6.  Patient was given 2 units of PRBCs.  Hemoglobin improved to 7.8.  Elevated BUN/creatinine ratio (51)  Patient was started on Protonix drip.  Patient had EGD on 12/28/2023  that revealed a large cratered ulcer with 3 to 4 cm at the antrum causing deformity of the pylorus.  Pigmented spots noted but no other high risk features.  No active bleeding at that time.    Past Medical History:  Past Medical History:   Diagnosis Date    Bell's palsy     Cellulitis     Hypertension     NSTEMI (non-ST elevated myocardial infarction) (Formerly [facility]) 01/02/2024       Past Surgical History:  Past Surgical History:   Procedure Laterality Date    CARDIAC PROCEDURE N/A 1/2/2024    Coronary angiography performed by Regis Aponte MD at [facility] CARDIAC CATH LAB    CARDIAC PROCEDURE N/A 1/4/2024    frantz / Percutaneous coronary intervention / rm 504 performed by Maria Teresa Rodriguez MD at [facility] CARDIAC CATH LAB    CATARACT REMOVAL Bilateral 2015    HYSTERECTOMY, TOTAL ABDOMINAL (CERVIX REMOVED)  1990    INCISION AND 
[facility]    Consult                        Today's Date: 5/2/2024  Patient Name: Elaina Champagne  Date of admission: 5/1/2024  2:38 PM  Patient's age: 73 y.o., 1950  Admission Dx: GI bleed [K92.2]  MICHELLE (acute kidney injury) (HCC) [N17.9]  Fall, initial encounter [W19.XXXA]  Other fatigue [R53.83]  Anemia, unspecified type [D64.9]    Reason for Consult:  Cardiac evaluation    Requesting Physician: Vita Andrade MD    CHIEF COMPLAINT:  Fatigue    History Obtained From:  patient, electronic medical record    HISTORY OF PRESENT ILLNESS:      The patient is a 73 y.o.  female who is admitted to the hospital for fatigue and fall. Patient reports generalized fatigue. Patient denies any chest pain or dyspnea. Patient has history of CAD with PCI 1/2024, afib, HTN. Patient reports having black stools. Hgb was 6.6 and one unit PRBC given. AC on hold. GI consulted.     Past Medical History:   has a past medical history of Bell's palsy, Cellulitis, Hypertension, and NSTEMI (non-ST elevated myocardial infarction) ([facility]).    Past Surgical History:   has a past surgical history that includes Hysterectomy, total abdominal (1990); Cataract removal (Bilateral, 2015); incision and drainage (Left, 07/06/2017); Total knee arthroplasty (Left, 11/09/2017); Knee Arthroplasty (Right, 09/24/2018); ventral hernia repair (02/11/2019); IR NONTUNNELED VASCULAR CATHETER > 5 YEARS (12/15/2023); Upper gastrointestinal endoscopy (N/A, 12/28/2023); Cardiac procedure (N/A, 1/2/2024); and Cardiac procedure (N/A, 1/4/2024).     Home Medications:    Prior to Admission medications    Medication Sig Start Date End Date Taking? Authorizing Provider   sulfamethoxazole-trimethoprim (BACTRIM) 400-80 MG per tablet Take 1 tablet by mouth 2 times daily for 7 days 4/29/24 5/6/24  Robin Ly MD   silver sulfADIAZINE (SILVADENE) 1 % cream Apply topically daily Apply topically daily.    Provider, MD Jah 
is red with some slough present. It is very painful to even light touch. Alginate dressing stuck to wound and was gently removed. Recommend santyl to assist with gentle debridement of the wound, order obtained. Small fluid filled blister to left medial leg. Pt also has a wound to her left buttock. The wound is clean and pink. Recommend triad cream.     Response to treatment:  Well tolerated by patient.       Plan:     Plan of Care:     Left posterior and medial lower leg: Cleanse with saline, pat dry. Apply santyl to wound (nickel thick layer), cover with oil emulsion dressing and ABD, wrap with roll gauze and ace wrap (4\" ace wrap from toe to ankle, 6\" ace wrap from ankle to knee, on in the morning, off at bedtime). Change daily and as needed if loose or soiled.     Right lower leg: No wounds present, but ace wrap irritates pt's skin, so please wrap with roll gauze prior to applying ace wrap toe to knee.     Buttocks: Cleanse with soap and water, pat dry. Apply triad cream twice daily and as needed when incontinent.     Pt has appointment with [facility] on 5/6- may need to reschedule if pt is still hospitalized.       [x] Turn and reposition every 2 hours while in bed.    [x] Float heels off of bed with pillows under calves.    [] Heel protective boots (heel medix boots) at all times while in bed.    [] Sacral foam dressing to sacrococcygeal area. Use skin barrier film prior to placement. Peel back dressing, inspect skin beneath, and re-secure every shift. Change every 3 days and as needed if loose or soiled. Discontinue sacral foam if repeatedly soiled by incontinence.    [] Apply zinc oxide cream twice daily and as needed after incontinent episodes.    [] Perform routine incontinence care with use of foam cleanser.    [x] Use single layer moisture wicking underpad.    [] Use comfort glide system and wedges to reposition patient.    [x] Keep the head of the bed below 30 degrees unless

## 2024-05-02 NOTE — ACP (ADVANCE CARE PLANNING)
Advance Care Planning     Advance Care Planning Activator (Inpatient)  Conversation Note      Date of ACP Conversation: 5/2/2024     Conversation Conducted with: Patient with Decision Making Capacity    ACP Activator: Reema Harrington RN    Health Care Decision Maker:     Current Designated Health Care Decision Maker:     Primary Decision Maker: Eze Champagne - [facility] - [phone]  Click here to complete Healthcare Decision Makers including section of the Healthcare Decision Maker Relationship (ie \"Primary\")  Today we documented Decision Maker(s) consistent with Legal Next of Kin hierarchy.    Care Preferences    Ventilation:  \"If you were in your present state of health and suddenly became very ill and were unable to breathe on your own, what would your preference be about the use of a ventilator (breathing machine) if it were available to you?\"      Would the patient desire the use of ventilator (breathing machine)?: yes    \"If your health worsens and it becomes clear that your chance of recovery is unlikely, what would your preference be about the use of a ventilator (breathing machine) if it were available to you?\"     Would the patient desire the use of ventilator (breathing machine)?: No      Resuscitation  \"CPR works best to restart the heart when there is a sudden event, like a heart attack, in someone who is otherwise healthy. Unfortunately, CPR does not typically restart the heart for people who have serious health conditions or who are very sick.\"    \"In the event your heart stopped as a result of an underlying serious health condition, would you want attempts to be made to restart your heart (answer \"yes\" for attempt to resuscitate) or would you prefer a natural death (answer \"no\" for do not attempt to resuscitate)?\" yes       [] Yes   [x] No   Educated Patient / Decision Maker regarding differences between Advance Directives and portable DNR orders.    Length of ACP Conversation in minutes:

## 2024-05-02 NOTE — ANESTHESIA PRE PROCEDURE
coronary artery of native heart without angina pectoris I25.10   • Healing ulcers of both lower extremities, limited to breakdown of skin (Formerly [facility]) L97.911, L97.921   • Pseudomonas aeruginosa infection A49.8   • Bilateral lower leg cellulitis L03.116, L03.115   • Class 3 severe obesity due to excess calories with serious comorbidity and body mass index (BMI) of 50.0 to 59.9 in adult (Formerly [facility]) E66.01, Z68.43   • Carotid stenosis, asymptomatic, bilateral I65.23   • Atherosclerotic heart disease of native coronary artery with unspecified angina pectoris I25.119   • Chronic kidney disease, stage 3b (Formerly [facility]) N18.32   • Chronic kidney disease, stage 3 unspecified (Formerly [facility]) N18.30   • GI bleed K92.2       Past Medical History:        Diagnosis Date   • Bell's palsy    • Cellulitis    • Hypertension    • NSTEMI (non-ST elevated myocardial infarction) (Formerly [facility]) 01/02/2024       Past Surgical History:        Procedure Laterality Date   • CARDIAC PROCEDURE N/A 1/2/2024    Coronary angiography performed by Regis Aponte MD at [facility] CARDIAC CATH LAB   • CARDIAC PROCEDURE N/A 1/4/2024    frantz / Percutaneous coronary intervention / rm 504 performed by Maria Teresa Rodriguez MD at [facility] CARDIAC CATH LAB   • CATARACT REMOVAL Bilateral 2015   • HYSTERECTOMY, TOTAL ABDOMINAL (CERVIX REMOVED)  1990   • INCISION AND DRAINAGE Left 07/06/2017    LEG INCISION AND DRAINAGE ABSCESS performed by Isaiah Casey MD at [facility]   • IR NONTUNNELED VASCULAR CATHETER  12/15/2023    IR NONTUNNELED VASCULAR CATHETER 12/15/2023 [facility]   • KNEE ARTHROPLASTY Right 09/24/2018   • TOTAL KNEE ARTHROPLASTY Left 11/09/2017   • UPPER GASTROINTESTINAL ENDOSCOPY N/A 12/28/2023    EGD BIOPSY performed by Mary Nolasco MD at [facility]   • VENTRAL HERNIA REPAIR  02/11/2019    5 hernias       Social History:    Social History     Tobacco Use   • Smoking status: Never     Passive exposure: Never   • Smokeless tobacco: Never   Substance Use Topics   • Alcohol use: No

## 2024-05-02 NOTE — DISCHARGE INSTR - COC
Readmission Risk Assessment Score:  Readmission Risk              Risk of Unplanned Readmission:  27           Discharging to Facility/ Agency     [facility]  [address] [place] [address]  P: [phone]  F: [phone]       [facility]  [address] [place] [address]  P: [phone]   F: [phone]      Dialysis Facility (if applicable)   Name:  Address:  Dialysis Schedule:  Phone:  Fax:    / signature: Electronically signed by Reema Harrington RN on 5/2/24 at 1:55 PM EDT    PHYSICIAN SECTION    Prognosis: {Prognosis:9150163441}    Condition at Discharge: { Patient Condition:977842530}    Rehab Potential (if transferring to Rehab): {Prognosis:0737363900}    Recommended Labs or Other Treatments After Discharge: ***    Physician Certification: I certify the above information and transfer of Elaina Champagne  is necessary for the continuing treatment of the diagnosis listed and that she requires {Admit to Appropriate Level of Care:08553} for {GREATER/LESS:720545294} 30 days.     Update Admission H&P: {CHP DME Changes in HandP:251576610}    PHYSICIAN SIGNATURE:  {Esignature:418882526}

## 2024-05-02 NOTE — PLAN OF CARE
Problem: Discharge Planning  Goal: Discharge to home or other facility with appropriate resources  Outcome: Progressing  Flowsheets (Taken 5/2/2024 9430)  Discharge to home or other facility with appropriate resources:   Identify barriers to discharge with patient and caregiver   Arrange for needed discharge resources and transportation as appropriate   Identify discharge learning needs (meds, wound care, etc)   Refer to discharge planning if patient needs post-hospital services based on physician order or complex needs related to functional status, cognitive ability or social support system     Problem: Pain  Goal: Verbalizes/displays adequate comfort level or baseline comfort level  Outcome: Progressing     Problem: Skin/Tissue Integrity  Goal: Absence of new skin breakdown  Description: 1.  Monitor for areas of redness and/or skin breakdown  2.  Assess vascular access sites hourly  3.  Every 4-6 hours minimum:  Change oxygen saturation probe site  4.  Every 4-6 hours:  If on nasal continuous positive airway pressure, respiratory therapy assess nares and determine need for appliance change or resting period.  Outcome: Progressing     Problem: Safety - Adult  Goal: Free from fall injury  Outcome: Progressing     Problem: ABCDS Injury Assessment  Goal: Absence of physical injury  Outcome: Progressing

## 2024-05-02 NOTE — OP NOTE
FLEXIBLE SIGMOIDOSCOPY      Patient:   Elaina Champagne    :    1950    Facility:               [facility]  Referring/PCP: Robin Ly MD  Procedure:   Flexible Sigmoidoscopy --diagnostic  Date:     2024  Endoscopist:  Rachana Maria M.D.,[facility]    Preoperative Diagnosis:  heamtochezia.    Postoperative Diagnosis:    Anesthesia: Monitored anesthesia care    Complications:  None      Estimated Blood Loss: less than 50 CC   Pathology:none      Description of Procedure:  Informed consent was obtained from the patient after explanation of the procedure including indications, description of the procedure,  benefits and possible risks and complications of the procedure, and alternatives. Questions were answered.  The patient's history was reviewed and a directed physical examination was performed prior to the procedure.    Patient was monitored throughout the procedure with pulse oximetry and periodic assessment of vital signs. With the patient initially in the left lateral decubitus position, a digital rectal examination was performed and revealed negative without mass, lesions or tenderness.  The Olympus video colonoscope was placed in the patient's rectum and advanced without difficulty  to  a distance of 40 cm. The prep was fair.  Examination of the mucosa was performed during both introduction and withdrawal of the colonoscope. Retroflexed view of the rectum was performed.  The patient  was taken to the recovery area in good condition.       Post sedation note :The patient's SPO2 remained above 90% throughout the procedure.the vital signs remained stable , and no immediate complication form the procedure noted, patient will be ready for d/c when criteria is met .      Findings:   Rectum- two ulcers near anal verge with heamtin spot on one of these- Epinephrine (1:10,000) used- 9 ml injected; Gold probe used; another large stercoral ulcer seen 10 cm from anal verge- clean based  Sigmoid colon-

## 2024-05-02 NOTE — H&P
TO INTERNAL MEDICINE   Patient is admitted as inpatient status because of co-morbidities listed above, severity of signs and symptoms as outlined, requirement for current medical therapies and most importantly because of direct risk to patient if care not provided in a hospital setting.    David Yang MD  5/2/2024  10:22 AM    Copy sent to Robin Franco MD    Please note that this chart was generated using voice recognition Dragon dictation software.  Although every effort was made to ensure the accuracy of this automated transcription, some errors in transcription may have occurred.

## 2024-05-02 NOTE — OP NOTE
PROCEDURE NOTE    DATE OF PROCEDURE: 5/2/2024     SURGEON: Rachana Maria MD  Facility: [facility]  ASSISTANT: None  Anesthesia: Monitored anesthesia care  PREOPERATIVE DIAGNOSIS: Anemia    Diagnosis:    POSTOPERATIVE DIAGNOSIS: As described below    OPERATION: Upper GI endoscopy with Biopsy    ANESTHESIA: Moderate Sedation     ESTIMATED BLOOD LOSS: Less than 50 ml    COMPLICATIONS: None.     SPECIMENS:  Was Obtained: gastric ulcer    HISTORY: The patient is a 73 y.o. year old female with history of above preop diagnosis.  I recommended esophagogastroduodenoscopy with possible biopsy and I explained the risk, benefits, expected outcome, and alternatives to the procedure.  Risks included but are not limited to bleeding, infection, respiratory distress, hypotension, and perforation of the esophagus, stomach, or duodenum.  Patient understands and is in agreement.      PROCEDURE: The patient was given IV conscious sedation.  The patient's SPO2 remained above 90% throughout the procedure.The gastroscope was inserted orally and advanced under direct vision through the esophagus, through the stomach, through the pylorus, and into the descending duodenum.      Post sedation note :The patient's SPO2 remained above 90% throughout the procedure.the vital signs remained stable , and no immediate complication form the procedure noted, patient will be ready for d/c when criteria is met .      Findings:    Retropharyngeal area was grossly normal appearing    Esophagus: normal;     Esophagogastric markings: Diaphragmatic hiatus- 40 cm; GE junction- 40 cm; Squamo-columnar junction- 40 cm    Stomach:    Fundus: normal    Body: normal    Antrum: abnormal: about 3 cm, oval shaped ulcer with clean base; 4-quadrant biopsy obtained    Duodenum:     Descending: normal    Bulb: normal    The scope was removed and the patient tolerated the procedure well.     Impression- Gastric antral ulcer ([place] Class-III)    Recommendations/Plan:   F/U

## 2024-05-02 NOTE — TELEPHONE ENCOUNTER
----- Message from Jaquelin Walton MA sent at 2024  9:25 AM EST -----  Regardin month follow up  6 month follow up for carotid artery stenosis / carotid ultrasound prior    Dr. Hernandez patient / [facility]    #See telephone encounter#  Patients Son did not want to schedule and request we call in May

## 2024-05-03 ENCOUNTER — APPOINTMENT (OUTPATIENT)
Dept: GENERAL RADIOLOGY | Age: 74
DRG: 377 | End: 2024-05-03
Payer: COMMERCIAL

## 2024-05-03 ENCOUNTER — FOLLOWUP TELEPHONE ENCOUNTER (OUTPATIENT)
Dept: WOUND CARE | Age: 74
End: 2024-05-03

## 2024-05-03 PROBLEM — E44.0 MODERATE MALNUTRITION (HCC): Status: ACTIVE | Noted: 2024-05-03

## 2024-05-03 LAB
ANION GAP SERPL CALCULATED.3IONS-SCNC: 8 MMOL/L (ref 9–17)
BASOPHILS # BLD: 0.07 K/UL (ref 0–0.2)
BASOPHILS NFR BLD: 1 % (ref 0–2)
BUN SERPL-MCNC: 34 MG/DL (ref 8–23)
CALCIUM SERPL-MCNC: 8.9 MG/DL (ref 8.6–10.4)
CHLORIDE SERPL-SCNC: 109 MMOL/L (ref 98–107)
CO2 SERPL-SCNC: 26 MMOL/L (ref 20–31)
CREAT SERPL-MCNC: 0.8 MG/DL (ref 0.5–0.9)
EOSINOPHIL # BLD: 0.14 K/UL (ref 0–0.4)
EOSINOPHILS RELATIVE PERCENT: 2 % (ref 0–4)
ERYTHROCYTE [DISTWIDTH] IN BLOOD BY AUTOMATED COUNT: 17.8 % (ref 11.5–14.9)
GFR, ESTIMATED: 78 ML/MIN/1.73M2
GLUCOSE SERPL-MCNC: 73 MG/DL (ref 70–99)
HCT VFR BLD AUTO: 24.3 % (ref 36–46)
HCT VFR BLD AUTO: 25.5 % (ref 36–46)
HGB BLD-MCNC: 7.8 G/DL (ref 12–16)
HGB BLD-MCNC: 8.2 G/DL (ref 12–16)
LYMPHOCYTES NFR BLD: 0.65 K/UL (ref 1–4.8)
LYMPHOCYTES RELATIVE PERCENT: 9 % (ref 24–44)
MCH RBC QN AUTO: 28.1 PG (ref 26–34)
MCHC RBC AUTO-ENTMCNC: 32.2 G/DL (ref 31–37)
MCV RBC AUTO: 87.4 FL (ref 80–100)
MONOCYTES NFR BLD: 0.36 K/UL (ref 0.1–1.3)
MONOCYTES NFR BLD: 5 % (ref 1–7)
MORPHOLOGY: ABNORMAL
MORPHOLOGY: ABNORMAL
NEUTROPHILS NFR BLD: 83 % (ref 36–66)
NEUTS SEG NFR BLD: 5.98 K/UL (ref 1.3–9.1)
PLATELET # BLD AUTO: 297 K/UL (ref 150–450)
PMV BLD AUTO: 7.6 FL (ref 6–12)
POTASSIUM SERPL-SCNC: 4.1 MMOL/L (ref 3.7–5.3)
RBC # BLD AUTO: 2.78 M/UL (ref 4–5.2)
SODIUM SERPL-SCNC: 143 MMOL/L (ref 135–144)
WBC OTHER # BLD: 7.2 K/UL (ref 3.5–11)

## 2024-05-03 PROCEDURE — 97166 OT EVAL MOD COMPLEX 45 MIN: CPT

## 2024-05-03 PROCEDURE — APPSS30 APP SPLIT SHARED TIME 16-30 MINUTES: Performed by: NURSE PRACTITIONER

## 2024-05-03 PROCEDURE — 6360000002 HC RX W HCPCS: Performed by: INTERNAL MEDICINE

## 2024-05-03 PROCEDURE — C9113 INJ PANTOPRAZOLE SODIUM, VIA: HCPCS | Performed by: INTERNAL MEDICINE

## 2024-05-03 PROCEDURE — 97530 THERAPEUTIC ACTIVITIES: CPT

## 2024-05-03 PROCEDURE — 2580000003 HC RX 258: Performed by: INTERNAL MEDICINE

## 2024-05-03 PROCEDURE — 71045 X-RAY EXAM CHEST 1 VIEW: CPT

## 2024-05-03 PROCEDURE — 85025 COMPLETE CBC W/AUTO DIFF WBC: CPT

## 2024-05-03 PROCEDURE — 97162 PT EVAL MOD COMPLEX 30 MIN: CPT

## 2024-05-03 PROCEDURE — 85014 HEMATOCRIT: CPT

## 2024-05-03 PROCEDURE — 6370000000 HC RX 637 (ALT 250 FOR IP): Performed by: INTERNAL MEDICINE

## 2024-05-03 PROCEDURE — 85018 HEMOGLOBIN: CPT

## 2024-05-03 PROCEDURE — 36415 COLL VENOUS BLD VENIPUNCTURE: CPT

## 2024-05-03 PROCEDURE — 1200000000 HC SEMI PRIVATE

## 2024-05-03 PROCEDURE — 80048 BASIC METABOLIC PNL TOTAL CA: CPT

## 2024-05-03 PROCEDURE — 99233 SBSQ HOSP IP/OBS HIGH 50: CPT | Performed by: NURSE PRACTITIONER

## 2024-05-03 PROCEDURE — 73030 X-RAY EXAM OF SHOULDER: CPT

## 2024-05-03 RX ORDER — FUROSEMIDE 10 MG/ML
80 INJECTION INTRAMUSCULAR; INTRAVENOUS ONCE
Status: COMPLETED | OUTPATIENT
Start: 2024-05-03 | End: 2024-05-03

## 2024-05-03 RX ORDER — SUCRALFATE 1 G/1
1 TABLET ORAL
Status: DISCONTINUED | OUTPATIENT
Start: 2024-05-03 | End: 2024-05-06 | Stop reason: HOSPADM

## 2024-05-03 RX ADMIN — TROSPIUM CHLORIDE 20 MG: 20 TABLET, FILM COATED ORAL at 05:17

## 2024-05-03 RX ADMIN — HYDROMORPHONE HYDROCHLORIDE 0.5 MG: 1 INJECTION, SOLUTION INTRAMUSCULAR; INTRAVENOUS; SUBCUTANEOUS at 10:19

## 2024-05-03 RX ADMIN — SUCRALFATE 1 G: 1 TABLET ORAL at 20:01

## 2024-05-03 RX ADMIN — CLOPIDOGREL BISULFATE 75 MG: 75 TABLET ORAL at 09:04

## 2024-05-03 RX ADMIN — ANTI-FUNGAL POWDER MICONAZOLE NITRATE TALC FREE: 1.42 POWDER TOPICAL at 09:20

## 2024-05-03 RX ADMIN — MEROPENEM 1000 MG: 1 INJECTION, POWDER, FOR SOLUTION INTRAVENOUS at 14:29

## 2024-05-03 RX ADMIN — ESCITALOPRAM OXALATE 20 MG: 20 TABLET ORAL at 09:04

## 2024-05-03 RX ADMIN — MEROPENEM 1000 MG: 1 INJECTION, POWDER, FOR SOLUTION INTRAVENOUS at 02:38

## 2024-05-03 RX ADMIN — HYDROMORPHONE HYDROCHLORIDE 0.5 MG: 1 INJECTION, SOLUTION INTRAMUSCULAR; INTRAVENOUS; SUBCUTANEOUS at 05:17

## 2024-05-03 RX ADMIN — SODIUM CHLORIDE: 9 INJECTION, SOLUTION INTRAVENOUS at 14:17

## 2024-05-03 RX ADMIN — ANTI-FUNGAL POWDER MICONAZOLE NITRATE TALC FREE: 1.42 POWDER TOPICAL at 20:02

## 2024-05-03 RX ADMIN — ATORVASTATIN CALCIUM 40 MG: 40 TABLET, FILM COATED ORAL at 20:01

## 2024-05-03 RX ADMIN — SUCRALFATE 1 G: 1 TABLET ORAL at 16:19

## 2024-05-03 RX ADMIN — TROSPIUM CHLORIDE 20 MG: 20 TABLET, FILM COATED ORAL at 15:43

## 2024-05-03 RX ADMIN — POTASSIUM CHLORIDE 10 MEQ: 10 CAPSULE, COATED, EXTENDED RELEASE ORAL at 20:01

## 2024-05-03 RX ADMIN — PANTOPRAZOLE SODIUM 8 MG/HR: 40 INJECTION, POWDER, FOR SOLUTION INTRAVENOUS at 09:20

## 2024-05-03 RX ADMIN — SUCRALFATE 1 G: 1 TABLET ORAL at 12:09

## 2024-05-03 RX ADMIN — FUROSEMIDE 80 MG: 10 INJECTION, SOLUTION INTRAMUSCULAR; INTRAVENOUS at 18:50

## 2024-05-03 RX ADMIN — POTASSIUM CHLORIDE 10 MEQ: 10 CAPSULE, COATED, EXTENDED RELEASE ORAL at 09:03

## 2024-05-03 RX ADMIN — PANTOPRAZOLE SODIUM 8 MG/HR: 40 INJECTION, POWDER, FOR SOLUTION INTRAVENOUS at 18:36

## 2024-05-03 RX ADMIN — METOPROLOL TARTRATE 50 MG: 50 TABLET, FILM COATED ORAL at 09:04

## 2024-05-03 RX ADMIN — SODIUM CHLORIDE, PRESERVATIVE FREE 10 ML: 5 INJECTION INTRAVENOUS at 09:20

## 2024-05-03 ASSESSMENT — PAIN DESCRIPTION - DESCRIPTORS
DESCRIPTORS: ACHING
DESCRIPTORS: BURNING

## 2024-05-03 ASSESSMENT — PAIN DESCRIPTION - ORIENTATION: ORIENTATION: RIGHT;LEFT

## 2024-05-03 ASSESSMENT — PAIN SCALES - GENERAL
PAINLEVEL_OUTOF10: 4
PAINLEVEL_OUTOF10: 5
PAINLEVEL_OUTOF10: 3

## 2024-05-03 ASSESSMENT — PAIN DESCRIPTION - LOCATION
LOCATION: LEG
LOCATION: LEG

## 2024-05-03 ASSESSMENT — PAIN - FUNCTIONAL ASSESSMENT: PAIN_FUNCTIONAL_ASSESSMENT: PREVENTS OR INTERFERES SOME ACTIVE ACTIVITIES AND ADLS

## 2024-05-03 NOTE — PLAN OF CARE
Problem: Discharge Planning  Goal: Discharge to home or other facility with appropriate resources  5/3/2024 0422 by Son Joseph, RN  Outcome: Progressing     Problem: Pain  Goal: Verbalizes/displays adequate comfort level or baseline comfort level  5/3/2024 0422 by Son Joseph, RN  Outcome: Progressing   Patient expresses relief following administration of prn pain medication.

## 2024-05-03 NOTE — CARE COORDINATION
ONGOING DISCHARGE PLAN:    Patient is alert and oriented x4.    Spoke with patient regarding discharge plan and patient confirms that plan is to go to SNF. PT made recs to patient and spouse. Spouse spoke to CM and gave choices of [facility]. [facility] and [facility]. [facility], referral sent to both facilities.     DME: walker, cane,toilet raiser.     [facility]NS: [facility], referral faxed. [facility] [employer] message sent to [facility] to verify current.    POD#1 EGD     IV merrem , PO carafate, protonix gtt, PT/OT eval.     HGB 7.8     Will continue to follow for additional discharge needs.    If patient is discharged prior to next notation, then this note serves as note for discharge by case management.    Electronically signed by Erica Rey RN on 5/3/2024 at 11:35 AM

## 2024-05-03 NOTE — PLAN OF CARE
Problem: Discharge Planning  Goal: Discharge to home or other facility with appropriate resources  5/3/2024 1611 by Yamilet Barahona RN  Outcome: Progressing     Problem: Pain  Goal: Verbalizes/displays adequate comfort level or baseline comfort level  5/3/2024 1611 by Yamilet Barahona, RN  Outcome: Progressing     Problem: Skin/Tissue Integrity  Goal: Absence of new skin breakdown  Description: 1.  Monitor for areas of redness and/or skin breakdown  2.  Assess vascular access sites hourly  3.  Every 4-6 hours minimum:  Change oxygen saturation probe site  4.  Every 4-6 hours:  If on nasal continuous positive airway pressure, respiratory therapy assess nares and determine need for appliance change or resting period.  Outcome: Progressing     Problem: Safety - Adult  Goal: Free from fall injury  Outcome: Progressing     Problem: ABCDS Injury Assessment  Goal: Absence of physical injury  Outcome: Progressing     Problem: Nutrition Deficit:  Goal: Optimize nutritional status  Outcome: Progressing

## 2024-05-03 NOTE — CARE COORDINATION
DISCHARGE PLANNING NOTE:    Call from Leslee at [facility] stating that they have no open beds at [facility] but do have open beds at [facility]. Precert started with [facility] today.  Spouse notified and agreeable.   Electronically signed by Erica Rey RN on 5/3/2024 at 3:15 PM

## 2024-05-03 NOTE — PROGRESS NOTES
Pt currently admitted and would like to cancel [facility] for 5/6/24. Pt will call clinic to reschedule.

## 2024-05-04 PROBLEM — Z95.5 H/O HEART ARTERY STENT: Status: ACTIVE | Noted: 2024-05-04

## 2024-05-04 LAB
ALBUMIN SERPL-MCNC: 2.6 G/DL (ref 3.5–5.2)
ALP SERPL-CCNC: 139 U/L (ref 35–104)
ALT SERPL-CCNC: 10 U/L (ref 5–33)
ANION GAP SERPL CALCULATED.3IONS-SCNC: 8 MMOL/L (ref 9–17)
AST SERPL-CCNC: 37 U/L
BASOPHILS # BLD: 0 K/UL (ref 0–0.2)
BASOPHILS NFR BLD: 1 % (ref 0–2)
BILIRUB SERPL-MCNC: 0.4 MG/DL (ref 0.3–1.2)
BUN SERPL-MCNC: 20 MG/DL (ref 8–23)
CALCIUM SERPL-MCNC: 8.7 MG/DL (ref 8.6–10.4)
CHLORIDE SERPL-SCNC: 101 MMOL/L (ref 98–107)
CO2 SERPL-SCNC: 29 MMOL/L (ref 20–31)
CREAT SERPL-MCNC: 0.7 MG/DL (ref 0.5–0.9)
EOSINOPHIL # BLD: 0.1 K/UL (ref 0–0.4)
EOSINOPHILS RELATIVE PERCENT: 1 % (ref 0–4)
ERYTHROCYTE [DISTWIDTH] IN BLOOD BY AUTOMATED COUNT: 17.4 % (ref 11.5–14.9)
GFR, ESTIMATED: >90 ML/MIN/1.73M2
GLUCOSE SERPL-MCNC: 91 MG/DL (ref 70–99)
HCT VFR BLD AUTO: 25.1 % (ref 36–46)
HGB BLD-MCNC: 8.4 G/DL (ref 12–16)
LYMPHOCYTES NFR BLD: 0.8 K/UL (ref 1–4.8)
LYMPHOCYTES RELATIVE PERCENT: 10 % (ref 24–44)
MCH RBC QN AUTO: 29 PG (ref 26–34)
MCHC RBC AUTO-ENTMCNC: 33.4 G/DL (ref 31–37)
MCV RBC AUTO: 86.9 FL (ref 80–100)
MONOCYTES NFR BLD: 0.4 K/UL (ref 0.1–1.3)
MONOCYTES NFR BLD: 5 % (ref 1–7)
NEUTROPHILS NFR BLD: 83 % (ref 36–66)
NEUTS SEG NFR BLD: 6.2 K/UL (ref 1.3–9.1)
PLATELET # BLD AUTO: 324 K/UL (ref 150–450)
PMV BLD AUTO: 8.1 FL (ref 6–12)
POTASSIUM SERPL-SCNC: 3.6 MMOL/L (ref 3.7–5.3)
PROT SERPL-MCNC: 6.1 G/DL (ref 6.4–8.3)
RBC # BLD AUTO: 2.89 M/UL (ref 4–5.2)
SODIUM SERPL-SCNC: 138 MMOL/L (ref 135–144)
WBC OTHER # BLD: 7.5 K/UL (ref 3.5–11)

## 2024-05-04 PROCEDURE — C9113 INJ PANTOPRAZOLE SODIUM, VIA: HCPCS | Performed by: INTERNAL MEDICINE

## 2024-05-04 PROCEDURE — 6370000000 HC RX 637 (ALT 250 FOR IP): Performed by: INTERNAL MEDICINE

## 2024-05-04 PROCEDURE — 6360000002 HC RX W HCPCS: Performed by: INTERNAL MEDICINE

## 2024-05-04 PROCEDURE — 1200000000 HC SEMI PRIVATE

## 2024-05-04 PROCEDURE — 2580000003 HC RX 258: Performed by: INTERNAL MEDICINE

## 2024-05-04 PROCEDURE — 36415 COLL VENOUS BLD VENIPUNCTURE: CPT

## 2024-05-04 PROCEDURE — 99233 SBSQ HOSP IP/OBS HIGH 50: CPT | Performed by: INTERNAL MEDICINE

## 2024-05-04 PROCEDURE — 80053 COMPREHEN METABOLIC PANEL: CPT

## 2024-05-04 PROCEDURE — 85025 COMPLETE CBC W/AUTO DIFF WBC: CPT

## 2024-05-04 RX ORDER — PANTOPRAZOLE SODIUM 40 MG/1
40 TABLET, DELAYED RELEASE ORAL
Status: DISCONTINUED | OUTPATIENT
Start: 2024-05-04 | End: 2024-05-06 | Stop reason: HOSPADM

## 2024-05-04 RX ORDER — FUROSEMIDE 10 MG/ML
20 INJECTION INTRAMUSCULAR; INTRAVENOUS DAILY
Status: DISCONTINUED | OUTPATIENT
Start: 2024-05-04 | End: 2024-05-04

## 2024-05-04 RX ADMIN — MEROPENEM 1000 MG: 1 INJECTION, POWDER, FOR SOLUTION INTRAVENOUS at 03:07

## 2024-05-04 RX ADMIN — SUCRALFATE 1 G: 1 TABLET ORAL at 09:52

## 2024-05-04 RX ADMIN — SUCRALFATE 1 G: 1 TABLET ORAL at 06:06

## 2024-05-04 RX ADMIN — SUCRALFATE 1 G: 1 TABLET ORAL at 18:23

## 2024-05-04 RX ADMIN — TROSPIUM CHLORIDE 20 MG: 20 TABLET, FILM COATED ORAL at 06:06

## 2024-05-04 RX ADMIN — PANTOPRAZOLE SODIUM 8 MG/HR: 40 INJECTION, POWDER, FOR SOLUTION INTRAVENOUS at 06:07

## 2024-05-04 RX ADMIN — SODIUM CHLORIDE, PRESERVATIVE FREE 10 ML: 5 INJECTION INTRAVENOUS at 20:49

## 2024-05-04 RX ADMIN — SUCRALFATE 1 G: 1 TABLET ORAL at 20:49

## 2024-05-04 RX ADMIN — POLYETHYLENE GLYCOL 3350 17 G: 17 POWDER, FOR SOLUTION ORAL at 00:20

## 2024-05-04 RX ADMIN — MEROPENEM 1000 MG: 1 INJECTION, POWDER, FOR SOLUTION INTRAVENOUS at 15:46

## 2024-05-04 RX ADMIN — POTASSIUM CHLORIDE 10 MEQ: 10 CAPSULE, COATED, EXTENDED RELEASE ORAL at 20:49

## 2024-05-04 RX ADMIN — FUROSEMIDE 40 MG: 40 TABLET ORAL at 09:52

## 2024-05-04 RX ADMIN — ATORVASTATIN CALCIUM 40 MG: 40 TABLET, FILM COATED ORAL at 20:49

## 2024-05-04 RX ADMIN — ESCITALOPRAM OXALATE 20 MG: 20 TABLET ORAL at 09:52

## 2024-05-04 RX ADMIN — CLOPIDOGREL BISULFATE 75 MG: 75 TABLET ORAL at 09:52

## 2024-05-04 RX ADMIN — METOPROLOL TARTRATE 50 MG: 50 TABLET, FILM COATED ORAL at 20:49

## 2024-05-04 RX ADMIN — POTASSIUM CHLORIDE 10 MEQ: 10 CAPSULE, COATED, EXTENDED RELEASE ORAL at 09:52

## 2024-05-04 RX ADMIN — ANTI-FUNGAL POWDER MICONAZOLE NITRATE TALC FREE: 1.42 POWDER TOPICAL at 09:53

## 2024-05-04 RX ADMIN — TROSPIUM CHLORIDE 20 MG: 20 TABLET, FILM COATED ORAL at 18:23

## 2024-05-04 RX ADMIN — METOPROLOL TARTRATE 50 MG: 50 TABLET, FILM COATED ORAL at 09:52

## 2024-05-04 RX ADMIN — PANTOPRAZOLE SODIUM 40 MG: 40 TABLET, DELAYED RELEASE ORAL at 21:00

## 2024-05-04 RX ADMIN — ANTI-FUNGAL POWDER MICONAZOLE NITRATE TALC FREE: 1.42 POWDER TOPICAL at 20:50

## 2024-05-04 NOTE — CARE COORDINATION
ONGOING DISCHARGE PLAN:    Patient is alert and oriented x4.    Spoke with patient regarding discharge plan and patient confirms that plan is still [facility] started 5/3.      POD #2 EGD gastric ulcer  Protonix drip  Carafate    97% on 2L    Plan for heart cath on Monday per Cardiology    IV merrem    PT/OT    Will continue to follow for additional discharge needs.    If patient is discharged prior to next notation, then this note serves as note for discharge by case management.    Electronically signed by Meagan Barrios RN on 5/4/2024 at 12:52 PM

## 2024-05-04 NOTE — PLAN OF CARE
Problem: Skin/Tissue Integrity  Goal: Absence of new skin breakdown  Description: 1.  Monitor for areas of redness and/or skin breakdown  2.  Assess vascular access sites hourly  3.  Every 4-6 hours minimum:  Change oxygen saturation probe site  4.  Every 4-6 hours:  If on nasal continuous positive airway pressure, respiratory therapy assess nares and determine need for appliance change or resting period.  5/4/2024 0521 by Son Joseph, RN  Outcome: Progressing     Problem: Safety - Adult  Goal: Free from fall injury  5/4/2024 0521 by Son Joseph, RN  Outcome: Progressing

## 2024-05-05 ENCOUNTER — APPOINTMENT (OUTPATIENT)
Dept: CT IMAGING | Age: 74
DRG: 377 | End: 2024-05-05
Payer: COMMERCIAL

## 2024-05-05 LAB
ABO/RH: NORMAL
ANION GAP SERPL CALCULATED.3IONS-SCNC: 9 MMOL/L (ref 9–17)
ANTIBODY SCREEN: NEGATIVE
ARM BAND NUMBER: NORMAL
BASOPHILS # BLD: 0 K/UL (ref 0–0.2)
BASOPHILS NFR BLD: 0 % (ref 0–2)
BLOOD BANK BLOOD PRODUCT EXPIRATION DATE: NORMAL
BLOOD BANK BLOOD PRODUCT EXPIRATION DATE: NORMAL
BLOOD BANK DISPENSE STATUS: NORMAL
BLOOD BANK DISPENSE STATUS: NORMAL
BLOOD BANK ISBT PRODUCT BLOOD TYPE: 600
BLOOD BANK ISBT PRODUCT BLOOD TYPE: 600
BLOOD BANK PRODUCT CODE: NORMAL
BLOOD BANK PRODUCT CODE: NORMAL
BLOOD BANK SAMPLE EXPIRATION: NORMAL
BLOOD BANK UNIT TYPE AND RH: NORMAL
BLOOD BANK UNIT TYPE AND RH: NORMAL
BPU ID: NORMAL
BPU ID: NORMAL
BUN SERPL-MCNC: 13 MG/DL (ref 8–23)
CALCIUM SERPL-MCNC: 8.6 MG/DL (ref 8.6–10.4)
CHLORIDE SERPL-SCNC: 100 MMOL/L (ref 98–107)
CO2 SERPL-SCNC: 30 MMOL/L (ref 20–31)
COMPONENT: NORMAL
COMPONENT: NORMAL
CREAT SERPL-MCNC: 0.6 MG/DL (ref 0.5–0.9)
CROSSMATCH RESULT: NORMAL
CROSSMATCH RESULT: NORMAL
EOSINOPHIL # BLD: 0.1 K/UL (ref 0–0.4)
EOSINOPHILS RELATIVE PERCENT: 1 % (ref 0–4)
ERYTHROCYTE [DISTWIDTH] IN BLOOD BY AUTOMATED COUNT: 17.5 % (ref 11.5–14.9)
GFR, ESTIMATED: >90 ML/MIN/1.73M2
GLUCOSE SERPL-MCNC: 97 MG/DL (ref 70–99)
HCT VFR BLD AUTO: 26.1 % (ref 36–46)
HGB BLD-MCNC: 8.5 G/DL (ref 12–16)
LYMPHOCYTES NFR BLD: 0.8 K/UL (ref 1–4.8)
LYMPHOCYTES RELATIVE PERCENT: 10 % (ref 24–44)
MCH RBC QN AUTO: 28 PG (ref 26–34)
MCHC RBC AUTO-ENTMCNC: 32.5 G/DL (ref 31–37)
MCV RBC AUTO: 86.1 FL (ref 80–100)
MONOCYTES NFR BLD: 0.5 K/UL (ref 0.1–1.3)
MONOCYTES NFR BLD: 6 % (ref 1–7)
NEUTROPHILS NFR BLD: 83 % (ref 36–66)
NEUTS SEG NFR BLD: 6.7 K/UL (ref 1.3–9.1)
PLATELET # BLD AUTO: 345 K/UL (ref 150–450)
PMV BLD AUTO: 7.5 FL (ref 6–12)
POTASSIUM SERPL-SCNC: 3.4 MMOL/L (ref 3.7–5.3)
RBC # BLD AUTO: 3.04 M/UL (ref 4–5.2)
SODIUM SERPL-SCNC: 139 MMOL/L (ref 135–144)
TRANSFUSION STATUS: NORMAL
TRANSFUSION STATUS: NORMAL
UNIT DIVISION: 0
UNIT DIVISION: 0
UNIT ISSUE DATE/TIME: NORMAL
UNIT ISSUE DATE/TIME: NORMAL
UNIT TAG COMMENT: NORMAL
WBC OTHER # BLD: 8.1 K/UL (ref 3.5–11)

## 2024-05-05 PROCEDURE — 6360000002 HC RX W HCPCS: Performed by: NURSE PRACTITIONER

## 2024-05-05 PROCEDURE — 6370000000 HC RX 637 (ALT 250 FOR IP): Performed by: INTERNAL MEDICINE

## 2024-05-05 PROCEDURE — 80048 BASIC METABOLIC PNL TOTAL CA: CPT

## 2024-05-05 PROCEDURE — 2580000003 HC RX 258: Performed by: INTERNAL MEDICINE

## 2024-05-05 PROCEDURE — 1200000000 HC SEMI PRIVATE

## 2024-05-05 PROCEDURE — 6360000002 HC RX W HCPCS: Performed by: INTERNAL MEDICINE

## 2024-05-05 PROCEDURE — 36415 COLL VENOUS BLD VENIPUNCTURE: CPT

## 2024-05-05 PROCEDURE — 99233 SBSQ HOSP IP/OBS HIGH 50: CPT | Performed by: INTERNAL MEDICINE

## 2024-05-05 PROCEDURE — 85025 COMPLETE CBC W/AUTO DIFF WBC: CPT

## 2024-05-05 PROCEDURE — 70450 CT HEAD/BRAIN W/O DYE: CPT

## 2024-05-05 RX ORDER — LORAZEPAM 2 MG/ML
1 INJECTION INTRAMUSCULAR ONCE
Status: COMPLETED | OUTPATIENT
Start: 2024-05-05 | End: 2024-05-05

## 2024-05-05 RX ADMIN — ATORVASTATIN CALCIUM 40 MG: 40 TABLET, FILM COATED ORAL at 22:15

## 2024-05-05 RX ADMIN — SUCRALFATE 1 G: 1 TABLET ORAL at 15:41

## 2024-05-05 RX ADMIN — POTASSIUM CHLORIDE 40 MEQ: 1500 TABLET, EXTENDED RELEASE ORAL at 15:42

## 2024-05-05 RX ADMIN — PANTOPRAZOLE SODIUM 40 MG: 40 TABLET, DELAYED RELEASE ORAL at 16:00

## 2024-05-05 RX ADMIN — ANTI-FUNGAL POWDER MICONAZOLE NITRATE TALC FREE: 1.42 POWDER TOPICAL at 22:20

## 2024-05-05 RX ADMIN — SODIUM CHLORIDE, PRESERVATIVE FREE 10 ML: 5 INJECTION INTRAVENOUS at 22:20

## 2024-05-05 RX ADMIN — ANTI-FUNGAL POWDER MICONAZOLE NITRATE TALC FREE: 1.42 POWDER TOPICAL at 15:09

## 2024-05-05 RX ADMIN — LORAZEPAM 1 MG: 2 INJECTION, SOLUTION INTRAMUSCULAR; INTRAVENOUS at 04:42

## 2024-05-05 RX ADMIN — METOPROLOL TARTRATE 50 MG: 50 TABLET, FILM COATED ORAL at 22:15

## 2024-05-05 RX ADMIN — COLLAGENASE SANTYL: 250 OINTMENT TOPICAL at 15:06

## 2024-05-05 RX ADMIN — MEROPENEM 1000 MG: 1 INJECTION, POWDER, FOR SOLUTION INTRAVENOUS at 02:14

## 2024-05-05 RX ADMIN — SUCRALFATE 1 G: 1 TABLET ORAL at 22:15

## 2024-05-05 NOTE — CARE COORDINATION
ONGOING DISCHARGE PLAN:    Patient is sleeping.    [facility] is [facility].      POD #3 EGD gastric ulcer  Protonix   Carafate    91% on on room air    Plan for heart cath on Monday per Cardiology    PT/OT    Will continue to follow for additional discharge needs.    If patient is discharged prior to next notation, then this note serves as note for discharge by case management.    Electronically signed by Meagan Barrios RN on 5/5/2024 at 5:18 PM

## 2024-05-05 NOTE — PLAN OF CARE
Problem: Discharge Planning  Goal: Discharge to home or other facility with appropriate resources  Outcome: Progressing  Flowsheets (Taken 5/5/2024 0300)  Discharge to home or other facility with appropriate resources:   Identify barriers to discharge with patient and caregiver   Arrange for needed discharge resources and transportation as appropriate   Identify discharge learning needs (meds, wound care, etc)  Note: TEAM WORKING TOGETHER TO IDENTIFY DISCHARGE BARRIERS     Problem: Pain  Goal: Verbalizes/displays adequate comfort level or baseline comfort level  Outcome: Progressing  Flowsheets (Taken 5/5/2024 0300)  Verbalizes/displays adequate comfort level or baseline comfort level:   Encourage patient to monitor pain and request assistance   Assess pain using appropriate pain scale   Administer analgesics based on type and severity of pain and evaluate response  Note: DENIES PAIN AT THIS TIME      Problem: Skin/Tissue Integrity  Goal: Absence of new skin breakdown  Description: 1.  Monitor for areas of redness and/or skin breakdown  2.  Assess vascular access sites hourly  3.  Every 4-6 hours minimum:  Change oxygen saturation probe site  4.  Every 4-6 hours:  If on nasal continuous positive airway pressure, respiratory therapy assess nares and determine need for appliance change or resting period.  Outcome: Progressing     Problem: Safety - Adult  Goal: Free from fall injury  Outcome: Progressing  Note: STANDARD SAFETY PRECAUTIONS IN PLACE

## 2024-05-05 NOTE — FLOWSHEET NOTE
Writer in with patient to do daily care.  Patient is confused, yelling out inappropriately, and is combative.  Patient is oriented to person. Patient trying to get out of bed.  Bed alarm on.

## 2024-05-06 ENCOUNTER — HOSPITAL ENCOUNTER (OUTPATIENT)
Age: 74
Setting detail: OUTPATIENT SURGERY
Discharge: ANOTHER HOSPITAL | End: 2024-05-06
Attending: INTERNAL MEDICINE | Admitting: INTERNAL MEDICINE
Payer: COMMERCIAL

## 2024-05-06 ENCOUNTER — HOSPITAL ENCOUNTER (INPATIENT)
Age: 74
LOS: 1 days | Discharge: NURSING, REHAB OR LONG-TERM CARE | DRG: 377 | End: 2024-05-07
Attending: FAMILY MEDICINE | Admitting: INTERNAL MEDICINE
Payer: COMMERCIAL

## 2024-05-06 VITALS
WEIGHT: 238 LBS | TEMPERATURE: 98.6 F | RESPIRATION RATE: 18 BRPM | HEIGHT: 61 IN | OXYGEN SATURATION: 92 % | DIASTOLIC BLOOD PRESSURE: 45 MMHG | SYSTOLIC BLOOD PRESSURE: 133 MMHG | BODY MASS INDEX: 44.93 KG/M2 | HEART RATE: 73 BPM

## 2024-05-06 VITALS
DIASTOLIC BLOOD PRESSURE: 56 MMHG | RESPIRATION RATE: 13 BRPM | OXYGEN SATURATION: 98 % | HEART RATE: 80 BPM | SYSTOLIC BLOOD PRESSURE: 85 MMHG | TEMPERATURE: 97.8 F

## 2024-05-06 DIAGNOSIS — I25.119 CORONARY ARTERY DISEASE WITH ANGINA PECTORIS, UNSPECIFIED VESSEL OR LESION TYPE, UNSPECIFIED WHETHER NATIVE OR TRANSPLANTED HEART (HCC): ICD-10-CM

## 2024-05-06 PROBLEM — K92.2 LOWER GI HEMORRHAGE: Status: ACTIVE | Noted: 2024-05-06

## 2024-05-06 PROCEDURE — 6360000002 HC RX W HCPCS: Performed by: INTERNAL MEDICINE

## 2024-05-06 PROCEDURE — 6370000000 HC RX 637 (ALT 250 FOR IP): Performed by: INTERNAL MEDICINE

## 2024-05-06 PROCEDURE — C1769 GUIDE WIRE: HCPCS | Performed by: INTERNAL MEDICINE

## 2024-05-06 PROCEDURE — 99223 1ST HOSP IP/OBS HIGH 75: CPT | Performed by: INTERNAL MEDICINE

## 2024-05-06 PROCEDURE — C1894 INTRO/SHEATH, NON-LASER: HCPCS | Performed by: INTERNAL MEDICINE

## 2024-05-06 PROCEDURE — C1874 STENT, COATED/COV W/DEL SYS: HCPCS | Performed by: INTERNAL MEDICINE

## 2024-05-06 PROCEDURE — 99152 MOD SED SAME PHYS/QHP 5/>YRS: CPT | Performed by: INTERNAL MEDICINE

## 2024-05-06 PROCEDURE — 7100000011 HC PHASE II RECOVERY - ADDTL 15 MIN: Performed by: INTERNAL MEDICINE

## 2024-05-06 PROCEDURE — C1887 CATHETER, GUIDING: HCPCS | Performed by: INTERNAL MEDICINE

## 2024-05-06 PROCEDURE — 2709999900 HC NON-CHARGEABLE SUPPLY: Performed by: INTERNAL MEDICINE

## 2024-05-06 PROCEDURE — 2060000000 HC ICU INTERMEDIATE R&B

## 2024-05-06 PROCEDURE — 92928 PRQ TCAT PLMT NTRAC ST 1 LES: CPT | Performed by: INTERNAL MEDICINE

## 2024-05-06 PROCEDURE — 2580000003 HC RX 258: Performed by: INTERNAL MEDICINE

## 2024-05-06 PROCEDURE — 93454 CORONARY ARTERY ANGIO S&I: CPT | Performed by: INTERNAL MEDICINE

## 2024-05-06 PROCEDURE — C9600 PERC DRUG-EL COR STENT SING: HCPCS | Performed by: INTERNAL MEDICINE

## 2024-05-06 PROCEDURE — 99153 MOD SED SAME PHYS/QHP EA: CPT | Performed by: INTERNAL MEDICINE

## 2024-05-06 PROCEDURE — 2500000003 HC RX 250 WO HCPCS: Performed by: INTERNAL MEDICINE

## 2024-05-06 PROCEDURE — 6360000004 HC RX CONTRAST MEDICATION: Performed by: INTERNAL MEDICINE

## 2024-05-06 PROCEDURE — 7100000010 HC PHASE II RECOVERY - FIRST 15 MIN: Performed by: INTERNAL MEDICINE

## 2024-05-06 DEVICE — STENT ONYXNG30018UX ONYX 3.00X18RX
Type: IMPLANTABLE DEVICE | Status: FUNCTIONAL
Brand: ONYX FRONTIER™

## 2024-05-06 RX ORDER — SODIUM CHLORIDE 0.9 % (FLUSH) 0.9 %
10 SYRINGE (ML) INJECTION PRN
Status: CANCELLED | OUTPATIENT
Start: 2024-05-06

## 2024-05-06 RX ORDER — ONDANSETRON 4 MG/1
4 TABLET, ORALLY DISINTEGRATING ORAL EVERY 8 HOURS PRN
Status: DISCONTINUED | OUTPATIENT
Start: 2024-05-06 | End: 2024-05-07 | Stop reason: HOSPADM

## 2024-05-06 RX ORDER — SUCRALFATE 1 G/1
1 TABLET ORAL
Status: CANCELLED | OUTPATIENT
Start: 2024-05-06

## 2024-05-06 RX ORDER — PANTOPRAZOLE SODIUM 40 MG/1
40 TABLET, DELAYED RELEASE ORAL
Status: DISCONTINUED | OUTPATIENT
Start: 2024-05-06 | End: 2024-05-07 | Stop reason: HOSPADM

## 2024-05-06 RX ORDER — ONDANSETRON 2 MG/ML
4 INJECTION INTRAMUSCULAR; INTRAVENOUS EVERY 6 HOURS PRN
Status: CANCELLED | OUTPATIENT
Start: 2024-05-06

## 2024-05-06 RX ORDER — POTASSIUM CHLORIDE 750 MG/1
10 CAPSULE, EXTENDED RELEASE ORAL 2 TIMES DAILY
Status: CANCELLED | OUTPATIENT
Start: 2024-05-06

## 2024-05-06 RX ORDER — CLOPIDOGREL 300 MG/1
TABLET, FILM COATED ORAL PRN
Status: DISCONTINUED | OUTPATIENT
Start: 2024-05-06 | End: 2024-05-06 | Stop reason: HOSPADM

## 2024-05-06 RX ORDER — ATORVASTATIN CALCIUM 40 MG/1
40 TABLET, FILM COATED ORAL NIGHTLY
Status: CANCELLED | OUTPATIENT
Start: 2024-05-06

## 2024-05-06 RX ORDER — POLYETHYLENE GLYCOL 3350 17 G/17G
17 POWDER, FOR SOLUTION ORAL DAILY PRN
Status: DISCONTINUED | OUTPATIENT
Start: 2024-05-06 | End: 2024-05-07 | Stop reason: HOSPADM

## 2024-05-06 RX ORDER — POTASSIUM CHLORIDE 20 MEQ/1
40 TABLET, EXTENDED RELEASE ORAL PRN
Status: CANCELLED | OUTPATIENT
Start: 2024-05-06

## 2024-05-06 RX ORDER — ASPIRIN 325 MG
TABLET ORAL PRN
Status: DISCONTINUED | OUTPATIENT
Start: 2024-05-06 | End: 2024-05-06 | Stop reason: HOSPADM

## 2024-05-06 RX ORDER — FUROSEMIDE 40 MG/1
40 TABLET ORAL DAILY
Status: CANCELLED | OUTPATIENT
Start: 2024-05-07

## 2024-05-06 RX ORDER — ONDANSETRON 2 MG/ML
4 INJECTION INTRAMUSCULAR; INTRAVENOUS EVERY 6 HOURS PRN
Status: DISCONTINUED | OUTPATIENT
Start: 2024-05-06 | End: 2024-05-07 | Stop reason: HOSPADM

## 2024-05-06 RX ORDER — ACETAMINOPHEN 650 MG/1
650 SUPPOSITORY RECTAL EVERY 6 HOURS PRN
Status: CANCELLED | OUTPATIENT
Start: 2024-05-06

## 2024-05-06 RX ORDER — PANTOPRAZOLE SODIUM 40 MG/1
40 TABLET, DELAYED RELEASE ORAL
Status: CANCELLED | OUTPATIENT
Start: 2024-05-06

## 2024-05-06 RX ORDER — CLOPIDOGREL BISULFATE 75 MG/1
75 TABLET ORAL DAILY
Status: CANCELLED | OUTPATIENT
Start: 2024-05-07

## 2024-05-06 RX ORDER — METOPROLOL TARTRATE 50 MG/1
50 TABLET, FILM COATED ORAL 2 TIMES DAILY
Status: CANCELLED | OUTPATIENT
Start: 2024-05-06

## 2024-05-06 RX ORDER — SUCRALFATE 1 G/1
1 TABLET ORAL
Status: DISCONTINUED | OUTPATIENT
Start: 2024-05-06 | End: 2024-05-07 | Stop reason: HOSPADM

## 2024-05-06 RX ORDER — ONDANSETRON 4 MG/1
4 TABLET, ORALLY DISINTEGRATING ORAL EVERY 8 HOURS PRN
Status: CANCELLED | OUTPATIENT
Start: 2024-05-06

## 2024-05-06 RX ORDER — POLYETHYLENE GLYCOL 3350 17 G/17G
17 POWDER, FOR SOLUTION ORAL DAILY PRN
Status: CANCELLED | OUTPATIENT
Start: 2024-05-06

## 2024-05-06 RX ORDER — FENTANYL CITRATE 50 UG/ML
INJECTION, SOLUTION INTRAMUSCULAR; INTRAVENOUS PRN
Status: DISCONTINUED | OUTPATIENT
Start: 2024-05-06 | End: 2024-05-06 | Stop reason: HOSPADM

## 2024-05-06 RX ORDER — SODIUM CHLORIDE 9 MG/ML
INJECTION, SOLUTION INTRAVENOUS PRN
Status: DISCONTINUED | OUTPATIENT
Start: 2024-05-06 | End: 2024-05-07 | Stop reason: HOSPADM

## 2024-05-06 RX ORDER — FUROSEMIDE 40 MG/1
40 TABLET ORAL DAILY
Status: DISCONTINUED | OUTPATIENT
Start: 2024-05-07 | End: 2024-05-07 | Stop reason: HOSPADM

## 2024-05-06 RX ORDER — BIVALIRUDIN 250 MG/5ML
INJECTION, POWDER, LYOPHILIZED, FOR SOLUTION INTRAVENOUS PRN
Status: DISCONTINUED | OUTPATIENT
Start: 2024-05-06 | End: 2024-05-06 | Stop reason: HOSPADM

## 2024-05-06 RX ORDER — LANOLIN ALCOHOL/MO/W.PET/CERES
3 CREAM (GRAM) TOPICAL NIGHTLY PRN
Status: DISCONTINUED | OUTPATIENT
Start: 2024-05-06 | End: 2024-05-07 | Stop reason: HOSPADM

## 2024-05-06 RX ORDER — BISACODYL 10 MG
10 SUPPOSITORY, RECTAL RECTAL DAILY PRN
Status: DISCONTINUED | OUTPATIENT
Start: 2024-05-06 | End: 2024-05-07 | Stop reason: HOSPADM

## 2024-05-06 RX ORDER — ACETAMINOPHEN 325 MG/1
650 TABLET ORAL EVERY 6 HOURS PRN
Status: CANCELLED | OUTPATIENT
Start: 2024-05-06

## 2024-05-06 RX ORDER — BISACODYL 10 MG
10 SUPPOSITORY, RECTAL RECTAL DAILY PRN
Status: CANCELLED | OUTPATIENT
Start: 2024-05-06

## 2024-05-06 RX ORDER — 0.9 % SODIUM CHLORIDE 0.9 %
INTRAVENOUS SOLUTION INTRAVENOUS CONTINUOUS PRN
Status: COMPLETED | OUTPATIENT
Start: 2024-05-06 | End: 2024-05-06

## 2024-05-06 RX ORDER — SODIUM CHLORIDE 9 MG/ML
INJECTION, SOLUTION INTRAVENOUS PRN
Status: CANCELLED | OUTPATIENT
Start: 2024-05-06

## 2024-05-06 RX ORDER — POTASSIUM CHLORIDE 750 MG/1
10 CAPSULE, EXTENDED RELEASE ORAL 2 TIMES DAILY
Status: DISCONTINUED | OUTPATIENT
Start: 2024-05-06 | End: 2024-05-07 | Stop reason: HOSPADM

## 2024-05-06 RX ORDER — POTASSIUM CHLORIDE 20 MEQ/1
40 TABLET, EXTENDED RELEASE ORAL PRN
Status: DISCONTINUED | OUTPATIENT
Start: 2024-05-06 | End: 2024-05-07 | Stop reason: HOSPADM

## 2024-05-06 RX ORDER — ESCITALOPRAM OXALATE 20 MG/1
20 TABLET ORAL DAILY
Status: CANCELLED | OUTPATIENT
Start: 2024-05-07

## 2024-05-06 RX ORDER — SODIUM CHLORIDE 9 MG/ML
INJECTION, SOLUTION INTRAVENOUS CONTINUOUS
Status: DISCONTINUED | OUTPATIENT
Start: 2024-05-06 | End: 2024-05-06 | Stop reason: HOSPADM

## 2024-05-06 RX ORDER — ESCITALOPRAM OXALATE 20 MG/1
20 TABLET ORAL DAILY
Status: DISCONTINUED | OUTPATIENT
Start: 2024-05-07 | End: 2024-05-07 | Stop reason: HOSPADM

## 2024-05-06 RX ORDER — MAGNESIUM SULFATE HEPTAHYDRATE 40 MG/ML
2000 INJECTION, SOLUTION INTRAVENOUS PRN
Status: CANCELLED | OUTPATIENT
Start: 2024-05-06

## 2024-05-06 RX ORDER — POTASSIUM CHLORIDE 7.45 MG/ML
10 INJECTION INTRAVENOUS PRN
Status: DISCONTINUED | OUTPATIENT
Start: 2024-05-06 | End: 2024-05-07 | Stop reason: HOSPADM

## 2024-05-06 RX ORDER — MIDAZOLAM HYDROCHLORIDE 1 MG/ML
INJECTION INTRAMUSCULAR; INTRAVENOUS PRN
Status: DISCONTINUED | OUTPATIENT
Start: 2024-05-06 | End: 2024-05-06 | Stop reason: HOSPADM

## 2024-05-06 RX ORDER — ACETAMINOPHEN 650 MG/1
650 SUPPOSITORY RECTAL EVERY 6 HOURS PRN
Status: DISCONTINUED | OUTPATIENT
Start: 2024-05-06 | End: 2024-05-07 | Stop reason: HOSPADM

## 2024-05-06 RX ORDER — LANOLIN ALCOHOL/MO/W.PET/CERES
3 CREAM (GRAM) TOPICAL NIGHTLY PRN
Status: CANCELLED | OUTPATIENT
Start: 2024-05-06

## 2024-05-06 RX ORDER — MAGNESIUM SULFATE HEPTAHYDRATE 40 MG/ML
2000 INJECTION, SOLUTION INTRAVENOUS PRN
Status: DISCONTINUED | OUTPATIENT
Start: 2024-05-06 | End: 2024-05-07 | Stop reason: HOSPADM

## 2024-05-06 RX ORDER — POTASSIUM CHLORIDE 7.45 MG/ML
10 INJECTION INTRAVENOUS PRN
Status: CANCELLED | OUTPATIENT
Start: 2024-05-06

## 2024-05-06 RX ORDER — ATORVASTATIN CALCIUM 40 MG/1
40 TABLET, FILM COATED ORAL NIGHTLY
Status: DISCONTINUED | OUTPATIENT
Start: 2024-05-06 | End: 2024-05-07 | Stop reason: HOSPADM

## 2024-05-06 RX ORDER — SODIUM CHLORIDE 0.9 % (FLUSH) 0.9 %
5-40 SYRINGE (ML) INJECTION EVERY 12 HOURS SCHEDULED
Status: CANCELLED | OUTPATIENT
Start: 2024-05-06

## 2024-05-06 RX ORDER — METOPROLOL TARTRATE 50 MG/1
50 TABLET, FILM COATED ORAL 2 TIMES DAILY
Status: DISCONTINUED | OUTPATIENT
Start: 2024-05-06 | End: 2024-05-07 | Stop reason: HOSPADM

## 2024-05-06 RX ORDER — SODIUM CHLORIDE 0.9 % (FLUSH) 0.9 %
10 SYRINGE (ML) INJECTION PRN
Status: DISCONTINUED | OUTPATIENT
Start: 2024-05-06 | End: 2024-05-07 | Stop reason: HOSPADM

## 2024-05-06 RX ORDER — ACETAMINOPHEN 325 MG/1
650 TABLET ORAL EVERY 6 HOURS PRN
Status: DISCONTINUED | OUTPATIENT
Start: 2024-05-06 | End: 2024-05-07 | Stop reason: HOSPADM

## 2024-05-06 RX ORDER — SODIUM CHLORIDE 0.9 % (FLUSH) 0.9 %
5-40 SYRINGE (ML) INJECTION EVERY 12 HOURS SCHEDULED
Status: DISCONTINUED | OUTPATIENT
Start: 2024-05-06 | End: 2024-05-07 | Stop reason: HOSPADM

## 2024-05-06 RX ORDER — CLOPIDOGREL BISULFATE 75 MG/1
75 TABLET ORAL DAILY
Status: DISCONTINUED | OUTPATIENT
Start: 2024-05-07 | End: 2024-05-07 | Stop reason: HOSPADM

## 2024-05-06 RX ADMIN — POTASSIUM CHLORIDE 10 MEQ: 10 CAPSULE, COATED, EXTENDED RELEASE ORAL at 21:35

## 2024-05-06 RX ADMIN — PANTOPRAZOLE SODIUM 40 MG: 40 TABLET, DELAYED RELEASE ORAL at 16:34

## 2024-05-06 RX ADMIN — ATORVASTATIN CALCIUM 40 MG: 40 TABLET, FILM COATED ORAL at 21:35

## 2024-05-06 RX ADMIN — SODIUM CHLORIDE, PRESERVATIVE FREE 10 ML: 5 INJECTION INTRAVENOUS at 21:39

## 2024-05-06 RX ADMIN — ACETAMINOPHEN 650 MG: 325 TABLET ORAL at 23:59

## 2024-05-06 RX ADMIN — SUCRALFATE 1 G: 1 TABLET ORAL at 16:34

## 2024-05-06 RX ADMIN — METOPROLOL TARTRATE 50 MG: 50 TABLET, FILM COATED ORAL at 21:34

## 2024-05-06 RX ADMIN — SUCRALFATE 1 G: 1 TABLET ORAL at 21:34

## 2024-05-06 ASSESSMENT — PAIN DESCRIPTION - DESCRIPTORS: DESCRIPTORS: ACHING

## 2024-05-06 ASSESSMENT — PAIN DESCRIPTION - LOCATION: LOCATION: HEAD

## 2024-05-06 ASSESSMENT — PAIN SCALES - GENERAL: PAINLEVEL_OUTOF10: 2

## 2024-05-06 NOTE — H&P
[facility]   IN-PATIENT SERVICE   [facility]    HISTORY AND PHYSICAL EXAMINATION            Date:   5/6/2024  Patient name:  Elaina Champagne  Date of admission:  5/6/2024  3:15 PM  MRN:   [id number]  Account:  [id number]  YOB: 1950  PCP:    Robin Ly MD  Room:   [address] [place] [address]  Code Status:    Full Code    Chief Complaint:     No chief complaint on file.      History Obtained From:     patient, electronic medical record    History of Present Illness:     The patient is a 73 y.o.   /  female who presents with No chief complaint on file.   and she is admitted to the hospital for the management of generalized fatigue, recurrent fall  Patient, has past medical history multimedical problems of morbid obesity, hypertension, history of breast positive, history of stroke in the past, atrial fibrillation, on anticoagulation, coronary artery disease s/p stent placed in January of this year, has chronic wound seen back of her legs follows with wound care  Patient, told me for last 2 to 3 days she is feeling very weak her legs are giving up, and patient had a fall she never lose her consciousness  Patient also noticed black-colored stools going on for last 2 to 3 days  No complaints of chest pain, shortness of breath  Of note, patient is on aspirin, Plavix and Eliquis at home  In the emergency room, patient was found to have hemoglobin of 6.6, patient was given 1 unit of packed cell transfusion  Started on Protonix drip  Anticoagulation intubated kept on hold  GI consulted  Patient also complaining of difficulty in passing urine having burning sensation, patient started on IV meropenem due to penicillin allergy        Past Medical History:     Past Medical History:   Diagnosis Date    Bell's palsy     Cellulitis     Hypertension     NSTEMI (non-ST elevated myocardial infarction) (HCC) 01/02/2024        Past Surgical History:     Past Surgical History:   Procedure

## 2024-05-06 NOTE — PROGRESS NOTES
Physical Therapy Cancel Note      DATE: 2024    NAME: Elaina Champagne  MRN: [id number]   : 1950      Patient not seen this date for Physical Therapy due to:    Cx, transport arriving any moment to transport pt to [facility] for cardiac cath lab per MARIA M Peters.      Electronically signed by Meagan Dawkins PTA on 2024 at 9:33 AM      
    [facility]   IN-PATIENT SERVICE   [facility]    Progress note            Date:   5/4/2024  Patient name:  Elaina Champagne  Date of admission:  5/1/2024  2:38 PM  MRN:   [id number]  Account:  [id number]  YOB: 1950  PCP:    Robin Ly MD  Room:   2051/2051-01  Code Status:    Full Code    Chief Complaint:     Chief Complaint   Patient presents with    Fall    Fatigue    Leg Pain       History Obtained From:     patient, electronic medical record    History of Present Illness:     The patient is a 73 y.o.   /  female who presents with Fall, Fatigue, and Leg Pain   and she is admitted to the hospital for the management of generalized fatigue, recurrent fall  Patient, has past medical history multimedical problems of morbid obesity, hypertension, history of breast positive, history of stroke in the past, atrial fibrillation, on anticoagulation, coronary artery disease s/p stent placed in January of this year, has chronic wound seen back of her legs follows with wound care  Patient, told me for last 2 to 3 days she is feeling very weak her legs are giving up, and patient had a fall she never lose her consciousness  Patient also noticed black-colored stools going on for last 2 to 3 days  No complaints of chest pain, shortness of breath  Of note, patient is on aspirin, Plavix and Eliquis at home  In the emergency room, patient was found to have hemoglobin of 6.6, patient was given 1 unit of packed cell transfusion  Started on Protonix drip  Anticoagulation intubated kept on hold  GI consulted  Patient also complaining of difficulty in passing urine having burning sensation, patient started on IV meropenem due to penicillin allergy        Past Medical History:     Past Medical History:   Diagnosis Date    Bell's palsy     Cellulitis     Hypertension     NSTEMI (non-ST elevated myocardial infarction) (HCC) 01/02/2024        Past Surgical History:     Past 
    [facility]   IN-PATIENT SERVICE   [facility]    Progress note            Date:   5/5/2024  Patient name:  Elaina Champagne  Date of admission:  5/1/2024  2:38 PM  MRN:   [id number]  Account:  [id number]  YOB: 1950  PCP:    Robin Ly MD  Room:   2051/2051-01  Code Status:    Full Code    Chief Complaint:     Chief Complaint   Patient presents with    Fall    Fatigue    Leg Pain       History Obtained From:     patient, electronic medical record    History of Present Illness:     The patient is a 73 y.o.   /  female who presents with Fall, Fatigue, and Leg Pain   and she is admitted to the hospital for the management of generalized fatigue, recurrent fall  Patient, has past medical history multimedical problems of morbid obesity, hypertension, history of breast positive, history of stroke in the past, atrial fibrillation, on anticoagulation, coronary artery disease s/p stent placed in January of this year, has chronic wound seen back of her legs follows with wound care  Patient, told me for last 2 to 3 days she is feeling very weak her legs are giving up, and patient had a fall she never lose her consciousness  Patient also noticed black-colored stools going on for last 2 to 3 days  No complaints of chest pain, shortness of breath  Of note, patient is on aspirin, Plavix and Eliquis at home  In the emergency room, patient was found to have hemoglobin of 6.6, patient was given 1 unit of packed cell transfusion  Started on Protonix drip  Anticoagulation intubated kept on hold  GI consulted  Patient also complaining of difficulty in passing urine having burning sensation, patient started on IV meropenem due to penicillin allergy        Past Medical History:     Past Medical History:   Diagnosis Date    Bell's palsy     Cellulitis     Hypertension     NSTEMI (non-ST elevated myocardial infarction) (HCC) 01/02/2024        Past Surgical History:     Past 
    [facility]   IN-PATIENT SERVICE   [facility]    Progress note            Date:   5/3/2024  Patient name:  Elaina Champagne  Date of admission:  5/1/2024  2:38 PM  MRN:   [id number]  Account:  [id number]  YOB: 1950  PCP:    Robin Ly MD  Room:   2051/2051-01  Code Status:    Full Code    Chief Complaint:     Chief Complaint   Patient presents with    Fall    Fatigue    Leg Pain       History Obtained From:     patient, electronic medical record    History of Present Illness:     The patient is a 73 y.o.   /  female who presents with Fall, Fatigue, and Leg Pain   and she is admitted to the hospital for the management of generalized fatigue, recurrent fall  Patient, has past medical history multimedical problems of morbid obesity, hypertension, history of breast positive, history of stroke in the past, atrial fibrillation, on anticoagulation, coronary artery disease s/p stent placed in January of this year, has chronic wound seen back of her legs follows with wound care  Patient, told me for last 2 to 3 days she is feeling very weak her legs are giving up, and patient had a fall she never lose her consciousness  Patient also noticed black-colored stools going on for last 2 to 3 days  No complaints of chest pain, shortness of breath  Of note, patient is on aspirin, Plavix and Eliquis at home  In the emergency room, patient was found to have hemoglobin of 6.6, patient was given 1 unit of packed cell transfusion  Started on Protonix drip  Anticoagulation intubated kept on hold  GI consulted  Patient also complaining of difficulty in passing urine having burning sensation, patient started on IV meropenem due to penicillin allergy        Past Medical History:     Past Medical History:   Diagnosis Date    Bell's palsy     Cellulitis     Hypertension     NSTEMI (non-ST elevated myocardial infarction) (HCC) 01/02/2024        Past Surgical History:     Past 
   GI Progress notes    5/3/2024   12:51 PM    Name:  Elaina Champagne  MRN:    [id number]     Acct:     [id number]   Room:  2051/2051-01   Day: 2     Admit Date: 5/1/2024  2:38 PM  PCP: Robin Ly MD    Subjective:     C/C:   Chief Complaint   Patient presents with    Fall    Fatigue    Leg Pain       Interval History: Status: improved.     Patient seen and examined  No acute events overnight.  S/p EGD that revealed gastric antral ulcer.  Bx obtained.  Clinically stable  Hgb stable  No signs of bleeding.    ROS:  Constitutional: negative for chills, fevers and sweats  Respiratory: negative for cough, dyspnea on exertion, hemoptysis, shortness of breath and wheezing  Cardiovascular: negative for chest pain, chest pressure/discomfort, dyspnea, lower extremity edema and palpitations  Gastrointestinal: negative for abdominal pain, constipation, diarrhea, nausea and vomiting  Neurological: negative for dizziness and headaches    Medications:     Allergies:   Allergies   Allergen Reactions    Penicillins Hives       Current Meds: sucralfate (CARAFATE) tablet 1 g, 4x Daily AC & HS  meropenem (MERREM) 1,000 mg in sodium chloride 0.9 % 100 mL IVPB ([id number]), Q12H  HYDROmorphone (DILAUDID) injection 0.5 mg, Q3H PRN  collagenase ointment, Daily  0.9 % sodium chloride infusion, PRN  pantoprazole (PROTONIX) 80 mg in sodium chloride 0.9 % 100 mL infusion, Continuous  0.9 % sodium chloride infusion, Continuous  [Held by provider] apixaban (ELIQUIS) tablet 5 mg, BID  atorvastatin (LIPITOR) tablet 40 mg, Nightly  clopidogrel (PLAVIX) tablet 75 mg, Daily  escitalopram (LEXAPRO) tablet 20 mg, Daily  [Held by provider] furosemide (LASIX) tablet 40 mg, Daily  metoprolol tartrate (LOPRESSOR) tablet 50 mg, BID  miconazole (MICOTIN) 2 % powder, BID  potassium chloride (MICRO-K) extended release capsule 10 mEq, BID  silver sulfADIAZINE (SILVADENE) 1 % cream, Daily  trospium (SANCTURA) tablet 20 mg, BID AC  sodium chloride flush 
  Meropenem Extended Interval Interchange    The following ordered dose of Meropenem has been changed to optimize its pharmacodynamic profile as approved by the Patient Care Review Committee.    Ordered Dose    __ 1 gm  IV every 8  hrs (120 minute infusion)          CrCl Dose   >50 500mg-2gm q8hrs over 3 hours   30-49 500mg-1gm q8hrs over 3 hours   <30 500mg-1gm q12hrs over 3 hours   <10 500mg q24hrs over 3 hours   HD 500mg q24hrs over 3 hours given after HD   CRRT 1gm q12hrs over 3 hours       New Dose    Meropenem   1 gm  IV q 12 hrs  over 3 hours.        Pharmacists should be contacted for issues concerning drug compatibility with multiple IV medications.  All doses will be prepared using 100ml bag to be infused over 3-hours at a rate of 33.3 ml/hr.    Thank You,  Randall Collazo, [facility]5/2/20242:26 AM  
Andra Bernhoffer, CNP notified of Hgb of 6.8. New order to transfuse 1 unit of blood.   
Comprehensive Nutrition Assessment    Type and Reason for Visit:  Initial, Positive Nutrition Screen, Wound, Patient Education (Decreased appetite and intake, wt loss)    Nutrition Recommendations/Plan:   Continue diet as tolerated.  Provide Ensure Clear x 1, Magic Cup x 2 daily.      Malnutrition Assessment:  Malnutrition Status:  Moderate malnutrition (05/03/24 1532)    Context:  Acute Illness     Findings of the 6 clinical characteristics of malnutrition:  Energy Intake:  75% or less of estimated energy requirements for 7 or more days  Weight Loss:  Greater than 7.5% over 3 months (22% loss within 4 months)     Body Fat Loss:  Unable to assess     Muscle Mass Loss:  Unable to assess    Fluid Accumulation:  No significant fluid accumulation     Strength:  Not Performed    Nutrition Assessment:    Pt admitted with GI bleed. Recent EGD revealed gastric antral ulcer, biospy obtained and meds adjusted. Pt states appetite and intake have been decreased for a couple of weeks with wt loss noted. States she as eating items such as crackers and lunch meat, but experienced early satiety and had no appetite. States today mostly just drinking water and not eating foods. No nausea or GI pain. Pt does not like shake-like Ensure but is willing to try to Ensure Clear and Magic Cups. Was drinking [place] [employer] occasionally at home, and likes soups.    Nutrition Related Findings:    Labs and meds reviewed. Hx: Morbid obesity, hypertension, stroke, CAD, chronic wounds. Wound Type: Multiple, Stage II, Venous Stasis       Current Nutrition Intake & Therapies:    Average Meal Intake: 1-25%     ADULT DIET; Regular    Anthropometric Measures:  Height: 154.9 cm (5' 0.98\")  Ideal Body Weight (IBW): 105 lbs (48 kg)    Admission Body Weight: 108 kg (238 lb 1.6 oz)  Current Body Weight: 108 kg (238 lb 1.6 oz), 226.8 % IBW.    Current BMI (kg/m2): 45  Usual Body Weight: 138.4 kg (305 lb 1.9 oz) (1/2/24)  % Weight Change 
DATE: 2024    NAME: Elaina Champagne  MRN: [id number]   : 1950    Patient not seen this date for Physical Therapy due to:      [x] Cancel by RN or physician due to: nursing deferred treatment this date, stating patient was agitated/combative, will continue to pursue at a later date when more appropriate.    [] Hemodialysis    [] Critical Lab Value Level     [] Blood transfusion in progress    [] Acute or unstable cardiovascular status   _MAP < 55 or more than >115  _HR < 40 or > 130    [] Acute or unstable pulmonary status   -FiO2 > 60%   _RR < 5 or >40    _O2 sats < 85%    [] Strict Bedrest    [] Off Unit for surgery or procedure    [] Off Unit for testing       [] Pending imaging to R/O fracture    [] Refusal by Patient      [] Other      [] PT being discontinued at this time. Patient independent. No further needs.     [] PT being discontinued at this time as the patient has been transferred to hospice care. No further needs.      Mary Anthony, PTA    
Dr Cota notified of patient not being able to obtain urine sample at this time due to incontinence and not being able to place external cath on patient due to wounds. Dr Cota states she has looked at the UA from 5/2 and is okay with not obtaining new sample.   
Dr. Lowe notified, patient was 87% on room air, and now requiring 2 L oxygen nasal cannula.  Patient oxygen saturation now increased to 95%.  Patient alert and oriented x 4 despite being slightly confused on situation when oxygen found to be low.  Awaiting new orders.   
Occupational Therapy  [facility]   OCCUPATIONAL THERAPY MISSED TREATMENT NOTE   INPATIENT   Date: 24  Patient Name: Elaina Champagne       Room: [facility] ENDO Pool/NONE  MRN: [id number]   Account #: [id number]    : 1950  (73 y.o.)  Gender: female         REASON FOR MISSED TREATMENT:  Patient at testing and/or off the floor   -   attempted to see pt at 1347, pt out of room for scope. OT will continue to follow and will re-attempt as soon as able.    Electronically signed by CAMERON Gee/L on 24 at 2:34 PM EDT   
Patient arrived to room 2051. Vital signs taken, patient oriented to room, side rails up x2, call light within reach. Assessment to follow   
Patient is hypoxic to 87% on room air requiring nasal cannula now will order stat chest x-ray 80 mg of IV Lasix probably has pulmonary edema from fluid anemia high-output cardiac failure hemoglobin is stable no active bleeding will repeat hematocrit  Discussed with the charge nurse claimed patient is okay for MedSurg at this time nursing staff to call if condition deteriorates patient may need a higher level of care  Meño Lowe MD  5/3/2024    
[facility]   Progress Note                   Date:   5/3/2024  Patient name: Elaina Champagne  Date of admission:  5/1/2024  2:38 PM  MRN:   [id number]  YOB: 1950  PCP: Robin Ly MD    Reason for Admission: GI bleed [K92.2]  MICHELLE (acute kidney injury) (HCC) [N17.9]  Fall, initial encounter [W19.XXXA]  Other fatigue [R53.83]  Anemia, unspecified type [D64.9]    Subjective:       Clinical Changes / Abnormalities:Pt seen and examined in the room.  Pt denies any CP or sob. Pt states that she is weak today.  On protonix gtt.   Labs, vitals and tele reviewed-        Medications:   Scheduled Meds:   meropenem  1,000 mg IntraVENous Q12H    collagenase   Topical Daily    [Held by provider] apixaban  5 mg Oral BID    [Held by provider] aspirin  81 mg Oral Daily    atorvastatin  40 mg Oral Nightly    clopidogrel  75 mg Oral Daily    escitalopram  20 mg Oral Daily    [Held by provider] furosemide  40 mg Oral Daily    metoprolol tartrate  50 mg Oral BID    miconazole   Topical BID    potassium chloride  10 mEq Oral BID    silver sulfADIAZINE   Topical Daily    trospium  20 mg Oral BID AC    sodium chloride flush  5-40 mL IntraVENous 2 times per day     Continuous Infusions:   sodium chloride      pantoprazole 8 mg/hr (05/02/24 2155)    sodium chloride 100 mL/hr at 05/02/24 0622    sodium chloride       CBC:   Recent Labs     05/01/24  1445 05/01/24  2223 05/02/24  0346 05/03/24  0609   WBC 8.4  --  6.2 7.2   HGB 6.6* 6.8* 7.8* 7.8*     --  274 297     BMP:    Recent Labs     05/01/24  1445 05/02/24  0346 05/03/24  0609   * 144 143   K 4.1 3.6* 4.1   CL 96* 107 109*   CO2 24 28 26   BUN 85* 72* 34*   CREATININE 1.8* 1.4* 0.8   GLUCOSE 124* 80 73     Hepatic:   Recent Labs     05/01/24  1445   AST 15   ALT 7   BILITOT 0.3   ALKPHOS 54     Troponin:   Recent Labs     05/01/24  1445 05/01/24  1704   TROPHS 45* 37*     BNP: No results for input(s): \"BNP\" in the last 72 
[facility]   Progress Note                   Date:   5/4/2024  Patient name: Elaina Champagne  Date of admission:  5/1/2024  2:38 PM  MRN:   [id number]  YOB: 1950  PCP: Robin Ly MD    Reason for Admission:      Subjective:       Clinical Changes / Abnormalities: Patient denies any chest pain pressure tightness      Medications:   Scheduled Meds:   pantoprazole  40 mg Oral BID AC    sucralfate  1 g Oral 4x Daily AC & HS    meropenem  1,000 mg IntraVENous Q12H    collagenase   Topical Daily    [Held by provider] apixaban  5 mg Oral BID    atorvastatin  40 mg Oral Nightly    clopidogrel  75 mg Oral Daily    escitalopram  20 mg Oral Daily    furosemide  40 mg Oral Daily    metoprolol tartrate  50 mg Oral BID    miconazole   Topical BID    potassium chloride  10 mEq Oral BID    silver sulfADIAZINE   Topical Daily    trospium  20 mg Oral BID AC    sodium chloride flush  5-40 mL IntraVENous 2 times per day     Continuous Infusions:   sodium chloride      pantoprazole 8 mg/hr (05/04/24 0607)    sodium chloride       CBC:   Recent Labs     05/02/24  0346 05/03/24  0609 05/03/24  1852 05/04/24  0618   WBC 6.2 7.2  --  7.5   HGB 7.8* 7.8* 8.2* 8.4*    297  --  324     BMP:    Recent Labs     05/02/24  0346 05/03/24  0609 05/04/24  0618    143 138   K 3.6* 4.1 3.6*    109* 101   CO2 28 26 29   BUN 72* 34* 20   CREATININE 1.4* 0.8 0.7   GLUCOSE 80 73 91     Hepatic:   Recent Labs     05/01/24  1445 05/04/24  0618   AST 15 37*   ALT 7 10   BILITOT 0.3 0.4   ALKPHOS 54 139*     Troponin: No results for input(s): \"TROPONINI\" in the last 72 hours.  BNP: No results for input(s): \"BNP\" in the last 72 hours.  Lipids: No results for input(s): \"CHOL\", \"HDL\" in the last 72 hours.    Invalid input(s): \"LDLCALCU\"  INR:   Recent Labs     05/01/24  1445   INR 1.7       Objective:   Vitals: BP (!) 134/52   Pulse 73   Temp 97.4 °F (36.3 °C) (Oral)   Resp 18   Ht 1.549 m (5' 
[facility]   Progress Note                   Date:   5/5/2024  Patient name: Elaina Champagne  Date of admission:  5/1/2024  2:38 PM  MRN:   [id number]  YOB: 1950  PCP: Robin Ly MD    Reason for Admission:      Subjective:       Clinical Changes / Abnormalities: Last night events noted at this time patient was given sedation she is sleeping patient  is in the room.      Medications:   Scheduled Meds:   pantoprazole  40 mg Oral BID AC    sucralfate  1 g Oral 4x Daily AC & HS    meropenem  1,000 mg IntraVENous Q12H    collagenase   Topical Daily    [Held by provider] apixaban  5 mg Oral BID    atorvastatin  40 mg Oral Nightly    clopidogrel  75 mg Oral Daily    escitalopram  20 mg Oral Daily    furosemide  40 mg Oral Daily    metoprolol tartrate  50 mg Oral BID    miconazole   Topical BID    potassium chloride  10 mEq Oral BID    silver sulfADIAZINE   Topical Daily    trospium  20 mg Oral BID AC    sodium chloride flush  5-40 mL IntraVENous 2 times per day     Continuous Infusions:   sodium chloride      sodium chloride       CBC:   Recent Labs     05/03/24  0609 05/03/24  1852 05/04/24  0618   WBC 7.2  --  7.5   HGB 7.8* 8.2* 8.4*     --  324       BMP:    Recent Labs     05/03/24  0609 05/04/24  0618    138   K 4.1 3.6*   * 101   CO2 26 29   BUN 34* 20   CREATININE 0.8 0.7   GLUCOSE 73 91       Hepatic:   Recent Labs     05/04/24  0618   AST 37*   ALT 10   BILITOT 0.4   ALKPHOS 139*       Troponin: No results for input(s): \"TROPONINI\" in the last 72 hours.  BNP: No results for input(s): \"BNP\" in the last 72 hours.  Lipids: No results for input(s): \"CHOL\", \"HDL\" in the last 72 hours.    Invalid input(s): \"LDLCALCU\"  INR:   No results for input(s): \"INR\" in the last 72 hours.      Objective:   Vitals: /63   Pulse 84   Temp 97.7 °F (36.5 °C) (Oral)   Resp 18   Ht 1.549 m (5' 0.98\")   Wt 108 kg (238 lb)   SpO2 91%   BMI 44.99 kg/m²   I/O 
Pharmacy Medication History Note      List of current medications patient is taking is complete.     Source of information: dispense report, OARRS    Changes made to medication list:  Medications flagged for removal (include reason, ex. noncompliance):  None     Medications removed (include reason, ex. therapy complete or physician discontinued):  None     Medications added/doses adjusted:  None     Other notes (ex. Recent course of antibiotics, Coumadin dosing):  OARRS negative   Patient started Bactrim on 4/29/24 for 7 days.     Denies use of other OTC or herbal medications.      Allergies clarified    Medication list provided to the patient: no  Medication education provided to the patient: none    Prior to Admission Medications   Prescriptions Last Dose Informant Patient Reported? Taking?   acetaminophen (TYLENOL) 325 MG tablet   Yes No   Sig: Take 2 tablets by mouth every 6 hours as needed for Pain   apixaban (ELIQUIS) 5 MG TABS tablet   No No   Sig: Take 1 tablet by mouth 2 times daily   aspirin 81 MG EC tablet   Yes No   Sig: Take 1 tablet by mouth daily   atorvastatin (LIPITOR) 40 MG tablet   No No   Sig: Take 1 tablet by mouth nightly   clopidogrel (PLAVIX) 75 MG tablet   No No   Sig: Take 1 tablet by mouth daily   escitalopram (LEXAPRO) 20 MG tablet   No No   Sig: Take 1 tablet by mouth daily   furosemide (LASIX) 40 MG tablet   No No   Sig: Take 1 tablet by mouth daily   metoprolol tartrate (LOPRESSOR) 50 MG tablet   No No   Sig: Take 1 tablet by mouth 2 times daily   miconazole (MICOTIN) 2 % powder   No No   Sig: Apply topically 2 times daily.   pantoprazole (PROTONIX) 40 MG tablet   No No   Sig: Take 1 tablet by mouth 2 times daily (before meals)   potassium chloride (MICRO-K) 10 MEQ extended release capsule   No No   Sig: Take 1 capsule by mouth 2 times daily   Patient taking differently: Take 1 capsule by mouth 2 times daily Only takes once a day, causes itching if takes twice a day   silver 
Physical Therapy        Physical Therapy Cancel Note      DATE: 2024    NAME: Elaina Champagne  MRN: [id number]   : 1950      Patient not seen this date for Physical Therapy due to:    Patient at testing and/or off the floor   -   attempted to see pt at 1347, pt out of room for scope. PT will continue to follow and will re-attempt as soon as able.       Electronically signed by Jose Juan Casey PT on 2024 at 5:21 PM      
Pt  Eze Champagne called. All questions and concerns answered.   
Report called to MARIA M Shabazz at [facility] lab. All questions answered.   
[facility]   Occupational Therapy Evaluation  Date: 5/3/24  Patient Name: Elaina Champagne       Room: -  MRN: [id number]  Account: [id number]   : 1950  (73 y.o.) Gender: female     Discharge Recommendations:  Further Occupational Therapy is recommended upon facility discharge.    OT Equipment Recommendations  Other: TBD    Referring Practitioner: Rachana Maria MD  Diagnosis: GI Bleed   Additional Pertinent Hx: Per H&P Note: she is admitted to the hospital for the management of generalized fatigue, recurrent fall  Patient, has past medical history multimedical problems of morbid obesity, hypertension, history of breast positive, history of stroke in the past, atrial fibrillation, on anticoagulation, coronary artery disease s/p stent placed in January of this year, has chronic wound seen back of her legs follows with wound care  Patient, told me for last 2 to 3 days she is feeling very weak her legs are giving up, and patient had a fall she never lose her consciousness  Patient also noticed black-colored stools going on for last 2 to 3 days    Treatment Diagnosis: Impaired self-care status    Past Medical History:  has a past medical history of Bell's palsy, Cellulitis, Hypertension, and NSTEMI (non-ST elevated myocardial infarction) (HCC).    Past Surgical History:   has a past surgical history that includes Hysterectomy, total abdominal (); Cataract removal (Bilateral, ); incision and drainage (Left, 2017); Total knee arthroplasty (Left, 2017); Knee Arthroplasty (Right, 2018); ventral hernia repair (2019); IR NONTUNNELED VASCULAR CATHETER > 5 YEARS (12/15/2023); Upper gastrointestinal endoscopy (N/A, 2023); Cardiac procedure (N/A, 2024); Cardiac procedure (N/A, 2024); and Upper gastrointestinal endoscopy (N/A, 2024).    Restrictions  Restrictions/Precautions  Restrictions/Precautions: Fall Risk, General Precautions  Required 
Spoke with Dr. Mariano this morning he states he left message with cath lab last night. No time set up at this time, wait to send patient for cath until we hear from them. Patient notified.   
Writer notified by [facility] to arrange for transport to [facility].  [employer] flight called for transport.  ETA within the hour.   
sulfADIAZINE (SILVADENE) 1 % cream   Yes No   Sig: Apply topically daily Apply topically daily.   solifenacin (VESICARE) 10 MG tablet   No No   Sig: Take 1 tablet by mouth daily   sulfamethoxazole-trimethoprim (BACTRIM) 400-80 MG per tablet   No No   Sig: Take 1 tablet by mouth 2 times daily for 7 days      Facility-Administered Medications: None           Electronically signed by Anahy Noe RPH on 5/1/2024 at 7:57 PM                                                     
Comment  Comments: Ok Per RN Princess to proceed with PT/OT assessments  Subjective  Subjective: Pt resting in bed upon arrival, \"I can't stand up\" she reports. However pt is agreeable to assessments and attempts standing with increased encouragement         Social/Functional History  Social/Functional History  Lives With: Spouse  Type of Home: Mobile home  Home Layout: One level  Home Access: Ramped entrance (bilat rails)  Bathroom Shower/Tub: Walk-in shower, Shower chair with back, Doors (9-10\" lip, small shower, Platform step set in front to assist in navigating lip)  Bathroom Toilet: Standard  Bathroom Equipment: Grab bars in shower, Hand-held shower, Shower chair, Toilet raiser, Grab bars around toilet (toilet safety frame)  Bathroom Accessibility: Accessible  Home Equipment: Cane, Walker, rolling, Reacher, Long-handled shoehorn, Sock aid, Grab bars, Lift chair  Has the patient had two or more falls in the past year or any fall with injury in the past year?: Yes  Receives Help From: Family, Home health  ADL Assistance: Needs assistance (Needed PRN A with Lower body dressing)  Homemaking Assistance: Needs assistance  Homemaking Responsibilities: No ( is primary)  Ambulation Assistance: Independent (with RW)  Transfer Assistance: Independent  Active : No  Patient's  Info: Spouse Drives  Occupation: Retired  Leisure & Hobbies: cares for dog  IADL Comments: sleeps in recliner \"I can't get into my bed\"  Additional Comments: Recent SNF stay at [facility] with subsequent return home and recieved Wound care and therapy services, each discipline coming 1-2 x per week. Recent SNF stay at [facility]. Particularly has difficulty entering/exiting shower stall.  Vision/Hearing  Vision  Vision: Impaired  Vision Exceptions: Wears glasses at all times  Hearing  Hearing: Within functional limits    Cognition   Orientation  Overall Orientation Status: Within Normal Limits  Cognition  Overall Cognitive 
tartrate (LOPRESSOR) 50 MG tablet Take 1 tablet by mouth 2 times daily 2/29/24   Robin Ly MD   potassium chloride (MICRO-K) 10 MEQ extended release capsule Take 1 capsule by mouth 2 times daily  Patient taking differently: Take 1 capsule by mouth 2 times daily Only takes once a day, causes itching if takes twice a day 2/29/24   Robin Ly MD   miconazole (MICOTIN) 2 % powder Apply topically 2 times daily. 1/5/24   Aj Mendez MD   pantoprazole (PROTONIX) 40 MG tablet Take 1 tablet by mouth 2 times daily (before meals) 1/5/24   Aj Mendez MD   acetaminophen (TYLENOL) 325 MG tablet Take 2 tablets by mouth every 6 hours as needed for Pain    ProviderJah MD   escitalopram (LEXAPRO) 20 MG tablet Take 1 tablet by mouth daily 10/10/23   Robin Ly MD   aspirin 81 MG EC tablet Take 1 tablet by mouth daily    Provider, MD Jah        Allergies:     Penicillins    Social History:     Tobacco:    reports that she has never smoked. She has never been exposed to tobacco smoke. She has never used smokeless tobacco.  Alcohol:      reports no history of alcohol use.  Drug Use:  reports no history of drug use.    Family History:     Family History   Problem Relation Age of Onset    Cancer Mother     High Blood Pressure Father     Stroke Father     Ovarian Cancer Daughter     Cancer Daughter     Diabetes Maternal Aunt     Cancer Other         daughter/ovarian cancer       Review of Systems:     Positive and Negative as described in HPI.    CONSTITUTIONAL: Generalized fatigue  HEENT:  negative for vision, hearing changes, runny nose, throat pain  RESPIRATORY:  negative for shortness of breath, cough, congestion, wheezing.  CARDIOVASCULAR:  negative for chest pain, palpitations.  GASTROINTESTINAL:  negative for nausea, vomiting, diarrhea, constipation, change in bowel habits, abdominal pain   GENITOURINARY: Dysuria  INTEGUMENT:  negative for rash, skin lesions, easy 
Transfusion Status OK TO TRANSFUSE     Crossmatch Result COMPATIBLE     Unit Number [id number]     Component Leukocyte Reduced Red Cell     Unit Divison 00     Dispense Status Blood Bank ISSUED     Unit Issue Date/Time [id number]     Product Code Blood Bank [id number]     Blood Bank Unit Type and Rh A NEG     Blood Bank ISBT Product Blood Type 0600     Blood Bank Blood Product Expiration Date [id number]     Transfusion Status OK TO TRANSFUSE     Crossmatch Result COMPATIBLE    Troponin    Collection Time: 05/01/24  5:04 PM   Result Value Ref Range    Troponin, High Sensitivity 37 (H) 0 - 14 ng/L   Hemoglobin and Hematocrit    Collection Time: 05/01/24 10:23 PM   Result Value Ref Range    Hemoglobin 6.8 (LL) 12.0 - 16.0 g/dL    Hematocrit 20.5 (L) 36 - 46 %   Urinalysis with Reflex to Culture    Collection Time: 05/02/24  1:30 AM    Specimen: Urine, clean catch   Result Value Ref Range    Color, UA Yellow Yellow    Turbidity UA Clear Clear    Glucose, Ur NEGATIVE NEGATIVE mg/dL    Bilirubin, Urine NEGATIVE NEGATIVE    Ketones, Urine NEGATIVE NEGATIVE mg/dL    Specific Gravity, UA 1.016 1.000 - 1.030    Urine Hgb NEGATIVE NEGATIVE    pH, Urine 5.0 5.0 - 8.0    Protein, UA NEGATIVE NEGATIVE mg/dL    Urobilinogen, Urine Normal 0.0 - 1.0 EU/dL    Nitrite, Urine NEGATIVE NEGATIVE    Leukocyte Esterase, Urine TRACE (A) NEGATIVE   Microscopic Urinalysis    Collection Time: 05/02/24  1:30 AM   Result Value Ref Range    WBC, UA 0 TO 2 (A) 0 TO 5 /HPF    RBC, UA 0 TO 2 0 TO 2 /HPF    Casts UA 3 to 5 (A) None /LPF    Epithelial Cells UA 0 TO 2 /HPF    Bacteria, UA None None       Imaging/Diagnostics:  CT CERVICAL SPINE WO CONTRAST    Result Date: 5/1/2024  Multilevel cervical spondylosis and degenerative disc disease No acute bony abnormalities are noted in the cervical spine RECOMMENDATIONS: Further evaluation of the cervical spine should be obtained with MR imaging if clinically indicated.     CT HEAD WO

## 2024-05-06 NOTE — PROGRESS NOTES
Patient received post cath to [facility] 12. Assessment obtained.  Post cath pathway initiated. Right radial site with Vascband intact with 14 ml of air. No hematoma noted. Restrictions reviewed with patient.  Patient without complaints.

## 2024-05-06 NOTE — PROGRESS NOTES
Patient admitted, consent signed and questions answered. Patient ready for procedure. Call light to reach with side rails up 2 of 2. Bilateral groin clipped with writer and Abbi RN present.  Spouse at bedside with patient.  History and physical needs to be completed.

## 2024-05-06 NOTE — H&P
Maria Teresa Rodriguez MD on 5/6/2024 at 3:11 PM.    [facility]      [phone]

## 2024-05-06 NOTE — PROGRESS NOTES
Stent card and booklet given to Spouse. Transportation here to take patient back to [place].

## 2024-05-06 NOTE — PLAN OF CARE
Problem: Safety - Adult  Goal: Free from fall injury  Outcome: Progressing     Problem: Discharge Planning  Goal: Discharge to home or other facility with appropriate resources  Outcome: Progressing  Flowsheets (Taken 5/6/2024 5819)  Discharge to home or other facility with appropriate resources:   Identify barriers to discharge with patient and caregiver   Arrange for needed discharge resources and transportation as appropriate   Identify discharge learning needs (meds, wound care, etc)   Refer to discharge planning if patient needs post-hospital services based on physician order or complex needs related to functional status, cognitive ability or social support system     Problem: Cardiovascular - Adult  Goal: Maintains optimal cardiac output and hemodynamic stability  Outcome: Progressing     Problem: Cardiovascular - Adult  Goal: Absence of cardiac dysrhythmias or at baseline  Outcome: Progressing     Problem: Skin/Tissue Integrity - Adult  Goal: Incisions, wounds, or drain sites healing without S/S of infection  Outcome: Progressing     Problem: Skin/Tissue Integrity - Adult  Goal: Oral mucous membranes remain intact  Outcome: Progressing     Problem: Musculoskeletal - Adult  Goal: Return mobility to safest level of function  Outcome: Progressing     Problem: Musculoskeletal - Adult  Goal: Maintain proper alignment of affected body part  Outcome: Progressing     Problem: Musculoskeletal - Adult  Goal: Return ADL status to a safe level of function  Outcome: Progressing

## 2024-05-07 VITALS
HEART RATE: 59 BPM | SYSTOLIC BLOOD PRESSURE: 117 MMHG | TEMPERATURE: 98.4 F | RESPIRATION RATE: 16 BRPM | DIASTOLIC BLOOD PRESSURE: 59 MMHG | OXYGEN SATURATION: 100 %

## 2024-05-07 LAB
ANION GAP SERPL CALCULATED.3IONS-SCNC: 8 MMOL/L (ref 9–17)
BACTERIA URNS QL MICRO: ABNORMAL
BILIRUB UR QL STRIP: NEGATIVE
BUN SERPL-MCNC: 8 MG/DL (ref 8–23)
CALCIUM SERPL-MCNC: 8.4 MG/DL (ref 8.6–10.4)
CASTS #/AREA URNS LPF: ABNORMAL /LPF
CHLORIDE SERPL-SCNC: 103 MMOL/L (ref 98–107)
CLARITY UR: ABNORMAL
CO2 SERPL-SCNC: 29 MMOL/L (ref 20–31)
COLOR UR: YELLOW
CREAT SERPL-MCNC: 0.6 MG/DL (ref 0.5–0.9)
CRYSTALS URNS MICRO: ABNORMAL /HPF
CRYSTALS URNS MICRO: ABNORMAL /HPF
EPI CELLS #/AREA URNS HPF: ABNORMAL /HPF
ERYTHROCYTE [DISTWIDTH] IN BLOOD BY AUTOMATED COUNT: 18.5 % (ref 11.5–14.9)
GFR, ESTIMATED: >90 ML/MIN/1.73M2
GLUCOSE SERPL-MCNC: 87 MG/DL (ref 70–99)
GLUCOSE UR STRIP-MCNC: NEGATIVE MG/DL
HCT VFR BLD AUTO: 26.5 % (ref 36–46)
HGB BLD-MCNC: 8.6 G/DL (ref 12–16)
HGB UR QL STRIP.AUTO: ABNORMAL
KETONES UR STRIP-MCNC: ABNORMAL MG/DL
LEUKOCYTE ESTERASE UR QL STRIP: NEGATIVE
MCH RBC QN AUTO: 28.6 PG (ref 26–34)
MCHC RBC AUTO-ENTMCNC: 32.5 G/DL (ref 31–37)
MCV RBC AUTO: 88.1 FL (ref 80–100)
MICROORGANISM SPEC CULT: NORMAL
MICROORGANISM SPEC CULT: NORMAL
NITRITE UR QL STRIP: NEGATIVE
PH UR STRIP: 6 [PH] (ref 5–8)
PLATELET # BLD AUTO: 358 K/UL (ref 150–450)
PMV BLD AUTO: 7.7 FL (ref 6–12)
POTASSIUM SERPL-SCNC: 3.9 MMOL/L (ref 3.7–5.3)
PROT UR STRIP-MCNC: ABNORMAL MG/DL
RBC # BLD AUTO: 3.01 M/UL (ref 4–5.2)
RBC #/AREA URNS HPF: ABNORMAL /HPF
SERVICE CMNT-IMP: NORMAL
SERVICE CMNT-IMP: NORMAL
SODIUM SERPL-SCNC: 140 MMOL/L (ref 135–144)
SP GR UR STRIP: 1.09 (ref 1–1.03)
SPECIMEN DESCRIPTION: NORMAL
SPECIMEN DESCRIPTION: NORMAL
SURGICAL PATHOLOGY REPORT: NORMAL
UROBILINOGEN UR STRIP-ACNC: NORMAL EU/DL (ref 0–1)
WBC #/AREA URNS HPF: ABNORMAL /HPF
WBC OTHER # BLD: 6.4 K/UL (ref 3.5–11)
YEAST URNS QL MICRO: ABNORMAL

## 2024-05-07 PROCEDURE — 99239 HOSP IP/OBS DSCHRG MGMT >30: CPT | Performed by: INTERNAL MEDICINE

## 2024-05-07 PROCEDURE — 81001 URINALYSIS AUTO W/SCOPE: CPT

## 2024-05-07 PROCEDURE — 6370000000 HC RX 637 (ALT 250 FOR IP): Performed by: INTERNAL MEDICINE

## 2024-05-07 PROCEDURE — 99233 SBSQ HOSP IP/OBS HIGH 50: CPT | Performed by: SURGERY

## 2024-05-07 PROCEDURE — 80048 BASIC METABOLIC PNL TOTAL CA: CPT

## 2024-05-07 PROCEDURE — 2500000003 HC RX 250 WO HCPCS: Performed by: INTERNAL MEDICINE

## 2024-05-07 PROCEDURE — 2580000003 HC RX 258: Performed by: INTERNAL MEDICINE

## 2024-05-07 PROCEDURE — 36415 COLL VENOUS BLD VENIPUNCTURE: CPT

## 2024-05-07 PROCEDURE — 6370000000 HC RX 637 (ALT 250 FOR IP): Performed by: SURGERY

## 2024-05-07 PROCEDURE — 99213 OFFICE O/P EST LOW 20 MIN: CPT

## 2024-05-07 PROCEDURE — 85027 COMPLETE CBC AUTOMATED: CPT

## 2024-05-07 RX ORDER — ASPIRIN 81 MG/1
81 TABLET ORAL DAILY
Qty: 30 TABLET | Refills: 0 | Status: SHIPPED | OUTPATIENT
Start: 2024-05-07

## 2024-05-07 RX ORDER — ASPIRIN 81 MG/1
81 TABLET, CHEWABLE ORAL DAILY
Status: DISCONTINUED | OUTPATIENT
Start: 2024-05-07 | End: 2024-05-07 | Stop reason: HOSPADM

## 2024-05-07 RX ORDER — SUCRALFATE 1 G/1
1 TABLET ORAL
Qty: 120 TABLET | Refills: 3 | Status: SHIPPED | OUTPATIENT
Start: 2024-05-07

## 2024-05-07 RX ORDER — BISACODYL 10 MG
10 SUPPOSITORY, RECTAL RECTAL DAILY PRN
Qty: 30 SUPPOSITORY | Refills: 0 | Status: SHIPPED | OUTPATIENT
Start: 2024-05-07 | End: 2024-06-06

## 2024-05-07 RX ORDER — PANTOPRAZOLE SODIUM 40 MG/1
40 TABLET, DELAYED RELEASE ORAL
Qty: 30 TABLET | Refills: 3 | Status: SHIPPED | OUTPATIENT
Start: 2024-05-07

## 2024-05-07 RX ADMIN — PANTOPRAZOLE SODIUM 40 MG: 40 TABLET, DELAYED RELEASE ORAL at 05:36

## 2024-05-07 RX ADMIN — ANTI-FUNGAL POWDER MICONAZOLE NITRATE TALC FREE: 1.42 POWDER TOPICAL at 09:19

## 2024-05-07 RX ADMIN — POTASSIUM CHLORIDE 10 MEQ: 10 CAPSULE, COATED, EXTENDED RELEASE ORAL at 09:18

## 2024-05-07 RX ADMIN — ESCITALOPRAM OXALATE 20 MG: 20 TABLET ORAL at 09:18

## 2024-05-07 RX ADMIN — CLOPIDOGREL BISULFATE 75 MG: 75 TABLET ORAL at 09:18

## 2024-05-07 RX ADMIN — PANTOPRAZOLE SODIUM 40 MG: 40 TABLET, DELAYED RELEASE ORAL at 15:52

## 2024-05-07 RX ADMIN — SUCRALFATE 1 G: 1 TABLET ORAL at 09:18

## 2024-05-07 RX ADMIN — SUCRALFATE 1 G: 1 TABLET ORAL at 05:36

## 2024-05-07 RX ADMIN — FUROSEMIDE 40 MG: 40 TABLET ORAL at 09:18

## 2024-05-07 RX ADMIN — METOPROLOL TARTRATE 50 MG: 50 TABLET, FILM COATED ORAL at 09:18

## 2024-05-07 RX ADMIN — SODIUM CHLORIDE, PRESERVATIVE FREE 10 ML: 5 INJECTION INTRAVENOUS at 09:19

## 2024-05-07 RX ADMIN — ASPIRIN 81 MG: 81 TABLET, CHEWABLE ORAL at 15:52

## 2024-05-07 ASSESSMENT — PAIN SCALES - GENERAL: PAINLEVEL_OUTOF10: 0

## 2024-05-07 NOTE — CARE COORDINATION
HENS complete.   Document ID : [id number]  Electronically signed by MANDI Hopson on 5/7/2024 at 5:12 PM

## 2024-05-07 NOTE — PROGRESS NOTES
Case discussed with the cardiologist Dr. Bo over the phone advised for aspirin 30 days Plavix indefinitely and Eliquis indefinitely watch for any GI bleed outpatient follow-up with cardiologist  Ok to luigi Lowe MD  5/7/2024    
PT going to SNF per Abbi WEBSTER (Nurse), holding all meds. Nothing to be delivered. 5/7/24  
[facility]   Progress Note                   Date:   5/7/2024  Patient name: Elaina Champagne  Date of admission:  5/6/2024  3:15 PM  MRN:   [id number]  YOB: 1950  PCP: Robin Ly MD    Reason for Admission:      Subjective:       Clinical Changes / Abnormalities: Last night events noted at this time patient was given sedation she is sleeping patient  is in the room.      Medications:   Scheduled Meds:   atorvastatin  40 mg Oral Nightly    clopidogrel  75 mg Oral Daily    escitalopram  20 mg Oral Daily    furosemide  40 mg Oral Daily    metoprolol tartrate  50 mg Oral BID    miconazole   Topical BID    potassium chloride  10 mEq Oral BID    silver sulfADIAZINE   Topical Daily    sodium chloride flush  5-40 mL IntraVENous 2 times per day    collagenase   Topical Daily    sucralfate  1 g Oral 4x Daily AC & HS    pantoprazole  40 mg Oral BID AC     Continuous Infusions:   sodium chloride      sodium chloride       CBC:   Recent Labs     05/05/24  1033 05/07/24  0523   WBC 8.1 6.4   HGB 8.5* 8.6*    358       BMP:    Recent Labs     05/05/24  1033 05/07/24  0523    140   K 3.4* 3.9    103   CO2 30 29   BUN 13 8   CREATININE 0.6 0.6   GLUCOSE 97 87       Hepatic:   No results for input(s): \"AST\", \"ALT\", \"BILITOT\", \"ALKPHOS\" in the last 72 hours.    Invalid input(s): \"ALB\"    Troponin: No results for input(s): \"TROPONINI\" in the last 72 hours.  BNP: No results for input(s): \"BNP\" in the last 72 hours.  Lipids: No results for input(s): \"CHOL\", \"HDL\" in the last 72 hours.    Invalid input(s): \"LDLCALCU\"  INR:   No results for input(s): \"INR\" in the last 72 hours.      Objective:   Vitals: BP (!) 117/59   Pulse 59   Temp 98.4 °F (36.9 °C) (Oral)   Resp 16   SpO2 100%   I/O last 3 completed shifts:  In: 360 [P.O.:360]  Out: 200 [Urine:200]  No intake/output data recorded.  Scheduled Meds:   atorvastatin  40 mg Oral Nightly    clopidogrel  75 mg Oral Daily 
05/07/24 1216   Drainage Amount Small (< 25%) 05/07/24 1216   Drainage Description Serosanguinous 05/07/24 1216   Odor None 05/07/24 1216   Sis-wound Assessment Dry/flaky 05/07/24 1216   Margins Attached edges 05/07/24 1216   Number of days: 0     Mabel Arellano, MACN, RN, CWOCN, [facility]  Wound, Ostomy, and Continence Nursing  [phone]    
itching  ENDOCRINE:  negative increase in drinking, increase in urination, hot or cold intolerance  MUSCULOSKELETAL:  negative joint pains, muscle aches, swelling of joints  NEUROLOGICAL: Dizziness, recurrent fall  BEHAVIOR/PSYCH:  negative for depression, anxiety    Physical Exam:   BP (!) 147/56   Pulse 63   Temp 97.8 °F (36.6 °C) (Oral)   Resp 16   SpO2 96%   Temp (24hrs), Av.1 °F (36.7 °C), Min:97.8 °F (36.6 °C), Max:98.6 °F (37 °C)    No results for input(s): \"POCGLU\" in the last 72 hours.    Intake/Output Summary (Last 24 hours) at 2024 1049  Last data filed at 2024 0624  Gross per 24 hour   Intake 360 ml   Output 200 ml   Net 160 ml         General Appearance:  alert, well appearing, and in no acute distress, central obesity present  Mental status: oriented to person, place, and time with normal affect  Head:  normocephalic, atraumatic.  Eye: no icterus, redness, pupils equal and reactive, extraocular eye movements intact, conjunctiva clear  Ear: normal external ear, no discharge, hearing intact  Nose:  no drainage noted  Mouth: mucous membranes moist  Neck: supple, no carotid bruits, thyroid not palpable  Lungs: Bilateral equal air entry, clear to ausculation, no wheezing, rales or rhonchi, normal effort  Cardiovascular: normal rate, regular rhythm, no murmur, gallop, rub.  Abdomen: Soft, nontender, nondistended, normal bowel sounds, no hepatomegaly or splenomegaly  Neurologic: There are no new focal motor or sensory deficits, normal muscle tone and bulk, no abnormal sensation, normal speech, cranial nerves II through XII grossly intact  Skin: No gross lesions, rashes, bruising or bleeding on exposed skin area  Extremities: Both legs are wrapped  Psych: normal affect     Investigations:      Laboratory Testing:  Recent Results (from the past 24 hour(s))   Urinalysis with Reflex to Culture    Collection Time: 24  2:00 AM    Specimen: Urine   Result Value Ref Range    Color, UA Yellow

## 2024-05-07 NOTE — CARE COORDINATION
Transportation arranged for patient to go to [facility] at 1800 via [employer]. Facility informed. Bedside nurse informed.     Number for Report: [phone]  Electronically signed by MANDI Hopson on 5/7/2024 at 2:08 PM

## 2024-05-07 NOTE — CARE COORDINATION
Continuity of Care Form    Patient Name: Elaina Champagne   :  1950  MRN:  [id number]    Admit date:  2024  Discharge date:  24    Code Status Order: Full Code   Advance Directives:     Admitting Physician:  Meño Lowe MD  PCP: Robin Ly MD    Discharging Nurse: Abbi SHAH RN  Discharging Hospital Unit/Room#: 4/2114-01  Discharging Unit Phone Number: [phone]    Emergency Contact:   Extended Emergency Contact Information  Primary Emergency Contact: Eze Champagne   [facility]  Home Phone: [phone]  Relation: Spouse  Secondary Emergency Contact: Margo ferreira  Home Phone: [phone]  Mobile Phone: [phone]  Relation: Grandchild  Preferred language: English    Past Surgical History:  Past Surgical History:   Procedure Laterality Date    CARDIAC PROCEDURE N/A 2024    Coronary angiography performed by Regis Aponte MD at [facility] CARDIAC CATH LAB    CARDIAC PROCEDURE N/A 2024    frantz / Percutaneous coronary intervention / rm 504 performed by Maria Teresa Rodriguez MD at [facility] CARDIAC CATH LAB    CATARACT REMOVAL Bilateral     CORONARY ANGIOPLASTY WITH STENT PLACEMENT  2024    HYSTERECTOMY, TOTAL ABDOMINAL (CERVIX REMOVED)  1990    INCISION AND DRAINAGE Left 2017    LEG INCISION AND DRAINAGE ABSCESS performed by Isaiah Casey MD at [facility] OR    IR NONTUNNELED VASCULAR CATHETER  12/15/2023    IR NONTUNNELED VASCULAR CATHETER 12/15/2023 [facility] SPECIAL PROCEDURES    KNEE ARTHROPLASTY Right 2018    TOTAL KNEE ARTHROPLASTY Left 2017    UPPER GASTROINTESTINAL ENDOSCOPY N/A 2023    EGD BIOPSY performed by Mary Nolasco MD at [facility] ENDO    UPPER GASTROINTESTINAL ENDOSCOPY N/A 2024    ESOPHAGOGASTRODUODENOSCOPY BIOPSY performed by Rachana Maria MD at [facility] ENDO    VENTRAL HERNIA REPAIR  2019    5 hernias       Immunization History:   Immunization History   Administered Date(s) Administered    COVID-19, MODERNA BLUE border,

## 2024-05-07 NOTE — CARE COORDINATION
Writer is following for potential discharge to [facility].    Writer placed call to bedside RN Abbi to verify pt discharge. Agreeable to writer scheduling transportation for 1800, waiting for cardiology to sign off.  Electronically signed by MANDI Hopson on 5/7/2024 at 11:08 AM    Writer placed call to [facility] to schedule transportation for this pt. Forms faxed for scheduling.  Electronically signed by MANDI Hopson on 5/7/2024 at 1:07 PM

## 2024-05-07 NOTE — DISCHARGE SUMMARY
Nodule size equals 6-8 mm In a low-risk patient, CT at 6-12 months, then consider CT at 18-24 months. In a high-risk patient, CT at 6-12 months, then CT at 18-24 months. Radiology 2017 http://pubs.rsna.org/doi/full/10.1148/radiol.0325900978     XR CHEST PORTABLE    Result Date: 5/1/2024  EXAMINATION: ONE XRAY VIEW OF THE CHEST 5/1/2024 3:06 pm COMPARISON: 01/04/2024 HISTORY: ORDERING SYSTEM PROVIDED HISTORY: general weakness TECHNOLOGIST PROVIDED HISTORY: general weakness Reason for Exam: general weakness, fall. FINDINGS: The lungs are without acute focal process.  There is no effusion or pneumothorax. The cardiomediastinal silhouette is without acute process. The osseous structures are without acute process.     No acute process.         Consultations:    Consults:     Final Specialist Recommendations/Findings:   IP CONSULT TO CARDIOLOGY      The patient was seen and examined on day of discharge and this discharge summary is in conjunction with any daily progress note from day of discharge.    Discharge plan:     Disposition: [facility]    Physician Follow Up:   Ann Marie Lomas  [address] [place] [address]  [phone]       Cardiology dr lopez    Requiring Further Evaluation/Follow Up POST HOSPITALIZATION/Incidental Findings:    Diet: cardiac diet    Activity: As tolerated    Instructions to Patient:     Discharge Medications:      Medication List        START taking these medications      bisacodyl 10 MG suppository  Commonly known as: DULCOLAX  Place 1 suppository rectally daily as needed for Constipation     sucralfate 1 GM tablet  Commonly known as: CARAFATE  Take 1 tablet by mouth 4 times daily (before meals and nightly)            CHANGE how you take these medications      aspirin 81 MG EC tablet  Take 1 tablet by mouth daily No more than 30 days  What changed: additional instructions            CONTINUE taking these medications      acetaminophen 325 MG tablet  Commonly known as: TYLENOL

## 2024-05-07 NOTE — PLAN OF CARE
Problem: Safety - Adult  Goal: Free from fall injury  5/7/2024 1510 by Abbi Kelley RN  Outcome: Completed     Problem: Discharge Planning  Goal: Discharge to home or other facility with appropriate resources  5/7/2024 1510 by Abbi Kelley RN  Outcome: Completed     Problem: Cardiovascular - Adult  Goal: Maintains optimal cardiac output and hemodynamic stability  5/7/2024 1510 by Abbi Kelley RN  Outcome: Completed     Problem: Cardiovascular - Adult  Goal: Absence of cardiac dysrhythmias or at baseline  5/7/2024 1510 by Abbi Kelley RN  Outcome: Completed     Problem: Skin/Tissue Integrity - Adult  Goal: Incisions, wounds, or drain sites healing without S/S of infection  5/7/2024 1510 by Abib Kelley RN  Outcome: Completed     Problem: Skin/Tissue Integrity - Adult  Goal: Oral mucous membranes remain intact  5/7/2024 1510 by Abbi Kelley RN  Outcome: Completed     Problem: Musculoskeletal - Adult  Goal: Return mobility to safest level of function  5/7/2024 1510 by Abbi Kelley RN  Outcome: Completed     Problem: Musculoskeletal - Adult  Goal: Maintain proper alignment of affected body part  5/7/2024 1510 by Abbi Kelley RN  Outcome: Completed     Problem: Musculoskeletal - Adult  Goal: Return ADL status to a safe level of function  5/7/2024 1510 by Abbi Kelley RN  Outcome: Completed     Problem: Pain  Goal: Verbalizes/displays adequate comfort level or baseline comfort level  5/7/2024 1510 by Abbi Kelley RN  Outcome: Completed     Problem: Skin/Tissue Integrity  Goal: Absence of new skin breakdown  Description: 1.  Monitor for areas of redness and/or skin breakdown  2.  Assess vascular access sites hourly  3.  Every 4-6 hours minimum:  Change oxygen saturation probe site  4.  Every 4-6 hours:  If on nasal continuous positive airway pressure, respiratory therapy assess nares and determine need for appliance change or resting period.  5/7/2024 1510 by Abbi Kelley

## 2024-05-07 NOTE — CARE COORDINATION
Writer met with pt to confirm discharge time. No further questions at this time.  Electronically signed by MANDI Hopson on 5/7/2024 at 2:42 PM

## 2024-05-07 NOTE — DISCHARGE INSTR - COC
Left;Medial;Lower (Active)   Wound Image   05/02/24 1417   Wound Etiology Venous 05/06/24 1637   Dressing Status Clean;Intact;Dry 05/07/24 0915   Wound Cleansed Not Cleansed 05/07/24 0915   Dressing/Treatment Dry dressing 05/07/24 0915   Wound Length (cm) 1.1 cm 05/02/24 1417   Wound Width (cm) 1.3 cm 05/02/24 1417   Wound Depth (cm) 0.1 cm 05/02/24 1417   Wound Surface Area (cm^2) 1.43 cm^2 05/02/24 1417   Wound Volume (cm^3) 0.143 cm^3 05/02/24 1417   Wound Assessment Other (Comment) 05/06/24 1637   Drainage Amount None (dry) 05/07/24 0400   Drainage Description Serosanguinous 05/06/24 1637   Odor None 05/07/24 0400   Sis-wound Assessment Dry/flaky 05/06/24 1637   Margins Attached edges 05/06/24 1637   Number of days: 4        Elimination:  Continence:   Bowel: Yes  Bladder: Yes  Urinary Catheter: None   Colostomy/Ileostomy/Ileal Conduit: No       Date of Last BM: 5/7/2    Intake/Output Summary (Last 24 hours) at 5/7/2024 1108  Last data filed at 5/7/2024 0624  Gross per 24 hour   Intake 360 ml   Output 200 ml   Net 160 ml     I/O last 3 completed shifts:  In: 360 [P.O.:360]  Out: 200 [Urine:200]    Safety Concerns:     History of Falls (last 30 days)    Impairments/Disabilities:      None    Nutrition Therapy:  Current Nutrition Therapy:   - Oral Diet:  General    Routes of Feeding: Oral  Liquids: Thin Liquids  Daily Fluid Restriction: no  Last Modified Barium Swallow with Video (Video Swallowing Test): not done    Treatments at the Time of Hospital Discharge:   Respiratory Treatments: None  Oxygen Therapy:  is not on home oxygen therapy.  Ventilator:    - No ventilator support    Rehab Therapies: Physical Therapy and Occupational Therapy  Weight Bearing Status/Restrictions: No weight bearing restrictions  Other Medical Equipment (for information only, NOT a DME order):  walker, bedside commode, and hospital bed  Other Treatments: Skilled Nursing assessment and monitoring. Medication education and monitoring per

## 2024-05-07 NOTE — PLAN OF CARE
Problem: Safety - Adult  Goal: Free from fall injury  5/7/2024 0600 by Minoo Weber RN  Outcome: Progressing  Note: Patient weak.  Alert and oriented.  Bed alarm on.  Using call light approprately.  5/6/2024 1956 by Kusum Long RN  Outcome: Progressing     Problem: Discharge Planning  Goal: Discharge to home or other facility with appropriate resources  5/7/2024 0600 by Minoo Weber RN  Outcome: Progressing  5/6/2024 1956 by Kusum Long RN  Outcome: Progressing  Flowsheets (Taken 5/6/2024 1637)  Discharge to home or other facility with appropriate resources:   Identify barriers to discharge with patient and caregiver   Arrange for needed discharge resources and transportation as appropriate   Identify discharge learning needs (meds, wound care, etc)   Refer to discharge planning if patient needs post-hospital services based on physician order or complex needs related to functional status, cognitive ability or social support system     Problem: Cardiovascular - Adult  Goal: Maintains optimal cardiac output and hemodynamic stability  5/7/2024 0600 by Minoo Weber RN  Outcome: Progressing  Note: Cardiac cath done with stent placement.  VS stable.  Denies chest pain.  Telemetry NSR.  5/6/2024 1956 by Kusum Long RN  Outcome: Progressing  Goal: Absence of cardiac dysrhythmias or at baseline  5/7/2024 0600 by Minoo Weber RN  Outcome: Progressing  5/6/2024 1956 by Kusum Long RN  Outcome: Progressing     Problem: Skin/Tissue Integrity - Adult  Goal: Incisions, wounds, or drain sites healing without S/S of infection  5/7/2024 0600 by Minoo Weber RN  Outcome: Progressing  Note: Skin on buttocks reddened.  Turned and repositioned.Skin kept clean and dry.  5/6/2024 1956 by Kusum Long RN  Outcome: Progressing  Goal: Oral mucous membranes remain intact  5/7/2024 0600 by Minoo Weber RN  Outcome: Progressing  5/6/2024 1956 by Kusum Long RN  Outcome:

## 2024-05-08 ENCOUNTER — COMMUNITY CARE MANAGEMENT (OUTPATIENT)
Facility: CLINIC | Age: 74
End: 2024-05-08

## 2024-05-08 ENCOUNTER — CARE COORDINATION (OUTPATIENT)
Dept: CARE COORDINATION | Age: 74
End: 2024-05-08

## 2024-05-08 NOTE — CARE COORDINATION
She had admit 5/1-5/6 at [facility] for GI bleed, transferred to [facility] 5/6 and had a cath and stent placed. Discharged 5/7 to [facility].    She was referred to [facility] wound care for treatment of her leg wounds, was seen inpatient. On hold right now since at facility    [facility] care transition nurse called facility for update and will follow for discharge plans. Writer will sign off from care management if [facility] is able to establish with patient.    Will follow in 2 weeks for update.    Future Appointments   Date Time Provider Department Center   5/13/2024 12:00 PM [facility] CARD REHAB RM 01 [facility] CARDIAC [facility]   7/1/2024  1:45 PM Robin Ly MD [facility]   7/2/2024  1:30 PM [facility] VASCULAR LAB 2 [facility] [place] [facility] Radiolog   7/16/2024  1:15 PM [facility]

## 2024-05-10 NOTE — PROGRESS NOTES
PATIENT CALLED AND REMINDED ABOUT APPT ON MONDAY 5/13/2024; SPOKE WITH ; HE STATED SHE WAS TAKEN TO [place] ON D/C A FEW DAYS AGO. HE STATED SHE IS VERY WEAK AND CAN'T WALK.  WILL CANCEL THIS APPT AND FOLLOW UP IN A FEW WEEKS.

## 2024-05-12 NOTE — DISCHARGE INSTRUCTIONS
[facility] WOUND and HYPERBARIC TREATMENT  CENTER      Visit  Discharge Instructions / Physician Orders  DATE:5/16/24  Home Care:     SUPPLIES ORDERED THRU:                     DATE LAST SUPPLIED     Wound Location:  Right Lower leg                                 Left Lower leg x2     Cleanse with: Liquid antibacterial soap and water, rinse well      Dressing Orders:  Primary dressing                       Secondary dressing                           secure with           x 30days     Frequency:       Additional Orders: Increase protein to diet (meat, cheese, eggs, fish, peanut butter, nuts and beans)  Multivitamin daily    OFFLOADING [] YES  TYPE:                  [] NA    Weekly wound care visits until determined otherwise.    Antibiotic therapy-wound care related YES [] NO [] NA[]    MY CHART []     Smart Device  []     HYPERBARIC TREATMENT-                TREATMENT #                          Your next appointment with the Wound Care Center is in 1 week                                                                                                   (Please note your next appointment above and if you are unable to keep, kindly give a 24 hour notice. Thank you.)  If more than 15 min late we cannot guarantee you will be seen due to clinician schedule  Per Policy, Excessive cancellation will call for dismissal from program.  If you experience any of the following, please call the Wound Care Center during business hours:  [phone]     * Increase in Pain  * Temperature over 101  * Increase in drainage from your wound  * Drainage with a foul odor  * Bleeding  * Increase in swelling  * Need for compression bandage changes due to slippage, breakthrough drainage.     If you need medical attention outside of the business hours of the Wound Care Centers please contact your PCP or go to the nearest emergency room.     The information contained in the After Visit Summary has been reviewed with me, the patient and/or

## 2024-05-13 ENCOUNTER — HOSPITAL ENCOUNTER (OUTPATIENT)
Dept: CARDIAC REHAB | Age: 74
Setting detail: THERAPIES SERIES
Discharge: HOME | End: 2024-05-13

## 2024-05-16 ENCOUNTER — HOSPITAL ENCOUNTER (OUTPATIENT)
Dept: WOUND CARE | Age: 74
Discharge: HOME | End: 2024-05-16

## 2024-05-20 ENCOUNTER — TELEPHONE (OUTPATIENT)
Dept: WOUND CARE | Age: 74
End: 2024-05-20

## 2024-05-20 NOTE — TELEPHONE ENCOUNTER
Call to [facility] regarding patients appointment for 5/22/24 at 2:00 pm  spoke with Lev.  Explained to her that  called saying that patient is not walking and is unsure how she will get to wound care.  Explained to Lev that spouse was concerned.  Lev stated that [facility]lc was picking up patient at 8am in wed.  Explained to her that appointment is not until 2:00 pm on 5/22/24. When asked if patient could pivot form chair she said that they were using a lanre lift. Enc her to send someone to pickup our lift pad if this is how she will need to transfer. Lev stated she would obtain stretcher transport.  Will notify spouse of situation.

## 2024-05-22 ENCOUNTER — CARE COORDINATION (OUTPATIENT)
Dept: CARE COORDINATION | Age: 74
End: 2024-05-22

## 2024-05-22 ENCOUNTER — FOLLOWUP TELEPHONE ENCOUNTER (OUTPATIENT)
Dept: WOUND CARE | Age: 74
End: 2024-05-22

## 2024-05-22 NOTE — CARE COORDINATION
Per chart notes, she is still in [facility]. Attempted to call there to speak with  about discharge plans, no answer and no voicemail. Will call again next week.    Future Appointments   Date Time Provider Department Center   5/22/2024  2:00 PM Erica Rodriguez, APRN - CNP [facility] WND CAR [facility]   7/1/2024  1:45 PM Robin Ly MD [facility]   7/2/2024  1:30 PM [facility] VASCULAR LAB 2 [facility] Radiolog   7/16/2024  1:15 [facility]

## 2024-05-27 ENCOUNTER — HOSPITAL ENCOUNTER (INPATIENT)
Age: 74
LOS: 10 days | Discharge: NURSING, REHAB OR LONG-TERM CARE | End: 2024-06-07
Attending: STUDENT IN AN ORGANIZED HEALTH CARE EDUCATION/TRAINING PROGRAM
Payer: COMMERCIAL

## 2024-05-27 ENCOUNTER — APPOINTMENT (OUTPATIENT)
Dept: GENERAL RADIOLOGY | Age: 74
End: 2024-05-27
Payer: COMMERCIAL

## 2024-05-27 DIAGNOSIS — L03.115 BILATERAL LOWER LEG CELLULITIS: Primary | ICD-10-CM

## 2024-05-27 DIAGNOSIS — L03.116 BILATERAL LOWER LEG CELLULITIS: Primary | ICD-10-CM

## 2024-05-27 DIAGNOSIS — R79.89 ELEVATED TROPONIN: ICD-10-CM

## 2024-05-27 LAB
ALBUMIN SERPL-MCNC: 3 G/DL (ref 3.5–5.2)
ALBUMIN/GLOB SERPL: 0.7 {RATIO} (ref 1–2.5)
ALP SERPL-CCNC: 109 U/L (ref 35–104)
ALT SERPL-CCNC: 8 U/L (ref 5–33)
ANION GAP SERPL CALCULATED.3IONS-SCNC: 14 MMOL/L (ref 9–17)
AST SERPL-CCNC: 17 U/L
BASOPHILS # BLD: 0 K/UL (ref 0–0.2)
BASOPHILS NFR BLD: 0 % (ref 0–2)
BILIRUB SERPL-MCNC: 0.4 MG/DL (ref 0.3–1.2)
BUN SERPL-MCNC: 25 MG/DL (ref 8–23)
CALCIUM SERPL-MCNC: 9.5 MG/DL (ref 8.6–10.4)
CHLORIDE SERPL-SCNC: 95 MMOL/L (ref 98–107)
CO2 SERPL-SCNC: 24 MMOL/L (ref 20–31)
CREAT SERPL-MCNC: 1.4 MG/DL (ref 0.5–0.9)
CRP SERPL HS-MCNC: 66 MG/L (ref 0–5)
EOSINOPHIL # BLD: 0 K/UL (ref 0–0.4)
EOSINOPHILS RELATIVE PERCENT: 1 % (ref 1–4)
ERYTHROCYTE [DISTWIDTH] IN BLOOD BY AUTOMATED COUNT: 17.6 % (ref 12.5–15.4)
ERYTHROCYTE [SEDIMENTATION RATE] IN BLOOD BY PHOTOMETRIC METHOD: 108 MM/HR (ref 0–30)
GFR, ESTIMATED: 40 ML/MIN/1.73M2
GLUCOSE SERPL-MCNC: 87 MG/DL (ref 70–99)
HCT VFR BLD AUTO: 26.8 % (ref 36–46)
HGB BLD-MCNC: 8.9 G/DL (ref 12–16)
LYMPHOCYTES NFR BLD: 0.9 K/UL (ref 1–4.8)
LYMPHOCYTES RELATIVE PERCENT: 16 % (ref 24–44)
MCH RBC QN AUTO: 27.5 PG (ref 26–34)
MCHC RBC AUTO-ENTMCNC: 33.1 G/DL (ref 31–37)
MCV RBC AUTO: 83 FL (ref 80–100)
MONOCYTES NFR BLD: 0.5 K/UL (ref 0.1–1.2)
MONOCYTES NFR BLD: 8 % (ref 2–11)
NEUTROPHILS NFR BLD: 75 % (ref 36–66)
NEUTS SEG NFR BLD: 4.4 K/UL (ref 1.8–7.7)
PLATELET # BLD AUTO: 428 K/UL (ref 140–450)
PMV BLD AUTO: 7.1 FL (ref 6–12)
POTASSIUM SERPL-SCNC: 4 MMOL/L (ref 3.7–5.3)
PROT SERPL-MCNC: 7.4 G/DL (ref 6.4–8.3)
RBC # BLD AUTO: 3.22 M/UL (ref 4–5.2)
SODIUM SERPL-SCNC: 133 MMOL/L (ref 135–144)
WBC OTHER # BLD: 5.9 K/UL (ref 3.5–11)

## 2024-05-27 PROCEDURE — 86140 C-REACTIVE PROTEIN: CPT

## 2024-05-27 PROCEDURE — 2580000003 HC RX 258: Performed by: STUDENT IN AN ORGANIZED HEALTH CARE EDUCATION/TRAINING PROGRAM

## 2024-05-27 PROCEDURE — 73590 X-RAY EXAM OF LOWER LEG: CPT

## 2024-05-27 PROCEDURE — 99285 EMERGENCY DEPT VISIT HI MDM: CPT

## 2024-05-27 PROCEDURE — 80053 COMPREHEN METABOLIC PANEL: CPT

## 2024-05-27 PROCEDURE — 36415 COLL VENOUS BLD VENIPUNCTURE: CPT

## 2024-05-27 PROCEDURE — 87040 BLOOD CULTURE FOR BACTERIA: CPT

## 2024-05-27 PROCEDURE — 6370000000 HC RX 637 (ALT 250 FOR IP)

## 2024-05-27 PROCEDURE — 85025 COMPLETE CBC W/AUTO DIFF WBC: CPT

## 2024-05-27 RX ORDER — 0.9 % SODIUM CHLORIDE 0.9 %
500 INTRAVENOUS SOLUTION INTRAVENOUS ONCE
Status: COMPLETED | OUTPATIENT
Start: 2024-05-27 | End: 2024-05-28

## 2024-05-27 RX ORDER — SODIUM CHLORIDE, SODIUM LACTATE, POTASSIUM CHLORIDE, AND CALCIUM CHLORIDE .6; .31; .03; .02 G/100ML; G/100ML; G/100ML; G/100ML
1000 INJECTION, SOLUTION INTRAVENOUS ONCE
Status: DISCONTINUED | OUTPATIENT
Start: 2024-05-27 | End: 2024-05-27

## 2024-05-27 RX ORDER — HYDROCODONE BITARTRATE AND ACETAMINOPHEN 5; 325 MG/1; MG/1
1 TABLET ORAL ONCE
Status: COMPLETED | OUTPATIENT
Start: 2024-05-27 | End: 2024-05-27

## 2024-05-27 RX ADMIN — HYDROCODONE BITARTRATE AND ACETAMINOPHEN 1 TABLET: 5; 325 TABLET ORAL at 23:23

## 2024-05-27 RX ADMIN — SODIUM CHLORIDE 500 ML: 9 INJECTION, SOLUTION INTRAVENOUS at 23:12

## 2024-05-27 ASSESSMENT — PAIN DESCRIPTION - LOCATION: LOCATION: LEG

## 2024-05-27 ASSESSMENT — PAIN SCALES - GENERAL: PAINLEVEL_OUTOF10: 6

## 2024-05-27 ASSESSMENT — PAIN DESCRIPTION - ORIENTATION: ORIENTATION: RIGHT;LEFT;LOWER

## 2024-05-28 PROBLEM — G93.41 ACUTE METABOLIC ENCEPHALOPATHY: Status: ACTIVE | Noted: 2024-05-28

## 2024-05-28 PROBLEM — E87.1 HYPONATREMIA: Status: ACTIVE | Noted: 2024-05-28

## 2024-05-28 PROCEDURE — 1210000000 HC MED SURG R&B

## 2024-05-28 PROCEDURE — 6360000002 HC RX W HCPCS

## 2024-05-28 PROCEDURE — 2580000003 HC RX 258: Performed by: STUDENT IN AN ORGANIZED HEALTH CARE EDUCATION/TRAINING PROGRAM

## 2024-05-28 PROCEDURE — 99223 1ST HOSP IP/OBS HIGH 75: CPT | Performed by: STUDENT IN AN ORGANIZED HEALTH CARE EDUCATION/TRAINING PROGRAM

## 2024-05-28 PROCEDURE — 2580000003 HC RX 258: Performed by: NURSE PRACTITIONER

## 2024-05-28 PROCEDURE — 2580000003 HC RX 258

## 2024-05-28 PROCEDURE — 6360000002 HC RX W HCPCS: Performed by: STUDENT IN AN ORGANIZED HEALTH CARE EDUCATION/TRAINING PROGRAM

## 2024-05-28 RX ORDER — ESCITALOPRAM OXALATE 10 MG/1
20 TABLET ORAL DAILY
Status: DISCONTINUED | OUTPATIENT
Start: 2024-05-28 | End: 2024-06-07 | Stop reason: HOSPADM

## 2024-05-28 RX ORDER — TROSPIUM CHLORIDE 20 MG/1
20 TABLET, FILM COATED ORAL
Status: DISCONTINUED | OUTPATIENT
Start: 2024-05-28 | End: 2024-06-07 | Stop reason: HOSPADM

## 2024-05-28 RX ORDER — SODIUM CHLORIDE 9 MG/ML
INJECTION, SOLUTION INTRAVENOUS PRN
Status: DISCONTINUED | OUTPATIENT
Start: 2024-05-28 | End: 2024-06-07 | Stop reason: HOSPADM

## 2024-05-28 RX ORDER — POTASSIUM CHLORIDE 20 MEQ/1
40 TABLET, EXTENDED RELEASE ORAL PRN
Status: DISCONTINUED | OUTPATIENT
Start: 2024-05-28 | End: 2024-06-07 | Stop reason: HOSPADM

## 2024-05-28 RX ORDER — SODIUM CHLORIDE 9 MG/ML
INJECTION, SOLUTION INTRAVENOUS CONTINUOUS
Status: DISCONTINUED | OUTPATIENT
Start: 2024-05-28 | End: 2024-05-30

## 2024-05-28 RX ORDER — CLOPIDOGREL BISULFATE 75 MG/1
75 TABLET ORAL DAILY
Status: DISCONTINUED | OUTPATIENT
Start: 2024-05-28 | End: 2024-06-01

## 2024-05-28 RX ORDER — POTASSIUM CHLORIDE 750 MG/1
10 CAPSULE, EXTENDED RELEASE ORAL 2 TIMES DAILY
Status: DISCONTINUED | OUTPATIENT
Start: 2024-05-28 | End: 2024-05-28

## 2024-05-28 RX ORDER — QUETIAPINE FUMARATE 25 MG/1
25 TABLET, FILM COATED ORAL NIGHTLY
Status: DISCONTINUED | OUTPATIENT
Start: 2024-05-28 | End: 2024-05-29

## 2024-05-28 RX ORDER — SODIUM CHLORIDE 0.9 % (FLUSH) 0.9 %
10 SYRINGE (ML) INJECTION PRN
Status: DISCONTINUED | OUTPATIENT
Start: 2024-05-28 | End: 2024-06-07 | Stop reason: HOSPADM

## 2024-05-28 RX ORDER — ONDANSETRON 4 MG/1
4 TABLET, ORALLY DISINTEGRATING ORAL EVERY 8 HOURS PRN
Status: DISCONTINUED | OUTPATIENT
Start: 2024-05-28 | End: 2024-06-07 | Stop reason: HOSPADM

## 2024-05-28 RX ORDER — FUROSEMIDE 20 MG/1
40 TABLET ORAL DAILY
Status: DISCONTINUED | OUTPATIENT
Start: 2024-05-28 | End: 2024-06-01

## 2024-05-28 RX ORDER — ACETAMINOPHEN 325 MG/1
650 TABLET ORAL EVERY 6 HOURS PRN
Status: DISCONTINUED | OUTPATIENT
Start: 2024-05-28 | End: 2024-05-30

## 2024-05-28 RX ORDER — SODIUM CHLORIDE 9 MG/ML
INJECTION, SOLUTION INTRAVENOUS CONTINUOUS
Status: DISCONTINUED | OUTPATIENT
Start: 2024-05-28 | End: 2024-05-28

## 2024-05-28 RX ORDER — LANOLIN ALCOHOL/MO/W.PET/CERES
3 CREAM (GRAM) TOPICAL NIGHTLY
COMMUNITY

## 2024-05-28 RX ORDER — MAGNESIUM SULFATE 1 G/100ML
1000 INJECTION INTRAVENOUS PRN
Status: DISCONTINUED | OUTPATIENT
Start: 2024-05-28 | End: 2024-06-07 | Stop reason: HOSPADM

## 2024-05-28 RX ORDER — ACETAMINOPHEN 650 MG/1
650 SUPPOSITORY RECTAL EVERY 6 HOURS PRN
Status: DISCONTINUED | OUTPATIENT
Start: 2024-05-28 | End: 2024-05-30

## 2024-05-28 RX ORDER — POLYETHYLENE GLYCOL 3350 17 G/17G
17 POWDER, FOR SOLUTION ORAL DAILY PRN
Status: DISCONTINUED | OUTPATIENT
Start: 2024-05-28 | End: 2024-06-07 | Stop reason: HOSPADM

## 2024-05-28 RX ORDER — SUCRALFATE 1 G/1
1 TABLET ORAL
Status: DISCONTINUED | OUTPATIENT
Start: 2024-05-28 | End: 2024-06-07 | Stop reason: HOSPADM

## 2024-05-28 RX ORDER — LORAZEPAM 2 MG/ML
1 INJECTION INTRAMUSCULAR EVERY 4 HOURS PRN
Status: DISCONTINUED | OUTPATIENT
Start: 2024-05-28 | End: 2024-05-30

## 2024-05-28 RX ORDER — OLANZAPINE 5 MG/1
5 TABLET ORAL NIGHTLY
Status: DISCONTINUED | OUTPATIENT
Start: 2024-05-28 | End: 2024-05-29

## 2024-05-28 RX ORDER — PANTOPRAZOLE SODIUM 40 MG/1
40 TABLET, DELAYED RELEASE ORAL
Status: DISCONTINUED | OUTPATIENT
Start: 2024-05-28 | End: 2024-06-07 | Stop reason: HOSPADM

## 2024-05-28 RX ORDER — METOPROLOL TARTRATE 50 MG/1
50 TABLET, FILM COATED ORAL 2 TIMES DAILY
Status: DISCONTINUED | OUTPATIENT
Start: 2024-05-28 | End: 2024-05-30

## 2024-05-28 RX ORDER — ASPIRIN 81 MG/1
81 TABLET ORAL DAILY
Status: DISCONTINUED | OUTPATIENT
Start: 2024-05-28 | End: 2024-06-01

## 2024-05-28 RX ORDER — ATORVASTATIN CALCIUM 40 MG/1
40 TABLET, FILM COATED ORAL NIGHTLY
Status: DISCONTINUED | OUTPATIENT
Start: 2024-05-28 | End: 2024-06-07 | Stop reason: HOSPADM

## 2024-05-28 RX ORDER — LIDOCAINE 4 G/G
1 PATCH TOPICAL DAILY
COMMUNITY

## 2024-05-28 RX ORDER — ONDANSETRON 2 MG/ML
4 INJECTION INTRAMUSCULAR; INTRAVENOUS EVERY 6 HOURS PRN
Status: DISCONTINUED | OUTPATIENT
Start: 2024-05-28 | End: 2024-06-07 | Stop reason: HOSPADM

## 2024-05-28 RX ORDER — POTASSIUM CHLORIDE 7.45 MG/ML
10 INJECTION INTRAVENOUS PRN
Status: DISCONTINUED | OUTPATIENT
Start: 2024-05-28 | End: 2024-06-07 | Stop reason: HOSPADM

## 2024-05-28 RX ORDER — OXYBUTYNIN CHLORIDE 10 MG/1
10 TABLET, EXTENDED RELEASE ORAL DAILY
COMMUNITY

## 2024-05-28 RX ORDER — SODIUM CHLORIDE 0.9 % (FLUSH) 0.9 %
5-40 SYRINGE (ML) INJECTION EVERY 12 HOURS SCHEDULED
Status: DISCONTINUED | OUTPATIENT
Start: 2024-05-28 | End: 2024-06-07 | Stop reason: HOSPADM

## 2024-05-28 RX ORDER — TRAMADOL HYDROCHLORIDE 50 MG/1
50 TABLET ORAL 2 TIMES DAILY
COMMUNITY

## 2024-05-28 RX ADMIN — OLANZAPINE 5 MG: 10 INJECTION, POWDER, LYOPHILIZED, FOR SOLUTION INTRAMUSCULAR at 14:48

## 2024-05-28 RX ADMIN — CEFEPIME 2000 MG: 2 INJECTION, POWDER, FOR SOLUTION INTRAVENOUS at 00:58

## 2024-05-28 RX ADMIN — VANCOMYCIN HYDROCHLORIDE 1500 MG: 1.5 INJECTION, POWDER, LYOPHILIZED, FOR SOLUTION INTRAVENOUS at 01:30

## 2024-05-28 RX ADMIN — SODIUM CHLORIDE: 9 INJECTION, SOLUTION INTRAVENOUS at 03:42

## 2024-05-28 ASSESSMENT — PAIN SCALES - GENERAL
PAINLEVEL_OUTOF10: 0
PAINLEVEL_OUTOF10: 4

## 2024-05-28 NOTE — SIGNIFICANT EVENT
[facility]  Date: 5/28/2024    Hospital day :0     I went to see the patient and she is currently agitated/confused and she refused for me to remove her dressings in the lower extremities bilaterally    The patient has a sitter in the room and I do understand that the patient just received Seroquel    I will attempt to see the patient tomorrow    Electronically signed by Laurent Gallego MD on 5/28/2024 at 2:45 PM      [facility]  Laurent Gallego MD  [employer] messaging  OFFICE: [phone]    Thank you for allowing us to participate in the care of this patient. Please call with questions.     This note is created with the assistance of a speech recognition program.  While intending to generate a document that actually reflects the content of the visit, the document can still have some errors including those of syntax and sound a like substitutions which may escape proof reading.  In such instances, actual meaning can be extrapolated by contextual diversion.

## 2024-05-28 NOTE — H&P
osteomyelitis.     XR TIBIA FIBULA RIGHT (2 VIEWS)    Result Date: 5/27/2024  No radiographic evidence for osteomyelitis       Assessment :      Hospital Problems             Last Modified POA    * (Principal) Cellulitis 5/28/2024 Yes    Atrial fibrillation with rapid ventricular response ([facility]) 5/28/2024 Yes    Venous insufficiency of both lower extremities 5/28/2024 Yes    Depression with anxiety 5/28/2024 Yes    Primary hypertension 5/28/2024 Yes    Hyperlipidemia 5/28/2024 Yes    Morbid obesity ([facility]) 5/28/2024 Yes    PAF (paroxysmal atrial fibrillation) ([facility]) 5/28/2024 Yes    MICHELLE (acute kidney injury) ([facility]) 5/28/2024 Yes    Elevated C-reactive protein (CRP) 5/28/2024 Yes    Normocytic anemia 5/28/2024 Yes    Anticoagulated 5/28/2024 Yes    Atherosclerotic heart disease of native coronary artery with unspecified angina pectoris 5/28/2024 Yes    Chronic kidney disease, stage 3 unspecified ([facility]) 5/28/2024 Yes    Acute metabolic encephalopathy 5/28/2024 Yes    Hyponatremia 5/28/2024 Yes     Plan:     Patient status inpatient in the Progressive Unit/Step down    Venous insufficiency of bilateral lower extremities with cellulitis.  Started on broad-spectrum antibiotics with vancomycin and cefepime.  Continue cefepime.  Infectious disease consultation.    Acute metabolic encephalopathy.  Likely multifactorial with unknown baseline.  Possibly secondary to infection, MICHELLE and further complicated by electrolyte derangements.  Check urinalysis and urine culture.  Psych consulted.  Zyprexa IM 5 mg now if needed for combativeness.  Schedule Seroquel 25 mg nightly.  Await psych input given paranoia and agitation.  Stop Norco as possible cause for patient's agitation per nursing discussion with .    Acute kidney injury possibly superimposed on chronic kidney disease stage III.  Creatinine 1.4.  Currently refusing therapy.  Will check BMP when able.  Bladder scan to ensure patient is emptying her bladder.  Gentle hydration

## 2024-05-28 NOTE — ED NOTES
Dressings applied to bilateral legs below the knee. Vaseline guaze, wet guaze wrap, dry guaze wrap and ace bandage applied to both legs by [facility] NINA assisted by Teagan hermosillo

## 2024-05-28 NOTE — ED PROVIDER NOTES
I performed a history and physical examination of the patient and discussed management with the mid level provideer. I reviewed the mid level provider's note and agree with the documented findings and plan of care.Any areas of disagreement are noted on the chart. I was personally present for the key portions of any procedures. I have documented in the chart those procedures where I was not present during the key portions. I have reviewed the emergency nurses triage note. I agree with the chief complaint, past medical history, past surgical history, allergies, medications, social and family history as documented unless otherwise noted below. Documentation of the HPI, Physical Exam and Medical Decision Making performed by medical students or scribes is based on my personal performance of the HPI, PE and MDM. For Physician Assistant/ Nurse Practitioner cases/documentation I have personally evaluated this patient and have completed at least one if not all key elements of the E/M (history, physical exam, and MDM). Additional findings are as noted.    Roberto Fang DO  Emergency Medicine Physician  10:31 PM         Roberto Fang DO  05/28/24 0113

## 2024-05-28 NOTE — PROGRESS NOTES
[facility]   Pharmacy Pharmacokinetic Monitoring Service - Vancomycin     Elaina Champagne is a 73 y.o. female starting on vancomycin therapy for skin and soft tissue infection. Pharmacy consulted by Kal Bowman NP for monitoring and adjustment.    Target Concentration: Dosing based on anticipated concentration <15 mg/L due to renal impairment/insufficiency    Additional Antimicrobials: Cefepime    Pertinent Laboratory Values:   Wt Readings from Last 1 Encounters:   05/27/24 106.6 kg (235 lb)     Temp Readings from Last 1 Encounters:   05/27/24 99 °F (37.2 °C) (Oral)     Estimated Creatinine Clearance: 41 mL/min (A) (based on SCr of 1.4 mg/dL (H)).  Recent Labs     05/27/24  2240   CREATININE 1.4*   BUN 25*   WBC 5.9     Procalcitonin: n/a    Pertinent Cultures:  Culture Date Source Results        MRSA Nasal Swab: N/A. Non-respiratory infection.    Plan:  Concentration-guided dosing due to renal impairment/insufficiency  Start vancomycin 1500mg IVPB x 1 dose  Renal labs as indicated   Vancomycin concentration ordered for  @    Pharmacy will continue to monitor patient and adjust therapy as indicated    Thank you for the consult,  Tino Maynard [facility]  5/28/2024 12:34 AM

## 2024-05-28 NOTE — SIGNIFICANT EVENT
Patient continues to  refuse treatment remains confused and agitated.  Per discussion with nursing staff patient's  states that she was given pain medication or some medication during her previous admission which caused her to be agitated and her mentation seems to have changed after Norco yesterday?  Will hold Norco at this time and she was given Zyprexa with some improvement.  Continues to refuse any therapy at this time or evaluation.  Psych consulted.     At this time we will continue to redirect patient and obtain IV for fluids, antibiotics as well as give oral medications when patient is agreeable.  Will also obtain labs if patient allows.  If she continues to be combative possible need for restraints.    Mando Burden DO  [facility], [place], [place]  5/28/2024 5:52 PM

## 2024-05-28 NOTE — PROGRESS NOTES
Patient transferred to room 321 with all belongings and sitter.  Writer attempted to do full assessment and patient refuses to let me touch her, listen with stethoscope, reposition her, assess skin, or answer questions.  Sitter at bedside, call light in reach, bed alarm on.  Will continue to monitor.  Patient does not have IV or telemetry on due to taking them off herself.    1740 - Spoke with Dr. Burden, he is aware that patient is unwilling to participate in assessment, VS, medication.  He states to continue to monitor at this time.

## 2024-05-28 NOTE — PROGRESS NOTES
Writer spoke to patient's  about patients mentation.  states that \"while she was at [place], the nurses gave her something and it made her go crazy\" Per nursing staff, when patient was admitted she was alert and oriented x 4/disoriented at times, but not cooperative. Patient was given norco at night, that may have caused a reaction.

## 2024-05-28 NOTE — ED PROVIDER NOTES
[facility] EMERGENCY DEPARTMENT  Emergency Department Encounter  Mid Level Provider     Pt Name: Elaina Champagne  MRN: [id number]  Birthdate 1950  Date of evaluation: 5/28/24  PCP:  Robin Ly MD    CHIEF COMPLAINT       Chief Complaint   Patient presents with    Leg Pain     Bilat lower leg pain with recent diagnosis of cellulitis       HISTORY OF PRESENT ILLNESS  (Location/Symptom, Timing/Onset,Context/Setting, Quality, Duration, Modifying Factors, Severity.)      Elaina Champagne is a 73 y.o. female who presents with complaints of bilateral lower extremity cellulitis, pain.  Patient is from a skilled nursing facility reports that she was recently started on oral antibiotics.  She reports that the staff at the facility said that she began hallucinating and acting confused so they sent her here for further evaluation and concern of sepsis.  Upon arrival she is alert and oriented reports that she has had this cellulitis for approximately 6 to 8 months and has been progressively worsening over that period of time.  Reports that she does follow with the wound care clinic but not has not gone to any of her appointments lately because she is unhappy with her care.    She has multiple areas of skin breakdown, redness, swelling of the bilateral lower extremities mainly on the posterior portion of the legs on the right calf there is an area of induration and fluctuance concerning for possible abscess formation.    Patient denies recent fevers or chills nausea or vomiting chest pain or shortness of breath.    PAST MEDICAL /SURGICAL / SOCIAL / FAMILY HISTORY      has a past medical history of Bell's palsy, Cellulitis, Hypertension, and NSTEMI (non-ST elevated myocardial infarction) (HCC).     has a past surgical history that includes Hysterectomy, total abdominal (1990); Cataract removal (Bilateral, 2015); incision and drainage (Left, 07/06/2017); Total knee arthroplasty (Left, 11/09/2017);  for osteomyelitis             Labs:  Results for orders placed or performed during the hospital encounter of 05/27/24   CBC with Auto Differential   Result Value Ref Range    WBC 5.9 3.5 - 11.0 k/uL    RBC 3.22 (L) 4.0 - 5.2 m/uL    Hemoglobin 8.9 (L) 12.0 - 16.0 g/dL    Hematocrit 26.8 (L) 36 - 46 %    MCV 83.0 80 - 100 fL    MCH 27.5 26 - 34 pg    MCHC 33.1 31 - 37 g/dL    RDW 17.6 (H) 12.5 - 15.4 %    Platelets 428 140 - 450 k/uL    MPV 7.1 6.0 - 12.0 fL    Neutrophils % 75 (H) 36 - 66 %    Lymphocytes % 16 (L) 24 - 44 %    Monocytes % 8 2 - 11 %    Eosinophils % 1 1 - 4 %    Basophils % 0 0 - 2 %    Neutrophils Absolute 4.40 1.8 - 7.7 k/uL    Lymphocytes Absolute 0.90 (L) 1.0 - 4.8 k/uL    Monocytes Absolute 0.50 0.1 - 1.2 k/uL    Eosinophils Absolute 0.00 0.0 - 0.4 k/uL    Basophils Absolute 0.00 0.0 - 0.2 k/uL   CMP   Result Value Ref Range    Sodium 133 (L) 135 - 144 mmol/L    Potassium 4.0 3.7 - 5.3 mmol/L    Chloride 95 (L) 98 - 107 mmol/L    CO2 24 20 - 31 mmol/L    Anion Gap 14 9 - 17 mmol/L    Glucose 87 70 - 99 mg/dL    BUN 25 (H) 8 - 23 mg/dL    Creatinine 1.4 (H) 0.5 - 0.9 mg/dL    Est, Glom Filt Rate 40 (L) >60 mL/min/1.73m2    Calcium 9.5 8.6 - 10.4 mg/dL    Total Protein 7.4 6.4 - 8.3 g/dL    Albumin 3.0 (L) 3.5 - 5.2 g/dL    Albumin/Globulin Ratio 0.7 (L) 1.0 - 2.5    Total Bilirubin 0.4 0.3 - 1.2 mg/dL    Alkaline Phosphatase 109 (H) 35 - 104 U/L    ALT 8 5 - 33 U/L    AST 17 <32 U/L   C-Reactive Protein   Result Value Ref Range    CRP 66.0 (H) 0.0 - 5.0 mg/L   Sedimentation Rate   Result Value Ref Range    Sed Rate, Automated 108 (H) 0 - 30 mm/Hr       Consults:  Shirley Anne Waterhouse, CNP-[facility]    Procedures:  None    Re-Evaluation & Disposition:  See ED Course notes above.    The patient and/or family/guardian and I have discussed the diagnosis and risks, and we agree with admission to [facility] . The patient appears appropriate for admission. The patient and/or family/guardian understands

## 2024-05-29 PROBLEM — S80.11XA HEMATOMA OF LEG, RIGHT, INITIAL ENCOUNTER: Status: ACTIVE | Noted: 2024-05-29

## 2024-05-29 PROBLEM — R41.0 ACUTE DELIRIUM: Status: ACTIVE | Noted: 2024-05-29

## 2024-05-29 LAB
ANION GAP SERPL CALCULATED.3IONS-SCNC: 14 MMOL/L (ref 9–17)
BASOPHILS # BLD: 0 K/UL (ref 0–0.2)
BASOPHILS NFR BLD: 1 % (ref 0–2)
BUN SERPL-MCNC: 12 MG/DL (ref 8–23)
CALCIUM SERPL-MCNC: 9.6 MG/DL (ref 8.6–10.4)
CHLORIDE SERPL-SCNC: 102 MMOL/L (ref 98–107)
CO2 SERPL-SCNC: 26 MMOL/L (ref 20–31)
CREAT SERPL-MCNC: 0.8 MG/DL (ref 0.5–0.9)
EOSINOPHIL # BLD: 0.1 K/UL (ref 0–0.4)
EOSINOPHILS RELATIVE PERCENT: 1 % (ref 1–4)
ERYTHROCYTE [DISTWIDTH] IN BLOOD BY AUTOMATED COUNT: 17.9 % (ref 12.5–15.4)
GFR, ESTIMATED: 78 ML/MIN/1.73M2
GLUCOSE SERPL-MCNC: 77 MG/DL (ref 70–99)
HCT VFR BLD AUTO: 25.8 % (ref 36–46)
HGB BLD-MCNC: 8.4 G/DL (ref 12–16)
LYMPHOCYTES NFR BLD: 1 K/UL (ref 1–4.8)
LYMPHOCYTES RELATIVE PERCENT: 16 % (ref 24–44)
MAGNESIUM SERPL-MCNC: 1.7 MG/DL (ref 1.6–2.6)
MCH RBC QN AUTO: 27.4 PG (ref 26–34)
MCHC RBC AUTO-ENTMCNC: 32.6 G/DL (ref 31–37)
MCV RBC AUTO: 84 FL (ref 80–100)
MONOCYTES NFR BLD: 0.5 K/UL (ref 0.1–1.2)
MONOCYTES NFR BLD: 8 % (ref 2–11)
NEUTROPHILS NFR BLD: 74 % (ref 36–66)
NEUTS SEG NFR BLD: 4.7 K/UL (ref 1.8–7.7)
PLATELET # BLD AUTO: 458 K/UL (ref 140–450)
PMV BLD AUTO: 7.3 FL (ref 6–12)
POTASSIUM SERPL-SCNC: 3.9 MMOL/L (ref 3.7–5.3)
RBC # BLD AUTO: 3.07 M/UL (ref 4–5.2)
SODIUM SERPL-SCNC: 142 MMOL/L (ref 135–144)
TROPONIN I SERPL HS-MCNC: 51 NG/L (ref 0–14)
WBC OTHER # BLD: 6.3 K/UL (ref 3.5–11)

## 2024-05-29 PROCEDURE — 6360000002 HC RX W HCPCS: Performed by: NURSE PRACTITIONER

## 2024-05-29 PROCEDURE — 80048 BASIC METABOLIC PNL TOTAL CA: CPT

## 2024-05-29 PROCEDURE — 93005 ELECTROCARDIOGRAM TRACING: CPT | Performed by: STUDENT IN AN ORGANIZED HEALTH CARE EDUCATION/TRAINING PROGRAM

## 2024-05-29 PROCEDURE — 2500000003 HC RX 250 WO HCPCS: Performed by: INTERNAL MEDICINE

## 2024-05-29 PROCEDURE — 2580000003 HC RX 258: Performed by: STUDENT IN AN ORGANIZED HEALTH CARE EDUCATION/TRAINING PROGRAM

## 2024-05-29 PROCEDURE — 2580000003 HC RX 258: Performed by: NURSE PRACTITIONER

## 2024-05-29 PROCEDURE — 36415 COLL VENOUS BLD VENIPUNCTURE: CPT

## 2024-05-29 PROCEDURE — 90792 PSYCH DIAG EVAL W/MED SRVCS: CPT | Performed by: PSYCHIATRY & NEUROLOGY

## 2024-05-29 PROCEDURE — 2060000000 HC ICU INTERMEDIATE R&B

## 2024-05-29 PROCEDURE — 84484 ASSAY OF TROPONIN QUANT: CPT

## 2024-05-29 PROCEDURE — 2500000003 HC RX 250 WO HCPCS: Performed by: STUDENT IN AN ORGANIZED HEALTH CARE EDUCATION/TRAINING PROGRAM

## 2024-05-29 PROCEDURE — 6360000002 HC RX W HCPCS: Performed by: STUDENT IN AN ORGANIZED HEALTH CARE EDUCATION/TRAINING PROGRAM

## 2024-05-29 PROCEDURE — 83735 ASSAY OF MAGNESIUM: CPT

## 2024-05-29 PROCEDURE — 85025 COMPLETE CBC W/AUTO DIFF WBC: CPT

## 2024-05-29 PROCEDURE — 99222 1ST HOSP IP/OBS MODERATE 55: CPT | Performed by: INTERNAL MEDICINE

## 2024-05-29 PROCEDURE — 99232 SBSQ HOSP IP/OBS MODERATE 35: CPT | Performed by: STUDENT IN AN ORGANIZED HEALTH CARE EDUCATION/TRAINING PROGRAM

## 2024-05-29 RX ORDER — METOPROLOL TARTRATE 1 MG/ML
5 INJECTION, SOLUTION INTRAVENOUS ONCE
Status: COMPLETED | OUTPATIENT
Start: 2024-05-29 | End: 2024-05-29

## 2024-05-29 RX ORDER — QUETIAPINE FUMARATE 25 MG/1
50 TABLET, FILM COATED ORAL NIGHTLY
Status: DISCONTINUED | OUTPATIENT
Start: 2024-05-29 | End: 2024-06-02

## 2024-05-29 RX ORDER — MAGNESIUM SULFATE 1 G/100ML
1000 INJECTION INTRAVENOUS ONCE
Status: COMPLETED | OUTPATIENT
Start: 2024-05-29 | End: 2024-05-29

## 2024-05-29 RX ORDER — HALOPERIDOL 5 MG/ML
5 INJECTION INTRAMUSCULAR EVERY 6 HOURS PRN
Status: DISCONTINUED | OUTPATIENT
Start: 2024-05-29 | End: 2024-05-30

## 2024-05-29 RX ORDER — DIGOXIN 0.25 MG/ML
250 INJECTION INTRAMUSCULAR; INTRAVENOUS ONCE
Status: COMPLETED | OUTPATIENT
Start: 2024-05-29 | End: 2024-05-29

## 2024-05-29 RX ORDER — POTASSIUM CHLORIDE 7.45 MG/ML
10 INJECTION INTRAVENOUS
Status: COMPLETED | OUTPATIENT
Start: 2024-05-29 | End: 2024-05-30

## 2024-05-29 RX ADMIN — DIGOXIN 250 MCG: 0.25 INJECTION INTRAMUSCULAR; INTRAVENOUS at 20:06

## 2024-05-29 RX ADMIN — WATER 5 MG: 1 INJECTION INTRAMUSCULAR; INTRAVENOUS; SUBCUTANEOUS at 00:26

## 2024-05-29 RX ADMIN — METOPROLOL TARTRATE 5 MG: 5 INJECTION INTRAVENOUS at 21:21

## 2024-05-29 RX ADMIN — SODIUM CHLORIDE: 9 INJECTION, SOLUTION INTRAVENOUS at 13:26

## 2024-05-29 RX ADMIN — Medication 10 MEQ: at 23:38

## 2024-05-29 RX ADMIN — HALOPERIDOL LACTATE 5 MG: 5 INJECTION, SOLUTION INTRAMUSCULAR at 17:51

## 2024-05-29 RX ADMIN — CEFEPIME 2000 MG: 2 INJECTION, POWDER, FOR SOLUTION INTRAVENOUS at 13:27

## 2024-05-29 RX ADMIN — Medication 10 MEQ: at 21:27

## 2024-05-29 RX ADMIN — MAGNESIUM SULFATE HEPTAHYDRATE 1000 MG: 1 INJECTION, SOLUTION INTRAVENOUS at 20:01

## 2024-05-29 RX ADMIN — SODIUM CHLORIDE: 9 INJECTION, SOLUTION INTRAVENOUS at 13:32

## 2024-05-29 RX ADMIN — METOPROLOL TARTRATE 5 MG: 5 INJECTION INTRAVENOUS at 18:48

## 2024-05-29 ASSESSMENT — PAIN SCALES - GENERAL
PAINLEVEL_OUTOF10: 0
PAINLEVEL_OUTOF10: 0

## 2024-05-29 NOTE — PROGRESS NOTES
Occupational Therapy    [facility]  Occupational Therapy Not Seen Note    DATE: 2024    NAME: Elaina Champagne  MRN: [id number]   : 1950      Patient not seen this date for Occupational Therapy due to:    Other: RN deferred OT today. Pt continues to be not appropriate due to confusion, inability to follow commands, agitation requiring restraints. Will continue to pursue OT eval as pt becomes more appropriate.     Next Scheduled Treatment:     Electronically signed by IVANIA SAENZ OT on 2024 at 1:04 PM

## 2024-05-29 NOTE — CARE COORDINATION
Left message earlier in the afternoon with my return number without a phone call back.  Attempted again now with no result, unable to leave a message as the mailbox is full at this time.    3088-Message left with Simin cummings [place] of [facility] to see if patient can return on discharge to them.

## 2024-05-29 NOTE — PLAN OF CARE
abnormalities, dehydration, psychiatric diagnoses, notify LIP   Decrease environmental stimuli, including noise as appropriate   Monitor and intervene to maintain adequate nutrition, hydration, elimination, sleep and activity   If unable to ensure safety without constant attention obtain sitter and review sitter guidelines with assigned personnel     Problem: Discharge Planning  Goal: Discharge to home or other facility with appropriate resources  Outcome: Not Progressing  Flowsheets (Taken 5/29/2024 0350)  Discharge to home or other facility with appropriate resources:   Identify barriers to discharge with patient and caregiver   Arrange for needed discharge resources and transportation as appropriate   Identify discharge learning needs (meds, wound care, etc)     Problem: ABCDS Injury Assessment  Goal: Absence of physical injury  Outcome: Not Progressing  Flowsheets (Taken 5/29/2024 0350)  Absence of Physical Injury: Implement safety measures based on patient assessment     Problem: Confusion  Goal: Confusion, delirium, dementia, or psychosis is improved or at baseline  Description: INTERVENTIONS:  1. Assess for possible contributors to thought disturbance, including medications, impaired vision or hearing, underlying metabolic abnormalities, dehydration, psychiatric diagnoses, and notify attending LIP  2. [place] high risk fall precautions, as indicated  3. Provide frequent short contacts to provide reality reorientation, refocusing and direction  4. Decrease environmental stimuli, including noise as appropriate  5. Monitor and intervene to maintain adequate nutrition, hydration, elimination, sleep and activity  6. If unable to ensure safety without constant attention obtain sitter and review sitter guidelines with assigned personnel  7. Initiate Psychosocial CNS and Spiritual Care consult, as indicated  Outcome: Not Progressing  Flowsheets (Taken 5/29/2024 0350)  Effect of thought disturbance (confusion,

## 2024-05-29 NOTE — CONSULTS
[facility]  Initial Consult Note  Date: 5/29/2024    Hospital day :1     Impression:   Altered mental status/encephalopathy unclear if this is truly related to sepsis or if this is medication induced  Venous stasis dermatitis with no evidence of acute infection  Left posterior calf ulcerations-clean with no signs of soft tissue infection  Right posterior calf small open wound with what clinically appears to be a draining hematoma and does not have any clinical findings to suggest cellulitis  Coronary artery disease status post PCI in January 2024  Paroxysmal atrial fibrillation was on anticoagulation.  Morbid obesity.  Previous CVA    Recommendations   At this point in time there is no evidence of acute infection in the lower extremities bilaterally and there is no need for any systemic antibiotic therapy.  The patient does have what is clinically appears to be a hematoma in the right posterior calf that is draining with a small opening.  The patient ideally would benefit from some form of imaging study which could be as simple as an ultrasound versus CT imaging.  Again clinically the findings seem to represent a hematoma.  For now we will observe off antimicrobial therapy    Chief complaint/reason for consultation:     Question Answer   Reason for Consult? Lower extremity cellulitis failed outpatient treatment     History of Present Illness:   Elaina Champagne is a 73 y.o.-year-old female who was initially admitted on 5/27/2024.   Elaina is seen and evaluated at bedside and has coronary artery disease status post PCI in January 2024, hypertension, history of non-STEMI, morbid obesity, previous CVA, atrial fibrillation on anticoagulation, venous stasis dermatitis, chronic leg wound who has a history of fall and leg pain.    I did attempt to see the patient yesterday but she was very agitated and animated did not want me to examine her at all.    The patient is still agitated though much better  GROWTH 1 DAY   Blood Culture 1 [[id number]] Collected: 05/27/24 2306   Order Status: Completed Specimen: Blood Updated: 05/29/24 0511    Specimen Description .BLOOD    Special Requests 10MLS LEFT HAND    Culture NO GROWTH 1 DAY   Culture, Urine [[id number]]    Order Status: No result Specimen: Urine, clean catch    Wound Gram stain [[id number]]    Order Status: No result Specimen: No Site Given from Wound    Culture, Wound [[id number]]    Order Status: No result Specimen: No Site Given from Skin        Electronically signed by Laurent Gallego MD on 5/29/2024 at 6:21 PM      [facility]  Laurent Gallego MD  Perfect Serve messaging  OFFICE: [phone]    Thank you for allowing us to participate in the care of this patient. Please call with questions.     This note is created with the assistance of a speech recognition program.  While intending to generate a document that actually reflects the content of the visit, the document can still have some errors including those of syntax and sound a like substitutions which may escape proof reading.  In such instances, actual meaning can be extrapolated by contextual diversion.

## 2024-05-29 NOTE — PROGRESS NOTES
[facility]  Office: [phone]  Nael Morgan DO, Ronny Quevedo DO, Pb Vásquez DO, Refugio Bradley DO, Sheldon Smith MD, Elsa Torres MD, Leandro Espinosa MD, Joann Hauser MD,  Dipak Montana MD, Rob Hernandez MD, Swathi Wells MD,  Gracia Chen DO, Pilar So MD, Kayden Walters MD, Orlando Morgan DO, Yamilex Luciano MD,  Mando Bruden DO, Gali Galarza MD, Georgie Workman MD, Jennifer Hill MD, Gloria Sierra MD,  Rafael Rawls MD, Denilson Murphy MD, Kamran Flores MD, Yazan Hughes MD, Theodore Grossman MD, Jaxon Villar MD, Ricky Lopez DO, Mickey Cabral DO, Trev Alex MD,  Arvin Powers MD, Shirley Waterhouse, CNP,  Nidia Lester CNP, Aj Castro, CNP,  Erin Shaw, DNP, Ivis Senior, CNP, Georgiana Freeman, CNP, Diandra Ruffin, CNP, Marilia Parker, CNP, Maureen Matamoros, PA-C, Meeta Titus PA-C, Sandrita Cruz, CNP, Nan Berrios, CNP, Veronica Alonzo, CNP, Areli Ware, CNP, Guadalupe Jaimes, CNP, Radhika Gillespie, CNS, Adriane Jimenez, CNP, Sarah Shi CNP, Tracy Schwab, CNP         [facility]   IN-PATIENT SERVICE   [facility]    Progress Note    5/29/2024    7:45 AM    Name:   Elaina Champagne  MRN:     [id number]     Acct:      [id number]   Room:   321/321-01   Day:  1  Admit Date:  5/27/2024  9:43 PM    PCP:   Robin Ly MD  Code Status:  Full Code    Subjective:     C/C:   Chief Complaint   Patient presents with    Leg Pain     Bilat lower leg pain with recent diagnosis of cellulitis     Interval History Status: worsened.     Vitals reviewed, patient has been refusing vitals.  Labs reviewed, patient has been refusing labs.  Overnight patient did not rest and required Zyprexa with little relief.    On examination patient delirious with no improvement overnight after zyprexa. Not oriented to person place or time.  at bedside stating patient had very similar episode at a previous hospitalization requiring 2

## 2024-05-29 NOTE — PROGRESS NOTES
Physical Therapy        Physical Therapy Cancel Note      DATE: 2024    NAME: Elaina Champagne  MRN: [id number]   : 1950      Patient not seen this date for Physical Therapy due to:    RN deferred PT today. Pt continues to be not appropriate due to confusion, inability to follow commands, agitation requiring restraints. Will continue to pursue PT eval as pt becomes more appropriate.       Electronically signed by SHMUEL SALCIDO PT on 2024 at 12:07 PM

## 2024-05-29 NOTE — PLAN OF CARE
Problem: Discharge Planning  Goal: Discharge to home or other facility with appropriate resources  Outcome: Progressing  Flowsheets  Taken 5/29/2024 1600  Discharge to home or other facility with appropriate resources:   Identify barriers to discharge with patient and caregiver   Arrange for needed discharge resources and transportation as appropriate   Identify discharge learning needs (meds, wound care, etc)  Taken 5/29/2024 1225  Discharge to home or other facility with appropriate resources:   Identify barriers to discharge with patient and caregiver   Arrange for needed discharge resources and transportation as appropriate   Identify discharge learning needs (meds, wound care, etc)  Taken 5/29/2024 0830  Discharge to home or other facility with appropriate resources:   Identify barriers to discharge with patient and caregiver   Arrange for needed discharge resources and transportation as appropriate   Identify discharge learning needs (meds, wound care, etc)     Problem: Pain  Goal: Verbalizes/displays adequate comfort level or baseline comfort level  Outcome: Progressing  Flowsheets  Taken 5/29/2024 1225  Verbalizes/displays adequate comfort level or baseline comfort level:   Encourage patient to monitor pain and request assistance   Assess pain using appropriate pain scale   Administer analgesics based on type and severity of pain and evaluate response   Implement non-pharmacological measures as appropriate and evaluate response   Consider cultural and social influences on pain and pain management   Notify Licensed Independent Practitioner if interventions unsuccessful or patient reports new pain  Taken 5/29/2024 0830  Verbalizes/displays adequate comfort level or baseline comfort level:   Encourage patient to monitor pain and request assistance   Assess pain using appropriate pain scale   Administer analgesics based on type and severity of pain and evaluate response   Implement non-pharmacological measures  underlying metabolic abnormalities, dehydration, psychiatric diagnoses, notify LIP  Taken 5/29/2024 1225  Effect of thought disturbance (confusion, delirium, dementia, or psychosis) are managed with adequate functional status: Assess for contributors to thought disturbance, including medications, impaired vision or hearing, underlying metabolic abnormalities, dehydration, psychiatric diagnoses, notify LIP  Taken 5/29/2024 0830  Effect of thought disturbance (confusion, delirium, dementia, or psychosis) are managed with adequate functional status: Assess for contributors to thought disturbance, including medications, impaired vision or hearing, underlying metabolic abnormalities, dehydration, psychiatric diagnoses, notify LIP     Problem: Safety - Medical Restraint  Goal: Remains free of injury from restraints (Restraint for Interference with Medical Device)  Description: INTERVENTIONS:  1. Determine that other, less restrictive measures have been tried or would not be effective before applying the restraint  2. Evaluate the patient's condition at the time of restraint application  3. Inform patient/family regarding the reason for restraint  4. Q2H: Monitor safety, psychosocial status, comfort, nutrition and hydration  Outcome: Progressing

## 2024-05-30 ENCOUNTER — APPOINTMENT (OUTPATIENT)
Dept: CT IMAGING | Age: 74
End: 2024-05-30
Payer: COMMERCIAL

## 2024-05-30 PROBLEM — R79.89 ELEVATED TROPONIN: Status: ACTIVE | Noted: 2024-05-30

## 2024-05-30 LAB
25(OH)D3 SERPL-MCNC: 12.8 NG/ML (ref 30–100)
AMMONIA PLAS-SCNC: 13 UMOL/L (ref 11–51)
ANION GAP SERPL CALCULATED.3IONS-SCNC: 14 MMOL/L (ref 9–17)
BASOPHILS # BLD: 0.02 K/UL (ref 0–0.2)
BASOPHILS NFR BLD: 0 % (ref 0–2)
BUN SERPL-MCNC: 13 MG/DL (ref 8–23)
CALCIUM SERPL-MCNC: 9 MG/DL (ref 8.6–10.4)
CHLORIDE SERPL-SCNC: 105 MMOL/L (ref 98–107)
CO2 SERPL-SCNC: 22 MMOL/L (ref 20–31)
CREAT SERPL-MCNC: 0.8 MG/DL (ref 0.5–0.9)
EKG ATRIAL RATE: 166 BPM
EKG ATRIAL RATE: 182 BPM
EKG ATRIAL RATE: 77 BPM
EKG Q-T INTERVAL: 268 MS
EKG Q-T INTERVAL: 276 MS
EKG Q-T INTERVAL: 288 MS
EKG QRS DURATION: 58 MS
EKG QRS DURATION: 72 MS
EKG QRS DURATION: 72 MS
EKG QTC CALCULATION (BAZETT): 448 MS
EKG QTC CALCULATION (BAZETT): 468 MS
EKG QTC CALCULATION (BAZETT): 487 MS
EKG R AXIS: -64 DEGREES
EKG R AXIS: -64 DEGREES
EKG R AXIS: -72 DEGREES
EKG T AXIS: 137 DEGREES
EKG T AXIS: 82 DEGREES
EKG T AXIS: 88 DEGREES
EKG VENTRICULAR RATE: 168 BPM
EKG VENTRICULAR RATE: 172 BPM
EKG VENTRICULAR RATE: 173 BPM
EOSINOPHIL # BLD: 0.04 K/UL (ref 0–0.4)
EOSINOPHILS RELATIVE PERCENT: 1 % (ref 1–4)
ERYTHROCYTE [DISTWIDTH] IN BLOOD BY AUTOMATED COUNT: 18.6 % (ref 12.5–15.4)
FOLATE SERPL-MCNC: <2 NG/ML (ref 4.8–24.2)
GFR, ESTIMATED: 78 ML/MIN/1.73M2
GLUCOSE SERPL-MCNC: 93 MG/DL (ref 70–99)
HCT VFR BLD AUTO: 21.9 % (ref 36–46)
HCT VFR BLD AUTO: 25 % (ref 36–46)
HGB BLD-MCNC: 7.2 G/DL (ref 12–16)
HGB BLD-MCNC: 8 G/DL (ref 12–16)
LYMPHOCYTES NFR BLD: 1.24 K/UL (ref 1–4.8)
LYMPHOCYTES RELATIVE PERCENT: 20 % (ref 24–44)
MAGNESIUM SERPL-MCNC: 2.2 MG/DL (ref 1.6–2.6)
MCH RBC QN AUTO: 27.6 PG (ref 26–34)
MCHC RBC AUTO-ENTMCNC: 32 G/DL (ref 31–37)
MCV RBC AUTO: 86.2 FL (ref 80–100)
MONOCYTES NFR BLD: 0.54 K/UL (ref 0.1–1.2)
MONOCYTES NFR BLD: 9 % (ref 2–11)
NEUTROPHILS NFR BLD: 70 % (ref 36–66)
NEUTS SEG NFR BLD: 4.35 K/UL (ref 1.8–7.7)
PLATELET # BLD AUTO: 351 K/UL (ref 140–450)
PMV BLD AUTO: 9.8 FL (ref 8–14)
POTASSIUM SERPL-SCNC: 4.5 MMOL/L (ref 3.7–5.3)
RBC # BLD AUTO: 2.9 M/UL (ref 4–5.2)
SODIUM SERPL-SCNC: 141 MMOL/L (ref 135–144)
T PALLIDUM AB SER QL IA: NONREACTIVE
TROPONIN I SERPL HS-MCNC: 245 NG/L (ref 0–14)
TROPONIN I SERPL HS-MCNC: 256 NG/L (ref 0–14)
TROPONIN I SERPL HS-MCNC: 272 NG/L (ref 0–14)
TSH SERPL DL<=0.05 MIU/L-ACNC: 2.95 UIU/ML (ref 0.3–5)
VIT B12 SERPL-MCNC: 323 PG/ML (ref 232–1245)
WBC OTHER # BLD: 6.2 K/UL (ref 3.5–11)

## 2024-05-30 PROCEDURE — 82140 ASSAY OF AMMONIA: CPT

## 2024-05-30 PROCEDURE — 84443 ASSAY THYROID STIM HORMONE: CPT

## 2024-05-30 PROCEDURE — 6360000004 HC RX CONTRAST MEDICATION: Performed by: STUDENT IN AN ORGANIZED HEALTH CARE EDUCATION/TRAINING PROGRAM

## 2024-05-30 PROCEDURE — 85025 COMPLETE CBC W/AUTO DIFF WBC: CPT

## 2024-05-30 PROCEDURE — 6360000002 HC RX W HCPCS: Performed by: STUDENT IN AN ORGANIZED HEALTH CARE EDUCATION/TRAINING PROGRAM

## 2024-05-30 PROCEDURE — 97162 PT EVAL MOD COMPLEX 30 MIN: CPT

## 2024-05-30 PROCEDURE — 99232 SBSQ HOSP IP/OBS MODERATE 35: CPT | Performed by: STUDENT IN AN ORGANIZED HEALTH CARE EDUCATION/TRAINING PROGRAM

## 2024-05-30 PROCEDURE — 82607 VITAMIN B-12: CPT

## 2024-05-30 PROCEDURE — 84484 ASSAY OF TROPONIN QUANT: CPT

## 2024-05-30 PROCEDURE — 82306 VITAMIN D 25 HYDROXY: CPT

## 2024-05-30 PROCEDURE — 2580000003 HC RX 258: Performed by: NURSE PRACTITIONER

## 2024-05-30 PROCEDURE — 85014 HEMATOCRIT: CPT

## 2024-05-30 PROCEDURE — 6370000000 HC RX 637 (ALT 250 FOR IP): Performed by: NURSE PRACTITIONER

## 2024-05-30 PROCEDURE — 36415 COLL VENOUS BLD VENIPUNCTURE: CPT

## 2024-05-30 PROCEDURE — 99232 SBSQ HOSP IP/OBS MODERATE 35: CPT | Performed by: INTERNAL MEDICINE

## 2024-05-30 PROCEDURE — 97530 THERAPEUTIC ACTIVITIES: CPT

## 2024-05-30 PROCEDURE — 80048 BASIC METABOLIC PNL TOTAL CA: CPT

## 2024-05-30 PROCEDURE — 99223 1ST HOSP IP/OBS HIGH 75: CPT | Performed by: NURSE PRACTITIONER

## 2024-05-30 PROCEDURE — 73706 CT ANGIO LWR EXTR W/O&W/DYE: CPT

## 2024-05-30 PROCEDURE — 83735 ASSAY OF MAGNESIUM: CPT

## 2024-05-30 PROCEDURE — 85018 HEMOGLOBIN: CPT

## 2024-05-30 PROCEDURE — 2060000000 HC ICU INTERMEDIATE R&B

## 2024-05-30 PROCEDURE — 97535 SELF CARE MNGMENT TRAINING: CPT

## 2024-05-30 PROCEDURE — 82746 ASSAY OF FOLIC ACID SERUM: CPT

## 2024-05-30 PROCEDURE — 70450 CT HEAD/BRAIN W/O DYE: CPT

## 2024-05-30 PROCEDURE — 99222 1ST HOSP IP/OBS MODERATE 55: CPT | Performed by: PSYCHIATRY & NEUROLOGY

## 2024-05-30 PROCEDURE — 86780 TREPONEMA PALLIDUM: CPT

## 2024-05-30 PROCEDURE — 6370000000 HC RX 637 (ALT 250 FOR IP): Performed by: STUDENT IN AN ORGANIZED HEALTH CARE EDUCATION/TRAINING PROGRAM

## 2024-05-30 PROCEDURE — 2580000003 HC RX 258: Performed by: STUDENT IN AN ORGANIZED HEALTH CARE EDUCATION/TRAINING PROGRAM

## 2024-05-30 PROCEDURE — 97166 OT EVAL MOD COMPLEX 45 MIN: CPT

## 2024-05-30 RX ORDER — ACETAMINOPHEN 650 MG/1
650 SUPPOSITORY RECTAL EVERY 6 HOURS PRN
Status: DISCONTINUED | OUTPATIENT
Start: 2024-05-30 | End: 2024-06-07 | Stop reason: HOSPADM

## 2024-05-30 RX ORDER — SODIUM CHLORIDE 0.9 % (FLUSH) 0.9 %
10 SYRINGE (ML) INJECTION PRN
Status: DISCONTINUED | OUTPATIENT
Start: 2024-05-30 | End: 2024-06-07 | Stop reason: HOSPADM

## 2024-05-30 RX ORDER — ENOXAPARIN SODIUM 150 MG/ML
1 INJECTION SUBCUTANEOUS 2 TIMES DAILY
Status: DISCONTINUED | OUTPATIENT
Start: 2024-05-30 | End: 2024-06-02

## 2024-05-30 RX ORDER — ACETAMINOPHEN 325 MG/1
650 TABLET ORAL EVERY 6 HOURS PRN
Status: DISCONTINUED | OUTPATIENT
Start: 2024-05-30 | End: 2024-06-07 | Stop reason: HOSPADM

## 2024-05-30 RX ORDER — 0.9 % SODIUM CHLORIDE 0.9 %
80 INTRAVENOUS SOLUTION INTRAVENOUS ONCE
Status: DISCONTINUED | OUTPATIENT
Start: 2024-05-30 | End: 2024-06-01

## 2024-05-30 RX ADMIN — CLOPIDOGREL BISULFATE 75 MG: 75 TABLET ORAL at 08:48

## 2024-05-30 RX ADMIN — SUCRALFATE 1 G: 1 TABLET ORAL at 08:48

## 2024-05-30 RX ADMIN — ENOXAPARIN SODIUM 105 MG: 150 INJECTION SUBCUTANEOUS at 08:50

## 2024-05-30 RX ADMIN — SUCRALFATE 1 G: 1 TABLET ORAL at 12:30

## 2024-05-30 RX ADMIN — ESCITALOPRAM OXALATE 20 MG: 10 TABLET ORAL at 08:48

## 2024-05-30 RX ADMIN — SUCRALFATE 1 G: 1 TABLET ORAL at 16:58

## 2024-05-30 RX ADMIN — SODIUM CHLORIDE, PRESERVATIVE FREE 10 ML: 5 INJECTION INTRAVENOUS at 21:00

## 2024-05-30 RX ADMIN — METOPROLOL TARTRATE 75 MG: 25 TABLET, FILM COATED ORAL at 08:53

## 2024-05-30 RX ADMIN — ACETAMINOPHEN 650 MG: 650 SUPPOSITORY RECTAL at 00:46

## 2024-05-30 RX ADMIN — SODIUM CHLORIDE: 9 INJECTION, SOLUTION INTRAVENOUS at 06:08

## 2024-05-30 RX ADMIN — PANTOPRAZOLE SODIUM 40 MG: 40 TABLET, DELAYED RELEASE ORAL at 08:47

## 2024-05-30 RX ADMIN — SODIUM CHLORIDE, PRESERVATIVE FREE 10 ML: 5 INJECTION INTRAVENOUS at 17:51

## 2024-05-30 RX ADMIN — ASPIRIN 81 MG: 81 TABLET, COATED ORAL at 08:49

## 2024-05-30 RX ADMIN — SUCRALFATE 1 G: 1 TABLET ORAL at 16:52

## 2024-05-30 RX ADMIN — ACETAMINOPHEN 650 MG: 325 TABLET ORAL at 08:47

## 2024-05-30 RX ADMIN — Medication 100 ML: at 17:51

## 2024-05-30 RX ADMIN — TROSPIUM CHLORIDE 20 MG: 20 TABLET, FILM COATED ORAL at 08:47

## 2024-05-30 RX ADMIN — PANTOPRAZOLE SODIUM 40 MG: 40 TABLET, DELAYED RELEASE ORAL at 16:52

## 2024-05-30 RX ADMIN — IOPAMIDOL 75 ML: 755 INJECTION, SOLUTION INTRAVENOUS at 17:51

## 2024-05-30 RX ADMIN — TROSPIUM CHLORIDE 20 MG: 20 TABLET, FILM COATED ORAL at 16:52

## 2024-05-30 RX ADMIN — ENOXAPARIN SODIUM 105 MG: 150 INJECTION SUBCUTANEOUS at 20:37

## 2024-05-30 ASSESSMENT — PAIN DESCRIPTION - LOCATION
LOCATION: LEG
LOCATION: LEG

## 2024-05-30 ASSESSMENT — PAIN DESCRIPTION - ORIENTATION
ORIENTATION: RIGHT;LEFT;LOWER
ORIENTATION: RIGHT;LEFT;LOWER

## 2024-05-30 ASSESSMENT — PAIN - FUNCTIONAL ASSESSMENT: PAIN_FUNCTIONAL_ASSESSMENT: PREVENTS OR INTERFERES SOME ACTIVE ACTIVITIES AND ADLS

## 2024-05-30 ASSESSMENT — PAIN DESCRIPTION - DESCRIPTORS
DESCRIPTORS: DISCOMFORT;ACHING
DESCRIPTORS: ACHING;DISCOMFORT

## 2024-05-30 ASSESSMENT — PAIN SCALES - GENERAL
PAINLEVEL_OUTOF10: 3
PAINLEVEL_OUTOF10: 4
PAINLEVEL_OUTOF10: 0
PAINLEVEL_OUTOF10: 4
PAINLEVEL_OUTOF10: 5

## 2024-05-30 ASSESSMENT — PAIN SCALES - WONG BAKER
WONGBAKER_NUMERICALRESPONSE: HURTS A LITTLE BIT
WONGBAKER_NUMERICALRESPONSE: HURTS A LITTLE BIT

## 2024-05-30 NOTE — CARE COORDINATION
Spoke with Simin at [facility], patient bed is available at [facility] and they will try to transfer her over there for discharge.  She will start a precert.

## 2024-05-30 NOTE — ACP (ADVANCE CARE PLANNING)
Advance Care Planning     Advance Care Planning Activator (Inpatient)  Conversation Note      Date of ACP Conversation: 5/30/2024     Conversation Conducted with: Legal next of kin    ACP Activator: Destiny Greer        Health Care Decision Maker:     Current Designated Health Care Decision Maker:     Primary Decision Maker: Eze Champagne - [facility] - [phone]  Click here to complete Healthcare Decision Makers including section of the Healthcare Decision Maker Relationship (ie \"Primary\")      Care Preferences    Ventilation:  \"If you were in your present state of health and suddenly became very ill and were unable to breathe on your own, what would your preference be about the use of a ventilator (breathing machine) if it were available to you?\"      Would the patient desire the use of ventilator (breathing machine)?: yes    \"If your health worsens and it becomes clear that your chance of recovery is unlikely, what would your preference be about the use of a ventilator (breathing machine) if it were available to you?\"     Would the patient desire the use of ventilator (breathing machine)?: Yes      Resuscitation  \"CPR works best to restart the heart when there is a sudden event, like a heart attack, in someone who is otherwise healthy. Unfortunately, CPR does not typically restart the heart for people who have serious health conditions or who are very sick.\"    \"In the event your heart stopped as a result of an underlying serious health condition, would you want attempts to be made to restart your heart (answer \"yes\" for attempt to resuscitate) or would you prefer a natural death (answer \"no\" for do not attempt to resuscitate)?\" yes        Conversation Outcomes:  ACP discussion completed and Existing advance directive reviewed with patient; no changes to patient's previously recorded wishes

## 2024-05-30 NOTE — PROGRESS NOTES
[facility]  Office: [phone]  Nael Morgan DO, Ronny Quevedo DO, Pb Vásquez DO, Refugio Bradley DO, Sheldon Smith MD, Elsa Torres MD, Leandro Espinosa MD, Joann Hauser MD,  Dipak Montana MD, Rob Hernandez MD, Swathi Wells MD,  Gracia Chen DO, Pilar So MD, Kayden Walters MD, Orlando Morgan DO, Yamilex Luciano MD,  Mando Burden DO, Gali Galarza MD, Georgie Workman MD, Jennifer Hill MD, Gloria Sierra MD,  Rafeal Rawls MD, Denilson Murphy MD, Kamran Flores MD, Yazan Hughes MD, Theodore Grossman MD, Jaxon Villar MD, Ricky Lopez DO, Mickey Cabral DO, Trev Alex MD,  Arvin Powers MD, Shirley Waterhouse, CNP,  Nidia Lester CNP, Aj Castro, CNP,  Erin Shaw, DNP, Ivis Senior, CNP, Georgiana Freeman, CNP, Diandra Ruffin, CNP, Marilia Parker, CNP, Maureen Matamoros, PA-C, Meeta Titus PA-C, Sandrita Cruz, CNP, Nan Berrios, CNP, Veronica Alonzo, CNP, Areli Ware, CNP, Guadalupe Jaimes, CNP, Radhika Gillespie, CNS, Adriane Jimenez, CNP, Sarah Shi CNP, Tracy Schwab, CNP         [facility]   IN-PATIENT SERVICE   [facility]    Progress Note    5/30/2024    7:56 AM    Name:   Elaina Champagne  MRN:     [id number]     Acct:      [id number]   Room:   321/321-01   Day:  2  Admit Date:  5/27/2024  9:43 PM    PCP:   Robin Ly MD  Code Status:  Full Code    Subjective:     C/C:   Chief Complaint   Patient presents with    Leg Pain     Bilat lower leg pain with recent diagnosis of cellulitis     Interval History Status: worsened.     Vitals reviewed, afebrile and hemodynamically stable. Saturating well on room air.   Labs reviewed, BMP unremarkable, CBC with hemoglobin 8.0. Troponin 51 > 256 > 272.  Overnight patient had episodes of afib with rvr for which cardiology was consulted.     On examination patient significantly improved. Alert and oriented to person place and time. Complains of leg pain but no other

## 2024-05-30 NOTE — CONSULTS
[facility]    Consult                        Today's Date: 5/30/2024  Patient Name: Elaina Champagne  Date of admission: 5/27/2024  9:43 PM  Patient's age: 73 y.o., 1950  Admission Dx: Cellulitis [L03.90]  Bilateral lower leg cellulitis [L03.116, L03.115]    Reason for Consult:  Cardiac evaluation    Requesting Physician: No admitting provider for patient encounter.    CHIEF COMPLAINT:  LE pain. Cardio consulted for Afib    History Obtained From:  spouse, electronic medical record, pt is confused    HISTORY OF PRESENT ILLNESS:      The patient is a 73 y.o.  female who is admitted to the hospital for LE pain/cellulitis.    This is a 73-year-old female with a significant past medical history of hypertension who initially presented emergency department with bilateral lower extremity cellulitis and pain for which she was recently started on oral antibiotics.  She was at skilled nursing facility with outpatient wound care however had not gone to her recent appointments as she was unhappy with the care. Does not answer questions on examination as she is frustrated staff took down her dressing and hurt her legs.     Cardiology consulted 5/29 for Afib RVR    Last seen by NP SUYAPA on 5/7/24    Plan of Treatment:      CAD with multiple vessel disease status post stent to LAD status post staged PCI for residual mid RCA -right radial cath site no hematoma or bleeding noted Cardiac stable - continue BB, dual antiplatelet therapy(aspirin for 2 weeks and then  stop if Eliquis resumed), statin and nitro as needed for chest pain   GI bleed with gastric ulcer on upper endoscopy -plan for repeat EGD in 2 months per GI-hemoglobin stable 8.6   PAF -continue beta-blocker Eliquis remains on hold patient will benefit from Watchman procedure in the future given fact GI bleed   Discussed in detail with patient post cath POC including but not limited to medications, diet, exercise,  artery site care, and  urine    Varicose veins    PAF (paroxysmal atrial fibrillation) ([facility])    Non-rheumatic tricuspid valve insufficiency    Venous insufficiency of both lower extremities    Lymphedema    Venous stasis ulcer of calf with fat layer exposed with varicose veins ([facility])    Venous ulcer of left leg ([facility])    Hyperkalemia    MICHELLE (acute kidney injury) ([facility])    Cellulitis    Bacteremia due to Pseudomonas    Bacteriuria    Leukocytosis    Elevated C-reactive protein (CRP)    Allergy to penicillin    Atrial fibrillation with rapid ventricular response ([facility])    Altered mental status    Elevated erythrocyte sedimentation rate    Permanent atrial fibrillation ([facility])    Renovascular hypertension    Melena    Normocytic anemia    Anticoagulated    Pseudomonas septicemia ([facility])    Abscess of right foot    Acute gastric ulcer with hemorrhage    NSTEMI (non-ST elevated myocardial infarction) ([facility])    S/P cardiac cath    Coronary artery disease involving native coronary artery of native heart without angina pectoris    Healing ulcers of both lower extremities, limited to breakdown of skin ([facility])    Pseudomonas aeruginosa infection    Bilateral lower leg cellulitis    Class 3 severe obesity due to excess calories with serious comorbidity and body mass index (BMI) of 50.0 to 59.9 in adult ([facility])    Carotid stenosis, asymptomatic, bilateral    Atherosclerotic heart disease of native coronary artery with unspecified angina pectoris    Chronic kidney disease, stage 3b ([facility])    Chronic kidney disease, stage 3 unspecified ([facility])    GI bleed    Moderate malnutrition ([facility])    S/P right coronary artery (RCA) stent placement    Lower GI hemorrhage    Acute metabolic encephalopathy    Hyponatremia    Acute delirium    Hematoma of leg, right, initial encounter     PAF with episode of RVR   CAD/MVD (not surgical candidate with recent PCI/RAJAN Jan 2024 & May 2024  Venous insufficiency of b/l LE w/ cellulitis  Acute metabolic encephalopathy  MICHELLE on

## 2024-05-30 NOTE — PLAN OF CARE
Problem: Discharge Planning  Goal: Discharge to home or other facility with appropriate resources  Outcome: Progressing  Flowsheets  Taken 5/30/2024 1700  Discharge to home or other facility with appropriate resources:   Identify barriers to discharge with patient and caregiver   Arrange for needed discharge resources and transportation as appropriate   Identify discharge learning needs (meds, wound care, etc)  Taken 5/30/2024 1300  Discharge to home or other facility with appropriate resources:   Identify barriers to discharge with patient and caregiver   Arrange for needed discharge resources and transportation as appropriate   Identify discharge learning needs (meds, wound care, etc)  Taken 5/30/2024 0830  Discharge to home or other facility with appropriate resources:   Identify barriers to discharge with patient and caregiver   Arrange for needed discharge resources and transportation as appropriate   Identify discharge learning needs (meds, wound care, etc)     Problem: Pain  Goal: Verbalizes/displays adequate comfort level or baseline comfort level  Outcome: Progressing  Flowsheets (Taken 5/30/2024 1125)  Verbalizes/displays adequate comfort level or baseline comfort level:   Encourage patient to monitor pain and request assistance   Assess pain using appropriate pain scale   Administer analgesics based on type and severity of pain and evaluate response   Implement non-pharmacological measures as appropriate and evaluate response   Consider cultural and social influences on pain and pain management   Notify Licensed Independent Practitioner if interventions unsuccessful or patient reports new pain     Problem: Skin/Tissue Integrity  Goal: Absence of new skin breakdown  Description: 1.  Monitor for areas of redness and/or skin breakdown  2.  Assess vascular access sites hourly  3.  Every 4-6 hours minimum:  Change oxygen saturation probe site  4.  Every 4-6 hours:  If on nasal continuous positive airway  Applied

## 2024-05-30 NOTE — PLAN OF CARE
Problem: Discharge Planning  Goal: Discharge to home or other facility with appropriate resources  5/30/2024 0120 by Ebony Davalos, RN  Outcome: Progressing  Flowsheets (Taken 5/29/2024 1600 by Ko Charles, RN)  Discharge to home or other facility with appropriate resources:   Identify barriers to discharge with patient and caregiver   Arrange for needed discharge resources and transportation as appropriate   Identify discharge learning needs (meds, wound care, etc)  5/29/2024 1835 by Ko Charles, RN  Outcome: Progressing  Flowsheets  Taken 5/29/2024 1600  Discharge to home or other facility with appropriate resources:   Identify barriers to discharge with patient and caregiver   Arrange for needed discharge resources and transportation as appropriate   Identify discharge learning needs (meds, wound care, etc)  Taken 5/29/2024 1225  Discharge to home or other facility with appropriate resources:   Identify barriers to discharge with patient and caregiver   Arrange for needed discharge resources and transportation as appropriate   Identify discharge learning needs (meds, wound care, etc)  Taken 5/29/2024 0830  Discharge to home or other facility with appropriate resources:   Identify barriers to discharge with patient and caregiver   Arrange for needed discharge resources and transportation as appropriate   Identify discharge learning needs (meds, wound care, etc)     Problem: Pain  Goal: Verbalizes/displays adequate comfort level or baseline comfort level  5/30/2024 0120 by Ebony Davalos, RN  Outcome: Progressing  Flowsheets (Taken 5/29/2024 1225 by Ko Charles, RN)  Verbalizes/displays adequate comfort level or baseline comfort level:   Encourage patient to monitor pain and request assistance   Assess pain using appropriate pain scale   Administer analgesics based on type and severity of pain and evaluate response   Implement non-pharmacological measures as appropriate and evaluate response   Consider  functional status: Assess for contributors to thought disturbance, including medications, impaired vision or hearing, underlying metabolic abnormalities, dehydration, psychiatric diagnoses, notify LIP  Taken 5/29/2024 1225  Effect of thought disturbance (confusion, delirium, dementia, or psychosis) are managed with adequate functional status: Assess for contributors to thought disturbance, including medications, impaired vision or hearing, underlying metabolic abnormalities, dehydration, psychiatric diagnoses, notify LIP  Taken 5/29/2024 0830  Effect of thought disturbance (confusion, delirium, dementia, or psychosis) are managed with adequate functional status: Assess for contributors to thought disturbance, including medications, impaired vision or hearing, underlying metabolic abnormalities, dehydration, psychiatric diagnoses, notify LIP     Problem: Safety - Medical Restraint  Goal: Remains free of injury from restraints (Restraint for Interference with Medical Device)  Description: INTERVENTIONS:  1. Determine that other, less restrictive measures have been tried or would not be effective before applying the restraint  2. Evaluate the patient's condition at the time of restraint application  3. Inform patient/family regarding the reason for restraint  4. Q2H: Monitor safety, psychosocial status, comfort, nutrition and hydration  5/30/2024 0120 by Ebony Davalos, RN  Outcome: Progressing  Flowsheets (Taken 5/30/2024 0120)  Remains free of injury from restraints (restraint for interference with medical device): Every 2 hours: Monitor safety, psychosocial status, comfort, nutrition and hydration  5/29/2024 1835 by Ko Charles, RN  Outcome: Progressing

## 2024-05-30 NOTE — PROGRESS NOTES
Physical Therapy  Facility/Department: [address]  Physical Therapy Initial Assessment    Name: Elaina Champagne  : 1950  MRN: [id number]  Date of Service: 2024    Discharge Recommendations:  Patient would benefit from continued therapy after discharge      Patient Diagnosis(es): The primary encounter diagnosis was Bilateral lower leg cellulitis. A diagnosis of Elevated troponin was also pertinent to this visit.  Past Medical History:  has a past medical history of Bell's palsy, Cellulitis, Hypertension, and NSTEMI (non-ST elevated myocardial infarction) (HCC).  Past Surgical History:  has a past surgical history that includes Hysterectomy, total abdominal (); Cataract removal (Bilateral, ); incision and drainage (Left, 2017); Total knee arthroplasty (Left, 2017); Knee Arthroplasty (Right, 2018); ventral hernia repair (2019); IR NONTUNNELED VASCULAR CATHETER > 5 YEARS (12/15/2023); Upper gastrointestinal endoscopy (N/A, 2023); Cardiac procedure (N/A, 2024); Cardiac procedure (N/A, 2024); Upper gastrointestinal endoscopy (N/A, 2024); Coronary angioplasty with stent (2024); and Cardiac procedure (N/A, 2024).    Assessment   Body Structures, Functions, Activity Limitations Requiring Skilled Therapeutic Intervention: Decreased functional mobility ;Decreased ROM;Decreased safe awareness;Decreased cognition;Decreased strength;Increased pain  Assessment: Pt presents with bilateral LE cellulitis admitted on . Lives with  but has been residing at [facility] for rehab. Pt receives help with ADLs, homemaking responsibilities, and cooking. She was independent in bed mobility, transfers, and gait with a RW. Currently, she requires mod assist x2 for bed mobility and sit to stand and max x2 for stand to sit with RW. She walked 2 ft with max assist w/ RW. Patient currently does not demonstrate adequate safety and mobility to return to  Raw Score : 10  AM-PAC Inpatient T-Scale Score : 32.29  Mobility Inpatient CMS 0-100% Score: 76.75  Mobility Inpatient CMS G-Code Modifier : CL    Goals  Short Term Goals  Time Frame for Short Term Goals: 14  Short Term Goal 1: Patient will complete bed mobility with CGA.  Short Term Goal 2: Patient will complete transfers with min assist and RW.  Short Term Goal 3: Patitent will complete gait for 50 ft with mod assist and RW.  Patient Goals   Patient Goals : return to SNF     Education  Patient Education  Education Given To: Patient  Education Provided: Role of Therapy;Transfer Training;Energy Conservation;Equipment;Plan of Care  Education Method: Verbal  Barriers to Learning: None  Education Outcome: Demonstrated understanding;Continued education needed      Therapy Time   Individual Concurrent Group Co-treatment   Time In 1400         Time Out 1421         Minutes 21         Timed Code Treatment Minutes: 10 Minutes     Evaluation/treatment performed by Student PT under the supervision of co-signing PT who agrees with all evaluation/treatment and documentation.      Dejon Millan, SPT

## 2024-05-30 NOTE — PROGRESS NOTES
[facility]  Progress Note    Elaina Champagne  MRN: [id number]  Date: 5/30/2024  LOS: 2     Reason for F/U :   Cellulitis    Impression :   Altered mental status/encephalopathy-improved  Venous stasis dermatitis with no evidence of acute infection  Left posterior calf ulcerations-clean with no signs of soft tissue infection  Right posterior calf small open wound with what clinically appears to be a draining hematoma   Coronary artery disease status post PCI in January 2024  Paroxysmal atrial fibrillation was on anticoagulation.  Morbid obesity.  Previous CVA    Recommendations:   The patient did have fever up to 102 degrees but clinically is improved overall  The patient did undergo CT imaging of the brain that did not show any acute disease process and CT imaging of the right lower extremity that confirms a clinical thought that the patient had a hematoma  The patient at this point in time will continue off systemic antibiotic  We will continue to observe her progress and follow    Infection Control Recommendations:   Universal precautions    Discharge Planning:   Patient will need Midline Catheter Insertion/ PICC line Insertion: No  Patient will need: Home IV , Infusion Center,  SNF,  LTAC: Undetermined  Patient willneed outpatient wound care: No    Medical Decision making / Summary of Stay:   Elaina Champagne is a 73 y.o.-year-old female who was initially admitted on 5/27/2024.   Elaina is seen and evaluated at bedside and has coronary artery disease status post PCI in January 2024, hypertension, history of non-STEMI, morbid obesity, previous CVA, atrial fibrillation on anticoagulation, venous stasis dermatitis, chronic leg wound who has a history of fall and leg pain.     I did attempt to see the patient yesterday but she was very agitated and animated did not want me to examine her at all.     The patient is still agitated though much better today and is a poor historian and is not really  able to get much from her but on reviewing the chart it is my understanding that she resides in a skilled nursing facility and had recently been started on oral antimicrobial therapy for bilateral lower extremity cellulitis.  The patient apparently became more confused started hallucinating and was sent in here for concern of sepsis.  The patient reported to the ED staff that she has had cellulitis for 6 to 8 months and follows at wound care but had not been following up with appointments because she was not happy with the care.     The patient was admitted and started on antimicrobial therapy due to concerns for cellulitis and I was asked to evaluate and help with antibiotic choice.     The patient is answering questions but repeatedly tells me to leave her alone and get out and was quite vocal while we are doing the dressing changes.    Current evaluation:2024    BP (!) 158/144 Comment: Pt tremors making BP inaccurate. Will check again  Pulse 64   Temp 98.6 °F (37 °C) (Axillary)   Resp 16   Ht 1.549 m (5' 1\")   Wt 106 kg (233 lb 11 oz)   SpO2 92%   BMI 44.15 kg/m²     Temperature Range: Temp: 98.6 °F (37 °C) Temp  Av.7 °F (37.6 °C)  Min: 98.6 °F (37 °C)  Max: 102 °F (38.9 °C)  The patient is seen and evaluated at bedside and is awake and alert actually doing better today than she had done previously  The patient had a Tmax of 102 degrees  She is answering questions appropriately does report some pain in the right lower extremity    The patient was seen by the neurology service earlier today.    Review of Systems   Constitutional: Negative.    Respiratory: Negative.     Cardiovascular: Negative.    Gastrointestinal: Negative.    Genitourinary: Negative.    Musculoskeletal: Negative.    Skin: Negative.    Neurological: Negative.    Psychiatric/Behavioral: Negative.         Physical Examination :     Physical Exam  Constitutional:       Appearance: She is well-developed. She is obese.   HENT:

## 2024-05-30 NOTE — CONSULTS
[facility] Neurology   IN-PATIENT SERVICE      NEUROLOGY CONSULT  NOTE            Date:   5/30/2024  Patient name:  Elaina Champagne  Date of admission:  5/27/2024  YOB: 1950      Chief Complaint:     Chief Complaint   Patient presents with    Leg Pain     Bilat lower leg pain with recent diagnosis of cellulitis       Reason for Consult:      Altered mental status    History of Present Illness:     The patient is a 73 y.o. female who presents with Leg Pain (Bilat lower leg pain with recent diagnosis of cellulitis)  . The patient was seen and examined and the chart was reviewed.  She was in the hospital approximately 3 weeks ago for findings of upper GI bleed.  She ended up having EGD done showing evidence of ulcer as well as hiatal hernia.  Hemoglobin was down in the 6 range which eventually came up into the 8 range and she was discharged.  She is back to the hospital on 5/28 with bilateral lower extremity pain as well as inflammation.  There was concern for cellulitis and she was started on vancomycin and cefepime and admitted for further workup.  Patient had altered mentation with some fluctuating confusion as well as agitation and aggressiveness.  She has required Zyprexa, Haldol, Seroquel as far as antipsychotics.  Psychiatry has been consulted starting patient on Seroquel 50 mg nightly.  Norco has been held which reportedly was making her symptoms worse.  Hemoglobin has been stable at 8.9.  CRP is 66.  There is a mild acute kidney injury.  Infectious disease on board does not believe there is lower extremity cellulitis, possibly more vascular etiology.  CT of the lower extremities has been ordered although currently pending.  Antibiotics have been discontinued.  Overnight patient went into A-fib with RVR and has been started on Lovenox.  Cardiology has been consulted.    At this time patient is sitting up in a chair, comfortable.  She is cooperative does not appear to be agitated at all at this  Robin Ly MD   miconazole (MICOTIN) 2 % powder Apply topically 2 times daily. 1/5/24   Aj Mendez MD   acetaminophen (TYLENOL) 325 MG tablet Take 2 tablets by mouth every 6 hours as needed for Pain    Provider, MD Jah   escitalopram (LEXAPRO) 20 MG tablet Take 1 tablet by mouth daily 10/10/23   Robin Ly MD        Allergies:     Penicillins    Social History:     Tobacco:    reports that she has never smoked. She has never been exposed to tobacco smoke. She has never used smokeless tobacco.  Alcohol:      reports no history of alcohol use.  Drug Use:  reports no history of drug use.    Family History:     Family History   Problem Relation Age of Onset    Cancer Mother     High Blood Pressure Father     Stroke Father     Ovarian Cancer Daughter     Cancer Daughter     Diabetes Maternal Aunt     Cancer Other         daughter/ovarian cancer       Review of Systems:       Constitutional Negative for fever and chills   HEENT Negative for ear discharge, ear pain, nosebleed. Negative for photophobia, headache.    Musculoskeletal Positive for joint pain at ankles, negative for myalgia   Respiratory Negative for cough, dyspnea. Negative for hemoptysis and sputum.    Cardiovascular Negative for palpitations, chest pain. Negative for orthopnea, claudication.    Gastrointestinal Negative for nausea, vomiting. Negative for abdominal pain, diarrhea, blood in stool   Genitourinary  Negative for dysuria, hematuria. Negative for suprapubic pain. Negative for bladder incontinence.    Skin Negative for rash or itching.  Positive for erythema bilateral lower extremities.   Hematology Negative for ecchymosis, positive for anemia   Psychiatric Positive for anxiety, depression. Negative for suicidal ideation, hallucinations         Physical Exam:   BP (!) 158/144 Comment: Pt tremors making BP inaccurate. Will check again  Pulse 64   Temp 98.6 °F (37 °C) (Axillary)   Resp 16   Ht 1.549 m (5' 1\")

## 2024-05-30 NOTE — CONSULTS
Department of Psychiatry   Psychiatric Assessment      Thank you very much for allowing us to participate in the care of this patient.      Reason for Consult: Agitation    HISTORY OF PRESENT ILLNESS:      Patient is a 73-year-old female with history of mood disorder admitted for lower extremity cellulitis management.  Psychiatrist consulted to evaluate for ongoing agitation.  Patient appears like he is dealing with acute delirium at this time.  Exhibiting some fluctuating cognition.  She did receive emergency medications and is unable to participate in any linear conversation or in the interview at this time.  Not oriented to time place person or situation.  In soft restraints at this time following a agitation episode.  Recent admission essentials indicated that patient gets extremely confused on opiates and benzos.      PSYCHIATRIC HISTORY:  Patient has history of mood disorder.  Records do not indicate any previous admissions.    Prior to Admission medications    Medication Sig Start Date End Date Taking? Authorizing Provider   oxyBUTYnin (DITROPAN-XL) 10 MG extended release tablet Take 1 tablet by mouth daily   Yes Jah Ko MD   traMADol (ULTRAM) 50 MG tablet Take 1 tablet by mouth 2 times daily. Max Daily Amount: 100 mg   Yes Jah Ko MD   lidocaine (HM LIDOCAINE PATCH) 4 % external patch Place 1 patch onto the skin daily Apply to left shoulder topically one time a day for pain   Yes Jah Ko MD   melatonin 3 MG TABS tablet Take 1 tablet by mouth at bedtime   Yes Jah Ko MD   bisacodyl (DULCOLAX) 10 MG suppository Place 1 suppository rectally daily as needed for Constipation 5/7/24 6/6/24  Meño Lowe MD   pantoprazole (PROTONIX) 40 MG tablet Take 1 tablet by mouth 2 times daily (before meals) 5/7/24   Meño Lowe MD   sucralfate (CARAFATE) 1 GM tablet Take 1 tablet by mouth 4 times daily (before meals and nightly) 5/7/24   Meño Lowe  Maria Teresa Rodriguez MD at [facility] CARDIAC CATH LAB    CATARACT REMOVAL Bilateral 2015    CORONARY ANGIOPLASTY WITH STENT PLACEMENT  05/06/2024    HYSTERECTOMY, TOTAL ABDOMINAL (CERVIX REMOVED)  1990    INCISION AND DRAINAGE Left 07/06/2017    LEG INCISION AND DRAINAGE ABSCESS performed by Isaiah Casey MD at [facility] OR    IR NONTUNNELED VASCULAR CATHETER  12/15/2023    IR NONTUNNELED VASCULAR CATHETER 12/15/2023 [facility] SPECIAL PROCEDURES    KNEE ARTHROPLASTY Right 09/24/2018    TOTAL KNEE ARTHROPLASTY Left 11/09/2017    UPPER GASTROINTESTINAL ENDOSCOPY N/A 12/28/2023    EGD BIOPSY performed by Mary Nolasco MD at [facility] ENDO    UPPER GASTROINTESTINAL ENDOSCOPY N/A 05/02/2024    ESOPHAGOGASTRODUODENOSCOPY BIOPSY performed by Rachana Maria MD at [facility] ENDO    VENTRAL HERNIA REPAIR  02/11/2019    5 hernias       Allergies: Penicillins      Social History:    Patient is from [facility] [facility].   is very supportive of her.  SUBSTANCE USE HISTORY: No significant substance use issues    Family Medical and Psychiatric History:             Problem Relation Age of Onset    Cancer Mother     High Blood Pressure Father     Stroke Father     Ovarian Cancer Daughter     Cancer Daughter     Diabetes Maternal Aunt     Cancer Other         daughter/ovarian cancer       Physical  BP (!) 125/107   Pulse (!) 117   Temp 98.4 °F (36.9 °C) (Oral)   Resp 20   Ht 1.549 m (5' 1\")   Wt 106 kg (233 lb 11 oz)   SpO2 93%   BMI 44.15 kg/m²     Mental Status Examination:   Level of consciousness:  Somnolent  Appearance: hospital attire, lying in bed, fair grooming  Behavior/Motor: Psychomotor agitation  Attitude toward examiner: Indifferent, poor eye contact  Speech: Monotonous  Mood:  Could not assess  Affect: Constricted  Thought processes: Indiscernible  Thought content: Could not assess  Cognition:  Oriented to self not to situation and location or time.  Concentration poor  Insight & Judgment: Limited  DSM-5 DIAGNOSIS:  Acute

## 2024-05-30 NOTE — CARE COORDINATION
Case Management Assessment  Initial Evaluation    Date/Time of Evaluation: 5/30/2024 10:52 AM  Assessment Completed by: Destiny Greer    If patient is discharged prior to next notation, then this note serves as note for discharge by case management.    Patient Name: Elaina Champagne                   YOB: 1950  Diagnosis: Cellulitis [L03.90]  Bilateral lower leg cellulitis [L03.116, L03.115]                   Date / Time: 5/27/2024  9:43 PM    Patient Admission Status: Inpatient   Readmission Risk (Low < 19, Mod (19-27), High > 27): Readmission Risk Score: 24.5    Current PCP: Robin Ly MD  PCP verified by CM? Yes    Chart Reviewed: Yes      History Provided by: Significant Other  Patient Orientation: Unable to Assess ( completed assessment with me)    Patient Cognition: Other (see comment) (patient not awake and appropriate at this time)    Hospitalization in the last 30 days (Readmission):  Yes    If yes, Readmission Assessment in  Navigator will be completed.    Advance Directives:      Code Status: Full Code   Patient's Primary Decision Maker is: Legal Next of Kin ()    Primary Decision Maker: Eze Champagne - Spouse - [phone]    Discharge Planning:    Patient lives with:  (at [facility] would like to be at [facility] instead though) Type of Home: Skilled Nursing Facility  Primary Care Giver: Other (Comment) (patient care by [facility] staff)  Patient Support Systems include: Spouse/Significant Other, Family Members   Current Financial resources: Medicare  Current community resources: ECF/Home Care  Current services prior to admission: Durable Medical Equipment            Current DME: Walker, [employer]ane            Type of Home Care services:  None    ADLS  Prior functional level: Assistance with the following:, Bathing, Dressing, Toileting, Cooking, Housework, Shopping, Mobility  Current functional level: Assistance with  [place] ambulatory encounter    HAND SURGERY WORKER'S COMPENSATION FOLLOW-UP EVALUATION    WORK INFORMATION:    EMPLOYER:  [employer] ( ALL SITES)  DATE OF INJURY:  3/3/2018  INJURY: Left ulnar wrist pain  PREVIOUS TREATMENT: Splinting    REASON FOR VISIT:  Left ulnar wrist pain    HISTORY OF PRESENT ILLNESS:  The patient is a 67 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  He notes the wrist was ulnarly deviated at the time of the injury.  He denies a pop or snapping sensation and has had none since.  He notes he has done martial arts extensively and has complaints at multiple joints but never any wrist pain or injuries prior to this.  He has continued using the left arm as a helper hand and continuing to work with a splint in place.  The patient denies numbness or tingling.       Interval history - the patient notes significant improvement in symptoms in the past 1-2 weeks, estimating 30-40% improvement with rest, splinting.  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  He has been soaking in warm epsom salts and thinks this has helped.    HISTORIES:  Past Medical History:   Diagnosis Date   • Arthritis    • Chronic pain    • Ear problem    • High cholesterol    • Pain     body pain     Past Surgical History:   Procedure Laterality Date   • Cosmetic surgery      sanding acne off of forehead     ALLERGIES:   Allergen Reactions   • Citrus Other (See Comments)   • Codeine RASH     Last time patient had codeine his lungs collapsed and he broke out in a rash.   • Grass Other (See Comments)     Current Outpatient Prescriptions   Medication Sig Dispense Refill   • Garlic OIL Take  by mouth.     • Multiple Vitamin (MULTI-VITAMINS PO) Take  by mouth.     • Cholecalciferol (VITAMIN D PO) Take  by mouth.     •  Cyanocobalamin (VITAMIN B 12 PO) Take  by mouth.     • Fish Oil OIL Flaxseed oil     • CALCIUM-MAGNESIUM-ZINC PO Take  by mouth.     • niacin (NIASPAN) 500 MG CR tablet Take 1 tablet by mouth nightly. 90 tablet 3     Current Facility-Administered Medications   Medication Dose Route Frequency Provider Last Rate Last Dose   • triamcinolone acetonide (KENALOG-10) 10 MG/ML injection 10 mg  10 mg Intra-articular Once Víctor Mcconnell MD         History   Smoking Status   • Never Smoker   Smokeless Tobacco   • Never Used     History   Alcohol Use No     Family History   Problem Relation Age of Onset   • High blood pressure Mother    • Diabetes Father    • Diabetes Brother       PHYSICAL EXAM:  Vitals:    05/31/18 1345   BP: 110/64   Pulse: 62   Resp: 16       General:  Well developed/well nourished/no acute distress.  Neurologic:  Oriented to person, place and time.  Psychiatry:  Mood is appropriate and pleasant.  Respiratory:  Breathing comfortably without wheezing or coughing.  Musculoskeletal:  Ambulates without an assistive device. No obvious weakness or discoordination of the upper or lower extremities.  Cardiac:  Regular rhythm and pulse.  No lower extremity edema.      Upper Extremity Exam:  The left elbow has good range of motion. The medial and lateral epicondyles are both nontender. The medial and lateral collateral ligaments of the elbow joint are stable on stress testing. The radial head and neck are both nontender. The proximal forearm compartments are soft and nontender, without compartment syndrome. The radial tunnel is nontender. The distal radius is nontender. The DRUJ test is negative for laxity, instability or crepitation. The anatomic snuffbox is nontender, as are the dorsal and volar aspects of the scaphoid bone. Aleman's test is negative.  The ulnar side of the wrist is tender over the TFCC at the fovea, less tender than prior. No ECU or FCU tendonitis. No ECU subluxation.  Significant pain with  ulnar deviation and stress.  Finkelstein’s test is negative.  All extensor and flexor tendons are intact. The fingers are pink and well perfused. Light touch sensation is normal in all digits. No triggering. The fingers have full range of motion.    IMAGING and ANCILLARY STUDIES:  Results for orders placed in visit on 04/11/18   XR WRIST 3+ VW LEFT    Impression IMPRESSION:  No evidence of an acute fracture       My review with positive ulnar variance, no other abnormality.    ASSESSMENT:  · 67 year old male with 3 months of ulnar sided wrist pain.  Exam and imaging consistent with possible TFCC injury and contribution from ulnar impaction given positive ulnar variance.  Approximately 30-40% improvement in symptoms with symptomatic treatment, splinting, and steroid injection.    PATIENT COUNSELING:  The treatment options for the ulnar sided wrist pain were discussed in detail today, including activity modification, nonsteroidal anti-inflammatory medications, splint usage, joint steroid injection, and surgery. Prior to surgery, MRI would likely be necessary as well as failure of conservative measures.  I reviewed for the patient in detail the relative advantages and disadvantages of the treatment options.      I recommended MRI today given ongoing symptoms.  The patient strongly wishes to avoid surgical intervention although does not want to be reliant on splinting or steroid injections long-term.  I demonstrated avoidance of activity that includes ulnar deviation or loading of the wrist, with activity modification.  He is encouraged by partial relief in past month with current management.    PLAN:  · OT ordered for activity modification, instruction, range of motion, strengthening.  · Continue conservative care with activity modification, rest, splinting, NSAIDs, ice, heat, etc...  · If improvement from prior steroid injection not seen, will obtain MR arthrogram to evaluate anatomy.  Offered this today, patient  declined.  · Continue prior work restrictions with light use of the affected hand and splint use while working.  · Follow-up in 4 weeks.  Call if no further improvement, can order MR arthrogram if needed to be completed prior to next visit.    Víctor Mcconnell M.D.  Plastic and Reconstructive Surgery  Hand and Microsurgery  [facility]

## 2024-05-30 NOTE — PROGRESS NOTES
Occupational Therapy  Facility/Department: [address]  Occupational Therapy Initial Assessment    Name: Elaina Champagne  : 1950  MRN: [id number]  Date of Service: 2024  Chief Complaint   Patient presents with    Leg Pain     Bilat lower leg pain with recent diagnosis of cellulitis         Discharge Recommendations:  Patient would benefit from continued therapy after discharge  OT Equipment Recommendations  Other: TBD       Patient Diagnosis(es): The primary encounter diagnosis was Bilateral lower leg cellulitis. A diagnosis of Elevated troponin was also pertinent to this visit.  Past Medical History:  has a past medical history of Bell's palsy, Cellulitis, Hypertension, and NSTEMI (non-ST elevated myocardial infarction) ([facility]).  Past Surgical History:  has a past surgical history that includes Hysterectomy, total abdominal (); Cataract removal (Bilateral, ); incision and drainage (Left, 2017); Total knee arthroplasty (Left, 2017); Knee Arthroplasty (Right, 2018); ventral hernia repair (2019); IR NONTUNNELED VASCULAR CATHETER > 5 YEARS (12/15/2023); Upper gastrointestinal endoscopy (N/A, 2023); Cardiac procedure (N/A, 2024); Cardiac procedure (N/A, 2024); Upper gastrointestinal endoscopy (N/A, 2024); Coronary angioplasty with stent (2024); and Cardiac procedure (N/A, 2024).           Assessment   Performance deficits / Impairments: Decreased safe awareness;Decreased cognition;Decreased balance;Decreased ADL status;Decreased functional mobility ;Decreased strength;Decreased endurance;Decreased sensation  Assessment: Admit from SNF with Cellulitis.Pt presents with decreased ADL status secondary to above noted deficits. PLOF pt only required occassional assist with LB ADL, Pt currently max A to dep for ADLs and functional mobility.Pt to benefit from continued therapy services while hospitalized to maximize pt's safety and independence in  for putting on and taking off regular lower body clothing?: Total  How much help is needed for bathing (which includes washing, rinsing, drying)?: Total  How much help is needed for toileting (which includes using toilet, bedpan, or urinal)?: Total  How much help is needed for putting on and taking off regular upper body clothing?: A Lot  How much help is needed for taking care of personal grooming?: A Lot  How much help for eating meals?: A Little  AM-[facility] Inpatient Daily Activity Raw Score: 10  AM-[facility] Inpatient ADL T-Scale Score : 27.31  ADL Inpatient CMS 0-100% Score: 74.7  ADL Inpatient CMS G-Code Modifier : CL         Goals  Short Term Goals  Time Frame for Short Term Goals: 14 days pt will:  Short Term Goal 1: Complete bed mobility with min A for assisted self care  Short Term Goal 2: Complete sup to sit with min A for assisted self care; Complete UB bath with min A  Short Term Goal 3: Complete simple grooming with set up  Short Term Goal 4: Complete UB bath with min A  Short Term Goal 5: ADL transfers with min A of 1 at   Patient Goals   Patient goals : get better       Therapy Time   Individual Concurrent Group Co-treatment   Time In 1145         Time Out 1221         Minutes 36         Timed Code Treatment Minutes: 23 Minutes       IVANIA SAENZ OT

## 2024-05-31 ENCOUNTER — APPOINTMENT (OUTPATIENT)
Age: 74
End: 2024-05-31
Attending: STUDENT IN AN ORGANIZED HEALTH CARE EDUCATION/TRAINING PROGRAM
Payer: COMMERCIAL

## 2024-05-31 PROBLEM — D62 ACUTE BLOOD LOSS ANEMIA: Status: ACTIVE | Noted: 2024-05-31

## 2024-05-31 PROBLEM — T14.8XXA HEMATOMA: Status: ACTIVE | Noted: 2024-05-29

## 2024-05-31 LAB
ANION GAP SERPL CALCULATED.3IONS-SCNC: 10 MMOL/L (ref 9–17)
BASOPHILS # BLD: 0 K/UL (ref 0–0.2)
BASOPHILS NFR BLD: 1 % (ref 0–2)
BUN SERPL-MCNC: 12 MG/DL (ref 8–23)
CALCIUM SERPL-MCNC: 8.6 MG/DL (ref 8.6–10.4)
CHLORIDE SERPL-SCNC: 104 MMOL/L (ref 98–107)
CO2 SERPL-SCNC: 25 MMOL/L (ref 20–31)
CREAT SERPL-MCNC: 0.6 MG/DL (ref 0.5–0.9)
ECHO AO ROOT DIAM: 3.1 CM
ECHO AO ROOT INDEX: 1.58 CM/M2
ECHO AV MEAN GRADIENT: 7 MMHG
ECHO AV MEAN VELOCITY: 1.3 M/S
ECHO AV PEAK GRADIENT: 14 MMHG
ECHO AV PEAK VELOCITY: 1.9 M/S
ECHO AV VELOCITY RATIO: 0.53
ECHO AV VTI: 42.9 CM
ECHO BSA: 2.06 M2
ECHO EST RA PRESSURE: 3 MMHG
ECHO IVC EXP: 1.9 CM
ECHO IVC INSP: 0.9 CM
ECHO LA AREA 2C: 26.1 CM2
ECHO LA AREA 4C: 24.6 CM2
ECHO LA DIAMETER INDEX: 2.86 CM/M2
ECHO LA DIAMETER: 5.6 CM
ECHO LA MAJOR AXIS: 6 CM
ECHO LA MINOR AXIS: 6.1 CM
ECHO LA TO AORTIC ROOT RATIO: 1.81
ECHO LA VOL BP: 84 ML (ref 22–52)
ECHO LA VOL MOD A2C: 91 ML (ref 22–52)
ECHO LA VOL MOD A4C: 79 ML (ref 22–52)
ECHO LA VOL/BSA BIPLANE: 43 ML/M2 (ref 16–34)
ECHO LA VOLUME INDEX MOD A2C: 46 ML/M2 (ref 16–34)
ECHO LA VOLUME INDEX MOD A4C: 40 ML/M2 (ref 16–34)
ECHO LV E' LATERAL VELOCITY: 5 CM/S
ECHO LV E' SEPTAL VELOCITY: 6 CM/S
ECHO LV FRACTIONAL SHORTENING: 39 % (ref 28–44)
ECHO LV INTERNAL DIMENSION DIASTOLE INDEX: 2.09 CM/M2
ECHO LV INTERNAL DIMENSION DIASTOLIC: 4.1 CM (ref 3.9–5.3)
ECHO LV INTERNAL DIMENSION SYSTOLIC INDEX: 1.28 CM/M2
ECHO LV INTERNAL DIMENSION SYSTOLIC: 2.5 CM
ECHO LV IVSD: 1.1 CM (ref 0.6–0.9)
ECHO LV MASS 2D: 151.3 G (ref 67–162)
ECHO LV MASS INDEX 2D: 77.2 G/M2 (ref 43–95)
ECHO LV POSTERIOR WALL DIASTOLIC: 1.1 CM (ref 0.6–0.9)
ECHO LV RELATIVE WALL THICKNESS RATIO: 0.54
ECHO LVOT AREA: 3.5 CM2
ECHO LVOT AV VTI INDEX: 0.72
ECHO LVOT DIAM: 2.1 CM
ECHO LVOT MEAN GRADIENT: 2 MMHG
ECHO LVOT PEAK GRADIENT: 4 MMHG
ECHO LVOT PEAK VELOCITY: 1 M/S
ECHO LVOT STROKE VOLUME INDEX: 54.2 ML/M2
ECHO LVOT SV: 106.3 ML
ECHO LVOT VTI: 30.7 CM
ECHO MV A VELOCITY: 0.95 M/S
ECHO MV E DECELERATION TIME (DT): 296 MS
ECHO MV E VELOCITY: 1.11 M/S
ECHO MV E/A RATIO: 1.17
ECHO MV E/E' LATERAL: 22.2
ECHO MV E/E' RATIO (AVERAGED): 20.35
ECHO MV E/E' SEPTAL: 18.5
ECHO RIGHT VENTRICULAR SYSTOLIC PRESSURE (RVSP): 43 MMHG
ECHO RV BASAL DIMENSION: 4.5 CM
ECHO RV FREE WALL PEAK S': 18 CM/S
ECHO TV REGURGITANT MAX VELOCITY: 3.18 M/S
ECHO TV REGURGITANT PEAK GRADIENT: 40 MMHG
EOSINOPHIL # BLD: 0.2 K/UL (ref 0–0.4)
EOSINOPHILS RELATIVE PERCENT: 5 % (ref 1–4)
ERYTHROCYTE [DISTWIDTH] IN BLOOD BY AUTOMATED COUNT: 18.1 % (ref 12.5–15.4)
GFR, ESTIMATED: >90 ML/MIN/1.73M2
GLUCOSE SERPL-MCNC: 72 MG/DL (ref 70–99)
HCT VFR BLD AUTO: 22.3 % (ref 36–46)
HCT VFR BLD AUTO: 24.8 % (ref 36–46)
HGB BLD-MCNC: 7.3 G/DL (ref 12–16)
HGB BLD-MCNC: 7.9 G/DL (ref 12–16)
LYMPHOCYTES NFR BLD: 1.1 K/UL (ref 1–4.8)
LYMPHOCYTES RELATIVE PERCENT: 21 % (ref 24–44)
MAGNESIUM SERPL-MCNC: 2.1 MG/DL (ref 1.6–2.6)
MCH RBC QN AUTO: 27.6 PG (ref 26–34)
MCHC RBC AUTO-ENTMCNC: 32.6 G/DL (ref 31–37)
MCV RBC AUTO: 84.7 FL (ref 80–100)
MONOCYTES NFR BLD: 0.4 K/UL (ref 0.1–1.2)
MONOCYTES NFR BLD: 7 % (ref 2–11)
NEUTROPHILS NFR BLD: 66 % (ref 36–66)
NEUTS SEG NFR BLD: 3.5 K/UL (ref 1.8–7.7)
PLATELET # BLD AUTO: 371 K/UL (ref 140–450)
PMV BLD AUTO: 7.4 FL (ref 6–12)
POTASSIUM SERPL-SCNC: 3.3 MMOL/L (ref 3.7–5.3)
RBC # BLD AUTO: 2.64 M/UL (ref 4–5.2)
SODIUM SERPL-SCNC: 139 MMOL/L (ref 135–144)
WBC OTHER # BLD: 5.2 K/UL (ref 3.5–11)

## 2024-05-31 PROCEDURE — 83735 ASSAY OF MAGNESIUM: CPT

## 2024-05-31 PROCEDURE — 97116 GAIT TRAINING THERAPY: CPT

## 2024-05-31 PROCEDURE — 36415 COLL VENOUS BLD VENIPUNCTURE: CPT

## 2024-05-31 PROCEDURE — 99232 SBSQ HOSP IP/OBS MODERATE 35: CPT | Performed by: INTERNAL MEDICINE

## 2024-05-31 PROCEDURE — 2580000003 HC RX 258: Performed by: NURSE PRACTITIONER

## 2024-05-31 PROCEDURE — 6360000002 HC RX W HCPCS: Performed by: NURSE PRACTITIONER

## 2024-05-31 PROCEDURE — 6370000000 HC RX 637 (ALT 250 FOR IP): Performed by: NURSE PRACTITIONER

## 2024-05-31 PROCEDURE — 85014 HEMATOCRIT: CPT

## 2024-05-31 PROCEDURE — 0H9KXZZ DRAINAGE OF RIGHT LOWER LEG SKIN, EXTERNAL APPROACH: ICD-10-PCS | Performed by: SURGERY

## 2024-05-31 PROCEDURE — 99232 SBSQ HOSP IP/OBS MODERATE 35: CPT | Performed by: STUDENT IN AN ORGANIZED HEALTH CARE EDUCATION/TRAINING PROGRAM

## 2024-05-31 PROCEDURE — 6360000002 HC RX W HCPCS: Performed by: STUDENT IN AN ORGANIZED HEALTH CARE EDUCATION/TRAINING PROGRAM

## 2024-05-31 PROCEDURE — 85025 COMPLETE CBC W/AUTO DIFF WBC: CPT

## 2024-05-31 PROCEDURE — 97535 SELF CARE MNGMENT TRAINING: CPT

## 2024-05-31 PROCEDURE — 93306 TTE W/DOPPLER COMPLETE: CPT

## 2024-05-31 PROCEDURE — 6370000000 HC RX 637 (ALT 250 FOR IP): Performed by: STUDENT IN AN ORGANIZED HEALTH CARE EDUCATION/TRAINING PROGRAM

## 2024-05-31 PROCEDURE — 93306 TTE W/DOPPLER COMPLETE: CPT | Performed by: INTERNAL MEDICINE

## 2024-05-31 PROCEDURE — 51798 US URINE CAPACITY MEASURE: CPT

## 2024-05-31 PROCEDURE — 85018 HEMOGLOBIN: CPT

## 2024-05-31 PROCEDURE — 99232 SBSQ HOSP IP/OBS MODERATE 35: CPT | Performed by: PSYCHIATRY & NEUROLOGY

## 2024-05-31 PROCEDURE — 80048 BASIC METABOLIC PNL TOTAL CA: CPT

## 2024-05-31 PROCEDURE — 97110 THERAPEUTIC EXERCISES: CPT

## 2024-05-31 PROCEDURE — 2060000000 HC ICU INTERMEDIATE R&B

## 2024-05-31 PROCEDURE — 99213 OFFICE O/P EST LOW 20 MIN: CPT

## 2024-05-31 PROCEDURE — 99233 SBSQ HOSP IP/OBS HIGH 50: CPT | Performed by: NURSE PRACTITIONER

## 2024-05-31 RX ORDER — POTASSIUM CHLORIDE 7.45 MG/ML
10 INJECTION INTRAVENOUS
Status: COMPLETED | OUTPATIENT
Start: 2024-05-31 | End: 2024-05-31

## 2024-05-31 RX ORDER — METOPROLOL TARTRATE 50 MG/1
50 TABLET, FILM COATED ORAL 2 TIMES DAILY
Status: DISCONTINUED | OUTPATIENT
Start: 2024-05-31 | End: 2024-06-02

## 2024-05-31 RX ORDER — ERGOCALCIFEROL 1.25 MG/1
50000 CAPSULE ORAL WEEKLY
Status: DISCONTINUED | OUTPATIENT
Start: 2024-05-31 | End: 2024-06-07 | Stop reason: HOSPADM

## 2024-05-31 RX ORDER — FOLIC ACID 1 MG/1
1 TABLET ORAL DAILY
Status: DISCONTINUED | OUTPATIENT
Start: 2024-05-31 | End: 2024-06-07 | Stop reason: HOSPADM

## 2024-05-31 RX ORDER — POTASSIUM CHLORIDE 20 MEQ/1
40 TABLET, EXTENDED RELEASE ORAL ONCE
Status: COMPLETED | OUTPATIENT
Start: 2024-05-31 | End: 2024-05-31

## 2024-05-31 RX ADMIN — POTASSIUM CHLORIDE 40 MEQ: 1500 TABLET, EXTENDED RELEASE ORAL at 09:07

## 2024-05-31 RX ADMIN — METOPROLOL TARTRATE 75 MG: 25 TABLET, FILM COATED ORAL at 09:07

## 2024-05-31 RX ADMIN — SUCRALFATE 1 G: 1 TABLET ORAL at 09:07

## 2024-05-31 RX ADMIN — SUCRALFATE 1 G: 1 TABLET ORAL at 21:04

## 2024-05-31 RX ADMIN — PANTOPRAZOLE SODIUM 40 MG: 40 TABLET, DELAYED RELEASE ORAL at 17:27

## 2024-05-31 RX ADMIN — Medication 10 MEQ: at 10:09

## 2024-05-31 RX ADMIN — ESCITALOPRAM OXALATE 20 MG: 10 TABLET ORAL at 09:06

## 2024-05-31 RX ADMIN — SODIUM CHLORIDE, PRESERVATIVE FREE 10 ML: 5 INJECTION INTRAVENOUS at 08:55

## 2024-05-31 RX ADMIN — FOLIC ACID 1 MG: 1 TABLET ORAL at 09:06

## 2024-05-31 RX ADMIN — ACETAMINOPHEN 650 MG: 325 TABLET ORAL at 15:06

## 2024-05-31 RX ADMIN — TROSPIUM CHLORIDE 20 MG: 20 TABLET, FILM COATED ORAL at 17:27

## 2024-05-31 RX ADMIN — PANTOPRAZOLE SODIUM 40 MG: 40 TABLET, DELAYED RELEASE ORAL at 09:06

## 2024-05-31 RX ADMIN — ACETAMINOPHEN 650 MG: 325 TABLET ORAL at 09:06

## 2024-05-31 RX ADMIN — Medication 10 MEQ: at 08:59

## 2024-05-31 RX ADMIN — SUCRALFATE 1 G: 1 TABLET ORAL at 17:27

## 2024-05-31 RX ADMIN — SODIUM CHLORIDE, PRESERVATIVE FREE 10 ML: 5 INJECTION INTRAVENOUS at 21:09

## 2024-05-31 RX ADMIN — ACETAMINOPHEN 650 MG: 325 TABLET ORAL at 20:56

## 2024-05-31 RX ADMIN — TROSPIUM CHLORIDE 20 MG: 20 TABLET, FILM COATED ORAL at 09:07

## 2024-05-31 RX ADMIN — ATORVASTATIN CALCIUM 40 MG: 40 TABLET, FILM COATED ORAL at 20:56

## 2024-05-31 RX ADMIN — ERGOCALCIFEROL 50000 UNITS: 1.25 CAPSULE ORAL at 09:07

## 2024-05-31 ASSESSMENT — PAIN SCALES - GENERAL
PAINLEVEL_OUTOF10: 10
PAINLEVEL_OUTOF10: 7
PAINLEVEL_OUTOF10: 7

## 2024-05-31 ASSESSMENT — PAIN DESCRIPTION - ORIENTATION
ORIENTATION: LEFT;RIGHT
ORIENTATION: RIGHT;LEFT
ORIENTATION: RIGHT;LEFT

## 2024-05-31 ASSESSMENT — PAIN DESCRIPTION - LOCATION
LOCATION: FOOT
LOCATION: LEG
LOCATION: LEG

## 2024-05-31 ASSESSMENT — PAIN DESCRIPTION - DESCRIPTORS
DESCRIPTORS: DISCOMFORT
DESCRIPTORS: DISCOMFORT
DESCRIPTORS: THROBBING

## 2024-05-31 ASSESSMENT — PAIN - FUNCTIONAL ASSESSMENT
PAIN_FUNCTIONAL_ASSESSMENT: PREVENTS OR INTERFERES SOME ACTIVE ACTIVITIES AND ADLS
PAIN_FUNCTIONAL_ASSESSMENT: ACTIVITIES ARE NOT PREVENTED

## 2024-05-31 NOTE — PROGRESS NOTES
Occupational Therapy  Facility/Department: [address]  Occupational Therapy Daily Treatment Note     Name: Elaina Champagne  : 1950  MRN: [id number]  Date of Service: 2024    Discharge Recommendations:  Patient would benefit from continued therapy after discharge        Patient Diagnosis(es): The primary encounter diagnosis was Bilateral lower leg cellulitis. A diagnosis of Elevated troponin was also pertinent to this visit.  Past Medical History:  has a past medical history of Bell's palsy, Cellulitis, Hypertension, and NSTEMI (non-ST elevated myocardial infarction) (HCC).  Past Surgical History:  has a past surgical history that includes Hysterectomy, total abdominal (); Cataract removal (Bilateral, ); incision and drainage (Left, 2017); Total knee arthroplasty (Left, 2017); Knee Arthroplasty (Right, 2018); ventral hernia repair (2019); IR NONTUNNELED VASCULAR CATHETER > 5 YEARS (12/15/2023); Upper gastrointestinal endoscopy (N/A, 2023); Cardiac procedure (N/A, 2024); Cardiac procedure (N/A, 2024); Upper gastrointestinal endoscopy (N/A, 2024); Coronary angioplasty with stent (2024); and Cardiac procedure (N/A, 2024).       Assessment   Performance deficits / Impairments: Decreased safe awareness;Decreased cognition;Decreased balance;Decreased ADL status;Decreased functional mobility ;Decreased strength;Decreased endurance;Decreased sensation  Assessment: Pt presents with decreased ADL status secondary to above noted deficits. Pt to benefit from continued therapy services while hospitalized to maximize pt's safety and independence in performing functional tasks. At this time, pt has not demonstrated the functional ability to safely return to prior living arrangements, continued skilled OT intervention recommended prior to an eventual return to home.  Prognosis: Good  Decision Making: Medium Complexity  REQUIRES OT FOLLOW-UP:

## 2024-05-31 NOTE — PROGRESS NOTES
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  Plan: Same as 1 RN attempted to give nightly medication however the patient is very drowsy and continues sleeping. Writer will attempt again at a later time.

## 2024-05-31 NOTE — PROGRESS NOTES
[facility]  Progress Note    Elaina Champagne  MRN: [id number]  Date: 5/31/2024  LOS: 3     Reason for F/U :   Cellulitis    Impression :   Altered mental status/encephalopathy-improved  CT imaging of the brain that did not show any acute disease process   Venous stasis dermatitis with no evidence of acute infection  Left posterior calf ulcerations-clean with no signs of soft tissue infection  Right posterior calf small open wound with a draining hematoma   CT imaging of the right lower extremity c/w a hematoma  Coronary artery disease   status post PCI in January 2024  Paroxysmal atrial fibrillation was on anticoagulation.  Morbid obesity.  Previous CVA    Recommendations:   Clinically the patient continues to do well off systemic antibiotics   The care was discussed with Dr. Lemus and general surgery has been consulted about the hematoma  Infectious disease wise the patient remains stable  We will continue to follow and await general surgery plans    Infection Control Recommendations:   Universal precautions    Discharge Planning:   Patient will need Midline Catheter Insertion/ PICC line Insertion: No  Patient will need: Home IV , Infusion Center,  SNF,  LTAC: Undetermined  Patient willneed outpatient wound care: No    Medical Decision making / Summary of Stay:   Elaina Champagne is a 73 y.o.-year-old female who was initially admitted on 5/27/2024.   Elaina is seen and evaluated at bedside and has coronary artery disease status post PCI in January 2024, hypertension, history of non-STEMI, morbid obesity, previous CVA, atrial fibrillation on anticoagulation, venous stasis dermatitis, chronic leg wound who has a history of fall and leg pain.     I did attempt to see the patient yesterday but she was very agitated and animated did not want me to examine her at all.     The patient is still agitated though much better today and is a poor historian and is not really able to get much from her but on    Culture, Urine [[id number]]    Order Status: No result Specimen: Urine, clean catch    Wound Gram stain [[id number]]    Order Status: No result Specimen: No Site Given from Wound    Culture, Wound [[id number]]    Order Status: No result Specimen: No Site Given from Skin      Medications:      vitamin D  50,000 Units Oral Weekly    potassium chloride  40 mEq Oral Once    folic acid  1 mg Oral Daily    potassium chloride  10 mEq IntraVENous Q1H    enoxaparin  1 mg/kg SubCUTAneous BID    metoprolol tartrate  75 mg Oral BID    sodium chloride  80 mL IntraVENous Once    QUEtiapine  50 mg Oral Nightly    aspirin  81 mg Oral Daily    atorvastatin  40 mg Oral Nightly    clopidogrel  75 mg Oral Daily    escitalopram  20 mg Oral Daily    [Held by provider] furosemide  40 mg Oral Daily    pantoprazole  40 mg Oral BID AC    trospium  20 mg Oral BID AC    sucralfate  1 g Oral 4x Daily AC & HS    sodium chloride flush  5-40 mL IntraVENous 2 times per day       Electronically signed by Laurent Gallego MD on 5/31/2024 at 8:34 AM      [facility]  Laurent Gallego MD  [employer] messaging  OFFICE: [phone]    Thank you for allowing us to participate in the care of this patient. Please call with questions.    This note is created with the assistance of a speech recognition program.  While intending to generate a document that actually reflects the content of the visit, the document can still have some errors including those of syntax and sound a like substitutions which may escape proof reading.  In such instances, actual meaning can be extrapolated by contextual diversion.

## 2024-05-31 NOTE — PLAN OF CARE
Problem: Discharge Planning  Goal: Discharge to home or other facility with appropriate resources  5/31/2024 0651 by Margo Mesa RN  Outcome: Progressing  5/30/2024 1838 by Ko Charles RN  Outcome: Progressing  Flowsheets  Taken 5/30/2024 1700  Discharge to home or other facility with appropriate resources:   Identify barriers to discharge with patient and caregiver   Arrange for needed discharge resources and transportation as appropriate   Identify discharge learning needs (meds, wound care, etc)  Taken 5/30/2024 1300  Discharge to home or other facility with appropriate resources:   Identify barriers to discharge with patient and caregiver   Arrange for needed discharge resources and transportation as appropriate   Identify discharge learning needs (meds, wound care, etc)  Taken 5/30/2024 0830  Discharge to home or other facility with appropriate resources:   Identify barriers to discharge with patient and caregiver   Arrange for needed discharge resources and transportation as appropriate   Identify discharge learning needs (meds, wound care, etc)     Problem: Pain  Goal: Verbalizes/displays adequate comfort level or baseline comfort level  5/31/2024 0651 by Margo Mesa RN  Outcome: Progressing  5/30/2024 1838 by Ko Charles RN  Outcome: Progressing  Flowsheets (Taken 5/30/2024 1125)  Verbalizes/displays adequate comfort level or baseline comfort level:   Encourage patient to monitor pain and request assistance   Assess pain using appropriate pain scale   Administer analgesics based on type and severity of pain and evaluate response   Implement non-pharmacological measures as appropriate and evaluate response   Consider cultural and social influences on pain and pain management   Notify Licensed Independent Practitioner if interventions unsuccessful or patient reports new pain     Problem: Skin/Tissue Integrity  Goal: Absence of new skin breakdown  Description: 1.  Monitor for areas of redness and/or

## 2024-05-31 NOTE — PLAN OF CARE
Problem: Discharge Planning  Goal: Discharge to home or other facility with appropriate resources  5/31/2024 1607 by SURJIT HUMPHREY  Outcome: Progressing  Flowsheets (Taken 5/31/2024 0830)  Discharge to home or other facility with appropriate resources:   Identify barriers to discharge with patient and caregiver   Arrange for needed discharge resources and transportation as appropriate   Identify discharge learning needs (meds, wound care, etc)   Refer to discharge planning if patient needs post-hospital services based on physician order or complex needs related to functional status, cognitive ability or social support system     Problem: Pain  Goal: Verbalizes/displays adequate comfort level or baseline comfort level  5/31/2024 1607 by SURJIT HUMPHREY  Outcome: Progressing  Flowsheets  Taken 5/31/2024 1201  Verbalizes/displays adequate comfort level or baseline comfort level:   Encourage patient to monitor pain and request assistance   Assess pain using appropriate pain scale   Administer analgesics based on type and severity of pain and evaluate response   Implement non-pharmacological measures as appropriate and evaluate response   Notify Licensed Independent Practitioner if interventions unsuccessful or patient reports new pain  Taken 5/31/2024 0755  Verbalizes/displays adequate comfort level or baseline comfort level:   Encourage patient to monitor pain and request assistance   Assess pain using appropriate pain scale   Administer analgesics based on type and severity of pain and evaluate response   Implement non-pharmacological measures as appropriate and evaluate response     Problem: Skin/Tissue Integrity  Goal: Absence of new skin breakdown  Description: 1.  Monitor for areas of redness and/or skin breakdown  2.  Assess vascular access sites hourly  3.  Every 4-6 hours minimum:  Change oxygen saturation probe site  4.  Every 4-6 hours:  If on nasal continuous positive airway pressure, respiratory therapy  Restraint  Goal: Remains free of injury from restraints (Restraint for Interference with Medical Device)  Description: INTERVENTIONS:  1. Determine that other, less restrictive measures have been tried or would not be effective before applying the restraint  2. Evaluate the patient's condition at the time of restraint application  3. Inform patient/family regarding the reason for restraint  4. Q2H: Monitor safety, psychosocial status, comfort, nutrition and hydration  5/31/2024 1607 by SURJIT HUMPHREY  Outcome: Progressing

## 2024-05-31 NOTE — PROGRESS NOTES
Physical Therapy  Facility/Department: [address]  Physical Therapy Daily Progress Note    Name: Elaina Champagne  : 1950  MRN: [id number]  Date of Service: 2024    Discharge Recommendations:  Patient would benefit from continued therapy after discharge   PT Equipment Recommendations  Other: TBD      Patient Diagnosis(es): The primary encounter diagnosis was Bilateral lower leg cellulitis. A diagnosis of Elevated troponin was also pertinent to this visit.  Past Medical History:  has a past medical history of Bell's palsy, Cellulitis, Hypertension, and NSTEMI (non-ST elevated myocardial infarction) (HCC).  Past Surgical History:  has a past surgical history that includes Hysterectomy, total abdominal (); Cataract removal (Bilateral, ); incision and drainage (Left, 2017); Total knee arthroplasty (Left, 2017); Knee Arthroplasty (Right, 2018); ventral hernia repair (2019); IR NONTUNNELED VASCULAR CATHETER > 5 YEARS (12/15/2023); Upper gastrointestinal endoscopy (N/A, 2023); Cardiac procedure (N/A, 2024); Cardiac procedure (N/A, 2024); Upper gastrointestinal endoscopy (N/A, 2024); Coronary angioplasty with stent (2024); and Cardiac procedure (N/A, 2024).    Assessment   Body Structures, Functions, Activity Limitations Requiring Skilled Therapeutic Intervention: Decreased functional mobility ;Decreased ROM;Decreased safe awareness;Decreased cognition;Decreased strength;Increased pain  Assessment: Pt presents with bilateral LE cellulitis admitted on . Lives with  but has been residing at [facility] for rehab. Pt receives help with ADLs, homemaking responsibilities, and cooking. She was independent in bed mobility, transfers, and gait with a RW. Pt with improved mobility today with Mod assist x 2 bed mobility and Min-Mod assist x 2 transfers with RW. Pt did not ambulate this date but did take several to pivot to the chair.

## 2024-05-31 NOTE — PROGRESS NOTES
Patient slept throughout the night comfortably. Patient is still drowsy this morning but arousable. Patient stated to RN she is still tired and to let her sleep.

## 2024-05-31 NOTE — PROGRESS NOTES
[facility]   Progress Note                   Date:   5/31/2024  Patient name: Elaina Champagne  Date of admission:  5/27/2024  9:43 PM  MRN:   [id number]  YOB: 1950  PCP: Robin Ly MD    Reason for Admission: Cellulitis [L03.90]  Bilateral lower leg cellulitis [L03.116, L03.115]    Subjective:       Clinical Changes / Abnormalities: Pt seen and examined in the room. Patient is drowsy and is in bed. Labs, vitals and tele reviewed- SR. 67    Medications:   Scheduled Meds:   enoxaparin  1 mg/kg SubCUTAneous BID    metoprolol tartrate  75 mg Oral BID    sodium chloride  80 mL IntraVENous Once    QUEtiapine  50 mg Oral Nightly    aspirin  81 mg Oral Daily    atorvastatin  40 mg Oral Nightly    clopidogrel  75 mg Oral Daily    escitalopram  20 mg Oral Daily    [Held by provider] furosemide  40 mg Oral Daily    pantoprazole  40 mg Oral BID AC    trospium  20 mg Oral BID AC    sucralfate  1 g Oral 4x Daily AC & HS    sodium chloride flush  5-40 mL IntraVENous 2 times per day     Continuous Infusions:   sodium chloride 5 mL/hr at 05/29/24 1326     CBC:   Recent Labs     05/29/24  1055 05/30/24  0723 05/30/24  2117 05/31/24  0502   WBC 6.3 6.2  --  5.2   HGB 8.4* 8.0* 7.2* 7.3*   * 351  --  371     BMP:    Recent Labs     05/29/24  1055 05/30/24  0723 05/31/24  0502    141 139   K 3.9 4.5 3.3*    105 104   CO2 26 22 25   BUN 12 13 12   CREATININE 0.8 0.8 0.6   GLUCOSE 77 93 72     Hepatic: No results for input(s): \"AST\", \"ALT\", \"BILITOT\", \"ALKPHOS\" in the last 72 hours.    Invalid input(s): \"ALB\"  Troponin:   Recent Labs     05/30/24  0104 05/30/24  0723 05/30/24  1525   TROPHS 256* 272* 245*     BNP: No results for input(s): \"BNP\" in the last 72 hours.  Lipids: No results for input(s): \"CHOL\", \"HDL\" in the last 72 hours.    Invalid input(s): \"LDLCALCU\"  INR: No results for input(s): \"INR\" in the last 72 hours.    Objective:   Vitals: BP (!) 122/54   Pulse 65    Temp 98.4 °F (36.9 °C) (Axillary)   Resp 17   Ht 1.549 m (5' 1\")   Wt 99 kg (218 lb 4.1 oz)   SpO2 93%   BMI 41.24 kg/m²     General appearance: alert and cooperative with exam  HEENT: Head: Normocephalic, no lesions, without obvious abnormality.  Neck: no JVD, trachea midline, no adenopathy  Lungs: Clear to auscultation  Heart: Regular rate and rhythm, s1/s2 auscultated, no murmurs  Abdomen: soft, non-tender, bowel sounds active  Extremities: trace edema. Bilateral lower extremities wrapped.  Neurologic: not done      ECHO: 12/2023     Technically difficult study  The left ventricle appears normal in size, severely increased LV wall thickness, small LV cavity  Hyperdynamic wall motions, ejection fraction more than 65%  The right ventricle appears normal in size and function  The left atrium appears mildly dilated  The aortic valve appears sclerotic no stenosis no regurgitation  Mitral valve structure appears normal no mitral stenosis, no obvious regurgitation  Tricuspid valve structure appears normal trace regurgitation no stenosis  Pulmonary valve not well-visualized  Normal aortic root dimensions  The IVC is dilated, impaired respiratory variation indicating elevated right atrial filling pressure  No significant pericardial effusion seen     Cardiac Cath 1/2/24  Diagnostic  Dominance: Right  Left Main   The vessel is angiographically normal.      Left Anterior Descending   Has proximal 75% stenosis followed by tadem mid 85% and 95% stenoses. The D1 has ostial 80% stenosis.      Left Circumflex   Has mid 80% stenosis. The OM1 has proximal 70% stenosis.      Right Coronary Artery   Has anterior take off with mid eccentric 80% stenosis.      Intervention      No interventions have been documented.   CTS consultation     Cath/PCI (not surgical candidate)  1/4/24  Findings:        LAD: 90% long proximal and mid stenosis at first diagonal bifurcation, length is 34 mm, RAVINDRA-3 flow, underwent PCI with RAJAN using

## 2024-05-31 NOTE — PROGRESS NOTES
[facility] Neurology   IN-PATIENT SERVICE      NEUROLOGY PROGRESS  NOTE            Date:   5/31/2024  Patient name:  Elaina Champagne  Date of admission:  5/27/2024  YOB: 1950      Interval History:     More drowsy today. Reportedly slept overnight and much of the day today.  She does easily arouse, answer questions appropriately.  States she does not feel like waking up.  Continues to complain of bilateral lower extremity pain.  CT of the lower extremity shows possible hematoma, surgical team has been consulted.    History of Present Illness:     The patient is a 73 y.o. female who presents with Leg Pain (Bilat lower leg pain with recent diagnosis of cellulitis)  . The patient was seen and examined and the chart was reviewed.  She was in the hospital approximately 3 weeks ago for findings of upper GI bleed.  She ended up having EGD done showing evidence of ulcer as well as hiatal hernia.  Hemoglobin was down in the 6 range which eventually came up into the 8 range and she was discharged.  She is back to the hospital on 5/28 with bilateral lower extremity pain as well as inflammation.  There was concern for cellulitis and she was started on vancomycin and cefepime and admitted for further workup.  Patient had altered mentation with some fluctuating confusion as well as agitation and aggressiveness.  She has required Zyprexa, Haldol, Seroquel as far as antipsychotics.  Psychiatry has been consulted starting patient on Seroquel 50 mg nightly.  Norco has been held which reportedly was making her symptoms worse.  Hemoglobin has been stable at 8.9.  CRP is 66.  There is a mild acute kidney injury.  Infectious disease on board does not believe there is lower extremity cellulitis, possibly more vascular etiology.  CT of the lower extremities has been ordered although currently pending.  Antibiotics have been discontinued.  Overnight patient went into A-fib with RVR and has been started on Lovenox.  Cardiology  microvascular  ischemic change.  There are old lacune infarcts involving the left basal  ganglia.    ORBITS: The visualized portion of the orbits demonstrate no acute abnormality.    SINUSES: The visualized paranasal sinuses and mastoid air cells demonstrate  no acute abnormality.    SOFT TISSUES/SKULL: No acute abnormality of the visualized skull or soft  tissues.    Impression  No acute intracranial abnormality.        I personally reviewed all of the above medications, clinical laboratory, imaging and other diagnostic tests.         Impression:      Acute hospital-acquired delirium in the setting of MICHELLE, pain medication usage, sleep deprivation  Bilateral lower extremity pain  Recent upper GI bleed with blood loss anemia    Plan:     She is likely more drowsy today as some of the sedating medications from before are probably catching up to her.  I did stress importance to her of being up during the day so she can sleep adequately at night and avoid further confusion/delirium.  Avoid any benzodiazepines or pain medications if patient can tolerate.  Treatment of metabolic derangements as per primary team  Continue to hold off of MRI brain  Frequent reorientation, blinds open during the day, limit interruptions at night  Vit D very low, recommend supplementation.   Will follow  Discussed w Dr. Burden     Electronically signed by Gerber Rodas DO on 5/31/2024 at 1:26 PM      Gerber Rodas DO  [facility] [place]  Neurology

## 2024-05-31 NOTE — PROGRESS NOTES
Pt resting with eyes closed. Pt is arousable. Pt oriented to self and refuses to answer any other orientation questions. Pt denies pain and states she just wants to sleep.

## 2024-05-31 NOTE — CARE COORDINATION
Called [facility] to see if the precert has been started and if it is still a possibility for the patient to transfer from the [facility] to the [facility]

## 2024-05-31 NOTE — PROGRESS NOTES
[facility]  Consult Note       NAME:  Elaina Champagne  MEDICAL RECORD NUMBER:  [id number]  AGE: 73 y.o.   GENDER: female  : 1950  TODAY'S DATE:  2024    Subjective:      Elaina Champagne is a 73 y.o. female with inpatient referral to [facility] for:  \"Lower extremty wounds\"      Wound Identification:  Wound Type: venous and pressure  Contributing Factors: edema, venous stasis, chronic pressure, and decreased mobility    Wound History: patient with wounds to BLE, posterior right thigh, and buttocks. She reports that she has previously followed at [facility] and stopped going because she was in a SNF where she was getting wound care. Reports that she has help at home with home health and her spouse.  Current Wound Care Treatment:  oil gauze, dry gauze, ACE wraps in place at time of [facility] nurse assessment    Patient Goal of Care:  [x] Wound Healing  [] Odor Control  [] Palliative Care  [] Pain Control   [] Other:         PAST MEDICAL HISTORY        Diagnosis Date    Bell's palsy     Cellulitis     Hypertension     NSTEMI (non-ST elevated myocardial infarction) (HCC) 2024       PAST SURGICAL HISTORY    Past Surgical History:   Procedure Laterality Date    CARDIAC PROCEDURE N/A 2024    Coronary angiography performed by Regis Aponte MD at [facility] CARDIAC CATH LAB    CARDIAC PROCEDURE N/A 2024    frantz / Percutaneous coronary intervention / rm 504 performed by Maria Teresa Rodriguez MD at [facility] CARDIAC CATH LAB    CARDIAC PROCEDURE N/A 2024    frantz / Percutaneous coronary intervention / op scmh performed by Maria Teresa Rodriguez MD at [facility] CARDIAC CATH LAB    CATARACT REMOVAL Bilateral 2015    CORONARY ANGIOPLASTY WITH STENT PLACEMENT  2024    HYSTERECTOMY, TOTAL ABDOMINAL (CERVIX REMOVED)      INCISION AND DRAINAGE Left 2017    LEG INCISION AND DRAINAGE ABSCESS [facility]     [facility]  Small (< 25%) 05/31/24 1428   Drainage Description Serosanguinous 05/31/24 1428   Odor None 05/31/24 1428   Sis-wound Assessment Fragile;Warm 05/31/24 1428   Margins Defined edges 05/31/24 1428   Number of days: 29       Wound 05/02/24 Leg Left;Medial;Lower (Active)   Wound Image   05/07/24 1216   Wound Etiology Venous 05/07/24 1216   Dressing Status New dressing applied 05/31/24 1345   Wound Cleansed Cleansed with saline 05/31/24 1345   Dressing/Treatment Xeroform;Roll gauze;Ace wrap 05/31/24 1345   Wound Length (cm) 1.1 cm 05/07/24 1216   Wound Width (cm) 0.4 cm 05/07/24 1216   Wound Depth (cm) 0.1 cm 05/07/24 1216   Wound Surface Area (cm^2) 0.44 cm^2 05/07/24 1216   Change in Wound Size % (l*w) 69.23 05/07/24 1216   Wound Volume (cm^3) 0.044 cm^3 05/07/24 1216   Wound Healing % 69 05/07/24 1216   Wound Assessment Other (Comment) 05/31/24 0830   Drainage Amount Scant (moist but unmeasurable) 05/29/24 1831   Drainage Description Serosanguinous 05/29/24 1831   Odor None 05/30/24 0830   Sis-wound Assessment Dry/flaky 05/29/24 1831   Margins Defined edges 05/29/24 1831   Number of days: 29       Wound 05/07/24 Leg Right;Lateral;Lower (Active)   Wound Image   05/31/24 1428   Wound Etiology Venous 05/31/24 1428   Dressing Status New dressing applied 05/31/24 1428   Wound Cleansed Cleansed with saline 05/31/24 1428   Dressing/Treatment Petroleum gauze;ABD;Roll gauze;Ace wrap 05/31/24 1428   Dressing Change Due 06/02/24 05/31/24 1428   Wound Length (cm) 0.7 cm 05/07/24 1216   Wound Width (cm) 0.6 cm 05/07/24 1216   Wound Depth (cm) 0.1 cm 05/07/24 1216   Wound Surface Area (cm^2) 0.42 cm^2 05/07/24 1216   Wound Volume (cm^3) 0.042 cm^3 05/07/24 1216   Wound Assessment Pink/red 05/31/24 1428   Drainage Amount Small (< 25%) 05/31/24 1428   Drainage Description Serosanguinous 05/31/24 1428   Odor None 05/31/24 1428   Sis-wound Assessment Edematous;Intact 05/31/24 1428   Margins Defined edges 05/31/24 1428   Number of

## 2024-05-31 NOTE — CONSULTS
similar in appearance to the previous study.     No evidence of osteomyelitis.     XR TIBIA FIBULA RIGHT (2 VIEWS)    Result Date: 5/27/2024  EXAMINATION: 2 XRAY VIEWS OF THE RIGHT TIBIA AND FIBULA 5/27/2024 11:02 pm COMPARISON: 12/14/2023 HISTORY: ORDERING SYSTEM PROVIDED HISTORY: ongoing cellulitis, worsening pain, r/o osteo TECHNOLOGIST PROVIDED HISTORY: ongoing cellulitis, worsening pain, r/o osteo Reason for Exam: Bilateral ongoing cellulitis; worsening pain; r/o osteo. FINDINGS: No lytic destructive lesions.  No periosteal reaction.  Total knee arthroplasty is in place.  Knee is incompletely evaluated.  Posteroinferior and plantar calcaneal spurs.  No gas in the soft tissues.  Soft tissue swelling is most prominent anteriorly.     No radiographic evidence for osteomyelitis         ASSESSMENT:  Active Hospital Problems    Diagnosis Date Noted    Atrial fibrillation with rapid ventricular response (HCC) [I48.91] 12/16/2023     Priority: High    Venous insufficiency of both lower extremities [I87.2] 05/27/2022     Priority: Medium    Elevated troponin [R79.89] 05/30/2024    Acute delirium [R41.0] 05/29/2024    Hematoma of leg, right, initial encounter [S80.11XA] 05/29/2024    Acute metabolic encephalopathy [G93.41] 05/28/2024    Hyponatremia [E87.1] 05/28/2024    Chronic kidney disease, stage 3 unspecified (HCC) [N18.30] 03/26/2024    Atherosclerotic heart disease of native coronary artery with unspecified angina pectoris [I25.119] 02/29/2024    Normocytic anemia [D64.9] 12/27/2023    Anticoagulated [Z79.01] 12/27/2023    Elevated C-reactive protein (CRP) [R79.82] 12/16/2023    Cellulitis [L03.90] 12/14/2023    MICHELLE (acute kidney injury) (HCC) [N17.9] 12/14/2023    PAF (paroxysmal atrial fibrillation) ([facility]) [I48.0] 01/25/2018    Depression with anxiety [F41.8] 11/17/2015    Hyperlipidemia [E78.5] 11/17/2015    Morbid obesity (HCC) [E66.01] 11/17/2015    Primary hypertension [I10] 11/17/2015       Cellulitis of

## 2024-05-31 NOTE — PROGRESS NOTES
[facility]  Office: [phone]  Nael Morgan DO, Ronny Quevedo DO, Pb Vásquez DO, Refugio Bradley DO, Sheldon Smith MD, Elsa Torres MD, Leandro Espinosa MD, Joann Hauser MD,  Dipak Montana MD, Rob Hernandez MD, Swathi Wells MD,  Gracia Chen DO, Pilar So MD, Kayden Walters MD, Orlando Morgan DO, Yamilex Luciano MD,  Mando Burden DO, Gali Galarza MD, Georgie Workman MD, Jennifer Hill MD, Gloria Sierra MD,  Rafael Rawls MD, Denilson Murphy MD, Kamran Flores MD, Yazan Hughes MD, Theodore Grossman MD, Jaxon Villar MD, Ricky Lopez DO, Mickey Cabral DO, Trev Alex MD,  Arvin Powers MD, Shirley Waterhouse, CNP,  Nidia Lester CNP, Aj Castro, CNP,  Erin Shaw, DNP, Ivis Senior, CNP, Georgiana Freeman, CNP, Diandra Ruffin, CNP, Marilia Parker, CNP, Maureen Matamoros, PA-C, Meeta Titus PA-C, Sandrita Cruz, CNP, Nan Berrios, CNP, Veronica Alonzo, CNP, Areli Ware, CNP, Guadalupe Jaimes, CNP, Radhika Gillespie, CNS, Adriane Jimenez, CNP, Sarah Shi CNP, Tracy Schwab, CNP         [facility]   IN-PATIENT SERVICE   [facility]    Progress Note    5/31/2024    8:04 AM    Name:   Elaina Champagne  MRN:     [id number]     Acct:      [id number]   Room:   321/321-01   Day:  3  Admit Date:  5/27/2024  9:43 PM    PCP:   Robin Ly MD  Code Status:  Full Code    Subjective:     C/C:   Chief Complaint   Patient presents with    Leg Pain     Bilat lower leg pain with recent diagnosis of cellulitis     Interval History Status: worsened.     Vitals reviewed, afebrile and hemodynamically stable. Saturating well on room air.   Labs reviewed, hypokalemia 3.3, CBC with hemoglobin 8.0 > 7.2 > 7.4. Troponin 51 > 256 > 272 > 242.  Folic acid less than 2.0, vitamin D 12.8.  Overnight patient slept comfortably but very drowsy.    On examination initially very drowsy this morning but reevaluated in the afternoon after blinds opened and

## 2024-05-31 NOTE — DISCHARGE INSTR - COC
05/31/24 1428   Wound Cleansed Cleansed with saline 05/31/24 1428   Dressing/Treatment Petroleum impregnated gauze;ABD;Roll gauze;Ace wrap 05/31/24 1428   Wound Length (cm) 6.8 cm 05/07/24 1216   Wound Width (cm) 4.7 cm 05/07/24 1216   Wound Depth (cm) 0.1 cm 05/07/24 1216   Wound Surface Area (cm^2) 31.96 cm^2 05/07/24 1216   Change in Wound Size % (l*w) 11.22 05/07/24 1216   Wound Volume (cm^3) 3.196 cm^3 05/07/24 1216   Wound Healing % 56 05/07/24 1216   Wound Assessment Pink/red;Slough 05/31/24 1428   Drainage Amount Small (< 25%) 05/31/24 1428   Drainage Description Serosanguinous 05/31/24 1428   Odor None 05/31/24 1428   Sis-wound Assessment Fragile;Warm 05/31/24 1428   Margins Defined edges 05/31/24 1428   Number of days: 29       Wound 05/02/24 Leg Left;Medial;Lower (Active)   Wound Image   05/07/24 1216   Wound Etiology Venous 05/07/24 1216   Dressing Status New dressing applied 05/31/24 1345   Wound Cleansed Cleansed with saline 05/31/24 1345   Dressing/Treatment Xeroform;Roll gauze;Ace wrap 05/31/24 1345   Wound Length (cm) 1.1 cm 05/07/24 1216   Wound Width (cm) 0.4 cm 05/07/24 1216   Wound Depth (cm) 0.1 cm 05/07/24 1216   Wound Surface Area (cm^2) 0.44 cm^2 05/07/24 1216   Change in Wound Size % (l*w) 69.23 05/07/24 1216   Wound Volume (cm^3) 0.044 cm^3 05/07/24 1216   Wound Healing % 69 05/07/24 1216   Wound Assessment Other (Comment) 05/31/24 0830   Drainage Amount Scant (moist but unmeasurable) 05/29/24 1831   Drainage Description Serosanguinous 05/29/24 1831   Odor None 05/30/24 0830   Sis-wound Assessment Dry/flaky 05/29/24 1831   Margins Defined edges 05/29/24 1831   Number of days: 29       Wound 05/07/24 Leg Right;Lateral;Lower (Active)   Wound Image   05/31/24 1428   Wound Etiology Venous 05/31/24 1428   Dressing Status New dressing applied 05/31/24 1428   Wound Cleansed Cleansed with saline 05/31/24 1428   Dressing/Treatment Petroleum gauze;ABD;Roll gauze;Ace wrap 05/31/24 1428   Dressing  Change Due 06/02/24 05/31/24 1428   Wound Length (cm) 0.7 cm 05/07/24 1216   Wound Width (cm) 0.6 cm 05/07/24 1216   Wound Depth (cm) 0.1 cm 05/07/24 1216   Wound Surface Area (cm^2) 0.42 cm^2 05/07/24 1216   Wound Volume (cm^3) 0.042 cm^3 05/07/24 1216   Wound Assessment Pink/red 05/31/24 1428   Drainage Amount Small (< 25%) 05/31/24 1428   Drainage Description Serosanguinous 05/31/24 1428   Odor None 05/31/24 1428   Sis-wound Assessment Edematous;Intact 05/31/24 1428   Margins Defined edges 05/31/24 1428   Number of days: 23       Wound 05/29/24 Thigh Left;Posterior (Active)   Wound Image   05/31/24 1428   Wound Etiology Skin Tear 05/31/24 1428   Dressing Status New dressing applied 05/31/24 1428   Wound Cleansed Cleansed with saline 05/31/24 1428   Dressing/Treatment Triad hydro/zinc oxide-based hydrophilic paste 05/31/24 1428   Dressing Change Due 05/31/24 05/31/24 1428   Wound Assessment Pink/red 05/31/24 1428   Drainage Amount Scant (moist but unmeasurable) 05/31/24 1428   Drainage Description Serosanguinous 05/31/24 1428   Odor None 05/31/24 1428   Sis-wound Assessment Fragile;Warm 05/31/24 1428   Margins Defined edges 05/31/24 1428   Number of days: 1     Wound care directions:    Buttocks, posterior right thigh - Cleanse with foaming soap and water, pat dry. Apply a thin layer of Triad to affected areas. Repeat twice daily and as needed.     BLE -  cleanse with saline, pat dry. Cover with oil emulsion dressing (Adaptic). Pad with ABD, secure with roll gauze. ACE wrap to both lower legs. Start with 4 inch wrap just above toes to ankle, then use 6 in wrap up to just below knees. Attempt to overlap layers 50/50. Rewrap daily. Ace wrap may come off at HS, back on in AM. Cnange every other day and PRN soiling       Elimination:  Continence:   Bowel: No  Bladder: Yes  Urinary Catheter: None   Colostomy/Ileostomy/Ileal Conduit: No       Date of Last BM: 6.4    Intake/Output Summary (Last 24 hours) at 5/31/2024

## 2024-06-01 LAB
ANION GAP SERPL CALCULATED.3IONS-SCNC: 5 MMOL/L (ref 9–17)
BASOPHILS # BLD: 0 K/UL (ref 0–0.2)
BASOPHILS NFR BLD: 1 % (ref 0–2)
BUN SERPL-MCNC: 11 MG/DL (ref 8–23)
CALCIUM SERPL-MCNC: 8.5 MG/DL (ref 8.6–10.4)
CHLORIDE SERPL-SCNC: 107 MMOL/L (ref 98–107)
CO2 SERPL-SCNC: 28 MMOL/L (ref 20–31)
CREAT SERPL-MCNC: 0.5 MG/DL (ref 0.5–0.9)
EOSINOPHIL # BLD: 0.3 K/UL (ref 0–0.4)
EOSINOPHILS RELATIVE PERCENT: 7 % (ref 1–4)
ERYTHROCYTE [DISTWIDTH] IN BLOOD BY AUTOMATED COUNT: 18.9 % (ref 12.5–15.4)
GFR, ESTIMATED: >90 ML/MIN/1.73M2
GLUCOSE SERPL-MCNC: 80 MG/DL (ref 70–99)
HCT VFR BLD AUTO: 23.5 % (ref 36–46)
HGB BLD-MCNC: 7.7 G/DL (ref 12–16)
LYMPHOCYTES NFR BLD: 0.6 K/UL (ref 1–4.8)
LYMPHOCYTES RELATIVE PERCENT: 17 % (ref 24–44)
MAGNESIUM SERPL-MCNC: 2.1 MG/DL (ref 1.6–2.6)
MCH RBC QN AUTO: 27.9 PG (ref 26–34)
MCHC RBC AUTO-ENTMCNC: 32.9 G/DL (ref 31–37)
MCV RBC AUTO: 84.9 FL (ref 80–100)
MONOCYTES NFR BLD: 0.3 K/UL (ref 0.1–1.2)
MONOCYTES NFR BLD: 9 % (ref 2–11)
NEUTROPHILS NFR BLD: 66 % (ref 36–66)
NEUTS SEG NFR BLD: 2.5 K/UL (ref 1.8–7.7)
PLATELET # BLD AUTO: 330 K/UL (ref 140–450)
PMV BLD AUTO: 7.4 FL (ref 6–12)
POTASSIUM SERPL-SCNC: 3.9 MMOL/L (ref 3.7–5.3)
RBC # BLD AUTO: 2.77 M/UL (ref 4–5.2)
SODIUM SERPL-SCNC: 140 MMOL/L (ref 135–144)
WBC OTHER # BLD: 3.7 K/UL (ref 3.5–11)

## 2024-06-01 PROCEDURE — 99232 SBSQ HOSP IP/OBS MODERATE 35: CPT | Performed by: STUDENT IN AN ORGANIZED HEALTH CARE EDUCATION/TRAINING PROGRAM

## 2024-06-01 PROCEDURE — 36415 COLL VENOUS BLD VENIPUNCTURE: CPT

## 2024-06-01 PROCEDURE — 97110 THERAPEUTIC EXERCISES: CPT

## 2024-06-01 PROCEDURE — 80048 BASIC METABOLIC PNL TOTAL CA: CPT

## 2024-06-01 PROCEDURE — 97116 GAIT TRAINING THERAPY: CPT

## 2024-06-01 PROCEDURE — 99232 SBSQ HOSP IP/OBS MODERATE 35: CPT | Performed by: INTERNAL MEDICINE

## 2024-06-01 PROCEDURE — 6370000000 HC RX 637 (ALT 250 FOR IP): Performed by: STUDENT IN AN ORGANIZED HEALTH CARE EDUCATION/TRAINING PROGRAM

## 2024-06-01 PROCEDURE — 85025 COMPLETE CBC W/AUTO DIFF WBC: CPT

## 2024-06-01 PROCEDURE — 2580000003 HC RX 258: Performed by: NURSE PRACTITIONER

## 2024-06-01 PROCEDURE — 83735 ASSAY OF MAGNESIUM: CPT

## 2024-06-01 PROCEDURE — 6370000000 HC RX 637 (ALT 250 FOR IP): Performed by: NURSE PRACTITIONER

## 2024-06-01 PROCEDURE — 2060000000 HC ICU INTERMEDIATE R&B

## 2024-06-01 PROCEDURE — 97535 SELF CARE MNGMENT TRAINING: CPT

## 2024-06-01 RX ORDER — FUROSEMIDE 20 MG/1
40 TABLET ORAL DAILY
Status: DISCONTINUED | OUTPATIENT
Start: 2024-06-01 | End: 2024-06-07 | Stop reason: HOSPADM

## 2024-06-01 RX ORDER — CLOPIDOGREL BISULFATE 75 MG/1
75 TABLET ORAL DAILY
Status: DISCONTINUED | OUTPATIENT
Start: 2024-06-01 | End: 2024-06-07 | Stop reason: HOSPADM

## 2024-06-01 RX ORDER — ASPIRIN 81 MG/1
81 TABLET ORAL DAILY
Status: DISCONTINUED | OUTPATIENT
Start: 2024-06-01 | End: 2024-06-07 | Stop reason: HOSPADM

## 2024-06-01 RX ADMIN — ATORVASTATIN CALCIUM 40 MG: 40 TABLET, FILM COATED ORAL at 20:11

## 2024-06-01 RX ADMIN — SUCRALFATE 1 G: 1 TABLET ORAL at 06:24

## 2024-06-01 RX ADMIN — TROSPIUM CHLORIDE 20 MG: 20 TABLET, FILM COATED ORAL at 16:18

## 2024-06-01 RX ADMIN — SUCRALFATE 1 G: 1 TABLET ORAL at 16:18

## 2024-06-01 RX ADMIN — METOPROLOL TARTRATE 50 MG: 50 TABLET, FILM COATED ORAL at 20:11

## 2024-06-01 RX ADMIN — SODIUM CHLORIDE, PRESERVATIVE FREE 10 ML: 5 INJECTION INTRAVENOUS at 20:11

## 2024-06-01 RX ADMIN — SUCRALFATE 1 G: 1 TABLET ORAL at 20:11

## 2024-06-01 RX ADMIN — FOLIC ACID 1 MG: 1 TABLET ORAL at 09:07

## 2024-06-01 RX ADMIN — PANTOPRAZOLE SODIUM 40 MG: 40 TABLET, DELAYED RELEASE ORAL at 16:18

## 2024-06-01 RX ADMIN — ESCITALOPRAM OXALATE 20 MG: 10 TABLET ORAL at 09:07

## 2024-06-01 RX ADMIN — TROSPIUM CHLORIDE 20 MG: 20 TABLET, FILM COATED ORAL at 06:24

## 2024-06-01 RX ADMIN — SUCRALFATE 1 G: 1 TABLET ORAL at 11:28

## 2024-06-01 RX ADMIN — ACETAMINOPHEN 650 MG: 325 TABLET ORAL at 16:18

## 2024-06-01 RX ADMIN — CLOPIDOGREL BISULFATE 75 MG: 75 TABLET ORAL at 11:28

## 2024-06-01 RX ADMIN — FUROSEMIDE 40 MG: 20 TABLET ORAL at 11:56

## 2024-06-01 RX ADMIN — PANTOPRAZOLE SODIUM 40 MG: 40 TABLET, DELAYED RELEASE ORAL at 06:24

## 2024-06-01 RX ADMIN — ASPIRIN 81 MG: 81 TABLET, COATED ORAL at 11:28

## 2024-06-01 RX ADMIN — SODIUM CHLORIDE, PRESERVATIVE FREE 10 ML: 5 INJECTION INTRAVENOUS at 09:07

## 2024-06-01 ASSESSMENT — PAIN DESCRIPTION - ORIENTATION: ORIENTATION: RIGHT;LEFT

## 2024-06-01 ASSESSMENT — PAIN SCALES - GENERAL: PAINLEVEL_OUTOF10: 5

## 2024-06-01 ASSESSMENT — PAIN DESCRIPTION - DESCRIPTORS: DESCRIPTORS: ACHING;SHARP

## 2024-06-01 ASSESSMENT — PAIN DESCRIPTION - LOCATION: LOCATION: LEG

## 2024-06-01 ASSESSMENT — PAIN - FUNCTIONAL ASSESSMENT: PAIN_FUNCTIONAL_ASSESSMENT: ACTIVITIES ARE NOT PREVENTED

## 2024-06-01 NOTE — PLAN OF CARE
Problem: Discharge Planning  Goal: Discharge to home or other facility with appropriate resources  Outcome: Progressing  Flowsheets (Taken 6/1/2024 0800)  Discharge to home or other facility with appropriate resources:   Identify barriers to discharge with patient and caregiver   Arrange for needed discharge resources and transportation as appropriate   Identify discharge learning needs (meds, wound care, etc)   Refer to discharge planning if patient needs post-hospital services based on physician order or complex needs related to functional status, cognitive ability or social support system     Problem: Pain  Goal: Verbalizes/displays adequate comfort level or baseline comfort level  Outcome: Progressing  Flowsheets  Taken 6/1/2024 1114  Verbalizes/displays adequate comfort level or baseline comfort level:   Encourage patient to monitor pain and request assistance   Assess pain using appropriate pain scale   Administer analgesics based on type and severity of pain and evaluate response   Implement non-pharmacological measures as appropriate and evaluate response   Notify Licensed Independent Practitioner if interventions unsuccessful or patient reports new pain  Taken 6/1/2024 0735  Verbalizes/displays adequate comfort level or baseline comfort level:   Encourage patient to monitor pain and request assistance   Administer analgesics based on type and severity of pain and evaluate response   Assess pain using appropriate pain scale   Implement non-pharmacological measures as appropriate and evaluate response   Notify Licensed Independent Practitioner if interventions unsuccessful or patient reports new pain     Problem: Skin/Tissue Integrity  Goal: Absence of new skin breakdown  Description: 1.  Monitor for areas of redness and/or skin breakdown  2.  Assess vascular access sites hourly  3.  Every 4-6 hours minimum:  Change oxygen saturation probe site  4.  Every 4-6 hours:  If on nasal continuous positive airway

## 2024-06-01 NOTE — PROGRESS NOTES
[facility]  Progress Note    Elaina Champagne  MRN: [id number]  Date: 6/1/2024  LOS: 4     Reason for F/U :   Cellulitis    Impression :   Altered mental status/encephalopathy-improved  CT imaging of the brain that did not show any acute disease process   Venous stasis dermatitis with no evidence of acute infection  Left posterior calf ulcerations-clean with no signs of soft tissue infection  Right posterior calf small open wound with a draining hematoma   CT imaging of the right lower extremity c/w a hematoma  Status post bedside drainage by the general surgery team with no purulence or active bleeding noted  Coronary artery disease   status post PCI in January 2024  Paroxysmal atrial fibrillation was on anticoagulation.  Morbid obesity.  Previous CVA    Recommendations:   Clinically the patient continues to do well off systemic antibiotics   The patient has a hematoma drained and there is no evidence of active infection  Infectious disease wise the patient remains stable and I will sign off    Infection Control Recommendations:   Universal precautions    Discharge Planning:   Patient will need Midline Catheter Insertion/ PICC line Insertion: No  Patient will need: Home IV , Infusion Center,  SNF,  LTAC: Undetermined  Patient willneed outpatient wound care: No    Medical Decision making / Summary of Stay:   Elaina Champagne is a 73 y.o.-year-old female who was initially admitted on 5/27/2024.   Elaina is seen and evaluated at bedside and has coronary artery disease status post PCI in January 2024, hypertension, history of non-STEMI, morbid obesity, previous CVA, atrial fibrillation on anticoagulation, venous stasis dermatitis, chronic leg wound who has a history of fall and leg pain.     I did attempt to see the patient yesterday but she was very agitated and animated did not want me to examine her at all.     The patient is still agitated though much better today and is a poor historian and is  50 mg Oral BID    [Held by provider] enoxaparin  1 mg/kg SubCUTAneous BID    QUEtiapine  50 mg Oral Nightly    atorvastatin  40 mg Oral Nightly    escitalopram  20 mg Oral Daily    furosemide  40 mg Oral Daily    pantoprazole  40 mg Oral BID AC    trospium  20 mg Oral BID AC    sucralfate  1 g Oral 4x Daily AC & HS    sodium chloride flush  5-40 mL IntraVENous 2 times per day       Electronically signed by Laurent Gallego MD on 6/1/2024 at 11:10 AM      [facility]  Laurent Gallego MD  [employer]  OFFICE: [phone]    Thank you for allowing us to participate in the care of this patient. Please call with questions.    This note is created with the assistance of a speech recognition program.  While intending to generate a document that actually reflects the content of the visit, the document can still have some errors including those of syntax and sound a like substitutions which may escape proof reading.  In such instances, actual meaning can be extrapolated by contextual diversion.

## 2024-06-01 NOTE — PLAN OF CARE
Problem: Discharge Planning  Goal: Discharge to home or other facility with appropriate resources  6/1/2024 1936 by Mary Anne Jimenez, RN  Outcome: Progressing  Flowsheets (Taken 6/1/2024 0800 by SURJIT HUMPHREY)  Discharge to home or other facility with appropriate resources:   Identify barriers to discharge with patient and caregiver   Arrange for needed discharge resources and transportation as appropriate   Identify discharge learning needs (meds, wound care, etc)   Refer to discharge planning if patient needs post-hospital services based on physician order or complex needs related to functional status, cognitive ability or social support system  6/1/2024 1634 by SURJIT HUMPHREY  Outcome: Progressing  Flowsheets (Taken 6/1/2024 0800)  Discharge to home or other facility with appropriate resources:   Identify barriers to discharge with patient and caregiver   Arrange for needed discharge resources and transportation as appropriate   Identify discharge learning needs (meds, wound care, etc)   Refer to discharge planning if patient needs post-hospital services based on physician order or complex needs related to functional status, cognitive ability or social support system     Problem: Pain  Goal: Verbalizes/displays adequate comfort level or baseline comfort level  6/1/2024 1936 by Mary Anne Jimenez, RN  Outcome: Progressing  Flowsheets (Taken 6/1/2024 1114 by SURJIT HUMPHREY)  Verbalizes/displays adequate comfort level or baseline comfort level:   Encourage patient to monitor pain and request assistance   Assess pain using appropriate pain scale   Administer analgesics based on type and severity of pain and evaluate response   Implement non-pharmacological measures as appropriate and evaluate response   Notify Licensed Independent Practitioner if interventions unsuccessful or patient reports new pain  6/1/2024 1634 by SURJIT HUMPHREY  Outcome: Progressing  Flowsheets  Taken 6/1/2024 1114  Verbalizes/displays adequate comfort level  dehydration, psychiatric diagnoses, notify [facility] high risk fall precautions, as indicated   Monitor and intervene to maintain adequate nutrition, hydration, elimination, sleep and activity     Problem: Safety - Medical Restraint  Goal: Remains free of injury from restraints (Restraint for Interference with Medical Device)  Description: INTERVENTIONS:  1. Determine that other, less restrictive measures have been tried or would not be effective before applying the restraint  2. Evaluate the patient's condition at the time of restraint application  3. Inform patient/family regarding the reason for restraint  4. Q2H: Monitor safety, psychosocial status, comfort, nutrition and hydration  6/1/2024 1634 by SURJIT HUMPHREY  Outcome: Progressing

## 2024-06-01 NOTE — PROGRESS NOTES
14  --  10 5*   LABGLOM  --  78  --  >90 >90   CALCIUM  --  9.0  --  8.6 8.5*   TROPHS 256* 272* 245*  --   --        Recent Labs     05/30/24  1525   TSH 2.95   AMMONIA 13       ABG:  Lab Results   Component Value Date/Time    PH 6.0 09/19/2018 11:53 AM    FIO2 INFORMATION NOT PROVIDED 01/05/2024 09:50 AM     Lab Results   Component Value Date/Time    SPECIAL 10MLS RIGHT FOREARM 05/27/2024 11:14 PM     Lab Results   Component Value Date/Time    CULTURE NO GROWTH 4 DAYS 05/27/2024 11:14 PM       Radiology:  XR TIBIA FIBULA LEFT (2 VIEWS)    Result Date: 5/27/2024  No evidence of osteomyelitis.     XR TIBIA FIBULA RIGHT (2 VIEWS)    Result Date: 5/27/2024  No radiographic evidence for osteomyelitis     Physical Examination:        General appearance:  Alert, in no distress.   Mental Status:  Oriented to person, place and time  Lungs:  clear to auscultation bilaterally, normal effort  Heart:  regular rate and rhythm, no murmur  Abdomen:  soft, nontender, nondistended  Extremities:  Bilateral lower extremities wrapped.    Skin:  no gross lesions, rashes, induration on exposed skin. Venous stasis changes to bilateral lower extremities with wounds to posterior left calf and small wound draining blood to posterior right calf.    Assessment:        Hospital Problems             Last Modified POA    * (Principal) Cellulitis 5/28/2024 Yes    Atrial fibrillation with rapid ventricular response (HCC) 5/28/2024 Yes    Venous insufficiency of both lower extremities 5/28/2024 Yes    Depression with anxiety 5/28/2024 Yes    Primary hypertension 5/28/2024 Yes    Hyperlipidemia 5/28/2024 Yes    Morbid obesity (HCC) 5/28/2024 Yes    PAF (paroxysmal atrial fibrillation) ([facility]) 5/28/2024 Yes    MICHELLE (acute kidney injury) ([facility]) 5/28/2024 Yes    Elevated C-reactive protein (CRP) 5/28/2024 Yes    Normocytic anemia 5/28/2024 Yes    Anticoagulated 5/28/2024 Yes    Atherosclerotic heart disease of native coronary artery with unspecified angina  pectoris 5/28/2024 Yes    Chronic kidney disease, stage 3 unspecified (HCC) 5/28/2024 Yes    Acute metabolic encephalopathy 5/28/2024 Yes    Hyponatremia 5/28/2024 Yes    Acute delirium 5/29/2024 Yes    Hematoma 5/31/2024 Yes    Elevated troponin 5/30/2024 Yes    Acute blood loss anemia 5/31/2024 Yes   Plan:        Elevated troponin. Cardiology consulted. Full dose lovenox currently held. Echo with normal left ventricular ejection fraction with grade 2 diastolic dysfunction.    Venous insufficiency of bilateral lower extremities with right lower extremity hematoma.  Started on broad-spectrum antibiotics with vancomycin and cefepime. ID does not suspect infection. Abx discontinued. Concern for hematoma.  CT demonstrated right calf hematoma.  General surgery consulted.     Delirium.  Resolved.     Acute kidney injury possibly superimposed on chronic kidney disease stage III.  Resolved.      Hyponatremia.  Resolved.     Hypokalemia.  Replaced.     Hypertension.  Stable.  Continue Lopressor to 50 mg twice daily.     Hyperlipidemia.  Continue Lipitor 40 mg nightly.     Paroxysmal atrial fibrillation with RVR.  Continue Lopressor to 50 mg twice daily.  Lovenox currently held due to hematoma and drop in hemoglobin.  Will resume Lovenox when okay with general surgery.     CAD.  Continue Lipitor 40 mg nightly and Lopressor 50 mg twice daily.  Resume aspirin and Plavix today and monitor hemoglobin in the morning.     Anxiety and depression.  Continue Lexapro 20 mg daily     Morbid obesity.  Encourage lifestyle modification with diet and exercise.     DVT prophylaxis: Lovenox held due to anemia and right calf hematoma.  GI prophylaxis: Protonix.    Discharge planning: Pending stability of hemoglobin on aspirin and Plavix.     Mando Burden DO  6/1/2024  7:46 AM

## 2024-06-01 NOTE — PROGRESS NOTES
Neurology ---     Per report patient is improved overall as far as from a cognitive standpoint.   She is status postdrainage of hematoma by surgical team  Currently working with therapies and awaiting placement  No further workup from neurologic standpoint.  We will sign off at this time.  Please do not hesitate to contact with any further questions or concerns    Gerber Rodas,   [facility] [place]  Neurology

## 2024-06-01 NOTE — PROGRESS NOTES
6/1/2024      GENERAL SURGERY PROGRESS NOTE- inpatient      PATIENT NAME: Elaina Champagne     DATE: 6/1/2024    SUBJECTIVE:    Patient assessed     OBJECTIVE:   VITALS:  BP (!) 122/53   Pulse 64   Temp 98.1 °F (36.7 °C) (Oral)   Resp 16   Ht 1.549 m (5' 1\")   Wt 97.6 kg (215 lb 2.7 oz)   SpO2 95%   BMI 40.66 kg/m²    height is 1.549 m (5' 1\") and weight is 97.6 kg (215 lb 2.7 oz). Her oral temperature is 98.1 °F (36.7 °C). Her blood pressure is 122/53 (abnormal) and her pulse is 64. Her respiration is 16 and oxygen saturation is 95%.     INTAKE/OUTPUT:    I/O last 3 completed shifts:  In: 1360 [P.O.:1360]  Out: 1974 [Urine:1974]  No intake/output data recorded.              CONSTITUTIONAL:  awake and alert    EXTREMITIES: chronic wounds assessed yesterday- see below      Data:  CBC:   Recent Labs     05/30/24  0723 05/30/24  2117 05/31/24  0502 05/31/24  1833 06/01/24  0604   WBC 6.2  --  5.2  --  3.7   HGB 8.0*   < > 7.3* 7.9* 7.7*   HCT 25.0*   < > 22.3* 24.8* 23.5*     --  371  --  330    < > = values in this interval not displayed.     BMP:    Recent Labs     05/30/24  0723 05/31/24  0502 06/01/24  0604    139 140   K 4.5 3.3* 3.9    104 107   CO2 22 25 28   BUN 13 12 11   CREATININE 0.8 0.6 0.5   GLUCOSE 93 72 80     Hepatic: No results for input(s): \"AST\", \"ALT\", \"BILITOT\", \"ALKPHOS\" in the last 72 hours.    Invalid input(s): \"ALB\"      ASSESSMENT & PLAN:      Labs stable. Wounds stable. On plavix and aspirin. Chronic wounds bilateral LE. Hematoma does not need to be evacuated. Antibiotics for cellulitis. Has excellent wound care already in place. Hematoma will continue to drain right calf residually, non explanding, no jessenia pus from the site, Dr. Nayak got out about 20 ml yesterday through draining sinus. Patient in no distress. She said she had blood transfusion in the past at [place] but not on this admission. At this time no surgical evacuation is indicated. Spoke with RN

## 2024-06-01 NOTE — PROGRESS NOTES
Occupational Therapy  Facility/Department: [address]  Occupational Therapy Daily Treatment Note    Name: Elaina Champagne  : 1950  MRN: [id number]  Date of Service: 2024    Discharge Recommendations:  Patient would benefit from continued therapy after discharge    Patient Diagnosis(es): The primary encounter diagnosis was Bilateral lower leg cellulitis. A diagnosis of Elevated troponin was also pertinent to this visit.  Past Medical History:  has a past medical history of Bell's palsy, Cellulitis, Hypertension, and NSTEMI (non-ST elevated myocardial infarction) (HCC).  Past Surgical History:  has a past surgical history that includes Hysterectomy, total abdominal (); Cataract removal (Bilateral, ); incision and drainage (Left, 2017); Total knee arthroplasty (Left, 2017); Knee Arthroplasty (Right, 2018); ventral hernia repair (2019); IR NONTUNNELED VASCULAR CATHETER > 5 YEARS (12/15/2023); Upper gastrointestinal endoscopy (N/A, 2023); Cardiac procedure (N/A, 2024); Cardiac procedure (N/A, 2024); Upper gastrointestinal endoscopy (N/A, 2024); Coronary angioplasty with stent (2024); and Cardiac procedure (N/A, 2024).    Assessment   Performance deficits / Impairments: Decreased safe awareness;Decreased cognition;Decreased balance;Decreased ADL status;Decreased functional mobility ;Decreased strength;Decreased endurance;Decreased sensation    Assessment: Pt presents with decreased ADL status secondary to above noted deficits. Pt to benefit from continued therapy services while hospitalized to maximize pt's safety and independence in performing functional tasks. At this time, pt has not demonstrated the functional ability to safely return to prior living arrangements, continued skilled OT intervention recommended prior to an eventual return to home.    Prognosis: Good  REQUIRES OT FOLLOW-UP: Yes    Activity Tolerance  Activity Tolerance:  min A of 1 at RW  Patient Goals   Patient goals : get better    Therapy Time   Individual Concurrent Group Co-treatment   Time In 1307         Time Out 1344         Minutes 37         Timed Code Treatment Minutes: 23 Minutes     Co- treatment with PT warranted secondary to decreased patient safety and independence with functional mobility requiring skilled physical assistance of two professionals to simultaneously address individualized discipline goals. OT is addressing activity tolerance/ADL transfers/ADL participation, while PT is addressing their individualized functional mobility task.    Cade Ortiz, OTRL

## 2024-06-01 NOTE — PLAN OF CARE
Problem: Discharge Planning  Goal: Discharge to home or other facility with appropriate resources  5/31/2024 2203 by Mary Anne Jimenez RN  Outcome: Progressing  Flowsheets (Taken 5/31/2024 0830 by SURJIT HUMPHREY)  Discharge to home or other facility with appropriate resources:   Identify barriers to discharge with patient and caregiver   Arrange for needed discharge resources and transportation as appropriate   Identify discharge learning needs (meds, wound care, etc)   Refer to discharge planning if patient needs post-hospital services based on physician order or complex needs related to functional status, cognitive ability or social support system  5/31/2024 1607 by SURJIT HUMPHREY  Outcome: Progressing  Flowsheets (Taken 5/31/2024 0830)  Discharge to home or other facility with appropriate resources:   Identify barriers to discharge with patient and caregiver   Arrange for needed discharge resources and transportation as appropriate   Identify discharge learning needs (meds, wound care, etc)   Refer to discharge planning if patient needs post-hospital services based on physician order or complex needs related to functional status, cognitive ability or social support system     Problem: Pain  Goal: Verbalizes/displays adequate comfort level or baseline comfort level  5/31/2024 2203 by Mary Anne Jimenez, RN  Outcome: Progressing  Flowsheets (Taken 5/31/2024 1201 by SURJIT HUMPHREY)  Verbalizes/displays adequate comfort level or baseline comfort level:   Encourage patient to monitor pain and request assistance   Assess pain using appropriate pain scale   Administer analgesics based on type and severity of pain and evaluate response   Implement non-pharmacological measures as appropriate and evaluate response   Notify Licensed Independent Practitioner if interventions unsuccessful or patient reports new pain  5/31/2024 1607 by SURJIT HUMPHREY  Outcome: Progressing  Flowsheets  Taken 5/31/2024 1201  Verbalizes/displays adequate

## 2024-06-01 NOTE — PROGRESS NOTES
Physical Therapy  Facility/Department: [address]  Physical Therapy Daily Treatment Note    Name: Elaina Champagne  : 1950  MRN: [id number]  Date of Service: 2024    Discharge Recommendations:  Patient would benefit from continued therapy after discharge   PT Equipment Recommendations  Other: TBD      Patient Diagnosis(es): The primary encounter diagnosis was Bilateral lower leg cellulitis. A diagnosis of Elevated troponin was also pertinent to this visit.  Past Medical History:  has a past medical history of Bell's palsy, Cellulitis, Hypertension, and NSTEMI (non-ST elevated myocardial infarction) (HCC).  Past Surgical History:  has a past surgical history that includes Hysterectomy, total abdominal (); Cataract removal (Bilateral, ); incision and drainage (Left, 2017); Total knee arthroplasty (Left, 2017); Knee Arthroplasty (Right, 2018); ventral hernia repair (2019); IR NONTUNNELED VASCULAR CATHETER > 5 YEARS (12/15/2023); Upper gastrointestinal endoscopy (N/A, 2023); Cardiac procedure (N/A, 2024); Cardiac procedure (N/A, 2024); Upper gastrointestinal endoscopy (N/A, 2024); Coronary angioplasty with stent (2024); and Cardiac procedure (N/A, 2024).    Assessment   Body Structures, Functions, Activity Limitations Requiring Skilled Therapeutic Intervention: Decreased functional mobility ;Decreased ROM;Decreased safe awareness;Decreased cognition;Decreased strength;Increased pain  Assessment: Pt presents with bilateral LE cellulitis admitted on . Lives with  but has been residing at [facility] for rehab. Pt receives help with ADLs, homemaking responsibilities, and cooking. She was independent in bed mobility, transfers, and gait with a RW. Pt with improved mobility today and was able to ambulate with RW 10' x 2 CGA with chair follow. Patient currently does not demonstrate adequate safety and mobility to return to prior

## 2024-06-02 LAB
ANION GAP SERPL CALCULATED.3IONS-SCNC: 6 MMOL/L (ref 9–17)
BASOPHILS # BLD: 0 K/UL (ref 0–0.2)
BASOPHILS NFR BLD: 0 % (ref 0–2)
BUN SERPL-MCNC: 9 MG/DL (ref 8–23)
CALCIUM SERPL-MCNC: 8 MG/DL (ref 8.6–10.4)
CHLORIDE SERPL-SCNC: 104 MMOL/L (ref 98–107)
CO2 SERPL-SCNC: 27 MMOL/L (ref 20–31)
CREAT SERPL-MCNC: 0.5 MG/DL (ref 0.5–0.9)
EOSINOPHIL # BLD: 0.2 K/UL (ref 0–0.4)
EOSINOPHILS RELATIVE PERCENT: 6 % (ref 1–4)
ERYTHROCYTE [DISTWIDTH] IN BLOOD BY AUTOMATED COUNT: 19.4 % (ref 12.5–15.4)
GFR, ESTIMATED: >90 ML/MIN/1.73M2
GLUCOSE SERPL-MCNC: 90 MG/DL (ref 70–99)
HCT VFR BLD AUTO: 25.4 % (ref 36–46)
HGB BLD-MCNC: 7.9 G/DL (ref 12–16)
LYMPHOCYTES NFR BLD: 0.68 K/UL (ref 1–4.8)
LYMPHOCYTES RELATIVE PERCENT: 21 % (ref 24–44)
MAGNESIUM SERPL-MCNC: 1.8 MG/DL (ref 1.6–2.6)
MCH RBC QN AUTO: 27.4 PG (ref 26–34)
MCHC RBC AUTO-ENTMCNC: 31.1 G/DL (ref 31–37)
MCV RBC AUTO: 88.2 FL (ref 80–100)
MICROORGANISM SPEC CULT: NORMAL
MICROORGANISM SPEC CULT: NORMAL
MONOCYTES NFR BLD: 0.33 K/UL (ref 0.1–1.2)
MONOCYTES NFR BLD: 10 % (ref 2–11)
NEUTROPHILS NFR BLD: 63 % (ref 36–66)
NEUTS SEG NFR BLD: 2 K/UL (ref 1.8–7.7)
PLATELET # BLD AUTO: 342 K/UL (ref 140–450)
PMV BLD AUTO: 9.4 FL (ref 8–14)
POTASSIUM SERPL-SCNC: 3.6 MMOL/L (ref 3.7–5.3)
RBC # BLD AUTO: 2.88 M/UL (ref 4–5.2)
SERVICE CMNT-IMP: NORMAL
SERVICE CMNT-IMP: NORMAL
SODIUM SERPL-SCNC: 137 MMOL/L (ref 135–144)
SPECIMEN DESCRIPTION: NORMAL
SPECIMEN DESCRIPTION: NORMAL
WBC OTHER # BLD: 3.2 K/UL (ref 3.5–11)

## 2024-06-02 PROCEDURE — 2580000003 HC RX 258: Performed by: NURSE PRACTITIONER

## 2024-06-02 PROCEDURE — 6370000000 HC RX 637 (ALT 250 FOR IP): Performed by: STUDENT IN AN ORGANIZED HEALTH CARE EDUCATION/TRAINING PROGRAM

## 2024-06-02 PROCEDURE — 85025 COMPLETE CBC W/AUTO DIFF WBC: CPT

## 2024-06-02 PROCEDURE — 99232 SBSQ HOSP IP/OBS MODERATE 35: CPT | Performed by: STUDENT IN AN ORGANIZED HEALTH CARE EDUCATION/TRAINING PROGRAM

## 2024-06-02 PROCEDURE — 80048 BASIC METABOLIC PNL TOTAL CA: CPT

## 2024-06-02 PROCEDURE — 6360000002 HC RX W HCPCS: Performed by: STUDENT IN AN ORGANIZED HEALTH CARE EDUCATION/TRAINING PROGRAM

## 2024-06-02 PROCEDURE — 2060000000 HC ICU INTERMEDIATE R&B

## 2024-06-02 PROCEDURE — 36415 COLL VENOUS BLD VENIPUNCTURE: CPT

## 2024-06-02 PROCEDURE — 6370000000 HC RX 637 (ALT 250 FOR IP): Performed by: NURSE PRACTITIONER

## 2024-06-02 PROCEDURE — 83735 ASSAY OF MAGNESIUM: CPT

## 2024-06-02 RX ORDER — POTASSIUM CHLORIDE 20 MEQ/1
40 TABLET, EXTENDED RELEASE ORAL ONCE
Status: COMPLETED | OUTPATIENT
Start: 2024-06-02 | End: 2024-06-02

## 2024-06-02 RX ORDER — ERGOCALCIFEROL 1.25 MG/1
50000 CAPSULE ORAL WEEKLY
Qty: 5 CAPSULE | Refills: 0 | DISCHARGE
Start: 2024-06-07 | End: 2024-07-20

## 2024-06-02 RX ORDER — FOLIC ACID 1 MG/1
1 TABLET ORAL DAILY
Qty: 30 TABLET | Refills: 3 | Status: SHIPPED | OUTPATIENT
Start: 2024-06-03 | End: 2024-06-02

## 2024-06-02 RX ORDER — ERGOCALCIFEROL 1.25 MG/1
50000 CAPSULE ORAL WEEKLY
Qty: 5 CAPSULE | Refills: 0 | Status: SHIPPED | OUTPATIENT
Start: 2024-06-07 | End: 2024-06-02

## 2024-06-02 RX ORDER — MAGNESIUM SULFATE 1 G/100ML
1000 INJECTION INTRAVENOUS ONCE
Status: COMPLETED | OUTPATIENT
Start: 2024-06-02 | End: 2024-06-02

## 2024-06-02 RX ORDER — FOLIC ACID 1 MG/1
1 TABLET ORAL DAILY
Qty: 30 TABLET | Refills: 3 | DISCHARGE
Start: 2024-06-02

## 2024-06-02 RX ADMIN — ESCITALOPRAM OXALATE 20 MG: 10 TABLET ORAL at 09:37

## 2024-06-02 RX ADMIN — TROSPIUM CHLORIDE 20 MG: 20 TABLET, FILM COATED ORAL at 06:03

## 2024-06-02 RX ADMIN — ACETAMINOPHEN 650 MG: 325 TABLET ORAL at 15:27

## 2024-06-02 RX ADMIN — SODIUM CHLORIDE, PRESERVATIVE FREE 10 ML: 5 INJECTION INTRAVENOUS at 09:33

## 2024-06-02 RX ADMIN — ACETAMINOPHEN 650 MG: 325 TABLET ORAL at 20:14

## 2024-06-02 RX ADMIN — MAGNESIUM SULFATE HEPTAHYDRATE 1000 MG: 1 INJECTION, SOLUTION INTRAVENOUS at 09:36

## 2024-06-02 RX ADMIN — SUCRALFATE 1 G: 1 TABLET ORAL at 06:03

## 2024-06-02 RX ADMIN — SUCRALFATE 1 G: 1 TABLET ORAL at 20:14

## 2024-06-02 RX ADMIN — ATORVASTATIN CALCIUM 40 MG: 40 TABLET, FILM COATED ORAL at 20:14

## 2024-06-02 RX ADMIN — SODIUM CHLORIDE, PRESERVATIVE FREE 10 ML: 5 INJECTION INTRAVENOUS at 20:14

## 2024-06-02 RX ADMIN — POTASSIUM CHLORIDE 40 MEQ: 1500 TABLET, EXTENDED RELEASE ORAL at 09:37

## 2024-06-02 RX ADMIN — APIXABAN 5 MG: 5 TABLET, FILM COATED ORAL at 20:14

## 2024-06-02 RX ADMIN — FOLIC ACID 1 MG: 1 TABLET ORAL at 09:37

## 2024-06-02 RX ADMIN — FUROSEMIDE 40 MG: 20 TABLET ORAL at 09:37

## 2024-06-02 RX ADMIN — ASPIRIN 81 MG: 81 TABLET, COATED ORAL at 09:37

## 2024-06-02 RX ADMIN — TROSPIUM CHLORIDE 20 MG: 20 TABLET, FILM COATED ORAL at 16:34

## 2024-06-02 RX ADMIN — PANTOPRAZOLE SODIUM 40 MG: 40 TABLET, DELAYED RELEASE ORAL at 16:34

## 2024-06-02 RX ADMIN — CLOPIDOGREL BISULFATE 75 MG: 75 TABLET ORAL at 09:37

## 2024-06-02 RX ADMIN — PANTOPRAZOLE SODIUM 40 MG: 40 TABLET, DELAYED RELEASE ORAL at 06:04

## 2024-06-02 RX ADMIN — SUCRALFATE 1 G: 1 TABLET ORAL at 16:34

## 2024-06-02 RX ADMIN — SUCRALFATE 1 G: 1 TABLET ORAL at 12:21

## 2024-06-02 ASSESSMENT — PAIN SCALES - GENERAL
PAINLEVEL_OUTOF10: 10
PAINLEVEL_OUTOF10: 3
PAINLEVEL_OUTOF10: 3

## 2024-06-02 ASSESSMENT — PAIN - FUNCTIONAL ASSESSMENT: PAIN_FUNCTIONAL_ASSESSMENT: ACTIVITIES ARE NOT PREVENTED

## 2024-06-02 ASSESSMENT — PAIN DESCRIPTION - DESCRIPTORS: DESCRIPTORS: DISCOMFORT

## 2024-06-02 ASSESSMENT — PAIN DESCRIPTION - LOCATION
LOCATION: LEG
LOCATION: LEG

## 2024-06-02 ASSESSMENT — PAIN DESCRIPTION - ORIENTATION
ORIENTATION: RIGHT;LEFT
ORIENTATION: RIGHT;LEFT

## 2024-06-02 NOTE — DISCHARGE INSTRUCTIONS
Follow up with your PCP in 3 days. Call for an appointment as soon as possible.  Follow-up with specialist as instructed.  Call for an appointment as soon as possible.  Medications as instructed.  Return to the emergency department immediately for any new or worsening concerns.

## 2024-06-02 NOTE — PROGRESS NOTES
[facility]  Office: [phone]  Nael Morgan DO, Ronny Quevedo DO, Pb Vásquez DO, Refugio Bradley DO, Sheldon Smith MD, Elsa Torres MD, Leandro Espinosa MD, Joann Hauser MD,  Dipak Montana MD, Rob Hernandez MD, Swathi Wells MD,  Gracia Chen DO, Pilar So MD, Kayden Walters MD, Orlando Morgan DO, Yamilex Luciano MD,  Mando Burden DO, Gali Galarza MD, Georgie Workman MD, Jennifer Hill MD, Gloria Sierra MD,  Rafael Rawls MD, Denilson Murphy MD, Kamran Flores MD, Yazan Hughes MD, Theodore Grossman MD, Jaxon Villar MD, Ricky Lopez DO, Mickey Cabral DO, Trev Alex MD,  Arvin Powers MD, Shirley Waterhouse, CNP,  Nidia Lester CNP, Aj Castro, CNP,  Erin Shaw, DNP, Ivis Senior, CNP, Georgiana Freeman, CNP, Diandra Ruffin, CNP, Marilia Parker, CNP, Maureen Matamoros, PA-C, Meeta Titus PA-C, Sandrita Cruz, CNP, Nan Berrios, CNP, Veronica Alonzo, CNP, Areli Ware, CNP, Guadalupe Jaimes, CNP, Radhika Gillespie, CNS, Adriane Jimenez, CNP, Sarah Shi CNP, Tracy Schwab, CNP         [facility]   IN-PATIENT SERVICE   [facility]    Progress Note    6/2/2024    7:53 AM    Name:   Elaina Champagne  MRN:     [id number]     Acct:      [id number]   Room:   321/321-01   Day:  5  Admit Date:  5/27/2024  9:43 PM    PCP:   Robin Ly MD  Code Status:  Full Code    Subjective:     C/C:   Chief Complaint   Patient presents with    Leg Pain     Bilat lower leg pain with recent diagnosis of cellulitis     Interval History Status: significantly improved.    Vitals reviewed, afebrile and hemodynamically stable. Saturating well on room air.   Labs reviewed, potassium 3.6 magnesium 1.7, hemoglobin stable 7.9 while on aspirin and Plavix.  Overnight patient had no significant events.    On examination patient resting comfortably in bed.  Patient's heart rate has been on the lower side in the low 50s will adjust Lopressor to 25 mg twice

## 2024-06-02 NOTE — PLAN OF CARE
Problem: Discharge Planning  Goal: Discharge to home or other facility with appropriate resources  Outcome: Progressing     Problem: Pain  Goal: Verbalizes/displays adequate comfort level or baseline comfort level  Outcome: Progressing  Flowsheets  Taken 6/2/2024 1109  Verbalizes/displays adequate comfort level or baseline comfort level:   Encourage patient to monitor pain and request assistance   Assess pain using appropriate pain scale   Administer analgesics based on type and severity of pain and evaluate response   Implement non-pharmacological measures as appropriate and evaluate response   Notify Licensed Independent Practitioner if interventions unsuccessful or patient reports new pain  Taken 6/2/2024 0735  Verbalizes/displays adequate comfort level or baseline comfort level:   Encourage patient to monitor pain and request assistance   Assess pain using appropriate pain scale   Administer analgesics based on type and severity of pain and evaluate response   Implement non-pharmacological measures as appropriate and evaluate response   Notify Licensed Independent Practitioner if interventions unsuccessful or patient reports new pain     Problem: Skin/Tissue Integrity  Goal: Absence of new skin breakdown  Description: 1.  Monitor for areas of redness and/or skin breakdown  2.  Assess vascular access sites hourly  3.  Every 4-6 hours minimum:  Change oxygen saturation probe site  4.  Every 4-6 hours:  If on nasal continuous positive airway pressure, respiratory therapy assess nares and determine need for appliance change or resting period.  Outcome: Progressing     Problem: Safety - Adult  Goal: Free from fall injury  Outcome: Progressing     Problem: ABCDS Injury Assessment  Goal: Absence of physical injury  Outcome: Progressing     Problem: Confusion  Goal: Confusion, delirium, dementia, or psychosis is improved or at baseline  Description: INTERVENTIONS:  1. Assess for possible contributors to thought

## 2024-06-03 LAB
ANION GAP SERPL CALCULATED.3IONS-SCNC: 7 MMOL/L (ref 9–17)
BASOPHILS # BLD: 0 K/UL (ref 0–0.2)
BASOPHILS NFR BLD: 0 % (ref 0–2)
BUN SERPL-MCNC: 7 MG/DL (ref 8–23)
CALCIUM SERPL-MCNC: 8.2 MG/DL (ref 8.6–10.4)
CHLORIDE SERPL-SCNC: 102 MMOL/L (ref 98–107)
CO2 SERPL-SCNC: 27 MMOL/L (ref 20–31)
CREAT SERPL-MCNC: 0.4 MG/DL (ref 0.5–0.9)
EOSINOPHIL # BLD: 0.03 K/UL (ref 0–0.4)
EOSINOPHILS RELATIVE PERCENT: 1 % (ref 1–4)
ERYTHROCYTE [DISTWIDTH] IN BLOOD BY AUTOMATED COUNT: 18.4 % (ref 12.5–15.4)
GFR, ESTIMATED: >90 ML/MIN/1.73M2
GLUCOSE SERPL-MCNC: 87 MG/DL (ref 70–99)
HCT VFR BLD AUTO: 27.5 % (ref 36–46)
HGB BLD-MCNC: 8.9 G/DL (ref 12–16)
LYMPHOCYTES NFR BLD: 0.96 K/UL (ref 1–4.8)
LYMPHOCYTES RELATIVE PERCENT: 32 % (ref 24–44)
MAGNESIUM SERPL-MCNC: 1.8 MG/DL (ref 1.6–2.6)
MCH RBC QN AUTO: 27.6 PG (ref 26–34)
MCHC RBC AUTO-ENTMCNC: 32.4 G/DL (ref 31–37)
MCV RBC AUTO: 85.3 FL (ref 80–100)
MONOCYTES NFR BLD: 0.15 K/UL (ref 0.1–0.8)
MONOCYTES NFR BLD: 5 % (ref 1–7)
MORPHOLOGY: ABNORMAL
NEUTROPHILS NFR BLD: 62 % (ref 36–66)
NEUTS SEG NFR BLD: 1.86 K/UL (ref 1.8–7.7)
PLATELET # BLD AUTO: 339 K/UL (ref 140–450)
PMV BLD AUTO: 7.2 FL (ref 6–12)
POTASSIUM SERPL-SCNC: 3.7 MMOL/L (ref 3.7–5.3)
RBC # BLD AUTO: 3.22 M/UL (ref 4–5.2)
SODIUM SERPL-SCNC: 136 MMOL/L (ref 135–144)
WBC OTHER # BLD: 3 K/UL (ref 3.5–11)

## 2024-06-03 PROCEDURE — 83735 ASSAY OF MAGNESIUM: CPT

## 2024-06-03 PROCEDURE — 2060000000 HC ICU INTERMEDIATE R&B

## 2024-06-03 PROCEDURE — 97110 THERAPEUTIC EXERCISES: CPT

## 2024-06-03 PROCEDURE — 85025 COMPLETE CBC W/AUTO DIFF WBC: CPT

## 2024-06-03 PROCEDURE — 6370000000 HC RX 637 (ALT 250 FOR IP): Performed by: NURSE PRACTITIONER

## 2024-06-03 PROCEDURE — 99232 SBSQ HOSP IP/OBS MODERATE 35: CPT | Performed by: STUDENT IN AN ORGANIZED HEALTH CARE EDUCATION/TRAINING PROGRAM

## 2024-06-03 PROCEDURE — 97116 GAIT TRAINING THERAPY: CPT

## 2024-06-03 PROCEDURE — 36415 COLL VENOUS BLD VENIPUNCTURE: CPT

## 2024-06-03 PROCEDURE — 2580000003 HC RX 258: Performed by: NURSE PRACTITIONER

## 2024-06-03 PROCEDURE — 51798 US URINE CAPACITY MEASURE: CPT

## 2024-06-03 PROCEDURE — 80048 BASIC METABOLIC PNL TOTAL CA: CPT

## 2024-06-03 PROCEDURE — 6370000000 HC RX 637 (ALT 250 FOR IP): Performed by: STUDENT IN AN ORGANIZED HEALTH CARE EDUCATION/TRAINING PROGRAM

## 2024-06-03 PROCEDURE — 97535 SELF CARE MNGMENT TRAINING: CPT

## 2024-06-03 RX ADMIN — FOLIC ACID 1 MG: 1 TABLET ORAL at 09:27

## 2024-06-03 RX ADMIN — SUCRALFATE 1 G: 1 TABLET ORAL at 20:57

## 2024-06-03 RX ADMIN — ACETAMINOPHEN 650 MG: 325 TABLET ORAL at 20:57

## 2024-06-03 RX ADMIN — ACETAMINOPHEN 650 MG: 325 TABLET ORAL at 03:24

## 2024-06-03 RX ADMIN — APIXABAN 5 MG: 5 TABLET, FILM COATED ORAL at 09:27

## 2024-06-03 RX ADMIN — METOPROLOL TARTRATE 25 MG: 25 TABLET, FILM COATED ORAL at 09:27

## 2024-06-03 RX ADMIN — TROSPIUM CHLORIDE 20 MG: 20 TABLET, FILM COATED ORAL at 17:16

## 2024-06-03 RX ADMIN — ATORVASTATIN CALCIUM 40 MG: 40 TABLET, FILM COATED ORAL at 20:57

## 2024-06-03 RX ADMIN — ESCITALOPRAM OXALATE 20 MG: 10 TABLET ORAL at 09:27

## 2024-06-03 RX ADMIN — CLOPIDOGREL BISULFATE 75 MG: 75 TABLET ORAL at 09:27

## 2024-06-03 RX ADMIN — PANTOPRAZOLE SODIUM 40 MG: 40 TABLET, DELAYED RELEASE ORAL at 17:15

## 2024-06-03 RX ADMIN — TROSPIUM CHLORIDE 20 MG: 20 TABLET, FILM COATED ORAL at 06:14

## 2024-06-03 RX ADMIN — FUROSEMIDE 40 MG: 20 TABLET ORAL at 09:28

## 2024-06-03 RX ADMIN — SODIUM CHLORIDE, PRESERVATIVE FREE 10 ML: 5 INJECTION INTRAVENOUS at 20:57

## 2024-06-03 RX ADMIN — SUCRALFATE 1 G: 1 TABLET ORAL at 11:53

## 2024-06-03 RX ADMIN — METOPROLOL TARTRATE 25 MG: 25 TABLET, FILM COATED ORAL at 20:59

## 2024-06-03 RX ADMIN — PANTOPRAZOLE SODIUM 40 MG: 40 TABLET, DELAYED RELEASE ORAL at 06:13

## 2024-06-03 RX ADMIN — SUCRALFATE 1 G: 1 TABLET ORAL at 06:13

## 2024-06-03 RX ADMIN — ASPIRIN 81 MG: 81 TABLET, COATED ORAL at 09:26

## 2024-06-03 RX ADMIN — ACETAMINOPHEN 650 MG: 325 TABLET ORAL at 13:53

## 2024-06-03 RX ADMIN — SODIUM CHLORIDE, PRESERVATIVE FREE 10 ML: 5 INJECTION INTRAVENOUS at 09:31

## 2024-06-03 RX ADMIN — APIXABAN 5 MG: 5 TABLET, FILM COATED ORAL at 20:57

## 2024-06-03 RX ADMIN — SUCRALFATE 1 G: 1 TABLET ORAL at 17:16

## 2024-06-03 ASSESSMENT — PAIN SCALES - GENERAL
PAINLEVEL_OUTOF10: 5
PAINLEVEL_OUTOF10: 4
PAINLEVEL_OUTOF10: 3
PAINLEVEL_OUTOF10: 4
PAINLEVEL_OUTOF10: 3
PAINLEVEL_OUTOF10: 3

## 2024-06-03 ASSESSMENT — PAIN DESCRIPTION - ORIENTATION
ORIENTATION: LEFT;RIGHT
ORIENTATION: RIGHT;LEFT

## 2024-06-03 ASSESSMENT — PAIN DESCRIPTION - PAIN TYPE: TYPE: CHRONIC PAIN

## 2024-06-03 ASSESSMENT — ENCOUNTER SYMPTOMS
VOMITING: 0
NAUSEA: 0
COUGH: 0
SHORTNESS OF BREATH: 0
COLOR CHANGE: 1
ABDOMINAL PAIN: 0

## 2024-06-03 ASSESSMENT — PAIN DESCRIPTION - LOCATION
LOCATION: LEG

## 2024-06-03 ASSESSMENT — PAIN DESCRIPTION - DESCRIPTORS
DESCRIPTORS: DISCOMFORT
DESCRIPTORS: DISCOMFORT

## 2024-06-03 NOTE — PROGRESS NOTES
Occupational Therapy  Facility/Department: [address]  Occupational Therapy Daily Treatment     Name: Elaina Champagne  : 1950  MRN: [id number]  Date of Service: 6/3/2024    Discharge Recommendations:  Patient would benefit from continued therapy after discharge        Patient Diagnosis(es): The primary encounter diagnosis was Bilateral lower leg cellulitis. A diagnosis of Elevated troponin was also pertinent to this visit.  Past Medical History:  has a past medical history of Bell's palsy, Cellulitis, Hypertension, and NSTEMI (non-ST elevated myocardial infarction) (HCC).  Past Surgical History:  has a past surgical history that includes Hysterectomy, total abdominal (); Cataract removal (Bilateral, ); incision and drainage (Left, 2017); Total knee arthroplasty (Left, 2017); Knee Arthroplasty (Right, 2018); ventral hernia repair (2019); IR NONTUNNELED VASCULAR CATHETER > 5 YEARS (12/15/2023); Upper gastrointestinal endoscopy (N/A, 2023); Cardiac procedure (N/A, 2024); Cardiac procedure (N/A, 2024); Upper gastrointestinal endoscopy (N/A, 2024); Coronary angioplasty with stent (2024); and Cardiac procedure (N/A, 2024).       Assessment   Performance deficits / Impairments: Decreased safe awareness;Decreased cognition;Decreased balance;Decreased ADL status;Decreased functional mobility ;Decreased strength;Decreased endurance;Decreased sensation  Assessment: Pt presents with decreased ADL status secondary to above noted deficits, most significantly decreased strength, decreased balance, and decreased activity tolerance. Pt to benefit from continued therapy services while hospitalized to maximize pt's safety and independence in performing functional tasks. At this time, pt has not demonstrated the functional ability to safely return to prior living arrangements, continued skilled OT intervention recommended prior to an eventual return to

## 2024-06-03 NOTE — PROGRESS NOTES
[facility]  Office: [phone]  Nael Morgan DO, Ronny Quevedo DO, Pb Vásquez DO, Refugio Bradley DO, Sheldon Smith MD, Elsa Torres MD, Leandro Espinosa MD, Joann Hauser MD,  Dipak Montana MD, Rob Hernandez MD, Swathi Wells MD,  Gracia Chen DO, Pilar So MD, Kayden Walters MD, Orlando Morgan DO, Yamilex Luciano MD,  Mando Burden DO, Gali Galarza MD, Georgie Workman MD, Jennifer Hill MD, Gloria Sierra MD,  Rafael Rawls MD, Denilson Murphy MD, Kamran Flores MD, Yazan Hughes MD, Theodore Grossman MD, Jaxon Villar MD, Ricky Lopez DO, Mickey Cabral DO, Trev Alex MD,  Arvin Powers MD, Shirley Waterhouse, CNP,  Nidia Lester CNP, Aj Castro, CNP,  Erin Shaw, DNP, Ivis Senior, CNP, Georgiana Freeman, CNP, Diandra Ruffin, CNP, Marilia Parker, CNP, Maureen Matamoros, PA-C, Meeta Titus PA-C, Sandrita Cruz, CNP, Nan Berrios, CNP, Veronica Alonzo, CNP, Areli Ware, CNP, Guadalupe Jaimes, CNP, Radhika Gillespie, CNS, Adriane Jimenez, CNP, Sarah Shi CNP, Tracy Schwab, CNP         [facility]   IN-PATIENT SERVICE   [facility]    Progress Note    6/3/2024    9:29 AM    Name:   Elaina Champagne  MRN:     [id number]     Acct:      [id number]   Room:   321/321-01   Day:  6  Admit Date:  5/27/2024  9:43 PM    PCP:   Robin Ly MD  Code Status:  Full Code    Subjective:     C/C:   Chief Complaint   Patient presents with    Leg Pain     Bilat lower leg pain with recent diagnosis of cellulitis     Interval History Status: significantly improved.    Patient seen and examined.  No acute events overnight.  Remained afebrile, vitals within normal range.  Labs reviewed, creatinine 1.4, potassium 3.7.  Hemoglobin 8.9.   working on discharge.  Pre-CERT still in process for [facility]    Brief History:     This is a 73-year-old female with a significant past medical history of hypertension who initially  initial encounter 6/1/2024 Yes    Elevated troponin 5/30/2024 Yes    Acute blood loss anemia 5/31/2024 Yes   Plan:        Elevated troponin. Cardiology consulted. Full dose lovenox currently held. Echo with normal left ventricular ejection fraction with grade 2 diastolic dysfunction.    Venous insufficiency of bilateral lower extremities with right lower extremity hematoma.  Started on broad-spectrum antibiotics with vancomycin and cefepime. ID does not suspect infection. Abx discontinued. Concern for hematoma.  CT demonstrated right calf hematoma.  General surgery consulted.  Drained hematoma no active sign of bleeding and no infection.  Outpatient follow-up.     Delirium.  Resolved.     Acute kidney injury possibly superimposed on chronic kidney disease stage III.  Resolved.      Hyponatremia.  Resolved.     Hypokalemia.  Replaced.     Hypertension.  Stable.  Continue Lopressor to 50 mg twice daily.     Hyperlipidemia.  Continue Lipitor 40 mg nightly.     Paroxysmal atrial fibrillation with RVR.  Continue Lopressor to 25 mg twice daily.  Resume Eliquis.     CAD.  Continue Lipitor 40 mg nightly and Lopressor 25 mg twice daily.  Hemoglobin stable on aspirin and Plavix will continue.     Anxiety and depression.  Continue Lexapro 20 mg daily     Morbid obesity.  Encourage lifestyle modification with diet and exercise.     DVT prophylaxis: Resume Eliquis.  GI prophylaxis: Protonix.    Discharge planning:  working on discharge, pre-CERT process.      Theodore Grossman MD  6/3/2024  9:29 AM

## 2024-06-03 NOTE — PROGRESS NOTES
Physical Therapy  Facility/Department: [address]  Physical Therapy Daily Treatment Note    Name: Elaina Champagne  : 1950  MRN: [id number]  Date of Service: 6/3/2024    Discharge Recommendations:  Patient would benefit from continued therapy after discharge          Patient Diagnosis(es): The primary encounter diagnosis was Bilateral lower leg cellulitis. A diagnosis of Elevated troponin was also pertinent to this visit.  Past Medical History:  has a past medical history of Bell's palsy, Cellulitis, Hypertension, and NSTEMI (non-ST elevated myocardial infarction) (HCC).  Past Surgical History:  has a past surgical history that includes Hysterectomy, total abdominal (); Cataract removal (Bilateral, ); incision and drainage (Left, 2017); Total knee arthroplasty (Left, 2017); Knee Arthroplasty (Right, 2018); ventral hernia repair (2019); IR NONTUNNELED VASCULAR CATHETER > 5 YEARS (12/15/2023); Upper gastrointestinal endoscopy (N/A, 2023); Cardiac procedure (N/A, 2024); Cardiac procedure (N/A, 2024); Upper gastrointestinal endoscopy (N/A, 2024); Coronary angioplasty with stent (2024); and Cardiac procedure (N/A, 2024).    Assessment   Body Structures, Functions, Activity Limitations Requiring Skilled Therapeutic Intervention: Decreased functional mobility ;Decreased ROM;Decreased safe awareness;Decreased cognition;Decreased strength;Increased pain    Assessment: Pt presents with bilateral LE cellulitis admitted on . Lives with  but has been residing at [facility] for rehab. Pt receives help with ADLs, homemaking responsibilities, and cooking. She was independent in bed mobility, transfers, and gait with a RW. Pt requiring min assist of 1-2 for transfers due to slight posterior lean and weight on heels, pt ambulated 12ft rolling walker and min assist x 2 with recliner to follow for safety due to pt's c/o dizziness.  Patient

## 2024-06-03 NOTE — FLOWSHEET NOTE
06/03/24 1345   Urine Assessment   Bladder Scan Volume (mL) 262 mL   $ Bladder scan $ Yes     Harvey catheter removed at 1000 hrs / 10:00 am this morning; pt has not yet voided; pt bladder scanned per orders; 262 mL urine; Dr betancourt

## 2024-06-03 NOTE — PLAN OF CARE
Problem: Discharge Planning  Goal: Discharge to home or other facility with appropriate resources  6/2/2024 2002 by Mary Anne Jimenez, RN  Outcome: Progressing  Flowsheets (Taken 6/1/2024 0800 by SURJIT HUMPHREY)  Discharge to home or other facility with appropriate resources:   Identify barriers to discharge with patient and caregiver   Arrange for needed discharge resources and transportation as appropriate   Identify discharge learning needs (meds, wound care, etc)   Refer to discharge planning if patient needs post-hospital services based on physician order or complex needs related to functional status, cognitive ability or social support system  6/2/2024 1611 by SURJIT HUMPHREY  Outcome: Progressing     Problem: Pain  Goal: Verbalizes/displays adequate comfort level or baseline comfort level  6/2/2024 2002 by Mary Anne Jimenez RN  Outcome: Progressing  Flowsheets (Taken 6/2/2024 1109 by SURJIT HUMPHREY)  Verbalizes/displays adequate comfort level or baseline comfort level:   Encourage patient to monitor pain and request assistance   Assess pain using appropriate pain scale   Administer analgesics based on type and severity of pain and evaluate response   Implement non-pharmacological measures as appropriate and evaluate response   Notify Licensed Independent Practitioner if interventions unsuccessful or patient reports new pain  6/2/2024 1611 by SURJIT HUMPHREY  Outcome: Progressing  Flowsheets  Taken 6/2/2024 1109  Verbalizes/displays adequate comfort level or baseline comfort level:   Encourage patient to monitor pain and request assistance   Assess pain using appropriate pain scale   Administer analgesics based on type and severity of pain and evaluate response   Implement non-pharmacological measures as appropriate and evaluate response   Notify Licensed Independent Practitioner if interventions unsuccessful or patient reports new pain  Taken 6/2/2024 0735  Verbalizes/displays adequate comfort level or baseline comfort  SURJIT  Outcome: Progressing

## 2024-06-03 NOTE — CARE COORDINATION
Unable to move to a different [facility] and the pre cert for [facility] is still in progress.

## 2024-06-03 NOTE — FLOWSHEET NOTE
06/03/24 1605 06/03/24 1828   Output (mL)   Urine 100 mL  --    Urine Assessment   Bladder Scan Volume (mL)  --  234 mL     Patient voided 100 mL this afternoon; hasn't voided since. Bladder scanned per orders; 234 mL urine

## 2024-06-03 NOTE — PLAN OF CARE
Problem: Discharge Planning  Goal: Discharge to home or other facility with appropriate resources  Outcome: Progressing     Problem: Pain  Goal: Verbalizes/displays adequate comfort level or baseline comfort level  Outcome: Progressing     Problem: Skin/Tissue Integrity  Goal: Absence of new skin breakdown  Description: 1.  Monitor for areas of redness and/or skin breakdown  2.  Assess vascular access sites hourly  3.  Every 4-6 hours minimum:  Change oxygen saturation probe site  4.  Every 4-6 hours:  If on nasal continuous positive airway pressure, respiratory therapy assess nares and determine need for appliance change or resting period.  Outcome: Progressing     Problem: Safety - Adult  Goal: Free from fall injury  Outcome: Progressing     Problem: ABCDS Injury Assessment  Goal: Absence of physical injury  Outcome: Progressing     Problem: Confusion  Goal: Confusion, delirium, dementia, or psychosis is improved or at baseline  Description: INTERVENTIONS:  1. Assess for possible contributors to thought disturbance, including medications, impaired vision or hearing, underlying metabolic abnormalities, dehydration, psychiatric diagnoses, and notify attending LIP  2. [place] high risk fall precautions, as indicated  3. Provide frequent short contacts to provide reality reorientation, refocusing and direction  4. Decrease environmental stimuli, including noise as appropriate  5. Monitor and intervene to maintain adequate nutrition, hydration, elimination, sleep and activity  6. If unable to ensure safety without constant attention obtain sitter and review sitter guidelines with assigned personnel  7. Initiate Psychosocial CNS and Spiritual Care consult, as indicated  Outcome: Progressing

## 2024-06-04 LAB
ANION GAP SERPL CALCULATED.3IONS-SCNC: 7 MMOL/L (ref 9–17)
BASOPHILS # BLD: 0 K/UL (ref 0–0.2)
BASOPHILS NFR BLD: 0 % (ref 0–2)
BUN SERPL-MCNC: 9 MG/DL (ref 8–23)
CALCIUM SERPL-MCNC: 8.1 MG/DL (ref 8.6–10.4)
CHLORIDE SERPL-SCNC: 104 MMOL/L (ref 98–107)
CO2 SERPL-SCNC: 26 MMOL/L (ref 20–31)
CREAT SERPL-MCNC: 0.5 MG/DL (ref 0.5–0.9)
EOSINOPHIL # BLD: 0.07 K/UL (ref 0–0.4)
EOSINOPHILS RELATIVE PERCENT: 2 % (ref 1–4)
ERYTHROCYTE [DISTWIDTH] IN BLOOD BY AUTOMATED COUNT: 19.5 % (ref 12.5–15.4)
GFR, ESTIMATED: >90 ML/MIN/1.73M2
GLUCOSE SERPL-MCNC: 88 MG/DL (ref 70–99)
HCT VFR BLD AUTO: 27.8 % (ref 36–46)
HGB BLD-MCNC: 9.2 G/DL (ref 12–16)
LYMPHOCYTES NFR BLD: 0.95 K/UL (ref 1–4.8)
LYMPHOCYTES RELATIVE PERCENT: 27 % (ref 24–44)
MAGNESIUM SERPL-MCNC: 1.8 MG/DL (ref 1.6–2.6)
MCH RBC QN AUTO: 28.5 PG (ref 26–34)
MCHC RBC AUTO-ENTMCNC: 33 G/DL (ref 31–37)
MCV RBC AUTO: 86.3 FL (ref 80–100)
MONOCYTES NFR BLD: 0.32 K/UL (ref 0.1–0.8)
MONOCYTES NFR BLD: 9 % (ref 1–7)
MORPHOLOGY: ABNORMAL
NEUTROPHILS NFR BLD: 62 % (ref 36–66)
NEUTS SEG NFR BLD: 2.16 K/UL (ref 1.8–7.7)
PLATELET # BLD AUTO: 341 K/UL (ref 140–450)
PMV BLD AUTO: 7.3 FL (ref 6–12)
POTASSIUM SERPL-SCNC: 3.6 MMOL/L (ref 3.7–5.3)
RBC # BLD AUTO: 3.22 M/UL (ref 4–5.2)
SODIUM SERPL-SCNC: 137 MMOL/L (ref 135–144)
WBC OTHER # BLD: 3.5 K/UL (ref 3.5–11)

## 2024-06-04 PROCEDURE — 6370000000 HC RX 637 (ALT 250 FOR IP): Performed by: STUDENT IN AN ORGANIZED HEALTH CARE EDUCATION/TRAINING PROGRAM

## 2024-06-04 PROCEDURE — 97530 THERAPEUTIC ACTIVITIES: CPT

## 2024-06-04 PROCEDURE — 6370000000 HC RX 637 (ALT 250 FOR IP): Performed by: NURSE PRACTITIONER

## 2024-06-04 PROCEDURE — 97535 SELF CARE MNGMENT TRAINING: CPT

## 2024-06-04 PROCEDURE — 83735 ASSAY OF MAGNESIUM: CPT

## 2024-06-04 PROCEDURE — 2580000003 HC RX 258: Performed by: NURSE PRACTITIONER

## 2024-06-04 PROCEDURE — 36415 COLL VENOUS BLD VENIPUNCTURE: CPT

## 2024-06-04 PROCEDURE — 99232 SBSQ HOSP IP/OBS MODERATE 35: CPT | Performed by: STUDENT IN AN ORGANIZED HEALTH CARE EDUCATION/TRAINING PROGRAM

## 2024-06-04 PROCEDURE — 80048 BASIC METABOLIC PNL TOTAL CA: CPT

## 2024-06-04 PROCEDURE — 85025 COMPLETE CBC W/AUTO DIFF WBC: CPT

## 2024-06-04 PROCEDURE — 97110 THERAPEUTIC EXERCISES: CPT

## 2024-06-04 PROCEDURE — 2060000000 HC ICU INTERMEDIATE R&B

## 2024-06-04 RX ORDER — LANOLIN ALCOHOL/MO/W.PET/CERES
3 CREAM (GRAM) TOPICAL NIGHTLY PRN
Status: DISCONTINUED | OUTPATIENT
Start: 2024-06-04 | End: 2024-06-07 | Stop reason: HOSPADM

## 2024-06-04 RX ORDER — POTASSIUM CHLORIDE 20 MEQ/1
40 TABLET, EXTENDED RELEASE ORAL ONCE
Status: COMPLETED | OUTPATIENT
Start: 2024-06-04 | End: 2024-06-04

## 2024-06-04 RX ADMIN — SODIUM CHLORIDE, PRESERVATIVE FREE 10 ML: 5 INJECTION INTRAVENOUS at 20:00

## 2024-06-04 RX ADMIN — ESCITALOPRAM OXALATE 20 MG: 10 TABLET ORAL at 08:25

## 2024-06-04 RX ADMIN — SUCRALFATE 1 G: 1 TABLET ORAL at 06:15

## 2024-06-04 RX ADMIN — APIXABAN 5 MG: 5 TABLET, FILM COATED ORAL at 19:59

## 2024-06-04 RX ADMIN — CLOPIDOGREL BISULFATE 75 MG: 75 TABLET ORAL at 08:25

## 2024-06-04 RX ADMIN — ACETAMINOPHEN 650 MG: 325 TABLET ORAL at 13:43

## 2024-06-04 RX ADMIN — APIXABAN 5 MG: 5 TABLET, FILM COATED ORAL at 08:25

## 2024-06-04 RX ADMIN — TROSPIUM CHLORIDE 20 MG: 20 TABLET, FILM COATED ORAL at 06:15

## 2024-06-04 RX ADMIN — ATORVASTATIN CALCIUM 40 MG: 40 TABLET, FILM COATED ORAL at 19:59

## 2024-06-04 RX ADMIN — SUCRALFATE 1 G: 1 TABLET ORAL at 12:03

## 2024-06-04 RX ADMIN — PANTOPRAZOLE SODIUM 40 MG: 40 TABLET, DELAYED RELEASE ORAL at 06:15

## 2024-06-04 RX ADMIN — SUCRALFATE 1 G: 1 TABLET ORAL at 19:59

## 2024-06-04 RX ADMIN — FUROSEMIDE 40 MG: 20 TABLET ORAL at 08:25

## 2024-06-04 RX ADMIN — POTASSIUM CHLORIDE 40 MEQ: 1500 TABLET, EXTENDED RELEASE ORAL at 18:11

## 2024-06-04 RX ADMIN — ACETAMINOPHEN 650 MG: 325 TABLET ORAL at 20:00

## 2024-06-04 RX ADMIN — SODIUM CHLORIDE, PRESERVATIVE FREE 10 ML: 5 INJECTION INTRAVENOUS at 08:28

## 2024-06-04 RX ADMIN — Medication 3 MG: at 23:07

## 2024-06-04 RX ADMIN — SUCRALFATE 1 G: 1 TABLET ORAL at 18:10

## 2024-06-04 RX ADMIN — FOLIC ACID 1 MG: 1 TABLET ORAL at 08:25

## 2024-06-04 RX ADMIN — METOPROLOL TARTRATE 25 MG: 25 TABLET, FILM COATED ORAL at 08:25

## 2024-06-04 RX ADMIN — PANTOPRAZOLE SODIUM 40 MG: 40 TABLET, DELAYED RELEASE ORAL at 18:11

## 2024-06-04 RX ADMIN — TROSPIUM CHLORIDE 20 MG: 20 TABLET, FILM COATED ORAL at 18:11

## 2024-06-04 RX ADMIN — ASPIRIN 81 MG: 81 TABLET, COATED ORAL at 08:25

## 2024-06-04 ASSESSMENT — ENCOUNTER SYMPTOMS
VOMITING: 0
NAUSEA: 0
SHORTNESS OF BREATH: 0
COUGH: 0
COLOR CHANGE: 1
ABDOMINAL PAIN: 0

## 2024-06-04 ASSESSMENT — PAIN DESCRIPTION - ONSET: ONSET: GRADUAL

## 2024-06-04 ASSESSMENT — PAIN - FUNCTIONAL ASSESSMENT
PAIN_FUNCTIONAL_ASSESSMENT: ACTIVITIES ARE NOT PREVENTED
PAIN_FUNCTIONAL_ASSESSMENT: ACTIVITIES ARE NOT PREVENTED

## 2024-06-04 ASSESSMENT — PAIN DESCRIPTION - LOCATION
LOCATION: COCCYX;LEG
LOCATION: LEG
LOCATION: LEG;COCCYX

## 2024-06-04 ASSESSMENT — PAIN DESCRIPTION - ORIENTATION
ORIENTATION: LOWER
ORIENTATION: LEFT;RIGHT
ORIENTATION: LOWER

## 2024-06-04 ASSESSMENT — PAIN DESCRIPTION - DESCRIPTORS
DESCRIPTORS: ACHING
DESCRIPTORS: ACHING

## 2024-06-04 ASSESSMENT — PAIN SCALES - GENERAL
PAINLEVEL_OUTOF10: 4
PAINLEVEL_OUTOF10: 5
PAINLEVEL_OUTOF10: 4

## 2024-06-04 ASSESSMENT — PAIN DESCRIPTION - FREQUENCY: FREQUENCY: INTERMITTENT

## 2024-06-04 NOTE — CARE COORDINATION
MOISES called and left message requesting call back for Simin with [facility] [phone]. Requested update on the status of patient's precert. Precert initiated 5/31.    1000- Call received from Leslee with [facility] [phone] stating that they were informed that patient's medicaid and medicare with [employer] terminated 6/1. [facility] [employer] department is working with patient's family to try and reinstate insurance coverage. MOISES called and left message requesting [facility] department assist.

## 2024-06-04 NOTE — PROGRESS NOTES
SPIRITUAL CARE DEPARTMENT - [facility]  PROGRESS NOTE    Room # 321/321-01   Name: Elaina Champagne               Reason for visit: Routine    I visited the patient.    Admit Date & Time: 5/27/2024  9:43 PM    Assessment:  Elaina Champagne is a 73 y.o. female in the hospital because \"cellulitis\". Upon entering the room pt was lying in bed. Pt shared about her current medical challenges and her need to go to rehab. Pt shared about her goal to return to home after rehab. Pt also shared about the passing of her son and daughter, and shared about grief surrounding their passing. Pt welcomed this writer to pray with and for her.       Intervention:  I introduced myself and my title as  I offered space for pt  to express feelings, needs, and concerns and provided a ministry presence. Writer actively listened to patient and offered words of affirmation. Writer also prayed with and for pt     Outcome:  Pt thanked writer for visit     Plan:  Chaplains will remain available to offer spiritual and emotional support as needed.    Electronically signed by Chaplain Ginna, on 6/4/2024 at 1:00 PM.  Spiritual Care Department  [facility]        06/04/24 1258   Encounter Summary   Encounter Overview/Reason Spiritual/Emotional Needs   Service Provided For Patient   Referral/Consult From [facility]   Support System Spouse   Last Encounter  06/04/24   Complexity of Encounter Moderate   Begin Time 1130   End Time  1150   Total Time Calculated 20 min   Spiritual/Emotional needs   Type Spiritual Support   Assessment/Intervention/Outcome   Assessment Calm;Coping   Intervention Active listening;Sustaining Presence/Ministry of presence;Nurtured Hope;Life review/Legacy;Explored/Affirmed feelings, thoughts, concerns   Outcome Engaged in conversation;Expressed Gratitude;Coping;Expressed feelings, needs, and concerns;Receptive   Plan and Referrals   Plan/Referrals Continue Support (comment)

## 2024-06-04 NOTE — PLAN OF CARE
Problem: Discharge Planning  Goal: Discharge to home or other facility with appropriate resources  Outcome: Progressing  Flowsheets (Taken 6/4/2024 0820)  Discharge to home or other facility with appropriate resources:   Identify barriers to discharge with patient and caregiver   Arrange for needed discharge resources and transportation as appropriate   Identify discharge learning needs (meds, wound care, etc)   Refer to discharge planning if patient needs post-hospital services based on physician order or complex needs related to functional status, cognitive ability or social support system     Problem: Pain  Goal: Verbalizes/displays adequate comfort level or baseline comfort level  Outcome: Progressing     Problem: Skin/Tissue Integrity  Goal: Absence of new skin breakdown  Description: 1.  Monitor for areas of redness and/or skin breakdown  2.  Assess vascular access sites hourly  3.  Every 4-6 hours minimum:  Change oxygen saturation probe site  4.  Every 4-6 hours:  If on nasal continuous positive airway pressure, respiratory therapy assess nares and determine need for appliance change or resting period.  Outcome: Progressing     Problem: Safety - Adult  Goal: Free from fall injury  Outcome: Progressing  Flowsheets (Taken 6/4/2024 0820)  Free From Fall Injury: Instruct family/caregiver on patient safety     Problem: ABCDS Injury Assessment  Goal: Absence of physical injury  Outcome: Progressing  Flowsheets (Taken 6/4/2024 0820)  Absence of Physical Injury: Implement safety measures based on patient assessment     Problem: Confusion  Goal: Confusion, delirium, dementia, or psychosis is improved or at baseline  Description: INTERVENTIONS:  1. Assess for possible contributors to thought disturbance, including medications, impaired vision or hearing, underlying metabolic abnormalities, dehydration, psychiatric diagnoses, and notify attending LIP  2. [place] high risk fall precautions, as indicated  3. Provide  frequent short contacts to provide reality reorientation, refocusing and direction  4. Decrease environmental stimuli, including noise as appropriate  5. Monitor and intervene to maintain adequate nutrition, hydration, elimination, sleep and activity  6. If unable to ensure safety without constant attention obtain sitter and review sitter guidelines with assigned personnel  7. Initiate Psychosocial CNS and Spiritual Care consult, as indicated  Outcome: Progressing  Flowsheets (Taken 6/4/2024 0820)  Effect of thought disturbance (confusion, delirium, dementia, or psychosis) are managed with adequate functional status:   Assess for contributors to thought disturbance, including medications, impaired vision or hearing, underlying metabolic abnormalities, dehydration, psychiatric diagnoses, notify LIP   [place] high risk fall precautions, as indicated   Provide frequent short contacts to provide reality reorientation, refocusing and direction   Decrease environmental stimuli, including noise as appropriate     Problem: Safety - Medical Restraint  Goal: Remains free of injury from restraints (Restraint for Interference with Medical Device)  Description: INTERVENTIONS:  1. Determine that other, less restrictive measures have been tried or would not be effective before applying the restraint  2. Evaluate the patient's condition at the time of restraint application  3. Inform patient/family regarding the reason for restraint  4. Q2H: Monitor safety, psychosocial status, comfort, nutrition and hydration  Outcome: Progressing

## 2024-06-04 NOTE — PROGRESS NOTES
Reacher, Grab bars, Sock aid, Lift chair  Has the patient had two or more falls in the past year or any fall with injury in the past year?: Yes  Receives Help From: Family, Home health  ADL Assistance: Needs assistance (prn A with LB dressing)  Homemaking Assistance: Needs assistance  Homemaking Responsibilities: No ( primary)  Ambulation Assistance: Independent (RW)  Transfer Assistance: Independent  Active : No  Patient's  Info: Spouse Drives  Occupation: Retired  Leisure & Hobbies: cares for dog  IADL Comments: sleeps in recliner    Cognition   Orientation  Overall Orientation Status: Within Functional Limits  Orientation Level: Oriented X4  Cognition  Overall Cognitive Status: Exceptions  Arousal/Alertness: Appears intact  Following Commands: Follows multistep commands with increased time;Follows multistep commands with repitition  Attention Span: Attends with cues to redirect  Memory: Appears intact  Safety Judgement: Decreased awareness of need for assistance;Decreased awareness of need for safety  Problem Solving: Decreased awareness of errors;Assistance required to implement solutions;Assistance required to identify errors made  Insights: Decreased awareness of deficits  Initiation: Requires cues for some  Sequencing: Requires cues for some     Objective   Observation/Palpation  Posture: Fair  Observation: bruise on middle of back and L arm, pain patch on L anterior shoulder, bruise on L forearm      Bed mobility  Supine to Sit: Minimal assistance  Scooting: Minimal assistance  Bed Mobility Comments: HOB elevated to 30 deg, use of bed rail, increased time and effort, reports fatigue and dizziness  Transfers  Sit to Stand: Minimal Assistance  Stand to Sit: Minimal Assistance  Comment: RW, min cues for hand placement, min cues for safety  Ambulation  Surface: Level tile  Device: Rolling Walker  Assistance: Minimal assistance  Quality of Gait: Short steps, reports of fatigue and neck pain,

## 2024-06-04 NOTE — PROGRESS NOTES
[facility]  Office: [phone]  Nael Morgan DO, Ronny Quevedo DO, Pb Vásquez DO, Refugio Bradley DO, Sheldon Smith MD, Elsa Torres MD, Leandro Espinosa MD, Joann Hauser MD,  Dipak Montana MD, Rob Hernandez MD, Swathi Wells MD,  Gracia Chen DO, Pilar So MD, Kayden Walters MD, Orlando Morgan DO, Yamilex Luciano MD,  Mando Burden DO, Gali Galarza MD, Georgie Workman MD, Jennifer Hill MD, Gloria Sierra MD,  Rafael Rawls MD, Denilson Murphy MD, Kamran Flores MD, Yazan Hughes MD, Theodore Grossman MD, Jaxon Villar MD, Ricky Lopez DO, Mickey Cabral DO, Trev Alex MD,  Arvin Powers MD, Shirley Waterhouse, CNP,  Nidia Lester CNP, Aj Castro, CNP,  Erin Shaw, DNP, Ivis Senior, CNP, Georgiana Freeman, CNP, Diandra Ruffin, CNP, Marilia Parker, CNP, Maureen Matamoros, PA-C, Meeta Titus PA-C, Sandrita Cruz, CNP, Nan Berrios, CNP, Veronica Alonzo, CNP, Areli Ware, CNP, Guadalupe Jaimes, CNP, Radhika Gillespie, CNS, Adriane Jimenez, CNP, Sarah Shi CNP, Tracy Schwab, CNP         [facility]   IN-PATIENT SERVICE   [facility]    Progress Note    6/4/2024    1:37 PM    Name:   Elaina Champagne  MRN:     [id number]     Acct:      [id number]   Room:   321/321-01   Day:  7  Admit Date:  5/27/2024  9:43 PM    PCP:   Robin Ly MD  Code Status:  Full Code    Subjective:     C/C:   Chief Complaint   Patient presents with    Leg Pain     Bilat lower leg pain with recent diagnosis of cellulitis     Interval History Status: significantly improved.    Patient seen and examined.  Resting comfortably in bed.  Denied acute events overnight.  Remained afebrile, vitals within normal range.  Lab work reviewed potassium 3.6 replaced.  Creatinine 0.5.  Glucose 88.  No leukocytosis, hemoglobin 9.2.  Stable   working on discharge.  Pre-CERT still in process for [facility]    Brief History:     This is a 73-year-old  5/28/2024 Yes    Acute delirium 5/29/2024 Yes    Hematoma of leg, right, initial encounter 6/1/2024 Yes    Elevated troponin 5/30/2024 Yes    Acute blood loss anemia 5/31/2024 Yes   Plan:        Elevated troponin. Cardiology on board. Echo with normal left ventricular ejection fraction with grade 2 diastolic dysfunction.    Venous insufficiency of bilateral lower extremities with right lower extremity hematoma.  Started on broad-spectrum antibiotics with vancomycin and cefepime. ID does not suspect infection. Abx discontinued. Concern for hematoma.  CT demonstrated right calf hematoma.  General surgery consulted.  Drained hematoma no active sign of bleeding and no infection.  Outpatient follow-up.     Delirium.  Resolved.     Acute kidney injury possibly superimposed on chronic kidney disease stage III.  Resolved.      Hyponatremia.  Resolved.     Hypokalemia.  Replaced.     Hypertension.  Stable.  Continue Lopressor to 50 mg twice daily.     Hyperlipidemia.  Continue Lipitor 40 mg nightly.     Paroxysmal atrial fibrillation with RVR.  Continue Lopressor to 25 mg twice daily.  Continue Eliquis.     CAD.  Continue Lipitor 40 mg nightly and Lopressor 25 mg twice daily.  Hemoglobin stable on aspirin and Plavix will continue.     Anxiety and depression.  Continue Lexapro 20 mg daily     Morbid obesity.  Encourage lifestyle modification with diet and exercise.     DVT prophylaxis: Resume Eliquis.  GI prophylaxis: Protonix.    Discharge planning:  working on discharge, pre-CERT process.  Discharge order was placed on 6/2/2024.    Theodore Grossman MD  6/4/2024  1:37 PM

## 2024-06-04 NOTE — PROGRESS NOTES
Occupational Therapy  Facility/Department: [address]  Occupational Therapy Initial Assessment    Name: Elaina Champagne  : 1950  MRN: [id number]  Date of Service: 2024      Discharge Recommendations:  Patient would benefit from continued therapy after discharge  OT Equipment Recommendations  Other: TBD       Patient Diagnosis(es): The primary encounter diagnosis was Bilateral lower leg cellulitis. A diagnosis of Elevated troponin was also pertinent to this visit.  Past Medical History:  has a past medical history of Bell's palsy, Cellulitis, Hypertension, and NSTEMI (non-ST elevated myocardial infarction) (HCC).  Past Surgical History:  has a past surgical history that includes Hysterectomy, total abdominal (); Cataract removal (Bilateral, ); incision and drainage (Left, 2017); Total knee arthroplasty (Left, 2017); Knee Arthroplasty (Right, 2018); ventral hernia repair (2019); IR NONTUNNELED VASCULAR CATHETER > 5 YEARS (12/15/2023); Upper gastrointestinal endoscopy (N/A, 2023); Cardiac procedure (N/A, 2024); Cardiac procedure (N/A, 2024); Upper gastrointestinal endoscopy (N/A, 2024); Coronary angioplasty with stent (2024); and Cardiac procedure (N/A, 2024).           Assessment   Performance deficits / Impairments: Decreased safe awareness;Decreased cognition;Decreased balance;Decreased ADL status;Decreased functional mobility ;Decreased strength;Decreased endurance;Decreased sensation  Assessment: Pt presents with decreased ADL status secondary to above noted deficits, most significantly decreased strength, decreased balance, and decreased activity tolerance. Pt to benefit from continued therapy services while hospitalized to maximize pt's safety and independence in performing functional and ADL  tasks. At this time, pt has not demonstrated the functional ability to safely return to prior living arrangements, continued skilled OT  intact  Following Commands: Follows multistep commands with increased time;Follows multistep commands with repitition  Attention Span: Attends with cues to redirect  Memory: Appears intact  Safety Judgement: Decreased awareness of need for assistance;Decreased awareness of need for safety  Problem Solving: Decreased awareness of errors;Assistance required to implement solutions;Assistance required to identify errors made  Insights: Decreased awareness of deficits  Initiation: Requires cues for some  Sequencing: Requires cues for some  Orientation  Overall Orientation Status: Within Functional Limits  Orientation Level: Oriented X4                  Education Given To: Patient  Education Provided: Role of Therapy;Plan of Care (Activity Promotion, Bed Mobility Techniques, Safety with Transfers/RW Mngt, Safety Awareness/Fall Prevention, ADL Techniques)  Education Method: Demonstration;Verbal  Barriers to Learning: None  Education Outcome: Verbalized understanding;Continued education needed                                                         AM-PAC - ADL  AM-PAC Daily Activity - Inpatient   How much help is needed for putting on and taking off regular lower body clothing?: A Lot  How much help is needed for bathing (which includes washing, rinsing, drying)?: A Lot  How much help is needed for toileting (which includes using toilet, bedpan, or urinal)?: A Lot  How much help is needed for putting on and taking off regular upper body clothing?: A Little  How much help is needed for taking care of personal grooming?: None  How much help for eating meals?: None  AM-PAC Inpatient Daily Activity Raw Score: 17  AM-PAC Inpatient ADL T-Scale Score : 37.26  ADL Inpatient CMS 0-100% Score: 50.11  ADL Inpatient CMS G-Code Modifier : CK           Goals  Short Term Goals  Time Frame for Short Term Goals: 14 days pt will:  Short Term Goal 1: Complete bed mobility with CGA for assisted self care  Short Term Goal 2: Complete sup to

## 2024-06-04 NOTE — DISCHARGE INSTR - DIET

## 2024-06-04 NOTE — PLAN OF CARE
Problem: Discharge Planning  Goal: Discharge to home or other facility with appropriate resources  6/3/2024 2120 by Mary Anne Jimenez RN  Outcome: Progressing  Flowsheets (Taken 6/1/2024 0800 by SURJIT HUMPHREY)  Discharge to home or other facility with appropriate resources:   Identify barriers to discharge with patient and caregiver   Arrange for needed discharge resources and transportation as appropriate   Identify discharge learning needs (meds, wound care, etc)   Refer to discharge planning if patient needs post-hospital services based on physician order or complex needs related to functional status, cognitive ability or social support system  6/3/2024 1719 by Basilia Mckinnon RN  Outcome: Progressing     Problem: Pain  Goal: Verbalizes/displays adequate comfort level or baseline comfort level  6/3/2024 2120 by [facility] Jimenez RN  Outcome: Progressing  Flowsheets (Taken 6/2/2024 1109 by SURJIT HUMPHREY)  Verbalizes/displays adequate comfort level or baseline comfort level:   Encourage patient to monitor pain and request assistance   Assess pain using appropriate pain scale   Administer analgesics based on type and severity of pain and evaluate response   Implement non-pharmacological measures as appropriate and evaluate response   Notify Licensed Independent Practitioner if interventions unsuccessful or patient reports new pain  6/3/2024 1719 by Basilia Mckinnon RN  Outcome: Progressing     Problem: Skin/Tissue Integrity  Goal: Absence of new skin breakdown  Description: 1.  Monitor for areas of redness and/or skin breakdown  2.  Assess vascular access sites hourly  3.  Every 4-6 hours minimum:  Change oxygen saturation probe site  4.  Every 4-6 hours:  If on nasal continuous positive airway pressure, respiratory therapy assess nares and determine need for appliance change or resting period.  6/3/2024 2120 by [facility] Jimenez RN  Outcome: Progressing  Note: Skin assesssed for any new skin breakdown and encouraged to turn

## 2024-06-05 PROCEDURE — 6370000000 HC RX 637 (ALT 250 FOR IP): Performed by: NURSE PRACTITIONER

## 2024-06-05 PROCEDURE — 97530 THERAPEUTIC ACTIVITIES: CPT

## 2024-06-05 PROCEDURE — 51798 US URINE CAPACITY MEASURE: CPT

## 2024-06-05 PROCEDURE — 97110 THERAPEUTIC EXERCISES: CPT

## 2024-06-05 PROCEDURE — 6370000000 HC RX 637 (ALT 250 FOR IP): Performed by: STUDENT IN AN ORGANIZED HEALTH CARE EDUCATION/TRAINING PROGRAM

## 2024-06-05 PROCEDURE — 2060000000 HC ICU INTERMEDIATE R&B

## 2024-06-05 PROCEDURE — 2580000003 HC RX 258: Performed by: NURSE PRACTITIONER

## 2024-06-05 PROCEDURE — 51701 INSERT BLADDER CATHETER: CPT

## 2024-06-05 PROCEDURE — 99232 SBSQ HOSP IP/OBS MODERATE 35: CPT | Performed by: STUDENT IN AN ORGANIZED HEALTH CARE EDUCATION/TRAINING PROGRAM

## 2024-06-05 RX ADMIN — CLOPIDOGREL BISULFATE 75 MG: 75 TABLET ORAL at 09:14

## 2024-06-05 RX ADMIN — PANTOPRAZOLE SODIUM 40 MG: 40 TABLET, DELAYED RELEASE ORAL at 15:53

## 2024-06-05 RX ADMIN — ASPIRIN 81 MG: 81 TABLET, COATED ORAL at 09:14

## 2024-06-05 RX ADMIN — SUCRALFATE 1 G: 1 TABLET ORAL at 20:01

## 2024-06-05 RX ADMIN — SODIUM CHLORIDE, PRESERVATIVE FREE 10 ML: 5 INJECTION INTRAVENOUS at 20:01

## 2024-06-05 RX ADMIN — METOPROLOL TARTRATE 25 MG: 25 TABLET, FILM COATED ORAL at 09:14

## 2024-06-05 RX ADMIN — PANTOPRAZOLE SODIUM 40 MG: 40 TABLET, DELAYED RELEASE ORAL at 06:00

## 2024-06-05 RX ADMIN — ACETAMINOPHEN 650 MG: 325 TABLET ORAL at 09:14

## 2024-06-05 RX ADMIN — ACETAMINOPHEN 650 MG: 325 TABLET ORAL at 23:14

## 2024-06-05 RX ADMIN — ATORVASTATIN CALCIUM 40 MG: 40 TABLET, FILM COATED ORAL at 20:01

## 2024-06-05 RX ADMIN — SUCRALFATE 1 G: 1 TABLET ORAL at 15:53

## 2024-06-05 RX ADMIN — APIXABAN 5 MG: 5 TABLET, FILM COATED ORAL at 09:15

## 2024-06-05 RX ADMIN — TROSPIUM CHLORIDE 20 MG: 20 TABLET, FILM COATED ORAL at 15:53

## 2024-06-05 RX ADMIN — FOLIC ACID 1 MG: 1 TABLET ORAL at 09:14

## 2024-06-05 RX ADMIN — Medication 3 MG: at 20:01

## 2024-06-05 RX ADMIN — FUROSEMIDE 40 MG: 20 TABLET ORAL at 09:15

## 2024-06-05 RX ADMIN — TROSPIUM CHLORIDE 20 MG: 20 TABLET, FILM COATED ORAL at 06:00

## 2024-06-05 RX ADMIN — APIXABAN 5 MG: 5 TABLET, FILM COATED ORAL at 20:01

## 2024-06-05 RX ADMIN — ESCITALOPRAM OXALATE 20 MG: 10 TABLET ORAL at 09:14

## 2024-06-05 RX ADMIN — ACETAMINOPHEN 650 MG: 325 TABLET ORAL at 16:46

## 2024-06-05 RX ADMIN — SUCRALFATE 1 G: 1 TABLET ORAL at 06:00

## 2024-06-05 RX ADMIN — SODIUM CHLORIDE, PRESERVATIVE FREE 10 ML: 5 INJECTION INTRAVENOUS at 09:15

## 2024-06-05 ASSESSMENT — PAIN DESCRIPTION - ORIENTATION
ORIENTATION: LEFT
ORIENTATION: LEFT

## 2024-06-05 ASSESSMENT — PAIN SCALES - GENERAL
PAINLEVEL_OUTOF10: 4
PAINLEVEL_OUTOF10: 3
PAINLEVEL_OUTOF10: 0
PAINLEVEL_OUTOF10: 1
PAINLEVEL_OUTOF10: 2
PAINLEVEL_OUTOF10: 3

## 2024-06-05 ASSESSMENT — ENCOUNTER SYMPTOMS
ABDOMINAL PAIN: 0
COUGH: 0
COLOR CHANGE: 1
SHORTNESS OF BREATH: 0
VOMITING: 0
NAUSEA: 0

## 2024-06-05 ASSESSMENT — PAIN DESCRIPTION - LOCATION
LOCATION: LEG
LOCATION: LEG

## 2024-06-05 NOTE — CARE COORDINATION
MOISES called and left message requesting call back for Leslee with [facility] [phone]. CM requested update on the status of patient's precert and insurance status.    1400- CM spoke with Leslee from [place] and she states that patient's  was given a financial agreement form to complete and 6 months of bank statements were requested. Leslee states that once bank statements and financial agreement is received they will accept patient back under her medicare benefits. MOISES called and spoke with patient's  Eze and he states that he will go to the bank today and take documents to [facility]. Will call transportation request sent to [facility].

## 2024-06-05 NOTE — PLAN OF CARE
Problem: Discharge Planning  Goal: Discharge to home or other facility with appropriate resources  6/4/2024 2020 by [facility] Jimenez RN  Outcome: Progressing  Flowsheets (Taken 6/4/2024 0820 by Cecilia Ramon, RN)  Discharge to home or other facility with appropriate resources:   Identify barriers to discharge with patient and caregiver   Arrange for needed discharge resources and transportation as appropriate   Identify discharge learning needs (meds, wound care, etc)   Refer to discharge planning if patient needs post-hospital services based on physician order or complex needs related to functional status, cognitive ability or social support system  6/4/2024 1923 by Cecilia Ramon, RN  Outcome: Progressing  Flowsheets (Taken 6/4/2024 0820)  Discharge to home or other facility with appropriate resources:   Identify barriers to discharge with patient and caregiver   Arrange for needed discharge resources and transportation as appropriate   Identify discharge learning needs (meds, wound care, etc)   Refer to discharge planning if patient needs post-hospital services based on physician order or complex needs related to functional status, cognitive ability or social support system     Problem: Pain  Goal: Verbalizes/displays adequate comfort level or baseline comfort level  6/4/2024 2020 by [facility] Jimenez RN  Outcome: Progressing  Flowsheets (Taken 6/2/2024 1109 by SURJIT HUMPHREY)  Verbalizes/displays adequate comfort level or baseline comfort level:   Encourage patient to monitor pain and request assistance   Assess pain using appropriate pain scale   Administer analgesics based on type and severity of pain and evaluate response   Implement non-pharmacological measures as appropriate and evaluate response   Notify Licensed Independent Practitioner if interventions unsuccessful or patient reports new pain  6/4/2024 1923 by Cecilia Ramon, RN  Outcome: Progressing     Problem: Skin/Tissue Integrity  Goal: Absence of

## 2024-06-05 NOTE — PROGRESS NOTES
[facility]  Office: [phone]  Nael Morgan DO, Ronny Quevedo DO, Pb Vásquez DO, Refugio Bradley DO, Sheldon Smith MD, Elsa Torres MD, Leandro Espinosa MD, Joann Hauser MD,  Dipak Montana MD, Rob Hernandez MD, Swathi Wells MD,  Gracia Chen DO, Pilar So MD, Kayden Walters MD, Orlando Morgan DO, Yamilex Luciano MD,  Mando Burden DO, Gali Galarza MD, Georgie Workman MD, Jennifer Hill MD, Gloria Sierra MD,  Rafael Rawls MD, Denilson Murphy MD, Kamran Flores MD, Yazan Hughes MD, Theodore Grossman MD, Jaxon Villar MD, Ricky Lopez DO, Mickey Cabral DO, Trev Alex MD,  Arvin Powers MD, Shirley Waterhouse, CNP,  Nidia Lester CNP, Aj Castro, CNP,  Erin Shaw, DNP, Ivis Senior, CNP, Georgiana Freeman, CNP, Diandra Ruffin, CNP, Marilia Parker, CNP, Maureen Matamoros, PA-C, Meeta Titus PA-C, Sandrita Cruz, CNP, Nan Berrios, CNP, Veronica Alonzo, CNP, Areli Ware, CNP, Guadalupe Jaimes, CNP, Radhika Gillespie, CNS, Adriane Jimenez, CNP, Sarah Shi CNP, Tracy Schwab, CNP         [facility]   IN-PATIENT SERVICE   [facility]    Progress Note    6/5/2024    3:51 PM    Name:   Elaina Champagne  MRN:     [id number]     Acct:      [id number]   Room:   321/321-01   Day:  8  Admit Date:  5/27/2024  9:43 PM    PCP:   Robin Ly MD  Code Status:  Full Code    Subjective:     C/C:   Chief Complaint   Patient presents with    Leg Pain     Bilat lower leg pain with recent diagnosis of cellulitis     Interval History Status: significantly improved.    Patient seen and examined this morning. Resting in recliner.  No acute events overnight.  Getting wound care.  RN reported concern for urinary retention, as patient was requiring frequent bladder scan and straight cath.  Apparently patient is on [place], will discontinue that and monitor for urinary retention.  If patient continues to retain urine, will consult  Cellulitis, Hypertension, and NSTEMI (non-ST elevated myocardial infarction) (HCC).    Social History:   reports that she has never smoked. She has never been exposed to tobacco smoke. She has never used smokeless tobacco. She reports that she does not drink alcohol and does not use drugs.     Family History:   Family History   Problem Relation Age of Onset    Cancer Mother     High Blood Pressure Father     Stroke Father     Ovarian Cancer Daughter     Cancer Daughter     Diabetes Maternal Aunt     Cancer Other         daughter/ovarian cancer       Vitals:  BP (!) 102/48   Pulse 58   Temp 97.7 °F (36.5 °C) (Oral)   Resp 16   Ht 1.549 m (5' 1\")   Wt 98.4 kg (216 lb 14.9 oz)   SpO2 96%   BMI 40.99 kg/m²   Temp (24hrs), Av.8 °F (36.6 °C), Min:97.3 °F (36.3 °C), Max:98.2 °F (36.8 °C)    No results for input(s): \"POCGLU\" in the last 72 hours.    I/O (24Hr):    Intake/Output Summary (Last 24 hours) at 2024 1551  Last data filed at 2024 0600  Gross per 24 hour   Intake --   Output 800 ml   Net -800 ml         Labs:  Hematology:  Recent Labs     24  0644 24  0644   WBC 3.0* 3.5   RBC 3.22* 3.22*   HGB 8.9* 9.2*   HCT 27.5* 27.8*   MCV 85.3 86.3   MCH 27.6 28.5   MCHC 32.4 33.0   RDW 18.4* 19.5*    341   MPV 7.2 7.3       Chemistry:  Recent Labs     24  0644 24  0644    137   K 3.7 3.6*    104   CO2 27 26   GLUCOSE 87 88   BUN 7* 9   CREATININE 0.4* 0.5   MG 1.8 1.8   ANIONGAP 7* 7*   LABGLOM >90 >90   CALCIUM 8.2* 8.1*       No results for input(s): \"PROT\", \"LABALBU\", \"LABA1C\", \"M0OQUOA\", \"H0YTMVL\", \"FT4\", \"TSH\", \"AST\", \"ALT\", \"LDH\", \"GGT\", \"ALKPHOS\", \"BILITOT\", \"BILIDIR\", \"AMMONIA\", \"AMYLASE\", \"LIPASE\", \"LACTATE\", \"CHOL\", \"HDL\", \"CHOLHDLRATIO\", \"TRIG\", \"VLDL\", \"EAS91SO\", \"PHENYTOIN\", \"PHENYF\", \"URICACID\", \"POCGLU\" in the last 72 hours.    Invalid input(s): \"LABGGT\", \"LDLCHOLESTEROL\"    ABG:  Lab Results   Component Value Date/Time    PH 6.0 2018 11:53 AM

## 2024-06-05 NOTE — PLAN OF CARE
Problem: Discharge Planning  Goal: Discharge to home or other facility with appropriate resources  Outcome: Progressing  Flowsheets (Taken 6/5/2024 4486)  Discharge to home or other facility with appropriate resources:   Identify barriers to discharge with patient and caregiver   Arrange for needed discharge resources and transportation as appropriate   Arrange for interpreters to assist at discharge as needed   Refer to discharge planning if patient needs post-hospital services based on physician order or complex needs related to functional status, cognitive ability or social support system   Identify discharge learning needs (meds, wound care, etc)     Problem: Pain  Goal: Verbalizes/displays adequate comfort level or baseline comfort level  Outcome: Progressing  Note: Pain scale preformed per protocol and pt treated for pain as documented. Education given.       Problem: Skin/Tissue Integrity  Goal: Absence of new skin breakdown  Description: 1.  Monitor for areas of redness and/or skin breakdown  2.  Assess vascular access sites hourly  3.  Every 4-6 hours minimum:  Change oxygen saturation probe site  4.  Every 4-6 hours:  If on nasal continuous positive airway pressure, respiratory therapy assess nares and determine need for appliance change or resting period.  Outcome: Progressing  Note: Skin assessment preformed. Pt turned every 2 hours with heals elevated off bed. [place] mattress in place. Will continue to monitor.      Problem: Safety - Adult  Goal: Free from fall injury  Outcome: Progressing  Note: Fall assessment preformed. Bed in low locked position with call light and tray table within reach. Education given. Will continue to monitor.      Problem: Confusion  Goal: Confusion, delirium, dementia, or psychosis is improved or at baseline  Description: INTERVENTIONS:  1. Assess for possible contributors to thought disturbance, including medications, impaired vision or hearing, underlying metabolic  abnormalities, dehydration, psychiatric diagnoses, and notify attending LIP  2. [place] high risk fall precautions, as indicated  3. Provide frequent short contacts to provide reality reorientation, refocusing and direction  4. Decrease environmental stimuli, including noise as appropriate  5. Monitor and intervene to maintain adequate nutrition, hydration, elimination, sleep and activity  6. If unable to ensure safety without constant attention obtain sitter and review sitter guidelines with assigned personnel  7. Initiate Psychosocial CNS and Spiritual Care consult, as indicated  Outcome: Progressing  Flowsheets (Taken 6/5/2024 0915)  Effect of thought disturbance (confusion, delirium, dementia, or psychosis) are managed with adequate functional status:   Assess for contributors to thought disturbance, including medications, impaired vision or hearing, underlying metabolic abnormalities, dehydration, psychiatric diagnoses, notify LIP   [place] high risk fall precautions, as indicated   Provide frequent short contacts to provide reality reorientation, refocusing and direction   Decrease environmental stimuli, including noise as appropriate   Monitor and intervene to maintain adequate nutrition, hydration, elimination, sleep and activity   If unable to ensure safety without constant attention obtain sitter and review sitter guidelines with assigned personnel

## 2024-06-05 NOTE — PLAN OF CARE
Problem: Discharge Planning  Goal: Discharge to home or other facility with appropriate resources  6/5/2024 1943 by Mary Anne Jimenez, RN  Outcome: Progressing  Flowsheets (Taken 6/5/2024 0915 by Brandee Lobato, RN)  Discharge to home or other facility with appropriate resources:   Identify barriers to discharge with patient and caregiver   Arrange for needed discharge resources and transportation as appropriate   Arrange for interpreters to assist at discharge as needed   Refer to discharge planning if patient needs post-hospital services based on physician order or complex needs related to functional status, cognitive ability or social support system   Identify discharge learning needs (meds, wound care, etc)  6/5/2024 1600 by Brandee Lobato, RN  Outcome: Progressing  Flowsheets (Taken 6/5/2024 0915)  Discharge to home or other facility with appropriate resources:   Identify barriers to discharge with patient and caregiver   Arrange for needed discharge resources and transportation as appropriate   Arrange for interpreters to assist at discharge as needed   Refer to discharge planning if patient needs post-hospital services based on physician order or complex needs related to functional status, cognitive ability or social support system   Identify discharge learning needs (meds, wound care, etc)     Problem: Pain  Goal: Verbalizes/displays adequate comfort level or baseline comfort level  6/5/2024 1943 by Mary Anne Jimneez, RN  Outcome: Progressing  Flowsheets (Taken 6/2/2024 1109 by SURJIT HUMPHREY)  Verbalizes/displays adequate comfort level or baseline comfort level:   Encourage patient to monitor pain and request assistance   Assess pain using appropriate pain scale   Administer analgesics based on type and severity of pain and evaluate response   Implement non-pharmacological measures as appropriate and evaluate response   Notify Licensed Independent Practitioner if interventions unsuccessful or patient reports new  [place] high risk fall precautions, as indicated  3. Provide frequent short contacts to provide reality reorientation, refocusing and direction  4. Decrease environmental stimuli, including noise as appropriate  5. Monitor and intervene to maintain adequate nutrition, hydration, elimination, sleep and activity  6. If unable to ensure safety without constant attention obtain sitter and review sitter guidelines with assigned personnel  7. Initiate Psychosocial CNS and Spiritual Care consult, as indicated  6/5/2024 1600 by Brandee Lobato, RN  Outcome: Progressing  Flowsheets (Taken 6/5/2024 0915)  Effect of thought disturbance (confusion, delirium, dementia, or psychosis) are managed with adequate functional status:   Assess for contributors to thought disturbance, including medications, impaired vision or hearing, underlying metabolic abnormalities, dehydration, psychiatric diagnoses, notify LIP   [place] high risk fall precautions, as indicated   Provide frequent short contacts to provide reality reorientation, refocusing and direction   Decrease environmental stimuli, including noise as appropriate   Monitor and intervene to maintain adequate nutrition, hydration, elimination, sleep and activity   If unable to ensure safety without constant attention obtain sitter and review sitter guidelines with assigned personnel

## 2024-06-05 NOTE — PROGRESS NOTES
Occupational Therapy  Facility/Department: [address]  Occupational Therapy Daily Treatment Note    Name: Elaina Champagne  : 1950  MRN: [id number]  Date of Service: 2024    Discharge Recommendations:  Patient would benefit from continued therapy after discharge  OT Equipment Recommendations  Other: TBD     Patient Diagnosis(es): The primary encounter diagnosis was Bilateral lower leg cellulitis. A diagnosis of Elevated troponin was also pertinent to this visit.  Past Medical History:  has a past medical history of Bell's palsy, Cellulitis, Hypertension, and NSTEMI (non-ST elevated myocardial infarction) (HCC).  Past Surgical History:  has a past surgical history that includes Hysterectomy, total abdominal (); Cataract removal (Bilateral, ); incision and drainage (Left, 2017); Total knee arthroplasty (Left, 2017); Knee Arthroplasty (Right, 2018); ventral hernia repair (2019); IR NONTUNNELED VASCULAR CATHETER > 5 YEARS (12/15/2023); Upper gastrointestinal endoscopy (N/A, 2023); Cardiac procedure (N/A, 2024); Cardiac procedure (N/A, 2024); Upper gastrointestinal endoscopy (N/A, 2024); Coronary angioplasty with stent (2024); and Cardiac procedure (N/A, 2024).    Assessment   Performance deficits / Impairments: Decreased safe awareness;Decreased cognition;Decreased balance;Decreased ADL status;Decreased functional mobility ;Decreased strength;Decreased endurance;Decreased sensation    Assessment: Pt presents with decreased ADL status secondary to above noted deficits, most significantly decreased strength, decreased balance, and decreased activity tolerance. Pt to benefit from continued therapy services while hospitalized to maximize pt's safety and independence in performing functional and ADL  tasks. At this time, pt has not demonstrated the functional ability to safely return to prior living arrangements, continued skilled OT intervention

## 2024-06-05 NOTE — PROGRESS NOTES
Therapy;Plan of Care  Education Provided Comments: importance of mobilty and LE exercises  Education Method: Verbal  Barriers to Learning: None  Education Outcome: Verbalized understanding;Demonstrated understanding;Continued education needed    Therapy Time   Individual Concurrent Group Co-treatment   Time In 1417         Time Out 1440         Minutes 23         Timed Code Treatment Minutes: 23 Minutes     Evaluation/treatment performed by Student PT under the supervision of co-signing PT who agrees with all evaluation/treatment and documentation.      Dejon Millan, SPT    Kristy Urbano, PT

## 2024-06-06 PROCEDURE — 6370000000 HC RX 637 (ALT 250 FOR IP): Performed by: NURSE PRACTITIONER

## 2024-06-06 PROCEDURE — 2580000003 HC RX 258: Performed by: NURSE PRACTITIONER

## 2024-06-06 PROCEDURE — 51798 US URINE CAPACITY MEASURE: CPT

## 2024-06-06 PROCEDURE — 97530 THERAPEUTIC ACTIVITIES: CPT

## 2024-06-06 PROCEDURE — 99232 SBSQ HOSP IP/OBS MODERATE 35: CPT | Performed by: STUDENT IN AN ORGANIZED HEALTH CARE EDUCATION/TRAINING PROGRAM

## 2024-06-06 PROCEDURE — 6370000000 HC RX 637 (ALT 250 FOR IP): Performed by: STUDENT IN AN ORGANIZED HEALTH CARE EDUCATION/TRAINING PROGRAM

## 2024-06-06 PROCEDURE — 2060000000 HC ICU INTERMEDIATE R&B

## 2024-06-06 PROCEDURE — 97110 THERAPEUTIC EXERCISES: CPT

## 2024-06-06 RX ORDER — LIDOCAINE 4 G/G
1 PATCH TOPICAL DAILY
Status: DISCONTINUED | OUTPATIENT
Start: 2024-06-07 | End: 2024-06-07 | Stop reason: HOSPADM

## 2024-06-06 RX ORDER — LIDOCAINE 4 G/G
1 PATCH TOPICAL DAILY
Status: DISCONTINUED | OUTPATIENT
Start: 2024-06-07 | End: 2024-06-07 | Stop reason: SDUPTHER

## 2024-06-06 RX ADMIN — ESCITALOPRAM OXALATE 20 MG: 10 TABLET ORAL at 09:19

## 2024-06-06 RX ADMIN — SODIUM CHLORIDE, PRESERVATIVE FREE 10 ML: 5 INJECTION INTRAVENOUS at 09:21

## 2024-06-06 RX ADMIN — SUCRALFATE 1 G: 1 TABLET ORAL at 12:02

## 2024-06-06 RX ADMIN — FOLIC ACID 1 MG: 1 TABLET ORAL at 09:19

## 2024-06-06 RX ADMIN — CLOPIDOGREL BISULFATE 75 MG: 75 TABLET ORAL at 09:19

## 2024-06-06 RX ADMIN — ACETAMINOPHEN 650 MG: 325 TABLET ORAL at 21:23

## 2024-06-06 RX ADMIN — APIXABAN 5 MG: 5 TABLET, FILM COATED ORAL at 21:23

## 2024-06-06 RX ADMIN — ACETAMINOPHEN 650 MG: 325 TABLET ORAL at 16:00

## 2024-06-06 RX ADMIN — Medication 3 MG: at 21:23

## 2024-06-06 RX ADMIN — FUROSEMIDE 40 MG: 20 TABLET ORAL at 09:19

## 2024-06-06 RX ADMIN — PANTOPRAZOLE SODIUM 40 MG: 40 TABLET, DELAYED RELEASE ORAL at 16:00

## 2024-06-06 RX ADMIN — SUCRALFATE 1 G: 1 TABLET ORAL at 05:32

## 2024-06-06 RX ADMIN — ACETAMINOPHEN 650 MG: 325 TABLET ORAL at 09:21

## 2024-06-06 RX ADMIN — APIXABAN 5 MG: 5 TABLET, FILM COATED ORAL at 09:19

## 2024-06-06 RX ADMIN — SODIUM CHLORIDE, PRESERVATIVE FREE 10 ML: 5 INJECTION INTRAVENOUS at 21:20

## 2024-06-06 RX ADMIN — ATORVASTATIN CALCIUM 40 MG: 40 TABLET, FILM COATED ORAL at 21:22

## 2024-06-06 RX ADMIN — ASPIRIN 81 MG: 81 TABLET, COATED ORAL at 09:19

## 2024-06-06 RX ADMIN — SUCRALFATE 1 G: 1 TABLET ORAL at 21:22

## 2024-06-06 RX ADMIN — SUCRALFATE 1 G: 1 TABLET ORAL at 16:00

## 2024-06-06 RX ADMIN — PANTOPRAZOLE SODIUM 40 MG: 40 TABLET, DELAYED RELEASE ORAL at 05:32

## 2024-06-06 ASSESSMENT — ENCOUNTER SYMPTOMS
VOMITING: 0
SHORTNESS OF BREATH: 0
ABDOMINAL PAIN: 0
COLOR CHANGE: 1
COUGH: 0
NAUSEA: 0

## 2024-06-06 ASSESSMENT — PAIN SCALES - GENERAL
PAINLEVEL_OUTOF10: 4
PAINLEVEL_OUTOF10: 8
PAINLEVEL_OUTOF10: 3
PAINLEVEL_OUTOF10: 5

## 2024-06-06 ASSESSMENT — PAIN DESCRIPTION - DESCRIPTORS
DESCRIPTORS: ACHING;ITCHING
DESCRIPTORS: DISCOMFORT
DESCRIPTORS: DISCOMFORT;ACHING

## 2024-06-06 ASSESSMENT — PAIN DESCRIPTION - LOCATION
LOCATION: LEG

## 2024-06-06 ASSESSMENT — PAIN DESCRIPTION - ORIENTATION
ORIENTATION: RIGHT;LEFT
ORIENTATION: LEFT
ORIENTATION: RIGHT;LEFT

## 2024-06-06 NOTE — PLAN OF CARE
Problem: Discharge Planning  Goal: Discharge to home or other facility with appropriate resources  Outcome: Progressing     Problem: Pain  Goal: Verbalizes/displays adequate comfort level or baseline comfort level  Outcome: Progressing     Problem: Skin/Tissue Integrity  Goal: Absence of new skin breakdown  Description: 1.  Monitor for areas of redness and/or skin breakdown  2.  Assess vascular access sites hourly  3.  Every 4-6 hours minimum:  Change oxygen saturation probe site  4.  Every 4-6 hours:  If on nasal continuous positive airway pressure, respiratory therapy assess nares and determine need for appliance change or resting period.  Outcome: Progressing     Problem: Safety - Adult  Goal: Free from fall injury  Outcome: Progressing     Problem: ABCDS Injury Assessment  Goal: Absence of physical injury  Outcome: Progressing     Problem: Confusion  Goal: Confusion, delirium, dementia, or psychosis is improved or at baseline  Description: INTERVENTIONS:  1. Assess for possible contributors to thought disturbance, including medications, impaired vision or hearing, underlying metabolic abnormalities, dehydration, psychiatric diagnoses, and notify attending LIP  2. [place] high risk fall precautions, as indicated  3. Provide frequent short contacts to provide reality reorientation, refocusing and direction  4. Decrease environmental stimuli, including noise as appropriate  5. Monitor and intervene to maintain adequate nutrition, hydration, elimination, sleep and activity  6. If unable to ensure safety without constant attention obtain sitter and review sitter guidelines with assigned personnel  7. Initiate Psychosocial CNS and Spiritual Care consult, as indicated  Outcome: Progressing

## 2024-06-06 NOTE — PROGRESS NOTES
[facility]  Office: [phone]  Nael Morgan DO, Ronny Quevedo DO, Pb Vásquez DO, Refugio Bradley DO, Sheldon Smith MD, Elsa Torres MD, Leandro Espinosa MD, Joann Hauser MD,  Dipak Montana MD, Rob Hernandez MD, Swathi Wells MD,  Gracia Chen DO, Pilar So MD, Kayden Walters MD, Orlando Morgan DO, Yamilex Luciano MD,  Mando Burden DO, Gali Galarza MD, Georgie Workman MD, Jennifer Hill MD, Gloria Sierra MD,  Rafael Rawls MD, Denilson Murphy MD, Kamran Flores MD, Yazan Hughes MD, Theodore Grossman MD, Jaxon Villar MD, Ricky Lopez DO, Mickey Cabral DO, Trev Alex MD,  Arvin Powers MD, Shirley Waterhouse, CNP,  Nidia Lester CNP, Aj Castro, CNP,  Erin Shaw, DNP, Ivis Senior, CNP, Georgiana Freeman, CNP, Diandra Ruffin, CNP, Marilia Parker, CNP, Maureen Matamoros, PA-C, Meeat Titus PA-C, Sandrita Cruz, CNP, Nan Berrios, CNP, Veronica Alonzo, CNP, Areli Ware, CNP, Guadalupe Jaimes, CNP, Radhika Gillespie, CNS, Adriane Jimenez, CNP, Sarah Shi CNP, Tracy Schwab, CNP         [facility]   IN-PATIENT SERVICE   [facility]    Progress Note    6/6/2024    11:09 AM    Name:   Elaina Champagne  MRN:     [id number]     Acct:      [id number]   Room:   321/321-01   Day:  9  Admit Date:  5/27/2024  9:43 PM    PCP:   Robin Ly MD  Code Status:  Full Code    Subjective:     C/C:   Chief Complaint   Patient presents with    Leg Pain     Bilat lower leg pain with recent diagnosis of cellulitis     Interval History Status: significantly improved.    Patient seen and examined this morning, resting in her bed.   bedside.  No acute issues overnight.  Patient is urinating well, no concern for urinary retention.  RN reported that patient did not require any bladder scan overnight.     on board, anticipate discharge in next 24 hours to [facility].    Brief History:     This is a 73-year-old female

## 2024-06-06 NOTE — CARE COORDINATION
spoke to Leslee with [facility] [place]. Patient has not been approved by financial office to return to facility. Transport canceled and rescheduled for 1230 tomorrow 6/7/2024. Will follow in the morning to determine if patient can return.

## 2024-06-06 NOTE — CARE COORDINATION
MOISES spoke with patient and she states that her  will be taking all needed financial information to [facility] at 9am today. CM set up pending transportation for 1230 with [employer].    1000- CM called and left message for Leslee with [facility] [phone] and requested call back to confirm that patient's  completed all necessary documents for patient to return today.    1430- CM called and left message requesting call back for Leslee with [facility]

## 2024-06-06 NOTE — PROGRESS NOTES
having L knee pain 4/10; neck pain in sitting 6/10 limiting functional mobility, reduced to 4/10 once back in bed with BLE elevated on pillows and pt's neck positioned for comfort. RN notified. Also reports having dizziness with supine to sit.              Cognition   Orientation  Overall Orientation Status: Within Functional Limits  Orientation Level: Oriented X4  Cognition  Overall Cognitive Status: Exceptions  Arousal/Alertness: Appears intact  Following Commands: Follows multistep commands with increased time  Attention Span: Attends with cues to redirect  Memory: Appears intact  Safety Judgement: Decreased awareness of need for assistance;Decreased awareness of need for safety  Problem Solving: Decreased awareness of errors;Assistance required to implement solutions;Assistance required to identify errors made;Assistance required to correct errors made  Insights: Decreased awareness of deficits  Initiation: Requires cues for some  Sequencing: Requires cues for some     Objective      Observation/Palpation  Posture: Fair (forward head posture with increased thoracic kyphosis)            Bed mobility  Supine to Sit: Minimal assistance  Sit to Supine: Minimal assistance  Scooting: Minimal assistance  Bed Mobility Comments: HOB up, much time spent from supine to sit due to dizziness; pt educated on gaze modification technique; per pt, this is chronic; sat EOB for at least 8 minutes, SBA for balance with cues on simple neck exercises within dizziness and neck pain limits; pt has forward head posture with increased thoracic kyphosis, pt able to minimally correct posture; no report of headache or vision changes  Transfers  Sit to Stand: Minimal Assistance  Stand to Sit: Minimal Assistance  Comment: cues for hand placement, pt only able to tolerate brief standing at bedside with RW due to neck pain; no report of dizziness in standing  Ambulation  Comments: pt only able to tolerate brief standing at bedside with RW due  changes  Education Method: Verbal;Demonstration  Barriers to Learning: None  Education Outcome: Verbalized understanding;Demonstrated understanding;Continued education needed      Therapy Time   Individual Concurrent Group Co-treatment   Time In 1451         Time Out 1531         Minutes 40         Timed Code Treatment Minutes: 38 Minutes       STONE GUERRERO, PT

## 2024-06-06 NOTE — CARE COORDINATION
called to Afshan with [facility] admissions. Paperwork has been sent to [facility] for approval. Not sure if everything was completed or not. Will call back when she has an answer. [facility]00. Will need to cancel if not approved before then.

## 2024-06-07 ENCOUNTER — CARE COORDINATION (OUTPATIENT)
Dept: CARE COORDINATION | Age: 74
End: 2024-06-07

## 2024-06-07 VITALS
HEART RATE: 69 BPM | TEMPERATURE: 98.2 F | RESPIRATION RATE: 18 BRPM | SYSTOLIC BLOOD PRESSURE: 116 MMHG | DIASTOLIC BLOOD PRESSURE: 52 MMHG | BODY MASS INDEX: 41.17 KG/M2 | OXYGEN SATURATION: 96 % | HEIGHT: 61 IN | WEIGHT: 218.03 LBS

## 2024-06-07 PROCEDURE — 6370000000 HC RX 637 (ALT 250 FOR IP): Performed by: NURSE PRACTITIONER

## 2024-06-07 PROCEDURE — 6370000000 HC RX 637 (ALT 250 FOR IP): Performed by: STUDENT IN AN ORGANIZED HEALTH CARE EDUCATION/TRAINING PROGRAM

## 2024-06-07 PROCEDURE — 2580000003 HC RX 258: Performed by: NURSE PRACTITIONER

## 2024-06-07 PROCEDURE — 99232 SBSQ HOSP IP/OBS MODERATE 35: CPT | Performed by: STUDENT IN AN ORGANIZED HEALTH CARE EDUCATION/TRAINING PROGRAM

## 2024-06-07 RX ADMIN — ACETAMINOPHEN 650 MG: 325 TABLET ORAL at 03:33

## 2024-06-07 RX ADMIN — CLOPIDOGREL BISULFATE 75 MG: 75 TABLET ORAL at 08:05

## 2024-06-07 RX ADMIN — SUCRALFATE 1 G: 1 TABLET ORAL at 06:27

## 2024-06-07 RX ADMIN — ASPIRIN 81 MG: 81 TABLET, COATED ORAL at 08:04

## 2024-06-07 RX ADMIN — PANTOPRAZOLE SODIUM 40 MG: 40 TABLET, DELAYED RELEASE ORAL at 06:27

## 2024-06-07 RX ADMIN — ESCITALOPRAM OXALATE 20 MG: 10 TABLET ORAL at 08:05

## 2024-06-07 RX ADMIN — SUCRALFATE 1 G: 1 TABLET ORAL at 12:05

## 2024-06-07 RX ADMIN — FUROSEMIDE 40 MG: 20 TABLET ORAL at 08:04

## 2024-06-07 RX ADMIN — METOPROLOL TARTRATE 12.5 MG: 25 TABLET, FILM COATED ORAL at 08:05

## 2024-06-07 RX ADMIN — FOLIC ACID 1 MG: 1 TABLET ORAL at 08:05

## 2024-06-07 RX ADMIN — APIXABAN 5 MG: 5 TABLET, FILM COATED ORAL at 08:05

## 2024-06-07 RX ADMIN — ERGOCALCIFEROL 50000 UNITS: 1.25 CAPSULE ORAL at 08:04

## 2024-06-07 RX ADMIN — ACETAMINOPHEN 650 MG: 325 TABLET ORAL at 13:05

## 2024-06-07 RX ADMIN — SODIUM CHLORIDE, PRESERVATIVE FREE 10 ML: 5 INJECTION INTRAVENOUS at 08:05

## 2024-06-07 ASSESSMENT — PAIN SCALES - GENERAL
PAINLEVEL_OUTOF10: 6
PAINLEVEL_OUTOF10: 5

## 2024-06-07 ASSESSMENT — PAIN DESCRIPTION - DESCRIPTORS
DESCRIPTORS: ACHING
DESCRIPTORS: ACHING;DISCOMFORT

## 2024-06-07 ASSESSMENT — PAIN SCALES - WONG BAKER
WONGBAKER_NUMERICALRESPONSE: HURTS A LITTLE BIT

## 2024-06-07 ASSESSMENT — ENCOUNTER SYMPTOMS
ABDOMINAL PAIN: 0
SHORTNESS OF BREATH: 0
COUGH: 0
VOMITING: 0
COLOR CHANGE: 1
NAUSEA: 0

## 2024-06-07 ASSESSMENT — PAIN DESCRIPTION - ORIENTATION: ORIENTATION: RIGHT;LEFT

## 2024-06-07 ASSESSMENT — PAIN DESCRIPTION - LOCATION
LOCATION: NECK;LEG
LOCATION: LEG

## 2024-06-07 NOTE — CARE COORDINATION
Received message from Linda at [facility] that financial approval has come through to return.  Preparing transport packet.  Patient's nurse and grand daughter Margo informed of planned transport to [facility] today at 1230.    1107-Received call from Afhsan at [facility] and they will expect patient today after 1230.

## 2024-06-07 NOTE — PLAN OF CARE
Problem: Discharge Planning  Goal: Discharge to home or other facility with appropriate resources  6/7/2024 0609 by Maggie Valdez, RN  Outcome: Progressing  Flowsheets  Taken 6/7/2024 0400  Discharge to home or other facility with appropriate resources:   Identify barriers to discharge with patient and caregiver   Arrange for needed discharge resources and transportation as appropriate   Identify discharge learning needs (meds, wound care, etc)   Refer to discharge planning if patient needs post-hospital services based on physician order or complex needs related to functional status, cognitive ability or social support system  Taken 6/7/2024 0000  Discharge to home or other facility with appropriate resources:   Identify barriers to discharge with patient and caregiver   Arrange for needed discharge resources and transportation as appropriate   Identify discharge learning needs (meds, wound care, etc)   Refer to discharge planning if patient needs post-hospital services based on physician order or complex needs related to functional status, cognitive ability or social support system  Taken 6/6/2024 2040  Discharge to home or other facility with appropriate resources:   Identify barriers to discharge with patient and caregiver   Arrange for needed discharge resources and transportation as appropriate   Identify discharge learning needs (meds, wound care, etc)   Refer to discharge planning if patient needs post-hospital services based on physician order or complex needs related to functional status, cognitive ability or social support system     Problem: Pain  Goal: Verbalizes/displays adequate comfort level or baseline comfort level  6/7/2024 0609 by Maggie Valdez, RN  Outcome: Progressing  Flowsheets (Taken 6/6/2024 2122)  Verbalizes/displays adequate comfort level or baseline comfort level:   Encourage patient to monitor pain and request assistance   Assess pain using appropriate pain scale   Administer analgesics

## 2024-06-07 NOTE — CARE COORDINATION
She had another admission 5/27-6/7 for cellulitis bilat lower legs. Discharged back to [facility]. Writer will call there in a few weeks to check on discharge plans.    Future Appointments   Date Time Provider Department Center   7/1/2024  1:45 PM Robin Ly MD [facility]   7/2/2024  1:30 PM STV VASCULAR LAB 2 [facility]   7/16/2024  1:15 PM [facility]

## 2024-06-07 NOTE — PROGRESS NOTES
Pt discharged via wheelchair and transport, IV removed, Tele removed all personal belongings sent with patient including dentures.     Voicemail left with admissions nurse for report. Awaiting call back.

## 2024-06-07 NOTE — PROGRESS NOTES
Plan:        Elevated troponin. S/p Cardiology evaluation.  Echo with normal left ventricular ejection fraction with grade 2 diastolic dysfunction.    Venous insufficiency of bilateral lower extremities with right lower extremity hematoma.  S/p broad-spectrum antibiotics with vancomycin and cefepime. ID does not suspect infection. Abx discontinued. Concern for hematoma.  CT demonstrated right calf hematoma.  General surgery consulted.  Drained hematoma no active sign of bleeding and no infection.  Outpatient follow-up.     Delirium.  Resolved.     Acute kidney injury possibly superimposed on chronic kidney disease stage III.  Resolved.      Hyponatremia.  Resolved.     Hypokalemia.  Replaced.     Hypertension.  Stable.  Continue Lopressor to 50 mg twice daily.     Hyperlipidemia.  Continue Lipitor 40 mg nightly.     Paroxysmal atrial fibrillation with RVR.  Continue Lopressor to 25 mg twice daily.  Continue Eliquis.     CAD.  Continue Lipitor 40 mg nightly and Lopressor 25 mg twice daily.  Hemoglobin stable on aspirin and Plavix will continue.     Anxiety and depression.  Continue Lexapro 20 mg daily     Urinary retention: Holding off on Vesicare/Sanctura, follow-up on bladder scan.  If patient continues to retain urine.  We will consult urology.  Morbid obesity.  Encourage lifestyle modification with diet and exercise.     DVT prophylaxis: Resume Eliquis.  GI prophylaxis: Protonix.    Discharge planning: Anticipate discharge in next 24 hours, going to [facility].   on board.    Theodore Grossman MD  6/7/2024  1:04 PM

## 2024-06-07 NOTE — DISCHARGE SUMMARY
[facility]  Office: [phone]  Nael Morgan DO, Ronny Quevedo DO, Pb Vásquez DO, Refugio Bradley DO, Sheldon Smith MD, Elsa Torres MD, Leandro Espinosa MD, Joann Hauser MD,  Dipak Montana MD, Rob Hernandez MD, Mickey Cabral DO, Swathi Wells MD,  Gracia Cehn DO, Pilar So MD, Kayden Walters MD, Orlando Morgan DO, Yamilex Luciano MD,  Mando Burden DO, Gali Galarza MD, Georgie Workman MD, Jennifer Hill MD, Gloria Sierra MD,  Rafael Rawls MD, Denilson Murphy MD, Kamran Flores MD, Ricky Lopez DO, Trev Alex MD,  Arvin Powers MD, Shirley Waterhouse, CNP,  Nidia Lester, CNP, Areli Ware, CNP, Aj Castro, CNP,  Erin Shaw, DNP, Ivis Senior, CNP, Georgiana Freeman, CNP, Guadalupe Jaimes, CNP, Diandra Ruffin, CNP, Marilia Parker, CNP, Dejon Maher PA-C, Radhika Gillespie, CNS, Adriane Jimenez, CNP, Sarah Shi, CNP         [facility]   IN-PATIENT SERVICE   [facility]    Discharge Summary     Patient ID: Elaina Champagne  :  1950   MRN: [id number]     ACCOUNT:  [id number]   Patient's PCP: Robin Ly MD  Admit Date: 2024   Discharge Date: 2024  Length of Stay: 10  Code Status:  Full Code  Admitting Physician: No admitting provider for patient encounter.  Discharge Physician: Theodore Grossman MD     Active Discharge Diagnoses:     Hospital Problem Lists:  Principal Problem:    Cellulitis  Active Problems:    Atrial fibrillation with rapid ventricular response (HCC)    Venous insufficiency of both lower extremities    Depression with anxiety    Primary hypertension    Hyperlipidemia    Morbid obesity (HCC)    PAF (paroxysmal atrial fibrillation) (HCC)    MICHELLE (acute kidney injury) (HCC)    Elevated C-reactive protein (CRP)    Normocytic anemia    Anticoagulated    Atherosclerotic heart disease of native coronary artery with unspecified angina pectoris    Chronic kidney disease, stage 3 unspecified (HCC)    Acute  dose and echo was ordered. ID evaluated patient with low suspicion for infection and more of a concern for hematoma. CT scan demonstrated hematoma with right tibial artery occlusion. Discussed with vascular surgery nothing to do for the tibial artery occlusion the recommended general surgery evaluation for hematoma.  General surgery evaluated patient awaiting recommendations.  General surgery evaluated patient and drained hematoma with approximately 20 mL removed.  No active sign of bleeding and no active infection. General surgery signed off.  Infectious disease signed off as they believed there was no infection either.  She significantly improved with plan to go back to SNF.    Significant therapeutic interventions:   Elevated troponin. S/p Cardiology evaluation.  Echo with normal left ventricular ejection fraction with grade 2 diastolic dysfunction.     Venous insufficiency of bilateral lower extremities with right lower extremity hematoma.  S/p broad-spectrum antibiotics with vancomycin and cefepime. ID does not suspect infection. Abx discontinued. Concern for hematoma.  CT demonstrated right calf hematoma.  General surgery consulted.  Drained hematoma no active sign of bleeding and no infection.  Outpatient follow-up.     Delirium.  Resolved.     Acute kidney injury possibly superimposed on chronic kidney disease stage III.  Resolved.      Hyponatremia.  Resolved.      Hypokalemia.  Replaced.     Hypertension.  Stable.  Continue Lopressor to 50 mg twice daily.     Hyperlipidemia.  Continue Lipitor 40 mg nightly.     Paroxysmal atrial fibrillation with RVR.  Continue Lopressor to 25 mg twice daily.  Continue Eliquis.     CAD.  Continue Lipitor 40 mg nightly and Lopressor 25 mg twice daily.  Hemoglobin stable on aspirin and Plavix will continue.     Anxiety and depression.  Continue Lexapro 20 mg daily     Urinary retention: Holding off on Vesicare/Sanctura, follow-up on bladder scan.  If patient continues to

## 2024-06-11 ENCOUNTER — COMMUNITY CARE MANAGEMENT (OUTPATIENT)
Facility: CLINIC | Age: 74
End: 2024-06-11

## 2024-06-14 NOTE — PROCEDURES
"Chief Complaint   Patient presents with    Ent Problem     Pt here for rock in left nostril.       Temp 97  F (36.1  C) (Temporal)   Ht 2' 11.83\" (91 cm)   Wt 27 lb 12.5 oz (12.6 kg)   BMI 15.22 kg/m      Reyna Saleh    " [facility]        Date:   5/6/2024  Patient name: Elaina Champagne  Date of admission:  5/6/2024 10:32 AM  MRN:   [id number]  YOB: 1950    PCI to RCA    Operators:  Primary: Maria Teresa Rodriguez MD.  Assistant:     Indications for PCI: High-grade coronary lesion with unstable angina    Procedure performed: PCI to RCA    Access: Right Radial artery      Procedure: After informed consent was obtained with explanation of the risks and benefits, patient was brought to the cath lab. The right wrist was prepped and draped in sterile fashion. 1% lidocaine was used for local block. The Radial artery was cannulated with 6  Fr sheath with brisk arterial blood return. The side port was frequently flushed and aspirated with normal saline.    EBL is 15 mL    Dominance is right    Findings:        RCA: 80% mid stenosis, length is 14 mm, RAVINDRA-3 flow, underwent RAJAN using 3.0 x 18 mm Chace stent with 0% residual stenosis and RAVINDRA-3 flow        Conclusions:  Successful PCI with RAJAN to mid RCA    Recommendation:  Routine post PCI orders  Medical treatments.  Risk factors modifications.        Electronically signed by Maria Teresa Rodriguez MD on 5/6/2024 at 3:13 PM      [facility]  [phone]

## 2024-06-20 ENCOUNTER — HOSPITAL ENCOUNTER (INPATIENT)
Age: 74
LOS: 2 days | Discharge: NURSING, REHAB OR LONG-TERM CARE | End: 2024-06-22
Attending: EMERGENCY MEDICINE | Admitting: INTERNAL MEDICINE
Payer: COMMERCIAL

## 2024-06-20 ENCOUNTER — APPOINTMENT (OUTPATIENT)
Dept: GENERAL RADIOLOGY | Age: 74
End: 2024-06-20
Payer: COMMERCIAL

## 2024-06-20 DIAGNOSIS — I48.91 ATRIAL FIBRILLATION WITH RVR (HCC): Primary | ICD-10-CM

## 2024-06-20 DIAGNOSIS — E87.6 HYPOKALEMIA: ICD-10-CM

## 2024-06-20 DIAGNOSIS — D64.9 ANEMIA, UNSPECIFIED TYPE: ICD-10-CM

## 2024-06-20 DIAGNOSIS — R07.9 ACUTE CHEST PAIN: ICD-10-CM

## 2024-06-20 DIAGNOSIS — L03.119 CELLULITIS OF LOWER EXTREMITY, UNSPECIFIED LATERALITY: ICD-10-CM

## 2024-06-20 LAB
ALBUMIN SERPL-MCNC: 2.9 G/DL (ref 3.5–5.2)
ALBUMIN/GLOB SERPL: 0.7 {RATIO} (ref 1–2.5)
ALP SERPL-CCNC: 92 U/L (ref 35–104)
ALT SERPL-CCNC: 8 U/L (ref 5–33)
ANION GAP SERPL CALCULATED.3IONS-SCNC: 12 MMOL/L (ref 9–17)
AST SERPL-CCNC: 15 U/L
BASOPHILS # BLD: 0 K/UL (ref 0–0.2)
BASOPHILS NFR BLD: 0 % (ref 0–2)
BILIRUB SERPL-MCNC: 0.4 MG/DL (ref 0.3–1.2)
BUN SERPL-MCNC: 26 MG/DL (ref 8–23)
CALCIUM SERPL-MCNC: 8.9 MG/DL (ref 8.6–10.4)
CHLORIDE SERPL-SCNC: 93 MMOL/L (ref 98–107)
CO2 SERPL-SCNC: 30 MMOL/L (ref 20–31)
CREAT SERPL-MCNC: 0.9 MG/DL (ref 0.5–0.9)
EOSINOPHIL # BLD: 0 K/UL (ref 0–0.4)
EOSINOPHILS RELATIVE PERCENT: 0 % (ref 1–4)
ERYTHROCYTE [DISTWIDTH] IN BLOOD BY AUTOMATED COUNT: 18.9 % (ref 12.5–15.4)
GFR, ESTIMATED: 68 ML/MIN/1.73M2
GLUCOSE SERPL-MCNC: 93 MG/DL (ref 70–99)
HCT VFR BLD AUTO: 23.6 % (ref 36–46)
HGB BLD-MCNC: 7.7 G/DL (ref 12–16)
IMM RETICS NFR: 20.7 % (ref 2.7–18.3)
INR PPP: 1.2
LACTATE BLDV-SCNC: 2 MMOL/L (ref 0.5–2.2)
LYMPHOCYTES NFR BLD: 0.67 K/UL (ref 1–4.8)
LYMPHOCYTES RELATIVE PERCENT: 12 % (ref 24–44)
MAGNESIUM SERPL-MCNC: 1.6 MG/DL (ref 1.6–2.6)
MCH RBC QN AUTO: 27.4 PG (ref 26–34)
MCHC RBC AUTO-ENTMCNC: 32.7 G/DL (ref 31–37)
MCV RBC AUTO: 83.6 FL (ref 80–100)
MONOCYTES NFR BLD: 0.34 K/UL (ref 0.1–0.8)
MONOCYTES NFR BLD: 6 % (ref 1–7)
MORPHOLOGY: ABNORMAL
MORPHOLOGY: ABNORMAL
NEUTROPHILS NFR BLD: 82 % (ref 36–66)
NEUTS SEG NFR BLD: 4.59 K/UL (ref 1.8–7.7)
PARTIAL THROMBOPLASTIN TIME: 37.7 SEC (ref 21.3–31.3)
PLATELET # BLD AUTO: 449 K/UL (ref 140–450)
PMV BLD AUTO: 7.4 FL (ref 6–12)
POTASSIUM SERPL-SCNC: 3 MMOL/L (ref 3.7–5.3)
PROT SERPL-MCNC: 7.1 G/DL (ref 6.4–8.3)
PROTHROMBIN TIME: 13.1 SEC (ref 9.4–12.6)
RBC # BLD AUTO: 2.82 M/UL (ref 4–5.2)
RETIC HEMOGLOBIN: 23.9 PG (ref 28.2–35.7)
RETICS # AUTO: 0.03 M/UL (ref 0.03–0.08)
RETICS/RBC NFR AUTO: 1 % (ref 0.5–1.9)
SODIUM SERPL-SCNC: 135 MMOL/L (ref 135–144)
TROPONIN I SERPL HS-MCNC: 47 NG/L (ref 0–14)
TROPONIN I SERPL HS-MCNC: 63 NG/L (ref 0–14)
TROPONIN I SERPL HS-MCNC: 74 NG/L (ref 0–14)
WBC OTHER # BLD: 5.6 K/UL (ref 3.5–11)

## 2024-06-20 PROCEDURE — 96374 THER/PROPH/DIAG INJ IV PUSH: CPT

## 2024-06-20 PROCEDURE — 71045 X-RAY EXAM CHEST 1 VIEW: CPT

## 2024-06-20 PROCEDURE — 2580000003 HC RX 258: Performed by: INTERNAL MEDICINE

## 2024-06-20 PROCEDURE — 2500000003 HC RX 250 WO HCPCS: Performed by: NURSE PRACTITIONER

## 2024-06-20 PROCEDURE — 83605 ASSAY OF LACTIC ACID: CPT

## 2024-06-20 PROCEDURE — 83550 IRON BINDING TEST: CPT

## 2024-06-20 PROCEDURE — 2060000000 HC ICU INTERMEDIATE R&B

## 2024-06-20 PROCEDURE — 82607 VITAMIN B-12: CPT

## 2024-06-20 PROCEDURE — 85610 PROTHROMBIN TIME: CPT

## 2024-06-20 PROCEDURE — 93005 ELECTROCARDIOGRAM TRACING: CPT | Performed by: EMERGENCY MEDICINE

## 2024-06-20 PROCEDURE — 83735 ASSAY OF MAGNESIUM: CPT

## 2024-06-20 PROCEDURE — 84484 ASSAY OF TROPONIN QUANT: CPT

## 2024-06-20 PROCEDURE — 84145 PROCALCITONIN (PCT): CPT

## 2024-06-20 PROCEDURE — 99285 EMERGENCY DEPT VISIT HI MDM: CPT

## 2024-06-20 PROCEDURE — 6370000000 HC RX 637 (ALT 250 FOR IP): Performed by: INTERNAL MEDICINE

## 2024-06-20 PROCEDURE — 94761 N-INVAS EAR/PLS OXIMETRY MLT: CPT

## 2024-06-20 PROCEDURE — 85045 AUTOMATED RETICULOCYTE COUNT: CPT

## 2024-06-20 PROCEDURE — 85730 THROMBOPLASTIN TIME PARTIAL: CPT

## 2024-06-20 PROCEDURE — 6360000002 HC RX W HCPCS: Performed by: INTERNAL MEDICINE

## 2024-06-20 PROCEDURE — 6370000000 HC RX 637 (ALT 250 FOR IP): Performed by: EMERGENCY MEDICINE

## 2024-06-20 PROCEDURE — 36415 COLL VENOUS BLD VENIPUNCTURE: CPT

## 2024-06-20 PROCEDURE — 2500000003 HC RX 250 WO HCPCS: Performed by: EMERGENCY MEDICINE

## 2024-06-20 PROCEDURE — 87040 BLOOD CULTURE FOR BACTERIA: CPT

## 2024-06-20 PROCEDURE — 85025 COMPLETE CBC W/AUTO DIFF WBC: CPT

## 2024-06-20 PROCEDURE — 2500000003 HC RX 250 WO HCPCS: Performed by: INTERNAL MEDICINE

## 2024-06-20 PROCEDURE — 6360000002 HC RX W HCPCS: Performed by: EMERGENCY MEDICINE

## 2024-06-20 PROCEDURE — 83540 ASSAY OF IRON: CPT

## 2024-06-20 PROCEDURE — 80053 COMPREHEN METABOLIC PANEL: CPT

## 2024-06-20 PROCEDURE — 99223 1ST HOSP IP/OBS HIGH 75: CPT | Performed by: INTERNAL MEDICINE

## 2024-06-20 PROCEDURE — 2580000003 HC RX 258: Performed by: EMERGENCY MEDICINE

## 2024-06-20 PROCEDURE — 82746 ASSAY OF FOLIC ACID SERUM: CPT

## 2024-06-20 RX ORDER — SODIUM CHLORIDE 0.9 % (FLUSH) 0.9 %
10 SYRINGE (ML) INJECTION PRN
Status: DISCONTINUED | OUTPATIENT
Start: 2024-06-20 | End: 2024-06-22 | Stop reason: HOSPADM

## 2024-06-20 RX ORDER — SODIUM CHLORIDE 0.9 % (FLUSH) 0.9 %
5-40 SYRINGE (ML) INJECTION EVERY 12 HOURS SCHEDULED
Status: DISCONTINUED | OUTPATIENT
Start: 2024-06-20 | End: 2024-06-22 | Stop reason: HOSPADM

## 2024-06-20 RX ORDER — ONDANSETRON 4 MG/1
4 TABLET, ORALLY DISINTEGRATING ORAL EVERY 8 HOURS PRN
Status: DISCONTINUED | OUTPATIENT
Start: 2024-06-20 | End: 2024-06-22 | Stop reason: HOSPADM

## 2024-06-20 RX ORDER — SUCRALFATE 1 G/1
1 TABLET ORAL
Status: DISCONTINUED | OUTPATIENT
Start: 2024-06-20 | End: 2024-06-22 | Stop reason: HOSPADM

## 2024-06-20 RX ORDER — OXYBUTYNIN CHLORIDE 5 MG/1
10 TABLET, EXTENDED RELEASE ORAL DAILY
Status: DISCONTINUED | OUTPATIENT
Start: 2024-06-20 | End: 2024-06-22 | Stop reason: HOSPADM

## 2024-06-20 RX ORDER — POTASSIUM CHLORIDE 7.45 MG/ML
10 INJECTION INTRAVENOUS PRN
Status: DISCONTINUED | OUTPATIENT
Start: 2024-06-20 | End: 2024-06-22 | Stop reason: HOSPADM

## 2024-06-20 RX ORDER — TRAMADOL HYDROCHLORIDE 50 MG/1
50 TABLET ORAL EVERY 6 HOURS PRN
Status: DISCONTINUED | OUTPATIENT
Start: 2024-06-20 | End: 2024-06-22 | Stop reason: HOSPADM

## 2024-06-20 RX ORDER — POLYETHYLENE GLYCOL 3350 17 G/17G
17 POWDER, FOR SOLUTION ORAL DAILY PRN
Status: DISCONTINUED | OUTPATIENT
Start: 2024-06-20 | End: 2024-06-22 | Stop reason: HOSPADM

## 2024-06-20 RX ORDER — METOPROLOL TARTRATE 50 MG/1
50 TABLET, FILM COATED ORAL 2 TIMES DAILY
Status: DISCONTINUED | OUTPATIENT
Start: 2024-06-20 | End: 2024-06-21

## 2024-06-20 RX ORDER — ACETAMINOPHEN 650 MG/1
650 SUPPOSITORY RECTAL EVERY 6 HOURS PRN
Status: DISCONTINUED | OUTPATIENT
Start: 2024-06-20 | End: 2024-06-22 | Stop reason: HOSPADM

## 2024-06-20 RX ORDER — SODIUM CHLORIDE 9 MG/ML
INJECTION, SOLUTION INTRAVENOUS PRN
Status: DISCONTINUED | OUTPATIENT
Start: 2024-06-20 | End: 2024-06-22 | Stop reason: HOSPADM

## 2024-06-20 RX ORDER — 0.9 % SODIUM CHLORIDE 0.9 %
500 INTRAVENOUS SOLUTION INTRAVENOUS ONCE
Status: COMPLETED | OUTPATIENT
Start: 2024-06-20 | End: 2024-06-20

## 2024-06-20 RX ORDER — ACETAMINOPHEN 325 MG/1
650 TABLET ORAL EVERY 6 HOURS PRN
Status: DISCONTINUED | OUTPATIENT
Start: 2024-06-20 | End: 2024-06-22 | Stop reason: HOSPADM

## 2024-06-20 RX ORDER — LANOLIN ALCOHOL/MO/W.PET/CERES
3 CREAM (GRAM) TOPICAL NIGHTLY
Status: DISCONTINUED | OUTPATIENT
Start: 2024-06-20 | End: 2024-06-22 | Stop reason: HOSPADM

## 2024-06-20 RX ORDER — DEXTROSE MONOHYDRATE, SODIUM CHLORIDE, AND POTASSIUM CHLORIDE 50; 1.49; 4.5 G/1000ML; G/1000ML; G/1000ML
INJECTION, SOLUTION INTRAVENOUS CONTINUOUS
Status: DISCONTINUED | OUTPATIENT
Start: 2024-06-20 | End: 2024-06-20

## 2024-06-20 RX ORDER — ASPIRIN 81 MG/1
81 TABLET ORAL DAILY
Status: DISCONTINUED | OUTPATIENT
Start: 2024-06-20 | End: 2024-06-21

## 2024-06-20 RX ORDER — ACETAMINOPHEN 325 MG/1
650 TABLET ORAL EVERY 6 HOURS PRN
Status: DISCONTINUED | OUTPATIENT
Start: 2024-06-20 | End: 2024-06-20 | Stop reason: SDUPTHER

## 2024-06-20 RX ORDER — MAGNESIUM SULFATE IN WATER 40 MG/ML
2000 INJECTION, SOLUTION INTRAVENOUS ONCE
Status: COMPLETED | OUTPATIENT
Start: 2024-06-20 | End: 2024-06-21

## 2024-06-20 RX ORDER — ONDANSETRON 2 MG/ML
4 INJECTION INTRAMUSCULAR; INTRAVENOUS EVERY 6 HOURS PRN
Status: DISCONTINUED | OUTPATIENT
Start: 2024-06-20 | End: 2024-06-22 | Stop reason: HOSPADM

## 2024-06-20 RX ORDER — ATORVASTATIN CALCIUM 40 MG/1
40 TABLET, FILM COATED ORAL NIGHTLY
Status: DISCONTINUED | OUTPATIENT
Start: 2024-06-20 | End: 2024-06-22 | Stop reason: HOSPADM

## 2024-06-20 RX ORDER — ESCITALOPRAM OXALATE 10 MG/1
20 TABLET ORAL DAILY
Status: DISCONTINUED | OUTPATIENT
Start: 2024-06-20 | End: 2024-06-22 | Stop reason: HOSPADM

## 2024-06-20 RX ORDER — SODIUM CHLORIDE 9 MG/ML
INJECTION, SOLUTION INTRAVENOUS CONTINUOUS
Status: DISCONTINUED | OUTPATIENT
Start: 2024-06-20 | End: 2024-06-21

## 2024-06-20 RX ORDER — MAGNESIUM SULFATE 1 G/100ML
1000 INJECTION INTRAVENOUS PRN
Status: DISCONTINUED | OUTPATIENT
Start: 2024-06-20 | End: 2024-06-22 | Stop reason: HOSPADM

## 2024-06-20 RX ORDER — CLOPIDOGREL BISULFATE 75 MG/1
75 TABLET ORAL DAILY
Status: DISCONTINUED | OUTPATIENT
Start: 2024-06-20 | End: 2024-06-22 | Stop reason: HOSPADM

## 2024-06-20 RX ORDER — POTASSIUM CHLORIDE 20 MEQ/1
40 TABLET, EXTENDED RELEASE ORAL PRN
Status: DISCONTINUED | OUTPATIENT
Start: 2024-06-20 | End: 2024-06-22 | Stop reason: HOSPADM

## 2024-06-20 RX ORDER — FOLIC ACID 1 MG/1
1 TABLET ORAL DAILY
Status: DISCONTINUED | OUTPATIENT
Start: 2024-06-20 | End: 2024-06-22 | Stop reason: HOSPADM

## 2024-06-20 RX ORDER — PANTOPRAZOLE SODIUM 40 MG/1
40 TABLET, DELAYED RELEASE ORAL
Status: DISCONTINUED | OUTPATIENT
Start: 2024-06-20 | End: 2024-06-22 | Stop reason: HOSPADM

## 2024-06-20 RX ADMIN — APIXABAN 5 MG: 5 TABLET, FILM COATED ORAL at 22:36

## 2024-06-20 RX ADMIN — AMIODARONE HYDROCHLORIDE 150 MG: 1.5 INJECTION, SOLUTION INTRAVENOUS at 16:50

## 2024-06-20 RX ADMIN — AMIODARONE HYDROCHLORIDE 0.5 MG/MIN: 1.8 INJECTION, SOLUTION INTRAVENOUS at 22:27

## 2024-06-20 RX ADMIN — ANTI-FUNGAL POWDER MICONAZOLE NITRATE TALC FREE: 1.42 POWDER TOPICAL at 22:33

## 2024-06-20 RX ADMIN — Medication 3 MG: at 22:36

## 2024-06-20 RX ADMIN — SODIUM CHLORIDE, PRESERVATIVE FREE 10 ML: 5 INJECTION INTRAVENOUS at 22:34

## 2024-06-20 RX ADMIN — ATORVASTATIN CALCIUM 40 MG: 40 TABLET, FILM COATED ORAL at 22:36

## 2024-06-20 RX ADMIN — DILTIAZEM HYDROCHLORIDE 5 MG/HR: 5 INJECTION, SOLUTION INTRAVENOUS at 11:09

## 2024-06-20 RX ADMIN — SODIUM CHLORIDE: 9 INJECTION, SOLUTION INTRAVENOUS at 22:41

## 2024-06-20 RX ADMIN — SODIUM CHLORIDE 500 ML: 0.9 INJECTION, SOLUTION INTRAVENOUS at 15:45

## 2024-06-20 RX ADMIN — POTASSIUM BICARBONATE 40 MEQ: 782 TABLET, EFFERVESCENT ORAL at 12:16

## 2024-06-20 RX ADMIN — VANCOMYCIN HYDROCHLORIDE 1250 MG: 1.25 INJECTION, POWDER, LYOPHILIZED, FOR SOLUTION INTRAVENOUS at 12:20

## 2024-06-20 RX ADMIN — MAGNESIUM SULFATE HEPTAHYDRATE 2000 MG: 40 INJECTION, SOLUTION INTRAVENOUS at 22:44

## 2024-06-20 RX ADMIN — AMIODARONE HYDROCHLORIDE 1 MG/MIN: 1.8 INJECTION, SOLUTION INTRAVENOUS at 17:03

## 2024-06-20 ASSESSMENT — PAIN - FUNCTIONAL ASSESSMENT: PAIN_FUNCTIONAL_ASSESSMENT: NONE - DENIES PAIN

## 2024-06-20 ASSESSMENT — ENCOUNTER SYMPTOMS
VOMITING: 0
SHORTNESS OF BREATH: 0
COUGH: 0
BACK PAIN: 0
WHEEZING: 0
ABDOMINAL PAIN: 0
SORE THROAT: 0
DIARRHEA: 0
NAUSEA: 0

## 2024-06-20 ASSESSMENT — HEART SCORE: ECG: NON-SPECIFC REPOLARIZATION DISTURBANCE/LBTB/PM

## 2024-06-20 NOTE — ACP (ADVANCE CARE PLANNING)
Advance Care Planning     Advance Care Planning Activator (Inpatient)  Conversation Note      Date of ACP Conversation: 6/20/2024     Conversation Conducted with: Patient with Decision Making Capacity    ACP Activator: Marianna Sibley RN        Health Care Decision Maker: Spouse    Current Designated Health Care Decision Maker:     Primary Decision Maker: Eze Champagne - Spouse - [phone]  Click here to complete Healthcare Decision Makers including section of the Healthcare Decision Maker Relationship (ie \"Primary\")  Today we documented Decision Maker(s) consistent with Legal Next of Kin hierarchy.    Care Preferences    Ventilation:  \"If you were in your present state of health and suddenly became very ill and were unable to breathe on your own, what would your preference be about the use of a ventilator (breathing machine) if it were available to you?\"      Would the patient desire the use of ventilator (breathing machine)?: yes    \"If your health worsens and it becomes clear that your chance of recovery is unlikely, what would your preference be about the use of a ventilator (breathing machine) if it were available to you?\"     Would the patient desire the use of ventilator (breathing machine)?: Yes      Resuscitation  \"CPR works best to restart the heart when there is a sudden event, like a heart attack, in someone who is otherwise healthy. Unfortunately, CPR does not typically restart the heart for people who have serious health conditions or who are very sick.\"    \"In the event your heart stopped as a result of an underlying serious health condition, would you want attempts to be made to restart your heart (answer \"yes\" for attempt to resuscitate) or would you prefer a natural death (answer \"no\" for do not attempt to resuscitate)?\" yes       [] Yes   [x] No   Educated Patient / Decision Maker regarding differences between Advance Directives and portable DNR orders.    Length of ACP Conversation in minutes:

## 2024-06-20 NOTE — H&P
wheezing, rales or rhonchi, normal effort  Cardiovascular: irregular rate and rhythm, no murmur, gallop, rub  Abdomen: Soft, nontender, obese, normal bowel sounds, no hepatomegaly or splenomegaly  Neurologic: There are no new focal motor or sensory deficits, normal muscle tone and bulk, no abnormal sensation, normal speech, cranial nerves II through XII grossly intact  Skin: Dry skin of bilat shins, dressing c/d/I, patient refused to let me remove the dressings  Extremities:  peripheral pulses palpable, trace edema bilat legs  Psych: normal affect     Investigations:      Laboratory Testing:  Recent Results (from the past 24 hour(s))   CBC with Auto Differential    Collection Time: 06/20/24 10:24 AM   Result Value Ref Range    WBC 5.6 3.5 - 11.0 k/uL    RBC 2.82 (L) 4.0 - 5.2 m/uL    Hemoglobin 7.7 (L) 12.0 - 16.0 g/dL    Hematocrit 23.6 (L) 36 - 46 %    MCV 83.6 80 - 100 fL    MCH 27.4 26 - 34 pg    MCHC 32.7 31 - 37 g/dL    RDW 18.9 (H) 12.5 - 15.4 %    Platelets 449 140 - 450 k/uL    MPV 7.4 6.0 - 12.0 fL    Neutrophils % 82 (H) 36 - 66 %    Lymphocytes % 12 (L) 24 - 44 %    Monocytes % 6 1 - 7 %    Eosinophils % 0 (L) 1 - 4 %    Basophils % 0 0 - 2 %    Neutrophils Absolute 4.59 1.8 - 7.7 k/uL    Lymphocytes Absolute 0.67 (L) 1.0 - 4.8 k/uL    Monocytes Absolute 0.34 0.1 - 0.8 k/uL    Eosinophils Absolute 0.00 0.0 - 0.4 k/uL    Basophils Absolute 0.00 0.0 - 0.2 k/uL    Morphology 1+ POLYCHROMASIA     Morphology 1+ HYPOCHROMIA PRESENT    CMP    Collection Time: 06/20/24 10:24 AM   Result Value Ref Range    Sodium 135 135 - 144 mmol/L    Potassium 3.0 (L) 3.7 - 5.3 mmol/L    Chloride 93 (L) 98 - 107 mmol/L    CO2 30 20 - 31 mmol/L    Anion Gap 12 9 - 17 mmol/L    Glucose 93 70 - 99 mg/dL    BUN 26 (H) 8 - 23 mg/dL    Creatinine 0.9 0.5 - 0.9 mg/dL    Est, Glom Filt Rate 68 >60 mL/min/1.73m2    Calcium 8.9 8.6 - 10.4 mg/dL    Total Protein 7.1 6.4 - 8.3 g/dL    Albumin 2.9 (L) 3.5 - 5.2 g/dL    Albumin/Globulin  extremities 6/20/2024 Yes    Lymphedema 6/20/2024 Yes    Morbid obesity (HCC) 6/20/2024 Yes    Chronic kidney disease, stage 3b (HCC) 6/20/2024 Yes    Anemia 6/20/2024 Yes       Plan:     Patient status inpatient in the Progressive Unit/Step down    A.fib with RVR- on a Cardizem gtt, Consult Cardiology for assistance, resume Lopressor if BP can tolerate, con't IVF 75 ml/hr, on Eliquis  Hypokalemia/ Hypomagnesemia- replace  Anemia- Hb 7.7, check iron profile, Vit B12/folic acid level, retic, occult, since she is on chronic AC  CAD- hx stent, con't ASA, statin, Plavix  CKD 3- cr near baseline  Bilat lymphedema LE- wraps, wound care, elevate, on IV Vancomycin empirically, doubt active infection, add on procalcitonin and f/u BC x 2  MO- recommend diet and weight loss  DVT proph- on Eliquis  Gi proph- on Protonix    Consultations:   PHARMACY TO DOSE VANCOMYCIN  IP CONSULT TO CARDIOLOGY     Patient is admitted as inpatient status because of co-morbidities listed above, severity of signs and symptoms as outlined, requirement for current medical therapies and most importantly because of direct risk to patient if care not provided in a hospital setting.  Expected length of stay > 48 hours.    Elsa Torres MD  6/20/2024  9:41 PM    Copy sent to Dr. Ly, Robin Olivo MD

## 2024-06-20 NOTE — CARE COORDINATION
06/20/24 1600   Readmission Assessment   Number of Days since last admission? 8-30 days   Previous Disposition SNF   Who is being Interviewed Patient   What was the patient's/caregiver's perception as to why they think they needed to return back to the hospital? Other (Comment)  (cellulitis)   Did you visit your Primary Care Physician after you left the hospital, before you returned this time? Yes   Why weren't you able to visit your PCP? Other (Comment)  (SNF PCP)   Did you see a specialist, such as Cardiac, Pulmonary, Orthopedic Physician, etc. after you left the hospital? No   Who advised the patient to return to the hospital? Physician   Does the patient report anything that got in the way of taking their medications? No   In our efforts to provide the best possible care to you and others like you, can you think of anything that we could have done to help you after you left the hospital the first time, so that you might not have needed to return so soon? Teach back instructions regarding management of illness;Discharge instructions that are concise, clear, and non contradictory;Identify patient's health literacy needs

## 2024-06-20 NOTE — ED NOTES
Provider in for rectal exam - lab completing blood cultures. HSB to bedside; remains painfree at this time

## 2024-06-20 NOTE — CARE COORDINATION
Case Management Assessment  Initial Evaluation    Date/Time of Evaluation: 6/20/2024 4:36 PM  Assessment Completed by: Marianna Sibley RN    If patient is discharged prior to next notation, then this note serves as note for discharge by case management.    Patient Name: Elaina Champagne                   YOB: 1950  Diagnosis: Hypokalemia [E87.6]  Acute chest pain [R07.9]  Atrial fibrillation with rapid ventricular response (HCC) [I48.91]  Atrial fibrillation with RVR (HCC) [I48.91]  Cellulitis of lower extremity, unspecified laterality [L03.119]  Anemia, unspecified type [D64.9]                   Date / Time: 6/20/2024 10:15 AM    Patient Admission Status: Inpatient   Readmission Risk (Low < 19, Mod (19-27), High > 27): Readmission Risk Score: 27.4    Current PCP: Robin Ly MD  PCP verified by CM? Yes    Chart Reviewed: Yes      History Provided by: Patient  Patient Orientation: Alert and Oriented    Patient Cognition: Alert    Hospitalization in the last 30 days (Readmission):  Yes    If yes, Readmission Assessment in CM Navigator will be completed.    Advance Directives:      Code Status: Full Code   Patient's Primary Decision Maker is: Legal Next of Kin    Primary Decision Maker: Eze Champagne - Spouse - [phone]    Discharge Planning:    Patient lives with: Other (Comment) (SNF) Type of Home: Skilled Nursing Facility  Primary Care Giver: Other (Comment) (SNF)  Patient Support Systems include: Spouse/Significant Other, /   Current Financial resources: Medicare, Medicaid  Current community resources: ECF/Home Care  Current services prior to admission: Skilled Nursing Facility            Current DME:              Type of Home Care services:  PT, OT    ADLS  Prior functional level: Assistance with the following:, Bathing, Dressing, Toileting, Cooking, Housework, Shopping, Mobility  Current functional level: Other (see comment) (Await PT/ OT eval)    PT AM-PAC:    /24  OT AM-PAC:   /24    Family can provide assistance at DC: No  Would you like Case Management to discuss the discharge plan with any other family members/significant others, and if so, who?    Plans to Return to Present Housing: Yes  Other Identified Issues/Barriers to RETURNING to current housing: clinical status  Potential Assistance needed at discharge: Skilled Nursing Facility            Potential DME:    Patient expects to discharge to: Skilled nursing facility  Plan for transportation at discharge: Other (see comment) (stretcher)    Financial    Payor: [employer] [place] / [facility] / Product Type: *No Product type* /     Does insurance require precert for SNF: No    Potential assistance Purchasing Medications: No  Meds-to-Beds request:        [facility] [address] [place] [address] [id number] [facility] [phone] - F [phone]  [address] [facility] [address]  Phone: [phone] Fax: [phone]      Notes:    Factors facilitating achievement of predicted outcomes: Caregiver support, Cooperative, and Pleasant    Barriers to discharge: Limited insight into deficits, Decreased endurance, and Medical complications    Additional Case Management Notes:   CM spoke with patient to discuss discharge planning. Patient is long term care at [facility] and she confirmed that she would like to return at discharge. CM called and spoke with Leslee from [facility] and she confirmed that patient is a bed hold and able to return at discharge.    The Plan for Transition of Care is related to the following treatment goals of Hypokalemia [E87.6]  Acute chest pain [R07.9]  Atrial fibrillation with rapid ventricular response (HCC) [I48.91]  Atrial fibrillation with RVR (HCC) [I48.91]  Cellulitis of lower extremity, unspecified laterality [L03.119]  Anemia, unspecified type [D64.9]    IF APPLICABLE: The Patient and/or patient representative Elaina and her family were provided with a

## 2024-06-20 NOTE — ED NOTES
Assist x 3 in turning pt and changing brief; small dime size open decub to sacral area and redness/tenderness to jyoti/rectal area; cream and Mepiplex dressing applied and new brief applied. Repositioned in bed.

## 2024-06-20 NOTE — PROGRESS NOTES
[facility]   Pharmacy Pharmacokinetic Monitoring Service - Vancomycin     Elaina Champagne is a 73 y.o. female starting on vancomycin therapy for SSTI. Pharmacy consulted by Tosha Walls  for monitoring and adjustment.    Target Concentration: Goal AUC/RASHEL 400-600 mg*hr/L    Additional Antimicrobials: none    Pertinent Laboratory Values:   Wt Readings from Last 1 Encounters:   06/20/24 98 kg (216 lb)     Temp Readings from Last 1 Encounters:   06/20/24 98.2 °F (36.8 °C) (Oral)     Estimated Creatinine Clearance: 61 mL/min (based on SCr of 0.9 mg/dL).  Recent Labs     06/20/24  1024   CREATININE 0.9   BUN 26*   WBC 5.6     Procalcitonin:     Pertinent Cultures:  Culture Date Source Results        MRSA Nasal Swab: N/A. Non-respiratory infection.    Plan:  Dosing recommendations based on Bayesian software  Start vancomycin 1250 mg every 24 hours  Anticipated AUC of 515 and trough concentration of 14 at steady state  Renal labs as indicated   Vancomycin concentration ordered for TBD @ TBD   Pharmacy will continue to monitor patient and adjust therapy as indicated    Thank you for the consult,  DIONICIO TORREZ RPH  6/20/2024 11:55 AM

## 2024-06-20 NOTE — ED PROVIDER NOTES
Medication List as of 6/22/2024  1:39 PM        CONTINUE these medications which have NOT CHANGED    Details   folic acid (FOLVITE) 1 MG tablet Take 1 tablet by mouth daily, Disp-30 tablet, R-3DC to SNF      Vitamin D, Ergocalciferol, 35085 units CAPS Take 50,000 Units by mouth once a week for 7 doses, Disp-5 capsule, R-0DC to SNF      oxyBUTYnin (DITROPAN-XL) 10 MG extended release tablet Take 1 tablet by mouth dailyHistorical Med      lidocaine (HM LIDOCAINE PATCH) 4 % external patch Place 1 patch onto the skin daily Apply to left shoulder topically one time a day for pain, TransDERmal, DAILY, Historical Med      melatonin 3 MG TABS tablet Take 1 tablet by mouth at bedtimeHistorical Med      pantoprazole (PROTONIX) 40 MG tablet Take 1 tablet by mouth 2 times daily (before meals), Disp-30 tablet, R-3Normal      sucralfate (CARAFATE) 1 GM tablet Take 1 tablet by mouth 4 times daily (before meals and nightly), Disp-120 tablet, R-3Normal      aspirin 81 MG EC tablet Take 1 tablet by mouth daily No more than 30 days, Disp-30 tablet, R-0Normal      silver sulfADIAZINE (SILVADENE) 1 % cream Apply topically daily Apply topically daily., Topical, DAILY, Historical Med      apixaban (ELIQUIS) 5 MG TABS tablet Take 1 tablet by mouth 2 times daily, Disp-180 tablet, R-3Normal      clopidogrel (PLAVIX) 75 MG tablet Take 1 tablet by mouth daily, Disp-90 tablet, R-3Normal      atorvastatin (LIPITOR) 40 MG tablet Take 1 tablet by mouth nightly, Disp-90 tablet, R-3Normal      miconazole (MICOTIN) 2 % powder Apply topically 2 times daily., Disp-45 g, R-1, DC to SNF      acetaminophen (TYLENOL) 325 MG tablet Take 2 tablets by mouth every 6 hours as needed for PainHistorical Med      escitalopram (LEXAPRO) 20 MG tablet Take 1 tablet by mouth daily, Disp-90 tablet, R-3Normal             ALLERGIES     is allergic to norco [hydrocodone-acetaminophen] and penicillins.    FAMILY HISTORY     She indicated that the status of her mother is  distress.  Cooperative and pleasant.  Not in any distress nontoxic-appearing.   HENT:      Head: Normocephalic and atraumatic.      Nose: Nose normal.      Mouth/Throat:      Mouth: Mucous membranes are moist.   Eyes:      Extraocular Movements: Extraocular movements intact.      Pupils: Pupils are equal, round, and reactive to light.   Cardiovascular:      Rate and Rhythm: Tachycardia present. Rhythm irregular.      Pulses: Normal pulses.      Heart sounds: Normal heart sounds.      Comments: A-fib with RVR on the monitor in the 130s.  Pulmonary:      Effort: Pulmonary effort is normal.      Breath sounds: Normal breath sounds.   Abdominal:      General: Abdomen is flat. Bowel sounds are normal.      Palpations: Abdomen is soft.      Comments: No pulsatile mass   Genitourinary:     Rectum: Normal. Guaiac result negative.      Comments: There is 2 very small areas less than dime shaped wounds noted on the inner fold of the right buttock consistent with pressure ulcers  Musculoskeletal:         General: Normal range of motion.      Cervical back: Normal range of motion and neck supple.   Skin:     General: Skin is warm and dry.      Findings: Erythema present.      Comments: Bilateral lower extremities with poor venous stasis changes skin is dry cracked and flaky.  The left lower extremity with extension of the redness beyond that proximally of Ace wrap that was initially in place.  This was removed to see lots of scabbed areas in the posterior calf region.  Poor podiatric care but no involvement of the feet noted.  The right lower extremity with minimal erythema involvement of the feet noted.  The right lower extremity with minimal erythema but a quarter sized lesion in the lateral aspect of the mid calf with some oozing blood.   Neurological:      General: No focal deficit present.      Mental Status: She is alert and oriented to person, place, and time. Mental status is at baseline.   Psychiatric:         Mood and  Troponin, High Sensitivity 87 (HH) 0 - 14 ng/L   Basic Metabolic Panel w/ Reflex to MG   Result Value Ref Range    Sodium 134 (L) 135 - 144 mmol/L    Potassium 3.4 (L) 3.7 - 5.3 mmol/L    Chloride 96 (L) 98 - 107 mmol/L    CO2 28 20 - 31 mmol/L    Anion Gap 10 9 - 17 mmol/L    Glucose 94 70 - 99 mg/dL    BUN 15 8 - 23 mg/dL    Creatinine 0.6 0.5 - 0.9 mg/dL    Est, Glom Filt Rate >90 >60 mL/min/1.73m2    Calcium 8.6 8.6 - 10.4 mg/dL   CBC   Result Value Ref Range    WBC 11.0 3.5 - 11.0 k/uL    RBC 2.87 (L) 4.0 - 5.2 m/uL    Hemoglobin 8.0 (L) 12.0 - 16.0 g/dL    Hematocrit 24.0 (L) 36 - 46 %    MCV 83.8 80 - 100 fL    MCH 27.8 26 - 34 pg    MCHC 33.2 31 - 37 g/dL    RDW 19.1 (H) 12.5 - 15.4 %    Platelets 382 140 - 450 k/uL    MPV 7.7 6.0 - 12.0 fL   Magnesium   Result Value Ref Range    Magnesium 1.9 1.6 - 2.6 mg/dL   EKG 12 Lead   Result Value Ref Range    Ventricular Rate 119 BPM    Atrial Rate 119 BPM    P-R Interval 82 ms    QRS Duration 68 ms    Q-T Interval 348 ms    QTc Calculation (Bazett) 489 ms    R Axis -34 degrees    T Axis 41 degrees   EKG 12 Lead   Result Value Ref Range    Ventricular Rate 120 BPM    Atrial Rate 240 BPM    QRS Duration 64 ms    Q-T Interval 296 ms    QTc Calculation (Bazett) 418 ms    P Axis 47 degrees    R Axis -38 degrees    T Axis 49 degrees   EKG 12 Lead   Result Value Ref Range    Ventricular Rate 138 BPM    Atrial Rate 150 BPM    QRS Duration 72 ms    Q-T Interval 338 ms    QTc Calculation (Bazett) 512 ms    R Axis -42 degrees    T Axis 82 degrees       All other labs were within normal range or not returned as of this dictation.    RADIOLOGY:  XR CHEST PORTABLE   Final Result   No acute cardiopulmonary process. Cardiomegaly.             I have reviewed the disposition diagnosis with the patient and or their family/guardian.  I have answered their questions and givendischarge instructions.  They voiced understanding of these instructions and did not have any further questions

## 2024-06-21 PROBLEM — R07.9 ACUTE CHEST PAIN: Status: ACTIVE | Noted: 2024-06-21

## 2024-06-21 LAB
ANION GAP SERPL CALCULATED.3IONS-SCNC: 9 MMOL/L (ref 9–17)
B PARAP IS1001 DNA NPH QL NAA+NON-PROBE: NOT DETECTED
B PERT DNA SPEC QL NAA+PROBE: NOT DETECTED
BASOPHILS # BLD: 0.01 K/UL (ref 0–0.2)
BASOPHILS NFR BLD: 0 % (ref 0–2)
BUN SERPL-MCNC: 17 MG/DL (ref 8–23)
C PNEUM DNA NPH QL NAA+NON-PROBE: NOT DETECTED
CALCIUM SERPL-MCNC: 8.1 MG/DL (ref 8.6–10.4)
CHLORIDE SERPL-SCNC: 95 MMOL/L (ref 98–107)
CO2 SERPL-SCNC: 31 MMOL/L (ref 20–31)
CREAT SERPL-MCNC: 0.7 MG/DL (ref 0.5–0.9)
EKG ATRIAL RATE: 119 BPM
EKG ATRIAL RATE: 150 BPM
EKG ATRIAL RATE: 240 BPM
EKG P AXIS: 47 DEGREES
EKG P-R INTERVAL: 82 MS
EKG Q-T INTERVAL: 296 MS
EKG Q-T INTERVAL: 338 MS
EKG Q-T INTERVAL: 348 MS
EKG QRS DURATION: 64 MS
EKG QRS DURATION: 68 MS
EKG QRS DURATION: 72 MS
EKG QTC CALCULATION (BAZETT): 418 MS
EKG QTC CALCULATION (BAZETT): 489 MS
EKG QTC CALCULATION (BAZETT): 512 MS
EKG R AXIS: -34 DEGREES
EKG R AXIS: -38 DEGREES
EKG R AXIS: -42 DEGREES
EKG T AXIS: 41 DEGREES
EKG T AXIS: 49 DEGREES
EKG T AXIS: 82 DEGREES
EKG VENTRICULAR RATE: 119 BPM
EKG VENTRICULAR RATE: 120 BPM
EKG VENTRICULAR RATE: 138 BPM
EOSINOPHIL # BLD: 0.01 K/UL (ref 0–0.4)
EOSINOPHILS RELATIVE PERCENT: 0 % (ref 1–4)
ERYTHROCYTE [DISTWIDTH] IN BLOOD BY AUTOMATED COUNT: 18.6 % (ref 12.5–15.4)
FLUAV RNA NPH QL NAA+NON-PROBE: NOT DETECTED
FLUBV RNA NPH QL NAA+NON-PROBE: NOT DETECTED
FOLATE SERPL-MCNC: >20 NG/ML (ref 4.8–24.2)
GFR, ESTIMATED: >90 ML/MIN/1.73M2
GLUCOSE SERPL-MCNC: 103 MG/DL (ref 70–99)
HADV DNA NPH QL NAA+NON-PROBE: NOT DETECTED
HCOV 229E RNA NPH QL NAA+NON-PROBE: NOT DETECTED
HCOV HKU1 RNA NPH QL NAA+NON-PROBE: NOT DETECTED
HCOV NL63 RNA NPH QL NAA+NON-PROBE: NOT DETECTED
HCOV OC43 RNA NPH QL NAA+NON-PROBE: NOT DETECTED
HCT VFR BLD AUTO: 23.4 % (ref 36–46)
HGB BLD-MCNC: 7.5 G/DL (ref 12–16)
HMPV RNA NPH QL NAA+NON-PROBE: NOT DETECTED
HPIV1 RNA NPH QL NAA+NON-PROBE: NOT DETECTED
HPIV2 RNA NPH QL NAA+NON-PROBE: NOT DETECTED
HPIV3 RNA NPH QL NAA+NON-PROBE: NOT DETECTED
HPIV4 RNA NPH QL NAA+NON-PROBE: NOT DETECTED
INR PPP: 1.3
IRON SATN MFR SERPL: 11 % (ref 20–55)
IRON SERPL-MCNC: 24 UG/DL (ref 37–145)
LYMPHOCYTES NFR BLD: 0.69 K/UL (ref 1–4.8)
LYMPHOCYTES RELATIVE PERCENT: 9 % (ref 24–44)
M PNEUMO DNA NPH QL NAA+NON-PROBE: NOT DETECTED
MAGNESIUM SERPL-MCNC: 1.9 MG/DL (ref 1.6–2.6)
MCH RBC QN AUTO: 27.2 PG (ref 26–34)
MCHC RBC AUTO-ENTMCNC: 32.1 G/DL (ref 31–37)
MCV RBC AUTO: 84.8 FL (ref 80–100)
MONOCYTES NFR BLD: 0.6 K/UL (ref 0.1–1.2)
MONOCYTES NFR BLD: 7 % (ref 2–11)
NEUTROPHILS NFR BLD: 84 % (ref 36–66)
NEUTS SEG NFR BLD: 6.84 K/UL (ref 1.8–7.7)
PLATELET # BLD AUTO: 380 K/UL (ref 140–450)
PMV BLD AUTO: 9.2 FL (ref 8–14)
POTASSIUM SERPL-SCNC: 3 MMOL/L (ref 3.7–5.3)
PROCALCITONIN SERPL-MCNC: 0.15 NG/ML (ref 0–0.09)
PROTHROMBIN TIME: 13.5 SEC (ref 9.4–12.6)
RBC # BLD AUTO: 2.76 M/UL (ref 4–5.2)
RSV RNA NPH QL NAA+NON-PROBE: NOT DETECTED
RV+EV RNA NPH QL NAA+NON-PROBE: NOT DETECTED
SARS-COV-2 RNA NPH QL NAA+NON-PROBE: NOT DETECTED
SODIUM SERPL-SCNC: 135 MMOL/L (ref 135–144)
SPECIMEN DESCRIPTION: NORMAL
SPECIMEN SOURCE: NORMAL
STREP A, MOLECULAR: NEGATIVE
TIBC SERPL-MCNC: 227 UG/DL (ref 250–450)
TROPONIN I SERPL HS-MCNC: 87 NG/L (ref 0–14)
UNSATURATED IRON BINDING CAPACITY: 203 UG/DL (ref 112–347)
VIT B12 SERPL-MCNC: 273 PG/ML (ref 232–1245)
WBC OTHER # BLD: 8.2 K/UL (ref 3.5–11)

## 2024-06-21 PROCEDURE — 2580000003 HC RX 258: Performed by: INTERNAL MEDICINE

## 2024-06-21 PROCEDURE — 6370000000 HC RX 637 (ALT 250 FOR IP): Performed by: INTERNAL MEDICINE

## 2024-06-21 PROCEDURE — 85025 COMPLETE CBC W/AUTO DIFF WBC: CPT

## 2024-06-21 PROCEDURE — 6360000002 HC RX W HCPCS: Performed by: INTERNAL MEDICINE

## 2024-06-21 PROCEDURE — 83735 ASSAY OF MAGNESIUM: CPT

## 2024-06-21 PROCEDURE — 36415 COLL VENOUS BLD VENIPUNCTURE: CPT

## 2024-06-21 PROCEDURE — 85610 PROTHROMBIN TIME: CPT

## 2024-06-21 PROCEDURE — 99223 1ST HOSP IP/OBS HIGH 75: CPT | Performed by: NURSE PRACTITIONER

## 2024-06-21 PROCEDURE — 0202U NFCT DS 22 TRGT SARS-COV-2: CPT

## 2024-06-21 PROCEDURE — 80048 BASIC METABOLIC PNL TOTAL CA: CPT

## 2024-06-21 PROCEDURE — 87651 STREP A DNA AMP PROBE: CPT

## 2024-06-21 PROCEDURE — 2500000003 HC RX 250 WO HCPCS: Performed by: NURSE PRACTITIONER

## 2024-06-21 PROCEDURE — 84484 ASSAY OF TROPONIN QUANT: CPT

## 2024-06-21 PROCEDURE — 99213 OFFICE O/P EST LOW 20 MIN: CPT

## 2024-06-21 PROCEDURE — 2060000000 HC ICU INTERMEDIATE R&B

## 2024-06-21 PROCEDURE — 6370000000 HC RX 637 (ALT 250 FOR IP): Performed by: NURSE PRACTITIONER

## 2024-06-21 RX ORDER — CYANOCOBALAMIN 1000 UG/ML
1000 INJECTION, SOLUTION INTRAMUSCULAR; SUBCUTANEOUS ONCE
Status: COMPLETED | OUTPATIENT
Start: 2024-06-21 | End: 2024-06-21

## 2024-06-21 RX ORDER — CALCIUM CARBONATE 500 MG/1
500 TABLET, CHEWABLE ORAL 3 TIMES DAILY PRN
Status: DISCONTINUED | OUTPATIENT
Start: 2024-06-21 | End: 2024-06-22 | Stop reason: HOSPADM

## 2024-06-21 RX ORDER — AMIODARONE HYDROCHLORIDE 200 MG/1
200 TABLET ORAL DAILY
Status: DISCONTINUED | OUTPATIENT
Start: 2024-06-28 | End: 2024-06-22

## 2024-06-21 RX ORDER — AMIODARONE HYDROCHLORIDE 200 MG/1
200 TABLET ORAL 2 TIMES DAILY
Status: DISCONTINUED | OUTPATIENT
Start: 2024-06-21 | End: 2024-06-22

## 2024-06-21 RX ADMIN — CLOPIDOGREL BISULFATE 75 MG: 75 TABLET ORAL at 08:00

## 2024-06-21 RX ADMIN — SUCRALFATE 1 G: 1 TABLET ORAL at 10:31

## 2024-06-21 RX ADMIN — Medication 10 MEQ: at 09:22

## 2024-06-21 RX ADMIN — ANTI-FUNGAL POWDER MICONAZOLE NITRATE TALC FREE: 1.42 POWDER TOPICAL at 08:01

## 2024-06-21 RX ADMIN — Medication 3 MG: at 21:07

## 2024-06-21 RX ADMIN — Medication 10 MEQ: at 14:00

## 2024-06-21 RX ADMIN — ATORVASTATIN CALCIUM 40 MG: 40 TABLET, FILM COATED ORAL at 21:07

## 2024-06-21 RX ADMIN — APIXABAN 5 MG: 5 TABLET, FILM COATED ORAL at 07:43

## 2024-06-21 RX ADMIN — SUCRALFATE 1 G: 1 TABLET ORAL at 16:39

## 2024-06-21 RX ADMIN — PANTOPRAZOLE SODIUM 40 MG: 40 TABLET, DELAYED RELEASE ORAL at 16:39

## 2024-06-21 RX ADMIN — Medication 10 MEQ: at 07:50

## 2024-06-21 RX ADMIN — PANTOPRAZOLE SODIUM 40 MG: 40 TABLET, DELAYED RELEASE ORAL at 06:36

## 2024-06-21 RX ADMIN — ASPIRIN 81 MG: 81 TABLET, COATED ORAL at 08:00

## 2024-06-21 RX ADMIN — APIXABAN 5 MG: 5 TABLET, FILM COATED ORAL at 21:09

## 2024-06-21 RX ADMIN — ESCITALOPRAM OXALATE 20 MG: 10 TABLET ORAL at 08:00

## 2024-06-21 RX ADMIN — SODIUM CHLORIDE: 9 INJECTION, SOLUTION INTRAVENOUS at 06:37

## 2024-06-21 RX ADMIN — ANTI-FUNGAL POWDER MICONAZOLE NITRATE TALC FREE: 1.42 POWDER TOPICAL at 21:11

## 2024-06-21 RX ADMIN — OXYBUTYNIN CHLORIDE 10 MG: 5 TABLET, EXTENDED RELEASE ORAL at 08:01

## 2024-06-21 RX ADMIN — SUCRALFATE 1 G: 1 TABLET ORAL at 21:11

## 2024-06-21 RX ADMIN — VANCOMYCIN HYDROCHLORIDE 1250 MG: 1.25 INJECTION, POWDER, LYOPHILIZED, FOR SOLUTION INTRAVENOUS at 11:57

## 2024-06-21 RX ADMIN — AMIODARONE HYDROCHLORIDE 200 MG: 200 TABLET ORAL at 21:06

## 2024-06-21 RX ADMIN — SODIUM CHLORIDE 125 MG: 9 INJECTION, SOLUTION INTRAVENOUS at 18:53

## 2024-06-21 RX ADMIN — Medication 10 MEQ: at 11:54

## 2024-06-21 RX ADMIN — SODIUM CHLORIDE, PRESERVATIVE FREE 10 ML: 5 INJECTION INTRAVENOUS at 21:10

## 2024-06-21 RX ADMIN — ACETAMINOPHEN 650 MG: 325 TABLET ORAL at 16:40

## 2024-06-21 RX ADMIN — AMIODARONE HYDROCHLORIDE 0.5 MG/MIN: 1.8 INJECTION, SOLUTION INTRAVENOUS at 07:50

## 2024-06-21 RX ADMIN — METOPROLOL TARTRATE 25 MG: 25 TABLET, FILM COATED ORAL at 21:08

## 2024-06-21 RX ADMIN — Medication 10 MEQ: at 15:15

## 2024-06-21 RX ADMIN — Medication 10 MEQ: at 10:28

## 2024-06-21 RX ADMIN — ACETAMINOPHEN 650 MG: 325 TABLET ORAL at 06:36

## 2024-06-21 RX ADMIN — FOLIC ACID 1 MG: 1 TABLET ORAL at 08:01

## 2024-06-21 RX ADMIN — CYANOCOBALAMIN 1000 MCG: 1000 INJECTION, SOLUTION INTRAMUSCULAR; SUBCUTANEOUS at 18:03

## 2024-06-21 RX ADMIN — AMIODARONE HYDROCHLORIDE 200 MG: 200 TABLET ORAL at 10:28

## 2024-06-21 ASSESSMENT — PAIN DESCRIPTION - LOCATION: LOCATION: HEAD

## 2024-06-21 ASSESSMENT — PAIN DESCRIPTION - DESCRIPTORS: DESCRIPTORS: DISCOMFORT

## 2024-06-21 ASSESSMENT — PAIN SCALES - GENERAL
PAINLEVEL_OUTOF10: 0
PAINLEVEL_OUTOF10: 3

## 2024-06-21 NOTE — PROGRESS NOTES
[facility],   Appears mag replacement ordered ealier in day never administered, mag 1.6 upon admission, please advise     Orders received to give.    @9340 Writer consulted NP S Waterhouse,   /44, manual check 99/42, please advise    No new orders.    @0006 Writer consulted NP S Waterhouse, BP 72/49, manual check 100/44, please advise    No new orders.

## 2024-06-21 NOTE — PROGRESS NOTES
[facility]  Consult Note       NAME:  Elaina Champagne  MEDICAL RECORD NUMBER:  [id number]  AGE: 73 y.o.   GENDER: female  : 1950  TODAY'S DATE:  2024    Subjective:      Elaina Champagne is a 73 y.o. female with inpatient referral to [facility] for:  \" Bilateral leg wounds\"      Wound Identification:  Wound Type: venous and pressure  Contributing Factors: edema, venous stasis, and decreased mobility    Wound History: patient known to [facility] nurse from previous admission. Wounds to BLE and buttocks continue. Patient has been at SNF since discharge, confirms that wound care has continued at SNF.  Current Wound Care Treatment:  oil gauze removed from BLE and foam dressing in place to sacrum    Patient Goal of Care:  [x] Wound Healing  [] Odor Control  [] Palliative Care  [] Pain Control   [] Other:         PAST MEDICAL HISTORY        Diagnosis Date    Anemia     Anxiety disorder     Artificial knee joint present, left     Artificial knee joint present, right     Atherosclerotic heart disease     Bell's palsy     Cellulitis     lower legs    Chronic kidney disease, stage 3 ([facility])     Cognitive communication deficit     Gastric ulcer with hemorrhage     Gastro-esophageal reflux disease without esophagitis     Hyperlipidemia     Hypertension     Lymphedema     Major depressive disorder     Morbid obesity (HCC)     Nonrheumatic tricuspid (valve) insufficiency     NSTEMI (non-ST elevated myocardial infarction) ([facility]) 2024    Occlusion and stenosis of bilateral carotid arteries     Osteoarthritis     Overactive bladder     Paroxysmal atrial fibrillation ([facility])     Urge incontinence     Venous insufficiency     Weakness        PAST SURGICAL HISTORY    Past Surgical History:   Procedure Laterality Date    CARDIAC PROCEDURE N/A 2024    Coronary angiography performed by Regis Aponte MD at [facility] CARDIAC CATH LAB    CARDIAC PROCEDURE N/A 2024    frantz /  06/21/24 0859   Number of days: 49       Wound 05/07/24 Leg Right;Lateral;Lower (Active)   Wound Image   06/21/24 0859   Wound Etiology Venous 06/21/24 0859   Dressing Status New dressing applied 06/21/24 0859   Wound Cleansed Cleansed with saline 06/21/24 0859   Dressing/Treatment Petroleum gauze;ABD;Roll gauze 06/21/24 0859   Dressing Change Due 06/23/24 06/21/24 0859   Wound Assessment Pink/red 06/21/24 0859   Drainage Amount Small (< 25%) 06/21/24 0859   Drainage Description Serosanguinous 06/21/24 0859   Odor None 06/21/24 0859   Sis-wound Assessment Intact;Blanchable erythema;Fragile 06/21/24 0859   Margins Defined edges 06/21/24 0859   Number of days: 44         WOUND ASSESSMENT:   BLE - wounds show significant improvement from last visit, with previously open areas now healed. Will continue oil gauze dressings as previously ordered. Buttocks/sacrum with foam dressing in place, can continue. If foam dressing becomes frequently soiled due to stool or urine, consider using Triad.     Response to treatment:  Well tolerated by patient.       Plan:     Plan of Care:     Bilateral lower extremity wound care - moisten dressing with normal saline for removal.  cleanse with saline, pat dry. Cover with oil emulsion dressing (Adaptic), cover with ABD and secure with roll gauze. Change every other day and PRN soiling.    Sacral foam dressing to sacrococcygeal area. Use skin barrier film prior to placement. Peel back dressing, inspect skin beneath, and re-secure every shift. Change every 3 days and as needed if loose or soiled. Discontinue sacral foam if repeatedly soiled by incontinence.     [x] Turn and reposition every 2 hours while in bed.    [x] Float heels off of bed with pillows under calves.    [] Heel protective boots (heel medix boots) at all times while in bed.      [] Apply zinc oxide cream twice daily and as needed after incontinent episodes.    [] Perform routine incontinence care with use of foam  cleanser.    [x] Use single layer moisture wicking underpad.    [] Use comfort glide system and wedges to reposition patient.    [x] Keep the head of the bed below 30 degrees unless contraindicated.    [] Pressure reducing chair cushion while up to chair. Reposition every hour while in chair and limit chair time to 2 hour intervals.    [x] Encourage good nutritional intake and fluids. Consult dietician if needed.    Specialty Bed Required : N/A   [] Low Air Loss   [] Pressure Redistribution  [] Fluid Immersion  [] Bariatric  [] Total Pressure Relief  [] Other:     Current Diet: ADULT DIET; Regular; Low Fat/Low Chol/High Fiber/2 gm Na  Dietician consult:  Yes    Discharge Plan:  Placement for patient upon discharge: skilled nursing   Patient appropriate for Outpatient Wound Care Center: N/A    Patient/Caregiver Teaching:  Level of patientunderstanding able to:     [] Indicates understanding       [] Needs reinforcement  [] Unsuccessful      [] Verbal Understanding  [] Demonstrated understanding       [] No evidence of learning  [] Refused teaching         [] N/A       Electronically signed by EVE CHAUDHARI RN on  6/21/2024 at 9:06 AM

## 2024-06-21 NOTE — PLAN OF CARE
Problem: Discharge Planning  Goal: Discharge to home or other facility with appropriate resources  6/21/2024 1140 by Reyes, Abigail, RN  Outcome: Progressing  6/21/2024 0329 by Jono Garza RN  Outcome: Progressing  Flowsheets (Taken 6/20/2024 1730 by Marquita Fall, RN)  Discharge to home or other facility with appropriate resources:   Arrange for needed discharge resources and transportation as appropriate   Identify barriers to discharge with patient and caregiver   Identify discharge learning needs (meds, wound care, etc)   Refer to discharge planning if patient needs post-hospital services based on physician order or complex needs related to functional status, cognitive ability or social support system     Problem: Skin/Tissue Integrity  Goal: Absence of new skin breakdown  Description: 1.  Monitor for areas of redness and/or skin breakdown  2.  Assess vascular access sites hourly  3.  Every 4-6 hours minimum:  Change oxygen saturation probe site  4.  Every 4-6 hours:  If on nasal continuous positive airway pressure, respiratory therapy assess nares and determine need for appliance change or resting period.  6/21/2024 1140 by Reyes, Abigail, RN  Outcome: Progressing  6/21/2024 0329 by Jono Garza RN  Outcome: Progressing     Problem: Safety - Adult  Goal: Free from fall injury  6/21/2024 1140 by Reyes, Abigail, RN  Outcome: Progressing  6/21/2024 0329 by Jono Garza RN  Outcome: Progressing  Flowsheets (Taken 6/20/2024 1500 by Marquita Fall, RN)  Free From Fall Injury:   Instruct family/caregiver on patient safety   Based on caregiver fall risk screen, instruct family/caregiver to ask for assistance with transferring infant if caregiver noted to have fall risk factors     Problem: ABCDS Injury Assessment  Goal: Absence of physical injury  6/21/2024 1140 by Reyes, Abigail, RN  Outcome: Progressing  6/21/2024 0329 by Jono Garza RN  Outcome: Progressing  Flowsheets (Taken 6/20/2024 1500 by Marquita Fall  RN)  Absence of Physical Injury: Implement safety measures based on patient assessment

## 2024-06-21 NOTE — CONSULTS
[facility]    Consult                        Today's Date: 6/21/2024  Patient Name: Elaina Champagne  Date of admission: 6/20/2024 10:15 AM  Patient's age: 73 y.o., 1950  Admission Dx: Hypokalemia [E87.6]  Acute chest pain [R07.9]  Atrial fibrillation with rapid ventricular response (HCC) [I48.91]  Atrial fibrillation with RVR (HCC) [I48.91]  Cellulitis of lower extremity, unspecified laterality [L03.119]  Anemia, unspecified type [D64.9]    Reason for Consult:  Cardiac evaluation    Requesting Physician: Elsa Torres MD    CHIEF COMPLAINT:  Chest pain, A-fib with RVR    History Obtained From:  patient, electronic medical record    HISTORY OF PRESENT ILLNESS:      Per previous documentation: \"patient presents with to ED with Chest Pain (Midsternal chest pain starting at ECF while getting leg dressings changed; EMS gave 1 NTG SL and painfree at present. ECG showing atrial fib w/ RVR) and is admitted to the hospital for the management of Atrial fibrillation with rapid ventricular response (HCC).      Pt is a 73 yr old female with a PMH A.fib, HTN, CKD 3, lymphedema, and MO who presented from the ECF with chest pressure and palpitations.  She was given Nitro x 1 and this relieved her chest pressure.  She was found to be in A.fib with RVR, -140s.  She was started on a Cardizem gtt in the ED.  She denies any fever, chills, sore throat, cough, SOB.  She does have some increased redness of her bilat legs.  She gets her legs wrapped for chronic wounds.  She was recently discharged from [facility] on 6/7/24 with possible cellulitis of her legs and a calf hematoma.\"    We have been asked to consult on patient as she was found to be in atrial fibrillation with rapid ventricular response.  Patient was initially started on a diltiazem drip which had been subsequently changed to an amiodarone drip. Patient has since converted back to NSR.          Past Medical History:   has a past  is 34 mm, RAVINDRA-3 flow, underwent PCI with RAJAN using 3.0*38 millimeter Chace stent with 0% residual stenosis and RAVINDRA-3 flow     First diagonal ostial 80% stenosis, length is 22 mm, RAVINDRA-3 flow, underwent PCI with RAJAN using 2.25*26 millimeter [place] stent with 0% residual stenosis and RAVINDRA-3 flow     Conclusions:  Successful kissing stents to LAD and diagonal  Residual mid RCA stenosis     Recommendation:  Routine post PCI orders  Medical treatments.  Risk factors modifications.  Staged PCI to RCA in 4 to 6 weeks    Cardiac Angiography 5/6/2: reviewed.  Findings:  RCA: 80% mid stenosis, length is 14 mm, RAVINDRA-3 flow, underwent RAJAN using 3.0 x 18 mm [place] stent with 0% residual stenosis and RAVINDRA-3 flow     Conclusions:  Successful PCI with RAJAN to mid RCA     Recommendation:  Routine post PCI orders  Medical treatments.  Risk factors modifications.    Labs:     CBC:   Recent Labs     06/20/24  1024 06/21/24  0629   WBC 5.6 8.2   HGB 7.7* 7.5*   HCT 23.6* 23.4*    380     BMP:   Recent Labs     06/20/24  1024 06/21/24  0629    135   K 3.0* 3.0*   CO2 30 31   BUN 26* 17   CREATININE 0.9 0.7   LABGLOM 68 >90   GLUCOSE 93 103*     BNP: No results for input(s): \"BNP\" in the last 72 hours.  PT/INR:   Recent Labs     06/20/24  1024 06/21/24  0629   PROTIME 13.1* 13.5*   INR 1.2 1.3     APTT:  Recent Labs     06/20/24  1024   APTT 37.7*     CARDIAC ENZYMES:No results for input(s): \"CKTOTAL\", \"CKMB\", \"CKMBINDEX\", \"TROPONINI\" in the last 72 hours.  FASTING LIPID PANEL:  Lab Results   Component Value Date/Time    HDL 44 08/08/2023 08:25 AM    TRIG 56 06/07/2017 12:00 AM     LIVER PROFILE:  Recent Labs     06/20/24  1024   AST 15   ALT 8       IMPRESSION:    Patient Active Problem List   Diagnosis    Depression with anxiety    Eczema    Edema    Primary hypertension    Hyperlipidemia    Morbid obesity (HCC)    Osteoarthritis    Rosacea    Urge incontinence of urine    Varicose veins    PAF (paroxysmal atrial fibrillation)

## 2024-06-21 NOTE — PROGRESS NOTES
Comprehensive Nutrition Assessment    Type and Reason for Visit:  Positive Nutrition Screen    Nutrition Recommendations/Plan:   Continue current diet, encourage intake  Jaziel BID  Monitor weight, intake, labs, skin     Malnutrition Assessment:  Malnutrition Status:  Insufficient data (06/21/24 0096)    Context:  Acute Illness     Findings of the 6 clinical characteristics of malnutrition:  Energy Intake:  No significant decrease in energy intake  Weight Loss:  No significant weight loss     Body Fat Loss:  Unable to assess     Muscle Mass Loss:  Unable to assess    Fluid Accumulation:  Mild     Strength:  Not Performed    Nutrition Assessment:    Pt admittedwith Afib. PNS received for wounds. No report of recent weight loss or poor appetite. EMR shows some weight loss, likely fluid related. Pt would benefit from ONS for wound healing, does not typically like Ensure supplements. Will add Jaziel BID to aid in wound healing.    Nutrition Related Findings:    +2 BLE. k+ 3.0, Chl 95, Glu 103. All other labs/meds reviewed. Wound Type: Multiple       Current Nutrition Intake & Therapies:    Average Meal Intake: Unable to assess  Average Supplements Intake: None Ordered  ADULT DIET; Regular; Low Fat/Low Chol/High Fiber/2 gm Na    Anthropometric Measures:  Height: 154.9 cm (5' 1\")  Ideal Body Weight (IBW): 105 lbs (48 kg)    Current Body Weight: 101.2 kg (223 lb 1.7 oz), 212.5 % IBW.    Current BMI (kg/m2): 42.2  BMI Categories: Obese Class 3 (BMI 40.0 or greater)    Estimated Daily Nutrient Needs:  Energy Requirements Based On: Kcal/kg  Weight Used for Energy Requirements: Current  Energy (kcal/day): 2571-0476 (11-13 kcal/kg)  Weight Used for Protein Requirements: Ideal  Protein (g/day):  g/day (2.0-2.5 g/kg IBW)  Method Used for Fluid Requirements: 1 ml/kcal  Fluid (ml/day): 7491-4536 ml    Nutrition Diagnosis:   Inadequate protein intake related to increase demand for energy/nutrients as evidenced by  wounds    Nutrition Interventions:   Food and/or Nutrient Delivery: Continue Current Diet, Start Oral Nutrition Supplement  Nutrition Education/Counseling: No recommendation at this time  Coordination of Nutrition Care: Continue to monitor while inpatient, Interdisciplinary Rounds    Goals:  Goals: PO intake 50% or greater, prior to discharge  Nutrition Monitoring and Evaluation:   Behavioral-Environmental Outcomes: None Identified  Food/Nutrient Intake Outcomes: Food and Nutrient Intake, Supplement Intake  Physical Signs/Symptoms Outcomes: Biochemical Data, Nutrition Focused Physical Findings, Skin, Weight, Fluid Status or Edema    Discharge Planning:    Too soon to determine     TONO Park, RDN, LD  Registered Dietitian  [facility]  [phone]

## 2024-06-21 NOTE — CARE COORDINATION
[place] Quality Flow/Interdisciplinary Rounds Progress Note    Quality Flow Rounds held on June 21, 2024 at 0930    Disciplines Attending:  Bedside Nurse, , , and Nursing Unit Leadership    Barriers to Discharge: Clinical status    Anticipated Discharge Date:   6/22/24    Anticipated Discharge Disposition: ECF    Readmission Risk              Risk of Unplanned Readmission:  38           Discussed patient goal for the day, patient clinical progression, and barriers to discharge.  Cardiology following and plan to transition patient to po amiodarone today. Patient is a bed hold at [facility] and is able to return without precert when discharged.  Marianna Sibley RN  June 21, 2024

## 2024-06-21 NOTE — PLAN OF CARE
Problem: Discharge Planning  Goal: Discharge to home or other facility with appropriate resources  Outcome: Progressing    Problem: Skin/Tissue Integrity  Goal: Absence of new skin breakdown  Description: 1.  Monitor for areas of redness and/or skin breakdown  2.  Assess vascular access sites hourly  3.  Every 4-6 hours minimum:  Change oxygen saturation probe site  4.  Every 4-6 hours:  If on nasal continuous positive airway pressure, respiratory therapy assess nares and determine need for appliance change or resting period.  Outcome: Progressing     Problem: Safety - Adult  Goal: Free from fall injury  Outcome: Progressing

## 2024-06-21 NOTE — PLAN OF CARE
Nutrition Problem #1: Inadequate protein intake  Intervention: Food and/or Nutrient Delivery: Continue Current Diet, Start Oral Nutrition Supplement  Nutritional Goal: At least 50% PO intake prior to discharge

## 2024-06-21 NOTE — PROGRESS NOTES
[facility]  Office: [phone]  Nael Morgan DO, Ronny Quevedo DO, Pb Vásquez DO, Refugio Bradley DO, Sheldon Smith MD, Elsa Torres MD, Leandro Espinosa MD, Joann Hauser MD,  Dipak Montana MD, oRb Hernandez MD, Swathi Wells MD,  Gracia Chen DO, Pilar So MD, Kayden Walters MD, Orlando Morgan DO, Yamilex Luciano MD,  Mando Burden DO, Gali Galarza MD, Georgie Workman MD, Jennifer Hill MD, Gloria Sierra MD,  Rafael Rawls MD, Denilson Murphy MD, Kamran Flores MD, Yazan Hughes MD, Theodore Grossman MD, Jaxon Villar MD, Ricky Lopez DO, Mickey Cabral DO, Trev Alex MD,  Arvin Powers MD, Shirley Waterhouse, CNP,  Nidia Lester CNP, Aj Castro, CNP,  Erin Shaw, DNP, Ivis Senior, CNP, Georgiana Freeman, CNP, Diandra Ruffin CNP, Marilia Parker, CNP, Maureen Matamoros, PA-C, Meeta Titus PA-C, Sandrita Cruz, CNP, Nan Berrios, CNP, Veronica Alonzo, CNP, Areli Ware, CNP, Guadalupe Jaimes, CNP, Radhika Gillespie, CNS, Adriane Jimenez, CNP, Sarah Shi CNP, Tracy Schwab, CNP         [facility]   IN-PATIENT SERVICE   [facility]    Progress Note    6/21/2024    8:00 AM    Name:   Elaina Champagne  MRN:     [id number]     Acct:      [id number]   Room:   329/329-01   Day:  1  Admit Date:  6/20/2024 10:15 AM    PCP:   Robin Ly MD  Code Status:  Full Code    Subjective:     C/C:   Chief Complaint   Patient presents with    Chest Pain     Midsternal chest pain starting at [facility]CF while getting leg dressings changed; EMS gave 1 NTG SL and painfree at present. ECG showing atrial fib w/ RVR     Interval History Status: improved     Patient's Cardizem gtt stopped.  She was started on an Amiodarone gtt last night and HR improved.  HR 70s.  She is c/o a sore throat, post nasal drip and epigastric pain.  She is refusing to take her Carafate.    Brief History:     Elaina Champagne is a 73 y.o.  /  female who presents with  cancer       Vitals:  BP (!) 108/44   Pulse 72   Temp 99 °F (37.2 °C) (Oral)   Resp 24   Ht 1.549 m (5' 1\")   Wt 101.2 kg (223 lb 1.7 oz)   SpO2 97%   BMI 42.16 kg/m²   Temp (24hrs), Av.7 °F (37.1 °C), Min:98.2 °F (36.8 °C), Max:99.5 °F (37.5 °C)    No results for input(s): \"POCGLU\" in the last 72 hours.    I/O (24Hr):    Intake/Output Summary (Last 24 hours) at 2024 0800  Last data filed at 2024 0654  Gross per 24 hour   Intake 1846.75 ml   Output 1100 ml   Net 746.75 ml       Labs:  Hematology:  Recent Labs     24  1024 24  0629   WBC 5.6 8.2   RBC 2.82* 2.76*   HGB 7.7* 7.5*   HCT 23.6* 23.4*   MCV 83.6 84.8   MCH 27.4 27.2   MCHC 32.7 32.1   RDW 18.9* 18.6*    380   MPV 7.4 9.2   INR 1.2 1.3     Chemistry:  Recent Labs     24  1024 24  1520 24  1801 24  0629     --   --  135   K 3.0*  --   --  3.0*   CL 93*  --   --  95*   CO2 30  --   --  31   GLUCOSE 93  --   --  103*   BUN 26*  --   --  17   CREATININE 0.9  --   --  0.7   MG 1.6  --   --  1.9   ANIONGAP 12  --   --  9   LABGLOM 68  --   --  >90   CALCIUM 8.9  --   --  8.1*   TROPHS 47* 63* 74*  --      Recent Labs     24  1024   AST 15   ALT 8   ALKPHOS 92   BILITOT 0.4     ABG:  Lab Results   Component Value Date/Time    PH 6.0 2018 11:53 AM    FIO2 INFORMATION NOT PROVIDED 2024 09:50 AM     Lab Results   Component Value Date/Time    SPECIAL LEFT HAND 10CC 2024 11:12 AM     Lab Results   Component Value Date/Time    CULTURE NO GROWTH 12 HOURS 2024 11:12 AM       Radiology:  XR CHEST PORTABLE    Result Date: 2024  No acute cardiopulmonary process. Cardiomegaly.       Physical Examination:       General appearance:  alert, cooperative and no distress, pale  Mental Status:  oriented to person, place and time and normal affect  Lungs:  clear to auscultation bilaterally, normal effort  Heart:  regular rate and rhythm, no murmur  Abdomen:  soft, nontender,  obese, normal bowel sounds, no masses, hepatomegaly, splenomegaly  Extremities:  no edema, redness, tenderness in the calves  Skin:  + wounds to bilat legs, dressing c/d/i    Assessment:     Hospital Problems             Last Modified POA    * (Principal) Atrial fibrillation with rapid ventricular response (HCC) 6/20/2024 Yes    Venous insufficiency of both lower extremities 6/20/2024 Yes    Lymphedema 6/20/2024 Yes    Morbid obesity (HCC) 6/20/2024 Yes    Chronic kidney disease, stage 3b (HCC) 6/20/2024 Yes    Anemia 6/20/2024 Yes       Plan:     A.fib with RVR- Amiodarone 200mg BID per Cardiology, resume Lopressor with parameters, stop IVF 75 ml/hr, on Eliquis  Hypokalemia/ Hypomagnesemia- replace  Anemia- Hb 7.5, replace IV Venofer 200mg daily x 2 doses, give IM Vit B12 injection, check occult, since she is on chronic AC  CAD- hx stent, con't ASA, statin, Plavix  CKD 3- cr near baseline  Bilat lymphedema LE- wraps, wound care, elevate, stop IV Vancomycin   doubt active infection, add on procalcitonin and BC negative x 2  MO- recommend diet and weight loss  Sore throat- check a resp viral panel, cepacol lozenges  PUD- on PPI BID, start Tums prn, refusing Carafate  DVT proph- on Eliquis  Gi proph- on Protonix  PT/OT     DC back to SNF tomorrow if HR stable    Elsa Torres MD  6/21/2024  8:00 AM

## 2024-06-21 NOTE — DISCHARGE INSTR - COC
Continuity of Care Form    Patient Name: Elaina Champagne   :  1950  MRN:  [id number]    Admit date:  2024  Discharge date:  24    Code Status Order: Full Code   Advance Directives:     Admitting Physician:  Elsa Torres MD  PCP: Robin Ly MD    Discharging Nurse: Ning FRANZ  Discharging Hospital Unit/Room#: 329/329-01  Discharging Unit Phone Number: [phone]    Emergency Contact:   Extended Emergency Contact Information  Primary Emergency Contact: Eze Champagne   [facility]  Home Phone: [phone]  Relation: Spouse  Secondary Emergency Contact: Margo ferreira  Home Phone: [phone]  Mobile Phone: [phone]  Relation: Grandchild  Preferred language: English    Past Surgical History:  Past Surgical History:   Procedure Laterality Date    CARDIAC PROCEDURE N/A 2024    Coronary angiography performed by Regis Aponte MD at [facility] CARDIAC CATH LAB    CARDIAC PROCEDURE N/A 2024    frantz / Percutaneous coronary intervention / rm 504 performed by Maria Teresa Rodriguez MD at [facility] CARDIAC CATH LAB    CARDIAC PROCEDURE N/A 2024    frantz / Percutaneous coronary intervention / op scmh performed by Maria Teresa Rodriguez MD at [facility] CARDIAC CATH LAB    CATARACT REMOVAL Bilateral 2015    CORONARY ANGIOPLASTY WITH STENT PLACEMENT  2024    HYSTERECTOMY, TOTAL ABDOMINAL (CERVIX REMOVED)  1990    INCISION AND DRAINAGE Left 2017    LEG INCISION AND DRAINAGE ABSCESS performed by Isaiah Casey MD at [facility] OR    IR NONTUNNELED VASCULAR CATHETER  12/15/2023    IR NONTUNNELED VASCULAR CATHETER 12/15/2023 [facility] SPECIAL PROCEDURES    KNEE ARTHROPLASTY Right 2018    TOTAL KNEE ARTHROPLASTY Left 2017    UPPER GASTROINTESTINAL ENDOSCOPY N/A 2023    EGD BIOPSY performed by Mayr Nolasco MD at [facility] ENDO    UPPER GASTROINTESTINAL ENDOSCOPY N/A 2024    ESOPHAGOGASTRODUODENOSCOPY BIOPSY performed by Rachana Maria MD at [facility] ENDO    VENTRAL HERNIA REPAIR

## 2024-06-22 VITALS
DIASTOLIC BLOOD PRESSURE: 54 MMHG | TEMPERATURE: 98.4 F | WEIGHT: 224.87 LBS | RESPIRATION RATE: 20 BRPM | OXYGEN SATURATION: 96 % | HEIGHT: 61 IN | SYSTOLIC BLOOD PRESSURE: 129 MMHG | HEART RATE: 67 BPM | BODY MASS INDEX: 42.46 KG/M2

## 2024-06-22 LAB
ANION GAP SERPL CALCULATED.3IONS-SCNC: 10 MMOL/L (ref 9–17)
BUN SERPL-MCNC: 15 MG/DL (ref 8–23)
CALCIUM SERPL-MCNC: 8.6 MG/DL (ref 8.6–10.4)
CHLORIDE SERPL-SCNC: 96 MMOL/L (ref 98–107)
CO2 SERPL-SCNC: 28 MMOL/L (ref 20–31)
CREAT SERPL-MCNC: 0.6 MG/DL (ref 0.5–0.9)
ERYTHROCYTE [DISTWIDTH] IN BLOOD BY AUTOMATED COUNT: 19.1 % (ref 12.5–15.4)
GFR, ESTIMATED: >90 ML/MIN/1.73M2
GLUCOSE SERPL-MCNC: 94 MG/DL (ref 70–99)
HCT VFR BLD AUTO: 24 % (ref 36–46)
HGB BLD-MCNC: 8 G/DL (ref 12–16)
MAGNESIUM SERPL-MCNC: 1.9 MG/DL (ref 1.6–2.6)
MCH RBC QN AUTO: 27.8 PG (ref 26–34)
MCHC RBC AUTO-ENTMCNC: 33.2 G/DL (ref 31–37)
MCV RBC AUTO: 83.8 FL (ref 80–100)
PLATELET # BLD AUTO: 382 K/UL (ref 140–450)
PMV BLD AUTO: 7.7 FL (ref 6–12)
POTASSIUM SERPL-SCNC: 3.4 MMOL/L (ref 3.7–5.3)
RBC # BLD AUTO: 2.87 M/UL (ref 4–5.2)
SODIUM SERPL-SCNC: 134 MMOL/L (ref 135–144)
WBC OTHER # BLD: 11 K/UL (ref 3.5–11)

## 2024-06-22 PROCEDURE — 99239 HOSP IP/OBS DSCHRG MGMT >30: CPT | Performed by: INTERNAL MEDICINE

## 2024-06-22 PROCEDURE — 6370000000 HC RX 637 (ALT 250 FOR IP): Performed by: INTERNAL MEDICINE

## 2024-06-22 PROCEDURE — 6370000000 HC RX 637 (ALT 250 FOR IP): Performed by: NURSE PRACTITIONER

## 2024-06-22 PROCEDURE — 36415 COLL VENOUS BLD VENIPUNCTURE: CPT

## 2024-06-22 PROCEDURE — 2580000003 HC RX 258: Performed by: INTERNAL MEDICINE

## 2024-06-22 PROCEDURE — 83735 ASSAY OF MAGNESIUM: CPT

## 2024-06-22 PROCEDURE — 80048 BASIC METABOLIC PNL TOTAL CA: CPT

## 2024-06-22 PROCEDURE — 99233 SBSQ HOSP IP/OBS HIGH 50: CPT | Performed by: INTERNAL MEDICINE

## 2024-06-22 PROCEDURE — 85027 COMPLETE CBC AUTOMATED: CPT

## 2024-06-22 RX ORDER — FERROUS SULFATE 325(65) MG
325 TABLET, DELAYED RELEASE (ENTERIC COATED) ORAL 2 TIMES DAILY
Qty: 180 TABLET | Refills: 1 | Status: ON HOLD | DISCHARGE
Start: 2024-06-22

## 2024-06-22 RX ORDER — AMIODARONE HYDROCHLORIDE 200 MG/1
200 TABLET ORAL 2 TIMES DAILY
Status: DISCONTINUED | OUTPATIENT
Start: 2024-06-22 | End: 2024-06-22 | Stop reason: HOSPADM

## 2024-06-22 RX ORDER — AMIODARONE HYDROCHLORIDE 200 MG/1
200 TABLET ORAL DAILY
Status: ON HOLD | DISCHARGE
Start: 2024-06-28

## 2024-06-22 RX ORDER — UREA 10 %
500 LOTION (ML) TOPICAL DAILY
Qty: 30 TABLET | Refills: 1 | Status: ON HOLD | DISCHARGE
Start: 2024-06-22 | End: 2025-06-22

## 2024-06-22 RX ORDER — TRAMADOL HYDROCHLORIDE 50 MG/1
50 TABLET ORAL 2 TIMES DAILY
Qty: 6 TABLET | Refills: 0 | Status: SHIPPED | OUTPATIENT
Start: 2024-06-22 | End: 2024-06-25

## 2024-06-22 RX ORDER — CALCIUM CARBONATE 500 MG/1
500 TABLET, CHEWABLE ORAL 3 TIMES DAILY PRN
Status: ON HOLD | DISCHARGE
Start: 2024-06-22 | End: 2024-07-22

## 2024-06-22 RX ORDER — FUROSEMIDE 20 MG/1
20 TABLET ORAL DAILY
Status: ON HOLD | DISCHARGE
Start: 2024-06-22

## 2024-06-22 RX ORDER — AMIODARONE HYDROCHLORIDE 200 MG/1
200 TABLET ORAL 2 TIMES DAILY
Qty: 11 TABLET | Refills: 0 | Status: ON HOLD | DISCHARGE
Start: 2024-06-22 | End: 2024-06-28

## 2024-06-22 RX ADMIN — FOLIC ACID 1 MG: 1 TABLET ORAL at 08:00

## 2024-06-22 RX ADMIN — ANTI-FUNGAL POWDER MICONAZOLE NITRATE TALC FREE: 1.42 POWDER TOPICAL at 08:04

## 2024-06-22 RX ADMIN — AMIODARONE HYDROCHLORIDE 200 MG: 200 TABLET ORAL at 08:00

## 2024-06-22 RX ADMIN — ESCITALOPRAM OXALATE 20 MG: 10 TABLET ORAL at 08:00

## 2024-06-22 RX ADMIN — POTASSIUM CHLORIDE 40 MEQ: 1500 TABLET, EXTENDED RELEASE ORAL at 08:00

## 2024-06-22 RX ADMIN — SUCRALFATE 1 G: 1 TABLET ORAL at 06:33

## 2024-06-22 RX ADMIN — CLOPIDOGREL BISULFATE 75 MG: 75 TABLET ORAL at 08:00

## 2024-06-22 RX ADMIN — SUCRALFATE 1 G: 1 TABLET ORAL at 11:44

## 2024-06-22 RX ADMIN — PANTOPRAZOLE SODIUM 40 MG: 40 TABLET, DELAYED RELEASE ORAL at 06:33

## 2024-06-22 RX ADMIN — APIXABAN 5 MG: 5 TABLET, FILM COATED ORAL at 08:00

## 2024-06-22 RX ADMIN — METOPROLOL TARTRATE 25 MG: 25 TABLET, FILM COATED ORAL at 08:00

## 2024-06-22 RX ADMIN — OXYBUTYNIN CHLORIDE 10 MG: 5 TABLET, EXTENDED RELEASE ORAL at 08:00

## 2024-06-22 RX ADMIN — SODIUM CHLORIDE, PRESERVATIVE FREE 10 ML: 5 INJECTION INTRAVENOUS at 08:06

## 2024-06-22 NOTE — PLAN OF CARE
Problem: Discharge Planning  Goal: Discharge to home or other facility with appropriate resources  6/22/2024 1340 by Reyes, Abigail, RN  Outcome: Adequate for Discharge  6/22/2024 1036 by Reyes, Abigail, RN  Outcome: Progressing  6/22/2024 0242 by Jono Garza RN  Outcome: Progressing     Problem: Skin/Tissue Integrity  Goal: Absence of new skin breakdown  Description: 1.  Monitor for areas of redness and/or skin breakdown  2.  Assess vascular access sites hourly  3.  Every 4-6 hours minimum:  Change oxygen saturation probe site  4.  Every 4-6 hours:  If on nasal continuous positive airway pressure, respiratory therapy assess nares and determine need for appliance change or resting period.  6/22/2024 1340 by Reyes, Abigail, RN  Outcome: Adequate for Discharge  6/22/2024 1036 by Reyes, Abigail, RN  Outcome: Progressing  6/22/2024 0242 by Jono Garza RN  Outcome: Progressing     Problem: Safety - Adult  Goal: Free from fall injury  6/22/2024 1340 by Reyes, Abigail, RN  Outcome: Adequate for Discharge  6/22/2024 1036 by Reyes, Abigail, RN  Outcome: Progressing  6/22/2024 0242 by Jono Garza RN  Outcome: Progressing     Problem: ABCDS Injury Assessment  Goal: Absence of physical injury  6/22/2024 1340 by Reyes, Abigail, RN  Outcome: Adequate for Discharge  6/22/2024 1036 by Reyes, Abigail, RN  Outcome: Progressing  6/22/2024 0242 by Jono Garza RN  Outcome: Progressing     Problem: Nutrition Deficit:  Goal: Optimize nutritional status  6/22/2024 1340 by Reyes, Abigail, RN  Outcome: Adequate for Discharge  6/22/2024 1036 by Reyes, Abigail, RN  Outcome: Progressing  6/22/2024 0242 by Jono Garza RN  Outcome: Progressing

## 2024-06-22 NOTE — DISCHARGE SUMMARY
[facility]  Office: [phone]  Nael Morgan DO, Ronny Quevedo DO, Pb Vásquez DO, Refugio Bradley DO, Sheldon Smith MD, Elsa Torres MD, Leandro Espinosa MD, Joann Hauser MD,  Dipak Montana MD, Rob Hernandez MD, Swathi Wells MD,  Gracia Chen DO, Pilar So MD, Kayden Walters MD, Orlando Morgan DO, Yamilex Luciano MD,  Mando Burden DO, Gali Galarza MD, Georgie Workman MD, Jennifer Hill MD, Gloria Sierra MD,  Rafael Rawls MD, Denilson Murphy MD, Kamran Flores MD, Yazan Hughes MD, Theodore Grossman MD, Jaxon Villar MD, Ricky Lopez DO, Mickey Cabral DO, Trev Alex MD,  Arvin Powers MD, Shirley Waterhouse, CNP,  Nidia Lester CNP, Aj Castro, CNP,  Erin Shaw, DNP, Ivis Senior, CNP, Georgiana Freeman, CNP, Diandra Ruffin CNP, Marilia Parker, CNP, Maureen Matamoros, PA-C, Meeta Titus PA-C, Sandrita Cruz, CNP, Nan Berrios, CNP, Veronica Alonzo, CNP, Areli Ware, CNP, Guadalupe Jaimes, CNP, Radhika Gillespie, CNS, Adriane Jimenez, CNP, Sarah Shi CNP, Tracy Schwab, CNP         [facility]   IN-PATIENT SERVICE   [facility]    Discharge Summary     Patient ID: Elaina Champagne  :  1950   MRN: [id number]     ACCOUNT:  [id number]   Patient's PCP: Robin Ly MD  Admit Date: 2024   Discharge Date: 2024      Length of Stay: 2  Code Status:  Full Code  Admitting Physician: Elsa Torres MD  Discharge Physician: Elsa L Brian, MD     Active Discharge Diagnoses:     Hospital Problem Lists:  Principal Problem:    Atrial fibrillation with rapid ventricular response ([facility])  Active Problems:    Venous insufficiency of both lower extremities    Lymphedema    Morbid obesity ([facility])    Chronic kidney disease, stage 3b ([facility])    Anemia    Acute chest pain  Resolved Problems:    * No resolved hospital problems. *      Admission Condition:  poor     Discharged Condition: fair    Hospital Stay:     Hospital Course:   ULTRAM  Take 1 tablet by mouth 2 times daily for 3 days. Max Daily Amount: 100 mg            ASK your doctor about these medications      Vitamin D (Ergocalciferol) 98474 units Caps  Take 50,000 Units by mouth once a week for 7 doses               Where to Get Your Medications        You can get these medications from any pharmacy    Bring a paper prescription for each of these medications  traMADol 50 MG tablet       Information about where to get these medications is not yet available    Ask your nurse or doctor about these medications  amiodarone 200 MG tablet  amiodarone 200 MG tablet  benzocaine-menthol 15-3.6 MG lozenge  calcium carbonate 500 MG chewable tablet  ferrous sulfate 325 (65 Fe) MG EC tablet  furosemide 20 MG tablet  metoprolol tartrate 25 MG tablet         No discharge procedures on file.    Time Spent on discharge is  31 mins in patient examination, evaluation, counseling as well as medication reconciliation, prescriptions for required medications, discharge plan and follow up.    Electronically signed by   Elsa Torres MD  6/22/2024  12:21 PM      Thank you Dr. Ly, Robin Olivo MD for the opportunity to be involved in this patient's care.

## 2024-06-22 NOTE — PROGRESS NOTES
[facility]  Office: [phone]  Nael Morgan DO, Ronny Quevedo DO, Pb Vásquez DO, Refugio Bradley DO, Sheldon Smith MD, Elsa Torres MD, Leandro Espinosa MD, Joann Hauser MD,  Dipak Montana MD, Rob Hernandez MD, Swathi Wells MD,  Gracia Chen DO, Pilar So MD, Kayden Walters MD, Orlando Morgan DO, Yamilex Luciano MD,  Mando Burden DO, Gali Galarza MD, Georgie Workman MD, Jennifer Hill MD, Gloria Sierra MD,  Rafael Rawls MD, Denilson Murphy MD, Kamran Flores MD, Yazan Hughes MD, Theodore Grossman MD, Jaxon Villar MD, Ricky Lopez DO, Mickey Cabral DO, Trev Alex MD,  Arvin Powers MD, Shirley Waterhouse, CNP,  Nidia Lester CNP, Aj Castro, CNP,  Erin Shaw, DNP, Ivis Senior, CNP, Georgiana Freeman, CNP, Diandra Ruffin CNP, Marilia Parker, CNP, Maureen Matamoros, PA-C, Meeta Titus PA-C, Sandrita Cruz, CNP, Nan Berrios, CNP, Veronica Alonzo, CNP, Areli Ware, CNP, Guadalupe Jaimes, CNP, Radhika Gillespie, CNS, Adriane Jimenez, CNP, Sarah Shi CNP, Tracy Schwab, CNP         [facility]   IN-PATIENT SERVICE   [facility]    Progress Note    6/22/2024    12:18 PM    Name:   Elaina Champagne  MRN:     [id number]     Acct:      [id number]   Room:   329/329-01   Day:  2  Admit Date:  6/20/2024 10:15 AM    PCP:   Robin Ly MD  Code Status:  Full Code    Subjective:     C/C:   Chief Complaint   Patient presents with    Chest Pain     Midsternal chest pain starting at [facility]CF while getting leg dressings changed; EMS gave 1 NTG SL and painfree at present. ECG showing atrial fib w/ RVR     Interval History Status: improved     Patient's HR 50-60s.  She feels back to baseline.   is present.  She wants to go back to SNF.    Brief History:     Elaina Champagne is a 73 y.o.  /  female who presents with Chest Pain (Midsternal chest pain starting at [facility]CF while getting leg dressings changed; EMS gave 1 NTG SL and  painfree at present. ECG showing atrial fib w/ RVR)   and is admitted to the hospital for the management of Atrial fibrillation with rapid ventricular response (HCC).     Pt is a 73 yr old female with a PMH A.fib, HTN, CKD 3, lymphedema, and MO who presented from the [facility] with chest pressure and palpitations.  She was given Nitro x 1 and this relieved her chest pressure.  She was found to be in A.fib with RVR, -140s.  She was started on a Cardizem gtt in the ED.  She denies any fever, chills, sore throat, cough, SOB.  She does have some increased redness of her bilat legs.  She gets her legs wrapped for chronic wounds.  She was recently discharged from [facility] on 6/7/24 with possible cellulitis of her legs and a calf hematoma.  Her IV antibiotics were stopped by ID since it was not true cellulitis.      Patient was started on IV Vancomycin in the ED, BC x 2 checked.      Review of Systems:     Constitutional:  negative for chills, fevers, sweats, + sore throat  Respiratory:  negative for cough, dyspnea on exertion, shortness of breath, wheezing  Cardiovascular:  negative for chest pain, chest pressure/discomfort, lower extremity edema, palpitations  Gastrointestinal:  negative for abdominal pain, constipation, diarrhea, nausea, vomiting  Neurological:  negative for dizziness, headache    Medications:     Allergies:    Allergies   Allergen Reactions    Norco [Hydrocodone-Acetaminophen] Hallucinations     Aggression & hallucinations    Penicillins Hives       Current Meds:   Scheduled Meds:    metoprolol tartrate  25 mg Oral BID    amiodarone  200 mg Oral BID    Followed by    [START ON 6/28/2024] amiodarone  200 mg Oral Daily    ferric gluconate  125 mg IntraVENous Q24H    apixaban  5 mg Oral BID    atorvastatin  40 mg Oral Nightly    clopidogrel  75 mg Oral Daily    escitalopram  20 mg Oral Daily    folic acid  1 mg Oral Daily    melatonin  3 mg Oral Nightly    miconazole   Topical BID    oxyBUTYnin

## 2024-06-22 NOTE — PLAN OF CARE
Problem: Discharge Planning  Goal: Discharge to home or other facility with appropriate resources  Outcome: Progressing     Problem: Skin/Tissue Integrity  Goal: Absence of new skin breakdown  Description: 1.  Monitor for areas of redness and/or skin breakdown  2.  Assess vascular access sites hourly  3.  Every 4-6 hours minimum:  Change oxygen saturation probe site  4.  Every 4-6 hours:  If on nasal continuous positive airway pressure, respiratory therapy assess nares and determine need for appliance change or resting period.  Outcome: Progressing     Problem: Safety - Adult  Goal: Free from fall injury  Outcome: Progressing     Problem: Nutrition Deficit:  Goal: Optimize nutritional status  6/22/2024 0242 by Jono Garza, RN  Outcome: Progressing

## 2024-06-22 NOTE — PROGRESS NOTES
[facility]        Date:   6/22/2024  Patient name: Elaina Champagne  Date of admission:  6/20/2024 10:15 AM  MRN:   [id number]  YOB: 1950  PCP: Robin Ly MD    Reason for Consult:       Subjective: No new cardiac issues. No overnight events. Chart reviewed.        Physical Exam:   Vitals: BP (!) 129/54   Pulse 67   Temp 98.4 °F (36.9 °C) (Oral)   Resp 20   Ht 1.549 m (5' 1\")   Wt 102 kg (224 lb 13.9 oz)   SpO2 96%   BMI 42.49 kg/m²   General appearance: alert and cooperative with exam  HEENT: Head: Normocephalic, no lesions, without obvious abnormality.  Neck: no adenopathy, no carotid bruit, no JVD, supple, symmetrical, trachea midline and thyroid not enlarged, symmetric, no tenderness/mass/nodules  Lungs: clear to auscultation bilaterally  Heart: regular rate and rhythm, S1, S2 normal, no murmur, click, rub or gallop  Abdomen: soft, non-tender; bowel sounds normal; no masses,  no organomegaly  Extremities: extremities normal, atraumatic, no cyanosis or edema  Neurologic: Mental status: Alert, oriented, thought content appropriate      Lab work, imaging, nursing, and chart reviewed extensively.    Medications:   Scheduled Meds:   metoprolol tartrate  25 mg Oral BID    amiodarone  200 mg Oral BID    Followed by    [START ON 6/28/2024] amiodarone  200 mg Oral Daily    ferric gluconate  125 mg IntraVENous Q24H    apixaban  5 mg Oral BID    atorvastatin  40 mg Oral Nightly    clopidogrel  75 mg Oral Daily    escitalopram  20 mg Oral Daily    folic acid  1 mg Oral Daily    melatonin  3 mg Oral Nightly    miconazole   Topical BID    oxyBUTYnin  10 mg Oral Daily    pantoprazole  40 mg Oral BID AC    silver sulfADIAZINE   Topical Daily    sucralfate  1 g Oral 4x Daily AC & HS    sodium chloride flush  5-40 mL IntraVENous 2 times per day     Continuous Infusions:   sodium chloride           Labs:     Lab Results   Component Value Date    CHOL 189 06/07/2017    TRIG 56  modifications.      Electronically signed by Maria Teresa Rodriguez MD on 5/6/2024 at 3:13 PM      [facility]  [phone]    Signed by: Maria Teresa Rodriguez MD on 5/6/2024  3:22 PM      Last Stress:  No results found for this or any previous visit.      IMPRESSION & Recommendations:      PAF with episode of RVR - much better now. Was on IV amiodarone. Now PO amiodarone.Continue BB with holding parameters and Eliquis  CAD/MVD-not surgical candidate with recent PCI/RAJAN Jan 2024 & May 2024  Hypokalemia- 3.0  Anemia- hgb 7.5 this AM  Elevated troponin- likely type II d/t demand ischemia  Venous insufficiency of b/l LE w/ cellulitis  HTN  HL  Anxiety/depression  Morbid obesity with BMI > 40  A-fib- now NSR. Change   Keep K >4 and Mg >2   Continue statin, Plavix and BB  If heart rate stays controlled, ok for patient to be discharged per CV standpoint and for her to follow up outpatient as scheduled.       Discussed with patient, family, and Nurse.    Thank you for allowing me to participate in the care of this patient, please do not hesitate to call if you have any questions.    Mariya Giles DO, FACC  Board Certified Cardiologist  Fellow of the American College of Cardiology    Obesity & Weight Loss Medicine   of Medicine [facility]   of Medicine [facility]  formerly [facility]  [phone]

## 2024-06-22 NOTE — PLAN OF CARE
Problem: Discharge Planning  Goal: Discharge to home or other facility with appropriate resources  6/22/2024 1036 by Reyes, Abigail, RN  Outcome: Progressing  6/22/2024 0242 by Jono Garza RN  Outcome: Progressing     Problem: Skin/Tissue Integrity  Goal: Absence of new skin breakdown  Description: 1.  Monitor for areas of redness and/or skin breakdown  2.  Assess vascular access sites hourly  3.  Every 4-6 hours minimum:  Change oxygen saturation probe site  4.  Every 4-6 hours:  If on nasal continuous positive airway pressure, respiratory therapy assess nares and determine need for appliance change or resting period.  6/22/2024 1036 by Reyes, Abigail, RN  Outcome: Progressing  6/22/2024 0242 by Jono Garza RN  Outcome: Progressing     Problem: Safety - Adult  Goal: Free from fall injury  6/22/2024 1036 by Reyes, Abigail, RN  Outcome: Progressing  6/22/2024 0242 by Jono Garza RN  Outcome: Progressing     Problem: ABCDS Injury Assessment  Goal: Absence of physical injury  6/22/2024 1036 by Reyes, Abigail, RN  Outcome: Progressing  6/22/2024 0242 by Jono Garza RN  Outcome: Progressing     Problem: Nutrition Deficit:  Goal: Optimize nutritional status  6/22/2024 1036 by Reyes, Abigail, RN  Outcome: Progressing  6/22/2024 0242 by Jono Garza RN  Outcome: Progressing

## 2024-06-23 LAB
MICROORGANISM SPEC CULT: NORMAL
MICROORGANISM SPEC CULT: NORMAL
SERVICE CMNT-IMP: NORMAL
SERVICE CMNT-IMP: NORMAL
SPECIMEN DESCRIPTION: NORMAL
SPECIMEN DESCRIPTION: NORMAL

## 2024-06-24 ENCOUNTER — CARE COORDINATION (OUTPATIENT)
Dept: CARE COORDINATION | Age: 74
End: 2024-06-24

## 2024-06-24 NOTE — CARE COORDINATION
Left VM message at [facility] asking for return call to discuss if any discharge plans. Called and spoke with , he stated there are no discharge plans yet, she is not able to walk yet, has been mostly bedridden since being there but her cellulitis is much better on her legs.    She had admission 6/20-6/22 for a fib/RVR, was added amiodarone but he stated she is confused now so they will do some labs and check for a UTI today.    Signed off from care management as has been at rehab facility for over a month and no discharge date or plans yet.

## 2024-06-29 PROBLEM — R79.89 ELEVATED TROPONIN: Status: RESOLVED | Noted: 2024-05-30 | Resolved: 2024-06-29

## 2024-07-01 ENCOUNTER — APPOINTMENT (OUTPATIENT)
Dept: GENERAL RADIOLOGY | Age: 74
DRG: 871 | End: 2024-07-01
Payer: COMMERCIAL

## 2024-07-01 ENCOUNTER — HOSPITAL ENCOUNTER (INPATIENT)
Age: 74
LOS: 2 days | Discharge: ANOTHER HOSPITAL | DRG: 871 | End: 2024-07-03
Attending: EMERGENCY MEDICINE | Admitting: STUDENT IN AN ORGANIZED HEALTH CARE EDUCATION/TRAINING PROGRAM
Payer: COMMERCIAL

## 2024-07-01 DIAGNOSIS — E87.6 HYPOKALEMIA: ICD-10-CM

## 2024-07-01 DIAGNOSIS — A41.9 SEPTICEMIA (HCC): Primary | ICD-10-CM

## 2024-07-01 DIAGNOSIS — N17.9 AKI (ACUTE KIDNEY INJURY) (HCC): ICD-10-CM

## 2024-07-01 DIAGNOSIS — J96.21 ACUTE ON CHRONIC RESPIRATORY FAILURE WITH HYPOXIA (HCC): ICD-10-CM

## 2024-07-01 DIAGNOSIS — I31.39 PERICARDIAL EFFUSION: ICD-10-CM

## 2024-07-01 DIAGNOSIS — R07.9 CHEST PAIN, UNSPECIFIED TYPE: ICD-10-CM

## 2024-07-01 DIAGNOSIS — I50.9 ACUTE ON CHRONIC CONGESTIVE HEART FAILURE, UNSPECIFIED HEART FAILURE TYPE (HCC): ICD-10-CM

## 2024-07-01 DIAGNOSIS — I48.91 ATRIAL FIBRILLATION WITH RAPID VENTRICULAR RESPONSE (HCC): ICD-10-CM

## 2024-07-01 DIAGNOSIS — J81.0 ACUTE PULMONARY EDEMA (HCC): ICD-10-CM

## 2024-07-01 DIAGNOSIS — E87.3 RESPIRATORY ALKALOSIS: ICD-10-CM

## 2024-07-01 DIAGNOSIS — N30.01 ACUTE CYSTITIS WITH HEMATURIA: ICD-10-CM

## 2024-07-01 LAB
ALBUMIN SERPL-MCNC: 2.5 G/DL (ref 3.5–5.2)
ALBUMIN SERPL-MCNC: 3 G/DL (ref 3.5–5.2)
ALBUMIN/GLOB SERPL: 0.6 {RATIO} (ref 1–2.5)
ALBUMIN/GLOB SERPL: 0.7 {RATIO} (ref 1–2.5)
ALLEN TEST: POSITIVE
ALLEN TEST: POSITIVE
ALP SERPL-CCNC: 136 U/L (ref 35–104)
ALP SERPL-CCNC: 136 U/L (ref 35–104)
ALT SERPL-CCNC: 32 U/L (ref 5–33)
ALT SERPL-CCNC: 8 U/L (ref 5–33)
ANION GAP SERPL CALCULATED.3IONS-SCNC: 16 MMOL/L (ref 9–17)
ANION GAP SERPL CALCULATED.3IONS-SCNC: 22 MMOL/L (ref 9–17)
AST SERPL-CCNC: 212 U/L
AST SERPL-CCNC: 28 U/L
B PARAP IS1001 DNA NPH QL NAA+NON-PROBE: NOT DETECTED
B PERT DNA SPEC QL NAA+PROBE: NOT DETECTED
BACTERIA URNS QL MICRO: ABNORMAL
BASOPHILS # BLD: 0 K/UL (ref 0–0.2)
BASOPHILS NFR BLD: 0 % (ref 0–2)
BILIRUB SERPL-MCNC: 0.6 MG/DL (ref 0.3–1.2)
BILIRUB SERPL-MCNC: 0.6 MG/DL (ref 0.3–1.2)
BILIRUB UR QL STRIP: NEGATIVE
BNP SERPL-MCNC: ABNORMAL PG/ML
BUN SERPL-MCNC: 28 MG/DL (ref 8–23)
BUN SERPL-MCNC: 31 MG/DL (ref 8–23)
C PNEUM DNA NPH QL NAA+NON-PROBE: NOT DETECTED
CALCIUM SERPL-MCNC: 9 MG/DL (ref 8.6–10.4)
CALCIUM SERPL-MCNC: 9.5 MG/DL (ref 8.6–10.4)
CHLORIDE SERPL-SCNC: 93 MMOL/L (ref 98–107)
CHLORIDE SERPL-SCNC: 94 MMOL/L (ref 98–107)
CLARITY UR: CLEAR
CO2 SERPL-SCNC: 17 MMOL/L (ref 20–31)
CO2 SERPL-SCNC: 27 MMOL/L (ref 20–31)
COLOR UR: YELLOW
CREAT SERPL-MCNC: 1.2 MG/DL (ref 0.5–0.9)
CREAT SERPL-MCNC: 1.7 MG/DL (ref 0.5–0.9)
EKG Q-T INTERVAL: 304 MS
EKG QRS DURATION: 84 MS
EKG QTC CALCULATION (BAZETT): 523 MS
EKG R AXIS: -18 DEGREES
EKG T AXIS: 150 DEGREES
EKG VENTRICULAR RATE: 178 BPM
EOSINOPHIL # BLD: 0 K/UL (ref 0–0.4)
EOSINOPHILS RELATIVE PERCENT: 0 % (ref 1–4)
EPI CELLS #/AREA URNS HPF: ABNORMAL /HPF (ref 0–5)
ERYTHROCYTE [DISTWIDTH] IN BLOOD BY AUTOMATED COUNT: 20.1 % (ref 12.5–15.4)
FERRITIN SERPL-MCNC: 1442 NG/ML (ref 13–150)
FLUAV AG SPEC QL: NEGATIVE
FLUAV RNA NPH QL NAA+NON-PROBE: NOT DETECTED
FLUBV AG SPEC QL: NEGATIVE
FLUBV RNA NPH QL NAA+NON-PROBE: NOT DETECTED
GFR, ESTIMATED: 32 ML/MIN/1.73M2
GFR, ESTIMATED: 48 ML/MIN/1.73M2
GLUCOSE SERPL-MCNC: 106 MG/DL (ref 70–99)
GLUCOSE SERPL-MCNC: 133 MG/DL (ref 70–99)
GLUCOSE UR STRIP-MCNC: NEGATIVE MG/DL
HADV DNA NPH QL NAA+NON-PROBE: NOT DETECTED
HCOV 229E RNA NPH QL NAA+NON-PROBE: NOT DETECTED
HCOV HKU1 RNA NPH QL NAA+NON-PROBE: NOT DETECTED
HCOV NL63 RNA NPH QL NAA+NON-PROBE: NOT DETECTED
HCOV OC43 RNA NPH QL NAA+NON-PROBE: NOT DETECTED
HCT VFR BLD AUTO: 24.7 % (ref 36–46)
HGB BLD-MCNC: 8 G/DL (ref 12–16)
HGB UR QL STRIP.AUTO: NEGATIVE
HMPV RNA NPH QL NAA+NON-PROBE: NOT DETECTED
HPIV1 RNA NPH QL NAA+NON-PROBE: NOT DETECTED
HPIV2 RNA NPH QL NAA+NON-PROBE: NOT DETECTED
HPIV3 RNA NPH QL NAA+NON-PROBE: NOT DETECTED
HPIV4 RNA NPH QL NAA+NON-PROBE: NOT DETECTED
INR PPP: 1.4
IRON SATN MFR SERPL: 12 % (ref 20–55)
IRON SERPL-MCNC: 23 UG/DL (ref 37–145)
KETONES UR STRIP-MCNC: NEGATIVE MG/DL
LACTATE BLDV-SCNC: 5.8 MMOL/L (ref 0.5–1.9)
LACTATE BLDV-SCNC: 8.1 MMOL/L (ref 0.5–1.9)
LEUKOCYTE ESTERASE UR QL STRIP: ABNORMAL
LYMPHOCYTES NFR BLD: 0.84 K/UL (ref 1–4.8)
LYMPHOCYTES RELATIVE PERCENT: 8 % (ref 24–44)
M PNEUMO DNA NPH QL NAA+NON-PROBE: NOT DETECTED
MAGNESIUM SERPL-MCNC: 2.1 MG/DL (ref 1.6–2.6)
MCH RBC QN AUTO: 27 PG (ref 26–34)
MCHC RBC AUTO-ENTMCNC: 32.5 G/DL (ref 31–37)
MCV RBC AUTO: 83 FL (ref 80–100)
MODE: ABNORMAL
MONOCYTES NFR BLD: 0.95 K/UL (ref 0.1–0.8)
MONOCYTES NFR BLD: 9 % (ref 1–7)
MORPHOLOGY: ABNORMAL
NEUTROPHILS NFR BLD: 83 % (ref 36–66)
NEUTS SEG NFR BLD: 8.71 K/UL (ref 1.8–7.7)
NITRITE UR QL STRIP: NEGATIVE
O2 DELIVERY DEVICE: ABNORMAL
O2 DELIVERY DEVICE: ABNORMAL
PARTIAL THROMBOPLASTIN TIME: 37.7 SEC (ref 21.3–31.3)
PARTIAL THROMBOPLASTIN TIME: >120 SEC (ref 21.3–31.3)
PH UR STRIP: 6 [PH] (ref 5–8)
PLATELET # BLD AUTO: 770 K/UL (ref 140–450)
PMV BLD AUTO: 7.8 FL (ref 6–12)
POC HCO3: 23.4 MMOL/L (ref 21–28)
POC HCO3: 25.9 MMOL/L (ref 21–28)
POC O2 SATURATION: 100 % (ref 94–98)
POC O2 SATURATION: 99.9 % (ref 94–98)
POC PCO2: 32.1 MM HG (ref 35–48)
POC PCO2: 33.5 MM HG (ref 35–48)
POC PH: 7.47 (ref 7.35–7.45)
POC PH: 7.5 (ref 7.35–7.45)
POC PO2: 233.8 MM HG (ref 83–108)
POC PO2: 417.9 MM HG (ref 83–108)
POSITIVE BASE EXCESS, ART: 0 MMOL/L (ref 0–3)
POSITIVE BASE EXCESS, ART: 2.7 MMOL/L (ref 0–3)
POTASSIUM SERPL-SCNC: 3.5 MMOL/L (ref 3.7–5.3)
POTASSIUM SERPL-SCNC: 4.2 MMOL/L (ref 3.7–5.3)
PROCALCITONIN SERPL-MCNC: 1.1 NG/ML (ref 0–0.09)
PROT SERPL-MCNC: 6.9 G/DL (ref 6.4–8.3)
PROT SERPL-MCNC: 7.4 G/DL (ref 6.4–8.3)
PROT UR STRIP-MCNC: ABNORMAL MG/DL
PROTHROMBIN TIME: 14.9 SEC (ref 9.4–12.6)
RBC # BLD AUTO: 2.98 M/UL (ref 4–5.2)
RBC #/AREA URNS HPF: ABNORMAL /HPF (ref 0–2)
RSV BY PCR: NEGATIVE
RSV RNA NPH QL NAA+NON-PROBE: NOT DETECTED
RV+EV RNA NPH QL NAA+NON-PROBE: NOT DETECTED
SAMPLE SITE: ABNORMAL
SAMPLE SITE: ABNORMAL
SARS-COV-2 RDRP RESP QL NAA+PROBE: NOT DETECTED
SARS-COV-2 RNA NPH QL NAA+NON-PROBE: NOT DETECTED
SODIUM SERPL-SCNC: 133 MMOL/L (ref 135–144)
SODIUM SERPL-SCNC: 136 MMOL/L (ref 135–144)
SP GR UR STRIP: 1.01 (ref 1–1.03)
SPECIMEN DESCRIPTION: NORMAL
SPECIMEN DESCRIPTION: NORMAL
SPECIMEN SOURCE: NORMAL
TIBC SERPL-MCNC: 188 UG/DL (ref 250–450)
TROPONIN I SERPL HS-MCNC: 67 NG/L (ref 0–14)
TROPONIN I SERPL HS-MCNC: 72 NG/L (ref 0–14)
TSH SERPL DL<=0.05 MIU/L-ACNC: 1.11 UIU/ML (ref 0.3–5)
UNSATURATED IRON BINDING CAPACITY: 165 UG/DL (ref 112–347)
UROBILINOGEN UR STRIP-ACNC: NORMAL EU/DL (ref 0–1)
WBC #/AREA URNS HPF: ABNORMAL /HPF (ref 0–5)
WBC OTHER # BLD: 10.5 K/UL (ref 3.5–11)

## 2024-07-01 PROCEDURE — 51798 US URINE CAPACITY MEASURE: CPT

## 2024-07-01 PROCEDURE — 87040 BLOOD CULTURE FOR BACTERIA: CPT

## 2024-07-01 PROCEDURE — 2580000003 HC RX 258: Performed by: NURSE PRACTITIONER

## 2024-07-01 PROCEDURE — 87804 INFLUENZA ASSAY W/OPTIC: CPT

## 2024-07-01 PROCEDURE — 81001 URINALYSIS AUTO W/SCOPE: CPT

## 2024-07-01 PROCEDURE — 6370000000 HC RX 637 (ALT 250 FOR IP): Performed by: EMERGENCY MEDICINE

## 2024-07-01 PROCEDURE — 94761 N-INVAS EAR/PLS OXIMETRY MLT: CPT

## 2024-07-01 PROCEDURE — 83540 ASSAY OF IRON: CPT

## 2024-07-01 PROCEDURE — 80053 COMPREHEN METABOLIC PANEL: CPT

## 2024-07-01 PROCEDURE — 6360000002 HC RX W HCPCS: Performed by: EMERGENCY MEDICINE

## 2024-07-01 PROCEDURE — 2580000003 HC RX 258: Performed by: STUDENT IN AN ORGANIZED HEALTH CARE EDUCATION/TRAINING PROGRAM

## 2024-07-01 PROCEDURE — 0202U NFCT DS 22 TRGT SARS-COV-2: CPT

## 2024-07-01 PROCEDURE — 2000000000 HC ICU R&B

## 2024-07-01 PROCEDURE — 71045 X-RAY EXAM CHEST 1 VIEW: CPT

## 2024-07-01 PROCEDURE — 99285 EMERGENCY DEPT VISIT HI MDM: CPT

## 2024-07-01 PROCEDURE — 83880 ASSAY OF NATRIURETIC PEPTIDE: CPT

## 2024-07-01 PROCEDURE — 96375 TX/PRO/DX INJ NEW DRUG ADDON: CPT

## 2024-07-01 PROCEDURE — 85610 PROTHROMBIN TIME: CPT

## 2024-07-01 PROCEDURE — 87086 URINE CULTURE/COLONY COUNT: CPT

## 2024-07-01 PROCEDURE — 02HV33Z INSERTION OF INFUSION DEVICE INTO SUPERIOR VENA CAVA, PERCUTANEOUS APPROACH: ICD-10-PCS | Performed by: INTERNAL MEDICINE

## 2024-07-01 PROCEDURE — 99223 1ST HOSP IP/OBS HIGH 75: CPT | Performed by: STUDENT IN AN ORGANIZED HEALTH CARE EDUCATION/TRAINING PROGRAM

## 2024-07-01 PROCEDURE — 84484 ASSAY OF TROPONIN QUANT: CPT

## 2024-07-01 PROCEDURE — 6360000002 HC RX W HCPCS: Performed by: STUDENT IN AN ORGANIZED HEALTH CARE EDUCATION/TRAINING PROGRAM

## 2024-07-01 PROCEDURE — 80074 ACUTE HEPATITIS PANEL: CPT

## 2024-07-01 PROCEDURE — 96365 THER/PROPH/DIAG IV INF INIT: CPT

## 2024-07-01 PROCEDURE — 5A09357 ASSISTANCE WITH RESPIRATORY VENTILATION, LESS THAN 24 CONSECUTIVE HOURS, CONTINUOUS POSITIVE AIRWAY PRESSURE: ICD-10-PCS | Performed by: INTERNAL MEDICINE

## 2024-07-01 PROCEDURE — 2500000003 HC RX 250 WO HCPCS: Performed by: EMERGENCY MEDICINE

## 2024-07-01 PROCEDURE — 85025 COMPLETE CBC W/AUTO DIFF WBC: CPT

## 2024-07-01 PROCEDURE — 87641 MR-STAPH DNA AMP PROBE: CPT

## 2024-07-01 PROCEDURE — 94660 CPAP INITIATION&MGMT: CPT

## 2024-07-01 PROCEDURE — 36415 COLL VENOUS BLD VENIPUNCTURE: CPT

## 2024-07-01 PROCEDURE — 85730 THROMBOPLASTIN TIME PARTIAL: CPT

## 2024-07-01 PROCEDURE — 84443 ASSAY THYROID STIM HORMONE: CPT

## 2024-07-01 PROCEDURE — 82803 BLOOD GASES ANY COMBINATION: CPT

## 2024-07-01 PROCEDURE — 2500000003 HC RX 250 WO HCPCS: Performed by: STUDENT IN AN ORGANIZED HEALTH CARE EDUCATION/TRAINING PROGRAM

## 2024-07-01 PROCEDURE — 82728 ASSAY OF FERRITIN: CPT

## 2024-07-01 PROCEDURE — 83550 IRON BINDING TEST: CPT

## 2024-07-01 PROCEDURE — 94640 AIRWAY INHALATION TREATMENT: CPT

## 2024-07-01 PROCEDURE — 93005 ELECTROCARDIOGRAM TRACING: CPT | Performed by: EMERGENCY MEDICINE

## 2024-07-01 PROCEDURE — 2700000000 HC OXYGEN THERAPY PER DAY

## 2024-07-01 PROCEDURE — 87635 SARS-COV-2 COVID-19 AMP PRB: CPT

## 2024-07-01 PROCEDURE — 87798 DETECT AGENT NOS DNA AMP: CPT

## 2024-07-01 PROCEDURE — 84145 PROCALCITONIN (PCT): CPT

## 2024-07-01 PROCEDURE — 36600 WITHDRAWAL OF ARTERIAL BLOOD: CPT

## 2024-07-01 PROCEDURE — 83605 ASSAY OF LACTIC ACID: CPT

## 2024-07-01 PROCEDURE — 83735 ASSAY OF MAGNESIUM: CPT

## 2024-07-01 PROCEDURE — 2580000003 HC RX 258: Performed by: EMERGENCY MEDICINE

## 2024-07-01 RX ORDER — HEPARIN SODIUM 1000 [USP'U]/ML
4000 INJECTION, SOLUTION INTRAVENOUS; SUBCUTANEOUS PRN
Status: DISCONTINUED | OUTPATIENT
Start: 2024-07-01 | End: 2024-07-03 | Stop reason: HOSPADM

## 2024-07-01 RX ORDER — ONDANSETRON 2 MG/ML
4 INJECTION INTRAMUSCULAR; INTRAVENOUS EVERY 6 HOURS PRN
Status: DISCONTINUED | OUTPATIENT
Start: 2024-07-01 | End: 2024-07-03 | Stop reason: HOSPADM

## 2024-07-01 RX ORDER — SODIUM CHLORIDE 9 MG/ML
INJECTION, SOLUTION INTRAVENOUS CONTINUOUS
Status: DISCONTINUED | OUTPATIENT
Start: 2024-07-01 | End: 2024-07-03

## 2024-07-01 RX ORDER — SODIUM CHLORIDE 0.9 % (FLUSH) 0.9 %
5-40 SYRINGE (ML) INJECTION EVERY 12 HOURS SCHEDULED
Status: DISCONTINUED | OUTPATIENT
Start: 2024-07-01 | End: 2024-07-03 | Stop reason: HOSPADM

## 2024-07-01 RX ORDER — POTASSIUM CHLORIDE 7.45 MG/ML
10 INJECTION INTRAVENOUS PRN
Status: DISCONTINUED | OUTPATIENT
Start: 2024-07-01 | End: 2024-07-03 | Stop reason: HOSPADM

## 2024-07-01 RX ORDER — IPRATROPIUM BROMIDE AND ALBUTEROL SULFATE 2.5; .5 MG/3ML; MG/3ML
1 SOLUTION RESPIRATORY (INHALATION) ONCE
Status: COMPLETED | OUTPATIENT
Start: 2024-07-01 | End: 2024-07-01

## 2024-07-01 RX ORDER — ACETAMINOPHEN 325 MG/1
650 TABLET ORAL EVERY 6 HOURS PRN
Status: DISCONTINUED | OUTPATIENT
Start: 2024-07-01 | End: 2024-07-03 | Stop reason: HOSPADM

## 2024-07-01 RX ORDER — POTASSIUM CHLORIDE 20 MEQ/1
40 TABLET, EXTENDED RELEASE ORAL PRN
Status: DISCONTINUED | OUTPATIENT
Start: 2024-07-01 | End: 2024-07-03 | Stop reason: HOSPADM

## 2024-07-01 RX ORDER — ESMOLOL HYDROCHLORIDE 10 MG/ML
25-300 INJECTION, SOLUTION INTRAVENOUS CONTINUOUS
Status: DISCONTINUED | OUTPATIENT
Start: 2024-07-01 | End: 2024-07-02

## 2024-07-01 RX ORDER — HEPARIN SODIUM 1000 [USP'U]/ML
2000 INJECTION, SOLUTION INTRAVENOUS; SUBCUTANEOUS PRN
Status: DISCONTINUED | OUTPATIENT
Start: 2024-07-01 | End: 2024-07-03 | Stop reason: HOSPADM

## 2024-07-01 RX ORDER — OXYBUTYNIN CHLORIDE 5 MG/1
10 TABLET, EXTENDED RELEASE ORAL DAILY
Status: DISCONTINUED | OUTPATIENT
Start: 2024-07-01 | End: 2024-07-03 | Stop reason: HOSPADM

## 2024-07-01 RX ORDER — SODIUM CHLORIDE 9 MG/ML
INJECTION, SOLUTION INTRAVENOUS PRN
Status: DISCONTINUED | OUTPATIENT
Start: 2024-07-01 | End: 2024-07-03 | Stop reason: HOSPADM

## 2024-07-01 RX ORDER — 0.9 % SODIUM CHLORIDE 0.9 %
500 INTRAVENOUS SOLUTION INTRAVENOUS ONCE
Status: COMPLETED | OUTPATIENT
Start: 2024-07-01 | End: 2024-07-01

## 2024-07-01 RX ORDER — HEPARIN SODIUM 10000 [USP'U]/100ML
5-30 INJECTION, SOLUTION INTRAVENOUS CONTINUOUS
Status: DISCONTINUED | OUTPATIENT
Start: 2024-07-01 | End: 2024-07-03 | Stop reason: HOSPADM

## 2024-07-01 RX ORDER — FOLIC ACID 1 MG/1
1 TABLET ORAL DAILY
Status: DISCONTINUED | OUTPATIENT
Start: 2024-07-01 | End: 2024-07-03 | Stop reason: HOSPADM

## 2024-07-01 RX ORDER — POTASSIUM CHLORIDE 7.45 MG/ML
10 INJECTION INTRAVENOUS ONCE
Status: COMPLETED | OUTPATIENT
Start: 2024-07-01 | End: 2024-07-01

## 2024-07-01 RX ORDER — FUROSEMIDE 10 MG/ML
20 INJECTION INTRAMUSCULAR; INTRAVENOUS ONCE
Status: COMPLETED | OUTPATIENT
Start: 2024-07-01 | End: 2024-07-01

## 2024-07-01 RX ORDER — SODIUM CHLORIDE 0.9 % (FLUSH) 0.9 %
5-40 SYRINGE (ML) INJECTION PRN
Status: DISCONTINUED | OUTPATIENT
Start: 2024-07-01 | End: 2024-07-03 | Stop reason: HOSPADM

## 2024-07-01 RX ORDER — ACETAMINOPHEN 650 MG/1
650 SUPPOSITORY RECTAL EVERY 6 HOURS PRN
Status: DISCONTINUED | OUTPATIENT
Start: 2024-07-01 | End: 2024-07-03 | Stop reason: HOSPADM

## 2024-07-01 RX ORDER — DILTIAZEM HYDROCHLORIDE 5 MG/ML
10 INJECTION INTRAVENOUS ONCE
Status: COMPLETED | OUTPATIENT
Start: 2024-07-01 | End: 2024-07-01

## 2024-07-01 RX ORDER — ESCITALOPRAM OXALATE 10 MG/1
20 TABLET ORAL DAILY
Status: DISCONTINUED | OUTPATIENT
Start: 2024-07-01 | End: 2024-07-03 | Stop reason: HOSPADM

## 2024-07-01 RX ORDER — MAGNESIUM SULFATE IN WATER 40 MG/ML
2000 INJECTION, SOLUTION INTRAVENOUS PRN
Status: DISCONTINUED | OUTPATIENT
Start: 2024-07-01 | End: 2024-07-03 | Stop reason: HOSPADM

## 2024-07-01 RX ORDER — ONDANSETRON 4 MG/1
4 TABLET, ORALLY DISINTEGRATING ORAL EVERY 8 HOURS PRN
Status: DISCONTINUED | OUTPATIENT
Start: 2024-07-01 | End: 2024-07-03 | Stop reason: HOSPADM

## 2024-07-01 RX ORDER — SODIUM CHLORIDE 9 MG/ML
INJECTION, SOLUTION INTRAVENOUS CONTINUOUS
Status: DISCONTINUED | OUTPATIENT
Start: 2024-07-01 | End: 2024-07-01

## 2024-07-01 RX ORDER — KETOROLAC TROMETHAMINE 30 MG/ML
30 INJECTION, SOLUTION INTRAMUSCULAR; INTRAVENOUS ONCE
Status: COMPLETED | OUTPATIENT
Start: 2024-07-01 | End: 2024-07-01

## 2024-07-01 RX ORDER — FENTANYL CITRATE 50 UG/ML
50 INJECTION, SOLUTION INTRAMUSCULAR; INTRAVENOUS
Status: DISCONTINUED | OUTPATIENT
Start: 2024-07-01 | End: 2024-07-03 | Stop reason: HOSPADM

## 2024-07-01 RX ORDER — HEPARIN SODIUM 1000 [USP'U]/ML
4000 INJECTION, SOLUTION INTRAVENOUS; SUBCUTANEOUS ONCE
Status: COMPLETED | OUTPATIENT
Start: 2024-07-01 | End: 2024-07-01

## 2024-07-01 RX ORDER — ATORVASTATIN CALCIUM 40 MG/1
40 TABLET, FILM COATED ORAL NIGHTLY
Status: DISCONTINUED | OUTPATIENT
Start: 2024-07-01 | End: 2024-07-03 | Stop reason: HOSPADM

## 2024-07-01 RX ORDER — ACETAMINOPHEN 650 MG/1
650 SUPPOSITORY RECTAL ONCE
Status: COMPLETED | OUTPATIENT
Start: 2024-07-01 | End: 2024-07-01

## 2024-07-01 RX ADMIN — CEFEPIME 2000 MG: 2 INJECTION, POWDER, FOR SOLUTION INTRAVENOUS at 13:29

## 2024-07-01 RX ADMIN — CEFEPIME 2000 MG: 2 INJECTION, POWDER, FOR SOLUTION INTRAVENOUS at 11:24

## 2024-07-01 RX ADMIN — SODIUM CHLORIDE: 9 INJECTION, SOLUTION INTRAVENOUS at 20:31

## 2024-07-01 RX ADMIN — AMIODARONE HYDROCHLORIDE 1 MG/MIN: 1.8 INJECTION, SOLUTION INTRAVENOUS at 09:49

## 2024-07-01 RX ADMIN — POTASSIUM CHLORIDE 10 MEQ: 7.46 INJECTION, SOLUTION INTRAVENOUS at 10:17

## 2024-07-01 RX ADMIN — SODIUM CHLORIDE 1500 MG: 9 INJECTION, SOLUTION INTRAVENOUS at 12:26

## 2024-07-01 RX ADMIN — AMIODARONE HYDROCHLORIDE 150 MG: 1.5 INJECTION, SOLUTION INTRAVENOUS at 09:35

## 2024-07-01 RX ADMIN — DILTIAZEM HYDROCHLORIDE 5 MG/HR: 5 INJECTION, SOLUTION INTRAVENOUS at 11:30

## 2024-07-01 RX ADMIN — KETOROLAC TROMETHAMINE 30 MG: 30 INJECTION, SOLUTION INTRAMUSCULAR; INTRAVENOUS at 12:38

## 2024-07-01 RX ADMIN — DILTIAZEM HYDROCHLORIDE 10 MG: 5 INJECTION, SOLUTION INTRAVENOUS at 11:27

## 2024-07-01 RX ADMIN — IPRATROPIUM BROMIDE AND ALBUTEROL SULFATE 1 DOSE: .5; 2.5 SOLUTION RESPIRATORY (INHALATION) at 10:03

## 2024-07-01 RX ADMIN — ACETAMINOPHEN 650 MG: 650 SUPPOSITORY RECTAL at 10:13

## 2024-07-01 RX ADMIN — ESMOLOL HYDROCHLORIDE 50 MCG/KG/MIN: 10 INJECTION INTRAVENOUS at 13:10

## 2024-07-01 RX ADMIN — AMIODARONE HYDROCHLORIDE 1 MG/MIN: 1.8 INJECTION, SOLUTION INTRAVENOUS at 20:33

## 2024-07-01 RX ADMIN — FUROSEMIDE 20 MG: 10 INJECTION, SOLUTION INTRAMUSCULAR; INTRAVENOUS at 09:54

## 2024-07-01 RX ADMIN — HEPARIN SODIUM 10 UNITS/KG/HR: 10000 INJECTION, SOLUTION INTRAVENOUS at 10:42

## 2024-07-01 RX ADMIN — SODIUM CHLORIDE: 9 INJECTION, SOLUTION INTRAVENOUS at 23:31

## 2024-07-01 RX ADMIN — HEPARIN SODIUM 4000 UNITS: 1000 INJECTION INTRAVENOUS; SUBCUTANEOUS at 10:36

## 2024-07-01 RX ADMIN — AMIODARONE HYDROCHLORIDE 1 MG/MIN: 1.8 INJECTION, SOLUTION INTRAVENOUS at 16:57

## 2024-07-01 RX ADMIN — SODIUM CHLORIDE 500 ML: 9 INJECTION, SOLUTION INTRAVENOUS at 10:18

## 2024-07-01 ASSESSMENT — PAIN SCALES - WONG BAKER: WONGBAKER_NUMERICALRESPONSE: NO HURT

## 2024-07-01 ASSESSMENT — PAIN SCALES - GENERAL: PAINLEVEL_OUTOF10: 0

## 2024-07-01 NOTE — H&P
[facility]  Office: [phone]  Nael Morgan DO, Ronny Quevedo DO, Pb Vásquez DO, Refugio Bradley DO, Sheldon Smith MD, Elsa Torres MD, Leandro Espinosa MD, Joann Hauser MD,  Dipak Montana MD, Rob Hernandez MD, Swathi Wells MD,  Gracia Chen DO, Pilar So MD, Kayden Walters MD, Oralndo Morgan DO, Yamilex Luciano MD,  Mando Burden DO, Gali Galarza MD, Georgie Workman MD, Jennifer Hill MD, Gloria Sierra MD,  Rafael Rawls MD, Denilson Murphy MD, Kamran Flores MD, Yazan Hughes MD, Theodore Grossman MD, Jaxon Villar MD, Ricky Lopez DO, Mickey Cabral DO, Trev Alex MD,  Arvin Powers MD, Shirley Waterhouse, CNP,  Nidia Lester CNP, Aj Castro, CNP,  Erin Shaw, DNP, Ivis Senior, CNP, Georgiana Freeman, CNP, Diandra Ruffin, CNP, Marilia Parker, CNP, Maureen Matamoros, PA-C, Meeta Titus PA-C, Sandrita Cruz, CNP, Nan Berrios, CNP, Veronica Alonzo, CNP, Areli Ware, CNP, Guadalupe Jaimes, CNP, Radhika Gillespie, CNS, Adriane Jimenez, CNP, Sarah Shi CNP, Tracy Schwab, CNP         [facility]   IN-PATIENT SERVICE   [facility]    HISTORY AND PHYSICAL EXAMINATION            Date:   7/1/2024  Patient name:  Elaina Champagne  Date of admission:  7/1/2024  9:28 AM  MRN:   [id number]  Account:  [id number]  YOB: 1950  PCP:    Robin Ly MD  Room:   [address]  Code Status:    DNR-CCA    Chief Complaint:     Chief Complaint   Patient presents with    Shortness of Breath       History Obtained From:     patient, spouse, family member - son  Patient unable to give history due to altered mentation, on BiPAP and unstable vital signs  History of Present Illness:     Elaina Champagne is a 73 y.o.  /  female who presents with Shortness of Breath   and is admitted to the hospital for the management of Sepsis (HCC).    This is a 73-year-old female past medical history of morbid obesity, anxiety and

## 2024-07-01 NOTE — PROGRESS NOTES
Patient placed on BiPAP per order, on charted settings. Tolerating well. ABG's drawn results given to MD. BARRON. repeat ABG in 30 min's.

## 2024-07-01 NOTE — PROCEDURES
Picc placement note:  Dynamic Access RN procedure    [facility] and [facility] by proceduralist[facility] See consent form in paper chart.    Prescribed IV Therapy = Multiple incompatible solutions, multiple failed PIVs  Peripheral ultrasound assessment done. Plan for right brachial vein insertion.   CVR measurement = 13 % (Linear CVR is preferred to be less than 45%).  Product type: Bard 5 fr DL lumen Power PICC.  History/Labs/Allergies Reviewed  Placed By: CIPRIANO PAGAN - RN (Dynamic Access)  Time out Performed using Two Identifiers  Lot # [id number]  Expiration date = 12-  Trimmed at 43 cm total  External catheter length 0 cm  Number of attempts 1  Special equipment used- Bard 3cg tip confirmation system, ultrasound, and micro-introducer (MST) technique   Catheter securement = adhesive 3M securement device  Dressing applied= Tegaderm CHG  Lidocaine administered intradermally conc.1%, approx 1 ml (Lidocaine Lot# - [id number] and Exp date - 03- )      Mateusz RN aware CXR needed prior to using picc. CXR ordered and awaiting report. Rn aware new iv tubing required.     PICC education:     [ X ] Discussed with patient/Family or POA prior to procedure.  Risks and Benefits along with reason for procedure were discussed and teaching was reinforced with an education handout on line  insertion. CDC FAQ Catheter Associated Blood Stream Infections and [facility] 19893 REV. 7/13 Nursing and Booklet left at bedside or in chart. Patient (Family or POA) acknowledged understanding of information taught and agreed to procedure.

## 2024-07-01 NOTE — PROGRESS NOTES
[facility]   Pharmacy Pharmacokinetic Monitoring Service - Vancomycin     Elaina Champagne is a 73 y.o. female starting on vancomycin therapy for sepsis/UTI. Pharmacy consulted by Dr. Alta Álvarez for monitoring and adjustment.    Target Concentration: Goal AUC/RASHEL 400-600 mg*hr/L    Additional Antimicrobials: cefepime    Pertinent Laboratory Values:   Wt Readings from Last 1 Encounters:   07/01/24 98.9 kg (218 lb)     Temp Readings from Last 1 Encounters:   07/01/24 (!) 104.2 °F (40.1 °C) (Rectal)     Estimated Creatinine Clearance: 46 mL/min (A) (based on SCr of 1.2 mg/dL (H)).  Recent Labs     07/01/24  0935   CREATININE 1.2*   BUN 28*   WBC 10.5     Procalcitonin: n/a    Pertinent Cultures:  Culture Date Source Results   07.01.24 Blood x 2 Sent   MRSA Nasal Swab:  Ordered    Plan:  Start vancomycin 1500 mg IV once followed by 1000 mg IV q24h  Anticipated AUC of 547 and trough concentration of 16.4 at steady state  Renal labs as indicated - SCr elevated. Monitor closely.   Pharmacy will continue to monitor patient and adjust therapy as indicated    Thank you for the consult,  Archie Ospina RPH  7/1/2024 10:56 AM

## 2024-07-01 NOTE — ED PROVIDER NOTES
[place] [id number]  Phone: [phone]       Pt Name: Elaina Champagne  MRN: [id number]  Birthdate 1950  Date of evaluation: 7/1/24  PCP:  Robin Ly MD    CHIEF COMPLAINT       Chief Complaint   Patient presents with    Shortness of Breath       HISTORY OF PRESENT ILLNESS  (Location/Symptom, Timing/Onset, Context/Setting, Quality, Duration, Modifying Factors, Severity.)    Elaina Champagne is a 73 y.o. female who presents with history of CKD, CHF, A-fib who came in via EMS in respiratory distress. She was 82% on room air, placed on CPAP. She was also in A-fib with RVR. She was given 2 breathing treatments, 2 g of mag, 125 of Solu-Medrol as well as 20 mg of Cardizem. EMS states that her heart rate was ranging between 30 and 220. She does not have a AICD or defibrillator in. It looks like in her recent echo she actually has diastolic heart failure and she is on Lasix at home. Is on oral amiodarone as well as Lopressor and Eliquis per her med list from the nursing facility.    All history provided per EMS due to patient's metabolic encephalopathy, respiratory distress, critical status    PAST MEDICAL / SURGICAL / SOCIAL / FAMILY HISTORY    has a past medical history of Anemia, Anxiety disorder, Artificial knee joint present, left, Artificial knee joint present, right, Atherosclerotic heart disease, Bell's palsy, Cellulitis, Chronic kidney disease, stage 3 ([facility]), Cognitive communication deficit, Gastric ulcer with hemorrhage, Gastro-esophageal reflux disease without esophagitis, Hyperlipidemia, Hypertension, Lymphedema, Major depressive disorder, Morbid obesity ([facility]), Nonrheumatic tricuspid (valve) insufficiency, NSTEMI (non-ST elevated myocardial infarction) ([facility]), Occlusion and stenosis of bilateral carotid arteries, Osteoarthritis, Overactive bladder, Paroxysmal atrial fibrillation ([facility]), Urge incontinence, Venous insufficiency, and Weakness.     has     PT evaluation and treat    Adult NIV/Positive Airway Pressure    Pulse oximetry, continuous    Respiratory care evaluation only    Initiate Oxygen Therapy Protocol    RT Communication Order    Arterial Blood Gas, POC    Arterial Blood Gas, POC    EKG 12 Lead    Insert peripheral IV    ADMIT TO INPATIENT       MEDICATIONS ORDERED:  Orders Placed This Encounter   Medications    FOLLOWED BY Linked Order Group     amiodarone (NEXTERONE) 150 mg in dextrose 5% 100 ml     amiodarone (NEXTERONE) 360 mg in dextrose 5% 200 ml    ipratropium 0.5 mg-albuterol 2.5 mg (DUONEB) nebulizer solution 1 Dose     Order Specific Question:   Initiate RT Bronchodilator Protocol     Answer:   No    furosemide (LASIX) injection 20 mg    acetaminophen (TYLENOL) suppository 650 mg    heparin (porcine) injection 4,000 Units    heparin (porcine) injection 4,000 Units    heparin (porcine) injection 2,000 Units    heparin 25,000 units in dextrose 5% 250 mL (premix) infusion    sodium chloride 0.9 % bolus 500 mL    potassium chloride 10 mEq/100 mL IVPB (Peripheral Line)    ceFEPIme (MAXIPIME) 2,000 mg in sodium chloride 0.9 % 100 mL IVPB (mini-bag)     Order Specific Question:   Antimicrobial Indications     Answer:   Urinary Tract Infection     Order Specific Question:   Antimicrobial Indications     Answer:   Sepsis of Unknown Etiology     Order Specific Question:   Sepsis duration of therapy     Answer:   7 days     Order Specific Question:   UTI duration of therapy     Answer:   10 days    vancomycin (VANCOCIN) 1,500 mg in sodium chloride 0.9 % 250 mL IVPB ([id number])     Order Specific Question:   Antimicrobial Indications     Answer:   Sepsis of Unknown Etiology     Order Specific Question:   Sepsis duration of therapy     Answer:   7 days    vancomycin (VANCOCIN) intermittent dosing (placeholder)     Order Specific Question:   Antimicrobial Indications     Answer:   Urinary Tract Infection     Order Specific Question:   Antimicrobial  intubation.  Patient will be admitted to the ICU under the hospitalist with consult to cardiology as well as critical care.       Amount and/or Complexity of Data Reviewed  Clinical lab tests: ordered and reviewed  Tests in the radiology section of CPT®: ordered and reviewed  Decide to obtain previous medical records or to obtain history from someone other than the patient: yes  Obtain history from someone other than the patient: yes  Review and summarize past medical records: yes  Discuss the patient with other providers: yes  Independent visualization of images, tracings, or specimens: yes    Risk of Complications, Morbidity, and/or Mortality  General comments: Is this patient to be included in the SEP-1 core measure due to severe sepsis or septic shock? Yes SEP-1 CORE MEASURE DATA    Sepsis Criteria   Severe Sepsis Criteria   Septic Shock Criteria    Must meet 2:  [ X]Temp >100.9 F (38.3 C) or < 96.8 F (36 C)  [ X]HR > 90  [ X]RR > 20  [ ]WBC > 12 or < 4 or 10% bands    AND:    [ X] Infection Confirmed or Suspected.     Must meet 1:    [ X]Lactate > 2       or   [ X]Signs of Organ Dysfunction:    - SBP < 90 or MAP < 65  -Creatinine > 2 or increased from baseline  -Urine Output < 0.5 ml/kg/hr  -Bilirubin > 2  -INR > 1.5 (not anticoagulated)  -Platelets < 100,000  -Acute Respiratory Failure as evidenced by new need for NIPPV or mechanical ventilation   Must meet 1:    [ X]Lactate > 4        or   [ ]SBP < 90 or MAP < 65 for at least two readings in the first hour after fluid bolus administration  [ ]Vasopressors initiated (if hypotension persists after fluid resuscitation)    Patient Vital Signs in the past 6 hrs:  07/01/24 0929, BP:(!) 178/157, Pulse:(!) 185, Resp:16, SpO2:90 %, Height:1.549 m (5' 1\"), Weight:98.9 kg (218 lb), Weight Method:Estimated;Stated, Percent Weight Change:0  07/01/24 1000, Temp:(!) 104.2 °F (40.1 °C)  07/01/24 1005, Pulse:(!) 132, Resp:25, SpO2:90 %  07/01/24 1035, BP:(!) 183/157, Pulse:(!)

## 2024-07-01 NOTE — CONSULTS
[facility]  Fabrizio Aparicio MD/Karl OBANDO AGACNP-BC, NP-C      Joie OBANDO NP-C      Sumanth OBANDO NP-C     Pulmonary and Critical Care CONSULT NOTE:      DATE OF CONSULT 7/1/2024    REASON FOR CONSULTATION:  Sepsis      PCP Robin Ly MD     CHIEF COMPLAINT: Altered mental status    HISTORY OF PRESENT ILLNESS:   This is a chronically ill 73-year-old female that appears older than stated age who presents to the hospital from skilled nursing facility with fever, chills and altered mental status.  Temperature was 104 with heart rate 150-160 with underlying atrial fibrillation.  Patient was hypertensive and very altered mentally.  ABG showed respiratory alkalosis with adequate oxygenation.    The patient has apparently been in and out of nursing homes and hospitals for the past 6 months according to the .  I spoke with both the  and his son at bedside.  I spoke with them regarding severity of her illness and CODE STATUS.  They have opted for DNR CC arrest/DO NOT INTUBATE which I think is very reasonable.    Patient has known history of chronic heart failure with preserved ejection fraction, atrial fibrillation, chronic kidney disease stage III, GERD, hypertension, lymphedema, morbid obesity and osteoarthritis.    According to the  the patient has no underlying lung condition but I strongly suspect she has obstructive sleep apnea.  I am unclear whether or not she has ever been checked.  Her  is somewhat of a reluctant/poor historian.    When I entered the room the patient was breathing 35 to 40 breaths/min on noninvasive ventilatory support.  Oxygen was adequate on 60% FiO2.  Heart rate was 150-160 with rapid ventricular response atrial fibrillation.  Blood pressure in the 180s.  Urine appears grossly purulent at bedside.      ALLERGIES:  Allergies   Allergen Reactions    Norco  Relation Age of Onset    Cancer Mother     High Blood Pressure Father     Stroke Father     Ovarian Cancer Daughter     Cancer Daughter     Diabetes Maternal Aunt     Cancer Other         daughter/ovarian cancer       REVIEW OF SYSTEMS:  All other systems reviewed and are negative.  Altered unable to participate    PHYSICAL EXAM:  Vital Signs Blood pressure (!) 222/209, pulse (!) 128, temperature (!) 104.2 °F (40.1 °C), temperature source Rectal, resp. rate 23, height 1.549 m (5' 1\"), weight 98.9 kg (218 lb), SpO2 98 %.  Oxygen Amount and Delivery:      Admission Weight Weight - Scale: 98.9 kg (218 lb)    General Appearance morbidly obese, severe metabolic encephalopathy with garbled incomprehensible speech, tachypneic and tachycardic    Head  Normocephalic, without obvious abnormality, atraumatic    Eyes  conjunctivae/corneas clear. PERRL, EOM's intact. Fundi benign.    ENT BiPAP mask in place  Neck  no adenopathy, no carotid bruit, moderate JVD, supple, symmetrical, trachea midline and thyroid not enlarged, symmetric, no tenderness/mass/nodules, thick neck, no stridor  Lungs decreased breath sounds bilateral bases, fairly clear anteriorly  Heart: Tachycardic in the 150s and irregular, soft murmur  Abdomen obese, soft, nondistended  Extremities chronic venous stasis changes bilateral legs with multiple venous insufficiency wounds    Skin  Skin color, texture, turgor normal. No rashes or lesions  Neurologic: Seems to move all 4 extremities just very altered mentally with metabolic encephalopathy.         Imaging  My interpretation:  Chest x-ray shows significant cardiomegaly with small right pleural effusion.  Difficult to exclude significant left pleural effusion but I would categorize as small to moderate at least.  Significant cardiomegaly with small right pleural effusion with fluid tracking into the fissure      Lab Review  CBC     Lab Results   Component Value Date/Time    WBC 10.5 07/01/2024 09:35 AM    RBC

## 2024-07-01 NOTE — PLAN OF CARE
Dr. Wells at bedside aware of patient's labile pressures.     Scabs diffuse on legs.   Open areas with slough tissue on bilateral buttocks and coccyx.     Patient confused, agitated. Pulling at gown and lines.       OK per family for PICC line.       Patient's pressures are labile, requested arterial line for more accurate blood pressure monitoring considering multiple cardioactive drips.       Problem: Safety - Adult  Goal: Free from fall injury  Outcome: Not Progressing     Problem: Safety - Adult  Goal: Free from fall injury  Outcome: Not Progressing

## 2024-07-02 ENCOUNTER — APPOINTMENT (OUTPATIENT)
Age: 74
DRG: 871 | End: 2024-07-02
Attending: STUDENT IN AN ORGANIZED HEALTH CARE EDUCATION/TRAINING PROGRAM
Payer: COMMERCIAL

## 2024-07-02 PROBLEM — D75.839 THROMBOCYTOSIS: Status: ACTIVE | Noted: 2024-07-02

## 2024-07-02 PROBLEM — I27.20 PULMONARY HYPERTENSION (HCC): Status: ACTIVE | Noted: 2024-07-02

## 2024-07-02 PROBLEM — E87.20 LACTIC ACID INCREASED: Status: ACTIVE | Noted: 2024-07-02

## 2024-07-02 PROBLEM — I50.31 ACUTE HEART FAILURE WITH PRESERVED EJECTION FRACTION (HCC): Status: ACTIVE | Noted: 2024-07-02

## 2024-07-02 PROBLEM — Z22.322 POSITIVE RESULT FOR METHICILLIN RESISTANT STAPHYLOCOCCUS AUREUS (MRSA) SCREENING: Status: ACTIVE | Noted: 2024-07-02

## 2024-07-02 PROBLEM — A41.9 SEPTICEMIA (HCC): Status: ACTIVE | Noted: 2024-07-02

## 2024-07-02 LAB
ANION GAP SERPL CALCULATED.3IONS-SCNC: 14 MMOL/L (ref 9–17)
ANION GAP SERPL CALCULATED.3IONS-SCNC: 15 MMOL/L (ref 9–17)
BASOPHILS # BLD: 0 K/UL (ref 0–0.2)
BASOPHILS NFR BLD: 0 % (ref 0–2)
BUN SERPL-MCNC: 46 MG/DL (ref 8–23)
BUN SERPL-MCNC: 53 MG/DL (ref 8–23)
CALCIUM SERPL-MCNC: 8.1 MG/DL (ref 8.6–10.4)
CALCIUM SERPL-MCNC: 8.5 MG/DL (ref 8.6–10.4)
CHLORIDE SERPL-SCNC: 95 MMOL/L (ref 98–107)
CHLORIDE SERPL-SCNC: 97 MMOL/L (ref 98–107)
CO2 SERPL-SCNC: 21 MMOL/L (ref 20–31)
CO2 SERPL-SCNC: 23 MMOL/L (ref 20–31)
CREAT SERPL-MCNC: 1.9 MG/DL (ref 0.5–0.9)
CREAT SERPL-MCNC: 2.1 MG/DL (ref 0.5–0.9)
ECHO BSA: 2.07 M2
ECHO LV FRACTIONAL SHORTENING: 41 % (ref 28–44)
ECHO LV INTERNAL DIMENSION DIASTOLE INDEX: 2.08 CM/M2
ECHO LV INTERNAL DIMENSION DIASTOLIC: 4.1 CM (ref 3.9–5.3)
ECHO LV INTERNAL DIMENSION SYSTOLIC INDEX: 1.22 CM/M2
ECHO LV INTERNAL DIMENSION SYSTOLIC: 2.4 CM
ECHO LV IVSD: 0.9 CM (ref 0.6–0.9)
ECHO LV MASS 2D: 114.1 G (ref 67–162)
ECHO LV MASS INDEX 2D: 57.9 G/M2 (ref 43–95)
ECHO LV POSTERIOR WALL DIASTOLIC: 0.9 CM (ref 0.6–0.9)
ECHO LV RELATIVE WALL THICKNESS RATIO: 0.44
EKG ATRIAL RATE: 59 BPM
EKG P AXIS: -26 DEGREES
EKG P-R INTERVAL: 186 MS
EKG Q-T INTERVAL: 538 MS
EKG QRS DURATION: 64 MS
EKG QTC CALCULATION (BAZETT): 532 MS
EKG R AXIS: -12 DEGREES
EKG T AXIS: 110 DEGREES
EKG VENTRICULAR RATE: 59 BPM
EOSINOPHIL # BLD: 0 K/UL (ref 0–0.4)
EOSINOPHILS RELATIVE PERCENT: 0 % (ref 1–4)
ERYTHROCYTE [DISTWIDTH] IN BLOOD BY AUTOMATED COUNT: 19.7 % (ref 12.5–15.4)
GFR, ESTIMATED: 25 ML/MIN/1.73M2
GFR, ESTIMATED: 28 ML/MIN/1.73M2
GLUCOSE SERPL-MCNC: 135 MG/DL (ref 70–99)
GLUCOSE SERPL-MCNC: 164 MG/DL (ref 70–99)
HAV IGM SERPL QL IA: NONREACTIVE
HBV CORE IGM SERPL QL IA: NONREACTIVE
HBV SURFACE AG SERPL QL IA: NONREACTIVE
HCT VFR BLD AUTO: 24.7 % (ref 36–46)
HCV AB SERPL QL IA: NONREACTIVE
HGB BLD-MCNC: 7.9 G/DL (ref 12–16)
LACTATE BLDV-SCNC: 1.9 MMOL/L (ref 0.5–1.9)
LACTATE BLDV-SCNC: 2.1 MMOL/L (ref 0.5–1.9)
LYMPHOCYTES NFR BLD: 0.92 K/UL (ref 1–4.8)
LYMPHOCYTES RELATIVE PERCENT: 6 % (ref 24–44)
MCH RBC QN AUTO: 26.7 PG (ref 26–34)
MCHC RBC AUTO-ENTMCNC: 32 G/DL (ref 31–37)
MCV RBC AUTO: 83.4 FL (ref 80–100)
MICROORGANISM SPEC CULT: NORMAL
MONOCYTES NFR BLD: 0.15 K/UL (ref 0.1–0.8)
MONOCYTES NFR BLD: 1 % (ref 1–7)
MORPHOLOGY: ABNORMAL
MRSA, DNA, NASAL: ABNORMAL
NEUTROPHILS NFR BLD: 93 % (ref 36–66)
NEUTS SEG NFR BLD: 14.23 K/UL (ref 1.8–7.7)
PARTIAL THROMBOPLASTIN TIME: 52.7 SEC (ref 21.3–31.3)
PARTIAL THROMBOPLASTIN TIME: 52.8 SEC (ref 21.3–31.3)
PARTIAL THROMBOPLASTIN TIME: 65.7 SEC (ref 21.3–31.3)
PLATELET # BLD AUTO: 602 K/UL (ref 140–450)
PMV BLD AUTO: 10 FL (ref 8–14)
POTASSIUM SERPL-SCNC: 3.6 MMOL/L (ref 3.7–5.3)
POTASSIUM SERPL-SCNC: 3.8 MMOL/L (ref 3.7–5.3)
PROCALCITONIN SERPL-MCNC: 26 NG/ML (ref 0–0.09)
RBC # BLD AUTO: 2.96 M/UL (ref 4–5.2)
SODIUM SERPL-SCNC: 131 MMOL/L (ref 135–144)
SODIUM SERPL-SCNC: 134 MMOL/L (ref 135–144)
SPECIMEN DESCRIPTION: ABNORMAL
SPECIMEN DESCRIPTION: NORMAL
TROPONIN I SERPL HS-MCNC: 81 NG/L (ref 0–14)
VANCOMYCIN SERPL-MCNC: 13.9 UG/ML
WBC OTHER # BLD: 15.3 K/UL (ref 3.5–11)

## 2024-07-02 PROCEDURE — 93005 ELECTROCARDIOGRAM TRACING: CPT | Performed by: INTERNAL MEDICINE

## 2024-07-02 PROCEDURE — 2580000003 HC RX 258: Performed by: STUDENT IN AN ORGANIZED HEALTH CARE EDUCATION/TRAINING PROGRAM

## 2024-07-02 PROCEDURE — 94660 CPAP INITIATION&MGMT: CPT

## 2024-07-02 PROCEDURE — 2500000003 HC RX 250 WO HCPCS: Performed by: STUDENT IN AN ORGANIZED HEALTH CARE EDUCATION/TRAINING PROGRAM

## 2024-07-02 PROCEDURE — 6370000000 HC RX 637 (ALT 250 FOR IP): Performed by: INTERNAL MEDICINE

## 2024-07-02 PROCEDURE — 6370000000 HC RX 637 (ALT 250 FOR IP): Performed by: NURSE PRACTITIONER

## 2024-07-02 PROCEDURE — 36415 COLL VENOUS BLD VENIPUNCTURE: CPT

## 2024-07-02 PROCEDURE — 83605 ASSAY OF LACTIC ACID: CPT

## 2024-07-02 PROCEDURE — 85025 COMPLETE CBC W/AUTO DIFF WBC: CPT

## 2024-07-02 PROCEDURE — 2580000003 HC RX 258: Performed by: NURSE PRACTITIONER

## 2024-07-02 PROCEDURE — 99213 OFFICE O/P EST LOW 20 MIN: CPT

## 2024-07-02 PROCEDURE — 2700000000 HC OXYGEN THERAPY PER DAY

## 2024-07-02 PROCEDURE — 85730 THROMBOPLASTIN TIME PARTIAL: CPT

## 2024-07-02 PROCEDURE — 99223 1ST HOSP IP/OBS HIGH 75: CPT | Performed by: NURSE PRACTITIONER

## 2024-07-02 PROCEDURE — 94761 N-INVAS EAR/PLS OXIMETRY MLT: CPT

## 2024-07-02 PROCEDURE — 2000000000 HC ICU R&B

## 2024-07-02 PROCEDURE — 84484 ASSAY OF TROPONIN QUANT: CPT

## 2024-07-02 PROCEDURE — 6360000002 HC RX W HCPCS: Performed by: STUDENT IN AN ORGANIZED HEALTH CARE EDUCATION/TRAINING PROGRAM

## 2024-07-02 PROCEDURE — 82805 BLOOD GASES W/O2 SATURATION: CPT

## 2024-07-02 PROCEDURE — 80048 BASIC METABOLIC PNL TOTAL CA: CPT

## 2024-07-02 PROCEDURE — 6370000000 HC RX 637 (ALT 250 FOR IP): Performed by: STUDENT IN AN ORGANIZED HEALTH CARE EDUCATION/TRAINING PROGRAM

## 2024-07-02 PROCEDURE — 93308 TTE F-UP OR LMTD: CPT

## 2024-07-02 PROCEDURE — 51702 INSERT TEMP BLADDER CATH: CPT

## 2024-07-02 PROCEDURE — 80202 ASSAY OF VANCOMYCIN: CPT

## 2024-07-02 PROCEDURE — 99232 SBSQ HOSP IP/OBS MODERATE 35: CPT | Performed by: INTERNAL MEDICINE

## 2024-07-02 PROCEDURE — 6360000002 HC RX W HCPCS: Performed by: INTERNAL MEDICINE

## 2024-07-02 PROCEDURE — 84145 PROCALCITONIN (PCT): CPT

## 2024-07-02 PROCEDURE — 2500000003 HC RX 250 WO HCPCS: Performed by: INTERNAL MEDICINE

## 2024-07-02 RX ORDER — CLOPIDOGREL BISULFATE 75 MG/1
75 TABLET ORAL DAILY
Status: DISCONTINUED | OUTPATIENT
Start: 2024-07-02 | End: 2024-07-03 | Stop reason: HOSPADM

## 2024-07-02 RX ORDER — AMIODARONE HYDROCHLORIDE 200 MG/1
200 TABLET ORAL 2 TIMES DAILY
Status: DISCONTINUED | OUTPATIENT
Start: 2024-07-02 | End: 2024-07-03 | Stop reason: HOSPADM

## 2024-07-02 RX ORDER — AMIODARONE HYDROCHLORIDE 200 MG/1
200 TABLET ORAL DAILY
Status: DISCONTINUED | OUTPATIENT
Start: 2024-07-09 | End: 2024-07-03 | Stop reason: HOSPADM

## 2024-07-02 RX ORDER — SUCRALFATE 1 G/1
1 TABLET ORAL
Status: DISCONTINUED | OUTPATIENT
Start: 2024-07-02 | End: 2024-07-03 | Stop reason: HOSPADM

## 2024-07-02 RX ORDER — 0.9 % SODIUM CHLORIDE 0.9 %
250 INTRAVENOUS SOLUTION INTRAVENOUS ONCE
Status: COMPLETED | OUTPATIENT
Start: 2024-07-02 | End: 2024-07-02

## 2024-07-02 RX ORDER — ASPIRIN 81 MG/1
81 TABLET ORAL DAILY
Status: DISCONTINUED | OUTPATIENT
Start: 2024-07-02 | End: 2024-07-02

## 2024-07-02 RX ORDER — UREA 10 %
500 LOTION (ML) TOPICAL DAILY
Status: DISCONTINUED | OUTPATIENT
Start: 2024-07-02 | End: 2024-07-03 | Stop reason: HOSPADM

## 2024-07-02 RX ORDER — ERGOCALCIFEROL 1.25 MG/1
50000 CAPSULE ORAL WEEKLY
Status: DISCONTINUED | OUTPATIENT
Start: 2024-07-07 | End: 2024-07-03 | Stop reason: HOSPADM

## 2024-07-02 RX ORDER — PANTOPRAZOLE SODIUM 40 MG/1
40 TABLET, DELAYED RELEASE ORAL
Status: DISCONTINUED | OUTPATIENT
Start: 2024-07-02 | End: 2024-07-03 | Stop reason: HOSPADM

## 2024-07-02 RX ORDER — FUROSEMIDE 10 MG/ML
40 INJECTION INTRAMUSCULAR; INTRAVENOUS ONCE
Status: COMPLETED | OUTPATIENT
Start: 2024-07-02 | End: 2024-07-02

## 2024-07-02 RX ADMIN — PANTOPRAZOLE SODIUM 40 MG: 40 TABLET, DELAYED RELEASE ORAL at 18:25

## 2024-07-02 RX ADMIN — MUPIROCIN: 20 OINTMENT TOPICAL at 21:48

## 2024-07-02 RX ADMIN — METOPROLOL TARTRATE 25 MG: 25 TABLET, FILM COATED ORAL at 09:56

## 2024-07-02 RX ADMIN — METOPROLOL TARTRATE 25 MG: 25 TABLET, FILM COATED ORAL at 20:11

## 2024-07-02 RX ADMIN — SODIUM CHLORIDE, PRESERVATIVE FREE 10 ML: 5 INJECTION INTRAVENOUS at 10:54

## 2024-07-02 RX ADMIN — Medication 500 MCG: at 18:25

## 2024-07-02 RX ADMIN — SODIUM CHLORIDE: 9 INJECTION, SOLUTION INTRAVENOUS at 09:56

## 2024-07-02 RX ADMIN — AMIODARONE HYDROCHLORIDE 1 MG/MIN: 1.8 INJECTION, SOLUTION INTRAVENOUS at 07:51

## 2024-07-02 RX ADMIN — FOLIC ACID 1 MG: 1 TABLET ORAL at 08:30

## 2024-07-02 RX ADMIN — ONDANSETRON 4 MG: 2 INJECTION INTRAMUSCULAR; INTRAVENOUS at 08:45

## 2024-07-02 RX ADMIN — AMIODARONE HYDROCHLORIDE 1 MG/MIN: 1.8 INJECTION, SOLUTION INTRAVENOUS at 01:13

## 2024-07-02 RX ADMIN — ESCITALOPRAM OXALATE 20 MG: 10 TABLET ORAL at 08:30

## 2024-07-02 RX ADMIN — SILVER SULFADIAZINE: 10 CREAM TOPICAL at 18:25

## 2024-07-02 RX ADMIN — ATORVASTATIN CALCIUM 40 MG: 40 TABLET, FILM COATED ORAL at 20:11

## 2024-07-02 RX ADMIN — SODIUM CHLORIDE, PRESERVATIVE FREE 10 ML: 5 INJECTION INTRAVENOUS at 20:11

## 2024-07-02 RX ADMIN — SODIUM CHLORIDE: 9 INJECTION, SOLUTION INTRAVENOUS at 23:32

## 2024-07-02 RX ADMIN — AMIODARONE HYDROCHLORIDE 200 MG: 200 TABLET ORAL at 20:11

## 2024-07-02 RX ADMIN — CLOPIDOGREL BISULFATE 75 MG: 75 TABLET ORAL at 14:49

## 2024-07-02 RX ADMIN — SODIUM CHLORIDE 250 ML: 9 INJECTION, SOLUTION INTRAVENOUS at 16:17

## 2024-07-02 RX ADMIN — OXYBUTYNIN CHLORIDE 10 MG: 5 TABLET, EXTENDED RELEASE ORAL at 08:30

## 2024-07-02 RX ADMIN — SODIUM CHLORIDE, PRESERVATIVE FREE 10 ML: 5 INJECTION INTRAVENOUS at 09:00

## 2024-07-02 RX ADMIN — AMIODARONE HYDROCHLORIDE 200 MG: 200 TABLET ORAL at 09:56

## 2024-07-02 RX ADMIN — CEFEPIME 2000 MG: 2 INJECTION, POWDER, FOR SOLUTION INTRAVENOUS at 01:16

## 2024-07-02 RX ADMIN — FUROSEMIDE 40 MG: 10 INJECTION, SOLUTION INTRAMUSCULAR; INTRAVENOUS at 11:51

## 2024-07-02 RX ADMIN — VANCOMYCIN HYDROCHLORIDE 500 MG: 500 INJECTION, POWDER, LYOPHILIZED, FOR SOLUTION INTRAVENOUS at 16:12

## 2024-07-02 ASSESSMENT — PAIN SCALES - GENERAL
PAINLEVEL_OUTOF10: 3
PAINLEVEL_OUTOF10: 0
PAINLEVEL_OUTOF10: 0

## 2024-07-02 ASSESSMENT — ENCOUNTER SYMPTOMS
NAUSEA: 1
PHOTOPHOBIA: 0
WHEEZING: 0
BLOOD IN STOOL: 0
COUGH: 0
SHORTNESS OF BREATH: 1
VOMITING: 0
ABDOMINAL DISTENTION: 0
ABDOMINAL PAIN: 0

## 2024-07-02 ASSESSMENT — PAIN DESCRIPTION - LOCATION: LOCATION: CHEST

## 2024-07-02 NOTE — ACP (ADVANCE CARE PLANNING)
.Advance Care Planning     Palliative Team Advance Care Planning (ACP) Conversation    Date of Conversation: 07/02/24    Individuals present for the conversation: Patient, Spouse Dawit, and Granddaughter Mabel     ACP documents on file prior to discussion:  -None    Previously completed document/s not on file: Patient / participant reports that there are no previously executed ACP documents.    Healthcare Decision Maker:    Primary Decision Maker: Eze Champagne - Spouse - [phone]     Conversation Summary:  Pt's  states patient does not have a DPOA. He states he is medical decision maker for patient, if needed. By [place] Law, this would be true. I did explain this to him.     Resuscitation Status:   Code Status: DNR-CCA     Documentation Completed:  -No new documents completed.    I spent 15 minutes with the patient and/or surrogate decision maker discussing the patient's wishes and goals.      Asfhan Land RN

## 2024-07-02 NOTE — DISCHARGE INSTR - COC
Continuity of Care Form    Patient Name: Elaina Champagne   :  1950  MRN:  [id number]    Admit date:  2024  Discharge date:  ***    Code Status Order: DNR-CCA   Advance Directives:     Admitting Physician:  Swathi Wells MD  PCP: Robin Ly MD    Discharging Nurse: ***  Discharging Hospital Unit/Room#: 340/340-01  Discharging Unit Phone Number: ***    Emergency Contact:   Extended Emergency Contact Information  Primary Emergency Contact: Eze Champagne   [facility]  Home Phone: [phone]  Relation: Spouse  Secondary Emergency Contact: Margo ferreira  Home Phone: [phone]  Mobile Phone: [phone]  Relation: Grandchild  Preferred language: English    Past Surgical History:  Past Surgical History:   Procedure Laterality Date    CARDIAC PROCEDURE N/A 2024    Coronary angiography performed by Regis Aponte MD at [facility] CARDIAC CATH LAB    CARDIAC PROCEDURE N/A 2024    frantz / Percutaneous coronary intervention / rm 504 performed by Maria Teresa Rodriguez MD at [facility] CARDIAC CATH LAB    CARDIAC PROCEDURE N/A 2024    frantz / Percutaneous coronary intervention / op scmh performed by Maria Teresa Rodriguez MD at [facility] CARDIAC CATH LAB    CATARACT REMOVAL Bilateral 2015    CORONARY ANGIOPLASTY WITH STENT PLACEMENT  2024    HYSTERECTOMY, TOTAL ABDOMINAL (CERVIX REMOVED)  1990    INCISION AND DRAINAGE Left 2017    LEG INCISION AND DRAINAGE ABSCESS performed by Isaiah Casey MD at [facility] OR    IR NONTUNNELED VASCULAR CATHETER  12/15/2023    IR NONTUNNELED VASCULAR CATHETER 12/15/2023 [facility] SPECIAL PROCEDURES    KNEE ARTHROPLASTY Right 2018    TOTAL KNEE ARTHROPLASTY Left 2017    UPPER GASTROINTESTINAL ENDOSCOPY N/A 2023    EGD BIOPSY performed by Mary Nolasco MD at [facility] ENDO    UPPER GASTROINTESTINAL ENDOSCOPY N/A 2024    ESOPHAGOGASTRODUODENOSCOPY BIOPSY performed by Rachana Maria MD at [facility] ENDO    VENTRAL HERNIA REPAIR  2019    5  I89.0    Venous stasis ulcer of calf with fat layer exposed with varicose veins ([facility]) I83.002, L97.202    Venous ulcer of left leg ([facility]) I83.029, L97.929    Hyperkalemia E87.5    MICHELLE (acute kidney injury) ([facility]) N17.9    Cellulitis L03.90    Bacteremia due to Pseudomonas R78.81, B96.5    Bacteriuria R82.71    Leukocytosis D72.829    Elevated C-reactive protein (CRP) R79.82    Allergy to penicillin Z88.0    Atrial fibrillation with RVR ([facility]) I48.91    Altered mental status R41.82    Elevated erythrocyte sedimentation rate R70.0    Permanent atrial fibrillation ([facility]) I48.21    Renovascular hypertension I15.0    Melena K92.1    Normocytic anemia D64.9    Anticoagulated Z79.01    Pseudomonas septicemia ([facility]) A41.52    Abscess of right foot L02.611    Acute gastric ulcer with hemorrhage K25.0    NSTEMI (non-ST elevated myocardial infarction) ([facility]) I21.4    S/P cardiac cath Z98.890    Coronary artery disease involving native coronary artery of native heart without angina pectoris I25.10    Healing ulcers of both lower extremities, limited to breakdown of skin ([facility]) L97.911, L97.921    Pseudomonas aeruginosa infection A49.8    Bilateral lower leg cellulitis L03.116, L03.115    Class 3 severe obesity due to excess calories with serious comorbidity and body mass index (BMI) of 50.0 to 59.9 in adult ([facility]) E66.01, Z68.43    Carotid stenosis, asymptomatic, bilateral I65.23    Atherosclerotic heart disease of native coronary artery with unspecified angina pectoris I25.119    Chronic kidney disease, stage 3b ([facility]) N18.32    Chronic kidney disease, stage 3 unspecified ([facility]) N18.30    GI bleed K92.2    Moderate malnutrition ([facility]) E44.0    S/P right coronary artery (RCA) stent placement Z95.5    Lower GI hemorrhage K92.2    Acute metabolic encephalopathy G93.41    Hyponatremia E87.1    Acute delirium R41.0    Hematoma of leg, right, initial encounter S80.11XA    Acute blood loss anemia D62    Anemia D64.9    Acute chest pain R07.9     needed.     Elimination:  Continence:   Bowel: {YES / NO:}  Bladder: {YES / NO:}  Urinary Catheter: {Urinary Catheter:346058988}   Colostomy/Ileostomy/Ileal Conduit: {YES / NO:}       Date of Last BM: ***    Intake/Output Summary (Last 24 hours) at 2024 1040  Last data filed at 2024 0949  Gross per 24 hour   Intake 2068.96 ml   Output 1030 ml   Net 1038.96 ml     I/O last 3 completed shifts:  In:  [I.V.:981.5; IV Piggyback:1087.4]  Out: 230 [Urine:230]    Safety Concerns:     { JENNIFER Safety Concerns:902926545}    Impairments/Disabilities:      { JENNIFER Impairments/Disabilities:558479450}    Nutrition Therapy:  Current Nutrition Therapy:   { JENNIFER Diet List:070090802}    Routes of Feeding: {[facility] DME Other Feedings:913911170}  Liquids: {Slp liquid thickness:55883}  Daily Fluid Restriction: {[facility] DME Yes amt example:164419221}  Last Modified Barium Swallow with Video (Video Swallowing Test): {Done Not Done Date:}    Treatments at the Time of Hospital Discharge:   Respiratory Treatments: ***  Oxygen Therapy:  {Therapy; copd oxygen:41280}  Ventilator:    { CC Vent List:550228505}    Rehab Therapies: {THERAPEUTIC INTERVENTION:4218593690}  Weight Bearing Status/Restrictions: {[facility] Weight Bearin}  Other Medical Equipment (for information only, NOT a DME order):  {EQUIPMENT:531219116}  Other Treatments: ***    Patient's personal belongings (please select all that are sent with patient):  {[facility] DME Belongings:517102033}    RN SIGNATURE:  {Esignature:969638834}    CASE MANAGEMENT/SOCIAL WORK SECTION    Inpatient Status Date: ***    Readmission Risk Assessment Score:  Readmission Risk              Risk of Unplanned Readmission:  51           Discharging to Facility/ Agency   Name:   Address:  Phone:  Fax:    Dialysis Facility (if applicable)   Name:  Address:  Dialysis Schedule:  Phone:  Fax:    / signature: {Esignature:834100861}    PHYSICIAN

## 2024-07-02 NOTE — CARE COORDINATION
Case Management Assessment  Initial Evaluation    Date/Time of Evaluation: 7/2/2024 11:12 AM  Assessment Completed by: Victoria Talbert RN    If patient is discharged prior to next notation, then this note serves as note for discharge by case management.    Patient Name: Elaina Champagne                   YOB: 1950  Diagnosis: Hypokalemia [E87.6]  Respiratory alkalosis [E87.3]  Acute pulmonary edema (HCC) [J81.0]  Septicemia (HCC) [A41.9]  MICHELLE (acute kidney injury) (HCC) [N17.9]  Atrial fibrillation with rapid ventricular response (HCC) [I48.91]  Acute cystitis with hematuria [N30.01]  Sepsis (HCC) [A41.9]  Acute on chronic respiratory failure with hypoxia (HCC) [J96.21]  Acute on chronic congestive heart failure, unspecified heart failure type (HCC) [I50.9]                   Date / Time: 7/1/2024  9:28 AM    Patient Admission Status: Inpatient   Readmission Risk (Low < 19, Mod (19-27), High > 27): Readmission Risk Score: 32    Current PCP: Robin Ly MD  PCP verified by CM? (P) Yes    Chart Reviewed: Yes      History Provided by: (P) Spouse  Patient Orientation: (P) Other (see comment) (Alert, confused.)    Patient Cognition: (P) Alert    Hospitalization in the last 30 days (Readmission):  Yes    If yes, Readmission Assessment in  Navigator will be completed.    Advance Directives:      Code Status: DNR-CCA   Patient's Primary Decision Maker is: (P) Legal Next of Kin    Primary Decision Maker: Eze Champagne - Spouse - [phone]    Discharge Planning:    Patient lives with: (P) Other (Comment) (From facility) Type of Home: (P) Skilled Nursing Facility  Primary Care Giver: (P) Other (Comment) (SNF)  Patient Support Systems include: (P) Spouse/Significant Other, Family Members   Current Financial resources: (P) Medicaid, Medicare  Current community resources:    Current services prior to admission: (P) Durable Medical Equipment, Skilled Nursing Facility            Current DME: (P)

## 2024-07-02 NOTE — NURSE NAVIGATOR
0326 - RN notified provider of patient temp low. RN notified provider of attempt to warm patient with warm blankets and room heat. Wondering if bear hugger needs to be applied?    0338 - Provider wrote \"If it drops below 96 we can initiate the bear hugger\"    0350 - More warm blankets applied to patient.

## 2024-07-02 NOTE — CONSULTS
..  Palliative Care Inpatient Consult    NAME:  Elaina Champagne  MEDICAL RECORD NUMBER:  [id number]  AGE: 73 y.o.   GENDER: female  : 1950  TODAY'S DATE:  2024    Reasons for Consultation:    Provision of information regarding PC and/or hospice philosophies  Complex, time-intensive communication and interdisciplinary psychosocial support  Clarification of goals of care and/or assistance with difficult decision-making  Guidance in regards to resources and transition(s)    Code Status: DNRCCA    Members of PC team contributing to this consultation are :  Afshan Land RN    PLAN:  -Pt denies many symptoms except some SOB with rest. She appears in no distress. Heparin gtt continued. Cardizem gtt off. Amiodarone switched to oral.  -Discussed with patient her wishes regarding aggressive medical treatment. She wishes to continue current treatment. Pt was oriented when I visited but staff report patient is confused at times. Pt does confirm DNRCCA. I did discuss the option of comfort care to patient and family. Pt declines at this time.   -Discussed outpatient palliative care to visit patient at facility. Will need to see where patient will discharge to before making a referral.  -Support offered     History of Present Illness     The patient is a 73 y.o.   /  female who presents with Shortness of Breath    Referred to Palliative Care by   [x] Physician   [] Nursing  [] Family Request   [] Other:       She was admitted to the primary service for Hypokalemia [E87.6]  Respiratory alkalosis [E87.3]  Acute pulmonary edema (HCC) [J81.0]  Septicemia (HCC) [A41.9]  MICHELLE (acute kidney injury) (HCC) [N17.9]  Atrial fibrillation with rapid ventricular response (HCC) [I48.91]  Acute cystitis with hematuria [N30.01]  Sepsis (HCC) [A41.9]  Acute on chronic respiratory failure with hypoxia (HCC) [J96.21]  Acute on chronic congestive heart failure, unspecified heart failure type (HCC) [I50.9]. Her hospital

## 2024-07-02 NOTE — PROGRESS NOTES
Pharmacy Note     Renal Dose Adjustment    Elaina Champagne is a 73 y.o. female. Pharmacist assessment of renally cleared medications.    Recent Labs     07/01/24  1614 07/02/24  0714   BUN 31* 46*       Recent Labs     07/01/24  1614 07/02/24  0714   CREATININE 1.7* 1.9*       Estimated Creatinine Clearance: 29 mL/min (A) (based on SCr of 1.9 mg/dL (H)).  Estimated CrCl using Ideal Body Weight: 20 mL/min (based on IBW 47.8 kg)    Height:   Ht Readings from Last 1 Encounters:   07/02/24 1.549 m (5' 1\")     Weight:  Wt Readings from Last 1 Encounters:   07/02/24 100 kg (220 lb 7.4 oz)       The following medication dose has been adjusted based upon renal function per P&T Guidelines:             Cefepime 2gram Q12 hours has been adjusted to 1 gram Q12 hours starting at 0100 on 7/3/24        Son Mora,   PharmD  7/2/2024 2:54 PM

## 2024-07-02 NOTE — CARE COORDINATION
07/02/24 1116   Readmission Assessment   Number of Days since last admission? 8-30 days   Previous Disposition SNF   Who is being Interviewed Caregiver   What was the patient's/caregiver's perception as to why they think they needed to return back to the hospital? Other (Comment)  (Altered mental status)   Did you visit your Primary Care Physician after you left the hospital, before you returned this time? No   Why weren't you able to visit your PCP? Other (Comment)  (Sees facility physician.)   Did you see a specialist, such as Cardiac, Pulmonary, Orthopedic Physician, etc. after you left the hospital? No   Who advised the patient to return to the hospital? Other (Comment)  (SNF)   Does the patient report anything that got in the way of taking their medications? Yes   What reasons did they give? Didn't want to take them (Comment)  (Pt was refusing medication at SNF.)   In our efforts to provide the best possible care to you and others like you, can you think of anything that we could have done to help you after you left the hospital the first time, so that you might not have needed to return so soon? Other (Comment)  (Nothing)

## 2024-07-02 NOTE — PROGRESS NOTES
Occupational Therapy    [facility]  Occupational Therapy Not Seen Note    DATE: 2024    NAME: Elaina Champagne  MRN: [id number]   : 1950      Patient not seen this date for Occupational Therapy due to:    Patient Declined: Pt declined therapy eval for today reporting nausea as well as pain \"when breathing.\" Pt states she would like therapy to recheck tomorrow 7/3/24.     Next Scheduled Treatment: 7/3/24    Electronically signed by Cade Ortiz OT on 2024 at 9:46 AM

## 2024-07-02 NOTE — CARE COORDINATION
Spoke with Tiny at [facility] and no bed is available at this time at [place] they will transfer her there from [facility] when one is available.  She is a bed hold at [facility] and will not need any precert to return on discharge.

## 2024-07-02 NOTE — PROGRESS NOTES
[facility]  Office: [phone]  Nael Morgan, DO, Ronny Quevedo, DO, Pb Vásquez DO, Refugio Bradley, DO, Sheldon Smith MD, Elsa Torres MD, Leandro Espinosa MD, Joann Hauser MD,  Dipak Montana MD, Rob Hernandez MD, Swathi Wells MD,  Gracia Chen DO, Pilar So MD, Kayden Walters MD, Orlando Morgan DO, Yamilex Luciano MD,  Mando Burden DO, Gali Galarza MD, Georgie Workman MD, Jennifer Hill MD, Gloria Sierra MD,  Rafael Rawls MD, Denilson Murphy MD, Kamran Flores MD, Yazan Hughes MD, Theodore Grossman MD, Jaxon Villar MD, Ricky Lopez DO, Mickey Cabral DO, Trev Alex MD,  Arvin Powers MD, Shirley Waterhouse, CNP,  Nidia Lester CNP, Aj Castro, CNP,  Erin Shaw, DNP, Ivis Senior, CNP, Georgiana Freeman, CNP, Diandra Ruffin, CNP, Marilia Parker, CNP, Maureen Matamoros, PA-C, Meeta Titus PA-C, Sandrita Cruz, CNP, Nan Berrios, CNP, Veronica Alonzo, CNP, Areli Ware, CNP, Guadalupe Jaimes, CNP, Radhika Gillespie, CNS, Adriane Jimenez, CNP, Sarah Shi CNP, Tracy Schwab, CNP         IN-PATIENT SERVICE  [facility]    Progress Note    7/2/2024    4:49 PM    Name:   Elaina Champagne  MRN:     [id number]     Acct:      [id number]   Room:   340/340-01   Day:  1  Admit Date:  7/1/2024  9:28 AM    PCP:   Robin Ly MD  Code Status:  DNR-CCA    Subjective:     C/C:   Chief Complaint   Patient presents with    Shortness of Breath     Interval History Status:   Improved  Less short of breath off BiPAP  Hungry  Requesting diet progression  Status post fluid bolus        Data Base Updates:  Gsefsy111 Low mmol/L Potassium3.6 Low mmol/L Zogvmzgs18 Low mmol/L FV433wnzu/L   Anion Tme96cmjj/L     Qyvfxuv634 High mg/dL     BUN46 High mg/dL   Creatinine1.9 High         Specimen Description.NASAL SWAB MRSA, DNA, NasalPOSITIVE:  MRSA DNA detected by nucleic acid amplification. Abnormal     Specimen Source: Urine Specimen Description.URINE ENG  acetaminophen **OR** acetaminophen, fentanNYL, sodium chloride flush, sodium chloride    Data:     I/O (24Hr):    Intake/Output Summary (Last 24 hours) at 7/2/2024 1649  Last data filed at 7/2/2024 1644  Gross per 24 hour   Intake 990.29 ml   Output 105 ml   Net 885.29 ml       Labs:  Hematology:  Recent Labs     07/01/24  0935 07/02/24  0714   WBC 10.5 15.3*   RBC 2.98* 2.96*   HGB 8.0* 7.9*   HCT 24.7* 24.7*   MCV 83.0 83.4   MCH 27.0 26.7   MCHC 32.5 32.0   RDW 20.1* 19.7*   * 602*   MPV 7.8 10.0   INR 1.4  --      Chemistry:  Recent Labs     07/01/24  0935 07/01/24  1143 07/01/24  1614 07/02/24  0714     --  133* 134*   K 3.5*  --  4.2 3.6*   CL 93*  --  94* 97*   CO2 27  --  17* 23   GLUCOSE 133*  --  106* 164*   BUN 28*  --  31* 46*   CREATININE 1.2*  --  1.7* 1.9*   MG 2.1  --   --   --    ANIONGAP 16  --  22* 14   LABGLOM 48*  --  32* 28*   CALCIUM 9.5  --  9.0 8.5*   PROBNP 14,347*  --   --   --    TROPHS 67* 72*  --   --      Recent Labs     07/01/24  0935 07/01/24  1614   TSH 1.11  --    AST 28 212*   ALT 8 32   ALKPHOS 136* 136*   BILITOT 0.6 0.6     ABG:  Lab Results   Component Value Date/Time    POCPH 7.471 07/01/2024 11:24 AM    PH 6.0 09/19/2018 11:53 AM    POCPCO2 32.1 07/01/2024 11:24 AM    POCPO2 233.8 07/01/2024 11:24 AM    POCHCO3 23.4 07/01/2024 11:24 AM    PBEA 0.0 07/01/2024 11:24 AM    PIAD2DYU 99.9 07/01/2024 11:24 AM    FIO2 INFORMATION NOT PROVIDED 01/05/2024 09:50 AM     Lab Results   Component Value Date/Time    SPECIAL [id number] 07/01/2024 11:03 AM     Lab Results   Component Value Date/Time    CULTURE NO GROWTH 1 DAY 07/01/2024 11:03 AM       Radiology:  XR CHEST PORTABLE    Result Date: 7/1/2024  1. Right upper extremity PICC tip terminates in the region of the superior cavoatrial junction. 2. No significant change in bibasilar airspace opacities and probable left pleural effusion.     XR CHEST PORTABLE    Result Date: 7/1/2024  1. Cardiomegaly with probable mild

## 2024-07-02 NOTE — PLAN OF CARE
Nutrition Problem #1: Unintended weight loss  Intervention: Food and/or Nutrient Delivery: Continue Current Diet, Start Oral Nutrition Supplement  Nutritional Goal: At least 50% PO intake prior to discharge

## 2024-07-02 NOTE — PROGRESS NOTES
[facility] PULMONARY,CRITICAL CARE & SLEEP   Fabrizio Aparicio MD/Karl Crowder MD/Homero OBANDO AGACNP-BC, NP-C      Joie OBANDO NP-C    Sumanth OBANDO NP-C                                         Pulmonary Progress Note    Patient - Elaina Champagne   Age - 73 y.o.   - 1950  MRN - [id number]  [id number]  Date of Admission - 2024  9:28 AM    Consulting Service/Physician:       Primary Care Physician: Robin Ly MD    SUBJECTIVE:     Chief Complaint:   Chief Complaint   Patient presents with    Shortness of Breath     Subjective:    Elaina is alert and awake, renal function slowly worsening, she has not had much urine output since yesterday when her Harvey catheter was placed, less than 100 mL.  She did have a one-time dose of Lasix earlier today with no results.  She has not had any fevers.  Amiodarone has been able to be weaned off.  She had been on BiPAP overnight until about 4 AM and since has been on 4 L with adequate saturations.  Respiratory pathogen panel was negative.  She did have a positive MRSA swab.  Her initial proBNP was greater than 14,000.    VITALS  BP (!) 134/98   Pulse 66   Temp 98.1 °F (36.7 °C)   Resp 25   Ht 1.549 m (5' 1\")   Wt 100 kg (220 lb 7.4 oz)   SpO2 93%   BMI 41.66 kg/m²   Wt Readings from Last 3 Encounters:   24 100 kg (220 lb 7.4 oz)   24 102 kg (224 lb 13.9 oz)   24 98.9 kg (218 lb 0.6 oz)     I/O (24 Hours)    Intake/Output Summary (Last 24 hours) at 2024 1309  Last data filed at 2024 0956  Gross per 24 hour   Intake 2308.96 ml   Output 230 ml   Net 2078.96 ml     Ventilator:   Settings  FiO2 : 40 %  Insp Rise Time (%): 3 %  Exam:   Physical Exam   Constitutional: Alert, appears oriented, very soft voice but answering all my questions, not in any distress  HENT: Unremarkable, nasal cannula in place  Head: Normocephalic and atraumatic.   Eyes: EOM are normal. Pupils  BMP  Continue with empiric antibiotics  Encourage BiPAP at at bedtime or for any episodes of distress  Will trial 1 fluid bolus due to low urine output  Repeat chest x-ray in a.m.  Discussed in detail with Dr. Crowder  Discussed with RN      Electronically signed by TOPHER Miguel - CNP on 07/02/24     This progress note was completed using a voice transcription system. Every effort was made to ensure accuracy. However, inadvertent computerized transcription errors may be present.    VERO OBANDO AGACNP-BC, NP-C  [facility] Pulmonary, Critical Care & Sleep

## 2024-07-02 NOTE — PROGRESS NOTES
[facility]   Pharmacy Pharmacokinetic Monitoring Service - Vancomycin    Consulting Provider: Dr Wells   Indication: sepsis  Target Concentration: Dosing based on anticipated concentration <15 mg/L due to renal impairment/insufficiency  Day of Therapy: 2  Additional Antimicrobials: cefepime    Pertinent Laboratory Values:   Wt Readings from Last 1 Encounters:   07/02/24 100 kg (220 lb 7.4 oz)     Temp Readings from Last 1 Encounters:   07/02/24 98.1 °F (36.7 °C)     Estimated Creatinine Clearance: 29 mL/min (A) (based on SCr of 1.9 mg/dL (H)).  Recent Labs     07/01/24  0935 07/01/24  1614 07/02/24  0714   CREATININE 1.2* 1.7* 1.9*   BUN 28* 31* 46*   WBC 10.5  --  15.3*     Procalcitonin:     Pertinent Cultures:  Culture Date Source Results   7/1 blood pending   MRSA Nasal Swab: showed MRSA positive result on 7/2    Recent vancomycin administrations                     vancomycin (VANCOCIN) 1,500 mg in sodium chloride 0.9 % 250 mL IVPB ([id number]) ()  Restarted 07/01/24 1304     1,500 mg New Bag  1226                    Assessment:  Date/Time Current Dose Concentration Timing of Concentration (h) AUC   7/2/24 12:29 1500 mg x1 13.9 24 hours post dose    Note: Serum concentrations collected for AUC dosing may appear elevated if collected in close proximity to the dose administered, this is not necessarily an indication of toxicity    Plan:  Current dosing regimen is therapeutic  Will plan to give 500 mg x1 today  Repeat vancomycin concentration ordered for 7/3/24 @ 12:00   Pharmacy will continue to monitor patient and adjust therapy as indicated    Thank you for the consult,  DIONICIO TORREZ [facility]  7/2/2024 1:36 PM

## 2024-07-02 NOTE — PROGRESS NOTES
[facility]  Consult Note       NAME:  Elaina Champagne  MEDICAL RECORD NUMBER:  [id number]  AGE: 73 y.o.   GENDER: female  : 1950  TODAY'S DATE:  2024    Subjective:      Elaina Champagne is a 73 y.o. female with inpatient referral to [facility] for:  \"Coccyx wound\"      Wound Identification:  Wound Type: pressure  Contributing Factors: decreased mobility and obesity    Wound History: patient known to [facility] nurse service from previous admissions. Residing at SNF.   Current Wound Care Treatment:  foam dressing removed at time of [facility] nurse assessment    Patient Goal of Care:  [x] Wound Healing  [] Odor Control  [] Palliative Care  [] Pain Control   [] Other:         PAST MEDICAL HISTORY        Diagnosis Date    Anemia     Anxiety disorder     Artificial knee joint present, left     Artificial knee joint present, right     Atherosclerotic heart disease     Bell's palsy     Cellulitis     lower legs    Chronic kidney disease, stage 3 (HCC)     Cognitive communication deficit     Gastric ulcer with hemorrhage     Gastro-esophageal reflux disease without esophagitis     Hyperlipidemia     Hypertension     Lymphedema     Major depressive disorder     Morbid obesity (HCC)     Nonrheumatic tricuspid (valve) insufficiency     NSTEMI (non-ST elevated myocardial infarction) (HCC) 2024    Occlusion and stenosis of bilateral carotid arteries     Osteoarthritis     Overactive bladder     Paroxysmal atrial fibrillation (HCC)     Urge incontinence     Venous insufficiency     Weakness        PAST SURGICAL HISTORY    Past Surgical History:   Procedure Laterality Date    CARDIAC PROCEDURE N/A 2024    Coronary angiography performed by Regis Aponte MD at [facility] CARDIAC CATH LAB    CARDIAC PROCEDURE N/A 2024    frantz / Percutaneous coronary intervention / rm 504 performed by Maria Teresa Rodriguez MD at [facility] CARDIAC CATH LAB    CARDIAC PROCEDURE N/A 2024     07/02/24 1033   Wound Assessment Bleeding 07/02/24 1033   Drainage Amount Scant (moist but unmeasurable) 07/02/24 1033   Drainage Description Serosanguinous 07/02/24 1033   Odor None 07/02/24 1033   Sis-wound Assessment Fragile;Warm;Dry/flaky 07/02/24 1033   Margins Attached edges 07/02/24 1033   Number of days: 0         WOUND ASSESSMENT:   Significant improvement to sacral-coccyx noted on assessment. Will implement Triad dressing to area two times daily and PRN soiling. Ensure using foaming soap for cleansing. Previous open areas on BLE now closed and no further wound care interventions indicated.    Response to treatment:  Well tolerated by patient.       Plan:     Plan of Care:     -Buttocks/coccyx wound care - Cleanse with foaming soap and water, pat dry. Apply a thin layer of Triad to affected areas. Repeat twice daily and as needed.      [x] Turn and reposition every 2 hours while in bed.    [x] Float heels off of bed with pillows under calves.    [] Heel protective boots (heel medix boots) at all times while in bed.    [] Sacral foam dressing to sacrococcygeal area. Use skin barrier film prior to placement. Peel back dressing, inspect skin beneath, and re-secure every shift. Change every 3 days and as needed if loose or soiled. Discontinue sacral foam if repeatedly soiled by incontinence.    [] Apply zinc oxide cream twice daily and as needed after incontinent episodes.    [] Perform routine incontinence care with use of foam cleanser.    [x] Use single layer moisture wicking underpad.    [] Use comfort glide system and wedges to reposition patient.    [x] Keep the head of the bed below 30 degrees unless contraindicated.    [] Pressure reducing chair cushion while up to chair. Reposition every hour while in chair and limit chair time to 2 hour intervals.    [x] Encourage good nutritional intake and fluids. Consult dietician if needed.    Specialty Bed Required :   [x] Low Air Loss   [] Pressure  Redistribution  [] Fluid Immersion  [] Bariatric  [] Total Pressure Relief  [] Other:     Current Diet: ADULT DIET; Regular; Low Fat/Low Chol/High Fiber/2 gm Na  Dietician consult:  N/A    Discharge Plan:  Placement for patient upon discharge: skilled nursing   Patient appropriate for Outpatient Wound Care Center: N/A    Patient/Caregiver Teaching:  Level of patientunderstanding able to:     [] Indicates understanding       [] Needs reinforcement  [] Unsuccessful      [] Verbal Understanding  [] Demonstrated understanding       [] No evidence of learning  [] Refused teaching         [] N/A       Electronically signed by EVE CHAUDHARI RN on  7/2/2024 at 10:35 AM

## 2024-07-02 NOTE — PLAN OF CARE
Problem: Discharge Planning  Goal: Discharge to home or other facility with appropriate resources  7/2/2024 0151 by Molly Lehman RN  Outcome: Progressing  Flowsheets (Taken 7/2/2024 0151)  Discharge to home or other facility with appropriate resources: Identify barriers to discharge with patient and caregiver     Problem: Skin/Tissue Integrity  Goal: Absence of new skin breakdown  Description: 1.  Monitor for areas of redness and/or skin breakdown  2.  Assess vascular access sites hourly  3.  Every 4-6 hours minimum:  Change oxygen saturation probe site  4.  Every 4-6 hours:  If on nasal continuous positive airway pressure, respiratory therapy assess nares and determine need for appliance change or resting period.  Outcome: Progressing     Problem: ABCDS Injury Assessment  Goal: Absence of physical injury  Outcome: Progressing  Flowsheets (Taken 7/2/2024 0151)  Absence of Physical Injury: Implement safety measures based on patient assessment     Problem: Respiratory - Adult  Goal: Achieves optimal ventilation and oxygenation  7/2/2024 0151 by Molly Lehman RN  Outcome: Progressing  Flowsheets (Taken 7/2/2024 0151)  Achieves optimal ventilation and oxygenation:   Assess for changes in respiratory status   Assess for changes in mentation and behavior     Problem: Neurosensory - Adult  Goal: Achieves stable or improved neurological status  Outcome: Progressing  Flowsheets (Taken 7/2/2024 0151)  Achieves stable or improved neurological status: Assess for and report changes in neurological status     Problem: Cardiovascular - Adult  Goal: Maintains optimal cardiac output and hemodynamic stability  Outcome: Progressing  Flowsheets (Taken 7/2/2024 0151)  Maintains optimal cardiac output and hemodynamic stability:   Monitor blood pressure and heart rate   Assess for signs of decreased cardiac output   Administer fluid and/or volume expanders as ordered     Problem: Genitourinary - Adult  Goal: Absence of urinary  retention  Outcome: Progressing  Flowsheets (Taken 7/2/2024 0151)  Absence of urinary retention:   Assess patient’s ability to void and empty bladder   Monitor intake/output and perform bladder scan as needed   Place urinary catheter per Licensed Independent Practitioner order if needed     Problem: Genitourinary - Adult  Goal: Urinary catheter remains patent  Outcome: Progressing  Flowsheets (Taken 7/2/2024 0151)  Urinary catheter remains patent:   Assess patency of urinary catheter   Irrigate catheter per Licensed Independent Practitioner order if indicated and notify Licensed Independent Practitioner if unable to irrigate   Problem: Safety - Adult  Goal: Free from fall injury  7/2/2024 0151 by Molly Lehman, RN  Outcome: Progressing  Flowsheets (Taken 7/2/2024 0151)  Free From Fall Injury: Instruct family/caregiver on patient safety  7/2/2024 0150 by Molly Lehman, RN

## 2024-07-02 NOTE — PLAN OF CARE
NON INVASIVE VENTILATION  PROVIDE OPTIMAL VENTILATION/ACCEPTABLE SP02  IMPLEMENT NON INVASIVE VENTILATION PROTOCOL  ASSESSMENT SKIN INTEGRITY  PATIENT EDUCATION AS NEEDED  BIPAP AS NEEDED    Mask changed to extra small        NIV 16/8 rate 20 40%       Problem: Safety - Adult  Goal: Free from fall injury  7/1/2024 1452 by Yuliya Sesay RN  Outcome: Not Progressing

## 2024-07-02 NOTE — CONSULTS
[facility]    Consult                        Today's Date: 7/2/2024  Patient Name: Elaina Champagne  Date of admission: 7/1/2024  9:28 AM  Patient's age: 73 y.o., 1950  Admission Dx: Hypokalemia [E87.6]  Respiratory alkalosis [E87.3]  Acute pulmonary edema (HCC) [J81.0]  Septicemia (HCC) [A41.9]  MICHELLE (acute kidney injury) (HCC) [N17.9]  Atrial fibrillation with rapid ventricular response (HCC) [I48.91]  Acute cystitis with hematuria [N30.01]  Sepsis (HCC) [A41.9]  Acute on chronic respiratory failure with hypoxia (HCC) [J96.21]  Acute on chronic congestive heart failure, unspecified heart failure type (HCC) [I50.9]    Reason for Consult:  Cardiac evaluation    Requesting Physician: Swathi Wells MD    CHIEF COMPLAINT:  Shortness of breath    History Obtained From:  patient, electronic medical record    HISTORY OF PRESENT ILLNESS:      Per previous documentation: \"73-year-old female past medical history of morbid obesity, anxiety and depression, hypertension, hyperlipidemia, history of paroxysmal A-fib with RVR, bilateral venous insufficiency of lower extremities, presented from outlying facility with fevers chills and altered mental status. Patient found to be septic with fever of 104 as well as in A-fib with RVR. Patient started on amiodarone drip, Cardizem drip, heparin for anticoagulation. On BiPAP due to respiratory distress.  and son are also at bedside.  seems distraught and frustrated. Tells me that he is concerned about her status and that she keeps getting worse. I did endorse that every time patient gets admitted she has a setback and unfortunately sounds like she has been having many setbacks in the past few months. He tells me that she has not been home in almost 5 months. That most of her time has been spent in the hospital or in a nursing facility.\"    Patient resting in bed with family at bedside. Patient denies any chest pain and reports an improvement in  urine    Varicose veins    PAF (paroxysmal atrial fibrillation) ([facility])    Non-rheumatic tricuspid valve insufficiency    Venous insufficiency of both lower extremities    Lymphedema    Venous stasis ulcer of calf with fat layer exposed with varicose veins ([facility])    Venous ulcer of left leg ([facility])    Hyperkalemia    MICHELLE (acute kidney injury) ([facility])    Cellulitis    Bacteremia due to Pseudomonas    Bacteriuria    Leukocytosis    Elevated C-reactive protein (CRP)    Allergy to penicillin    Atrial fibrillation with RVR ([facility])    Altered mental status    Elevated erythrocyte sedimentation rate    Permanent atrial fibrillation ([facility])    Renovascular hypertension    Melena    Normocytic anemia    Anticoagulated    Pseudomonas septicemia ([facility])    Abscess of right foot    Acute gastric ulcer with hemorrhage    NSTEMI (non-ST elevated myocardial infarction) ([facility])    S/P cardiac cath    Coronary artery disease involving native coronary artery of native heart without angina pectoris    Healing ulcers of both lower extremities, limited to breakdown of skin ([facility])    Pseudomonas aeruginosa infection    Bilateral lower leg cellulitis    Class 3 severe obesity due to excess calories with serious comorbidity and body mass index (BMI) of 50.0 to 59.9 in adult ([facility])    Carotid stenosis, asymptomatic, bilateral    Atherosclerotic heart disease of native coronary artery with unspecified angina pectoris    Chronic kidney disease, stage 3b ([facility])    Chronic kidney disease, stage 3 unspecified ([facility])    GI bleed    Moderate malnutrition ([facility])    S/P right coronary artery (RCA) stent placement    Lower GI hemorrhage    Acute metabolic encephalopathy    Hyponatremia    Acute delirium    Hematoma of leg, right, initial encounter    Acute blood loss anemia    Anemia    Acute chest pain    Sepsis ([facility])       CV active problem list  PAF with episode of RVR  CAD/MVD  Anemia  Elevated trop- type I vs type II. Likely demand ischemia. No EKG changes

## 2024-07-02 NOTE — PROGRESS NOTES
Physical Therapy        Physical Therapy Cancel Note      DATE: 2024    NAME: Elaina Champagne  MRN: [id number]   : 1950      Patient not seen this date for Physical Therapy due to:    Patient Declined: Pt reports being nauseous and fatigued. Continue to pursue PT evaluation as available.       Electronically signed by QUYNH Estrada on 2024 at 9:47 AM

## 2024-07-02 NOTE — PROGRESS NOTES
Comprehensive Nutrition Assessment    Type and Reason for Visit:  Positive Nutrition Screen    Nutrition Recommendations/Plan:   Continue current diet, encourage PO intake  ONS milkshake TID  Monitor weight, intake, labs, skin     Malnutrition Assessment:  Malnutrition Status:  Insufficient data (need more accurate weight hx) (07/02/24 1208)    Context:  Chronic Illness     Findings of the 6 clinical characteristics of malnutrition:  Energy Intake:  Mild decrease in energy intake (Comment)  Weight Loss:  Greater than 5% over 1 month (fluid shifts?)     Body Fat Loss:  No significant body fat loss     Muscle Mass Loss:  Mild muscle mass loss    Fluid Accumulation:  No significant fluid accumulation     Strength:  Not Performed    Nutrition Assessment:    Pt admitted with sepsis. PNS received for wounds. Pt is known to writer from previous admissions. Pt c/o all foods tasting \"wrong\". Pt's family states pt has not eaten well x6 months. Noted 7% weight loss over the past 2 months (may be partially d/t fluid shifts?). Pt does not typically like Ensure, but is willing to try Ensure milkshake. Multiple wounds are noted. NaCl @ 75/hr.    Nutrition Related Findings:    +2 BLE, +1 generalized edema. Na 134, K+ 3.6, Chl 97, BUN 46, Cr 1.9, Glu 164. All other labs/meds reviewed. Wound Type: Multiple       Current Nutrition Intake & Therapies:    Average Meal Intake: Unable to assess  Average Supplements Intake: None Ordered  ADULT DIET; Regular; Low Fat/Low Chol/High Fiber/2 gm Na  ADULT ORAL NUTRITION SUPPLEMENT; Breakfast, Lunch, Dinner; Low Calorie/High Protein Oral Supplement    Anthropometric Measures:  Height: 154.9 cm (5' 1\")  Ideal Body Weight (IBW): 105 lbs (48 kg)    Current Body Weight: 100 kg (220 lb 7.4 oz), 210 % IBW.    Current BMI (kg/m2): 41.7  BMI Categories: Obese Class 3 (BMI 40.0 or greater)    Estimated Daily Nutrient Needs:  Energy Requirements Based On: Kcal/kg  Weight Used for Energy Requirements:  Current  Energy (kcal/day): 2076-3222 kcal/day (13-15 kcal/kg)  Weight Used for Protein Requirements: Ideal  Protein (g/day):  g/day (2.0-2.2 g/kg IBW)  Method Used for Fluid Requirements: 1 ml/kcal  Fluid (ml/day): 1942-7582 ml    Nutrition Diagnosis:   Unintended weight loss related to inadequate protein-energy intake as evidenced by poor intake prior to admission, weight loss    Nutrition Interventions:   Food and/or Nutrient Delivery: Continue Current Diet, Start Oral Nutrition Supplement  Nutrition Education/Counseling: No recommendation at this time  Coordination of Nutrition Care: Continue to monitor while inpatient, Interdisciplinary Rounds    Goals:  Goals: PO intake 50% or greater, prior to discharge    Nutrition Monitoring and Evaluation:   Behavioral-Environmental Outcomes: None Identified  Food/Nutrient Intake Outcomes: Food and Nutrient Intake, Supplement Intake  Physical Signs/Symptoms Outcomes: Biochemical Data, Nutrition Focused Physical Findings, Skin, Weight, Fluid Status or Edema    Discharge Planning:    Too soon to determine     TONO Park, RDN, LD  Registered Dietitian  [facility]  [phone]

## 2024-07-03 ENCOUNTER — APPOINTMENT (OUTPATIENT)
Dept: GENERAL RADIOLOGY | Age: 74
DRG: 871 | End: 2024-07-03
Payer: COMMERCIAL

## 2024-07-03 ENCOUNTER — APPOINTMENT (OUTPATIENT)
Dept: ULTRASOUND IMAGING | Age: 74
DRG: 871 | End: 2024-07-03
Attending: INTERNAL MEDICINE
Payer: COMMERCIAL

## 2024-07-03 ENCOUNTER — HOSPITAL ENCOUNTER (INPATIENT)
Age: 74
LOS: 11 days | Discharge: NURSING, REHAB OR LONG-TERM CARE | DRG: 871 | End: 2024-07-14
Attending: INTERNAL MEDICINE | Admitting: STUDENT IN AN ORGANIZED HEALTH CARE EDUCATION/TRAINING PROGRAM
Payer: COMMERCIAL

## 2024-07-03 ENCOUNTER — APPOINTMENT (OUTPATIENT)
Age: 74
DRG: 871 | End: 2024-07-03
Attending: INTERNAL MEDICINE
Payer: COMMERCIAL

## 2024-07-03 VITALS
DIASTOLIC BLOOD PRESSURE: 68 MMHG | BODY MASS INDEX: 41.21 KG/M2 | RESPIRATION RATE: 23 BRPM | HEIGHT: 61 IN | SYSTOLIC BLOOD PRESSURE: 110 MMHG | WEIGHT: 218.26 LBS | HEART RATE: 59 BPM | TEMPERATURE: 98.2 F | OXYGEN SATURATION: 96 %

## 2024-07-03 DIAGNOSIS — R06.02 SHORTNESS OF BREATH: Primary | ICD-10-CM

## 2024-07-03 DIAGNOSIS — I31.39 PERICARDIAL EFFUSION: ICD-10-CM

## 2024-07-03 DIAGNOSIS — I24.9 ACUTE CORONARY SYNDROME (HCC): ICD-10-CM

## 2024-07-03 PROBLEM — Z98.890 S/P PERICARDIAL OPERATION: Status: ACTIVE | Noted: 2024-07-03

## 2024-07-03 LAB
ALBUMIN FLD-MCNC: 2 G/DL
ANION GAP SERPL CALCULATED.3IONS-SCNC: 12 MMOL/L (ref 9–17)
APPEARANCE FLD: NORMAL
BASOPHILS # BLD: 0 K/UL (ref 0–0.2)
BASOPHILS NFR BLD: 0 % (ref 0–2)
BNP SERPL-MCNC: ABNORMAL PG/ML
BODY FLD TYPE: NORMAL
BUN SERPL-MCNC: 56 MG/DL (ref 8–23)
CALCIUM SERPL-MCNC: 8.2 MG/DL (ref 8.6–10.4)
CHLORIDE SERPL-SCNC: 96 MMOL/L (ref 98–107)
CLOT CHECK: NORMAL
CO2 SERPL-SCNC: 25 MMOL/L (ref 20–31)
COLOR FLD: NORMAL
CREAT SERPL-MCNC: 2.2 MG/DL (ref 0.5–0.9)
CREAT UR-MCNC: 54.1 MG/DL (ref 28–217)
DATE LAST DOSE: NORMAL
ECHO BSA: 2.06 M2
ECHO BSA: 2.06 M2
ECHO IVC EXP: 2 CM
EKG ATRIAL RATE: 58 BPM
EKG P AXIS: 38 DEGREES
EKG P-R INTERVAL: 188 MS
EKG Q-T INTERVAL: 434 MS
EKG QRS DURATION: 78 MS
EKG QTC CALCULATION (BAZETT): 426 MS
EKG R AXIS: 5 DEGREES
EKG T AXIS: 54 DEGREES
EKG VENTRICULAR RATE: 58 BPM
EOSINOPHIL # BLD: 0 K/UL (ref 0–0.4)
EOSINOPHILS RELATIVE PERCENT: 0 % (ref 1–4)
ERYTHROCYTE [DISTWIDTH] IN BLOOD BY AUTOMATED COUNT: 20 % (ref 12.5–15.4)
GFR, ESTIMATED: 23 ML/MIN/1.73M2
GLUCOSE SERPL-MCNC: 114 MG/DL (ref 70–99)
HCT VFR BLD AUTO: 22.2 % (ref 36–46)
HGB BLD-MCNC: 7.1 G/DL (ref 12–16)
LACTATE BLDV-SCNC: 1.1 MMOL/L (ref 0.5–2.2)
LDH FLD L TO P-CCNC: 1863 U/L
LYMPHOCYTES NFR BLD: 0.6 K/UL (ref 1–4.8)
LYMPHOCYTES NFR FLD: 15 %
LYMPHOCYTES RELATIVE PERCENT: 4 % (ref 24–44)
MAGNESIUM SERPL-MCNC: 2 MG/DL (ref 1.6–2.6)
MCH RBC QN AUTO: 26.2 PG (ref 26–34)
MCHC RBC AUTO-ENTMCNC: 32 G/DL (ref 31–37)
MCV RBC AUTO: 82 FL (ref 80–100)
MONOCYTES NFR BLD: 0.4 K/UL (ref 0.1–1.2)
MONOCYTES NFR BLD: 2 % (ref 2–11)
MONOCYTES NFR FLD: 24 %
NEUTROPHILS NFR BLD: 94 % (ref 36–66)
NEUTROPHILS NFR FLD: 61 %
NEUTS SEG NFR BLD: 15.9 K/UL (ref 1.8–7.7)
NUC CELL # FLD: 828 CELLS/UL
PARTIAL THROMBOPLASTIN TIME: 60 SEC (ref 21.3–31.3)
PLATELET # BLD AUTO: 533 K/UL (ref 140–450)
PMV BLD AUTO: 8.3 FL (ref 6–12)
POTASSIUM SERPL-SCNC: 3.4 MMOL/L (ref 3.7–5.3)
PROT FLD-MCNC: 4.5 G/DL
RBC # BLD AUTO: 2.71 M/UL (ref 4–5.2)
RBC # FLD: NORMAL CELLS/UL
SODIUM SERPL-SCNC: 133 MMOL/L (ref 135–144)
SODIUM UR-SCNC: 36 MMOL/L
SPECIMEN TYPE: NORMAL
TME LAST DOSE: NORMAL H
TOTAL PROTEIN, URINE: 166 MG/DL
TROPONIN I SERPL HS-MCNC: 74 NG/L (ref 0–14)
TROPONIN I SERPL HS-MCNC: 76 NG/L (ref 0–14)
URINE TOTAL PROTEIN CREATININE RATIO: 3.07
VANCOMYCIN DOSE: NORMAL MG
VANCOMYCIN SERPL-MCNC: 18.1 UG/ML
WBC OTHER # BLD: 17 K/UL (ref 3.5–11)

## 2024-07-03 PROCEDURE — 84300 ASSAY OF URINE SODIUM: CPT

## 2024-07-03 PROCEDURE — 83615 LACTATE (LD) (LDH) ENZYME: CPT

## 2024-07-03 PROCEDURE — 87070 CULTURE OTHR SPECIMN AEROBIC: CPT

## 2024-07-03 PROCEDURE — 33016 PERICARDIOCENTESIS W/IMAGING: CPT | Performed by: INTERNAL MEDICINE

## 2024-07-03 PROCEDURE — 76770 US EXAM ABDO BACK WALL COMP: CPT

## 2024-07-03 PROCEDURE — 84484 ASSAY OF TROPONIN QUANT: CPT

## 2024-07-03 PROCEDURE — 2580000003 HC RX 258: Performed by: STUDENT IN AN ORGANIZED HEALTH CARE EDUCATION/TRAINING PROGRAM

## 2024-07-03 PROCEDURE — 2060000000 HC ICU INTERMEDIATE R&B

## 2024-07-03 PROCEDURE — 99239 HOSP IP/OBS DSCHRG MGMT >30: CPT | Performed by: INTERNAL MEDICINE

## 2024-07-03 PROCEDURE — 6370000000 HC RX 637 (ALT 250 FOR IP): Performed by: STUDENT IN AN ORGANIZED HEALTH CARE EDUCATION/TRAINING PROGRAM

## 2024-07-03 PROCEDURE — 88305 TISSUE EXAM BY PATHOLOGIST: CPT

## 2024-07-03 PROCEDURE — 6370000000 HC RX 637 (ALT 250 FOR IP): Performed by: INTERNAL MEDICINE

## 2024-07-03 PROCEDURE — 0W9D3ZZ DRAINAGE OF PERICARDIAL CAVITY, PERCUTANEOUS APPROACH: ICD-10-PCS | Performed by: INTERNAL MEDICINE

## 2024-07-03 PROCEDURE — 84157 ASSAY OF PROTEIN OTHER: CPT

## 2024-07-03 PROCEDURE — 94660 CPAP INITIATION&MGMT: CPT

## 2024-07-03 PROCEDURE — 71045 X-RAY EXAM CHEST 1 VIEW: CPT

## 2024-07-03 PROCEDURE — 33017 PRCRD DRG 6YR+ W/O CGEN CAR: CPT | Performed by: INTERNAL MEDICINE

## 2024-07-03 PROCEDURE — 85730 THROMBOPLASTIN TIME PARTIAL: CPT

## 2024-07-03 PROCEDURE — C1769 GUIDE WIRE: HCPCS | Performed by: INTERNAL MEDICINE

## 2024-07-03 PROCEDURE — 93308 TTE F-UP OR LMTD: CPT

## 2024-07-03 PROCEDURE — 6370000000 HC RX 637 (ALT 250 FOR IP): Performed by: NURSE PRACTITIONER

## 2024-07-03 PROCEDURE — 2709999900 HC NON-CHARGEABLE SUPPLY: Performed by: INTERNAL MEDICINE

## 2024-07-03 PROCEDURE — 94761 N-INVAS EAR/PLS OXIMETRY MLT: CPT

## 2024-07-03 PROCEDURE — 6360000002 HC RX W HCPCS: Performed by: STUDENT IN AN ORGANIZED HEALTH CARE EDUCATION/TRAINING PROGRAM

## 2024-07-03 PROCEDURE — 99233 SBSQ HOSP IP/OBS HIGH 50: CPT | Performed by: INTERNAL MEDICINE

## 2024-07-03 PROCEDURE — 82042 OTHER SOURCE ALBUMIN QUAN EA: CPT

## 2024-07-03 PROCEDURE — 87205 SMEAR GRAM STAIN: CPT

## 2024-07-03 PROCEDURE — 83880 ASSAY OF NATRIURETIC PEPTIDE: CPT

## 2024-07-03 PROCEDURE — 6360000002 HC RX W HCPCS: Performed by: INTERNAL MEDICINE

## 2024-07-03 PROCEDURE — 2580000003 HC RX 258: Performed by: INTERNAL MEDICINE

## 2024-07-03 PROCEDURE — 87075 CULTR BACTERIA EXCEPT BLOOD: CPT

## 2024-07-03 PROCEDURE — 83735 ASSAY OF MAGNESIUM: CPT

## 2024-07-03 PROCEDURE — 84156 ASSAY OF PROTEIN URINE: CPT

## 2024-07-03 PROCEDURE — 83605 ASSAY OF LACTIC ACID: CPT

## 2024-07-03 PROCEDURE — 85025 COMPLETE CBC W/AUTO DIFF WBC: CPT

## 2024-07-03 PROCEDURE — 89051 BODY FLUID CELL COUNT: CPT

## 2024-07-03 PROCEDURE — 82570 ASSAY OF URINE CREATININE: CPT

## 2024-07-03 PROCEDURE — 80202 ASSAY OF VANCOMYCIN: CPT

## 2024-07-03 PROCEDURE — 2700000000 HC OXYGEN THERAPY PER DAY

## 2024-07-03 PROCEDURE — 88112 CYTOPATH CELL ENHANCE TECH: CPT

## 2024-07-03 PROCEDURE — 93308 TTE F-UP OR LMTD: CPT | Performed by: INTERNAL MEDICINE

## 2024-07-03 PROCEDURE — 80048 BASIC METABOLIC PNL TOTAL CA: CPT

## 2024-07-03 PROCEDURE — 2500000003 HC RX 250 WO HCPCS: Performed by: INTERNAL MEDICINE

## 2024-07-03 PROCEDURE — 36415 COLL VENOUS BLD VENIPUNCTURE: CPT

## 2024-07-03 RX ORDER — PANTOPRAZOLE SODIUM 40 MG/1
40 TABLET, DELAYED RELEASE ORAL
Status: DISCONTINUED | OUTPATIENT
Start: 2024-07-03 | End: 2024-07-07

## 2024-07-03 RX ORDER — ERGOCALCIFEROL 1.25 MG/1
50000 CAPSULE ORAL WEEKLY
Status: CANCELLED | OUTPATIENT
Start: 2024-07-07

## 2024-07-03 RX ORDER — POTASSIUM CHLORIDE 20 MEQ/1
40 TABLET, EXTENDED RELEASE ORAL PRN
Status: CANCELLED | OUTPATIENT
Start: 2024-07-03

## 2024-07-03 RX ORDER — SODIUM CHLORIDE 9 MG/ML
INJECTION, SOLUTION INTRAVENOUS CONTINUOUS
Status: DISCONTINUED | OUTPATIENT
Start: 2024-07-03 | End: 2024-07-03

## 2024-07-03 RX ORDER — HEPARIN SODIUM 1000 [USP'U]/ML
2000 INJECTION, SOLUTION INTRAVENOUS; SUBCUTANEOUS PRN
Status: DISCONTINUED | OUTPATIENT
Start: 2024-07-03 | End: 2024-07-03

## 2024-07-03 RX ORDER — ERGOCALCIFEROL 1.25 MG/1
50000 CAPSULE ORAL WEEKLY
Status: DISCONTINUED | OUTPATIENT
Start: 2024-07-07 | End: 2024-07-15 | Stop reason: HOSPADM

## 2024-07-03 RX ORDER — ACETAMINOPHEN 325 MG/1
650 TABLET ORAL EVERY 6 HOURS PRN
Status: CANCELLED | OUTPATIENT
Start: 2024-07-03

## 2024-07-03 RX ORDER — OXYBUTYNIN CHLORIDE 5 MG/1
10 TABLET, EXTENDED RELEASE ORAL DAILY
Status: CANCELLED | OUTPATIENT
Start: 2024-07-04

## 2024-07-03 RX ORDER — SUCRALFATE 1 G/1
1 TABLET ORAL
Status: CANCELLED | OUTPATIENT
Start: 2024-07-03

## 2024-07-03 RX ORDER — SODIUM CHLORIDE 9 MG/ML
INJECTION, SOLUTION INTRAVENOUS PRN
Status: DISCONTINUED | OUTPATIENT
Start: 2024-07-03 | End: 2024-07-15 | Stop reason: HOSPADM

## 2024-07-03 RX ORDER — HEPARIN SODIUM 1000 [USP'U]/ML
4000 INJECTION, SOLUTION INTRAVENOUS; SUBCUTANEOUS PRN
Status: DISCONTINUED | OUTPATIENT
Start: 2024-07-03 | End: 2024-07-03 | Stop reason: CLARIF

## 2024-07-03 RX ORDER — ONDANSETRON 2 MG/ML
4 INJECTION INTRAMUSCULAR; INTRAVENOUS EVERY 6 HOURS PRN
Status: DISCONTINUED | OUTPATIENT
Start: 2024-07-03 | End: 2024-07-15 | Stop reason: HOSPADM

## 2024-07-03 RX ORDER — SODIUM CHLORIDE 0.9 % (FLUSH) 0.9 %
5-40 SYRINGE (ML) INJECTION EVERY 12 HOURS SCHEDULED
Status: CANCELLED | OUTPATIENT
Start: 2024-07-03

## 2024-07-03 RX ORDER — ESCITALOPRAM OXALATE 10 MG/1
20 TABLET ORAL DAILY
Status: DISCONTINUED | OUTPATIENT
Start: 2024-07-04 | End: 2024-07-15 | Stop reason: HOSPADM

## 2024-07-03 RX ORDER — ATORVASTATIN CALCIUM 40 MG/1
40 TABLET, FILM COATED ORAL NIGHTLY
Status: CANCELLED | OUTPATIENT
Start: 2024-07-03

## 2024-07-03 RX ORDER — ACETAMINOPHEN 650 MG/1
650 SUPPOSITORY RECTAL EVERY 6 HOURS PRN
Status: DISCONTINUED | OUTPATIENT
Start: 2024-07-03 | End: 2024-07-15 | Stop reason: HOSPADM

## 2024-07-03 RX ORDER — ACETAMINOPHEN 650 MG/1
650 SUPPOSITORY RECTAL EVERY 6 HOURS PRN
Status: CANCELLED | OUTPATIENT
Start: 2024-07-03

## 2024-07-03 RX ORDER — POTASSIUM CHLORIDE 7.45 MG/ML
10 INJECTION INTRAVENOUS PRN
Status: DISCONTINUED | OUTPATIENT
Start: 2024-07-03 | End: 2024-07-03

## 2024-07-03 RX ORDER — UREA 10 %
500 LOTION (ML) TOPICAL DAILY
Status: CANCELLED | OUTPATIENT
Start: 2024-07-04

## 2024-07-03 RX ORDER — SODIUM CHLORIDE 9 MG/ML
INJECTION, SOLUTION INTRAVENOUS PRN
Status: CANCELLED | OUTPATIENT
Start: 2024-07-03

## 2024-07-03 RX ORDER — ATORVASTATIN CALCIUM 40 MG/1
40 TABLET, FILM COATED ORAL NIGHTLY
Status: DISCONTINUED | OUTPATIENT
Start: 2024-07-03 | End: 2024-07-15 | Stop reason: HOSPADM

## 2024-07-03 RX ORDER — SODIUM CHLORIDE 0.9 % (FLUSH) 0.9 %
5-40 SYRINGE (ML) INJECTION PRN
Status: CANCELLED | OUTPATIENT
Start: 2024-07-03

## 2024-07-03 RX ORDER — POTASSIUM CHLORIDE 7.45 MG/ML
10 INJECTION INTRAVENOUS PRN
Status: CANCELLED | OUTPATIENT
Start: 2024-07-03

## 2024-07-03 RX ORDER — ONDANSETRON 2 MG/ML
4 INJECTION INTRAMUSCULAR; INTRAVENOUS EVERY 6 HOURS PRN
Status: CANCELLED | OUTPATIENT
Start: 2024-07-03

## 2024-07-03 RX ORDER — MAGNESIUM SULFATE IN WATER 40 MG/ML
2000 INJECTION, SOLUTION INTRAVENOUS PRN
Status: CANCELLED | OUTPATIENT
Start: 2024-07-03

## 2024-07-03 RX ORDER — ACETAMINOPHEN 325 MG/1
650 TABLET ORAL EVERY 6 HOURS PRN
Status: DISCONTINUED | OUTPATIENT
Start: 2024-07-03 | End: 2024-07-15 | Stop reason: HOSPADM

## 2024-07-03 RX ORDER — AMIODARONE HYDROCHLORIDE 200 MG/1
200 TABLET ORAL DAILY
Status: DISCONTINUED | OUTPATIENT
Start: 2024-07-09 | End: 2024-07-15 | Stop reason: HOSPADM

## 2024-07-03 RX ORDER — FOLIC ACID 1 MG/1
1 TABLET ORAL DAILY
Status: CANCELLED | OUTPATIENT
Start: 2024-07-04

## 2024-07-03 RX ORDER — UREA 10 %
500 LOTION (ML) TOPICAL DAILY
Status: DISCONTINUED | OUTPATIENT
Start: 2024-07-04 | End: 2024-07-15 | Stop reason: HOSPADM

## 2024-07-03 RX ORDER — SODIUM CHLORIDE 0.9 % (FLUSH) 0.9 %
5-40 SYRINGE (ML) INJECTION EVERY 12 HOURS SCHEDULED
Status: DISCONTINUED | OUTPATIENT
Start: 2024-07-03 | End: 2024-07-15 | Stop reason: HOSPADM

## 2024-07-03 RX ORDER — OXYBUTYNIN CHLORIDE 10 MG/1
10 TABLET, EXTENDED RELEASE ORAL DAILY
Status: DISCONTINUED | OUTPATIENT
Start: 2024-07-04 | End: 2024-07-15 | Stop reason: HOSPADM

## 2024-07-03 RX ORDER — ONDANSETRON 4 MG/1
4 TABLET, ORALLY DISINTEGRATING ORAL EVERY 8 HOURS PRN
Status: DISCONTINUED | OUTPATIENT
Start: 2024-07-03 | End: 2024-07-15 | Stop reason: HOSPADM

## 2024-07-03 RX ORDER — FENTANYL CITRATE 50 UG/ML
50 INJECTION, SOLUTION INTRAMUSCULAR; INTRAVENOUS
Status: DISCONTINUED | OUTPATIENT
Start: 2024-07-03 | End: 2024-07-15 | Stop reason: HOSPADM

## 2024-07-03 RX ORDER — SODIUM CHLORIDE 0.9 % (FLUSH) 0.9 %
5-40 SYRINGE (ML) INJECTION PRN
Status: DISCONTINUED | OUTPATIENT
Start: 2024-07-03 | End: 2024-07-15 | Stop reason: HOSPADM

## 2024-07-03 RX ORDER — HEPARIN SODIUM 1000 [USP'U]/ML
4000 INJECTION, SOLUTION INTRAVENOUS; SUBCUTANEOUS PRN
Status: CANCELLED | OUTPATIENT
Start: 2024-07-03

## 2024-07-03 RX ORDER — AMIODARONE HYDROCHLORIDE 200 MG/1
200 TABLET ORAL 2 TIMES DAILY
Status: CANCELLED | OUTPATIENT
Start: 2024-07-03 | End: 2024-07-09

## 2024-07-03 RX ORDER — SUCRALFATE 1 G/1
1 TABLET ORAL
Status: DISCONTINUED | OUTPATIENT
Start: 2024-07-03 | End: 2024-07-15 | Stop reason: HOSPADM

## 2024-07-03 RX ORDER — SODIUM CHLORIDE 9 MG/ML
INJECTION, SOLUTION INTRAVENOUS CONTINUOUS
Status: CANCELLED | OUTPATIENT
Start: 2024-07-03

## 2024-07-03 RX ORDER — FOLIC ACID 1 MG/1
1 TABLET ORAL DAILY
Status: DISCONTINUED | OUTPATIENT
Start: 2024-07-04 | End: 2024-07-15 | Stop reason: HOSPADM

## 2024-07-03 RX ORDER — HEPARIN SODIUM 1000 [USP'U]/ML
2000 INJECTION, SOLUTION INTRAVENOUS; SUBCUTANEOUS PRN
Status: CANCELLED | OUTPATIENT
Start: 2024-07-03

## 2024-07-03 RX ORDER — MAGNESIUM SULFATE IN WATER 40 MG/ML
2000 INJECTION, SOLUTION INTRAVENOUS PRN
Status: DISCONTINUED | OUTPATIENT
Start: 2024-07-03 | End: 2024-07-03

## 2024-07-03 RX ORDER — AMIODARONE HYDROCHLORIDE 200 MG/1
200 TABLET ORAL DAILY
Status: CANCELLED | OUTPATIENT
Start: 2024-07-09

## 2024-07-03 RX ORDER — AMIODARONE HYDROCHLORIDE 200 MG/1
200 TABLET ORAL 2 TIMES DAILY
Status: DISPENSED | OUTPATIENT
Start: 2024-07-03 | End: 2024-07-09

## 2024-07-03 RX ORDER — SODIUM CHLORIDE 9 MG/ML
INJECTION, SOLUTION INTRAVENOUS CONTINUOUS
Status: DISCONTINUED | OUTPATIENT
Start: 2024-07-03 | End: 2024-07-03 | Stop reason: HOSPADM

## 2024-07-03 RX ORDER — POTASSIUM CHLORIDE 20 MEQ/1
40 TABLET, EXTENDED RELEASE ORAL PRN
Status: DISCONTINUED | OUTPATIENT
Start: 2024-07-03 | End: 2024-07-03

## 2024-07-03 RX ORDER — ESCITALOPRAM OXALATE 10 MG/1
20 TABLET ORAL DAILY
Status: CANCELLED | OUTPATIENT
Start: 2024-07-04

## 2024-07-03 RX ORDER — ONDANSETRON 4 MG/1
4 TABLET, ORALLY DISINTEGRATING ORAL EVERY 8 HOURS PRN
Status: CANCELLED | OUTPATIENT
Start: 2024-07-03

## 2024-07-03 RX ORDER — LIDOCAINE HYDROCHLORIDE 10 MG/ML
INJECTION, SOLUTION INFILTRATION; PERINEURAL PRN
Status: DISCONTINUED | OUTPATIENT
Start: 2024-07-03 | End: 2024-07-03 | Stop reason: HOSPADM

## 2024-07-03 RX ORDER — PANTOPRAZOLE SODIUM 40 MG/1
40 TABLET, DELAYED RELEASE ORAL
Status: CANCELLED | OUTPATIENT
Start: 2024-07-03

## 2024-07-03 RX ORDER — FENTANYL CITRATE 50 UG/ML
50 INJECTION, SOLUTION INTRAMUSCULAR; INTRAVENOUS
Status: CANCELLED | OUTPATIENT
Start: 2024-07-03

## 2024-07-03 RX ADMIN — MUPIROCIN: 20 OINTMENT TOPICAL at 20:20

## 2024-07-03 RX ADMIN — SODIUM CHLORIDE, PRESERVATIVE FREE 10 ML: 5 INJECTION INTRAVENOUS at 20:34

## 2024-07-03 RX ADMIN — CEFEPIME 1000 MG: 1 INJECTION, POWDER, FOR SOLUTION INTRAMUSCULAR; INTRAVENOUS at 01:53

## 2024-07-03 RX ADMIN — DESMOPRESSIN ACETATE 40 MG: 0.2 TABLET ORAL at 20:20

## 2024-07-03 RX ADMIN — CEFEPIME 1000 MG: 1 INJECTION, POWDER, FOR SOLUTION INTRAMUSCULAR; INTRAVENOUS at 17:59

## 2024-07-03 RX ADMIN — SUCRALFATE 1 G: 1 TABLET ORAL at 17:35

## 2024-07-03 RX ADMIN — AMIODARONE HYDROCHLORIDE 200 MG: 200 TABLET ORAL at 20:20

## 2024-07-03 RX ADMIN — PANTOPRAZOLE SODIUM 40 MG: 40 TABLET, DELAYED RELEASE ORAL at 06:30

## 2024-07-03 RX ADMIN — VANCOMYCIN HYDROCHLORIDE 500 MG: 500 INJECTION, POWDER, LYOPHILIZED, FOR SOLUTION INTRAVENOUS at 17:58

## 2024-07-03 RX ADMIN — METOPROLOL TARTRATE 25 MG: 25 TABLET, FILM COATED ORAL at 10:05

## 2024-07-03 RX ADMIN — METOPROLOL TARTRATE 25 MG: 25 TABLET, FILM COATED ORAL at 20:20

## 2024-07-03 RX ADMIN — AMIODARONE HYDROCHLORIDE 200 MG: 200 TABLET ORAL at 09:13

## 2024-07-03 RX ADMIN — SODIUM CHLORIDE, PRESERVATIVE FREE 10 ML: 5 INJECTION INTRAVENOUS at 20:20

## 2024-07-03 RX ADMIN — PANTOPRAZOLE SODIUM 40 MG: 40 TABLET, DELAYED RELEASE ORAL at 17:35

## 2024-07-03 RX ADMIN — POTASSIUM BICARBONATE 40 MEQ: 782 TABLET, EFFERVESCENT ORAL at 06:29

## 2024-07-03 RX ADMIN — FENTANYL CITRATE 50 MCG: 50 INJECTION, SOLUTION INTRAMUSCULAR; INTRAVENOUS at 21:23

## 2024-07-03 RX ADMIN — ESCITALOPRAM OXALATE 20 MG: 10 TABLET ORAL at 09:13

## 2024-07-03 RX ADMIN — SUCRALFATE 1 G: 1 TABLET ORAL at 20:20

## 2024-07-03 ASSESSMENT — ENCOUNTER SYMPTOMS
PHOTOPHOBIA: 0
COUGH: 0
ABDOMINAL DISTENTION: 0
VOMITING: 0
ABDOMINAL PAIN: 0
WHEEZING: 0
SHORTNESS OF BREATH: 1
NAUSEA: 1
BLOOD IN STOOL: 0

## 2024-07-03 ASSESSMENT — PAIN SCALES - GENERAL
PAINLEVEL_OUTOF10: 8
PAINLEVEL_OUTOF10: 0
PAINLEVEL_OUTOF10: 8

## 2024-07-03 ASSESSMENT — PAIN DESCRIPTION - LOCATION: LOCATION: LEG;ABDOMEN

## 2024-07-03 ASSESSMENT — PAIN SCALES - WONG BAKER: WONGBAKER_NUMERICALRESPONSE: NO HURT

## 2024-07-03 ASSESSMENT — PAIN DESCRIPTION - DESCRIPTORS: DESCRIPTORS: ACHING

## 2024-07-03 NOTE — PROGRESS NOTES
Physical Therapy                                                                 Physical Therapy Cancel Note      DATE: 7/3/2024    NAME: Elaina Champagne  MRN: [id number]   : 1950      Patient not seen this date for Physical Therapy due to:    Other: Per Cardiology note 7/3/24: Plan transfer to [facility] for pericardiocentesis and CTS eval for as high likelihood she will need pericardial window via CTS .  RN defer therapy evaluations with pt pending transfer to [facility].   PLAN: continue to pursue PT evaluation as able and appropriate, if pt remains at [facility].      Electronically signed by Abbi Casey PT on 7/3/2024 at 9:01 AM

## 2024-07-03 NOTE — PROCEDURES
[facility]    Pericardiocentesis Procedure Note             Today's Date:  7/3/2024  Patient name:  Elaina Champagne  MRN:   [id number]  YOB: 1950  PCP:    Robin Ly MD    Procedure: Pericardiocentesis under fluoroscopic and ultrasound guidance    Operators:  Primary: Mariya Luong MD (Attending Physician)    Indications:   Large Pericardial Effusion    Procedure:  After informed consent was obtained, the patient was positioned, prepped and draped in usual sterile fashion. 1% Lidocaine was used to anesthetize the area and a 7F needle was advanced under fluoroscopy and echo guidance into the pericardial space with constant negative aspiration applied until pericardial fluid was encountered.    A guidewire was passed through the needle into the pericardial space. The needle was withdrawn and a pericardiocentesis catheter was then advanced into the pericardial space over the guidewire. The guidewire was then withdrawn. The catheter was connected to a vaccum drain. The catheter was then secure with a suture and dressed in sterile fashion.    Hemorrhagic fluid was removed with volume of about 600 mL    Transthoracic echocardiogram was done to confirm the process and the success of drainage    Total amount of fluid: 600 ml     Type of fluid: Hemorrhagic    Complications: None    Bleeding: minimal    Impression:  Successful drainage of 600 ml of hemorrhagic fluid from the pericardial space with placement of pigtail catheter    Plan:  Send fluid for gram stain, culture, and cytology.  Maintain catheter drainage to negative suction and re-evaluate in 1 day.   Repeat Echo showed small effusion.     MD [facility]

## 2024-07-03 NOTE — CARE COORDINATION
[employer] called for paperwork to transport pt to cath lab today. Faxed and provided to ABILIO oconnor

## 2024-07-03 NOTE — PROGRESS NOTES
Occupational Therapy      [facility]  Occupational Therapy Not Seen Note    DATE: 7/3/2024    NAME: Elaina Champagne  MRN: [id number]   : 1950      Patient not seen this date for Occupational Therapy due to:      Other: Spoke to RN who reports following cardiology rounding this AM, pt now pending a transfer to [facility] for pericardiocentesis and CTS eval for as high likelihood she will need pericardial window via CTS      Next Scheduled Treatment: Will check back as able    Electronically signed by Estefania Duvall OT on 7/3/2024 at 9:54 AM

## 2024-07-03 NOTE — H&P
[facility]  Office: [phone]  Nael Morgan DO, Ronny Quevedo DO, Pb Vásquez DO, Refugio Bradley DO, Sheldon mSith MD, Elsa Torres MD, Leandro Espinosa MD, Joann Hauser MD,  Dipak Montana MD, Rob Hernandez MD, Swathi Wells MD,  Gracia Chen DO, Pilar So MD, Kayden Walters MD, Orlando Morgan DO, Yamilex Luciano MD,  Mando Burden DO, Gali Galarza MD, Georgie Workman MD, Jennifer Hill MD, Gloria Sierra MD,  Rafael Rawls MD, Denilson Murphy MD, Kamran Flores MD, Yazan Hughes MD, Theodore Grossman MD, Jaxon Villar MD, Ricky Lopez DO, Mickey Cabral DO, Trev Alex MD,  Arvin Powers MD, Shirley Waterhouse, CNP,  Nidia Lester CNP, Aj Castro, CNP,  Erin Shaw, DNP, Ivis Senior, CNP, Georgiana Freeman, CNP, Diandra Ruffin, CNP, Marilia Parker, CNP, Maureen Matamoros, PA-C, Meeta Titus PA-C, Sandrita Cruz, CNP, Nan Berrios, CNP, Veronica Alonzo, CNP, Areli Ware, CNP, Guadalupe Jaimes, CNP, Radhika Gillespie, CNS, Adriane Jimenez, CNP, Sarah Shi CNP, Tracy Schwab, CNP         [facility]   IN-PATIENT SERVICE   [facility]    HISTORY AND PHYSICAL EXAMINATION            Date:   7/3/2024  Patient name:  Elaina Champagne  Date of admission:  7/3/2024 11:29 AM  MRN:   [id number]  Account:  [id number]  YOB: 1950  PCP:    Robin Ly MD  Room:   2020/2020-01  Code Status:    DNR-CCA    Chief Complaint:     Pericardial effusion    History Obtained From:     patient, electronic medical record    History of Present Illness:     Elaina Champagne is a 73 y.o.  /  female who presents with a pericardial effusion and is admitted to the hospital for the management of S/P pericardial operation.    Patient is a 73 yr old female with a PMH lymphedema, A.fib, HTN, CKD 3, MO and CAD who was transferred from [facility] after being admitted with sepsis due to UTI on 7/1.  She experienced septic

## 2024-07-03 NOTE — PLAN OF CARE
Problem: Safety - Adult  Goal: Free from fall injury  Outcome: Progressing  Flowsheets (Taken 7/2/2024 0151 by Molly Lehman, RN)  Free From Fall Injury: Instruct family/caregiver on patient safety  Note: Fall assessment preformed. Bed in low locked position with call light and tray table within reach. Education given. Will continue to monitor.      Problem: Discharge Planning  Goal: Discharge to home or other facility with appropriate resources  Outcome: Progressing  Flowsheets (Taken 7/2/2024 2011)  Discharge to home or other facility with appropriate resources:   Identify barriers to discharge with patient and caregiver   Arrange for needed discharge resources and transportation as appropriate   Identify discharge learning needs (meds, wound care, etc)   Arrange for interpreters to assist at discharge as needed     Problem: Skin/Tissue Integrity  Goal: Absence of new skin breakdown  Description: 1.  Monitor for areas of redness and/or skin breakdown  2.  Assess vascular access sites hourly  3.  Every 4-6 hours minimum:  Change oxygen saturation probe site  4.  Every 4-6 hours:  If on nasal continuous positive airway pressure, respiratory therapy assess nares and determine need for appliance change or resting period.  Outcome: Progressing  Note: Skin assessment preformed. Pt turned every 2 hours with heals elevated off bed. Will continue to monitor.      Problem: ABCDS Injury Assessment  Goal: Absence of physical injury  Outcome: Progressing  Flowsheets (Taken 7/2/2024 0151 by Molly Lehman, RN)  Absence of Physical Injury: Implement safety measures based on patient assessment     Problem: Respiratory - Adult  Goal: Achieves optimal ventilation and oxygenation  Outcome: Progressing  Flowsheets  Taken 7/2/2024 2216 by Eugenia Brand RCP  Achieves optimal ventilation and oxygenation: Assess for changes in respiratory status  Taken 7/2/2024 2011 by Brandee Lobato, RN  Achieves optimal ventilation and  oxygenation:   Assess for changes in respiratory status   Assess for changes in mentation and behavior   Position to facilitate oxygenation and minimize respiratory effort   Oxygen supplementation based on oxygen saturation or arterial blood gases   Encourage broncho-pulmonary hygiene including cough, deep breathe, incentive spirometry     Problem: Neurosensory - Adult  Goal: Achieves stable or improved neurological status  Outcome: Progressing  Flowsheets (Taken 7/2/2024 2011)  Achieves stable or improved neurological status:   Assess for and report changes in neurological status   Initiate measures to prevent increased intracranial pressure   Maintain blood pressure and fluid volume within ordered parameters to optimize cerebral perfusion and minimize risk of hemorrhage   Monitor temperature, glucose, and sodium. Initiate appropriate interventions as ordered     Problem: Cardiovascular - Adult  Goal: Maintains optimal cardiac output and hemodynamic stability  Outcome: Progressing  Flowsheets (Taken 7/2/2024 2011)  Maintains optimal cardiac output and hemodynamic stability:   Monitor blood pressure and heart rate   Monitor urine output and notify Licensed Independent Practitioner for values outside of normal range   Assess for signs of decreased cardiac output     Problem: Genitourinary - Adult  Goal: Absence of urinary retention  Outcome: Progressing  Flowsheets (Taken 7/2/2024 2011)  Absence of urinary retention:   Monitor intake/output and perform bladder scan as needed   Assess patient’s ability to void and empty bladder  Goal: Urinary catheter remains patent  Outcome: Progressing  Flowsheets (Taken 7/2/2024 2011)  Urinary catheter remains patent:   Assess patency of urinary catheter   Irrigate catheter per Licensed Independent Practitioner order if indicated and notify Licensed Independent Practitioner if unable to irrigate     Problem: Pain  Goal: Verbalizes/displays adequate comfort level or baseline comfort

## 2024-07-03 NOTE — PROGRESS NOTES
[facility]  Office: [phone]  Nael Morgan DO, Ronny Quevedo DO, Pb Vásquez DO, Refugio Bradley DO, Sheldon Smith MD, Elsa Torres MD, Leandro Espinosa MD, Joann Hauser MD,  Dipak Montana MD, Rob Hernandez MD, Swathi Wells MD,  Gracia Chen DO, Pilar So MD, Kayden Walters MD, Orlando Morgan DO, Yamilex Luciano MD,  Mando Burden DO, Gali Galarza MD, Georgie Workman MD, Jennifer Hill MD, Gloria Sierra MD,  Rafael Rawls MD, Denilson Murphy MD, Kamran Folres MD, Yazan Hughes MD, Theodore Grossman MD, Jaxon Villar MD, Ricky Lopez DO, Mickey Cabral DO, Trev Alex MD,  Arvin Powers MD, Shirley Waterhouse, CNP,  Nidia Lester CNP, Aj Castro, CNP,  Erin Shaw, DNP, Ivis Senior, CNP, Georgiana Freeman, CNP, Diandra Ruffin, CNP, Marilia Parker, CNP, Maureen Matamoros, PA-C, Meeta Titus PA-C, Sandrita Cruz, CNP, Nan Berrios, CNP, Veronica Alonzo, CNP, Areli Ware, CNP, Guadalupe Jaimes, CNP, Radhika Gillespie, CNS, Adriane Jimenez, CNP, Sarah Shi CNP, Tracy Schwab, CNP         IN-PATIENT SERVICE  [facility]    Progress Note    7/3/2024    9:37 AM    Name:   Elaina Champagne  MRN:     [id number]     Acct:      [id number]   Room:   340/340-01   Day:  2  Admit Date:  7/1/2024  9:28 AM    PCP:   Robin Ly MD  Code Status:  DNR-CCA    Subjective:     C/C:   Chief Complaint   Patient presents with    Shortness of Breath     Interval History Status:   No chest pain  Still reporting dyspnea on exertion  Exercise capacity remains diminished    Database updates  Echocardiogram:    Left Ventricle: Normal left ventricular systolic function with a   visually estimated EF of 60 - 65%. Left ventricle size is normal. Normal   wall thickness. Normal wall motion.     Pericardium: Large (>2 cm) circumferential pericardial effusion   present. Pericardial effusion contains fibrinous materials. No indication   of cardiac tamponade. Evidence  533*   MPV 7.8 10.0 8.3   INR 1.4  --   --      Chemistry:  Recent Labs     07/01/24  0935 07/01/24  1143 07/02/24  0714 07/02/24  1649 07/02/24  2311 07/03/24  0514      < > 134* 131*  --  133*   K 3.5*   < > 3.6* 3.8  --  3.4*   CL 93*   < > 97* 95*  --  96*   CO2 27   < > 23 21  --  25   GLUCOSE 133*   < > 164* 135*  --  114*   BUN 28*   < > 46* 53*  --  56*   CREATININE 1.2*   < > 1.9* 2.1*  --  2.2*   MG 2.1  --   --   --   --  2.0   ANIONGAP 16   < > 14 15  --  12   LABGLOM 48*   < > 28* 25*  --  23*   CALCIUM 9.5   < > 8.5* 8.1*  --  8.2*   PROBNP 14,347*  --   --   --   --  30,075*   TROPHS 67*   < >  --  81* 76* 74*    < > = values in this interval not displayed.     Recent Labs     07/01/24  0935 07/01/24  1614   TSH 1.11  --    AST 28 212*   ALT 8 32   ALKPHOS 136* 136*   BILITOT 0.6 0.6     ABG:  Lab Results   Component Value Date/Time    POCPH 7.471 07/01/2024 11:24 AM    PH 6.0 09/19/2018 11:53 AM    POCPCO2 32.1 07/01/2024 11:24 AM    POCPO2 233.8 07/01/2024 11:24 AM    POCHCO3 23.4 07/01/2024 11:24 AM    PBEA 0.0 07/01/2024 11:24 AM    ZAVU3EMF 99.9 07/01/2024 11:24 AM    FIO2 INFORMATION NOT PROVIDED 01/05/2024 09:50 AM     Lab Results   Component Value Date/Time    SPECIAL [id number] 07/01/2024 11:03 AM     Lab Results   Component Value Date/Time    CULTURE NO GROWTH 1 DAY 07/01/2024 11:03 AM       Radiology:  XR CHEST PORTABLE    Result Date: 7/1/2024  1. Right upper extremity PICC tip terminates in the region of the superior cavoatrial junction. 2. No significant change in bibasilar airspace opacities and probable left pleural effusion.     XR CHEST PORTABLE    Result Date: 7/1/2024  1. Cardiomegaly with probable mild pulmonary vascular congestion. 2. Bibasilar opacities, worse on the left, likely atelectasis and/or small effusions.  Likely atelectasis right midlung.       Assessment:        Primary Problem  Sepsis (HCC)    Active Hospital Problems    Diagnosis Date Noted    S/P right  CARE  PHARMACY TO DOSE VANCOMYCIN  IP CONSULT TO CARDIOLOGY  IP CONSULT TO HOSPITALIST  IP CONSULT TO SOCIAL WORK  IP CONSULT TO CARDIOLOGY  IP CONSULT TO PULMONOLOGY  PHARMACY TO DOSE VANCOMYCIN  IP CONSULT TO PALLIATIVE CARE  IP CONSULT TO VASCULAR ACCESS TEAM  IP CONSULT TO NEPHROLOGY  IP CONSULT TO CARDIOLOGY  IP CONSULT TO HOSPITALIST  IP CONSULT TO SOCIAL WORK  PHARMACY TO DOSE VANCOMYCIN  IP CONSULT TO VASCULAR ACCESS TEAM  IP CONSULT TO NEPHROLOGY    Ronny Quevedo DO  7/3/2024  9:37 AM

## 2024-07-03 NOTE — PROGRESS NOTES
[facility]   Progress Note                   Date:   7/3/2024  Patient name: Elaina Champagne  Date of admission:  7/1/2024  9:28 AM  MRN:   [id number]  YOB: 1950  PCP: Robin Ly MD    Reason for Admission:     Subjective:       Clinical Changes / Abnormalities:  On nasal cannula  No cp  No sob  No palpitations  Echo showed large effusion  Cr rising  BP stable  In NSR    Medications:   Scheduled Meds:   Current Facility-Administered Medications:     amiodarone (CORDARONE) tablet 200 mg, 200 mg, Oral, BID, 200 mg at 07/02/24 2011 **FOLLOWED BY** [START ON 7/9/2024] amiodarone (CORDARONE) tablet 200 mg, 200 mg, Oral, Daily, Guadalupe Jaimes APRN - CNP    metoprolol tartrate (LOPRESSOR) tablet 25 mg, 25 mg, Oral, BID, Guadalupe Jaimes APRN - CNP, 25 mg at 07/02/24 2011    clopidogrel (PLAVIX) tablet 75 mg, 75 mg, Oral, Daily, Guadalupe Jaimes APRN - CNP, 75 mg at 07/02/24 1449    cefepime (MAXIPIME) 1,000 mg in sodium chloride 0.9 % 50 mL IVPB (mini-bag), 1,000 mg, IntraVENous, Q12H, Swathi Wells MD, Stopped at 07/03/24 0553    mupirocin (BACTROBAN) 2 % ointment, , Each Nostril, BID, Ronny Quevedo DO, Given at 07/02/24 2148    pantoprazole (PROTONIX) tablet 40 mg, 40 mg, Oral, BID AC, Ronny Quevedo DO, 40 mg at 07/03/24 0630    silver sulfADIAZINE (SILVADENE) 1 % cream, , Topical, Daily, Ronny Quevedo DO, Given at 07/02/24 1825    sucralfate (CARAFATE) tablet 1 g, 1 g, Oral, 4x Daily AC & HS, Ronny Quevedo DO    vitamin B-12 (CYANOCOBALAMIN) tablet 500 mcg, 500 mcg, Oral, Daily, Ronny Quevedo DO, 500 mcg at 07/02/24 1825    [START ON 7/7/2024] vitamin D (ERGOCALCIFEROL) capsule 50,000 Units, 50,000 Units, Oral, Weekly, Ronny Quevedo DO    heparin (porcine) injection 4,000 Units, 4,000 Units, IntraVENous, PRN, Swathi Wells MD    heparin (porcine) injection 2,000 Units, 2,000 Units, IntraVENous, PRN, Swathi Wells,  murmur, click, rub or gallop  Abdomen: soft, non-tender; bowel sounds normal; no masses,  no organomegaly  Extremities: extremities normal, atraumatic, no cyanosis or edema  Neurologic: Mental status: Alert, oriented, thought content appropriate      ECHO (TTE) LIMITED (PRN CONTRAST/BUBBLE/STRAIN/3D) 07/02/2024  4:56 PM (Final)    Interpretation Summary    Left Ventricle: Normal left ventricular systolic function with a visually estimated EF of 60 - 65%. Left ventricle size is normal. Normal wall thickness. Normal wall motion.    Pericardium: Large (>2 cm) circumferential pericardial effusion present. Pericardial effusion contains fibrinous materials. No indication of cardiac tamponade. Evidence includes no significant respiratory chamber variation.    Image quality is adequate. Procedure performed with the patient in a supine position.      CARDIAC PROCEDURE 05/06/2024  3:22 PM (Final)      Indications for PCI: High-grade coronary lesion with unstable angina    Procedure performed: PCI to RCA    Access: Right Radial artery    Procedure: After informed consent was obtained with explanation of the risks and benefits, patient was brought to the cath lab. The right wrist was prepped and draped in sterile fashion. 1% lidocaine was used for local block. The Radial artery was cannulated with 6  Fr sheath with brisk arterial blood return. The side port was frequently flushed and aspirated with normal saline.    EBL is 15 mL    Dominance is right    Findings:    RCA: 80% mid stenosis, length is 14 mm, RAVINDRA-3 flow, underwent RAJAN using 3.0 x 18 mm Cody stent with 0% residual stenosis and RAVINDRA-3 flow    Conclusions:  Successful PCI with RAJAN to mid RCA    Recommendation:  Routine post PCI orders  Medical treatments.  Risk factors modifications.        Assessment / Acute Cardiac Problems:       Has large pericardial effusion, circumferential but significant amount posterior as well, no respiratory variation but large and high

## 2024-07-03 NOTE — DISCHARGE SUMMARY
[facility]  Office: [phone]  Nael Morgan DO, Ronny Quevedo DO, Pb Vásquez DO, Refugio Bradley DO, Sheldon Smith MD, Elsa Torres MD, Leandro Espinosa MD, Joann Hauser MD,  Dipak Montana MD, Rob Hernandez MD, Swathi Wells MD,  Gracia Chen DO, Pilar So MD, Kayden Walters MD, Orlando Morgan DO, Yamilex Luciano MD,  Mando Burden DO, Gali Galarza MD, Georgie Workman MD, Jennifer Hill MD, Gloria Seirra MD,  Rafael Rawls MD, Denilson Murphy MD, Kamran Flores MD, Yazan Hughes MD, Theodore Grossman MD, Jaxon Villar MD, Ricky Lopez DO, Mickey Cabral DO, Trev Alex MD,  Arvin Powers MD, Shirley Waterhouse, CNP,  Nidia Lester CNP, Aj Castro, CNP,  Erin Shaw, DNP, Ivsi Senior, CNP, Georgiana Freeman, CNP, Diandra Ruffin, CNP, Marilia Parker, CNP, Maureen Matamoros, PA-C, Meeta Titus PA-C, Sandrita Cruz, CNP, Nan Berrios, CNP, Veronica Alonzo, CNP, Areli Ware, CNP, Guadalupe Jaimes, CNP, Radhika Gillespie, CNS, Adriane Jimenez, CNP, Sarah Shi CNP, Tracy Schwab, CNP           IN-PATIENT SERVICE  [facility]    Discharge Summary    Patient ID: Elaina Champagne  :  1950   MRN: [id number]     ACCOUNT:  [id number]   Patient's PCP: Robin Ly MD  Admit Date: 2024   Discharge Date: 7/3/2024    Discharge Physician: Ronny Quevedo DO     The patient was seen and examined on day of discharge and this discharge summary is in conjunction with any daily progress note from day of discharge.    Active Discharge Diagnoses:     Primary Problem  Acute heart failure with preserved ejection fraction (HCC)      Hospital Problems  Active Hospital Problems    Diagnosis Date Noted    S/P right coronary artery (RCA) stent  linear densities right mid lung and hazy opacity left base with mild obscuration of both hemidiaphragms laterally, worse on the left. Bones unchanged.     1. Cardiomegaly with probable mild pulmonary vascular congestion. 2. Bibasilar opacities, worse on the left, likely atelectasis and/or small effusions.  Likely atelectasis right midlung.     XR CHEST PORTABLE    Result Date: 6/20/2024  EXAMINATION: ONE XRAY VIEW OF THE CHEST 6/20/2024 11:02 am COMPARISON: None. HISTORY: ORDERING SYSTEM PROVIDED HISTORY: cp TECHNOLOGIST PROVIDED HISTORY: cp Reason for Exam: chest pain, bilat leg swelling and redness FINDINGS: Lungs appear clear.  There is cardiomegaly noted.  Bony structures appear normal.  There is no change from prior examination.     No acute cardiopulmonary process. Cardiomegaly.         Consultations:    Consults:     Final Specialist Recommendations/Findings:   IP CONSULT TO CRITICAL CARE  PHARMACY TO DOSE VANCOMYCIN  IP CONSULT TO CARDIOLOGY  IP CONSULT TO HOSPITALIST  IP CONSULT TO SOCIAL WORK  IP CONSULT TO CARDIOLOGY  IP CONSULT TO PULMONOLOGY  PHARMACY TO DOSE VANCOMYCIN  IP CONSULT TO PALLIATIVE CARE  IP CONSULT TO VASCULAR ACCESS TEAM  IP CONSULT TO NEPHROLOGY        Discharged Condition:    Stable     Disposition: Transfer to [facility]    Physician Follow Up:   Greg Giles,   [address], [address]  [place] [address]  [phone]    Follow up in 2 week(s)         Activity:  activity as tolerated    Diet:  cardiac diet     Discharge Medications:      Medication List        ASK your doctor about these medications      acetaminophen 325 MG tablet  Commonly known as: TYLENOL     * amiodarone 200 MG tablet  Commonly known as: CORDARONE  Take 1 tablet by mouth 2 times daily for 11 doses     * amiodarone 200 MG tablet  Commonly known as: CORDARONE  Take 1 tablet by mouth daily     apixaban 5 MG Tabs tablet  Commonly known as: ELIQUIS  Take 1 tablet by mouth 2 times daily     aspirin 81 MG EC

## 2024-07-03 NOTE — PROGRESS NOTES
07/02/24 2216   NIV Type   Suction Setup and Functional Yes   NIV Started/Stopped On   Equipment Type V60   Mode Bilevel   Mask Type Full face mask   Mask Size Extra small   Assessment   Level of Consciousness 0   Comfort Level Good   Using Accessory Muscles No   Mask Compliance Good   Skin Assessment Clean, dry, & intact   Skin Protection for O2 Device N/A   Breath Sounds   Respiratory Pattern Regular   Settings/Measurements   PIP Observed 12 cm H20   IPAP (S)  12 cmH20  (lower down for pt comfort/ pt could tolerate NIV at this time)   CPAP/EPAP 6 cmH2O  (pressure drop for pt could tolerate and wear NIV)   Vt (Measured) 437 mL   Rate Ordered 12   FiO2  40 %   I Time/ I Time % 1 s   Minute Volume (L/min) 11 Liters   Mask Leak (lpm) 26 lpm   Patient's Home Machine No   Alarm Settings   Alarms On Y   Low Pressure (cmH2O) 5 cmH2O   High Pressure (cmH2O) 30 cmH2O   Apnea (secs) 20 secs   RR Low (bpm) 10   RR High (bpm) 20 br/min   Care Plan - Respiratory Goals   Achieves optimal ventilation and oxygenation Assess for changes in respiratory status

## 2024-07-04 LAB
ANION GAP SERPL CALCULATED.3IONS-SCNC: 11 MMOL/L (ref 9–16)
BASOPHILS # BLD: 0 K/UL (ref 0–0.2)
BASOPHILS NFR BLD: 0 % (ref 0–2)
BUN SERPL-MCNC: 57 MG/DL (ref 8–23)
CALCIUM SERPL-MCNC: 8 MG/DL (ref 8.6–10.4)
CHLORIDE SERPL-SCNC: 99 MMOL/L (ref 98–107)
CO2 SERPL-SCNC: 24 MMOL/L (ref 20–31)
CREAT SERPL-MCNC: 2 MG/DL (ref 0.5–0.9)
ECHO BSA: 2.16 M2
EOSINOPHIL # BLD: 0 K/UL (ref 0–0.44)
EOSINOPHILS RELATIVE PERCENT: 0 % (ref 1–4)
ERYTHROCYTE [DISTWIDTH] IN BLOOD BY AUTOMATED COUNT: 19.9 % (ref 11.8–14.4)
GFR, ESTIMATED: 26 ML/MIN/1.73M2
GLUCOSE SERPL-MCNC: 86 MG/DL (ref 74–99)
HCT VFR BLD AUTO: 25.5 % (ref 36.3–47.1)
HGB BLD-MCNC: 7.7 G/DL (ref 11.9–15.1)
IMM GRANULOCYTES # BLD AUTO: 0.15 K/UL (ref 0–0.3)
IMM GRANULOCYTES NFR BLD: 1 %
LYMPHOCYTES NFR BLD: 0.76 K/UL (ref 1.1–3.7)
LYMPHOCYTES RELATIVE PERCENT: 5 % (ref 24–43)
MCH RBC QN AUTO: 25.7 PG (ref 25.2–33.5)
MCHC RBC AUTO-ENTMCNC: 30.2 G/DL (ref 28.4–34.8)
MCV RBC AUTO: 85 FL (ref 82.6–102.9)
MONOCYTES NFR BLD: 0.46 K/UL (ref 0.1–1.2)
MONOCYTES NFR BLD: 3 % (ref 3–12)
MORPHOLOGY: ABNORMAL
NEUTROPHILS NFR BLD: 91 % (ref 36–65)
NEUTS SEG NFR BLD: 13.83 K/UL (ref 1.5–8.1)
NRBC BLD-RTO: 0.5 PER 100 WBC
PLATELET # BLD AUTO: 534 K/UL (ref 138–453)
PMV BLD AUTO: 10.3 FL (ref 8.1–13.5)
POTASSIUM SERPL-SCNC: 3.6 MMOL/L (ref 3.7–5.3)
RBC # BLD AUTO: 3 M/UL (ref 3.95–5.11)
SODIUM SERPL-SCNC: 134 MMOL/L (ref 136–145)
WBC OTHER # BLD: 15.2 K/UL (ref 3.5–11.3)

## 2024-07-04 PROCEDURE — 99232 SBSQ HOSP IP/OBS MODERATE 35: CPT | Performed by: STUDENT IN AN ORGANIZED HEALTH CARE EDUCATION/TRAINING PROGRAM

## 2024-07-04 PROCEDURE — 2580000003 HC RX 258: Performed by: INTERNAL MEDICINE

## 2024-07-04 PROCEDURE — 2700000000 HC OXYGEN THERAPY PER DAY

## 2024-07-04 PROCEDURE — 80048 BASIC METABOLIC PNL TOTAL CA: CPT

## 2024-07-04 PROCEDURE — 6370000000 HC RX 637 (ALT 250 FOR IP): Performed by: INTERNAL MEDICINE

## 2024-07-04 PROCEDURE — 97167 OT EVAL HIGH COMPLEX 60 MIN: CPT

## 2024-07-04 PROCEDURE — 6360000002 HC RX W HCPCS: Performed by: INTERNAL MEDICINE

## 2024-07-04 PROCEDURE — 85025 COMPLETE CBC W/AUTO DIFF WBC: CPT

## 2024-07-04 PROCEDURE — 99233 SBSQ HOSP IP/OBS HIGH 50: CPT | Performed by: NURSE PRACTITIONER

## 2024-07-04 PROCEDURE — 36415 COLL VENOUS BLD VENIPUNCTURE: CPT

## 2024-07-04 PROCEDURE — 6360000002 HC RX W HCPCS: Performed by: STUDENT IN AN ORGANIZED HEALTH CARE EDUCATION/TRAINING PROGRAM

## 2024-07-04 PROCEDURE — 2580000003 HC RX 258: Performed by: STUDENT IN AN ORGANIZED HEALTH CARE EDUCATION/TRAINING PROGRAM

## 2024-07-04 PROCEDURE — 94761 N-INVAS EAR/PLS OXIMETRY MLT: CPT

## 2024-07-04 PROCEDURE — 97530 THERAPEUTIC ACTIVITIES: CPT

## 2024-07-04 PROCEDURE — 97535 SELF CARE MNGMENT TRAINING: CPT

## 2024-07-04 PROCEDURE — 2060000000 HC ICU INTERMEDIATE R&B

## 2024-07-04 PROCEDURE — 6370000000 HC RX 637 (ALT 250 FOR IP): Performed by: NURSE PRACTITIONER

## 2024-07-04 RX ORDER — CLOPIDOGREL BISULFATE 75 MG/1
75 TABLET ORAL DAILY
Status: DISCONTINUED | OUTPATIENT
Start: 2024-07-04 | End: 2024-07-15 | Stop reason: HOSPADM

## 2024-07-04 RX ADMIN — CEFEPIME 1000 MG: 1 INJECTION, POWDER, FOR SOLUTION INTRAMUSCULAR; INTRAVENOUS at 04:44

## 2024-07-04 RX ADMIN — Medication 500 MCG: at 09:08

## 2024-07-04 RX ADMIN — CEFEPIME 1000 MG: 1 INJECTION, POWDER, FOR SOLUTION INTRAMUSCULAR; INTRAVENOUS at 16:44

## 2024-07-04 RX ADMIN — FENTANYL CITRATE 50 MCG: 50 INJECTION, SOLUTION INTRAMUSCULAR; INTRAVENOUS at 22:10

## 2024-07-04 RX ADMIN — DESMOPRESSIN ACETATE 40 MG: 0.2 TABLET ORAL at 21:09

## 2024-07-04 RX ADMIN — FOLIC ACID 1 MG: 1 TABLET ORAL at 09:09

## 2024-07-04 RX ADMIN — CLOPIDOGREL BISULFATE 75 MG: 75 TABLET ORAL at 12:43

## 2024-07-04 RX ADMIN — METOPROLOL TARTRATE 25 MG: 25 TABLET, FILM COATED ORAL at 09:09

## 2024-07-04 RX ADMIN — PANTOPRAZOLE SODIUM 40 MG: 40 TABLET, DELAYED RELEASE ORAL at 09:21

## 2024-07-04 RX ADMIN — ESCITALOPRAM OXALATE 20 MG: 10 TABLET ORAL at 09:09

## 2024-07-04 RX ADMIN — SUCRALFATE 1 G: 1 TABLET ORAL at 21:09

## 2024-07-04 RX ADMIN — FENTANYL CITRATE 50 MCG: 50 INJECTION, SOLUTION INTRAMUSCULAR; INTRAVENOUS at 00:10

## 2024-07-04 RX ADMIN — AMIODARONE HYDROCHLORIDE 200 MG: 200 TABLET ORAL at 09:09

## 2024-07-04 RX ADMIN — MUPIROCIN: 20 OINTMENT TOPICAL at 09:06

## 2024-07-04 RX ADMIN — OXYBUTYNIN CHLORIDE 10 MG: 10 TABLET, EXTENDED RELEASE ORAL at 09:08

## 2024-07-04 RX ADMIN — PANTOPRAZOLE SODIUM 40 MG: 40 TABLET, DELAYED RELEASE ORAL at 16:40

## 2024-07-04 RX ADMIN — SODIUM CHLORIDE, PRESERVATIVE FREE 35 ML: 5 INJECTION INTRAVENOUS at 09:08

## 2024-07-04 RX ADMIN — SODIUM CHLORIDE, PRESERVATIVE FREE 10 ML: 5 INJECTION INTRAVENOUS at 22:10

## 2024-07-04 RX ADMIN — AMIODARONE HYDROCHLORIDE 200 MG: 200 TABLET ORAL at 21:09

## 2024-07-04 RX ADMIN — SUCRALFATE 1 G: 1 TABLET ORAL at 09:11

## 2024-07-04 RX ADMIN — VANCOMYCIN HYDROCHLORIDE 500 MG: 500 INJECTION, POWDER, LYOPHILIZED, FOR SOLUTION INTRAVENOUS at 18:17

## 2024-07-04 RX ADMIN — SUCRALFATE 1 G: 1 TABLET ORAL at 16:40

## 2024-07-04 ASSESSMENT — PAIN DESCRIPTION - ORIENTATION
ORIENTATION: LEFT
ORIENTATION: MID

## 2024-07-04 ASSESSMENT — PAIN DESCRIPTION - LOCATION
LOCATION: LEG
LOCATION: BUTTOCKS

## 2024-07-04 ASSESSMENT — PAIN SCALES - GENERAL
PAINLEVEL_OUTOF10: 10
PAINLEVEL_OUTOF10: 0

## 2024-07-04 ASSESSMENT — PAIN SCALES - WONG BAKER: WONGBAKER_NUMERICALRESPONSE: HURTS A LITTLE BIT

## 2024-07-04 ASSESSMENT — PAIN DESCRIPTION - DESCRIPTORS
DESCRIPTORS: ACHING
DESCRIPTORS: ACHING

## 2024-07-04 NOTE — CARE COORDINATION
07/04/24 1700   Readmission Assessment   Number of Days since last admission? 8-30 days   Previous Disposition SNF   Who is being Interviewed Patient   What was the patient's/caregiver's perception as to why they think they needed to return back to the hospital? Other (Comment)  (SOB)   Did you visit your Primary Care Physician after you left the hospital, before you returned this time? No   Why weren't you able to visit your PCP? Did not have an appointment;Other (Comment)  (saw facility physician)   Did you see a specialist, such as Cardiac, Pulmonary, Orthopedic Physician, etc. after you left the hospital? No   Who advised the patient to return to the hospital? Skilled Unit   Does the patient report anything that got in the way of taking their medications? No  (facility administered medications)   What reasons did they give? Other (Comment)  (facility administered medications)   In our efforts to provide the best possible care to you and others like you, can you think of anything that we could have done to help you after you left the hospital the first time, so that you might not have needed to return so soon? Other (Comment)  (DC instructions provided to facility)

## 2024-07-04 NOTE — CARE COORDINATION
Transitional planning. TOBY Duval with [facility] asking for CB to confirm pt can return to [facility], informed her that pt wants to go to [facility], pt is currently at [facility].

## 2024-07-04 NOTE — CARE COORDINATION
Case Management Assessment  Initial Evaluation    Date/Time of Evaluation: 7/4/2024 5:11 PM  Assessment Completed by: BALAJI HOPKINS RN    If patient is discharged prior to next notation, then this note serves as note for discharge by case management.    Patient Name: Elaina Champagne                   YOB: 1950  Diagnosis: Pericardial effusion [I31.39]  S/P pericardial operation [Z98.890]                   Date / Time: 7/3/2024 11:29 AM    Patient Admission Status: Inpatient   Readmission Risk (Low < 19, Mod (19-27), High > 27): Readmission Risk Score: 35.6    Current PCP: Robin Ly MD  PCP verified by CM? (P) Yes (Robin Ly MD)    Chart Reviewed: Yes      History Provided by: Patient  Patient Orientation: Alert and Oriented    Patient Cognition: Alert    Hospitalization in the last 30 days (Readmission):  Yes    If yes, Readmission Assessment in CM Navigator will be completed.    Advance Directives:      Code Status: DNR-CCA   Patient's Primary Decision Maker is: (P) Legal Next of Kin    Primary Decision Maker: Eze Champagne - Spouse - [phone]    Discharge Planning:    Patient lives with: Other (Comment) (at nursing facility) Type of Home: (P) Skilled Nursing Facility  Primary Care Giver: Other (Comment) (From [facility])  Patient Support Systems include: Spouse/Significant Other, Family Members   Current Financial resources: (P) Medicare, Medicaid ([facility] Dual, [employer] [facility] Medicare)  Current community resources: (P) ECF/Home Care  Current services prior to admission: (P) Durable Medical Equipment            Current DME: (P) Walker, Bedside [employer]            Type of Home Care services:  (P) None    ADLS  Prior functional level: (P) Assistance with the following:, Bathing, Dressing, Toileting, Mobility  Current functional level: (P) Assistance with the following:, Bathing, Dressing, Toileting, Mobility    PT AM-PAC:   /24  OT AM-PAC: 16

## 2024-07-05 ENCOUNTER — APPOINTMENT (OUTPATIENT)
Age: 74
DRG: 871 | End: 2024-07-05
Attending: INTERNAL MEDICINE
Payer: COMMERCIAL

## 2024-07-05 PROBLEM — E87.6 HYPOKALEMIA: Status: ACTIVE | Noted: 2024-07-05

## 2024-07-05 LAB
ANION GAP SERPL CALCULATED.3IONS-SCNC: 11 MMOL/L (ref 9–16)
BASOPHILS # BLD: 0 K/UL (ref 0–0.2)
BASOPHILS NFR BLD: 0 % (ref 0–2)
BUN SERPL-MCNC: 51 MG/DL (ref 8–23)
CALCIUM SERPL-MCNC: 8.2 MG/DL (ref 8.6–10.4)
CASE NUMBER:: NORMAL
CHLORIDE SERPL-SCNC: 99 MMOL/L (ref 98–107)
CO2 SERPL-SCNC: 23 MMOL/L (ref 20–31)
CREAT SERPL-MCNC: 1.8 MG/DL (ref 0.5–0.9)
EOSINOPHIL # BLD: 0 K/UL (ref 0–0.44)
EOSINOPHILS RELATIVE PERCENT: 0 % (ref 1–4)
ERYTHROCYTE [DISTWIDTH] IN BLOOD BY AUTOMATED COUNT: 20.4 % (ref 11.8–14.4)
GFR, ESTIMATED: 29 ML/MIN/1.73M2
GLUCOSE SERPL-MCNC: 68 MG/DL (ref 74–99)
HCT VFR BLD AUTO: 26.7 % (ref 36.3–47.1)
HGB BLD-MCNC: 7.9 G/DL (ref 11.9–15.1)
IMM GRANULOCYTES # BLD AUTO: 0.13 K/UL (ref 0–0.3)
IMM GRANULOCYTES NFR BLD: 1 %
LYMPHOCYTES NFR BLD: 0.88 K/UL (ref 1.1–3.7)
LYMPHOCYTES RELATIVE PERCENT: 7 % (ref 24–43)
MCH RBC QN AUTO: 26.2 PG (ref 25.2–33.5)
MCHC RBC AUTO-ENTMCNC: 29.6 G/DL (ref 28.4–34.8)
MCV RBC AUTO: 88.4 FL (ref 82.6–102.9)
MONOCYTES NFR BLD: 0.5 K/UL (ref 0.1–1.2)
MONOCYTES NFR BLD: 4 % (ref 3–12)
MORPHOLOGY: ABNORMAL
NEUTROPHILS NFR BLD: 88 % (ref 36–65)
NEUTS SEG NFR BLD: 10.99 K/UL (ref 1.5–8.1)
NRBC BLD-RTO: 0.6 PER 100 WBC
PLATELET # BLD AUTO: 607 K/UL (ref 138–453)
PMV BLD AUTO: 10.3 FL (ref 8.1–13.5)
POTASSIUM SERPL-SCNC: 3.6 MMOL/L (ref 3.7–5.3)
RBC # BLD AUTO: 3.02 M/UL (ref 3.95–5.11)
SODIUM SERPL-SCNC: 133 MMOL/L (ref 136–145)
SPECIMEN DESCRIPTION: NORMAL
WBC OTHER # BLD: 12.5 K/UL (ref 3.5–11.3)

## 2024-07-05 PROCEDURE — 93308 TTE F-UP OR LMTD: CPT

## 2024-07-05 PROCEDURE — 97530 THERAPEUTIC ACTIVITIES: CPT

## 2024-07-05 PROCEDURE — 36415 COLL VENOUS BLD VENIPUNCTURE: CPT

## 2024-07-05 PROCEDURE — 99232 SBSQ HOSP IP/OBS MODERATE 35: CPT | Performed by: STUDENT IN AN ORGANIZED HEALTH CARE EDUCATION/TRAINING PROGRAM

## 2024-07-05 PROCEDURE — 99212 OFFICE O/P EST SF 10 MIN: CPT

## 2024-07-05 PROCEDURE — 97535 SELF CARE MNGMENT TRAINING: CPT

## 2024-07-05 PROCEDURE — 80048 BASIC METABOLIC PNL TOTAL CA: CPT

## 2024-07-05 PROCEDURE — 99233 SBSQ HOSP IP/OBS HIGH 50: CPT | Performed by: NURSE PRACTITIONER

## 2024-07-05 PROCEDURE — 6370000000 HC RX 637 (ALT 250 FOR IP): Performed by: STUDENT IN AN ORGANIZED HEALTH CARE EDUCATION/TRAINING PROGRAM

## 2024-07-05 PROCEDURE — 6360000002 HC RX W HCPCS: Performed by: STUDENT IN AN ORGANIZED HEALTH CARE EDUCATION/TRAINING PROGRAM

## 2024-07-05 PROCEDURE — 6360000002 HC RX W HCPCS: Performed by: INTERNAL MEDICINE

## 2024-07-05 PROCEDURE — 93308 TTE F-UP OR LMTD: CPT | Performed by: INTERNAL MEDICINE

## 2024-07-05 PROCEDURE — 2580000003 HC RX 258: Performed by: STUDENT IN AN ORGANIZED HEALTH CARE EDUCATION/TRAINING PROGRAM

## 2024-07-05 PROCEDURE — 2700000000 HC OXYGEN THERAPY PER DAY

## 2024-07-05 PROCEDURE — 6370000000 HC RX 637 (ALT 250 FOR IP): Performed by: NURSE PRACTITIONER

## 2024-07-05 PROCEDURE — 94761 N-INVAS EAR/PLS OXIMETRY MLT: CPT

## 2024-07-05 PROCEDURE — 2500000003 HC RX 250 WO HCPCS: Performed by: INTERNAL MEDICINE

## 2024-07-05 PROCEDURE — 2060000000 HC ICU INTERMEDIATE R&B

## 2024-07-05 PROCEDURE — 97162 PT EVAL MOD COMPLEX 30 MIN: CPT

## 2024-07-05 PROCEDURE — 85025 COMPLETE CBC W/AUTO DIFF WBC: CPT

## 2024-07-05 PROCEDURE — 6370000000 HC RX 637 (ALT 250 FOR IP): Performed by: INTERNAL MEDICINE

## 2024-07-05 PROCEDURE — 2580000003 HC RX 258: Performed by: INTERNAL MEDICINE

## 2024-07-05 RX ORDER — POTASSIUM CHLORIDE 20 MEQ/1
40 TABLET, EXTENDED RELEASE ORAL ONCE
Status: COMPLETED | OUTPATIENT
Start: 2024-07-05 | End: 2024-07-05

## 2024-07-05 RX ADMIN — VANCOMYCIN HYDROCHLORIDE 500 MG: 500 INJECTION, POWDER, LYOPHILIZED, FOR SOLUTION INTRAVENOUS at 19:03

## 2024-07-05 RX ADMIN — CEFEPIME 1000 MG: 1 INJECTION, POWDER, FOR SOLUTION INTRAMUSCULAR; INTRAVENOUS at 15:56

## 2024-07-05 RX ADMIN — SUCRALFATE 1 G: 1 TABLET ORAL at 16:45

## 2024-07-05 RX ADMIN — MUPIROCIN: 20 OINTMENT TOPICAL at 08:48

## 2024-07-05 RX ADMIN — PANTOPRAZOLE SODIUM 40 MG: 40 TABLET, DELAYED RELEASE ORAL at 16:45

## 2024-07-05 RX ADMIN — SODIUM CHLORIDE, PRESERVATIVE FREE 10 ML: 5 INJECTION INTRAVENOUS at 08:42

## 2024-07-05 RX ADMIN — SILVER SULFADIAZINE: 10 CREAM TOPICAL at 08:49

## 2024-07-05 RX ADMIN — FENTANYL CITRATE 50 MCG: 50 INJECTION, SOLUTION INTRAMUSCULAR; INTRAVENOUS at 08:39

## 2024-07-05 RX ADMIN — FENTANYL CITRATE 50 MCG: 50 INJECTION, SOLUTION INTRAMUSCULAR; INTRAVENOUS at 06:13

## 2024-07-05 RX ADMIN — SODIUM CHLORIDE, PRESERVATIVE FREE 10 ML: 5 INJECTION INTRAVENOUS at 20:57

## 2024-07-05 RX ADMIN — DESMOPRESSIN ACETATE 40 MG: 0.2 TABLET ORAL at 20:48

## 2024-07-05 RX ADMIN — FOLIC ACID 1 MG: 1 TABLET ORAL at 08:41

## 2024-07-05 RX ADMIN — SUCRALFATE 1 G: 1 TABLET ORAL at 06:12

## 2024-07-05 RX ADMIN — ACETAMINOPHEN 650 MG: 325 TABLET ORAL at 08:41

## 2024-07-05 RX ADMIN — POTASSIUM CHLORIDE 40 MEQ: 1500 TABLET, EXTENDED RELEASE ORAL at 08:40

## 2024-07-05 RX ADMIN — AMIODARONE HYDROCHLORIDE 200 MG: 200 TABLET ORAL at 08:41

## 2024-07-05 RX ADMIN — SUCRALFATE 1 G: 1 TABLET ORAL at 20:48

## 2024-07-05 RX ADMIN — AMIODARONE HYDROCHLORIDE 200 MG: 200 TABLET ORAL at 20:48

## 2024-07-05 RX ADMIN — CLOPIDOGREL BISULFATE 75 MG: 75 TABLET ORAL at 08:41

## 2024-07-05 RX ADMIN — SUCRALFATE 1 G: 1 TABLET ORAL at 13:16

## 2024-07-05 RX ADMIN — PANTOPRAZOLE SODIUM 40 MG: 40 TABLET, DELAYED RELEASE ORAL at 06:12

## 2024-07-05 RX ADMIN — SODIUM CHLORIDE, PRESERVATIVE FREE 10 ML: 5 INJECTION INTRAVENOUS at 00:36

## 2024-07-05 RX ADMIN — ESCITALOPRAM OXALATE 20 MG: 10 TABLET ORAL at 08:48

## 2024-07-05 RX ADMIN — MUPIROCIN: 20 OINTMENT TOPICAL at 20:53

## 2024-07-05 RX ADMIN — METOPROLOL TARTRATE 25 MG: 25 TABLET, FILM COATED ORAL at 08:41

## 2024-07-05 RX ADMIN — MUPIROCIN: 20 OINTMENT TOPICAL at 00:35

## 2024-07-05 RX ADMIN — FENTANYL CITRATE 50 MCG: 50 INJECTION, SOLUTION INTRAMUSCULAR; INTRAVENOUS at 03:17

## 2024-07-05 RX ADMIN — CEFEPIME 1000 MG: 1 INJECTION, POWDER, FOR SOLUTION INTRAMUSCULAR; INTRAVENOUS at 04:18

## 2024-07-05 ASSESSMENT — PAIN DESCRIPTION - LOCATION
LOCATION: GENERALIZED

## 2024-07-05 ASSESSMENT — PAIN SCALES - GENERAL
PAINLEVEL_OUTOF10: 4
PAINLEVEL_OUTOF10: 8
PAINLEVEL_OUTOF10: 8
PAINLEVEL_OUTOF10: 7
PAINLEVEL_OUTOF10: 4

## 2024-07-05 ASSESSMENT — PAIN DESCRIPTION - DESCRIPTORS
DESCRIPTORS: ACHING

## 2024-07-05 NOTE — CARE COORDINATION
TRANSITIONAL CARE/DISCHARGE PLANNING ONGOING EVALUATION      Reason for Admission: Pericardial effusion [I31.39]  S/P pericardial operation [Z98.890]     Hospital day:2    Patient goals/Transitional Plan:    Spoke with patient about transition and discharge planning, Pt from [facility] plan is to return there with a potential to transfer to [facility] when a bad becomes available there.     Spoke with Linda from [facility] stated they spoke with family and everyone is on the same page.       Readmission Risk              Risk of Unplanned Readmission:  51

## 2024-07-06 PROBLEM — R50.9 FEVER: Status: ACTIVE | Noted: 2024-07-06

## 2024-07-06 LAB
ABO + RH BLD: NORMAL
ARM BAND NUMBER: NORMAL
BASOPHILS # BLD: 0 K/UL (ref 0–0.2)
BASOPHILS NFR BLD: 0 % (ref 0–2)
BLOOD BANK SAMPLE EXPIRATION: NORMAL
BLOOD GROUP ANTIBODIES SERPL: NEGATIVE
ECHO BSA: 2.16 M2
EOSINOPHIL # BLD: 0.11 K/UL (ref 0–0.4)
EOSINOPHILS RELATIVE PERCENT: 1 % (ref 1–4)
ERYTHROCYTE [DISTWIDTH] IN BLOOD BY AUTOMATED COUNT: 20.3 % (ref 11.8–14.4)
GLUCOSE BLD-MCNC: 116 MG/DL (ref 65–105)
GLUCOSE BLD-MCNC: 94 MG/DL (ref 65–105)
HCT VFR BLD AUTO: 23 % (ref 36.3–47.1)
HCT VFR BLD AUTO: 26.2 % (ref 36.3–47.1)
HGB BLD-MCNC: 6.1 G/DL (ref 11.9–15.1)
HGB BLD-MCNC: 7.9 G/DL (ref 11.9–15.1)
IMM GRANULOCYTES # BLD AUTO: 0.11 K/UL (ref 0–0.3)
IMM GRANULOCYTES NFR BLD: 1 %
LYMPHOCYTES NFR BLD: 0.78 K/UL (ref 1–4.8)
LYMPHOCYTES RELATIVE PERCENT: 7 % (ref 24–44)
MAGNESIUM SERPL-MCNC: 2.7 MG/DL (ref 1.6–2.4)
MCH RBC QN AUTO: 25.7 PG (ref 25.2–33.5)
MCHC RBC AUTO-ENTMCNC: 26.5 G/DL (ref 28.4–34.8)
MCV RBC AUTO: 97 FL (ref 82.6–102.9)
MICROORGANISM SPEC CULT: NORMAL
MONOCYTES NFR BLD: 0.44 K/UL (ref 0.1–0.8)
MONOCYTES NFR BLD: 4 % (ref 1–7)
MORPHOLOGY: ABNORMAL
NEUTROPHILS NFR BLD: 87 % (ref 36–66)
NEUTS SEG NFR BLD: 9.66 K/UL (ref 1.8–7.7)
NRBC BLD-RTO: 0.4 PER 100 WBC
PLATELET # BLD AUTO: 415 K/UL (ref 138–453)
PMV BLD AUTO: 9.8 FL (ref 8.1–13.5)
POTASSIUM SERPL-SCNC: 5.3 MMOL/L (ref 3.7–5.3)
RBC # BLD AUTO: 2.37 M/UL (ref 3.95–5.11)
RHEUMATOID FACT SER NEPH-ACNC: 74 IU/ML (ref 0–13)
SERVICE CMNT-IMP: NORMAL
SPECIMEN DESCRIPTION: NORMAL
WBC OTHER # BLD: 11.1 K/UL (ref 3.5–11.3)

## 2024-07-06 PROCEDURE — 87533 HHV-6 DNA QUANT: CPT

## 2024-07-06 PROCEDURE — 82947 ASSAY GLUCOSE BLOOD QUANT: CPT

## 2024-07-06 PROCEDURE — 2580000003 HC RX 258

## 2024-07-06 PROCEDURE — 86038 ANTINUCLEAR ANTIBODIES: CPT

## 2024-07-06 PROCEDURE — 86900 BLOOD TYPING SEROLOGIC ABO: CPT

## 2024-07-06 PROCEDURE — 86645 CMV ANTIBODY IGM: CPT

## 2024-07-06 PROCEDURE — 86431 RHEUMATOID FACTOR QUANT: CPT

## 2024-07-06 PROCEDURE — 86850 RBC ANTIBODY SCREEN: CPT

## 2024-07-06 PROCEDURE — 86658 ENTEROVIRUS ANTIBODY: CPT

## 2024-07-06 PROCEDURE — 86663 EPSTEIN-BARR ANTIBODY: CPT

## 2024-07-06 PROCEDURE — 6360000002 HC RX W HCPCS

## 2024-07-06 PROCEDURE — 6370000000 HC RX 637 (ALT 250 FOR IP): Performed by: NURSE PRACTITIONER

## 2024-07-06 PROCEDURE — 99233 SBSQ HOSP IP/OBS HIGH 50: CPT | Performed by: NURSE PRACTITIONER

## 2024-07-06 PROCEDURE — 86665 EPSTEIN-BARR CAPSID VCA: CPT

## 2024-07-06 PROCEDURE — 82272 OCCULT BLD FECES 1-3 TESTS: CPT

## 2024-07-06 PROCEDURE — 94761 N-INVAS EAR/PLS OXIMETRY MLT: CPT

## 2024-07-06 PROCEDURE — 6370000000 HC RX 637 (ALT 250 FOR IP): Performed by: STUDENT IN AN ORGANIZED HEALTH CARE EDUCATION/TRAINING PROGRAM

## 2024-07-06 PROCEDURE — 87389 HIV-1 AG W/HIV-1&-2 AB AG IA: CPT

## 2024-07-06 PROCEDURE — 83735 ASSAY OF MAGNESIUM: CPT

## 2024-07-06 PROCEDURE — 84132 ASSAY OF SERUM POTASSIUM: CPT

## 2024-07-06 PROCEDURE — 99223 1ST HOSP IP/OBS HIGH 75: CPT | Performed by: INTERNAL MEDICINE

## 2024-07-06 PROCEDURE — 2700000000 HC OXYGEN THERAPY PER DAY

## 2024-07-06 PROCEDURE — 6360000002 HC RX W HCPCS: Performed by: STUDENT IN AN ORGANIZED HEALTH CARE EDUCATION/TRAINING PROGRAM

## 2024-07-06 PROCEDURE — 86664 EPSTEIN-BARR NUCLEAR ANTIGEN: CPT

## 2024-07-06 PROCEDURE — 2060000000 HC ICU INTERMEDIATE R&B

## 2024-07-06 PROCEDURE — 86225 DNA ANTIBODY NATIVE: CPT

## 2024-07-06 PROCEDURE — 85018 HEMOGLOBIN: CPT

## 2024-07-06 PROCEDURE — 36415 COLL VENOUS BLD VENIPUNCTURE: CPT

## 2024-07-06 PROCEDURE — 99232 SBSQ HOSP IP/OBS MODERATE 35: CPT | Performed by: STUDENT IN AN ORGANIZED HEALTH CARE EDUCATION/TRAINING PROGRAM

## 2024-07-06 PROCEDURE — 86738 MYCOPLASMA ANTIBODY: CPT

## 2024-07-06 PROCEDURE — 85025 COMPLETE CBC W/AUTO DIFF WBC: CPT

## 2024-07-06 PROCEDURE — 80048 BASIC METABOLIC PNL TOTAL CA: CPT

## 2024-07-06 PROCEDURE — 2580000003 HC RX 258: Performed by: STUDENT IN AN ORGANIZED HEALTH CARE EDUCATION/TRAINING PROGRAM

## 2024-07-06 PROCEDURE — 2580000003 HC RX 258: Performed by: INTERNAL MEDICINE

## 2024-07-06 PROCEDURE — 86901 BLOOD TYPING SEROLOGIC RH(D): CPT

## 2024-07-06 PROCEDURE — 6360000002 HC RX W HCPCS: Performed by: INTERNAL MEDICINE

## 2024-07-06 PROCEDURE — 6370000000 HC RX 637 (ALT 250 FOR IP): Performed by: INTERNAL MEDICINE

## 2024-07-06 PROCEDURE — 85014 HEMATOCRIT: CPT

## 2024-07-06 RX ORDER — WATER 10 ML/10ML
INJECTION INTRAMUSCULAR; INTRAVENOUS; SUBCUTANEOUS
Status: COMPLETED
Start: 2024-07-06 | End: 2024-07-06

## 2024-07-06 RX ORDER — POTASSIUM CHLORIDE 20 MEQ/1
40 TABLET, EXTENDED RELEASE ORAL
Status: DISCONTINUED | OUTPATIENT
Start: 2024-07-06 | End: 2024-07-06

## 2024-07-06 RX ORDER — MAGNESIUM SULFATE IN WATER 40 MG/ML
2000 INJECTION, SOLUTION INTRAVENOUS PRN
Status: DISCONTINUED | OUTPATIENT
Start: 2024-07-06 | End: 2024-07-15 | Stop reason: HOSPADM

## 2024-07-06 RX ORDER — POTASSIUM CHLORIDE 29.8 MG/ML
20 INJECTION INTRAVENOUS
Status: COMPLETED | OUTPATIENT
Start: 2024-07-06 | End: 2024-07-06

## 2024-07-06 RX ORDER — POTASSIUM CHLORIDE 7.45 MG/ML
10 INJECTION INTRAVENOUS PRN
Status: DISCONTINUED | OUTPATIENT
Start: 2024-07-06 | End: 2024-07-15 | Stop reason: HOSPADM

## 2024-07-06 RX ORDER — POTASSIUM CHLORIDE 20 MEQ/1
40 TABLET, EXTENDED RELEASE ORAL PRN
Status: DISCONTINUED | OUTPATIENT
Start: 2024-07-06 | End: 2024-07-15 | Stop reason: HOSPADM

## 2024-07-06 RX ADMIN — SODIUM CHLORIDE, PRESERVATIVE FREE 10 ML: 5 INJECTION INTRAVENOUS at 20:32

## 2024-07-06 RX ADMIN — CEFEPIME 1000 MG: 1 INJECTION, POWDER, FOR SOLUTION INTRAMUSCULAR; INTRAVENOUS at 15:59

## 2024-07-06 RX ADMIN — FOLIC ACID 1 MG: 1 TABLET ORAL at 08:26

## 2024-07-06 RX ADMIN — ACETAMINOPHEN 650 MG: 325 TABLET ORAL at 08:25

## 2024-07-06 RX ADMIN — ALTEPLASE 2 MG: 2.2 INJECTION, POWDER, LYOPHILIZED, FOR SOLUTION INTRAVENOUS at 13:47

## 2024-07-06 RX ADMIN — AMIODARONE HYDROCHLORIDE 200 MG: 200 TABLET ORAL at 08:25

## 2024-07-06 RX ADMIN — CEFEPIME 1000 MG: 1 INJECTION, POWDER, FOR SOLUTION INTRAMUSCULAR; INTRAVENOUS at 04:33

## 2024-07-06 RX ADMIN — OXYBUTYNIN CHLORIDE 10 MG: 10 TABLET, EXTENDED RELEASE ORAL at 08:24

## 2024-07-06 RX ADMIN — SUCRALFATE 1 G: 1 TABLET ORAL at 08:26

## 2024-07-06 RX ADMIN — POTASSIUM CHLORIDE 20 MEQ: 29.8 INJECTION, SOLUTION INTRAVENOUS at 12:18

## 2024-07-06 RX ADMIN — VANCOMYCIN HYDROCHLORIDE 500 MG: 500 INJECTION, POWDER, LYOPHILIZED, FOR SOLUTION INTRAVENOUS at 20:31

## 2024-07-06 RX ADMIN — MUPIROCIN: 20 OINTMENT TOPICAL at 08:24

## 2024-07-06 RX ADMIN — FENTANYL CITRATE 50 MCG: 50 INJECTION, SOLUTION INTRAMUSCULAR; INTRAVENOUS at 06:39

## 2024-07-06 RX ADMIN — WATER 2.2 ML: 1 INJECTION INTRAMUSCULAR; INTRAVENOUS; SUBCUTANEOUS at 13:46

## 2024-07-06 RX ADMIN — POTASSIUM CHLORIDE 40 MEQ: 1500 TABLET, EXTENDED RELEASE ORAL at 09:43

## 2024-07-06 RX ADMIN — POTASSIUM CHLORIDE 20 MEQ: 29.8 INJECTION, SOLUTION INTRAVENOUS at 12:19

## 2024-07-06 RX ADMIN — DESMOPRESSIN ACETATE 40 MG: 0.2 TABLET ORAL at 20:32

## 2024-07-06 RX ADMIN — CLOPIDOGREL BISULFATE 75 MG: 75 TABLET ORAL at 08:26

## 2024-07-06 RX ADMIN — METOPROLOL TARTRATE 25 MG: 25 TABLET, FILM COATED ORAL at 08:25

## 2024-07-06 RX ADMIN — FENTANYL CITRATE 50 MCG: 50 INJECTION, SOLUTION INTRAMUSCULAR; INTRAVENOUS at 21:40

## 2024-07-06 RX ADMIN — PANTOPRAZOLE SODIUM 40 MG: 40 TABLET, DELAYED RELEASE ORAL at 08:26

## 2024-07-06 RX ADMIN — ONDANSETRON 4 MG: 4 TABLET, ORALLY DISINTEGRATING ORAL at 08:26

## 2024-07-06 RX ADMIN — SUCRALFATE 1 G: 1 TABLET ORAL at 20:34

## 2024-07-06 RX ADMIN — POTASSIUM CHLORIDE 40 MEQ: 1500 TABLET, EXTENDED RELEASE ORAL at 08:24

## 2024-07-06 RX ADMIN — SODIUM CHLORIDE, PRESERVATIVE FREE 10 ML: 5 INJECTION INTRAVENOUS at 08:27

## 2024-07-06 RX ADMIN — PANTOPRAZOLE SODIUM 40 MG: 40 TABLET, DELAYED RELEASE ORAL at 16:03

## 2024-07-06 RX ADMIN — FENTANYL CITRATE 50 MCG: 50 INJECTION, SOLUTION INTRAMUSCULAR; INTRAVENOUS at 11:14

## 2024-07-06 RX ADMIN — SUCRALFATE 1 G: 1 TABLET ORAL at 16:03

## 2024-07-06 RX ADMIN — MUPIROCIN: 20 OINTMENT TOPICAL at 20:32

## 2024-07-06 RX ADMIN — FENTANYL CITRATE 50 MCG: 50 INJECTION, SOLUTION INTRAMUSCULAR; INTRAVENOUS at 03:56

## 2024-07-06 RX ADMIN — Medication 500 MCG: at 08:25

## 2024-07-06 RX ADMIN — ACETAMINOPHEN 650 MG: 325 TABLET ORAL at 20:32

## 2024-07-06 RX ADMIN — SUCRALFATE 1 G: 1 TABLET ORAL at 12:18

## 2024-07-06 RX ADMIN — POTASSIUM CHLORIDE 20 MEQ: 29.8 INJECTION, SOLUTION INTRAVENOUS at 12:11

## 2024-07-06 RX ADMIN — ESCITALOPRAM OXALATE 20 MG: 10 TABLET ORAL at 08:26

## 2024-07-06 ASSESSMENT — PAIN DESCRIPTION - DESCRIPTORS
DESCRIPTORS: ACHING
DESCRIPTORS: ACHING;DISCOMFORT
DESCRIPTORS: ACHING
DESCRIPTORS: DISCOMFORT
DESCRIPTORS: ACHING

## 2024-07-06 ASSESSMENT — PAIN DESCRIPTION - LOCATION
LOCATION: GENERALIZED;BUTTOCKS
LOCATION: BUTTOCKS;BACK
LOCATION: BUTTOCKS
LOCATION: GENERALIZED
LOCATION: GENERALIZED

## 2024-07-06 ASSESSMENT — PAIN SCALES - GENERAL
PAINLEVEL_OUTOF10: 10
PAINLEVEL_OUTOF10: 4
PAINLEVEL_OUTOF10: 0
PAINLEVEL_OUTOF10: 4
PAINLEVEL_OUTOF10: 5
PAINLEVEL_OUTOF10: 0
PAINLEVEL_OUTOF10: 8
PAINLEVEL_OUTOF10: 8
PAINLEVEL_OUTOF10: 0

## 2024-07-06 ASSESSMENT — PAIN DESCRIPTION - ORIENTATION: ORIENTATION: MID

## 2024-07-06 ASSESSMENT — PAIN SCALES - WONG BAKER
WONGBAKER_NUMERICALRESPONSE: NO HURT
WONGBAKER_NUMERICALRESPONSE: NO HURT

## 2024-07-06 NOTE — CONSENT
Informed Consent for Blood Component Transfusion Note    I have discussed with the patient the rationale for blood component transfusion; its benefits in treating or preventing fatigue, organ damage, or death; and its risk which includes mild transfusion reactions, rare risk of blood borne infection, or more serious but rare reactions. I have discussed the alternatives to transfusion, including the risk and consequences of not receiving transfusion. The patient had an opportunity to ask questions and had agreed to proceed with transfusion of blood components.    Electronically signed by Pilar So MD on 7/6/24 at 1:17 PM EDT

## 2024-07-07 PROBLEM — K25.4 CHRONIC GASTRIC ULCER WITH HEMORRHAGE: Status: ACTIVE | Noted: 2024-07-07

## 2024-07-07 LAB
ANION GAP SERPL CALCULATED.3IONS-SCNC: 8 MMOL/L (ref 9–16)
BASOPHILS # BLD: 0 K/UL (ref 0–0.2)
BASOPHILS NFR BLD: 0 % (ref 0–2)
BUN SERPL-MCNC: 36 MG/DL (ref 8–23)
CALCIUM SERPL-MCNC: 8.3 MG/DL (ref 8.6–10.4)
CHLORIDE SERPL-SCNC: 104 MMOL/L (ref 98–107)
CO2 SERPL-SCNC: 23 MMOL/L (ref 20–31)
CREAT SERPL-MCNC: 1.2 MG/DL (ref 0.5–0.9)
DATE, STOOL #1: ABNORMAL
EOSINOPHIL # BLD: 0.16 K/UL (ref 0–0.44)
EOSINOPHILS RELATIVE PERCENT: 1 % (ref 1–4)
ERYTHROCYTE [DISTWIDTH] IN BLOOD BY AUTOMATED COUNT: 20.2 % (ref 11.8–14.4)
GFR, ESTIMATED: 47 ML/MIN/1.73M2
GLUCOSE SERPL-MCNC: 80 MG/DL (ref 74–99)
HCT VFR BLD AUTO: 28.8 % (ref 36.3–47.1)
HEMOCCULT SP1 STL QL: POSITIVE
HGB BLD-MCNC: 8.7 G/DL (ref 11.9–15.1)
HIV 1+2 AB+HIV1 P24 AG SERPL QL IA: NONREACTIVE
IMM GRANULOCYTES # BLD AUTO: 0.16 K/UL (ref 0–0.3)
IMM GRANULOCYTES NFR BLD: 1 %
LYMPHOCYTES NFR BLD: 0.94 K/UL (ref 1.1–3.7)
LYMPHOCYTES RELATIVE PERCENT: 6 % (ref 24–43)
MCH RBC QN AUTO: 25.7 PG (ref 25.2–33.5)
MCHC RBC AUTO-ENTMCNC: 30.2 G/DL (ref 28.4–34.8)
MCV RBC AUTO: 85.2 FL (ref 82.6–102.9)
MONOCYTES NFR BLD: 0.63 K/UL (ref 0.1–1.2)
MONOCYTES NFR BLD: 4 % (ref 3–12)
MORPHOLOGY: ABNORMAL
MORPHOLOGY: ABNORMAL
NEUTROPHILS NFR BLD: 88 % (ref 36–65)
NEUTS SEG NFR BLD: 13.81 K/UL (ref 1.5–8.1)
NRBC BLD-RTO: 0.2 PER 100 WBC
PLATELET # BLD AUTO: 611 K/UL (ref 138–453)
PMV BLD AUTO: 10 FL (ref 8.1–13.5)
POTASSIUM SERPL-SCNC: 4.2 MMOL/L (ref 3.7–5.3)
RBC # BLD AUTO: 3.38 M/UL (ref 3.95–5.11)
SODIUM SERPL-SCNC: 135 MMOL/L (ref 136–145)
TIME, STOOL #1: ABNORMAL
WBC OTHER # BLD: 15.7 K/UL (ref 3.5–11.3)

## 2024-07-07 PROCEDURE — 2060000000 HC ICU INTERMEDIATE R&B

## 2024-07-07 PROCEDURE — 2580000003 HC RX 258: Performed by: STUDENT IN AN ORGANIZED HEALTH CARE EDUCATION/TRAINING PROGRAM

## 2024-07-07 PROCEDURE — 2700000000 HC OXYGEN THERAPY PER DAY

## 2024-07-07 PROCEDURE — 2580000003 HC RX 258: Performed by: INTERNAL MEDICINE

## 2024-07-07 PROCEDURE — 80048 BASIC METABOLIC PNL TOTAL CA: CPT

## 2024-07-07 PROCEDURE — 99232 SBSQ HOSP IP/OBS MODERATE 35: CPT | Performed by: INTERNAL MEDICINE

## 2024-07-07 PROCEDURE — 6370000000 HC RX 637 (ALT 250 FOR IP): Performed by: INTERNAL MEDICINE

## 2024-07-07 PROCEDURE — 99223 1ST HOSP IP/OBS HIGH 75: CPT | Performed by: INTERNAL MEDICINE

## 2024-07-07 PROCEDURE — 6360000002 HC RX W HCPCS: Performed by: STUDENT IN AN ORGANIZED HEALTH CARE EDUCATION/TRAINING PROGRAM

## 2024-07-07 PROCEDURE — 99232 SBSQ HOSP IP/OBS MODERATE 35: CPT | Performed by: STUDENT IN AN ORGANIZED HEALTH CARE EDUCATION/TRAINING PROGRAM

## 2024-07-07 PROCEDURE — 94761 N-INVAS EAR/PLS OXIMETRY MLT: CPT

## 2024-07-07 PROCEDURE — 99233 SBSQ HOSP IP/OBS HIGH 50: CPT | Performed by: NURSE PRACTITIONER

## 2024-07-07 PROCEDURE — 6360000002 HC RX W HCPCS: Performed by: INTERNAL MEDICINE

## 2024-07-07 PROCEDURE — 6370000000 HC RX 637 (ALT 250 FOR IP): Performed by: NURSE PRACTITIONER

## 2024-07-07 PROCEDURE — 36415 COLL VENOUS BLD VENIPUNCTURE: CPT

## 2024-07-07 PROCEDURE — 85025 COMPLETE CBC W/AUTO DIFF WBC: CPT

## 2024-07-07 RX ADMIN — ESCITALOPRAM OXALATE 20 MG: 10 TABLET ORAL at 09:18

## 2024-07-07 RX ADMIN — SODIUM CHLORIDE, PRESERVATIVE FREE 10 ML: 5 INJECTION INTRAVENOUS at 09:13

## 2024-07-07 RX ADMIN — MUPIROCIN: 20 OINTMENT TOPICAL at 09:19

## 2024-07-07 RX ADMIN — OXYBUTYNIN CHLORIDE 10 MG: 10 TABLET, EXTENDED RELEASE ORAL at 09:18

## 2024-07-07 RX ADMIN — CLOPIDOGREL BISULFATE 75 MG: 75 TABLET ORAL at 09:22

## 2024-07-07 RX ADMIN — AMIODARONE HYDROCHLORIDE 200 MG: 200 TABLET ORAL at 09:12

## 2024-07-07 RX ADMIN — PANTOPRAZOLE SODIUM 8 MG/HR: 40 INJECTION, POWDER, FOR SOLUTION INTRAVENOUS at 10:30

## 2024-07-07 RX ADMIN — PANTOPRAZOLE SODIUM 8 MG/HR: 40 INJECTION, POWDER, FOR SOLUTION INTRAVENOUS at 20:38

## 2024-07-07 RX ADMIN — SODIUM CHLORIDE, PRESERVATIVE FREE 10 ML: 5 INJECTION INTRAVENOUS at 19:42

## 2024-07-07 RX ADMIN — CEFEPIME 1000 MG: 1 INJECTION, POWDER, FOR SOLUTION INTRAMUSCULAR; INTRAVENOUS at 04:20

## 2024-07-07 RX ADMIN — ACETAMINOPHEN 650 MG: 325 TABLET ORAL at 19:50

## 2024-07-07 RX ADMIN — SUCRALFATE 1 G: 1 TABLET ORAL at 19:42

## 2024-07-07 RX ADMIN — FENTANYL CITRATE 50 MCG: 50 INJECTION, SOLUTION INTRAMUSCULAR; INTRAVENOUS at 10:26

## 2024-07-07 RX ADMIN — SUCRALFATE 1 G: 1 TABLET ORAL at 09:18

## 2024-07-07 RX ADMIN — MUPIROCIN: 20 OINTMENT TOPICAL at 19:41

## 2024-07-07 RX ADMIN — FOLIC ACID 1 MG: 1 TABLET ORAL at 09:12

## 2024-07-07 RX ADMIN — ERGOCALCIFEROL 50000 UNITS: 1.25 CAPSULE ORAL at 09:18

## 2024-07-07 RX ADMIN — Medication 500 MCG: at 09:18

## 2024-07-07 RX ADMIN — SUCRALFATE 1 G: 1 TABLET ORAL at 16:49

## 2024-07-07 RX ADMIN — ACETAMINOPHEN 650 MG: 325 TABLET ORAL at 09:12

## 2024-07-07 RX ADMIN — DESMOPRESSIN ACETATE 40 MG: 0.2 TABLET ORAL at 19:42

## 2024-07-07 RX ADMIN — FENTANYL CITRATE 50 MCG: 50 INJECTION, SOLUTION INTRAMUSCULAR; INTRAVENOUS at 07:03

## 2024-07-07 RX ADMIN — SODIUM CHLORIDE 80 MG: 9 INJECTION INTRAMUSCULAR; INTRAVENOUS; SUBCUTANEOUS at 09:11

## 2024-07-07 RX ADMIN — METOPROLOL TARTRATE 25 MG: 25 TABLET, FILM COATED ORAL at 09:11

## 2024-07-07 ASSESSMENT — PAIN DESCRIPTION - ORIENTATION
ORIENTATION: MID;LOWER
ORIENTATION: MID

## 2024-07-07 ASSESSMENT — PAIN SCALES - WONG BAKER
WONGBAKER_NUMERICALRESPONSE: HURTS EVEN MORE
WONGBAKER_NUMERICALRESPONSE: NO HURT

## 2024-07-07 ASSESSMENT — PAIN DESCRIPTION - LOCATION
LOCATION: BUTTOCKS
LOCATION: SACRUM
LOCATION: ABDOMEN
LOCATION: BUTTOCKS

## 2024-07-07 ASSESSMENT — PAIN DESCRIPTION - DESCRIPTORS
DESCRIPTORS: DISCOMFORT;ACHING
DESCRIPTORS: ACHING;DISCOMFORT

## 2024-07-07 ASSESSMENT — PAIN SCALES - GENERAL
PAINLEVEL_OUTOF10: 0
PAINLEVEL_OUTOF10: 10
PAINLEVEL_OUTOF10: 0
PAINLEVEL_OUTOF10: 3

## 2024-07-07 NOTE — CONSULTS
[facility]astroenterology  Consultation Note     .  Chief Complaint:     Shortness of breath    Reason for consult:    black tarry stools    History of present illness: This is a 73 y.o. female with PMH including CKD, CHF, A-fib came in via EMS in respiratory distress.  Patient has shortness of breath and was admitted to the hospital for the management of the sepsis due to UTI.  Patient was transferred from [facility] on 7/1/2024.  On 7/7/2024 per nursing staff reported 2 episodes of black tarry stool.  Patient complains OF left lower abdominal pain.  There is no history of nausea and vomiting.  Patient denies any alcohol consumption.        Summary of imaging completed at this time:  Chest x-ray 7/3/2024    IMPRESSION:  1. Interval increase in the left pleural effusion, now at least moderate in  size. Associated atelectasis or consolidation in the left base has increased.  2. Vascular congestion and small right effusion again demonstrated.  US renal 7/3/2024    IMPRESSION:  No hydronephrosis in either kidney.  Bilateral linear echogenicity in the renal jair probably reflecting vascular  calcifications since there were no calculi noted on the or recent CT obtained  on May 1, 2024.  Cholelithiasis.  CT abdomen pelvis 5/1/2024  IMPRESSION:  1. No acute intra-abdominal or pelvic process.  2. 8 mm semi-solid nodular density RLL.  See recommendations below.  3. Colonic diverticulosis without CT evidence diverticulitis.  4. Fat containing umbilical and infraumbilical hernias status post repair    Summary of current abnormal labs completed at this time:    Metabolic: Sodium 135mmol/L, BUN 36, potassium 4.2mmol/L ,creatinine 1.2 ,EGFR 47, calcium 8.3mg/dl  Hematology: Hemoglobin 8.7 gm/dl, WBC 15.7 k/uL, platelet 611 k/uL  Coags: 7/1/2024 for PT 14.9, INR 1.4  Liver profile:  Cardiac profile:  Echo 7/5/2024  Left Ventricle    Normal left ventricular systolic function with a visually estimated EF of 60 
Consult for DL PICC occlusion. Right UE PICC in place dressing CDI no kinks noted at insertion site, ultrasound assessment reveals catheter in vessel, vessel compressible. Cap change to each lumen, red lumen flushes freely with brisk blood return after cap change. D/W primary RN no contraindications to cathflo, order obtained instilled 2mg dose into purple lumen, catheter labeled not for use until cathflo is aspirated. 30 minute dwell patency restored aspirated 7 cc blood and cathflo easily, cap change flushed with saline alcohol cap applied Primary RN updated.  
Isothermal Nucleic Acid Amplification         Rapid influenza A/B antigens [[id number]] Collected: 07/01/24 1011    Order Status: Completed Specimen: Nasopharyngeal Updated: 07/01/24 1036     Flu A Antigen NEGATIVE     Comment: for Influenza A Antigen        Flu B Antigen NEGATIVE     Comment: for Influenza B Antigen.       RSV Detection [[id number]] Collected: 07/01/24 1011    Order Status: Completed Specimen: Nasopharyngeal Swab Updated: 07/01/24 1043     Source .NASOPHARYNGEAL SWAB     RSV by PCR NEGATIVE    Culture, Urine [[id number]] Collected: 07/01/24 1000    Order Status: Completed Specimen: Urine Updated: 07/02/24 1450     Specimen Description .URINE ENG SPECIMEN     Culture NO SIGNIFICANT GROWTH              Medical Decision Making-Other:     Note:    Thank you for allowing us to participate in the care of this patient. Please call with questions.    Denys Baez MD  Office: [phone]

## 2024-07-08 ENCOUNTER — APPOINTMENT (OUTPATIENT)
Dept: GENERAL RADIOLOGY | Age: 74
DRG: 871 | End: 2024-07-08
Attending: INTERNAL MEDICINE
Payer: COMMERCIAL

## 2024-07-08 ENCOUNTER — APPOINTMENT (OUTPATIENT)
Age: 74
DRG: 871 | End: 2024-07-08
Attending: INTERNAL MEDICINE
Payer: COMMERCIAL

## 2024-07-08 LAB
ANION GAP SERPL CALCULATED.3IONS-SCNC: 2 MMOL/L (ref 9–16)
ANION GAP SERPL CALCULATED.3IONS-SCNC: 6 MMOL/L (ref 9–16)
BASOPHILS # BLD: 0 K/UL (ref 0–0.2)
BASOPHILS NFR BLD: 0 % (ref 0–2)
BUN SERPL-MCNC: 31 MG/DL (ref 8–23)
BUN SERPL-MCNC: 38 MG/DL (ref 8–23)
CALCIUM SERPL-MCNC: 6.2 MG/DL (ref 8.6–10.4)
CALCIUM SERPL-MCNC: 8.2 MG/DL (ref 8.6–10.4)
CHLORIDE SERPL-SCNC: 107 MMOL/L (ref 98–107)
CHLORIDE SERPL-SCNC: 115 MMOL/L (ref 98–107)
CMV IGM SERPL QL IA: 0.8
CO2 SERPL-SCNC: 16 MMOL/L (ref 20–31)
CO2 SERPL-SCNC: 24 MMOL/L (ref 20–31)
CREAT SERPL-MCNC: 1.1 MG/DL (ref 0.5–0.9)
CREAT SERPL-MCNC: 1.3 MG/DL (ref 0.5–0.9)
ECHO BSA: 2.16 M2
EOSINOPHIL # BLD: 0.3 K/UL (ref 0–0.44)
EOSINOPHILS RELATIVE PERCENT: 3 % (ref 1–4)
ERYTHROCYTE [DISTWIDTH] IN BLOOD BY AUTOMATED COUNT: 20.3 % (ref 11.8–14.4)
GFR, ESTIMATED: 44 ML/MIN/1.73M2
GFR, ESTIMATED: 54 ML/MIN/1.73M2
GLUCOSE BLD-MCNC: 102 MG/DL (ref 65–105)
GLUCOSE BLD-MCNC: 83 MG/DL (ref 65–105)
GLUCOSE SERPL-MCNC: 44 MG/DL (ref 74–99)
GLUCOSE SERPL-MCNC: 86 MG/DL (ref 74–99)
HCT VFR BLD AUTO: 28.4 % (ref 36.3–47.1)
HGB BLD-MCNC: 8.6 G/DL (ref 11.9–15.1)
IMM GRANULOCYTES # BLD AUTO: 0.1 K/UL (ref 0–0.3)
IMM GRANULOCYTES NFR BLD: 1 %
INTERVENTION: NORMAL
LYMPHOCYTES NFR BLD: 0.9 K/UL (ref 1.1–3.7)
LYMPHOCYTES RELATIVE PERCENT: 9 % (ref 24–43)
M PNEUMO IGM SER QL IA: 0.36
MCH RBC QN AUTO: 25.6 PG (ref 25.2–33.5)
MCHC RBC AUTO-ENTMCNC: 30.3 G/DL (ref 28.4–34.8)
MCV RBC AUTO: 84.5 FL (ref 82.6–102.9)
MICROORGANISM SPEC CULT: NORMAL
MICROORGANISM/AGENT SPEC: NORMAL
MONOCYTES NFR BLD: 0.5 K/UL (ref 0.1–1.2)
MONOCYTES NFR BLD: 5 % (ref 3–12)
MORPHOLOGY: ABNORMAL
NEUTROPHILS NFR BLD: 82 % (ref 36–65)
NEUTS SEG NFR BLD: 8.2 K/UL (ref 1.5–8.1)
NRBC BLD-RTO: 0 PER 100 WBC
PLATELET # BLD AUTO: 564 K/UL (ref 138–453)
PMV BLD AUTO: 10.1 FL (ref 8.1–13.5)
POTASSIUM SERPL-SCNC: 2.9 MMOL/L (ref 3.7–5.3)
POTASSIUM SERPL-SCNC: 4.2 MMOL/L (ref 3.7–5.3)
RBC # BLD AUTO: 3.36 M/UL (ref 3.95–5.11)
SERVICE CMNT-IMP: NORMAL
SODIUM SERPL-SCNC: 133 MMOL/L (ref 136–145)
SODIUM SERPL-SCNC: 137 MMOL/L (ref 136–145)
SPECIMEN DESCRIPTION: NORMAL
SURGICAL PATHOLOGY REPORT: NORMAL
WBC OTHER # BLD: 10 K/UL (ref 3.5–11.3)

## 2024-07-08 PROCEDURE — 93308 TTE F-UP OR LMTD: CPT

## 2024-07-08 PROCEDURE — 6370000000 HC RX 637 (ALT 250 FOR IP): Performed by: NURSE PRACTITIONER

## 2024-07-08 PROCEDURE — 93308 TTE F-UP OR LMTD: CPT | Performed by: INTERNAL MEDICINE

## 2024-07-08 PROCEDURE — 6360000002 HC RX W HCPCS: Performed by: INTERNAL MEDICINE

## 2024-07-08 PROCEDURE — 6370000000 HC RX 637 (ALT 250 FOR IP): Performed by: INTERNAL MEDICINE

## 2024-07-08 PROCEDURE — 6360000002 HC RX W HCPCS: Performed by: STUDENT IN AN ORGANIZED HEALTH CARE EDUCATION/TRAINING PROGRAM

## 2024-07-08 PROCEDURE — 82947 ASSAY GLUCOSE BLOOD QUANT: CPT

## 2024-07-08 PROCEDURE — 71045 X-RAY EXAM CHEST 1 VIEW: CPT

## 2024-07-08 PROCEDURE — 2060000000 HC ICU INTERMEDIATE R&B

## 2024-07-08 PROCEDURE — 36415 COLL VENOUS BLD VENIPUNCTURE: CPT

## 2024-07-08 PROCEDURE — 99233 SBSQ HOSP IP/OBS HIGH 50: CPT | Performed by: INTERNAL MEDICINE

## 2024-07-08 PROCEDURE — 99231 SBSQ HOSP IP/OBS SF/LOW 25: CPT | Performed by: INTERNAL MEDICINE

## 2024-07-08 PROCEDURE — 99232 SBSQ HOSP IP/OBS MODERATE 35: CPT | Performed by: STUDENT IN AN ORGANIZED HEALTH CARE EDUCATION/TRAINING PROGRAM

## 2024-07-08 PROCEDURE — 85025 COMPLETE CBC W/AUTO DIFF WBC: CPT

## 2024-07-08 PROCEDURE — 2580000003 HC RX 258: Performed by: STUDENT IN AN ORGANIZED HEALTH CARE EDUCATION/TRAINING PROGRAM

## 2024-07-08 PROCEDURE — 80048 BASIC METABOLIC PNL TOTAL CA: CPT

## 2024-07-08 PROCEDURE — 2580000003 HC RX 258: Performed by: INTERNAL MEDICINE

## 2024-07-08 RX ORDER — METOPROLOL SUCCINATE 25 MG/1
25 TABLET, EXTENDED RELEASE ORAL DAILY
Status: DISCONTINUED | OUTPATIENT
Start: 2024-07-09 | End: 2024-07-15 | Stop reason: HOSPADM

## 2024-07-08 RX ADMIN — MUPIROCIN: 20 OINTMENT TOPICAL at 20:10

## 2024-07-08 RX ADMIN — FENTANYL CITRATE 50 MCG: 50 INJECTION, SOLUTION INTRAMUSCULAR; INTRAVENOUS at 12:56

## 2024-07-08 RX ADMIN — Medication 500 MCG: at 09:58

## 2024-07-08 RX ADMIN — FOLIC ACID 1 MG: 1 TABLET ORAL at 09:57

## 2024-07-08 RX ADMIN — CLOPIDOGREL BISULFATE 75 MG: 75 TABLET ORAL at 09:56

## 2024-07-08 RX ADMIN — PANTOPRAZOLE SODIUM 8 MG/HR: 40 INJECTION, POWDER, FOR SOLUTION INTRAVENOUS at 18:59

## 2024-07-08 RX ADMIN — ACETAMINOPHEN 650 MG: 325 TABLET ORAL at 20:04

## 2024-07-08 RX ADMIN — MUPIROCIN: 20 OINTMENT TOPICAL at 09:58

## 2024-07-08 RX ADMIN — AMIODARONE HYDROCHLORIDE 200 MG: 200 TABLET ORAL at 09:56

## 2024-07-08 RX ADMIN — FENTANYL CITRATE 50 MCG: 50 INJECTION, SOLUTION INTRAMUSCULAR; INTRAVENOUS at 15:18

## 2024-07-08 RX ADMIN — SUCRALFATE 1 G: 1 TABLET ORAL at 12:57

## 2024-07-08 RX ADMIN — ESCITALOPRAM OXALATE 20 MG: 10 TABLET ORAL at 09:57

## 2024-07-08 RX ADMIN — SODIUM CHLORIDE, PRESERVATIVE FREE 10 ML: 5 INJECTION INTRAVENOUS at 09:57

## 2024-07-08 RX ADMIN — FENTANYL CITRATE 50 MCG: 50 INJECTION, SOLUTION INTRAMUSCULAR; INTRAVENOUS at 00:56

## 2024-07-08 RX ADMIN — DESMOPRESSIN ACETATE 40 MG: 0.2 TABLET ORAL at 20:04

## 2024-07-08 RX ADMIN — SUCRALFATE 1 G: 1 TABLET ORAL at 20:05

## 2024-07-08 RX ADMIN — PANTOPRAZOLE SODIUM 8 MG/HR: 40 INJECTION, POWDER, FOR SOLUTION INTRAVENOUS at 05:59

## 2024-07-08 RX ADMIN — SUCRALFATE 1 G: 1 TABLET ORAL at 18:59

## 2024-07-08 RX ADMIN — SUCRALFATE 1 G: 1 TABLET ORAL at 09:56

## 2024-07-08 RX ADMIN — ACETAMINOPHEN 650 MG: 325 TABLET ORAL at 11:06

## 2024-07-08 RX ADMIN — AMIODARONE HYDROCHLORIDE 200 MG: 200 TABLET ORAL at 20:04

## 2024-07-08 RX ADMIN — OXYBUTYNIN CHLORIDE 10 MG: 10 TABLET, EXTENDED RELEASE ORAL at 09:58

## 2024-07-08 ASSESSMENT — PAIN DESCRIPTION - ORIENTATION
ORIENTATION: MID;LOWER
ORIENTATION: MID
ORIENTATION: MID;LOWER
ORIENTATION: MID;LOWER

## 2024-07-08 ASSESSMENT — PAIN SCALES - GENERAL
PAINLEVEL_OUTOF10: 5
PAINLEVEL_OUTOF10: 5
PAINLEVEL_OUTOF10: 7
PAINLEVEL_OUTOF10: 3
PAINLEVEL_OUTOF10: 0
PAINLEVEL_OUTOF10: 0
PAINLEVEL_OUTOF10: 7
PAINLEVEL_OUTOF10: 0
PAINLEVEL_OUTOF10: 0
PAINLEVEL_OUTOF10: 5
PAINLEVEL_OUTOF10: 0
PAINLEVEL_OUTOF10: 0
PAINLEVEL_OUTOF10: 3

## 2024-07-08 ASSESSMENT — PAIN DESCRIPTION - LOCATION
LOCATION: GENERALIZED;BACK
LOCATION: BUTTOCKS
LOCATION: GENERALIZED
LOCATION: COCCYX
LOCATION: SACRUM

## 2024-07-08 ASSESSMENT — PAIN SCALES - WONG BAKER: WONGBAKER_NUMERICALRESPONSE: NO HURT

## 2024-07-08 ASSESSMENT — PAIN DESCRIPTION - DESCRIPTORS
DESCRIPTORS: DISCOMFORT;ACHING
DESCRIPTORS: ACHING;DISCOMFORT
DESCRIPTORS: SHOOTING
DESCRIPTORS: ACHING;THROBBING

## 2024-07-08 ASSESSMENT — PAIN - FUNCTIONAL ASSESSMENT: PAIN_FUNCTIONAL_ASSESSMENT: PREVENTS OR INTERFERES SOME ACTIVE ACTIVITIES AND ADLS

## 2024-07-08 NOTE — CARE COORDINATION
Updated Simin from [facility]. They are able to accept when patient is ready. She will transition to [facility] for long term care once bed is available

## 2024-07-09 ENCOUNTER — APPOINTMENT (OUTPATIENT)
Dept: GENERAL RADIOLOGY | Age: 74
DRG: 871 | End: 2024-07-09
Attending: INTERNAL MEDICINE
Payer: COMMERCIAL

## 2024-07-09 ENCOUNTER — APPOINTMENT (OUTPATIENT)
Age: 74
DRG: 871 | End: 2024-07-09
Attending: STUDENT IN AN ORGANIZED HEALTH CARE EDUCATION/TRAINING PROGRAM
Payer: COMMERCIAL

## 2024-07-09 PROBLEM — I24.9 ACUTE CORONARY SYNDROME (HCC): Status: ACTIVE | Noted: 2024-07-09

## 2024-07-09 LAB
ANION GAP SERPL CALCULATED.3IONS-SCNC: 8 MMOL/L (ref 9–16)
BASOPHILS # BLD: 0 K/UL (ref 0–0.2)
BASOPHILS NFR BLD: 0 % (ref 0–2)
BUN SERPL-MCNC: 26 MG/DL (ref 8–23)
CALCIUM SERPL-MCNC: 8.3 MG/DL (ref 8.6–10.4)
CHLORIDE SERPL-SCNC: 105 MMOL/L (ref 98–107)
CO2 SERPL-SCNC: 23 MMOL/L (ref 20–31)
CREAT SERPL-MCNC: 1 MG/DL (ref 0.5–0.9)
EBV EARLY ANTIGEN AB, IGG: >150 U/ML (ref 0–10.9)
EBV NUCLEAR AG AB: 37.2 U/ML (ref 0–21.9)
ECHO BSA: 2.16 M2
EKG ATRIAL RATE: 133 BPM
EKG Q-T INTERVAL: 350 MS
EKG QRS DURATION: 80 MS
EKG QTC CALCULATION (BAZETT): 516 MS
EKG R AXIS: -41 DEGREES
EKG T AXIS: 104 DEGREES
EKG VENTRICULAR RATE: 131 BPM
EOSINOPHIL # BLD: 0.27 K/UL (ref 0–0.44)
EOSINOPHILS RELATIVE PERCENT: 3 % (ref 1–4)
EPSTEIN-BARR VCA IGG: >750 U/ML (ref 0–21.9)
EPSTEIN-BARR VCA IGM: 24.4 U/ML (ref 0–43.9)
ERYTHROCYTE [DISTWIDTH] IN BLOOD BY AUTOMATED COUNT: 20.5 % (ref 11.8–14.4)
GFR, ESTIMATED: 59 ML/MIN/1.73M2
GLUCOSE SERPL-MCNC: 96 MG/DL (ref 74–99)
HCT VFR BLD AUTO: 27.4 % (ref 36.3–47.1)
HCT VFR BLD AUTO: 28.1 % (ref 36.3–47.1)
HGB BLD-MCNC: 8.2 G/DL (ref 11.9–15.1)
HGB BLD-MCNC: 8.2 G/DL (ref 11.9–15.1)
IMM GRANULOCYTES # BLD AUTO: 0.09 K/UL (ref 0–0.3)
IMM GRANULOCYTES NFR BLD: 1 %
INTERVENTION: NORMAL
LYMPHOCYTES NFR BLD: 1.09 K/UL (ref 1.1–3.7)
LYMPHOCYTES RELATIVE PERCENT: 12 % (ref 24–43)
MAGNESIUM SERPL-MCNC: 1.8 MG/DL (ref 1.6–2.4)
MCH RBC QN AUTO: 25.8 PG (ref 25.2–33.5)
MCHC RBC AUTO-ENTMCNC: 29.2 G/DL (ref 28.4–34.8)
MCV RBC AUTO: 88.4 FL (ref 82.6–102.9)
MONOCYTES NFR BLD: 0.73 K/UL (ref 0.1–1.2)
MONOCYTES NFR BLD: 8 % (ref 3–12)
MORPHOLOGY: ABNORMAL
NEUTROPHILS NFR BLD: 76 % (ref 36–65)
NEUTS SEG NFR BLD: 6.92 K/UL (ref 1.5–8.1)
NRBC BLD-RTO: 0 PER 100 WBC
PLATELET # BLD AUTO: 477 K/UL (ref 138–453)
PMV BLD AUTO: 10.1 FL (ref 8.1–13.5)
POTASSIUM SERPL-SCNC: 3 MMOL/L (ref 3.7–5.3)
POTASSIUM SERPL-SCNC: 3.5 MMOL/L (ref 3.7–5.3)
RBC # BLD AUTO: 3.18 M/UL (ref 3.95–5.11)
SODIUM SERPL-SCNC: 136 MMOL/L (ref 136–145)
TROPONIN I SERPL HS-MCNC: 45 NG/L (ref 0–14)
TROPONIN I SERPL HS-MCNC: 50 NG/L (ref 0–14)
WBC OTHER # BLD: 9.1 K/UL (ref 3.5–11.3)

## 2024-07-09 PROCEDURE — 84484 ASSAY OF TROPONIN QUANT: CPT

## 2024-07-09 PROCEDURE — 6360000002 HC RX W HCPCS: Performed by: STUDENT IN AN ORGANIZED HEALTH CARE EDUCATION/TRAINING PROGRAM

## 2024-07-09 PROCEDURE — 80048 BASIC METABOLIC PNL TOTAL CA: CPT

## 2024-07-09 PROCEDURE — 99231 SBSQ HOSP IP/OBS SF/LOW 25: CPT | Performed by: INTERNAL MEDICINE

## 2024-07-09 PROCEDURE — 71045 X-RAY EXAM CHEST 1 VIEW: CPT

## 2024-07-09 PROCEDURE — 6370000000 HC RX 637 (ALT 250 FOR IP): Performed by: INTERNAL MEDICINE

## 2024-07-09 PROCEDURE — 99233 SBSQ HOSP IP/OBS HIGH 50: CPT | Performed by: NURSE PRACTITIONER

## 2024-07-09 PROCEDURE — 2580000003 HC RX 258: Performed by: INTERNAL MEDICINE

## 2024-07-09 PROCEDURE — 85018 HEMOGLOBIN: CPT

## 2024-07-09 PROCEDURE — 93321 DOPPLER ECHO F-UP/LMTD STD: CPT

## 2024-07-09 PROCEDURE — 84132 ASSAY OF SERUM POTASSIUM: CPT

## 2024-07-09 PROCEDURE — 93321 DOPPLER ECHO F-UP/LMTD STD: CPT | Performed by: INTERNAL MEDICINE

## 2024-07-09 PROCEDURE — 93005 ELECTROCARDIOGRAM TRACING: CPT | Performed by: STUDENT IN AN ORGANIZED HEALTH CARE EDUCATION/TRAINING PROGRAM

## 2024-07-09 PROCEDURE — 2500000003 HC RX 250 WO HCPCS: Performed by: STUDENT IN AN ORGANIZED HEALTH CARE EDUCATION/TRAINING PROGRAM

## 2024-07-09 PROCEDURE — 2060000000 HC ICU INTERMEDIATE R&B

## 2024-07-09 PROCEDURE — 83735 ASSAY OF MAGNESIUM: CPT

## 2024-07-09 PROCEDURE — 99232 SBSQ HOSP IP/OBS MODERATE 35: CPT | Performed by: INTERNAL MEDICINE

## 2024-07-09 PROCEDURE — 92610 EVALUATE SWALLOWING FUNCTION: CPT

## 2024-07-09 PROCEDURE — 85025 COMPLETE CBC W/AUTO DIFF WBC: CPT

## 2024-07-09 PROCEDURE — 2580000003 HC RX 258: Performed by: STUDENT IN AN ORGANIZED HEALTH CARE EDUCATION/TRAINING PROGRAM

## 2024-07-09 PROCEDURE — 94761 N-INVAS EAR/PLS OXIMETRY MLT: CPT

## 2024-07-09 PROCEDURE — 93308 TTE F-UP OR LMTD: CPT | Performed by: INTERNAL MEDICINE

## 2024-07-09 PROCEDURE — 99232 SBSQ HOSP IP/OBS MODERATE 35: CPT | Performed by: STUDENT IN AN ORGANIZED HEALTH CARE EDUCATION/TRAINING PROGRAM

## 2024-07-09 PROCEDURE — 6360000002 HC RX W HCPCS: Performed by: INTERNAL MEDICINE

## 2024-07-09 PROCEDURE — 6360000002 HC RX W HCPCS: Performed by: NURSE PRACTITIONER

## 2024-07-09 PROCEDURE — 6370000000 HC RX 637 (ALT 250 FOR IP): Performed by: NURSE PRACTITIONER

## 2024-07-09 PROCEDURE — 36415 COLL VENOUS BLD VENIPUNCTURE: CPT

## 2024-07-09 PROCEDURE — 2700000000 HC OXYGEN THERAPY PER DAY

## 2024-07-09 PROCEDURE — 85014 HEMATOCRIT: CPT

## 2024-07-09 PROCEDURE — 6370000000 HC RX 637 (ALT 250 FOR IP): Performed by: STUDENT IN AN ORGANIZED HEALTH CARE EDUCATION/TRAINING PROGRAM

## 2024-07-09 RX ORDER — MIDODRINE HYDROCHLORIDE 5 MG/1
5 TABLET ORAL
Status: DISCONTINUED | OUTPATIENT
Start: 2024-07-09 | End: 2024-07-15 | Stop reason: HOSPADM

## 2024-07-09 RX ORDER — DIGOXIN 0.25 MG/ML
250 INJECTION INTRAMUSCULAR; INTRAVENOUS ONCE
Status: COMPLETED | OUTPATIENT
Start: 2024-07-09 | End: 2024-07-09

## 2024-07-09 RX ORDER — VALGANCICLOVIR 450 MG/1
900 TABLET, FILM COATED ORAL 2 TIMES DAILY
Status: DISCONTINUED | OUTPATIENT
Start: 2024-07-09 | End: 2024-07-09

## 2024-07-09 RX ORDER — MIDODRINE HYDROCHLORIDE 5 MG/1
5 TABLET ORAL
Status: DISCONTINUED | OUTPATIENT
Start: 2024-07-09 | End: 2024-07-09

## 2024-07-09 RX ORDER — POTASSIUM CHLORIDE 7.45 MG/ML
10 INJECTION INTRAVENOUS
Status: COMPLETED | OUTPATIENT
Start: 2024-07-09 | End: 2024-07-09

## 2024-07-09 RX ORDER — MAGNESIUM SULFATE IN WATER 40 MG/ML
2000 INJECTION, SOLUTION INTRAVENOUS ONCE
Status: COMPLETED | OUTPATIENT
Start: 2024-07-09 | End: 2024-07-09

## 2024-07-09 RX ORDER — FUROSEMIDE 20 MG/1
20 TABLET ORAL DAILY
Status: DISCONTINUED | OUTPATIENT
Start: 2024-07-10 | End: 2024-07-15 | Stop reason: HOSPADM

## 2024-07-09 RX ADMIN — PANTOPRAZOLE SODIUM 8 MG/HR: 40 INJECTION, POWDER, FOR SOLUTION INTRAVENOUS at 03:57

## 2024-07-09 RX ADMIN — DIGOXIN 250 MCG: 0.25 INJECTION INTRAMUSCULAR; INTRAVENOUS at 13:36

## 2024-07-09 RX ADMIN — AMIODARONE HYDROCHLORIDE 200 MG: 200 TABLET ORAL at 10:08

## 2024-07-09 RX ADMIN — MAGNESIUM SULFATE HEPTAHYDRATE 2000 MG: 40 INJECTION, SOLUTION INTRAVENOUS at 17:43

## 2024-07-09 RX ADMIN — POTASSIUM CHLORIDE 10 MEQ: 10 INJECTION, SOLUTION INTRAVENOUS at 22:05

## 2024-07-09 RX ADMIN — MIDODRINE HYDROCHLORIDE 5 MG: 5 TABLET ORAL at 15:05

## 2024-07-09 RX ADMIN — ACETAMINOPHEN 650 MG: 325 TABLET ORAL at 15:05

## 2024-07-09 RX ADMIN — POTASSIUM CHLORIDE 10 MEQ: 10 INJECTION, SOLUTION INTRAVENOUS at 17:39

## 2024-07-09 RX ADMIN — FOLIC ACID 1 MG: 1 TABLET ORAL at 10:08

## 2024-07-09 RX ADMIN — POTASSIUM CHLORIDE 10 MEQ: 10 INJECTION, SOLUTION INTRAVENOUS at 20:56

## 2024-07-09 RX ADMIN — POTASSIUM CHLORIDE 10 MEQ: 10 INJECTION, SOLUTION INTRAVENOUS at 18:47

## 2024-07-09 RX ADMIN — ACETAMINOPHEN 650 MG: 325 TABLET ORAL at 10:07

## 2024-07-09 RX ADMIN — POTASSIUM CHLORIDE 10 MEQ: 10 INJECTION, SOLUTION INTRAVENOUS at 23:09

## 2024-07-09 RX ADMIN — SUCRALFATE 1 G: 1 TABLET ORAL at 19:50

## 2024-07-09 RX ADMIN — DESMOPRESSIN ACETATE 40 MG: 0.2 TABLET ORAL at 19:50

## 2024-07-09 RX ADMIN — Medication 150 MG: at 15:10

## 2024-07-09 RX ADMIN — MUPIROCIN: 20 OINTMENT TOPICAL at 10:02

## 2024-07-09 RX ADMIN — CLOPIDOGREL BISULFATE 75 MG: 75 TABLET ORAL at 10:06

## 2024-07-09 RX ADMIN — POTASSIUM CHLORIDE 10 MEQ: 10 INJECTION, SOLUTION INTRAVENOUS at 19:50

## 2024-07-09 RX ADMIN — Medication 1 MG/MIN: at 15:21

## 2024-07-09 RX ADMIN — PANTOPRAZOLE SODIUM 8 MG/HR: 40 INJECTION, POWDER, FOR SOLUTION INTRAVENOUS at 14:07

## 2024-07-09 RX ADMIN — SUCRALFATE 1 G: 1 TABLET ORAL at 10:08

## 2024-07-09 RX ADMIN — SODIUM CHLORIDE, PRESERVATIVE FREE 5 ML: 5 INJECTION INTRAVENOUS at 09:57

## 2024-07-09 RX ADMIN — Medication 500 MCG: at 10:02

## 2024-07-09 RX ADMIN — ESCITALOPRAM OXALATE 20 MG: 10 TABLET ORAL at 10:03

## 2024-07-09 RX ADMIN — FENTANYL CITRATE 50 MCG: 50 INJECTION, SOLUTION INTRAMUSCULAR; INTRAVENOUS at 22:03

## 2024-07-09 RX ADMIN — OXYBUTYNIN CHLORIDE 10 MG: 10 TABLET, EXTENDED RELEASE ORAL at 10:02

## 2024-07-09 RX ADMIN — MUPIROCIN: 20 OINTMENT TOPICAL at 19:51

## 2024-07-09 ASSESSMENT — PAIN SCALES - WONG BAKER: WONGBAKER_NUMERICALRESPONSE: NO HURT

## 2024-07-09 ASSESSMENT — PAIN DESCRIPTION - DESCRIPTORS
DESCRIPTORS: ACHING;DISCOMFORT;SORE
DESCRIPTORS: ACHING

## 2024-07-09 ASSESSMENT — PAIN SCALES - GENERAL
PAINLEVEL_OUTOF10: 8
PAINLEVEL_OUTOF10: 10
PAINLEVEL_OUTOF10: 3
PAINLEVEL_OUTOF10: 7
PAINLEVEL_OUTOF10: 3
PAINLEVEL_OUTOF10: 5
PAINLEVEL_OUTOF10: 7

## 2024-07-09 ASSESSMENT — PAIN DESCRIPTION - LOCATION
LOCATION: BUTTOCKS
LOCATION: GENERALIZED

## 2024-07-09 ASSESSMENT — PAIN DESCRIPTION - ORIENTATION: ORIENTATION: POSTERIOR

## 2024-07-09 ASSESSMENT — PAIN - FUNCTIONAL ASSESSMENT: PAIN_FUNCTIONAL_ASSESSMENT: ACTIVITIES ARE NOT PREVENTED

## 2024-07-09 NOTE — SIGNIFICANT EVENT
Informed by nursing staff patient developed A-fib with RVR sustained heart rate 110s to 140s following coughing episode after taking valganciclovir with water.  Concern for aspiration causing dyspnea versus rate/rhythm driven?  Cardiology informed by nursing staff.  Will obtain chest x-ray and EKG.    Discussed with Cardiology and evaluated patient at bedside. Plan for STAT limited echo to eval re-accumulation of pericardial effusion. CXR with CHF and minimal improvement from 7/8/24. Digoxin 250 mcg given with little improvement in HR. Cardiology with plan for Amiodarone infusion. BP softer but MAP stable. Will start midodrine for sustained BP < 90 mmHg. Echo obtained awaiting read.     Mando Burden DO  [facility], [place], [place]  7/9/2024 1:12 PM

## 2024-07-10 PROBLEM — E43 SEVERE MALNUTRITION (HCC): Status: ACTIVE | Noted: 2024-07-10

## 2024-07-10 PROBLEM — B25.9 CMV INFECTION, ACUTE (HCC): Status: ACTIVE | Noted: 2024-07-10

## 2024-07-10 LAB
ANION GAP SERPL CALCULATED.3IONS-SCNC: 7 MMOL/L (ref 9–16)
BASOPHILS # BLD: 0 K/UL (ref 0–0.2)
BASOPHILS NFR BLD: 0 % (ref 0–2)
BUN SERPL-MCNC: 23 MG/DL (ref 8–23)
CALCIUM SERPL-MCNC: 8.5 MG/DL (ref 8.6–10.4)
CHLORIDE SERPL-SCNC: 106 MMOL/L (ref 98–107)
CO2 SERPL-SCNC: 22 MMOL/L (ref 20–31)
CREAT SERPL-MCNC: 0.9 MG/DL (ref 0.5–0.9)
EOSINOPHIL # BLD: 0.15 K/UL (ref 0–0.44)
EOSINOPHILS RELATIVE PERCENT: 2 % (ref 1–4)
ERYTHROCYTE [DISTWIDTH] IN BLOOD BY AUTOMATED COUNT: 20.6 % (ref 11.8–14.4)
GFR, ESTIMATED: 65 ML/MIN/1.73M2
GLUCOSE SERPL-MCNC: 80 MG/DL (ref 74–99)
HCT VFR BLD AUTO: 29.1 % (ref 36.3–47.1)
HGB BLD-MCNC: 8.5 G/DL (ref 11.9–15.1)
IMM GRANULOCYTES # BLD AUTO: 0.07 K/UL (ref 0–0.3)
IMM GRANULOCYTES NFR BLD: 1 %
LYMPHOCYTES NFR BLD: 0.88 K/UL (ref 1.1–3.7)
LYMPHOCYTES RELATIVE PERCENT: 12 % (ref 24–43)
MAGNESIUM SERPL-MCNC: 2.1 MG/DL (ref 1.6–2.4)
MCH RBC QN AUTO: 25.9 PG (ref 25.2–33.5)
MCHC RBC AUTO-ENTMCNC: 29.2 G/DL (ref 28.4–34.8)
MCV RBC AUTO: 88.7 FL (ref 82.6–102.9)
MONOCYTES NFR BLD: 0.66 K/UL (ref 0.1–1.2)
MONOCYTES NFR BLD: 9 % (ref 3–12)
MORPHOLOGY: ABNORMAL
NEUTROPHILS NFR BLD: 76 % (ref 36–65)
NEUTS SEG NFR BLD: 5.54 K/UL (ref 1.5–8.1)
NRBC BLD-RTO: 0 PER 100 WBC
PLATELET # BLD AUTO: 498 K/UL (ref 138–453)
PMV BLD AUTO: 10 FL (ref 8.1–13.5)
POTASSIUM SERPL-SCNC: 3.8 MMOL/L (ref 3.7–5.3)
RBC # BLD AUTO: 3.28 M/UL (ref 3.95–5.11)
SODIUM SERPL-SCNC: 135 MMOL/L (ref 136–145)
WBC OTHER # BLD: 7.3 K/UL (ref 3.5–11.3)

## 2024-07-10 PROCEDURE — 6360000002 HC RX W HCPCS: Performed by: NURSE PRACTITIONER

## 2024-07-10 PROCEDURE — 97530 THERAPEUTIC ACTIVITIES: CPT

## 2024-07-10 PROCEDURE — 85025 COMPLETE CBC W/AUTO DIFF WBC: CPT

## 2024-07-10 PROCEDURE — 2060000000 HC ICU INTERMEDIATE R&B

## 2024-07-10 PROCEDURE — 6360000002 HC RX W HCPCS: Performed by: INTERNAL MEDICINE

## 2024-07-10 PROCEDURE — 2580000003 HC RX 258: Performed by: STUDENT IN AN ORGANIZED HEALTH CARE EDUCATION/TRAINING PROGRAM

## 2024-07-10 PROCEDURE — 2500000003 HC RX 250 WO HCPCS: Performed by: STUDENT IN AN ORGANIZED HEALTH CARE EDUCATION/TRAINING PROGRAM

## 2024-07-10 PROCEDURE — 2580000003 HC RX 258: Performed by: INTERNAL MEDICINE

## 2024-07-10 PROCEDURE — 83735 ASSAY OF MAGNESIUM: CPT

## 2024-07-10 PROCEDURE — 6360000002 HC RX W HCPCS: Performed by: STUDENT IN AN ORGANIZED HEALTH CARE EDUCATION/TRAINING PROGRAM

## 2024-07-10 PROCEDURE — 6370000000 HC RX 637 (ALT 250 FOR IP): Performed by: STUDENT IN AN ORGANIZED HEALTH CARE EDUCATION/TRAINING PROGRAM

## 2024-07-10 PROCEDURE — 36415 COLL VENOUS BLD VENIPUNCTURE: CPT

## 2024-07-10 PROCEDURE — 99233 SBSQ HOSP IP/OBS HIGH 50: CPT | Performed by: NURSE PRACTITIONER

## 2024-07-10 PROCEDURE — 99232 SBSQ HOSP IP/OBS MODERATE 35: CPT | Performed by: INTERNAL MEDICINE

## 2024-07-10 PROCEDURE — 6370000000 HC RX 637 (ALT 250 FOR IP): Performed by: INTERNAL MEDICINE

## 2024-07-10 PROCEDURE — 99232 SBSQ HOSP IP/OBS MODERATE 35: CPT | Performed by: STUDENT IN AN ORGANIZED HEALTH CARE EDUCATION/TRAINING PROGRAM

## 2024-07-10 PROCEDURE — 80048 BASIC METABOLIC PNL TOTAL CA: CPT

## 2024-07-10 PROCEDURE — 6370000000 HC RX 637 (ALT 250 FOR IP): Performed by: NURSE PRACTITIONER

## 2024-07-10 PROCEDURE — 97110 THERAPEUTIC EXERCISES: CPT

## 2024-07-10 RX ORDER — HEPARIN SODIUM 5000 [USP'U]/ML
5000 INJECTION, SOLUTION INTRAVENOUS; SUBCUTANEOUS EVERY 8 HOURS SCHEDULED
Status: DISCONTINUED | OUTPATIENT
Start: 2024-07-10 | End: 2024-07-15 | Stop reason: HOSPADM

## 2024-07-10 RX ORDER — PANTOPRAZOLE SODIUM 40 MG/1
40 TABLET, DELAYED RELEASE ORAL
Status: DISCONTINUED | OUTPATIENT
Start: 2024-07-10 | End: 2024-07-10

## 2024-07-10 RX ORDER — POTASSIUM CHLORIDE 7.45 MG/ML
10 INJECTION INTRAVENOUS
Status: DISPENSED | OUTPATIENT
Start: 2024-07-10 | End: 2024-07-10

## 2024-07-10 RX ADMIN — FOLIC ACID 1 MG: 1 TABLET ORAL at 10:13

## 2024-07-10 RX ADMIN — HEPARIN SODIUM 5000 UNITS: 5000 INJECTION INTRAVENOUS; SUBCUTANEOUS at 17:45

## 2024-07-10 RX ADMIN — MUPIROCIN: 20 OINTMENT TOPICAL at 21:42

## 2024-07-10 RX ADMIN — AMIODARONE HYDROCHLORIDE 200 MG: 200 TABLET ORAL at 10:15

## 2024-07-10 RX ADMIN — POTASSIUM CHLORIDE 10 MEQ: 7.46 INJECTION, SOLUTION INTRAVENOUS at 10:02

## 2024-07-10 RX ADMIN — SUCRALFATE 1 G: 1 TABLET ORAL at 12:30

## 2024-07-10 RX ADMIN — Medication 500 MCG: at 17:46

## 2024-07-10 RX ADMIN — DESMOPRESSIN ACETATE 40 MG: 0.2 TABLET ORAL at 21:41

## 2024-07-10 RX ADMIN — ONDANSETRON 4 MG: 2 INJECTION INTRAMUSCULAR; INTRAVENOUS at 20:38

## 2024-07-10 RX ADMIN — FENTANYL CITRATE 50 MCG: 50 INJECTION, SOLUTION INTRAMUSCULAR; INTRAVENOUS at 05:58

## 2024-07-10 RX ADMIN — PANTOPRAZOLE SODIUM 40 MG: 40 INJECTION, POWDER, FOR SOLUTION INTRAVENOUS at 18:37

## 2024-07-10 RX ADMIN — PANTOPRAZOLE SODIUM 8 MG/HR: 40 INJECTION, POWDER, FOR SOLUTION INTRAVENOUS at 00:14

## 2024-07-10 RX ADMIN — CLOPIDOGREL BISULFATE 75 MG: 75 TABLET ORAL at 10:13

## 2024-07-10 RX ADMIN — HEPARIN SODIUM 5000 UNITS: 5000 INJECTION INTRAVENOUS; SUBCUTANEOUS at 21:41

## 2024-07-10 RX ADMIN — SODIUM CHLORIDE, PRESERVATIVE FREE 10 ML: 5 INJECTION INTRAVENOUS at 21:42

## 2024-07-10 RX ADMIN — ESCITALOPRAM OXALATE 20 MG: 10 TABLET ORAL at 10:14

## 2024-07-10 RX ADMIN — MUPIROCIN: 20 OINTMENT TOPICAL at 10:21

## 2024-07-10 RX ADMIN — SUCRALFATE 1 G: 1 TABLET ORAL at 21:41

## 2024-07-10 RX ADMIN — POTASSIUM CHLORIDE 10 MEQ: 7.46 INJECTION, SOLUTION INTRAVENOUS at 11:25

## 2024-07-10 RX ADMIN — ACETAMINOPHEN 650 MG: 325 TABLET ORAL at 18:36

## 2024-07-10 RX ADMIN — FENTANYL CITRATE 50 MCG: 50 INJECTION, SOLUTION INTRAMUSCULAR; INTRAVENOUS at 21:50

## 2024-07-10 RX ADMIN — FUROSEMIDE 20 MG: 20 TABLET ORAL at 10:13

## 2024-07-10 RX ADMIN — Medication 0.5 MG/MIN: at 00:16

## 2024-07-10 ASSESSMENT — PAIN SCALES - GENERAL
PAINLEVEL_OUTOF10: 3
PAINLEVEL_OUTOF10: 7
PAINLEVEL_OUTOF10: 3
PAINLEVEL_OUTOF10: 8
PAINLEVEL_OUTOF10: 5
PAINLEVEL_OUTOF10: 0
PAINLEVEL_OUTOF10: 3
PAINLEVEL_OUTOF10: 8
PAINLEVEL_OUTOF10: 0

## 2024-07-10 ASSESSMENT — PAIN DESCRIPTION - ORIENTATION
ORIENTATION: MID

## 2024-07-10 ASSESSMENT — PAIN SCALES - WONG BAKER
WONGBAKER_NUMERICALRESPONSE: NO HURT
WONGBAKER_NUMERICALRESPONSE: NO HURT

## 2024-07-10 ASSESSMENT — PAIN DESCRIPTION - LOCATION
LOCATION: BUTTOCKS
LOCATION: ABDOMEN
LOCATION: BUTTOCKS

## 2024-07-10 ASSESSMENT — PAIN DESCRIPTION - DESCRIPTORS
DESCRIPTORS: SHOOTING;STABBING;SHARP
DESCRIPTORS: BURNING;DISCOMFORT
DESCRIPTORS: DISCOMFORT;ACHING

## 2024-07-10 NOTE — CARE COORDINATION
Met with patient and  to discuss transitional planning. Their goal is for patient to go to [facility]. They are agreeable to go to [facility] if there is a bed available. Spoke to Simin from [facility]. [facility] has beds. Patient and  updated and agreeable    2704 received call from Simin from [facility]. They are able to accept. They will need a precert    3199 voicemail left for Simin to notify that PT and OT treatments are available for precert

## 2024-07-10 NOTE — DISCHARGE INSTR - COC
Continuity of Care Form    Patient Name: Elaina Champagne   :  1950  MRN:  [id number]    Admit date:  7/3/2024  Discharge date:  2024    Code Status Order: DNR-CCA   Advance Directives:     Admitting Physician:  Jennifer Hill MD  PCP: Robin Ly MD    Discharging Nurse: Suasn NATHAN  Discharging Hospital Unit/Room#:   Discharging Unit Phone Number: [phone]    Emergency Contact:   Extended Emergency Contact Information  Primary Emergency Contact: Eze Champagne   [facility]  Home Phone: [phone]  Relation: Spouse  Secondary Emergency Contact: Margo ferreira  Home Phone: [phone]  Mobile Phone: [phone]  Relation: Grandchild  Preferred language: English    Past Surgical History:  Past Surgical History:   Procedure Laterality Date    CARDIAC PROCEDURE N/A 2024    Coronary angiography performed by Regis Aponte MD at [facility] CARDIAC CATH LAB    CARDIAC PROCEDURE N/A 2024    frantz / Percutaneous coronary intervention / rm 504 performed by Maria Teresa Rodriguez MD at [facility] CARDIAC CATH LAB    CARDIAC PROCEDURE N/A 2024    frantz / Percutaneous coronary intervention / op scmh performed by Maria Teresa Rodriguez MD at [facility] CARDIAC CATH LAB    CARDIAC PROCEDURE N/A 7/3/2024    demetria / Pericardiocentesis / op pb performed by Mariya Luong MD at [facility] CARDIAC CATH LAB    CATARACT REMOVAL Bilateral     CORONARY ANGIOPLASTY WITH STENT PLACEMENT  2024    HYSTERECTOMY, TOTAL ABDOMINAL (CERVIX REMOVED)  1990    INCISION AND DRAINAGE Left 2017    LEG INCISION AND DRAINAGE ABSCESS performed by Isaiah Casey MD at [facility] OR    IR NONTUNNELED VASCULAR CATHETER  12/15/2023    IR NONTUNNELED VASCULAR CATHETER 12/15/2023 [facility] SPECIAL PROCEDURES    KNEE ARTHROPLASTY Right 2018    TOTAL KNEE ARTHROPLASTY Left 2017    UPPER GASTROINTESTINAL ENDOSCOPY N/A 2023    EGD BIOPSY performed by Mary Nolasco MD at [facility] ENDO    UPPER

## 2024-07-11 LAB
ANA SER QL IA: POSITIVE
ANION GAP SERPL CALCULATED.3IONS-SCNC: 9 MMOL/L (ref 9–16)
BASOPHILS # BLD: 0 K/UL (ref 0–0.2)
BASOPHILS NFR BLD: 0 % (ref 0–2)
BUN SERPL-MCNC: 19 MG/DL (ref 8–23)
CALCIUM SERPL-MCNC: 8.2 MG/DL (ref 8.6–10.4)
CHLORIDE SERPL-SCNC: 105 MMOL/L (ref 98–107)
CMV QNT BY NAAT, PLASMA INTERP: NOT DETECTED
CMV QNT BY NAAT, PLASMA IU/ML: NOT DETECTED IU/ML
CMV QNT BY NAAT, PLASMA LOG IU/ML: NOT DETECTED LOG IU/ML
CO2 SERPL-SCNC: 20 MMOL/L (ref 20–31)
CREAT SERPL-MCNC: 0.9 MG/DL (ref 0.5–0.9)
CV A9 AB TITR SER CF: NORMAL {TITER}
DSDNA IGG SER QL IA: 1.2 IU/ML
EOSINOPHIL # BLD: 0.16 K/UL (ref 0–0.44)
EOSINOPHILS RELATIVE PERCENT: 3 % (ref 1–4)
ERYTHROCYTE [DISTWIDTH] IN BLOOD BY AUTOMATED COUNT: 20.9 % (ref 11.8–14.4)
GFR, ESTIMATED: 70 ML/MIN/1.73M2
GLUCOSE SERPL-MCNC: 75 MG/DL (ref 74–99)
HCT VFR BLD AUTO: 30.5 % (ref 36.3–47.1)
HGB BLD-MCNC: 8.6 G/DL (ref 11.9–15.1)
HHV6 DNA # SPEC NAA+PROBE: <1000 CPY/ML
HHV6 DNA SPEC NAA+PROBE-LOG#: <3 LOG
HHV6 DNA SPEC NAA+PROBE: NORMAL
HHV6 IGG+IGM SER-IMP: NOT DETECTED
IMM GRANULOCYTES # BLD AUTO: 0.11 K/UL (ref 0–0.3)
IMM GRANULOCYTES NFR BLD: 2 %
LYMPHOCYTES NFR BLD: 0.74 K/UL (ref 1.1–3.7)
LYMPHOCYTES RELATIVE PERCENT: 14 % (ref 24–43)
MAGNESIUM SERPL-MCNC: 2 MG/DL (ref 1.6–2.4)
MCH RBC QN AUTO: 26.2 PG (ref 25.2–33.5)
MCHC RBC AUTO-ENTMCNC: 28.2 G/DL (ref 28.4–34.8)
MCV RBC AUTO: 93 FL (ref 82.6–102.9)
MONOCYTES NFR BLD: 0.48 K/UL (ref 0.1–1.2)
MONOCYTES NFR BLD: 9 % (ref 3–12)
MORPHOLOGY: ABNORMAL
NEUTROPHILS NFR BLD: 72 % (ref 36–65)
NEUTS SEG NFR BLD: 3.81 K/UL (ref 1.5–8.1)
NRBC BLD-RTO: 0 PER 100 WBC
NUCLEAR IGG SER IA-RTO: >55 U/ML
PLATELET # BLD AUTO: 451 K/UL (ref 138–453)
PMV BLD AUTO: 9.9 FL (ref 8.1–13.5)
POTASSIUM SERPL-SCNC: 3.4 MMOL/L (ref 3.7–5.3)
RBC # BLD AUTO: 3.28 M/UL (ref 3.95–5.11)
SODIUM SERPL-SCNC: 134 MMOL/L (ref 136–145)
WBC OTHER # BLD: 5.3 K/UL (ref 3.5–11.3)

## 2024-07-11 PROCEDURE — 36415 COLL VENOUS BLD VENIPUNCTURE: CPT

## 2024-07-11 PROCEDURE — 6370000000 HC RX 637 (ALT 250 FOR IP): Performed by: NURSE PRACTITIONER

## 2024-07-11 PROCEDURE — 6360000002 HC RX W HCPCS: Performed by: INTERNAL MEDICINE

## 2024-07-11 PROCEDURE — 83735 ASSAY OF MAGNESIUM: CPT

## 2024-07-11 PROCEDURE — 85025 COMPLETE CBC W/AUTO DIFF WBC: CPT

## 2024-07-11 PROCEDURE — 99232 SBSQ HOSP IP/OBS MODERATE 35: CPT | Performed by: INTERNAL MEDICINE

## 2024-07-11 PROCEDURE — 86225 DNA ANTIBODY NATIVE: CPT

## 2024-07-11 PROCEDURE — 99231 SBSQ HOSP IP/OBS SF/LOW 25: CPT | Performed by: STUDENT IN AN ORGANIZED HEALTH CARE EDUCATION/TRAINING PROGRAM

## 2024-07-11 PROCEDURE — 6370000000 HC RX 637 (ALT 250 FOR IP): Performed by: STUDENT IN AN ORGANIZED HEALTH CARE EDUCATION/TRAINING PROGRAM

## 2024-07-11 PROCEDURE — 6360000002 HC RX W HCPCS: Performed by: STUDENT IN AN ORGANIZED HEALTH CARE EDUCATION/TRAINING PROGRAM

## 2024-07-11 PROCEDURE — 2060000000 HC ICU INTERMEDIATE R&B

## 2024-07-11 PROCEDURE — 86038 ANTINUCLEAR ANTIBODIES: CPT

## 2024-07-11 PROCEDURE — 2580000003 HC RX 258: Performed by: INTERNAL MEDICINE

## 2024-07-11 PROCEDURE — 80048 BASIC METABOLIC PNL TOTAL CA: CPT

## 2024-07-11 PROCEDURE — 86235 NUCLEAR ANTIGEN ANTIBODY: CPT

## 2024-07-11 PROCEDURE — 6370000000 HC RX 637 (ALT 250 FOR IP): Performed by: INTERNAL MEDICINE

## 2024-07-11 PROCEDURE — 2580000003 HC RX 258: Performed by: STUDENT IN AN ORGANIZED HEALTH CARE EDUCATION/TRAINING PROGRAM

## 2024-07-11 RX ORDER — LANOLIN ALCOHOL/MO/W.PET/CERES
3 CREAM (GRAM) TOPICAL NIGHTLY PRN
Status: DISCONTINUED | OUTPATIENT
Start: 2024-07-11 | End: 2024-07-15 | Stop reason: HOSPADM

## 2024-07-11 RX ORDER — POTASSIUM CHLORIDE 7.45 MG/ML
10 INJECTION INTRAVENOUS
Status: COMPLETED | OUTPATIENT
Start: 2024-07-11 | End: 2024-07-11

## 2024-07-11 RX ADMIN — SUCRALFATE 1 G: 1 TABLET ORAL at 19:52

## 2024-07-11 RX ADMIN — Medication 3 MG: at 22:57

## 2024-07-11 RX ADMIN — MUPIROCIN: 20 OINTMENT TOPICAL at 09:33

## 2024-07-11 RX ADMIN — FOLIC ACID 1 MG: 1 TABLET ORAL at 09:33

## 2024-07-11 RX ADMIN — HEPARIN SODIUM 5000 UNITS: 5000 INJECTION INTRAVENOUS; SUBCUTANEOUS at 14:13

## 2024-07-11 RX ADMIN — SODIUM CHLORIDE, PRESERVATIVE FREE 10 ML: 5 INJECTION INTRAVENOUS at 09:33

## 2024-07-11 RX ADMIN — FUROSEMIDE 20 MG: 20 TABLET ORAL at 09:32

## 2024-07-11 RX ADMIN — HEPARIN SODIUM 5000 UNITS: 5000 INJECTION INTRAVENOUS; SUBCUTANEOUS at 22:57

## 2024-07-11 RX ADMIN — SUCRALFATE 1 G: 1 TABLET ORAL at 16:13

## 2024-07-11 RX ADMIN — POTASSIUM CHLORIDE 10 MEQ: 7.46 INJECTION, SOLUTION INTRAVENOUS at 18:04

## 2024-07-11 RX ADMIN — PANTOPRAZOLE SODIUM 40 MG: 40 INJECTION, POWDER, FOR SOLUTION INTRAVENOUS at 06:01

## 2024-07-11 RX ADMIN — SODIUM CHLORIDE, PRESERVATIVE FREE 10 ML: 5 INJECTION INTRAVENOUS at 20:00

## 2024-07-11 RX ADMIN — CLOPIDOGREL BISULFATE 75 MG: 75 TABLET ORAL at 09:33

## 2024-07-11 RX ADMIN — ESCITALOPRAM OXALATE 20 MG: 10 TABLET ORAL at 09:32

## 2024-07-11 RX ADMIN — POTASSIUM CHLORIDE 10 MEQ: 7.46 INJECTION, SOLUTION INTRAVENOUS at 14:12

## 2024-07-11 RX ADMIN — HEPARIN SODIUM 5000 UNITS: 5000 INJECTION INTRAVENOUS; SUBCUTANEOUS at 06:01

## 2024-07-11 RX ADMIN — SUCRALFATE 1 G: 1 TABLET ORAL at 09:33

## 2024-07-11 RX ADMIN — AMIODARONE HYDROCHLORIDE 200 MG: 200 TABLET ORAL at 09:34

## 2024-07-11 RX ADMIN — POTASSIUM CHLORIDE 10 MEQ: 7.46 INJECTION, SOLUTION INTRAVENOUS at 16:58

## 2024-07-11 RX ADMIN — POTASSIUM CHLORIDE 10 MEQ: 7.46 INJECTION, SOLUTION INTRAVENOUS at 15:28

## 2024-07-11 RX ADMIN — DESMOPRESSIN ACETATE 40 MG: 0.2 TABLET ORAL at 19:52

## 2024-07-11 RX ADMIN — FENTANYL CITRATE 50 MCG: 50 INJECTION, SOLUTION INTRAMUSCULAR; INTRAVENOUS at 16:51

## 2024-07-11 RX ADMIN — PANTOPRAZOLE SODIUM 40 MG: 40 INJECTION, POWDER, FOR SOLUTION INTRAVENOUS at 16:51

## 2024-07-11 ASSESSMENT — PAIN SCALES - GENERAL
PAINLEVEL_OUTOF10: 0
PAINLEVEL_OUTOF10: 0
PAINLEVEL_OUTOF10: 2
PAINLEVEL_OUTOF10: 9

## 2024-07-11 ASSESSMENT — PAIN DESCRIPTION - LOCATION: LOCATION: BUTTOCKS

## 2024-07-12 LAB
ANION GAP SERPL CALCULATED.3IONS-SCNC: 6 MMOL/L (ref 9–16)
BASOPHILS # BLD: 0 K/UL (ref 0–0.2)
BASOPHILS NFR BLD: 0 % (ref 0–2)
BUN SERPL-MCNC: 14 MG/DL (ref 8–23)
CALCIUM SERPL-MCNC: 8.3 MG/DL (ref 8.6–10.4)
CHLORIDE SERPL-SCNC: 105 MMOL/L (ref 98–107)
CO2 SERPL-SCNC: 23 MMOL/L (ref 20–31)
CREAT SERPL-MCNC: 0.7 MG/DL (ref 0.5–0.9)
EOSINOPHIL # BLD: 0.1 K/UL (ref 0–0.44)
EOSINOPHILS RELATIVE PERCENT: 2 % (ref 1–4)
ERYTHROCYTE [DISTWIDTH] IN BLOOD BY AUTOMATED COUNT: 20.6 % (ref 11.8–14.4)
GFR, ESTIMATED: >90 ML/MIN/1.73M2
GLUCOSE BLD-MCNC: 107 MG/DL (ref 65–105)
GLUCOSE BLD-MCNC: 116 MG/DL (ref 65–105)
GLUCOSE BLD-MCNC: 83 MG/DL (ref 65–105)
GLUCOSE SERPL-MCNC: 81 MG/DL (ref 74–99)
HCT VFR BLD AUTO: 25.2 % (ref 36.3–47.1)
HGB BLD-MCNC: 7.5 G/DL (ref 11.9–15.1)
IMM GRANULOCYTES # BLD AUTO: 0.05 K/UL (ref 0–0.3)
IMM GRANULOCYTES NFR BLD: 1 %
LYMPHOCYTES NFR BLD: 0.78 K/UL (ref 1.1–3.7)
LYMPHOCYTES RELATIVE PERCENT: 16 % (ref 24–43)
MAGNESIUM SERPL-MCNC: 1.6 MG/DL (ref 1.6–2.4)
MCH RBC QN AUTO: 26 PG (ref 25.2–33.5)
MCHC RBC AUTO-ENTMCNC: 29.8 G/DL (ref 28.4–34.8)
MCV RBC AUTO: 87.5 FL (ref 82.6–102.9)
MONOCYTES NFR BLD: 0.39 K/UL (ref 0.1–1.2)
MONOCYTES NFR BLD: 8 % (ref 3–12)
MORPHOLOGY: ABNORMAL
NEUTROPHILS NFR BLD: 73 % (ref 36–65)
NEUTS SEG NFR BLD: 3.58 K/UL (ref 1.5–8.1)
NRBC BLD-RTO: 0 PER 100 WBC
PLATELET # BLD AUTO: 417 K/UL (ref 138–453)
PMV BLD AUTO: 9.8 FL (ref 8.1–13.5)
POTASSIUM SERPL-SCNC: 3.6 MMOL/L (ref 3.7–5.3)
RBC # BLD AUTO: 2.88 M/UL (ref 3.95–5.11)
SODIUM SERPL-SCNC: 134 MMOL/L (ref 136–145)
WBC OTHER # BLD: 4.9 K/UL (ref 3.5–11.3)

## 2024-07-12 PROCEDURE — 2060000000 HC ICU INTERMEDIATE R&B

## 2024-07-12 PROCEDURE — 6370000000 HC RX 637 (ALT 250 FOR IP): Performed by: INTERNAL MEDICINE

## 2024-07-12 PROCEDURE — 99233 SBSQ HOSP IP/OBS HIGH 50: CPT | Performed by: NURSE PRACTITIONER

## 2024-07-12 PROCEDURE — 85025 COMPLETE CBC W/AUTO DIFF WBC: CPT

## 2024-07-12 PROCEDURE — 80048 BASIC METABOLIC PNL TOTAL CA: CPT

## 2024-07-12 PROCEDURE — 6370000000 HC RX 637 (ALT 250 FOR IP): Performed by: NURSE PRACTITIONER

## 2024-07-12 PROCEDURE — 6370000000 HC RX 637 (ALT 250 FOR IP): Performed by: STUDENT IN AN ORGANIZED HEALTH CARE EDUCATION/TRAINING PROGRAM

## 2024-07-12 PROCEDURE — 82947 ASSAY GLUCOSE BLOOD QUANT: CPT

## 2024-07-12 PROCEDURE — 99232 SBSQ HOSP IP/OBS MODERATE 35: CPT | Performed by: INTERNAL MEDICINE

## 2024-07-12 PROCEDURE — 2580000003 HC RX 258: Performed by: STUDENT IN AN ORGANIZED HEALTH CARE EDUCATION/TRAINING PROGRAM

## 2024-07-12 PROCEDURE — 2580000003 HC RX 258: Performed by: INTERNAL MEDICINE

## 2024-07-12 PROCEDURE — 6360000002 HC RX W HCPCS: Performed by: NURSE PRACTITIONER

## 2024-07-12 PROCEDURE — 6360000002 HC RX W HCPCS

## 2024-07-12 PROCEDURE — 99232 SBSQ HOSP IP/OBS MODERATE 35: CPT | Performed by: STUDENT IN AN ORGANIZED HEALTH CARE EDUCATION/TRAINING PROGRAM

## 2024-07-12 PROCEDURE — 93005 ELECTROCARDIOGRAM TRACING: CPT | Performed by: STUDENT IN AN ORGANIZED HEALTH CARE EDUCATION/TRAINING PROGRAM

## 2024-07-12 PROCEDURE — 36415 COLL VENOUS BLD VENIPUNCTURE: CPT

## 2024-07-12 PROCEDURE — 6360000002 HC RX W HCPCS: Performed by: STUDENT IN AN ORGANIZED HEALTH CARE EDUCATION/TRAINING PROGRAM

## 2024-07-12 PROCEDURE — 83735 ASSAY OF MAGNESIUM: CPT

## 2024-07-12 RX ORDER — HYOSCYAMINE SULFATE 0.5 MG/ML
250 INJECTION, SOLUTION SUBCUTANEOUS EVERY 4 HOURS PRN
Status: DISCONTINUED | OUTPATIENT
Start: 2024-07-12 | End: 2024-07-15 | Stop reason: HOSPADM

## 2024-07-12 RX ORDER — MORPHINE SULFATE 2 MG/ML
1 INJECTION, SOLUTION INTRAMUSCULAR; INTRAVENOUS ONCE
Status: DISCONTINUED | OUTPATIENT
Start: 2024-07-12 | End: 2024-07-12

## 2024-07-12 RX ADMIN — OXYBUTYNIN CHLORIDE 10 MG: 10 TABLET, EXTENDED RELEASE ORAL at 09:06

## 2024-07-12 RX ADMIN — ESCITALOPRAM OXALATE 20 MG: 10 TABLET ORAL at 09:06

## 2024-07-12 RX ADMIN — MAGNESIUM SULFATE HEPTAHYDRATE 2000 MG: 40 INJECTION, SOLUTION INTRAVENOUS at 21:01

## 2024-07-12 RX ADMIN — ACETAMINOPHEN 650 MG: 325 TABLET ORAL at 09:21

## 2024-07-12 RX ADMIN — HYOSCYAMINE SULFATE 250 MCG: 0.5 INJECTION, SOLUTION SUBCUTANEOUS at 20:50

## 2024-07-12 RX ADMIN — MUPIROCIN: 20 OINTMENT TOPICAL at 20:47

## 2024-07-12 RX ADMIN — MIDODRINE HYDROCHLORIDE 5 MG: 5 TABLET ORAL at 09:05

## 2024-07-12 RX ADMIN — PANTOPRAZOLE SODIUM 40 MG: 40 INJECTION, POWDER, FOR SOLUTION INTRAVENOUS at 09:07

## 2024-07-12 RX ADMIN — Medication 3 MG: at 23:45

## 2024-07-12 RX ADMIN — HEPARIN SODIUM 5000 UNITS: 5000 INJECTION INTRAVENOUS; SUBCUTANEOUS at 15:43

## 2024-07-12 RX ADMIN — SUCRALFATE 1 G: 1 TABLET ORAL at 17:53

## 2024-07-12 RX ADMIN — SODIUM CHLORIDE, PRESERVATIVE FREE 10 ML: 5 INJECTION INTRAVENOUS at 20:48

## 2024-07-12 RX ADMIN — FUROSEMIDE 20 MG: 20 TABLET ORAL at 09:05

## 2024-07-12 RX ADMIN — ACETAMINOPHEN 650 MG: 325 TABLET ORAL at 20:47

## 2024-07-12 RX ADMIN — DESMOPRESSIN ACETATE 40 MG: 0.2 TABLET ORAL at 20:47

## 2024-07-12 RX ADMIN — FOLIC ACID 1 MG: 1 TABLET ORAL at 09:05

## 2024-07-12 RX ADMIN — AMIODARONE HYDROCHLORIDE 200 MG: 200 TABLET ORAL at 09:31

## 2024-07-12 RX ADMIN — HEPARIN SODIUM 5000 UNITS: 5000 INJECTION INTRAVENOUS; SUBCUTANEOUS at 23:45

## 2024-07-12 RX ADMIN — CLOPIDOGREL BISULFATE 75 MG: 75 TABLET ORAL at 09:05

## 2024-07-12 RX ADMIN — SUCRALFATE 1 G: 1 TABLET ORAL at 15:44

## 2024-07-12 RX ADMIN — SUCRALFATE 1 G: 1 TABLET ORAL at 09:05

## 2024-07-12 RX ADMIN — HEPARIN SODIUM 5000 UNITS: 5000 INJECTION INTRAVENOUS; SUBCUTANEOUS at 09:06

## 2024-07-12 RX ADMIN — PANTOPRAZOLE SODIUM 40 MG: 40 INJECTION, POWDER, FOR SOLUTION INTRAVENOUS at 17:52

## 2024-07-12 RX ADMIN — ACETAMINOPHEN 650 MG: 325 TABLET ORAL at 02:06

## 2024-07-12 RX ADMIN — METOPROLOL SUCCINATE 25 MG: 25 TABLET, EXTENDED RELEASE ORAL at 09:06

## 2024-07-12 RX ADMIN — SODIUM CHLORIDE, PRESERVATIVE FREE 10 ML: 5 INJECTION INTRAVENOUS at 09:32

## 2024-07-12 RX ADMIN — SUCRALFATE 1 G: 1 TABLET ORAL at 20:47

## 2024-07-12 RX ADMIN — MIDODRINE HYDROCHLORIDE 5 MG: 5 TABLET ORAL at 17:53

## 2024-07-12 RX ADMIN — Medication 500 MCG: at 09:06

## 2024-07-12 RX ADMIN — MIDODRINE HYDROCHLORIDE 5 MG: 5 TABLET ORAL at 15:44

## 2024-07-12 ASSESSMENT — PAIN SCALES - WONG BAKER: WONGBAKER_NUMERICALRESPONSE: NO HURT

## 2024-07-12 ASSESSMENT — PAIN SCALES - GENERAL
PAINLEVEL_OUTOF10: 5
PAINLEVEL_OUTOF10: 0
PAINLEVEL_OUTOF10: 3
PAINLEVEL_OUTOF10: 0
PAINLEVEL_OUTOF10: 2
PAINLEVEL_OUTOF10: 3

## 2024-07-12 ASSESSMENT — PAIN DESCRIPTION - ORIENTATION
ORIENTATION: LEFT;RIGHT
ORIENTATION: RIGHT;LEFT;LOWER
ORIENTATION: MID

## 2024-07-12 ASSESSMENT — PAIN DESCRIPTION - LOCATION
LOCATION: LEG
LOCATION: BUTTOCKS;LEG
LOCATION: ABDOMEN

## 2024-07-12 ASSESSMENT — PAIN DESCRIPTION - DESCRIPTORS
DESCRIPTORS: ACHING;DISCOMFORT
DESCRIPTORS: ACHING
DESCRIPTORS: DISCOMFORT

## 2024-07-12 NOTE — CARE COORDINATION
Voicemail left for Simin from [facility] requesting return call to follow up on precert    1430 spoke to Simin from [facility]. Precert is pending. She is on call this weekend and will check precert status tomorrow

## 2024-07-13 LAB
ANION GAP SERPL CALCULATED.3IONS-SCNC: 5 MMOL/L (ref 9–16)
BASOPHILS # BLD: 0 K/UL (ref 0–0.2)
BASOPHILS NFR BLD: 0 % (ref 0–2)
BUN SERPL-MCNC: 11 MG/DL (ref 8–23)
CALCIUM SERPL-MCNC: 8.3 MG/DL (ref 8.6–10.4)
CHLORIDE SERPL-SCNC: 105 MMOL/L (ref 98–107)
CO2 SERPL-SCNC: 26 MMOL/L (ref 20–31)
CREAT SERPL-MCNC: 0.7 MG/DL (ref 0.5–0.9)
EOSINOPHIL # BLD: 0.1 K/UL (ref 0–0.44)
EOSINOPHILS RELATIVE PERCENT: 2 % (ref 1–4)
ERYTHROCYTE [DISTWIDTH] IN BLOOD BY AUTOMATED COUNT: 20.8 % (ref 11.8–14.4)
GFR, ESTIMATED: >90 ML/MIN/1.73M2
GLUCOSE BLD-MCNC: 100 MG/DL (ref 65–105)
GLUCOSE BLD-MCNC: 106 MG/DL (ref 65–105)
GLUCOSE BLD-MCNC: 97 MG/DL (ref 65–105)
GLUCOSE SERPL-MCNC: 99 MG/DL (ref 74–99)
HCT VFR BLD AUTO: 25.4 % (ref 36.3–47.1)
HGB BLD-MCNC: 7.5 G/DL (ref 11.9–15.1)
IMM GRANULOCYTES # BLD AUTO: 0 K/UL (ref 0–0.3)
IMM GRANULOCYTES NFR BLD: 0 %
LYMPHOCYTES NFR BLD: 0.71 K/UL (ref 1.1–3.7)
LYMPHOCYTES RELATIVE PERCENT: 14 % (ref 24–43)
MAGNESIUM SERPL-MCNC: 2.4 MG/DL (ref 1.6–2.4)
MCH RBC QN AUTO: 25.8 PG (ref 25.2–33.5)
MCHC RBC AUTO-ENTMCNC: 29.5 G/DL (ref 28.4–34.8)
MCV RBC AUTO: 87.3 FL (ref 82.6–102.9)
MONOCYTES NFR BLD: 0.36 K/UL (ref 0.1–1.2)
MONOCYTES NFR BLD: 7 % (ref 3–12)
MORPHOLOGY: ABNORMAL
NEUTROPHILS NFR BLD: 77 % (ref 36–65)
NEUTS SEG NFR BLD: 3.93 K/UL (ref 1.5–8.1)
NRBC BLD-RTO: 0 PER 100 WBC
PLATELET # BLD AUTO: 399 K/UL (ref 138–453)
PMV BLD AUTO: 9.7 FL (ref 8.1–13.5)
POTASSIUM SERPL-SCNC: 3.4 MMOL/L (ref 3.7–5.3)
RBC # BLD AUTO: 2.91 M/UL (ref 3.95–5.11)
SODIUM SERPL-SCNC: 136 MMOL/L (ref 136–145)
WBC OTHER # BLD: 5.1 K/UL (ref 3.5–11.3)

## 2024-07-13 PROCEDURE — 6370000000 HC RX 637 (ALT 250 FOR IP): Performed by: INTERNAL MEDICINE

## 2024-07-13 PROCEDURE — 80048 BASIC METABOLIC PNL TOTAL CA: CPT

## 2024-07-13 PROCEDURE — 2580000003 HC RX 258: Performed by: INTERNAL MEDICINE

## 2024-07-13 PROCEDURE — 85025 COMPLETE CBC W/AUTO DIFF WBC: CPT

## 2024-07-13 PROCEDURE — 99232 SBSQ HOSP IP/OBS MODERATE 35: CPT | Performed by: INTERNAL MEDICINE

## 2024-07-13 PROCEDURE — 36415 COLL VENOUS BLD VENIPUNCTURE: CPT

## 2024-07-13 PROCEDURE — 99231 SBSQ HOSP IP/OBS SF/LOW 25: CPT | Performed by: STUDENT IN AN ORGANIZED HEALTH CARE EDUCATION/TRAINING PROGRAM

## 2024-07-13 PROCEDURE — 6370000000 HC RX 637 (ALT 250 FOR IP): Performed by: STUDENT IN AN ORGANIZED HEALTH CARE EDUCATION/TRAINING PROGRAM

## 2024-07-13 PROCEDURE — 6370000000 HC RX 637 (ALT 250 FOR IP): Performed by: NURSE PRACTITIONER

## 2024-07-13 PROCEDURE — 6360000002 HC RX W HCPCS: Performed by: STUDENT IN AN ORGANIZED HEALTH CARE EDUCATION/TRAINING PROGRAM

## 2024-07-13 PROCEDURE — 82947 ASSAY GLUCOSE BLOOD QUANT: CPT

## 2024-07-13 PROCEDURE — 83735 ASSAY OF MAGNESIUM: CPT

## 2024-07-13 PROCEDURE — 2580000003 HC RX 258: Performed by: STUDENT IN AN ORGANIZED HEALTH CARE EDUCATION/TRAINING PROGRAM

## 2024-07-13 PROCEDURE — 2060000000 HC ICU INTERMEDIATE R&B

## 2024-07-13 RX ADMIN — SODIUM CHLORIDE, PRESERVATIVE FREE 10 ML: 5 INJECTION INTRAVENOUS at 08:51

## 2024-07-13 RX ADMIN — DESMOPRESSIN ACETATE 40 MG: 0.2 TABLET ORAL at 20:15

## 2024-07-13 RX ADMIN — Medication 500 MCG: at 08:51

## 2024-07-13 RX ADMIN — MUPIROCIN: 20 OINTMENT TOPICAL at 20:15

## 2024-07-13 RX ADMIN — PANTOPRAZOLE SODIUM 40 MG: 40 INJECTION, POWDER, FOR SOLUTION INTRAVENOUS at 17:15

## 2024-07-13 RX ADMIN — MUPIROCIN: 20 OINTMENT TOPICAL at 08:51

## 2024-07-13 RX ADMIN — HEPARIN SODIUM 5000 UNITS: 5000 INJECTION INTRAVENOUS; SUBCUTANEOUS at 23:06

## 2024-07-13 RX ADMIN — PANTOPRAZOLE SODIUM 40 MG: 40 INJECTION, POWDER, FOR SOLUTION INTRAVENOUS at 06:01

## 2024-07-13 RX ADMIN — SUCRALFATE 1 G: 1 TABLET ORAL at 08:54

## 2024-07-13 RX ADMIN — HEPARIN SODIUM 5000 UNITS: 5000 INJECTION INTRAVENOUS; SUBCUTANEOUS at 12:56

## 2024-07-13 RX ADMIN — FUROSEMIDE 20 MG: 20 TABLET ORAL at 08:50

## 2024-07-13 RX ADMIN — SODIUM CHLORIDE, PRESERVATIVE FREE 10 ML: 5 INJECTION INTRAVENOUS at 20:16

## 2024-07-13 RX ADMIN — HEPARIN SODIUM 5000 UNITS: 5000 INJECTION INTRAVENOUS; SUBCUTANEOUS at 06:01

## 2024-07-13 RX ADMIN — POTASSIUM BICARBONATE 40 MEQ: 782 TABLET, EFFERVESCENT ORAL at 10:51

## 2024-07-13 RX ADMIN — SODIUM CHLORIDE, PRESERVATIVE FREE 10 ML: 5 INJECTION INTRAVENOUS at 20:15

## 2024-07-13 RX ADMIN — FOLIC ACID 1 MG: 1 TABLET ORAL at 08:50

## 2024-07-13 RX ADMIN — Medication 3 MG: at 23:06

## 2024-07-13 RX ADMIN — ESCITALOPRAM OXALATE 20 MG: 10 TABLET ORAL at 08:54

## 2024-07-13 RX ADMIN — AMIODARONE HYDROCHLORIDE 200 MG: 200 TABLET ORAL at 08:49

## 2024-07-13 RX ADMIN — ACETAMINOPHEN 650 MG: 325 TABLET ORAL at 20:15

## 2024-07-13 RX ADMIN — CLOPIDOGREL BISULFATE 75 MG: 75 TABLET ORAL at 08:50

## 2024-07-13 RX ADMIN — SUCRALFATE 1 G: 1 TABLET ORAL at 20:15

## 2024-07-13 RX ADMIN — METOPROLOL SUCCINATE 25 MG: 25 TABLET, EXTENDED RELEASE ORAL at 08:48

## 2024-07-13 ASSESSMENT — PAIN SCALES - GENERAL
PAINLEVEL_OUTOF10: 0
PAINLEVEL_OUTOF10: 0
PAINLEVEL_OUTOF10: 3
PAINLEVEL_OUTOF10: 0

## 2024-07-13 ASSESSMENT — PAIN SCALES - WONG BAKER: WONGBAKER_NUMERICALRESPONSE: NO HURT

## 2024-07-13 ASSESSMENT — PAIN DESCRIPTION - LOCATION: LOCATION: COCCYX;LEG

## 2024-07-13 ASSESSMENT — PAIN DESCRIPTION - DESCRIPTORS: DESCRIPTORS: DISCOMFORT

## 2024-07-13 NOTE — DISCHARGE INSTRUCTIONS
Follow up with your PCP in 3 days. Call for an appointment as soon as possible.  Follow-up with specialist as instructed.  Call for an appointment as soon as possible.  Medications as instructed.  - Hold your Aspirin and Eliquis given concern for GI bleed until seen by PCP.   - Continue Plavix.   Return to the emergency department immediately for any new or worsening concerns.

## 2024-07-13 NOTE — CARE COORDINATION
Transitional planning. LM bernie Duval with [facility] requesting CB concerning Pending Pre-cert.

## 2024-07-14 VITALS
BODY MASS INDEX: 42.62 KG/M2 | DIASTOLIC BLOOD PRESSURE: 50 MMHG | HEART RATE: 63 BPM | TEMPERATURE: 97.5 F | RESPIRATION RATE: 18 BRPM | OXYGEN SATURATION: 97 % | HEIGHT: 61 IN | SYSTOLIC BLOOD PRESSURE: 145 MMHG | WEIGHT: 225.75 LBS

## 2024-07-14 LAB
MAGNESIUM SERPL-MCNC: 1.6 MG/DL (ref 1.6–2.4)
POTASSIUM SERPL-SCNC: 3.5 MMOL/L (ref 3.7–5.3)

## 2024-07-14 PROCEDURE — 6360000002 HC RX W HCPCS: Performed by: STUDENT IN AN ORGANIZED HEALTH CARE EDUCATION/TRAINING PROGRAM

## 2024-07-14 PROCEDURE — 97110 THERAPEUTIC EXERCISES: CPT

## 2024-07-14 PROCEDURE — 2580000003 HC RX 258: Performed by: STUDENT IN AN ORGANIZED HEALTH CARE EDUCATION/TRAINING PROGRAM

## 2024-07-14 PROCEDURE — 97535 SELF CARE MNGMENT TRAINING: CPT

## 2024-07-14 PROCEDURE — 84132 ASSAY OF SERUM POTASSIUM: CPT

## 2024-07-14 PROCEDURE — 2580000003 HC RX 258: Performed by: INTERNAL MEDICINE

## 2024-07-14 PROCEDURE — 6370000000 HC RX 637 (ALT 250 FOR IP): Performed by: INTERNAL MEDICINE

## 2024-07-14 PROCEDURE — 36415 COLL VENOUS BLD VENIPUNCTURE: CPT

## 2024-07-14 PROCEDURE — 6370000000 HC RX 637 (ALT 250 FOR IP): Performed by: NURSE PRACTITIONER

## 2024-07-14 PROCEDURE — 97530 THERAPEUTIC ACTIVITIES: CPT

## 2024-07-14 PROCEDURE — 6370000000 HC RX 637 (ALT 250 FOR IP): Performed by: STUDENT IN AN ORGANIZED HEALTH CARE EDUCATION/TRAINING PROGRAM

## 2024-07-14 PROCEDURE — 83735 ASSAY OF MAGNESIUM: CPT

## 2024-07-14 PROCEDURE — 99231 SBSQ HOSP IP/OBS SF/LOW 25: CPT | Performed by: STUDENT IN AN ORGANIZED HEALTH CARE EDUCATION/TRAINING PROGRAM

## 2024-07-14 RX ORDER — MIDODRINE HYDROCHLORIDE 5 MG/1
5 TABLET ORAL 3 TIMES DAILY PRN
Qty: 90 TABLET | Refills: 0 | DISCHARGE
Start: 2024-07-14

## 2024-07-14 RX ORDER — METOPROLOL SUCCINATE 25 MG/1
25 TABLET, EXTENDED RELEASE ORAL DAILY
Qty: 30 TABLET | Refills: 3 | DISCHARGE
Start: 2024-07-15

## 2024-07-14 RX ORDER — MAGNESIUM SULFATE IN WATER 40 MG/ML
2000 INJECTION, SOLUTION INTRAVENOUS ONCE
Status: COMPLETED | OUTPATIENT
Start: 2024-07-14 | End: 2024-07-14

## 2024-07-14 RX ADMIN — SODIUM CHLORIDE, PRESERVATIVE FREE 10 ML: 5 INJECTION INTRAVENOUS at 08:48

## 2024-07-14 RX ADMIN — SUCRALFATE 1 G: 1 TABLET ORAL at 06:02

## 2024-07-14 RX ADMIN — HEPARIN SODIUM 5000 UNITS: 5000 INJECTION INTRAVENOUS; SUBCUTANEOUS at 06:01

## 2024-07-14 RX ADMIN — PANTOPRAZOLE SODIUM 40 MG: 40 INJECTION, POWDER, FOR SOLUTION INTRAVENOUS at 06:01

## 2024-07-14 RX ADMIN — HEPARIN SODIUM 5000 UNITS: 5000 INJECTION INTRAVENOUS; SUBCUTANEOUS at 12:34

## 2024-07-14 RX ADMIN — ERGOCALCIFEROL 50000 UNITS: 1.25 CAPSULE ORAL at 08:48

## 2024-07-14 RX ADMIN — FOLIC ACID 1 MG: 1 TABLET ORAL at 08:48

## 2024-07-14 RX ADMIN — AMIODARONE HYDROCHLORIDE 200 MG: 200 TABLET ORAL at 08:50

## 2024-07-14 RX ADMIN — MUPIROCIN: 20 OINTMENT TOPICAL at 08:48

## 2024-07-14 RX ADMIN — DESMOPRESSIN ACETATE 40 MG: 0.2 TABLET ORAL at 21:03

## 2024-07-14 RX ADMIN — MAGNESIUM SULFATE HEPTAHYDRATE 2000 MG: 40 INJECTION, SOLUTION INTRAVENOUS at 08:55

## 2024-07-14 RX ADMIN — ESCITALOPRAM OXALATE 20 MG: 10 TABLET ORAL at 08:48

## 2024-07-14 RX ADMIN — METOPROLOL SUCCINATE 25 MG: 25 TABLET, EXTENDED RELEASE ORAL at 08:48

## 2024-07-14 RX ADMIN — SUCRALFATE 1 G: 1 TABLET ORAL at 12:33

## 2024-07-14 RX ADMIN — CLOPIDOGREL BISULFATE 75 MG: 75 TABLET ORAL at 08:48

## 2024-07-14 RX ADMIN — MIDODRINE HYDROCHLORIDE 5 MG: 5 TABLET ORAL at 08:48

## 2024-07-14 RX ADMIN — SODIUM CHLORIDE, PRESERVATIVE FREE 10 ML: 5 INJECTION INTRAVENOUS at 08:52

## 2024-07-14 RX ADMIN — FUROSEMIDE 20 MG: 20 TABLET ORAL at 08:50

## 2024-07-14 RX ADMIN — SUCRALFATE 1 G: 1 TABLET ORAL at 17:07

## 2024-07-14 RX ADMIN — PANTOPRAZOLE SODIUM 40 MG: 40 INJECTION, POWDER, FOR SOLUTION INTRAVENOUS at 17:06

## 2024-07-14 RX ADMIN — Medication 500 MCG: at 08:48

## 2024-07-14 RX ADMIN — SUCRALFATE 1 G: 1 TABLET ORAL at 21:03

## 2024-07-14 RX ADMIN — HEPARIN SODIUM 5000 UNITS: 5000 INJECTION INTRAVENOUS; SUBCUTANEOUS at 21:07

## 2024-07-14 RX ADMIN — MUPIROCIN: 20 OINTMENT TOPICAL at 21:12

## 2024-07-14 ASSESSMENT — PAIN SCALES - GENERAL
PAINLEVEL_OUTOF10: 0
PAINLEVEL_OUTOF10: 0

## 2024-07-14 NOTE — CARE COORDINATION
Transitional planning. Simin rendon/ [facility] called, pt has pre-cert and can come today.   Writer will call Simin rendon/ transport time.     PS Dr. Burden to inform and complete med rec and physician section of [facility]ENN[facility].   Informed pt's RN, Naomi rendon/ [employer] confirmed a 9 pm transport    Informed Simin rendon/ [facility] of Transport time.   Report [phone]  [phone]    1635 informed pt's RNSusan of transport time, she will call pt's , informed pt of transport time and told her that her RN, Susan will call her .     1647 faxed medical necessity/ transport request and face sheet to [employer]    1650 faxed AVS/JENNIFER to [phone]

## 2024-07-14 NOTE — PLAN OF CARE
Problem: Respiratory - Adult  Goal: Achieves optimal ventilation and oxygenation  7/7/2024 2223 by Britta Jiménez RCP  Outcome: Progressing   PROVIDE ADEQUATE OXYGENATION WITH ACCEPTABLE SP02/ABG'S    [x]  IDENTIFY APPROPRIATE OXYGEN THERAPY  [x]   MONITOR SP02/ABG'S AS NEEDED   [x]   PATIENT EDUCATION AS NEEDED    
  Problem: Respiratory - Adult  Goal: Achieves optimal ventilation and oxygenation  Outcome: Progressing   NON INVASIVE VENTILATION  PROVIDE OPTIMAL VENTILATION/ACCEPTABLE SP02  IMPLEMENT NON INVASIVE VENTILATION PROTOCOL  ASSESSMENT SKIN INTEGRITY  PATIENT EDUCATION AS NEEDED  BIPAP AS NEEDED  
  Problem: Safety - Adult  Goal: Free from fall injury  7/11/2024 0124 by Leonela Mar RN  Outcome: Progressing  7/10/2024 1914 by Kusum Gunderson RN  Outcome: Progressing     Problem: Discharge Planning  Goal: Discharge to home or other facility with appropriate resources  7/11/2024 0124 by Leonela Mar RN  Outcome: Progressing  7/10/2024 1914 by Kusum Gunderson RN  Outcome: Progressing     Problem: Skin/Tissue Integrity  Goal: Absence of new skin breakdown  Description: 1.  Monitor for areas of redness and/or skin breakdown  2.  Assess vascular access sites hourly  3.  Every 4-6 hours minimum:  Change oxygen saturation probe site  4.  Every 4-6 hours:  If on nasal continuous positive airway pressure, respiratory therapy assess nares and determine need for appliance change or resting period.  7/11/2024 0124 by Leonela Mar RN  Outcome: Progressing  7/10/2024 1914 by Kusum Gunderson RN  Outcome: Progressing     Problem: Respiratory - Adult  Goal: Achieves optimal ventilation and oxygenation  7/10/2024 1914 by Kusum Gunderson RN  Outcome: Progressing     Problem: Pain  Goal: Verbalizes/displays adequate comfort level or baseline comfort level  7/11/2024 0124 by Leonela Mar RN  Outcome: Progressing  7/10/2024 1914 by Kusum Gunderson RN  Outcome: Progressing     Problem: Nutrition Deficit:  Goal: Optimize nutritional status  7/11/2024 0124 by Leonela Mar RN  Outcome: Progressing  7/10/2024 1914 by Kusum Gunderson RN  Outcome: Progressing     
  Problem: Safety - Adult  Goal: Free from fall injury  7/12/2024 0624 by Mando Dominique RN  Outcome: Progressing  7/11/2024 1814 by Gabby Gardiner RN  Outcome: Progressing     Problem: Discharge Planning  Goal: Discharge to home or other facility with appropriate resources  7/12/2024 0624 by Mando Dominique RN  Outcome: Progressing  Flowsheets (Taken 7/11/2024 2000)  Discharge to home or other facility with appropriate resources:   Identify barriers to discharge with patient and caregiver   Arrange for needed discharge resources and transportation as appropriate   Identify discharge learning needs (meds, wound care, etc)  7/11/2024 1814 by Gabby Gardiner RN  Outcome: Progressing     Problem: Skin/Tissue Integrity  Goal: Absence of new skin breakdown  Description: 1.  Monitor for areas of redness and/or skin breakdown  2.  Assess vascular access sites hourly  3.  Every 4-6 hours minimum:  Change oxygen saturation probe site  4.  Every 4-6 hours:  If on nasal continuous positive airway pressure, respiratory therapy assess nares and determine need for appliance change or resting period.  7/12/2024 0624 by Mando Dominique RN  Outcome: Progressing  7/11/2024 1814 by Gabby Gardiner RN  Outcome: Progressing     Problem: ABCDS Injury Assessment  Goal: Absence of physical injury  7/12/2024 0624 by Mando Dominique RN  Outcome: Progressing  Flowsheets (Taken 7/11/2024 2000)  Absence of Physical Injury: Implement safety measures based on patient assessment  7/11/2024 1814 by Gabby Gardiner RN  Outcome: Progressing     Problem: Respiratory - Adult  Goal: Achieves optimal ventilation and oxygenation  7/12/2024 0624 by Mando Dominique RN  Outcome: Progressing  Flowsheets (Taken 7/11/2024 2000)  Achieves optimal ventilation and oxygenation:   Assess for changes in respiratory status   Assess for changes in mentation and behavior   Position to facilitate oxygenation and minimize respiratory 
  Problem: Safety - Adult  Goal: Free from fall injury  7/13/2024 1115 by Jacklyn Ferrell RN  Outcome: Progressing  7/13/2024 0010 by Ame Araujo RN  Outcome: Progressing     Problem: Discharge Planning  Goal: Discharge to home or other facility with appropriate resources  7/13/2024 1115 by Jacklyn Ferrell RN  Outcome: Progressing  Flowsheets (Taken 7/13/2024 0800)  Discharge to home or other facility with appropriate resources: Identify barriers to discharge with patient and caregiver  7/13/2024 0010 by Ame Araujo RN  Outcome: Progressing     Problem: Skin/Tissue Integrity  Goal: Absence of new skin breakdown  7/13/2024 1115 by Jacklyn Ferrell RN  Outcome: Progressing  7/13/2024 0010 by Ame Araujo RN  Outcome: Progressing     Problem: ABCDS Injury Assessment  Goal: Absence of physical injury  7/13/2024 1115 by Jacklyn Ferrell RN  Outcome: Progressing  7/13/2024 0010 by Ame Araujo RN  Outcome: Progressing     Problem: Respiratory - Adult  Goal: Achieves optimal ventilation and oxygenation  7/13/2024 1115 by Jacklyn Ferrell RN  Outcome: Progressing  Flowsheets (Taken 7/13/2024 0800)  Achieves optimal ventilation and oxygenation: Assess for changes in respiratory status  7/13/2024 0010 by Ame Araujo RN  Outcome: Progressing     Problem: Pain  Goal: Verbalizes/displays adequate comfort level or baseline comfort level  7/13/2024 1115 by Jacklyn Ferrell RN  Outcome: Progressing  7/13/2024 0010 by Ame Araujo RN  Outcome: Progressing     Problem: Nutrition Deficit:  Goal: Optimize nutritional status  7/13/2024 1115 by Jacklyn Ferrell RN  Outcome: Progressing  7/13/2024 0010 by Ame Araujo RN  Outcome: Progressing  Flowsheets (Taken 7/12/2024 1019 by Afshan Bethea, MS, RD, LD)  Nutrient intake appropriate for improving, restoring, or maintaining nutritional needs:   Assess nutritional status and recommend course of action   Monitor oral intake, labs, and treatment plans   
  Problem: Safety - Adult  Goal: Free from fall injury  Outcome: Completed     Problem: Discharge Planning  Goal: Discharge to home or other facility with appropriate resources  Outcome: Completed     Problem: Skin/Tissue Integrity  Goal: Absence of new skin breakdown  Description: 1.  Monitor for areas of redness and/or skin breakdown  2.  Assess vascular access sites hourly  3.  Every 4-6 hours minimum:  Change oxygen saturation probe site  4.  Every 4-6 hours:  If on nasal continuous positive airway pressure, respiratory therapy assess nares and determine need for appliance change or resting period.  Outcome: Completed     Problem: ABCDS Injury Assessment  Goal: Absence of physical injury  Outcome: Completed     Problem: Respiratory - Adult  Goal: Achieves optimal ventilation and oxygenation  Outcome: Completed     Problem: Pain  Goal: Verbalizes/displays adequate comfort level or baseline comfort level  Outcome: Completed     Problem: Nutrition Deficit:  Goal: Optimize nutritional status  Outcome: Completed     
  Problem: Safety - Adult  Goal: Free from fall injury  Outcome: Progressing     Problem: Discharge Planning  Goal: Discharge to home or other facility with appropriate resources  Outcome: Progressing     Problem: ABCDS Injury Assessment  Goal: Absence of physical injury  Outcome: Progressing     Problem: Respiratory - Adult  Goal: Achieves optimal ventilation and oxygenation  Outcome: Progressing     Problem: Pain  Goal: Verbalizes/displays adequate comfort level or baseline comfort level  Outcome: Progressing     
  Problem: Safety - Adult  Goal: Free from fall injury  Outcome: Progressing     Problem: Discharge Planning  Goal: Discharge to home or other facility with appropriate resources  Outcome: Progressing     Problem: Skin/Tissue Integrity  Goal: Absence of new skin breakdown  Description: 1.  Monitor for areas of redness and/or skin breakdown  2.  Assess vascular access sites hourly  3.  Every 4-6 hours minimum:  Change oxygen saturation probe site  4.  Every 4-6 hours:  If on nasal continuous positive airway pressure, respiratory therapy assess nares and determine need for appliance change or resting period.  Outcome: Progressing     Problem: ABCDS Injury Assessment  Goal: Absence of physical injury  Outcome: Progressing     Problem: Respiratory - Adult  Goal: Achieves optimal ventilation and oxygenation  Outcome: Progressing     Problem: Pain  Goal: Verbalizes/displays adequate comfort level or baseline comfort level  Outcome: Progressing     
  Problem: Safety - Adult  Goal: Free from fall injury  Outcome: Progressing     Problem: Discharge Planning  Goal: Discharge to home or other facility with appropriate resources  Outcome: Progressing     Problem: Skin/Tissue Integrity  Goal: Absence of new skin breakdown  Description: 1.  Monitor for areas of redness and/or skin breakdown  2.  Assess vascular access sites hourly  3.  Every 4-6 hours minimum:  Change oxygen saturation probe site  4.  Every 4-6 hours:  If on nasal continuous positive airway pressure, respiratory therapy assess nares and determine need for appliance change or resting period.  Outcome: Progressing     Problem: ABCDS Injury Assessment  Goal: Absence of physical injury  Outcome: Progressing     Problem: Respiratory - Adult  Goal: Achieves optimal ventilation and oxygenation  Outcome: Progressing     Problem: Pain  Goal: Verbalizes/displays adequate comfort level or baseline comfort level  Outcome: Progressing     Problem: Nutrition Deficit:  Goal: Optimize nutritional status  7/10/2024 1914 by Kusum Gunderson, RN  Outcome: Progressing  7/10/2024 1010 by Nhi Owen RD  Outcome: Progressing  Flowsheets (Taken 7/10/2024 0954)  Nutrient intake appropriate for improving, restoring, or maintaining nutritional needs:   Assess nutritional status and recommend course of action   Monitor oral intake, labs, and treatment plans   Recommend appropriate diets, oral nutritional supplements, and vitamin/mineral supplements   Recommend, monitor, and adjust tube feedings and TPN/PPN based on assessed needs     
  Problem: Safety - Adult  Goal: Free from fall injury  Outcome: Progressing     Problem: Discharge Planning  Goal: Discharge to home or other facility with appropriate resources  Outcome: Progressing     Problem: Skin/Tissue Integrity  Goal: Absence of new skin breakdown  Description: 1.  Monitor for areas of redness and/or skin breakdown  2.  Assess vascular access sites hourly  3.  Every 4-6 hours minimum:  Change oxygen saturation probe site  4.  Every 4-6 hours:  If on nasal continuous positive airway pressure, respiratory therapy assess nares and determine need for appliance change or resting period.  Outcome: Progressing     Problem: ABCDS Injury Assessment  Goal: Absence of physical injury  Outcome: Progressing     Problem: Respiratory - Adult  Goal: Achieves optimal ventilation and oxygenation  Outcome: Progressing     Problem: Pain  Goal: Verbalizes/displays adequate comfort level or baseline comfort level  Outcome: Progressing     Problem: Nutrition Deficit:  Goal: Optimize nutritional status  Outcome: Progressing     
  Problem: Safety - Adult  Goal: Free from fall injury  Outcome: Progressing     Problem: Discharge Planning  Goal: Discharge to home or other facility with appropriate resources  Outcome: Progressing     Problem: Skin/Tissue Integrity  Goal: Absence of new skin breakdown  Description: 1.  Monitor for areas of redness and/or skin breakdown  2.  Assess vascular access sites hourly  3.  Every 4-6 hours minimum:  Change oxygen saturation probe site  4.  Every 4-6 hours:  If on nasal continuous positive airway pressure, respiratory therapy assess nares and determine need for appliance change or resting period.  Outcome: Progressing     Problem: ABCDS Injury Assessment  Goal: Absence of physical injury  Outcome: Progressing     Problem: Respiratory - Adult  Goal: Achieves optimal ventilation and oxygenation  Outcome: Progressing     Problem: Pain  Goal: Verbalizes/displays adequate comfort level or baseline comfort level  Outcome: Progressing     Problem: Nutrition Deficit:  Goal: Optimize nutritional status  Outcome: Progressing     
  Problem: Safety - Adult  Goal: Free from fall injury  Outcome: Progressing     Problem: Discharge Planning  Goal: Discharge to home or other facility with appropriate resources  Outcome: Progressing     Problem: Skin/Tissue Integrity  Goal: Absence of new skin breakdown  Description: 1.  Monitor for areas of redness and/or skin breakdown  2.  Assess vascular access sites hourly  3.  Every 4-6 hours minimum:  Change oxygen saturation probe site  4.  Every 4-6 hours:  If on nasal continuous positive airway pressure, respiratory therapy assess nares and determine need for appliance change or resting period.  Outcome: Progressing     Problem: ABCDS Injury Assessment  Goal: Absence of physical injury  Outcome: Progressing     Problem: Respiratory - Adult  Goal: Achieves optimal ventilation and oxygenation  Outcome: Progressing     Problem: Pain  Goal: Verbalizes/displays adequate comfort level or baseline comfort level  Outcome: Progressing     Problem: Nutrition Deficit:  Goal: Optimize nutritional status  Outcome: Progressing  Flowsheets (Taken 7/12/2024 1019 by Afshan Bethea, MS, RD, LD)  Nutrient intake appropriate for improving, restoring, or maintaining nutritional needs:   Assess nutritional status and recommend course of action   Monitor oral intake, labs, and treatment plans   Recommend appropriate diets, oral nutritional supplements, and vitamin/mineral supplements     
  Problem: Safety - Adult  Goal: Free from fall injury  Outcome: Progressing     Problem: Discharge Planning  Goal: Discharge to home or other facility with appropriate resources  Outcome: Progressing  Flowsheets (Taken 7/3/2024 1510)  Discharge to home or other facility with appropriate resources: Identify barriers to discharge with patient and caregiver     
  Problem: Safety - Adult  Goal: Free from fall injury  Outcome: Progressing     Problem: Discharge Planning  Goal: Discharge to home or other facility with appropriate resources  Outcome: Progressing  Flowsheets (Taken 7/4/2024 0850)  Discharge to home or other facility with appropriate resources: Identify barriers to discharge with patient and caregiver     Problem: Skin/Tissue Integrity  Goal: Absence of new skin breakdown  Description: 1.  Monitor for areas of redness and/or skin breakdown  2.  Assess vascular access sites hourly  3.  Every 4-6 hours minimum:  Change oxygen saturation probe site  4.  Every 4-6 hours:  If on nasal continuous positive airway pressure, respiratory therapy assess nares and determine need for appliance change or resting period.  Outcome: Progressing     Problem: ABCDS Injury Assessment  Goal: Absence of physical injury  Outcome: Progressing     Problem: Respiratory - Adult  Goal: Achieves optimal ventilation and oxygenation  7/4/2024 0943 by Maribel Wilkerson, RN  Outcome: Progressing  7/3/2024 2304 by Jennifer Dodd RCP  Outcome: Progressing     
  Problem: Safety - Adult  Goal: Free from fall injury  Outcome: Progressing     Problem: Discharge Planning  Goal: Discharge to home or other facility with appropriate resources  Outcome: Progressing  Flowsheets (Taken 7/4/2024 2000)  Discharge to home or other facility with appropriate resources:   Identify barriers to discharge with patient and caregiver   Arrange for needed discharge resources and transportation as appropriate   Identify discharge learning needs (meds, wound care, etc)     Problem: Skin/Tissue Integrity  Goal: Absence of new skin breakdown  Description: 1.  Monitor for areas of redness and/or skin breakdown  2.  Assess vascular access sites hourly  3.  Every 4-6 hours minimum:  Change oxygen saturation probe site  4.  Every 4-6 hours:  If on nasal continuous positive airway pressure, respiratory therapy assess nares and determine need for appliance change or resting period.  Outcome: Progressing     Problem: ABCDS Injury Assessment  Goal: Absence of physical injury  Outcome: Progressing  Flowsheets (Taken 7/4/2024 2000)  Absence of Physical Injury: Implement safety measures based on patient assessment     Problem: Respiratory - Adult  Goal: Achieves optimal ventilation and oxygenation  Outcome: Progressing  Flowsheets (Taken 7/4/2024 2000)  Achieves optimal ventilation and oxygenation:   Assess for changes in respiratory status   Assess for changes in mentation and behavior     Problem: Pain  Goal: Verbalizes/displays adequate comfort level or baseline comfort level  Outcome: Progressing     
  Problem: Safety - Adult  Goal: Free from fall injury  Outcome: Progressing     Problem: Discharge Planning  Goal: Discharge to home or other facility with appropriate resources  Outcome: Progressing  Flowsheets (Taken 7/5/2024 2000)  Discharge to home or other facility with appropriate resources:   Identify barriers to discharge with patient and caregiver   Arrange for needed discharge resources and transportation as appropriate   Identify discharge learning needs (meds, wound care, etc)     Problem: Skin/Tissue Integrity  Goal: Absence of new skin breakdown  Description: 1.  Monitor for areas of redness and/or skin breakdown  2.  Assess vascular access sites hourly  3.  Every 4-6 hours minimum:  Change oxygen saturation probe site  4.  Every 4-6 hours:  If on nasal continuous positive airway pressure, respiratory therapy assess nares and determine need for appliance change or resting period.  Outcome: Progressing     Problem: ABCDS Injury Assessment  Goal: Absence of physical injury  Outcome: Progressing  Flowsheets (Taken 7/5/2024 2000)  Absence of Physical Injury: Implement safety measures based on patient assessment     Problem: Respiratory - Adult  Goal: Achieves optimal ventilation and oxygenation  Outcome: Progressing     Problem: Pain  Goal: Verbalizes/displays adequate comfort level or baseline comfort level  Outcome: Progressing     
  Problem: Safety - Adult  Goal: Free from fall injury  Outcome: Progressing     Problem: Discharge Planning  Goal: Discharge to home or other facility with appropriate resources  Outcome: Progressing  Flowsheets (Taken 7/7/2024 0746 by Norma Solomon, RN)  Discharge to home or other facility with appropriate resources:   Identify barriers to discharge with patient and caregiver   Arrange for needed discharge resources and transportation as appropriate   Identify discharge learning needs (meds, wound care, etc)   Refer to discharge planning if patient needs post-hospital services based on physician order or complex needs related to functional status, cognitive ability or social support system     Problem: Skin/Tissue Integrity  Goal: Absence of new skin breakdown  Description: 1.  Monitor for areas of redness and/or skin breakdown  2.  Assess vascular access sites hourly  3.  Every 4-6 hours minimum:  Change oxygen saturation probe site  4.  Every 4-6 hours:  If on nasal continuous positive airway pressure, respiratory therapy assess nares and determine need for appliance change or resting period.  Outcome: Progressing     Problem: ABCDS Injury Assessment  Goal: Absence of physical injury  Outcome: Progressing     Problem: Pain  Goal: Verbalizes/displays adequate comfort level or baseline comfort level  Outcome: Progressing  Flowsheets (Taken 7/7/2024 0746 by Norma Solomon, RN)  Verbalizes/displays adequate comfort level or baseline comfort level:   Encourage patient to monitor pain and request assistance   Assess pain using appropriate pain scale   Administer analgesics based on type and severity of pain and evaluate response   Implement non-pharmacological measures as appropriate and evaluate response   Consider cultural and social influences on pain and pain management   Notify Licensed Independent Practitioner if interventions unsuccessful or patient reports new pain     
Problem: Safety - Adult  Goal: Free from fall injury  Outcome: Progressing     Problem: Discharge Planning  Goal: Discharge to home or other facility with appropriate resources  Outcome: Progressing     Problem: Skin/Tissue Integrity  Goal: Absence of new skin breakdown  Description: 1.  Monitor for areas of redness and/or skin breakdown  2.  Assess vascular access sites hourly  3.  Every 4-6 hours minimum:  Change oxygen saturation probe site  4.  Every 4-6 hours:  If on nasal continuous positive airway pressure, respiratory therapy assess nares and determine need for appliance change or resting period.  Outcome: Progressing     Problem: ABCDS Injury Assessment  Goal: Absence of physical injury  Outcome: Progressing     Problem: Respiratory - Adult  Goal: Achieves optimal ventilation and oxygenation  7/8/2024 1123 by Bartolo Gimenez RN  Outcome: Progressing  7/7/2024 2223 by Britta Jiménez RCP  Outcome: Progressing     Problem: Pain  Goal: Verbalizes/displays adequate comfort level or baseline comfort level  Outcome: Progressing  Flowsheets (Taken 7/8/2024 1110)  Verbalizes/displays adequate comfort level or baseline comfort level: Encourage patient to monitor pain and request assistance     
Urology was consulted for bladder spasms. Checked with pharmacy and we recommend levsin  mcg q4 hours with 4 dose max per day. Random bladder scan shows volumes <60cc, indicating the tierney is draining properly.     Thank you for allowing us to take part in Ms. Elaina Champagne's care. Please notify urology with any additional concerns  
baseline comfort level  7/9/2024 0020 by Saba Negrete, RN  Outcome: Progressing  7/8/2024 1123 by Bartolo Gimenez, RN  Outcome: Progressing  Flowsheets (Taken 7/8/2024 1110)  Verbalizes/displays adequate comfort level or baseline comfort level: Encourage patient to monitor pain and request assistance

## 2024-07-15 LAB
EKG ATRIAL RATE: 69 BPM
EKG P AXIS: 18 DEGREES
EKG P-R INTERVAL: 176 MS
EKG Q-T INTERVAL: 456 MS
EKG QRS DURATION: 78 MS
EKG QTC CALCULATION (BAZETT): 488 MS
EKG R AXIS: -43 DEGREES
EKG T AXIS: 53 DEGREES
EKG VENTRICULAR RATE: 69 BPM

## 2024-07-15 NOTE — PROGRESS NOTES
[facility]   Gastroenterology Progress Note    Elaina Champagne is a 73 y.o. female patient.  Hospitalization Day:6      Chief consult reason:   Shortness of breath    Subjective:  Patient seen and examined, no acute events overnight  Staff and patient denies any rectal bleeding  Had small amount of breakfast  Hemoglobin stable 8.2 g/dL, leukocytosis has resolved    VITALS:  BP (!) 118/53   Pulse (!) 134   Temp 97.5 °F (36.4 °C) (Oral)   Resp 17   Ht 1.549 m (5' 1\")   Wt 101.7 kg (224 lb 3.3 oz)   SpO2 99%   BMI 42.36 kg/m²   TEMPERATURE:  Current - Temp: 97.5 °F (36.4 °C); Max - Temp  Av.7 °F (36.5 °C)  Min: 97.4 °F (36.3 °C)  Max: 98 °F (36.7 °C)    Physical Assessment:  General appearance:  alert, cooperative and no distress  Mental Status:  oriented to person, place and time and normal affect  Lungs:  clear to auscultation bilaterally, normal effort  Heart:  regular rate and rhythm, no murmur  Abdomen:  soft, nontender, nondistended, normal bowel sounds, no masses, hepatomegaly, splenomegaly  Extremities:  no edema, redness, tenderness in the calves  Skin:  no gross lesions, rashes, induration    Data Review:    Labs and Imaging:     CBC:  Recent Labs     24  0817 24  0635 24  0346   WBC 15.7* 10.0 9.1   HGB 8.7* 8.6* 8.2*   MCV 85.2 84.5 88.4   RDW 20.2* 20.3* 20.5*   * 564* 477*       ANEMIA STUDIES:  No results for input(s): \"TIBC\", \"FERRITIN\", \"MOJWZLIP79\", \"FOLATE\", \"OCCULTBLD\" in the last 72 hours.    Invalid input(s): \"LABIRON\"    BMP:  Recent Labs     24  1841 24  0817 24  0635   NA  --  135* 137   K 5.3 4.2 4.2   CL  --  104 107   CO2  --  23 24   BUN  --  36* 31*   CREATININE  --  1.2* 1.1*   GLUCOSE  --  80 86   CALCIUM  --  8.3* 8.2*       LFTS:  No results for input(s): \"ALKPHOS\", \"ALT\", \"AST\", \"BILITOT\", \"BILIDIR\", \"LABALBU\" in the last 72 hours.    Amylase/Lipase and Ammonia:  No results for input(s): \"AMYLASE\", \"LIPASE\", 
[facility]  Office: [phone]  Nael Morgan DO, Ronny Quevedo, DO, Pb Vásquez DO, Refugio Bradley, DO, Sheldon Smith MD, Elsa Torres MD, Leandro Espinosa MD, Joann Hauser MD,  Dipak Montana MD, Rob Hernandez MD, Swathi Wells MD,  Gracia Chen DO, Pilar So MD, Kayden Walters MD, Orlando Morgan DO, Yamilex Luciano MD,  Mando Burden DO, Gali Galarza MD, Georgie Workman MD, Jennifer Hill MD, Gloria Sierra MD,  Rafael Rawls MD, Denilson Murphy MD, Kamran Flores MD, Yazan Hughes MD, Theodore Grossman MD, Jaxon Villar MD, Ricky Lopez DO, Mickey Cabral DO, Trev Alex MD,  Arvin Powers MD, Shirley Waterhouse, CNP,  Nidia Lester CNP, Aj Castro, CNP,  Erin Shaw, DNP, Ivis Senior, CNP, Georgiana Freeman, CNP, Diandra Ruffin, CNP, Marilia Parker, CNP, Maureen Matamoros, PA-C, Meeta Titus, PA-C, Sandrita Cruz, CNP, Nan Berrios, CNP, Veronica Alonzo, CNP, Areli Ware, CNP, Guadalupe Jaimes, CNP, Radhika Gillespie, CNS, Adriane Jimenez, CNP, Sarah Shi CNP, Tracy Schwab, CNP         [facility]   IN-PATIENT SERVICE   [facility]    Progress Note    7/9/2024    8:54 AM    Name:   Elaina Champagne  MRN:     [id number]     Acct:      [id number]   Room:   2020/2020-01  IP Day:  6  Admit Date:  7/3/2024 11:29 AM    PCP:   Robin Ly MD  Code Status:  DNR-CCA    Subjective:     C/C: Fever.    Interval History Status: not changed.     Vitals reviewed, afebrile and hemodynamically stable.  Saturating well on 1.5 L nasal cannula.  Labs reviewed, leukocytosis has resolved, hemoglobin stable, platelets are stable.  Overnight patient had no significant events.    On examination patient resting comfortably in bed.  States she is fatigued but otherwise feels well.  Currently in normal sinus rhythm.  Valganciclovir given at bedside and patient choked on pills/water.  Speech therapy consulted.    Brief History:     This is a 
Cleansed buttocks wound with soap and water. Applied zinc cream to patient's buttocks wound and brief changed, left loose.  Wounds actively bleeding, sponge added.  Patient tolerated procedure well. 25 ml drainage out from pericardial pigtail into container.  Care ongoing.  
Comprehensive Nutrition Assessment    Type and Reason for Visit:  RD Nutrition Re-Screen/LOS    Nutrition Recommendations/Plan:   Continue diet as tolerated; liberalize cardiac restriction r/t poor PO; severe malnutrition  Continue Clear ONS TID  Start high kcal, high protein ONS TID  Intake of all ONS per day = 100% of kcal need, 73-80% of protein need  Consider appetite stimulant  Monitor skin integrity, weight, labs, GI status, fluid status, meal intakes, ONS intakes.     Malnutrition Assessment:  Malnutrition Status:  Severe malnutrition (07/10/24 1003)    Context:  Chronic Illness     Findings of the 6 clinical characteristics of malnutrition:  Energy Intake:  75% or less estimated energy requirements for 1 month or longer  Weight Loss:  Greater than 20% over 1 year     Body Fat Loss:  Unable to assess     Muscle Mass Loss:  Unable to assess    Fluid Accumulation:  Mild Extremities   Strength:  Not Performed    Nutrition Assessment:    72 yo F, transferred from OSH after being admitted for sepsis d/t UTI. Noted some confusion at time of visit. PMH includes depression. EMR weight hx indicating significant 23.7% weight loss over 1 year. Per notes, choking on water yesterday; developed A-fib with RVR. Consuming 1-25% of meals. Per family, pt has had decreased intake x 7 months. Drinking Clear ONS well noted %. Discussed increased calorie need r/t skin integrity. Per flowsheet, Stage 2 PI to buttocks. Agreeable to Ensure high kcal, high protein ONS. Pt reports a decreased appetite and feels full at meals. Discussed small, frequent meals. Labs include Na 135 mmol/L, Ca 8.5 mg/dL. Per RN, pt reporting intermittent nausea.    Nutrition Related Findings:    Meds/labs reviewed. Wound Type: Pressure Injury, Stage II       Current Nutrition Intake & Therapies:    Average Meal Intake: 1-25%  Average Supplements Intake: %  ADULT ORAL NUTRITION SUPPLEMENT; Breakfast, Lunch, Dinner; Clear Liquid Oral 
Comprehensive Nutrition Assessment    Type and Reason for Visit:  Reassess    Nutrition Recommendations/Plan:   Continue diet pre SLP, Pureed with Thin Liquids  Continue current ONS, Clear TID and high kcal/high PRO ONS TID (strawberry)  Monitor/encourage PO intake     Malnutrition Assessment:  Malnutrition Status:  Severe malnutrition (07/10/24 1003)    Context:  Chronic Illness     Findings of the 6 clinical characteristics of malnutrition:  Energy Intake:  75% or less estimated energy requirements for 1 month or longer  Weight Loss:  Greater than 20% over 1 year     Body Fat Loss:  Unable to assess     Muscle Mass Loss:  Unable to assess    Fluid Accumulation:  Mild Extremities, Generalized   Strength:  Not Performed    Nutrition Assessment:    Pt reports poor appetite, endorses PO intake < 25%. Pt reports drinking ~ 50% of Clear and Ensure. Pt has no flavor preference for Clear ONS, only likes strawberry Ensure. Pt dislikes most foods, states they have no taste, requesting mashed potatoes and oatmeal. Emphasized that patient cannot have oatmeal on current diet. Encouraged patient to focus on any exercises provided by SLP. LBM 7/11. Trace generalized, +2 BUE, +1 BLE edema noted.    Nutrition Related Findings:    Labs/meds reviewed Wound Type: Pressure Injury, Stage II       Current Nutrition Intake & Therapies:    Average Meal Intake: 1-25%  Average Supplements Intake: 26-50%, 51-75%  ADULT ORAL NUTRITION SUPPLEMENT; Breakfast, Lunch, Dinner; Clear Liquid Oral Supplement  ADULT ORAL NUTRITION SUPPLEMENT; Breakfast, Lunch, Dinner; Standard High Calorie/High Protein Oral Supplement  ADULT DIET; Dysphagia - Pureed    Anthropometric Measures:  Height: 154.9 cm (5' 0.98\")  Ideal Body Weight (IBW): 105 lbs (48 kg)       Current Body Weight: 105 kg (231 lb 7.7 oz) (7/11/24), 220.5 % IBW. Weight Source: Bed Scale  Current BMI (kg/m2): 43.8  Usual Body Weight: 138.7 kg (305 lb 12.5 oz) (10/10/24)  % Weight Change 
[facility] - Progress Note    Today's Date and Time: 7/8/2024, 8:00 AM    Impression :   Pericardial effusion  Hemorrhagic fluid  Status post pericardiocentesis with drain insertion  Atrial fibrillation  Coronary artery disease with previous PCI in January 2024  Essential hypertension  CKD stage III  Diastolic congestive heart failure  Lymphedema    Recommendations:   Pericardial fluid: No growth   Discontinue vancomycin and cefepime  Obtain viral serologies to try to establish the cause of the pericardial effusion  Await cytology of the pericardial fluid    Medical Decision Making/Summary/Discussion:7/8/2024     Daily Elements of Decision Making provided by Consulting Physician:    Note: I have independently performed the steps listed below as part of the medical decision making and evaluation.    Review of current Problems:  Today I am seeing the patient for the following problems:  Pericardial effusion  Hemorrhagic fluid  Status post pericardiocentesis with drain insertion  Atrial fibrillation  Coronary artery disease with previous PCI in January 2024  Essential hypertension  CKD stage III  Diastolic congestive heart failure  Lymphedema  Evaluation of Patient:  Patient being evaluated for her large pericardial effusion  Please see daily details in Interval Changes Section  Changes in physical exam:  Status post drainage of pericardial effusion and placement of pericardial drain  Please see daily details in Physical Exam section and in Interval Changes Section  Changes in ROS:  Unchanged  Please see daily details in Review of Symptoms section and in Interval Changes Section  Discussion with Referring Physician or Service  Dr. So  Laboratory and Radiologic Studies with personal review of radiologic studies  Laboratory  Radiology  Microbiology  Cultures: No growth   Please see Details in daily Interval Changes Section devoted to Lab, Micro and Radiology and in Medical 
[facility]  Office: [phone]  Nael Morgan DO, Ronny Quevedo, DO, Pb Vásquez DO, Refugio Bradley, DO, Sheldon Smith MD, Elsa Torres MD, Leandro Espinosa MD, Joann Hauser MD,  Dipak Montana MD, Rob Hernandez MD, Swathi Wells MD,  Gracia Chen DO, Pilar So MD, Kayden Walters MD, Orlando Morgan DO, Yamilex Luciano MD,  Mando Burden DO, Gali Galarza MD, Georgie Workman MD, Jennifer Hill MD, Gloria Sierra MD,  Rafael Rawls MD, Denilson Murphy MD, Kamran Flores MD, Yazan Hughes MD, Theodore Grossman MD, Jaxon Villar MD, Ricky Lopez DO, Mickey Cabral DO, Trev Alex MD,  Arvin Powers MD, Shirley Waterhouse, CNP,  Nidia Lester CNP, Aj Castro, CNP,  Erin Shaw, DNP, Ivis Senior, CNP, Georgiana Freeman, CNP, Diandra Ruffin, CNP, Marilia Parker, CNP, Maureen Matamoros, PA-C, Meeta Titus, PA-C, Sandrita Cruz, CNP, Nan Berrios, CNP, Veronica Alonzo, CNP, Areli Ware, CNP, Guadalupe Jaimes, CNP, Radhika Gillespie, CNS, Adriane Jimenez, CNP, Sarah Shi CNP, Tracy Schwab, CNP         [facility]   IN-PATIENT SERVICE   [facility]    Progress Note    7/14/2024    8:34 AM    Name:   Elaina Champagne  MRN:     [id number]     Acct:      [id number]   Room:   2020/2020-01  IP Day:  11  Admit Date:  7/3/2024 11:29 AM    PCP:   Robin Ly MD  Code Status:  DNR-CCA    Subjective:     C/C: Fever.    Interval History Status: not changed.     Vitals reviewed, afebrile and hemodynamically stable. Saturating well on room air.   Labs reviewed, potassium 3.5 replaced with 40 mEq, magnesium 1.6.   Overnight patient had no significant events..    On examination patient resting comfortably in bed.  No complaints this time eager for discharge to facility.  Replace magnesium and potassium.  Awaiting discharge to SNF.    Brief History:     This is a 73-year-old female with a significant past medical history of hypertension, CKD stage III, 
Noted referral for WO nurse for coccyx wound.   Patient is a 73 yr old female with a PMH lymphedema, A.fib, HTN, CKD 3, MO and CAD who was transferred from [facility] after being admitted with sepsis due to UTI on 7/1. Had been evaluated by WOC Nurse at [facility];   Reviewed Plan of Care with RN and will continue treatment plan with Triad Cream BID and standard Pressure injury prevention measures.  Lower extremity venous ulceration have healed; she does not use compression therapy; no edema was appreciated.    Plan:      Plan of Care:      -Buttocks/coccyx wound care - Cleanse with foaming soap and water, pat dry. Apply a thin layer of Triad to affected areas. Repeat twice daily and as needed.  
Notified on-call CRUZ Ruffin via [employer] patient complaining of 7/10 chest pain slightly offset to left of sternum.  Patient has even, non-labored respirations and skin condition is pink, warm, and dry.  Patient does not want pain medications right now.  12 lead EKG completed, awaiting any further orders. Care ongoing.  
Occupational Therapy    [facility]  Occupational Therapy Not Seen Note    DATE: 2024    NAME: Elaina Champagne  MRN: [id number]   : 1950      Patient not seen this date for Occupational Therapy due to:    Other: RN cancelled d/t elevated HR    Next Scheduled Treatment: 7/10    Electronically signed by DEAN Vasques on 2024 at 2:42 PM   
Occupational Therapy  Facility/Department: [facility] CAR 2- [facility]PDOWN  Occupational Daily Treatment Note    Name: Elaina Champagne  : 1950  MRN: [id number]  Date of Service: 2024    Discharge Recommendations:  Patient would benefit from continued therapy after discharge  Patient Diagnosis(es): The primary encounter diagnosis was Shortness of breath. A diagnosis of Pericardial effusion was also pertinent to this visit.  Past Medical History:  has a past medical history of Anemia, Anxiety disorder, Artificial knee joint present, left, Artificial knee joint present, right, Atherosclerotic heart disease, Bell's palsy, Cellulitis, Chronic kidney disease, stage 3 (HCC), Cognitive communication deficit, Gastric ulcer with hemorrhage, Gastro-esophageal reflux disease without esophagitis, Hyperlipidemia, Hypertension, Lymphedema, Major depressive disorder, Morbid obesity ([facility]), Nonrheumatic tricuspid (valve) insufficiency, NSTEMI (non-ST elevated myocardial infarction) ([facility]), Occlusion and stenosis of bilateral carotid arteries, Osteoarthritis, Overactive bladder, Paroxysmal atrial fibrillation ([facility]), Urge incontinence, Venous insufficiency, and Weakness.  Past Surgical History:  has a past surgical history that includes Hysterectomy, total abdominal (); Cataract removal (Bilateral, ); incision and drainage (Left, 2017); Total knee arthroplasty (Left, 2017); Knee Arthroplasty (Right, 2018); ventral hernia repair (2019); IR NONTUNNELED VASCULAR CATHETER > 5 YEARS (12/15/2023); Upper gastrointestinal endoscopy (N/A, 2023); Cardiac procedure (N/A, 2024); Cardiac procedure (N/A, 2024); Upper gastrointestinal endoscopy (N/A, 2024); Coronary angioplasty with stent (2024); and Cardiac procedure (N/A, 2024).  Assessment   Performance deficits / Impairments: Decreased functional mobility ;Decreased ADL status;Decreased endurance;Decreased balance;Decreased 
On-call CRUZ Ruffin notified of critical HgB 6.1 at this time via [employer]  
PHARMACY NOTE:    The electrolyte replacement protocol for potassium/magnesium has been discontinued per P&T guidelines because the patient has reduced renal function (CrCl < 30 mL/min).      The patient's most recent potassium & magnesium levels are:  Recent Labs     07/01/24  0935 07/01/24  1614 07/02/24  0714 07/02/24  1649 07/03/24  0514   K 3.5*   < > 3.6* 3.8 3.4*   MG 2.1  --   --   --  2.0    < > = values in this interval not displayed.     Estimated Creatinine Clearance: 25 mL/min (A) (based on SCr of 2.2 mg/dL (H)).    For patients with decreased renal function (below 30ml/min) needing potassium/magnesium supplementation, please order individual bolus doses with appropriate monitoring.      Please contact the inpatient pharmacy with any concerns.  Thank you.   
Patient had 2 episode of black tarry stool. Awaiting for occult stool that was sent in the morning. Vital signs obtained BP:145/64 HR:55 on sinus bradycardia spo2:99% on 1L nasal cannula. Notified provider on-call Eloisa Ruffin APN.Care ongoing.  
Patient had bladder spasm, leaked unmeasurable large amount of urine around tierney onto bed.  Patient received CHG bath, linen change, wound care as ordered, and tierney confirmed patent. Care ongoing.  
Patient only occasionally letting staff turn her in bed.  Education provided that turning will help decrease pressure wound risk.  Patient still reluctant to turn. Care ongoing.  
Patient refusing hygiene and wound care and intermittently refusing turning.  Will continue to offer and attempt to provide these services. Care ongoing.  
[facility]   Progress Note                   Date:   7/5/2024  Patient name: Elaina Champagne  Date of admission:  7/3/2024 11:29 AM  MRN:   [id number]  YOB: 1950  PCP: Robin Ly MD    Reason for Admission:     Subjective:       Clinical Changes / Abnormalities:   Pt seen and examined in the room.  S.p Pericardiocentesis. Drain remains inplace.  Family in room.    ~ minimal drainage overnight   Medications:   Scheduled Meds:   Current Facility-Administered Medications:     clopidogrel (PLAVIX) tablet 75 mg, 75 mg, Oral, Daily, Lois Ramsey, APRN - CNP, 75 mg at 07/05/24 0841    vancomycin (VANCOCIN) 500 mg in sodium chloride 0.9 % 100 mL IVPB (mini-bag), 500 mg, IntraVENous, Q24H, Jaxon Villar MD, Stopped at 07/04/24 1930    vancomycin (VANCOCIN) intermittent dosing (placeholder), , Other, RX Placeholder, Ronny Quevedo DO    atorvastatin (LIPITOR) tablet 40 mg, 40 mg, Oral, Nightly, Ronny Quevedo DO, 40 mg at 07/04/24 2109    escitalopram (LEXAPRO) tablet 20 mg, 20 mg, Oral, Daily, Ronny Quevedo DO, 20 mg at 07/05/24 0848    folic acid (FOLVITE) tablet 1 mg, 1 mg, Oral, Daily, Ronny Quevedo DO, 1 mg at 07/05/24 0841    oxyBUTYnin (DITROPAN-XL) extended release tablet 10 mg, 10 mg, Oral, Daily, Ronny Quevedo DO, 10 mg at 07/04/24 0908    sodium chloride flush 0.9 % injection 5-40 mL, 5-40 mL, IntraVENous, 2 times per day, Ronny Quevedo DO, 10 mL at 07/05/24 0842    sodium chloride flush 0.9 % injection 5-40 mL, 5-40 mL, IntraVENous, PRN, Ronny Quevedo DO    0.9 % sodium chloride infusion, , IntraVENous, PRN, Ronny Quevedo DO    ondansetron (ZOFRAN-ODT) disintegrating tablet 4 mg, 4 mg, Oral, Q8H PRN **OR** ondansetron (ZOFRAN) injection 4 mg, 4 mg, IntraVENous, Q6H PRN, Ronyn Quevedo, DO    magnesium hydroxide (MILK OF MAGNESIA) 400 MG/5ML suspension 30 mL, 30 mL, Oral, Daily PRN, Ronny Quevedo S, DO    
[facility]   Progress Note                   Date:   7/6/2024  Patient name: Elaina Champagne  Date of admission:  7/3/2024 11:29 AM  MRN:   [id number]  YOB: 1950  PCP: Robin Ly MD    Reason for Admission:     Subjective:       Clinical Changes / Abnormalities:   Pt seen and examined in the room.  S.p Pericardiocentesis. Drain remains inplace.  Family in room.    ~ minimal drainage overnight   Medications:   Scheduled Meds:   Current Facility-Administered Medications:     potassium chloride 20 mEq/50 mL IVPB (Central Line), 20 mEq, IntraVENous, Q1H, Pilar So MD    clopidogrel (PLAVIX) tablet 75 mg, 75 mg, Oral, Daily, Lois Ramsey APRN - CNP, 75 mg at 07/06/24 0826    vancomycin (VANCOCIN) 500 mg in sodium chloride 0.9 % 100 mL IVPB (mini-bag), 500 mg, IntraVENous, Q24H, Jaxon Villar MD, Stopped at 07/05/24 2050    vancomycin (VANCOCIN) intermittent dosing (placeholder), , Other, RX Placeholder, Ronny Quevedo DO    atorvastatin (LIPITOR) tablet 40 mg, 40 mg, Oral, Nightly, Ronny Quevedo DO, 40 mg at 07/05/24 2048    escitalopram (LEXAPRO) tablet 20 mg, 20 mg, Oral, Daily, Ronny Quevedo DO, 20 mg at 07/06/24 0826    folic acid (FOLVITE) tablet 1 mg, 1 mg, Oral, Daily, Ronny Quevedo DO, 1 mg at 07/06/24 0826    oxyBUTYnin (DITROPAN-XL) extended release tablet 10 mg, 10 mg, Oral, Daily, Ronny Quevedo DO, 10 mg at 07/06/24 0824    sodium chloride flush 0.9 % injection 5-40 mL, 5-40 mL, IntraVENous, 2 times per day, Ronny Quevedo DO, 10 mL at 07/06/24 0827    sodium chloride flush 0.9 % injection 5-40 mL, 5-40 mL, IntraVENous, PRN, Ronny Quevedo DO    0.9 % sodium chloride infusion, , IntraVENous, PRN, Sae, Ronny S, DO    ondansetron (ZOFRAN-ODT) disintegrating tablet 4 mg, 4 mg, Oral, Q8H PRN, 4 mg at 07/06/24 0826 **OR** ondansetron (ZOFRAN) injection 4 mg, 4 mg, IntraVENous, Q6H PRN, Ronny Quevedo S, DO    
[facility]   Progress Note                   Date:   7/9/2024  Patient name: Elaina Champagne  Date of admission:  7/3/2024 11:29 AM  MRN:   [id number]  YOB: 1950  PCP: Robin Ly MD    Reason for Admission:     Subjective:       Clinical Changes / Abnormalities: Pt seen and examined in the room.  Patient resting in bed. Pt denies any CP or sob.  Labs, vitals and tele reviewed- SR    Medications:   Scheduled Meds:   Current Facility-Administered Medications:     valGANciclovir (VALCYTE) tablet 900 mg, 900 mg, Oral, BID, Denys Baez MD    metoprolol succinate (TOPROL XL) extended release tablet 25 mg, 25 mg, Oral, Daily, Mando Burden DO    pantoprazole (PROTONIX) 80 mg in sodium chloride 0.9 % 100 mL infusion, 8 mg/hr, IntraVENous, Continuous, Pilar So MD, Last Rate: 10 mL/hr at 07/09/24 0357, 8 mg/hr at 07/09/24 0357    potassium chloride (KLOR-CON M) extended release tablet 40 mEq, 40 mEq, Oral, PRN **OR** potassium bicarb-citric acid (EFFER-K) effervescent tablet 40 mEq, 40 mEq, Oral, PRN **OR** potassium chloride 10 mEq/100 mL IVPB (Peripheral Line), 10 mEq, IntraVENous, PRN, Braulio, Lois E, APRN - CNP    magnesium sulfate 2000 mg in 50 mL IVPB premix, 2,000 mg, IntraVENous, PRN, Braulio, Lois E, APRN - CNP    clopidogrel (PLAVIX) tablet 75 mg, 75 mg, Oral, Daily, Braulio, Lois E, APRN - CNP, 75 mg at 07/09/24 1006    atorvastatin (LIPITOR) tablet 40 mg, 40 mg, Oral, Nightly, Ronny Quevedo DO, 40 mg at 07/08/24 2004    escitalopram (LEXAPRO) tablet 20 mg, 20 mg, Oral, Daily, Ronny Quevedo DO, 20 mg at 07/09/24 1003    folic acid (FOLVITE) tablet 1 mg, 1 mg, Oral, Daily, Ronny Quevedo DO, 1 mg at 07/09/24 1008    oxyBUTYnin (DITROPAN-XL) extended release tablet 10 mg, 10 mg, Oral, Daily, Ronny Quevedo DO, 10 mg at 07/09/24 1002    sodium chloride flush 0.9 % injection 5-40 mL, 5-40 mL, IntraVENous, 2 times per day, 
Physical Therapy        Physical Therapy Cancel Note      DATE: 2024    NAME: Elaina Champagne  MRN: [id number]   : 1950      Patient not seen this date for Physical Therapy due to:    Other: RN deferred due to heart rate. Will continue to pursue as able.       Electronically signed by Raven Shea PTA on 2024 at 1:37 PM     
Physical Therapy  Facility/Department: [facility] CAR 2- STEPDOWN  Physical Therapy Daily Treatment Note  Name: Elaina Champagne  : 1950  MRN: [id number]  Date of Service: 7/10/2024    Discharge Recommendations:  Patient would benefit from continued therapy after discharge   PT Equipment Recommendations  Equipment Needed: No      Patient Diagnosis(es): The primary encounter diagnosis was Shortness of breath. Diagnoses of Pericardial effusion, Acute coronary syndrome (HCC), and Pericardial effusion - large were also pertinent to this visit.  Past Medical History:  has a past medical history of Anemia, Anxiety disorder, Artificial knee joint present, left, Artificial knee joint present, right, Atherosclerotic heart disease, Bell's palsy, Cellulitis, Chronic kidney disease, stage 3 (HCC), Cognitive communication deficit, Gastric ulcer with hemorrhage, Gastro-esophageal reflux disease without esophagitis, Hyperlipidemia, Hypertension, Lymphedema, Major depressive disorder, Morbid obesity (HCC), Nonrheumatic tricuspid (valve) insufficiency, NSTEMI (non-ST elevated myocardial infarction) ([facility]), Occlusion and stenosis of bilateral carotid arteries, Osteoarthritis, Overactive bladder, Paroxysmal atrial fibrillation ([facility] [facility]), Urge incontinence, Venous insufficiency, and Weakness.  Past Surgical History:  has a past surgical history that includes Hysterectomy, total abdominal (); Cataract removal (Bilateral, ); incision and drainage (Left, 2017); Total knee arthroplasty (Left, 2017); Knee Arthroplasty (Right, 2018); ventral hernia repair (2019); IR NONTUNNELED VASCULAR CATHETER > 5 YEARS (12/15/2023); Upper gastrointestinal endoscopy (N/A, 2023); Cardiac procedure (N/A, 2024); Cardiac procedure (N/A, 2024); Upper gastrointestinal endoscopy (N/A, 2024); Coronary angioplasty with stent (2024); Cardiac procedure (N/A, 2024); and Cardiac procedure (N/A, 
Potassium 3.6 on AM labs. Perfect serve sent to Dr Villar. Orders received to monitor for now and recheck BMP in am. Plan of care ongoing.  
[facility]  Office: [phone]  Nael Morgan DO, Ronny Queveod DO, Pb Vásquez DO, Refugio Bradley DO, Sheldon Smith MD, Elsa Torres MD, Leandro Espinosa MD, Joann Hauser MD,  Dipak Montana MD, Rob Hernandez MD, Swathi Wells MD,  Gracia Chen DO, Pilar So MD, Kyaden Walters MD, Orlando Morgan DO, Yamilex Luciano MD,  Mando Burden DO, Gali Galarza MD, Georgie Workman MD, Jennifer Hill MD, Gloria Sierra MD,  Rafael Rawls MD, Denilson Murphy MD, Kamran Flores MD, Yazan Hughes MD, Theodore Grossman MD, Jaxon Villar MD, Ricky Lopez DO, Mickey Cabral DO, Trev Alex MD,  Arvin Powers MD, Shirley Waterhouse, CNP,  Nidia Lester CNP, Aj Castro, CNP,  Erin Shaw, DNP, Ivis Senior, CNP, Georgiana Freeman, CNP, Diandra Ruffin, CNP, Marilia Parker, CNP, Maureen Matamoros, PA-C, Meeta Titus PA-C, Sandrita Cruz, CNP, Nan Berrios, CNP, Veronica Alonzo, CNP, Areli Ware, CNP, Guadalupe Jaimes, CNP, Radhika Gillespie, CNS, Adriane Jimenez, CNP, Sarah Shi CNP, Tracy Schwab, CNP         [facility]   IN-PATIENT SERVICE   [facility]    Progress Note    7/6/2024    1:10 PM    Name:   Elaina Champagne  MRN:     [id number]     Acct:      [id number]   Room:   2020/2020-01  IP Day:  3  Admit Date:  7/3/2024 11:29 AM    PCP:   Robin Ly MD  Code Status:  DNR-CCA    Subjective:     C/C:fever  Interval History Status: not changed.     Patient seen and examined.    Patient is extremely weak.  No chest pain or shortness of breath.  She does not have any acute complaints.  Hemoglobin was low down to 6 in the morning however repeat was 7.9.  Nurse does report that she is having some worsening of her sacral wound.  Patient does not like turning and has been refusing hygiene. She had some rj stool, some contamination from bloody sacral wounds.   at bedside, updates given.  We are waiting for echo to remove the drain.  Labs and 
SLP ALL NOTES  Facility/Department: [facility] CAR 2- STEPDOWN   CLINICAL BEDSIDE SWALLOW EVALUATION    NAME: Elaina Champagne  : 1950  MRN: [id number]    ADMISSION DATE: 7/3/2024  ADMITTING DIAGNOSIS: has Depression with anxiety; Eczema; Edema; Primary hypertension; Hyperlipidemia; Morbid obesity ([facility]); Osteoarthritis; Rosacea; Urge incontinence of urine; Varicose veins; PAF (paroxysmal atrial fibrillation) ([facility]); Non-rheumatic tricuspid valve insufficiency; Venous insufficiency of both lower extremities; Lymphedema; Venous stasis ulcer of calf with fat layer exposed with varicose veins ([facility]); Venous ulcer of left leg ([facility]); Hyperkalemia; MICHELLE (acute kidney injury) ([facility]); Cellulitis; Bacteremia due to Pseudomonas; Bacteriuria; Leukocytosis; Elevated C-reactive protein (CRP); Allergy to penicillin; Atrial fibrillation with RVR ([facility]); Altered mental status; Elevated erythrocyte sedimentation rate; Permanent atrial fibrillation ([facility]); Renovascular hypertension; Melena; Normocytic anemia; Anticoagulated; Pseudomonas septicemia ([facility]); Abscess of right foot; Acute gastric ulcer with hemorrhage; NSTEMI (non-ST elevated myocardial infarction) ([facility]); S/P cardiac cath; Coronary artery disease involving native coronary artery of native heart without angina pectoris; Healing ulcers of both lower extremities, limited to breakdown of skin ([facility]); Pseudomonas aeruginosa infection; Bilateral lower leg cellulitis; Class 3 severe obesity due to excess calories with serious comorbidity and body mass index (BMI) of 50.0 to 59.9 in adult ([facility]); Carotid stenosis, asymptomatic, bilateral; Atherosclerotic heart disease of native coronary artery with unspecified angina pectoris; Chronic kidney disease, stage 3b ([facility]); GI bleed; Moderate malnutrition ([facility]); S/P right coronary artery (RCA) stent placement; Lower GI hemorrhage; Acute metabolic encephalopathy; Hyponatremia; Acute delirium; Hematoma of leg, right, initial encounter; Troponin 
[facility]  Office: [phone]  Nael Morgan DO, Ronny Quevedo, DO, Pb Vásquez DO, Refugio Bradley, DO, Sheldon Smith MD, Elsa Torres MD, Leandro Espinosa MD, Joann Hauser MD,  Dipak Montana MD, Rob Hernandez MD, Swathi Wells MD,  Gracia Chen DO, Pilar So MD, Kayden Walters MD, Orlando Morgan DO, Yamilex Luciano MD,  Mando Burden DO, Gali Galarza MD, Georgie Workman MD, Jennifer Hill MD, Gloria Sierra MD,  Rafael Rawls MD, Denilson Murphy MD, Kamran Flores MD, Yazan Hughes MD, Theodore Grossman MD, Jaxon Villar MD, Ricky Lopez DO, Mickey Cabral DO, Trev Alex MD,  Arvin Powers MD, Shirley Waterhouse, CNP,  Nidia Lester CNP, Aj Castro, CNP,  Erin Shaw, DNP, Ivis Senior, CNP, Georgiana Freeman, CNP, Diandra Ruffin, CNP, Marilia Parker, CNP, Maureen Matamoros, PA-C, Meeta Titus, PA-C, Sandrita Cruz, CNP, Nan Berrios, CNP, Veronica Alonzo, CNP, Areli Ware, CNP, Guadalupe Jaimes, CNP, Radhika Gillespie, CNS, Adriane Jimenez, CNP, Sarah Shi CNP, Tracy Schwab, CNP         [facility]   IN-PATIENT SERVICE   [facility]    Progress Note    7/12/2024    9:01 AM    Name:   Elaina Champagne  MRN:     [id number]     Acct:      [id number]   Room:   2020/2020-01  IP Day:  9  Admit Date:  7/3/2024 11:29 AM    PCP:   Robin Ly MD  Code Status:  DNR-CCA    Subjective:     C/C: Fever.    Interval History Status: not changed.     Vitals reviewed, afebrile and hemodynamically stable. Saturating well on 1.5 L nasal cannula.  Labs reviewed, mild hypokalemia 3.6, hemoglobin dropped from 8.6-7.5?  Overnight patient complained of chest pain overnight as well as bladder spasms.    On examination patient resting comfortably in bed. Will get urology involvement.  Discussed with urology recommending Levsin while inpatient. Resume oxybutynin when able. Awaiting placement at [facility].    Brief History:     This is a 
[facility]  Office: [phone]  Nael Morgan DO, Ronny Quevedo DO, Pb Vásquez DO, Refugio Bradley DO, Sheldon Smith MD, Elsa Torres MD, Leandro Espinosa MD, Joann Hauser MD,  Dipak Montana MD, Rob Hernandez MD, Swathi Wells MD,  Gracia Chen DO, Pilar So MD, Kayden Walters MD, Orlando Morgan DO, Yamilex Luciano MD,  Mando Burden DO, Gali Galarza MD, Georgie Workman MD, Jennifer Hill MD, Gloria Sierra MD,  Rafael Rawls MD, Denilson Murphy MD, Kamran Flores MD, Yazan Hughes MD, Theodore Grossman MD, Jaxon Villar MD, Ricky Lopez DO, Mickey Cabral DO, Trev Alex MD,  Arvin Powers MD, Shirley Waterhouse, CNP,  Nidia Lester CNP, Aj Castro, CNP,  Erin Shaw, DNP, Ivis Senior, CNP, Georgiana Freeman, CNP, Diandra Ruffin, CNP, Marilia Parker, CNP, Maureen Matamoros, PA-C, Meeta Titus PA-C, Sandrita Cruz, CNP, Nan Berrios, CNP, Veronica Alonzo, CNP, Areli Ware, CNP, Guadalupe Jaimes, CNP, Radhika Gillespie, CNS, Adriane Jimenez, CNP, Sarah Shi CNP, Tracy Schwab, CNP         [facility]   IN-PATIENT SERVICE   [facility]    Progress Note    7/5/2024    12:27 PM    Name:   Elaina Champagne  MRN:     [id number]     Acct:      [id number]   Room:   2020/2020-01  IP Day:  2  Admit Date:  7/3/2024 11:29 AM    PCP:   Robin Ly MD  Code Status:  DNR-CCA    Subjective:     C/C:fever  Interval History Status: not changed.     Patient seen and examined.  She appears extremely weak.  She is mostly sleeping and  gives major history.  He states that she appears weaker compared to yesterday.  Blood pressure 120 systolic.  Heart rate of 50, she is afebrile.  Saturating 95% on 2 L.  Labs reviewed  Potassium 2.6, sodium 133, creatinine 1.8, glucose 68, hemoglobin 7.9, WBC 12.5.    Brief History:     Elaina Champagne is a 73 y.o. female with PMHx lymphedema, A.fib, HTN, CKD 3, MI and CAD who was transferred from [facility] 
[facility]  Office: [phone]  Nael Morgan DO, Ronny Quevedo DO, Pb Vásquez DO, Refugio Bradley DO, Sheldon Smith MD, Elsa Torres MD, Leandro Espinosa MD, Joann Hauser MD,  Dipak Montana MD, Rob Hernandez MD, Swathi Wells MD,  Gracia Chen DO, Pilar So MD, Kayden Walters MD, Orlando Morgan DO, Yamilex Luciano MD,  Mando Burden DO, Gali Galarza MD, Georgie Workman MD, Jennifer Hill MD, Gloria Sierra MD,  Rafael Rawls MD, Deinlson Murphy MD, Kamran Flores MD, Yazan Hughes MD, Theodore Grossman MD, Jaxon Villar MD, Ricky Lopez DO, Mickey Cabral DO, Trev Alex MD,  Arvin Powers MD, Shirley Waterhouse, CNP,  Nidia Lester CNP, Aj Castro, CNP,  Erin Shaw, DNP, Ivis Senior, CNP, Georgiana Freeman, CNP, Diandra Ruffin, CNP, Marilia Parker, CNP, Maureen Matamoros, PA-C, Meeta Titus PA-C, Sandrita Cruz, CNP, Nan Berrios, CNP, Veronica Alonzo, CNP, Areli Ware, CNP, Guadalupe Jaimes, CNP, Radhika Gillespie, CNS, Adriane Jimenez, CNP, Sarah Shi CNP, Tracy Schwab, CNP         [facility]   IN-PATIENT SERVICE   [facility]    Progress Note    7/4/2024    12:42 PM    Name:   Elaina Champagne  MRN:     [id number]     Acct:      [id number]   Room:   2020/2020-01   Day:  1  Admit Date:  7/3/2024 11:29 AM    PCP:   Robin Ly MD  Code Status:  DNR-CCA    Subjective:     C/C: CP  Interval History Status: not changed.     Patient seen and examined at bedside this morning.  No acute events overnight.  Drain is in place with hemorrhagic fluid.  Hemoglobin 7.7 this morning.  Cardiology evaluated.  Patient denies having any chest pain, shortness of breath, lightheadedness, dizziness, fever or chills.    Brief History:     Elaina Champagne is a 73 y.o. female with PMHx lymphedema, A.fib, HTN, CKD 3, MI and CAD who was transferred from [facility] after being admitted with sepsis due to UTI on 7/1.  On echo, 
Unable to complete wound care as ordered, as ordered dressing cream Triad is unavailable both on the unit and in the pharmacy.  Care ongoing.  
Urine leakage around tierney into patient bed.  Verified balloon inflated and seated. On-call APN notified. Care ongoing.    Tierney replaced per on-call APN.  
[facility]  Office: [phone]  Nael Morgan DO, Ronny Quevedo DO, Pb Vásquez DO, Refugio Bradley DO, Sheldon Smith MD, Elsa Torres MD, Leandro Espinosa MD, Joann Hauser MD,  Dipak Montana MD, Rob Hernandez MD, Swathi Wells MD,  Gracia Chen DO, Pilar So MD, Kayden Walters MD, Orlando Morgan DO, Yamilex Luciano MD,  Mando Burden DO, Gali Galarza MD, Georgie Workman MD, Jennifer Hill MD, Gloria Sierra MD,  Rafael Rawls MD, Denilson Murphy MD, Kamran Flores MD, Yazan Hughes MD, Theodore Grossman MD, Jaxon Villar MD, Ricky Lopez DO, Mickey Cabral DO, Trev Alex MD,  Arvin Powers MD, Shirley Waterhouse, CNP,  Nidia Lester CNP, Aj Castro, CNP,  Erin Shaw, DNP, Ivis Senior, CNP, Georgiana Freeman, CNP, Diandra Ruffin, CNP, Marilia Parker, CNP, Maureen Matamoros, PA-C, Meeta Titus PA-C, Sandrita Cruz, CNP, Nan Berrios, CNP, Veronica Alonzo, CNP, Areli Ware, CNP, Guadalupe Jaimes, CNP, Radhika Gillespie, CNS, Adriane Jimenez, CNP, Sarah Shi CNP, Tracy Schwab, CNP         [facility]   IN-PATIENT SERVICE   [facility]    Progress Note    7/7/2024    12:36 PM    Name:   Elaina Champagne  MRN:     [id number]     Acct:      [id number]   Room:   2020/2020-01  IP Day:  4  Admit Date:  7/3/2024 11:29 AM    PCP:   Robin Ly MD  Code Status:  DNR-CCA    Subjective:     C/C:fever  Interval History Status: not changed.     Patient seen and examined.    Nursing staff reported some black tarry stool this morning 2 episodes.  Patient actually looks slightly better today and is drinking currently Ensure supplement.  She was still eat slightly better today.  Her hemoglobin is stable.  Does not have any tachycardia or hypotension.  She does have a history of gastric ulcer.  Labs and vitals reviewed.  Bp 150s  Temp 97.8  Creatinine 1.2  Sodium 135  Hb 8.7, wbc 15.7    Brief History:     Elaina Champagne is a 73 y.o. female 
[facility]  Office: [phone]  Nael Morgan DO, Ronny Quevedo, DO, Pb Vásquez DO, Refugio Bradley, DO, Sheldon Smith MD, Elsa Torres MD, Leandro Espinosa MD, Joann Hauser MD,  Dipak Montana MD, Rob Hernandez MD, Swathi Wells MD,  Gracia Chen DO, Pilar So MD, Kayden Walters MD, Olrando oMrgan DO, Yamilex Luciano MD,  Mando Burden DO, Gali Galarza MD, Georgie Workman MD, Jennifer Hill MD, Gloria Sierra MD,  Rafael Rawls MD, Denilson Murphy MD, Kamran Flores MD, Yazan Hughes MD, Theodore Grossman MD, Jaxon Villar MD, Ricky Lopez DO, Mickey Cabral DO, Trev Alex MD,  Arvin Powers MD, Shirley Waterhouse, CNP,  Nidia Lester CNP, Aj Castro, CNP,  Erin Shaw, DNP, Ivis Senior, CNP, Georgiana Freeman, CNP, Diandra Ruffin, CNP, Marilia Parker, CNP, Maureen Matamoros, PA-C, Meeta Titus PA-C, Sandrita Cruz, CNP, Nan Berrios, CNP, Veronica Alonzo, CNP, Areli Ware, CNP, Guadalupe Jaimes, CNP, Radhika Gillespie, CNS, Adriane Jimenez, CNP, Sarah Shi CNP, Tracy Schwab, CNP         [facility]   IN-PATIENT SERVICE   [facility]    Progress Note    7/8/2024    2:46 PM    Name:   Elaina Champagne  MRN:     [id number]     Acct:      [id number]   Room:   2020/2020-01  IP Day:  5  Admit Date:  7/3/2024 11:29 AM    PCP:   Robin Ly MD  Code Status:  DNR-CCA    Subjective:     C/C:fever  Interval History Status: not changed.     Patient seen and examined.    Family at bedside  No bowel moment this morning  Labs and vitals reviewed  Patient was bradycardic, BB was held  She is trying to eat better  Creatinine 1.1  Hb 8.6  Brief History:     Elaina Champagne is a 73 y.o. female with PMHx lymphedema, A.fib, HTN, CKD 3, MI and CAD who was transferred from [facility] after being admitted with sepsis due to UTI on 7/1. Patient had fevers, chills and altered mental status. Patient found to be septic with fever of 104 
Wound care completed as ordered. Care ongoing.  
Writer called report to nurse at [facility]. All questions and concerns answered. Pt on stretcher and exited room 2020 with transport. Pt belongings given to .   
0.9 % injection 5-40 mL, 5-40 mL, IntraVENous, 2 times per day, Ronny Quevedo DO, 10 mL at 07/03/24 2020    sodium chloride flush 0.9 % injection 5-40 mL, 5-40 mL, IntraVENous, PRN, Ronny Quevedo DO    0.9 % sodium chloride infusion, , IntraVENous, PRN, Ronny Quevedo DO    amiodarone (CORDARONE) tablet 200 mg, 200 mg, Oral, BID, 200 mg at 07/03/24 2020 **FOLLOWED BY** [START ON 7/9/2024] amiodarone (CORDARONE) tablet 200 mg, 200 mg, Oral, Daily, Ronny Quevedo DO    metoprolol tartrate (LOPRESSOR) tablet 25 mg, 25 mg, Oral, BID, Ronny Quevedo DO, 25 mg at 07/03/24 2020    cefepime (MAXIPIME) 1,000 mg in sodium chloride 0.9 % 50 mL IVPB (mini-bag), 1,000 mg, IntraVENous, Q12H, Ronny Quevedo DO, Last Rate: 12.5 mL/hr at 07/04/24 0444, 1,000 mg at 07/04/24 0444    mupirocin (BACTROBAN) 2 % ointment, , Each Nostril, BID, Ronny Quevedo DO, Given at 07/03/24 2020    pantoprazole (PROTONIX) tablet 40 mg, 40 mg, Oral, BID AC, Ronny Quevedo DO, 40 mg at 07/03/24 1735    silver sulfADIAZINE (SILVADENE) 1 % cream, , Topical, Daily, Ronny Quevedo DO    sucralfate (CARAFATE) tablet 1 g, 1 g, Oral, 4x Daily AC & HS, Ronny Quevedo DO, 1 g at 07/03/24 2020    vitamin B-12 (CYANOCOBALAMIN) tablet 500 mcg, 500 mcg, Oral, Daily, Ronny Quevedo DO    [START ON 7/7/2024] vitamin D (ERGOCALCIFEROL) capsule 50,000 Units, 50,000 Units, Oral, Weekly, Ronny Quevedo DO   Continuous Infusions:   sodium chloride      sodium chloride       CBC:   Recent Labs     07/02/24  0714 07/03/24  0514 07/04/24  0621   WBC 15.3* 17.0* 15.2*   HGB 7.9* 7.1* 7.7*   * 533* 534*       BMP:    Recent Labs     07/02/24  1649 07/03/24  0514 07/04/24  0621   * 133* 134*   K 3.8 3.4* 3.6*   CL 95* 96* 99   CO2 21 25 24   BUN 53* 56* 57*   CREATININE 2.1* 2.2* 2.0*   GLUCOSE 135* 114* 86       Hepatic:   Recent Labs     07/01/24  0935 07/01/24  1614   AST 28 212*   ALT 8 32   BILITOT 
on 7/1/2024.  At that point she was transported from her skilled nursing facility because of the presence of fevers, chills, altered mental status.     The patient had altered mentation with underlying severe metabolic encephalopathy, respiratory distress, tachypnea and tachycardia.  She was evaluated by the pulmonary critical care service.  Her radiological studies showed a very dilated cardiac silhouette.  Cardiac echo on 7/2/2024 showed the presence of pericardial effusion without overt tamponade.    The patient was transferred to [facility] and underwent a pericardiocentesis on 7/3/2024 with drainage of 600 cc of hemorrhagic fluid.  Pigtail catheter was left in place.    ID service asked to evaluate and advice on treatment.      CURRENT EVALUATION- DAILY INTERVAL CHANGES 7/8/2024  BP (!) 100/45   Pulse 65   Temp 97.9 °F (36.6 °C) (Axillary)   Resp 17   Ht 1.549 m (5' 1\")   Wt 108.4 kg (239 lb)   SpO2 98%   BMI 45.16 kg/m²     Afebrile  VS stable    Patient feels better  She is on nasal oxygen at 1.5 L/min  Respiratory rate 20-->17  O2 saturation 98-->96-->99    No new issues reported  Cultures: No growth   Rheumatoid factor: Elevated    Medications reviewed:  Monitor off antibiotics   D/C vancomycin  D/C cefepime    Labs, X rays reviewed: 7/8/2024 with independent review of X rays    BUN: 38-->36-->31  Cr: 1.3-->1.2-->1.1  Na 132-->135    WBC: 11.1-->15.7-->10  Hb: 6.1-->7.9-->8.6  Plat:  415-->611-->654    CRP:       Influenza A & B: negative   Covid: negative   RSV negative  Rheumatoid factor: Positive    Cultures:  Urine:    Blood:  5/27/2024 no growth  6/20/2024 no growth x 2  7/1/2024 no growth  Sputum :  7/1/2024 respiratory PCR panel negative  Wound:  7/3/2024 pericardial fluid no growth  MRSA Nares:  7/1/2024 positive      Sed rate  5/27/2024 108  CRP  527 2466  T. Pallidum negative     Imaging:        I have personally reviewed the past medical history, past surgical history, medications, social 
(36.6 °C) (Oral)   Resp 20   Ht 1.549 m (5' 1\")   Wt 108.4 kg (239 lb)   SpO2 96%   BMI 45.16 kg/m²   General appearance: alert and cooperative with exam  HEENT: Head: Normocephalic, no lesions, without obvious abnormality.  Neck: no adenopathy, no carotid bruit, no JVD, supple, symmetrical, trachea midline and thyroid not enlarged, symmetric, no tenderness/mass/nodules  Lungs: clear to auscultation bilaterally  Heart: regular rate and rhythm, S1, S2 normal, no murmur, click, rub or gallop   Abdomen: soft, non-tender; bowel sounds normal; no masses,  no organomegaly  Extremities: extremities normal, atraumatic, no cyanosis or edema  Neurologic: Mental status: Alert, oriented, thought content appropriate      ECHO (TTE) LIMITED (PRN CONTRAST/BUBBLE/STRAIN/3D) 07/02/2024  4:56 PM (Final)    Interpretation Summary    Left Ventricle: Normal left ventricular systolic function with a visually estimated EF of 60 - 65%. Left ventricle size is normal. Normal wall thickness. Normal wall motion.    Pericardium: Large (>2 cm) circumferential pericardial effusion present. Pericardial effusion contains fibrinous materials. No indication of cardiac tamponade. Evidence includes no significant respiratory chamber variation.    Image quality is adequate. Procedure performed with the patient in a supine position.      CARDIAC PROCEDURE 05/06/2024  3:22 PM (Final)      Indications for PCI: High-grade coronary lesion with unstable angina    Procedure performed: PCI to RCA    Access: Right Radial artery    Procedure: After informed consent was obtained with explanation of the risks and benefits, patient was brought to the cath lab. The right wrist was prepped and draped in sterile fashion. 1% lidocaine was used for local block. The Radial artery was cannulated with 6  Fr sheath with brisk arterial blood return. The side port was frequently flushed and aspirated with normal saline.    EBL is 15 mL    Dominance is 
AROM: WFL except knee flexion limited to ~90 degrees  AROM LLE (degrees)  LLE AROM : Exceptions  LLE General AROM: ankle dorsiflexion limited to neutral, knee flexion limited to ~30 degrees  AROM RUE (degrees)  RUE AROM : WFL  AROM LUE (degrees)  LUE AROM : WFL  Strength RLE  Strength RLE: Exception  R Hip Flexion: 2/5  R Knee Flexion: 3+/5  R Knee Extension: 4-/5  R Ankle Dorsiflexion: 3/5  R Ankle Plantar flexion: 3/5  Strength LLE  Strength LLE: Exception  L Hip Flexion: 2+/5  L Knee Flexion: 2+/5  L Knee Extension: 2+/5  L Ankle Dorsiflexion: 2/5  L Ankle Plantar Flexion: 2/5  Strength RUE  Strength RUE: WFL  Comment: Grossly 3+/5  Strength LUE  Strength LUE: WFL  Comment: Grossly 3+/5      Bed mobility  Rolling to Left: Maximum assistance  Rolling to Right: Maximum assistance  Supine to Sit: Maximum assistance;2 Person assistance  Sit to Supine: Maximum assistance;2 Person assistance  Scooting: Moderate assistance  Bed Mobility Comments: HOB elevated ~35 degrees, increased time/effort to perform.  Verbal cues for sequencing.  Log roll performed to reduce shear on sacral wound.  Transfers  Sit to Stand: Moderate Assistance;2 Person Assistance  Stand to Sit: Moderate Assistance;2 Person Assistance  Comment: Transfers performed with a RW.  Verbal cues for UE placement. Significant L sided trunk lean upon reaching standing requiring mod-A x2 to correct.  Ambulation  More Ambulation?: No (pt unable to unweight either LE to progress with ambulation.)  Stairs/Curb  Stairs?: No     Balance  Posture: Fair  Sitting - Static: Fair  Sitting - Dynamic: Fair;-  Standing - Static: Poor;-  Comments: standing balance assessed while using a RW.                                                    AM-PAC - Mobility    AM-PAC Basic Mobility - Inpatient   How much help is needed turning from your back to your side while in a flat bed without using bedrails?: A Lot  How much help is needed moving from lying on your back to sitting on 
Pressure Father     Stroke Father     Ovarian Cancer Daughter     Cancer Daughter     Diabetes Maternal Aunt     Cancer Other         daughter/ovarian cancer       Vitals:  /61   Pulse 66   Temp 97.7 °F (36.5 °C) (Axillary)   Resp 15   Ht 1.549 m (5' 0.98\")   Wt 105 kg (231 lb 7.7 oz)   SpO2 96%   BMI 43.76 kg/m²   Temp (24hrs), Av.7 °F (36.5 °C), Min:97.3 °F (36.3 °C), Max:97.9 °F (36.6 °C)    Recent Labs     24  2126   POCGLU 102         I/O (24Hr):    Intake/Output Summary (Last 24 hours) at 2024 0920  Last data filed at 2024 0500  Gross per 24 hour   Intake 10 ml   Output 1100 ml   Net -1090 ml         Labs:  Hematology:  Recent Labs     24  0346 24  1509 07/10/24  0733 24  0633   WBC 9.1  --  7.3 5.3   RBC 3.18*  --  3.28* 3.28*   HGB 8.2* 8.2* 8.5* 8.6*   HCT 28.1* 27.4* 29.1* 30.5*   MCV 88.4  --  88.7 93.0   MCH 25.8  --  25.9 26.2   MCHC 29.2  --  29.2 28.2*   RDW 20.5*  --  20.6* 20.9*   *  --  498* 451   MPV 10.1  --  10.0 9.9       Chemistry:  Recent Labs     24  1509 07/09/24  2001 07/10/24  0733 24  0633     --  135* 134*   K 3.0* 3.5* 3.8 3.4*     --  106 105   CO2 23  --  22 20   GLUCOSE 96  --  80 75   BUN 26*  --  23 19   CREATININE 1.0*  --  0.9 0.9   MG 1.8  --  2.1 2.0   ANIONGAP 8*  --  7* 9   LABGLOM 59*  --  65 70   CALCIUM 8.3*  --  8.5* 8.2*   TROPHS 45* 50*  --   --        Recent Labs     24   POCGLU 102       ABG:  Lab Results   Component Value Date/Time    POCPH 7.471 2024 11:24 AM    PH 6.0 2018 11:53 AM    POCPCO2 32.1 2024 11:24 AM    POCPO2 233.8 2024 11:24 AM    POCHCO3 23.4 2024 11:24 AM    PBEA 0.0 2024 11:24 AM    YPDX1PQP 99.9 2024 11:24 AM    FIO2 INFORMATION NOT PROVIDED 2024 09:50 AM     Lab Results   Component Value Date/Time    SPECIAL Site: Body Fluid 2024 02:28 PM     Lab Results   Component Value Date/Time    CULTURE NO 
functional limits  Cognition  Overall Cognitive Status: WFL                  Education Given To: Patient  Education Provided: Role of Therapy;Plan of Care;Transfer Training  Education Method: Demonstration;Verbal  Barriers to Learning: None  Education Outcome: Verbalized understanding;Demonstrated understanding       AM-PAC - ADL  AM-PAC Daily Activity - Inpatient   How much help is needed for putting on and taking off regular lower body clothing?: A Lot  How much help is needed for bathing (which includes washing, rinsing, drying)?: A Lot  How much help is needed for toileting (which includes using toilet, bedpan, or urinal)?: A Lot  How much help is needed for putting on and taking off regular upper body clothing?: A Little  How much help is needed for taking care of personal grooming?: A Little  How much help for eating meals?: None  AM-PAC Inpatient Daily Activity Raw Score: 16  AM-PAC Inpatient ADL T-Scale Score : 35.96  ADL Inpatient CMS 0-100% Score: 53.32  ADL Inpatient CMS G-Code Modifier : CK    Goals  Short Term Goals  Time Frame for Short Term Goals: By discharge, pt will  Short Term Goal 1: demo UB ADLs with SUP, adaptive techniques PRN.  Short Term Goal 2: demo LB ADLs with MIN A, adaptive techniques PRN.  Short Term Goal 3: demo bed mobility with MOD A for engagement in ADLs  Short Term Goal 4: demo dynamic sitting during functional activities for 6+ mins with MIN A.  Short Term Goal 5: NOTIFY OTR/L FOR UPDATED GOALS AS APPROPRIATE.       Therapy Time   Individual Concurrent Group Co-treatment   Time In 1313         Time Out 1342         Minutes 29         Timed Code Treatment Minutes: 25 Minutes       Lakisha Espino OTR/L      
linen change, she required maxA to roll R/L.  Transfers  Sit to Stand: Unable to assess  Stand to Sit: Unable to assess  Comment: Attempted STS transfers x1 in [place], despite maxA x2 pt was unable to clear buttocks from EOB. Pt performed rocking motion to attempt to gain momentum to stand, unable to clear buttocks.   Ambulation  More Ambulation?: No  Stairs/Curb  Stairs?: No     Balance  Posture: Fair  Sitting - Static: Fair  Sitting - Dynamic: Fair  Comments: Pt tolerated sitting EOB ~15 minutes total with CGA/SBA.    Exercise Treatment:  Seated LE exercise program: Long Arc Quads, hip abduction/adduction, heel/toe raises, and marches. Reps: x10 BLE  Comments: Pt performed while seated EOB  Static/dynamic sitting balance: pt tolerated sitting EOB ~15 minutes with CGA initially progressing to SBA.       [facility] - Mobility  AM-PAC Basic Mobility - Inpatient   How much help is needed turning from your back to your side while in a flat bed without using bedrails?: A Lot  How much help is needed moving from lying on your back to sitting on the side of a flat bed without using bedrails?: Total  How much help is needed moving to and from a bed to a chair?: Total  How much help is needed standing up from a chair using your arms?: Total  How much help is needed walking in hospital room?: Total  How much help is needed climbing 3-5 steps with a railing?: Total  AM-PAC Inpatient Mobility Raw Score : 7  AM-PAC Inpatient T-Scale Score : 26.42  Mobility Inpatient CMS 0-100% Score: 92.36  Mobility Inpatient CMS G-Code Modifier : CM      Goals  Short Term Goals  Time Frame for Short Term Goals: 14 visits  Short Term Goal 1: Pt will perform sit<>stand transfer with CGA.  Short Term Goal 2: Pt will ambulate 50 feet with a RW and CGA.  Short Term Goal 3: Pt will demonstrate fair dynamic standing balance to decrease fall risk.  Short Term Goal 4: Pt will tolerate a 35 minute therapy session to promote increased endurance.  Short 
ADL T-Scale Score : 33.39  ADL Inpatient CMS 0-100% Score: 59.67  ADL Inpatient CMS G-Code Modifier : CK      Goals  Short Term Goals  Time Frame for Short Term Goals: By discharge, pt will  Short Term Goal 1: demo UB ADLs with SUP, adaptive techniques PRN.  Short Term Goal 2: demo LB ADLs with MIN A, adaptive techniques PRN.  Short Term Goal 3: demo bed mobility with MOD A for engagement in ADLs  Short Term Goal 4: demo dynamic sitting during functional activities for 6+ mins with MIN A.  Short Term Goal 5: NOTIFY OTR/L FOR UPDATED GOALS AS APPROPRIATE.       Therapy Time   Individual Concurrent Group Co-treatment   Time In 1503         Time Out 1533         Minutes 30         Timed Code Treatment Minutes: 23 Minutes     Co- treatment with PT warranted secondary to decreased patient safety and independence with functional mobility requiring skilled physical assistance of two professionals to simultaneously address individualized discipline goals. OT is addressing activity tolerance and bed mobility  , while PT is addressing their individualized functional mobility task.    Nidia Castro, OTR/L     
Little  How much help is needed for taking care of personal grooming?: A Little  How much help for eating meals?: A Little  AM-PAC Inpatient Daily Activity Raw Score: 14  AM-PAC Inpatient ADL T-Scale Score : 33.39  ADL Inpatient CMS 0-100% Score: 59.67  ADL Inpatient CMS G-Code Modifier : CK        Goals  Short Term Goals  Time Frame for Short Term Goals: By discharge, pt will  Short Term Goal 1: demo UB ADLs with SUP, adaptive techniques PRN.  Short Term Goal 2: demo LB ADLs with MIN A, adaptive techniques PRN.  Short Term Goal 3: demo bed mobility with MOD A for engagement in ADLs  Short Term Goal 4: demo dynamic sitting during functional activities for 6+ mins with MIN A.  Short Term Goal 5: NOTIFY OTR/L FOR UPDATED GOALS AS APPROPRIATE.       Therapy Time   Individual Concurrent Group Co-treatment   Time In 0927         Time Out 1011         Minutes 44         Timed Code Treatment Minutes: 38 Minutes (co tx with PTA. ASIA addressing ADLs and BUE/cervical ROM exercises       CLIFF Hayden/RODRI     
MODERATE NEUTROPHILS     NO ORGANISMS SEEN     Gram stain made from cytocentrifuged specimen.  Organisms and cells will be concentrated.    Culture NO GROWTH 5 DAYS   Infectious Disease Intervention [[id number]] Collected: 07/07/24 1100   Order Status: Completed Updated: 07/08/24 0859    Intervention Discontinuation of therapy       Medications:      heparin (porcine)  5,000 Units SubCUTAneous 3 times per day    pantoprazole (PROTONIX) 40 mg in sodium chloride (PF) 0.9 % 10 mL injection  40 mg IntraVENous Q12H    furosemide  20 mg Oral Daily    midodrine  5 mg Oral TID WC    metoprolol succinate  25 mg Oral Daily    clopidogrel  75 mg Oral Daily    atorvastatin  40 mg Oral Nightly    escitalopram  20 mg Oral Daily    folic acid  1 mg Oral Daily    [Held by provider] oxyBUTYnin  10 mg Oral Daily    sodium chloride flush  5-40 mL IntraVENous 2 times per day    sodium chloride flush  5-40 mL IntraVENous 2 times per day    amiodarone  200 mg Oral Daily    mupirocin   Each Nostril BID    sucralfate  1 g Oral 4x Daily AC & HS    vitamin B-12  500 mcg Oral Daily    vitamin D  50,000 Units Oral Weekly       Electronically signed by Laurent Gallego MD on 7/13/2024 at 12:43 PM      [facility]  Laurent Gallego MD  [employer]  OFFICE: [phone]    Thank you for allowing us to participate in the care of this patient. Please call with questions.    This note is created with the assistance of a speech recognition program.  While intending to generate a document that actually reflects the content of the visit, the document can still have some errors including those of syntax and sound a like substitutions which may escape proof reading.  In such instances, actual meaning can be extrapolated by contextual diversion.    
PROCEDURE N/A 01/04/2024    frantz / Percutaneous coronary intervention / rm 504 performed by Maria Teresa Rodriguez MD at [facility] CARDIAC CATH LAB    CARDIAC PROCEDURE N/A 5/6/2024    frantz / Percutaneous coronary intervention / op scmh performed by Maria Teresa Rodriguez MD at [facility] CARDIAC CATH LAB    CARDIAC PROCEDURE N/A 7/3/2024    demetria / Pericardiocentesis / op pb performed by Mariya Luong MD at [facility] CARDIAC CATH LAB    CATARACT REMOVAL Bilateral 2015    CORONARY ANGIOPLASTY WITH STENT PLACEMENT  05/06/2024    HYSTERECTOMY, TOTAL ABDOMINAL (CERVIX REMOVED)  1990    INCISION AND DRAINAGE Left 07/06/2017    LEG INCISION AND DRAINAGE ABSCESS performed by Isaiah Casey MD at [facility] OR    IR NONTUNNELED VASCULAR CATHETER  12/15/2023    IR NONTUNNELED VASCULAR CATHETER 12/15/2023 [facility] SPECIAL PROCEDURES    KNEE ARTHROPLASTY Right 09/24/2018    TOTAL KNEE ARTHROPLASTY Left 11/09/2017    UPPER GASTROINTESTINAL ENDOSCOPY N/A 12/28/2023    EGD BIOPSY performed by Mary Nolasco MD at [facility] ENDO    UPPER GASTROINTESTINAL ENDOSCOPY N/A 05/02/2024    ESOPHAGOGASTRODUODENOSCOPY BIOPSY performed by Rachana Maria MD at [facility] ENDO    VENTRAL HERNIA REPAIR  02/11/2019    5 hernias       Medications:      heparin (porcine)  5,000 Units SubCUTAneous 3 times per day    pantoprazole (PROTONIX) 40 mg in sodium chloride (PF) 0.9 % 10 mL injection  40 mg IntraVENous Q12H    furosemide  20 mg Oral Daily    midodrine  5 mg Oral TID WC    metoprolol succinate  25 mg Oral Daily    clopidogrel  75 mg Oral Daily    atorvastatin  40 mg Oral Nightly    escitalopram  20 mg Oral Daily    folic acid  1 mg Oral Daily    oxyBUTYnin  10 mg Oral Daily    sodium chloride flush  5-40 mL IntraVENous 2 times per day    sodium chloride flush  5-40 mL IntraVENous 2 times per day    amiodarone  200 mg Oral Daily    mupirocin   Each Nostril BID    sucralfate  1 g Oral 4x Daily AC & HS    vitamin B-12  500 mcg Oral Daily    vitamin D  50,000 Units Oral Weekly 
PROCEDURE N/A 01/04/2024    frantz / Percutaneous coronary intervention / rm 504 performed by Maria Teresa Rodriguez MD at [facility] CARDIAC CATH LAB    CARDIAC PROCEDURE N/A 5/6/2024    frantz / Percutaneous coronary intervention / op scmh performed by Maria Teresa Rodriguez MD at [facility] CARDIAC CATH LAB    CARDIAC PROCEDURE N/A 7/3/2024    demetria / Pericardiocentesis / op pb performed by Mariya Luong MD at [facility] CARDIAC CATH LAB    CATARACT REMOVAL Bilateral 2015    CORONARY ANGIOPLASTY WITH STENT PLACEMENT  05/06/2024    HYSTERECTOMY, TOTAL ABDOMINAL (CERVIX REMOVED)  1990    INCISION AND DRAINAGE Left 07/06/2017    LEG INCISION AND DRAINAGE ABSCESS performed by Isaiah Casey MD at [facility] OR    IR NONTUNNELED VASCULAR CATHETER  12/15/2023    IR NONTUNNELED VASCULAR CATHETER 12/15/2023 [facility] SPECIAL PROCEDURES    KNEE ARTHROPLASTY Right 09/24/2018    TOTAL KNEE ARTHROPLASTY Left 11/09/2017    UPPER GASTROINTESTINAL ENDOSCOPY N/A 12/28/2023    EGD BIOPSY performed by Mary Nolasco MD at [facility] ENDO    UPPER GASTROINTESTINAL ENDOSCOPY N/A 05/02/2024    ESOPHAGOGASTRODUODENOSCOPY BIOPSY performed by Rachana Maria MD at [facility] ENDO    VENTRAL HERNIA REPAIR  02/11/2019    5 hernias       Medications:      morphine  1 mg IntraVENous Once    heparin (porcine)  5,000 Units SubCUTAneous 3 times per day    pantoprazole (PROTONIX) 40 mg in sodium chloride (PF) 0.9 % 10 mL injection  40 mg IntraVENous Q12H    furosemide  20 mg Oral Daily    midodrine  5 mg Oral TID WC    metoprolol succinate  25 mg Oral Daily    clopidogrel  75 mg Oral Daily    atorvastatin  40 mg Oral Nightly    escitalopram  20 mg Oral Daily    folic acid  1 mg Oral Daily    oxyBUTYnin  10 mg Oral Daily    sodium chloride flush  5-40 mL IntraVENous 2 times per day    sodium chloride flush  5-40 mL IntraVENous 2 times per day    amiodarone  200 mg Oral Daily    mupirocin   Each Nostril BID    sucralfate  1 g Oral 4x Daily AC & HS    vitamin B-12  500 mcg Oral Daily    vitamin D 
and high risk   NSTEMI type 2  PAF- with RVR- now improved  CAD s/p PCI to RCA on 5/24  HTN  Anemia  Sepsis   UTI  AMS- improved  MICHELLE  DL  DNR-CCA / DNI    [id number] Score for Atrial Fibrillation Stroke Risk   Risk   Factors  Component Value   C CHF Yes 1   H HTN Yes 1   A2 Age >= 75 No,  (73 y.o.) 0   D DM No 0   S2 Prior Stroke/TIA No 0   V Vascular Disease Yes 1   A Age 65-74 Yes,  (73 y.o.) 1   Sc Sex female 1    [id number]  Score  5   Score last updated 7/8/24 10:27 AM EDT    Click here for a link to the UpToDate guideline \"Atrial Fibrillation: Anticoagulation therapy to prevent embolization    Disclaimer: Risk Score calculation is dependent on accuracy of patient problem list and past encounter diagnosis.      Plan of Treatment:   Stable post pericardiocentesis w/ drain removal. Repeat limited echo pending.  HGB remains low but stable, 8.6 today. Continue Plavix given recent stent. Plans for GI work-up in future. Will need to wait AT LEAST 6mo unless emergent prior to holding Plavix  Continue PO Amio. BB held d/t bradycardia. Will switch to low dose Toprol XL. Remains SR. Given melena stools and anemia will hold off on NOAC at this time. Disucsse din detial with patient. Risk/benefit reviewed and verb understanding.       Thank you for allowing me to participate in the care of this patient, please do not hesitate to call if you have any questions.    Raven Conte, APRN - CNP   [facility]  [facility]  [phone]            
return. The side port was frequently flushed and aspirated with normal saline.    EBL is 15 mL    Dominance is right    Findings:    RCA: 80% mid stenosis, length is 14 mm, RAVINDRA-3 flow, underwent RAJAN using 3.0 x 18 mm [place] stent with 0% residual stenosis and RAVINDRA-3 flow    Conclusions:  Successful PCI with RAJAN to mid RCA    Recommendation:  Routine post PCI orders  Medical treatments.  Risk factors modifications.        Assessment / Acute Cardiac Problems:       Has large pericardial effusion, circumferential but significant amount posterior as well, no respiratory variation but large and high risk   NSTEMI type 2  PAF- with RVR- now improved  CAD s/p PCI to RCA on 5/24  HTN  Anemia  Sepsis   UTI  AMS- improved  MICHELLE  DL  DNR-CCA / DNI    [id number] Score for Atrial Fibrillation Stroke Risk   Risk   Factors  Component Value   C CHF Yes 1   H HTN Yes 1   A2 Age >= 75 No,  (73 y.o.) 0   D DM No 0   S2 Prior Stroke/TIA No 0   V Vascular Disease Yes 1   A Age 65-74 Yes,  (73 y.o.) 1   Sc Sex female 1    [id number]  Score  5   Score last updated 7/8/24 10:27 AM EDT    Click here for a link to the UpToDate guideline \"Atrial Fibrillation: Anticoagulation therapy to prevent embolization    Disclaimer: Risk Score calculation is dependent on accuracy of patient problem list and past encounter diagnosis.    ECHO 7/7/24:    Left Ventricle: Normal left ventricular systolic function with a visually estimated EF of 55 - 60%. Left ventricle size is normal.    Pericardium: Trivial to small pericardial effusion present.    Extracardiac: Large bilateral pleural effusion.    Image quality is adequate. Procedure performed with the patient in a supine position.  Plan of Treatment:   Limited echo reviewed. Trivial to small pericardial effusion.   HGB remains low but stable, 8.5 today. Continue Plavix given recent stent. Plans for GI work-up in future. Will need to wait AT LEAST 6mo unless emergent prior to holding Plavix  Continue PO 
7/5/2024 Yes    Fever 7/6/2024 Yes    Chronic gastric ulcer with hemorrhage 7/7/2024 Yes     Plan:        Pericardial effusion.  Cardiology and infectious disease following. Cultures are negative however serologies positive for CMV.  Infectious disease started on valganciclovir 900 mg twice daily.  CMV quantitative level pending.  Cultures negative x 5 days. Unable to swallow Valganciclovir.     Sepsis.  Previously on vancomycin and cefepime however discontinued.  CMV positive and pericardial fluid and patient now on valganciclovir.  Infectious disease following.    Melena.  GI following.  Recommend daily hemoglobin and hematocrit as well as continued PPI infusion.  No plan for endoscopy at this time.    A-fib.  Cardiology following.  Continue amiodarone 200 mg daily, Toprol-XL 25 mg daily, anticoagulation currently held given anemia and melena.    MICHELLE on CKD.  Stable. Avoid nephrotoxic agents.    CAD with recent stents.  Continue Lipitor 40 mg daily, Plavix 75 mg daily, Toprol-XL 25 mg daily.    Lymphedema.  Wound care.    DVT prophylaxis: Start DVT Prophylaxis today. Resume Eliquis on DC.   GI prophylaxis: PPI infusion.    Discharge planning: SNF pending ID plan.     Mando Burden DO  7/10/2024  8:56 AM     
NONREACTIVE 07/01/2024 04:14 PM    HEPBIGM NONREACTIVE 07/01/2024 04:14 PM    HEPAIGM NONREACTIVE 07/01/2024 04:14 PM            Principal Problem:    S/P pericardial operation  Active Problems:    Atrial fibrillation with RVR (HCC)    S/P right coronary artery (RCA) stent placement    MICHELLE (acute kidney injury) (HCC)    Coronary artery disease involving native coronary artery of native heart without angina pectoris    Chronic kidney disease, stage 3b (HCC)    Moderate malnutrition (HCC)    Shortness of breath    Hypokalemia    Fever    Chronic gastric ulcer with hemorrhage  Resolved Problems:    * No resolved hospital problems. *       GI Impression:  ?  Melena, none today  Anemia due to blood loss, stable hemoglobin today  Chronic kidney disease  A-fib  Coronary artery disease  Shortness of breath    Recommendations and plan:  Recommend daily hemoglobin and hematocrit  Continue Protonix drip  No plans for endoscopy at this time  Watch for GI bleed  Monitor vitals          [facility]  Jules Onofre MD   [phone]  7/8/2024    9:20 PM     Estimated time of  mins reviewing chart, assessing patient and formulating plan of care    This note was created with the assistance of a speech-recognition program.  Although the intention is to generate a document that actually reflects the content of the visit, no guarantees can be provided that every mistake has been identified and corrected by editing.         Electronically signed by Jules Onofre MD     
Shortness of breath 7/3/2024 Yes    Hypokalemia 7/5/2024 Yes    Fever 7/6/2024 Yes    Chronic gastric ulcer with hemorrhage 7/7/2024 Yes    Severe malnutrition (HCC) 7/10/2024 Yes    CMV infection, acute (HCC) 7/10/2024 Yes   Plan:        Pericardial effusion.  Cardiology and infectious disease following. Cultures are negative however serologies positive for CMV.  Infectious disease started on valganciclovir 900 mg twice daily.  CMV quantitative level pending.  Cultures negative x 5 days. Unable to swallow Valganciclovir.     Sepsis.  Previously on vancomycin and cefepime however discontinued.  CMV positive and pericardial fluid and patient now on valganciclovir.  Infectious disease following.  Unable to swallow pills.    Melena.  GI following.  Recommend daily hemoglobin and hematocrit as well as continued PPI infusion.  No plan for endoscopy at this time.    A-fib.  Cardiology following.  Continue amiodarone 200 mg daily, Toprol-XL 25 mg daily, anticoagulation currently held given anemia and melena.    MICHELLE on CKD.  Stable. Avoid nephrotoxic agents.    CAD with recent stents.  Continue Lipitor 40 mg daily, Plavix 75 mg daily, Toprol-XL 25 mg daily.    Lymphedema.  Wound care.    DVT prophylaxis: Heparin.   GI prophylaxis: Protonix.     Discharge planning: DC to SNF once accepted.    Mando Burden DO  7/13/2024  8:33 AM     
but stable, 8.5 today. Continue Plavix given recent stent. Plans for GI work-up in future. Will need to wait AT LEAST 6mo unless emergent prior to holding Plavix  Continue PO Amio and BB. Remains SR. Given melena stools and anemia will hold off on NOAC at this time. Discussed in detial with patient. Risk/benefit reviewed and verb understanding.   Will d/c amiodarone drip.  OK for patient to be discharged per CV standpoint and for her to follow up outpatient in 2-4 weeks          Guadalupe Jaimes, APRN - CNP   [facility]  [facility]  [phone]            
level: Not on file   Occupational History    Not on file   Tobacco Use    Smoking status: Never     Passive exposure: Never    Smokeless tobacco: Never   Vaping Use    Vaping Use: Never used   Substance and Sexual Activity    Alcohol use: No    Drug use: No    Sexual activity: Yes   Other Topics Concern    Not on file   Social History Narrative    Not on file     Social Determinants of Health     Financial Resource Strain: Low Risk  (3/7/2023)    Overall Financial Resource Strain (CARDIA)     Difficulty of Paying Living Expenses: Not hard at all   Food Insecurity: No Food Insecurity (7/3/2024)    Hunger Vital Sign     Worried About Running Out of Food in the Last Year: Never true     Ran Out of Food in the Last Year: Never true   Transportation Needs: No Transportation Needs (7/3/2024)    PRAPARE - Transportation     Lack of Transportation (Medical): No     Lack of Transportation (Non-Medical): No   Physical Activity: Insufficiently Active (3/7/2023)    Exercise Vital Sign     Days of Exercise per Week: 2 days     Minutes of Exercise per Session: 20 min   Stress: Not on file   Social Connections: Not on file   Intimate Partner Violence: Not on file   Housing Stability: Unknown (7/3/2024)    Housing Stability Vital Sign     Unable to Pay for Housing in the Last Year: Patient declined     Number of Places Lived in the Last Year: 1     Unstable Housing in the Last Year: No       Family History:     Family History   Problem Relation Age of Onset    Cancer Mother     High Blood Pressure Father     Stroke Father     Ovarian Cancer Daughter     Cancer Daughter     Diabetes Maternal Aunt     Cancer Other         daughter/ovarian cancer        Allergies:   Norco [hydrocodone-acetaminophen] and Penicillins     Review of Systems:   Constitutional: Prior fevers or chills. No systemic complaints  Head: No headaches  Eyes: No double vision or blurry vision. No conjunctival inflammation.  ENT: No sore throat or runny nose.. No 
vomiting, diarrhea, or abdominal pain.. No cramps.  Genitourinary: No increased urinary frequency, or dysuria. No hematuria. No suprapubic or CVA pain  Musculoskeletal: No muscle aches or pains. No joint effusions or deformities.  Leg edema  Hematologic: No bleeding or bruising.  Neurologic: No headache, weakness, numbness, or tingling.  Prior altered mentation  Integument: No rash, no ulcers.  Psychiatric: No depression.   Endocrine: No polyuria, no polydipsia, no polyphagia.    Physical Examination :   Patient Vitals for the past 8 hrs:   BP Temp Temp src Pulse Resp SpO2   07/09/24 0340 (!) 124/50 97.4 °F (36.3 °C) Oral 66 18 97 %   07/08/24 2330 (!) 111/45 97.6 °F (36.4 °C) Axillary 65 18 93 %       General Appearance: Awake, alert, on nasal oxygen.  Heavyset individual  Head:  Normocephalic, no trauma  Eyes: Pupils equal, round, reactive to light and accommodation; extraocular movements intact; sclera anicteric; conjunctivae pink. No embolic phenomena.  ENT: Oropharynx clear, without erythema, exudate, or thrush. No tenderness of sinuses. Mouth/throat: mucosa pink and moist. No lesions. Dentition in good repair.  Neck:Supple, without lymphadenopathy. Thyroid normal, No bruits.  Pulmonary/Chest: Decreased breath sounds, left pleural effusion   cardiovascular: Regular rate and rhythm without murmurs, rubs, or gallops.  Pericardial drain in place  Abdomen: Soft, non tender. Bowel sounds normal. No organomegaly  All four Extremities: No cyanosis, clubbing.has bilateral leg edema.  Neurologic: No gross sensory or motor deficits.  Skin: Warm and dry with good turgor. Signs of peripheral arterial and venous insufficiency. No ulcerations. No open wounds.    Medical Decision Making -Laboratory:   I have independently reviewed/ordered the following labs:    CBC with Differential:   Recent Labs     07/08/24  0635 07/09/24  0346   WBC 10.0 9.1   HGB 8.6* 8.2*   HCT 28.4* 28.1*   * 477*   LYMPHOPCT 9* 12*   MONOPCT 5 
Date/Time    BACTERIA MODERATE 07/01/2024 10:00 AM    PH 6.0 09/19/2018 11:53 AM    RBC 2.37 07/06/2024 06:26 AM    RBC 3.34 09/25/2018 05:38 AM    WBC 11.1 07/06/2024 06:26 AM    YEAST MANY 05/07/2024 02:00 AM    TURBIDITY Clear 07/01/2024 10:00 AM     Lab Results   Component Value Date/Time    CREATININE 1.3 07/06/2024 06:26 AM    GLUCOSE 44 07/06/2024 06:26 AM    GLUCOSE 91 09/19/2018 11:53 AM       Medical Decision Making-Imaging:   Echo (TTE) limited (PRN contrast/bubble/strain/3D)    Result Date: 7/6/2024    Left Ventricle: Normal left ventricular systolic function with a visually estimated EF of 60 - 65%. Left ventricle size is normal.   Pericardium: Trivial to small pericardial effusion present.   Extracardiac: Large bilateral pleural effusion.   Image quality is adequate.     Echo (TTE) limited (PRN contrast/bubble/strain/3D)    Result Date: 7/4/2024    Left Ventricle: Normal left ventricular systolic function with a visually estimated EF of 60 - 65%. Left ventricle size is normal. Normal wall thickness. Normal wall motion.   Pericardium: Large (>2 cm) circumferential pericardial effusion present in the Prior Echo/ images. Drainage catheter was inserted in the pericardial space. Images taken post drainage show trvial to small. Successfull Pericardiocentesis.   Image quality is adequate.     Cardiac procedure    Result Date: 7/3/2024  Images from the original result were not included. [facility]  Pericardiocentesis Procedure Note      Today's Date:  7/3/2024 Patient name:  Elaina Champagne MRN:   [id number] YOB: 1950 PCP:    Robin Ly MD Procedure: Pericardiocentesis under fluoroscopic and ultrasound guidance Operators: Primary: Mariya Luong MD (Attending Physician) Indications: Large Pericardial Effusion Procedure: After informed consent was obtained, the patient was positioned, prepped and draped in usual sterile fashion. 1% Lidocaine was used to anesthetize

## 2024-07-15 NOTE — DISCHARGE SUMMARY
[facility]  Office: [phone]  Nael Mrogan DO, Ronny Quevedo DO, Pb Vásquez DO, Refugio Bradley DO, Sheldon Smith MD, Elsa Torres MD, Leandro Espinosa MD, Joann Hauser MD,  Dipak Montana MD, Rob Hernandez MD, Swathi Wells MD,  Gracia Chen DO, Pilar So MD, Kayden Walters MD, Orlando Morgan DO, Yamilex Luciano MD,  Mando Burden DO, Gali Galarza MD, Georgie Workman MD, Jennifer Hill MD, Gloria Sierra MD,  Rafael Rawls MD, Denilson Murphy MD, Kamran Flores MD, Yazan Hughes MD, Theodore Grossman MD, Jaxon Villar MD, Ricky Lopez DO, Mickey Cabral DO, Trev Alex MD,  Arvin Powers MD, Shirley Waterhouse, CNP,  Nidia Lester CNP, Aj Castro, CNP,  Erin Shaw, DNP, Ivis Senior, CNP, Georgiana Freeman, CNP, Diandra Ruffin CNP, Marilia Parker, CNP, Maureen Matamoros, PA-C, Meeta Titus PA-C, Sandrita Cruz, CNP, Nan Berrios, CNP, Veronica Alonzo, CNP, Areli Ware, CNP, Guadalupe Jaimes, CNP, Radhika Gillespie, CNS, Adriane Jimenez, CNP, Sarah Shi CNP, Tracy Schwab, CNP         [facility]   IN-PATIENT SERVICE   [facility]    Discharge Summary     Patient ID: Elaina Champagne  :  1950   MRN: [id number]     ACCOUNT:  [id number]   Patient's PCP: Robin Ly MD  Admit Date: 7/3/2024   Discharge Date: 7/15/2024     Length of Stay: 11  Code Status:  Prior  Admitting Physician: Jennifer Hill MD  Discharge Physician: Mando Burden DO     Active Discharge Diagnoses:     Hospital Problem Lists:  Principal Problem:    S/P pericardial operation  Active Problems:    Atrial fibrillation with RVR ([facility])    S/P right coronary artery (RCA) stent placement    Acute coronary syndrome ([facility])    MICHELLE (acute kidney injury) ([facility])    Coronary artery disease involving native coronary artery of native heart without angina pectoris    Chronic kidney disease, stage 3b ([facility])    Moderate malnutrition ([facility])    Shortness of breath

## 2024-07-17 LAB
COXSACKIE B1 ANTIBODY: ABNORMAL
COXSACKIE B2 ANTIBODY: ABNORMAL
COXSACKIE B3 ANTIBODY: ABNORMAL
COXSACKIE B4 ANTIBODY: ABNORMAL
COXSACKIE B5 ANTIBODY: ABNORMAL
COXSACKIE B6 ANTIBODY: ABNORMAL
DEPRECATED S PNEUM5 IGG SER-MCNC: 0.6 U/ML
ENA JO1 IGG SER-ACNC: <0.4 U/ML
ENA SCL70 AB SER IA-ACNC: <0.6 U/ML
ENA SM AB SER-ACNC: <0.8 U/ML
ENA SS-A IGG SER QL: >240 U/ML
ENA SS-B IGG SER IA-ACNC: >320 U/ML
U1 SNRNP IGG SER IA-ACNC: 0.5 U/ML
U1 SNRNP IGG SER IA-ACNC: 0.6 U/ML

## 2024-08-29 ENCOUNTER — HOSPITAL ENCOUNTER (OUTPATIENT)
Dept: VASCULAR LAB | Age: 74
Discharge: HOME | End: 2024-08-31
Attending: STUDENT IN AN ORGANIZED HEALTH CARE EDUCATION/TRAINING PROGRAM
Payer: COMMERCIAL

## 2024-08-29 DIAGNOSIS — I65.23 CAROTID STENOSIS, BILATERAL: ICD-10-CM

## 2024-08-29 PROCEDURE — 93880 EXTRACRANIAL BILAT STUDY: CPT

## 2024-08-30 LAB
VAS LEFT BULB EDV: 21.4 CM/S
VAS LEFT BULB PSV: 62.8 CM/S
VAS LEFT CCA DIST EDV: 19.6 CM/S
VAS LEFT CCA DIST PSV: 80.8 CM/S
VAS LEFT CCA PROX EDV: 16 CM/S
VAS LEFT CCA PROX PSV: 53.8 CM/S
VAS LEFT ECA EDV: 0 CM/S
VAS LEFT ECA PSV: 62.8 CM/S
VAS LEFT ICA DIST EDV: 21.3 CM/S
VAS LEFT ICA DIST PSV: 60.7 CM/S
VAS LEFT ICA PROX EDV: 28.4 CM/S
VAS LEFT ICA PROX PSV: 83.9 CM/S
VAS LEFT ICA/CCA PSV: 1 NO UNITS
VAS LEFT VERTEBRAL EDV: 8.8 CM/S
VAS LEFT VERTEBRAL PSV: 40.9 CM/S
VAS RIGHT BULB EDV: 10.8 CM/S
VAS RIGHT BULB PSV: 48.4 CM/S
VAS RIGHT CCA DIST EDV: 9.7 CM/S
VAS RIGHT CCA DIST PSV: 36.6 CM/S
VAS RIGHT CCA PROX EDV: 12.1 CM/S
VAS RIGHT CCA PROX PSV: 44 CM/S
VAS RIGHT ECA EDV: 0 CM/S
VAS RIGHT ECA PSV: 44.7 CM/S
VAS RIGHT ICA DIST EDV: 17.8 CM/S
VAS RIGHT ICA DIST PSV: 82.6 CM/S
VAS RIGHT ICA PROX EDV: 25.1 CM/S
VAS RIGHT ICA PROX PSV: 122.5 CM/S
VAS RIGHT ICA/CCA PSV: 3.3 NO UNITS
VAS RIGHT VERTEBRAL EDV: 17.8 CM/S
VAS RIGHT VERTEBRAL PSV: 57.4 CM/S

## 2024-09-03 ENCOUNTER — OFFICE VISIT (OUTPATIENT)
Dept: VASCULAR SURGERY | Age: 74
End: 2024-09-03
Payer: COMMERCIAL

## 2024-09-03 VITALS
OXYGEN SATURATION: 94 % | DIASTOLIC BLOOD PRESSURE: 62 MMHG | HEIGHT: 60 IN | HEART RATE: 66 BPM | WEIGHT: 225 LBS | SYSTOLIC BLOOD PRESSURE: 100 MMHG | BODY MASS INDEX: 44.17 KG/M2 | RESPIRATION RATE: 18 BRPM

## 2024-09-03 DIAGNOSIS — I65.23 CAROTID STENOSIS, ASYMPTOMATIC, BILATERAL: Primary | ICD-10-CM

## 2024-09-03 PROCEDURE — 3074F SYST BP LT 130 MM HG: CPT | Performed by: STUDENT IN AN ORGANIZED HEALTH CARE EDUCATION/TRAINING PROGRAM

## 2024-09-03 PROCEDURE — 99213 OFFICE O/P EST LOW 20 MIN: CPT | Performed by: STUDENT IN AN ORGANIZED HEALTH CARE EDUCATION/TRAINING PROGRAM

## 2024-09-03 PROCEDURE — 3078F DIAST BP <80 MM HG: CPT | Performed by: STUDENT IN AN ORGANIZED HEALTH CARE EDUCATION/TRAINING PROGRAM

## 2024-09-03 PROCEDURE — 1123F ACP DISCUSS/DSCN MKR DOCD: CPT | Performed by: STUDENT IN AN ORGANIZED HEALTH CARE EDUCATION/TRAINING PROGRAM

## 2024-10-09 ENCOUNTER — TELEPHONE (OUTPATIENT)
Dept: GASTROENTEROLOGY | Age: 74
End: 2024-10-09

## 2024-10-09 NOTE — TELEPHONE ENCOUNTER
Writer called pt's number on file asking to speak with pt. Pt's  answered phone and said Elaina lives at [facility]. Pt's  said he's not sure about any appointments coming up and that I would have to contact her nurse at [facility]. Writer could not find a number for [facility], however writer found in Media, [facility] number [phone]. Writer called that number, pressed 1 for hospital/doctor's office, then pressed 2 for director of nursing. Writer could not speak to anyone, however writer left message for someone to call our office back regarding a mutual pt's appointment.    Pt's appt on 11/27/24 at [facility] needs to be rescheduled due to Dr. Nolasco being on call. Appt can be moved up 1-2 weeks to 11/13 (Wed at [facility]) or 11/20 (Wed at [facility]).

## 2024-10-21 ENCOUNTER — TELEPHONE (OUTPATIENT)
Dept: PRIMARY CARE CLINIC | Age: 74
End: 2024-10-21

## 2024-10-21 NOTE — TELEPHONE ENCOUNTER
Ivy with [facility] called in wanting to know if you will follow patient. Call back # [phone] ext [phone]

## 2024-10-31 ENCOUNTER — TELEPHONE (OUTPATIENT)
Dept: PRIMARY CARE CLINIC | Age: 74
End: 2024-10-31

## 2024-10-31 NOTE — TELEPHONE ENCOUNTER
Patient advised to call back if area gets worse.  She said area is not warm now, it is puffed up.  She will call us tomorrow if area worsens.  Voices understanding.

## 2024-10-31 NOTE — TELEPHONE ENCOUNTER
Lorraine with [facility] states patient has a fluid filled raised area above her left knee, about the size of a quarter. Lorraine states the area is red and hot to the touch, no fevers reported.    Patient has an appointment with PCP next week but Lorraine is wondering of patient needs seen sooner of if patient needs an antibiotic.    Please call patient back with advise at [phone] .

## 2024-11-06 ENCOUNTER — HOSPITAL ENCOUNTER (OUTPATIENT)
Age: 74
Setting detail: SPECIMEN
Discharge: HOME | End: 2024-11-06

## 2024-11-06 ENCOUNTER — OFFICE VISIT (OUTPATIENT)
Dept: PRIMARY CARE CLINIC | Age: 74
End: 2024-11-06

## 2024-11-06 VITALS
WEIGHT: 176.8 LBS | DIASTOLIC BLOOD PRESSURE: 70 MMHG | OXYGEN SATURATION: 98 % | SYSTOLIC BLOOD PRESSURE: 128 MMHG | BODY MASS INDEX: 34.71 KG/M2 | HEART RATE: 52 BPM | HEIGHT: 60 IN

## 2024-11-06 DIAGNOSIS — N39.41 URGE INCONTINENCE OF URINE: ICD-10-CM

## 2024-11-06 DIAGNOSIS — L02.416 ABSCESS OF KNEE, LEFT: ICD-10-CM

## 2024-11-06 DIAGNOSIS — L03.116 BILATERAL LOWER LEG CELLULITIS: ICD-10-CM

## 2024-11-06 DIAGNOSIS — F41.8 DEPRESSION WITH ANXIETY: ICD-10-CM

## 2024-11-06 DIAGNOSIS — L03.115 BILATERAL LOWER LEG CELLULITIS: ICD-10-CM

## 2024-11-06 DIAGNOSIS — I48.91 ATRIAL FIBRILLATION WITH RAPID VENTRICULAR RESPONSE (HCC): ICD-10-CM

## 2024-11-06 DIAGNOSIS — Z00.00 MEDICARE ANNUAL WELLNESS VISIT, SUBSEQUENT: Primary | ICD-10-CM

## 2024-11-06 DIAGNOSIS — K25.9 GASTRIC ULCER, UNSPECIFIED CHRONICITY, UNSPECIFIED WHETHER GASTRIC ULCER HEMORRHAGE OR PERFORATION PRESENT: ICD-10-CM

## 2024-11-06 DIAGNOSIS — N18.32 CHRONIC KIDNEY DISEASE, STAGE 3B (HCC): ICD-10-CM

## 2024-11-06 DIAGNOSIS — D64.9 ANEMIA, UNSPECIFIED TYPE: ICD-10-CM

## 2024-11-06 PROBLEM — R41.82 ALTERED MENTAL STATUS: Status: RESOLVED | Noted: 2023-12-16 | Resolved: 2024-11-06

## 2024-11-06 PROBLEM — D75.839 THROMBOCYTOSIS: Status: RESOLVED | Noted: 2024-07-02 | Resolved: 2024-11-06

## 2024-11-06 PROBLEM — E87.6 HYPOKALEMIA: Status: RESOLVED | Noted: 2024-07-05 | Resolved: 2024-11-06

## 2024-11-06 PROBLEM — Z22.322 POSITIVE RESULT FOR METHICILLIN RESISTANT STAPHYLOCOCCUS AUREUS (MRSA) SCREENING: Status: RESOLVED | Noted: 2024-07-02 | Resolved: 2024-11-06

## 2024-11-06 PROBLEM — E43 SEVERE MALNUTRITION (HCC): Status: RESOLVED | Noted: 2024-07-10 | Resolved: 2024-11-06

## 2024-11-06 PROBLEM — A49.8 PSEUDOMONAS AERUGINOSA INFECTION: Status: RESOLVED | Noted: 2024-01-03 | Resolved: 2024-11-06

## 2024-11-06 PROBLEM — R79.82 ELEVATED C-REACTIVE PROTEIN (CRP): Status: RESOLVED | Noted: 2023-12-16 | Resolved: 2024-11-06

## 2024-11-06 PROBLEM — E87.1 HYPONATREMIA: Status: RESOLVED | Noted: 2024-05-28 | Resolved: 2024-11-06

## 2024-11-06 PROBLEM — E87.20 LACTIC ACID INCREASED: Status: RESOLVED | Noted: 2024-07-02 | Resolved: 2024-11-06

## 2024-11-06 PROBLEM — R82.71 BACTERIURIA: Status: RESOLVED | Noted: 2023-12-16 | Resolved: 2024-11-06

## 2024-11-06 PROBLEM — R78.81 BACTEREMIA DUE TO PSEUDOMONAS: Status: RESOLVED | Noted: 2023-12-16 | Resolved: 2024-11-06

## 2024-11-06 PROBLEM — N17.9 AKI (ACUTE KIDNEY INJURY) (HCC): Status: RESOLVED | Noted: 2023-12-14 | Resolved: 2024-11-06

## 2024-11-06 PROBLEM — G93.41 ACUTE METABOLIC ENCEPHALOPATHY: Status: RESOLVED | Noted: 2024-05-28 | Resolved: 2024-11-06

## 2024-11-06 PROBLEM — I83.002 VENOUS STASIS ULCER OF CALF WITH FAT LAYER EXPOSED WITH VARICOSE VEINS (HCC): Status: RESOLVED | Noted: 2023-10-30 | Resolved: 2024-11-06

## 2024-11-06 PROBLEM — I31.39 PERICARDIAL EFFUSION: Status: RESOLVED | Noted: 2024-07-03 | Resolved: 2024-11-06

## 2024-11-06 PROBLEM — R70.0 ELEVATED ERYTHROCYTE SEDIMENTATION RATE: Status: RESOLVED | Noted: 2023-12-17 | Resolved: 2024-11-06

## 2024-11-06 PROBLEM — R50.9 FEVER: Status: RESOLVED | Noted: 2024-07-06 | Resolved: 2024-11-06

## 2024-11-06 PROBLEM — K92.1 MELENA: Status: RESOLVED | Noted: 2023-12-27 | Resolved: 2024-11-06

## 2024-11-06 PROBLEM — Z79.01 ANTICOAGULATED: Status: RESOLVED | Noted: 2023-12-27 | Resolved: 2024-11-06

## 2024-11-06 PROBLEM — R41.0 ACUTE DELIRIUM: Status: RESOLVED | Noted: 2024-05-29 | Resolved: 2024-11-06

## 2024-11-06 PROBLEM — A41.9 SEPSIS (HCC): Status: RESOLVED | Noted: 2024-07-01 | Resolved: 2024-11-06

## 2024-11-06 PROBLEM — R06.02 SHORTNESS OF BREATH: Status: RESOLVED | Noted: 2024-07-03 | Resolved: 2024-11-06

## 2024-11-06 PROBLEM — A41.52 PSEUDOMONAS SEPTICEMIA (HCC): Status: RESOLVED | Noted: 2023-12-29 | Resolved: 2024-11-06

## 2024-11-06 PROBLEM — E44.0 MODERATE MALNUTRITION (HCC): Status: RESOLVED | Noted: 2024-05-03 | Resolved: 2024-11-06

## 2024-11-06 PROBLEM — L97.202 VENOUS STASIS ULCER OF CALF WITH FAT LAYER EXPOSED WITH VARICOSE VEINS (HCC): Status: RESOLVED | Noted: 2023-10-30 | Resolved: 2024-11-06

## 2024-11-06 PROBLEM — A41.9 SEPTICEMIA (HCC): Status: RESOLVED | Noted: 2024-07-02 | Resolved: 2024-11-06

## 2024-11-06 PROBLEM — I21.4 NSTEMI (NON-ST ELEVATED MYOCARDIAL INFARCTION) (HCC): Status: RESOLVED | Noted: 2023-12-31 | Resolved: 2024-11-06

## 2024-11-06 PROBLEM — B96.5 BACTEREMIA DUE TO PSEUDOMONAS: Status: RESOLVED | Noted: 2023-12-16 | Resolved: 2024-11-06

## 2024-11-06 PROBLEM — R79.89 TROPONIN LEVEL ELEVATED: Status: RESOLVED | Noted: 2024-05-30 | Resolved: 2024-11-06

## 2024-11-06 RX ORDER — IBUPROFEN 200 MG
1 CAPSULE ORAL DAILY
Qty: 90 TABLET | Refills: 3 | Status: SHIPPED | OUTPATIENT
Start: 2024-11-06

## 2024-11-06 RX ORDER — CLOPIDOGREL BISULFATE 75 MG/1
75 TABLET ORAL DAILY
Qty: 90 TABLET | Refills: 3 | Status: SHIPPED | OUTPATIENT
Start: 2024-11-06

## 2024-11-06 RX ORDER — DIPHENHYDRAMINE HCL 25 MG
25 CAPSULE ORAL DAILY
COMMUNITY
End: 2024-11-06 | Stop reason: ALTCHOICE

## 2024-11-06 RX ORDER — POTASSIUM CHLORIDE 750 MG/1
20 TABLET, EXTENDED RELEASE ORAL DAILY
Qty: 180 TABLET | Refills: 3 | Status: SHIPPED | OUTPATIENT
Start: 2024-11-06

## 2024-11-06 RX ORDER — ERGOCALCIFEROL 1.25 MG/1
50000 CAPSULE ORAL WEEKLY
Qty: 13 CAPSULE | Refills: 3 | Status: SHIPPED | OUTPATIENT
Start: 2024-11-06

## 2024-11-06 RX ORDER — TRAZODONE HYDROCHLORIDE 50 MG/1
25 TABLET, FILM COATED ORAL NIGHTLY
Qty: 45 TABLET | Refills: 3 | Status: SHIPPED | OUTPATIENT
Start: 2024-11-06

## 2024-11-06 RX ORDER — DOCUSATE SODIUM 100 MG/1
100 CAPSULE, LIQUID FILLED ORAL 2 TIMES DAILY
COMMUNITY
End: 2024-11-06

## 2024-11-06 RX ORDER — ESCITALOPRAM OXALATE 20 MG/1
20 TABLET ORAL DAILY
Qty: 90 TABLET | Refills: 3 | Status: SHIPPED | OUTPATIENT
Start: 2024-11-06

## 2024-11-06 RX ORDER — TRAZODONE HYDROCHLORIDE 50 MG/1
25 TABLET, FILM COATED ORAL NIGHTLY
COMMUNITY
Start: 2024-10-20 | End: 2024-11-06 | Stop reason: SDUPTHER

## 2024-11-06 RX ORDER — MIDODRINE HYDROCHLORIDE 2.5 MG/1
5 TABLET ORAL 3 TIMES DAILY PRN
Qty: 90 TABLET | Refills: 5
Start: 2024-11-06

## 2024-11-06 RX ORDER — ATORVASTATIN CALCIUM 40 MG/1
40 TABLET, FILM COATED ORAL NIGHTLY
Qty: 90 TABLET | Refills: 3 | Status: SHIPPED | OUTPATIENT
Start: 2024-11-06

## 2024-11-06 RX ORDER — LIDOCAINE 4 G/G
1 PATCH TOPICAL DAILY
Qty: 90 EACH | Refills: 3 | Status: SHIPPED | OUTPATIENT
Start: 2024-11-06

## 2024-11-06 RX ORDER — DOCUSATE SODIUM 100 MG/1
100 CAPSULE, LIQUID FILLED ORAL DAILY
Qty: 90 CAPSULE | Refills: 3 | Status: SHIPPED | OUTPATIENT
Start: 2024-11-06

## 2024-11-06 RX ORDER — AMIODARONE HYDROCHLORIDE 200 MG/1
200 TABLET ORAL DAILY
Qty: 90 TABLET | Refills: 3 | Status: SHIPPED | OUTPATIENT
Start: 2024-11-06

## 2024-11-06 RX ORDER — CEPHALEXIN 500 MG/1
500 CAPSULE ORAL 4 TIMES DAILY
Qty: 40 CAPSULE | Refills: 0 | Status: SHIPPED | OUTPATIENT
Start: 2024-11-06 | End: 2024-11-16

## 2024-11-06 RX ORDER — FUROSEMIDE 20 MG/1
20 TABLET ORAL DAILY
Qty: 90 TABLET | Refills: 3 | Status: SHIPPED | OUTPATIENT
Start: 2024-11-06

## 2024-11-06 RX ORDER — UREA 10 %
500 LOTION (ML) TOPICAL DAILY
Qty: 90 TABLET | Refills: 3 | Status: SHIPPED | OUTPATIENT
Start: 2024-11-06 | End: 2025-11-06

## 2024-11-06 RX ORDER — FERROUS SULFATE 325(65) MG
325 TABLET ORAL
Qty: 90 TABLET | Refills: 3 | Status: SHIPPED | OUTPATIENT
Start: 2024-11-06

## 2024-11-06 RX ORDER — FOLIC ACID 1 MG/1
1 TABLET ORAL DAILY
Qty: 90 TABLET | Refills: 3 | Status: SHIPPED | OUTPATIENT
Start: 2024-11-06

## 2024-11-06 RX ORDER — SUCRALFATE 1 G/1
1 TABLET ORAL
Qty: 360 TABLET | Refills: 3 | Status: SHIPPED | OUTPATIENT
Start: 2024-11-06

## 2024-11-06 RX ORDER — PANTOPRAZOLE SODIUM 40 MG/1
40 TABLET, DELAYED RELEASE ORAL
Qty: 180 TABLET | Refills: 3 | Status: SHIPPED | OUTPATIENT
Start: 2024-11-06

## 2024-11-06 RX ORDER — POTASSIUM CHLORIDE 750 MG/1
20 TABLET, EXTENDED RELEASE ORAL 2 TIMES DAILY
COMMUNITY
Start: 2024-10-20 | End: 2024-11-06

## 2024-11-06 RX ORDER — METOPROLOL TARTRATE 25 MG/1
12.5 TABLET, FILM COATED ORAL 2 TIMES DAILY
Qty: 90 TABLET | Refills: 3 | Status: SHIPPED | OUTPATIENT
Start: 2024-11-06

## 2024-11-06 RX ORDER — ONDANSETRON 4 MG/1
4 TABLET, FILM COATED ORAL EVERY 8 HOURS PRN
COMMUNITY
End: 2024-11-06 | Stop reason: ALTCHOICE

## 2024-11-06 RX ORDER — OXYBUTYNIN CHLORIDE 10 MG/1
10 TABLET, EXTENDED RELEASE ORAL DAILY
Qty: 90 TABLET | Refills: 3 | Status: SHIPPED | OUTPATIENT
Start: 2024-11-06

## 2024-11-06 SDOH — ECONOMIC STABILITY: FOOD INSECURITY: WITHIN THE PAST 12 MONTHS, YOU WORRIED THAT YOUR FOOD WOULD RUN OUT BEFORE YOU GOT MONEY TO BUY MORE.: NEVER TRUE

## 2024-11-06 SDOH — ECONOMIC STABILITY: INCOME INSECURITY: HOW HARD IS IT FOR YOU TO PAY FOR THE VERY BASICS LIKE FOOD, HOUSING, MEDICAL CARE, AND HEATING?: NOT HARD AT ALL

## 2024-11-06 SDOH — ECONOMIC STABILITY: FOOD INSECURITY: WITHIN THE PAST 12 MONTHS, THE FOOD YOU BOUGHT JUST DIDN'T LAST AND YOU DIDN'T HAVE MONEY TO GET MORE.: NEVER TRUE

## 2024-11-06 ASSESSMENT — PATIENT HEALTH QUESTIONNAIRE - PHQ9
6. FEELING BAD ABOUT YOURSELF - OR THAT YOU ARE A FAILURE OR HAVE LET YOURSELF OR YOUR FAMILY DOWN: NOT AT ALL
2. FEELING DOWN, DEPRESSED OR HOPELESS: NOT AT ALL
4. FEELING TIRED OR HAVING LITTLE ENERGY: NOT AT ALL
SUM OF ALL RESPONSES TO PHQ9 QUESTIONS 1 & 2: 0
5. POOR APPETITE OR OVEREATING: NOT AT ALL
SUM OF ALL RESPONSES TO PHQ QUESTIONS 1-9: 0
7. TROUBLE CONCENTRATING ON THINGS, SUCH AS READING THE NEWSPAPER OR WATCHING TELEVISION: NOT AT ALL
1. LITTLE INTEREST OR PLEASURE IN DOING THINGS: NOT AT ALL
9. THOUGHTS THAT YOU WOULD BE BETTER OFF DEAD, OR OF HURTING YOURSELF: NOT AT ALL
3. TROUBLE FALLING OR STAYING ASLEEP: NOT AT ALL
10. IF YOU CHECKED OFF ANY PROBLEMS, HOW DIFFICULT HAVE THESE PROBLEMS MADE IT FOR YOU TO DO YOUR WORK, TAKE CARE OF THINGS AT HOME, OR GET ALONG WITH OTHER PEOPLE: NOT DIFFICULT AT ALL
SUM OF ALL RESPONSES TO PHQ QUESTIONS 1-9: 0
SUM OF ALL RESPONSES TO PHQ QUESTIONS 1-9: 0
8. MOVING OR SPEAKING SO SLOWLY THAT OTHER PEOPLE COULD HAVE NOTICED. OR THE OPPOSITE, BEING SO FIGETY OR RESTLESS THAT YOU HAVE BEEN MOVING AROUND A LOT MORE THAN USUAL: NOT AT ALL
SUM OF ALL RESPONSES TO PHQ QUESTIONS 1-9: 0

## 2024-11-06 NOTE — PATIENT INSTRUCTIONS
feeling in the chest.     Sweating.     Shortness of breath.     Pain, pressure, or a strange feeling in the back, neck, jaw, or upper belly or in one or both shoulders or arms.     Lightheadedness or sudden weakness.     A fast or irregular heartbeat.   After you call 911, the  may tell you to chew 1 adult-strength or 2 to 4 low-dose aspirin. Wait for an ambulance. Do not try to drive yourself.  Watch closely for changes in your health, and be sure to contact your doctor if you have any problems.  Where can you learn more?  Go to https://www.[employer].net/patientEd and enter F075 to learn more about \"A Healthy Heart: Care Instructions.\"  Current as of: June 24, 2023  Content Version: 14.2  © 2024 [employer].   Care instructions adapted under license by [employer]. If you have questions about a medical condition or this instruction, always ask your healthcare professional. [employer] disclaims any warranty or liability for your use of this information.      Personalized Preventive Plan for Elaina Champagne - 11/6/2024  Medicare offers a range of preventive health benefits. Some of the tests and screenings are paid in full while other may be subject to a deductible, co-insurance, and/or copay.    Some of these benefits include a comprehensive review of your medical history including lifestyle, illnesses that may run in your family, and various assessments and screenings as appropriate.    After reviewing your medical record and screening and assessments performed today your provider may have ordered immunizations, labs, imaging, and/or referrals for you.  A list of these orders (if applicable) as well as your Preventive Care list are included within your After Visit Summary for your review.    Other Preventive Recommendations:    A preventive eye exam performed by an eye specialist is recommended every 1-2 years to screen for glaucoma; cataracts, macular degeneration, and other

## 2024-11-06 NOTE — PROGRESS NOTES
Robin Ly MD   furosemide (LASIX) 20 MG tablet Take 1 tablet by mouth daily Yes Robin Ly MD   pantoprazole (PROTONIX) 40 MG tablet Take 1 tablet by mouth 2 times daily (before meals) Yes Robin Ly MD   sucralfate (CARAFATE) 1 GM tablet Take 1 tablet by mouth 4 times daily (before meals and nightly) Yes Robin Ly MD   traZODone (DESYREL) 50 MG tablet Take 0.5 tablets by mouth nightly Yes Robin Ly MD   vitamin B-12 (CYANOCOBALAMIN) 500 MCG tablet Take 1 tablet by mouth daily Yes Robin Ly MD   cephALEXin (KEFLEX) 500 MG capsule Take 1 capsule by mouth 4 times daily for 10 days Yes Robin Ly MD   miconazole (MICOTIN) 2 % powder Apply topically 2 times daily. Yes Aj Mendez MD       [facility] (Including outside providers/suppliers regularly involved in providing care):   Patient Care Team:  Robin Ly MD as PCP - General (Family Medicine)  Robin Ly MD as PCP - Empaneled Provider      Reviewed and updated this visit:  Tobacco  Allergies  Meds  Problems  Med Hx  Surg Hx  Soc Hx  Fam Hx           Renew medications  Restart Ditropan XL for urinary incontinence  Patient with cellulitis to the left leg.  An aspiration was done of a fluid-filled superficial abscess.  Purulent bloody 3 cc was obtained.  Sent for culture  Start Keflex for 10 days  Repeat blood work in 2 weeks  Keep follow-up appointment with GI.  Continue both Carafate and Protonix until further evaluation  Recheck in office 6 weeks

## 2024-11-11 RX ORDER — LEVOFLOXACIN 500 MG/1
500 TABLET, FILM COATED ORAL DAILY
Qty: 10 TABLET | Refills: 0 | Status: SHIPPED | OUTPATIENT
Start: 2024-11-11 | End: 2024-11-21

## 2024-12-02 ENCOUNTER — TELEPHONE (OUTPATIENT)
Dept: PRIMARY CARE CLINIC | Age: 74
End: 2024-12-02

## 2024-12-02 NOTE — TELEPHONE ENCOUNTER
Coretta with [facility] states patients HR has been reading low. Today while at patients home visit patients HR was reading 42 - 50. Coretta also had to educate patient on the proper use of midodrine.    Coretta is asking if PCP would like to be notified regarding patients vitals, if so please advise the parameters for reporting.

## 2024-12-03 ENCOUNTER — TELEPHONE (OUTPATIENT)
Dept: PRIMARY CARE CLINIC | Age: 74
End: 2024-12-03

## 2024-12-03 NOTE — TELEPHONE ENCOUNTER
Coretta with [facility] is calling regarding two wounds patient has and she is requesting verbal orders for wound care.    Patient has a wound just above her left knee, Coretta describes this as an abscess, about the size of a nickel. Patient has been cleaning the area with Derma-Cleanse wash and dressing the area with Melgisorb AG and a 4x4 gauze. The wound is seeping a lot, the seepage is yellow in color.    The other wound was noted on patients right inner buttocks, Coretta describes this as a small area of skin breakdown, about the size of a dime. Patient had reported some incontinence which she believes may have caused this wound. Patient has been applying A+D ointment to the area, patient reports the area healing well. Coretta states there is some redness around the area however no signs of infection.    Please advise if verbal orders for wound care and above noted supplies okay.

## 2024-12-05 RX ORDER — HYDROPHILIC CREAM
1 PASTE (GRAM) TOPICAL DAILY
Qty: 170 G | Refills: 0 | Status: SHIPPED | OUTPATIENT
Start: 2024-12-05

## 2024-12-05 NOTE — TELEPHONE ENCOUNTER
I recommend getting a culture of the knee wound. For the treatment the Tenafly-Cleanse is appropriate and pat dry and apply a silver alginate to the wound base and cover with dry sterile dressing such as island dressing or gauze.    It sounds like Triad ointment would be appropriate for right inner buttocks. Apply 3x/day.

## 2024-12-05 NOTE — TELEPHONE ENCOUNTER
Advise home health of [facility] recommendations.  Triad paste sent to pharmacy  Wound culture recommended

## 2024-12-09 ENCOUNTER — HOSPITAL ENCOUNTER (OUTPATIENT)
Age: 74
Setting detail: SPECIMEN
Discharge: HOME | End: 2024-12-09
Payer: MEDICARE

## 2024-12-09 PROCEDURE — 87205 SMEAR GRAM STAIN: CPT

## 2024-12-09 PROCEDURE — 86403 PARTICLE AGGLUT ANTBDY SCRN: CPT

## 2024-12-09 PROCEDURE — 87186 SC STD MICRODIL/AGAR DIL: CPT

## 2024-12-09 PROCEDURE — 87070 CULTURE OTHR SPECIMN AEROBIC: CPT

## 2024-12-11 ENCOUNTER — HOSPITAL ENCOUNTER (OUTPATIENT)
Age: 74
Setting detail: SPECIMEN
Discharge: HOME | End: 2024-12-11

## 2024-12-11 LAB
ANION GAP SERPL CALCULATED.3IONS-SCNC: 11 MMOL/L (ref 9–16)
BASOPHILS # BLD: 0.03 K/UL (ref 0–0.2)
BASOPHILS NFR BLD: 1 % (ref 0–2)
BUN SERPL-MCNC: 15 MG/DL (ref 8–23)
CALCIUM SERPL-MCNC: 9 MG/DL (ref 8.6–10.4)
CHLORIDE SERPL-SCNC: 100 MMOL/L (ref 98–107)
CO2 SERPL-SCNC: 24 MMOL/L (ref 20–31)
CREAT SERPL-MCNC: 1 MG/DL (ref 0.6–0.9)
EOSINOPHIL # BLD: 0 K/UL (ref 0–0.4)
EOSINOPHILS RELATIVE PERCENT: 0 % (ref 1–4)
ERYTHROCYTE [DISTWIDTH] IN BLOOD BY AUTOMATED COUNT: 17.3 % (ref 11.8–14.4)
FOLATE SERPL-MCNC: 26.4 NG/ML (ref 4.8–24.2)
GFR, ESTIMATED: 59 ML/MIN/1.73M2
GLUCOSE SERPL-MCNC: 92 MG/DL (ref 74–99)
HCT VFR BLD AUTO: 28.4 % (ref 36.3–47.1)
HGB BLD-MCNC: 8.8 G/DL (ref 11.9–15.1)
IMM GRANULOCYTES # BLD AUTO: 0 K/UL (ref 0–0.3)
IMM GRANULOCYTES NFR BLD: 0 %
IRON SATN MFR SERPL: 8 % (ref 20–55)
IRON SERPL-MCNC: 20 UG/DL (ref 37–145)
LYMPHOCYTES NFR BLD: 0.96 K/UL (ref 1–4.8)
LYMPHOCYTES RELATIVE PERCENT: 31 % (ref 24–44)
MCH RBC QN AUTO: 28.1 PG (ref 25.2–33.5)
MCHC RBC AUTO-ENTMCNC: 31 G/DL (ref 28.4–34.8)
MCV RBC AUTO: 90.7 FL (ref 82.6–102.9)
MICROORGANISM SPEC CULT: ABNORMAL
MICROORGANISM/AGENT SPEC: ABNORMAL
MICROORGANISM/AGENT SPEC: ABNORMAL
MONOCYTES NFR BLD: 0.16 K/UL (ref 0.1–0.8)
MONOCYTES NFR BLD: 5 % (ref 1–7)
MORPHOLOGY: ABNORMAL
NEUTROPHILS NFR BLD: 63 % (ref 36–66)
NEUTS SEG NFR BLD: 1.95 K/UL (ref 1.8–7.7)
NRBC BLD-RTO: 0 PER 100 WBC
PLATELET # BLD AUTO: 235 K/UL (ref 138–453)
PMV BLD AUTO: 11 FL (ref 8.1–13.5)
POTASSIUM SERPL-SCNC: 4.3 MMOL/L (ref 3.7–5.3)
RBC # BLD AUTO: 3.13 M/UL (ref 3.95–5.11)
SODIUM SERPL-SCNC: 135 MMOL/L (ref 136–145)
SPECIMEN DESCRIPTION: ABNORMAL
TIBC SERPL-MCNC: 246 UG/DL (ref 250–450)
UNSATURATED IRON BINDING CAPACITY: 226 UG/DL (ref 112–347)
VIT B12 SERPL-MCNC: 595 PG/ML (ref 232–1245)
WBC OTHER # BLD: 3.1 K/UL (ref 3.5–11.3)

## 2024-12-12 RX ORDER — SULFAMETHOXAZOLE AND TRIMETHOPRIM 800; 160 MG/1; MG/1
1 TABLET ORAL 2 TIMES DAILY
Qty: 20 TABLET | Refills: 0 | Status: SHIPPED | OUTPATIENT
Start: 2024-12-12 | End: 2024-12-22

## 2024-12-12 RX ORDER — FERROUS SULFATE 325(65) MG
325 TABLET ORAL 2 TIMES DAILY WITH MEALS
Qty: 180 TABLET | Refills: 3 | Status: SHIPPED | OUTPATIENT
Start: 2024-12-12

## 2024-12-31 ENCOUNTER — OFFICE VISIT (OUTPATIENT)
Dept: PRIMARY CARE CLINIC | Age: 74
End: 2024-12-31
Payer: MEDICARE

## 2024-12-31 VITALS
WEIGHT: 191.2 LBS | DIASTOLIC BLOOD PRESSURE: 72 MMHG | SYSTOLIC BLOOD PRESSURE: 150 MMHG | BODY MASS INDEX: 37.54 KG/M2 | OXYGEN SATURATION: 98 % | HEIGHT: 60 IN | HEART RATE: 65 BPM

## 2024-12-31 DIAGNOSIS — I89.0 LYMPHEDEMA: Primary | ICD-10-CM

## 2024-12-31 DIAGNOSIS — N39.46 MIXED STRESS AND URGE URINARY INCONTINENCE: ICD-10-CM

## 2024-12-31 DIAGNOSIS — I83.029 VENOUS ULCER OF LEFT LEG (HCC): ICD-10-CM

## 2024-12-31 DIAGNOSIS — N18.32 CHRONIC KIDNEY DISEASE, STAGE 3B (HCC): ICD-10-CM

## 2024-12-31 DIAGNOSIS — L97.929 VENOUS ULCER OF LEFT LEG (HCC): ICD-10-CM

## 2024-12-31 PROCEDURE — 1159F MED LIST DOCD IN RCRD: CPT | Performed by: FAMILY MEDICINE

## 2024-12-31 PROCEDURE — G8399 PT W/DXA RESULTS DOCUMENT: HCPCS | Performed by: FAMILY MEDICINE

## 2024-12-31 PROCEDURE — 1123F ACP DISCUSS/DSCN MKR DOCD: CPT | Performed by: FAMILY MEDICINE

## 2024-12-31 PROCEDURE — 3017F COLORECTAL CA SCREEN DOC REV: CPT | Performed by: FAMILY MEDICINE

## 2024-12-31 PROCEDURE — 3078F DIAST BP <80 MM HG: CPT | Performed by: FAMILY MEDICINE

## 2024-12-31 PROCEDURE — 99214 OFFICE O/P EST MOD 30 MIN: CPT | Performed by: FAMILY MEDICINE

## 2024-12-31 PROCEDURE — 1036F TOBACCO NON-USER: CPT | Performed by: FAMILY MEDICINE

## 2024-12-31 PROCEDURE — G8484 FLU IMMUNIZE NO ADMIN: HCPCS | Performed by: FAMILY MEDICINE

## 2024-12-31 PROCEDURE — 0509F URINE INCON PLAN DOCD: CPT | Performed by: FAMILY MEDICINE

## 2024-12-31 PROCEDURE — 1090F PRES/ABSN URINE INCON ASSESS: CPT | Performed by: FAMILY MEDICINE

## 2024-12-31 PROCEDURE — 3077F SYST BP >= 140 MM HG: CPT | Performed by: FAMILY MEDICINE

## 2024-12-31 PROCEDURE — G8427 DOCREV CUR MEDS BY ELIG CLIN: HCPCS | Performed by: FAMILY MEDICINE

## 2024-12-31 PROCEDURE — G8417 CALC BMI ABV UP PARAM F/U: HCPCS | Performed by: FAMILY MEDICINE

## 2024-12-31 RX ORDER — OXYBUTYNIN CHLORIDE 15 MG/1
15 TABLET, EXTENDED RELEASE ORAL DAILY
Qty: 90 TABLET | Refills: 3 | Status: SHIPPED | OUTPATIENT
Start: 2024-12-31

## 2024-12-31 RX ORDER — FUROSEMIDE 40 MG/1
40 TABLET ORAL DAILY
Qty: 90 TABLET | Refills: 3 | Status: SHIPPED | OUTPATIENT
Start: 2024-12-31

## 2024-12-31 ASSESSMENT — ENCOUNTER SYMPTOMS
SORE THROAT: 0
DIARRHEA: 0
VOMITING: 0
WHEEZING: 0
COUGH: 0
NAUSEA: 0
EYE REDNESS: 0
RHINORRHEA: 0
EYE DISCHARGE: 0
SHORTNESS OF BREATH: 0
ABDOMINAL PAIN: 0

## 2024-12-31 NOTE — PROGRESS NOTES
Polio vaccine  Aged Out    Meningococcal (ACWY) vaccine  Aged Out       Subjective:      Review of Systems   Constitutional:  Negative for chills and fever.   HENT:  Negative for rhinorrhea and sore throat.    Eyes:  Negative for discharge and redness.   Respiratory:  Negative for cough, shortness of breath and wheezing.    Cardiovascular:  Positive for leg swelling. Negative for chest pain and palpitations.   Gastrointestinal:  Negative for abdominal pain, diarrhea, nausea and vomiting.   Genitourinary:  Negative for dysuria and frequency.   Musculoskeletal:  Negative for arthralgias and myalgias.   Neurological:  Negative for dizziness, light-headedness and headaches.   Psychiatric/Behavioral:  Negative for sleep disturbance.        Objective:     BP (!) 150/72   Pulse 65   Ht 1.524 m (5')   Wt 86.7 kg (191 lb 3.2 oz)   SpO2 98%   BMI 37.34 kg/m²   Physical Exam  Vitals and nursing note reviewed.   Constitutional:       General: She is not in acute distress.     Appearance: She is well-developed. She is not ill-appearing.   HENT:      Head: Normocephalic and atraumatic.      Right Ear: External ear normal.      Left Ear: External ear normal.   Eyes:      General: No scleral icterus.        Right eye: No discharge.         Left eye: No discharge.      Conjunctiva/sclera: Conjunctivae normal.   Neck:      Thyroid: No thyromegaly.      Trachea: No tracheal deviation.   Cardiovascular:      Rate and Rhythm: Normal rate and regular rhythm.      Heart sounds: Normal heart sounds.   Pulmonary:      Effort: Pulmonary effort is normal. No respiratory distress.      Breath sounds: Normal breath sounds. No wheezing.   Musculoskeletal:      Right lower leg: Edema present.      Left lower leg: Edema present.      Comments: 2+ firm pitting edema to both extremities.  Open wound noted to the left leg but much less erythema with no purulent drainage   Lymphadenopathy:      Cervical: No cervical adenopathy.   Skin:

## 2025-01-09 ENCOUNTER — TELEPHONE (OUTPATIENT)
Dept: PRIMARY CARE CLINIC | Age: 75
End: 2025-01-09

## 2025-01-09 DIAGNOSIS — N39.46 MIXED STRESS AND URGE URINARY INCONTINENCE: Primary | ICD-10-CM

## 2025-01-09 NOTE — TELEPHONE ENCOUNTER
Patient asking for depends XL and bed pads to be ordered and sent to [employer] supplier    Fax [phone]  Phone [phone]

## 2025-01-20 ENCOUNTER — OFFICE VISIT (OUTPATIENT)
Dept: PRIMARY CARE CLINIC | Age: 75
End: 2025-01-20

## 2025-01-20 ENCOUNTER — HOSPITAL ENCOUNTER (OUTPATIENT)
Age: 75
Setting detail: SPECIMEN
Discharge: HOME | End: 2025-01-20

## 2025-01-20 VITALS
HEART RATE: 60 BPM | HEIGHT: 60 IN | WEIGHT: 175.4 LBS | DIASTOLIC BLOOD PRESSURE: 76 MMHG | BODY MASS INDEX: 34.44 KG/M2 | SYSTOLIC BLOOD PRESSURE: 138 MMHG | OXYGEN SATURATION: 99 %

## 2025-01-20 DIAGNOSIS — I83.029 VENOUS ULCER OF LEFT LEG (HCC): ICD-10-CM

## 2025-01-20 DIAGNOSIS — L02.416 ABSCESS OF KNEE, LEFT: ICD-10-CM

## 2025-01-20 DIAGNOSIS — L97.929 VENOUS ULCER OF LEFT LEG (HCC): ICD-10-CM

## 2025-01-20 DIAGNOSIS — N39.46 MIXED STRESS AND URGE URINARY INCONTINENCE: Primary | ICD-10-CM

## 2025-01-20 DIAGNOSIS — N18.32 CHRONIC KIDNEY DISEASE, STAGE 3B (HCC): ICD-10-CM

## 2025-01-20 DIAGNOSIS — I27.20 PULMONARY HYPERTENSION, UNSPECIFIED (HCC): ICD-10-CM

## 2025-01-20 DIAGNOSIS — I48.91 ATRIAL FIBRILLATION WITH RAPID VENTRICULAR RESPONSE (HCC): ICD-10-CM

## 2025-01-20 LAB
ANION GAP SERPL CALCULATED.3IONS-SCNC: 12 MMOL/L (ref 9–16)
BUN BLDV-MCNC: 17 MG/DL (ref 8–23)
CALCIUM SERPL-MCNC: 9.3 MG/DL (ref 8.6–10.4)
CHLORIDE BLD-SCNC: 101 MMOL/L (ref 98–107)
CO2: 26 MMOL/L (ref 20–31)
CREAT SERPL-MCNC: 1.1 MG/DL (ref 0.6–0.9)
GFR, ESTIMATED: 53 ML/MIN/1.73M2
GLUCOSE BLD-MCNC: 97 MG/DL (ref 74–99)
POTASSIUM SERPL-SCNC: 4.5 MMOL/L (ref 3.7–5.3)
SODIUM BLD-SCNC: 139 MMOL/L (ref 136–145)

## 2025-01-20 RX ORDER — SULFAMETHOXAZOLE AND TRIMETHOPRIM 800; 160 MG/1; MG/1
1 TABLET ORAL 2 TIMES DAILY
Qty: 40 TABLET | Refills: 0 | Status: SHIPPED | OUTPATIENT
Start: 2025-01-20 | End: 2025-02-09

## 2025-01-20 SDOH — ECONOMIC STABILITY: FOOD INSECURITY: WITHIN THE PAST 12 MONTHS, THE FOOD YOU BOUGHT JUST DIDN'T LAST AND YOU DIDN'T HAVE MONEY TO GET MORE.: NEVER TRUE

## 2025-01-20 SDOH — ECONOMIC STABILITY: FOOD INSECURITY: WITHIN THE PAST 12 MONTHS, YOU WORRIED THAT YOUR FOOD WOULD RUN OUT BEFORE YOU GOT MONEY TO BUY MORE.: NEVER TRUE

## 2025-01-20 ASSESSMENT — ENCOUNTER SYMPTOMS
SORE THROAT: 0
RHINORRHEA: 0
COUGH: 0
EYE DISCHARGE: 0
NAUSEA: 0
SHORTNESS OF BREATH: 0
VOMITING: 0
ABDOMINAL PAIN: 0
DIARRHEA: 0
WHEEZING: 0
EYE REDNESS: 0

## 2025-01-20 ASSESSMENT — PATIENT HEALTH QUESTIONNAIRE - PHQ9
SUM OF ALL RESPONSES TO PHQ9 QUESTIONS 1 & 2: 0
9. THOUGHTS THAT YOU WOULD BE BETTER OFF DEAD, OR OF HURTING YOURSELF: NOT AT ALL
8. MOVING OR SPEAKING SO SLOWLY THAT OTHER PEOPLE COULD HAVE NOTICED. OR THE OPPOSITE, BEING SO FIGETY OR RESTLESS THAT YOU HAVE BEEN MOVING AROUND A LOT MORE THAN USUAL: NOT AT ALL
10. IF YOU CHECKED OFF ANY PROBLEMS, HOW DIFFICULT HAVE THESE PROBLEMS MADE IT FOR YOU TO DO YOUR WORK, TAKE CARE OF THINGS AT HOME, OR GET ALONG WITH OTHER PEOPLE: NOT DIFFICULT AT ALL
3. TROUBLE FALLING OR STAYING ASLEEP: NOT AT ALL
SUM OF ALL RESPONSES TO PHQ QUESTIONS 1-9: 0
SUM OF ALL RESPONSES TO PHQ QUESTIONS 1-9: 0
2. FEELING DOWN, DEPRESSED OR HOPELESS: NOT AT ALL
4. FEELING TIRED OR HAVING LITTLE ENERGY: NOT AT ALL
5. POOR APPETITE OR OVEREATING: NOT AT ALL
1. LITTLE INTEREST OR PLEASURE IN DOING THINGS: NOT AT ALL
7. TROUBLE CONCENTRATING ON THINGS, SUCH AS READING THE NEWSPAPER OR WATCHING TELEVISION: NOT AT ALL
6. FEELING BAD ABOUT YOURSELF - OR THAT YOU ARE A FAILURE OR HAVE LET YOURSELF OR YOUR FAMILY DOWN: NOT AT ALL
SUM OF ALL RESPONSES TO PHQ QUESTIONS 1-9: 0
SUM OF ALL RESPONSES TO PHQ QUESTIONS 1-9: 0

## 2025-01-20 NOTE — PROGRESS NOTES
[facility]  [facility]  [address] [address] [facility] [facility] [address]  Dept: [phone]    Elaina Champagne is a 74 y.o. female Established patient, who presents today for her medical conditions/complaints as noted below.      Chief Complaint   Patient presents with    Wound Check     Patient here today for wound check on LT knee       HPI:     HPI  Patient here for wound check on the left knee.  Patient states after the antibiotic it had improved.  However when the scab came off, the wound drained again.  Patient's previous culture grew out MRSA.  Was treated with Bactrim antibiotic.    Patient with history of incontinence.  States the Ditropan XL at 15 mg is not helping.  Patient is even interested in possible surgical intervention    Patient with history of venous ulcer of the left leg.  Improved    Patient with pulmonary hypertension.  Stable.    Patient with history of atrial fibrillation.  Stable at present.  His pulse rate is controlled.    Patient with history of chronic kidney disease stage IIIb.  Last blood work was reviewed.    Reviewed prior notes None  Reviewed previous Labs    No components found for: \"LDLCHOLESTEROL\", \"LDLCALC\"    (goal LDL is <100)   AST (U/L)   Date Value   07/01/2024 212 (H)     ALT (U/L)   Date Value   07/01/2024 32     BUN (mg/dL)   Date Value   12/11/2024 15     Hemoglobin A1C (%)   Date Value   08/21/2018 5.2     TSH (uIU/mL)   Date Value   07/01/2024 1.11     BP Readings from Last 3 Encounters:   01/20/25 138/76   12/31/24 (!) 150/72   11/06/24 128/70          (goal 120/80)    Past Medical History:   Diagnosis Date    Anemia     Anxiety disorder     Artificial knee joint present, left     Artificial knee joint present, right     Atherosclerotic heart disease     Bell's palsy     Cellulitis     lower legs    Chronic kidney disease, stage 3 (HCC)     Cognitive communication deficit     Gastric ulcer with hemorrhage     Gastro-esophageal

## 2025-01-23 NOTE — RESULT ENCOUNTER NOTE
Advise patient culture does show slight growth of Pseudomonas.  Sensitive to the Bactrim antibiotic.  Please finish antibiotic as prescribed

## 2025-01-24 LAB
MICROORGANISM SPEC CULT: ABNORMAL
MICROORGANISM SPEC CULT: ABNORMAL
MICROORGANISM/AGENT SPEC: ABNORMAL
MICROORGANISM/AGENT SPEC: ABNORMAL
SPECIMEN DESCRIPTION: ABNORMAL

## 2025-01-28 ENCOUNTER — HOSPITAL ENCOUNTER (OUTPATIENT)
Dept: WOUND CARE | Age: 75
Discharge: HOME | End: 2025-01-28
Payer: MEDICARE

## 2025-01-28 ENCOUNTER — HOSPITAL ENCOUNTER (INPATIENT)
Age: 75
LOS: 6 days | Discharge: HOME HEALTH | DRG: 463 | End: 2025-02-03
Attending: EMERGENCY MEDICINE
Payer: MEDICARE

## 2025-01-28 ENCOUNTER — APPOINTMENT (OUTPATIENT)
Dept: GENERAL RADIOLOGY | Age: 75
DRG: 463 | End: 2025-01-28
Payer: MEDICARE

## 2025-01-28 ENCOUNTER — APPOINTMENT (OUTPATIENT)
Dept: CT IMAGING | Age: 75
DRG: 463 | End: 2025-01-28
Payer: MEDICARE

## 2025-01-28 ENCOUNTER — HOSPITAL ENCOUNTER (EMERGENCY)
Age: 75
Discharge: ANOTHER HOSPITAL | DRG: 463 | End: 2025-01-28
Attending: STUDENT IN AN ORGANIZED HEALTH CARE EDUCATION/TRAINING PROGRAM
Payer: MEDICARE

## 2025-01-28 VITALS
RESPIRATION RATE: 20 BRPM | HEART RATE: 56 BPM | WEIGHT: 175 LBS | TEMPERATURE: 98.4 F | OXYGEN SATURATION: 96 % | HEIGHT: 61 IN | SYSTOLIC BLOOD PRESSURE: 153 MMHG | BODY MASS INDEX: 33.04 KG/M2 | DIASTOLIC BLOOD PRESSURE: 58 MMHG

## 2025-01-28 VITALS
HEART RATE: 54 BPM | TEMPERATURE: 96.9 F | BODY MASS INDEX: 34.36 KG/M2 | SYSTOLIC BLOOD PRESSURE: 172 MMHG | WEIGHT: 175 LBS | HEIGHT: 60 IN | DIASTOLIC BLOOD PRESSURE: 66 MMHG | RESPIRATION RATE: 20 BRPM

## 2025-01-28 DIAGNOSIS — I89.0 LYMPHEDEMA: Primary | ICD-10-CM

## 2025-01-28 DIAGNOSIS — I87.2 VENOUS INSUFFICIENCY OF BOTH LOWER EXTREMITIES: ICD-10-CM

## 2025-01-28 DIAGNOSIS — R06.02 SHORTNESS OF BREATH: ICD-10-CM

## 2025-01-28 DIAGNOSIS — T84.7XXA INFECTED HARDWARE IN LEFT LOWER EXTREMITY, INITIAL ENCOUNTER (HCC): ICD-10-CM

## 2025-01-28 DIAGNOSIS — M00.9 PYOGENIC ARTHRITIS OF LEFT KNEE JOINT, DUE TO UNSPECIFIED ORGANISM: Primary | ICD-10-CM

## 2025-01-28 DIAGNOSIS — T84.54XA INFECTION ASSOCIATED WITH INTERNAL LEFT KNEE PROSTHESIS, INITIAL ENCOUNTER (HCC): ICD-10-CM

## 2025-01-28 DIAGNOSIS — J81.0 ACUTE PULMONARY EDEMA: ICD-10-CM

## 2025-01-28 LAB
ABSOLUTE BANDS: 0.06 K/UL (ref 0–1)
ALBUMIN SERPL-MCNC: 3.6 G/DL (ref 3.5–5.2)
ALP SERPL-CCNC: 77 U/L (ref 35–104)
ALT SERPL-CCNC: 8 U/L (ref 10–35)
ANION GAP SERPL CALCULATED.3IONS-SCNC: 11 MMOL/L (ref 9–16)
AST SERPL-CCNC: 22 U/L (ref 10–35)
BANDS: 2 % (ref 0–10)
BASOPHILS # BLD: 0 K/UL (ref 0–0.2)
BASOPHILS NFR BLD: 0 % (ref 0–2)
BILIRUB SERPL-MCNC: 0.2 MG/DL (ref 0–1.2)
BNP SERPL-MCNC: 4621 PG/ML (ref 0–125)
BUN SERPL-MCNC: 18 MG/DL (ref 8–23)
CALCIUM SERPL-MCNC: 9.3 MG/DL (ref 8.6–10.4)
CHLORIDE SERPL-SCNC: 102 MMOL/L (ref 98–107)
CO2 SERPL-SCNC: 22 MMOL/L (ref 20–31)
CREAT SERPL-MCNC: 1.2 MG/DL (ref 0.7–1.2)
CRP SERPL HS-MCNC: 11.6 MG/L (ref 0–5)
EOSINOPHIL # BLD: 0.06 K/UL (ref 0–0.4)
EOSINOPHILS RELATIVE PERCENT: 2 % (ref 0–4)
ERYTHROCYTE [DISTWIDTH] IN BLOOD BY AUTOMATED COUNT: 17.9 % (ref 11.5–14.9)
ERYTHROCYTE [SEDIMENTATION RATE] IN BLOOD BY PHOTOMETRIC METHOD: 44 MM/HR (ref 0–30)
GFR, ESTIMATED: 48 ML/MIN/1.73M2
GLUCOSE SERPL-MCNC: 81 MG/DL (ref 74–99)
HCT VFR BLD AUTO: 25.3 % (ref 36–46)
HGB BLD-MCNC: 8.2 G/DL (ref 12–16)
INR PPP: 1.1
LACTATE BLDV-SCNC: 1.2 MMOL/L (ref 0.5–1.9)
LYMPHOCYTES NFR BLD: 0.32 K/UL (ref 1–4.8)
LYMPHOCYTES RELATIVE PERCENT: 11 % (ref 24–44)
MCH RBC QN AUTO: 27.6 PG (ref 26–34)
MCHC RBC AUTO-ENTMCNC: 32.6 G/DL (ref 31–37)
MCV RBC AUTO: 84.8 FL (ref 80–100)
MONOCYTES NFR BLD: 0.23 K/UL (ref 0.1–1.3)
MONOCYTES NFR BLD: 8 % (ref 1–7)
MORPHOLOGY: ABNORMAL
MORPHOLOGY: ABNORMAL
NEUTROPHILS NFR BLD: 77 % (ref 36–66)
NEUTS SEG NFR BLD: 2.23 K/UL (ref 1.3–9.1)
PLATELET # BLD AUTO: 240 K/UL (ref 150–450)
PMV BLD AUTO: 8 FL (ref 6–12)
POTASSIUM SERPL-SCNC: 5.3 MMOL/L (ref 3.7–5.3)
PROCALCITONIN SERPL-MCNC: 0.09 NG/ML (ref 0–0.09)
PROT SERPL-MCNC: 7 G/DL (ref 6.6–8.7)
PROTHROMBIN TIME: 15.2 SEC (ref 11.8–14.6)
RBC # BLD AUTO: 2.98 M/UL (ref 4–5.2)
SODIUM SERPL-SCNC: 135 MMOL/L (ref 136–145)
TROPONIN I SERPL HS-MCNC: 34 NG/L (ref 0–14)
TROPONIN I SERPL HS-MCNC: 35 NG/L (ref 0–14)
WBC OTHER # BLD: 2.9 K/UL (ref 3.5–11)

## 2025-01-28 PROCEDURE — 73562 X-RAY EXAM OF KNEE 3: CPT

## 2025-01-28 PROCEDURE — 99223 1ST HOSP IP/OBS HIGH 75: CPT | Performed by: STUDENT IN AN ORGANIZED HEALTH CARE EDUCATION/TRAINING PROGRAM

## 2025-01-28 PROCEDURE — 6360000002 HC RX W HCPCS

## 2025-01-28 PROCEDURE — 80053 COMPREHEN METABOLIC PANEL: CPT

## 2025-01-28 PROCEDURE — 36415 COLL VENOUS BLD VENIPUNCTURE: CPT

## 2025-01-28 PROCEDURE — 6360000004 HC RX CONTRAST MEDICATION

## 2025-01-28 PROCEDURE — 84145 PROCALCITONIN (PCT): CPT

## 2025-01-28 PROCEDURE — 6360000002 HC RX W HCPCS: Performed by: STUDENT IN AN ORGANIZED HEALTH CARE EDUCATION/TRAINING PROGRAM

## 2025-01-28 PROCEDURE — 93005 ELECTROCARDIOGRAM TRACING: CPT

## 2025-01-28 PROCEDURE — 86140 C-REACTIVE PROTEIN: CPT

## 2025-01-28 PROCEDURE — 85652 RBC SED RATE AUTOMATED: CPT

## 2025-01-28 PROCEDURE — 96374 THER/PROPH/DIAG INJ IV PUSH: CPT

## 2025-01-28 PROCEDURE — 87040 BLOOD CULTURE FOR BACTERIA: CPT

## 2025-01-28 PROCEDURE — 2580000003 HC RX 258

## 2025-01-28 PROCEDURE — 0QDF0ZZ EXTRACTION OF LEFT PATELLA, OPEN APPROACH: ICD-10-PCS | Performed by: SURGERY

## 2025-01-28 PROCEDURE — 85610 PROTHROMBIN TIME: CPT

## 2025-01-28 PROCEDURE — 71260 CT THORAX DX C+: CPT

## 2025-01-28 PROCEDURE — 85025 COMPLETE CBC W/AUTO DIFF WBC: CPT

## 2025-01-28 PROCEDURE — 11042 DBRDMT SUBQ TIS 1ST 20SQCM/<: CPT | Performed by: SURGERY

## 2025-01-28 PROCEDURE — 2500000003 HC RX 250 WO HCPCS

## 2025-01-28 PROCEDURE — 84484 ASSAY OF TROPONIN QUANT: CPT

## 2025-01-28 PROCEDURE — 1200000000 HC SEMI PRIVATE

## 2025-01-28 PROCEDURE — 83880 ASSAY OF NATRIURETIC PEPTIDE: CPT

## 2025-01-28 PROCEDURE — 96375 TX/PRO/DX INJ NEW DRUG ADDON: CPT

## 2025-01-28 PROCEDURE — 93005 ELECTROCARDIOGRAM TRACING: CPT | Performed by: STUDENT IN AN ORGANIZED HEALTH CARE EDUCATION/TRAINING PROGRAM

## 2025-01-28 PROCEDURE — 11042 DBRDMT SUBQ TIS 1ST 20SQCM/<: CPT

## 2025-01-28 PROCEDURE — 99285 EMERGENCY DEPT VISIT HI MDM: CPT

## 2025-01-28 PROCEDURE — 83605 ASSAY OF LACTIC ACID: CPT

## 2025-01-28 PROCEDURE — 99214 OFFICE O/P EST MOD 30 MIN: CPT

## 2025-01-28 RX ORDER — SUCRALFATE 1 G/1
1 TABLET ORAL
Status: DISCONTINUED | OUTPATIENT
Start: 2025-01-29 | End: 2025-02-03 | Stop reason: HOSPADM

## 2025-01-28 RX ORDER — FOLIC ACID 1 MG/1
1 TABLET ORAL DAILY
Status: DISCONTINUED | OUTPATIENT
Start: 2025-01-29 | End: 2025-02-03 | Stop reason: HOSPADM

## 2025-01-28 RX ORDER — MULTIVITAMIN WITH IRON
500 TABLET ORAL DAILY
Status: DISCONTINUED | OUTPATIENT
Start: 2025-01-29 | End: 2025-02-03 | Stop reason: HOSPADM

## 2025-01-28 RX ORDER — POTASSIUM CHLORIDE 7.45 MG/ML
10 INJECTION INTRAVENOUS PRN
Status: DISCONTINUED | OUTPATIENT
Start: 2025-01-28 | End: 2025-02-03 | Stop reason: HOSPADM

## 2025-01-28 RX ORDER — POLYETHYLENE GLYCOL 3350 17 G/17G
17 POWDER, FOR SOLUTION ORAL DAILY PRN
Status: DISCONTINUED | OUTPATIENT
Start: 2025-01-28 | End: 2025-02-03 | Stop reason: HOSPADM

## 2025-01-28 RX ORDER — MORPHINE SULFATE 4 MG/ML
4 INJECTION, SOLUTION INTRAMUSCULAR; INTRAVENOUS ONCE
Status: COMPLETED | OUTPATIENT
Start: 2025-01-28 | End: 2025-01-28

## 2025-01-28 RX ORDER — TRAZODONE HYDROCHLORIDE 50 MG/1
25 TABLET, FILM COATED ORAL NIGHTLY
Status: DISCONTINUED | OUTPATIENT
Start: 2025-01-29 | End: 2025-02-03 | Stop reason: HOSPADM

## 2025-01-28 RX ORDER — FUROSEMIDE 20 MG/1
40 TABLET ORAL DAILY
Status: DISCONTINUED | OUTPATIENT
Start: 2025-01-29 | End: 2025-01-29

## 2025-01-28 RX ORDER — LIDOCAINE HYDROCHLORIDE 40 MG/ML
SOLUTION TOPICAL ONCE
Status: DISCONTINUED | OUTPATIENT
Start: 2025-01-28 | End: 2025-01-29 | Stop reason: HOSPADM

## 2025-01-28 RX ORDER — SODIUM CHLORIDE 9 MG/ML
INJECTION, SOLUTION INTRAVENOUS CONTINUOUS
Status: DISCONTINUED | OUTPATIENT
Start: 2025-01-29 | End: 2025-01-29

## 2025-01-28 RX ORDER — POTASSIUM CHLORIDE 1500 MG/1
40 TABLET, EXTENDED RELEASE ORAL PRN
Status: DISCONTINUED | OUTPATIENT
Start: 2025-01-28 | End: 2025-02-03 | Stop reason: HOSPADM

## 2025-01-28 RX ORDER — MIDODRINE HYDROCHLORIDE 5 MG/1
5 TABLET ORAL 3 TIMES DAILY PRN
Status: DISCONTINUED | OUTPATIENT
Start: 2025-01-28 | End: 2025-02-03 | Stop reason: HOSPADM

## 2025-01-28 RX ORDER — AMIODARONE HYDROCHLORIDE 200 MG/1
200 TABLET ORAL DAILY
Status: DISCONTINUED | OUTPATIENT
Start: 2025-01-29 | End: 2025-02-03 | Stop reason: HOSPADM

## 2025-01-28 RX ORDER — ESCITALOPRAM OXALATE 10 MG/1
20 TABLET ORAL DAILY
Status: DISCONTINUED | OUTPATIENT
Start: 2025-01-29 | End: 2025-02-03 | Stop reason: HOSPADM

## 2025-01-28 RX ORDER — IOPAMIDOL 755 MG/ML
75 INJECTION, SOLUTION INTRAVASCULAR
Status: COMPLETED | OUTPATIENT
Start: 2025-01-28 | End: 2025-01-28

## 2025-01-28 RX ORDER — KETOROLAC TROMETHAMINE 30 MG/ML
15 INJECTION, SOLUTION INTRAMUSCULAR; INTRAVENOUS ONCE
Status: COMPLETED | OUTPATIENT
Start: 2025-01-28 | End: 2025-01-28

## 2025-01-28 RX ORDER — FERROUS SULFATE 325(65) MG
325 TABLET, DELAYED RELEASE (ENTERIC COATED) ORAL 2 TIMES DAILY WITH MEALS
Status: DISCONTINUED | OUTPATIENT
Start: 2025-01-29 | End: 2025-02-03 | Stop reason: HOSPADM

## 2025-01-28 RX ORDER — DIPHENHYDRAMINE HYDROCHLORIDE 50 MG/ML
25 INJECTION INTRAMUSCULAR; INTRAVENOUS ONCE
Status: COMPLETED | OUTPATIENT
Start: 2025-01-28 | End: 2025-01-28

## 2025-01-28 RX ORDER — ONDANSETRON 2 MG/ML
4 INJECTION INTRAMUSCULAR; INTRAVENOUS EVERY 6 HOURS PRN
Status: DISCONTINUED | OUTPATIENT
Start: 2025-01-28 | End: 2025-02-03 | Stop reason: HOSPADM

## 2025-01-28 RX ORDER — SODIUM CHLORIDE 0.9 % (FLUSH) 0.9 %
5-40 SYRINGE (ML) INJECTION EVERY 12 HOURS SCHEDULED
Status: DISCONTINUED | OUTPATIENT
Start: 2025-01-29 | End: 2025-02-03 | Stop reason: HOSPADM

## 2025-01-28 RX ORDER — ERGOCALCIFEROL 1.25 MG/1
50000 CAPSULE, LIQUID FILLED ORAL WEEKLY
Status: DISCONTINUED | OUTPATIENT
Start: 2025-01-29 | End: 2025-02-03 | Stop reason: HOSPADM

## 2025-01-28 RX ORDER — CLOPIDOGREL BISULFATE 75 MG/1
75 TABLET ORAL DAILY
Status: DISCONTINUED | OUTPATIENT
Start: 2025-01-29 | End: 2025-01-29

## 2025-01-28 RX ORDER — ONDANSETRON 4 MG/1
4 TABLET, ORALLY DISINTEGRATING ORAL EVERY 8 HOURS PRN
Status: DISCONTINUED | OUTPATIENT
Start: 2025-01-28 | End: 2025-02-03 | Stop reason: HOSPADM

## 2025-01-28 RX ORDER — CALCIUM CARBONATE 500 MG/1
1250 TABLET, CHEWABLE ORAL DAILY
Status: DISCONTINUED | OUTPATIENT
Start: 2025-01-29 | End: 2025-02-03 | Stop reason: HOSPADM

## 2025-01-28 RX ORDER — POTASSIUM CHLORIDE 1500 MG/1
20 TABLET, EXTENDED RELEASE ORAL DAILY
Status: DISCONTINUED | OUTPATIENT
Start: 2025-01-29 | End: 2025-02-03 | Stop reason: HOSPADM

## 2025-01-28 RX ORDER — SODIUM CHLORIDE 0.9 % (FLUSH) 0.9 %
10 SYRINGE (ML) INJECTION PRN
Status: COMPLETED | OUTPATIENT
Start: 2025-01-28 | End: 2025-01-28

## 2025-01-28 RX ORDER — 0.9 % SODIUM CHLORIDE 0.9 %
1000 INTRAVENOUS SOLUTION INTRAVENOUS ONCE
Status: COMPLETED | OUTPATIENT
Start: 2025-01-28 | End: 2025-01-28

## 2025-01-28 RX ORDER — MORPHINE SULFATE 4 MG/ML
4 INJECTION INTRAVENOUS ONCE
Status: COMPLETED | OUTPATIENT
Start: 2025-01-28 | End: 2025-01-28

## 2025-01-28 RX ORDER — LINEZOLID 2 MG/ML
600 INJECTION, SOLUTION INTRAVENOUS EVERY 12 HOURS
Status: DISCONTINUED | OUTPATIENT
Start: 2025-01-28 | End: 2025-01-28 | Stop reason: HOSPADM

## 2025-01-28 RX ORDER — SODIUM CHLORIDE 0.9 % (FLUSH) 0.9 %
10 SYRINGE (ML) INJECTION PRN
Status: DISCONTINUED | OUTPATIENT
Start: 2025-01-28 | End: 2025-02-03 | Stop reason: HOSPADM

## 2025-01-28 RX ORDER — ATORVASTATIN CALCIUM 40 MG/1
40 TABLET, FILM COATED ORAL NIGHTLY
Status: DISCONTINUED | OUTPATIENT
Start: 2025-01-29 | End: 2025-02-03 | Stop reason: HOSPADM

## 2025-01-28 RX ORDER — ACETAMINOPHEN 650 MG/1
650 SUPPOSITORY RECTAL EVERY 6 HOURS PRN
Status: DISCONTINUED | OUTPATIENT
Start: 2025-01-28 | End: 2025-02-03 | Stop reason: HOSPADM

## 2025-01-28 RX ORDER — OXYBUTYNIN CHLORIDE 5 MG/1
15 TABLET, EXTENDED RELEASE ORAL DAILY
Status: DISCONTINUED | OUTPATIENT
Start: 2025-01-29 | End: 2025-02-03 | Stop reason: HOSPADM

## 2025-01-28 RX ORDER — LINEZOLID 2 MG/ML
600 INJECTION, SOLUTION INTRAVENOUS EVERY 12 HOURS
Status: DISCONTINUED | OUTPATIENT
Start: 2025-01-29 | End: 2025-01-29

## 2025-01-28 RX ORDER — 0.9 % SODIUM CHLORIDE 0.9 %
100 INTRAVENOUS SOLUTION INTRAVENOUS ONCE
Status: COMPLETED | OUTPATIENT
Start: 2025-01-28 | End: 2025-01-28

## 2025-01-28 RX ORDER — MAGNESIUM SULFATE 1 G/100ML
1000 INJECTION INTRAVENOUS PRN
Status: DISCONTINUED | OUTPATIENT
Start: 2025-01-28 | End: 2025-02-03 | Stop reason: HOSPADM

## 2025-01-28 RX ORDER — ACETAMINOPHEN 325 MG/1
650 TABLET ORAL EVERY 6 HOURS PRN
Status: DISCONTINUED | OUTPATIENT
Start: 2025-01-28 | End: 2025-02-03 | Stop reason: HOSPADM

## 2025-01-28 RX ORDER — SODIUM CHLORIDE 9 MG/ML
INJECTION, SOLUTION INTRAVENOUS PRN
Status: DISCONTINUED | OUTPATIENT
Start: 2025-01-28 | End: 2025-02-03 | Stop reason: HOSPADM

## 2025-01-28 RX ORDER — PANTOPRAZOLE SODIUM 40 MG/1
40 TABLET, DELAYED RELEASE ORAL
Status: DISCONTINUED | OUTPATIENT
Start: 2025-01-29 | End: 2025-02-03 | Stop reason: HOSPADM

## 2025-01-28 RX ORDER — MORPHINE SULFATE 4 MG/ML
2 INJECTION INTRAVENOUS EVERY 4 HOURS PRN
Status: DISCONTINUED | OUTPATIENT
Start: 2025-01-28 | End: 2025-02-03 | Stop reason: HOSPADM

## 2025-01-28 RX ORDER — DOCUSATE SODIUM 100 MG/1
100 CAPSULE, LIQUID FILLED ORAL DAILY
Status: DISCONTINUED | OUTPATIENT
Start: 2025-01-29 | End: 2025-02-03 | Stop reason: HOSPADM

## 2025-01-28 RX ORDER — FUROSEMIDE 10 MG/ML
20 INJECTION INTRAMUSCULAR; INTRAVENOUS ONCE
Status: COMPLETED | OUTPATIENT
Start: 2025-01-28 | End: 2025-01-28

## 2025-01-28 RX ORDER — ENOXAPARIN SODIUM 100 MG/ML
40 INJECTION SUBCUTANEOUS DAILY
Status: DISCONTINUED | OUTPATIENT
Start: 2025-01-29 | End: 2025-01-29

## 2025-01-28 RX ORDER — METOPROLOL TARTRATE 25 MG/1
12.5 TABLET, FILM COATED ORAL 2 TIMES DAILY
Status: DISCONTINUED | OUTPATIENT
Start: 2025-01-29 | End: 2025-02-03 | Stop reason: HOSPADM

## 2025-01-28 RX ADMIN — MORPHINE SULFATE 4 MG: 4 INJECTION INTRAVENOUS at 21:48

## 2025-01-28 RX ADMIN — FUROSEMIDE 20 MG: 10 INJECTION, SOLUTION INTRAMUSCULAR; INTRAVENOUS at 22:27

## 2025-01-28 RX ADMIN — DIPHENHYDRAMINE HYDROCHLORIDE 25 MG: 50 INJECTION, SOLUTION INTRAMUSCULAR; INTRAVENOUS at 18:40

## 2025-01-28 RX ADMIN — CEFEPIME 2000 MG: 2 INJECTION, POWDER, FOR SOLUTION INTRAVENOUS at 17:09

## 2025-01-28 RX ADMIN — IOPAMIDOL 75 ML: 755 INJECTION, SOLUTION INTRAVENOUS at 20:50

## 2025-01-28 RX ADMIN — LINEZOLID 600 MG: 600 INJECTION, SOLUTION INTRAVENOUS at 19:09

## 2025-01-28 RX ADMIN — VANCOMYCIN HYDROCHLORIDE 2000 MG: 1 INJECTION, POWDER, LYOPHILIZED, FOR SOLUTION INTRAVENOUS at 18:29

## 2025-01-28 RX ADMIN — SODIUM CHLORIDE 1000 ML: 9 INJECTION, SOLUTION INTRAVENOUS at 17:34

## 2025-01-28 RX ADMIN — SODIUM CHLORIDE 100 ML: 9 INJECTION, SOLUTION INTRAVENOUS at 20:50

## 2025-01-28 RX ADMIN — MORPHINE SULFATE 4 MG: 4 INJECTION, SOLUTION INTRAMUSCULAR; INTRAVENOUS at 20:17

## 2025-01-28 RX ADMIN — KETOROLAC TROMETHAMINE 15 MG: 30 INJECTION, SOLUTION INTRAMUSCULAR at 17:05

## 2025-01-28 RX ADMIN — SODIUM CHLORIDE, PRESERVATIVE FREE 10 ML: 5 INJECTION INTRAVENOUS at 20:50

## 2025-01-28 ASSESSMENT — PAIN DESCRIPTION - LOCATION
LOCATION: LEG
LOCATION: LEG;KNEE
LOCATION: KNEE
LOCATION: KNEE

## 2025-01-28 ASSESSMENT — PAIN DESCRIPTION - ORIENTATION
ORIENTATION: LEFT;LOWER
ORIENTATION: LEFT

## 2025-01-28 ASSESSMENT — PAIN - FUNCTIONAL ASSESSMENT
PAIN_FUNCTIONAL_ASSESSMENT: 0-10
PAIN_FUNCTIONAL_ASSESSMENT: PREVENTS OR INTERFERES SOME ACTIVE ACTIVITIES AND ADLS
PAIN_FUNCTIONAL_ASSESSMENT: 0-10

## 2025-01-28 ASSESSMENT — PAIN SCALES - GENERAL
PAINLEVEL_OUTOF10: 3
PAINLEVEL_OUTOF10: 8
PAINLEVEL_OUTOF10: 3
PAINLEVEL_OUTOF10: 3
PAINLEVEL_OUTOF10: 5

## 2025-01-28 ASSESSMENT — PAIN DESCRIPTION - DESCRIPTORS
DESCRIPTORS: BURNING
DESCRIPTORS: SORE
DESCRIPTORS: THROBBING

## 2025-01-28 ASSESSMENT — ENCOUNTER SYMPTOMS
ABDOMINAL PAIN: 0
EYE REDNESS: 0
DIARRHEA: 0
NAUSEA: 0
SHORTNESS OF BREATH: 0
WHEEZING: 0
EYE DISCHARGE: 0
RHINORRHEA: 0
SORE THROAT: 0
VOMITING: 0
COUGH: 0

## 2025-01-28 ASSESSMENT — PAIN DESCRIPTION - PAIN TYPE: TYPE: CHRONIC PAIN

## 2025-01-28 ASSESSMENT — PAIN DESCRIPTION - FREQUENCY: FREQUENCY: INTERMITTENT

## 2025-01-28 ASSESSMENT — PAIN DESCRIPTION - ONSET: ONSET: ON-GOING

## 2025-01-28 NOTE — DISCHARGE INSTRUCTIONS
[facility] WOUND and HYPERBARIC TREATMENT  CENTER      Visit  Discharge Instructions / Physician Orders  DATE:1/28/25     Home Care:[facility]     SUPPLIES ORDERED THRU:                     DATE LAST SUPPLIED     Wound Location:  Left knee-? Infected hardware     Cleanse with: Liquid antibacterial soap and water, rinse well      Dressing Orders:  Primary dressing    Left Knee                   Secondary dressing  4x4 Silicone                        secure with           x 30days     Frequency:  transport to ER     Additional Orders: Increase protein to diet (meat, cheese, eggs, fish, peanut butter, nuts and beans)  Multivitamin daily  Elevate legs if swollen or wearing compression when sitting  OFFLOADING [] YES  TYPE:                  [x] NA    Weekly wound care visits until determined otherwise.    Antibiotic therapy-wound care related YES [] NO [] NA[]  DRUG-             BACTrim for  been on for approx 10 days                                                Duration-             Script sent to-                      on (date)  MY CHART []     Smart Device  []     HYPERBARIC TREATMENT-                TREATMENT #                          Your next appointment with the Wound Care Center is in 1 week                                                                                                   (Please note your next appointment above and if you are unable to keep, kindly give a 24 hour notice. Thank you.)  If more than 15 min late we cannot guarantee you will be seen due to clinician schedule    Per Policy,  3 or more cancellations or no show may result in dismissal from program  If you experience any of the following, please call the Wound Care Center during business hours:  [phone]     * Increase in Pain  * Temperature over 101  * Increase in drainage from your wound  * Drainage with a foul odor  * Bleeding  * Increase in swelling  * Need for compression bandage changes due to slippage, breakthrough

## 2025-01-28 NOTE — ED PROVIDER NOTES
[facility] EMERGENCY DEPARTMENT  Emergency Department Encounter  Emergency Medicine Resident     Pt Name:Elaina Champagne  MRN: [id number]  Birthdate 1950  Date of evaluation: 1/28/25  PCP:  Robin Ly MD  Note Started: 4:18 PM EST      CHIEF COMPLAINT       Chief Complaint   Patient presents with    Wound Check       HISTORY OF PRESENT ILLNESS  (Location/Symptom, Timing/Onset, Context/Setting, Quality, Duration, Modifying Factors, Severity.)      Elaina Champagne is a 74 y.o. female presenting to ED for    CC's: Left knee wound    Patient with history of left knee replacement in 2017 with Dr. Bose .  Patient does not remember who this provider was.  Patient endorses that this was at [facility].  Patient has had issues with this knee for some time.  Patient has been intermittently following with wound care.  Patient was advised via her home health care nurse to come to the ED for evaluation.  Patient currently on Bactrim for left knee.    Notable PMHx: Anemia, anxiety, bilateral knee replacement, GERD, pud, hyperlipidemia, hypertension, lymphedema, A-fib on rate control with Lopressor, amiodarone not on chronic anticoagulation    Notable Home Meds: Trazodone, Protonix, midodrine, metoprolol, Lexapro, Plavix, amiodarone, Lasix, Bactrim    PAST MEDICAL / SURGICAL / SOCIAL / FAMILY HISTORY      has a past medical history of Anemia, Anxiety disorder, Artificial knee joint present, left, Artificial knee joint present, right, Atherosclerotic heart disease, Bell's palsy, Cellulitis, Chronic kidney disease, stage 3 (HCC), Cognitive communication deficit, Gastric ulcer with hemorrhage, Gastro-esophageal reflux disease without esophagitis, Hyperlipidemia, Hypertension, Lymphedema, Major depressive disorder, Morbid obesity, Nonrheumatic tricuspid (valve) insufficiency, NSTEMI (non-ST elevated myocardial infarction) ([facility]), Occlusion and stenosis of bilateral carotid arteries, Osteoarthritis,  leukopenic, borderline hemoglobin.  EKG relatively unremarkable, plain film left knee without hardware complication.  Initially no imaging of the chest obtained.  Patient became hypoxic with bilateral rails with subsequent CT PE obtained to rule out pulmonary embolism.  No pulmonary embolism identified pulmonary edema which was unilateral cooperative reported ultrasound.  Patient transferred to [facility] for further ED to ED transfer at recommendation of onsite orthopedics team with reevaluation via ED.     *Additional specifics as per ED course when pertinent.    IMPRESSION: Septic arthritis of the left knee, volume overload/pulmonary edema    DISPO: Transfer to [facility] for reevaluation via orthopedics for consideration of additional treatment of possible septic arthritis of the left knee    Amount and/or Complexity of Data Reviewed  Labs: ordered. Decision-making details documented in ED Course.  Radiology: ordered.  ECG/medicine tests: ordered.    Risk  Prescription drug management.        EKG    EKG sinus bradycardia, normal MS, QRS, QTc interval no acute ST or T wave changes.  Poor R wave progression precordial leads likely anterior septal infarct indeterminate.    All EKG's are interpreted by the Emergency Department Physician who either signs or Co-signs this chart in the absence of a cardiologist.    EMERGENCY DEPARTMENT COURSE:      ED Course as of 01/29/25 0036   Tue Jan 28, 2025   1609 Ashley w/ Dr. Ly, Robin Olivo MD Family Medicine and put pt on Bactrim   [BAN]   1716 Sed Rate, Automated(!): 44 [BAN]   1743 Lactic Acid, Sepsis: 1.2 [BAN]   1743 Hemoglobin Quant(!): 8.2  Hemoglobin relatively stable comparison to previous [BAN]   1743 Sed Rate, Automated(!): 44 [BAN]   1743 Procalcitonin: 0.09 [BAN]   1911 Discussed case with [facility].  Dr. Ren excepted patient recommending ED to ED transfer.  Dr. Tyson excepting. [BAN]   2030 Patient with new oxygen requirement on 3

## 2025-01-28 NOTE — PROGRESS NOTES
[facility]   Progress Note and Procedure Note      Elaina Champagne  MEDICAL RECORD NUMBER:  [id number]  AGE: 74 y.o.   GENDER: female  : 1950  EPISODE DATE:  2025    Subjective:     Chief Complaint   Patient presents with    Wound Check     Left knee         HISTORY of PRESENT ILLNESS HPI     Elaina Champagne is a 74 y.o. female who presents today for wound/ulcer evaluation.   History of Wound Context: S/P bilateral knee replacement. Blistered area has been there 2 weeks. Tracks 2 cm.  Family physician placed her on oral antibiotics a week ago    Wound/Ulcer Pain Timing/Severity: intermittent  Quality of pain: dull  Severity:  2 / 10   Modifying Factors: Pain worsens with instrumentation  Associated Signs/Symptoms: drainage and pain    Ulcer Identification:  Ulcer Type: undetermined  Contributing Factors: edema and lymphedema         PAST MEDICAL HISTORY        Diagnosis Date    Anemia     Anxiety disorder     Artificial knee joint present, left     Artificial knee joint present, right     Atherosclerotic heart disease     Bell's palsy     Cellulitis     lower legs    Chronic kidney disease, stage 3 ([facility])     Cognitive communication deficit     Gastric ulcer with hemorrhage     Gastro-esophageal reflux disease without esophagitis     Hyperlipidemia     Hypertension     Lymphedema     Major depressive disorder     Morbid obesity     Nonrheumatic tricuspid (valve) insufficiency     NSTEMI (non-ST elevated myocardial infarction) ([facility]) 2024    Occlusion and stenosis of bilateral carotid arteries     Osteoarthritis     Overactive bladder     Paroxysmal atrial fibrillation (HCC)     Urge incontinence     Venous insufficiency     Weakness        PAST SURGICAL HISTORY    Past Surgical History:   Procedure Laterality Date    CARDIAC PROCEDURE N/A 2024    Coronary angiography performed by Regis Aponte MD at [facility] CARDIAC CATH LAB    CARDIAC PROCEDURE N/A 2024

## 2025-01-28 NOTE — ED TRIAGE NOTES
Mode of arrival (squad #, walk in, police, etc) : walk in        Chief complaint(s): Wound check        Arrival Note (brief scenario, treatment PTA, etc).: Pt brought in from wound care for L knee swelling, opened, redness. Pt is AOX4.        C= \"Have you ever felt that you should Cut down on your drinking?\"  No  A= \"Have people Annoyed you by criticizing your drinking?\"  No  G= \"Have you ever felt bad or Guilty about your drinking?\"  No  E= \"Have you ever had a drink as an Eye-opener first thing in the morning to steady your nerves or to help a hangover?\"  No      Deferred []      Reason for deferring: N/A    *If yes to two or more: probable alcohol abuse.*

## 2025-01-28 NOTE — ED PROVIDER NOTES
[facility] EMERGENCY DEPARTMENT  Emergency Department  Faculty Attestation       I performed a history and physical examination of the patient and discussed management with the resident. I reviewed the resident’s note and agree with the documented findings including all diagnostic interpretations and plan of care. Any areas of disagreement are noted on the chart. I was personally present for the key portions of any procedures. I have documented in the chart those procedures where I was not present during the key portions. I have reviewed the emergency nurses triage note. I agree with the chief complaint, past medical history, past surgical history, allergies, medications, social and family history as documented unless otherwise noted below. Documentation of the HPI, Physical Exam and Medical Decision Making performed by shannen is based on my personal performance of the HPI, PE and MDM. For Physician Assistant/ Nurse Practitioner cases/documentation I have personally evaluated this patient and have completed at least one if not all key elements of the E/M (history, physical exam, and MDM). Additional findings are as noted.    Pertinent Comments     Primary Care Physician: Robin Ly MD    History: This is a 74 y.o. female who presents to the Emergency Department with complaint of    Chief Complaint   Patient presents with    Wound Check         Physical:    ED Triage Vitals [01/28/25 1545]   BP Systolic BP Percentile Diastolic BP Percentile Temp Temp Source Pulse Respirations SpO2   (!) 144/61 -- -- 98.4 °F (36.9 °C) Oral 52 16 100 %      Height Weight - Scale         1.549 m (5' 1\") 79.4 kg (175 lb)              RADIOLOGY:  XR KNEE LEFT (3 VIEWS)    Result Date: 1/28/2025  EXAMINATION: THREE XRAY VIEWS OF THE LEFT KNEE 1/28/2025 4:22 pm COMPARISON: None. HISTORY: ORDERING SYSTEM PROVIDED HISTORY: Left erythema, redness, wound, joint arthrocentesis 27 TECHNOLOGIST PROVIDED HISTORY: Left

## 2025-01-29 ENCOUNTER — ANESTHESIA (OUTPATIENT)
Dept: OPERATING ROOM | Age: 75
End: 2025-01-29
Payer: MEDICARE

## 2025-01-29 ENCOUNTER — APPOINTMENT (OUTPATIENT)
Age: 75
DRG: 463 | End: 2025-01-29
Attending: ANESTHESIOLOGY
Payer: MEDICARE

## 2025-01-29 ENCOUNTER — ANESTHESIA EVENT (OUTPATIENT)
Dept: OPERATING ROOM | Age: 75
End: 2025-01-29
Payer: MEDICARE

## 2025-01-29 PROBLEM — Z96.659 FAILED TOTAL KNEE ARTHROPLASTY, INITIAL ENCOUNTER (HCC): Status: ACTIVE | Noted: 2025-01-29

## 2025-01-29 PROBLEM — J96.01 ACUTE HYPOXIC RESPIRATORY FAILURE: Status: ACTIVE | Noted: 2025-01-29

## 2025-01-29 PROBLEM — I50.33 ACUTE ON CHRONIC DIASTOLIC (CONGESTIVE) HEART FAILURE (HCC): Status: ACTIVE | Noted: 2025-01-29

## 2025-01-29 PROBLEM — J81.0 ACUTE PULMONARY EDEMA: Status: ACTIVE | Noted: 2025-01-29

## 2025-01-29 PROBLEM — A49.02 MRSA (METHICILLIN RESISTANT STAPHYLOCOCCUS AUREUS) INFECTION: Status: ACTIVE | Noted: 2025-01-29

## 2025-01-29 PROBLEM — Z01.818 PRE-OP EVALUATION: Status: ACTIVE | Noted: 2025-01-29

## 2025-01-29 PROBLEM — T84.018A FAILED TOTAL KNEE ARTHROPLASTY, INITIAL ENCOUNTER (HCC): Status: ACTIVE | Noted: 2025-01-29

## 2025-01-29 PROBLEM — A49.8 PSEUDOMONAS AERUGINOSA INFECTION: Status: ACTIVE | Noted: 2025-01-29

## 2025-01-29 LAB
CRP SERPL HS-MCNC: 9.2 MG/L (ref 0–5)
ECHO AO ASC DIAM: 2.8 CM
ECHO AO ASCENDING AORTA INDEX: 1.57 CM/M2
ECHO AO ROOT DIAM: 2.8 CM
ECHO AO ROOT INDEX: 1.57 CM/M2
ECHO AV MEAN GRADIENT: 10 MMHG
ECHO AV MEAN VELOCITY: 1.5 M/S
ECHO AV PEAK GRADIENT: 19 MMHG
ECHO AV PEAK VELOCITY: 2.2 M/S
ECHO AV VELOCITY RATIO: 0.5
ECHO AV VTI: 50.7 CM
ECHO BSA: 1.85 M2
ECHO EST RA PRESSURE: 3 MMHG
ECHO IVC PROX: 1.5 CM
ECHO LA DIAMETER INDEX: 2.81 CM/M2
ECHO LA DIAMETER: 5 CM
ECHO LA TO AORTIC ROOT RATIO: 1.79
ECHO LV E' LATERAL VELOCITY: 7.07 CM/S
ECHO LV E' SEPTAL VELOCITY: 4.13 CM/S
ECHO LV EDV A2C: 68 ML
ECHO LV EDV A4C: 82 ML
ECHO LV EDV INDEX A4C: 46 ML/M2
ECHO LV EDV NDEX A2C: 38 ML/M2
ECHO LV EJECTION FRACTION A2C: 83 %
ECHO LV EJECTION FRACTION A4C: 64 %
ECHO LV EJECTION FRACTION BIPLANE: 70 % (ref 55–100)
ECHO LV ESV A2C: 11 ML
ECHO LV ESV A4C: 30 ML
ECHO LV ESV INDEX A2C: 6 ML/M2
ECHO LV ESV INDEX A4C: 17 ML/M2
ECHO LV FRACTIONAL SHORTENING: 39 % (ref 28–44)
ECHO LV INTERNAL DIMENSION DIASTOLE INDEX: 2.58 CM/M2
ECHO LV INTERNAL DIMENSION DIASTOLIC: 4.6 CM (ref 3.9–5.3)
ECHO LV INTERNAL DIMENSION SYSTOLIC INDEX: 1.57 CM/M2
ECHO LV INTERNAL DIMENSION SYSTOLIC: 2.8 CM
ECHO LV IVSD: 1.4 CM (ref 0.6–0.9)
ECHO LV MASS 2D: 256.8 G (ref 67–162)
ECHO LV MASS INDEX 2D: 144.2 G/M2 (ref 43–95)
ECHO LV POSTERIOR WALL DIASTOLIC: 1.4 CM (ref 0.6–0.9)
ECHO LV RELATIVE WALL THICKNESS RATIO: 0.61
ECHO LVOT AREA: 2.3 CM2
ECHO LVOT AV VTI INDEX: 0.62
ECHO LVOT DIAM: 1.7 CM
ECHO LVOT MEAN GRADIENT: 3 MMHG
ECHO LVOT PEAK GRADIENT: 5 MMHG
ECHO LVOT PEAK VELOCITY: 1.1 M/S
ECHO LVOT STROKE VOLUME INDEX: 39.9 ML/M2
ECHO LVOT SV: 71 ML
ECHO LVOT VTI: 31.3 CM
ECHO MV A VELOCITY: 0.77 M/S
ECHO MV AREA VTI: 1.4 CM2
ECHO MV E DECELERATION TIME (DT): 250 MS
ECHO MV E VELOCITY: 1.59 M/S
ECHO MV E/A RATIO: 2.06
ECHO MV E/E' LATERAL: 22.49
ECHO MV E/E' RATIO (AVERAGED): 30.49
ECHO MV E/E' SEPTAL: 38.5
ECHO MV LVOT VTI INDEX: 1.65
ECHO MV MAX VELOCITY: 1.4 M/S
ECHO MV MEAN GRADIENT: 3 MMHG
ECHO MV MEAN VELOCITY: 0.9 M/S
ECHO MV PEAK GRADIENT: 8 MMHG
ECHO MV VTI: 51.6 CM
ECHO RIGHT VENTRICULAR SYSTOLIC PRESSURE (RVSP): 20 MMHG
ECHO RV FREE WALL PEAK S': 19.2 CM/S
ECHO RV TAPSE: 2.4 CM (ref 1.7–?)
ECHO TV REGURGITANT MAX VELOCITY: 2.08 M/S
ECHO TV REGURGITANT PEAK GRADIENT: 17 MMHG
EKG ATRIAL RATE: 55 BPM
EKG ATRIAL RATE: 57 BPM
EKG P AXIS: 22 DEGREES
EKG P AXIS: 29 DEGREES
EKG P-R INTERVAL: 190 MS
EKG P-R INTERVAL: 198 MS
EKG Q-T INTERVAL: 460 MS
EKG Q-T INTERVAL: 462 MS
EKG QRS DURATION: 74 MS
EKG QRS DURATION: 76 MS
EKG QTC CALCULATION (BAZETT): 441 MS
EKG QTC CALCULATION (BAZETT): 447 MS
EKG R AXIS: -40 DEGREES
EKG R AXIS: -40 DEGREES
EKG T AXIS: 21 DEGREES
EKG T AXIS: 23 DEGREES
EKG VENTRICULAR RATE: 55 BPM
EKG VENTRICULAR RATE: 57 BPM

## 2025-01-29 PROCEDURE — 0H8LXZZ DIVISION OF LEFT LOWER LEG SKIN, EXTERNAL APPROACH: ICD-10-PCS | Performed by: ORTHOPAEDIC SURGERY

## 2025-01-29 PROCEDURE — 2500000003 HC RX 250 WO HCPCS: Performed by: ORTHOPAEDIC SURGERY

## 2025-01-29 PROCEDURE — 0JBP0ZZ EXCISION OF LEFT LOWER LEG SUBCUTANEOUS TISSUE AND FASCIA, OPEN APPROACH: ICD-10-PCS | Performed by: ORTHOPAEDIC SURGERY

## 2025-01-29 PROCEDURE — 2580000003 HC RX 258

## 2025-01-29 PROCEDURE — 6370000000 HC RX 637 (ALT 250 FOR IP): Performed by: HOSPITALIST

## 2025-01-29 PROCEDURE — 0S9D3ZZ DRAINAGE OF LEFT KNEE JOINT, PERCUTANEOUS APPROACH: ICD-10-PCS | Performed by: ORTHOPAEDIC SURGERY

## 2025-01-29 PROCEDURE — 6360000002 HC RX W HCPCS: Performed by: NURSE PRACTITIONER

## 2025-01-29 PROCEDURE — 86140 C-REACTIVE PROTEIN: CPT

## 2025-01-29 PROCEDURE — 3600000014 HC SURGERY LEVEL 4 ADDTL 15MIN: Performed by: ORTHOPAEDIC SURGERY

## 2025-01-29 PROCEDURE — 87075 CULTR BACTERIA EXCEPT BLOOD: CPT

## 2025-01-29 PROCEDURE — 6360000002 HC RX W HCPCS: Performed by: ANESTHESIOLOGY

## 2025-01-29 PROCEDURE — 6370000000 HC RX 637 (ALT 250 FOR IP): Performed by: NURSE PRACTITIONER

## 2025-01-29 PROCEDURE — 87077 CULTURE AEROBIC IDENTIFY: CPT

## 2025-01-29 PROCEDURE — 87206 SMEAR FLUORESCENT/ACID STAI: CPT

## 2025-01-29 PROCEDURE — 99232 SBSQ HOSP IP/OBS MODERATE 35: CPT | Performed by: HOSPITALIST

## 2025-01-29 PROCEDURE — 87102 FUNGUS ISOLATION CULTURE: CPT

## 2025-01-29 PROCEDURE — 93010 ELECTROCARDIOGRAM REPORT: CPT | Performed by: INTERNAL MEDICINE

## 2025-01-29 PROCEDURE — 6360000002 HC RX W HCPCS: Performed by: STUDENT IN AN ORGANIZED HEALTH CARE EDUCATION/TRAINING PROGRAM

## 2025-01-29 PROCEDURE — 3700000000 HC ANESTHESIA ATTENDED CARE: Performed by: ORTHOPAEDIC SURGERY

## 2025-01-29 PROCEDURE — 93306 TTE W/DOPPLER COMPLETE: CPT

## 2025-01-29 PROCEDURE — 2580000003 HC RX 258: Performed by: NURSE PRACTITIONER

## 2025-01-29 PROCEDURE — 93306 TTE W/DOPPLER COMPLETE: CPT | Performed by: INTERNAL MEDICINE

## 2025-01-29 PROCEDURE — 87116 MYCOBACTERIA CULTURE: CPT

## 2025-01-29 PROCEDURE — 6360000002 HC RX W HCPCS

## 2025-01-29 PROCEDURE — 64447 NJX AA&/STRD FEMORAL NRV IMG: CPT | Performed by: ANESTHESIOLOGY

## 2025-01-29 PROCEDURE — 86403 PARTICLE AGGLUT ANTBDY SCRN: CPT

## 2025-01-29 PROCEDURE — 3700000001 HC ADD 15 MINUTES (ANESTHESIA): Performed by: ORTHOPAEDIC SURGERY

## 2025-01-29 PROCEDURE — G0545 PR INHERENT VISIT TO INPT: HCPCS | Performed by: INTERNAL MEDICINE

## 2025-01-29 PROCEDURE — 87015 SPECIMEN INFECT AGNT CONCNTJ: CPT

## 2025-01-29 PROCEDURE — 3600000004 HC SURGERY LEVEL 4 BASE: Performed by: ORTHOPAEDIC SURGERY

## 2025-01-29 PROCEDURE — 2500000003 HC RX 250 WO HCPCS

## 2025-01-29 PROCEDURE — 87176 TISSUE HOMOGENIZATION CULTR: CPT

## 2025-01-29 PROCEDURE — 99222 1ST HOSP IP/OBS MODERATE 55: CPT | Performed by: INTERNAL MEDICINE

## 2025-01-29 PROCEDURE — 6370000000 HC RX 637 (ALT 250 FOR IP)

## 2025-01-29 PROCEDURE — 7100000000 HC PACU RECOVERY - FIRST 15 MIN: Performed by: ORTHOPAEDIC SURGERY

## 2025-01-29 PROCEDURE — 2W1MX6Z COMPRESSION OF LEFT LOWER EXTREMITY USING PRESSURE DRESSING: ICD-10-PCS | Performed by: ORTHOPAEDIC SURGERY

## 2025-01-29 PROCEDURE — 3E0T3BZ INTRODUCTION OF ANESTHETIC AGENT INTO PERIPHERAL NERVES AND PLEXI, PERCUTANEOUS APPROACH: ICD-10-PCS | Performed by: ORTHOPAEDIC SURGERY

## 2025-01-29 PROCEDURE — 1200000000 HC SEMI PRIVATE

## 2025-01-29 PROCEDURE — 6360000002 HC RX W HCPCS: Performed by: ORTHOPAEDIC SURGERY

## 2025-01-29 PROCEDURE — 87186 SC STD MICRODIL/AGAR DIL: CPT

## 2025-01-29 PROCEDURE — 7100000001 HC PACU RECOVERY - ADDTL 15 MIN: Performed by: ORTHOPAEDIC SURGERY

## 2025-01-29 PROCEDURE — 87070 CULTURE OTHR SPECIMN AEROBIC: CPT

## 2025-01-29 PROCEDURE — 2709999900 HC NON-CHARGEABLE SUPPLY: Performed by: ORTHOPAEDIC SURGERY

## 2025-01-29 PROCEDURE — 87205 SMEAR GRAM STAIN: CPT

## 2025-01-29 RX ORDER — LABETALOL HYDROCHLORIDE 5 MG/ML
10 INJECTION, SOLUTION INTRAVENOUS
Status: DISCONTINUED | OUTPATIENT
Start: 2025-01-29 | End: 2025-01-29

## 2025-01-29 RX ORDER — ROPIVACAINE HYDROCHLORIDE 5 MG/ML
INJECTION, SOLUTION EPIDURAL; INFILTRATION; PERINEURAL
Status: COMPLETED | OUTPATIENT
Start: 2025-01-29 | End: 2025-01-29

## 2025-01-29 RX ORDER — DEXAMETHASONE SODIUM PHOSPHATE 10 MG/ML
INJECTION INTRAMUSCULAR; INTRAVENOUS
Status: DISCONTINUED | OUTPATIENT
Start: 2025-01-29 | End: 2025-01-29 | Stop reason: SDUPTHER

## 2025-01-29 RX ORDER — PROCHLORPERAZINE EDISYLATE 5 MG/ML
5 INJECTION INTRAMUSCULAR; INTRAVENOUS
Status: DISCONTINUED | OUTPATIENT
Start: 2025-01-29 | End: 2025-01-29

## 2025-01-29 RX ORDER — FENTANYL CITRATE 50 UG/ML
25 INJECTION, SOLUTION INTRAMUSCULAR; INTRAVENOUS EVERY 5 MIN PRN
Status: DISCONTINUED | OUTPATIENT
Start: 2025-01-29 | End: 2025-01-29

## 2025-01-29 RX ORDER — FUROSEMIDE 10 MG/ML
40 INJECTION INTRAMUSCULAR; INTRAVENOUS 2 TIMES DAILY
Status: DISCONTINUED | OUTPATIENT
Start: 2025-01-29 | End: 2025-01-29

## 2025-01-29 RX ORDER — NALOXONE HYDROCHLORIDE 0.4 MG/ML
INJECTION, SOLUTION INTRAMUSCULAR; INTRAVENOUS; SUBCUTANEOUS PRN
Status: DISCONTINUED | OUTPATIENT
Start: 2025-01-29 | End: 2025-01-29

## 2025-01-29 RX ORDER — LIDOCAINE HYDROCHLORIDE 10 MG/ML
INJECTION, SOLUTION EPIDURAL; INFILTRATION; INTRACAUDAL; PERINEURAL
Status: DISCONTINUED | OUTPATIENT
Start: 2025-01-29 | End: 2025-01-29 | Stop reason: SDUPTHER

## 2025-01-29 RX ORDER — PROPOFOL 10 MG/ML
INJECTION, EMULSION INTRAVENOUS
Status: DISCONTINUED | OUTPATIENT
Start: 2025-01-29 | End: 2025-01-29 | Stop reason: SDUPTHER

## 2025-01-29 RX ORDER — SODIUM CHLORIDE, SODIUM LACTATE, POTASSIUM CHLORIDE, CALCIUM CHLORIDE 600; 310; 30; 20 MG/100ML; MG/100ML; MG/100ML; MG/100ML
INJECTION, SOLUTION INTRAVENOUS CONTINUOUS
Status: DISCONTINUED | OUTPATIENT
Start: 2025-01-29 | End: 2025-01-29

## 2025-01-29 RX ORDER — MAGNESIUM HYDROXIDE 1200 MG/15ML
LIQUID ORAL CONTINUOUS PRN
Status: DISCONTINUED | OUTPATIENT
Start: 2025-01-29 | End: 2025-01-29 | Stop reason: HOSPADM

## 2025-01-29 RX ORDER — MIDAZOLAM HYDROCHLORIDE 2 MG/2ML
2 INJECTION, SOLUTION INTRAMUSCULAR; INTRAVENOUS ONCE
Status: COMPLETED | OUTPATIENT
Start: 2025-01-29 | End: 2025-01-29

## 2025-01-29 RX ORDER — PHENYLEPHRINE HCL IN 0.9% NACL 1 MG/10 ML
SYRINGE (ML) INTRAVENOUS
Status: DISCONTINUED | OUTPATIENT
Start: 2025-01-29 | End: 2025-01-29

## 2025-01-29 RX ORDER — FENTANYL CITRATE 50 UG/ML
100 INJECTION, SOLUTION INTRAMUSCULAR; INTRAVENOUS ONCE
Status: COMPLETED | OUTPATIENT
Start: 2025-01-29 | End: 2025-01-29

## 2025-01-29 RX ORDER — ONDANSETRON 2 MG/ML
INJECTION INTRAMUSCULAR; INTRAVENOUS
Status: DISCONTINUED | OUTPATIENT
Start: 2025-01-29 | End: 2025-01-29 | Stop reason: SDUPTHER

## 2025-01-29 RX ORDER — ROCURONIUM BROMIDE 10 MG/ML
INJECTION, SOLUTION INTRAVENOUS
Status: DISCONTINUED | OUTPATIENT
Start: 2025-01-29 | End: 2025-01-29 | Stop reason: SDUPTHER

## 2025-01-29 RX ORDER — SODIUM CHLORIDE 9 MG/ML
INJECTION, SOLUTION INTRAVENOUS
Status: DISCONTINUED | OUTPATIENT
Start: 2025-01-29 | End: 2025-01-29 | Stop reason: SDUPTHER

## 2025-01-29 RX ORDER — TRAMADOL HYDROCHLORIDE 50 MG/1
50 TABLET ORAL EVERY 4 HOURS PRN
Status: DISCONTINUED | OUTPATIENT
Start: 2025-01-29 | End: 2025-02-03 | Stop reason: HOSPADM

## 2025-01-29 RX ORDER — METOCLOPRAMIDE HYDROCHLORIDE 5 MG/ML
10 INJECTION INTRAMUSCULAR; INTRAVENOUS
Status: DISCONTINUED | OUTPATIENT
Start: 2025-01-29 | End: 2025-01-29

## 2025-01-29 RX ORDER — FUROSEMIDE 10 MG/ML
20 INJECTION INTRAMUSCULAR; INTRAVENOUS 2 TIMES DAILY
Status: DISCONTINUED | OUTPATIENT
Start: 2025-01-29 | End: 2025-01-31

## 2025-01-29 RX ORDER — SODIUM CHLORIDE 0.9 % (FLUSH) 0.9 %
5-40 SYRINGE (ML) INJECTION EVERY 12 HOURS SCHEDULED
Status: DISCONTINUED | OUTPATIENT
Start: 2025-01-29 | End: 2025-01-29

## 2025-01-29 RX ORDER — TOBRAMYCIN 1.2 G/30ML
INJECTION, POWDER, LYOPHILIZED, FOR SOLUTION INTRAVENOUS PRN
Status: DISCONTINUED | OUTPATIENT
Start: 2025-01-29 | End: 2025-01-29 | Stop reason: HOSPADM

## 2025-01-29 RX ORDER — ETOMIDATE 2 MG/ML
INJECTION INTRAVENOUS
Status: DISCONTINUED | OUTPATIENT
Start: 2025-01-29 | End: 2025-01-29 | Stop reason: SDUPTHER

## 2025-01-29 RX ORDER — PHENYLEPHRINE HCL IN 0.9% NACL 1 MG/10 ML
SYRINGE (ML) INTRAVENOUS
Status: DISCONTINUED | OUTPATIENT
Start: 2025-01-29 | End: 2025-01-29 | Stop reason: SDUPTHER

## 2025-01-29 RX ORDER — FENTANYL CITRATE 50 UG/ML
INJECTION, SOLUTION INTRAMUSCULAR; INTRAVENOUS
Status: DISCONTINUED | OUTPATIENT
Start: 2025-01-29 | End: 2025-01-29 | Stop reason: SDUPTHER

## 2025-01-29 RX ADMIN — ONDANSETRON 4 MG: 2 INJECTION INTRAMUSCULAR; INTRAVENOUS at 12:03

## 2025-01-29 RX ADMIN — FUROSEMIDE 40 MG: 10 INJECTION, SOLUTION INTRAMUSCULAR; INTRAVENOUS at 09:32

## 2025-01-29 RX ADMIN — FENTANYL CITRATE 50 MCG: 50 INJECTION INTRAMUSCULAR; INTRAVENOUS at 08:04

## 2025-01-29 RX ADMIN — SUGAMMADEX 400 MG: 100 INJECTION, SOLUTION INTRAVENOUS at 13:44

## 2025-01-29 RX ADMIN — MIDAZOLAM HYDROCHLORIDE 1 MG: 1 INJECTION, SOLUTION INTRAMUSCULAR; INTRAVENOUS at 08:04

## 2025-01-29 RX ADMIN — ATORVASTATIN CALCIUM 40 MG: 80 TABLET, FILM COATED ORAL at 00:24

## 2025-01-29 RX ADMIN — METOPROLOL TARTRATE 12.5 MG: 25 TABLET, FILM COATED ORAL at 20:28

## 2025-01-29 RX ADMIN — TRAZODONE HYDROCHLORIDE 25 MG: 50 TABLET ORAL at 20:27

## 2025-01-29 RX ADMIN — SODIUM CHLORIDE: 0.9 INJECTION, SOLUTION INTRAVENOUS at 11:48

## 2025-01-29 RX ADMIN — ETOMIDATE 20 MG: 2 INJECTION INTRAVENOUS at 11:57

## 2025-01-29 RX ADMIN — MORPHINE SULFATE 2 MG: 4 INJECTION INTRAVENOUS at 20:26

## 2025-01-29 RX ADMIN — FERROUS SULFATE TAB EC 325 MG (65 MG FE EQUIVALENT) 325 MG: 325 (65 FE) TABLET DELAYED RESPONSE at 18:10

## 2025-01-29 RX ADMIN — CEFEPIME 2000 MG: 2 INJECTION, POWDER, FOR SOLUTION INTRAVENOUS at 21:14

## 2025-01-29 RX ADMIN — FENTANYL CITRATE 25 MCG: 50 INJECTION, SOLUTION INTRAMUSCULAR; INTRAVENOUS at 12:12

## 2025-01-29 RX ADMIN — DOCUSATE SODIUM 100 MG: 100 CAPSULE, LIQUID FILLED ORAL at 20:30

## 2025-01-29 RX ADMIN — ROPIVACAINE HYDROCHLORIDE 30 ML: 5 INJECTION, SOLUTION EPIDURAL; INFILTRATION; PERINEURAL at 08:06

## 2025-01-29 RX ADMIN — PROPOFOL 50 MG: 10 INJECTION, EMULSION INTRAVENOUS at 11:57

## 2025-01-29 RX ADMIN — LINEZOLID 600 MG: 600 INJECTION, SOLUTION INTRAVENOUS at 12:02

## 2025-01-29 RX ADMIN — LIDOCAINE HYDROCHLORIDE 50 MG: 10 INJECTION, SOLUTION EPIDURAL; INFILTRATION; INTRACAUDAL; PERINEURAL at 11:57

## 2025-01-29 RX ADMIN — FENTANYL CITRATE 25 MCG: 50 INJECTION, SOLUTION INTRAMUSCULAR; INTRAVENOUS at 11:52

## 2025-01-29 RX ADMIN — DEXAMETHASONE SODIUM PHOSPHATE 10 MG: 10 INJECTION INTRAMUSCULAR; INTRAVENOUS at 12:03

## 2025-01-29 RX ADMIN — Medication 50 MCG: at 12:07

## 2025-01-29 RX ADMIN — Medication 50 MCG: at 12:06

## 2025-01-29 RX ADMIN — PANTOPRAZOLE SODIUM 40 MG: 40 TABLET, DELAYED RELEASE ORAL at 18:12

## 2025-01-29 RX ADMIN — FENTANYL CITRATE 25 MCG: 50 INJECTION, SOLUTION INTRAMUSCULAR; INTRAVENOUS at 15:05

## 2025-01-29 RX ADMIN — SUCRALFATE 1 G: 1 TABLET ORAL at 20:27

## 2025-01-29 RX ADMIN — DAPTOMYCIN 350 MG: 500 INJECTION, POWDER, LYOPHILIZED, FOR SOLUTION INTRAVENOUS at 18:23

## 2025-01-29 RX ADMIN — ROCURONIUM BROMIDE 50 MG: 10 INJECTION INTRAVENOUS at 11:58

## 2025-01-29 RX ADMIN — FUROSEMIDE 20 MG: 10 INJECTION, SOLUTION INTRAMUSCULAR; INTRAVENOUS at 18:14

## 2025-01-29 RX ADMIN — FENTANYL CITRATE 25 MCG: 50 INJECTION, SOLUTION INTRAMUSCULAR; INTRAVENOUS at 11:48

## 2025-01-29 RX ADMIN — ATORVASTATIN CALCIUM 40 MG: 80 TABLET, FILM COATED ORAL at 20:27

## 2025-01-29 RX ADMIN — Medication 50 MCG: at 12:04

## 2025-01-29 RX ADMIN — SUCRALFATE 1 G: 1 TABLET ORAL at 18:10

## 2025-01-29 RX ADMIN — TRAZODONE HYDROCHLORIDE 25 MG: 50 TABLET ORAL at 00:51

## 2025-01-29 RX ADMIN — SODIUM CHLORIDE, PRESERVATIVE FREE 10 ML: 5 INJECTION INTRAVENOUS at 20:30

## 2025-01-29 RX ADMIN — TRAMADOL HYDROCHLORIDE 50 MG: 50 TABLET ORAL at 18:10

## 2025-01-29 RX ADMIN — FENTANYL CITRATE 25 MCG: 50 INJECTION, SOLUTION INTRAMUSCULAR; INTRAVENOUS at 12:09

## 2025-01-29 RX ADMIN — ACETAMINOPHEN 650 MG: 325 TABLET ORAL at 03:39

## 2025-01-29 RX ADMIN — CEFEPIME 2000 MG: 2 INJECTION, POWDER, FOR SOLUTION INTRAVENOUS at 05:38

## 2025-01-29 ASSESSMENT — PAIN DESCRIPTION - LOCATION
LOCATION: LEG

## 2025-01-29 ASSESSMENT — ENCOUNTER SYMPTOMS
NAUSEA: 0
BACK PAIN: 0
ABDOMINAL PAIN: 0
VOMITING: 0
COUGH: 0
DIARRHEA: 0
SHORTNESS OF BREATH: 0

## 2025-01-29 ASSESSMENT — PAIN SCALES - GENERAL
PAINLEVEL_OUTOF10: 6
PAINLEVEL_OUTOF10: 8
PAINLEVEL_OUTOF10: 8
PAINLEVEL_OUTOF10: 4
PAINLEVEL_OUTOF10: 6
PAINLEVEL_OUTOF10: 8
PAINLEVEL_OUTOF10: 8

## 2025-01-29 ASSESSMENT — PAIN DESCRIPTION - ORIENTATION
ORIENTATION: LEFT

## 2025-01-29 ASSESSMENT — PAIN DESCRIPTION - DESCRIPTORS
DESCRIPTORS: SHARP
DESCRIPTORS: SHARP
DESCRIPTORS: ACHING

## 2025-01-29 ASSESSMENT — PAIN DESCRIPTION - ONSET: ONSET: ON-GOING

## 2025-01-29 ASSESSMENT — PAIN - FUNCTIONAL ASSESSMENT
PAIN_FUNCTIONAL_ASSESSMENT: PREVENTS OR INTERFERES SOME ACTIVE ACTIVITIES AND ADLS
PAIN_FUNCTIONAL_ASSESSMENT: 0-10

## 2025-01-29 ASSESSMENT — PAIN DESCRIPTION - FREQUENCY: FREQUENCY: CONTINUOUS

## 2025-01-29 ASSESSMENT — PAIN DESCRIPTION - PAIN TYPE: TYPE: ACUTE PAIN;SURGICAL PAIN

## 2025-01-29 NOTE — ANESTHESIA PROCEDURE NOTES
Arterial Line:    An arterial line was placed using ultrasound guidance, in the OR for the following indication(s): continuous blood pressure monitoring and blood sampling needed.    A 20 gauge (size), 1 and 3/4 inch (length), Arrow (type) catheter was placed, Seldinger technique used, into the left radial artery, secured by Tegaderm.  Anesthesia type: General    Events:  patient tolerated procedure well with no complications.1/29/2025 12:09 PM1/29/2025 12:11 PM  Anesthesiologist: Ryan Palumbo MD  Performed: Anesthesiologist

## 2025-01-29 NOTE — CONSULTS
Orthopaedic Surgery Consult  (Dr. Ren)      CC/Reason for consult: Left prosthetic joint infection    HPI:      The patient is a 74 y.o. right hand-dominant female who presents to [facility] as a transfer from [facility].  Patient earlier today was seen by wound care.  While doing so wound care expressed concerns regarding this draining sinus overlying her infrapatellar region.  At [facility] they obtained imaging of the left knee which demonstrates intact orthopedic hardware which appears to be a Biomet Vanguard.  Patient originally fell roughly a month ago.  After she fell she developed a scab overlying the infra patella region of her left knee.  Since that time she has noticed increased redness and pain in that region.  Over the last 2 weeks there has been active drainage that she states is constant and purulent.  She had her left total knee arthroplasty performed in 2017 subsequently followed by 2018 right total knee arthroplasty.  These were performed by Dr. Bose.  Patient denies any issues with her right lower extremity.  Since the redness is developed patient has lost close to all range of motion of her left knee.    Patient is retired.  She is on Plavix at home for prior NSTEMI, A-fib and bilateral carotid artery stenosis.  She denies any other prior orthopedic injuries/surgeries besides what is listed above.  Her past medical history consist of anemia, CAD, Bell's palsy, CKD, gastric ulcers, hyperlipidemia, hypertension, depression, obesity, atrial fibrillation, venous insufficiency.  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    Past Medical History:    Past Medical History:   Diagnosis Date    Anemia     Anxiety disorder     Artificial knee joint present, left     Artificial knee joint present, right     Atherosclerotic heart disease     Bell's palsy     Cellulitis     lower legs    Chronic kidney disease, stage 3 (HCC)  
  [facility]    Consult / H&P               Today's Date: 1/29/2025  Patient Name: Elaina Champagne  Date of admission: 1/28/2025  9:22 PM  Patient's age: 74 y.o., 1950  Admission Dx: Septic joint [M00.9]    Requesting Physician: Orlando Morgan, DO    Cardiac Evaluation Reason:  Pre operative    History Obtained From: patient and chart review     History of Present Illness:      This patient 74 y.o. years old with past medical history given below.   bilateral prosthetic knee joints, Bell's palsy, gastric ulcer, CAD, CKD stage III, hypertension/hyperlipidemia, depression, morbid obesity, paroxysmal A-fib and other comorbidities transferred from [facility] due to concern of septic prosthetic knee joint.  Consulted for preoperative risk stratification.    Patient was seen in the preoperative area.  She is complaining of orthopnea and PND.  But denies any chest pain at the moment.    Past Medical History:   has a past medical history of Anemia, Anxiety disorder, Artificial knee joint present, left, Artificial knee joint present, right, Atherosclerotic heart disease, Bell's palsy, Cellulitis, Chronic kidney disease, stage 3 (HCC), Cognitive communication deficit, Gastric ulcer with hemorrhage, Gastro-esophageal reflux disease without esophagitis, Hyperlipidemia, Hypertension, Lymphedema, Major depressive disorder, Morbid obesity, Nonrheumatic tricuspid (valve) insufficiency, NSTEMI (non-ST elevated myocardial infarction) ([facility]), Occlusion and stenosis of bilateral carotid arteries, Osteoarthritis, Overactive bladder, Paroxysmal atrial fibrillation (HCC), Urge incontinence, Venous insufficiency, and Weakness.    Past Surgical History:   has a past surgical history that includes Hysterectomy, total abdominal (1990); Cataract removal (Bilateral, 2015); incision and drainage (Left, 07/06/2017); Total knee arthroplasty (Left, 11/09/2017); Knee Arthroplasty (Right, 09/24/2018); ventral hernia 
Would favor asymmetric pulmonary edema more  than multifocal pneumonia.    Left knee XR 1/28/25:  Total knee arthropasty without acute hardware complication     CURRENT EVALUATION- DAILY INTERVAL CHANGES 1/29/2025  BP (!) 178/63   Pulse 61   Temp 96.8 °F (36 °C) (Temporal)   Resp 16   SpO2 98%     Afebrile  Vital signs stable    The patient was still in the PACU at the time of rounding.   She is on 5 L NC  RN was unsure if the patient would be coming back to the ED or be discharged to the floor post-operatively.     Lab-work reviewed    Daptomycin and cefepime continue    We will follow-up with the surgical culture data      Labs, X rays reviewed: 1/29/2025 with independent review of X rays    I have independently reviewed/ordered the following labs:    CBC with Differential:   Recent Labs     01/28/25  1645   WBC 2.9*   HGB 8.2*   HCT 25.3*      LYMPHOPCT 11*   MONOPCT 8*   EOSPCT 2     BMP:   Recent Labs     01/28/25  1645   *   K 5.3      CO2 22   BUN 18   CREATININE 1.2     Hepatic Function Panel:   Recent Labs     01/28/25  1645   BILITOT 0.2   ALKPHOS 77   ALT 8*   AST 22     No results for input(s): \"RPR\" in the last 72 hours.  No results for input(s): \"HIV\" in the last 72 hours.  No results for input(s): \"BC\" in the last 72 hours.  Lab Results   Component Value Date/Time    BACTERIA MODERATE 07/01/2024 10:00 AM    PH 6.0 09/19/2018 11:53 AM    RBC 2.98 01/28/2025 04:45 PM    RBC 3.34 09/25/2018 05:38 AM    WBC 2.9 01/28/2025 04:45 PM    YEAST MANY 05/07/2024 02:00 AM    TURBIDITY Clear 07/01/2024 10:00 AM     Lab Results   Component Value Date/Time    CREATININE 1.2 01/28/2025 04:45 PM    GLUCOSE 81 01/28/2025 04:45 PM    GLUCOSE 91 09/19/2018 11:53 AM         Cultures:  Urine:    Blood:  1/28/25: No growth   Sputum :    Wound:  Left Knee swab: 1/20/25: Pseudomonas and MRSA  Left knee surgical culture 1/29/25: pending  MRSA Nares:      Imaging:    Left Knee  1/28/25                CT

## 2025-01-29 NOTE — ANESTHESIA PRE PROCEDURE
Department of Anesthesiology  Preprocedure Note       Name:  Elaina Champagne   Age:  74 y.o.  :  1950                                          MRN:  [id number]         Date:  2025      Surgeon: Surgeon(s):  Rony Ren DO    Procedure: Procedure(s):  LEFT KNEE IRRIGATION AND DEBRIDEMENT WITH LINER EXCHANGE ((SUPINE,3080 WITH EXTENTON,DEMAYO,VANCOMYCIN POWDER, PULSE LAVAGE AND BIOMET VANDGARD SET)    Medications prior to admission:   Prior to Admission medications    Medication Sig Start Date End Date Taking? Authorizing Provider   furosemide (LASIX) 40 MG tablet Take 1 tablet by mouth daily 24  Yes Robin Ly MD   sulfamethoxazole-trimethoprim (BACTRIM DS;SEPTRA DS) 800-160 MG per tablet Take 1 tablet by mouth 2 times daily for 20 days 25  Robin Ly MD   oxyBUTYnin (DITROPAN XL) 15 MG extended release tablet Take 1 tablet by mouth daily 24   Robin Ly MD   ferrous sulfate (IRON 325) 325 (65 Fe) MG tablet Take 1 tablet by mouth with breakfast and with evening meal 24   Kris Jon, TOPHER - CNP   zinc oxide (TRIAD HYDROPHILIC) PSTE paste Apply 1 Units topically daily 24   Robin Ly MD   amiodarone (CORDARONE) 200 MG tablet Take 1 tablet by mouth daily 24   Robin Ly MD   atorvastatin (LIPITOR) 40 MG tablet Take 1 tablet by mouth nightly 24   Robin Ly MD   calcium carbonate (OYSTER SHELL CALCIUM 500 MG) 1250 (500 Ca) MG tablet Take 1 tablet by mouth daily 24   Robin Ly MD   clopidogrel (PLAVIX) 75 MG tablet Take 1 tablet by mouth daily 24   Robin Ly MD   docusate sodium (COLACE) 100 MG capsule Take 1 capsule by mouth daily 24   Robin Ly MD   ergocalciferol (ERGOCALCIFEROL) 1.25 MG (50026 UT) capsule Take 1 capsule by mouth once a week 24   Robin Ly MD   escitalopram (LEXAPRO) 20 MG tablet Take 1 tablet by mouth daily

## 2025-01-29 NOTE — DISCHARGE INSTRUCTIONS
Orthopaedic Instructions:  -Weight bearing status: Weight bearing as tolerated with the left leg  -Do not remove the Prevena incisional wound VAC overlying the surgical incision to the anterior aspect of your knee.  If the Prevena incisional wound VAC does die (which showed approximately 7 to 10 days after use) please cut the tubing at the junction between the purple bandage and the tubing.  Cover this with tape and leave the purple bandage in place until your follow-up visit.  -Always look for signs of compartment syndrome: pain out of proportion to the injury, pain not controlled with pain medication, numbness in digits, changing of color of digits (paleness). If these signs occur return to ED immediately for reassessment.  -Always work on toe motion (to non-injured toes) to decrease swelling.  -Ice (20 minutes on and off 1 hour) and elevate above the level of the heart to reduce swelling and throbbing pain.  -Should urinate within 8 hours of surgery.  -Call the office or come to Emergency Room if signs of infection appear (hot, swollen, red, draining pus, fever).  -Take medications as prescribed.  -Wean off narcotics (percocet/norco) as soon as possible. Do not take tylenol if still taking narcotics.  -Follow up with Dr. Ren in his office on  2/13/2025 at 8:00 AM . Call [phone] to schedule/confirm or with any questions/concerns.

## 2025-01-29 NOTE — ED PROVIDER NOTES
[facility] EMERGENCY DEPARTMENT  Emergency Department Encounter  Emergency Medicine Resident     Pt Name:Elaina Champagne  MRN: [id number]  Birthdate 1950  Date of evaluation: 1/28/25  PCP:  Robin Ly MD  Note Started: 9:34 PM EST      CHIEF COMPLAINT       Chief Complaint   Patient presents with    Knee Pain     Left knee pain/ septic joint. [facility]    Wound Infection       HISTORY OF PRESENT ILLNESS  (Location/Symptom, Timing/Onset, Context/Setting, Quality, Duration, Modifying Factors, Severity.)      Elaina Champagne is a 74 y.o. female who presents as transfer from [facility] for concern of left knee septic joint.  Patient has a history of a total knee arthroplasty, per patient she has had decreased range of motion of that joint since replacement.  She reportedly had a fall 2 months prior, has home health visits daily.  She noticed an open wound with purulent drainage to the left anterior leg just inferior to the knee 2 weeks ago.  There is also associated surrounding erythema.  She was evaluated by wound care today, they were concerned for possible septic joint for which she was ultimately transferred to [facility] for evaluation.  She denies associated fever, chills, myalgias night sweats.      No arthrocentesis performed at [facility].  She did receive antibiotics prior to transfer.    She was intermittently hypoxic at [facility], B-lines seen on bedside ultrasound concerning for pulmonary edema.  She was started on supplemental oxygen.    PAST MEDICAL / SURGICAL / SOCIAL / FAMILY HISTORY      has a past medical history of Anemia, Anxiety disorder, Artificial knee joint present, left, Artificial knee joint present, right, Atherosclerotic heart disease, Bell's palsy, Cellulitis, Chronic kidney disease, stage 3 (HCC), Cognitive communication deficit, Gastric ulcer with hemorrhage, Gastro-esophageal reflux disease without esophagitis, Hyperlipidemia,

## 2025-01-29 NOTE — ANESTHESIA PROCEDURE NOTES
Peripheral Block    Patient location during procedure: pre-op  Reason for block: post-op pain management and at surgeon's request  Start time: 1/29/2025 8:06 AM  End time: 1/29/2025 8:10 AM  Staffing  Performed: anesthesiologist   Anesthesiologist: Ryan Palumbo MD  Performed by: Ryan Palumbo MD  Authorized by: Ryan Palumbo MD    Preanesthetic Checklist  Completed: patient identified, IV checked, site marked, risks and benefits discussed, surgical/procedural consents, equipment checked, pre-op evaluation, timeout performed, anesthesia consent given, oxygen available, monitors applied/VS acknowledged, fire risk safety assessment completed and verbalized and blood product R/B/A discussed and consented  Peripheral Block   Patient position: supine  Prep: ChloraPrep  Provider prep: mask and sterile gloves  Patient monitoring: cardiac monitor, continuous pulse ox, continuous capnometry, frequent blood pressure checks, IV access and oxygen  Block type: Femoral  Adductor canal  Laterality: left  Injection technique: single-shot  Guidance: ultrasound guided    Needle   Needle type: insulated echogenic nerve stimulator needle   Needle gauge: 20 G  Needle localization: ultrasound guidance  Needle length: 10 cm  Assessment   Injection assessment: negative aspiration for heme, local visualized surrounding nerve on ultrasound, no intravascular symptoms and no paresthesia on injection  Slow fractionated injection: yes  Hemodynamics: stable  Outcomes: uncomplicated    Medications Administered  ropivacaine (NAROPIN) injection 0.5% - Perineural   30 mL - 1/29/2025 8:06:00 AM

## 2025-01-29 NOTE — OP NOTE
debridement, as well as inspection of the arthrotomy site, as well as the draining sinus to evaluate if the sinus goes intra-articular.    Detailed Description of Procedure:   The patient was transported to the operating room and general anesthesia was administered by the anesthesia providers without complication. The patient was then transferred to a cantilever type table in a supine position. The left lower extremity was isolated, scrubbed with Hibiclens followed by alcohol, and prepped and draped in the usual sterile fashion.  A timeout was performed that included all involved parties and correctly identified the patient, planned procedure, and operative site.  The patient received weight based antibiotics in compliance with their allergy profile. After everyone agreed we continued.    We first began by inspecting the draining sinus.  During inspection, were able to expel copious amounts of purulent fluid out of the draining sinus by performing a milking maneuver.  This was sent out for cultures and Gram stain.  We created an ellipse incision around the draining sinus being careful not to excise excessive tissue for coverage reasons.  After the proper ellipse incision was made, we inspected the draining sinus, and we able to use our 10 blade to dissect down the draining sinus, and we were able to noticed that the sinus terminal connection was medially, but it seemed to be superficial with a arthrotomy site.      After proper excisional debridement of skin and subcutaneous tissue with a surface area of 12 x 6 cm we were able to visualize the medial surface of the tibial tuberosity and patellar tendon to evaluate for any arthrotomy.  There was no jessenia arthrotomy noted with visualization and inspection of the surface.  After proper irrigation was performed, using 6 L of saline and Irrisept, would like to proceed with aspiration of the knee joint.      Using an 18-gauge needle, the needle was introduced into the

## 2025-01-29 NOTE — CARE COORDINATION
Case Management Assessment  Initial Evaluation    Date/Time of Evaluation: 1/29/2025 10:21 AM  Assessment Completed by: Codi Alexander RN    If patient is discharged prior to next notation, then this note serves as note for discharge by case management.    Patient Name: Elaina Champagne                   YOB: 1950  Diagnosis: Septic joint [M00.9]                   Date / Time: 1/28/2025  9:22 PM    Patient Admission Status: Inpatient   Readmission Risk (Low < 19, Mod (19-27), High > 27): Readmission Risk Score: 23.6    Current PCP: Robin Ly MD  PCP verified by CM? (P) Yes    Chart Reviewed: Yes      History Provided by: (P) Significant Other  Patient Orientation: (P) Alert and Oriented    Patient Cognition: (P) Alert    Hospitalization in the last 30 days (Readmission):  Yes    If yes, Readmission Assessment in  Navigator will be completed.    Advance Directives:      Code Status: Full Code   Patient's Primary Decision Maker is: (P) Legal Next of Kin    Primary Decision Maker: Eze Champagne - Spouse - [phone]    Discharge Planning:    Patient lives with: (P) Spouse/Significant Other Type of Home: (P) Trailer/Mobile Home  Primary Care Giver: (P) Self  Patient Support Systems include: (P) Spouse/Significant Other, Family Members   Current Financial resources:    Current community resources:    Current services prior to admission: (P) Home Care            Current DME:              Type of Home Care services:  (P) PT, OT, Nursing Services    ADLS  Prior functional level: (P) Independent in ADLs/IADLs  Current functional level: (P) Independent in ADLs/IADLs    PT AM-PAC:   /24  OT AM-PAC:   /24    Family can provide assistance at DC:    Would you like Case Management to discuss the discharge plan with any other family members/significant others, and if so, who?    Plans to Return to Present Housing: (P) Unknown at present  Other Identified Issues/Barriers to RETURNING to current

## 2025-01-29 NOTE — BRIEF OP NOTE
Brief Postoperative Note      Patient: Elaina Champagne  YOB: 1950  MRN: [id number]    Date of Procedure: 1/29/2025    Pre-Op Diagnosis:   Left superficial knee infection and ulceration    Post-Op Diagnosis:   Left superficial knee infection and ulceration       Procedure(s):  Left knee irrigation and debridement; 12 cm x 6 cm  Left knee complex wound closure  Left knee application of negative pressure wound VAC  Left knee aspiration    Surgeon(s):  Rony Ren DO    Assistant:  Resident: Irene Beckett DO; Taz Zarate DO    Anesthesia: General    Estimated Blood Loss (mL): 3 cc    Fluids: 100 cc crystalloid    TT: 78 minutes    Complications: None    Specimens:   ID Type Source Tests Collected by Time Destination   1 : LEFT KNEE SWABS Swab Joint, Knee CULTURE, ANAEROBIC AND AEROBIC Rony Ren DO 1/29/2025 1230    2 : LEFT KNEE TISSUE Tissue Joint, Knee CULTURE, FUNGUS, FUNGAL STAIN, CULTURE WITH SMEAR, ACID FAST BACILLIUS, CULTURE, ANAEROBIC AND AEROBIC oRny Ren DO 1/29/2025 1239        Implants:  * No implants in log *      Drains:   Closed/Suction Drain Left;Anterior Knee Accordion (Active)       Negative Pressure Wound Therapy Knee Left;Anterior (Active)       Findings:  Infection Present At Time Of Surgery (PATOS) (choose all levels that have infection present):  - Superficial Infection (skin/subcutaneous) present as evidenced by purulent fluid  Other Findings: Left knee I&D, see op note for details.     Taz Zarate DO  Orthopedic Surgery, PGY-1  [place], [place]     Electronically signed by Taz Zarate DO on 1/29/2025 at 1:47 PM

## 2025-01-29 NOTE — ED NOTES
called per pt request and updated on plan of care.  
ED to inpatient nurses report      Chief Complaint:  Chief Complaint   Patient presents with    Knee Pain     Left knee pain/ septic joint. [facility] tx    Wound Infection     Present to ED from: [place], via EMS    MOA:     LOC: alert and orientated to name, place, date  Mobility: Independent  Oxygen Baseline: Room air    Current needs required: 6L NC   Pending ED orders: Admit and drain wound  Present condition: Stable    Why did the patient come to the ED? Pt presented to the ED with c/o wound on the L knee.  EMS reports wound is septic, and wound doctor told patient to come to the ER.  EMS reports possible reaction to vanc received at [place].  EMS reports CT ruled out PE at [place].  Pt reported SOB and was placed on 3L NC by EMS. Pt SPO2 86% upon arrival and writer placed pt on 6L NC and SPO2 went up to 92%.  What is the plan? Drain wound  Any procedures or intervention occur? Morphine, BNP, Lasix, 6L NC  Any safety concerns?? Uses walker at home, fall risk    Mental Status:  Level of Consciousness: Alert (0)    Psych Assessment:   Psychosocial  Psychosocial (WDL): Within Defined Limits  Vital signs   Vitals:    01/29/25 0015 01/29/25 0045 01/29/25 0047 01/29/25 0115   BP: (!) 159/60 (!) 162/67  (!) 141/60   Pulse: 56 58 57 56   Resp: 23 20 16 17   Temp:       TempSrc:       SpO2: 100%  92% 100%        Vitals:  Patient Vitals for the past 24 hrs:   BP Temp Temp src Pulse Resp SpO2   01/29/25 0115 (!) 141/60 -- -- 56 17 100 %   01/29/25 0047 -- -- -- 57 16 92 %   01/29/25 0045 (!) 162/67 -- -- 58 20 --   01/29/25 0015 (!) 159/60 -- -- 56 23 100 %   01/29/25 0000 (!) 144/54 -- -- 57 19 99 %   01/28/25 2345 (!) 145/70 -- -- 53 18 100 %   01/28/25 2245 (!) 150/66 -- -- 59 16 96 %   01/28/25 2228 (!) 149/62 -- -- 60 24 96 %   01/28/25 2227 (!) 149/62 -- -- -- -- --   01/28/25 2148 -- -- -- -- 20 --   01/28/25 2145 -- -- -- 64 26 93 %   01/28/25 2131 (!) 159/63 -- -- 63 -- 92 %   01/28/25 2128 (!) 
Elainabharathi Champagne [birth year]  L knee with concern for septic joint, hardware from arthroplasty in 2017  Elevated inflammatory markers, painful, erythematous  Wound culture sent.  Started on cefepime and vancomycin, which was switched to Zyvox due to reaction to vancomycin  Orthopedics aware, requested ER to ER    Update 2100: Patient became short of breath, developed pulmonary edema and hypoxemia.  PE study has been completed but not read.  Patient is maintaining saturations on 5 to 6 L nasal cannula.  Plan was to start BiPAP, however transport has arrived.  
Pt ambulated to restroom with walker and standby assist. Gait steady.  
Pt placed on purwick  
Pt presented to the ED with c/o wound on the L knee.  EMS reports wound is septic, and wound doctor told patient to come to the ER.  EMS reports possible reaction to vanc received at [place].  EMS reports CT ruled out PE at [place].  Pt reported SOB and was placed on 3L NC by EMS. Pt SPO2 86% upon arrival and writer placed pt on 6L NC and SPO2 went up to 92%.    A&Ox4, respirations even and unlabored on 6L NC  
Report given to MARIA M Sargent. All questions answered.  
The following labs were labeled with appropriate pt sticker and tubed to lab:     [] Blue     [] Lavender   [] on ice  [x] Green/yellow  [] Green/black [] on ice  [] Grey  [] on ice  [] Yellow  [] Red  [] Pink  [] Type/ Screen  [] ABG  [] VBG    [] COVID-19 swab    [] Rapid  [] PCR  [] Flu swab  [] Peds Viral Panel     [] Urine Sample  [] Fecal Sample  [] Pelvic Cultures  [] Blood Cultures  [] X 2  [] STREP Cultures  [] Wound Cultures   
by mouth 4 times daily (before meals and nightly)    SULFAMETHOXAZOLE-TRIMETHOPRIM (BACTRIM DS;SEPTRA DS) 800-160 MG PER TABLET    Take 1 tablet by mouth 2 times daily for 20 days    TRAZODONE (DESYREL) 50 MG TABLET    Take 0.5 tablets by mouth nightly    VITAMIN B-12 (CYANOCOBALAMIN) 500 MCG TABLET    Take 1 tablet by mouth daily    ZINC OXIDE (TRIAD HYDROPHILIC) PSTE PASTE    Apply 1 Units topically daily     Orders Placed This Encounter   Medications    morphine injection 4 mg    furosemide (LASIX) injection 20 mg    amiodarone (CORDARONE) tablet 200 mg    atorvastatin (LIPITOR) tablet 40 mg    calcium carbonate (TUMS) chewable tablet 1,250 mg    clopidogrel (PLAVIX) tablet 75 mg    docusate sodium (COLACE) capsule 100 mg    ergocalciferol capsule 50,000 Units    escitalopram (LEXAPRO) tablet 20 mg    folic acid (FOLVITE) tablet 1 mg    metoprolol tartrate (LOPRESSOR) tablet 12.5 mg    midodrine (PROAMATINE) tablet 5 mg    potassium chloride (KLOR-CON M) extended release tablet 20 mEq    pantoprazole (PROTONIX) tablet 40 mg    sucralfate (CARAFATE) tablet 1 g    traZODone (DESYREL) tablet 25 mg    vitamin B-12 (CYANOCOBALAMIN) tablet 500 mcg    ferrous sulfate (FE TABS 325) EC tablet 325 mg    DISCONTD: furosemide (LASIX) tablet 40 mg    oxyBUTYnin (DITROPAN-XL) extended release tablet 15 mg    DISCONTD: 0.9 % sodium chloride infusion    sodium chloride flush 0.9 % injection 5-40 mL    sodium chloride flush 0.9 % injection 10 mL    0.9 % sodium chloride infusion    OR Linked Order Group     potassium chloride (KLOR-CON M) extended release tablet 40 mEq     potassium bicarb-citric acid (EFFER-K) effervescent tablet 40 mEq     potassium chloride 10 mEq/100 mL IVPB (Peripheral Line)    magnesium sulfate 1000 mg in dextrose 5% 100 mL IVPB    enoxaparin (LOVENOX) injection 40 mg     Order Specific Question:   Indication of Use     Answer:   Prophylaxis-DVT/PE    OR Linked Order Group     ondansetron (ZOFRAN-ODT) 
injection 4 mg    polyethylene glycol (GLYCOLAX) packet 17 g    OR Linked Order Group     acetaminophen (TYLENOL) tablet 650 mg     acetaminophen (TYLENOL) suppository 650 mg    linezolid (ZYVOX) IVPB 600 mg     Order Specific Question:   Antimicrobial Indications     Answer:   Skin and Soft Tissue Infection     Order Specific Question:   Skin duration of therapy     Answer:   5 days    ceFEPIme (MAXIPIME) 2,000 mg in sodium chloride 0.9 % 100 mL IVPB (mini-bag)     Order Specific Question:   Antimicrobial Indications     Answer:   Skin and Soft Tissue Infection     Order Specific Question:   Skin duration of therapy     Answer:   5 days    morphine injection 2 mg    furosemide (LASIX) injection 40 mg       SURGICAL HISTORY       Past Surgical History:   Procedure Laterality Date    CARDIAC PROCEDURE N/A 01/02/2024    Coronary angiography performed by Regis Aponte MD at [facility] CARDIAC CATH LAB    CARDIAC PROCEDURE N/A 01/04/2024    frantz / Percutaneous coronary intervention / rm 504 performed by Maria Teresa Rodriguez MD at [facility] CARDIAC CATH LAB    CARDIAC PROCEDURE N/A 5/6/2024    frantz / Percutaneous coronary intervention / op scmh performed by Maria Teresa Rodriguez MD at [facility] CARDIAC CATH LAB    CARDIAC PROCEDURE N/A 7/3/2024    talmimi / Pericardiocentesis / op pb performed by Mariya Luong MD at [facility] CARDIAC CATH LAB    CATARACT REMOVAL Bilateral 2015    CORONARY ANGIOPLASTY WITH STENT PLACEMENT  05/06/2024    HYSTERECTOMY, TOTAL ABDOMINAL (CERVIX REMOVED)  1990    INCISION AND DRAINAGE Left 07/06/2017    LEG INCISION AND DRAINAGE ABSCESS performed by Isaiah Casey MD at [facility] OR    IR NONTUNNELED VASCULAR CATHETER > 5 YEARS  12/15/2023    IR NONTUNNELED VASCULAR CATHETER 12/15/2023 [facility] SPECIAL PROCEDURES    KNEE ARTHROPLASTY Right 09/24/2018    TOTAL KNEE ARTHROPLASTY Left 11/09/2017    UPPER GASTROINTESTINAL ENDOSCOPY N/A 12/28/2023    EGD BIOPSY performed by Mary Nolasco MD at [facility] ENDO    UPPER

## 2025-01-29 NOTE — ED PROVIDER NOTES
Note Started: 10:17 PM LONI         [facility]     Emergency Department     Faculty Attestation    I performed a history and physical examination of the patient and discussed management with the resident. I reviewed the resident’s note and agree with the documented findings and plan of care. Any areas of disagreement are noted on the chart. I was personally present for the key portions of any procedures. I have documented in the chart those procedures where I was not present during the key portions. I have reviewed the emergency nurses triage note. I agree with the chief complaint, past medical history, past surgical history, allergies, medications, social and family history as documented unless otherwise noted below.        For Physician Assistant/ Nurse Practitioner cases/documentation I have personally evaluated this patient and have completed at least one if not all key elements of the E/M (history, physical exam, and MDM). Additional findings are as noted.  I have personally seen and evaluated the patient.  I find the patient's history and physical exam are consistent with the NP/PA documentation.  I agree with the care provided, treatment rendered, disposition and follow-up plan.    74-year-old female presenting as a transfer from [facility] with concern for septic prosthetic knee joint.  Has been having pain and swelling over the left knee.  Patient states that the knee has not been right since her arthroplasty in 2017, is on home Bactrim.  Patient was seen by wound care earlier today, had sharp debridement, then was sent to the ER with concern for hardware infection due to the depth of the wound per physician note at wound care clinic.  Patient was transferred for orthopedic evaluation.  Shortly prior to transfer, patient became hypoxic with increased work of breathing.  Concern for flash pulmonary edema.  PE study was obtained, which is negative for PE but does show

## 2025-01-29 NOTE — ANESTHESIA POSTPROCEDURE EVALUATION
Department of Anesthesiology  Postprocedure Note    Patient: Elaina Champagne  MRN: [id number]  YOB: 1950  Date of evaluation: 1/29/2025    Procedure Summary       Date: 01/29/25 Room / Location: [address]    Anesthesia Start: 1148 Anesthesia Stop: 1400    Procedure: LEFT KNEE IRRIGATION AND DEBRIDEMENT WITH CULTURES (Left: Knee) Diagnosis: Infection associated with internal left knee prosthesis, initial encounter ([facility])    Surgeons: Rony Ren DO Responsible Provider: Ryan Palumbo MD    Anesthesia Type: general ASA Status: 4            Anesthesia Type: No value filed.    Chase Phase I: Chase Score: 10    Chase Phase II:      Anesthesia Post Evaluation    Patient location during evaluation: PACU  Patient participation: complete - patient participated  Level of consciousness: awake and alert  Airway patency: patent  Nausea & Vomiting: no nausea and no vomiting  Cardiovascular status: blood pressure returned to baseline  Respiratory status: acceptable  Hydration status: euvolemic  Comments: No known anesthesia related complication  Multimodal analgesia pain management approach  Pain management: adequate    No notable events documented.

## 2025-01-29 NOTE — H&P
[facility]  Office: [phone]  Nael Morgan DO, Ronny Quevedo DO, Pb Vásquez DO, Refugio Bradley DO, Sheldon Smith MD, Elsa Torres MD, Leandro Espinosa MD, Joann Hauser MD,  Dipak Montana MD, Rob Hernandez MD, Mickey Cabral DO, Swathi Wells MD,  Gracia Chen DO, Pilar So MD, Kayden Walters MD, Orlando Morgan DO, Yamilex Luciano MD, Arvin Powers MD, Mando Burden DO, Gali Galarza MD, Georgie Workman MD, Jennifer Hill MD, Gloria Sierra MD,  Ricky Lopez DO, Trev Alex MD,  Shirley Waterhouse, CNP,  Nidia Lester, CNP, Areli Ware, CNP, Aj Castro, CNP,  Erin Shaw, [facility], Ivis Senior, CNP, Georgiana Freeman, CNP, Guadalupe Jaimes, CNP, Diandra Ruffin, CNP, Marilia Parker, CNP, IZA Chávez-VERO, Radhika Gillespie, CNS, Adriane Jimenez, CNP, Sarah Shi, CNP         [facility]   IN-PATIENT SERVICE   [facility]    HISTORY AND PHYSICAL EXAMINATION            Date:   1/29/2025  Patient name:  Elaina Champagne  Date of admission:  1/28/2025  9:22 PM  MRN:   [id number]  Account:  [id number]  YOB: 1950  PCP:    Robin Ly MD  Room:   41/41  Code Status:    Full Code    Chief Complaint:     Chief Complaint   Patient presents with    Knee Pain     Left knee pain/ septic joint. [facility]    Wound Infection       History Obtained From:     patient    History of Present Illness:     Elaina Champagne is a 74 y.o.  /  female who presents with Knee Pain (Left knee pain/ septic joint. [facility]) and Wound Infection   and is admitted to the hospital for the management of Septic joint.    74-year-old female with past medical history of bilateral prosthetic knee joints, Bell's palsy, gastric ulcer, CAD, CKD stage III, hypertension/hyperlipidemia, depression, morbid obesity, paroxysmal A-fib and other comorbidities transferred from [facility] due to concern of septic prosthetic knee joint.  Patient

## 2025-01-30 LAB
ANION GAP SERPL CALCULATED.3IONS-SCNC: 14 MMOL/L (ref 9–16)
BUN SERPL-MCNC: 22 MG/DL (ref 8–23)
CALCIUM SERPL-MCNC: 9 MG/DL (ref 8.6–10.4)
CHLORIDE SERPL-SCNC: 98 MMOL/L (ref 98–107)
CK SERPL-CCNC: 39 U/L (ref 26–192)
CO2 SERPL-SCNC: 23 MMOL/L (ref 20–31)
CREAT SERPL-MCNC: 1.3 MG/DL (ref 0.6–0.9)
GFR, ESTIMATED: 43 ML/MIN/1.73M2
GLUCOSE SERPL-MCNC: 117 MG/DL (ref 74–99)
POTASSIUM SERPL-SCNC: 5.2 MMOL/L (ref 3.7–5.3)
SODIUM SERPL-SCNC: 135 MMOL/L (ref 136–145)

## 2025-01-30 PROCEDURE — 6370000000 HC RX 637 (ALT 250 FOR IP)

## 2025-01-30 PROCEDURE — 6360000002 HC RX W HCPCS: Performed by: NURSE PRACTITIONER

## 2025-01-30 PROCEDURE — 6370000000 HC RX 637 (ALT 250 FOR IP): Performed by: HOSPITALIST

## 2025-01-30 PROCEDURE — 2580000003 HC RX 258

## 2025-01-30 PROCEDURE — 82550 ASSAY OF CK (CPK): CPT

## 2025-01-30 PROCEDURE — 80048 BASIC METABOLIC PNL TOTAL CA: CPT

## 2025-01-30 PROCEDURE — 1200000000 HC SEMI PRIVATE

## 2025-01-30 PROCEDURE — G0545 PR INHERENT VISIT TO INPT: HCPCS | Performed by: INTERNAL MEDICINE

## 2025-01-30 PROCEDURE — 6360000002 HC RX W HCPCS

## 2025-01-30 PROCEDURE — 36415 COLL VENOUS BLD VENIPUNCTURE: CPT

## 2025-01-30 PROCEDURE — 99232 SBSQ HOSP IP/OBS MODERATE 35: CPT | Performed by: INTERNAL MEDICINE

## 2025-01-30 PROCEDURE — 6370000000 HC RX 637 (ALT 250 FOR IP): Performed by: INTERNAL MEDICINE

## 2025-01-30 PROCEDURE — 86140 C-REACTIVE PROTEIN: CPT

## 2025-01-30 PROCEDURE — 99233 SBSQ HOSP IP/OBS HIGH 50: CPT | Performed by: SURGERY

## 2025-01-30 PROCEDURE — 2500000003 HC RX 250 WO HCPCS

## 2025-01-30 PROCEDURE — 2580000003 HC RX 258: Performed by: NURSE PRACTITIONER

## 2025-01-30 RX ORDER — CYCLOBENZAPRINE HCL 10 MG
10 TABLET ORAL 3 TIMES DAILY PRN
Status: DISCONTINUED | OUTPATIENT
Start: 2025-01-30 | End: 2025-02-03 | Stop reason: HOSPADM

## 2025-01-30 RX ADMIN — AMIODARONE HYDROCHLORIDE 200 MG: 200 TABLET ORAL at 09:28

## 2025-01-30 RX ADMIN — ATORVASTATIN CALCIUM 40 MG: 80 TABLET, FILM COATED ORAL at 20:23

## 2025-01-30 RX ADMIN — SODIUM CHLORIDE, PRESERVATIVE FREE 10 ML: 5 INJECTION INTRAVENOUS at 20:24

## 2025-01-30 RX ADMIN — FERROUS SULFATE TAB EC 325 MG (65 MG FE EQUIVALENT) 325 MG: 325 (65 FE) TABLET DELAYED RESPONSE at 09:27

## 2025-01-30 RX ADMIN — METOPROLOL TARTRATE 12.5 MG: 25 TABLET, FILM COATED ORAL at 09:27

## 2025-01-30 RX ADMIN — SODIUM CHLORIDE, PRESERVATIVE FREE 10 ML: 5 INJECTION INTRAVENOUS at 09:29

## 2025-01-30 RX ADMIN — ANTACID TABLETS 1250 MG: 500 TABLET, CHEWABLE ORAL at 09:27

## 2025-01-30 RX ADMIN — FUROSEMIDE 20 MG: 10 INJECTION, SOLUTION INTRAMUSCULAR; INTRAVENOUS at 17:07

## 2025-01-30 RX ADMIN — DAPTOMYCIN 400 MG: 500 INJECTION, POWDER, LYOPHILIZED, FOR SOLUTION INTRAVENOUS at 17:08

## 2025-01-30 RX ADMIN — SUCRALFATE 1 G: 1 TABLET ORAL at 17:07

## 2025-01-30 RX ADMIN — TRAMADOL HYDROCHLORIDE 50 MG: 50 TABLET ORAL at 13:18

## 2025-01-30 RX ADMIN — ACETAMINOPHEN 650 MG: 325 TABLET ORAL at 17:07

## 2025-01-30 RX ADMIN — CYCLOBENZAPRINE 10 MG: 10 TABLET, FILM COATED ORAL at 23:55

## 2025-01-30 RX ADMIN — DOCUSATE SODIUM 100 MG: 100 CAPSULE, LIQUID FILLED ORAL at 20:23

## 2025-01-30 RX ADMIN — TRAMADOL HYDROCHLORIDE 50 MG: 50 TABLET ORAL at 05:54

## 2025-01-30 RX ADMIN — FERROUS SULFATE TAB EC 325 MG (65 MG FE EQUIVALENT) 325 MG: 325 (65 FE) TABLET DELAYED RESPONSE at 17:07

## 2025-01-30 RX ADMIN — SUCRALFATE 1 G: 1 TABLET ORAL at 20:23

## 2025-01-30 RX ADMIN — PANTOPRAZOLE SODIUM 40 MG: 40 TABLET, DELAYED RELEASE ORAL at 05:55

## 2025-01-30 RX ADMIN — FUROSEMIDE 20 MG: 10 INJECTION, SOLUTION INTRAMUSCULAR; INTRAVENOUS at 09:27

## 2025-01-30 RX ADMIN — CEFEPIME 2000 MG: 2 INJECTION, POWDER, FOR SOLUTION INTRAVENOUS at 21:31

## 2025-01-30 RX ADMIN — PANTOPRAZOLE SODIUM 40 MG: 40 TABLET, DELAYED RELEASE ORAL at 09:28

## 2025-01-30 RX ADMIN — CEFEPIME 2000 MG: 2 INJECTION, POWDER, FOR SOLUTION INTRAVENOUS at 09:29

## 2025-01-30 RX ADMIN — OXYBUTYNIN CHLORIDE 15 MG: 5 TABLET, EXTENDED RELEASE ORAL at 09:28

## 2025-01-30 RX ADMIN — SUCRALFATE 1 G: 1 TABLET ORAL at 05:55

## 2025-01-30 RX ADMIN — PANTOPRAZOLE SODIUM 40 MG: 40 TABLET, DELAYED RELEASE ORAL at 17:07

## 2025-01-30 RX ADMIN — CYANOCOBALAMIN TAB 500 MCG 500 MCG: 500 TAB at 09:28

## 2025-01-30 RX ADMIN — ACETAMINOPHEN 650 MG: 325 TABLET ORAL at 09:28

## 2025-01-30 RX ADMIN — FOLIC ACID 1 MG: 1 TABLET ORAL at 09:28

## 2025-01-30 RX ADMIN — SUCRALFATE 1 G: 1 TABLET ORAL at 09:28

## 2025-01-30 RX ADMIN — ESCITALOPRAM OXALATE 20 MG: 10 TABLET ORAL at 09:27

## 2025-01-30 RX ADMIN — MIDODRINE HYDROCHLORIDE 5 MG: 5 TABLET ORAL at 21:38

## 2025-01-30 ASSESSMENT — PAIN - FUNCTIONAL ASSESSMENT: PAIN_FUNCTIONAL_ASSESSMENT: PREVENTS OR INTERFERES SOME ACTIVE ACTIVITIES AND ADLS

## 2025-01-30 ASSESSMENT — PAIN DESCRIPTION - ONSET: ONSET: ON-GOING

## 2025-01-30 ASSESSMENT — PAIN SCALES - GENERAL
PAINLEVEL_OUTOF10: 2
PAINLEVEL_OUTOF10: 5
PAINLEVEL_OUTOF10: 3
PAINLEVEL_OUTOF10: 5
PAINLEVEL_OUTOF10: 3
PAINLEVEL_OUTOF10: 1
PAINLEVEL_OUTOF10: 3
PAINLEVEL_OUTOF10: 4
PAINLEVEL_OUTOF10: 4

## 2025-01-30 ASSESSMENT — PAIN DESCRIPTION - FREQUENCY: FREQUENCY: INTERMITTENT

## 2025-01-30 ASSESSMENT — PAIN DESCRIPTION - LOCATION
LOCATION: LEG;KNEE
LOCATION: HEAD
LOCATION: LEG;KNEE;SHOULDER
LOCATION: NECK
LOCATION: HEAD

## 2025-01-30 ASSESSMENT — PAIN DESCRIPTION - PAIN TYPE: TYPE: ACUTE PAIN;SURGICAL PAIN

## 2025-01-30 ASSESSMENT — PAIN DESCRIPTION - DESCRIPTORS
DESCRIPTORS: ACHING
DESCRIPTORS: ACHING;SHARP

## 2025-01-30 ASSESSMENT — PAIN DESCRIPTION - ORIENTATION
ORIENTATION: LEFT
ORIENTATION: LEFT

## 2025-01-30 NOTE — PLAN OF CARE
Problem: Safety - Adult  Goal: Free from fall injury  1/30/2025 0135 by Perlita Cortez RN  Outcome: Progressing  1/29/2025 1658 by Zenobia Slade RN  Outcome: Progressing     Problem: Chronic Conditions and Co-morbidities  Goal: Patient's chronic conditions and co-morbidity symptoms are monitored and maintained or improved  1/30/2025 0135 by Perlita Cortez RN  Outcome: Progressing  1/29/2025 1658 by Zenobia Slade RN  Outcome: Progressing     Problem: Discharge Planning  Goal: Discharge to home or other facility with appropriate resources  1/30/2025 0135 by Perlita Cortez RN  Outcome: Progressing  1/29/2025 1658 by Zenobia Slade RN  Outcome: Progressing     Problem: Pain  Goal: Verbalizes/displays adequate comfort level or baseline comfort level  Outcome: Progressing     Problem: ABCDS Injury Assessment  Goal: Absence of physical injury  Outcome: Progressing

## 2025-01-31 ENCOUNTER — APPOINTMENT (OUTPATIENT)
Dept: GENERAL RADIOLOGY | Age: 75
DRG: 463 | End: 2025-01-31
Payer: MEDICARE

## 2025-01-31 ENCOUNTER — TELEPHONE (OUTPATIENT)
Dept: PRIMARY CARE CLINIC | Age: 75
End: 2025-01-31

## 2025-01-31 PROBLEM — A49.8 PSEUDOMONAS INFECTION: Status: ACTIVE | Noted: 2025-01-31

## 2025-01-31 PROBLEM — A49.02 MRSA INFECTION: Status: ACTIVE | Noted: 2025-01-31

## 2025-01-31 LAB
ANION GAP SERPL CALCULATED.3IONS-SCNC: 9 MMOL/L (ref 9–16)
BNP SERPL-MCNC: 3701 PG/ML (ref 0–125)
BUN SERPL-MCNC: 29 MG/DL (ref 8–23)
CALCIUM SERPL-MCNC: 8.9 MG/DL (ref 8.6–10.4)
CHLORIDE SERPL-SCNC: 99 MMOL/L (ref 98–107)
CO2 SERPL-SCNC: 25 MMOL/L (ref 20–31)
CREAT SERPL-MCNC: 1.2 MG/DL (ref 0.6–0.9)
CRP SERPL HS-MCNC: 32.6 MG/L (ref 0–5)
ERYTHROCYTE [DISTWIDTH] IN BLOOD BY AUTOMATED COUNT: 17.4 % (ref 11.8–14.4)
GFR, ESTIMATED: 48 ML/MIN/1.73M2
GLUCOSE SERPL-MCNC: 84 MG/DL (ref 74–99)
HCT VFR BLD AUTO: 27.6 % (ref 36.3–47.1)
HGB BLD-MCNC: 8.8 G/DL (ref 11.9–15.1)
MCH RBC QN AUTO: 27.2 PG (ref 25.2–33.5)
MCHC RBC AUTO-ENTMCNC: 31.9 G/DL (ref 28.4–34.8)
MCV RBC AUTO: 85.2 FL (ref 82.6–102.9)
NRBC BLD-RTO: 0 PER 100 WBC
PLATELET # BLD AUTO: 301 K/UL (ref 138–453)
PMV BLD AUTO: 10.9 FL (ref 8.1–13.5)
POTASSIUM SERPL-SCNC: 4.3 MMOL/L (ref 3.7–5.3)
RBC # BLD AUTO: 3.24 M/UL (ref 3.95–5.11)
SODIUM SERPL-SCNC: 133 MMOL/L (ref 136–145)
WBC OTHER # BLD: 5.8 K/UL (ref 3.5–11.3)

## 2025-01-31 PROCEDURE — 97162 PT EVAL MOD COMPLEX 30 MIN: CPT

## 2025-01-31 PROCEDURE — 6360000002 HC RX W HCPCS

## 2025-01-31 PROCEDURE — 2580000003 HC RX 258

## 2025-01-31 PROCEDURE — 71045 X-RAY EXAM CHEST 1 VIEW: CPT

## 2025-01-31 PROCEDURE — 36415 COLL VENOUS BLD VENIPUNCTURE: CPT

## 2025-01-31 PROCEDURE — 97530 THERAPEUTIC ACTIVITIES: CPT

## 2025-01-31 PROCEDURE — 80048 BASIC METABOLIC PNL TOTAL CA: CPT

## 2025-01-31 PROCEDURE — 6370000000 HC RX 637 (ALT 250 FOR IP): Performed by: INTERNAL MEDICINE

## 2025-01-31 PROCEDURE — 6360000002 HC RX W HCPCS: Performed by: NURSE PRACTITIONER

## 2025-01-31 PROCEDURE — 6370000000 HC RX 637 (ALT 250 FOR IP)

## 2025-01-31 PROCEDURE — 99232 SBSQ HOSP IP/OBS MODERATE 35: CPT | Performed by: INTERNAL MEDICINE

## 2025-01-31 PROCEDURE — 2580000003 HC RX 258: Performed by: NURSE PRACTITIONER

## 2025-01-31 PROCEDURE — 85027 COMPLETE CBC AUTOMATED: CPT

## 2025-01-31 PROCEDURE — 6360000002 HC RX W HCPCS: Performed by: INTERNAL MEDICINE

## 2025-01-31 PROCEDURE — 2500000003 HC RX 250 WO HCPCS

## 2025-01-31 PROCEDURE — 83880 ASSAY OF NATRIURETIC PEPTIDE: CPT

## 2025-01-31 PROCEDURE — 1200000000 HC SEMI PRIVATE

## 2025-01-31 PROCEDURE — G0545 PR INHERENT VISIT TO INPT: HCPCS | Performed by: INTERNAL MEDICINE

## 2025-01-31 RX ORDER — HEPARIN SODIUM 5000 [USP'U]/ML
5000 INJECTION, SOLUTION INTRAVENOUS; SUBCUTANEOUS EVERY 8 HOURS SCHEDULED
Status: DISCONTINUED | OUTPATIENT
Start: 2025-01-31 | End: 2025-02-03 | Stop reason: HOSPADM

## 2025-01-31 RX ORDER — FUROSEMIDE 40 MG/1
40 TABLET ORAL DAILY
Status: DISCONTINUED | OUTPATIENT
Start: 2025-01-31 | End: 2025-02-03 | Stop reason: HOSPADM

## 2025-01-31 RX ADMIN — CEFEPIME 2000 MG: 2 INJECTION, POWDER, FOR SOLUTION INTRAVENOUS at 22:48

## 2025-01-31 RX ADMIN — FERROUS SULFATE TAB EC 325 MG (65 MG FE EQUIVALENT) 325 MG: 325 (65 FE) TABLET DELAYED RESPONSE at 10:06

## 2025-01-31 RX ADMIN — FUROSEMIDE 40 MG: 40 TABLET ORAL at 10:06

## 2025-01-31 RX ADMIN — METOPROLOL TARTRATE 12.5 MG: 25 TABLET, FILM COATED ORAL at 20:34

## 2025-01-31 RX ADMIN — ANTACID TABLETS 1250 MG: 500 TABLET, CHEWABLE ORAL at 10:05

## 2025-01-31 RX ADMIN — ESCITALOPRAM OXALATE 20 MG: 10 TABLET ORAL at 10:05

## 2025-01-31 RX ADMIN — SUCRALFATE 1 G: 1 TABLET ORAL at 20:34

## 2025-01-31 RX ADMIN — PANTOPRAZOLE SODIUM 40 MG: 40 TABLET, DELAYED RELEASE ORAL at 05:41

## 2025-01-31 RX ADMIN — ATORVASTATIN CALCIUM 40 MG: 80 TABLET, FILM COATED ORAL at 20:34

## 2025-01-31 RX ADMIN — SUCRALFATE 1 G: 1 TABLET ORAL at 05:41

## 2025-01-31 RX ADMIN — SUCRALFATE 1 G: 1 TABLET ORAL at 10:05

## 2025-01-31 RX ADMIN — CEFEPIME 2000 MG: 2 INJECTION, POWDER, FOR SOLUTION INTRAVENOUS at 10:11

## 2025-01-31 RX ADMIN — FERROUS SULFATE TAB EC 325 MG (65 MG FE EQUIVALENT) 325 MG: 325 (65 FE) TABLET DELAYED RESPONSE at 18:51

## 2025-01-31 RX ADMIN — HEPARIN SODIUM 5000 UNITS: 5000 INJECTION INTRAVENOUS; SUBCUTANEOUS at 20:39

## 2025-01-31 RX ADMIN — DAPTOMYCIN 400 MG: 500 INJECTION, POWDER, LYOPHILIZED, FOR SOLUTION INTRAVENOUS at 18:51

## 2025-01-31 RX ADMIN — TRAZODONE HYDROCHLORIDE 25 MG: 50 TABLET ORAL at 20:34

## 2025-01-31 RX ADMIN — CYANOCOBALAMIN TAB 500 MCG 500 MCG: 500 TAB at 10:05

## 2025-01-31 RX ADMIN — SODIUM CHLORIDE, PRESERVATIVE FREE 10 ML: 5 INJECTION INTRAVENOUS at 19:35

## 2025-01-31 RX ADMIN — SUCRALFATE 1 G: 1 TABLET ORAL at 18:51

## 2025-01-31 RX ADMIN — ACETAMINOPHEN 650 MG: 325 TABLET ORAL at 05:40

## 2025-01-31 RX ADMIN — OXYBUTYNIN CHLORIDE 15 MG: 5 TABLET, EXTENDED RELEASE ORAL at 10:05

## 2025-01-31 RX ADMIN — ACETAMINOPHEN 650 MG: 325 TABLET ORAL at 19:34

## 2025-01-31 RX ADMIN — FOLIC ACID 1 MG: 1 TABLET ORAL at 10:05

## 2025-01-31 ASSESSMENT — PAIN DESCRIPTION - ORIENTATION: ORIENTATION: LEFT

## 2025-01-31 ASSESSMENT — PAIN DESCRIPTION - DESCRIPTORS
DESCRIPTORS: ACHING
DESCRIPTORS: ACHING

## 2025-01-31 ASSESSMENT — PAIN SCALES - GENERAL
PAINLEVEL_OUTOF10: 2
PAINLEVEL_OUTOF10: 4

## 2025-01-31 ASSESSMENT — PAIN DESCRIPTION - LOCATION
LOCATION: LEG;KNEE
LOCATION: LEG;BACK;NECK

## 2025-01-31 NOTE — TELEPHONE ENCOUNTER
Erica from [facility] called asking if Dr. Ly will follow once patient is released from [facility]. I informed we will follow.

## 2025-01-31 NOTE — CARE COORDINATION
Transition planning    Referral sent to [facility] (patient choice) and [facility] [place] for possible home IV antibiotics.    Spoke with Cayla at [facility] [place], she states patient cost for Daptomycin and supplies is $211 per week. She states they will follow patient.    1312 Call from Brooke at [facility], she states patient is current with them and they are able to accept her back at discharge. CM informs her that patient may need IV antibiotics at discharge.

## 2025-01-31 NOTE — PLAN OF CARE
Problem: Safety - Adult  Goal: Free from fall injury  1/31/2025 1123 by Lois Jacobs RN  Outcome: Progressing  Flowsheets (Taken 1/31/2025 0800)  Free From Fall Injury: Instruct family/caregiver on patient safety  1/31/2025 0142 by Perlita Cortez RN  Outcome: Progressing     Problem: Chronic Conditions and Co-morbidities  Goal: Patient's chronic conditions and co-morbidity symptoms are monitored and maintained or improved  1/31/2025 1123 by Lois Jacobs RN  Outcome: Progressing  1/31/2025 0142 by Perlita Cortez RN  Outcome: Progressing     Problem: Discharge Planning  Goal: Discharge to home or other facility with appropriate resources  1/31/2025 1123 by Lois Jacobs RN  Outcome: Progressing  1/31/2025 0142 by Perlita Cortez RN  Outcome: Progressing     Problem: Pain  Goal: Verbalizes/displays adequate comfort level or baseline comfort level  1/31/2025 1123 by Lois Jacobs RN  Outcome: Progressing  1/31/2025 0142 by Perlita Cortez RN  Outcome: Progressing     Problem: ABCDS Injury Assessment  Goal: Absence of physical injury  1/31/2025 1123 by Lios Jacobs RN  Outcome: Progressing  Flowsheets (Taken 1/31/2025 0800)  Absence of Physical Injury: Implement safety measures based on patient assessment  1/31/2025 0142 by Perlita Cortez RN  Outcome: Progressing

## 2025-02-01 LAB
ANION GAP SERPL CALCULATED.3IONS-SCNC: 11 MMOL/L (ref 9–16)
BUN SERPL-MCNC: 25 MG/DL (ref 8–23)
CALCIUM SERPL-MCNC: 8.8 MG/DL (ref 8.6–10.4)
CHLORIDE SERPL-SCNC: 99 MMOL/L (ref 98–107)
CO2 SERPL-SCNC: 22 MMOL/L (ref 20–31)
CREAT SERPL-MCNC: 1.1 MG/DL (ref 0.6–0.9)
CRP SERPL HS-MCNC: 19.5 MG/L (ref 0–5)
GFR, ESTIMATED: 53 ML/MIN/1.73M2
GLUCOSE SERPL-MCNC: 80 MG/DL (ref 74–99)
MICROORGANISM SPEC CULT: ABNORMAL
MICROORGANISM/AGENT SPEC: ABNORMAL
POTASSIUM SERPL-SCNC: 4.3 MMOL/L (ref 3.7–5.3)
SODIUM SERPL-SCNC: 132 MMOL/L (ref 136–145)
SPECIMEN DESCRIPTION: ABNORMAL
SPECIMEN DESCRIPTION: ABNORMAL

## 2025-02-01 PROCEDURE — 36415 COLL VENOUS BLD VENIPUNCTURE: CPT

## 2025-02-01 PROCEDURE — 6370000000 HC RX 637 (ALT 250 FOR IP): Performed by: HOSPITALIST

## 2025-02-01 PROCEDURE — 2580000003 HC RX 258

## 2025-02-01 PROCEDURE — 97530 THERAPEUTIC ACTIVITIES: CPT

## 2025-02-01 PROCEDURE — 6370000000 HC RX 637 (ALT 250 FOR IP)

## 2025-02-01 PROCEDURE — 6360000002 HC RX W HCPCS: Performed by: INTERNAL MEDICINE

## 2025-02-01 PROCEDURE — 97166 OT EVAL MOD COMPLEX 45 MIN: CPT

## 2025-02-01 PROCEDURE — G0545 PR INHERENT VISIT TO INPT: HCPCS | Performed by: INTERNAL MEDICINE

## 2025-02-01 PROCEDURE — 97535 SELF CARE MNGMENT TRAINING: CPT

## 2025-02-01 PROCEDURE — 97110 THERAPEUTIC EXERCISES: CPT

## 2025-02-01 PROCEDURE — 80048 BASIC METABOLIC PNL TOTAL CA: CPT

## 2025-02-01 PROCEDURE — 6370000000 HC RX 637 (ALT 250 FOR IP): Performed by: INTERNAL MEDICINE

## 2025-02-01 PROCEDURE — 6360000002 HC RX W HCPCS

## 2025-02-01 PROCEDURE — 1200000000 HC SEMI PRIVATE

## 2025-02-01 PROCEDURE — 99232 SBSQ HOSP IP/OBS MODERATE 35: CPT | Performed by: INTERNAL MEDICINE

## 2025-02-01 PROCEDURE — 97116 GAIT TRAINING THERAPY: CPT

## 2025-02-01 PROCEDURE — 86140 C-REACTIVE PROTEIN: CPT

## 2025-02-01 RX ORDER — LIDOCAINE 4 G/G
1 PATCH TOPICAL DAILY
Status: DISCONTINUED | OUTPATIENT
Start: 2025-02-01 | End: 2025-02-03 | Stop reason: HOSPADM

## 2025-02-01 RX ORDER — DOXYCYCLINE HYCLATE 100 MG
100 TABLET ORAL 2 TIMES DAILY
Qty: 28 TABLET | Refills: 0 | Status: SHIPPED | OUTPATIENT
Start: 2025-02-01 | End: 2025-02-15

## 2025-02-01 RX ADMIN — METOPROLOL TARTRATE 12.5 MG: 25 TABLET, FILM COATED ORAL at 09:20

## 2025-02-01 RX ADMIN — SUCRALFATE 1 G: 1 TABLET ORAL at 19:58

## 2025-02-01 RX ADMIN — FUROSEMIDE 40 MG: 40 TABLET ORAL at 09:19

## 2025-02-01 RX ADMIN — CEFEPIME 2000 MG: 2 INJECTION, POWDER, FOR SOLUTION INTRAVENOUS at 09:25

## 2025-02-01 RX ADMIN — SUCRALFATE 1 G: 1 TABLET ORAL at 09:20

## 2025-02-01 RX ADMIN — ACETAMINOPHEN 650 MG: 325 TABLET ORAL at 01:45

## 2025-02-01 RX ADMIN — HEPARIN SODIUM 5000 UNITS: 5000 INJECTION INTRAVENOUS; SUBCUTANEOUS at 20:00

## 2025-02-01 RX ADMIN — OXYBUTYNIN CHLORIDE 15 MG: 5 TABLET, EXTENDED RELEASE ORAL at 09:19

## 2025-02-01 RX ADMIN — SUCRALFATE 1 G: 1 TABLET ORAL at 06:05

## 2025-02-01 RX ADMIN — FERROUS SULFATE TAB EC 325 MG (65 MG FE EQUIVALENT) 325 MG: 325 (65 FE) TABLET DELAYED RESPONSE at 18:42

## 2025-02-01 RX ADMIN — METOPROLOL TARTRATE 12.5 MG: 25 TABLET, FILM COATED ORAL at 19:58

## 2025-02-01 RX ADMIN — ACETAMINOPHEN 650 MG: 325 TABLET ORAL at 09:18

## 2025-02-01 RX ADMIN — FOLIC ACID 1 MG: 1 TABLET ORAL at 09:19

## 2025-02-01 RX ADMIN — PANTOPRAZOLE SODIUM 40 MG: 40 TABLET, DELAYED RELEASE ORAL at 06:05

## 2025-02-01 RX ADMIN — AMIODARONE HYDROCHLORIDE 200 MG: 200 TABLET ORAL at 09:20

## 2025-02-01 RX ADMIN — HEPARIN SODIUM 5000 UNITS: 5000 INJECTION INTRAVENOUS; SUBCUTANEOUS at 06:04

## 2025-02-01 RX ADMIN — ESCITALOPRAM OXALATE 20 MG: 10 TABLET ORAL at 09:19

## 2025-02-01 RX ADMIN — ATORVASTATIN CALCIUM 40 MG: 80 TABLET, FILM COATED ORAL at 19:58

## 2025-02-01 RX ADMIN — SUCRALFATE 1 G: 1 TABLET ORAL at 18:42

## 2025-02-01 RX ADMIN — TRAMADOL HYDROCHLORIDE 50 MG: 50 TABLET ORAL at 18:41

## 2025-02-01 RX ADMIN — TRAMADOL HYDROCHLORIDE 50 MG: 50 TABLET ORAL at 04:55

## 2025-02-01 RX ADMIN — CYANOCOBALAMIN TAB 500 MCG 500 MCG: 500 TAB at 09:19

## 2025-02-01 RX ADMIN — TRAZODONE HYDROCHLORIDE 25 MG: 50 TABLET ORAL at 19:58

## 2025-02-01 RX ADMIN — FERROUS SULFATE TAB EC 325 MG (65 MG FE EQUIVALENT) 325 MG: 325 (65 FE) TABLET DELAYED RESPONSE at 09:20

## 2025-02-01 RX ADMIN — ANTACID TABLETS 1250 MG: 500 TABLET, CHEWABLE ORAL at 09:20

## 2025-02-01 RX ADMIN — DOCUSATE SODIUM 100 MG: 100 CAPSULE, LIQUID FILLED ORAL at 20:00

## 2025-02-01 ASSESSMENT — PAIN SCALES - GENERAL
PAINLEVEL_OUTOF10: 8
PAINLEVEL_OUTOF10: 0
PAINLEVEL_OUTOF10: 5
PAINLEVEL_OUTOF10: 2
PAINLEVEL_OUTOF10: 4
PAINLEVEL_OUTOF10: 2

## 2025-02-01 ASSESSMENT — PAIN DESCRIPTION - DESCRIPTORS: DESCRIPTORS: ACHING

## 2025-02-01 ASSESSMENT — PAIN DESCRIPTION - LOCATION
LOCATION: LEG
LOCATION: LEG;SHOULDER

## 2025-02-01 ASSESSMENT — PAIN DESCRIPTION - ORIENTATION
ORIENTATION: LEFT
ORIENTATION: LEFT

## 2025-02-01 NOTE — PLAN OF CARE
Problem: Safety - Adult  Goal: Free from fall injury  2/1/2025 1746 by Lois Jacobs RN  Outcome: Progressing  Flowsheets (Taken 2/1/2025 0800)  Free From Fall Injury: Instruct family/caregiver on patient safety  2/1/2025 0635 by Vivi Kasper RN  Outcome: Progressing     Problem: Chronic Conditions and Co-morbidities  Goal: Patient's chronic conditions and co-morbidity symptoms are monitored and maintained or improved  2/1/2025 1746 by Lois Jacobs RN  Outcome: Progressing  2/1/2025 0635 by Vivi Kasper RN  Outcome: Progressing     Problem: Discharge Planning  Goal: Discharge to home or other facility with appropriate resources  2/1/2025 1746 by Lois Jacobs RN  Outcome: Progressing  2/1/2025 0635 by Vivi Kasper RN  Outcome: Progressing     Problem: Pain  Goal: Verbalizes/displays adequate comfort level or baseline comfort level  2/1/2025 1746 by Lois Jacobs RN  Outcome: Progressing  2/1/2025 0635 by Vivi Kasper RN  Outcome: Progressing     Problem: ABCDS Injury Assessment  Goal: Absence of physical injury  Outcome: Progressing  Flowsheets (Taken 2/1/2025 0800)  Absence of Physical Injury: Implement safety measures based on patient assessment     Problem: Skin/Tissue Integrity  Goal: Skin integrity remains intact  Description: 1.  Monitor for areas of redness and/or skin breakdown  2.  Assess vascular access sites hourly  3.  Every 4-6 hours minimum:  Change oxygen saturation probe site  4.  Every 4-6 hours:  If on nasal continuous positive airway pressure, respiratory therapy assess nares and determine need for appliance change or resting period  Outcome: Progressing  Flowsheets (Taken 2/1/2025 0800)  Skin Integrity Remains Intact: Monitor for areas of redness and/or skin breakdown

## 2025-02-01 NOTE — PLAN OF CARE
Problem: Safety - Adult  Goal: Free from fall injury  2/1/2025 1748 by Lois Jacobs RN  Outcome: Progressing  2/1/2025 1746 by Lois Jacobs RN  Outcome: Progressing  Flowsheets (Taken 2/1/2025 0800)  Free From Fall Injury: Instruct family/caregiver on patient safety  2/1/2025 0635 by Vivi Kasper RN  Outcome: Progressing     Problem: Chronic Conditions and Co-morbidities  Goal: Patient's chronic conditions and co-morbidity symptoms are monitored and maintained or improved  2/1/2025 1748 by Lois Jacobs RN  Outcome: Progressing  2/1/2025 1746 by Lois Jacobs RN  Outcome: Progressing  2/1/2025 0635 by Vivi Kasper RN  Outcome: Progressing     Problem: Discharge Planning  Goal: Discharge to home or other facility with appropriate resources  2/1/2025 1748 by Lois Jacobs RN  Outcome: Progressing  2/1/2025 1746 by Lois Jacobs RN  Outcome: Progressing  2/1/2025 0635 by Vivi Kasper RN  Outcome: Progressing     Problem: Pain  Goal: Verbalizes/displays adequate comfort level or baseline comfort level  2/1/2025 1748 by Lois Jacobs RN  Outcome: Progressing  2/1/2025 1746 by Lois Jacobs RN  Outcome: Progressing  2/1/2025 0635 by Vivi Kasper RN  Outcome: Progressing     Problem: ABCDS Injury Assessment  Goal: Absence of physical injury  2/1/2025 1748 by Losi Jacobs RN  Outcome: Progressing  2/1/2025 1746 by Lois Jacobs RN  Outcome: Progressing  Flowsheets (Taken 2/1/2025 0800)  Absence of Physical Injury: Implement safety measures based on patient assessment     Problem: Skin/Tissue Integrity  Goal: Skin integrity remains intact  Description: 1.  Monitor for areas of redness and/or skin breakdown  2.  Assess vascular access sites hourly  3.  Every 4-6 hours minimum:  Change oxygen saturation probe site  4.  Every 4-6 hours:  If on nasal continuous positive airway pressure, respiratory therapy assess nares and determine need for

## 2025-02-02 PROCEDURE — 97530 THERAPEUTIC ACTIVITIES: CPT

## 2025-02-02 PROCEDURE — 6370000000 HC RX 637 (ALT 250 FOR IP)

## 2025-02-02 PROCEDURE — 6370000000 HC RX 637 (ALT 250 FOR IP): Performed by: INTERNAL MEDICINE

## 2025-02-02 PROCEDURE — 2500000003 HC RX 250 WO HCPCS

## 2025-02-02 PROCEDURE — 99232 SBSQ HOSP IP/OBS MODERATE 35: CPT | Performed by: INTERNAL MEDICINE

## 2025-02-02 PROCEDURE — 97110 THERAPEUTIC EXERCISES: CPT

## 2025-02-02 PROCEDURE — G0545 PR INHERENT VISIT TO INPT: HCPCS | Performed by: INTERNAL MEDICINE

## 2025-02-02 PROCEDURE — 1200000000 HC SEMI PRIVATE

## 2025-02-02 PROCEDURE — 6360000002 HC RX W HCPCS: Performed by: INTERNAL MEDICINE

## 2025-02-02 RX ORDER — DOXYCYCLINE HYCLATE 100 MG
100 TABLET ORAL EVERY 12 HOURS SCHEDULED
Status: DISCONTINUED | OUTPATIENT
Start: 2025-02-02 | End: 2025-02-03 | Stop reason: HOSPADM

## 2025-02-02 RX ADMIN — FOLIC ACID 1 MG: 1 TABLET ORAL at 08:01

## 2025-02-02 RX ADMIN — HEPARIN SODIUM 5000 UNITS: 5000 INJECTION INTRAVENOUS; SUBCUTANEOUS at 05:29

## 2025-02-02 RX ADMIN — ATORVASTATIN CALCIUM 40 MG: 80 TABLET, FILM COATED ORAL at 20:07

## 2025-02-02 RX ADMIN — AMIODARONE HYDROCHLORIDE 200 MG: 200 TABLET ORAL at 08:02

## 2025-02-02 RX ADMIN — FERROUS SULFATE TAB EC 325 MG (65 MG FE EQUIVALENT) 325 MG: 325 (65 FE) TABLET DELAYED RESPONSE at 17:14

## 2025-02-02 RX ADMIN — DOXYCYCLINE HYCLATE 100 MG: 100 TABLET, COATED ORAL at 20:06

## 2025-02-02 RX ADMIN — HEPARIN SODIUM 5000 UNITS: 5000 INJECTION INTRAVENOUS; SUBCUTANEOUS at 20:13

## 2025-02-02 RX ADMIN — SUCRALFATE 1 G: 1 TABLET ORAL at 12:02

## 2025-02-02 RX ADMIN — FUROSEMIDE 40 MG: 40 TABLET ORAL at 08:02

## 2025-02-02 RX ADMIN — SUCRALFATE 1 G: 1 TABLET ORAL at 20:06

## 2025-02-02 RX ADMIN — SODIUM CHLORIDE, PRESERVATIVE FREE 10 ML: 5 INJECTION INTRAVENOUS at 08:02

## 2025-02-02 RX ADMIN — SODIUM CHLORIDE, PRESERVATIVE FREE 10 ML: 5 INJECTION INTRAVENOUS at 20:07

## 2025-02-02 RX ADMIN — SUCRALFATE 1 G: 1 TABLET ORAL at 17:14

## 2025-02-02 RX ADMIN — CYANOCOBALAMIN TAB 500 MCG 500 MCG: 500 TAB at 08:01

## 2025-02-02 RX ADMIN — PANTOPRAZOLE SODIUM 40 MG: 40 TABLET, DELAYED RELEASE ORAL at 17:14

## 2025-02-02 RX ADMIN — PANTOPRAZOLE SODIUM 40 MG: 40 TABLET, DELAYED RELEASE ORAL at 05:29

## 2025-02-02 RX ADMIN — ANTACID TABLETS 1250 MG: 500 TABLET, CHEWABLE ORAL at 08:00

## 2025-02-02 RX ADMIN — TRAZODONE HYDROCHLORIDE 25 MG: 50 TABLET ORAL at 20:07

## 2025-02-02 RX ADMIN — OXYBUTYNIN CHLORIDE 15 MG: 5 TABLET, EXTENDED RELEASE ORAL at 08:00

## 2025-02-02 RX ADMIN — ESCITALOPRAM OXALATE 20 MG: 10 TABLET ORAL at 08:02

## 2025-02-02 RX ADMIN — FERROUS SULFATE TAB EC 325 MG (65 MG FE EQUIVALENT) 325 MG: 325 (65 FE) TABLET DELAYED RESPONSE at 08:01

## 2025-02-02 RX ADMIN — SUCRALFATE 1 G: 1 TABLET ORAL at 05:29

## 2025-02-02 NOTE — PLAN OF CARE
Problem: Safety - Adult  Goal: Free from fall injury  2/1/2025 2305 by Moon Morrow RN  Outcome: Progressing  2/1/2025 1748 by Lois Jacobs RN  Outcome: Progressing  2/1/2025 1746 by Lois Jacobs RN  Outcome: Progressing  Flowsheets (Taken 2/1/2025 0800)  Free From Fall Injury: Instruct family/caregiver on patient safety     Problem: Chronic Conditions and Co-morbidities  Goal: Patient's chronic conditions and co-morbidity symptoms are monitored and maintained or improved  2/1/2025 2305 by Moon Morrow RN  Outcome: Progressing  2/1/2025 1748 by Lois Jacobs RN  Outcome: Progressing  2/1/2025 1746 by Lois Jacobs RN  Outcome: Progressing     Problem: Discharge Planning  Goal: Discharge to home or other facility with appropriate resources  2/1/2025 2305 by Moon Morrow RN  Outcome: Progressing  2/1/2025 1748 by Lois Jacobs RN  Outcome: Progressing  2/1/2025 1746 by Lois Jacobs RN  Outcome: Progressing     Problem: Pain  Goal: Verbalizes/displays adequate comfort level or baseline comfort level  2/1/2025 2305 by Moon Morrow RN  Outcome: Progressing  2/1/2025 1748 by Lois Jacobs RN  Outcome: Progressing  2/1/2025 1746 by Lois Jacobs RN  Outcome: Progressing     Problem: ABCDS Injury Assessment  Goal: Absence of physical injury  2/1/2025 2305 by Moon Morrow RN  Outcome: Progressing  2/1/2025 1748 by Lois Jacobs RN  Outcome: Progressing  2/1/2025 1746 by Lois Jacobs RN  Outcome: Progressing  Flowsheets (Taken 2/1/2025 0800)  Absence of Physical Injury: Implement safety measures based on patient assessment     Problem: Skin/Tissue Integrity  Goal: Skin integrity remains intact  Description: 1.  Monitor for areas of redness and/or skin breakdown  2.  Assess vascular access sites hourly  3.  Every 4-6 hours minimum:  Change oxygen saturation probe site  4.  Every 4-6 hours:  If on nasal continuous positive airway pressure,

## 2025-02-02 NOTE — PLAN OF CARE
Problem: Safety - Adult  Goal: Free from fall injury  2/2/2025 1133 by Michel Velázquez RN  Outcome: Progressing  2/1/2025 2305 by Moon Morrow RN  Outcome: Progressing     Problem: Chronic Conditions and Co-morbidities  Goal: Patient's chronic conditions and co-morbidity symptoms are monitored and maintained or improved  2/2/2025 1133 by Michel Velázquez RN  Outcome: Progressing  2/1/2025 2305 by Moon Morrow RN  Outcome: Progressing     Problem: Discharge Planning  Goal: Discharge to home or other facility with appropriate resources  2/2/2025 1133 by Michel Velázquez RN  Outcome: Progressing  2/1/2025 2305 by Moon Morrow RN  Outcome: Progressing     Problem: Pain  Goal: Verbalizes/displays adequate comfort level or baseline comfort level  2/2/2025 1133 by Michel Velázquez RN  Outcome: Progressing  2/1/2025 2305 by Moon Morrow RN  Outcome: Progressing     Problem: ABCDS Injury Assessment  Goal: Absence of physical injury  2/2/2025 1133 by Michel Velázquez RN  Outcome: Progressing  2/1/2025 2305 by Moon Morrow RN  Outcome: Progressing     Problem: Skin/Tissue Integrity  Goal: Skin integrity remains intact  Description: 1.  Monitor for areas of redness and/or skin breakdown  2.  Assess vascular access sites hourly  3.  Every 4-6 hours minimum:  Change oxygen saturation probe site  4.  Every 4-6 hours:  If on nasal continuous positive airway pressure, respiratory therapy assess nares and determine need for appliance change or resting period  2/2/2025 1133 by Michel Velázquez RN  Outcome: Progressing  2/1/2025 2305 by Moon Morrow RN  Outcome: Progressing

## 2025-02-03 VITALS
DIASTOLIC BLOOD PRESSURE: 47 MMHG | SYSTOLIC BLOOD PRESSURE: 131 MMHG | RESPIRATION RATE: 15 BRPM | OXYGEN SATURATION: 96 % | HEIGHT: 61 IN | TEMPERATURE: 97.3 F | HEART RATE: 61 BPM | BODY MASS INDEX: 36.09 KG/M2 | WEIGHT: 191.14 LBS

## 2025-02-03 PROBLEM — A49.01 METHICILLIN SUSCEPTIBLE STAPHYLOCOCCUS AUREUS INFECTION: Status: ACTIVE | Noted: 2025-02-03

## 2025-02-03 LAB
CK SERPL-CCNC: 19 U/L (ref 26–192)
CRP SERPL HS-MCNC: 18 MG/L (ref 0–5)
INTERVENTION: NORMAL
MICROORGANISM SPEC CULT: NORMAL
MICROORGANISM SPEC CULT: NORMAL
MICROORGANISM/AGENT SPEC: NORMAL
MICROORGANISM/AGENT SPEC: NORMAL
SPECIMEN DESCRIPTION: NORMAL
SPECIMEN DESCRIPTION: NORMAL

## 2025-02-03 PROCEDURE — 6360000002 HC RX W HCPCS: Performed by: INTERNAL MEDICINE

## 2025-02-03 PROCEDURE — 6370000000 HC RX 637 (ALT 250 FOR IP): Performed by: INTERNAL MEDICINE

## 2025-02-03 PROCEDURE — 99239 HOSP IP/OBS DSCHRG MGMT >30: CPT | Performed by: INTERNAL MEDICINE

## 2025-02-03 PROCEDURE — 86140 C-REACTIVE PROTEIN: CPT

## 2025-02-03 PROCEDURE — 2500000003 HC RX 250 WO HCPCS

## 2025-02-03 PROCEDURE — APPSS30 APP SPLIT SHARED TIME 16-30 MINUTES: Performed by: NURSE PRACTITIONER

## 2025-02-03 PROCEDURE — 6370000000 HC RX 637 (ALT 250 FOR IP)

## 2025-02-03 PROCEDURE — 99232 SBSQ HOSP IP/OBS MODERATE 35: CPT | Performed by: INTERNAL MEDICINE

## 2025-02-03 PROCEDURE — 6370000000 HC RX 637 (ALT 250 FOR IP): Performed by: HOSPITALIST

## 2025-02-03 PROCEDURE — 36415 COLL VENOUS BLD VENIPUNCTURE: CPT

## 2025-02-03 PROCEDURE — 82550 ASSAY OF CK (CPK): CPT

## 2025-02-03 PROCEDURE — G0545 PR INHERENT VISIT TO INPT: HCPCS | Performed by: INTERNAL MEDICINE

## 2025-02-03 RX ORDER — CYCLOBENZAPRINE HCL 10 MG
10 TABLET ORAL 3 TIMES DAILY PRN
Qty: 21 TABLET | Refills: 0 | Status: SHIPPED | OUTPATIENT
Start: 2025-02-03 | End: 2025-02-10

## 2025-02-03 RX ORDER — TRAMADOL HYDROCHLORIDE 50 MG/1
50 TABLET ORAL EVERY 6 HOURS PRN
Qty: 20 TABLET | Refills: 0 | Status: SHIPPED | OUTPATIENT
Start: 2025-02-03 | End: 2025-02-08

## 2025-02-03 RX ORDER — LIDOCAINE 4 G/G
1 PATCH TOPICAL DAILY
Qty: 30 PATCH | Refills: 0 | Status: SHIPPED | OUTPATIENT
Start: 2025-02-04

## 2025-02-03 RX ORDER — ASPIRIN 81 MG/1
81 TABLET ORAL 2 TIMES DAILY
Qty: 60 TABLET | Refills: 0 | Status: SHIPPED | OUTPATIENT
Start: 2025-02-03

## 2025-02-03 RX ADMIN — METOPROLOL TARTRATE 12.5 MG: 25 TABLET, FILM COATED ORAL at 08:41

## 2025-02-03 RX ADMIN — PANTOPRAZOLE SODIUM 40 MG: 40 TABLET, DELAYED RELEASE ORAL at 06:23

## 2025-02-03 RX ADMIN — ACETAMINOPHEN 650 MG: 325 TABLET ORAL at 12:30

## 2025-02-03 RX ADMIN — SODIUM CHLORIDE, PRESERVATIVE FREE 10 ML: 5 INJECTION INTRAVENOUS at 08:42

## 2025-02-03 RX ADMIN — TRAMADOL HYDROCHLORIDE 50 MG: 50 TABLET ORAL at 06:46

## 2025-02-03 RX ADMIN — HEPARIN SODIUM 5000 UNITS: 5000 INJECTION INTRAVENOUS; SUBCUTANEOUS at 06:23

## 2025-02-03 RX ADMIN — OXYBUTYNIN CHLORIDE 15 MG: 5 TABLET, EXTENDED RELEASE ORAL at 08:41

## 2025-02-03 RX ADMIN — AMIODARONE HYDROCHLORIDE 200 MG: 200 TABLET ORAL at 08:41

## 2025-02-03 RX ADMIN — ANTACID TABLETS 1250 MG: 500 TABLET, CHEWABLE ORAL at 08:40

## 2025-02-03 RX ADMIN — FERROUS SULFATE TAB EC 325 MG (65 MG FE EQUIVALENT) 325 MG: 325 (65 FE) TABLET DELAYED RESPONSE at 08:40

## 2025-02-03 RX ADMIN — SUCRALFATE 1 G: 1 TABLET ORAL at 06:23

## 2025-02-03 RX ADMIN — ESCITALOPRAM OXALATE 20 MG: 10 TABLET ORAL at 08:41

## 2025-02-03 RX ADMIN — CYANOCOBALAMIN TAB 500 MCG 500 MCG: 500 TAB at 08:40

## 2025-02-03 RX ADMIN — SUCRALFATE 1 G: 1 TABLET ORAL at 11:27

## 2025-02-03 RX ADMIN — FUROSEMIDE 40 MG: 40 TABLET ORAL at 08:40

## 2025-02-03 RX ADMIN — DOXYCYCLINE HYCLATE 100 MG: 100 TABLET, COATED ORAL at 06:23

## 2025-02-03 RX ADMIN — FOLIC ACID 1 MG: 1 TABLET ORAL at 08:40

## 2025-02-03 ASSESSMENT — PAIN DESCRIPTION - DESCRIPTORS: DESCRIPTORS: ACHING;NUMBNESS

## 2025-02-03 ASSESSMENT — PAIN DESCRIPTION - ORIENTATION: ORIENTATION: RIGHT;LEFT

## 2025-02-03 ASSESSMENT — PAIN DESCRIPTION - LOCATION: LOCATION: LEG

## 2025-02-03 ASSESSMENT — PAIN SCALES - GENERAL: PAINLEVEL_OUTOF10: 7

## 2025-02-03 NOTE — DISCHARGE INSTR - COC
Continuity of Care Form    Patient Name: Elaina Champagne   :  1950  MRN:  [id number]    Admit date:  2025  Discharge date:  2025    Code Status Order: Full Code   Advance Directives:   Advance Care Flowsheet Documentation        Date/Time Healthcare Directive Type of Healthcare Directive Copy in Chart Healthcare Agent Appointed Healthcare Agent's Name Healthcare Agent's Phone Number    25 0736 No, patient does not have an advance directive for healthcare treatment  --  --  --  --  --                     Admitting Physician:  No admitting provider for patient encounter.  PCP: Robin Ly MD    Discharging Nurse: Unique   Discharging Hospital Unit/Room#: 0245/0245-01  Discharging Unit Phone Number: [phone]    Emergency Contact:   Extended Emergency Contact Information  Primary Emergency Contact: MahiEze   [facility]  Home Phone: [phone]  Relation: Spouse  Secondary Emergency Contact: Margo ferreira  Home Phone: [phone]  Mobile Phone: [phone]  Relation: Grandchild  Preferred language: English    Past Surgical History:  Past Surgical History:   Procedure Laterality Date    CARDIAC PROCEDURE N/A 2024    Coronary angiography performed by Regis Aponte MD at [facility] CARDIAC CATH LAB    CARDIAC PROCEDURE N/A 2024    frantz / Percutaneous coronary intervention / rm 504 performed by Maria Teresa Rodriguez MD at [facility] CARDIAC CATH LAB    CARDIAC PROCEDURE N/A 2024    frantz / Percutaneous coronary intervention / op scmh performed by Maria Teresa Rodriguez MD at [facility] CARDIAC CATH LAB    CARDIAC PROCEDURE N/A 2024    demetria / Pericardiocentesis / op pb performed by Mariya Luong MD at [facility] CARDIAC CATH LAB    CATARACT REMOVAL Bilateral 2015    CORONARY ANGIOPLASTY WITH STENT PLACEMENT  2024    DEBRIDEMENT Left 2025    IRRIGATION AND DEBRIDEMENT WITH CULTURES    HYSTERECTOMY, TOTAL ABDOMINAL (CERVIX REMOVED)      INCISION AND

## 2025-02-03 NOTE — CARE COORDINATION
Transition planning    Called and notified Rere at [facility] that patient is discharging home today. She states they will obtain necessary information from Epic.

## 2025-02-03 NOTE — PLAN OF CARE
Problem: Safety - Adult  Goal: Free from fall injury  2/3/2025 1304 by Unique Chapman RN  Outcome: Adequate for Discharge  2/3/2025 0623 by Vivi Kasper RN  Outcome: Progressing     Problem: Chronic Conditions and Co-morbidities  Goal: Patient's chronic conditions and co-morbidity symptoms are monitored and maintained or improved  2/3/2025 1304 by Unique Chapman RN  Outcome: Adequate for Discharge  2/3/2025 0623 by Vivi Kasper RN  Outcome: Progressing     Problem: Discharge Planning  Goal: Discharge to home or other facility with appropriate resources  2/3/2025 1304 by Unique Chapman RN  Outcome: Adequate for Discharge  2/3/2025 0623 by Vivi Kasper RN  Outcome: Progressing     Problem: Pain  Goal: Verbalizes/displays adequate comfort level or baseline comfort level  Outcome: Adequate for Discharge     Problem: ABCDS Injury Assessment  Goal: Absence of physical injury  Outcome: Adequate for Discharge     Problem: Skin/Tissue Integrity  Goal: Skin integrity remains intact  Description: 1.  Monitor for areas of redness and/or skin breakdown  2.  Assess vascular access sites hourly  3.  Every 4-6 hours minimum:  Change oxygen saturation probe site  4.  Every 4-6 hours:  If on nasal continuous positive airway pressure, respiratory therapy assess nares and determine need for appliance change or resting period  Outcome: Adequate for Discharge

## 2025-02-03 NOTE — PLAN OF CARE
Problem: Safety - Adult  Goal: Free from fall injury  2/3/2025 1304 by Unique Chapman RN  Outcome: Completed  2/3/2025 1304 by Unique Chapman RN  Outcome: Adequate for Discharge  2/3/2025 0623 by Vivi Kasper RN  Outcome: Progressing     Problem: Chronic Conditions and Co-morbidities  Goal: Patient's chronic conditions and co-morbidity symptoms are monitored and maintained or improved  2/3/2025 1304 by Unique Chapman RN  Outcome: Completed  2/3/2025 1304 by Unique Chapman RN  Outcome: Adequate for Discharge  2/3/2025 0623 by Vivi Kasper RN  Outcome: Progressing     Problem: Discharge Planning  Goal: Discharge to home or other facility with appropriate resources  2/3/2025 1304 by Unique Chapman RN  Outcome: Completed  2/3/2025 1304 by Unique Chapman RN  Outcome: Adequate for Discharge  2/3/2025 0623 by Vivi Kasper RN  Outcome: Progressing     Problem: Pain  Goal: Verbalizes/displays adequate comfort level or baseline comfort level  2/3/2025 1304 by Unique Chapman RN  Outcome: Completed  2/3/2025 1304 by Unique Chapman RN  Outcome: Adequate for Discharge     Problem: ABCDS Injury Assessment  Goal: Absence of physical injury  2/3/2025 1304 by Unique Chapman RN  Outcome: Completed  2/3/2025 1304 by Unique Chapman RN  Outcome: Adequate for Discharge     Problem: Skin/Tissue Integrity  Goal: Skin integrity remains intact  Description: 1.  Monitor for areas of redness and/or skin breakdown  2.  Assess vascular access sites hourly  3.  Every 4-6 hours minimum:  Change oxygen saturation probe site  4.  Every 4-6 hours:  If on nasal continuous positive airway pressure, respiratory therapy assess nares and determine need for appliance change or resting period  2/3/2025 1304 by Unique Chapman RN  Outcome: Completed  2/3/2025 1304 by Unique Chapman RN  Outcome: Adequate for Discharge

## 2025-02-03 NOTE — DISCHARGE SUMMARY
[facility]  Office: [phone]  Nael Morgan DO, Ronny Quevedo DO, Pb Vásquez DO, Refugio Bradley DO, Sheldon Smith MD, Elsa Torres MD, Leandro Espinosa MD, Joann Hauser MD,  Dipak Montana MD, Rob Hernandez MD, Swathi Wells MD,  Gracia Chen DO, Pilar So MD, Kayden Walters MD, Orlando Morgan DO, Yamilex Luciano MD,  Mando Burden DO, Gali Galarza MD, Georgie Workman MD, Jennifer Hill MD, Gloria Sierra MD,  Rafael Rawls MD, Denilson Murphy MD, Kamran Flores MD, Yazan Hughes MD, Theodore Grossman MD, Jaxon Villar MD, Aj Bess DO, Arvin Powers MD, Gracia Lockwood MD, Mohsin Reza, MD, Shirley Waterhouse, CNP,  Nidia Lester CNP, Aj Castro, CNP,  Erin Shaw, JASON, Ivis Senior, CNP, Georgiana Freeman, CNP, Diandra Ruffin, CNP, Marilia Parker, CNP, Maureen Matamoros, PA-C, Meeta Titus PA-C, Sandrita Cruz, CNP, Veronica Alonzo, CNP,  Maryann Page, CNP, Coretta Rosario, CNP, Areli Ware, CNP,  Radhika Gillespie, CNS, Guadalupe Jaimes, CNP, Sarah Shi, CNP,   Sera Jefferson, CNP         [facility]   IN-PATIENT SERVICE   [facility]    Discharge Summary     Patient ID: Elaina Champagne  :  1950   MRN: [id number]     ACCOUNT:  [id number]   Patient's PCP: Robin Ly MD  Admit Date: 2025   Discharge Date: 2/3/2025 ***    Length of Stay: 6  Code Status:  Full Code   Admitting Physician: No admitting provider for patient encounter.  Discharge Physician: Elsa Torres MD     Active Discharge Diagnoses:     Hospital Problem Lists:  Principal Problem:    Septic joint  Active Problems:    Permanent atrial fibrillation (HCC)    Acute pulmonary edema    Pre-op evaluation    Depression with anxiety    Primary hypertension    Hyperlipidemia    Morbid obesity    Non-rheumatic tricuspid valve insufficiency    Leukocytosis    Normocytic anemia    Carotid stenosis, asymptomatic, bilateral    Chronic kidney disease, stage 3b  Recommend using ice. Can alternate Tylenol and Ibuprofen, taking 1000 mg Tylenol every 6 hours, alternating with ibuprofen 600mg every 6 hours, so that you are taking something every 3 hours.

## 2025-02-03 NOTE — DISCHARGE INSTRUCTIONS
[facility]                            Visit  Discharge Instructions / Physician Orders  DATE:2/6/25     Home Care:[facility]     SUPPLIES ORDERED THRU:                     DATE LAST SUPPLIED     Wound Location:  Left knee-? Infected hardware     Cleanse with: Liquid antibacterial soap and water, rinse well      Dressing Orders:  Primary dressing    Left Knee                   Secondary dressing  4x4 Silicone                        secure with           x 30days     Frequency:  transport to ER     Additional Orders: Increase protein to diet (meat, cheese, eggs, fish, peanut butter, nuts and beans)  Multivitamin daily  Elevate legs if swollen or wearing compression when sitting  OFFLOADING [] YES  TYPE:                  [x] NA     Weekly wound care visits until determined otherwise.     Antibiotic therapy-wound care related YES [] NO [] NA[]  DRUG-             BACTrim for  been on for approx 10 days                                                Duration-             Script sent to-                      on (date)  MY CHART []     Smart Device  []      HYPERBARIC TREATMENT-                TREATMENT #                          Your next appointment with the Wound Care Center is in 1 week                                                                                                   (Please note your next appointment above and if you are unable to keep, kindly give a 24 hour notice. Thank you.)  If more than 15 min late we cannot guarantee you will be seen due to clinician schedule     Per Policy,  3 or more cancellations or no show may result in dismissal from program  If you experience any of the following, please call the Wound Care Center during business hours:  [phone]     * Increase in Pain  * Temperature over 101  * Increase in drainage from your wound  * Drainage with a foul odor  * Bleeding  * Increase in swelling  * Need for compression bandage changes due to

## 2025-02-05 NOTE — PROGRESS NOTES
Orthopedic Progress Note    Patient:  Elaina Champagne  YOB: 1950     74 y.o. female    Subjective:  Patient seen and examined this morning. No complaints or concerns. No issues overnight per nursing. Pain is well controlled on current regimen. Denies fever, HA, CP, SOB, N/V, dysuria, new numbness/tingling. BM/flatus+ Voiding appropriately.     Vitals reviewed, afebrile    Objective:   Vitals:    01/31/25 0451   BP: (!) 146/48   Pulse: 56   Resp: 17   Temp:    SpO2:      Gen: NAD, cooperative    Cardiovascular: Bradycardic on monitor  Respiratory: No acute respiratory distress, breathing comfortably    Orthopedic Exam  LLE:  Prevena incisional VAC overlying the surgical incision to the anterior inferior aspect of the left knee, maintaining appropriate suction, 0 cc output in the canister overnight.  Optifoam overlying the medial aspect of the left knee from previous drain site clean/dry/intact without evidence of strikethrough.  Compartments of the left lower extremity soft and easily compressible.  Patient is able to range her knee from neutral to approximately 10 degrees of flexion with little pain, per patient this is improved from time of admission.  FHL/EHL/TA/GSC gross motor intact.  Sural/saphenous/SPN/DPN/plantar sensation light touch intact.  Limb appears warm and well-perfused.    Recent Labs     01/28/25  1645 01/30/25  0645   WBC 2.9*  --    HGB 8.2*  --    HCT 25.3*  --      --    INR 1.1  --    * 135*   K 5.3 5.2   BUN 18 22   CREATININE 1.2 1.3*   GLUCOSE 81 117*      Meds:   Abx: Cefepime and daptomycin  DVT ppx: EPC  See rec for complete list    Impression 74 y.o. female who is being seen for:    -Left superficial knee infection     DOS: 1/29/2025 with Dr. Ren  -Left knee irrigation and debridement  -Application of negative pressure wound VAC    Plan  - No further plans for orthopedic intervention at this time  - Prevena incisional wound VAC 0 cc output 
           Orthopedic Progress Note    Patient:  Elaina Champagne  YOB: 1950     74 y.o. female    Subjective:  Patient seen and examined this morning. No complaints or concerns. No issues overnight per nursing. Pain is well controlled on current regimen. Denies fever, HA, CP, SOB, N/V, dysuria, new numbness/tingling. BM/flatus+ Voiding appropriately.  Patient to work with PT/OT today.    Vitals reviewed, afebrile    Objective:   Vitals:    01/30/25 0442   BP: (!) 134/51   Pulse: 55   Resp: 16   Temp:    SpO2: 100%     Gen: NAD, cooperative    Cardiovascular: Regular rate on monitor  Respiratory: No acute respiratory distress, breathing comfortably    Orthopedic Exam  RLE:  Prevena incisional wound VAC overlying the surgical incision to the anterior aspect of the patient's right knee.  Additionally there is a Hemovac drain to the medial aspect of the patient's right knee.  Both the Hemovac drain and Prevena incisional wound VAC has 0 cc output overnight.  Hemovac drain pulled.  There is some drainage from around the Hemovac drain site.  Compartments of the lower extremity soft and easily compressible.  FHL/EHL/TA/GSC gross motor intact.  Sural close evidence of significant/DPN/plantar sensation light touch intact.  Limb is warm and well-perfused.    Recent Labs     01/28/25  1645   WBC 2.9*   HGB 8.2*   HCT 25.3*      INR 1.1   *   K 5.3   BUN 18   CREATININE 1.2   GLUCOSE 81      Meds:   Abx: Cefepime, daptomycin  DVT ppx: EPC  See rec for complete list    Impression 74 y.o. female who is being seen for:    -Left superficial knee infection    DOS: 1/29/2025 with Dr. Ren  -Left knee irrigation and debridement  -Application of negative pressure wound VAC    Plan  - No further plans for orthopedic intervention at this time  - Prevena incisional wound VAC 0 cc output overnight   -Hemovac drain with 0 cc output overnight, subsequently pulled this morning  - Optifoam on RLE. Okay to 
  Physician Progress Note      PATIENT:               MARGARET PACHECO  [facility] #:                  [id number]  :                       1950  ADMIT DATE:       2025 9:22 PM  DISCH DATE:        2/3/2025 3:59 PM  RESPONDING  PROVIDER #:        LADARIUS COFFEY          QUERY TEXT:    Patient admitted with infected left knee joint. Noted documentation of sepsis   in cardiology consult note on  In order to support the diagnosis of   sepsis, please include additional clinical indicators in your documentation.    Or please document if the diagnosis of sepsis has been ruled out after further   study    The medical record reflects the following:  Risk Factors: septic knee joint, cad, Respiratory failure  Clinical Indicators: admitting provider notes septic prosthetic left knee   joint. cardiology consult notes ongoing sepsis. per review wbc 2.9 checked   times 1, lactic acid 1.2, procalcitonin .09 crp 11.6>9.2  Treatment: excisional debridement, IV cefepime, id consult    Thank You Mahad FRANZ BSN CCDS  Options provided:  -- systemic Sepsis present as evidenced by, Please document evidence.  -- systemic Sepsis was ruled out after study, septic joint confirmed  -- Other - I will add my own diagnosis  -- Disagree - Not applicable / Not valid  -- Disagree - Clinically unable to determine / Unknown  -- Refer to Clinical Documentation Reviewer    PROVIDER RESPONSE TEXT:    systemic Sepsis was ruled out after study.septic joint confirmed    Query created by: Hortencia Ahuja on 2025 12:33 PM      Electronically signed by:  LADARIUS COFFEY 2025 11:24 AM          
CLINICAL PHARMACY NOTE: MEDS TO BEDS    Total # of Prescriptions Filled: 1   The following medications were delivered to the patient:  doxycycline    Additional Documentation: meds delivered to the pt in room 245 on 02.02.25 at 1:31 $1.60 paid cash    
[facility]  Office: [phone]  Nael Morgan DO, Ronny Quevedo, DO, Pb Vásquez DO, Refugio Bradley DO, Sheldon Smith MD, Elsa Torres MD, Leandro Espinosa MD, Joann Hauser MD,  Dipak Montana MD, Rob Hernandez MD, Swathi Wells MD,  Gracia Chen DO, Pilar So MD, Kayden Walters MD, Orlando Morgan DO, Yamilex Luciano MD,  Mando Burden DO, Gali Galarza MD, Georgie Workman MD, Jennifer Hill MD, Gloria Sierra MD,  Rafael Rawls MD, Denilson Murphy MD, Kamran Flores MD, Yazan Hughes MD, Theodore Grossman MD, Jaxon Villar MD, Aj Bess DO, Arvin Powers MD, Gracia Lockwood MD, Mohsin Reza, MD, Shirley Waterhouse, CNP,  Nidia Lester CNP, Aj Castro, CNP,  Erin Shaw, DNP, Ivis Senior, CNP, Georgiana Freeman, CNP, Diandra Ruffin, CNP, Marilia Parker, CNP, Maureen Matamoros, PA-C, Meeta Titus, PA-C, Sandrita Cruz, CNP, Veronica Alonzo, CNP,  Maryann Page, CNP, Coretta Rosario, CNP, Areli Ware, CNP,  Radhika Gillespie, CNS, Guadalupe Jaimes, CNP, Sarah Shi, CNP,   Sera Jefferson, CNP         [facility]   IN-PATIENT SERVICE   [facility]    Progress Note    1/30/2025    12:13 PM    Name:   Elaina Champagne  MRN:     [id number]     Acct:      [id number]   Room:   0245/0245-01   Day:  2  Admit Date:  1/28/2025  9:22 PM    PCP:   Robin Ly MD  Code Status:  Full Code    Subjective:     C/C:   Chief Complaint   Patient presents with    Knee Pain     Left knee pain/ septic joint. St ronny tx    Wound Infection     Interval History Status: not changed.     Pt s/p left knee irrigation and debridement by Ortho yesterday.  A wound vac is placed over the left knee incision and she has a drain in place.  She is c/o neck pain and a HA.  She states that she fell recently and hit her head, she's had a headache since.  Tylenol did not help her pain, she cannot take Ibuprofen.    Brief History:     Per the Nocturnist:  \"Elaina Champagne 
ER nurses brought walker to pre op area.   
[facility] - Progress Note    Today's Date and Time: 1/31/2025, 1:16 PM    Impression :   Septic prosthetic left  knee joint   MRSA and Pseudomonas on wound culture from 1/20/25  S/p debridement on 1/29/25  Hx of left knee replacement at [facility] in 2017  Fall injuring left knee in December 2024  Acute hypoxic respiratory failure  Pulmonary edema  Atrial fibrillation on Plavix  Coronary artery disease with previous PCI in January 2024  Essential hypertension  CKD stage III  Diastolic congestive heart failure  Lymphedema  Urinary incontinence  PCN allergy  Vancomycin allergy    Recommendations:   Cefepime continues pending finalized culture data  IV Daptomycin initiated 1/29/25 pending finalized culture data  Zyvox discontinued because of adverse interactions with home medications  MRSA and Pseudomonas on left knee culture from 1/20/25  OR culture 1-29-25: S aureus and Gram negative bacilli   Wound care    There are no satisfactory oral antibiotics for this, high risk,  infection   She will need IV antibiotics when ready to pursue outpatient treatment.     Medical Decision Making/Summary/Discussion:1/31/2025         Elements of Medical Decision Making:  Note: I have independently performed the steps listed below as part of the medical decision making and evaluation.   Examined and discussed with patient.  Initial total left knee arthroplasty in 2017 at the [facility]  Fall with injury to the left knee in December 2024  Septic arthritis left knee joint  Wound cultures with MRSA and Pseudomonas on 1/20/2025  Status post surgical debridement on 1/29/2025  Acute hypoxic respiratory failure  Pulmonary edema  Diastolic congestive heart failure  Atrial fibrillation on Plavix  Coronary artery disease with previous PCI in January 2024  Essential hypertension  CKD stage III  Lymphedema  Penicillin allergy  Vancomycin allergy  Labs, medications, radiologic studies were reviewed with 
[facility] - Progress Note    Today's Date and Time: 2/2/2025, 3:30 PM    Impression :   Septic prosthetic left  knee joint   MRSA and Pseudomonas on wound culture from 1/20/25  S/p debridement on 1/29/25  Hx of left knee replacement at [facility] in 2017  Fall injuring left knee in December 2024  Acute hypoxic respiratory failure  Pulmonary edema  Atrial fibrillation on Plavix  Coronary artery disease with previous PCI in January 2024  Essential hypertension  CKD stage III  Diastolic congestive heart failure  Lymphedema  Urinary incontinence  PCN allergy  Vancomycin allergy    Recommendations:   Antimicrobials:  Patient received:  Cefepime for Pseudomonas abscess  IV Daptomycin initiated 1/29/25 for MRSA abscess  Zyvox discontinued because of adverse interactions with home medications  Plan doxycycline for treatment at home for MRSA  Office f/up in 2 weeks with Dr Baez for infection. Please call [phone] for appointment  Levaquin not available because of amiodarone interaction    Cultures:  MRSA and Pseudomonas on left knee culture from 1/20/25  OR culture 1-29-25: S aureus and Gram negative bacilli   Wound care    Medical Decision Making/Summary/Discussion:2/2/2025         Elements of Medical Decision Making:  Note: I have independently performed the steps listed below as part of the medical decision making and evaluation.   Examined and discussed with patient.  Initial total left knee arthroplasty in 2017 at the [facility]  Fall with injury to the left knee in December 2024  Septic arthritis left knee joint  Wound cultures with MRSA and Pseudomonas on 1/20/2025  Status post surgical debridement on 1/29/2025  Acute hypoxic respiratory failure  Pulmonary edema  Diastolic congestive heart failure  Atrial fibrillation on Plavix  Coronary artery disease with previous PCI in January 2024  Essential hypertension  CKD stage III  Lymphedema  Penicillin allergy  Vancomycin 
Physical Therapy  Facility/Department: [address] ORTHO/MED SURG   Physical Therapy Daily Treatment Note    Patient Name: Elaina Champagne        MRN: [id number]    : 1950    Date of Service: 2025    Chief Complaint   Patient presents with    Knee Pain     Left knee pain/ septic joint. St gaspar tx    Wound Infection     Past Medical History:  has a past medical history of Anemia, Anxiety disorder, Artificial knee joint present, left, Artificial knee joint present, right, Atherosclerotic heart disease, Bell's palsy, Cellulitis, Chronic kidney disease, stage 3 (HCC), Cognitive communication deficit, Gastric ulcer with hemorrhage, Gastro-esophageal reflux disease without esophagitis, Hyperlipidemia, Hypertension, Lymphedema, Major depressive disorder, Morbid obesity, Nonrheumatic tricuspid (valve) insufficiency, NSTEMI (non-ST elevated myocardial infarction) ([facility]), Occlusion and stenosis of bilateral carotid arteries, Osteoarthritis, Overactive bladder, Paroxysmal atrial fibrillation ([facility]), Urge incontinence, Venous insufficiency, and Weakness.  Past Surgical History:  has a past surgical history that includes Hysterectomy, total abdominal (); Cataract removal (Bilateral, ); incision and drainage (Left, 2017); Total knee arthroplasty (Left, 2017); Knee Arthroplasty (Right, 2018); ventral hernia repair (2019); IR NONTUNNELED VASCULAR CATHETER > 5 YEARS (12/15/2023); Upper gastrointestinal endoscopy (N/A, 2023); Cardiac procedure (N/A, 2024); Cardiac procedure (N/A, 2024); Upper gastrointestinal endoscopy (N/A, 2024); Coronary angioplasty with stent (2024); Cardiac procedure (N/A, 2024); Cardiac procedure (N/A, 2024); Wound debridement (Left, 2025); and Total knee arthroplasty (Left, 2025).    Discharge Recommendations     PT Equipment Recommendations  Equipment Needed: No     Assessment  Assessment: pt amb 15 ft RW with min 
Physical Therapy  Facility/Department: [address] ORTHO/MED SURG   Physical Therapy Initial Evaluation    Patient Name: Elaina Champagne        MRN: [id number]    : 1950    Date of Service: 2025    Chief Complaint   Patient presents with    Knee Pain     Left knee pain/ septic joint. St gaspar tx    Wound Infection     Past Medical History:  has a past medical history of Anemia, Anxiety disorder, Artificial knee joint present, left, Artificial knee joint present, right, Atherosclerotic heart disease, Bell's palsy, Cellulitis, Chronic kidney disease, stage 3 (HCC), Cognitive communication deficit, Gastric ulcer with hemorrhage, Gastro-esophageal reflux disease without esophagitis, Hyperlipidemia, Hypertension, Lymphedema, Major depressive disorder, Morbid obesity, Nonrheumatic tricuspid (valve) insufficiency, NSTEMI (non-ST elevated myocardial infarction) ([facility]), Occlusion and stenosis of bilateral carotid arteries, Osteoarthritis, Overactive bladder, Paroxysmal atrial fibrillation ([facility]), Urge incontinence, Venous insufficiency, and Weakness.  Past Surgical History:  has a past surgical history that includes Hysterectomy, total abdominal (); Cataract removal (Bilateral, ); incision and drainage (Left, 2017); Total knee arthroplasty (Left, 2017); Knee Arthroplasty (Right, 2018); ventral hernia repair (2019); IR NONTUNNELED VASCULAR CATHETER > 5 YEARS (12/15/2023); Upper gastrointestinal endoscopy (N/A, 2023); Cardiac procedure (N/A, 2024); Cardiac procedure (N/A, 2024); Upper gastrointestinal endoscopy (N/A, 2024); Coronary angioplasty with stent (2024); Cardiac procedure (N/A, 2024); Cardiac procedure (N/A, 2024); Wound debridement (Left, 2025); and Total knee arthroplasty (Left, 2025).    Discharge Recommendations   Further therapy recommended at discharge.    PT Equipment Recommendations  Equipment Needed: 
Physical Therapy  Facility/Department: [address] ORTHO/MED SURG   Physical Therapy Daily Treatment Note    Patient Name: Elaina Champagne        MRN: [id number]    : 1950    Date of Service: 2025    Chief Complaint   Patient presents with    Knee Pain     Left knee pain/ septic joint. St gaspar tx    Wound Infection     Past Medical History:  has a past medical history of Anemia, Anxiety disorder, Artificial knee joint present, left, Artificial knee joint present, right, Atherosclerotic heart disease, Bell's palsy, Cellulitis, Chronic kidney disease, stage 3 (HCC), Cognitive communication deficit, Gastric ulcer with hemorrhage, Gastro-esophageal reflux disease without esophagitis, Hyperlipidemia, Hypertension, Lymphedema, Major depressive disorder, Morbid obesity, Nonrheumatic tricuspid (valve) insufficiency, NSTEMI (non-ST elevated myocardial infarction) ([facility]), Occlusion and stenosis of bilateral carotid arteries, Osteoarthritis, Overactive bladder, Paroxysmal atrial fibrillation ([facility]), Urge incontinence, Venous insufficiency, and Weakness.  Past Surgical History:  has a past surgical history that includes Hysterectomy, total abdominal (); Cataract removal (Bilateral, ); incision and drainage (Left, 2017); Total knee arthroplasty (Left, 2017); Knee Arthroplasty (Right, 2018); ventral hernia repair (2019); IR NONTUNNELED VASCULAR CATHETER > 5 YEARS (12/15/2023); Upper gastrointestinal endoscopy (N/A, 2023); Cardiac procedure (N/A, 2024); Cardiac procedure (N/A, 2024); Upper gastrointestinal endoscopy (N/A, 2024); Coronary angioplasty with stent (2024); Cardiac procedure (N/A, 2024); Cardiac procedure (N/A, 2024); Wound debridement (Left, 2025); and Total knee arthroplasty (Left, 2025).    Discharge Recommendations  Discharge Recommendations: Patient would benefit from continued therapy after discharge  PT Equipment 
[facility]  Office: [phone]  Nael Morgan DO, Ronny Quevedo, DO, Pb Vásquez DO, Refugio Bradley DO, Sheldon Smith MD, Elsa Torres MD, Leandro Espinosa MD, Joann Hauser MD,  Dipak Montana MD, Rob Hernandez MD, Swathi Wells MD,  Gracia Chen DO, Pilar So MD, Kayden Walters MD, Orlando Morgan DO, Yamilex Luciano MD,  Mando Burden DO, Gali Galarza MD, Georgie Workman MD, Jennifer Hill MD, Gloria Sierra MD,  Rafael Rawls MD, Denilson Murphy MD, Kamran Flores MD, Yazan Hughes MD, Theodore Grossman MD, Jaxon Villar MD, Aj Bess DO, Arvin Powers MD, Gracia Lockwood MD, Mohsin Reza, MD, Shirley Waterhouse, CNP,  Nidia Lester CNP, Aj Castro, CNP,  Erin Shaw, DNP, Ivis Senior, CNP, Georgiana Freeman, CNP, Diandra Ruffin, CNP, Marilia Parker, CNP, Maureen Matamoros, PA-C, Meeta Titus, PA-C, Sandrita Cruz, CNP, Veronica Alonzo, CNP,  Maryann Page, CNP, Coretta Rosario, CNP, Areli Ware, CNP,  Radhika Gillespie, CNS, Guadalupe Jaimes, CNP, Sarah Shi, CNP,   Sera Jefferson, CNP         [facility]   IN-PATIENT SERVICE   [facility]    Progress Note    2/2/2025    8:18 AM    Name:   Elaina Champagne  MRN:     [id number]     Acct:      [id number]   Room:   0245/0245-01   Day:  5  Admit Date:  1/28/2025  9:22 PM    PCP:   Robin Ly MD  Code Status:  Full Code    Subjective:     C/C:   Chief Complaint   Patient presents with    Knee Pain     Left knee pain/ septic joint. St ronny tx    Wound Infection     Interval History Status: not changed.     Pt s/p left knee irrigation and debridement by Ortho.  A wound vac is placed over the left knee incision and she has a drain in place.   No fevers, oxygen has been weaned off.  She is up in a chair now. On po Doxycycline BID now per ID.      Brief History:     Per the Nocturnist:  \"Elaina Champagne is a 74 y.o.  /  female who presents with Knee Pain (Left knee 
[facility]  Office: [phone]  Nael Morgan DO, Ronny Quevedo, DO, Pb Vásquez DO, Refugio Bradley DO, Sheldon Smith MD, Elsa Torres MD, Leandro Espinosa MD, Joann Hauser MD,  Dipak Montana MD, Rob Hernandez MD, Swathi Wells MD,  Gracia Chen DO, Pilar So MD, Kayden Walters MD, Orlando Morgan DO, Yamilex Luciano MD,  Mando Burden DO, Gali Galarza MD, Georgie Workman MD, Jennifer Hill MD, Gloria Sierra MD,  Rafael Rawls MD, Denilson Murphy MD, Kamran Flores MD, Yazan Hughes MD, Theodore Grossman MD, Jaxon Villar MD, Aj Bess DO, Arvin Powers MD, Gracia Lockwood MD, Mohsin Reza, MD, Shirley Waterhouse, CNP,  Nidia Lester CNP, Aj Castro, CNP,  Erin Shaw, DNP, Ivis Senior, CNP, Georgiana Freeman, CNP, Diandra Ruffin, CNP, Marilia Parker, CNP, Maureen Matamoros, PA-C, Meeta Titus, PA-C, Sandrita Cruz, CNP, Veronica Alonzo, CNP,  Maryann Page, CNP, Coretta Rosario, CNP, Areli Ware, CNP,  Radhika Gillespie, CNS, Guadalupe Jaimes, CNP, Sarah Shi, CNP,   Sera Jefferson, CNP         [facility]   IN-PATIENT SERVICE   [facility]    Progress Note    2/3/2025    7:56 AM    Name:   Elaina Champagne  MRN:     [id number]     Acct:      [id number]   Room:   0245/0245-01   Day:  6  Admit Date:  1/28/2025  9:22 PM    PCP:   Robin Ly MD  Code Status:  Full Code    Subjective:     C/C:   Chief Complaint   Patient presents with    Knee Pain     Left knee pain/ septic joint. St ronny tx    Wound Infection     Interval History Status: not changed.     Pt s/p left knee irrigation and debridement by Ortho.  A wound vac is placed over the left knee incision and she has a drain in place.   No fevers, oxygen has been weaned off.  She is up in a chair now. On po Doxycycline BID now per ID.  She still feels weak, but wants to go home with home care, PT/OT.    Brief History:     Per the Nocturnist:  \"Elaina Champagne is a 74 y.o. 
allergy  Vancomycin allergy  Labs, medications, radiologic studies were reviewed with personal review of films  Radiologic studies  Lab work  Cultures  Prior wound cultures with MRSA and Pseudomonas 1/20/2025  Large amounts of data were reviewed  Please see history of present illness  Discussed with nursing Staff, Discharge planner  Dr Torres  Infection Control and Prevention measures reviewed  Universal precaution  Contact isolation  All prior entries were reviewed  Orthopedic notes reviewed  Administer medications as ordered   Doxycycline for the second phase of treatment at home  Office f/up in 2 weeks with Dr Baez for infection. Please call [phone] for appointment   Prognosis:   Guarded  Discharge planning reviewed  Follow up as outpatient.  Office f/up in 2 weeks with Dr Baez for infection. Please call [phone] for appointment     Denys Baez MD.      Infection Control Recommendations   Ramer Precautions    Antimicrobial Stewardship Recommendations     Simplification of therapy  Targeted therapy    Coordination of Outpatient Care:   Estimated Length of IV antimicrobials:TBD  Patient will need Midline Catheter Insertion: TBD  Patient will need PICC line Insertion:TBD  Patient will need: Home IV , Infusion Center,  SNF,  LTAC: TBD  Patient will need outpatient wound care:     Chief complaint/reason for consultation:   Prosthetic joint infection      History of Present Illness:   Elaina Champagne is a 74 y.o.-year-old female who was initially admitted on 1/28/2025. Patient seen at the request of Dr. Morgan    INITIAL HISTORY:    This patient has underlying coronary artery disease with previous PCI in January 2024, paroxysmal atrial fibrillation, essential hypertension, chronic diastolic congestive heart failure, bilateral leg edema, CKD stage III, GERD, osteoarthritis, PCN and Vancomycin allergies. She underwent a left TKA in 2017 and right TKA in 2018, both at [facility] with  
Pulse 61   Temp 96.8 °F (36 °C) (Temporal)   Resp 16   SpO2 98%   Temp (24hrs), Av.7 °F (36.5 °C), Min:96.8 °F (36 °C), Max:98.5 °F (36.9 °C)    No results for input(s): \"POCGLU\" in the last 72 hours.    I/O (24Hr):  No intake or output data in the 24 hours ending 25 0906    Labs:  Hematology:  Recent Labs     25  16425  0036   WBC 2.9*  --    RBC 2.98*  --    HGB 8.2*  --    HCT 25.3*  --    MCV 84.8  --    MCH 27.6  --    MCHC 32.6  --    RDW 17.9*  --      --    MPV 8.0  --    SEDRATE 44*  --    CRP 11.6* 9.2*   INR 1.1  --      Chemistry:  Recent Labs     25  1645 25  1835 25  2232   *  --   --    K 5.3  --   --      --   --    CO2 22  --   --    GLUCOSE 81  --   --    BUN 18  --   --    CREATININE 1.2  --   --    ANIONGAP 11  --   --    LABGLOM 48*  --   --    CALCIUM 9.3  --   --    PROBNP  --   --  4,621*   TROPHS 35* 34*  --      Recent Labs     25  1645   AST 22   ALT 8*   ALKPHOS 77   BILITOT 0.2     ABG:  Lab Results   Component Value Date/Time    POCPH 7.471 2024 11:24 AM    PH 6.0 2018 11:53 AM    POCPCO2 32.1 2024 11:24 AM    POCPO2 233.8 2024 11:24 AM    POCHCO3 23.4 2024 11:24 AM    PBEA 0.0 2024 11:24 AM    UDQQ2NNF 99.9 2024 11:24 AM    FIO2 INFORMATION NOT PROVIDED 2024 09:50 AM     Lab Results   Component Value Date/Time    SPECIAL Site: Body Fluid 2024 02:28 PM     Lab Results   Component Value Date/Time    CULTURE NO GROWTH <24 HRS 2025 05:10 PM       Radiology:  CT CHEST PULMONARY EMBOLISM W CONTRAST    Result Date: 2025  1.  Motion artifact in the lower lobes but no convincing pulmonary arterial embolism. 2.  Dilated cardiomyopathy is present. 3.  Ground-glass and consolidative opacities in the right lung and patchy opacities in the left lung.  Interlobular septal thickening and heterogenous ventilation are also present.  Would favor asymmetric pulmonary edema 
1/20/2025      We will follow-up with the surgical culture data      Labs, X rays reviewed: 1/30/2025 with independent review of X rays    I have independently reviewed/ordered the following labs:    CBC with Differential:   Recent Labs     01/28/25  1645   WBC 2.9*   HGB 8.2*   HCT 25.3*      LYMPHOPCT 11*   MONOPCT 8*   EOSPCT 2     BMP:   Recent Labs     01/28/25  1645 01/30/25  0645   * 135*   K 5.3 5.2    98   CO2 22 23   BUN 18 22   CREATININE 1.2 1.3*     Hepatic Function Panel:   Recent Labs     01/28/25  1645   BILITOT 0.2   ALKPHOS 77   ALT 8*   AST 22     No results for input(s): \"RPR\" in the last 72 hours.  No results for input(s): \"HIV\" in the last 72 hours.  No results for input(s): \"BC\" in the last 72 hours.  Lab Results   Component Value Date/Time    BACTERIA MODERATE 07/01/2024 10:00 AM    PH 6.0 09/19/2018 11:53 AM    RBC 2.98 01/28/2025 04:45 PM    RBC 3.34 09/25/2018 05:38 AM    WBC 2.9 01/28/2025 04:45 PM    YEAST MANY 05/07/2024 02:00 AM    TURBIDITY Clear 07/01/2024 10:00 AM     Lab Results   Component Value Date/Time    CREATININE 1.3 01/30/2025 06:45 AM    GLUCOSE 117 01/30/2025 06:45 AM    GLUCOSE 91 09/19/2018 11:53 AM         Cultures:  Urine:    Blood:  1/28/25: No growth   Sputum :    Wound:  Left Knee swab: 1/20/25: Pseudomonas and MRSA  Left knee surgical culture 1/29/25: pending  MRSA Nares:      Imaging:    Left Knee  1/28/25                CT Chest  1/28/25        Left Knee XR   1/28/25      Review of Systems:     Pertinent review of symptoms listed in Initial Evaluation and daily interval evaluations sections      Physical Examination :   Patient Vitals for the past 8 hrs:   Resp   01/30/25 1348 16     General Appearance: Awake, alert, and in no apparent distress  Head:  Normocephalic, no trauma  Eyes: Pupils equal, round, reactive to light and accommodation; extraocular movements intact; sclera anicteric; conjunctivae pink. No embolic phenomena.  ENT: 
Living  Feeding: Independent  Grooming: Modified independent   UE Bathing: Stand by assistance  LE Bathing: Minimal assistance  UE Dressing: Stand by assistance  LE Dressing: Moderate assistance (pt required mod A for adjusting B socks d/t decresed functional reach)  Toileting: Minimal assistance  All ADLs completed during session discussed above, otherwise clinical reasoning/judgement utilized for all other assist levels.    Balance  Balance  Sitting: Intact (~12 minutes in chair)  Standing: High guard (pt complete static standing at chairside for <1 minute d/t dizziness. attempted to take pt BP in standing but unable to maintain standing long enough to complete full BP. BP started in standing and finished in sitting 80/70)    Transfers/Mobility  Bed mobility  Bed Mobility Comments: pt in chair upon arrival and retired to chair at [facility]          Transfers  Sit to stand: Contact guard assistance  Stand to sit: Contact guard assistance  Transfer Comments: from chair with RW         Functional Mobility Skilled Clinical Factors: SHONA d/t dizziness, pt unabel to tolerate standing this date       Patient Education  Patient Education  Education Given To: Patient  Education Provided: Role of Therapy;Plan of Care;Transfer Training;Precautions  Education Method: Verbal  Barriers to Learning: None  Education Outcome: Verbalized understanding    Goals  Short Term Goals  Time Frame for Short Term Goals: pt will, by discharge  Short Term Goal 1: complete LB ADLs and toileting tasks with CGA and AE, as needed  Short Term Goal 2: complete UB ADLS with mod I  Short Term Goal 3: dem SBA During functional transfers/functional mobility with LRAD, as needed  Short Term Goal 4: dem ~6 minutes dynamic standing tolerance with SBA in order to complete functional tasks  Short Term Goal 5: increase activity tolerance to 25+ minutes in order to participate in daily tasks    Plan  Occupational Therapy Plan  Times Per Week: 
amiodarone, not anticoagulated  Hypertension/dyslipidemia/depression  CKD  Morbid obesity with BMI > 40     Patient Active Problem List:     Depression with anxiety     Edema     Primary hypertension     Hyperlipidemia     Morbid obesity     Osteoarthritis     Rosacea     Urge incontinence of urine     Varicose veins     PAF (paroxysmal atrial fibrillation) ([facility])     Non-rheumatic tricuspid valve insufficiency     Venous insufficiency of both lower extremities     Lymphedema     Venous ulcer of left leg ([facility])     Hyperkalemia     Cellulitis     Leukocytosis     Allergy to penicillin     Atrial fibrillation with RVR ([facility])     Permanent atrial fibrillation ([facility])     Renovascular hypertension     Normocytic anemia     Abscess of right foot     Acute gastric ulcer with hemorrhage     S/P cardiac cath     Coronary artery disease involving native coronary artery of native heart without angina pectoris     Healing ulcers of both lower extremities, limited to breakdown of skin ([facility])     Bilateral lower leg cellulitis     Class 3 severe obesity due to excess calories with serious comorbidity and body mass index (BMI) of 50.0 to 59.9 in adult     Carotid stenosis, asymptomatic, bilateral     Atherosclerotic heart disease of native coronary artery with unspecified angina pectoris     Chronic kidney disease, stage 3b ([facility])     GI bleed     S/P right coronary artery (RCA) stent placement     Lower GI hemorrhage     Hematoma of leg, right, initial encounter     Acute blood loss anemia     Anemia     Acute chest pain     Pulmonary hypertension ([facility])     Acute heart failure with preserved ejection fraction ([facility])     S/P pericardial operation     Shortness of breath     Chronic gastric ulcer with hemorrhage     Acute coronary syndrome ([facility])     CMV infection, acute ([facility])     Infected hardware in left leg ([facility])     Septic joint     Acute hypoxic respiratory failure     Acute on chronic diastolic (congestive) heart failure ([facility])     
 --      Chemistry:  Recent Labs     01/30/25  0645 01/31/25  0706   * 133*   K 5.2 4.3   CL 98 99   CO2 23 25   GLUCOSE 117* 84   BUN 22 29*   CREATININE 1.3* 1.2*   ANIONGAP 14 9   LABGLOM 43* 48*   CALCIUM 9.0 8.9   PROBNP  --  3,701*   CKTOTAL 39  --      No results for input(s): \"LABALBU\", \"LABA1C\", \"Y0GOYHY\", \"FT4\", \"TSH\", \"AST\", \"ALT\", \"LDH\", \"GGT\", \"ALKPHOS\", \"BILITOT\", \"BILIDIR\", \"AMMONIA\", \"AMYLASE\", \"LIPASE\", \"LACTATE\", \"CHOL\", \"HDL\", \"CHOLHDLRATIO\", \"TRIG\", \"VLDL\", \"EDO05XN\", \"PHENYTOIN\", \"PHENYF\", \"URICACID\", \"POCGLU\" in the last 72 hours.    Invalid input(s): \"PROT\", \"A2LUYVD\", \"LABGGT\", \"LDLCHOLESTEROL\"    ABG:  Lab Results   Component Value Date/Time    POCPH 7.471 07/01/2024 11:24 AM    PH 6.0 09/19/2018 11:53 AM    POCPCO2 32.1 07/01/2024 11:24 AM    POCPO2 233.8 07/01/2024 11:24 AM    POCHCO3 23.4 07/01/2024 11:24 AM    PBEA 0.0 07/01/2024 11:24 AM    EACX7XFO 99.9 07/01/2024 11:24 AM    FIO2 INFORMATION NOT PROVIDED 01/05/2024 09:50 AM     Lab Results   Component Value Date/Time    SPECIAL Site: Body Fluid 07/03/2024 02:28 PM     Lab Results   Component Value Date/Time    CULTURE PENDING 01/29/2025 12:40 PM    CULTURE PENDING 01/29/2025 12:40 PM    CULTURE STAPHYLOCOCCUS AUREUS RARE GROWTH (A) 01/29/2025 12:40 PM    CULTURE (A) 01/29/2025 12:40 PM     PSEUDOMONAS AERUGINOSA RARE GROWTH Identification by MALDI-TOF    CULTURE No anaerobic organisms isolated at 2 days. 01/29/2025 12:40 PM       Radiology:  CT CHEST PULMONARY EMBOLISM W CONTRAST    Result Date: 1/28/2025  1.  Motion artifact in the lower lobes but no convincing pulmonary arterial embolism. 2.  Dilated cardiomyopathy is present. 3.  Ground-glass and consolidative opacities in the right lung and patchy opacities in the left lung.  Interlobular septal thickening and heterogenous ventilation are also present.  Would favor asymmetric pulmonary edema more than multifocal pneumonia.     XR KNEE LEFT (3 VIEWS)    Result Date: 
      Physical Examination:        General appearance:  alert, cooperative and in no distress  Mental Status:  oriented to person, place and time and normal affect  Lungs:  clear to auscultation bilaterally, normal effort  Heart:  regular rate and rhythm, no murmur  Abdomen:  soft, nontender, nondistended, normal bowel sounds, no masses, hepatomegaly, splenomegaly  Extremities:  no edema, left knee Prevena   Skin:  no gross lesions, rashes, induration    Assessment:        Hospital Problems             Last Modified POA    * (Principal) Septic joint 1/28/2025 Yes    Permanent atrial fibrillation (HCC) 1/29/2025 Yes    Acute pulmonary edema 1/29/2025 Yes    Pre-op evaluation 1/29/2025 Yes    Depression with anxiety 1/29/2025 Yes    Primary hypertension 1/29/2025 Yes    Hyperlipidemia 1/29/2025 Yes    Morbid obesity 1/29/2025 Yes    Non-rheumatic tricuspid valve insufficiency 1/29/2025 Yes    Leukocytosis 1/29/2025 Yes    Normocytic anemia 1/29/2025 Yes    Carotid stenosis, asymptomatic, bilateral 1/29/2025 Yes    Chronic kidney disease, stage 3b ([facility]) 1/29/2025 Yes    Pulmonary hypertension ([facility]) 1/29/2025 Yes    Shortness of breath 1/29/2025 Yes    Infected hardware in left leg ([facility]) 1/29/2025 Yes    Acute hypoxic respiratory failure 1/29/2025 Yes    Acute on chronic diastolic (congestive) heart failure ([facility]) 1/29/2025 Yes    MRSA (methicillin resistant Staphylococcus aureus) infection 1/29/2025 Yes    Failed total knee arthroplasty, initial encounter ([facility]) 1/29/2025 Yes    Pseudomonas aeruginosa infection 1/29/2025 Yes       Plan:        Left prosthetic knee infection - con't IV antibiotics per ID, wound care, pain control per Ortho, f/u final culture  Acute on chronic diastolic CHF exac with acute hypoxic respiratory failure- stopped Lasix 20mg IV Bid, monitor I/os, daily weights, hold potassium for now, CXR looks much better, will resume Lasix 40mg daily  Elevated troponin- Trop trend flat, likely demand 
cefepime continue for MSSA and Pseudomonas on previous wound culture from 25    Cultures:  Urine:     Blood:  25: No growth   Sputum :     Wound:  Left Knee swab: 25: Pseudomonas and MRSA  Left knee joint surgical culture 25: GNR  MRSA Nares:       Physical Examination :   Patient Vitals for the past 8 hrs:   BP Resp Weight   25 0900 (!) 146/60 -- --   25 0600 -- -- 87.7 kg (193 lb 5.5 oz)   25 0525 -- 16 --     Temp (24hrs), Av.2 °F (37.3 °C), Min:99 °F (37.2 °C), Max:99.3 °F (37.4 °C)    General Appearance: Awake, alert, and in no apparent distress  Eyes: Sclera anicteric; conjunctivae pink\"} No hemorhages, no embolic phenomena.  ENT: Oropharynx clear, without erythema, exudate, or thrush.No tenderness of sinuses. No nasal drainage.  Neck: Supple, without lymphadenopathy. No JVD  Pulmonary/Chest: Clear to auscultation, without wheezes, rales, or rhonchi. No areas of consolidation.Good air movement bilaterally. No egophony.  Cardiovascular: Regular rate and rhythm without murmurs, rubs, or gallops. S1 and S2 normal.  Abdomen: Soft, no tenderness, bowel sounds normal  All four Extremities: No cyanosis, clubbing, edema, or effusions.Wound vac in place to right knee  Neurologic: No gross sensory or motor deficits.  Skin: No rash or lesions. IV site inspected: no inflammation.Wound vac in place to right knee      Medical Decision Making:   I have independently reviewed/ordered the following labs:    CBC with Differential:   Recent Labs     25  0706   WBC 5.8   HGB 8.8*   HCT 27.6*        BMP:   Recent Labs     25  0706 25  0846   * 132*   K 4.3 4.3   CL 99 99   CO2 25 22   BUN 29* 25*   CREATININE 1.2* 1.1*     Hepatic Function Panel:   No results for input(s): \"LABALBU\", \"BILIDIR\", \"IBILI\", \"BILITOT\", \"ALKPHOS\", \"ALT\", \"AST\" in the last 72 hours.    Invalid input(s): \"PROT\"    Lab Results   Component Value Date/Time    [facility] 12.0 2017

## 2025-02-06 ENCOUNTER — HOSPITAL ENCOUNTER (OUTPATIENT)
Dept: WOUND CARE | Age: 75
Discharge: HOME | End: 2025-02-06

## 2025-02-10 ENCOUNTER — TELEPHONE (OUTPATIENT)
Dept: ORTHOPEDIC SURGERY | Age: 75
End: 2025-02-10

## 2025-02-10 LAB
MICROORGANISM SPEC CULT: NORMAL
MICROORGANISM SPEC CULT: NORMAL
MICROORGANISM/AGENT SPEC: NORMAL
MICROORGANISM/AGENT SPEC: NORMAL
SPECIMEN DESCRIPTION: NORMAL
SPECIMEN DESCRIPTION: NORMAL

## 2025-02-10 NOTE — TELEPHONE ENCOUNTER
Pt is scheduled for 2/13/25 for post op and is wanting to know if her appt date can be moved up to avoid the bad weather coming on 2/12/25

## 2025-02-11 ENCOUNTER — OFFICE VISIT (OUTPATIENT)
Dept: ORTHOPEDIC SURGERY | Age: 75
End: 2025-02-11

## 2025-02-11 VITALS — BODY MASS INDEX: 36.13 KG/M2 | HEIGHT: 61 IN

## 2025-02-11 DIAGNOSIS — S81.002D: Primary | ICD-10-CM

## 2025-02-11 PROCEDURE — 99024 POSTOP FOLLOW-UP VISIT: CPT | Performed by: ORTHOPAEDIC SURGERY

## 2025-02-11 NOTE — PROGRESS NOTES
[facility]  [address]  [place] [address]  Dept: [phone]  Dept Fax: [phone]        Orthopaedic Trauma Clinic Follow Up      Subjective:   Date of Surgery: 1/29/2025  -Left knee irrigation and debridement, 12 cm x 6 and meter  -Left knee complex wound closure  -Left knee application of negative pressure wound VAC  -Left knee aspiration    Elaina Champagne is a 74 y.o. year old female who presents to the clinic today for routine followup regarding her above procedures.  Patient is now 15 days postop and has been doing very well.  She denies any interval injury, trauma or other falls.  Patient has been ambulating with a walker and has maintained your wound VAC diligently.  She has not noticed any significant increase or change in output of her wound VAC and stated that there had been little to no output in the last 2 weeks.  Patient does live at home with her  and does have a nurse coming out for help once a week.  Other than mild discomfort in her left knee she has no other complaint or concern today.      Review of Systems  Gen: no fever, chills, malaise  CV: no chest pain or palpitations  Resp: no cough or shortness of breath  GI: no nausea, vomiting, diarrhea, or constipation  Neuro: no numbness, tingling, or weakness  Msk: See HPI for  10 remaining systems reviewed and negative    Objective :   There were no vitals filed for this visit.Body mass index is 36.13 kg/m².  General: No acute distress, resting comfortably in the clinic  Neuro: alert. oriented  Eyes: Extra-ocular muscles intact  Pulm: Respirations unlabored and regular.  Skin: warm, well perfused  Psych:   Patient has good fund of knowledge and displays understanging of exam, diagnosis, and plan.  MSK: LLE: Skin is well-approximated with small area centrally that is still healing.  There is mild erythema but no drainage, or gross sign of infection.  There

## 2025-02-18 ENCOUNTER — OFFICE VISIT (OUTPATIENT)
Dept: INFECTIOUS DISEASES | Age: 75
End: 2025-02-18
Payer: MEDICARE

## 2025-02-18 ENCOUNTER — OFFICE VISIT (OUTPATIENT)
Dept: ORTHOPEDIC SURGERY | Age: 75
End: 2025-02-18

## 2025-02-18 VITALS
HEIGHT: 61 IN | WEIGHT: 191 LBS | RESPIRATION RATE: 16 BRPM | DIASTOLIC BLOOD PRESSURE: 67 MMHG | BODY MASS INDEX: 36.06 KG/M2 | HEART RATE: 69 BPM | SYSTOLIC BLOOD PRESSURE: 151 MMHG | OXYGEN SATURATION: 96 % | TEMPERATURE: 97.8 F

## 2025-02-18 VITALS — BODY MASS INDEX: 36.17 KG/M2 | WEIGHT: 191.6 LBS | HEIGHT: 61 IN

## 2025-02-18 DIAGNOSIS — S81.002D: Primary | ICD-10-CM

## 2025-02-18 DIAGNOSIS — M00.9 PYOGENIC ARTHRITIS OF LEFT KNEE JOINT, DUE TO UNSPECIFIED ORGANISM (HCC): Primary | ICD-10-CM

## 2025-02-18 PROCEDURE — 3077F SYST BP >= 140 MM HG: CPT | Performed by: INTERNAL MEDICINE

## 2025-02-18 PROCEDURE — G8427 DOCREV CUR MEDS BY ELIG CLIN: HCPCS | Performed by: INTERNAL MEDICINE

## 2025-02-18 PROCEDURE — G8399 PT W/DXA RESULTS DOCUMENT: HCPCS | Performed by: INTERNAL MEDICINE

## 2025-02-18 PROCEDURE — 99213 OFFICE O/P EST LOW 20 MIN: CPT | Performed by: INTERNAL MEDICINE

## 2025-02-18 PROCEDURE — 1036F TOBACCO NON-USER: CPT | Performed by: INTERNAL MEDICINE

## 2025-02-18 PROCEDURE — 3017F COLORECTAL CA SCREEN DOC REV: CPT | Performed by: INTERNAL MEDICINE

## 2025-02-18 PROCEDURE — 3078F DIAST BP <80 MM HG: CPT | Performed by: INTERNAL MEDICINE

## 2025-02-18 PROCEDURE — 1090F PRES/ABSN URINE INCON ASSESS: CPT | Performed by: INTERNAL MEDICINE

## 2025-02-18 PROCEDURE — G8417 CALC BMI ABV UP PARAM F/U: HCPCS | Performed by: INTERNAL MEDICINE

## 2025-02-18 PROCEDURE — 1111F DSCHRG MED/CURRENT MED MERGE: CPT | Performed by: INTERNAL MEDICINE

## 2025-02-18 PROCEDURE — 1123F ACP DISCUSS/DSCN MKR DOCD: CPT | Performed by: INTERNAL MEDICINE

## 2025-02-18 PROCEDURE — 1159F MED LIST DOCD IN RCRD: CPT | Performed by: INTERNAL MEDICINE

## 2025-02-18 RX ORDER — DOXYCYCLINE 100 MG/1
100 TABLET ORAL 2 TIMES DAILY
Qty: 60 TABLET | Refills: 0 | Status: SHIPPED | OUTPATIENT
Start: 2025-02-18 | End: 2025-02-18 | Stop reason: SDUPTHER

## 2025-02-18 RX ORDER — DOXYCYCLINE HYCLATE 100 MG
100 TABLET ORAL 2 TIMES DAILY
Qty: 60 TABLET | Refills: 0 | Status: SHIPPED | OUTPATIENT
Start: 2025-02-18 | End: 2025-03-20

## 2025-02-18 NOTE — PROGRESS NOTES
[facility]  [address]  [place] [address]  Dept: [phone]  Dept Fax: [phone]        Orthopaedic Trauma Clinic Follow Up      Subjective:   Date of Surgery: 1/29/2025  -Left knee irrigation and debridement, 12 cm x 6 and meter  -Left knee complex wound closure  -Left knee application of negative pressure wound VAC  -Left knee aspiration    Elaina Champagne is a 74 y.o. year old female who presents to the clinic today for routine followup regarding her above procedures.  She is now nearly 3 weeks postop and states that her pain has been well-controlled.  Patient did complete her antibiotics cycle 2 days prior to today's visit.  She was however seen by our clinic on 2/11/2025 and still had a small portion of her prior incision that had not approximated completely.  Today there is a scab over the incision and patient denies any drainage.  There is increased erythema however though there is no increase in pain.  Patient has been performing soft dressing changes using Adaptic, gauze, and Ace bandage for compression.  Patient does have help at home with her  and a home health nurse that comes to her place once weekly.  She denies any interval trauma or other injury and has had no new numbness, tingling or weakness.      Review of Systems  Gen: no fever, chills, malaise  CV: no chest pain or palpitations  Resp: no cough or shortness of breath  GI: no nausea, vomiting, diarrhea, or constipation  Neuro: no numbness, tingling, or weakness  Msk: See HPI for detail  10 remaining systems reviewed and negative    Objective :   There were no vitals filed for this visit.Body mass index is 36.2 kg/m².  General: No acute distress, resting comfortably in the clinic  Neuro: alert. oriented  Eyes: Extra-ocular muscles intact  Pulm: Respirations unlabored and regular.  Skin: warm, well perfused  Psych:   Patient has good fund of knowledge

## 2025-02-18 NOTE — PROGRESS NOTES
[facility] - Office visit    Today's Date and Time: 2/18/2025, 2:41 PM    Impression :   Septic prosthetic left  knee joint   MRSA and Pseudomonas on wound culture from 1/20/25  S/p debridement on 1/29/25  Hx of left knee replacement at [facility] in 2017  Fall injuring left knee in December 2024  Acute hypoxic respiratory failure  Pulmonary edema  Atrial fibrillation on Plavix  Coronary artery disease with previous PCI in January 2024  Essential hypertension  CKD stage III  Diastolic congestive heart failure  Lymphedema  Urinary incontinence  PCN allergy  Vancomycin allergy    Recommendations:   Antimicrobials:  Patient completed:  Cefepime for Pseudomonas abscess  IV Daptomycin initiated 1/29/25 for MRSA abscess  Zyvox discontinued because of adverse interactions with home medications  Completed course of Doxycyline 2-16-25  Will require an additional course of Doxycycline for one month.     Cultures:  MRSA and Pseudomonas on left knee culture from 1/20/25  OR culture 1-29-25: S aureus and Gram negative bacilli   Wound care    Chief complaint/reason for consultation:   Prosthetic joint infection    History of Present Illness:   Elaina Champagne is a 74 y.o.-year-old female     INITIAL HISTORY:    This patient has underlying coronary artery disease with previous PCI in January 2024, paroxysmal atrial fibrillation, essential hypertension, chronic diastolic congestive heart failure, bilateral leg edema, CKD stage III, GERD, osteoarthritis, PCN and Vancomycin allergies. She underwent a left TKA in 2017 and right TKA in 2018, both at [facility] with Dr. Bose.    The patient fell about a month ago, and landed on her left knee. A scab initially formed, but for the past couple weeks, there has been purulent drainage noted. She has also experienced decreased mobility of her left knee since the fall.     A wound culture obtained on 1/20/25 grew MRSA and Pseudomonas.   She had been started on

## 2025-02-25 ENCOUNTER — OFFICE VISIT (OUTPATIENT)
Dept: ORTHOPEDIC SURGERY | Age: 75
End: 2025-02-25

## 2025-02-25 VITALS — WEIGHT: 191 LBS | HEIGHT: 61 IN | BODY MASS INDEX: 36.06 KG/M2

## 2025-02-25 DIAGNOSIS — S81.002D: Primary | ICD-10-CM

## 2025-02-25 PROCEDURE — 99024 POSTOP FOLLOW-UP VISIT: CPT | Performed by: ORTHOPAEDIC SURGERY

## 2025-02-25 NOTE — PROGRESS NOTES
[facility]  [address]  [place] [address]  Dept Phone: [phone]  Dept Fax: [phone]      Orthopaedic Trauma Clinic Follow Up      Subjective:   Date of Surgery: 1/29/2025  -Left knee irrigation and debridement, 12 cm x 6 and meter  -Left knee complex wound closure  -Left knee application of negative pressure wound VAC  -Left knee aspiration    Elaina Champagne is a 74 y.o. year old female who presents to the clinic today for follow up regarding her above listed procedures.  She is roughly 5 weeks out from surgery.  Overall her pain has been well-controlled.  She was last seen by our clinic on 2/18/2025.  At that time we recommend the patient see infectious disease for antibiotic recommendations.  As well as wrap the knee to decrease swelling.  Overall she has been doing well.  She performs at home exercises including leg abduction and knee flexion/extension.  Her anterior surgical site remains to have a scab formation with overlying Steri-Strips.  She denies any erythema or drainage from the site.  She has a swelling is improved.  She denies fever, chills, chest pain, shortness of breath at today's visit.    Review of Systems  Gen: no fever, chills, malaise  CV: no chest pain or palpitations  Resp: no cough or shortness of breath  GI: no nausea, vomiting, diarrhea, or constipation  Neuro: no seizures, vertigo, or headache  Msk: Left knee swelling  10 remaining systems reviewed and negative    Objective :   There were no vitals filed for this visit.Body mass index is 36.09 kg/m².  General: No acute distress, resting comfortably in the clinic  Neuro: alert. oriented  Eyes: Extra-ocular muscles intact  Pulm: Respirations unlabored and regular.  Skin: warm, well perfused  Psych:   Patient has good fund of knowledge and displays understanding of exam, diagnosis, and plan.    LLE: Scab formation in the central portion of the anterior patellar incision line.  This is improved

## 2025-02-26 ENCOUNTER — TELEPHONE (OUTPATIENT)
Dept: PRIMARY CARE CLINIC | Age: 75
End: 2025-02-26

## 2025-02-26 RX ORDER — FLUCONAZOLE 150 MG/1
150 TABLET ORAL ONCE
Qty: 1 TABLET | Refills: 0 | Status: SHIPPED | OUTPATIENT
Start: 2025-02-26 | End: 2025-02-26

## 2025-02-26 NOTE — TELEPHONE ENCOUNTER
Patient believes she has a vaginal yeast infection. She is asking for medication. Pharm [facility] [place]

## 2025-02-28 PROBLEM — Z01.818 PRE-OP EVALUATION: Status: RESOLVED | Noted: 2025-01-29 | Resolved: 2025-02-28

## 2025-03-02 NOTE — PROGRESS NOTES
[facility] UROGYNECOLOGY AND PELVIC REHABILITATION   [address] [place] [address]  Dept: [phone]  Date: 3/3/2025  Patient Name: Elaina Champagne    VISIT - NEW PATIENT VISIT     CC: had concerns including New Patient (How long have you had urinary incontinence: 6 month(s)/ /Do you wear pads: yes/Pad Size: brief/How often do you have to change the pad: 3/ /Are you having incontinence w/:cough, laugh, sneeze, run, jump/Are you having incontinence with urge:yes/Have you ever done PFT: no/ /Any H/O of UTI's: yes/If yes, were there cultures done: yes/ /Have you ever had any previous bladder surgeries:no /).    HPI:  Patient is a 74 year old  new patient, here to discuss mixed urinary incontinence. Past medical history includes CKD stage 3, NSTEMI, lymphedema and venous insufficiency; she has been having ongoing treatment for an open wound on her left knee following knee replacement. She has been evaluated for CRISTINA,  by her PCP who noted that the Ditropan 15mg XL is not beneficial. This is the first medication that she has tried; she has not tried any other medications. She has been noting CRISTINA for the past several months, worsening since her visit to [facility]. She does wear a brief and will note most urinary incontinence when going from sitting or laying down to standing. She does report bothersome urinary urgency and frequency with UUI. She also has JOSE ALFREDO. She denies any prior bladder surgeries or bladder testing. She has never been to PFT. She denies hematuria, kidney stones or frequent UTI. She did report one UTI last year; denies any UTI this year. She denies any bowel concerns. She is not sexually active. She denies any pelvic pain or vaginal bleeding. She states that she was recently just started on treatment for yeast infection and has been itching her vulva. Otherwise she does not typically have vulvar irritation. She will

## 2025-03-03 ENCOUNTER — OFFICE VISIT (OUTPATIENT)
Age: 75
End: 2025-03-03
Payer: MEDICARE

## 2025-03-03 ENCOUNTER — HOSPITAL ENCOUNTER (OUTPATIENT)
Age: 75
Setting detail: SPECIMEN
Discharge: HOME | End: 2025-03-03

## 2025-03-03 VITALS — SYSTOLIC BLOOD PRESSURE: 159 MMHG | HEART RATE: 80 BPM | DIASTOLIC BLOOD PRESSURE: 70 MMHG | OXYGEN SATURATION: 90 %

## 2025-03-03 DIAGNOSIS — N81.10 BADEN-WALKER GRADE 2 CYSTOCELE: ICD-10-CM

## 2025-03-03 DIAGNOSIS — N90.89 VULVAR IRRITATION: ICD-10-CM

## 2025-03-03 DIAGNOSIS — R39.9 URINARY TRACT INFECTION SYMPTOMS: ICD-10-CM

## 2025-03-03 DIAGNOSIS — R39.15 URINARY URGENCY: ICD-10-CM

## 2025-03-03 DIAGNOSIS — N81.89 WEAKNESS OF PELVIC FLOOR: ICD-10-CM

## 2025-03-03 DIAGNOSIS — N95.2 POSTMENOPAUSAL ATROPHIC VAGINITIS: ICD-10-CM

## 2025-03-03 DIAGNOSIS — K59.00 CONSTIPATION, UNSPECIFIED CONSTIPATION TYPE: ICD-10-CM

## 2025-03-03 DIAGNOSIS — R33.9 URINARY RETENTION: ICD-10-CM

## 2025-03-03 DIAGNOSIS — N39.46 URINARY INCONTINENCE, MIXED: ICD-10-CM

## 2025-03-03 DIAGNOSIS — R35.0 URINARY FREQUENCY: Primary | ICD-10-CM

## 2025-03-03 LAB
BILIRUBIN, POC: ABNORMAL
BLOOD URINE, POC: 250
CLARITY, POC: ABNORMAL
COLOR, POC: YELLOW
GLUCOSE URINE, POC: ABNORMAL MG/DL
KETONES, POC: ABNORMAL MG/DL
LEUKOCYTE EST, POC: 500
MICROORGANISM SPEC CULT: NORMAL
MICROORGANISM SPEC CULT: NORMAL
MICROORGANISM/AGENT SPEC: NORMAL
MICROORGANISM/AGENT SPEC: NORMAL
NITRITE, POC: ABNORMAL
PH, POC: 8
PROTEIN, POC: 30 MG/DL
SPECIFIC GRAVITY, POC: 1.01
SPECIMEN DESCRIPTION: NORMAL
SPECIMEN DESCRIPTION: NORMAL
UROBILINOGEN, POC: ABNORMAL MG/DL

## 2025-03-03 PROCEDURE — G8399 PT W/DXA RESULTS DOCUMENT: HCPCS

## 2025-03-03 PROCEDURE — 1111F DSCHRG MED/CURRENT MED MERGE: CPT

## 2025-03-03 PROCEDURE — 51798 US URINE CAPACITY MEASURE: CPT

## 2025-03-03 PROCEDURE — 1160F RVW MEDS BY RX/DR IN RCRD: CPT

## 2025-03-03 PROCEDURE — 99205 OFFICE O/P NEW HI 60 MIN: CPT

## 2025-03-03 PROCEDURE — 1036F TOBACCO NON-USER: CPT

## 2025-03-03 PROCEDURE — 1159F MED LIST DOCD IN RCRD: CPT

## 2025-03-03 PROCEDURE — 3078F DIAST BP <80 MM HG: CPT

## 2025-03-03 PROCEDURE — 81003 URINALYSIS AUTO W/O SCOPE: CPT

## 2025-03-03 PROCEDURE — 0509F URINE INCON PLAN DOCD: CPT

## 2025-03-03 PROCEDURE — 3077F SYST BP >= 140 MM HG: CPT

## 2025-03-03 PROCEDURE — 1090F PRES/ABSN URINE INCON ASSESS: CPT

## 2025-03-03 PROCEDURE — G8417 CALC BMI ABV UP PARAM F/U: HCPCS

## 2025-03-03 PROCEDURE — 3017F COLORECTAL CA SCREEN DOC REV: CPT

## 2025-03-03 PROCEDURE — G8427 DOCREV CUR MEDS BY ELIG CLIN: HCPCS

## 2025-03-03 PROCEDURE — 1123F ACP DISCUSS/DSCN MKR DOCD: CPT

## 2025-03-03 RX ORDER — GRANULES FOR ORAL 3 G/1
3 POWDER ORAL ONCE
Qty: 1 EACH | Refills: 0 | Status: SHIPPED | OUTPATIENT
Start: 2025-03-03 | End: 2025-03-03

## 2025-03-03 RX ORDER — NYSTATIN AND TRIAMCINOLONE ACETONIDE 100000; 1 [USP'U]/G; MG/G
CREAM TOPICAL
Qty: 30 G | Refills: 0 | Status: SHIPPED | OUTPATIENT
Start: 2025-03-03

## 2025-03-03 RX ORDER — VIBEGRON 75 MG/1
75 TABLET, FILM COATED ORAL DAILY
Qty: 30 TABLET | Refills: 1 | Status: SHIPPED | OUTPATIENT
Start: 2025-03-03

## 2025-03-03 ASSESSMENT — ENCOUNTER SYMPTOMS: CONSTIPATION: 1

## 2025-03-03 NOTE — PATIENT INSTRUCTIONS
Cavilon use as a barrier cream on vulva     [facility] UROGYNECOLOGY & PELVIC REHABILITATION    URGE SUPPRESSION EXERCISES    ? Do you rush to the bathroom for fear of losing urine?    ? Do you dribble urine on your way to the bathroom?        The feeling of urgency to get to the bathroom most likely is caused by bladder spasms.  Rushing to the bathroom during spasms or urge causes your bladder to bounce.  This causes more bladder spasms.  Learning urge suppression may help you control and / or eliminate these spasms so that you can stop the feeling of urgency and walk to the bathroom calmly.    To stop the feeling of urgency to urinate:    1. Remain in the same position in which you began having the feeling to urinate.  If You are sitting, remain   seated.  If you are walking, stop walking but remain standing.    2. Tighten your rectum, then let go a little, tighten again and let go a little.  Keep doing this until the urge   feeling has passed (about 15 - 30 seconds).By tightening and letting go of your rectum, you stimulate a   nerve in your Pelvic muscles which causes the bladder to relax.  This stops the strong feeling to urinate.    3. Tell yourself that you are in control of your bladder - not the other way around.    4. Once the urge is over, take a deep breath and relax.  Then walk to the bathroom calmly.              [facility] UROGYNECOLOGY & PELVIC REHABILITATION    Strengthening Exercises for Pelvic Floor Muscles      Introduction  Pregnancy, childbirth, obesity and excessive straining from frequent constipation can weaken a woman's pelvic floor muscles.    Weak pelvic floor muscles can cause urinary incontinence or loss of urine when pressure is exerted on the bladder (for example, by coughing, laughing, running, jumping or lifting).   This is called stress incontinence.  Aging and decreased levels of estrogen during and following menopause also can contribute to urinary

## 2025-03-03 NOTE — PROGRESS NOTES
How long have you had urinary incontinence: 6 month(s)    Do you wear pads: yes  Pad Size: brief  How often do you have to change the pad: 3    Are you having incontinence w/:cough, laugh, sneeze, run, jump  Are you having incontinence with urge:yes  Have you ever done PFT: no    Any H/O of UTI's: yes  If yes, were there cultures done: yes    Have you ever had any previous bladder surgeries:no

## 2025-03-05 DIAGNOSIS — Z16.20 MICROBIAL RESISTANCE TO ANTIBIOTIC: Primary | ICD-10-CM

## 2025-03-05 DIAGNOSIS — R39.9 URINARY TRACT INFECTION SYMPTOMS: ICD-10-CM

## 2025-03-05 LAB
MICROORGANISM SPEC CULT: ABNORMAL
SERVICE CMNT-IMP: ABNORMAL
SPECIMEN DESCRIPTION: ABNORMAL

## 2025-03-05 RX ORDER — CEPHALEXIN 500 MG/1
500 CAPSULE ORAL 2 TIMES DAILY
Qty: 14 CAPSULE | Refills: 0 | Status: SHIPPED | OUTPATIENT
Start: 2025-03-05 | End: 2025-03-12

## 2025-03-10 LAB
MICROORGANISM SPEC CULT: NORMAL
MICROORGANISM/AGENT SPEC: NORMAL
SPECIMEN DESCRIPTION: NORMAL

## 2025-03-17 LAB
MICROORGANISM SPEC CULT: NORMAL
MICROORGANISM/AGENT SPEC: NORMAL
SPECIMEN DESCRIPTION: NORMAL

## 2025-03-18 ENCOUNTER — OFFICE VISIT (OUTPATIENT)
Dept: ORTHOPEDIC SURGERY | Age: 75
End: 2025-03-18

## 2025-03-18 DIAGNOSIS — M25.662 KNEE STIFFNESS, LEFT: ICD-10-CM

## 2025-03-18 DIAGNOSIS — M79.662 PAIN OF LEFT CALF: Primary | ICD-10-CM

## 2025-03-18 NOTE — PROGRESS NOTES
[facility] ORTHO SPECIALISTS  [address]  [place] [address]  Dept: [phone]  Dept Fax: [phone]        Orthopaedic Trauma Clinic Follow Up      Subjective:   Date of Surgery: 1/29/2025  -Left knee irrigation and debridement, 12 cm x 6 cm  -Left knee complex wound closure  -Left knee application of negative pressure wound VAC  -Left knee aspiration    Elaina Champagne is a 74 y.o. year old female who presents to the clinic today for routine 6-week followup status post left knee I&D on 1/29/2025.  Patient has been doing well overall since her last visit on 2/25/2025.  She has been on oral doxycycline which she has 2 days left.  She continues to ambulate with a walker and has been working on range of motion exercises at home.  No falls or injuries.  A home health nurse has been visiting her at home once weekly for wound care and application of an Ace wrap.  She reports that she tends to take the Ace wrap after off after couple days due to discomfort.  She feels that her swelling and pain have decreased, however her left lower extremity continues to be diffusely swollen and erythematous.  She occasionally feels pain on the lateral aspect of the lower leg she had sat on something.  She denies any recent shortness of breath, chest pain, fevers, or chills.  She would like to start working on physical therapy for the left knee and requests referral for home health therapy.    Review of Systems  Gen: no fever, chills, malaise  CV: no chest pain or palpitations  Resp: no cough or shortness of breath  GI: no nausea, vomiting, diarrhea, or constipation  Neuro: no numbness, tingling, or weakness  Msk: See HPI  10 remaining systems reviewed and negative    Objective :   There were no vitals filed for this visit.There is no height or weight on file to calculate BMI.  General: No acute distress, resting comfortably in the clinic  Neuro: alert. oriented  Eyes:

## 2025-03-20 ENCOUNTER — HOSPITAL ENCOUNTER (OUTPATIENT)
Dept: VASCULAR LAB | Age: 75
Discharge: HOME | End: 2025-03-22
Payer: MEDICARE

## 2025-03-20 DIAGNOSIS — M79.662 PAIN OF LEFT CALF: ICD-10-CM

## 2025-03-20 PROCEDURE — 93971 EXTREMITY STUDY: CPT

## 2025-03-26 ENCOUNTER — HOSPITAL ENCOUNTER (OUTPATIENT)
Age: 75
Setting detail: SPECIMEN
Discharge: HOME | End: 2025-03-26

## 2025-03-26 ENCOUNTER — OFFICE VISIT (OUTPATIENT)
Dept: ORTHOPEDIC SURGERY | Age: 75
End: 2025-03-26

## 2025-03-26 VITALS — HEIGHT: 61 IN | BODY MASS INDEX: 36.06 KG/M2 | WEIGHT: 191 LBS

## 2025-03-26 DIAGNOSIS — S81.002D: Primary | ICD-10-CM

## 2025-03-26 DIAGNOSIS — L03.116 CELLULITIS OF LEFT LEG: ICD-10-CM

## 2025-03-26 DIAGNOSIS — S81.002D: ICD-10-CM

## 2025-03-26 LAB
APPEARANCE FLD: ABNORMAL
BODY FLD TYPE: ABNORMAL
CLOT CHECK: ABNORMAL
COLOR FLD: ABNORMAL
EOSINOPHIL NFR FLD: 2 %
MONOCYTES NFR FLD: 9 %
NEUTROPHILS NFR FLD: 89 %
NUC CELL # FLD: ABNORMAL CELLS/UL
RBC # FLD: ABNORMAL CELLS/UL

## 2025-03-26 PROCEDURE — 99024 POSTOP FOLLOW-UP VISIT: CPT | Performed by: PHYSICIAN ASSISTANT

## 2025-03-26 RX ORDER — DOXYCYCLINE HYCLATE 100 MG
100 TABLET ORAL 2 TIMES DAILY
Qty: 14 TABLET | Refills: 0 | Status: SHIPPED | OUTPATIENT
Start: 2025-03-26 | End: 2025-04-02

## 2025-03-26 NOTE — PROGRESS NOTES
[facility] ORTHO SPECIALISTS  [address]  [place] [address]  Dept: [phone]  Dept Fax: [phone]        Orthopaedic Trauma Clinic Follow Up      Subjective:   Date of Surgery: 1/29/2025  -Left knee irrigation and debridement, 12 cm x 6 cm  -Left knee complex wound closure  -Left knee application of negative pressure wound VAC  -Left knee aspiration    Elaina Champagne is a 74 y.o. year old female who presents to the clinic today for routine 6-week followup status post left knee I&D on 1/29/2025.  Patient has been doing well overall reporting virtually no pain she is ambulating with a walker.  She states the only time she really has pain is with direct palpation of the inferior aspect of the incision and the recurrent area of swelling.  Patient reports she finished her doxycycline prescription on Thursday, 3/20/2025 has not taken antibiotics for the last 5 days.  She continues to note recurrent collection of fluid that was noticed at her last appointment on 3/18/2025.  An aspiration was offered at that time but patient declined.  She continues to note drainage coming from the distalmost aspect of the incision overlying the area of swelling.  She has continued with compression stockings since her last visit but continues to note redness of the lower leg and swelling.  She has a home wound care nurse who is applying collagen and bordered gauze every other day.  She denies any systemic symptoms no fever, chills, numbness or tingling.    Of note patient was sent for a stat Doppler after her last visit due to her calf pain which was negative for DVT.     She denies any recent shortness of breath, chest pain, fevers, or chills.  She reports she has started home physical therapy since her last visit tolerated her first appointment well.    Review of Systems  Gen: no fever, chills, malaise  CV: no chest pain or palpitations  Resp: no cough or

## 2025-03-27 ENCOUNTER — TELEPHONE (OUTPATIENT)
Age: 75
End: 2025-03-27

## 2025-03-27 LAB
CRYSTALS FLD MICRO: NEGATIVE
PATH INTERP FLD-IMP: NORMAL
SPECIMEN TYPE: NORMAL

## 2025-03-27 NOTE — TELEPHONE ENCOUNTER
Outcome  Approved on March 26 by [employer] 2017  Approved. This drug has been approved under the Member's Medicare Part D benefit. Approved quantity: 30 units per 30 day(s). You may fill up to a 90 day supply except for those on Specialty Tier 5, which can be filled up to a 30 day supply. Please call the pharmacy to process the prescription claim.  Effective Date: 3/12/2025  Authorization Expiration Date: 12/31/2099  Drug  Gemtesa 75MG tablets    Form  [employer] Electronic Prior Authorization Request Form (2017 NCPDP)  Original Claim Info  MR,[id number]    Patient informed

## 2025-03-27 NOTE — TELEPHONE ENCOUNTER
Pt called to make aware that Gemtesa script requires prior authorization... not sure if this is in process.  Script issues at 3/3/25 appt

## 2025-03-28 ENCOUNTER — TELEPHONE (OUTPATIENT)
Dept: INFECTIOUS DISEASES | Age: 75
End: 2025-03-28

## 2025-03-28 ENCOUNTER — RESULTS FOLLOW-UP (OUTPATIENT)
Dept: ORTHOPEDIC SURGERY | Age: 75
End: 2025-03-28

## 2025-03-28 NOTE — TELEPHONE ENCOUNTER
Patient called today, states her home care nurse and Dr Ren recommend IV antibiotics for her knee infection. Writer doesn't see a positive culture. Per chart, on result note patient would like to avoid surgery and agree's on going with antibiotics but patient would like IV not oral.     They did prescribe Doxycycline on 3/26 however.      Can you review and advise?

## 2025-04-01 DIAGNOSIS — A49.8 PSEUDOMONAS AERUGINOSA INFECTION: Primary | ICD-10-CM

## 2025-04-01 NOTE — TELEPHONE ENCOUNTER
Advance Care Planning     Advance Care Planning Clinical Specialist  Conversation Note      Date of ACP Conversation: 06/14/22    Conversation Conducted with:  Patient with Decision Making Capacity. Pt's mom was a supportive presence throughout the call. ACP Clinical Specialist: Effie Shaw LCSW    Health Care Decision Maker:    Current Designated Health Care Decision Maker:     Primary Decision Maker: Maribell Bowen - Parent - [phone]    Secondary Decision Maker: Gabriel Dillard - Sister - [phone]    Supplemental (Other) Decision Maker: Chris Toussaint - Parent - [phone]    Today we discussed who Pt would like as her HCDMs, her worries/concerns regarding her health, as well as the things that bring her vandana. Care Preferences    Hospitalization: \"If your health worsens and it becomes clear that your chance of recovery is unlikely, what would your preference be regarding hospitalization? \"    Choice:  []  The patient wants hospitalization  []  The patient prefers comfort-focused treatment without hospitalization. N/A    Ventilation: \"If you were in your present state of health and suddenly became very ill and were unable to breathe on your own, what would your preference be about the use of a ventilator (breathing machine) if it were available to you? \"      If patient would desire the use of a ventilator (breathing machine), answer \"yes\", if not \"no\":n/a    \"If your health worsens and it becomes clear that your chance of recovery is unlikely, what would your preference be about the use of a ventilator (breathing machine) if it were available to you? \"     Would the patient desire the use of a ventilator (breathing machine)? N/A    Resuscitation  \"CPR works best to restart the heart when there is a sudden event, like a heart attack, in someone who is otherwise healthy.  Unfortunately, CPR does not typically restart the heart for people who have serious health conditions or who are very IV antibiotics are ordered along with a mid line. Home care and Infusion company will be informed as well. Patient can't have oral due to current medication Amiodarone.        sick.\"    \"In the event your heart stopped as a result of an underlying serious health condition, would you want attempts to be made to restart your heart (answer \"yes\" for attempt to resuscitate) or would you prefer a natural death (answer \"no\" for do not attempt to resuscitate)? \" n/a      [] Yes  [] No   Educated Patient / Jose Hair regarding differences between Advance Directives and portable DNR orders. Length of ACP Conversation in minutes: 30m    Conversation Outcomes:  [x] ACP discussion completed  [] Existing advance directive reviewed with patient; no changes to patient's previously recorded wishes   [x] New Advance Directive completed   [] Portable Do Not Resuscitate prepared for Provider review and signature  [] POLST/POST/MOLST/MOST prepared for Provider review and signature      Follow-up plan:    [] Schedule follow-up conversation to continue planning  [] Referred individual to Provider for additional questions/concerns   [x] Advised patient/agent/surrogate to review completed ACP document and update if needed with changes in condition, patient preferences or care setting     [x] This note routed to one or more involved healthcare providers    6/14- Throughout this call, extra care was taken to ensure Pt had a good understanding of topics discussed. After consultation with Pt's mother and PCP, specific questions were intentionally not addressed. [facility] was alert and engaged throughout her call. She did an excellent job remaining attentive and contributing her thoughts. She expressed a good understanding of the reason for the call and appropriately shared feeling a little \"anxious\". Pt was able to identify the three people she felt would be her desired HCDMs; her mother, sister, and father. With questioning, Pt admitted she did not often worry about her health. Her biggest concerns revolve around the highs/lows of her blood glucose.  She freely shared that she knows she could die, should they become uncontrolled. However, Doretha did go on to share her strong nabeel and certainty of going to [facility]. She was very willing to talk with Specialist about the things that bring her \"vandana\" and \"quality\" in her life, specifically: family, diet soda, music, and games. We openly discussed how knowing these things might be of help to her HCDMs. When asked if she had anything to share with her medical team, Pt responded, \"make sure I'm comfortable\". Pt will be completing her ACP document via [employer]. Once finalized it will be uploaded into the EMR. At that time, this referral will be recommended for closure.         Brinda Quezada LCSW, [facility] Center[facility]-SW  Advance Care   Population Health

## 2025-04-02 NOTE — PROGRESS NOTES
[facility] UROGYNECOLOGY AND PELVIC REHABILITATION   [address] [place] [address]  Dept: [phone]  Date: 4/3/2025  Patient Name: Elaina Champagne    VISIT - NEW PATIENT VISIT     CC: had concerns including Procedure (Cysto - Pt denies UTI symptoms ).    Chaperone present for entire visit and pertinent physical exam: Resident    HPI:   History of Present Illness  Patient is a 74 year old  new patient, here to discuss mixed urinary incontinence. Past medical history includes CKD stage 3, NSTEMI, lymphedema and venous insufficiency; she has been having ongoing treatment for an open wound on her left knee following knee replacement. She has been evaluated for CRISTINA,  by her PCP who noted that the Ditropan 15mg XL is not beneficial. This is the first medication that she has tried; she has not tried any other medications. She has been noting CRISTINA for the past several months, worsening since her visit to [place]. She does wear a brief and will note most urinary incontinence when going from sitting or laying down to standing. She does report bothersome urinary urgency and frequency with UUI. She also has JOSE ALFREDO. She denies any prior bladder surgeries or bladder testing. She has never been to PFT. She denies hematuria, kidney stones or frequent UTI. She did report one UTI last year; denies any UTI this year. She denies any bowel concerns. She is not sexually active. She denies any pelvic pain or vaginal bleeding. She states that she was recently just started on treatment for yeast infection and has been itching her vulva. Otherwise she does not typically have vulvar irritation. She will also sometimes feel a bulge at the vaginal opening. She has had a previous hysterectomy due to AUB, benign pathology per patient. No bowel concerns, other than intermittent constipation.       The patient presents for evaluation of overactive bladder and

## 2025-04-03 ENCOUNTER — PROCEDURE VISIT (OUTPATIENT)
Age: 75
End: 2025-04-03
Payer: MEDICARE

## 2025-04-03 VITALS — DIASTOLIC BLOOD PRESSURE: 75 MMHG | SYSTOLIC BLOOD PRESSURE: 165 MMHG

## 2025-04-03 DIAGNOSIS — R33.9 URINARY RETENTION: ICD-10-CM

## 2025-04-03 DIAGNOSIS — N81.10 BADEN-WALKER GRADE 2 CYSTOCELE: ICD-10-CM

## 2025-04-03 DIAGNOSIS — N39.46 MIXED STRESS AND URGE URINARY INCONTINENCE: ICD-10-CM

## 2025-04-03 DIAGNOSIS — N39.46 URINARY INCONTINENCE, MIXED: ICD-10-CM

## 2025-04-03 DIAGNOSIS — N81.89 WEAKNESS OF PELVIC FLOOR: ICD-10-CM

## 2025-04-03 DIAGNOSIS — N90.89 VULVAR IRRITATION: ICD-10-CM

## 2025-04-03 DIAGNOSIS — K59.00 CONSTIPATION, UNSPECIFIED CONSTIPATION TYPE: ICD-10-CM

## 2025-04-03 DIAGNOSIS — R35.0 URINARY FREQUENCY: ICD-10-CM

## 2025-04-03 DIAGNOSIS — R33.9 RETENTION OF URINE: Primary | ICD-10-CM

## 2025-04-03 DIAGNOSIS — R39.15 URINARY URGENCY: ICD-10-CM

## 2025-04-03 PROCEDURE — 0509F URINE INCON PLAN DOCD: CPT | Performed by: OBSTETRICS & GYNECOLOGY

## 2025-04-03 PROCEDURE — 52000 CYSTOURETHROSCOPY: CPT | Performed by: OBSTETRICS & GYNECOLOGY

## 2025-04-03 PROCEDURE — G8417 CALC BMI ABV UP PARAM F/U: HCPCS | Performed by: OBSTETRICS & GYNECOLOGY

## 2025-04-03 PROCEDURE — 3017F COLORECTAL CA SCREEN DOC REV: CPT | Performed by: OBSTETRICS & GYNECOLOGY

## 2025-04-03 PROCEDURE — 1159F MED LIST DOCD IN RCRD: CPT | Performed by: OBSTETRICS & GYNECOLOGY

## 2025-04-03 PROCEDURE — G8399 PT W/DXA RESULTS DOCUMENT: HCPCS | Performed by: OBSTETRICS & GYNECOLOGY

## 2025-04-03 PROCEDURE — 3077F SYST BP >= 140 MM HG: CPT | Performed by: OBSTETRICS & GYNECOLOGY

## 2025-04-03 PROCEDURE — 51725 SIMPLE CYSTOMETROGRAM: CPT | Performed by: OBSTETRICS & GYNECOLOGY

## 2025-04-03 PROCEDURE — 3078F DIAST BP <80 MM HG: CPT | Performed by: OBSTETRICS & GYNECOLOGY

## 2025-04-03 PROCEDURE — G8427 DOCREV CUR MEDS BY ELIG CLIN: HCPCS | Performed by: OBSTETRICS & GYNECOLOGY

## 2025-04-03 PROCEDURE — 1036F TOBACCO NON-USER: CPT | Performed by: OBSTETRICS & GYNECOLOGY

## 2025-04-03 PROCEDURE — 1160F RVW MEDS BY RX/DR IN RCRD: CPT | Performed by: OBSTETRICS & GYNECOLOGY

## 2025-04-03 PROCEDURE — 99214 OFFICE O/P EST MOD 30 MIN: CPT | Performed by: OBSTETRICS & GYNECOLOGY

## 2025-04-03 PROCEDURE — 1123F ACP DISCUSS/DSCN MKR DOCD: CPT | Performed by: OBSTETRICS & GYNECOLOGY

## 2025-04-03 PROCEDURE — 1090F PRES/ABSN URINE INCON ASSESS: CPT | Performed by: OBSTETRICS & GYNECOLOGY

## 2025-04-03 PROCEDURE — 51798 US URINE CAPACITY MEASURE: CPT | Performed by: OBSTETRICS & GYNECOLOGY

## 2025-04-03 RX ORDER — VIBEGRON 75 MG/1
75 TABLET, FILM COATED ORAL DAILY
Qty: 90 TABLET | Refills: 3 | Status: SHIPPED | OUTPATIENT
Start: 2025-04-03

## 2025-04-03 NOTE — TELEPHONE ENCOUNTER
Patient called, she will have her [facility] nurse call me Monday to make arrangements for next week as that is what the patient prefers.

## 2025-04-03 NOTE — PATIENT INSTRUCTIONS
[facility] UROGYNECOLOGY & PELVIC REHABILITATION    Caffeine - How little, how much for you?     Caffeine is a naturally occurring substance in certain plants such as cocoa beans, tea leaves, and bart nuts.  Most caffeine is consumed in beverages - coffee, tea, soft drinks, and chocolate.  Caffeine is also an ingredient in many over-the-counter and prescription drugs.  Guarana and mate - both herbal supplements - also contain caffeine.   Over the years many studies have explored the connection between caffeine and health.  No scientific evidence has been found to link caffeine intake to any health risk, including cancer, cardiovascular disease, fibrocystic breast disease, osteoporosis, and birth defects.  And no studies have shown that caffeine causes attention deficit disorder in children.  Anecdotally, many have had improvements in conditions such as benign fibrocystic breast disease by regulating caffeine intake.     Caffeine-containing beverages have a diuretic effect - increasing water loss from the body through urination.  Caffeine also is touted as a cathartic bowel stimulant.  The more caffeine consumed, the greater potential for increased urination or defecation.  Excessive caffeine intake may cause “coffee jitters,” anxiety, and insomnia.  Caffeine also may speed the heart rate temporarily.  These physical effects do not last long since caffeine does not accumulate in the body.  Most is excreted within 3-4 hours.   How much caffeine is “too much” is an individual matter.  Caffeine sensitivity depends on the amount and frequency of caffeine intake, body weight, physical conditions, and overall anxiety level.  Tolerance to caffeine develops over time.  A regular coffee drinker may not notice the effects as quickly as someone who drinks just an occasional cup.  For most healthy adults, moderate amounts of caffeine - 200 to 300 milligrams a day or about 2 cups of coffee - pose no physical problems.   If

## 2025-04-09 ENCOUNTER — TELEPHONE (OUTPATIENT)
Dept: PRIMARY CARE CLINIC | Age: 75
End: 2025-04-09

## 2025-04-09 ENCOUNTER — OFFICE VISIT (OUTPATIENT)
Dept: ORTHOPEDIC SURGERY | Age: 75
End: 2025-04-09

## 2025-04-09 VITALS — BODY MASS INDEX: 36.06 KG/M2 | HEIGHT: 61 IN | WEIGHT: 191 LBS

## 2025-04-09 DIAGNOSIS — L03.116 CELLULITIS OF LEFT LEG: ICD-10-CM

## 2025-04-09 DIAGNOSIS — S81.002D: Primary | ICD-10-CM

## 2025-04-09 PROCEDURE — 99024 POSTOP FOLLOW-UP VISIT: CPT | Performed by: PHYSICIAN ASSISTANT

## 2025-04-09 NOTE — PROGRESS NOTES
Identification by MALDI-TOF Abnormal     No anaerobic organisms isolated at 5 days.           Assessment:   74 y.o. year old female 9 weeks status post I&D left knee with recurrent abscess  Plan:   Lengthy discussion had with patient about current clinical state.   -Again had lengthy discussion of aspiration results revealing Pseudomonas growth and significant elevation in white blood cell count.  We did have a discussion on 3/28/2025 and patient wishes to avoid surgical intervention and trial IV antibiotics instead of oral.  - Continue with plan for follow-up with infectious disease for further antibiotic management.  - Given had lengthy discussion of possibility of repeat I&D of the left knee but patient would like to avoid surgery at this time.  She is educated that she should monitor the wound closely with daily dressing changes and if she starts to notice increased drainage, purulence, redness would strongly advise follow-up for scheduling of surgery and she does verbalize understanding.  - Dry bordered gauze is placed over the wound today but patient has been utilizing collagen dressing may return to this upon getting home.  - Follow-up in 2 weeks however patient may call return sooner for any questions or concerns      Electronically signed by IZA Muller on 4/9/2025 at 9:14 AM    This note is created with the assistance of a speech recognition program.  While intending to generate a document that actually reflects the content of the visit, the document can still have some errors including those of syntax and sound a like substitutions which may escape proof reading.  In such instances, actual meaning can be extrapolated by contextual diversion

## 2025-04-09 NOTE — TELEPHONE ENCOUNTER
Karel from [employer] came in today to inform Elaina Champagne needs to get a Pneumatic compression device (PLD) for treatment of her lymphedema (BLE and trunk). They need the following to documented per insurance purpose.    Patient has tried/failed compression, elevation and exercise over the last 4 weeks.    2. 30 mins. of daily MLD or not indicated    3. Skin care, diet and med eval    4. Mention of abdominal swelling with measurements.    See notes from  [employer]      Karel Art  [phone]

## 2025-04-09 NOTE — TELEPHONE ENCOUNTER
Lizbetnt heard from anyone, called patient. She said again she would have her nurse call me to make the arrangements.

## 2025-04-10 NOTE — TELEPHONE ENCOUNTER
Rj @ Elaina to schedule in office appt with Dr. Ly.  Called Karel @ [employer] to inform him Elaina needs to be seen in office.  Told him I left her a message stating such.  He will reach out to her as well to schedule with Dr. Ly.

## 2025-04-10 NOTE — TELEPHONE ENCOUNTER
Patient called this morning, says she will have her home care nurse make the arrangements for her and they are to call me.

## 2025-04-11 ENCOUNTER — HOSPITAL ENCOUNTER (OUTPATIENT)
Dept: OBSERVATION | Age: 75
Discharge: HOME | End: 2025-04-11

## 2025-04-11 ENCOUNTER — TELEPHONE (OUTPATIENT)
Dept: ORTHOPEDIC SURGERY | Age: 75
End: 2025-04-11

## 2025-04-11 NOTE — TELEPHONE ENCOUNTER
Mariela rendon/[facility] called, states patient can't afford the $20 a day for the medication. LOIS was informed, Cecilia w/the infusion center was informed, [facility] was informed. Spoke to patient, told her to follow up with ortho's recommendations on oral medication.

## 2025-04-11 NOTE — TELEPHONE ENCOUNTER
Spoke to patient.    Informed Randell with [facility] of midline placement 2:30pm today. Informed Cecilia with infusion center of same.  Faxed orders to [facility] #[phone]  Faxed orders to [facility] #[phone]

## 2025-04-11 NOTE — TELEPHONE ENCOUNTER
Received call from patient requesting to speak to office. Pt stated she can not afford the IV antibiotics she was supposed to have today. Stated she needs something called in.    Unable to reach nurse for pt to speak with.    Please advise.    Call back #: [phone]

## 2025-04-11 NOTE — TELEPHONE ENCOUNTER
Date of Surgery: 1/29/2025  -Left knee irrigation and debridement, 12 cm x 6 and meter  -Left knee complex wound closure  -Left knee application of negative pressure wound VAC  -Left knee aspiration    Patient states that she is no longer taking iv antibiotics because she can not afford to take them. Stated that infectious disease office informed her to request oral antibiotic from ortho. Please advise

## 2025-04-14 NOTE — TELEPHONE ENCOUNTER
Patient called requesting to be seen by Ricardo prior to 4/20.  She states she could possibly have an infection in her knee that needs cleaned out. Per visiting nurse he should be seen asap.    Picc line appointment cancelled due to financial reasons.    Please return her call to discuss.    Thank you.

## 2025-04-17 ENCOUNTER — OFFICE VISIT (OUTPATIENT)
Dept: ORTHOPEDIC SURGERY | Age: 75
End: 2025-04-17

## 2025-04-17 DIAGNOSIS — S81.002D: Primary | ICD-10-CM

## 2025-04-17 PROCEDURE — 99024 POSTOP FOLLOW-UP VISIT: CPT | Performed by: ORTHOPAEDIC SURGERY

## 2025-04-17 RX ORDER — DOXYCYCLINE HYCLATE 100 MG
100 TABLET ORAL 2 TIMES DAILY
Qty: 28 TABLET | Refills: 0 | Status: SHIPPED | OUTPATIENT
Start: 2025-04-17 | End: 2025-05-01

## 2025-04-17 NOTE — PROGRESS NOTES
[facility] ORTHO SPECIALISTS  [address]  [place] [address]  Dept: [phone]  Dept Fax: [phone]        Orthopaedic Trauma Clinic Follow Up      Subjective:   Date of Surgery: 1/29/2025  -Left knee irrigation and debridement, 12 cm x 6 cm  -Left knee complex wound closure  -Left knee application of negative pressure wound VAC  -Left knee aspiration    Elaina Champagne is a 74 y.o. year old female who presents to the clinic today for routine followup regarding her left periprosthetic knee infection status post the above procedures, she is approximately 11 weeks postop.  She was most recently seen in the clinic on 8/9/2025.  At that time, we discussed her aspirate results again in detail with her that were positive for Pseudomonas and MRSA.  We discussed IV antibiotics as she wished to avoid surgery.  We were going to have her follow-up with infectious disease for further antibiotic management.  At this time, she has not seen infectious disease.  She was supposed to have a PICC line placed, however due to the cost of the medication she thought that she would not be able to afford IV antibiotics.  She has been off all antibiotics for approximately 2 weeks.  She notes that she still has a weeping sore at the proximal aspect of the left tibia.  She states that the drainage has not really increased any since she has been off antibiotics but does continue to drain.  At this time, she states that she would be willing to have IV antibiotics if needed but would like to avoid surgery if she is going to have to have antibiotics after surgery.  She states her pain is fairly well-controlled, notes that her knee is pretty stiff and she is only able to range it to about 30 degrees.  She continues to work with home PT to improve this.  She denies any fevers or chills.      Review of Systems  Gen: no fever, chills, malaise  CV: no chest pain or

## 2025-04-23 ENCOUNTER — OFFICE VISIT (OUTPATIENT)
Dept: PRIMARY CARE CLINIC | Age: 75
End: 2025-04-23
Payer: MEDICARE

## 2025-04-23 VITALS
SYSTOLIC BLOOD PRESSURE: 138 MMHG | BODY MASS INDEX: 33.18 KG/M2 | HEART RATE: 55 BPM | OXYGEN SATURATION: 99 % | DIASTOLIC BLOOD PRESSURE: 72 MMHG | WEIGHT: 169 LBS | HEIGHT: 60 IN

## 2025-04-23 DIAGNOSIS — D64.9 ANEMIA, UNSPECIFIED TYPE: ICD-10-CM

## 2025-04-23 DIAGNOSIS — N18.30 STAGE 3 CHRONIC KIDNEY DISEASE, UNSPECIFIED WHETHER STAGE 3A OR 3B CKD (HCC): ICD-10-CM

## 2025-04-23 DIAGNOSIS — I89.0 LYMPHEDEMA: Primary | ICD-10-CM

## 2025-04-23 DIAGNOSIS — I10 ESSENTIAL (PRIMARY) HYPERTENSION: ICD-10-CM

## 2025-04-23 LAB
ANION GAP SERPL CALCULATED.3IONS-SCNC: 11 MMOL/L (ref 9–16)
BASOPHILS ABSOLUTE: 0 K/UL (ref 0–0.2)
BASOPHILS RELATIVE PERCENT: 0 % (ref 0–2)
BUN BLDV-MCNC: 22 MG/DL (ref 8–23)
CALCIUM SERPL-MCNC: 9.1 MG/DL (ref 8.6–10.4)
CHLORIDE BLD-SCNC: 100 MMOL/L (ref 98–107)
CO2: 27 MMOL/L (ref 20–31)
CREAT SERPL-MCNC: 1 MG/DL (ref 0.6–0.9)
EOSINOPHILS ABSOLUTE: 0.03 K/UL (ref 0–0.4)
EOSINOPHILS RELATIVE PERCENT: 1 % (ref 1–4)
FERRITIN: 194 NG/ML
GFR, ESTIMATED: 59 ML/MIN/1.73M2
GLUCOSE BLD-MCNC: 89 MG/DL (ref 74–99)
HCT VFR BLD CALC: 29.7 % (ref 36.3–47.1)
HEMOGLOBIN: 8.8 G/DL (ref 11.9–15.1)
IMMATURE GRANULOCYTES %: 0 %
IMMATURE GRANULOCYTES ABSOLUTE: 0 K/UL (ref 0–0.3)
IRON % SATURATION: 11 % (ref 20–55)
IRON: 25 UG/DL (ref 37–145)
LYMPHOCYTES ABSOLUTE: 0.51 K/UL (ref 1–4.8)
LYMPHOCYTES RELATIVE PERCENT: 17 % (ref 24–44)
MCH RBC QN AUTO: 27.3 PG (ref 25.2–33.5)
MCHC RBC AUTO-ENTMCNC: 29.6 G/DL (ref 28.4–34.8)
MCV RBC AUTO: 92.2 FL (ref 82.6–102.9)
MONOCYTES ABSOLUTE: 0.27 K/UL (ref 0.1–0.8)
MONOCYTES RELATIVE PERCENT: 9 % (ref 1–7)
MORPHOLOGY: ABNORMAL
NEUTROPHILS ABSOLUTE: 2.19 K/UL (ref 1.8–7.7)
NEUTROPHILS RELATIVE PERCENT: 73 % (ref 36–66)
NRBC AUTOMATED: 0 PER 100 WBC
PDW BLD-RTO: 17.5 % (ref 11.8–14.4)
PLATELET # BLD: 335 K/UL (ref 138–453)
PMV BLD AUTO: 10.2 FL (ref 8.1–13.5)
POTASSIUM SERPL-SCNC: 4.5 MMOL/L (ref 3.7–5.3)
RBC # BLD: 3.22 M/UL (ref 3.95–5.11)
SODIUM BLD-SCNC: 138 MMOL/L (ref 136–145)
TOTAL IRON BINDING CAPACITY: 232 UG/DL (ref 250–450)
UNSATURATED IRON BINDING CAPACITY: 207 UG/DL (ref 112–347)
WBC # BLD: 3 K/UL (ref 3.5–11.3)

## 2025-04-23 PROCEDURE — 1159F MED LIST DOCD IN RCRD: CPT | Performed by: FAMILY MEDICINE

## 2025-04-23 PROCEDURE — 99213 OFFICE O/P EST LOW 20 MIN: CPT | Performed by: FAMILY MEDICINE

## 2025-04-23 PROCEDURE — G2211 COMPLEX E/M VISIT ADD ON: HCPCS | Performed by: FAMILY MEDICINE

## 2025-04-23 PROCEDURE — G8427 DOCREV CUR MEDS BY ELIG CLIN: HCPCS | Performed by: FAMILY MEDICINE

## 2025-04-23 PROCEDURE — 1036F TOBACCO NON-USER: CPT | Performed by: FAMILY MEDICINE

## 2025-04-23 PROCEDURE — 3075F SYST BP GE 130 - 139MM HG: CPT | Performed by: FAMILY MEDICINE

## 2025-04-23 PROCEDURE — 1090F PRES/ABSN URINE INCON ASSESS: CPT | Performed by: FAMILY MEDICINE

## 2025-04-23 PROCEDURE — 3017F COLORECTAL CA SCREEN DOC REV: CPT | Performed by: FAMILY MEDICINE

## 2025-04-23 PROCEDURE — 3078F DIAST BP <80 MM HG: CPT | Performed by: FAMILY MEDICINE

## 2025-04-23 PROCEDURE — G8417 CALC BMI ABV UP PARAM F/U: HCPCS | Performed by: FAMILY MEDICINE

## 2025-04-23 PROCEDURE — G8399 PT W/DXA RESULTS DOCUMENT: HCPCS | Performed by: FAMILY MEDICINE

## 2025-04-23 PROCEDURE — 1123F ACP DISCUSS/DSCN MKR DOCD: CPT | Performed by: FAMILY MEDICINE

## 2025-04-23 ASSESSMENT — ENCOUNTER SYMPTOMS
EYE DISCHARGE: 0
DIARRHEA: 0
SHORTNESS OF BREATH: 0
COUGH: 0
SORE THROAT: 0
EYE REDNESS: 0
NAUSEA: 0
WHEEZING: 0
VOMITING: 0
ABDOMINAL PAIN: 1
RHINORRHEA: 0

## 2025-04-23 NOTE — PROGRESS NOTES
[facility]  [facility]ARE  [address] [facility] [address]  Dept: [phone]    Elaina Champagne is a 74 y.o. female Established patient, who presents today for her medical conditions/complaints as noted below.      Chief Complaint   Patient presents with    lymphedema     Patient neds Pneumatic compression device (PLD) for treatment of her lymphedema (BLE and trunk).     Insomnia       HPI:     History of Present Illness  The patient presents for evaluation of lower extremity edema, insomnia, anemia, atrial fibrillation, mood disorder, and health maintenance.    A positive response to the diuretic medication is reported, with no changes in the current medication regimen. Adherence to the prescribed treatment plan, including leg elevation exercises, the use of 20 to 30 mm compression stockings, and daily leg hygiene with lotion application, is noted. Despite these efforts, persistent swelling in the lower extremities continues, although it has not extended into the thighs or abdomen.    Sleep disturbances have been occurring for the past few weeks, characterized by difficulty maintaining sleep throughout the night without urinary frequency. Attempts to nap in the afternoon have been unsuccessful. No new stressors contributing to sleep issues are reported. Melatonin was previously tried during a rehabilitation period.    Iron supplementation is currently being taken, with occasional constipation as a side effect. A sufficient supply of iron pills is available.    No recent episodes of atrial fibrillation are reported, and regular monitoring by a healthcare professional who visits weekly is ongoing.    Lexapro is currently being taken, with occasional feelings of nervousness reported. Emotional stress is present due to a terminally ill son with cancer.    No recent colonoscopy or EGD procedures have been performed. Awareness of the need for a mammogram is expressed, with

## 2025-04-24 ENCOUNTER — RESULTS FOLLOW-UP (OUTPATIENT)
Dept: PRIMARY CARE CLINIC | Age: 75
End: 2025-04-24

## 2025-04-27 NOTE — DISCHARGE INSTRUCTIONS
[facility] WOUND and HYPERBARIC TREATMENT  CENTER      Visit  Discharge Instructions / Physician Orders  DATE:4/28/25     Home Care: [facility]     SUPPLIES ORDERED THRU:   Home care needs to order supplies per medicare guidelines.                      Wound Location:  left knee     Cleanse with: Liquid antibacterial soap and water, rinse well      Dressing Orders:  Primary dressing   silvercel,                     Secondary dressing  ABD, kerlix, ACE                         Frequency:  once daily     Additional Orders: Increase protein to diet (meat, cheese, eggs, fish, peanut butter, nuts and beans)  Multivitamin daily  Elevate legs if swollen or wearing compression when sitting  OFFLOADING [] YES  TYPE:                  [] NA    Weekly wound care visits until determined otherwise.    Antibiotic therapy-wound care related YES [] NO [] NA[]  DRUG-                                                             Duration-             Script sent to-                      on (date)  MY CHART []     Smart Device  []     HYPERBARIC TREATMENT-                TREATMENT #                          Your next appointment with the Wound Care Center is in 1 week with Dr. Luong                                                                                                   (Please note your next appointment above and if you are unable to keep, kindly give a 24 hour notice. Thank you.)  If more than 15 min late we cannot guarantee you will be seen due to clinician schedule    Per Policy,  3 or more cancellations or no show may result in dismissal from program  If you experience any of the following, please call the Wound Care Center during business hours:  [phone]     * Increase in Pain  * Temperature over 101  * Increase in drainage from your wound  * Drainage with a foul odor  * Bleeding  * Increase in swelling  * Need for compression bandage changes due to slippage, breakthrough drainage.     If you need medical attention

## 2025-04-28 ENCOUNTER — HOSPITAL ENCOUNTER (OUTPATIENT)
Dept: WOUND CARE | Age: 75
Discharge: HOME | End: 2025-04-28
Payer: MEDICARE

## 2025-04-28 VITALS
TEMPERATURE: 98.7 F | WEIGHT: 167 LBS | RESPIRATION RATE: 19 BRPM | HEIGHT: 60 IN | SYSTOLIC BLOOD PRESSURE: 188 MMHG | DIASTOLIC BLOOD PRESSURE: 61 MMHG | BODY MASS INDEX: 32.79 KG/M2 | HEART RATE: 55 BPM

## 2025-04-28 DIAGNOSIS — S81.002D OPEN WOUND OF LEFT KNEE, SUBSEQUENT ENCOUNTER: Primary | ICD-10-CM

## 2025-04-28 PROBLEM — S81.002A OPEN WOUND OF LEFT KNEE: Status: ACTIVE | Noted: 2025-04-28

## 2025-04-28 PROCEDURE — 87075 CULTR BACTERIA EXCEPT BLOOD: CPT

## 2025-04-28 PROCEDURE — 99213 OFFICE O/P EST LOW 20 MIN: CPT

## 2025-04-28 PROCEDURE — 87205 SMEAR GRAM STAIN: CPT

## 2025-04-28 PROCEDURE — 87070 CULTURE OTHR SPECIMN AEROBIC: CPT

## 2025-04-28 PROCEDURE — 11042 DBRDMT SUBQ TIS 1ST 20SQCM/<: CPT | Performed by: PLASTIC SURGERY

## 2025-04-28 PROCEDURE — 11042 DBRDMT SUBQ TIS 1ST 20SQCM/<: CPT

## 2025-04-28 PROCEDURE — 87186 SC STD MICRODIL/AGAR DIL: CPT

## 2025-04-28 RX ORDER — LIDOCAINE 40 MG/G
CREAM TOPICAL ONCE
OUTPATIENT
Start: 2025-04-28 | End: 2025-04-28

## 2025-04-28 RX ORDER — LIDOCAINE HYDROCHLORIDE 20 MG/ML
JELLY TOPICAL ONCE
Status: CANCELLED | OUTPATIENT
Start: 2025-04-28 | End: 2025-04-28

## 2025-04-28 RX ORDER — SODIUM CHLOR/HYPOCHLOROUS ACID 0.033 %
SOLUTION, IRRIGATION IRRIGATION ONCE
OUTPATIENT
Start: 2025-04-28 | End: 2025-04-28

## 2025-04-28 RX ORDER — SILVER SULFADIAZINE 10 MG/G
CREAM TOPICAL ONCE
OUTPATIENT
Start: 2025-04-28 | End: 2025-04-28

## 2025-04-28 RX ORDER — GENTAMICIN SULFATE 1 MG/G
OINTMENT TOPICAL ONCE
OUTPATIENT
Start: 2025-04-28 | End: 2025-04-28

## 2025-04-28 RX ORDER — MUPIROCIN 20 MG/G
OINTMENT TOPICAL ONCE
OUTPATIENT
Start: 2025-04-28 | End: 2025-04-28

## 2025-04-28 RX ORDER — GINSENG 100 MG
CAPSULE ORAL ONCE
OUTPATIENT
Start: 2025-04-28 | End: 2025-04-28

## 2025-04-28 RX ORDER — BACITRACIN ZINC AND POLYMYXIN B SULFATE 500; 1000 [USP'U]/G; [USP'U]/G
OINTMENT TOPICAL ONCE
OUTPATIENT
Start: 2025-04-28 | End: 2025-04-28

## 2025-04-28 RX ORDER — LIDOCAINE HYDROCHLORIDE 20 MG/ML
JELLY TOPICAL ONCE
OUTPATIENT
Start: 2025-04-28 | End: 2025-04-28

## 2025-04-28 RX ORDER — CLOBETASOL PROPIONATE 0.5 MG/G
OINTMENT TOPICAL ONCE
OUTPATIENT
Start: 2025-04-28 | End: 2025-04-28

## 2025-04-28 RX ORDER — LIDOCAINE 40 MG/G
CREAM TOPICAL ONCE
Status: CANCELLED | OUTPATIENT
Start: 2025-04-28 | End: 2025-04-28

## 2025-04-28 RX ORDER — TRIAMCINOLONE ACETONIDE 1 MG/G
OINTMENT TOPICAL ONCE
Status: CANCELLED | OUTPATIENT
Start: 2025-04-28 | End: 2025-04-28

## 2025-04-28 RX ORDER — NEOMYCIN/BACITRACIN/POLYMYXINB 3.5-400-5K
OINTMENT (GRAM) TOPICAL ONCE
OUTPATIENT
Start: 2025-04-28 | End: 2025-04-28

## 2025-04-28 RX ORDER — SODIUM CHLOR/HYPOCHLOROUS ACID 0.033 %
SOLUTION, IRRIGATION IRRIGATION ONCE
Status: CANCELLED | OUTPATIENT
Start: 2025-04-28 | End: 2025-04-28

## 2025-04-28 RX ORDER — MUPIROCIN 20 MG/G
OINTMENT TOPICAL ONCE
Status: CANCELLED | OUTPATIENT
Start: 2025-04-28 | End: 2025-04-28

## 2025-04-28 RX ORDER — BACITRACIN ZINC AND POLYMYXIN B SULFATE 500; 1000 [USP'U]/G; [USP'U]/G
OINTMENT TOPICAL ONCE
Status: CANCELLED | OUTPATIENT
Start: 2025-04-28 | End: 2025-04-28

## 2025-04-28 RX ORDER — CLOBETASOL PROPIONATE 0.5 MG/G
OINTMENT TOPICAL ONCE
Status: CANCELLED | OUTPATIENT
Start: 2025-04-28 | End: 2025-04-28

## 2025-04-28 RX ORDER — GINSENG 100 MG
CAPSULE ORAL ONCE
Status: CANCELLED | OUTPATIENT
Start: 2025-04-28 | End: 2025-04-28

## 2025-04-28 RX ORDER — NEOMYCIN/BACITRACIN/POLYMYXINB 3.5-400-5K
OINTMENT (GRAM) TOPICAL ONCE
Status: CANCELLED | OUTPATIENT
Start: 2025-04-28 | End: 2025-04-28

## 2025-04-28 RX ORDER — TRIAMCINOLONE ACETONIDE 1 MG/G
OINTMENT TOPICAL ONCE
OUTPATIENT
Start: 2025-04-28 | End: 2025-04-28

## 2025-04-28 RX ORDER — LIDOCAINE 50 MG/G
OINTMENT TOPICAL ONCE
OUTPATIENT
Start: 2025-04-28 | End: 2025-04-28

## 2025-04-28 RX ORDER — LIDOCAINE 50 MG/G
OINTMENT TOPICAL ONCE
Status: CANCELLED | OUTPATIENT
Start: 2025-04-28 | End: 2025-04-28

## 2025-04-28 RX ORDER — SILVER SULFADIAZINE 10 MG/G
CREAM TOPICAL ONCE
Status: CANCELLED | OUTPATIENT
Start: 2025-04-28 | End: 2025-04-28

## 2025-04-28 RX ORDER — LIDOCAINE HYDROCHLORIDE 40 MG/ML
SOLUTION TOPICAL ONCE
Status: DISCONTINUED | OUTPATIENT
Start: 2025-04-28 | End: 2025-04-29 | Stop reason: HOSPADM

## 2025-04-28 RX ORDER — LIDOCAINE HYDROCHLORIDE 40 MG/ML
SOLUTION TOPICAL ONCE
OUTPATIENT
Start: 2025-04-28 | End: 2025-04-28

## 2025-04-28 RX ORDER — GENTAMICIN SULFATE 1 MG/G
OINTMENT TOPICAL ONCE
Status: CANCELLED | OUTPATIENT
Start: 2025-04-28 | End: 2025-04-28

## 2025-04-28 ASSESSMENT — PAIN DESCRIPTION - LOCATION: LOCATION: KNEE

## 2025-04-28 ASSESSMENT — PAIN DESCRIPTION - ORIENTATION: ORIENTATION: LEFT

## 2025-04-28 ASSESSMENT — PAIN DESCRIPTION - ONSET: ONSET: ON-GOING

## 2025-04-28 ASSESSMENT — PAIN DESCRIPTION - FREQUENCY: FREQUENCY: INTERMITTENT

## 2025-04-28 ASSESSMENT — PAIN DESCRIPTION - PAIN TYPE: TYPE: ACUTE PAIN

## 2025-04-28 ASSESSMENT — PAIN DESCRIPTION - DESCRIPTORS: DESCRIPTORS: ACHING

## 2025-04-28 ASSESSMENT — PAIN - FUNCTIONAL ASSESSMENT: PAIN_FUNCTIONAL_ASSESSMENT: PREVENTS OR INTERFERES SOME ACTIVE ACTIVITIES AND ADLS

## 2025-04-28 ASSESSMENT — PAIN SCALES - GENERAL: PAINLEVEL_OUTOF10: 1

## 2025-04-28 NOTE — PROGRESS NOTES
Left PVR waveforms: Consistent with MODERATE disease - ankle. The left posterior tibial artery and dorsalis pedis artery has monophasic waveforms.     Right Lower Arterial Measurements     PSV Yoshi Ratio   CFA Prox 172.8 cm/s           PFA Prox 168.8 cm/s           SFA Prox 145.2 cm/s        0.8          SFA Mid 129.4 cm/s        0.9          SFA Dist 127.2 cm/s        0.98          Pop Prox 109.4 cm/s        0.86          Pop Dist 120.7 cm/s              PTA Prox 32 cm/s           PTA Mid 45.1 cm/s           PTA Dist 44.2 cm/s           MARILEE Prox 13.6 cm/s           Jorge A Mid 108.4 cm/s             Left Lower Arterial Measurements     PSV Yoshi Ratio   CFA Prox 160.5 cm/s           PFA Prox 106.6 cm/s           SFA Prox 114.7 cm/s        0.71          SFA Mid 133.9 cm/s        1.17          Pop Prox 178.1 cm/s              Pop Dist 90.5 cm/s              PTA Prox 84 cm/s           PTA Mid 98.5 cm/s           PTA Dist 68.6 cm/s           MARILEE Prox 42.7 cm/s             Arterial Pressure Measurements     Right Left   Brachial 202 mmHg        194 mmHg          Post Tibial 215 mmHg        137 mmHg          Dorslis Pedis 216 mmHg        110 mmHg          PHILL 1.07        0.68                                    All Reviewers List    Adele Ramos MD on 12/4/2023 10:18     Signed    Electronically signed by Mary Hernandez MD on 12/4/23 at 0259 EST           Impression/Plan:     Problem List Items Addressed This Visit    None            Patient Active Problem List   Diagnosis    Depression with anxiety    Edema    Primary hypertension    Hyperlipidemia    Morbid obesity (HCC)    Osteoarthritis    Rosacea    Urge incontinence of urine    Varicose veins    PAF (paroxysmal atrial fibrillation) ([facility])    Non-rheumatic tricuspid valve insufficiency    Venous insufficiency of both lower extremities    Lymphedema    Venous ulcer of left leg (HCC)    Hyperkalemia    Cellulitis    Leukocytosis    Allergy to penicillin    Atrial

## 2025-04-29 ENCOUNTER — RESULTS FOLLOW-UP (OUTPATIENT)
Dept: SURGERY | Age: 75
End: 2025-04-29

## 2025-05-01 ENCOUNTER — OFFICE VISIT (OUTPATIENT)
Dept: ORTHOPEDIC SURGERY | Age: 75
End: 2025-05-01
Payer: MEDICARE

## 2025-05-01 VITALS — HEIGHT: 60 IN | BODY MASS INDEX: 32.79 KG/M2 | WEIGHT: 167 LBS

## 2025-05-01 DIAGNOSIS — S81.002D: Primary | ICD-10-CM

## 2025-05-01 DIAGNOSIS — M25.662 KNEE STIFFNESS, LEFT: ICD-10-CM

## 2025-05-01 PROCEDURE — 1126F AMNT PAIN NOTED NONE PRSNT: CPT | Performed by: ORTHOPAEDIC SURGERY

## 2025-05-01 PROCEDURE — G8417 CALC BMI ABV UP PARAM F/U: HCPCS | Performed by: ORTHOPAEDIC SURGERY

## 2025-05-01 PROCEDURE — 1123F ACP DISCUSS/DSCN MKR DOCD: CPT | Performed by: ORTHOPAEDIC SURGERY

## 2025-05-01 PROCEDURE — G8427 DOCREV CUR MEDS BY ELIG CLIN: HCPCS | Performed by: ORTHOPAEDIC SURGERY

## 2025-05-01 PROCEDURE — 3017F COLORECTAL CA SCREEN DOC REV: CPT | Performed by: ORTHOPAEDIC SURGERY

## 2025-05-01 PROCEDURE — 1159F MED LIST DOCD IN RCRD: CPT | Performed by: ORTHOPAEDIC SURGERY

## 2025-05-01 PROCEDURE — 1036F TOBACCO NON-USER: CPT | Performed by: ORTHOPAEDIC SURGERY

## 2025-05-01 PROCEDURE — 1090F PRES/ABSN URINE INCON ASSESS: CPT | Performed by: ORTHOPAEDIC SURGERY

## 2025-05-01 PROCEDURE — 99213 OFFICE O/P EST LOW 20 MIN: CPT | Performed by: ORTHOPAEDIC SURGERY

## 2025-05-01 PROCEDURE — G8399 PT W/DXA RESULTS DOCUMENT: HCPCS | Performed by: ORTHOPAEDIC SURGERY

## 2025-05-01 RX ORDER — DOXYCYCLINE HYCLATE 100 MG
100 TABLET ORAL 2 TIMES DAILY
Qty: 14 TABLET | Refills: 0 | Status: SHIPPED | OUTPATIENT
Start: 2025-05-01 | End: 2025-05-08

## 2025-05-01 NOTE — PROGRESS NOTES
[facility]  [address]  [place] [address]  Dept: [phone]  Dept Fax: [phone]        [facility]th[facility]uma Clinic Follow Up      Subjective:   Date of Surgery: 1/29/2025  -Left knee irrigation and debridement, 12 cm x 6 cm  -Left knee complex wound closure  -Left knee application of negative pressure wound VAC  -Left knee aspiration      Elaina Champagne is a 74 y.o. year old female who presents to the clinic today 13 weeks and 1 day status post above listed procedures.  She was most recently seen in the clinic on 4/17/2025 where she had a open wound overlying the anterior medial aspect of her left proximal tibia.  At that point in time a prescription of doxycycline was given to her as well as a referral to [place] wound care.  Since then patient has been seen by wound care 1 time where they debrided her wound and instructed her to do daily dressing changes with Optifoam and silver gauze.  Patient states that each day she continues to have drainage from her wound.  She denies any fevers or chills.  She denies any increased pain of the knee.  She states that she just finished her doxycycline yesterday.  She also states that she has been doing an at-home pedal pump machine to help her with range of motion of her knee and she states that has greatly helped with her stiffness.  She currently has a another appointment with wound care as well as an appointment with infectious disease scheduled for 5/5.      Review of Systems  Gen: no fever, chills, malaise  CV: no chest pain or palpitations  Resp: no cough or shortness of breath  GI: no nausea, vomiting, diarrhea, or constipation  Neuro: no numbness, tingling, or weakness  Msk: Left knee wound  10 remaining systems reviewed and negative    Objective :   There were no vitals filed for this visit.Body mass index is 32.61 kg/m².  General: No acute distress, resting comfortably in

## 2025-05-02 LAB
MICROORGANISM SPEC CULT: ABNORMAL
MICROORGANISM/AGENT SPEC: ABNORMAL
MICROORGANISM/AGENT SPEC: ABNORMAL
SERVICE CMNT-IMP: ABNORMAL
SPECIMEN DESCRIPTION: ABNORMAL

## 2025-05-03 NOTE — DISCHARGE INSTRUCTIONS
[facility] WOUND and HYPERBARIC TREATMENT  CENTER                            Visit  Discharge Instructions / Physician Orders  DATE:5/5/25     Home Care: [facility]     SUPPLIES ORDERED THRU:   Home care needs to order supplies per medicare guidelines.                      Wound Location:  left knee     Cleanse with: Liquid antibacterial soap and water, rinse well      Dressing Orders:  Primary dressing   silvercel,                     Secondary dressing  4x4 gauze and cover with an island dressing.                     Frequency:  once daily     Additional Orders: Increase protein to diet (meat, cheese, eggs, fish, peanut butter, nuts and beans)  Multivitamin daily  Elevate legs if swollen or wearing compression when sitting  OFFLOADING [] YES  TYPE:                  [] NA     Weekly wound care visits until determined otherwise.   have blood work drawn before next visit  Antibiotic therapy-wound care related YES [] NO [] NA[]  DRUG-    doxycycline  100 mg twice a day     continue for 2 more weeks                        Levaquin, 250 mg once daily       x 10 days                                 MY CHART []     Smart Device  []      HYPERBARIC TREATMENT-                TREATMENT #                          Your next appointment with the Wound Care Center is in 1 week with Dr. Luong                                                                                                   (Please note your next appointment above and if you are unable to keep, kindly give a 24 hour notice. Thank you.)  If more than 15 min late we cannot guarantee you will be seen due to clinician schedule     Per Policy,  3 or more cancellations or no show may result in dismissal from program  If you experience any of the following, please call the Wound Care Center during business hours:  [phone]     * Increase in Pain  * Temperature over 101  * Increase in drainage from your wound  * Drainage with a foul odor  * Bleeding  * Increase in

## 2025-05-05 ENCOUNTER — HOSPITAL ENCOUNTER (OUTPATIENT)
Age: 75
Discharge: HOME | End: 2025-05-05
Payer: MEDICARE

## 2025-05-05 ENCOUNTER — HOSPITAL ENCOUNTER (OUTPATIENT)
Dept: WOUND CARE | Age: 75
Discharge: HOME | End: 2025-05-05
Payer: MEDICARE

## 2025-05-05 VITALS
DIASTOLIC BLOOD PRESSURE: 49 MMHG | HEIGHT: 60 IN | HEART RATE: 57 BPM | WEIGHT: 167 LBS | TEMPERATURE: 97 F | SYSTOLIC BLOOD PRESSURE: 150 MMHG | BODY MASS INDEX: 32.79 KG/M2 | RESPIRATION RATE: 20 BRPM

## 2025-05-05 DIAGNOSIS — T14.8XXA WOUND INFECTION: Primary | ICD-10-CM

## 2025-05-05 DIAGNOSIS — T84.7XXD INFECTED HARDWARE IN LEFT LOWER EXTREMITY, SUBSEQUENT ENCOUNTER: ICD-10-CM

## 2025-05-05 DIAGNOSIS — S81.002D OPEN WOUND OF LEFT KNEE, SUBSEQUENT ENCOUNTER: Primary | ICD-10-CM

## 2025-05-05 DIAGNOSIS — T84.7XXA INFECTED HARDWARE IN LEFT LOWER EXTREMITY, INITIAL ENCOUNTER: ICD-10-CM

## 2025-05-05 DIAGNOSIS — L08.9 WOUND INFECTION: Primary | ICD-10-CM

## 2025-05-05 LAB
BUN SERPL-MCNC: 21 MG/DL (ref 8–23)
CREAT SERPL-MCNC: 0.9 MG/DL (ref 0.7–1.2)
CRP SERPL HS-MCNC: 10 MG/L (ref 0–5)
ERYTHROCYTE [DISTWIDTH] IN BLOOD BY AUTOMATED COUNT: 17.7 % (ref 11.5–14.9)
GFR, ESTIMATED: 68 ML/MIN/1.73M2
HCT VFR BLD AUTO: 30 % (ref 36–46)
HGB BLD-MCNC: 9.8 G/DL (ref 12–16)
MCH RBC QN AUTO: 28.6 PG (ref 26–34)
MCHC RBC AUTO-ENTMCNC: 32.7 G/DL (ref 31–37)
MCV RBC AUTO: 87.5 FL (ref 80–100)
PLATELET # BLD AUTO: 239 K/UL (ref 150–450)
PMV BLD AUTO: 8.1 FL (ref 6–12)
RBC # BLD AUTO: 3.43 M/UL (ref 4–5.2)
WBC OTHER # BLD: 3.5 K/UL (ref 3.5–11)

## 2025-05-05 PROCEDURE — 36415 COLL VENOUS BLD VENIPUNCTURE: CPT

## 2025-05-05 PROCEDURE — 86140 C-REACTIVE PROTEIN: CPT

## 2025-05-05 PROCEDURE — 11043 DBRDMT MUSC&/FSCA 1ST 20/<: CPT

## 2025-05-05 PROCEDURE — 99214 OFFICE O/P EST MOD 30 MIN: CPT | Performed by: INTERNAL MEDICINE

## 2025-05-05 PROCEDURE — 11043 DBRDMT MUSC&/FSCA 1ST 20/<: CPT | Performed by: INTERNAL MEDICINE

## 2025-05-05 PROCEDURE — 85027 COMPLETE CBC AUTOMATED: CPT

## 2025-05-05 PROCEDURE — 82565 ASSAY OF CREATININE: CPT

## 2025-05-05 PROCEDURE — 84520 ASSAY OF UREA NITROGEN: CPT

## 2025-05-05 RX ORDER — MUPIROCIN 20 MG/G
OINTMENT TOPICAL ONCE
OUTPATIENT
Start: 2025-05-05 | End: 2025-05-05

## 2025-05-05 RX ORDER — LIDOCAINE HYDROCHLORIDE 20 MG/ML
JELLY TOPICAL ONCE
OUTPATIENT
Start: 2025-05-05 | End: 2025-05-05

## 2025-05-05 RX ORDER — TRIAMCINOLONE ACETONIDE 1 MG/G
OINTMENT TOPICAL ONCE
OUTPATIENT
Start: 2025-05-05 | End: 2025-05-05

## 2025-05-05 RX ORDER — LEVOFLOXACIN 500 MG/1
250 TABLET, FILM COATED ORAL DAILY
Qty: 5 TABLET | Refills: 0 | Status: SHIPPED | OUTPATIENT
Start: 2025-05-05 | End: 2025-05-15

## 2025-05-05 RX ORDER — LIDOCAINE HYDROCHLORIDE 40 MG/ML
SOLUTION TOPICAL ONCE
OUTPATIENT
Start: 2025-05-05 | End: 2025-05-05

## 2025-05-05 RX ORDER — NEOMYCIN/BACITRACIN/POLYMYXINB 3.5-400-5K
OINTMENT (GRAM) TOPICAL ONCE
OUTPATIENT
Start: 2025-05-05 | End: 2025-05-05

## 2025-05-05 RX ORDER — DOXYCYCLINE HYCLATE 100 MG
100 TABLET ORAL 2 TIMES DAILY
Qty: 28 TABLET | Refills: 0 | Status: SHIPPED | OUTPATIENT
Start: 2025-05-05 | End: 2025-05-19

## 2025-05-05 RX ORDER — LIDOCAINE 50 MG/G
OINTMENT TOPICAL ONCE
OUTPATIENT
Start: 2025-05-05 | End: 2025-05-05

## 2025-05-05 RX ORDER — SILVER SULFADIAZINE 10 MG/G
CREAM TOPICAL ONCE
OUTPATIENT
Start: 2025-05-05 | End: 2025-05-05

## 2025-05-05 RX ORDER — GENTAMICIN SULFATE 1 MG/G
OINTMENT TOPICAL ONCE
OUTPATIENT
Start: 2025-05-05 | End: 2025-05-05

## 2025-05-05 RX ORDER — GINSENG 100 MG
CAPSULE ORAL ONCE
OUTPATIENT
Start: 2025-05-05 | End: 2025-05-05

## 2025-05-05 RX ORDER — BACITRACIN ZINC AND POLYMYXIN B SULFATE 500; 1000 [USP'U]/G; [USP'U]/G
OINTMENT TOPICAL ONCE
OUTPATIENT
Start: 2025-05-05 | End: 2025-05-05

## 2025-05-05 RX ORDER — CLOBETASOL PROPIONATE 0.5 MG/G
OINTMENT TOPICAL ONCE
OUTPATIENT
Start: 2025-05-05 | End: 2025-05-05

## 2025-05-05 RX ORDER — LIDOCAINE 40 MG/G
CREAM TOPICAL ONCE
OUTPATIENT
Start: 2025-05-05 | End: 2025-05-05

## 2025-05-05 RX ORDER — SODIUM CHLOR/HYPOCHLOROUS ACID 0.033 %
SOLUTION, IRRIGATION IRRIGATION ONCE
OUTPATIENT
Start: 2025-05-05 | End: 2025-05-05

## 2025-05-05 RX ORDER — LIDOCAINE HYDROCHLORIDE 40 MG/ML
SOLUTION TOPICAL ONCE
Status: DISCONTINUED | OUTPATIENT
Start: 2025-05-05 | End: 2025-05-06 | Stop reason: HOSPADM

## 2025-05-05 ASSESSMENT — PAIN DESCRIPTION - DESCRIPTORS: DESCRIPTORS: ACHING

## 2025-05-05 ASSESSMENT — PAIN DESCRIPTION - LOCATION: LOCATION: KNEE

## 2025-05-05 ASSESSMENT — PAIN DESCRIPTION - PAIN TYPE: TYPE: CHRONIC PAIN

## 2025-05-05 ASSESSMENT — PAIN SCALES - GENERAL: PAINLEVEL_OUTOF10: 1

## 2025-05-05 ASSESSMENT — PAIN - FUNCTIONAL ASSESSMENT: PAIN_FUNCTIONAL_ASSESSMENT: PREVENTS OR INTERFERES SOME ACTIVE ACTIVITIES AND ADLS

## 2025-05-05 ASSESSMENT — PAIN DESCRIPTION - FREQUENCY: FREQUENCY: INTERMITTENT

## 2025-05-05 ASSESSMENT — PAIN DESCRIPTION - ORIENTATION: ORIENTATION: LEFT

## 2025-05-05 ASSESSMENT — PAIN DESCRIPTION - ONSET: ONSET: ON-GOING

## 2025-05-05 NOTE — PROGRESS NOTES
[facility] Wound Care Center   Progress Note and Procedure Note      Elaina Champagne  MEDICAL RECORD NUMBER:  [id number]  AGE: 74 y.o.   GENDER: female  : 1950  EPISODE DATE:  2025    Subjective:     Chief Complaint   Patient presents with    Wound Check     Left knee         HISTORY of PRESENT ILLNESS HPI     Elaina Champagne is a 74 y.o. female who presents today for wound/ulcer evaluation.   History of Wound Context: The patient is here for left knee open wound with deep tunneling and purulent drainage, mild surrounding redness.  She denied fever or chills, denied significant pain, no nausea or vomiting, no other complaints.  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  MRSA and Pseudomonas aeruginosa grew on wound culture from 2025 and intraoperative culture 2025.  The patient received IV cefepime and daptomycin initially followed by oral doxycycline.  Wound culture on 3/26/2025 grew Pseudomonas aeruginosa that was sensitive to cefepime and Levaquin.  Tissue culture on 2025 grew Pseudomonas aeruginosa in broth medium only that was sensitive to cefepime and Levaquin.  2025 creatinine 1, GFR 59, WBC 3  C-reactive protein on 2/3/2025 was 18      Ulcer Identification:  Ulcer Type: non-healing surgical    Contributing Factors: edema, venous stasis, lymphedema, decreased mobility, and obesity    Acute Wound: N/A    PAST MEDICAL HISTORY        Diagnosis Date    Anemia     Anxiety disorder     Arthritis     Artificial knee joint present, left     Artificial knee joint present, right     Atherosclerotic heart disease     Bell's palsy     Burning with urination     Cellulitis     lower legs    Chronic kidney disease, stage 3 (HCC)     Cognitive communication deficit     Depression     Frequent urination     Gastric ulcer with hemorrhage     Gastro-esophageal reflux disease without esophagitis

## 2025-05-09 NOTE — DISCHARGE INSTRUCTIONS
swelling  * Need for compression bandage changes due to slippage, breakthrough drainage.     If you need medical attention outside of the business hours of the Wound Care Centers please contact your PCP or go to the nearest emergency room.     The information contained in the After Visit Summary has been reviewed with me, the patient and/or responsible adult, by my health care provider(s). I had the opportunity to ask questions regarding this information. I have elected to receive;      []After Visit Summary  [x]Comprehensive Discharge Instruction        Patient signature______________________________________Date:_______

## 2025-05-12 ENCOUNTER — HOSPITAL ENCOUNTER (OUTPATIENT)
Dept: WOUND CARE | Age: 75
Discharge: HOME | End: 2025-05-12

## 2025-05-13 NOTE — DISCHARGE INSTRUCTIONS
[facility] WOUND and HYPERBARIC TREATMENT  CENTER                            Visit  Discharge Instructions / Physician Orders  DATE:5/19/25     Home Care: [facility]     SUPPLIES ORDERED THRU:   Home care needs to order supplies per medicare guidelines.                      Wound Location:  left knee     Cleanse with: Liquid antibacterial soap and water, rinse well      Dressing Orders:  Primary dressing   Silvercel,     Secondary dressing  4x4 gauze and cover with an island dressing.                     Frequency:  once daily     Additional Orders: Increase protein to diet (meat, cheese, eggs, fish, peanut butter, nuts and beans)  Multivitamin daily  Elevate legs if swollen or wearing compression when sitting  OFFLOADING [] YES  TYPE:                  [] NA     Weekly wound care visits until determined otherwise.   have blood work drawn before next visit  Antibiotic therapy-wound care related YES [] NO [] NA[]  DRUG-    doxycycline  100 mg twice a day     continue for 2 more weeks                        Levaquin, 250 mg once daily       x 10 days                                 MY CHART []     Smart Device  []      HYPERBARIC TREATMENT-                TREATMENT #                          Your next appointment with the Wound Care Center is in 1 week with Dr. Luong                                                                                                   (Please note your next appointment above and if you are unable to keep, kindly give a 24 hour notice. Thank you.)  If more than 15 min late we cannot guarantee you will be seen due to clinician schedule     Per Policy,  3 or more cancellations or no show may result in dismissal from program  If you experience any of the following, please call the Wound Care Center during business hours:  [phone]     * Increase in Pain  * Temperature over 101  * Increase in drainage from your wound  * Drainage with a foul odor  * Bleeding  * Increase in swelling  * Need

## 2025-05-15 ENCOUNTER — OFFICE VISIT (OUTPATIENT)
Dept: ORTHOPEDIC SURGERY | Age: 75
End: 2025-05-15
Payer: MEDICARE

## 2025-05-15 VITALS — WEIGHT: 167 LBS | BODY MASS INDEX: 32.79 KG/M2 | HEIGHT: 60 IN

## 2025-05-15 DIAGNOSIS — S81.002D OPEN WOUND OF LEFT KNEE, SUBSEQUENT ENCOUNTER: Primary | ICD-10-CM

## 2025-05-15 PROCEDURE — G8399 PT W/DXA RESULTS DOCUMENT: HCPCS | Performed by: ORTHOPAEDIC SURGERY

## 2025-05-15 PROCEDURE — G8427 DOCREV CUR MEDS BY ELIG CLIN: HCPCS | Performed by: ORTHOPAEDIC SURGERY

## 2025-05-15 PROCEDURE — G8417 CALC BMI ABV UP PARAM F/U: HCPCS | Performed by: ORTHOPAEDIC SURGERY

## 2025-05-15 PROCEDURE — 3017F COLORECTAL CA SCREEN DOC REV: CPT | Performed by: ORTHOPAEDIC SURGERY

## 2025-05-15 PROCEDURE — 99213 OFFICE O/P EST LOW 20 MIN: CPT | Performed by: ORTHOPAEDIC SURGERY

## 2025-05-15 PROCEDURE — 1126F AMNT PAIN NOTED NONE PRSNT: CPT | Performed by: ORTHOPAEDIC SURGERY

## 2025-05-15 PROCEDURE — 1090F PRES/ABSN URINE INCON ASSESS: CPT | Performed by: ORTHOPAEDIC SURGERY

## 2025-05-15 PROCEDURE — 1123F ACP DISCUSS/DSCN MKR DOCD: CPT | Performed by: ORTHOPAEDIC SURGERY

## 2025-05-15 PROCEDURE — 1036F TOBACCO NON-USER: CPT | Performed by: ORTHOPAEDIC SURGERY

## 2025-05-19 ENCOUNTER — HOSPITAL ENCOUNTER (OUTPATIENT)
Dept: WOUND CARE | Age: 75
Discharge: HOME | End: 2025-05-19
Payer: MEDICARE

## 2025-05-19 VITALS
HEIGHT: 60 IN | WEIGHT: 167 LBS | DIASTOLIC BLOOD PRESSURE: 45 MMHG | BODY MASS INDEX: 32.79 KG/M2 | HEART RATE: 55 BPM | RESPIRATION RATE: 20 BRPM | TEMPERATURE: 97.7 F | SYSTOLIC BLOOD PRESSURE: 177 MMHG

## 2025-05-19 DIAGNOSIS — T84.7XXD INFECTED HARDWARE IN LEFT LOWER EXTREMITY, SUBSEQUENT ENCOUNTER: ICD-10-CM

## 2025-05-19 DIAGNOSIS — S81.002D OPEN WOUND OF LEFT KNEE, SUBSEQUENT ENCOUNTER: Primary | ICD-10-CM

## 2025-05-19 PROCEDURE — 11043 DBRDMT MUSC&/FSCA 1ST 20/<: CPT

## 2025-05-19 RX ORDER — GENTAMICIN SULFATE 1 MG/G
OINTMENT TOPICAL ONCE
OUTPATIENT
Start: 2025-05-19 | End: 2025-05-19

## 2025-05-19 RX ORDER — LIDOCAINE HYDROCHLORIDE 20 MG/ML
JELLY TOPICAL ONCE
OUTPATIENT
Start: 2025-05-19 | End: 2025-05-19

## 2025-05-19 RX ORDER — LIDOCAINE HYDROCHLORIDE 40 MG/ML
SOLUTION TOPICAL ONCE
OUTPATIENT
Start: 2025-05-19 | End: 2025-05-19

## 2025-05-19 RX ORDER — LIDOCAINE 40 MG/G
CREAM TOPICAL ONCE
OUTPATIENT
Start: 2025-05-19 | End: 2025-05-19

## 2025-05-19 RX ORDER — BACITRACIN ZINC AND POLYMYXIN B SULFATE 500; 1000 [USP'U]/G; [USP'U]/G
OINTMENT TOPICAL ONCE
OUTPATIENT
Start: 2025-05-19 | End: 2025-05-19

## 2025-05-19 RX ORDER — SILVER SULFADIAZINE 10 MG/G
CREAM TOPICAL ONCE
OUTPATIENT
Start: 2025-05-19 | End: 2025-05-19

## 2025-05-19 RX ORDER — NEOMYCIN/BACITRACIN/POLYMYXINB 3.5-400-5K
OINTMENT (GRAM) TOPICAL ONCE
OUTPATIENT
Start: 2025-05-19 | End: 2025-05-19

## 2025-05-19 RX ORDER — TRIAMCINOLONE ACETONIDE 1 MG/G
OINTMENT TOPICAL ONCE
OUTPATIENT
Start: 2025-05-19 | End: 2025-05-19

## 2025-05-19 RX ORDER — CLOBETASOL PROPIONATE 0.5 MG/G
OINTMENT TOPICAL ONCE
OUTPATIENT
Start: 2025-05-19 | End: 2025-05-19

## 2025-05-19 RX ORDER — MUPIROCIN 20 MG/G
OINTMENT TOPICAL ONCE
OUTPATIENT
Start: 2025-05-19 | End: 2025-05-19

## 2025-05-19 RX ORDER — LIDOCAINE HYDROCHLORIDE 40 MG/ML
SOLUTION TOPICAL ONCE
Status: COMPLETED | OUTPATIENT
Start: 2025-05-19 | End: 2025-05-19

## 2025-05-19 RX ORDER — GINSENG 100 MG
CAPSULE ORAL ONCE
OUTPATIENT
Start: 2025-05-19 | End: 2025-05-19

## 2025-05-19 RX ORDER — LIDOCAINE 50 MG/G
OINTMENT TOPICAL ONCE
OUTPATIENT
Start: 2025-05-19 | End: 2025-05-19

## 2025-05-19 RX ORDER — SODIUM CHLOR/HYPOCHLOROUS ACID 0.033 %
SOLUTION, IRRIGATION IRRIGATION ONCE
OUTPATIENT
Start: 2025-05-19 | End: 2025-05-19

## 2025-05-19 RX ADMIN — LIDOCAINE HYDROCHLORIDE 5 ML: 40 SOLUTION TOPICAL at 08:50

## 2025-05-19 ASSESSMENT — PAIN SCALES - GENERAL: PAINLEVEL_OUTOF10: 0

## 2025-05-19 NOTE — PROGRESS NOTES
mouth daily 90 tablet 3    clopidogrel (PLAVIX) 75 MG tablet Take 1 tablet by mouth daily 90 tablet 3    docusate sodium (COLACE) 100 MG capsule Take 1 capsule by mouth daily 90 capsule 3    escitalopram (LEXAPRO) 20 MG tablet Take 1 tablet by mouth daily 90 tablet 3    folic acid (FOLVITE) 1 MG tablet Take 1 tablet by mouth daily 90 tablet 3    metoprolol tartrate (LOPRESSOR) 25 MG tablet Take 0.5 tablets by mouth 2 times daily 90 tablet 3    potassium chloride (KLOR-CON) 10 MEQ extended release tablet Take 2 tablets by mouth daily 180 tablet 3    pantoprazole (PROTONIX) 40 MG tablet Take 1 tablet by mouth 2 times daily (before meals) 180 tablet 3    sucralfate (CARAFATE) 1 GM tablet Take 1 tablet by mouth 4 times daily (before meals and nightly) 360 tablet 3    vitamin B-12 (CYANOCOBALAMIN) 500 MCG tablet Take 1 tablet by mouth daily 90 tablet 3    midodrine (PROAMATINE) 2.5 MG tablet Take 2 tablets by mouth 3 times daily as needed (Give for sustained SBP < 110 mmHg.) 90 tablet 5    miconazole (MICOTIN) 2 % powder Apply topically 2 times daily. (Patient not taking: Reported on 5/19/2025) 45 g 1     No current facility-administered medications on file prior to encounter.       REVIEW OF SYSTEMS  Review of Systems    Pertinent items are noted in HPI.    Objective:      BP (!) 177/45   Pulse 55   Temp 97.7 °F (36.5 °C) (Tympanic)   Resp 20   Ht 1.524 m (5')   Wt 75.8 kg (167 lb)   BMI 32.61 kg/m²     Wt Readings from Last 3 Encounters:   05/19/25 75.8 kg (167 lb)   05/15/25 75.8 kg (167 lb)   05/05/25 75.8 kg (167 lb)       PHYSICAL EXAM  Physical Exam        Assessment:        Problem List Items Addressed This Visit       Infected hardware in left leg    Open wound of left knee - Primary    Relevant Orders    Initiate Outpatient Wound Care Protocol        Procedure Note  Indications:  Based on my examination of this patient's wound(s)/ulcer(s) today, debridement is required to promote healing and evaluate the

## 2025-05-22 NOTE — PROGRESS NOTES
surgical intervention.  Recommended she continue current plan as directed by wound care.  Will follow-up in 1 month    Follow up:Return in about 4 weeks (around 6/12/2025) for evaluation without X-rays.    No orders of the defined types were placed in this encounter.         No orders of the defined types were placed in this encounter.      Electronically signed by Rony Ren DO on 5/22/2025 at 4:53 PM    This note is created with the assistance of a speech recognition program.  While intending to generate a document that actually reflects the content of the visit, the document can still have some errors including those of syntax and sound a like substitutions which may escape proof reading.  In such instances, actual meaning can be extrapolated by contextual diversion

## 2025-05-27 NOTE — DISCHARGE INSTRUCTIONS
[facility] WOUND and HYPERBARIC TREATMENT  CENTER                            Visit  Discharge Instructions / Physician Orders  DATE:5/30/25     Home Care: [facility]     SUPPLIES ORDERED THRU:   Home care needs to order supplies per medicare guidelines.                      Wound Location:  left knee     Cleanse with: Liquid antibacterial soap and water, rinse well      Dressing Orders:  Primary dressing   Fibracol then silvercel     Secondary dressing  4x4 gauze and cover with an Silicone border dressing  Tubi  sock- may remove at night and reapply in am                     Frequency:  once daily     Additional Orders: Increase protein to diet (meat, cheese, eggs, fish, peanut butter, nuts and beans)  Multivitamin daily  Elevate legs if swollen or wearing compression when sitting  OFFLOADING [] YES  TYPE:                  [x] NA     Weekly wound care visits until determined otherwise.   have blood work drawn before next visit  Antibiotic therapy-wound care related YES [x] NO [] NA[]  DRUG-    doxycycline  100 mg twice a day     continue for 2 more weeks                        Levaquin, 250 mg once daily       x 10 days                                 MY CHART []     Smart Device  []      HYPERBARIC TREATMENT-                TREATMENT #                          Your next appointment with the Wound Care Center is in 2 weeks with Dr. Luong                                                                                                   (Please note your next appointment above and if you are unable to keep, kindly give a 24 hour notice. Thank you.)  If more than 15 min late we cannot guarantee you will be seen due to clinician schedule     Per Policy,  3 or more cancellations or no show may result in dismissal from program  If you experience any of the following, please call the Wound Care Center during business hours:  [phone]     * Increase in Pain  * Temperature over 101  * Increase in drainage from your

## 2025-05-30 ENCOUNTER — HOSPITAL ENCOUNTER (OUTPATIENT)
Dept: WOUND CARE | Age: 75
Discharge: HOME | End: 2025-05-30
Payer: MEDICARE

## 2025-05-30 VITALS
HEART RATE: 61 BPM | SYSTOLIC BLOOD PRESSURE: 193 MMHG | TEMPERATURE: 97.3 F | HEIGHT: 60 IN | BODY MASS INDEX: 32.79 KG/M2 | WEIGHT: 167 LBS | DIASTOLIC BLOOD PRESSURE: 64 MMHG | RESPIRATION RATE: 18 BRPM

## 2025-05-30 DIAGNOSIS — T84.7XXA INFECTED HARDWARE IN LEFT LOWER EXTREMITY, INITIAL ENCOUNTER: Primary | ICD-10-CM

## 2025-05-30 DIAGNOSIS — S81.002D OPEN WOUND OF LEFT KNEE, SUBSEQUENT ENCOUNTER: ICD-10-CM

## 2025-05-30 PROCEDURE — 11042 DBRDMT SUBQ TIS 1ST 20SQCM/<: CPT

## 2025-05-30 RX ORDER — DOXYCYCLINE HYCLATE 100 MG
100 TABLET ORAL 2 TIMES DAILY
Qty: 180 TABLET | Refills: 0 | Status: SHIPPED | OUTPATIENT
Start: 2025-05-30 | End: 2025-08-28

## 2025-05-30 ASSESSMENT — PAIN SCALES - GENERAL: PAINLEVEL_OUTOF10: 4

## 2025-05-30 NOTE — PLAN OF CARE
Problem: ABCDS Injury Assessment  Goal: Absence of physical injury  Outcome: Progressing     Problem: Pain  Goal: Verbalizes/displays adequate comfort level or baseline comfort level  Outcome: Progressing     Problem: Wound:  Goal: Will show signs of wound healing; wound closure and no evidence of infection  Description: Will show signs of wound healing; wound closure and no evidence of infection  Outcome: Progressing     Problem: Venous:  Goal: Signs of wound healing will improve  Description: Signs of wound healing will improve  Outcome: Progressing     Problem: Falls - Risk of:  Goal: Will remain free from falls  Description: Will remain free from falls  Outcome: Progressing

## 2025-05-30 NOTE — PROGRESS NOTES
[facility] Wound Care Center   Progress Note and Procedure Note      Elaina Champagne  MEDICAL RECORD NUMBER:  [id number]  AGE: 74 y.o.   GENDER: female  : 1950  EPISODE DATE:  2025    Subjective:     Chief Complaint   Patient presents with    Wound Check     Left knee         HISTORY of PRESENT ILLNESS HPI     Elaina Champagne is a 74 y.o. female who presents today for wound/ulcer evaluation.   History of Wound Context: The patient is here for left knee open wound with deep tunneling and yellow drainage.  She had mild pain to the knee, no significant swelling or redness, no fever or chills, denied significant pain, no nausea or vomiting, no other complaints.  She completed a course of p.o. Levaquin on 5/15/2025, currently on oral doxycycline.  The patient was evaluated by her orthopedic surgeon, no intervention planned.  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  MRSA and Pseudomonas aeruginosa grew on wound culture from 2025 and intraoperative culture 2025.  The patient received IV cefepime and daptomycin initially followed by oral doxycycline.  Wound culture on 3/26/2025 grew Pseudomonas aeruginosa that was sensitive to cefepime and Levaquin.  Tissue culture on 2025 grew Pseudomonas aeruginosa in broth medium only that was sensitive to cefepime and Levaquin.  2025 creatinine 1, GFR 59, WBC 3  C-reactive protein on 2/3/2025 was 18      Ulcer Identification:  Ulcer Type: non-healing surgical    Contributing Factors: edema, venous stasis, lymphedema, decreased mobility, and obesity    Acute Wound: N/A    PAST MEDICAL HISTORY        Diagnosis Date    Anemia     Anxiety disorder     Arthritis     Artificial knee joint present, left     Artificial knee joint present, right     Atherosclerotic heart disease     Bell's palsy     Burning with urination     Cellulitis     lower legs    Chronic kidney

## 2025-06-09 ENCOUNTER — OFFICE VISIT (OUTPATIENT)
Dept: PRIMARY CARE CLINIC | Age: 75
End: 2025-06-09
Payer: MEDICARE

## 2025-06-09 ENCOUNTER — TELEPHONE (OUTPATIENT)
Dept: PRIMARY CARE CLINIC | Age: 75
End: 2025-06-09

## 2025-06-09 VITALS
HEIGHT: 60 IN | OXYGEN SATURATION: 98 % | DIASTOLIC BLOOD PRESSURE: 70 MMHG | WEIGHT: 168.4 LBS | SYSTOLIC BLOOD PRESSURE: 142 MMHG | HEART RATE: 68 BPM | BODY MASS INDEX: 33.06 KG/M2

## 2025-06-09 DIAGNOSIS — F51.01 PRIMARY INSOMNIA: ICD-10-CM

## 2025-06-09 DIAGNOSIS — T78.40XA ALLERGIC REACTION TO DRUG, INITIAL ENCOUNTER: Primary | ICD-10-CM

## 2025-06-09 PROCEDURE — 99214 OFFICE O/P EST MOD 30 MIN: CPT | Performed by: FAMILY MEDICINE

## 2025-06-09 PROCEDURE — 1159F MED LIST DOCD IN RCRD: CPT | Performed by: FAMILY MEDICINE

## 2025-06-09 PROCEDURE — 3078F DIAST BP <80 MM HG: CPT | Performed by: FAMILY MEDICINE

## 2025-06-09 PROCEDURE — 1123F ACP DISCUSS/DSCN MKR DOCD: CPT | Performed by: FAMILY MEDICINE

## 2025-06-09 PROCEDURE — 3077F SYST BP >= 140 MM HG: CPT | Performed by: FAMILY MEDICINE

## 2025-06-09 RX ORDER — PREDNISONE 10 MG/1
TABLET ORAL
Qty: 48 TABLET | Refills: 0 | Status: SHIPPED | OUTPATIENT
Start: 2025-06-09

## 2025-06-09 RX ORDER — TRAZODONE HYDROCHLORIDE 50 MG/1
50 TABLET ORAL NIGHTLY
Qty: 90 TABLET | Refills: 1 | Status: SHIPPED | OUTPATIENT
Start: 2025-06-09

## 2025-06-09 RX ORDER — PANTOPRAZOLE SODIUM 40 MG/1
40 TABLET, DELAYED RELEASE ORAL 2 TIMES DAILY PRN
Qty: 180 TABLET | Refills: 3
Start: 2025-06-09

## 2025-06-09 ASSESSMENT — ENCOUNTER SYMPTOMS
COUGH: 0
DIARRHEA: 0
ABDOMINAL PAIN: 0
WHEEZING: 0
SHORTNESS OF BREATH: 0
EYE DISCHARGE: 0
EYE REDNESS: 0
RHINORRHEA: 0
SORE THROAT: 0
VOMITING: 0
NAUSEA: 0

## 2025-06-09 ASSESSMENT — PATIENT HEALTH QUESTIONNAIRE - PHQ9
6. FEELING BAD ABOUT YOURSELF - OR THAT YOU ARE A FAILURE OR HAVE LET YOURSELF OR YOUR FAMILY DOWN: NOT AT ALL
SUM OF ALL RESPONSES TO PHQ QUESTIONS 1-9: 0
9. THOUGHTS THAT YOU WOULD BE BETTER OFF DEAD, OR OF HURTING YOURSELF: NOT AT ALL
10. IF YOU CHECKED OFF ANY PROBLEMS, HOW DIFFICULT HAVE THESE PROBLEMS MADE IT FOR YOU TO DO YOUR WORK, TAKE CARE OF THINGS AT HOME, OR GET ALONG WITH OTHER PEOPLE: NOT DIFFICULT AT ALL
2. FEELING DOWN, DEPRESSED OR HOPELESS: NOT AT ALL
SUM OF ALL RESPONSES TO PHQ QUESTIONS 1-9: 0
7. TROUBLE CONCENTRATING ON THINGS, SUCH AS READING THE NEWSPAPER OR WATCHING TELEVISION: NOT AT ALL
4. FEELING TIRED OR HAVING LITTLE ENERGY: NOT AT ALL
8. MOVING OR SPEAKING SO SLOWLY THAT OTHER PEOPLE COULD HAVE NOTICED. OR THE OPPOSITE, BEING SO FIGETY OR RESTLESS THAT YOU HAVE BEEN MOVING AROUND A LOT MORE THAN USUAL: NOT AT ALL
5. POOR APPETITE OR OVEREATING: NOT AT ALL
3. TROUBLE FALLING OR STAYING ASLEEP: NOT AT ALL
1. LITTLE INTEREST OR PLEASURE IN DOING THINGS: NOT AT ALL

## 2025-06-09 NOTE — PATIENT INSTRUCTIONS
Discontinue clopidogrel  Discontinue Carafate  Change Protonix to 40 mg twice a day only as needed for heartburn or reflux  Finish course of prednisone as prescribed

## 2025-06-09 NOTE — TELEPHONE ENCOUNTER
Writer jaime asking for a return call.  Per Dr Ly please schedule patient appointment today to see him.  Patient has bilateral marks on her arms with extreme itchiness.  Writer received call from scheduling, placed patient on hold and the call must have dropped.

## 2025-06-09 NOTE — PROGRESS NOTES
[facility]  [facility] PRIMARY CARE  [address]  [facility] [address]  Dept: [phone]    Elaina Champagne is a 74 y.o. female Established patient, who presents today for her medical conditions/complaints as noted below.      Chief Complaint   Patient presents with    Rash     Patient complains of bilateral rash, painful and itching. Patient states its from her blood thinners.       HPI:     History of Present Illness  The patient is a 74-year-old female who presents for evaluation of a rash on her arms, sleep issues, and medication management.    She reports the development of a rash on both arms, which she attributes to her blood thinner medication. The rash initially presented as a sore before progressing to its current state approximately 3 weeks ago. She has been on the blood thinner for 6 to 8 months. The rash is localized to her arms and is associated with itching and burning sensations. She has attempted to manage the rash by covering it, but this has not provided relief. She has been on an antibiotic for about 3 months and Gemtesa for bladder control longer than that. She has been on doxycycline for 3 to 4 months. She has a history of stent placement in the heart, with 3 stents placed over 2 years ago.    She also reports sleep disturbances, characterized by difficulty falling asleep until 3 or 4 AM after an initial hour of sleep in the evening. She has tried over-the-counter sleep aids without success.    She is currently taking iron and potassium supplements. She reports no current issues with heartburn or reflux. She has a history of stomach ulcer when she first got sick. She does not have any issues with constipation.    She still has trouble with her knee because when they went down and scraped the bone, she still has an infection in there. She is taking an antibiotic for that. She goes to Dr. Ren at [facility]. She goes to him on

## 2025-06-10 ENCOUNTER — TELEPHONE (OUTPATIENT)
Dept: PRIMARY CARE CLINIC | Age: 75
End: 2025-06-10

## 2025-06-10 RX ORDER — FLUOCINONIDE 0.5 MG/G
CREAM TOPICAL
Qty: 30 G | Refills: 0 | Status: SHIPPED | OUTPATIENT
Start: 2025-06-10

## 2025-06-10 NOTE — TELEPHONE ENCOUNTER
You prescribed a steroid and in reading all the side effects she was hoping you can send in a cream instead.

## 2025-06-10 NOTE — DISCHARGE INSTRUCTIONS
[facility] WOUND and HYPERBARIC TREATMENT  CENTER                            Visit  Discharge Instructions / Physician Orders  DATE:6/13/25     Home Care: [facility]     SUPPLIES ORDERED THRU:   Home care needs to order supplies per medicare guidelines.                      Wound Location:  left knee     Cleanse with: Liquid antibacterial soap and water, rinse well      Dressing Orders:  Primary dressing   Fibracol then silvercel     Secondary dressing  4x4 gauze and cover with an Silicone border dressing  Tubi  sock- may remove at night and reapply in am                     Frequency:  once daily     Additional Orders: Increase protein to diet (meat, cheese, eggs, fish, peanut butter, nuts and beans)  Multivitamin daily  Elevate legs if swollen or wearing compression when sitting  OFFLOADING [] YES  TYPE:                  [x] NA     Weekly wound care visits until determined otherwise.   have blood work drawn before next visit  Antibiotic therapy-wound care related YES [x] NO [] NA[]  DRUG-    doxycycline  100 mg twice a day FOR CHRONIC SUPPRESSION                        Levaquin, 250 mg once daily -COMPLETED                                 MY CHART []     Smart Device  []      HYPERBARIC TREATMENT-                TREATMENT #                          Your next appointment with the Wound Care Center is in 2 weeks with Dr. Luong                                                                                                   (Please note your next appointment above and if you are unable to keep, kindly give a 24 hour notice. Thank you.)  If more than 15 min late we cannot guarantee you will be seen due to clinician schedule     Per Policy,  3 or more cancellations or no show may result in dismissal from program  If you experience any of the following, please call the Wound Care Center during business hours:  [phone]     * Increase in Pain  * Temperature over 101  * Increase in drainage from your wound  *

## 2025-06-10 NOTE — TELEPHONE ENCOUNTER
Advise patient Lidex cream sent to the pharmacy but it will work nowhere as near as well as the oral prednisone for a systemic skin reaction.  The cream is still a steroid just like the prednisone just not as effective

## 2025-06-12 NOTE — TELEPHONE ENCOUNTER
Attempted to call patient and phone was off hook or busy. Would not go through and unable to leave a vm.    Have attempted to contact patient X2 with no answer.

## 2025-06-13 ENCOUNTER — HOSPITAL ENCOUNTER (OUTPATIENT)
Dept: WOUND CARE | Age: 75
Discharge: HOME | End: 2025-06-13
Payer: MEDICARE

## 2025-06-13 VITALS
DIASTOLIC BLOOD PRESSURE: 44 MMHG | SYSTOLIC BLOOD PRESSURE: 165 MMHG | RESPIRATION RATE: 18 BRPM | BODY MASS INDEX: 32.89 KG/M2 | HEIGHT: 60 IN | HEART RATE: 55 BPM | TEMPERATURE: 98 F

## 2025-06-13 DIAGNOSIS — S81.002D OPEN WOUND OF LEFT KNEE, SUBSEQUENT ENCOUNTER: ICD-10-CM

## 2025-06-13 DIAGNOSIS — T84.7XXA INFECTED HARDWARE IN LEFT LOWER EXTREMITY, INITIAL ENCOUNTER: Primary | ICD-10-CM

## 2025-06-13 PROCEDURE — 11043 DBRDMT MUSC&/FSCA 1ST 20/<: CPT

## 2025-06-13 ASSESSMENT — PAIN SCALES - GENERAL: PAINLEVEL_OUTOF10: 0

## 2025-06-13 NOTE — PROGRESS NOTES
0937   Wound Length (cm) 0.7 cm 06/13/25 0840   Wound Width (cm) 0.4 cm 06/13/25 0840   Wound Depth (cm) 0.4 cm 06/13/25 0840   Wound Surface Area (cm^2) 0.28 cm^2 06/13/25 0840   Change in Wound Size % (l*w) 88.33 06/13/25 0840   Wound Volume (cm^3) 0.112 cm^3 06/13/25 0840   Wound Healing % 88 06/13/25 0840   Post-Procedure Length (cm) 0.7 cm 06/13/25 0840   Post-Procedure Width (cm) 0.4 cm 06/13/25 0840   Post-Procedure Depth (cm) 0.4 cm 06/13/25 0840   Post-Procedure Surface Area (cm^2) 0.28 cm^2 06/13/25 0840   Post-Procedure Volume (cm^3) 0.112 cm^3 06/13/25 0840   Undermining Starts ___ O'Clock 0700 06/13/25 0840   Undermining Ends___ O'Clock 1100 06/13/25 0840   Undermining Maxium Distance (cm) 1.5@10 06/13/25 0840   Wound Assessment Slough;Pink/red;Hyper granulation tissue 06/13/25 0840   Drainage Amount Moderate (25-50%) 06/13/25 0840   Drainage Description Yellow 06/13/25 0840   Odor None 06/13/25 0840   Sis-wound Assessment Fragile 06/13/25 0840   Margins Defined edges 06/13/25 0840   Wound Thickness Description not for Pressure Injury Full thickness 06/13/25 0840   Number of days: 45          Total Surface Area Debrided:  0.28 sq cm     Estimated Blood Loss:  Minimal    Hemostasis Achieved:  by pressure    Procedural Pain:  0  / 10     Post Procedural Pain:  0 / 10     Response to treatment:  Well tolerated by patient.       Plan:   P.o. Levaquin course completed 5/15/2025  Continue p.o. doxycycline for chronic suppression.  Follow CBC, BUN/creatinine and C-reactive protein  Follow-up with primary doctor for anemia    Treatment Note please see Discharge Instructions    Written patient dismissal instructions given to patient and signed by patient or POA.           Electronically signed by Jorge Luong MD on 6/13/2025 at 9:15 AM

## 2025-06-24 NOTE — DISCHARGE INSTRUCTIONS
[facility] WOUND and HYPERBARIC TREATMENT  CENTER                            Visit  Discharge Instructions / Physician Orders  DATE:6/27/25     Home Care: [facility]     SUPPLIES ORDERED THRU:   Home care needs to order supplies per medicare guidelines.                      Wound Location:  left knee     Cleanse with: Liquid antibacterial soap and water, rinse well      Dressing Orders:  Primary dressing   Fibracol then silvercel     Secondary dressing  4x4 gauze and cover with an Silicone border dressing  Tubi  sock- may remove at night and reapply in am                     Frequency:  once daily     Additional Orders: Increase protein to diet (meat, cheese, eggs, fish, peanut butter, nuts and beans)  Multivitamin daily  Elevate legs if swollen or wearing compression when sitting  OFFLOADING [] YES  TYPE:                  [x] NA     Weekly wound care visits until determined otherwise.   have blood work drawn before next visit  Antibiotic therapy-wound care related YES [x] NO [] NA[]  DRUG-    doxycycline  100 mg twice a day FOR CHRONIC SUPPRESSION                        Levaquin, 250 mg once daily -COMPLETED                                 MY CHART []     Smart Device  []      HYPERBARIC TREATMENT-                TREATMENT #                          Your next appointment with the Wound Care Center is in 2 weeks with Dr. Luong                                                                                                   (Please note your next appointment above and if you are unable to keep, kindly give a 24 hour notice. Thank you.)  If more than 15 min late we cannot guarantee you will be seen due to clinician schedule     Per Policy,  3 or more cancellations or no show may result in dismissal from program  If you experience any of the following, please call the Wound Care Center during business hours:  [phone]     * Increase in Pain  * Temperature over 101  * Increase in drainage from your wound  *

## 2025-06-26 ENCOUNTER — PREP FOR PROCEDURE (OUTPATIENT)
Dept: ORTHOPEDIC SURGERY | Age: 75
End: 2025-06-26

## 2025-06-26 ENCOUNTER — ANESTHESIA EVENT (OUTPATIENT)
Dept: OPERATING ROOM | Age: 75
End: 2025-06-26
Payer: MEDICARE

## 2025-06-26 ENCOUNTER — OFFICE VISIT (OUTPATIENT)
Dept: ORTHOPEDIC SURGERY | Age: 75
End: 2025-06-26

## 2025-06-26 VITALS — WEIGHT: 168.8 LBS | BODY MASS INDEX: 33.14 KG/M2 | HEIGHT: 60 IN

## 2025-06-26 DIAGNOSIS — R52 PAIN: Primary | ICD-10-CM

## 2025-06-26 DIAGNOSIS — S81.002D OPEN WOUND OF LEFT KNEE, SUBSEQUENT ENCOUNTER: ICD-10-CM

## 2025-06-26 PROBLEM — T84.7XXA WOUND INFECTION COMPLICATING HARDWARE: Status: ACTIVE | Noted: 2025-06-26

## 2025-06-26 NOTE — PROGRESS NOTES
[facility]  [address]  [place] [address]  Dept Phone: [phone]  Dept Fax: [phone]      Orthopaedic Trauma Clinic Follow Up      Subjective:   Date of Surgery: 1/29/25  -Left knee irrigation and debridement, 12 cm x 6 cm  -Left knee complex wound closure  -Left knee application of negative pressure wound VAC  -Left knee aspiration    Elaina Champagne is a 74 y.o. year old female who presents to the clinic today for follow up regarding her left knee draining sinus. She is 4 months and 26 days out from surgery. At todays visit she notes increased drainage that appears purulent. She has been going to wound care but believes not much progress is being made. She continues to take her antibiotics doxycycline. She denies fever or chills. She currently ambulates with a quadwalker. She does not stiffness in her left knee, she has difficulty flexing her knee past 90 degrees.     Review of Systems  Gen: no fever, chills, malaise  CV: no chest pain or palpitations  Resp: no cough or shortness of breath  GI: no nausea, vomiting, diarrhea, or constipation  Neuro: no seizures, vertigo, or headache  Msk: Left knee drainage.   10 remaining systems reviewed and negative    Objective :   There were no vitals filed for this visit.Body mass index is 32.97 kg/m².  General: No acute distress, resting comfortably in the clinic  Neuro: alert. oriented  Eyes: Extra-ocular muscles intact  Pulm: Respirations unlabored and regular.  Skin: warm, well perfused  Psych:   Patient has good fund of knowledge and displays understanding of exam, diagnosis, and plan.      LLE: Open sinus just proximal to the tibial tubercle.  Active drainage that is serosanguineous and mildly pus.  Minimal erythema surrounding the site.  Drainage is increased with varus stress.  Knee is stable to varus/valgus stress at 0 degrees and 30s.  Lachman Ia.  Anterior/posterior drawer negative.  Active range of motion of the left

## 2025-06-27 ENCOUNTER — ANESTHESIA (OUTPATIENT)
Dept: OPERATING ROOM | Age: 75
End: 2025-06-27
Payer: MEDICARE

## 2025-06-27 ENCOUNTER — HOSPITAL ENCOUNTER (INPATIENT)
Age: 75
LOS: 6 days | Discharge: HOME | DRG: 857 | End: 2025-07-03
Attending: ORTHOPAEDIC SURGERY | Admitting: ORTHOPAEDIC SURGERY
Payer: MEDICARE

## 2025-06-27 ENCOUNTER — APPOINTMENT (OUTPATIENT)
Dept: MRI IMAGING | Age: 75
DRG: 857 | End: 2025-06-27
Attending: ORTHOPAEDIC SURGERY
Payer: MEDICARE

## 2025-06-27 ENCOUNTER — HOSPITAL ENCOUNTER (OUTPATIENT)
Dept: WOUND CARE | Age: 75
Discharge: HOME | End: 2025-06-27

## 2025-06-27 DIAGNOSIS — T84.7XXA WOUND INFECTION COMPLICATING HARDWARE: ICD-10-CM

## 2025-06-27 DIAGNOSIS — T81.49XA SURGICAL SITE INFECTION: ICD-10-CM

## 2025-06-27 DIAGNOSIS — G89.18 POST-OP PAIN: Primary | ICD-10-CM

## 2025-06-27 PROBLEM — T81.42XA INFECTION OF DEEP INCISIONAL SURGICAL SITE AFTER PROCEDURE, INITIAL ENCOUNTER: Status: ACTIVE | Noted: 2025-06-27

## 2025-06-27 PROBLEM — Z22.322 MRSA CARRIER: Status: ACTIVE | Noted: 2025-06-27

## 2025-06-27 LAB
BUN BLD-MCNC: 21 MG/DL (ref 8–26)
CHLORIDE BLD-SCNC: 102 MMOL/L (ref 98–107)
EGFR, POC: 59 ML/MIN/1.73M2
GLUCOSE BLD-MCNC: 83 MG/DL (ref 74–100)
POC CREATININE: 1 MG/DL (ref 0.51–1.19)
POTASSIUM BLD-SCNC: 3.8 MMOL/L (ref 3.5–4.5)
SODIUM BLD-SCNC: 142 MMOL/L (ref 138–146)

## 2025-06-27 PROCEDURE — 82435 ASSAY OF BLOOD CHLORIDE: CPT

## 2025-06-27 PROCEDURE — 73723 MRI JOINT LWR EXTR W/O&W/DYE: CPT

## 2025-06-27 PROCEDURE — 64447 NJX AA&/STRD FEMORAL NRV IMG: CPT | Performed by: ANESTHESIOLOGY

## 2025-06-27 PROCEDURE — 87116 MYCOBACTERIA CULTURE: CPT

## 2025-06-27 PROCEDURE — 3600000005 HC SURGERY LEVEL 5 BASE: Performed by: ORTHOPAEDIC SURGERY

## 2025-06-27 PROCEDURE — 87075 CULTR BACTERIA EXCEPT BLOOD: CPT

## 2025-06-27 PROCEDURE — 6360000002 HC RX W HCPCS: Performed by: INTERNAL MEDICINE

## 2025-06-27 PROCEDURE — 6360000002 HC RX W HCPCS: Performed by: ANESTHESIOLOGY

## 2025-06-27 PROCEDURE — 84520 ASSAY OF UREA NITROGEN: CPT

## 2025-06-27 PROCEDURE — 99222 1ST HOSP IP/OBS MODERATE 55: CPT | Performed by: STUDENT IN AN ORGANIZED HEALTH CARE EDUCATION/TRAINING PROGRAM

## 2025-06-27 PROCEDURE — 87206 SMEAR FLUORESCENT/ACID STAI: CPT

## 2025-06-27 PROCEDURE — 6370000000 HC RX 637 (ALT 250 FOR IP): Performed by: STUDENT IN AN ORGANIZED HEALTH CARE EDUCATION/TRAINING PROGRAM

## 2025-06-27 PROCEDURE — 6370000000 HC RX 637 (ALT 250 FOR IP)

## 2025-06-27 PROCEDURE — 87070 CULTURE OTHR SPECIMN AEROBIC: CPT

## 2025-06-27 PROCEDURE — 87186 SC STD MICRODIL/AGAR DIL: CPT

## 2025-06-27 PROCEDURE — 82565 ASSAY OF CREATININE: CPT

## 2025-06-27 PROCEDURE — 1200000000 HC SEMI PRIVATE

## 2025-06-27 PROCEDURE — 2500000003 HC RX 250 WO HCPCS: Performed by: ORTHOPAEDIC SURGERY

## 2025-06-27 PROCEDURE — 2580000003 HC RX 258

## 2025-06-27 PROCEDURE — 87015 SPECIMEN INFECT AGNT CONCNTJ: CPT

## 2025-06-27 PROCEDURE — 84295 ASSAY OF SERUM SODIUM: CPT

## 2025-06-27 PROCEDURE — 87077 CULTURE AEROBIC IDENTIFY: CPT

## 2025-06-27 PROCEDURE — 6360000004 HC RX CONTRAST MEDICATION

## 2025-06-27 PROCEDURE — 3700000001 HC ADD 15 MINUTES (ANESTHESIA): Performed by: ORTHOPAEDIC SURGERY

## 2025-06-27 PROCEDURE — 99222 1ST HOSP IP/OBS MODERATE 55: CPT | Performed by: INTERNAL MEDICINE

## 2025-06-27 PROCEDURE — 6370000000 HC RX 637 (ALT 250 FOR IP): Performed by: INTERNAL MEDICINE

## 2025-06-27 PROCEDURE — 2709999900 HC NON-CHARGEABLE SUPPLY: Performed by: ORTHOPAEDIC SURGERY

## 2025-06-27 PROCEDURE — G0545 PR INHERENT VISIT TO INPT: HCPCS | Performed by: INTERNAL MEDICINE

## 2025-06-27 PROCEDURE — A9579 GAD-BASE MR CONTRAST NOS,1ML: HCPCS

## 2025-06-27 PROCEDURE — 87205 SMEAR GRAM STAIN: CPT

## 2025-06-27 PROCEDURE — 3700000000 HC ANESTHESIA ATTENDED CARE: Performed by: ORTHOPAEDIC SURGERY

## 2025-06-27 PROCEDURE — 3600000015 HC SURGERY LEVEL 5 ADDTL 15MIN: Performed by: ORTHOPAEDIC SURGERY

## 2025-06-27 PROCEDURE — 6370000000 HC RX 637 (ALT 250 FOR IP): Performed by: ORTHOPAEDIC SURGERY

## 2025-06-27 PROCEDURE — 87176 TISSUE HOMOGENIZATION CULTR: CPT

## 2025-06-27 PROCEDURE — 87102 FUNGUS ISOLATION CULTURE: CPT

## 2025-06-27 PROCEDURE — 7100000001 HC PACU RECOVERY - ADDTL 15 MIN: Performed by: ORTHOPAEDIC SURGERY

## 2025-06-27 PROCEDURE — 2580000003 HC RX 258: Performed by: INTERNAL MEDICINE

## 2025-06-27 PROCEDURE — 6360000002 HC RX W HCPCS

## 2025-06-27 PROCEDURE — 84132 ASSAY OF SERUM POTASSIUM: CPT

## 2025-06-27 PROCEDURE — 2500000003 HC RX 250 WO HCPCS

## 2025-06-27 PROCEDURE — 82947 ASSAY GLUCOSE BLOOD QUANT: CPT

## 2025-06-27 PROCEDURE — 7100000000 HC PACU RECOVERY - FIRST 15 MIN: Performed by: ORTHOPAEDIC SURGERY

## 2025-06-27 RX ORDER — ONDANSETRON 2 MG/ML
4 INJECTION INTRAMUSCULAR; INTRAVENOUS EVERY 6 HOURS PRN
Status: DISCONTINUED | OUTPATIENT
Start: 2025-06-27 | End: 2025-07-03 | Stop reason: HOSPADM

## 2025-06-27 RX ORDER — PANTOPRAZOLE SODIUM 40 MG/1
40 TABLET, DELAYED RELEASE ORAL 2 TIMES DAILY PRN
Status: DISCONTINUED | OUTPATIENT
Start: 2025-06-27 | End: 2025-07-03 | Stop reason: HOSPADM

## 2025-06-27 RX ORDER — DEXAMETHASONE SODIUM PHOSPHATE 10 MG/ML
INJECTION, SOLUTION INTRA-ARTICULAR; INTRALESIONAL; INTRAMUSCULAR; INTRAVENOUS; SOFT TISSUE
Status: DISCONTINUED | OUTPATIENT
Start: 2025-06-27 | End: 2025-06-27 | Stop reason: SDUPTHER

## 2025-06-27 RX ORDER — METOPROLOL TARTRATE 25 MG/1
12.5 TABLET, FILM COATED ORAL 2 TIMES DAILY
Status: DISCONTINUED | OUTPATIENT
Start: 2025-06-27 | End: 2025-07-03 | Stop reason: HOSPADM

## 2025-06-27 RX ORDER — CYCLOBENZAPRINE HCL 10 MG
10 TABLET ORAL EVERY 12 HOURS PRN
Status: DISCONTINUED | OUTPATIENT
Start: 2025-06-27 | End: 2025-07-03 | Stop reason: HOSPADM

## 2025-06-27 RX ORDER — SODIUM HYPOCHLORITE 1.25 MG/ML
SOLUTION TOPICAL PRN
Status: DISCONTINUED | OUTPATIENT
Start: 2025-06-27 | End: 2025-06-27 | Stop reason: ALTCHOICE

## 2025-06-27 RX ORDER — ONDANSETRON 4 MG/1
4 TABLET, ORALLY DISINTEGRATING ORAL EVERY 8 HOURS PRN
Status: DISCONTINUED | OUTPATIENT
Start: 2025-06-27 | End: 2025-07-03 | Stop reason: HOSPADM

## 2025-06-27 RX ORDER — ONDANSETRON 2 MG/ML
INJECTION INTRAMUSCULAR; INTRAVENOUS
Status: DISCONTINUED | OUTPATIENT
Start: 2025-06-27 | End: 2025-06-27 | Stop reason: SDUPTHER

## 2025-06-27 RX ORDER — SODIUM CHLORIDE 0.9 % (FLUSH) 0.9 %
10 SYRINGE (ML) INJECTION PRN
Status: DISCONTINUED | OUTPATIENT
Start: 2025-06-27 | End: 2025-07-03 | Stop reason: HOSPADM

## 2025-06-27 RX ORDER — ONDANSETRON 2 MG/ML
4 INJECTION INTRAMUSCULAR; INTRAVENOUS
Status: DISCONTINUED | OUTPATIENT
Start: 2025-06-27 | End: 2025-06-27 | Stop reason: HOSPADM

## 2025-06-27 RX ORDER — SODIUM CHLORIDE 9 MG/ML
INJECTION, SOLUTION INTRAVENOUS CONTINUOUS
Status: DISCONTINUED | OUTPATIENT
Start: 2025-06-27 | End: 2025-06-28

## 2025-06-27 RX ORDER — FENTANYL CITRATE 50 UG/ML
INJECTION, SOLUTION INTRAMUSCULAR; INTRAVENOUS
Status: DISCONTINUED | OUTPATIENT
Start: 2025-06-27 | End: 2025-06-27 | Stop reason: SDUPTHER

## 2025-06-27 RX ORDER — TROSPIUM CHLORIDE 20 MG/1
20 TABLET, FILM COATED ORAL NIGHTLY
Status: DISCONTINUED | OUTPATIENT
Start: 2025-06-27 | End: 2025-07-03 | Stop reason: HOSPADM

## 2025-06-27 RX ORDER — FENTANYL CITRATE 50 UG/ML
25 INJECTION, SOLUTION INTRAMUSCULAR; INTRAVENOUS EVERY 5 MIN PRN
Status: DISCONTINUED | OUTPATIENT
Start: 2025-06-27 | End: 2025-06-27 | Stop reason: HOSPADM

## 2025-06-27 RX ORDER — FOLIC ACID 1 MG/1
1 TABLET ORAL DAILY
Status: DISCONTINUED | OUTPATIENT
Start: 2025-06-27 | End: 2025-07-01

## 2025-06-27 RX ORDER — PROPOFOL 10 MG/ML
INJECTION, EMULSION INTRAVENOUS
Status: DISCONTINUED | OUTPATIENT
Start: 2025-06-27 | End: 2025-06-27 | Stop reason: SDUPTHER

## 2025-06-27 RX ORDER — MIDODRINE HYDROCHLORIDE 5 MG/1
5 TABLET ORAL 3 TIMES DAILY PRN
Status: DISCONTINUED | OUTPATIENT
Start: 2025-06-27 | End: 2025-07-03 | Stop reason: HOSPADM

## 2025-06-27 RX ORDER — FERROUS SULFATE 325(65) MG
325 TABLET, DELAYED RELEASE (ENTERIC COATED) ORAL 2 TIMES DAILY WITH MEALS
Status: DISCONTINUED | OUTPATIENT
Start: 2025-06-27 | End: 2025-07-01

## 2025-06-27 RX ORDER — SODIUM CHLORIDE 9 MG/ML
INJECTION, SOLUTION INTRAVENOUS PRN
Status: DISCONTINUED | OUTPATIENT
Start: 2025-06-27 | End: 2025-06-27 | Stop reason: HOSPADM

## 2025-06-27 RX ORDER — FENTANYL CITRATE 50 UG/ML
50 INJECTION, SOLUTION INTRAMUSCULAR; INTRAVENOUS ONCE
Status: COMPLETED | OUTPATIENT
Start: 2025-06-27 | End: 2025-06-27

## 2025-06-27 RX ORDER — SODIUM CHLORIDE 9 MG/ML
INJECTION, SOLUTION INTRAVENOUS PRN
Status: DISCONTINUED | OUTPATIENT
Start: 2025-06-27 | End: 2025-07-03 | Stop reason: HOSPADM

## 2025-06-27 RX ORDER — NALOXONE HYDROCHLORIDE 0.4 MG/ML
INJECTION, SOLUTION INTRAMUSCULAR; INTRAVENOUS; SUBCUTANEOUS PRN
Status: DISCONTINUED | OUTPATIENT
Start: 2025-06-27 | End: 2025-06-27 | Stop reason: HOSPADM

## 2025-06-27 RX ORDER — FENTANYL CITRATE 50 UG/ML
INJECTION, SOLUTION INTRAMUSCULAR; INTRAVENOUS
Status: COMPLETED
Start: 2025-06-27 | End: 2025-06-27

## 2025-06-27 RX ORDER — ROCURONIUM BROMIDE 10 MG/ML
INJECTION, SOLUTION INTRAVENOUS
Status: DISCONTINUED | OUTPATIENT
Start: 2025-06-27 | End: 2025-06-27 | Stop reason: SDUPTHER

## 2025-06-27 RX ORDER — FENTANYL CITRATE 50 UG/ML
50 INJECTION, SOLUTION INTRAMUSCULAR; INTRAVENOUS EVERY 5 MIN PRN
Status: DISCONTINUED | OUTPATIENT
Start: 2025-06-27 | End: 2025-06-27 | Stop reason: HOSPADM

## 2025-06-27 RX ORDER — SODIUM CHLORIDE, SODIUM LACTATE, POTASSIUM CHLORIDE, CALCIUM CHLORIDE 600; 310; 30; 20 MG/100ML; MG/100ML; MG/100ML; MG/100ML
INJECTION, SOLUTION INTRAVENOUS
Status: DISCONTINUED | OUTPATIENT
Start: 2025-06-27 | End: 2025-06-27 | Stop reason: SDUPTHER

## 2025-06-27 RX ORDER — TRIAMCINOLONE ACETONIDE 1 MG/G
OINTMENT TOPICAL 2 TIMES DAILY
Status: DISCONTINUED | OUTPATIENT
Start: 2025-06-27 | End: 2025-07-03 | Stop reason: HOSPADM

## 2025-06-27 RX ORDER — MIDAZOLAM HYDROCHLORIDE 2 MG/2ML
2 INJECTION, SOLUTION INTRAMUSCULAR; INTRAVENOUS ONCE
Status: COMPLETED | OUTPATIENT
Start: 2025-06-27 | End: 2025-06-27

## 2025-06-27 RX ORDER — OXYCODONE HYDROCHLORIDE 5 MG/1
5 TABLET ORAL EVERY 4 HOURS PRN
Status: DISCONTINUED | OUTPATIENT
Start: 2025-06-27 | End: 2025-07-03 | Stop reason: HOSPADM

## 2025-06-27 RX ORDER — ATORVASTATIN CALCIUM 40 MG/1
40 TABLET, FILM COATED ORAL NIGHTLY
Status: DISCONTINUED | OUTPATIENT
Start: 2025-06-27 | End: 2025-07-02

## 2025-06-27 RX ORDER — SODIUM CHLORIDE 0.9 % (FLUSH) 0.9 %
5-40 SYRINGE (ML) INJECTION PRN
Status: DISCONTINUED | OUTPATIENT
Start: 2025-06-27 | End: 2025-06-27 | Stop reason: HOSPADM

## 2025-06-27 RX ORDER — ASPIRIN 81 MG/1
81 TABLET, CHEWABLE ORAL 2 TIMES DAILY
Status: DISCONTINUED | OUTPATIENT
Start: 2025-06-28 | End: 2025-07-03 | Stop reason: HOSPADM

## 2025-06-27 RX ORDER — MULTIVITAMIN WITH IRON
500 TABLET ORAL DAILY
Status: DISCONTINUED | OUTPATIENT
Start: 2025-06-27 | End: 2025-07-03 | Stop reason: HOSPADM

## 2025-06-27 RX ORDER — SODIUM CHLORIDE 0.9 % (FLUSH) 0.9 %
5-40 SYRINGE (ML) INJECTION EVERY 12 HOURS SCHEDULED
Status: DISCONTINUED | OUTPATIENT
Start: 2025-06-27 | End: 2025-06-27 | Stop reason: HOSPADM

## 2025-06-27 RX ORDER — ESCITALOPRAM OXALATE 10 MG/1
20 TABLET ORAL DAILY
Status: DISCONTINUED | OUTPATIENT
Start: 2025-06-27 | End: 2025-07-02

## 2025-06-27 RX ORDER — LIDOCAINE HYDROCHLORIDE 10 MG/ML
1 INJECTION, SOLUTION INFILTRATION; PERINEURAL
Status: DISCONTINUED | OUTPATIENT
Start: 2025-06-27 | End: 2025-06-27 | Stop reason: HOSPADM

## 2025-06-27 RX ORDER — OXYCODONE HYDROCHLORIDE 10 MG/1
10 TABLET ORAL EVERY 4 HOURS PRN
Status: DISCONTINUED | OUTPATIENT
Start: 2025-06-27 | End: 2025-07-03 | Stop reason: HOSPADM

## 2025-06-27 RX ORDER — GADOTERIDOL 279.3 MG/ML
14 INJECTION INTRAVENOUS
Status: COMPLETED | OUTPATIENT
Start: 2025-06-27 | End: 2025-06-27

## 2025-06-27 RX ORDER — MAGNESIUM HYDROXIDE 1200 MG/15ML
LIQUID ORAL CONTINUOUS PRN
Status: DISCONTINUED | OUTPATIENT
Start: 2025-06-27 | End: 2025-06-27 | Stop reason: HOSPADM

## 2025-06-27 RX ORDER — SODIUM CHLORIDE 0.9 % (FLUSH) 0.9 %
5-40 SYRINGE (ML) INJECTION PRN
Status: DISCONTINUED | OUTPATIENT
Start: 2025-06-27 | End: 2025-07-03 | Stop reason: HOSPADM

## 2025-06-27 RX ORDER — LINEZOLID 600 MG/1
600 TABLET, FILM COATED ORAL EVERY 12 HOURS SCHEDULED
Status: DISCONTINUED | OUTPATIENT
Start: 2025-06-27 | End: 2025-06-28

## 2025-06-27 RX ORDER — BUPIVACAINE HYDROCHLORIDE 5 MG/ML
15 INJECTION, SOLUTION EPIDURAL; INTRACAUDAL; PERINEURAL ONCE
Status: DISCONTINUED | OUTPATIENT
Start: 2025-06-27 | End: 2025-06-27 | Stop reason: HOSPADM

## 2025-06-27 RX ORDER — MIDAZOLAM HYDROCHLORIDE 2 MG/2ML
1 INJECTION, SOLUTION INTRAMUSCULAR; INTRAVENOUS EVERY 10 MIN PRN
Status: DISCONTINUED | OUTPATIENT
Start: 2025-06-27 | End: 2025-06-27 | Stop reason: HOSPADM

## 2025-06-27 RX ORDER — AMIODARONE HYDROCHLORIDE 200 MG/1
200 TABLET ORAL DAILY
Status: DISCONTINUED | OUTPATIENT
Start: 2025-06-28 | End: 2025-07-03 | Stop reason: HOSPADM

## 2025-06-27 RX ORDER — SODIUM CHLORIDE 0.9 % (FLUSH) 0.9 %
5-40 SYRINGE (ML) INJECTION EVERY 12 HOURS SCHEDULED
Status: DISCONTINUED | OUTPATIENT
Start: 2025-06-27 | End: 2025-07-03 | Stop reason: HOSPADM

## 2025-06-27 RX ORDER — LIDOCAINE HYDROCHLORIDE 10 MG/ML
INJECTION, SOLUTION EPIDURAL; INFILTRATION; INTRACAUDAL; PERINEURAL
Status: DISCONTINUED | OUTPATIENT
Start: 2025-06-27 | End: 2025-06-27 | Stop reason: SDUPTHER

## 2025-06-27 RX ORDER — BISACODYL 5 MG/1
5 TABLET, DELAYED RELEASE ORAL DAILY PRN
Status: DISCONTINUED | OUTPATIENT
Start: 2025-06-27 | End: 2025-07-03 | Stop reason: HOSPADM

## 2025-06-27 RX ORDER — BUPIVACAINE HYDROCHLORIDE 5 MG/ML
INJECTION, SOLUTION EPIDURAL; INTRACAUDAL; PERINEURAL
Status: COMPLETED | OUTPATIENT
Start: 2025-06-27 | End: 2025-06-27

## 2025-06-27 RX ORDER — ACETAMINOPHEN 325 MG/1
650 TABLET ORAL EVERY 6 HOURS
Status: DISCONTINUED | OUTPATIENT
Start: 2025-06-27 | End: 2025-07-03 | Stop reason: HOSPADM

## 2025-06-27 RX ORDER — TRAZODONE HYDROCHLORIDE 50 MG/1
50 TABLET ORAL NIGHTLY
Status: DISCONTINUED | OUTPATIENT
Start: 2025-06-27 | End: 2025-07-03 | Stop reason: HOSPADM

## 2025-06-27 RX ORDER — CEFAZOLIN SODIUM 1 G/3ML
INJECTION, POWDER, FOR SOLUTION INTRAMUSCULAR; INTRAVENOUS
Status: DISCONTINUED | OUTPATIENT
Start: 2025-06-27 | End: 2025-06-27 | Stop reason: SDUPTHER

## 2025-06-27 RX ADMIN — FENTANYL CITRATE 50 MCG: 50 INJECTION INTRAMUSCULAR; INTRAVENOUS at 08:58

## 2025-06-27 RX ADMIN — SUGAMMADEX 200 MG: 100 INJECTION, SOLUTION INTRAVENOUS at 11:02

## 2025-06-27 RX ADMIN — FOLIC ACID 1 MG: 1 TABLET ORAL at 15:26

## 2025-06-27 RX ADMIN — TRAZODONE HYDROCHLORIDE 50 MG: 50 TABLET ORAL at 21:19

## 2025-06-27 RX ADMIN — FENTANYL CITRATE 50 MCG: 50 INJECTION, SOLUTION INTRAMUSCULAR; INTRAVENOUS at 08:58

## 2025-06-27 RX ADMIN — Medication 1200 MG: at 15:26

## 2025-06-27 RX ADMIN — SODIUM CHLORIDE: 0.9 INJECTION, SOLUTION INTRAVENOUS at 12:19

## 2025-06-27 RX ADMIN — LINEZOLID 600 MG: 600 TABLET, FILM COATED ORAL at 21:19

## 2025-06-27 RX ADMIN — CEFAZOLIN 2 G: 1 INJECTION, POWDER, FOR SOLUTION INTRAMUSCULAR; INTRAVENOUS at 10:21

## 2025-06-27 RX ADMIN — METOPROLOL TARTRATE 12.5 MG: 25 TABLET, FILM COATED ORAL at 21:19

## 2025-06-27 RX ADMIN — MIDAZOLAM HYDROCHLORIDE 1 MG: 1 INJECTION, SOLUTION INTRAMUSCULAR; INTRAVENOUS at 08:54

## 2025-06-27 RX ADMIN — OXYCODONE 5 MG: 5 TABLET ORAL at 15:08

## 2025-06-27 RX ADMIN — TRIAMCINOLONE ACETONIDE: 1 OINTMENT TOPICAL at 21:34

## 2025-06-27 RX ADMIN — ESCITALOPRAM OXALATE 20 MG: 10 TABLET ORAL at 15:26

## 2025-06-27 RX ADMIN — CYANOCOBALAMIN TAB 500 MCG 500 MCG: 500 TAB at 15:26

## 2025-06-27 RX ADMIN — SODIUM CHLORIDE, POTASSIUM CHLORIDE, SODIUM LACTATE AND CALCIUM CHLORIDE: 600; 310; 30; 20 INJECTION, SOLUTION INTRAVENOUS at 09:41

## 2025-06-27 RX ADMIN — GADOTERIDOL 14 ML: 279.3 INJECTION, SOLUTION INTRAVENOUS at 19:34

## 2025-06-27 RX ADMIN — PROPOFOL 200 MG: 10 INJECTION, EMULSION INTRAVENOUS at 09:43

## 2025-06-27 RX ADMIN — LIDOCAINE HYDROCHLORIDE 50 MG: 10 INJECTION, SOLUTION EPIDURAL; INFILTRATION; INTRACAUDAL; PERINEURAL at 09:43

## 2025-06-27 RX ADMIN — ONDANSETRON 4 MG: 2 INJECTION, SOLUTION INTRAMUSCULAR; INTRAVENOUS at 10:02

## 2025-06-27 RX ADMIN — CEFEPIME 2000 MG: 2 INJECTION, POWDER, FOR SOLUTION INTRAVENOUS at 19:43

## 2025-06-27 RX ADMIN — ACETAMINOPHEN 650 MG: 325 TABLET ORAL at 21:18

## 2025-06-27 RX ADMIN — ACETAMINOPHEN 650 MG: 325 TABLET ORAL at 15:08

## 2025-06-27 RX ADMIN — ATORVASTATIN CALCIUM 40 MG: 40 TABLET, FILM COATED ORAL at 21:18

## 2025-06-27 RX ADMIN — FERROUS SULFATE TAB EC 325 MG (65 MG FE EQUIVALENT) 325 MG: 325 (65 FE) TABLET DELAYED RESPONSE at 21:31

## 2025-06-27 RX ADMIN — DEXAMETHASONE SODIUM PHOSPHATE 10 MG: 10 INJECTION INTRAMUSCULAR; INTRAVENOUS at 10:02

## 2025-06-27 RX ADMIN — FENTANYL CITRATE 25 MCG: 50 INJECTION INTRAMUSCULAR; INTRAVENOUS at 12:16

## 2025-06-27 RX ADMIN — ROCURONIUM BROMIDE 30 MG: 10 INJECTION INTRAVENOUS at 09:48

## 2025-06-27 RX ADMIN — ROCURONIUM BROMIDE 20 MG: 10 INJECTION INTRAVENOUS at 10:04

## 2025-06-27 RX ADMIN — TROSPIUM CHLORIDE 20 MG: 20 TABLET, FILM COATED ORAL at 21:18

## 2025-06-27 RX ADMIN — FENTANYL CITRATE 100 MCG: 50 INJECTION, SOLUTION INTRAMUSCULAR; INTRAVENOUS at 09:50

## 2025-06-27 RX ADMIN — BUPIVACAINE HYDROCHLORIDE 15 ML: 5 INJECTION, SOLUTION EPIDURAL; INTRACAUDAL; PERINEURAL at 08:55

## 2025-06-27 RX ADMIN — BUPIVACAINE 10 ML: 13.3 INJECTION, SUSPENSION, LIPOSOMAL INFILTRATION at 08:55

## 2025-06-27 ASSESSMENT — PAIN DESCRIPTION - DESCRIPTORS
DESCRIPTORS: SPASM;STABBING
DESCRIPTORS: ACHING
DESCRIPTORS: ACHING
DESCRIPTORS: SPASM

## 2025-06-27 ASSESSMENT — PAIN SCALES - GENERAL
PAINLEVEL_OUTOF10: 5
PAINLEVEL_OUTOF10: 4
PAINLEVEL_OUTOF10: 1
PAINLEVEL_OUTOF10: 4
PAINLEVEL_OUTOF10: 4
PAINLEVEL_OUTOF10: 3
PAINLEVEL_OUTOF10: 4

## 2025-06-27 ASSESSMENT — PAIN DESCRIPTION - ORIENTATION
ORIENTATION: LEFT
ORIENTATION: LOWER;LEFT

## 2025-06-27 ASSESSMENT — PAIN - FUNCTIONAL ASSESSMENT
PAIN_FUNCTIONAL_ASSESSMENT: 0-10
PAIN_FUNCTIONAL_ASSESSMENT: ACTIVITIES ARE NOT PREVENTED
PAIN_FUNCTIONAL_ASSESSMENT: PREVENTS OR INTERFERES SOME ACTIVE ACTIVITIES AND ADLS

## 2025-06-27 ASSESSMENT — PAIN DESCRIPTION - FREQUENCY
FREQUENCY: INTERMITTENT
FREQUENCY: CONTINUOUS

## 2025-06-27 ASSESSMENT — PAIN DESCRIPTION - LOCATION
LOCATION: KNEE
LOCATION: LEG

## 2025-06-27 ASSESSMENT — PAIN DESCRIPTION - ONSET
ONSET: ON-GOING
ONSET: GRADUAL

## 2025-06-27 ASSESSMENT — PAIN DESCRIPTION - PAIN TYPE: TYPE: SURGICAL PAIN

## 2025-06-27 ASSESSMENT — ENCOUNTER SYMPTOMS: SHORTNESS OF BREATH: 1

## 2025-06-27 NOTE — CARE COORDINATION
Case Management   Daily Progress Note       Patient Name: Elaina Champagne                   YOB: 1950  Diagnosis: Wound infection complicating hardware [T84.7XXA]  Infection of deep incisional surgical site after procedure, initial encounter [T81.42XA]                         days  Length of Stay: 0  days    Anticipated Discharge Date: To be determined    Readmission Risk (Low < 19, Mod (19-27), High > 27): Readmission Risk Score: 16.5    Medical Mgt:  Cefepime       Workflow Continuation (Additional Notes) :  Referral to bioscript       EVE OSHEA RN  June 27, 2025

## 2025-06-27 NOTE — PLAN OF CARE
Patient:  Elaina Champagne  YOB: 1950     74 y.o. female    Orthopedic post-operative instructions    Elaina Champagne is a 74 y.o. female who is being seen for:    - Left knee surgical site infection s/p irrigation and excisional debridement with veraflow placement, POD#0    - Plan for OR with  for repeat irrigation and debridement with closure on 7/3/25.  -MRI of left knee  -Veraflow on left knee. Please measure and record output every shift. Okay to reinforce/maintain by nursing if necessary. Please notify Ortho if saturated or malfunctioning.  - WBAT  to the LLE.  - Complete post-op antibiotics: Ancef 2g q8h x2 doses, ID consulted for antibiotic recs.  - Diet: Ok for Adult diet from ortho perspective   - DVT ppx:  ASA 81mg BID  - Pain control and medical management per primary: Orthopedic surgery. Internal medicine consulted for assistance with medical management.  - Ice and elevate extremity for pain and swelling   -Please contact Ortho on call with any questions    Juan Jose Benítez DO  Orthopedic Surgery Resident, PGY-2  [place], [place]

## 2025-06-27 NOTE — ANESTHESIA PRE PROCEDURE
insufficiency I36.1   • Venous insufficiency of both lower extremities I87.2   • Lymphedema I89.0   • Venous ulcer of left leg ([facility]) I83.029, L97.929   • Hyperkalemia E87.5   • Cellulitis L03.90   • Leukocytosis D72.829   • Allergy to penicillin Z88.0   • Atrial fibrillation with RVR ([facility]) I48.91   • Permanent atrial fibrillation ([facility]) I48.21   • Renovascular hypertension I15.0   • Normocytic anemia D64.9   • Abscess of right foot L02.611   • Acute gastric ulcer with hemorrhage K25.0   • S/P cardiac cath Z98.890   • Coronary artery disease involving native coronary artery of native heart without angina pectoris I25.10   • Healing ulcers of both lower extremities, limited to breakdown of skin ([facility]) L97.911, L97.921   • Bilateral lower leg cellulitis L03.116, L03.115   • Class 3 severe obesity due to excess calories with serious comorbidity and body mass index (BMI) of 50.0 to 59.9 in adult ([facility]) E66.813, Z68.43   • Carotid stenosis, asymptomatic, bilateral I65.23   • Atherosclerotic heart disease of native coronary artery with unspecified angina pectoris I25.119   • Chronic kidney disease, stage 3b ([facility]) N18.32   • GI bleed K92.2   • S/P right coronary artery (RCA) stent placement Z95.5   • Lower GI hemorrhage K92.2   • Hematoma of leg, right, initial encounter S80.11XA   • Acute blood loss anemia D62   • Anemia D64.9   • Acute chest pain R07.9   • Pulmonary hypertension ([facility]) I27.20   • Acute heart failure with preserved ejection fraction ([facility]) I50.31   • S/P pericardial operation Z98.890   • Shortness of breath R06.02   • Chronic gastric ulcer with hemorrhage K25.4   • Acute coronary syndrome ([facility]) I24.9   • CMV infection, acute ([facility]) B25.9   • Infected hardware in left leg T84.7XXA   • Septic joint ([facility]) M00.9   • Acute hypoxic respiratory failure ([facility]) J96.01   • Acute on chronic diastolic (congestive) heart failure ([facility]) I50.33   • MRSA (methicillin resistant Staphylococcus aureus) infection A49.02   • Failed

## 2025-06-27 NOTE — PLAN OF CARE
Problem: Discharge Planning  Goal: Discharge to home or other facility with appropriate resources  Outcome: Progressing  Flowsheets (Taken 6/27/2025 1430)  Discharge to home or other facility with appropriate resources:   Refer to discharge planning if patient needs post-hospital services based on physician order or complex needs related to functional status, cognitive ability or social support system   Identify barriers to discharge with patient and caregiver     Problem: Chronic Conditions and Co-morbidities  Goal: Patient's chronic conditions and co-morbidity symptoms are monitored and maintained or improved  Outcome: Progressing  Flowsheets (Taken 6/27/2025 1430)  Care Plan - Patient's Chronic Conditions and Co-Morbidity Symptoms are Monitored and Maintained or Improved: Monitor and assess patient's chronic conditions and comorbid symptoms for stability, deterioration, or improvement     Problem: Pain  Goal: Verbalizes/displays adequate comfort level or baseline comfort level  Outcome: Progressing     Problem: Safety - Adult  Goal: Free from fall injury  Outcome: Progressing     Problem: ABCDS Injury Assessment  Goal: Absence of physical injury  Outcome: Progressing  Flowsheets (Taken 6/27/2025 1438)  Absence of Physical Injury: Implement safety measures based on patient assessment

## 2025-06-27 NOTE — FLOWSHEET NOTE
RN changed patient brief and connected ex cath to patient. Patient has beginning of pressure sore on coccyx. Pressure dressing applied in PACU

## 2025-06-27 NOTE — ANESTHESIA POSTPROCEDURE EVALUATION
Department of Anesthesiology  Postprocedure Note    Patient: Elaian Champagne  MRN: [id number]  YOB: 1950  Date of evaluation: 6/27/2025    Procedure Summary       Date: 06/27/25 Room / Location: [address]    Anesthesia Start: 0941 Anesthesia Stop: 1119    Procedure: KNEE IRRIGATION AND DEBRIDEMENT WITH WOUND VAC PLACEMENT. (Left) Diagnosis:       Wound infection complicating hardware      (Wound infection complicating hardware [T84.7XXA])    Surgeons: Rony Ren DO Responsible Provider: Lucas Rivas MD    Anesthesia Type: general ASA Status: 3            Anesthesia Type: No value filed.    Chase Phase I: Chase Score: 10    Chase Phase II:      Anesthesia Post Evaluation    Patient location during evaluation: PACU  Patient participation: complete - patient participated  Level of consciousness: awake and alert  Pain score: 0  Airway patency: patent  Nausea & Vomiting: no vomiting and no nausea  Cardiovascular status: hemodynamically stable  Respiratory status: acceptable  Hydration status: stable  Pain management: adequate    No notable events documented.

## 2025-06-27 NOTE — PROGRESS NOTES
MRI has not been done in PACU-MRI was not ready for patient. Will transport to floor. MRI aware of test

## 2025-06-27 NOTE — H&P
History and Physical    Pt Name: Elaina Champagne  MRN: [id number]  YOB: 1950  Date of evaluation: 6/27/2025  Primary Care Physician: Robin Ly MD    SUBJECTIVE:   History of Chief Complaint:    Elaina Champagne is a 74 y.o. female who is scheduled today for KNEE IRRIGATION AND DEBRIDEMENT WITH WOUND VAC PLACEMENT (PRE OP NERVE BLOCK, 3080 TABLE, SUPINE, WOUND VAC IN ROOM) - Left. Patient reports she had left knee replaced five months ago and shortly after this time she has been having left knee pain and drainage from the surgical site. She denies any numbness or tingling to her lower extremities.   Allergies  is allergic to vancomycin, norco [hydrocodone-acetaminophen], and penicillins.  Medications  Prior to Admission medications    Medication Sig Start Date End Date Taking? Authorizing Provider   pantoprazole (PROTONIX) 40 MG tablet Take 1 tablet by mouth 2 times daily as needed (reflux) 6/9/25  Yes Robin Ly MD   traZODone (DESYREL) 50 MG tablet Take 1 tablet by mouth nightly 6/9/25  Yes Robin Ly MD   vibegron (GEMTESA) 75 MG TABS tablet Take 1 tablet by mouth daily 4/3/25  Yes Dejon Garzon DO   aspirin 81 MG EC tablet Take 1 tablet by mouth in the morning and at bedtime 2/3/25  Yes Elsa Torres MD   furosemide (LASIX) 40 MG tablet Take 1 tablet by mouth daily 12/31/24  Yes Robin Ly MD   ferrous sulfate (IRON 325) 325 (65 Fe) MG tablet Take 1 tablet by mouth with breakfast and with evening meal 12/12/24  Yes Kris Jon, APRN - CNP   amiodarone (CORDARONE) 200 MG tablet Take 1 tablet by mouth daily 11/6/24  Yes Robin Ly MD   atorvastatin (LIPITOR) 40 MG tablet Take 1 tablet by mouth nightly 11/6/24  Yes Robin Ly MD   calcium carbonate (OYSTER SHELL CALCIUM 500 MG) 1250 (500 Ca) MG tablet Take 1 tablet by mouth daily 11/6/24  Yes Robin Ly MD   escitalopram (LEXAPRO) 20 MG tablet Take 1 tablet by mouth daily  Morbid obesity (HCC), Nonrheumatic tricuspid (valve) insufficiency, NSTEMI (non-ST elevated myocardial infarction) (HCC), Occlusion and stenosis of bilateral carotid arteries, Osteoarthritis, Overactive bladder, Paroxysmal atrial fibrillation (HCC), Under care of team, Under care of team, Under care of team, Under care of team, Under care of team, Under care of team, Urge incontinence, Urine retention, UTI (urinary tract infection), Venous insufficiency, and Weakness.  Past Surgical History   has a past surgical history that includes Hysterectomy, total abdominal (1990); Cataract removal (Bilateral, 2015); incision and drainage (Left, 07/06/2017); Total knee arthroplasty (Left, 11/09/2017); Knee Arthroplasty (Right, 09/24/2018); ventral hernia repair (02/11/2019); IR NONTUNNELED VASCULAR CATHETER > 5 YEARS (12/15/2023); Upper gastrointestinal endoscopy (N/A, 12/28/2023); Cardiac procedure (N/A, 01/02/2024); Cardiac procedure (N/A, 01/04/2024); Upper gastrointestinal endoscopy (N/A, 05/02/2024); Coronary angioplasty with stent (05/06/2024); Cardiac procedure (N/A, 05/06/2024); Cardiac procedure (N/A, 07/03/2024); Wound debridement (Left, 01/29/2025); and Total knee arthroplasty (Left, 1/29/2025).  Social History   reports that she has never smoked. She has never been exposed to tobacco smoke. She has never used smokeless tobacco.   reports no history of alcohol use.   reports no history of drug use.  Marital Status    Occupation retired   Family History  Family Status   Relation Name Status    Mother  (Not Specified)    Father  (Not Specified)    Blair  (Not Specified)    MAunt  (Not Specified)    Other  (Not Specified)   No partnership data on file     family history includes Cancer in her daughter, mother, and another family member; Diabetes in her maternal aunt; High Blood Pressure in her father; Ovarian Cancer in her daughter; Stroke in her father.    Review of Systems:  CONSTITUTIONAL:   negative for

## 2025-06-27 NOTE — CONSULTS
[facility] - Initial Consult Note  Today's Date and Time: 6/27/2025, 5:13 PM    Impression :   Lt  knee infection with MRSA  S/P I&D and Veraflow VAC placement on 6-27-25  Plans for repeat I&D, irrigation and closure on 7-3-25  Cultures;  1-29-25: MRSA and Pseudomonas  3-26-25: Pseudomonas  4-8-25: Pseudomonas   6-27-25: Pending  Hx of left knee replacement at [facility] in 2017  Fall injuring left knee in December 2024  Diastolic CHF  Essential HTN  Venous insufficiency of legs  CKD stage III B  PAF  Pulmonary HTN  Allergy to vancomycin and to penicillins    Recommendations:   Avoid vancomycin   Cefepime   Linezolid     Medical Decision Making/Summary/Discussion:6/27/2025     Daily Elements of Decision Making provided by Consulting Physician:    Note: I have independently performed the steps listed below as part of the medical decision making and evaluation.    Review of current Problems:  Today I am seeing and examining the patient for the following problems:    Evaluation of Patient:  Please see daily details in Interval Changes Section  Changes in physical exam:  Please see daily details in Physical Exam section and in Interval Changes Section  Changes in ROS:  Please see daily details in Review of Symptoms section and in Interval Changes Section  Discussion with Referring Physician or Service     Laboratory and Radiologic Studies with personal review of radiologic studies  Laboratory  Radiology  Microbiology  Please see Details in daily Interval Changes Section devoted to Lab, Micro and Radiology and in Medical Decision Laboratory, Imaging and Cultures sections.  Review of Infection Control and Prevention measures:  Please see daily details in Infection Control Recommendations section  Administer medications as ordered:  Review of Antimicrobial Stewardship Measures:  Please see daily details in Antimicrobial Stewardship Recommendations section   Prognosis:  Review of Discharge

## 2025-06-27 NOTE — BRIEF OP NOTE
Brief Postoperative Note      Patient: Elaina Champagne  YOB: 1950  MRN: [id number]    Date of Procedure: 6/27/2025    Pre-Op Diagnosis Codes:   1.) Left knee surgical site infection    Post-Op Diagnosis:   Left knee surgical site infection       Procedure(s):  KNEE IRRIGATION AND DEBRIDEMENT DOWN TO AND INCLUDING FASCIA, 3 x 2 cm, WITH WOUND VAC PLACEMENT.    Surgeon(s):  Rony Ren DO    Assistant:  Resident: Dagoberto Starkey DO; Juan Jose Benítez DO    Anesthesia: General    Estimated Blood Loss (mL): 25 mL     Fluids (ml): 700 cc crystalloid    Complications: None    Specimens:   ID Type Source Tests Collected by Time Destination   1 : left knee tissue Tissue Joint, Knee CULTURE, ANAEROBIC AND AEROBIC Rony Ren DO 6/27/2025 1022    2 : LEFT KNEE TISSUE Tissue Joint, Knee CULTURE, FUNGUS, FUNGAL STAIN (Canceled), CULTURE WITH SMEAR, ACID FAST BACILLIUS, CULTURE, ANAEROBIC AND AEROBIC Rony Ren DO 6/27/2025 1025        Implants:  * No implants in log *      Drains: * No LDAs found *    Findings:  Infection Present At Time Of Surgery (PATOS) (choose all levels that have infection present):  No infection present  Other Findings: See op note    Electronically signed by Juan Jose Benítez DO on 6/27/2025 at 11:02 AM

## 2025-06-27 NOTE — CONSULTS
[facility]  Office: [phone]  Nael Morgan DO, Ronny Quevedo, DO, Pb Vásquez DO, Refugio Bradley, DO, Sheldon Smith MD, Elsa Torres MD, Leandro Espinosa MD, Joann Hauser MD,  Dipak Montana MD, Rob Hernandez MD, Swathi Wells MD,  Gracia Chen DO, Pilar So MD, Kayden Walters MD, Orlando Morgan DO, Yamilex Luciano MD,  Mando Burden DO, Georgie Workman MD, Jennifer Hill MD, Gloria Sierra MD,  Rafael Rawls MD, Denilson Murphy MD, Kamran Flores MD, Yazan Hughes MD, Theodore Grossman MD, Jaxon Villar MD, Aj Bses, DO, Vita Andrade MD, Anne-Marie Pierce DO, Arvin Powers MD, Gracia Lockwood MD, Mohsin Reza, MD, Kip Negron MD, Shirley Waterhouse, CNP,  Nidia Lester, CNP, Aj Castro, CNP,  Erin Shaw, JASON, Ivis Senior, CNP, Georgiana Freeman, CNP, Diandra Ruffin, CNP, Marilia Parker, CNP, Maureen Matamoros, PA-C, Sandrita Cruz, CNP, Veronica Alonzo, CNP,  Maryann Page, CNP, Coretta Rosario, CNP, Zuhair Lozano, PA-C, Rowan Piña, PA-C, Areli Ware, CNP,        Radhika Gillespie, CNS, Guadalupe Jaimes, CNP, Sarah Shi, CNP         [facility]   IN-PATIENT SERVICE   [facility]    CONSULTATION / HISTORY AND PHYSICAL EXAMINATION            Date:   6/27/2025  Patient name:  Elaina Champagne  Date of admission:  6/27/2025  7:00 AM  MRN:   [id number]  Account:  [id number]  YOB: 1950  PCP:    Robin Ly MD  Room:   0249/0249-01  Code Status:    Full Code    Physician Requesting Consult: Rony Ren DO    Reason for Consult:  medical management    Chief Complaint:     Left knee pain    History Obtained From:     patient    History of Present Illness:     74-year-old female past medical history of paroxysmal atrial, pulmonary hypertension, venous insufficiency lower extremities, depression, CKD stage IIIb, diastolic heart failure, history of MRSA infection presents with knee pain and drainage.  Patient  6/13/2025 11/6/24   Robin Ly MD   midodrine (PROAMATINE) 2.5 MG tablet Take 2 tablets by mouth 3 times daily as needed (Give for sustained SBP < 110 mmHg.) 11/6/24   Robin Ly MD   miconazole (MICOTIN) 2 % powder Apply topically 2 times daily. 1/5/24   Aj Mendez MD        Allergies:     Vancomycin, Norco [hydrocodone-acetaminophen], and Penicillins    Social History:     Tobacco:    reports that she has never smoked. She has never been exposed to tobacco smoke. She has never used smokeless tobacco.  Alcohol:      reports no history of alcohol use.  Drug Use:  reports no history of drug use.    Family History:     Family History   Problem Relation Age of Onset    Cancer Mother     High Blood Pressure Father     Stroke Father     Ovarian Cancer Daughter     Cancer Daughter     Diabetes Maternal Aunt     Cancer Other         daughter/ovarian cancer       Review of Systems:     Positive and Negative as described in HPI.    CONSTITUTIONAL:  negative for fevers, chills, sweats, fatigue, weight loss  HEENT:  negative for vision, hearing changes, runny nose, throat pain  RESPIRATORY:  negative for shortness of breath, cough, congestion, wheezing.  CARDIOVASCULAR:  negative for chest pain, palpitations.  GASTROINTESTINAL:  negative for nausea, vomiting, diarrhea, constipation, change in bowel habits, abdominal pain   GENITOURINARY:  negative for difficulty of urination, burning with urination, frequency   INTEGUMENT:  negative for rash, skin lesions, easy bruising   HEMATOLOGIC/LYMPHATIC:  negative for swelling/edema   ALLERGIC/IMMUNOLOGIC:  negative for urticaria , itching  ENDOCRINE:  negative increase in drinking, increase in urination, hot or cold intolerance  MUSCULOSKELETAL:  negative joint pains, muscle aches, swelling of joints  NEUROLOGICAL:  negative for headaches, dizziness, lightheadedness, numbness, pain, tingling extremities  BEHAVIOR/PSYCH:  negative for depression,

## 2025-06-28 PROBLEM — Z22.322 MRSA (METHICILLIN RESISTANT STAPH AUREUS) CULTURE POSITIVE: Status: ACTIVE | Noted: 2025-06-28

## 2025-06-28 LAB
ANION GAP SERPL CALCULATED.3IONS-SCNC: 9 MMOL/L (ref 9–16)
BUN SERPL-MCNC: 18 MG/DL (ref 8–23)
CALCIUM SERPL-MCNC: 8.9 MG/DL (ref 8.6–10.4)
CHLORIDE SERPL-SCNC: 101 MMOL/L (ref 98–107)
CK SERPL-CCNC: 30 U/L (ref 26–192)
CO2 SERPL-SCNC: 25 MMOL/L (ref 20–31)
CREAT SERPL-MCNC: 0.8 MG/DL (ref 0.6–0.9)
ERYTHROCYTE [DISTWIDTH] IN BLOOD BY AUTOMATED COUNT: 14.6 % (ref 11.8–14.4)
GFR, ESTIMATED: 78 ML/MIN/1.73M2
GLUCOSE SERPL-MCNC: 119 MG/DL (ref 74–99)
HCT VFR BLD AUTO: 28.5 % (ref 36.3–47.1)
HGB BLD-MCNC: 9.1 G/DL (ref 11.9–15.1)
MCH RBC QN AUTO: 28.3 PG (ref 25.2–33.5)
MCHC RBC AUTO-ENTMCNC: 31.9 G/DL (ref 28.4–34.8)
MCV RBC AUTO: 88.8 FL (ref 82.6–102.9)
NRBC BLD-RTO: 0 PER 100 WBC
PLATELET # BLD AUTO: 214 K/UL (ref 138–453)
PMV BLD AUTO: 10.8 FL (ref 8.1–13.5)
POTASSIUM SERPL-SCNC: 4.9 MMOL/L (ref 3.7–5.3)
RBC # BLD AUTO: 3.21 M/UL (ref 3.95–5.11)
SODIUM SERPL-SCNC: 135 MMOL/L (ref 136–145)
WBC OTHER # BLD: 2.8 K/UL (ref 3.5–11.3)

## 2025-06-28 PROCEDURE — 36415 COLL VENOUS BLD VENIPUNCTURE: CPT

## 2025-06-28 PROCEDURE — 6360000002 HC RX W HCPCS: Performed by: NURSE PRACTITIONER

## 2025-06-28 PROCEDURE — 97530 THERAPEUTIC ACTIVITIES: CPT

## 2025-06-28 PROCEDURE — 99232 SBSQ HOSP IP/OBS MODERATE 35: CPT | Performed by: INTERNAL MEDICINE

## 2025-06-28 PROCEDURE — 2580000003 HC RX 258: Performed by: INTERNAL MEDICINE

## 2025-06-28 PROCEDURE — 85027 COMPLETE CBC AUTOMATED: CPT

## 2025-06-28 PROCEDURE — 80048 BASIC METABOLIC PNL TOTAL CA: CPT

## 2025-06-28 PROCEDURE — 6370000000 HC RX 637 (ALT 250 FOR IP)

## 2025-06-28 PROCEDURE — 6360000002 HC RX W HCPCS: Performed by: INTERNAL MEDICINE

## 2025-06-28 PROCEDURE — G0545 PR INHERENT VISIT TO INPT: HCPCS | Performed by: INTERNAL MEDICINE

## 2025-06-28 PROCEDURE — 99232 SBSQ HOSP IP/OBS MODERATE 35: CPT | Performed by: NURSE PRACTITIONER

## 2025-06-28 PROCEDURE — 1200000000 HC SEMI PRIVATE

## 2025-06-28 PROCEDURE — 6370000000 HC RX 637 (ALT 250 FOR IP): Performed by: INTERNAL MEDICINE

## 2025-06-28 PROCEDURE — 97166 OT EVAL MOD COMPLEX 45 MIN: CPT

## 2025-06-28 PROCEDURE — 6370000000 HC RX 637 (ALT 250 FOR IP): Performed by: STUDENT IN AN ORGANIZED HEALTH CARE EDUCATION/TRAINING PROGRAM

## 2025-06-28 PROCEDURE — 82550 ASSAY OF CK (CPK): CPT

## 2025-06-28 PROCEDURE — 97162 PT EVAL MOD COMPLEX 30 MIN: CPT

## 2025-06-28 PROCEDURE — 97535 SELF CARE MNGMENT TRAINING: CPT

## 2025-06-28 RX ORDER — HYDRALAZINE HYDROCHLORIDE 20 MG/ML
5 INJECTION INTRAMUSCULAR; INTRAVENOUS ONCE
Status: COMPLETED | OUTPATIENT
Start: 2025-06-28 | End: 2025-06-28

## 2025-06-28 RX ADMIN — HYDRALAZINE HYDROCHLORIDE 5 MG: 20 INJECTION INTRAMUSCULAR; INTRAVENOUS at 03:34

## 2025-06-28 RX ADMIN — TRIAMCINOLONE ACETONIDE: 1 OINTMENT TOPICAL at 21:37

## 2025-06-28 RX ADMIN — ASPIRIN 81 MG: 81 TABLET, CHEWABLE ORAL at 21:37

## 2025-06-28 RX ADMIN — OXYCODONE 5 MG: 5 TABLET ORAL at 14:05

## 2025-06-28 RX ADMIN — FOLIC ACID 1 MG: 1 TABLET ORAL at 09:15

## 2025-06-28 RX ADMIN — TRIAMCINOLONE ACETONIDE: 1 OINTMENT TOPICAL at 09:17

## 2025-06-28 RX ADMIN — CYANOCOBALAMIN TAB 500 MCG 500 MCG: 500 TAB at 09:16

## 2025-06-28 RX ADMIN — ACETAMINOPHEN 650 MG: 325 TABLET ORAL at 14:04

## 2025-06-28 RX ADMIN — ACETAMINOPHEN 650 MG: 325 TABLET ORAL at 00:32

## 2025-06-28 RX ADMIN — ACETAMINOPHEN 650 MG: 325 TABLET ORAL at 21:37

## 2025-06-28 RX ADMIN — Medication 1200 MG: at 09:16

## 2025-06-28 RX ADMIN — AMIODARONE HYDROCHLORIDE 200 MG: 200 TABLET ORAL at 09:15

## 2025-06-28 RX ADMIN — FERROUS SULFATE TAB EC 325 MG (65 MG FE EQUIVALENT) 325 MG: 325 (65 FE) TABLET DELAYED RESPONSE at 18:20

## 2025-06-28 RX ADMIN — FERROUS SULFATE TAB EC 325 MG (65 MG FE EQUIVALENT) 325 MG: 325 (65 FE) TABLET DELAYED RESPONSE at 09:16

## 2025-06-28 RX ADMIN — METOPROLOL TARTRATE 12.5 MG: 25 TABLET, FILM COATED ORAL at 09:16

## 2025-06-28 RX ADMIN — ACETAMINOPHEN 650 MG: 325 TABLET ORAL at 09:15

## 2025-06-28 RX ADMIN — ASPIRIN 81 MG: 81 TABLET, CHEWABLE ORAL at 09:16

## 2025-06-28 RX ADMIN — CEFEPIME 2000 MG: 2 INJECTION, POWDER, FOR SOLUTION INTRAVENOUS at 05:35

## 2025-06-28 RX ADMIN — TROSPIUM CHLORIDE 20 MG: 20 TABLET, FILM COATED ORAL at 21:37

## 2025-06-28 RX ADMIN — DAPTOMYCIN 275 MG: 500 INJECTION, POWDER, LYOPHILIZED, FOR SOLUTION INTRAVENOUS at 18:18

## 2025-06-28 RX ADMIN — LINEZOLID 600 MG: 600 TABLET, FILM COATED ORAL at 09:16

## 2025-06-28 RX ADMIN — CEFEPIME 2000 MG: 2 INJECTION, POWDER, FOR SOLUTION INTRAVENOUS at 19:01

## 2025-06-28 RX ADMIN — TRAZODONE HYDROCHLORIDE 50 MG: 50 TABLET ORAL at 21:37

## 2025-06-28 ASSESSMENT — PAIN SCALES - GENERAL
PAINLEVEL_OUTOF10: 2
PAINLEVEL_OUTOF10: 4
PAINLEVEL_OUTOF10: 2
PAINLEVEL_OUTOF10: 4

## 2025-06-28 NOTE — PROGRESS NOTES
[facility]  Office: [phone]  Nael Morgan DO, Ronny Quevedo, DO, Pb Vásquez DO, Refugio Bradley, DO, Sheldon Smith MD, Elsa Torres MD, Leandro Espinosa MD, Joann Hauser MD,  Dipak Montana MD, Rob Hernandez MD, Swathi Wells MD,  Gracia Chen DO, Pilar So MD, Kayden Walters MD, Orlando Morgan DO, Yamilex Luciano MD,  Mando Burden DO, Georgie Workman MD, Jennifer Hill MD, Gloria Sierra MD,  Rafael Rawls MD, Denilson Murphy MD, Kamran Flores MD, Yazan Hughes MD, Theodore Grossman MD, Jaxon Villar MD, Aj Bess, DO, Vita Andrade MD, Anne-Marie Pierce DO, Arvin Powers MD, Gracia Lockwood MD, Mohsin Reza, MD, Kip Negron MD, Shirley Waterhouse, CNP,  Nidia Lester, CNP, Aj Castro, CNP,  Erin Shaw, JASON, Ivis Senior, CNP, Georgiana Freeman, CNP, Diandra Ruffin, CNP, Marilia Parker, CNP, Maureen Matamoros, PA-C, Sandrita Cruz, CNP, Veronica Alonzo, CNP,  Maryann Page, CNP, Coretta Rosario, CNP, Zuhair Lozano, PA-C, Rowan Piña, PA-C, Areli Ware, CNP,        Radhika Gillespie, CNS, Guadalupe Jaimes, CNP, Sarah Shi, CNP         [facility]   IN-PATIENT SERVICE   [facility]    Progress Note    6/28/2025    8:21 AM    Name:   Elaina Champagne  MRN:     [id number]     Acct:      [id number]   Room:   0249/0249-01   Day:  1  Admit Date:  6/27/2025  7:00 AM    PCP:   Robin Ly MD  Code Status:  Full Code    Subjective:     C/C: Left knee pain    Interval History Status: not changed.     Up in chair.  Reporting some achy pain to left knee intermittently--tolerating with current pain regime. VS and available labs reviewed. Afebrile. B/P stable.     Brief History:     Elaina Champagne is a pleasant 74-year old female who was admitted post orthopedic intervention for  I & D left knee  2/2 surgical hardware infection with application of wound vac and irrigation.     She has a history of left TKA in 2017/right TKA 2018 at [facility].

## 2025-06-28 NOTE — PLAN OF CARE
Problem: Discharge Planning  Goal: Discharge to home or other facility with appropriate resources  6/28/2025 0141 by Perlita Cortez RN  Outcome: Progressing  6/27/2025 1615 by Linda Renteria RN  Outcome: Progressing  Flowsheets (Taken 6/27/2025 1430)  Discharge to home or other facility with appropriate resources:   Refer to discharge planning if patient needs post-hospital services based on physician order or complex needs related to functional status, cognitive ability or social support system   Identify barriers to discharge with patient and caregiver     Problem: Chronic Conditions and Co-morbidities  Goal: Patient's chronic conditions and co-morbidity symptoms are monitored and maintained or improved  6/28/2025 0141 by Perlita Cortez RN  Outcome: Progressing  6/27/2025 1615 by Linda Renteria RN  Outcome: Progressing  Flowsheets (Taken 6/27/2025 1430)  Care Plan - Patient's Chronic Conditions and Co-Morbidity Symptoms are Monitored and Maintained or Improved: Monitor and assess patient's chronic conditions and comorbid symptoms for stability, deterioration, or improvement     Problem: Pain  Goal: Verbalizes/displays adequate comfort level or baseline comfort level  6/28/2025 0141 by Perlita Cortez RN  Outcome: Progressing  6/27/2025 1615 by Linda Renteria RN  Outcome: Progressing     Problem: Safety - Adult  Goal: Free from fall injury  6/28/2025 0141 by Perlita Cortez RN  Outcome: Progressing  6/27/2025 1615 by Linda Renteria RN  Outcome: Progressing     Problem: ABCDS Injury Assessment  Goal: Absence of physical injury  6/28/2025 0141 by Perlita Cortez RN  Outcome: Progressing  6/27/2025 1615 by Linda Renteria RN  Outcome: Progressing  Flowsheets (Taken 6/27/2025 1438)  Absence of Physical Injury: Implement safety measures based on patient assessment

## 2025-06-28 NOTE — CARE COORDINATION
Case Management Assessment  Initial Evaluation    Date/Time of Evaluation: 6/28/2025 6:59 PM  Assessment Completed by: EVE OSHEA RN    If patient is discharged prior to next notation, then this note serves as note for discharge by case management.    Patient Name: Elaina Champagne                   YOB: 1950  Diagnosis: Wound infection complicating hardware [T84.7XXA]  Infection of deep incisional surgical site after procedure, initial encounter [T81.42XA]                   Date / Time: 6/27/2025  7:00 AM    Patient Admission Status: Inpatient   Readmission Risk (Low < 19, Mod (19-27), High > 27): Readmission Risk Score: 19.8    Current PCP: Robin Ly MD  PCP verified by CM? Yes    Chart Reviewed: Yes      History Provided by: Patient  Patient Orientation: Alert and Oriented    Patient Cognition: Alert    Hospitalization in the last 30 days (Readmission):  No    If yes, Readmission Assessment in CM Navigator will be completed.    Advance Directives:      Code Status: Full Code   Patient's Primary Decision Maker is: Legal Next of Kin    Primary Decision Maker: Eze Champagne - Spouse - [phone]    Discharge Planning:    Patient lives with: Spouse/Significant Other Type of Home: Trailer/Mobile Home  Primary Care Giver: Spouse  Patient Support Systems include: Spouse/Significant Other   Current Financial resources: Medicare  Current community resources: None  Current services prior to admission: [facility])            Current DME:  shower chair, toilet raiser, grab bars toilet/shower, RW            Type of Home Care services:  OT, PT, Nursing Services    ADLS  Prior functional level: Assistance with the following:, Bathing  Current functional level: Assistance with the following:, Bathing    PT AM-PAC: 19 /24  OT AM-PAC: 20 /24    Family can provide assistance at DC: Yes  Would you like Case Management to discuss the discharge plan with any other family members/significant

## 2025-06-28 NOTE — PROGRESS NOTES
Occupational Therapy Initial Evaluation  Facility/Department: [address] ORTHO/MED SURG   Patient Name: Elaina Champagne        MRN: [id number]    : 1950    Date of Service: 2025    No chief complaint on file.    Past Medical History:  has a past medical history of Anemia, Anxiety disorder, Arthritis, Artificial knee joint present, left, Artificial knee joint present, right, Atherosclerotic heart disease, Bell's palsy, Burning with urination, Cellulitis, Chronic kidney disease, stage 3 ([facility]), Cognitive communication deficit, Depression, Fall, Frequent urination, Gastric ulcer with hemorrhage, Gastro-esophageal reflux disease without esophagitis, Hyperlipidemia, Hypertension, Insomnia, Joint replaced, Leaking of urine, Lymphedema, Major depressive disorder, Morbid obesity ([facility]), Nonrheumatic tricuspid (valve) insufficiency, NSTEMI (non-ST elevated myocardial infarction) ([facility]), Occlusion and stenosis of bilateral carotid arteries, Osteoarthritis, Overactive bladder, Paroxysmal atrial fibrillation ([facility]), Under care of team, Under care of team, Under care of team, Under care of team, Under care of team, Under care of team, Urge incontinence, Urine retention, UTI (urinary tract infection), Venous insufficiency, and Weakness.  Past Surgical History:  has a past surgical history that includes Hysterectomy, total abdominal (); Cataract removal (Bilateral, ); incision and drainage (Left, 2017); Total knee arthroplasty (Left, 2017); Knee Arthroplasty (Right, 2018); ventral hernia repair (2019); IR NONTUNNELED VASCULAR CATHETER > 5 YEARS (12/15/2023); Upper gastrointestinal endoscopy (N/A, 2023); Cardiac procedure (N/A, 2024); Cardiac procedure (N/A, 2024); Upper gastrointestinal endoscopy (N/A, 2024); Coronary angioplasty with stent (2024); Cardiac procedure (N/A, 2024); Cardiac procedure (N/A, 2024); Wound debridement (Left, 2025); Total    Objective  Orientation  Overall Orientation Status: Within Functional Limits  Cognition  Overall Cognitive Status: WFL    Activities of Daily Living  Feeding: Independent  Grooming: Modified independent   UE Bathing: Stand by assistance  LE Bathing: Minimal assistance  UE Dressing: Stand by assistance (pt donned gown around back while sitting on EOB with SBA)  LE Dressing: Minimal assistance  Toileting: Contact guard assistance  All ADLs completed during session discussed above, otherwise clinical reasoning/judgement utilized for all other assist levels.    Balance  Balance  Sitting: Intact (~15 minutes on eOB and in chair)  Standing: Intact (~4 minutes. pt completed static standing at bedside, as well as functional mobility; all with CGA and RW)    Transfers/Mobility  Bed mobility  Supine to Sit: Stand by assistance  Sit to Supine:  (pt retired to chair at end of session)  Scooting: Stand by assistance          Transfers  Sit to stand: Contact guard assistance  Stand to sit: Contact guard assistance  Transfer Comments: from EOB with RW         Functional Mobility: Contact guard assistance  Functional Mobility Skilled Clinical Factors: pt completed functional mobility in hallway to simulate household distances with CGA and RW. pt with no LOB or buckling observed          Patient Education  Patient Education  Education Given To: Patient  Education Provided: Role of Therapy;Plan of Care;Transfer Training  Education Method: Verbal  Barriers to Learning: None  Education Outcome: Verbalized understanding    Goals  Short Term Goals  Time Frame for Short Term Goals: pt will, by discharge  Short Term Goal 1: complete LB ADLs and toileting tasks with SBA and AE, as needed  Short Term Goal 2: complete UB ADLs with mod I  Short Term Goal 3: dem SBA during functional transfers/functional mobility with LRAD, as needed  Short Term Goal 4: dem ~8 minutes dynamic standing tolerance with SBA in order to complete functiona

## 2025-06-28 NOTE — PROGRESS NOTES
Physical Therapy  Facility/Department: [address] ORTHO/MED SURG   Physical Therapy Initial Evaluation    Patient Name: Elaina Champagne        MRN: [id number]    : 1950    Date of Service: 2025    No chief complaint on file.    Past Medical History:  has a past medical history of Anemia, Anxiety disorder, Arthritis, Artificial knee joint present, left, Artificial knee joint present, right, Atherosclerotic heart disease, Bell's palsy, Burning with urination, Cellulitis, Chronic kidney disease, stage 3 ([facility]), Cognitive communication deficit, Depression, Fall, Frequent urination, Gastric ulcer with hemorrhage, Gastro-esophageal reflux disease without esophagitis, Hyperlipidemia, Hypertension, Insomnia, Joint replaced, Leaking of urine, Lymphedema, Major depressive disorder, Morbid obesity ([facility]), Nonrheumatic tricuspid (valve) insufficiency, NSTEMI (non-ST elevated myocardial infarction) ([facility]), Occlusion and stenosis of bilateral carotid arteries, Osteoarthritis, Overactive bladder, Paroxysmal atrial fibrillation ([facility]), Under care of team, Under care of team, Under care of team, Under care of team, Under care of team, Under care of team, Urge incontinence, Urine retention, UTI (urinary tract infection), Venous insufficiency, and Weakness.  Past Surgical History:  has a past surgical history that includes Hysterectomy, total abdominal (); Cataract removal (Bilateral, ); incision and drainage (Left, 2017); Total knee arthroplasty (Left, 2017); Knee Arthroplasty (Right, 2018); ventral hernia repair (2019); IR NONTUNNELED VASCULAR CATHETER > 5 YEARS (12/15/2023); Upper gastrointestinal endoscopy (N/A, 2023); Cardiac procedure (N/A, 2024); Cardiac procedure (N/A, 2024); Upper gastrointestinal endoscopy (N/A, 2024); Coronary angioplasty with stent (2024); Cardiac procedure (N/A, 2024); Cardiac procedure (N/A, 2024); Wound debridement (Left,  LLE (degrees)  LLE AROM : WFL  AROM RUE (degrees)  RUE AROM : WNL  AROM LUE (degrees)  LUE AROM : WNL          Strength RLE  Strength RLE: WFL  Strength LLE  Comment: N/T  Strength RUE  Strength RUE: WFL  Strength LUE  Strength LUE: WFL    Mobility   Bed mobility  Supine to Sit: Contact guard assistance  Sit to Supine: Contact guard assistance  Scooting: Contact guard assistance         Transfers  Sit to Stand: Contact guard assistance  Stand to Sit: Contact guard assistance    Ambulation  Surface: Level tile  Device: Rolling Walker  Assistance: Contact guard assistance  Distance: amb 75 ft with a RW x CGA with WBAT L LE       Balance   Balance  Posture: Good  Sitting - Static: Good  Sitting - Dynamic: Fair  Standing - Static: Fair  Standing - Dynamic: Fair    Exercise       Patient Education  Patient Education  Education Given To: Patient  Education Provided: Role of Therapy;Plan of Care  Education Method: Verbal  Barriers to Learning: None  Education Outcome: Verbalized understanding    Plan  Physical Therapy Plan  General Plan: 6-7 times per week  Current Treatment Recommendations: Strengthening, Balance training, Functional mobility training, Transfer training, Gait training, Endurance training, Stair training, Safety education & training, Therapeutic activities    Goals  Short Term Goals  Time Frame for Short Term Goals: 10 visits  Short Term Goal 1: transfers with SBA  Short Term Goal 2: amb 125 ft with a RW x SBA with WBAT L LE  Short Term Goal 3: ascend/descend 4 steps with SBA  Short Term Goal 4: 20 min strengthening exercises x SBA    Minutes  PT Concurrent Minutes  Time In: 1002  Time Out: 1023  Minutes: 21    Electronically signed by Levon Phillips PT on 6/28/25 at 11:05 AM EDT

## 2025-06-28 NOTE — PROGRESS NOTES
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 Ren    Infection Control Recommendations   [place] Precautions  Contact Isolation     Antimicrobial Stewardship Recommendations   Simplification of therapy  Targeted therapy    History of Present Illness:   Initial history:  Elaina Champagen is a 74 y.o.-year-old female     Interval changes  6/28/2025   Patient Vitals for the past 8 hrs:   BP Temp Temp src Pulse Resp SpO2   06/28/25 1200 (!) 121/44 98.2 °F (36.8 °C) Oral 56 16 97 %   06/28/25 0745 (!) 137/53 97.5 °F (36.4 °C) Oral 57 16 98 %   6/28  No fever, alert appropriate, WBC is 2.8 tends to fluctuate on low numbers, plans for further I&D early next week  Tolerating the antibiotic well otherwise    Summary of relevant labs:  Labs:    Micro:      Procedures      Cardiology      Imaging:      I have personally reviewed the past medical history, past surgical history, medications, social history, and family history, and I haveupdated the database accordingly.      Allergies:   Vancomycin, Norco [hydrocodone-acetaminophen], and Penicillins     Review of Systems:     Review of Systems   Constitutional:  Negative for activity change.   HENT:  Negative for congestion.    Eyes:  Negative for discharge.   Respiratory:  Negative for apnea.    Cardiovascular:  Negative for chest pain.   Gastrointestinal:  Negative for abdominal pain.

## 2025-06-28 NOTE — PROGRESS NOTES
Orthopedic Progress Note    Patient:  Elaina Champagne  YOB: 1950     74 y.o. female    Subjective:  Patient seen and examined this morning. No complaints or concerns. No issues overnight per nursing. Pain is well controlled on current regimen. Denies fever, HA, CP, SOB, N/V, dysuria, new numbness/tingling.  Plan to return to the operating room for repeat irrigation debridement of the left knee on 7/3/2025.  Patient will work with physical therapy.    Vitals reviewed, afebrile    Objective:   Vitals:    06/28/25 0420   BP: (!) 157/59   Pulse: 66   Resp: 16   Temp: 97.6 °F (36.4 °C)   SpO2: 99%     Gen: NAD, cooperative    Cardiovascular: Regular rate   Respiratory: No acute respiratory distress, breathing comfortably    Orthopedic Exam  LLE:  Soft dressings in place without strikethrough or saturation.  Vera flow wound VAC is in place maintaining adequate installation and suction cycles.  There is minimal fluid in canister this morning.  Appropriately tender to palpation about the left knee.  Motor function is intact to TA/GS/EHL/FHL motor complex.  Sensation is intact light touch across the SPN/DPN/sural/saphenous nerve distributions.  Limb is warm and well-perfused.      Recent Labs     06/27/25  0832   CREATININE 1.0      Meds:   Abx: Cefepime 2g twice daily, linezolid 600 mg twice daily  DVT ppx: ASA 81 mg twice daily  See rec for complete list    Impression 74 y.o. female who is being seen for:    - Left knee surgical site infection    Procedures, DOS: 6/27/2025  - Left knee irrigation and debridement down to and including the fascia, 3 x 2 cm.  - Vera flow wound VAC placement    Plan  - Plan for OR with Dr. Ren for repeat irrigation and debridement with closure on 7/3/25.  - MRI of left knee  - Veraflow on left knee. Please measure and record output every shift. Okay to reinforce/maintain by nursing if necessary. Please notify Ortho if saturated or malfunctioning.  - WBAT  to

## 2025-06-29 LAB
ANION GAP SERPL CALCULATED.3IONS-SCNC: 9 MMOL/L (ref 9–16)
BASOPHILS # BLD: 0 K/UL (ref 0–0.2)
BASOPHILS NFR BLD: 0 % (ref 0–2)
BUN SERPL-MCNC: 25 MG/DL (ref 8–23)
CALCIUM SERPL-MCNC: 9.1 MG/DL (ref 8.6–10.4)
CHLORIDE SERPL-SCNC: 105 MMOL/L (ref 98–107)
CK SERPL-CCNC: 23 U/L (ref 26–192)
CO2 SERPL-SCNC: 23 MMOL/L (ref 20–31)
CREAT SERPL-MCNC: 1.1 MG/DL (ref 0.6–0.9)
EOSINOPHIL # BLD: 0.07 K/UL (ref 0–0.44)
EOSINOPHILS RELATIVE PERCENT: 2 % (ref 1–4)
ERYTHROCYTE [DISTWIDTH] IN BLOOD BY AUTOMATED COUNT: 15 % (ref 11.8–14.4)
GFR, ESTIMATED: 53 ML/MIN/1.73M2
GLUCOSE SERPL-MCNC: 99 MG/DL (ref 74–99)
HCT VFR BLD AUTO: 28.9 % (ref 36.3–47.1)
HGB BLD-MCNC: 9.1 G/DL (ref 11.9–15.1)
IMM GRANULOCYTES # BLD AUTO: 0.04 K/UL (ref 0–0.3)
IMM GRANULOCYTES NFR BLD: 1 %
LYMPHOCYTES NFR BLD: 0.67 K/UL (ref 1.1–3.7)
LYMPHOCYTES RELATIVE PERCENT: 18 % (ref 24–43)
MCH RBC QN AUTO: 28.7 PG (ref 25.2–33.5)
MCHC RBC AUTO-ENTMCNC: 31.5 G/DL (ref 28.4–34.8)
MCV RBC AUTO: 91.2 FL (ref 82.6–102.9)
MONOCYTES NFR BLD: 0.26 K/UL (ref 0.1–1.2)
MONOCYTES NFR BLD: 7 % (ref 3–12)
MORPHOLOGY: ABNORMAL
NEUTROPHILS NFR BLD: 72 % (ref 36–65)
NEUTS SEG NFR BLD: 2.66 K/UL (ref 1.5–8.1)
NRBC BLD-RTO: 0 PER 100 WBC
PLATELET # BLD AUTO: 211 K/UL (ref 138–453)
PMV BLD AUTO: 10.4 FL (ref 8.1–13.5)
POTASSIUM SERPL-SCNC: 4.2 MMOL/L (ref 3.7–5.3)
RBC # BLD AUTO: 3.17 M/UL (ref 3.95–5.11)
SODIUM SERPL-SCNC: 137 MMOL/L (ref 136–145)
WBC OTHER # BLD: 3.7 K/UL (ref 3.5–11.3)

## 2025-06-29 PROCEDURE — 6360000002 HC RX W HCPCS: Performed by: INTERNAL MEDICINE

## 2025-06-29 PROCEDURE — 6370000000 HC RX 637 (ALT 250 FOR IP): Performed by: STUDENT IN AN ORGANIZED HEALTH CARE EDUCATION/TRAINING PROGRAM

## 2025-06-29 PROCEDURE — 1200000000 HC SEMI PRIVATE

## 2025-06-29 PROCEDURE — 6370000000 HC RX 637 (ALT 250 FOR IP)

## 2025-06-29 PROCEDURE — 0JBP0ZZ EXCISION OF LEFT LOWER LEG SUBCUTANEOUS TISSUE AND FASCIA, OPEN APPROACH: ICD-10-PCS | Performed by: ORTHOPAEDIC SURGERY

## 2025-06-29 PROCEDURE — 2500000003 HC RX 250 WO HCPCS

## 2025-06-29 PROCEDURE — 36415 COLL VENOUS BLD VENIPUNCTURE: CPT

## 2025-06-29 PROCEDURE — 99232 SBSQ HOSP IP/OBS MODERATE 35: CPT | Performed by: NURSE PRACTITIONER

## 2025-06-29 PROCEDURE — 2580000003 HC RX 258: Performed by: INTERNAL MEDICINE

## 2025-06-29 PROCEDURE — 85025 COMPLETE CBC W/AUTO DIFF WBC: CPT

## 2025-06-29 PROCEDURE — 80048 BASIC METABOLIC PNL TOTAL CA: CPT

## 2025-06-29 PROCEDURE — 82550 ASSAY OF CK (CPK): CPT

## 2025-06-29 RX ADMIN — CYANOCOBALAMIN TAB 500 MCG 500 MCG: 500 TAB at 09:52

## 2025-06-29 RX ADMIN — CEFEPIME 2000 MG: 2 INJECTION, POWDER, FOR SOLUTION INTRAVENOUS at 17:52

## 2025-06-29 RX ADMIN — FOLIC ACID 1 MG: 1 TABLET ORAL at 09:53

## 2025-06-29 RX ADMIN — DAPTOMYCIN 275 MG: 500 INJECTION, POWDER, LYOPHILIZED, FOR SOLUTION INTRAVENOUS at 15:41

## 2025-06-29 RX ADMIN — ACETAMINOPHEN 650 MG: 325 TABLET ORAL at 09:53

## 2025-06-29 RX ADMIN — FERROUS SULFATE TAB EC 325 MG (65 MG FE EQUIVALENT) 325 MG: 325 (65 FE) TABLET DELAYED RESPONSE at 09:53

## 2025-06-29 RX ADMIN — TROSPIUM CHLORIDE 20 MG: 20 TABLET, FILM COATED ORAL at 20:24

## 2025-06-29 RX ADMIN — SODIUM CHLORIDE, PRESERVATIVE FREE 10 ML: 5 INJECTION INTRAVENOUS at 09:53

## 2025-06-29 RX ADMIN — CEFEPIME 2000 MG: 2 INJECTION, POWDER, FOR SOLUTION INTRAVENOUS at 05:38

## 2025-06-29 RX ADMIN — Medication 1200 MG: at 09:54

## 2025-06-29 RX ADMIN — ACETAMINOPHEN 650 MG: 325 TABLET ORAL at 01:10

## 2025-06-29 RX ADMIN — TRIAMCINOLONE ACETONIDE: 1 OINTMENT TOPICAL at 20:28

## 2025-06-29 RX ADMIN — ASPIRIN 81 MG: 81 TABLET, CHEWABLE ORAL at 20:24

## 2025-06-29 RX ADMIN — OXYCODONE 5 MG: 5 TABLET ORAL at 09:52

## 2025-06-29 RX ADMIN — ACETAMINOPHEN 650 MG: 325 TABLET ORAL at 20:24

## 2025-06-29 RX ADMIN — ASPIRIN 81 MG: 81 TABLET, CHEWABLE ORAL at 09:53

## 2025-06-29 RX ADMIN — TRAZODONE HYDROCHLORIDE 50 MG: 50 TABLET ORAL at 20:24

## 2025-06-29 RX ADMIN — FERROUS SULFATE TAB EC 325 MG (65 MG FE EQUIVALENT) 325 MG: 325 (65 FE) TABLET DELAYED RESPONSE at 17:50

## 2025-06-29 RX ADMIN — ACETAMINOPHEN 650 MG: 325 TABLET ORAL at 13:41

## 2025-06-29 ASSESSMENT — PAIN DESCRIPTION - DESCRIPTORS: DESCRIPTORS: SHARP

## 2025-06-29 ASSESSMENT — PAIN SCALES - GENERAL
PAINLEVEL_OUTOF10: 3
PAINLEVEL_OUTOF10: 4

## 2025-06-29 ASSESSMENT — PAIN DESCRIPTION - ONSET: ONSET: ON-GOING

## 2025-06-29 ASSESSMENT — PAIN DESCRIPTION - FREQUENCY: FREQUENCY: INTERMITTENT

## 2025-06-29 ASSESSMENT — PAIN DESCRIPTION - ORIENTATION: ORIENTATION: LEFT

## 2025-06-29 ASSESSMENT — PAIN DESCRIPTION - PAIN TYPE: TYPE: SURGICAL PAIN

## 2025-06-29 ASSESSMENT — PAIN DESCRIPTION - LOCATION: LOCATION: LEG

## 2025-06-29 ASSESSMENT — PAIN - FUNCTIONAL ASSESSMENT: PAIN_FUNCTIONAL_ASSESSMENT: PREVENTS OR INTERFERES SOME ACTIVE ACTIVITIES AND ADLS

## 2025-06-29 NOTE — PLAN OF CARE
Problem: Discharge Planning  Goal: Discharge to home or other facility with appropriate resources  6/29/2025 1038 by Gilda Garcia RN  Outcome: Progressing     Problem: Chronic Conditions and Co-morbidities  Goal: Patient's chronic conditions and co-morbidity symptoms are monitored and maintained or improved  6/29/2025 1038 by Gilda Garcia RN  Outcome: Progressing     Problem: Pain  Goal: Verbalizes/displays adequate comfort level or baseline comfort level  6/29/2025 1038 by Gilda Garcia RN  Outcome: Progressing     Problem: Safety - Adult  Goal: Free from fall injury  6/29/2025 1038 by Gilda Garcia RN  Outcome: Progressing     Problem: ABCDS Injury Assessment  Goal: Absence of physical injury  6/29/2025 1038 by Gilda Garcia RN  Outcome: Progressing

## 2025-06-29 NOTE — PLAN OF CARE
Problem: Discharge Planning  Goal: Discharge to home or other facility with appropriate resources  6/29/2025 0250 by Perlita Cortez RN  Outcome: Progressing  6/28/2025 1826 by Zenobia Slade RN  Outcome: Progressing     Problem: Chronic Conditions and Co-morbidities  Goal: Patient's chronic conditions and co-morbidity symptoms are monitored and maintained or improved  6/29/2025 0250 by Perlita Cortez RN  Outcome: Progressing  6/28/2025 1826 by Zenobia Slade RN  Outcome: Progressing     Problem: Pain  Goal: Verbalizes/displays adequate comfort level or baseline comfort level  6/29/2025 0250 by Perlita Cortez RN  Outcome: Progressing  6/28/2025 1826 by Zenobia Slade RN  Outcome: Progressing     Problem: Safety - Adult  Goal: Free from fall injury  Outcome: Progressing     Problem: ABCDS Injury Assessment  Goal: Absence of physical injury  Outcome: Progressing

## 2025-06-29 NOTE — PROGRESS NOTES
Orthopedic Progress Note    Patient:  Elaina Champagne  YOB: 1950     74 y.o. female    Subjective:  Patient seen and examined this morning. No complaints or concerns. No issues overnight per nursing. Pain is well controlled on current regimen. Denies fever, HA, CP, SOB, N/V, dysuria, new numbness/tingling. No BM, but flatus +.  Voiding appropriately.  Patient was able to ambulate a total of 75 feet with a rolling walker and contact-guard assist with physical therapy yesterday.    Vitals reviewed, afebrile    Objective:   Vitals:    06/29/25 0540   BP: (!) 132/52   Pulse: 52   Resp: 16   Temp: 98.6 °F (37 °C)   SpO2: 98%     Gen: NAD, cooperative    Cardiovascular: Slightly bradycardic on monitor  Respiratory: No acute respiratory distress, breathing comfortably    Orthopedic Exam  LLE:  Soft dressings overlying the patient's LLE clean/dry/intact without evidence of strikethrough.  Vera flow wound VAC to the patient's LLE maintaining appropriate suction.  Patient is appropriately tender to palpation at about the level of the knee.  Compartments of the LLE are soft and easily compressible.  EHL/FHL/TA/GSC gross motor intact.  Sural/saphenous/SPN/DPN/plantar sensation light touch intact.  Limb is warm and well-perfused with BCR appreciable.    Recent Labs     06/28/25  0610   WBC 2.8*   HGB 9.1*   HCT 28.5*      *   K 4.9   BUN 18   CREATININE 0.8   GLUCOSE 119*      Meds:   Abx: Cefepime, daptomycin  DVT ppx: ASA currently  See rec for complete list    Impression 74 y.o. female who is being seen for:    - Left knee surgical site infection     DOS: 6/27/2025 with Dr. Ren  - Left knee irrigation and debridement down to and including the fascia, 3 x 2 cm.  - Vera flow wound VAC placement    Plan  - Plan for OR with Dr. Ren on 7/2/2025 for repeat irrigation and debridement  - Ancef OCTOR  - Wound-Vac on LLE. Please measure and record output every shift. Okay to

## 2025-06-29 NOTE — PROGRESS NOTES
[facility]  Office: [phone]  Nael Morgan, DO, Ronny Quevedo, DO, Pb Vásquez DO, Refugio Bradley, DO, Sheldon Smith MD, Elsa Torres MD, Leandro Espinosa MD, Joann Hauser MD,  Dipak Montana MD, Rob Hernandez MD, Swathi Wells MD,  Gracia Chen DO, Pilar So MD, Kayden Walters MD, Orlando Morgan DO, Yamilex Luciano MD,  Mando Burden DO, Georgie Workman MD, Jennifer Hill MD, Gloria Sierra MD,  Rafael Rawls MD, Denilson Murphy MD, Kamran Flores MD, Yazan Hughes MD, Theodore Grossman MD, Jaxon Villar MD, Aj Bess, DO, Vita Andrade MD, Anne-Marie Pierce DO, Arvin Powers MD, Gracia Lockwood MD, Mohsin Reza, MD, Kip Negron MD, Shirley Waterhouse, CNP,  Nidia Lester, CNP, Aj Castro, CNP,  Erin Shaw, JASON, Ivis Senior, CNP, Georgiana Freeman, CNP, Diandra Ruffin, CNP, Marilia Parker, CNP, Maureen Matamoros, PA-C, Sandrita Cruz, CNP, Veronica Alonzo, CNP,  Maryann Page, CNP, Coretta Rosario, CNP, Zuhair Lozano, PA-C, Rowan Piña, PA-C, Areli Ware, CNP,        Radhika Gillespie, CNS, Guadalupe Jaimes, CNP, Sarah Shi, CNP         [facility]   IN-PATIENT SERVICE   [facility]    Progress Note    6/29/2025    2:01 PM    Name:   Elaina Champagne  MRN:     [id number]     Acct:      [id number]   Room:   0249/0249-01   Day:  2  Admit Date:  6/27/2025  7:00 AM    PCP:   Robni Ly MD  Code Status:  Full Code    Subjective:     C/C: Left knee hardware infection.     Interval History Status: improved.     Reports she is doing well this morning. No complaints today. States she is having \"a little\" left knee pain but is tolerable with current analgesia regime.     VS and available labs reviewed. Afebrile.     Brief History:     Elaina Champagne is a pleasant 74-year old female who was admitted post orthopedic intervention for  I & D left knee  2/2 surgical hardware infection with application of wound vac and irrigation.      She has a

## 2025-06-29 NOTE — OP NOTE
Operative Note      Patient: Elaina Champagne  YOB: 1950  MRN: [id number]    Date of Procedure: 6/27/2025     Pre-Op Diagnosis Codes:   1.) Left knee surgical site infection     Post-Op Diagnosis:   Left knee surgical site infection       Procedure(s):  -Left medial knee irrigation and debridement of skin down to fascia of wound 4 x 2 cm.  -Application of negative pressure dressing     Surgeon(s):  Rony Ren DO     Assistant:  Resident: Dagoberto Starkey DO; Juan Jose Benítez DO     Anesthesia: General     Estimated Blood Loss (mL): 25 mL      Fluids (ml): 700 cc crystalloid     Complications: None     Specimens:   ID Type Source Tests Collected by Time Destination   1 : left knee tissue Tissue Joint, Knee CULTURE, ANAEROBIC AND AEROBIC Royn Ren DO 6/27/2025 1022     2 : LEFT KNEE TISSUE Tissue Joint, Knee CULTURE, FUNGUS, FUNGAL STAIN (Canceled), CULTURE WITH SMEAR, ACID FAST BACILLIUS, CULTURE, ANAEROBIC AND AEROBIC Rony Ren DO 6/27/2025 1025           Implants:  * No implants in log *      Drains: * No LDAs found *     Findings:  Infection Present At Time Of Surgery (PATOS) (choose all levels that have infection present):  No infection present  Other Findings: Draining sinus with superficial infection tracking to the medial posterior knee    Detailed Description of Procedure:   Indications:  This is a 74 y.o. y/o female who presents for surgery on the left knee due to a draining sinus.  This has been washed out previously and demonstrated to be superficial.  Multiple cultures have been grown for Pseudomonas.  We discussed the nature of the injury as well as the indications for surgical intervention as well as the associated risks, benefits, and alternatives of the procedure. The patient stated clear understanding, was willing to proceed, provided written informed consent, and had his operative site marked. The patient received appropriate preoperative clearance/risk stratification  connecting to a veraflo system.  The vacuum seal was checked and set for the appropriate level of irrigation.    Postoperative plan:  Plan to admit the patient for IV antibiotics.  Will consult ID for further recommendations.  The patient will be okay for DVT prophylaxis postop day 1.  We will follow cultures to evaluate antibiotic therapy.  The wound VAC will remain with veraflo until return to the OR on 1/2 for repeat irrigation debridement.  We will obtain an MRI of the left knee to evaluate for abscess in the posterior medial knee.    Dr. Ren was present for all aspects of the procedure.    Electronically signed by Dagoberto Starkey DO on 6/29/2025 at 5:59 PM

## 2025-06-30 LAB
ALBUMIN SERPL-MCNC: 3.1 G/DL (ref 3.5–5.2)
ALBUMIN/GLOB SERPL: 0.9 {RATIO} (ref 1–2.5)
ALP SERPL-CCNC: 92 U/L (ref 35–104)
ALT SERPL-CCNC: 9 U/L (ref 10–35)
ANION GAP SERPL CALCULATED.3IONS-SCNC: 8 MMOL/L (ref 9–16)
AST SERPL-CCNC: 20 U/L (ref 10–35)
BASOPHILS # BLD: 0 K/UL (ref 0–0.2)
BASOPHILS NFR BLD: 0 % (ref 0–2)
BILIRUB DIRECT SERPL-MCNC: 0.1 MG/DL (ref 0–0.2)
BILIRUB INDIRECT SERPL-MCNC: 0.1 MG/DL (ref 0–1)
BILIRUB SERPL-MCNC: 0.2 MG/DL (ref 0–1.2)
BUN SERPL-MCNC: 22 MG/DL (ref 8–23)
CALCIUM SERPL-MCNC: 9.3 MG/DL (ref 8.6–10.4)
CHLORIDE SERPL-SCNC: 102 MMOL/L (ref 98–107)
CO2 SERPL-SCNC: 25 MMOL/L (ref 20–31)
CREAT SERPL-MCNC: 1 MG/DL (ref 0.6–0.9)
EOSINOPHIL # BLD: 0.1 K/UL (ref 0–0.44)
EOSINOPHILS RELATIVE PERCENT: 2 % (ref 1–4)
ERYTHROCYTE [DISTWIDTH] IN BLOOD BY AUTOMATED COUNT: 15.2 % (ref 11.8–14.4)
FERRITIN SERPL-MCNC: 141 NG/ML
FOLATE SERPL-MCNC: 25 NG/ML (ref 4.8–24.2)
GFR, ESTIMATED: 59 ML/MIN/1.73M2
GLOBULIN SER CALC-MCNC: 3.4 G/DL
GLUCOSE SERPL-MCNC: 93 MG/DL (ref 74–99)
HCT VFR BLD AUTO: 32.2 % (ref 36.3–47.1)
HGB BLD-MCNC: 10.2 G/DL (ref 11.9–15.1)
IMM GRANULOCYTES # BLD AUTO: 0 K/UL (ref 0–0.3)
IMM GRANULOCYTES NFR BLD: 0 %
INTERVENTION: NORMAL
INTERVENTION: NORMAL
IRON SATN MFR SERPL: 29 % (ref 20–55)
IRON SERPL-MCNC: 59 UG/DL (ref 37–145)
LYMPHOCYTES NFR BLD: 0.46 K/UL (ref 1.1–3.7)
LYMPHOCYTES RELATIVE PERCENT: 9 % (ref 24–43)
MCH RBC QN AUTO: 28.6 PG (ref 25.2–33.5)
MCHC RBC AUTO-ENTMCNC: 31.7 G/DL (ref 28.4–34.8)
MCV RBC AUTO: 90.2 FL (ref 82.6–102.9)
MICROORGANISM SPEC CULT: NORMAL
MICROORGANISM SPEC CULT: NORMAL
MICROORGANISM/AGENT SPEC: NORMAL
MICROORGANISM/AGENT SPEC: NORMAL
MONOCYTES NFR BLD: 0.26 K/UL (ref 0.1–1.2)
MONOCYTES NFR BLD: 5 % (ref 3–12)
MORPHOLOGY: ABNORMAL
NEUTROPHILS NFR BLD: 84 % (ref 36–65)
NEUTS SEG NFR BLD: 4.28 K/UL (ref 1.5–8.1)
NRBC BLD-RTO: 0 PER 100 WBC
PLATELET # BLD AUTO: 234 K/UL (ref 138–453)
PMV BLD AUTO: 10.2 FL (ref 8.1–13.5)
POTASSIUM SERPL-SCNC: 4.6 MMOL/L (ref 3.7–5.3)
PROT SERPL-MCNC: 6.5 G/DL (ref 6.6–8.7)
RBC # BLD AUTO: 3.57 M/UL (ref 3.95–5.11)
SERVICE CMNT-IMP: NORMAL
SERVICE CMNT-IMP: NORMAL
SODIUM SERPL-SCNC: 135 MMOL/L (ref 136–145)
SPECIMEN DESCRIPTION: NORMAL
SPECIMEN DESCRIPTION: NORMAL
TIBC SERPL-MCNC: 205 UG/DL (ref 250–450)
UNSATURATED IRON BINDING CAPACITY: 146 UG/DL (ref 112–347)
VIT B12 SERPL-MCNC: 772 PG/ML (ref 232–1245)
WBC OTHER # BLD: 5.1 K/UL (ref 3.5–11.3)

## 2025-06-30 PROCEDURE — 99232 SBSQ HOSP IP/OBS MODERATE 35: CPT | Performed by: STUDENT IN AN ORGANIZED HEALTH CARE EDUCATION/TRAINING PROGRAM

## 2025-06-30 PROCEDURE — 36415 COLL VENOUS BLD VENIPUNCTURE: CPT

## 2025-06-30 PROCEDURE — 80076 HEPATIC FUNCTION PANEL: CPT

## 2025-06-30 PROCEDURE — 85025 COMPLETE CBC W/AUTO DIFF WBC: CPT

## 2025-06-30 PROCEDURE — 2580000003 HC RX 258: Performed by: INTERNAL MEDICINE

## 2025-06-30 PROCEDURE — 80048 BASIC METABOLIC PNL TOTAL CA: CPT

## 2025-06-30 PROCEDURE — G0545 PR INHERENT VISIT TO INPT: HCPCS | Performed by: INTERNAL MEDICINE

## 2025-06-30 PROCEDURE — 97535 SELF CARE MNGMENT TRAINING: CPT

## 2025-06-30 PROCEDURE — 6360000002 HC RX W HCPCS: Performed by: INTERNAL MEDICINE

## 2025-06-30 PROCEDURE — 1200000000 HC SEMI PRIVATE

## 2025-06-30 PROCEDURE — 83550 IRON BINDING TEST: CPT

## 2025-06-30 PROCEDURE — 82607 VITAMIN B-12: CPT

## 2025-06-30 PROCEDURE — 6370000000 HC RX 637 (ALT 250 FOR IP)

## 2025-06-30 PROCEDURE — 82746 ASSAY OF FOLIC ACID SERUM: CPT

## 2025-06-30 PROCEDURE — 82728 ASSAY OF FERRITIN: CPT

## 2025-06-30 PROCEDURE — 97116 GAIT TRAINING THERAPY: CPT

## 2025-06-30 PROCEDURE — 99232 SBSQ HOSP IP/OBS MODERATE 35: CPT | Performed by: INTERNAL MEDICINE

## 2025-06-30 PROCEDURE — 86140 C-REACTIVE PROTEIN: CPT

## 2025-06-30 PROCEDURE — 6370000000 HC RX 637 (ALT 250 FOR IP): Performed by: STUDENT IN AN ORGANIZED HEALTH CARE EDUCATION/TRAINING PROGRAM

## 2025-06-30 PROCEDURE — 83540 ASSAY OF IRON: CPT

## 2025-06-30 PROCEDURE — 97110 THERAPEUTIC EXERCISES: CPT

## 2025-06-30 RX ADMIN — AMIODARONE HYDROCHLORIDE 200 MG: 200 TABLET ORAL at 08:12

## 2025-06-30 RX ADMIN — Medication 1200 MG: at 12:14

## 2025-06-30 RX ADMIN — TROSPIUM CHLORIDE 20 MG: 20 TABLET, FILM COATED ORAL at 21:16

## 2025-06-30 RX ADMIN — TRIAMCINOLONE ACETONIDE: 1 OINTMENT TOPICAL at 21:17

## 2025-06-30 RX ADMIN — CEFEPIME 2000 MG: 2 INJECTION, POWDER, FOR SOLUTION INTRAVENOUS at 05:33

## 2025-06-30 RX ADMIN — ACETAMINOPHEN 650 MG: 325 TABLET ORAL at 08:12

## 2025-06-30 RX ADMIN — FERROUS SULFATE TAB EC 325 MG (65 MG FE EQUIVALENT) 325 MG: 325 (65 FE) TABLET DELAYED RESPONSE at 18:16

## 2025-06-30 RX ADMIN — ACETAMINOPHEN 650 MG: 325 TABLET ORAL at 21:16

## 2025-06-30 RX ADMIN — FERROUS SULFATE TAB EC 325 MG (65 MG FE EQUIVALENT) 325 MG: 325 (65 FE) TABLET DELAYED RESPONSE at 08:12

## 2025-06-30 RX ADMIN — DAPTOMYCIN 275 MG: 500 INJECTION, POWDER, LYOPHILIZED, FOR SOLUTION INTRAVENOUS at 17:17

## 2025-06-30 RX ADMIN — TRAZODONE HYDROCHLORIDE 50 MG: 50 TABLET ORAL at 21:16

## 2025-06-30 RX ADMIN — ACETAMINOPHEN 650 MG: 325 TABLET ORAL at 00:47

## 2025-06-30 RX ADMIN — CYANOCOBALAMIN TAB 500 MCG 500 MCG: 500 TAB at 08:12

## 2025-06-30 RX ADMIN — ASPIRIN 81 MG: 81 TABLET, CHEWABLE ORAL at 08:12

## 2025-06-30 RX ADMIN — OXYCODONE 5 MG: 5 TABLET ORAL at 05:36

## 2025-06-30 RX ADMIN — METOPROLOL TARTRATE 12.5 MG: 25 TABLET, FILM COATED ORAL at 21:17

## 2025-06-30 RX ADMIN — CEFEPIME 2000 MG: 2 INJECTION, POWDER, FOR SOLUTION INTRAVENOUS at 18:18

## 2025-06-30 RX ADMIN — FOLIC ACID 1 MG: 1 TABLET ORAL at 08:12

## 2025-06-30 RX ADMIN — ACETAMINOPHEN 650 MG: 325 TABLET ORAL at 14:17

## 2025-06-30 RX ADMIN — ASPIRIN 81 MG: 81 TABLET, CHEWABLE ORAL at 21:16

## 2025-06-30 ASSESSMENT — PAIN SCALES - GENERAL
PAINLEVEL_OUTOF10: 3
PAINLEVEL_OUTOF10: 3
PAINLEVEL_OUTOF10: 4
PAINLEVEL_OUTOF10: 2

## 2025-06-30 ASSESSMENT — PAIN DESCRIPTION - ORIENTATION: ORIENTATION: LEFT

## 2025-06-30 ASSESSMENT — PAIN DESCRIPTION - LOCATION: LOCATION: LEG

## 2025-06-30 ASSESSMENT — PAIN DESCRIPTION - DESCRIPTORS: DESCRIPTORS: SHARP

## 2025-06-30 NOTE — PROGRESS NOTES
[facility] - Progress Note   Today's Date and Time: 6/30/2025, 7:48 PM    Impression :   Lt  knee infection with MRSA  S/P I&D and Veraflow VAC placement on 6-27-25  Plans for repeat I&D, irrigation and closure on 7-3-25  Cultures;  1-29-25: MRSA and Pseudomonas  3-26-25: Pseudomonas  4-8-25: Pseudomonas   6-27-25: Pending  Hx of left knee replacement at [facility] in 2017  Fall injuring left knee in December 2024  Diastolic CHF  Essential HTN  Venous insufficiency of legs  CKD stage III B  PAF  Pulmonary HTN  Allergy to vancomycin and to penicillins    Recommendations:   Avoid vancomycin   Cefepime   Linezolid     Medical Decision Making/Summary/Discussion:6/30/2025     Daily Elements of Decision Making provided by Consulting Physician:    Note: I have independently performed the steps listed below as part of the medical decision making and evaluation.    Review of current Problems:  Today I am seeing and examining the patient for the following problems:  Lt  knee infection with MRSA  S/P I&D and Veraflow VAC placement on 6-27-25  Plans for repeat I&D, irrigation and closure on 7-3-25  Cultures;  1-29-25: MRSA and Pseudomonas  3-26-25: Pseudomonas  4-8-25: Pseudomonas   6-27-25: Pending  Hx of left knee replacement at [facility] in 2017  Fall injuring left knee in December 2024  Diastolic CHF  Essential HTN  Venous insufficiency of legs  CKD stage III B  PAF  Pulmonary HTN  Allergy to vancomycin and to penicillins  Evaluation of Patient:  Evaluated because of MRSA and Pseudomonas knee wound infection  Please see daily details in Interval Changes Section  Changes in physical exam:  S/P I&D Lt Knee  Please see daily details in Physical Exam section and in Interval Changes Section  Changes in ROS:  Improved  Please see daily details in Review of Symptoms section and in Interval Changes Section  Discussion with Referring Physician or Service  Dr. Ren  Laboratory and Radiologic Studies  Left 01/29/2025    IRRIGATION AND DEBRIDEMENT WITH CULTURES    HYSTERECTOMY, TOTAL ABDOMINAL (CERVIX REMOVED)  1990    INCISION AND DRAINAGE Left 07/06/2017    LEG INCISION AND DRAINAGE ABSCESS performed by Isaiah Casey MD at [facility]    IR NONTUNNELED VASCULAR CATHETER > 5 YEARS  12/15/2023    IR NONTUNNELED VASCULAR CATHETER 12/15/2023 [facility]    KNEE ARTHROPLASTY Right 09/24/2018    KNEE DEBRIDEMENT Left 06/27/2025    KNEE IRRIGATION AND DEBRIDEMENT WITH WOUND VAC PLACEMENT. - Left    LEG SURGERY Left 6/27/2025    KNEE IRRIGATION AND DEBRIDEMENT WITH WOUND VAC PLACEMENT. performed by Rony Ren DO at [facility] OR    TOTAL KNEE ARTHROPLASTY Left 11/09/2017    TOTAL KNEE ARTHROPLASTY Left 01/29/2025    LEFT KNEE IRRIGATION AND DEBRIDEMENT WITH CULTURES performed by Rony Ren DO at [facility] OR    UPPER GASTROINTESTINAL ENDOSCOPY N/A 12/28/2023    EGD BIOPSY performed by Mary Nolasco MD at [facility]    UPPER GASTROINTESTINAL ENDOSCOPY N/A 05/02/2024    ESOPHAGOGASTRODUODENOSCOPY BIOPSY performed by Rachana Maria MD at [facility]    VENTRAL HERNIA REPAIR  02/11/2019    5 hernias       Medications:      [START ON 7/2/2025] ceFAZolin  2,000 mg IntraVENous On Call to OR    DAPTOmycin (CUBICIN) 275 mg in sodium chloride 0.9 % 50 mL IVPB  6 mg/kg (Ideal) IntraVENous Q24H    sodium chloride flush  5-40 mL IntraVENous 2 times per day    acetaminophen  650 mg Oral Q6H    aspirin  81 mg Oral BID    amiodarone  200 mg Oral Daily    [Held by provider] atorvastatin  40 mg Oral Nightly    calcium carbonate  1,200 mg Oral Daily    [Held by provider] escitalopram  20 mg Oral Daily    ferrous sulfate  325 mg Oral BID with meals    folic acid  1 mg Oral Daily    metoprolol tartrate  12.5 mg Oral BID    traZODone  50 mg Oral Nightly    trospium  20 mg Oral Nightly    vitamin B-12  500 mcg Oral Daily    triamcinolone   Topical BID    cefepime  2,000 mg IntraVENous Q12H       Social History:     Social History

## 2025-06-30 NOTE — PROGRESS NOTES
Physical Therapy  Facility/Department: [address] ORTHO/MED SURG   Physical Therapy Daily Treatment Note    Patient Name: Elaina Champagne        MRN: [id number]    : 1950    Date of Service: 2025      Past Medical History:  has a past medical history of Anemia, Anxiety disorder, Arthritis, Artificial knee joint present, left, Artificial knee joint present, right, Atherosclerotic heart disease, Bell's palsy, Burning with urination, Cellulitis, Chronic kidney disease, stage 3 ([facility]), Cognitive communication deficit, Depression, Fall, Frequent urination, Gastric ulcer with hemorrhage, Gastro-esophageal reflux disease without esophagitis, Hyperlipidemia, Hypertension, Insomnia, Joint replaced, Leaking of urine, Lymphedema, Major depressive disorder, Morbid obesity ([facility]), Nonrheumatic tricuspid (valve) insufficiency, NSTEMI (non-ST elevated myocardial infarction) ([facility]), Occlusion and stenosis of bilateral carotid arteries, Osteoarthritis, Overactive bladder, Paroxysmal atrial fibrillation ([facility]), Under care of team, Under care of team, Under care of team, Under care of team, Under care of team, Under care of team, Urge incontinence, Urine retention, UTI (urinary tract infection), Venous insufficiency, and Weakness.  Past Surgical History:  has a past surgical history that includes Hysterectomy, total abdominal (); Cataract removal (Bilateral, ); incision and drainage (Left, 2017); Total knee arthroplasty (Left, 2017); Knee Arthroplasty (Right, 2018); ventral hernia repair (2019); IR NONTUNNELED VASCULAR CATHETER > 5 YEARS (12/15/2023); Upper gastrointestinal endoscopy (N/A, 2023); Cardiac procedure (N/A, 2024); Cardiac procedure (N/A, 2024); Upper gastrointestinal endoscopy (N/A, 2024); Coronary angioplasty with stent (2024); Cardiac procedure (N/A, 2024); Cardiac procedure (N/A, 2024); Wound debridement (Left, 2025); Total knee  Restrictions  Left Lower Extremity Weight Bearing: Weight Bearing As Tolerated  Position Activity Restriction  Other Position/Activity Restrictions: s/p L KNEE IRRIGATION AND DEBRIDEMENT WITH WOUND VAC PLACEMENT 6/27  O2 Device: None (Room air)    Subjective  General  Patient assessed for rehabilitation services?: Yes  Response To Previous Treatment: Patient with no complaints from previous session.  Family/Caregiver Present: No  Follows Commands: Within Functional Limits  General  General Comments: Pt returned to seated in chair at end of session, call light in reach, positioned for comfort.  Subjective  Subjective: Pt and RN agreeable to PT this morning. Pt seated in chair upon arrival, pleasant and cooperative throughout session.  Pain  Pre-Pain: 3  Post-Pain: 3  Pain Location: Left;Knee  Pain Descriptor: Aching  Pain Interventions: Repositioning;Rest    Objective  Orientation  Overall Orientation Status: Within Functional Limits  Cognition  Overall Cognitive Status: WFL    Mobility   Bed mobility  Supine to Sit: Unable to assess  Sit to Supine: Unable to assess  Scooting: Contact guard assistance  Bed Mobility Comments: Pt up in chair upon entry/exit this date.    Transfers  Sit to Stand: Contact guard assistance  Stand to Sit: Contact guard assistance  Comment: Pt stood x1 from chair and x1 from toilet with RW, CGA provided for safety.    Ambulation  Surface: Level tile  Device: Rolling Walker  Assistance: Contact guard assistance  Quality of Gait: overall fair stability, slow pace.  Gait Deviations: Slow [place];Decreased step length;Decreased step height  Distance: 15ft, seated rest, 200ft  Comments: Pt overall fair stability, no true LOB. Pt took a few short standing rest breaks for endurance recovery. Pt reports BUEs were getting sore from pushing walker around.  More Ambulation?: No    Stairs/Curb  Stairs?: No      Balance   Balance  Posture: Good  Sitting - Static: Good  Sitting - Dynamic: Fair  Standing

## 2025-06-30 NOTE — PROGRESS NOTES
Occupational Therapy  Occupational Therapy Daily Treatment Note  Facility/Department: [address] ORTHO/MED SURG   Patient Name: Elaina Champagne        MRN: [id number]    : 1950    Date of Service: 2025    No chief complaint on file.    Past Medical History:  has a past medical history of Anemia, Anxiety disorder, Arthritis, Artificial knee joint present, left, Artificial knee joint present, right, Atherosclerotic heart disease, Bell's palsy, Burning with urination, Cellulitis, Chronic kidney disease, stage 3 ([facility]), Cognitive communication deficit, Depression, Fall, Frequent urination, Gastric ulcer with hemorrhage, Gastro-esophageal reflux disease without esophagitis, Hyperlipidemia, Hypertension, Insomnia, Joint replaced, Leaking of urine, Lymphedema, Major depressive disorder, Morbid obesity ([facility]), Nonrheumatic tricuspid (valve) insufficiency, NSTEMI (non-ST elevated myocardial infarction) ([facility]), Occlusion and stenosis of bilateral carotid arteries, Osteoarthritis, Overactive bladder, Paroxysmal atrial fibrillation ([facility]), Under care of team, Under care of team, Under care of team, Under care of team, Under care of team, Under care of team, Urge incontinence, Urine retention, UTI (urinary tract infection), Venous insufficiency, and Weakness.  Past Surgical History:  has a past surgical history that includes Hysterectomy, total abdominal (); Cataract removal (Bilateral, ); incision and drainage (Left, 2017); Total knee arthroplasty (Left, 2017); Knee Arthroplasty (Right, 2018); ventral hernia repair (2019); IR NONTUNNELED VASCULAR CATHETER > 5 YEARS (12/15/2023); Upper gastrointestinal endoscopy (N/A, 2023); Cardiac procedure (N/A, 2024); Cardiac procedure (N/A, 2024); Upper gastrointestinal endoscopy (N/A, 2024); Coronary angioplasty with stent (2024); Cardiac procedure (N/A, 2024); Cardiac procedure (N/A, 2024); Wound debridement  (Left, 01/29/2025); Total knee arthroplasty (Left, 01/29/2025); Knee debridement (Left, 06/27/2025); and Leg Surgery (Left, 6/27/2025).    Discharge Recommendations  Discharge Recommendations: Patient would benefit from continued therapy after discharge     Assessment  Performance deficits / Impairments: Decreased functional mobility ;Decreased ADL status;Decreased high-level IADLs;Decreased endurance;Decreased balance  Prognosis: Good  REQUIRES OT FOLLOW-UP: Yes  Activity Tolerance  Activity Tolerance: Patient Tolerated treatment well  Safety Devices  Type of Devices: Call light within reach;Left in chair;Chair alarm in place;Gait belt;Nurse notified  Restraints  Restraints Initially in Place: No    AM-PAC  AM-[facility] Daily Activity - Inpatient   How much help is needed for putting on and taking off regular lower body clothing?: A Little  How much help is needed for bathing (which includes washing, rinsing, drying)?: A Little  How much help is needed for toileting (which includes using toilet, bedpan, or urinal)?: A Little  How much help is needed for putting on and taking off regular upper body clothing?: A Little  How much help is needed for taking care of personal grooming?: None  How much help for eating meals?: None  AM-[facility] Inpatient Daily Activity Raw Score: 20  AM-PAC Inpatient ADL T-Scale Score : 42.03  ADL Inpatient CMS 0-100% Score: 38.32  ADL Inpatient CMS G-Code Modifier : CJ    Restrictions/Precautions  Restrictions/Precautions  Restrictions/Precautions: Contact Precautions;Fall Risk;General Precautions  Required Braces or Orthoses?: No  Lower Extremity Weight Bearing Restrictions  Left Lower Extremity Weight Bearing: Weight Bearing As Tolerated  Position Activity Restriction  Other Position/Activity Restrictions: s/p L KNEE IRRIGATION AND DEBRIDEMENT WITH WOUND VAC PLACEMENT 6/27     Subjective  General  Patient assessed for rehabilitation services?: Yes  Family / Caregiver Present: No  Diagnosis: L knee

## 2025-06-30 NOTE — PROGRESS NOTES
[facility]  Office: [phone]  Nael Morgan DO, Ronny Quevedo, DO, Pb Vásquez DO, Refugio Bradley, DO, Sheldon Smith MD, Elsa Torres MD, Leandro Espinosa MD, Joann Hauser MD,  Dipak Montana MD, Rob Hernandez MD, Swathi Wells MD,  Gracia Chen DO, Pilar So MD, Kayden Walters MD, Orlando Morgan DO, Yamilex Luciano MD,  Mando Burden DO, Georgie Workman MD, Jennifer Hill MD, Gloria Sierra MD,  Rafael Rawls MD, Denilson Murphy MD, Kamran Flores MD, Yazan Hughes MD, Theodore Grossman MD, Jaxon Villra MD, Aj Bess, DO, Vita Andrade MD, Anne-Marie Pierce DO, Arvin Powers MD, Gracia Lockwood MD, Mohsin Reza, MD, Kip Negron MD, Shirley Waterhouse, CNP,  Nidia Lester, CNP, Aj Castro, CNP,  Erin Shaw, JASON, Ivis Senior CNP, Georgiana Freeman, CNP, Diandra Ruffin, CNP, Marilia Parker, CNP, Maureen Matamoros, PA-C, Sandrita Cruz, CNP, Veronica Alonzo, CNP,  Maryann Page, CNP, Coretta Rosario, CNP, Zuhair Lozano, PA-C, Rowan Piña, PA-C, Areli Ware, CNP,        Radhika Gillespie, CNS, Guadalupe Jaimes, CNP, Sarah Shi, CNP         [facility]   IN-PATIENT SERVICE   [facility]    Progress Note    6/30/2025    8:31 AM    Name:   Elaina Champagne  MRN:     [id number]     Acct:      [id number]   Room:   0249/0249-01   Day:  3  Admit Date:  6/27/2025  7:00 AM    PCP:   Robin Ly MD  Code Status:  Full Code    Subjective:     C/C: No chief complaint on file.    Interval History Status: improved.       Patient seen examined sitting in chair.  Overall feeling well.  Knee pain is improving.  Wound VAC still continues to drain some serosanguineous output.  Patient slightly bradycardic however not in any A-fib or RVR.  No other complaints at this time.  Continue regimen that her  has been feeling well today and is going to be staying home.  Brief History:     74-year-old female past medical history of paroxysmal atrial,  adelfo  Discussed with nurse at bedside.  Will continue to follow.  Feel free to contact us if any questions arise  Swathi Wells MD  6/30/2025  8:31 AM

## 2025-06-30 NOTE — PROGRESS NOTES
Comprehensive Nutrition Assessment    Type and Reason for Visit:  Initial, Positive nutrition screen (Weight Loss; Poor Intakes/Appetite)    Nutrition Recommendations/Plan:   Continue current diet.  Will provide strawberry Ensure x 2 per day.  Monitor PO intakes, labs, weights, and plan of care.     Malnutrition Assessment:  Malnutrition Status:  At risk for malnutrition (06/30/25 1507)    Context:  Chronic Illness     Findings of the 6 clinical characteristics of malnutrition:  Energy Intake:  Mild decrease in energy intake  Weight Loss:  Greater than 10% over 6 months     Body Fat Loss:  Unable to assess   Muscle Mass Loss:  Unable to assess  Fluid Accumulation:  No fluid accumulation   Strength:  Not Performed    Nutrition Assessment:    Admitted for and s/p left knee I&D with wound vac placement - planned return to OR later this week.  PMH: paroxysmal atrial, pulmonary hypertension, venous insufficiency lower extremities, depression, CKD stage IIIb, diastolic heart failure, h/o MRSA infection.  Pt reports having a variable appetite.  Discussed oral supplements - pt likes strawberry Ensure.  Weight loss of 11% x past 6 months noted - also noted h/o weight fluctuations.  Labs reviewed: Na 135 mmol/L.  Meds reviewed.    Nutrition Related Findings:    labs/meds reviewed. Wound Type: Surgical Incision, Wound Vac (to left knee)       Current Nutrition Intake & Therapies:    Average Meal Intake: 1-25%, 26-50%, 51-75%  Average Supplements Intake: None Ordered  ADULT DIET; Regular  Diet NPO Exceptions are: Sips of Water with Meds    Anthropometric Measures:  Height: 152.4 cm (5')  Ideal Body Weight (IBW): 100 lbs (45 kg)    Admission Body Weight: 76.6 kg (168 lb 14 oz)  Current Body Weight: 81 kg (178 lb 9.2 oz), 178.6 % IBW. Weight Source: Bed scale  Current BMI (kg/m2): 34.9  Usual Body Weight: 91.4 kg (201 lb 8 oz) (1/30/25 bed scale per chart review)     % Weight Change (Calculated): -11.4  Weight Adjustment For:

## 2025-06-30 NOTE — PLAN OF CARE
Problem: Discharge Planning  Goal: Discharge to home or other facility with appropriate resources  6/30/2025 1649 by Zenobia Slade, RN  Outcome: Progressing     Problem: Chronic Conditions and Co-morbidities  Goal: Patient's chronic conditions and co-morbidity symptoms are monitored and maintained or improved  6/30/2025 1649 by Zenobia Slade, RN  Outcome: Progressing     Problem: Pain  Goal: Verbalizes/displays adequate comfort level or baseline comfort level  6/30/2025 1649 by Zenobia Slade, RN  Outcome: Progressing

## 2025-06-30 NOTE — CARE COORDINATION
Case Management   Daily Progress Note       Patient Name: Elaina Champagne                   YOB: 1950  Diagnosis: Wound infection complicating hardware [T84.7XXA]  Infection of deep incisional surgical site after procedure, initial encounter [T81.42XA]                       GMLOS: 5.4 days  Length of Stay: 3  days    Anticipated Discharge Date: Two or more days before discharge    Readmission Risk (Low < 19, Mod (19-27), High > 27): Readmission Risk Score: 18.9        Current Transitional Plan    [] Home Independently    [] Home with HC    [] Skilled Nursing Facility    [] Acute Rehabilitation    [] Long Term Acute Care (LTAC)    [] Other:     Plan for the Stay (Medical Management) :          Workflow Continuation (Additional Notes) : Plan is OR tomorrow I&D with Dr Ren. Home with  and [place]. Bioscript referral if josé miguel LANE RN  June 30, 2025

## 2025-06-30 NOTE — PROGRESS NOTES
Orthopedic Progress Note    Patient:  Elaina Champagne  YOB: 1950     74 y.o. female    Subjective:  Patient seen and examined this morning. No complaints or concerns. No issues overnight per nursing.  Patient states that the only true pain that she experiences to the left knee is when the installation fluid is running but this lasts for approximately 1 to 2 minutes every couple hours.  Denies fever, HA, CP, SOB, N/V, new numbness/tingling.  Patient was able to ambulate a total of 75 feet with a rolling walker and contact-guard assist the last time in which she did physical therapy.  Since then however, the patient has been able to ambulate up and down the hallways of the second floor with her rolling walker.    Vitals reviewed, afebrile    Objective:   Vitals:    06/30/25 0420   BP: (!) 163/46   Pulse: 56   Resp:    Temp:    SpO2:      Gen: NAD, cooperative    Cardiovascular: Slightly bradycardic on monitor  Respiratory: No acute respiratory distress, breathing comfortably    Orthopedic Exam  LLE:  Soft dressings overlying the patient's left lower extremity clean/dry/intact without evidence of strikethrough.  Vera flow wound VAC that is applied to the patient's LLE maintaining appropriate suction and functioning properly.  Patient is appropriately tender to palpation at about the level of the knee, she does endorse improvement in tenderness over the last couple days.  Compartments of the LLE are soft and easily compressible.  EHL/FHL/TA/GSC gross motor intact.  Sural/saphenous/SPN/DPN/plantar sensation light touch intact.  Limb is warm and well-perfused with BCR appreciable.    Recent Labs     06/29/25  0921   WBC 3.7   HGB 9.1*   HCT 28.9*         K 4.2   BUN 25*   CREATININE 1.1*   GLUCOSE 99      Meds:   Abx: Cefepime, daptomycin  DVT ppx: ASA currently  See rec for complete list    Impression 74 y.o. female who is being seen for:    - Left knee surgical site infection

## 2025-07-01 ENCOUNTER — ANESTHESIA EVENT (OUTPATIENT)
Dept: OPERATING ROOM | Age: 75
End: 2025-07-01
Payer: MEDICARE

## 2025-07-01 LAB
CRP SERPL HS-MCNC: 11.1 MG/L (ref 0–5)
MICROORGANISM SPEC CULT: ABNORMAL
MICROORGANISM SPEC CULT: ABNORMAL
MICROORGANISM/AGENT SPEC: ABNORMAL
MICROORGANISM/AGENT SPEC: ABNORMAL
SERVICE CMNT-IMP: ABNORMAL
SPECIMEN DESCRIPTION: ABNORMAL

## 2025-07-01 PROCEDURE — 6370000000 HC RX 637 (ALT 250 FOR IP)

## 2025-07-01 PROCEDURE — 97110 THERAPEUTIC EXERCISES: CPT

## 2025-07-01 PROCEDURE — G0545 PR INHERENT VISIT TO INPT: HCPCS | Performed by: INTERNAL MEDICINE

## 2025-07-01 PROCEDURE — 99232 SBSQ HOSP IP/OBS MODERATE 35: CPT | Performed by: INTERNAL MEDICINE

## 2025-07-01 PROCEDURE — 2580000003 HC RX 258: Performed by: INTERNAL MEDICINE

## 2025-07-01 PROCEDURE — 6370000000 HC RX 637 (ALT 250 FOR IP): Performed by: STUDENT IN AN ORGANIZED HEALTH CARE EDUCATION/TRAINING PROGRAM

## 2025-07-01 PROCEDURE — 6360000002 HC RX W HCPCS: Performed by: INTERNAL MEDICINE

## 2025-07-01 PROCEDURE — 99232 SBSQ HOSP IP/OBS MODERATE 35: CPT | Performed by: STUDENT IN AN ORGANIZED HEALTH CARE EDUCATION/TRAINING PROGRAM

## 2025-07-01 PROCEDURE — APPSS30 APP SPLIT SHARED TIME 16-30 MINUTES: Performed by: NURSE PRACTITIONER

## 2025-07-01 PROCEDURE — 1200000000 HC SEMI PRIVATE

## 2025-07-01 PROCEDURE — 97116 GAIT TRAINING THERAPY: CPT

## 2025-07-01 RX ORDER — AMLODIPINE BESYLATE 5 MG/1
5 TABLET ORAL DAILY
Status: DISCONTINUED | OUTPATIENT
Start: 2025-07-01 | End: 2025-07-03 | Stop reason: HOSPADM

## 2025-07-01 RX ORDER — FERROUS SULFATE 325(65) MG
325 TABLET, DELAYED RELEASE (ENTERIC COATED) ORAL
Status: DISCONTINUED | OUTPATIENT
Start: 2025-07-02 | End: 2025-07-03 | Stop reason: HOSPADM

## 2025-07-01 RX ADMIN — TRIAMCINOLONE ACETONIDE: 1 OINTMENT TOPICAL at 08:57

## 2025-07-01 RX ADMIN — TRAZODONE HYDROCHLORIDE 50 MG: 50 TABLET ORAL at 21:12

## 2025-07-01 RX ADMIN — AMLODIPINE BESYLATE 5 MG: 5 TABLET ORAL at 10:12

## 2025-07-01 RX ADMIN — METOPROLOL TARTRATE 12.5 MG: 25 TABLET, FILM COATED ORAL at 21:12

## 2025-07-01 RX ADMIN — Medication 1200 MG: at 08:56

## 2025-07-01 RX ADMIN — ACETAMINOPHEN 650 MG: 325 TABLET ORAL at 08:56

## 2025-07-01 RX ADMIN — CEFEPIME 2000 MG: 2 INJECTION, POWDER, FOR SOLUTION INTRAVENOUS at 05:51

## 2025-07-01 RX ADMIN — FERROUS SULFATE TAB EC 325 MG (65 MG FE EQUIVALENT) 325 MG: 325 (65 FE) TABLET DELAYED RESPONSE at 08:57

## 2025-07-01 RX ADMIN — CEFEPIME 2000 MG: 2 INJECTION, POWDER, FOR SOLUTION INTRAVENOUS at 17:57

## 2025-07-01 RX ADMIN — AMIODARONE HYDROCHLORIDE 200 MG: 200 TABLET ORAL at 08:56

## 2025-07-01 RX ADMIN — FOLIC ACID 1 MG: 1 TABLET ORAL at 08:56

## 2025-07-01 RX ADMIN — ACETAMINOPHEN 650 MG: 325 TABLET ORAL at 14:44

## 2025-07-01 RX ADMIN — CYANOCOBALAMIN TAB 500 MCG 500 MCG: 500 TAB at 08:58

## 2025-07-01 RX ADMIN — METOPROLOL TARTRATE 12.5 MG: 25 TABLET, FILM COATED ORAL at 08:56

## 2025-07-01 RX ADMIN — ASPIRIN 81 MG: 81 TABLET, CHEWABLE ORAL at 08:56

## 2025-07-01 RX ADMIN — BISACODYL 5 MG: 5 TABLET, COATED ORAL at 14:44

## 2025-07-01 RX ADMIN — DAPTOMYCIN 275 MG: 500 INJECTION, POWDER, LYOPHILIZED, FOR SOLUTION INTRAVENOUS at 16:16

## 2025-07-01 RX ADMIN — TROSPIUM CHLORIDE 20 MG: 20 TABLET, FILM COATED ORAL at 21:11

## 2025-07-01 RX ADMIN — ASPIRIN 81 MG: 81 TABLET, CHEWABLE ORAL at 21:12

## 2025-07-01 RX ADMIN — ACETAMINOPHEN 650 MG: 325 TABLET ORAL at 21:11

## 2025-07-01 ASSESSMENT — PAIN SCALES - GENERAL: PAINLEVEL_OUTOF10: 0

## 2025-07-01 NOTE — PROGRESS NOTES
Orthopedic Progress Note    Patient:  Elaina Champagne  YOB: 1950     74 y.o. female    Subjective:  Patient seen and examined this morning. No complaints or concerns. No issues overnight per nursing. Pain is well controlled on current regimen. Denies fever, HA, CP, SOB, N/V, dysuria, new numbness/tingling. BM/flatus+ Voiding appropriately. Was able to ambulate with PT yesterday 200 feet with rolling walker.  Patient had 260cc out of serous fluid in her vera flow overnight.  Patient's intraoperative cultures are currently growing Pseudomonas.    Vitals reviewed, afebrile    Objective:   Vitals:    07/01/25 0412   BP: (!) 159/60   Pulse: 63   Resp: 18   Temp: 98.4 °F (36.9 °C)   SpO2: 97%     Gen: NAD, cooperative    Cardiovascular: Regular rate   Respiratory: No acute respiratory distress, breathing comfortably    Orthopedic Exam  LLE: Vera flow in place maintaining appropriate suction with 260 cc of serous fluid out overnight.  Patient had overlying Ace bandage that was clean/dry/intact without strikethrough.  Nontender to palpation overlying surgical incision.  Compartments soft easily compressible.  EHL/FHL/TA/GS motor complex intact.  Sural/saphenous/SPN/DPN/plantar nerve distribution SI LT.  Limb is warm well-perfused with BCR.    Recent Labs     06/30/25  0856   WBC 5.1   HGB 10.2*   HCT 32.2*      *   K 4.6   BUN 22   CREATININE 1.0*   GLUCOSE 93      Meds:   Abx: Cefepime and daptomycin  DVT ppx: ASA 81 twice daily  See rec for complete list    Impression 74 y.o. female being seen for:    -Left knee surgical site infection     DOS: 6/27/2025 with Dr. Ren  - Left knee irrigation and debridement down to and including the fascia, 3 x 2 cm.  - Vera flow wound VAC placement     Plan  - Plan for OR with Dr. Ren on 7/2/2025 for repeat irrigation and debridement  - Anc OCTOR  - Wound-Vac on LLE. Please measure and record output every shift. Okay to  reinforce/maintain by nursing if necessary. Please notify Ortho if saturated or malfunctioning.  - WBAT to the LLE  - Pain control per primary: Orthopedic surgery  - Medical management per IM  - ABX: Cefepime and daptomycin per ID  - Multimodal pain control ordered   - DVT ppx: ASA 81 mg twice daily   - Ice/Elevate to control pain and edema  - Diet: Adult diet okay from orthopedic perspective, please keep patient n.p.o. on 7/2 at 0001  - Encourage Incentive Spirometry use  - Continue working with PT/OT    - Please page Ortho on call with any questions    Anthony Bryant, DO  Orthopedic Surgery, PGY-1  [place], [place]

## 2025-07-01 NOTE — PROGRESS NOTES
Physical Therapy  Facility/Department: [address] ORTHO/MED SURG   Physical Therapy Daily Treatment Note    Patient Name: Elaina Champagne        MRN: [id number]    : 1950    Date of Service: 2025    No chief complaint on file.    Past Medical History:  has a past medical history of Anemia, Anxiety disorder, Arthritis, Artificial knee joint present, left, Artificial knee joint present, right, Atherosclerotic heart disease, Bell's palsy, Burning with urination, Cellulitis, Chronic kidney disease, stage 3 ([facility]), Cognitive communication deficit, Depression, Fall, Frequent urination, Gastric ulcer with hemorrhage, Gastro-esophageal reflux disease without esophagitis, Hyperlipidemia, Hypertension, Insomnia, Joint replaced, Leaking of urine, Lymphedema, Major depressive disorder, Morbid obesity ([facility]), Nonrheumatic tricuspid (valve) insufficiency, NSTEMI (non-ST elevated myocardial infarction) ([facility]), Occlusion and stenosis of bilateral carotid arteries, Osteoarthritis, Overactive bladder, Paroxysmal atrial fibrillation ([facility]), Under care of team, Under care of team, Under care of team, Under care of team, Under care of team, Under care of team, Urge incontinence, Urine retention, UTI (urinary tract infection), Venous insufficiency, and Weakness.  Past Surgical History:  has a past surgical history that includes Hysterectomy, total abdominal (); Cataract removal (Bilateral, ); incision and drainage (Left, 2017); Total knee arthroplasty (Left, 2017); Knee Arthroplasty (Right, 2018); ventral hernia repair (2019); IR NONTUNNELED VASCULAR CATHETER > 5 YEARS (12/15/2023); Upper gastrointestinal endoscopy (N/A, 2023); Cardiac procedure (N/A, 2024); Cardiac procedure (N/A, 2024); Upper gastrointestinal endoscopy (N/A, 2024); Coronary angioplasty with stent (2024); Cardiac procedure (N/A, 2024); Cardiac procedure (N/A, 2024); Wound debridement (Left,  01/29/2025); Total knee arthroplasty (Left, 01/29/2025); Knee debridement (Left, 06/27/2025); and Leg Surgery (Left, 6/27/2025).    Discharge Recommendations  Discharge Recommendations: Patient would benefit from continued therapy after discharge  PT Equipment Recommendations  Equipment Needed: No    Assessment  Body Structures, Functions, Activity Limitations Requiring Skilled Therapeutic Intervention: Decreased functional mobility ;Decreased strength;Decreased endurance;Decreased balance  Assessment: Pt ambulated 150ft with RW and CGA, fair stability with no true LOB. Pt most limited by decreased balance and endurance. Recommending continued PT to address deficits and maximize safety and independence with mobility .  Therapy Prognosis: Good  Activity Tolerance  Activity Tolerance: Patient limited by endurance;Patient limited by pain;Patient tolerated treatment well  Safety Devices  Type of Devices: Call light within reach;Left in chair;Gait belt;Nurse notified  Restraints  Restraints Initially in Place: No    -PAC  AM-PAC Basic Mobility - Inpatient   How much help is needed turning from your back to your side while in a flat bed without using bedrails?: None  How much help is needed moving from lying on your back to sitting on the side of a flat bed without using bedrails?: None  How much help is needed moving to and from a bed to a chair?: A Little  How much help is needed standing up from a chair using your arms?: A Little  How much help is needed walking in hospital room?: A Little  How much help is needed climbing 3-5 steps with a railing?: A Little  [facility] Inpatient Mobility Raw Score : 20  AM-PAC Inpatient T-Scale Score : 47.67  Mobility Inpatient CMS 0-100% Score: 35.83  Mobility Inpatient CMS G-Code Modifier : CJ    Restrictions/Precautions  Restrictions/Precautions  Restrictions/Precautions: Contact Precautions;Fall Risk;General Precautions  Required Braces or Orthoses?: No  Lower Extremity Weight

## 2025-07-01 NOTE — PROGRESS NOTES
[facility]  Office: [phone]  Nael Morgan, DO, Ronny Quevedo, DO, Pb Vásquez DO, Refugio Bradley, DO, Sheldon Smith MD, Elsa Torres MD, Leandro Espinosa MD, Joann Hauser MD,  Dipak Montana MD, Rob Hernandez MD, Swathi Wells MD,  Gracia Chen DO, Pilar So MD, Kayden Walters MD, Orlando Morgan DO, Yamilex Luciano MD,  Mando Burden DO, eGorgie Workman MD, Jennifer Hill MD, Gloria Sierra MD,  Rafael Rawls MD, Denilson Murphy MD, Kamran Flores MD, Yazan Hughes MD, Theodore Grossman MD, Jaxon Villar MD, Aj Bess, DO, Vita Andrade MD, Anne-Marie Pierce DO, Arvin Powers MD, Gracia Lockwood MD, Mohsin Reza, MD, Kip Negron MD, Shirley Waterhouse, CNP,  Nidia Lester, CNP, Aj Castro, CNP,  Erin Shaw, JASON, Ivis Senior, CNP, Georgiana Freeman, CNP, Diandra Ruffin, CNP, Marilia Parker, CNP, Maureen Matamoros, PA-C, Sandrita Cruz, CNP, Veronica Alonzo, CNP,  Maryann Page, CNP, Coretta Rosario, CNP, Zuhair Lozano, PA-C, Rowan Piña, PA-C, Areli Ware, CNP,        Radhika Gillespie, CNS, Guadalupe Jaimes, CNP, Sarah Shi, CNP         [facility]   IN-PATIENT SERVICE   [facility]    Progress Note    7/1/2025    9:16 AM    Name:   Elaina Champagne  MRN:     [id number]     Acct:      [id number]   Room:   0249/0249-01   Day:  4  Admit Date:  6/27/2025  7:00 AM    PCP:   Robin Ly MD  Code Status:  Full Code    Subjective:     C/C:knee pain and drainage     Interval History Status: improved.     Seen and  examined   Overall feeling well.  Wound VAC in place  Denies any complaints  Pain controlled   Plan for OR with Dr. Ren on 7/2/2025 for repeat irrigation and debridement   Brief History:   Per documentation    74-year-old female past medical history of paroxysmal atrial, pulmonary hypertension, venous insufficiency lower extremities, depression, CKD stage IIIb, diastolic heart failure, history of MRSA infection presents  and time and normal affect  Lungs:  clear to auscultation bilaterally, normal effort  Heart: Bradycardic, systolic murmur  Abdomen:  soft, nontender, nondistended, normal bowel sounds  Extremities: Wound VAC in place      Assessment:        Hospital Problems           Last Modified POA    * (Principal) Wound infection complicating hardware 6/26/2025 Unknown    Atrial fibrillation with RVR ([facility]) 6/27/2025 Yes    Venous insufficiency of both lower extremities 6/27/2025 Yes    Depression with anxiety 6/27/2025 Yes    Hyperlipidemia 6/27/2025 Yes    Morbid obesity ([facility]) 6/27/2025 Yes    PAF (paroxysmal atrial fibrillation) ([facility]) 6/27/2025 Yes    Chronic kidney disease, stage 3b ([facility]) 6/27/2025 Yes    Anemia 6/27/2025 Yes    Pulmonary hypertension ([facility]) 6/27/2025 Yes    Methicillin susceptible Staphylococcus aureus infection 6/27/2025 Yes    Infection of deep incisional surgical site after procedure, initial encounter 6/27/2025 Yes    MRSA carrier 6/27/2025 Yes    MRSA (methicillin resistant staph aureus) culture positive 6/28/2025 Yes       Plan:        MRSA wound infection.   Plan per Infectious disease and orthopedic surgery  Plan for OR with Dr. Ren on 7/2/2025 for repeat irrigation and debridement     Iron deficiency anemia: continue ferrous sulfate  HTN : BP stable. C/w Norvasc   Folic acid elevated: d/c folic acid supplement  A-fib : rate controlled  not on full anticoagulation due to history of GI bleeding.    Continue aspirin if okay with surgery.  Continue amiodarone and Lopressor    Discussed with nurse  C/w PT/OT  Will continue to follow.  Feel free to contact us if any questions     Jennifer Hill MD  7/1/2025  9:16 AM

## 2025-07-01 NOTE — PROGRESS NOTES
irrigation and closure on 7-3-25  Cultures;  25: MRSA and Pseudomonas  3-26-25: Pseudomonas  25: Pseudomonas   25: Pseudomonas  Hx of left knee replacement at [facility] in   Fall injuring left knee in 2024  Diastolic CHF  Essential HTN  Venous insufficiency of legs  CKD stage III B  PAF  Pulmonary HTN  Allergy to vancomycin and to penicillins    Recommendations:   Please follow suggestions as per Dr Baez's recommendations:  Daptomycin and Cefepime continue  Pseudomonas on knee culture-sensitive to cefepime, Levaquin, meropenem and Zosyn  Plan for repeat I&D with Ortho on 25    Interval History:     2025  /66   Pulse 58   Temp 99.5 °F (37.5 °C)   Resp 15   Ht 1.524 m (5')   Wt 81 kg (178 lb 9.2 oz)   SpO2 100%   BMI 34.88 kg/m²     Afebrile  VSS    The patient is doing well today with wound vac in place to her left knee.   Ortho is planning on repeat I&D on 25.    Daptomycin and Cefepime continues  Pseudomonas on knee culture.    Lab-work reviewed and is stable    No acute issues per RN      Physical Examination :   Patient Vitals for the past 8 hrs:   BP Temp Pulse Resp SpO2   25 1142 136/66 99.5 °F (37.5 °C) 58 15 100 %   25 1012 (!) 166/57 -- -- -- --   25 0856 (!) 166/57 -- 57 -- --     Temp (24hrs), Av.8 °F (37.1 °C), Min:98.4 °F (36.9 °C), Max:99.5 °F (37.5 °C)    General Appearance: Awake, alert, and in no apparent distress  Eyes: Sclera anicteric; conjunctivae pink, No hemorhages, no embolic phenomena.  ENT: Oropharynx clear, without erythema, exudate, or thrush.No tenderness of sinuses. No nasal drainage.  Neck: Supple, without lymphadenopathy. No JVD  Pulmonary/Chest: Clear to auscultation, without wheezes, rales, or rhonchi. No areas of consolidation.Good air movement bilaterally. No egophony.  Cardiovascular: Regular rate and rhythm without murmurs, rubs, or gallops. S1 and S2 normal.  Abdomen: Soft, no tenderness, bowel sounds

## 2025-07-01 NOTE — PLAN OF CARE
Problem: Discharge Planning  Goal: Discharge to home or other facility with appropriate resources  7/1/2025 0512 by Shonda Henning RN  Outcome: Progressing  6/30/2025 1649 by Zenobia Slade RN  Outcome: Progressing     Problem: Chronic Conditions and Co-morbidities  Goal: Patient's chronic conditions and co-morbidity symptoms are monitored and maintained or improved  7/1/2025 0512 by Shonda Henning RN  Outcome: Progressing  6/30/2025 1649 by Zenobia Slade RN  Outcome: Progressing     Problem: Pain  Goal: Verbalizes/displays adequate comfort level or baseline comfort level  7/1/2025 0512 by Shonda Henning RN  Outcome: Progressing  6/30/2025 1649 by Zenobia Slade RN  Outcome: Progressing     Problem: Safety - Adult  Goal: Free from fall injury  Outcome: Progressing     Problem: ABCDS Injury Assessment  Goal: Absence of physical injury  Outcome: Progressing     Problem: Nutrition Deficit:  Goal: Optimize nutritional status  Outcome: Progressing

## 2025-07-02 ENCOUNTER — ANESTHESIA (OUTPATIENT)
Dept: OPERATING ROOM | Age: 75
End: 2025-07-02
Payer: MEDICARE

## 2025-07-02 PROCEDURE — 2580000003 HC RX 258: Performed by: NURSE ANESTHETIST, CERTIFIED REGISTERED

## 2025-07-02 PROCEDURE — 6370000000 HC RX 637 (ALT 250 FOR IP)

## 2025-07-02 PROCEDURE — 7100000000 HC PACU RECOVERY - FIRST 15 MIN: Performed by: ORTHOPAEDIC SURGERY

## 2025-07-02 PROCEDURE — 88305 TISSUE EXAM BY PATHOLOGIST: CPT

## 2025-07-02 PROCEDURE — 2500000003 HC RX 250 WO HCPCS: Performed by: NURSE ANESTHETIST, CERTIFIED REGISTERED

## 2025-07-02 PROCEDURE — 87205 SMEAR GRAM STAIN: CPT

## 2025-07-02 PROCEDURE — 87070 CULTURE OTHR SPECIMN AEROBIC: CPT

## 2025-07-02 PROCEDURE — 6360000002 HC RX W HCPCS: Performed by: INTERNAL MEDICINE

## 2025-07-02 PROCEDURE — 7100000001 HC PACU RECOVERY - ADDTL 15 MIN: Performed by: ORTHOPAEDIC SURGERY

## 2025-07-02 PROCEDURE — 6370000000 HC RX 637 (ALT 250 FOR IP): Performed by: STUDENT IN AN ORGANIZED HEALTH CARE EDUCATION/TRAINING PROGRAM

## 2025-07-02 PROCEDURE — 2580000003 HC RX 258

## 2025-07-02 PROCEDURE — 99232 SBSQ HOSP IP/OBS MODERATE 35: CPT | Performed by: INTERNAL MEDICINE

## 2025-07-02 PROCEDURE — 2500000003 HC RX 250 WO HCPCS: Performed by: ORTHOPAEDIC SURGERY

## 2025-07-02 PROCEDURE — 3600000014 HC SURGERY LEVEL 4 ADDTL 15MIN: Performed by: ORTHOPAEDIC SURGERY

## 2025-07-02 PROCEDURE — 87075 CULTR BACTERIA EXCEPT BLOOD: CPT

## 2025-07-02 PROCEDURE — 3700000000 HC ANESTHESIA ATTENDED CARE: Performed by: ORTHOPAEDIC SURGERY

## 2025-07-02 PROCEDURE — 99232 SBSQ HOSP IP/OBS MODERATE 35: CPT | Performed by: STUDENT IN AN ORGANIZED HEALTH CARE EDUCATION/TRAINING PROGRAM

## 2025-07-02 PROCEDURE — 1200000000 HC SEMI PRIVATE

## 2025-07-02 PROCEDURE — 6360000002 HC RX W HCPCS: Performed by: STUDENT IN AN ORGANIZED HEALTH CARE EDUCATION/TRAINING PROGRAM

## 2025-07-02 PROCEDURE — 2500000003 HC RX 250 WO HCPCS

## 2025-07-02 PROCEDURE — G0545 PR INHERENT VISIT TO INPT: HCPCS | Performed by: INTERNAL MEDICINE

## 2025-07-02 PROCEDURE — 3600000004 HC SURGERY LEVEL 4 BASE: Performed by: ORTHOPAEDIC SURGERY

## 2025-07-02 PROCEDURE — 6360000002 HC RX W HCPCS: Performed by: NURSE ANESTHETIST, CERTIFIED REGISTERED

## 2025-07-02 PROCEDURE — 0JBP0ZZ EXCISION OF LEFT LOWER LEG SUBCUTANEOUS TISSUE AND FASCIA, OPEN APPROACH: ICD-10-PCS | Performed by: ORTHOPAEDIC SURGERY

## 2025-07-02 PROCEDURE — 3700000001 HC ADD 15 MINUTES (ANESTHESIA): Performed by: ORTHOPAEDIC SURGERY

## 2025-07-02 PROCEDURE — APPSS30 APP SPLIT SHARED TIME 16-30 MINUTES: Performed by: NURSE PRACTITIONER

## 2025-07-02 PROCEDURE — 2709999900 HC NON-CHARGEABLE SUPPLY: Performed by: ORTHOPAEDIC SURGERY

## 2025-07-02 PROCEDURE — 6360000002 HC RX W HCPCS

## 2025-07-02 PROCEDURE — 0JBP0ZX EXCISION OF LEFT LOWER LEG SUBCUTANEOUS TISSUE AND FASCIA, OPEN APPROACH, DIAGNOSTIC: ICD-10-PCS | Performed by: ORTHOPAEDIC SURGERY

## 2025-07-02 PROCEDURE — 2580000003 HC RX 258: Performed by: INTERNAL MEDICINE

## 2025-07-02 PROCEDURE — 64447 NJX AA&/STRD FEMORAL NRV IMG: CPT | Performed by: ANESTHESIOLOGY

## 2025-07-02 RX ORDER — FENTANYL CITRATE 50 UG/ML
INJECTION, SOLUTION INTRAMUSCULAR; INTRAVENOUS
Status: DISCONTINUED | OUTPATIENT
Start: 2025-07-02 | End: 2025-07-02 | Stop reason: SDUPTHER

## 2025-07-02 RX ORDER — FENTANYL CITRATE 50 UG/ML
50 INJECTION, SOLUTION INTRAMUSCULAR; INTRAVENOUS EVERY 5 MIN PRN
Status: DISCONTINUED | OUTPATIENT
Start: 2025-07-02 | End: 2025-07-02 | Stop reason: HOSPADM

## 2025-07-02 RX ORDER — BUPIVACAINE HYDROCHLORIDE 5 MG/ML
INJECTION, SOLUTION EPIDURAL; INTRACAUDAL; PERINEURAL
Status: COMPLETED | OUTPATIENT
Start: 2025-07-02 | End: 2025-07-02

## 2025-07-02 RX ORDER — LIDOCAINE HYDROCHLORIDE 10 MG/ML
INJECTION, SOLUTION EPIDURAL; INFILTRATION; INTRACAUDAL; PERINEURAL
Status: DISCONTINUED | OUTPATIENT
Start: 2025-07-02 | End: 2025-07-02 | Stop reason: SDUPTHER

## 2025-07-02 RX ORDER — DEXAMETHASONE SODIUM PHOSPHATE 10 MG/ML
INJECTION, SOLUTION INTRA-ARTICULAR; INTRALESIONAL; INTRAMUSCULAR; INTRAVENOUS; SOFT TISSUE
Status: DISCONTINUED | OUTPATIENT
Start: 2025-07-02 | End: 2025-07-02 | Stop reason: SDUPTHER

## 2025-07-02 RX ORDER — SODIUM CHLORIDE 0.9 % (FLUSH) 0.9 %
5-40 SYRINGE (ML) INJECTION EVERY 12 HOURS SCHEDULED
Status: DISCONTINUED | OUTPATIENT
Start: 2025-07-02 | End: 2025-07-02 | Stop reason: HOSPADM

## 2025-07-02 RX ORDER — FENTANYL CITRATE 50 UG/ML
25 INJECTION, SOLUTION INTRAMUSCULAR; INTRAVENOUS EVERY 5 MIN PRN
Status: COMPLETED | OUTPATIENT
Start: 2025-07-02 | End: 2025-07-02

## 2025-07-02 RX ORDER — SODIUM CHLORIDE, SODIUM LACTATE, POTASSIUM CHLORIDE, CALCIUM CHLORIDE 600; 310; 30; 20 MG/100ML; MG/100ML; MG/100ML; MG/100ML
INJECTION, SOLUTION INTRAVENOUS
Status: DISCONTINUED | OUTPATIENT
Start: 2025-07-02 | End: 2025-07-02 | Stop reason: SDUPTHER

## 2025-07-02 RX ORDER — HYDRALAZINE HYDROCHLORIDE 20 MG/ML
10 INJECTION INTRAMUSCULAR; INTRAVENOUS
Status: DISCONTINUED | OUTPATIENT
Start: 2025-07-02 | End: 2025-07-02 | Stop reason: HOSPADM

## 2025-07-02 RX ORDER — IPRATROPIUM BROMIDE AND ALBUTEROL SULFATE 2.5; .5 MG/3ML; MG/3ML
1 SOLUTION RESPIRATORY (INHALATION)
Status: DISCONTINUED | OUTPATIENT
Start: 2025-07-02 | End: 2025-07-02 | Stop reason: HOSPADM

## 2025-07-02 RX ORDER — FENTANYL CITRATE 50 UG/ML
12.5 INJECTION, SOLUTION INTRAMUSCULAR; INTRAVENOUS ONCE
Status: COMPLETED | OUTPATIENT
Start: 2025-07-03 | End: 2025-07-03

## 2025-07-02 RX ORDER — NALOXONE HYDROCHLORIDE 0.4 MG/ML
INJECTION, SOLUTION INTRAMUSCULAR; INTRAVENOUS; SUBCUTANEOUS PRN
Status: DISCONTINUED | OUTPATIENT
Start: 2025-07-02 | End: 2025-07-02 | Stop reason: HOSPADM

## 2025-07-02 RX ORDER — LABETALOL HYDROCHLORIDE 5 MG/ML
10 INJECTION, SOLUTION INTRAVENOUS
Status: DISCONTINUED | OUTPATIENT
Start: 2025-07-02 | End: 2025-07-02 | Stop reason: HOSPADM

## 2025-07-02 RX ORDER — MAGNESIUM HYDROXIDE 1200 MG/15ML
LIQUID ORAL CONTINUOUS PRN
Status: DISCONTINUED | OUTPATIENT
Start: 2025-07-02 | End: 2025-07-02 | Stop reason: HOSPADM

## 2025-07-02 RX ORDER — ROCURONIUM BROMIDE 10 MG/ML
INJECTION, SOLUTION INTRAVENOUS
Status: DISCONTINUED | OUTPATIENT
Start: 2025-07-02 | End: 2025-07-02 | Stop reason: SDUPTHER

## 2025-07-02 RX ORDER — ONDANSETRON 2 MG/ML
INJECTION INTRAMUSCULAR; INTRAVENOUS
Status: DISCONTINUED | OUTPATIENT
Start: 2025-07-02 | End: 2025-07-02 | Stop reason: SDUPTHER

## 2025-07-02 RX ORDER — EPHEDRINE SULFATE/0.9% NACL/PF 25 MG/5 ML
SYRINGE (ML) INTRAVENOUS
Status: DISCONTINUED | OUTPATIENT
Start: 2025-07-02 | End: 2025-07-02 | Stop reason: SDUPTHER

## 2025-07-02 RX ORDER — SODIUM CHLORIDE 0.9 % (FLUSH) 0.9 %
5-40 SYRINGE (ML) INJECTION PRN
Status: DISCONTINUED | OUTPATIENT
Start: 2025-07-02 | End: 2025-07-02 | Stop reason: HOSPADM

## 2025-07-02 RX ORDER — METOCLOPRAMIDE HYDROCHLORIDE 5 MG/ML
10 INJECTION INTRAMUSCULAR; INTRAVENOUS
Status: DISCONTINUED | OUTPATIENT
Start: 2025-07-02 | End: 2025-07-02 | Stop reason: HOSPADM

## 2025-07-02 RX ORDER — SODIUM CHLORIDE 9 MG/ML
INJECTION, SOLUTION INTRAVENOUS PRN
Status: DISCONTINUED | OUTPATIENT
Start: 2025-07-02 | End: 2025-07-02 | Stop reason: HOSPADM

## 2025-07-02 RX ORDER — PROCHLORPERAZINE EDISYLATE 5 MG/ML
5 INJECTION INTRAMUSCULAR; INTRAVENOUS
Status: DISCONTINUED | OUTPATIENT
Start: 2025-07-02 | End: 2025-07-02 | Stop reason: HOSPADM

## 2025-07-02 RX ORDER — CEFAZOLIN SODIUM 1 G/3ML
INJECTION, POWDER, FOR SOLUTION INTRAMUSCULAR; INTRAVENOUS
Status: DISCONTINUED | OUTPATIENT
Start: 2025-07-02 | End: 2025-07-02 | Stop reason: SDUPTHER

## 2025-07-02 RX ORDER — PROPOFOL 10 MG/ML
INJECTION, EMULSION INTRAVENOUS
Status: DISCONTINUED | OUTPATIENT
Start: 2025-07-02 | End: 2025-07-02 | Stop reason: SDUPTHER

## 2025-07-02 RX ADMIN — TRAZODONE HYDROCHLORIDE 50 MG: 50 TABLET ORAL at 20:45

## 2025-07-02 RX ADMIN — PROPOFOL 20 MG: 10 INJECTION, EMULSION INTRAVENOUS at 13:40

## 2025-07-02 RX ADMIN — ROCURONIUM BROMIDE 40 MG: 10 INJECTION, SOLUTION INTRAVENOUS at 12:29

## 2025-07-02 RX ADMIN — DEXAMETHASONE SODIUM PHOSPHATE 4 MG: 10 INJECTION INTRAMUSCULAR; INTRAVENOUS at 12:35

## 2025-07-02 RX ADMIN — FENTANYL CITRATE 25 MCG: 50 INJECTION, SOLUTION INTRAMUSCULAR; INTRAVENOUS at 13:52

## 2025-07-02 RX ADMIN — ACETAMINOPHEN 650 MG: 325 TABLET ORAL at 03:38

## 2025-07-02 RX ADMIN — TROSPIUM CHLORIDE 20 MG: 20 TABLET, FILM COATED ORAL at 20:45

## 2025-07-02 RX ADMIN — CEFAZOLIN 2 G: 1 INJECTION, POWDER, FOR SOLUTION INTRAMUSCULAR; INTRAVENOUS at 12:36

## 2025-07-02 RX ADMIN — PROPOFOL 140 MG: 10 INJECTION, EMULSION INTRAVENOUS at 12:29

## 2025-07-02 RX ADMIN — Medication 1200 MG: at 09:09

## 2025-07-02 RX ADMIN — CEFEPIME 2000 MG: 2 INJECTION, POWDER, FOR SOLUTION INTRAVENOUS at 18:14

## 2025-07-02 RX ADMIN — ACETAMINOPHEN 650 MG: 325 TABLET ORAL at 20:45

## 2025-07-02 RX ADMIN — CEFEPIME 2000 MG: 2 INJECTION, POWDER, FOR SOLUTION INTRAVENOUS at 05:37

## 2025-07-02 RX ADMIN — EPHEDRINE SULFATE 5 MG: 5 INJECTION INTRAVENOUS at 12:55

## 2025-07-02 RX ADMIN — AMLODIPINE BESYLATE 5 MG: 5 TABLET ORAL at 09:08

## 2025-07-02 RX ADMIN — FENTANYL CITRATE 50 MCG: 50 INJECTION, SOLUTION INTRAMUSCULAR; INTRAVENOUS at 12:29

## 2025-07-02 RX ADMIN — EPHEDRINE SULFATE 5 MG: 5 INJECTION INTRAVENOUS at 12:41

## 2025-07-02 RX ADMIN — ONDANSETRON 4 MG: 2 INJECTION, SOLUTION INTRAMUSCULAR; INTRAVENOUS at 12:35

## 2025-07-02 RX ADMIN — FENTANYL CITRATE 50 MCG: 50 INJECTION INTRAMUSCULAR; INTRAVENOUS at 14:28

## 2025-07-02 RX ADMIN — METOPROLOL TARTRATE 12.5 MG: 25 TABLET, FILM COATED ORAL at 20:45

## 2025-07-02 RX ADMIN — ACETAMINOPHEN 650 MG: 325 TABLET ORAL at 09:09

## 2025-07-02 RX ADMIN — FENTANYL CITRATE 25 MCG: 50 INJECTION INTRAMUSCULAR; INTRAVENOUS at 14:21

## 2025-07-02 RX ADMIN — TRIAMCINOLONE ACETONIDE: 1 OINTMENT TOPICAL at 20:48

## 2025-07-02 RX ADMIN — ASPIRIN 81 MG: 81 TABLET, CHEWABLE ORAL at 20:45

## 2025-07-02 RX ADMIN — FENTANYL CITRATE 25 MCG: 50 INJECTION, SOLUTION INTRAMUSCULAR; INTRAVENOUS at 13:24

## 2025-07-02 RX ADMIN — ASPIRIN 81 MG: 81 TABLET, CHEWABLE ORAL at 09:09

## 2025-07-02 RX ADMIN — SODIUM CHLORIDE, PRESERVATIVE FREE 10 ML: 5 INJECTION INTRAVENOUS at 20:47

## 2025-07-02 RX ADMIN — EPHEDRINE SULFATE 5 MG: 5 INJECTION INTRAVENOUS at 12:51

## 2025-07-02 RX ADMIN — OXYCODONE 5 MG: 5 TABLET ORAL at 00:42

## 2025-07-02 RX ADMIN — TRIAMCINOLONE ACETONIDE: 1 OINTMENT TOPICAL at 09:09

## 2025-07-02 RX ADMIN — CYANOCOBALAMIN TAB 500 MCG 500 MCG: 500 TAB at 09:09

## 2025-07-02 RX ADMIN — AMIODARONE HYDROCHLORIDE 200 MG: 200 TABLET ORAL at 09:08

## 2025-07-02 RX ADMIN — SUGAMMADEX 200 MG: 100 INJECTION, SOLUTION INTRAVENOUS at 13:53

## 2025-07-02 RX ADMIN — LIDOCAINE HYDROCHLORIDE 50 MG: 10 INJECTION, SOLUTION EPIDURAL; INFILTRATION; INTRACAUDAL; PERINEURAL at 12:29

## 2025-07-02 RX ADMIN — OXYCODONE HYDROCHLORIDE 10 MG: 10 TABLET ORAL at 15:16

## 2025-07-02 RX ADMIN — FERROUS SULFATE TAB EC 325 MG (65 MG FE EQUIVALENT) 325 MG: 325 (65 FE) TABLET DELAYED RESPONSE at 09:09

## 2025-07-02 RX ADMIN — BUPIVACAINE HYDROCHLORIDE 10 ML: 5 INJECTION, SOLUTION EPIDURAL; INTRACAUDAL; PERINEURAL at 12:27

## 2025-07-02 RX ADMIN — SODIUM CHLORIDE, POTASSIUM CHLORIDE, SODIUM LACTATE AND CALCIUM CHLORIDE: 600; 310; 30; 20 INJECTION, SOLUTION INTRAVENOUS at 12:21

## 2025-07-02 RX ADMIN — FENTANYL CITRATE 25 MCG: 50 INJECTION INTRAMUSCULAR; INTRAVENOUS at 14:13

## 2025-07-02 RX ADMIN — DAPTOMYCIN 275 MG: 500 INJECTION, POWDER, LYOPHILIZED, FOR SOLUTION INTRAVENOUS at 16:56

## 2025-07-02 RX ADMIN — OXYCODONE HYDROCHLORIDE 10 MG: 10 TABLET ORAL at 20:45

## 2025-07-02 ASSESSMENT — PAIN DESCRIPTION - DESCRIPTORS
DESCRIPTORS: ACHING;DISCOMFORT
DESCRIPTORS: ACHING;DISCOMFORT
DESCRIPTORS: ACHING;SORE
DESCRIPTORS: ACHING;STABBING
DESCRIPTORS: ACHING
DESCRIPTORS: ACHING;DISCOMFORT
DESCRIPTORS: ACHING;DISCOMFORT
DESCRIPTORS: ACHING
DESCRIPTORS: ACHING;DISCOMFORT
DESCRIPTORS: ACHING
DESCRIPTORS: ACHING;DISCOMFORT
DESCRIPTORS: ACHING;STABBING
DESCRIPTORS: ACHING;DISCOMFORT

## 2025-07-02 ASSESSMENT — PAIN DESCRIPTION - LOCATION
LOCATION: KNEE
LOCATION: LEG
LOCATION: KNEE
LOCATION: LEG
LOCATION: LEG
LOCATION: KNEE
LOCATION: KNEE
LOCATION: LEG
LOCATION: KNEE
LOCATION: LEG

## 2025-07-02 ASSESSMENT — PAIN DESCRIPTION - PAIN TYPE
TYPE: SURGICAL PAIN

## 2025-07-02 ASSESSMENT — PAIN SCALES - GENERAL
PAINLEVEL_OUTOF10: 5
PAINLEVEL_OUTOF10: 3
PAINLEVEL_OUTOF10: 6
PAINLEVEL_OUTOF10: 4
PAINLEVEL_OUTOF10: 7
PAINLEVEL_OUTOF10: 7
PAINLEVEL_OUTOF10: 5
PAINLEVEL_OUTOF10: 5
PAINLEVEL_OUTOF10: 7
PAINLEVEL_OUTOF10: 6
PAINLEVEL_OUTOF10: 5
PAINLEVEL_OUTOF10: 6
PAINLEVEL_OUTOF10: 4
PAINLEVEL_OUTOF10: 5
PAINLEVEL_OUTOF10: 4
PAINLEVEL_OUTOF10: 8
PAINLEVEL_OUTOF10: 7

## 2025-07-02 ASSESSMENT — PAIN DESCRIPTION - ORIENTATION
ORIENTATION: LEFT

## 2025-07-02 ASSESSMENT — PAIN - FUNCTIONAL ASSESSMENT: PAIN_FUNCTIONAL_ASSESSMENT: 0-10

## 2025-07-02 ASSESSMENT — ENCOUNTER SYMPTOMS: SHORTNESS OF BREATH: 1

## 2025-07-02 ASSESSMENT — PAIN DESCRIPTION - FREQUENCY: FREQUENCY: CONTINUOUS

## 2025-07-02 NOTE — PROGRESS NOTES
[facility] - Progress Note   Today's Date and Time: 7/2/2025, 3:32 PM    Impression :   Lt  knee infection with MRSA  S/P I&D and Veraflow VAC placement on 6-27-25  S/P repeat I&D, irrigation and closure on 7-2-25  Cultures;  1-29-25: MRSA and Pseudomonas  3-26-25: Pseudomonas  4-8-25: Pseudomonas   6-27-25: Pending  Hx of left knee replacement at [facility] in 2017  Fall injuring left knee in December 2024  Diastolic CHF  Essential HTN  Venous insufficiency of legs  CKD stage III B  PAF  Pulmonary HTN  Allergy to vancomycin and to penicillins    Recommendations:   Avoid vancomycin   Cefepime until discharge  Plan po Levofloxacin x 4 weeks at time of discharge  Linezolid to stop at discharge   Office f/up in 3 weeks with Dr Baez for infection. Please call [phone] for appointment     Medical Decision Making/Summary/Discussion:7/2/2025     Daily Elements of Decision Making provided by Consulting Physician:    Note: I have independently performed the steps listed below as part of the medical decision making and evaluation.    Review of current Problems:  Today I am seeing and examining the patient for the following problems:  Lt  knee infection with MRSA  S/P I&D and Veraflow VAC placement on 6-27-25  Plans for repeat I&D, irrigation and closure on 7-3-25  Cultures;  1-29-25: MRSA and Pseudomonas  3-26-25: Pseudomonas  4-8-25: Pseudomonas   6-27-25: Pending  Hx of left knee replacement at [facility] in 2017  Fall injuring left knee in December 2024  Diastolic CHF  Essential HTN  Venous insufficiency of legs  CKD stage III B  PAF  Pulmonary HTN  Allergy to vancomycin and to penicillins  Evaluation of Patient:  Evaluated because of MRSA and Pseudomonas knee wound infection  Please see daily details in Interval Changes Section  Changes in physical exam:  S/P I&D Lt Knee  Please see daily details in Physical Exam section and in Interval Changes Section  Changes in ROS:  Improved  Please      Socioeconomic History    Marital status:      Spouse name: Eze    Number of children: 3    Years of education: Not on file    Highest education level: Not on file   Occupational History    Not on file   Tobacco Use    Smoking status: Never     Passive exposure: Never    Smokeless tobacco: Never   Vaping Use    Vaping status: Never Used   Substance and Sexual Activity    Alcohol use: No    Drug use: No    Sexual activity: Yes   Other Topics Concern    Not on file   Social History Narrative    Not on file     Social Drivers of Health     Financial Resource Strain: Low Risk  (11/6/2024)    Overall Financial Resource Strain (CARDIA)     Difficulty of Paying Living Expenses: Not hard at all   Food Insecurity: No Food Insecurity (6/27/2025)    Hunger Vital Sign     Worried About Running Out of Food in the Last Year: Never true     Ran Out of Food in the Last Year: Never true   Transportation Needs: No Transportation Needs (6/27/2025)    PRAPARE - Transportation     Lack of Transportation (Medical): No     Lack of Transportation (Non-Medical): No   Physical Activity: Inactive (11/6/2024)    Exercise Vital Sign     Days of Exercise per Week: 0 days     Minutes of Exercise per Session: 0 min   Stress: Not on file   Social Connections: Not on file   Intimate Partner Violence: Unknown (2/22/2024)    Received from [facility]     Fear of Current or Ex-Partner: Not on file     Emotionally Abused: Not on file     Physically Abused: Not on file     Sexually Abused: Not on file     Physically or Sexually Abused: Not on file   Housing Stability: High Risk (6/27/2025)    Housing Stability Vital Sign     Unable to Pay for Housing in the Last Year: No     Number of Times Moved in the Last Year: 0     Homeless in the Last Year: Yes       Family History:     Family History   Problem Relation Age of Onset    Cancer Mother     High Blood Pressure Father     Stroke Father     Ovarian

## 2025-07-02 NOTE — PROGRESS NOTES
[facility]  Office: [phone]  Nael Morgan, DO, Ronny Quevedo, DO, Pb Vásquez DO, Refugio Bradley, DO, Sheldon Smith MD, Elsa Torres MD, Leandro Espinosa MD, Joann Hauser MD,  Dipak Montana MD, Rob Hernandez MD, Swathi Wells MD,  Gracia Chen DO, Pilar So MD, Kayden Walters MD, Orlando Morgan DO, Yamilex Luciano MD,  Mando Burden DO, Georgie Workman MD, Jennifer Hill MD, Gloria Sierra MD,  Rafael Rawls MD, Denilson Murphy MD, Kamran Flores MD, Yazan Hughes MD, Theodore Grossman MD, Jaxon Villar MD, Aj Bess, DO, Vita Andrade MD, Anne-Marie Pierce DO, Arvin Powers MD, Gracia Lockwood MD, Mohsin Reza, MD, Kip Negron MD, Shirley Waterhouse, CNP,  Nidia Lester, CNP, Aj Castro, CNP,  Erin Shaw, JASON, Ivis Senior, CNP, Georgiana Freeamn, CNP, Diandra Ruffin, CNP, Marilia Parker, CNP, Maureen Matamoros, PA-C, Sandrita Cruz, CNP, Veronica Alonzo, CNP,  Maryann Page, CNP, Coretta Rosario, CNP, Zuhair Lozano, PA-C, Rowan Piña, PA-C, Areli Ware, CNP,        Radhika Gillespie, CNS, Guadalupe Jaimes, CNP, Sarah Shi, CNP         [facility]   IN-PATIENT SERVICE   [facility]    Progress Note    7/2/2025    9:44 AM    Name:   Elaina Champagne  MRN:     [id number]     Acct:      [id number]   Room:   0249/0249-01   Day:  5  Admit Date:  6/27/2025  7:00 AM    PCP:   Robin Ly MD  Code Status:  Full Code    Subjective:     C/C:knee pain and drainage     Interval History Status: improved.     Seen and  examined   Complaining of pain  BP was elevated  Wound VAC was removed  Went to OR with Dr. Ren on 7/2/2025 for repeat irrigation and debridement     Brief History:   Per documentation    74-year-old female past medical history of paroxysmal atrial, pulmonary hypertension, venous insufficiency lower extremities, depression, CKD stage IIIb, diastolic heart failure, history of MRSA infection presents with knee pain and  9*   ALKPHOS 92   BILITOT 0.2   BILIDIR 0.1     ABG:  Lab Results   Component Value Date/Time    POCPH 7.471 07/01/2024 11:24 AM    PH 6.0 09/19/2018 11:53 AM    POCPCO2 32.1 07/01/2024 11:24 AM    POCPO2 233.8 07/01/2024 11:24 AM    POCHCO3 23.4 07/01/2024 11:24 AM    PBEA 0.0 07/01/2024 11:24 AM    MLFI2WWG 99.9 07/01/2024 11:24 AM    FIO2 INFORMATION NOT PROVIDED 01/05/2024 09:50 AM     Lab Results   Component Value Date/Time    SPECIAL TISSUE, LEFT KNEE, JOINT 06/27/2025 10:25 AM    SPECIAL TISSUE, LEFT KNEE JOINT 06/27/2025 10:25 AM    SPECIAL TISSUE, LEFT KNEE JOINT 06/27/2025 10:25 AM     Lab Results   Component Value Date/Time    CULTURE (A) 06/27/2025 10:25 AM     PSEUDOMONAS AERUGINOSA One colony Identification by MALDI-TOF    CULTURE No anaerobic organisms isolated at 3 days. 06/27/2025 10:25 AM    CULTURE NO GROWTH 3 DAYS 06/27/2025 10:25 AM    CULTURE NO GROWTH 2 DAYS 06/27/2025 10:25 AM       Radiology:  MRI KNEE LEFT W WO CONTRAST  Result Date: 6/27/2025  1. Deep soft tissue defect anteromedial to the proximal tibial metaphysis with an underlying sinus tract extending into the medial subcutaneous fat and measuring approximately 5.7 cm in length. The sinus tract likely communicates with a heterogeneous fluid collection in the subcutaneous fat medial to the knee measuring approximately 3.1 x 1.7 cm. Questionable internal gas. This is concerning for an abscess. 2. Circumferential subcutaneous edema compatible with cellulitis. 3. Status post total knee arthroplasty. No joint effusion or acute osseous abnormality identified.       Physical Examination:        General appearance:  alert, cooperative and no distress  Mental Status:  oriented to person, place and time and normal affect  Lungs:  clear to auscultation bilaterally, normal effort  Heart: Bradycardic, systolic murmur  Abdomen:  soft, nontender, nondistended, normal bowel sounds  Extremities:lower extremity wrapped      Assessment:        Hospital

## 2025-07-02 NOTE — BRIEF OP NOTE
Brief Postoperative Note      Patient: Elaina Champagne  YOB: 1950  MRN: [id number]    Date of Procedure: 7/2/2025    Pre-Op Diagnosis:   -Left knee surgical site infection  Left medial knee soft tissue mass    Post-Op Diagnosis:   -Left knee surgical site infection  -Left medial knee soft tissue mass       Procedure(s):  -Left knee irrigation and excisional debridement of skin and subcutaneous tissue measuring 5x27 cm with closure  -Left medial leg mass excision  - Left knee secondary wound closure    Surgeon(s):  Rony Ren DO    Assistant:  Resident: Anthony Bryant DO    Anesthesia: General    Estimated Blood Loss (mL): 5mL    IV Fluids: 700mL crystalloid    Complications: None    Specimens:   ID Type Source Tests Collected by Time Destination   A : LEFT, MEDIAL LEG MASS Tissue Leg SURGICAL PATHOLOGY, CULTURE, ANAEROBIC AND AEROBIC Rony Ren DO 7/2/2025 1310        Implants:  * No implants in log *      Drains:   [REMOVED] Negative Pressure Wound Therapy Leg Left;Lower;Proximal;Anterior (Removed)   Dressing Status Intact w/seal 07/02/25 1121   Canister changed? No 07/01/25 0800   Output (ml) 50 ml 07/01/25 0600   Mode Continuous 07/02/25 1121   Target Pressure (mmHg) 125 07/02/25 1121       Findings:  Infection Present At Time Of Surgery (PATOS) (choose all levels that have infection present):  No infection present  Other Findings: Left knee surgical site infection, see op note for details    Electronically signed by Rony Ren DO on 7/2/2025 at 1:55 PM

## 2025-07-02 NOTE — ANESTHESIA PRE PROCEDURE
Department of Anesthesiology  Preprocedure Note       Name:  Elaina Champagne   Age:  74 y.o.  :  1950                                          MRN:  [id number]         Date:  2025      Surgeon: Surgeon(s):  Rony Ren DO    Procedure: Procedure(s):  KNEE IRRIGATION AND DEBRIDEMENT WITH CLOSURE (CYSTO TUBING, 3080 TABLE WITH EXTENSION, SUPINE)    Medications prior to admission:   Prior to Admission medications    Medication Sig Start Date End Date Taking? Authorizing Provider   pantoprazole (PROTONIX) 40 MG tablet Take 1 tablet by mouth 2 times daily as needed (reflux) 25  Yes Robin Ly MD   traZODone (DESYREL) 50 MG tablet Take 1 tablet by mouth nightly 25  Yes Robin Ly MD   vibegron (GEMTESA) 75 MG TABS tablet Take 1 tablet by mouth daily 4/3/25  Yes Dejon Garzon DO   aspirin 81 MG EC tablet Take 1 tablet by mouth in the morning and at bedtime 2/3/25  Yes Elsa Torres MD   furosemide (LASIX) 40 MG tablet Take 1 tablet by mouth daily 24  Yes Robin Ly MD   ferrous sulfate (IRON 325) 325 (65 Fe) MG tablet Take 1 tablet by mouth with breakfast and with evening meal 24  Yes Kris Jon, TOPHER - CNP   amiodarone (CORDARONE) 200 MG tablet Take 1 tablet by mouth daily 24  Yes Robin Ly MD   atorvastatin (LIPITOR) 40 MG tablet Take 1 tablet by mouth nightly 24  Yes Robin Ly MD   calcium carbonate (OYSTER SHELL CALCIUM 500 MG) 1250 (500 Ca) MG tablet Take 1 tablet by mouth daily 24  Yes Robin Ly MD   escitalopram (LEXAPRO) 20 MG tablet Take 1 tablet by mouth daily 24  Yes Robin Ly MD   folic acid (FOLVITE) 1 MG tablet Take 1 tablet by mouth daily 24  Yes Robin Ly MD   metoprolol tartrate (LOPRESSOR) 25 MG tablet Take 0.5 tablets by mouth 2 times daily 24  Yes Robin Ly MD   potassium chloride (KLOR-CON) 10 MEQ extended release tablet Take 2

## 2025-07-02 NOTE — ANESTHESIA PROCEDURE NOTES
Peripheral Block    Patient location during procedure: pre-op  Reason for block: procedure for pain, post-op pain management and at surgeon's request  Start time: 7/2/2025 12:27 PM  End time: 7/2/2025 12:29 PM  Staffing  Performed: anesthesiologist   Anesthesiologist: Dejon Alfaro MD  Performed by: Dejon Alfaro MD  Authorized by: Dejon Alfaro MD    Preanesthetic Checklist  Completed: patient identified, IV checked, site marked, risks and benefits discussed, surgical/procedural consents, equipment checked, pre-op evaluation, timeout performed, anesthesia consent given, oxygen available, monitors applied/VS acknowledged, fire risk safety assessment completed and verbalized and blood product R/B/A discussed and consented  Peripheral Block   Patient position: supine  Prep: ChloraPrep  Provider prep: mask and sterile gloves  Patient monitoring: cardiac monitor, continuous pulse ox, frequent blood pressure checks, IV access, oxygen and responsive to questions  Block type: Femoral  Adductor canal  Laterality: left  Injection technique: single-shot  Guidance: ultrasound guided    Needle   Needle type: insulated echogenic nerve stimulator needle   Needle gauge: 22 G  Needle localization: ultrasound guidance  Needle length: 11 cm  Assessment   Injection assessment: negative aspiration for heme, no paresthesia on injection and local visualized surrounding nerve on ultrasound  Outcomes: patient tolerated procedure well and uncomplicated    Medications Administered  BUPivacaine (MARCAINE) PF injection 0.5% - Perineural   10 mL - 7/2/2025 12:27:00 PM

## 2025-07-02 NOTE — PLAN OF CARE
Patient:  Elaina Champagne  YOB: 1950     74 y.o. female    Orthopedic post-operative instructions    Impression:  Elaina Champagne is a 74 y.o. female is being seen for:    - Left knee surgical site infection     DOS: 6/27/2025 with Dr. Ren  - Left knee irrigation and debridement down to and including the fascia, 3 x 2 cm.  - Vera flow wound VAC placement    Procedure(s), DOS: 7/2/25  -Left knee irrigation and excisional debridement of skin down to including bone of wound measuring 5x27 cm with closure  -Left medial knee excisional biopsy of mass      Plan:  - No further orthopaedic surgical intervention planned at this time   - Soft dressings on LLE . Please maintain and keep clean and dry. Reinforce dressings as needed per nursing.  -F/u path and cultures of left knee mass  - WBAT  to the LLE  - Abx: per ID recs  - Diet: Ok for Adult diet from ortho perspective   - DVT ppx:  Okay to start chemical anticoagulation POD#1.    - PT/OT evaluation post-op.  - Pain control and medical management per primary  - Ice and elevate extremity for pain and swelling  - Ok to discharge after final ID recommendations, evaluation by PT/OT, and once medically stable   - Follow up with Dr. Ren 7/10/25   -Please contact Ortho on call with any questions    Anthony Bryant,   Orthopedic Surgery, PGY-1  [place], [place]

## 2025-07-02 NOTE — PROGRESS NOTES
Occupational Therapy    [facility]  Occupational Therapy Not Seen Note    DATE: 2025    NAME: Elaina Champagne  MRN: [id number]   : 1950      Patient not seen this date for Occupational Therapy due to:    Surgery/Procedure: KNEE IRRIGATION AND DEBRIDEMENT WITH CLOSURE     Next Scheduled Treatment: 7/3    Electronically signed by ASIA Newton on 2025 at 10:12 AM

## 2025-07-02 NOTE — PROGRESS NOTES
Orthopedic Progress Note    Patient:  Elaina Champagne  YOB: 1950     74 y.o. female    Subjective:  Patient seen and examined this morning. No complaints or concerns. No issues overnight per nursing. Pain is well controlled on current regimen. Denies fever, HA, CP, SOB, N/V, dysuria, new numbness/tingling. BM/flatus+ Voiding appropriately. Was able to ambulate with PT yesterday 150 feet with rolling walker.  Patient had 400 cc out of serous fluid in her vera flow overnight.  Discussed surgery with the patient this morning.  She demonstrated well understanding.    Vitals reviewed, afebrile    Objective:   Vitals:    07/01/25 1645   BP: (!) 149/58   Pulse: 59   Resp:    Temp: 98.6 °F (37 °C)   SpO2: 98%     Gen: NAD, cooperative    Cardiovascular: Regular rate   Respiratory: No acute respiratory distress, breathing comfortably    Orthopedic Exam  LLE: Vera flow in place maintaining appropriate suction with 400 cc of serous fluid out overnight.  Patient had overlying Ace bandage that was clean/dry/intact without strikethrough.  Nontender to palpation overlying surgical incision.  Compartments soft easily compressible.  EHL/FHL/TA/GS motor complex intact.  Sural/saphenous/SPN/DPN/plantar nerve distribution SILT. PT +2 BCR    Recent Labs     06/30/25  0856   WBC 5.1   HGB 10.2*   HCT 32.2*      *   K 4.6   BUN 22   CREATININE 1.0*   GLUCOSE 93      Meds:   Abx: Cefepime and daptomycin  DVT ppx: ASA 81 twice daily  See rec for complete list    Impression 74 y.o. female being seen for:    -Left knee surgical site infection     DOS: 6/27/2025 with Dr. Ren  - Left knee irrigation and debridement down to and including the fascia, 3 x 2 cm.  - Vera flow wound VAC placement     Plan  - Plan for OR with Dr. Ren on 7/2/2025 for repeat irrigation and debridement  - Ancef OCTOR  - Wound-Vac on LLE. Please measure and record output every shift. Okay to reinforce/maintain by nursing

## 2025-07-02 NOTE — ANESTHESIA POSTPROCEDURE EVALUATION
Department of Anesthesiology  Postprocedure Note    Patient: Elaina Champagne  MRN: [id number]  YOB: 1950  Date of evaluation: 7/2/2025    Procedure Summary       Date: 07/02/25 Room / Location: [address]    Anesthesia Start: 1221 Anesthesia Stop: 1404    Procedure: LEFT KNEE IRRIGATION AND DEBRIDEMENT WITH SECONDARY WOUND CLOSURE, EXCISION OF LEFT KNEE MASS (Left) Diagnosis:       Surgical site infection      (Surgical site infection [T81.49XA])    Surgeons: Rony Ren DO Responsible Provider: Corbin Toro MD    Anesthesia Type: general ASA Status: 3            Anesthesia Type: No value filed.    Chase Phase I: Chase Score: 10    Chase Phase II:      Anesthesia Post Evaluation    Patient location during evaluation: PACU  Patient participation: complete - patient participated  Level of consciousness: awake and alert  Airway patency: patent  Nausea & Vomiting: no nausea and no vomiting  Cardiovascular status: blood pressure returned to baseline  Respiratory status: acceptable  Hydration status: euvolemic  Pain management: adequate    No notable events documented.

## 2025-07-02 NOTE — DISCHARGE INSTRUCTIONS
Orthopaedic Instructions:  -Weight bearing status: Weight bearing as tolerated with the left leg  -Do not remove dressings until your post-operative follow up visit.  -Ice (20 minutes on and off 1 hour) and elevate above the level of the heart to reduce swelling and throbbing pain.  -Call the office or come to Emergency Room if signs of infection appear (hot, swollen, red, draining pus, fever).  -Take medications as prescribed.  -Wean off narcotics (percocet/norco) as soon as possible. Do not take tylenol if still taking narcotics.  -Follow up with Dr. Ren in his office on 7/10/25 at 9:15AM. Call [phone]   to schedule/confirm or with any questions/concerns.

## 2025-07-02 NOTE — PROGRESS NOTES
Physical Therapy        Physical Therapy Cancel Note      DATE: 2025    NAME: Elaina Champagne  MRN: [id number]   : 1950      Patient not seen this date for Physical Therapy due to:    Surgery/Procedure: KNEE IRRIGATION AND DEBRIDEMENT WITH CLOSURE       Electronically signed by Daria Samano PTA on 2025 at 11:35 AM

## 2025-07-03 VITALS
WEIGHT: 186 LBS | OXYGEN SATURATION: 98 % | HEART RATE: 60 BPM | HEIGHT: 60 IN | SYSTOLIC BLOOD PRESSURE: 154 MMHG | DIASTOLIC BLOOD PRESSURE: 54 MMHG | RESPIRATION RATE: 15 BRPM | TEMPERATURE: 97.3 F | BODY MASS INDEX: 36.52 KG/M2

## 2025-07-03 LAB — CRP SERPL HS-MCNC: 12.6 MG/L (ref 0–5)

## 2025-07-03 PROCEDURE — 97535 SELF CARE MNGMENT TRAINING: CPT

## 2025-07-03 PROCEDURE — 97110 THERAPEUTIC EXERCISES: CPT

## 2025-07-03 PROCEDURE — 6370000000 HC RX 637 (ALT 250 FOR IP)

## 2025-07-03 PROCEDURE — 6360000002 HC RX W HCPCS: Performed by: NURSE PRACTITIONER

## 2025-07-03 PROCEDURE — 97116 GAIT TRAINING THERAPY: CPT

## 2025-07-03 PROCEDURE — APPSS30 APP SPLIT SHARED TIME 16-30 MINUTES: Performed by: NURSE PRACTITIONER

## 2025-07-03 PROCEDURE — 36415 COLL VENOUS BLD VENIPUNCTURE: CPT

## 2025-07-03 PROCEDURE — 6370000000 HC RX 637 (ALT 250 FOR IP): Performed by: INTERNAL MEDICINE

## 2025-07-03 PROCEDURE — 86140 C-REACTIVE PROTEIN: CPT

## 2025-07-03 PROCEDURE — 2580000003 HC RX 258

## 2025-07-03 PROCEDURE — 99232 SBSQ HOSP IP/OBS MODERATE 35: CPT | Performed by: INTERNAL MEDICINE

## 2025-07-03 PROCEDURE — 6360000002 HC RX W HCPCS

## 2025-07-03 RX ORDER — AMLODIPINE BESYLATE 5 MG/1
5 TABLET ORAL DAILY
Qty: 30 TABLET | Refills: 3 | Status: SHIPPED | OUTPATIENT
Start: 2025-07-04

## 2025-07-03 RX ORDER — LEVOFLOXACIN 500 MG/1
500 TABLET, FILM COATED ORAL DAILY
Qty: 28 TABLET | Refills: 0 | Status: SHIPPED | OUTPATIENT
Start: 2025-07-03 | End: 2025-07-31

## 2025-07-03 RX ORDER — LEVOFLOXACIN 500 MG/1
500 TABLET, FILM COATED ORAL DAILY
Status: DISCONTINUED | OUTPATIENT
Start: 2025-07-03 | End: 2025-07-03 | Stop reason: HOSPADM

## 2025-07-03 RX ORDER — TRAMADOL HYDROCHLORIDE 50 MG/1
50 TABLET ORAL EVERY 6 HOURS PRN
Qty: 20 TABLET | Refills: 0 | Status: SHIPPED | OUTPATIENT
Start: 2025-07-03 | End: 2025-07-08

## 2025-07-03 RX ORDER — ACETAMINOPHEN 500 MG
500 TABLET ORAL EVERY 6 HOURS PRN
Qty: 60 TABLET | Refills: 0 | Status: SHIPPED | OUTPATIENT
Start: 2025-07-03 | End: 2025-08-02

## 2025-07-03 RX ADMIN — AMLODIPINE BESYLATE 5 MG: 5 TABLET ORAL at 08:53

## 2025-07-03 RX ADMIN — CYANOCOBALAMIN TAB 500 MCG 500 MCG: 500 TAB at 08:53

## 2025-07-03 RX ADMIN — FERROUS SULFATE TAB EC 325 MG (65 MG FE EQUIVALENT) 325 MG: 325 (65 FE) TABLET DELAYED RESPONSE at 08:53

## 2025-07-03 RX ADMIN — ACETAMINOPHEN 650 MG: 325 TABLET ORAL at 08:52

## 2025-07-03 RX ADMIN — ASPIRIN 81 MG: 81 TABLET, CHEWABLE ORAL at 08:53

## 2025-07-03 RX ADMIN — Medication 1200 MG: at 08:54

## 2025-07-03 RX ADMIN — FENTANYL CITRATE 12.5 MCG: 50 INJECTION INTRAMUSCULAR; INTRAVENOUS at 00:05

## 2025-07-03 RX ADMIN — AMIODARONE HYDROCHLORIDE 200 MG: 200 TABLET ORAL at 08:52

## 2025-07-03 RX ADMIN — OXYCODONE HYDROCHLORIDE 10 MG: 10 TABLET ORAL at 02:04

## 2025-07-03 RX ADMIN — LEVOFLOXACIN 500 MG: 500 TABLET, FILM COATED ORAL at 14:03

## 2025-07-03 RX ADMIN — CEFEPIME 2000 MG: 2 INJECTION, POWDER, FOR SOLUTION INTRAVENOUS at 05:55

## 2025-07-03 RX ADMIN — ACETAMINOPHEN 650 MG: 325 TABLET ORAL at 02:07

## 2025-07-03 RX ADMIN — ACETAMINOPHEN 650 MG: 325 TABLET ORAL at 14:03

## 2025-07-03 ASSESSMENT — PAIN DESCRIPTION - PAIN TYPE
TYPE: SURGICAL PAIN

## 2025-07-03 ASSESSMENT — PAIN DESCRIPTION - LOCATION
LOCATION: LEG

## 2025-07-03 ASSESSMENT — PAIN DESCRIPTION - ORIENTATION
ORIENTATION: LEFT

## 2025-07-03 ASSESSMENT — PAIN SCALES - GENERAL
PAINLEVEL_OUTOF10: 5
PAINLEVEL_OUTOF10: 7
PAINLEVEL_OUTOF10: 5
PAINLEVEL_OUTOF10: 7
PAINLEVEL_OUTOF10: 4

## 2025-07-03 ASSESSMENT — PAIN DESCRIPTION - DESCRIPTORS
DESCRIPTORS: ACHING;DISCOMFORT

## 2025-07-03 NOTE — PROGRESS NOTES
[facility] - Progress Note   Today's Date and Time: 7/3/2025, 12:27 PM    Impression :   Lt  knee infection with MRSA  S/P I&D and Veraflow VAC placement on 6-27-25  S/P repeat I&D, irrigation and closure on 7-2-25  Cultures;  1-29-25: MRSA and Pseudomonas  3-26-25: Pseudomonas  4-8-25: Pseudomonas   6-27-25: Pending  Hx of left knee replacement at [facility] in 2017  Fall injuring left knee in December 2024  Diastolic CHF  Essential HTN  Venous insufficiency of legs  CKD stage III B  PAF  Pulmonary HTN  Allergy to vancomycin and to penicillins    Recommendations:   Avoid vancomycin   Cefepime D/C  Plan po Levofloxacin x 4 weeks at time of discharge  Linezolid D/C  Office f/up in 3 weeks with Dr aBez for infection. Please call [phone] for appointment     Medical Decision Making/Summary/Discussion:7/3/2025     Daily Elements of Decision Making provided by Consulting Physician:    Note: I have independently performed the steps listed below as part of the medical decision making and evaluation.    Review of current Problems:  Today I am seeing and examining the patient for the following problems:  Lt  knee infection with MRSA  S/P I&D and Veraflow VAC placement on 6-27-25  Plans for repeat I&D, irrigation and closure on 7-3-25  Cultures;  1-29-25: MRSA and Pseudomonas  3-26-25: Pseudomonas  4-8-25: Pseudomonas   6-27-25: Pending  Hx of left knee replacement at [facility] in 2017  Fall injuring left knee in December 2024  Diastolic CHF  Essential HTN  Venous insufficiency of legs  CKD stage III B  PAF  Pulmonary HTN  Allergy to vancomycin and to penicillins  Evaluation of Patient:  Evaluated because of MRSA and Pseudomonas knee wound infection  Please see daily details in Interval Changes Section  Changes in physical exam:  S/P I&D Lt Knee  Please see daily details in Physical Exam section and in Interval Changes Section  Changes in ROS:  Improved  Please see daily details in Review

## 2025-07-03 NOTE — CARE COORDINATION
Case Management   Daily Progress Note       Patient Name: Elaina Champagne                   YOB: 1950  Diagnosis: Wound infection complicating hardware [T84.7XXA]  Infection of deep incisional surgical site after procedure, initial encounter [T81.42XA]                       GMLOS: 5.4 days  Length of Stay: 6  days    Anticipated Discharge Date: Ready for discharge    Readmission Risk (Low < 19, Mod (19-27), High > 27): Readmission Risk Score: 16.4        Current Transitional Plan    [] Home Independently    [x] Home with HC    [] Skilled Nursing Facility    [] Acute Rehabilitation    [] Long Term Acute Care (LTAC)    [] Other:     Plan for the Stay (Medical Management) :          Workflow Continuation (Additional Notes) : Notified [facility] via [facility] that pt is discharging home today, does not need IV antibiotics. AVS sent to [facility] via [facility].        Monica Serna RN  July 3, 2025

## 2025-07-03 NOTE — PLAN OF CARE
Problem: Discharge Planning  Goal: Discharge to home or other facility with appropriate resources  7/3/2025 0048 by Marianna Edwards RN  Outcome: Progressing  Flowsheets (Taken 7/2/2025 2000)  Discharge to home or other facility with appropriate resources:   Identify barriers to discharge with patient and caregiver   Identify discharge learning needs (meds, wound care, etc)  7/2/2025 1644 by Zenobia Slade RN  Outcome: Progressing     Problem: Chronic Conditions and Co-morbidities  Goal: Patient's chronic conditions and co-morbidity symptoms are monitored and maintained or improved  7/3/2025 0048 by Marianna Edwards RN  Outcome: Progressing  Flowsheets (Taken 7/2/2025 2000)  Care Plan - Patient's Chronic Conditions and Co-Morbidity Symptoms are Monitored and Maintained or Improved:   Monitor and assess patient's chronic conditions and comorbid symptoms for stability, deterioration, or improvement   Collaborate with multidisciplinary team to address chronic and comorbid conditions and prevent exacerbation or deterioration   Update acute care plan with appropriate goals if chronic or comorbid symptoms are exacerbated and prevent overall improvement and discharge  7/2/2025 1644 by Zenobia Slade, RN  Outcome: Progressing     Problem: Pain  Goal: Verbalizes/displays adequate comfort level or baseline comfort level  7/3/2025 0048 by Marianna Edwards RN  Outcome: Progressing  Flowsheets (Taken 7/3/2025 0048)  Verbalizes/displays adequate comfort level or baseline comfort level:   Encourage patient to monitor pain and request assistance   Administer analgesics based on type and severity of pain and evaluate response   Consider cultural and social influences on pain and pain management   Notify Licensed Independent Practitioner if interventions unsuccessful or patient reports new pain   Implement non-pharmacological measures as appropriate and evaluate response   Assess pain using appropriate pain scale  7/2/2025 1644 by [place]

## 2025-07-03 NOTE — DISCHARGE INSTR - COC
Continuity of Care Form    Patient Name: Elaina Champagne   :  1950  MRN:  [id number]    Admit date:  2025  Discharge date:  7/3/25    Code Status Order: Full Code   Advance Directives:    Date/Time Healthcare Directive Type of Healthcare Directive Copy in Chart Healthcare Agent Appointed Healthcare Agent's Name Healthcare Agent's Phone Number    25 1115 Yes, patient has an advance directive for healthcare treatment  Durable power of  for health care;Living will  Yes, copy in chart  --  --  --     25 0814 Yes, patient has an advance directive for healthcare treatment  --  Yes, copy in chart  --  --  --             Admitting Physician:  Rony Ren DO  PCP: Robin Ly MD    Discharging Nurse: MARIA M Fregoso  Discharging Hospital Unit/Room#: 0249/0249-01  Discharging Unit Phone Number: [phone]    Emergency Contact:   Extended Emergency Contact Information  Primary Emergency Contact: Eze Champagne   [facility]  Home Phone: [phone]  Relation: Spouse  Secondary Emergency Contact: Margo Quarles  Home Phone: [phone]  Mobile Phone: [phone]  Relation: Grandchild  Preferred language: English    Past Surgical History:  Past Surgical History:   Procedure Laterality Date    CARDIAC PROCEDURE N/A 2024    Coronary angiography performed by Regis Aponte MD at [facility] CARDIAC CATH LAB    CARDIAC PROCEDURE N/A 2024    frantz / Percutaneous coronary intervention / rm 504 performed by Maria Teresa Rodriguez MD at [facility] CARDIAC CATH LAB    CARDIAC PROCEDURE N/A 2024    frantz / Percutaneous coronary intervention / op scmh performed by Maria Teresa Rodriguez MD at [facility] CARDIAC CATH LAB    CARDIAC PROCEDURE N/A 2024    talmimi / Pericardiocentesis / op pb performed by Mariya Luong MD at [facility] CARDIAC CATH LAB    CATARACT REMOVAL Bilateral 2015    CORONARY ANGIOPLASTY WITH STENT PLACEMENT  2024    DEBRIDEMENT Left 2025    IRRIGATION AND

## 2025-07-03 NOTE — PROGRESS NOTES
Comprehensive Nutrition Assessment    Type and Reason for Visit:  Reassess    Nutrition Recommendations/Plan:   Continue current diet.  Will provide strawberry Ensure ONS x 1 per day.  Monitor intakes, labs, weights, and plan of care.     Malnutrition Assessment:  Malnutrition Status:  At risk for malnutrition (06/30/25 1507)    Context:  Chronic Illness     Findings of the 6 clinical characteristics of malnutrition:  Energy Intake:  Mild decrease in energy intake  Weight Loss:  Greater than 10% over 6 months     Body Fat Loss:  Unable to assess   Muscle Mass Loss:  Unable to assess  Fluid Accumulation:  No fluid accumulation   Strength:  Not Performed    Nutrition Assessment:    Pt s/p repeat I&D of left knee yesterday.  Pt reports she has been eating well for meals.  States eating % of meals.  Will re-order oral supplements in strawberry for additional nutrition.  Weight fluctuations noted - pt +1.7L fluid since admission.    Nutrition Related Findings:    labs/meds reviewed.  trace BLE edema.  LBM 6/29.   Wound Type: Surgical Incision, Wound Vac (to left knee)       Current Nutrition Intake & Therapies:    Average Meal Intake: 51-75%, %  Average Supplements Intake: None Ordered  ADULT DIET; Regular    Anthropometric Measures:  Height: 152.4 cm (5')  Ideal Body Weight (IBW): 100 lbs (45 kg)    Admission Body Weight: 76.6 kg (168 lb 14 oz)  Current Body Weight: 84.4 kg (186 lb 1.1 oz), 186.1 % IBW. Weight Source: Bed scale  Current BMI (kg/m2): 36.3  Usual Body Weight: 91.4 kg (201 lb 8 oz) (1/30/25 bed scale per chart review)     % Weight Change (Calculated): -11.4  Weight Adjustment For: No Adjustment                 BMI Categories: Obese Class 2 (BMI 35.0 -39.9)    Estimated Daily Nutrient Needs:  Energy Requirements Based On: Kcal/kg  Weight Used for Energy Requirements: Admission  Energy (kcal/day): 0763-9824 kcals/day  Weight Used for Protein Requirements: Ideal  Protein (g/day):  gm

## 2025-07-03 NOTE — PROGRESS NOTES
RN gave pt discharge instructions, all questions answered. Pt discharged via wheelchair with all personal belongings to home.

## 2025-07-03 NOTE — PROGRESS NOTES
Physical Therapy  Facility/Department: [address] ORTHO/MED SURG   Physical Therapy Daily Treatment Note    Patient Name: Elaina Champagne        MRN: [id number]    : 1950    Date of Service: 7/3/2025    No chief complaint on file.    Past Medical History:  has a past medical history of Anemia, Anxiety disorder, Arthritis, Artificial knee joint present, left, Artificial knee joint present, right, Atherosclerotic heart disease, Bell's palsy, Burning with urination, Cellulitis, Chronic kidney disease, stage 3 ([facility]), Cognitive communication deficit, Depression, Fall, Frequent urination, Gastric ulcer with hemorrhage, Gastro-esophageal reflux disease without esophagitis, Hyperlipidemia, Hypertension, Insomnia, Joint replaced, Leaking of urine, Lymphedema, Major depressive disorder, Morbid obesity ([facility]), Nonrheumatic tricuspid (valve) insufficiency, NSTEMI (non-ST elevated myocardial infarction) ([facility]), Occlusion and stenosis of bilateral carotid arteries, Osteoarthritis, Overactive bladder, Paroxysmal atrial fibrillation ([facility]), Under care of team, Under care of team, Under care of team, Under care of team, Under care of team, Under care of team, Urge incontinence, Urine retention, UTI (urinary tract infection), Venous insufficiency, and Weakness.  Past Surgical History:  has a past surgical history that includes Hysterectomy, total abdominal (); Cataract removal (Bilateral, ); incision and drainage (Left, 2017); Total knee arthroplasty (Left, 2017); Knee Arthroplasty (Right, 2018); ventral hernia repair (2019); IR NONTUNNELED VASCULAR CATHETER > 5 YEARS (12/15/2023); Upper gastrointestinal endoscopy (N/A, 2023); Cardiac procedure (N/A, 2024); Cardiac procedure (N/A, 2024); Upper gastrointestinal endoscopy (N/A, 2024); Coronary angioplasty with stent (2024); Cardiac procedure (N/A, 2024); Cardiac procedure (N/A, 2024); Wound debridement (Left,  Term Goal 3: ascend/descend 4 steps with SBA  Short Term Goal 4: 20 min strengthening exercises x SBA    Minutes  PT Individual Minutes  Time In: 1320  Time Out: 1403  Minutes: 43  Time Code Minutes  Timed Code Treatment Minutes: 25 Minutes    Electronically signed by RUSSELL PENA PTA on 7/3/25 at 2:23 PM EDT

## 2025-07-03 NOTE — PROGRESS NOTES
[facility]  Office: [phone]  Nael Morgan, DO, Ronny Quevedo, DO, Pb Vásquez DO, Refugio Bradley, DO, Sheldon Smith MD, Elsa Torres MD, Leandro Espinosa MD, Joann Hauser MD,  Dipak Montana MD, Rob Hernandez MD, Swathi Wells MD,  Gracia Chen DO, Pilar So MD, Kayden Walters MD, Orlando Morgan DO, Yamilex Luciano MD,  Mando Burden DO, Georgie Workman MD, Jennifer Hill MD, Gloria Sierra MD,  Rafael Rawls MD, Denilson Murphy MD, Kamran Flores MD, Yazan Hughes MD, Theodore Grossman MD, Jaxon Villar MD, Aj Bess, DO, Vita Andrade MD, Anne-Marie Pierce DO, Arvin Powers MD, Gracia Lockwood MD, Mohsin Reza, MD, Kip Negron MD, Shirley Waterhouse, CNP,  Nidia Lester, CNP, Aj Castro, CNP,  Erin Shaw, JASON, Ivis Senior, CNP, Georgiana Freeman, CNP, Diandra Ruffin, CNP, Marilia Parker, CNP, Maureen Matamoros, PA-C, Sandrita Cruz, CNP, Veronica Alonzo, CNP,  Maryann Page, CNP, Coretta Rosario, CNP, Zuhair Lozano, PA-C, Rowan Piña, PA-C, Areli Ware, CNP,        Radhika Gillespie, CNS, Guadalupe Jaimes, CNP, Sarah Shi, CNP         [facility]   IN-PATIENT SERVICE   [facility]    Progress Note    7/3/2025    8:34 AM    Name:   Elaina Champagne  MRN:     [id number]     Acct:      [id number]   Room:   0249/0249-01   Day:  6  Admit Date:  6/27/2025  7:00 AM    PCP:   Robin Ly MD  Code Status:  Full Code    Subjective:     C/C:knee pain and drainage     Interval History Status: improved.     Seen and  examined   Complaining of pain  BP elevated 2/2 to pain  Went to OR with Dr. Ren on 7/2/2025 for repeat irrigation and debridement   Antibiotics reconciled by ID  Discussed with nursing plan for discharge today  Waiting on PT recs    Brief History:   Per documentation    74-year-old female past medical history of paroxysmal atrial, pulmonary hypertension, venous insufficiency lower extremities, depression, CKD stage IIIb,

## 2025-07-03 NOTE — OP NOTE
Operative Note      Patient: Elaina Champagne  YOB: 1950  MRN: [id number]    Date of Procedure: 7/2/2025    Pre-Op Diagnosis:   -Left knee surgical site infection  Left medial knee soft tissue mass     Post-Op Diagnosis:   -Left knee surgical site infection  -Left medial knee soft tissue mass       Procedure(s):  -Left knee irrigation and excisional debridement of skin and subcutaneous tissue measuring 5x27 cm with closure  -Left medial leg mass excision  - Left knee secondary wound closure     Surgeon(s):  Rony Ren DO     Assistant:  Resident: Anthony Bryant DO     Anesthesia: General     Estimated Blood Loss (mL): 5mL     IV Fluids: 700mL crystalloid     Complications: None    Specimens:   ID Type Source Tests Collected by Time Destination   A : LEFT, MEDIAL LEG MASS Tissue Leg SURGICAL PATHOLOGY, CULTURE, ANAEROBIC AND AEROBIC Rony Ren DO 7/2/2025 1310        Indications:  This is a 74 y.o. female who sustained a left knee surgical site infection that underwent initial debridement on 6/27/2025 with vera flow wound VAC placement.  Postoperative MRI was obtained which identified a sizable soft tissue mass in the medial aspect of the knee. We discussed the nature of the infection as well as the indications for surgical intervention as well as the associated risks, benefits, and alternatives of the procedure. The patient stated clear understanding, was willing to proceed, provided written informed consent, and had her operative site marked.    Procedure:  The patient was transported to the operating room and general anesthesia was administered by the anesthesia providers without complication. The patient was then transferred to a cantilever type table in a supine position.  The wound VAC was removed in its entirety.  The left lower extremity was isolated, scrubbed with Hibiclens followed by alcohol, and prepped and draped in the usual sterile fashion.  A timeout was performed that  included all involved parties and correctly identified the patient, planned procedure, and operative site.  The patient received weight based antibiotics in compliance with their allergy profile. After everyone agreed we continued.    We completed an excisional debridement of the skin and subcutaneous tissue of the left medial knee which removed any foreign material and/or nonviable tissue that was identified. The wound bed was heavily irrigated with numerous litters of normal saline solution. On repeat inspection, we found no additional material or tissue that required further debridement. Surface area of debridement was measured to be 5 x 27 cm.     Next, we were able to palpate the location of the soft tissue mass as identified on the MRI.  An incision was made through the skin and subcutaneous tissue and a complete excision was performed.  The mass was sent for surgical pathological analysis as well as Gram stain and culture.    Both wounds were copiously irrigated with normal saline solution as well as Irrisept.  A secondary wound closure was performed on the medial wound after forming proper undermining creating full-thickness flaps in order to properly close the incision under minimal tension.  Combination of simple as well as vertical mattress style sutures were utilized for wound approximation and eversion with minimal tension.  Mastisol and Steri-Strips were applied to further decrease wound tension as this has been a problem incision site in the past.    Standard layered closure was performed on the excision site on the medial knee.  The field was cleaned and dried and sterile bandages and a bolster type dressing were applied.  The patient was successfully extubated by the anesthesia providers with no known complications and transferred to the recovery room.    Postoperative plan:  Patient will resume antibiotics as being managed by the infectious disease service.  Do not anticipate further surgical

## 2025-07-03 NOTE — PROGRESS NOTES
Orthopedic Progress Note    Patient:  Elaina Champagne  YOB: 1950     74 y.o. female    Subjective:  Patient seen and examined this morning. No complaints or concerns. No issues overnight per nursing. Pain is well controlled on current regimen. Denies fever, HA, CP, SOB, N/V, dysuria, new numbness/tingling. No BM, but flatus +.  Voiding appropriately.  Worked with physical therapy postoperatively, plan will be to work with them today.  Patient did state that she has some medial knee pain after surgery.    Vitals reviewed, afebrile    Objective:   Vitals:    07/03/25 0500   BP: (!) 157/63   Pulse: 56   Resp: 17   Temp: 97.7 °F (36.5 °C)   SpO2: 100%     Gen: NAD, cooperative    Cardiovascular: Regular rate   Respiratory: No acute respiratory distress, breathing comfortably    Orthopedic Exam  LLE: Compressive Ace bandage with underlying soft dressings to extremity are clean/dry/intact without strikethrough.  Patient appropriately tender to palpation around the knee and on the medial aspect of the knee.  Compartments are soft and easily compressible.  EHL/FHL/TA/GS motor complex intact.  Sural/saphenous/SPN/DPN/plantar nerve distribution SI LT.  Limb is warm well-perfused with BCR.    Recent Labs     06/30/25  0856   WBC 5.1   HGB 10.2*   HCT 32.2*      *   K 4.6   BUN 22   CREATININE 1.0*   GLUCOSE 93      Meds:   Abx: Cefepime and daptomycin  DVT ppx: Aspirin  See rec for complete list    Impression 74 y.o. female being seen for:    - Left knee surgical site infection     DOS: 6/27/2025 with Dr. Ren  - Left knee irrigation and debridement down to and including the fascia, 3 x 2 cm.  - Vera flow wound VAC placement     Procedure(s), DOS: 7/2/25  -Left knee irrigation and excisional debridement of skin down to including bone of wound measuring 5x27 cm with closure  -Left medial knee excisional biopsy of mass       Plan:  - No further orthopaedic surgical intervention  planned at this time   - Soft dressings on LLE, plans to do dressing change on 7/4 if still in house. Please maintain and keep clean and dry. Reinforce dressings as needed per nursing.  - F/u path and cultures of left knee mass  - WBAT to the LLE  - Abx: Currently on cefepime and daptomycin, ID recommendations are to discharge on levofloxacin for 4 weeks  - Diet: Ok for Adult diet from ortho perspective   - DVT ppx: ASA 81 mg twice daily  - PT/OT evaluation post-op.  - Pain control and medical management per primary  - Ice and elevate extremity for pain and swelling  - Ok to discharge after evaluation by PT/OT and once medically stable   - Follow up with Dr. Ren 7/10/25   -Please contact Ortho on call with any questions    Anthony Bryant,   Orthopedic Surgery, PGY-2  [place], [place]

## 2025-07-04 NOTE — DISCHARGE SUMMARY
Orthopaedic Discharge Summary     Patient ID:  Elaina Champagne  [id number]  74 y.o.  1950    Admit date: 6/27/2025    Discharge date and time: 7/3/2025  4:39 PM     Admitting Physician: Rony Ren DO     Discharge Physician: same    Admission Diagnoses: Wound infection complicating hardware [T84.7XXA]  Infection of deep incisional surgical site after procedure, initial encounter [T81.42XA]    Discharge Diagnoses: same    Admission Condition: fair    Discharged Condition: good    Surgical procedure: DOS: 6/27/2025 with Dr. Ren  - Left knee irrigation and debridement down to and including the fascia, 3 x 2 cm.  - Vera flow wound VAC placement     Procedure(s), DOS: 7/2/25  -Left knee irrigation and excisional debridement of skin down to including bone of wound measuring 5x27 cm with closure  -Left medial knee excisional biopsy of mass         CONSULT SERVICES    [x] Internal Medicine    [] Neurosurgery    [] Urology     [] Trauma     [] Critical Care    [] GI    [x] ID     [] Neurology    [] Vascular    [] General Surgery    [] Pain Management     [] None    [] Other:     Disposition: home    Patient Instructions:      Medication List        START taking these medications      acetaminophen 500 MG tablet  Commonly known as: TYLENOL  Take 1 tablet by mouth every 6 hours as needed for Pain     amLODIPine 5 MG tablet  Commonly known as: NORVASC  Take 1 tablet by mouth daily     levoFLOXacin 500 MG tablet  Commonly known as: LEVAQUIN  Take 1 tablet by mouth daily for 28 days     traMADol 50 MG tablet  Commonly known as: Ultram  Take 1 tablet by mouth every 6 hours as needed for Pain for up to 5 days. Take lowest dose possible to manage pain Max Daily Amount: 200 mg            CONTINUE taking these medications      amiodarone 200 MG tablet  Commonly known as: CORDARONE  Take 1 tablet by mouth daily     aspirin 81 MG EC tablet  Take 1 tablet by mouth in the morning and at bedtime     atorvastatin 40 MG

## 2025-07-06 LAB
MICROORGANISM SPEC CULT: NORMAL
MICROORGANISM/AGENT SPEC: NORMAL
MICROORGANISM/AGENT SPEC: NORMAL
SERVICE CMNT-IMP: NORMAL
SPECIMEN DESCRIPTION: NORMAL

## 2025-07-07 LAB
MICROORGANISM SPEC CULT: NORMAL
MICROORGANISM/AGENT SPEC: NORMAL
SERVICE CMNT-IMP: NORMAL
SPECIMEN DESCRIPTION: NORMAL
SURGICAL PATHOLOGY REPORT: NORMAL

## 2025-07-07 RX ORDER — FLUOCINONIDE 0.5 MG/G
CREAM TOPICAL
Qty: 30 G | Refills: 0 | Status: SHIPPED | OUTPATIENT
Start: 2025-07-07

## 2025-07-10 ENCOUNTER — OFFICE VISIT (OUTPATIENT)
Dept: ORTHOPEDIC SURGERY | Age: 75
End: 2025-07-10

## 2025-07-10 VITALS — WEIGHT: 186 LBS | HEIGHT: 60 IN | BODY MASS INDEX: 36.52 KG/M2

## 2025-07-10 DIAGNOSIS — T81.42XA INFECTION OF DEEP INCISIONAL SURGICAL SITE AFTER PROCEDURE, INITIAL ENCOUNTER: Primary | ICD-10-CM

## 2025-07-10 PROCEDURE — 99024 POSTOP FOLLOW-UP VISIT: CPT | Performed by: ORTHOPAEDIC SURGERY

## 2025-07-10 RX ORDER — TRAMADOL HYDROCHLORIDE 50 MG/1
50 TABLET ORAL EVERY 6 HOURS PRN
Qty: 28 TABLET | Refills: 0 | Status: SHIPPED | OUTPATIENT
Start: 2025-07-10 | End: 2025-07-17

## 2025-07-10 NOTE — PROGRESS NOTES
[facility]  [address]  [place] [address]  Dept Phone: [phone]  Dept Fax: [phone]      Orthopaedic Trauma Clinic Follow Up      Subjective:   Date of Surgery:   6/27/25 - I&D and wound VAC application of left medial leg chronic wound  7/2/25 - repeat debridement, excision of soft tissue mass, secondary closure of left medial leg chronic wound    History of Present Illness  The patient is 1 week post repeat debridement of left medial knee chronic wound, excision of soft tissue mass.    She reports severe pain and a burning sensation in her left medial thigh. The wound leaks slightly through the gauze bandage. She is concerned about persistent swelling in the back of her leg. She keeps her leg elevated and is under home health care. She continues her prescribed antibiotics and finds short-lived relief from pain medication.  She uses a wrap for support, but it rolls down and tightens, causing discomfort.      Objective :   There were no vitals filed for this visit.Body mass index is 36.33 kg/m².  General: No acute distress, resting comfortably in the clinic  Neuro: alert. oriented  Eyes: Extra-ocular muscles intact  Pulm: Respirations unlabored and regular.  Skin: warm, well perfused  Psych:   Patient has good fund of knowledge and displays understanding of exam, diagnosis, and plan.  MSK: LLE: Medial incision healing well with retained sutures.  Anterior incision draining hemorrhagic fluid upon removing Steri-Strips.  Not actively bleeding.  Minimal surrounding erythema.  Slight dehiscence of the distal most centimeter of the wound.  No gross purulent drainage.  Induration and significant TTP over the medial distal thigh. compartments soft. Limb is warm and well perfused. Motor and sensory function grossly intact.        Radiology:  No studies          Assessment & Plan  1.  Status post debridement of left leg:  - Stitches retained  - Significant pain and tenderness

## 2025-07-15 LAB
MICROORGANISM SPEC CULT: NORMAL
MICROORGANISM SPEC CULT: NORMAL
MICROORGANISM/AGENT SPEC: NORMAL
MICROORGANISM/AGENT SPEC: NORMAL
SERVICE CMNT-IMP: NORMAL
SERVICE CMNT-IMP: NORMAL
SPECIMEN DESCRIPTION: NORMAL
SPECIMEN DESCRIPTION: NORMAL

## 2025-07-21 LAB
MICROORGANISM SPEC CULT: NORMAL
MICROORGANISM/AGENT SPEC: NORMAL
SERVICE CMNT-IMP: NORMAL
SPECIMEN DESCRIPTION: NORMAL

## 2025-07-24 ENCOUNTER — OFFICE VISIT (OUTPATIENT)
Dept: ORTHOPEDIC SURGERY | Age: 75
End: 2025-07-24

## 2025-07-24 VITALS — WEIGHT: 186 LBS | BODY MASS INDEX: 36.52 KG/M2 | HEIGHT: 60 IN

## 2025-07-24 DIAGNOSIS — S81.002D OPEN WOUND OF LEFT KNEE, SUBSEQUENT ENCOUNTER: Primary | ICD-10-CM

## 2025-07-24 PROCEDURE — 99024 POSTOP FOLLOW-UP VISIT: CPT | Performed by: ORTHOPAEDIC SURGERY

## 2025-07-24 NOTE — PROGRESS NOTES
[facility]  [address]  [place] [address]  Dept: [phone]  Dept Fax: [phone]        Orthopaedic Trauma Clinic Follow Up      Subjective:   Date of Surgery:   6/27/25 - I&D and wound VAC application of left medial leg chronic wound  7/2/25 - repeat debridement, excision of soft tissue mass, secondary closure of left medial leg chronic wound    Elaina Champagne is a 74 y.o. year old female who presents to the clinic today 3 weeks and 1 day status post above listed procedure.  Patient has been doing daily dressing changes to her knee wounds and sometimes having to change the posterior medial wound multiple times a day due to drainage.  She has home health care come out twice weekly to help her with her dressing changes.  She denies any fevers or chills.  She does state that she has bloody serous drainage from her posterior medial wound every single day.  Patient states that she has been elevating her legs is much as possible as well as taking her water pills.  She denies any interval trauma.      Review of Systems  Gen: no fever, chills, malaise  CV: no chest pain or palpitations  Resp: no cough or shortness of breath  GI: no nausea, vomiting, diarrhea, or constipation  Neuro: no numbness, tingling, or weakness  Msk: Left knee pain  10 remaining systems reviewed and negative    Objective :   There were no vitals filed for this visit.Body mass index is 36.33 kg/m².  General: No acute distress, resting comfortably in the clinic  Neuro: alert. oriented  Eyes: Extra-ocular muscles intact  Pulm: Respirations unlabored and regular.  Skin: warm, well perfused  Psych:   Patient has good fund of knowledge and displays understanging of exam, diagnosis, and plan.    MSK: LLE: Sutures in place overlying surgical incisions on the anterior aspect of the proximal tibia as well as posterior middle aspect of the knee.  Mild erythema throughout

## 2025-07-31 ENCOUNTER — OFFICE VISIT (OUTPATIENT)
Dept: ORTHOPEDIC SURGERY | Age: 75
End: 2025-07-31

## 2025-07-31 VITALS — HEIGHT: 60 IN | BODY MASS INDEX: 36.33 KG/M2

## 2025-07-31 DIAGNOSIS — S81.002D OPEN WOUND OF LEFT KNEE, SUBSEQUENT ENCOUNTER: Primary | ICD-10-CM

## 2025-07-31 PROCEDURE — 99024 POSTOP FOLLOW-UP VISIT: CPT | Performed by: ORTHOPAEDIC SURGERY

## 2025-07-31 RX ORDER — SODIUM HYPOCHLORITE 1.25 MG/ML
SOLUTION TOPICAL DAILY
Qty: 473 ML | Refills: 0 | Status: SHIPPED | OUTPATIENT
Start: 2025-07-31

## 2025-07-31 NOTE — PROGRESS NOTES
in about 1 week (around 8/7/2025) for Evaluation without x-rays.    Orders Placed This Encounter   Medications    sodium hypochlorite (DAKINS) 0.125 % SOLN external solution     Sig: Apply topically daily Apply wet-to-dry dressings twice daily     Dispense:  473 mL     Refill:  0          No orders of the defined types were placed in this encounter.      Electronically signed by Anthony Bryant DO on 7/31/2025 at 9:09 AM

## 2025-08-12 ENCOUNTER — HOSPITAL ENCOUNTER (OUTPATIENT)
Age: 75
Setting detail: THERAPIES SERIES
Discharge: HOME | End: 2025-08-12
Payer: MEDICARE

## 2025-08-12 PROCEDURE — 97535 SELF CARE MNGMENT TRAINING: CPT

## 2025-08-12 PROCEDURE — 97166 OT EVAL MOD COMPLEX 45 MIN: CPT

## 2025-08-14 ENCOUNTER — OFFICE VISIT (OUTPATIENT)
Dept: ORTHOPEDIC SURGERY | Age: 75
End: 2025-08-14

## 2025-08-14 VITALS — WEIGHT: 186 LBS | HEIGHT: 60 IN | BODY MASS INDEX: 36.52 KG/M2

## 2025-08-14 DIAGNOSIS — S81.002D: Primary | ICD-10-CM

## 2025-08-26 RX ORDER — SODIUM HYPOCHLORITE 1.25 MG/ML
SOLUTION TOPICAL DAILY
Qty: 473 ML | Refills: 0 | Status: SHIPPED | OUTPATIENT
Start: 2025-08-26

## 2025-08-26 RX ORDER — SODIUM HYPOCHLORITE 1.25 MG/ML
SOLUTION TOPICAL DAILY
Qty: 473 ML | Refills: 0 | OUTPATIENT
Start: 2025-08-26

## 2025-08-28 ENCOUNTER — OFFICE VISIT (OUTPATIENT)
Dept: ORTHOPEDIC SURGERY | Age: 75
End: 2025-08-28

## 2025-08-28 VITALS — HEIGHT: 60 IN | BODY MASS INDEX: 36.52 KG/M2 | WEIGHT: 186 LBS

## 2025-08-28 DIAGNOSIS — S81.002D: Primary | ICD-10-CM

## 2025-08-28 PROCEDURE — 99024 POSTOP FOLLOW-UP VISIT: CPT | Performed by: ORTHOPAEDIC SURGERY

## 2025-08-29 ENCOUNTER — OFFICE VISIT (OUTPATIENT)
Age: 75
End: 2025-08-29
Payer: MEDICARE

## 2025-08-29 VITALS
WEIGHT: 178.4 LBS | TEMPERATURE: 98.2 F | BODY MASS INDEX: 34.84 KG/M2 | OXYGEN SATURATION: 100 % | SYSTOLIC BLOOD PRESSURE: 182 MMHG | HEART RATE: 70 BPM | DIASTOLIC BLOOD PRESSURE: 80 MMHG

## 2025-08-29 DIAGNOSIS — R39.15 URINARY URGENCY: ICD-10-CM

## 2025-08-29 DIAGNOSIS — N81.10 BADEN-WALKER GRADE 2 CYSTOCELE: ICD-10-CM

## 2025-08-29 DIAGNOSIS — R33.9 RETENTION OF URINE: Primary | ICD-10-CM

## 2025-08-29 DIAGNOSIS — N39.46 URINARY INCONTINENCE, MIXED: ICD-10-CM

## 2025-08-29 DIAGNOSIS — N39.46 MIXED STRESS AND URGE URINARY INCONTINENCE: ICD-10-CM

## 2025-08-29 DIAGNOSIS — N81.89 WEAKNESS OF PELVIC FLOOR: ICD-10-CM

## 2025-08-29 DIAGNOSIS — R35.0 URINARY FREQUENCY: ICD-10-CM

## 2025-08-29 DIAGNOSIS — R33.9 URINARY RETENTION: ICD-10-CM

## 2025-08-29 LAB
BILIRUBIN, POC: NORMAL
BLOOD URINE, POC: 250
CLARITY, POC: CLEAR
COLOR, POC: YELLOW
GLUCOSE URINE, POC: NORMAL MG/DL
KETONES, POC: NORMAL MG/DL
LEUKOCYTE EST, POC: NORMAL
NITRITE, POC: NORMAL
PH, POC: 6.5
PROTEIN, POC: NORMAL MG/DL
SPECIFIC GRAVITY, POC: 1.01
UROBILINOGEN, POC: NORMAL MG/DL

## 2025-08-29 PROCEDURE — G8417 CALC BMI ABV UP PARAM F/U: HCPCS | Performed by: OBSTETRICS & GYNECOLOGY

## 2025-08-29 PROCEDURE — 1159F MED LIST DOCD IN RCRD: CPT | Performed by: OBSTETRICS & GYNECOLOGY

## 2025-08-29 PROCEDURE — 1160F RVW MEDS BY RX/DR IN RCRD: CPT | Performed by: OBSTETRICS & GYNECOLOGY

## 2025-08-29 PROCEDURE — 1036F TOBACCO NON-USER: CPT | Performed by: OBSTETRICS & GYNECOLOGY

## 2025-08-29 PROCEDURE — 3077F SYST BP >= 140 MM HG: CPT | Performed by: OBSTETRICS & GYNECOLOGY

## 2025-08-29 PROCEDURE — 3017F COLORECTAL CA SCREEN DOC REV: CPT | Performed by: OBSTETRICS & GYNECOLOGY

## 2025-08-29 PROCEDURE — 1123F ACP DISCUSS/DSCN MKR DOCD: CPT | Performed by: OBSTETRICS & GYNECOLOGY

## 2025-08-29 PROCEDURE — 3078F DIAST BP <80 MM HG: CPT | Performed by: OBSTETRICS & GYNECOLOGY

## 2025-08-29 PROCEDURE — G8427 DOCREV CUR MEDS BY ELIG CLIN: HCPCS | Performed by: OBSTETRICS & GYNECOLOGY

## 2025-08-29 PROCEDURE — 1090F PRES/ABSN URINE INCON ASSESS: CPT | Performed by: OBSTETRICS & GYNECOLOGY

## 2025-08-29 PROCEDURE — G8399 PT W/DXA RESULTS DOCUMENT: HCPCS | Performed by: OBSTETRICS & GYNECOLOGY

## 2025-08-29 PROCEDURE — 81003 URINALYSIS AUTO W/O SCOPE: CPT | Performed by: OBSTETRICS & GYNECOLOGY

## 2025-08-29 PROCEDURE — 0509F URINE INCON PLAN DOCD: CPT | Performed by: OBSTETRICS & GYNECOLOGY

## 2025-08-29 PROCEDURE — 99459 PELVIC EXAMINATION: CPT | Performed by: OBSTETRICS & GYNECOLOGY

## 2025-08-29 PROCEDURE — 99213 OFFICE O/P EST LOW 20 MIN: CPT | Performed by: OBSTETRICS & GYNECOLOGY

## 2025-09-03 PROBLEM — R39.15 URGENCY OF URINATION: Status: ACTIVE | Noted: 2025-09-03

## 2025-09-03 PROBLEM — N39.46 MIXED INCONTINENCE URGE AND STRESS (MALE)(FEMALE): Status: ACTIVE | Noted: 2025-09-03

## (undated) DEVICE — APPLICATOR MEDICATED 26 CC SOLUTION HI LT ORNG CHLORAPREP

## (undated) DEVICE — SOLUTION IV 1000ML LAC RINGERS PH 6.5 INJ USP VIAFLX PLAS

## (undated) DEVICE — 1200CC GUARDIAN II: Brand: GUARDIAN

## (undated) DEVICE — GOWN,SIRUS,NONRNF,SETINSLV,2XL,18/CS: Brand: MEDLINE

## (undated) DEVICE — ANGIOGRAPHIC CATHETER: Brand: EXPO™

## (undated) DEVICE — BITEBLOCK 54FR W/ DENT RIM BLOX

## (undated) DEVICE — GOWN,SIRUS,NONRNF,SETINSLV,XL,20/CS: Brand: MEDLINE

## (undated) DEVICE — Device

## (undated) DEVICE — Z DISCONTINUED GLOVE SURG SZ 7.5 L12IN FNGR THK13MIL WHT ISOLEX

## (undated) DEVICE — SOLUTION IV 1000ML 0.9% SOD CHL PH 5 INJ USP VIAFLX PLAS

## (undated) DEVICE — STRAP,POSITIONING,KNEE/BODY,FOAM,4X60": Brand: MEDLINE

## (undated) DEVICE — SUTURE NONABSORBABLE MONOFILAMENT 3-0 PS-1 18 IN BLK ETHILON 1663H

## (undated) DEVICE — 450 ML BOTTLE OF 0.05% CHLORHEXIDINE GLUCONATE IN 99.95% STERILE WATER FOR IRRIGATION, USP AND APPLICATOR.: Brand: IRRISEPT ANTIMICROBIAL WOUND LAVAGE

## (undated) DEVICE — NEEDLE, QUINCKE, 18GX3.5": Brand: MEDLINE

## (undated) DEVICE — 1810 FOAM BLOCK NEEDLE COUNTER: Brand: DEVON

## (undated) DEVICE — STRAP ARMBRD W1.5XL32IN FOAM STR YET SFT W/ HK AND LOOP

## (undated) DEVICE — GAUZE,SPONGE,FLUFF,6"X6.75",STRL,5/TRAY: Brand: MEDLINE

## (undated) DEVICE — GOWN,AURORA,NONREINFORCED,LARGE: Brand: MEDLINE

## (undated) DEVICE — BANDAGE COMPR W6INXL15YD WHT BGE POLY COT WV E HK LOOP CLSR

## (undated) DEVICE — CYSTO/BLADDER IRRIGATION SET, REGULATING CLAMP

## (undated) DEVICE — GUIDEWIRE WITH ICE™ HYDROPHILIC COATING: Brand: LUGE™

## (undated) DEVICE — MEDI-VAC YANKAUER SUCTION HANDLE W/BULBOUS TIP: Brand: CARDINAL HEALTH

## (undated) DEVICE — COLLECTOR SPEC RAYON LIQ STUART DBL FOR THRT VAG SKIN WND

## (undated) DEVICE — PAD,ABDOMINAL,8"X7.5",ST,LF,20/BX: Brand: MEDLINE INDUSTRIES, INC.

## (undated) DEVICE — DRESSING FOAM W4XL4IN AG SIL FACE BORD IONIC ANTIMIC ADH

## (undated) DEVICE — CLEANSER WND CLN DEB SYS IRRISEPT

## (undated) DEVICE — GARMENT,MEDLINE,DVT,INT,CALF,MED, GEN2: Brand: MEDLINE

## (undated) DEVICE — CANISTER NEG PRSS 1000ML W/ GEL INFOVAC

## (undated) DEVICE — SURGICAL PROCEDURE PACK PLAS C

## (undated) DEVICE — DRAPE SURG LAPAROSCOPY 14X6 IN WNG

## (undated) DEVICE — MEDI-VAC NON-CONDUCTIVE SUCTION TUBING: Brand: CARDINAL HEALTH

## (undated) DEVICE — CATHETER GUID AD 6FR L100CM GRN NYL PTFE 3DRC WLLMS TECH

## (undated) DEVICE — 60-7070-103 TRNQT,DPSB,PLC RED: Brand: MEDLINE RENEWAL

## (undated) DEVICE — CONTAINER,SPECIMEN,4OZ,OR STRL: Brand: MEDLINE

## (undated) DEVICE — CASSETTE THER 38 MM W/ INSTILLATION TBNG VAC VERALINK

## (undated) DEVICE — 60-7070-104 TRNQT,DPSB,PLC GREEN: Brand: MEDLINE RENEWAL

## (undated) DEVICE — GUIDEWIRE 35/260/FC/PTFE/3J: Brand: GUIDEWIRE

## (undated) DEVICE — BANDAGE,GAUZE,BULKEE II,4.5"X4.1YD,STRL: Brand: MEDLINE

## (undated) DEVICE — SOLUTION SURG PREP POV IOD 7.5% 4 OZ

## (undated) DEVICE — BAND COMPR L24CM REG CLR PLAS HEMSTAT EXT HK AND LOOP RETEN

## (undated) DEVICE — SOLUTION IV 250ML 0.9% SOD CHL PH 5 INJ USP VIAFLX PLAS

## (undated) DEVICE — SYRINGE MED 50ML LUERLOCK TIP

## (undated) DEVICE — CATHETER GUID AD 6FR L100CM COR PERIPH GRN NYL PTFE XB L 3

## (undated) DEVICE — SUTURE MONOCRYL SZ 3-0 L27IN ABSRB UD L24MM PS-1 3/8 CIR PRIM Y936H

## (undated) DEVICE — ENDO KIT W/SYRINGE: Brand: MEDLINE INDUSTRIES, INC.

## (undated) DEVICE — SYSTEM WND THER 14 DAY 150 CC CANSTR THER UNIT PREVENA + 125

## (undated) DEVICE — GLIDESHEATH SLENDER STAINLESS STEEL KIT: Brand: GLIDESHEATH SLENDER

## (undated) DEVICE — HANDPIECE SET WITH COAXIAL HIGH FLOW TIP AND SUCTION TUBE: Brand: INTERPULSE

## (undated) DEVICE — GLOVE ORTHO 8   MSG9480

## (undated) DEVICE — NO USE 18 MONTHS GOWN STD LG  1153D

## (undated) DEVICE — SUTURE MONOCRYL SZ 2-0 L27IN ABSRB UD SH L26MM TAPERPOINT NDL Y417H

## (undated) DEVICE — SYRINGE CATH TIP 50ML

## (undated) DEVICE — PROTECTOR ULN NRV PUR FOAM HK LOOP STRP ANATOMICALLY

## (undated) DEVICE — BOTTLE DRAINAGE 1000 CC W/ TBNG SET STRL PRE-VAC

## (undated) DEVICE — CEMENT MIXING SYSTEM WITH FEMORAL BREAKWAY NOZZLE: Brand: REVOLUTION

## (undated) DEVICE — SINGLE-USE BIOPSY FORCEPS: Brand: RADIAL JAW 4

## (undated) DEVICE — Device: Brand: PROWATER

## (undated) DEVICE — SURGICAL PROCEDURE TRAY CRD CATH SVMMC

## (undated) DEVICE — BANDAGE COBAN 6 IN WND 6INX5YD FOAM

## (undated) DEVICE — CATH BLLN ANGIO 2X12MM SC EUPHORA RX

## (undated) DEVICE — SOLUTION WOUND IRRIGATION PRONTOSAN 350ML

## (undated) DEVICE — BRUSH TOOTH SURG SCRB FOR CANN CLN

## (undated) DEVICE — KIT NEG PRSS M DSG FOAM VAC VERAFLO

## (undated) DEVICE — GLOVE ORANGE PI 8   MSG9080

## (undated) DEVICE — DEFENDO AIR WATER SUCTION AND BIOPSY VALVE KIT FOR  OLYMPUS: Brand: DEFENDO AIR/WATER/SUCTION AND BIOPSY VALVE

## (undated) DEVICE — COVER LT HNDL BLU PLAS

## (undated) DEVICE — PREP SOL PVP IODINE 4%  4 OZ/BTL

## (undated) DEVICE — STOCKINETTE,IMPERVIOUS,12X48,STERILE: Brand: MEDLINE

## (undated) DEVICE — CATHETER COR DIAG JUDKINS L 3.5 CRV 6FR 100CM 0 SIDE H

## (undated) DEVICE — THE STERILE LIGHT HANDLE COVER IS USED WITH STERIS SURGICAL LIGHTING AND VISUALIZATION SYSTEMS.

## (undated) DEVICE — SUTURE ETHILON SZ 2-0 L18IN NONABSORBABLE BLK L26MM PS 3/8 585H

## (undated) DEVICE — DRAPE,U/ SHT,SPLIT,PLAS,STERIL: Brand: MEDLINE

## (undated) DEVICE — PENCIL ES L3M BTTN SWCH HOLSTER W/ BLDE ELECTRD EDGE

## (undated) DEVICE — BLADE,CARBON-STEEL,15,STRL,DISPOSABLE: Brand: MEDLINE

## (undated) DEVICE — DRESSING NEG PRESSURE WND VAC

## (undated) DEVICE — STRIP,CLOSURE,WOUND,MEDI-STRIP,1/2X4: Brand: MEDLINE

## (undated) DEVICE — SHEET,DRAPE,70X100,STERILE: Brand: MEDLINE

## (undated) DEVICE — INTENDED FOR TISSUE SEPARATION, AND OTHER PROCEDURES THAT REQUIRE A SHARP SURGICAL BLADE TO PUNCTURE OR CUT.: Brand: BARD-PARKER ® CARBON RIB-BACK BLADES

## (undated) DEVICE — CATH BLLN ANGIO 2.50X20MM SC EUPHORA RX

## (undated) DEVICE — YANKAUER,FLEXIBLE HANDLE,REGLR CAPACITY: Brand: MEDLINE INDUSTRIES, INC.

## (undated) DEVICE — SUTURE MONOCRYL SZ 2-0 L36IN ABSRB UD L36MM CT-1 1/2 CIR Y945H

## (undated) DEVICE — POOLE SUCTION HANDLE: Brand: CARDINAL HEALTH

## (undated) DEVICE — CONVERTED USE 248065 TOWELS OR BL ST

## (undated) DEVICE — KIT PERICARDCENT W/ 6FR PGTL CATH DIL ANES NDL LUERLOCK SYR

## (undated) DEVICE — GAUZE,PACKING STRIP,IODOFORM,2"X5YD,STRL: Brand: CURAD

## (undated) DEVICE — SOLUTION SURG SCRB CHLORHEXIDINE GLUC 4% 1 GL JUG HIBICLN

## (undated) DEVICE — SOLUTION IRRIG 3000ML 0.9% SOD CHL USP UROMATIC PLAS CONT

## (undated) DEVICE — SWAB CULT DBL W/O CHAR RAYON TIP AMIES GEL CLMN FOR COLL

## (undated) DEVICE — LOOP VES W25MM THK1MM MAXI RED SIL FLD REPELLENT 100 PER

## (undated) DEVICE — SURGICAL SUCTION CONNECTING TUBE WITH MALE CONNECTOR AND SUCTION CLAMP, 2 FT. LONG (.6 M), 5 MM I.D.: Brand: CONMED

## (undated) DEVICE — SYRINGE BLB 2 PC STER 50

## (undated) DEVICE — CONVERTED USE 338908 SPONGES LAP 18X18 ST

## (undated) DEVICE — PADDING CAST W6INXL4YD COT LO LINTING WYTEX

## (undated) DEVICE — DEVICE COMPR LNG 27 CM VASC BND

## (undated) DEVICE — Z DISCONTINUED BY MEDLINE USE 2711682 TRAY SKIN PREP DRY W/ PREM GLV

## (undated) DEVICE — STERILE PATIENT PROTECTIVE PAD FOR IMP® KNEE POSITIONERS & COHESIVE WRAP (10 / CASE): Brand: DE MAYO KNEE POSITIONER®

## (undated) DEVICE — DRESSING NEG PRSS 13CM PREVENA

## (undated) DEVICE — TIP SUCTION YANKAUER STD FLX W/ FLANGE NO VENT STRL

## (undated) DEVICE — 1LYRTR 16FR10ML100%SIL UMS SNP: Brand: MEDLINE INDUSTRIES, INC.